# Patient Record
Sex: FEMALE | Race: BLACK OR AFRICAN AMERICAN | NOT HISPANIC OR LATINO | Employment: UNEMPLOYED | URBAN - METROPOLITAN AREA
[De-identification: names, ages, dates, MRNs, and addresses within clinical notes are randomized per-mention and may not be internally consistent; named-entity substitution may affect disease eponyms.]

---

## 2017-03-05 ENCOUNTER — HOSPITAL ENCOUNTER (EMERGENCY)
Facility: HOSPITAL | Age: 67
Discharge: HOME | End: 2017-03-05
Attending: EMERGENCY MEDICINE
Payer: MEDICARE

## 2017-03-05 VITALS
BODY MASS INDEX: 48.82 KG/M2 | DIASTOLIC BLOOD PRESSURE: 77 MMHG | TEMPERATURE: 99 F | OXYGEN SATURATION: 96 % | WEIGHT: 293 LBS | HEIGHT: 65 IN | HEART RATE: 88 BPM | SYSTOLIC BLOOD PRESSURE: 181 MMHG | RESPIRATION RATE: 18 BRPM

## 2017-03-05 DIAGNOSIS — M25.512 LEFT SHOULDER PAIN: ICD-10-CM

## 2017-03-05 DIAGNOSIS — M75.32 CALCIFIC TENDONITIS OF LEFT SHOULDER: Primary | ICD-10-CM

## 2017-03-05 PROCEDURE — 96372 THER/PROPH/DIAG INJ SC/IM: CPT

## 2017-03-05 PROCEDURE — 99283 EMERGENCY DEPT VISIT LOW MDM: CPT | Mod: 25

## 2017-03-05 PROCEDURE — 63600175 PHARM REV CODE 636 W HCPCS: Performed by: NURSE PRACTITIONER

## 2017-03-05 RX ORDER — MORPHINE SULFATE 10 MG/ML
6 INJECTION INTRAMUSCULAR; INTRAVENOUS; SUBCUTANEOUS
Status: COMPLETED | OUTPATIENT
Start: 2017-03-05 | End: 2017-03-05

## 2017-03-05 RX ORDER — HYDROCODONE BITARTRATE AND ACETAMINOPHEN 5; 325 MG/1; MG/1
1 TABLET ORAL EVERY 4 HOURS PRN
Qty: 12 TABLET | Refills: 0 | Status: SHIPPED | OUTPATIENT
Start: 2017-03-05 | End: 2017-03-15

## 2017-03-05 RX ADMIN — MORPHINE SULFATE 6 MG: 10 INJECTION INTRAVENOUS at 10:03

## 2017-03-05 NOTE — ED AVS SNAPSHOT
[facility]  [address] [place] [address]               Nisa Reynolds Zac   3/5/2017  8:18 AM   ED    Description:  Female : 1950   Department:  [facility]           Your Care was Coordinated By:     Provider Role From To    Maddy Dodd MD Attending Provider 17 0851 --    Marilyn Martin NP Nurse Practitioner 17 0829 --      Reason for Visit     Shoulder Pain           Diagnoses this Visit        Comments    Calcific tendonitis of left shoulder    -  Primary     Left shoulder pain           ED Disposition     ED Disposition Condition Comment    Discharge             To Do List           Follow-up Information     Follow up with Atilio Downs MD In 1 week.    Specialty:  General Practice    Why:  For further evaluation of symptoms, if no improvement    Contact information:    [address] [place] [address]  [phone]          Follow up with Serjio Baca MD.    Specialty:  Orthopedic Surgery    Why:  ORTHOPEDICS - CALL FOR APPOINTMENT AS NEEDED    Contact information:    [address]  [address]  [place] [address]  [phone]         These Medications        Disp Refills Start End    hydrocodone-acetaminophen 5-325mg (NORCO) 5-325 mg per tablet 12 tablet 0 3/5/2017 3/15/2017    Take 1 tablet by mouth every 4 (four) hours as needed (Breakthrough pain). May cause drowsiness - Oral    Pharmacy: [facility] [place] [facility] - [address] Ph #: [phone]         [facility] On Call     [facility] On Call Nurse Care Line -  Assistance  Registered nurses in the [facility] provide clinical advisement, health education, appointment booking, and other advisory services.  Call for this free service at [phone].             Medications           Message regarding Medications     Verify the changes and/or additions to your medication regime listed below are the same as  discussed with your clinician today.  If any of these changes or additions are incorrect, please notify your healthcare provider.        START taking these NEW medications        Refills    hydrocodone-acetaminophen 5-325mg (NORCO) 5-325 mg per tablet 0    Sig: Take 1 tablet by mouth every 4 (four) hours as needed (Breakthrough pain). May cause drowsiness    Class: Print    Route: Oral      These medications were administered today        Dose Freq    morphine injection 6 mg 6 mg ED 1 Time    Sig: Inject 0.6 mLs (6 mg total) into the muscle ED 1 Time.    Class: Normal    Route: Intramuscular      STOP taking these medications     ondansetron (ZOFRAN) 4 MG tablet Take 1 tablet (4 mg total) by mouth every 8 (eight) hours as needed.           Verify that the below list of medications is an accurate representation of the medications you are currently taking.  If none reported, the list may be blank. If incorrect, please contact your healthcare provider. Carry this list with you in case of emergency.           Current Medications     aliskiren (TEKTURNA) 300 MG Tab Take by mouth once daily.    amlodipine (NORVASC) 10 MG tablet Take 1 tablet (10 mg total) by mouth once daily.    aspirin 81 MG Chew Take 1 tablet (81 mg total) by mouth once daily.    hydrALAZINE (APRESOLINE) 25 MG tablet Take 1 tablet (25 mg total) by mouth every 12 (twelve) hours.    hydrochlorothiazide (HYDRODIURIL) 12.5 MG Tab Take 1 tablet (12.5 mg total) by mouth once daily.    hydrocodone-acetaminophen 7.5-325mg (NORCO) 7.5-325 mg per tablet     insulin glargine (LANTUS) 100 unit/mL injection Inject into the skin every evening.    JANUMET XR 50-1,000 mg TM24 Take 1 tablet by mouth 2 (two) times daily.    loratadine (CLARITIN) 10 mg tablet Take 10 mg by mouth once daily.    saxagliptin (ONGLYZA) 5 mg Tab tablet Take 1 tablet (5 mg total) by mouth once daily.    atorvastatin (LIPITOR) 10 MG tablet Take 10 mg by mouth once daily.    diclofenac sodium  "(VOLTAREN) 1 % Gel Apply 2 g topically 2 (two) times daily.    EASY TOUCH TWIST LANCETS 30 gauge Misc     hydrocodone-acetaminophen 5-325mg (NORCO) 5-325 mg per tablet Take 1 tablet by mouth every 4 (four) hours as needed (Breakthrough pain). May cause drowsiness    LANTUS SOLOSTAR 100 unit/mL (3 mL) InPn pen     meclizine (ANTIVERT) 25 mg tablet Take 1 tablet (25 mg total) by mouth every 6 (six) hours as needed.    morphine injection 6 mg Inject 0.6 mLs (6 mg total) into the muscle ED 1 Time.    TRUETEST TEST STRIPS Strp     urea (CARMOL) 40 % Crea Apply topically 2 (two) times daily.           Clinical Reference Information           Your Vitals Were     BP Pulse Temp Resp Height Weight    174/79 91 100 °F (37.8 °C) (Oral) 18 5' 5" (1.651 m) 151.5 kg (334 lb)    SpO2 BMI             97% 55.58 kg/m2         Allergies as of 3/5/2017        Reactions    Latex, Natural Rubber       Immunizations Administered on Date of Encounter - 3/5/2017     None      ED Micro, Lab, POCT     None      ED Imaging Orders     Start Ordered       Status Ordering Provider    03/05/17 0846 03/05/17 0847  X-Ray Shoulder Trauma Left  1 time imaging      Final result         Discharge Instructions       Your x-ray indicates that you may have calcific tendonitis.  Please see handout for more information.    Try to get plenty of rest and stay well-hydrated on these medications.  You can wear the sling to help alleviate the pain to your shoulder, but please stretch your shoulder several times a day to prevent frozen shoulder.    RICE - Rest, Ice, Compress, Elevate (see handout).    Please take medications as prescribed.    Take Norco for pain, as needed.    Follow up with your primary care doctor or orthopedics in 1 week if symptoms have not improved.    Return to ER for any new or worsening symptoms.      Discharge References/Attachments     TENDONITIS (ENGLISH)      [facility] Sign-Up     Activating your [facility] account is as easy as 1-2-3! "     1) Visit [email or web address], select Sign Up Now, enter this activation code and your date of birth, then select Next.  [email or web address]  Expires: 4/19/2017  9:44 AM      2) Create a username and password to use when you visit [facility] in the future and select a security question in case you lose your password and select Next.    3) Enter your e-mail address and click Sign Up!    Additional Information  If you have questions, please e-mail [email or web address] or call [phone] to talk to our [facility] staff. Remember, [facility] is NOT to be used for urgent needs. For medical emergencies, dial 911.          [facility] complies with applicable Federal civil rights laws and does not discriminate on the basis of race, color, national origin, age, disability, or sex.        Language Assistance Services     ATTENTION: Language assistance services are available, free of charge. Please call [phone].      ATENCIÓN: Si habla español, tiene a hall disposición servicios gratuitos de asistencia lingüística. Llame al [phone].     CHÚ Ý: N?u b?n nói Ti?ng Vi?t, có các d?ch v? h? tr? ngôn ng? mi?n phí dành cho b?n. G?i s? [phone].

## 2017-03-05 NOTE — ED PROVIDER NOTES
Encounter Date: 3/5/2017    SCRIBE #1 NOTE: I, Cathy Padilla , am scribing for, and in the presence of,  Marilyn Martin NP . I have scribed the following portions of the note - Other sections scribed: HPI/ROS .       History     Chief Complaint   Patient presents with    Shoulder Pain     States she has pain in her left shoulder since thursday      Review of patient's allergies indicates:   Allergen Reactions    Latex, natural rubber      HPI Comments: CC: Shoulder Pain     HPI: This 66 y.o. female with HTN and IDDM presents to the ED in her friend's arm sling to the LUE c/o a 3-day hx of atraumatic, acute-onset L sided shoulder pain that has not resolved since onset. Pt states her pain is constant, severe (10/10), and temporarily alleviated with Norco. Pain is exacerbated with movement. She notes she has been frequently sleeping on that shoulder recently. She otherwise denies any recent trauma. She reports similar shoulder pain which began spontaneously and resolved on its own several years ago. Pt denies fever, numbness, weakness, paresthesia to the arms, neck pain, and back pain.       The history is provided by the patient. No  was used.     Past Medical History:   Diagnosis Date    Diabetes mellitus     Hypertension      Past Surgical History:   Procedure Laterality Date    ARTERIAL BYPASS SURGRY      9 tears sgo    CARDIAC SURGERY       SECTION      HYSTERECTOMY      TUBAL LIGATION       Family History   Problem Relation Age of Onset    No Known Problems Father     No Known Problems Mother     No Known Problems Sister     No Known Problems Brother     No Known Problems Maternal Aunt     No Known Problems Maternal Uncle     No Known Problems Paternal Aunt     No Known Problems Paternal Uncle     No Known Problems Maternal Grandmother     No Known Problems Maternal Grandfather     No Known Problems Paternal Grandmother     No Known Problems Paternal Grandfather      Amblyopia Neg Hx     Blindness Neg Hx     Cancer Neg Hx     Cataracts Neg Hx     Diabetes Neg Hx     Glaucoma Neg Hx     Hypertension Neg Hx     Macular degeneration Neg Hx     Retinal detachment Neg Hx     Strabismus Neg Hx     Stroke Neg Hx     Thyroid disease Neg Hx      Social History   Substance Use Topics    Smoking status: Never Smoker    Smokeless tobacco: Never Used    Alcohol use Yes      Comment: occ     Review of Systems   Constitutional: Negative for chills and fever.   HENT: Negative for congestion, ear pain and sore throat.    Eyes: Negative for visual disturbance.   Respiratory: Negative for cough and shortness of breath.    Cardiovascular: Negative for chest pain.   Gastrointestinal: Negative for abdominal pain, diarrhea, nausea and vomiting.   Genitourinary: Negative for dysuria and hematuria.   Musculoskeletal: Positive for arthralgias (L shoulder ). Negative for back pain, joint swelling, neck pain and neck stiffness.   Skin: Negative for rash.   Neurological: Negative for dizziness, weakness, numbness and headaches.        (-) paresthesia to the arms        Physical Exam   Initial Vitals   BP Pulse Resp Temp SpO2   03/05/17 0807 03/05/17 0807 03/05/17 0807 03/05/17 0807 03/05/17 0807   174/79 91 18 100 °F (37.8 °C) 97 %     Physical Exam    Nursing note and vitals reviewed.  Constitutional: She appears well-developed and well-nourished. She is not diaphoretic.   Eyes: Conjunctivae and EOM are normal. Pupils are equal, round, and reactive to light.   Neck: Normal range of motion. Neck supple.   Pulmonary/Chest: No respiratory distress.   Musculoskeletal:        Left shoulder: She exhibits decreased range of motion (limited d/t pain - unable to flex or extend beyond 90 degrees without severe pain), tenderness (diffuse) and pain. She exhibits no bony tenderness, no swelling, no effusion, no crepitus, no deformity, no laceration, no spasm, normal pulse and normal strength.    Neurological: She is alert and oriented to person, place, and time. She has normal strength.   Skin: Skin is warm and dry.   Psychiatric: She has a normal mood and affect.         ED Course   Procedures  Labs Reviewed - No data to display       X-Rays:   Independently Interpreted Readings:   Other Readings:  Left shoulder x-ray with tiny calcific density projected over the superior lateral humeral head.  No fractures, osseous lesions.       Additional MDM:   Comments: This is an urgent evaluation of a 66-year-old female that presents to the emergency room complaining of atraumatic left shoulder pain.  Pain is worse with movement and she has moderate tenderness to palpation diffusely over the shoulder.  Sensory motor function is intact.  There is no erythema, swelling, warmth.  She denies a history of RA or gout.  XR shows a small calcific density projected over the area adjacent to the humeral head - c/w calcific tendonitis - which would correlate with her history and physical exam.  I have a low suspicion for gout, RA, septic joint at this time.  Will give sling for comfort and rest, with precautions to prevent frozen shoulder  She has run out of her [employer], but sees her PCP in 3 days. Will give short supply for supportive care.  Encouraged warm/cool compresses.  Ortho referral.    Case discussed with attending, , and she is in agreement with plan. Stable for discharge and outpatient follow.  .          Scribe Attestation:   Scribe #1: I performed the above scribed service and the documentation accurately describes the services I performed. I attest to the accuracy of the note.    Attending Attestation:     Physician Attestation Statement for NP/PA:   I discussed this assessment and plan of this patient with the NP/PA, but I did not personally examine the patient. The face to face encounter was performed by the NP/PA.    Other NP/PA Attestation Additions:      Medical Decision Makin y.o.  Female presents with left sided shoulder pain.  She has pain with range of motion of left shoulder.  Left shoulder x-rays independently interpreted by me show degenerative changes present.  Radiology comments that there is evidence of calcific tendinitis.  Given IM morphine in ED.  Septic joint unlikely at this time. Plan to treat symptomatically and refer to PCP and orthopedist. Patient placed in sling for comfort. I have discussed this patient with mid-level provider and agree with physical exam, assessment and plan.        Physician Attestation for Scribe:  Physician Attestation Statement for Scribe #1: I, Marilyn Martin NP  , reviewed documentation, as scribed by Cathy Padilla  in my presence, and it is both accurate and complete.                 ED Course     Clinical Impression:   The primary encounter diagnosis was Calcific tendonitis of left shoulder. A diagnosis of Left shoulder pain was also pertinent to this visit.    Disposition:   Disposition: Discharged  Condition: Stable       Marilyn Martin NP  03/05/17 1783       Maddy Dodd MD  03/05/17 3076

## 2017-03-05 NOTE — DISCHARGE INSTRUCTIONS
Your x-ray indicates that you may have calcific tendonitis.  Please see handout for more information.    Try to get plenty of rest and stay well-hydrated on these medications.  You can wear the sling to help alleviate the pain to your shoulder, but please stretch your shoulder several times a day to prevent frozen shoulder.    RICE - Rest, Ice, Compress, Elevate (see handout).    Please take medications as prescribed.    Take Norco for pain, as needed.    Follow up with your primary care doctor or orthopedics in 1 week if symptoms have not improved.    Return to ER for any new or worsening symptoms.

## 2017-06-18 ENCOUNTER — HOSPITAL ENCOUNTER (INPATIENT)
Facility: HOSPITAL | Age: 67
LOS: 26 days | Discharge: NURSING, REHAB OR LONG-TERM CARE | DRG: 004 | End: 2017-07-14
Attending: EMERGENCY MEDICINE | Admitting: PSYCHIATRY & NEUROLOGY
Payer: MEDICARE

## 2017-06-18 DIAGNOSIS — R79.89 ELEVATED TROPONIN: ICD-10-CM

## 2017-06-18 DIAGNOSIS — J96.01 ACUTE RESPIRATORY FAILURE WITH HYPOXIA: ICD-10-CM

## 2017-06-18 DIAGNOSIS — A41.9 SEPSIS, DUE TO UNSPECIFIED ORGANISM: Primary | ICD-10-CM

## 2017-06-18 DIAGNOSIS — R50.9 FEVER: ICD-10-CM

## 2017-06-18 DIAGNOSIS — R78.81 BACTEREMIA: ICD-10-CM

## 2017-06-18 DIAGNOSIS — I67.4 HYPERTENSIVE ENCEPHALOPATHY: ICD-10-CM

## 2017-06-18 DIAGNOSIS — R13.11 ORAL PHASE DYSPHAGIA: ICD-10-CM

## 2017-06-18 DIAGNOSIS — I63.89 CEREBRAL INFARCTION, WATERSHED DISTRIBUTION, BILATERAL, ACUTE: ICD-10-CM

## 2017-06-18 DIAGNOSIS — R56.9 SEIZURE: ICD-10-CM

## 2017-06-18 DIAGNOSIS — I16.1 HYPERTENSIVE EMERGENCY: ICD-10-CM

## 2017-06-18 DIAGNOSIS — I10 ESSENTIAL HYPERTENSION: ICD-10-CM

## 2017-06-18 LAB
ALBUMIN SERPL BCP-MCNC: 2.7 G/DL
ALP SERPL-CCNC: 137 U/L
ALT SERPL W/O P-5'-P-CCNC: 8 U/L
ANION GAP SERPL CALC-SCNC: 12 MMOL/L
AST SERPL-CCNC: 8 U/L
BACTERIA #/AREA URNS HPF: ABNORMAL /HPF
BASOPHILS # BLD AUTO: 0.01 K/UL
BASOPHILS NFR BLD: 0.1 %
BILIRUB SERPL-MCNC: 1.3 MG/DL
BILIRUB UR QL STRIP: NEGATIVE
BNP SERPL-MCNC: 151 PG/ML
BUN SERPL-MCNC: 9 MG/DL
CALCIUM SERPL-MCNC: 8.7 MG/DL
CHLORIDE SERPL-SCNC: 95 MMOL/L
CLARITY UR: ABNORMAL
CO2 SERPL-SCNC: 30 MMOL/L
COLOR UR: ABNORMAL
CREAT SERPL-MCNC: 0.9 MG/DL
DIFFERENTIAL METHOD: ABNORMAL
EOSINOPHIL # BLD AUTO: 0 K/UL
EOSINOPHIL NFR BLD: 0 %
ERYTHROCYTE [DISTWIDTH] IN BLOOD BY AUTOMATED COUNT: 13.6 %
EST. GFR  (AFRICAN AMERICAN): >60 ML/MIN/1.73 M^2
EST. GFR  (NON AFRICAN AMERICAN): >60 ML/MIN/1.73 M^2
GLUCOSE SERPL-MCNC: 276 MG/DL
GLUCOSE UR QL STRIP: ABNORMAL
HCT VFR BLD AUTO: 38.4 %
HGB BLD-MCNC: 12.4 G/DL
HGB UR QL STRIP: ABNORMAL
HYALINE CASTS #/AREA URNS LPF: 0 /LPF
KETONES UR QL STRIP: NEGATIVE
LACTATE SERPL-SCNC: 1.9 MMOL/L
LEUKOCYTE ESTERASE UR QL STRIP: ABNORMAL
LYMPHOCYTES # BLD AUTO: 1.8 K/UL
LYMPHOCYTES NFR BLD: 11.6 %
MCH RBC QN AUTO: 26.1 PG
MCHC RBC AUTO-ENTMCNC: 32.3 %
MCV RBC AUTO: 81 FL
MICROSCOPIC COMMENT: ABNORMAL
MONOCYTES # BLD AUTO: 0.6 K/UL
MONOCYTES NFR BLD: 3.8 %
NEUTROPHILS # BLD AUTO: 12.8 K/UL
NEUTROPHILS NFR BLD: 84.5 %
NITRITE UR QL STRIP: NEGATIVE
NON-SQ EPI CELLS #/AREA URNS HPF: 2 /HPF
PH UR STRIP: 5 [PH] (ref 5–8)
PLATELET # BLD AUTO: 280 K/UL
PMV BLD AUTO: 10 FL
POTASSIUM SERPL-SCNC: 3.4 MMOL/L
PROT SERPL-MCNC: 8 G/DL
PROT UR QL STRIP: ABNORMAL
RBC # BLD AUTO: 4.75 M/UL
RBC #/AREA URNS HPF: 1 /HPF (ref 0–4)
SODIUM SERPL-SCNC: 137 MMOL/L
SP GR UR STRIP: 1.02 (ref 1–1.03)
SQUAMOUS #/AREA URNS HPF: 0 /HPF
TROPONIN I SERPL DL<=0.01 NG/ML-MCNC: 0.04 NG/ML
URN SPEC COLLECT METH UR: ABNORMAL
UROBILINOGEN UR STRIP-ACNC: NEGATIVE EU/DL
WBC # BLD AUTO: 15.14 K/UL
WBC #/AREA URNS HPF: 8 /HPF (ref 0–5)
YEAST URNS QL MICRO: ABNORMAL

## 2017-06-18 PROCEDURE — 63600175 PHARM REV CODE 636 W HCPCS: Performed by: EMERGENCY MEDICINE

## 2017-06-18 PROCEDURE — 83880 ASSAY OF NATRIURETIC PEPTIDE: CPT

## 2017-06-18 PROCEDURE — 96375 TX/PRO/DX INJ NEW DRUG ADDON: CPT

## 2017-06-18 PROCEDURE — 84484 ASSAY OF TROPONIN QUANT: CPT

## 2017-06-18 PROCEDURE — 83605 ASSAY OF LACTIC ACID: CPT

## 2017-06-18 PROCEDURE — 12000002 HC ACUTE/MED SURGE SEMI-PRIVATE ROOM

## 2017-06-18 PROCEDURE — 93005 ELECTROCARDIOGRAM TRACING: CPT

## 2017-06-18 PROCEDURE — 80053 COMPREHEN METABOLIC PANEL: CPT

## 2017-06-18 PROCEDURE — 96365 THER/PROPH/DIAG IV INF INIT: CPT

## 2017-06-18 PROCEDURE — P9612 CATHETERIZE FOR URINE SPEC: HCPCS

## 2017-06-18 PROCEDURE — 87040 BLOOD CULTURE FOR BACTERIA: CPT | Mod: 59

## 2017-06-18 PROCEDURE — 85025 COMPLETE CBC W/AUTO DIFF WBC: CPT

## 2017-06-18 PROCEDURE — 96376 TX/PRO/DX INJ SAME DRUG ADON: CPT

## 2017-06-18 PROCEDURE — 81000 URINALYSIS NONAUTO W/SCOPE: CPT

## 2017-06-18 PROCEDURE — 96366 THER/PROPH/DIAG IV INF ADDON: CPT

## 2017-06-18 PROCEDURE — 83036 HEMOGLOBIN GLYCOSYLATED A1C: CPT

## 2017-06-18 PROCEDURE — 25000003 PHARM REV CODE 250: Performed by: EMERGENCY MEDICINE

## 2017-06-18 PROCEDURE — 87086 URINE CULTURE/COLONY COUNT: CPT

## 2017-06-18 PROCEDURE — 99291 CRITICAL CARE FIRST HOUR: CPT | Mod: 25

## 2017-06-18 RX ORDER — INSULIN ASPART 100 [IU]/ML
0-5 INJECTION, SOLUTION INTRAVENOUS; SUBCUTANEOUS
Status: DISCONTINUED | OUTPATIENT
Start: 2017-06-19 | End: 2017-06-19

## 2017-06-18 RX ORDER — LABETALOL HYDROCHLORIDE 5 MG/ML
10 INJECTION, SOLUTION INTRAVENOUS
Status: COMPLETED | OUTPATIENT
Start: 2017-06-18 | End: 2017-06-18

## 2017-06-18 RX ORDER — PROCHLORPERAZINE EDISYLATE 5 MG/ML
10 INJECTION INTRAMUSCULAR; INTRAVENOUS ONCE
Status: COMPLETED | OUTPATIENT
Start: 2017-06-18 | End: 2017-06-18

## 2017-06-18 RX ORDER — MORPHINE SULFATE 10 MG/ML
5 INJECTION INTRAMUSCULAR; INTRAVENOUS; SUBCUTANEOUS ONCE
Status: COMPLETED | OUTPATIENT
Start: 2017-06-19 | End: 2017-06-18

## 2017-06-18 RX ORDER — IBUPROFEN 200 MG
16 TABLET ORAL
Status: DISCONTINUED | OUTPATIENT
Start: 2017-06-19 | End: 2017-06-27

## 2017-06-18 RX ORDER — HYDRALAZINE HYDROCHLORIDE 20 MG/ML
10 INJECTION INTRAMUSCULAR; INTRAVENOUS
Status: COMPLETED | OUTPATIENT
Start: 2017-06-18 | End: 2017-06-18

## 2017-06-18 RX ORDER — LABETALOL HYDROCHLORIDE 5 MG/ML
20 INJECTION, SOLUTION INTRAVENOUS EVERY 4 HOURS PRN
Status: DISCONTINUED | OUTPATIENT
Start: 2017-06-18 | End: 2017-06-19

## 2017-06-18 RX ORDER — IBUPROFEN 200 MG
24 TABLET ORAL
Status: DISCONTINUED | OUTPATIENT
Start: 2017-06-19 | End: 2017-06-27

## 2017-06-18 RX ORDER — DIPHENHYDRAMINE HYDROCHLORIDE 50 MG/ML
25 INJECTION INTRAMUSCULAR; INTRAVENOUS
Status: COMPLETED | OUTPATIENT
Start: 2017-06-18 | End: 2017-06-18

## 2017-06-18 RX ORDER — LABETALOL HYDROCHLORIDE 5 MG/ML
20 INJECTION, SOLUTION INTRAVENOUS
Status: COMPLETED | OUTPATIENT
Start: 2017-06-18 | End: 2017-06-18

## 2017-06-18 RX ADMIN — HYDRALAZINE HYDROCHLORIDE 10 MG: 20 INJECTION INTRAMUSCULAR; INTRAVENOUS at 07:06

## 2017-06-18 RX ADMIN — CEFTRIAXONE 1 G: 1 INJECTION, SOLUTION INTRAVENOUS at 10:06

## 2017-06-18 RX ADMIN — DIPHENHYDRAMINE HYDROCHLORIDE 25 MG: 50 INJECTION, SOLUTION INTRAMUSCULAR; INTRAVENOUS at 08:06

## 2017-06-18 RX ADMIN — LABETALOL HYDROCHLORIDE 20 MG: 5 INJECTION, SOLUTION INTRAVENOUS at 10:06

## 2017-06-18 RX ADMIN — MORPHINE SULFATE 5 MG: 10 INJECTION INTRAVENOUS at 11:06

## 2017-06-18 RX ADMIN — LABETALOL HYDROCHLORIDE 10 MG: 5 INJECTION, SOLUTION INTRAVENOUS at 10:06

## 2017-06-18 RX ADMIN — PROCHLORPERAZINE EDISYLATE 10 MG: 5 INJECTION INTRAMUSCULAR; INTRAVENOUS at 08:06

## 2017-06-18 NOTE — LETTER
June 20, 2017                      [place] [address]  Phone: [phone] TO: Human Resources         [place]      RE:  Shayna Owen     Please assist Ms Owen to adjust her work schedule. Her mother admitted to the hospital on 6/18/2017, is currently in ICU in serious condition. Ms Owen is needed at this time at bedside to met with the medical team throughout the day.    Thank you for your consideration.          Dasia Talbot [facility]   [place]  [phone]

## 2017-06-18 NOTE — LETTER
June 20, 2017                      [place] [address]  Phone: [phone] TO: Human Resources         [facility] [place]      RE:  Shayna Owen     Please assist Ms wOen to adjust her work schedule. Her mother admitted to the hospital on 6/18/2017, is currently in ICU in serious condition. Ms Owen is needed at this time at bedside to met with the medical team throughout the day.    Thank you for your consideration.          Dasia Talbot [facility]   [place]  [phone]

## 2017-06-19 PROBLEM — E78.5 HYPERLIPIDEMIA: Chronic | Status: ACTIVE | Noted: 2017-06-19

## 2017-06-19 PROBLEM — I25.10 CAD (CORONARY ARTERY DISEASE): Chronic | Status: ACTIVE | Noted: 2017-06-19

## 2017-06-19 PROBLEM — N12 PYELONEPHRITIS: Status: ACTIVE | Noted: 2017-06-19

## 2017-06-19 PROBLEM — A41.9 SEPSIS: Status: ACTIVE | Noted: 2017-06-19

## 2017-06-19 PROBLEM — E44.0 MODERATE PROTEIN MALNUTRITION: Chronic | Status: ACTIVE | Noted: 2017-06-19

## 2017-06-19 PROBLEM — I16.1 HYPERTENSIVE EMERGENCY: Status: ACTIVE | Noted: 2017-06-18

## 2017-06-19 PROBLEM — N39.0 UTI (URINARY TRACT INFECTION): Status: ACTIVE | Noted: 2017-06-19

## 2017-06-19 PROBLEM — E87.6 HYPOKALEMIA: Status: ACTIVE | Noted: 2017-06-19

## 2017-06-19 LAB
ALBUMIN SERPL BCP-MCNC: 2.7 G/DL
ALLENS TEST: ABNORMAL
ALLENS TEST: ABNORMAL
ALP SERPL-CCNC: 127 U/L
ALT SERPL W/O P-5'-P-CCNC: 8 U/L
ANION GAP SERPL CALC-SCNC: 18 MMOL/L
ANISOCYTOSIS BLD QL SMEAR: SLIGHT
AST SERPL-CCNC: 14 U/L
BASOPHILS # BLD AUTO: 0.04 K/UL
BASOPHILS NFR BLD: 0.3 %
BILIRUB SERPL-MCNC: 1.2 MG/DL
BUN SERPL-MCNC: 12 MG/DL
CALCIUM SERPL-MCNC: 8.5 MG/DL
CHLORIDE SERPL-SCNC: 96 MMOL/L
CO2 SERPL-SCNC: 23 MMOL/L
CREAT SERPL-MCNC: 0.9 MG/DL
DACRYOCYTES BLD QL SMEAR: ABNORMAL
DELSYS: ABNORMAL
DELSYS: ABNORMAL
DIFFERENTIAL METHOD: ABNORMAL
EOSINOPHIL # BLD AUTO: 0 K/UL
EOSINOPHIL NFR BLD: 0.1 %
ERYTHROCYTE [DISTWIDTH] IN BLOOD BY AUTOMATED COUNT: 13.7 %
EST. GFR  (AFRICAN AMERICAN): >60 ML/MIN/1.73 M^2
EST. GFR  (NON AFRICAN AMERICAN): >60 ML/MIN/1.73 M^2
ESTIMATED AVG GLUCOSE: 252 MG/DL
ESTIMATED AVG GLUCOSE: 255 MG/DL
FIO2: 30
FLOW: 2
GLUCOSE SERPL-MCNC: 253 MG/DL
HBA1C MFR BLD HPLC: 10.4 %
HBA1C MFR BLD HPLC: 10.5 %
HCO3 UR-SCNC: 32 MMOL/L (ref 24–28)
HCO3 UR-SCNC: 32.5 MMOL/L (ref 24–28)
HCT VFR BLD AUTO: 36.4 %
HGB BLD-MCNC: 12 G/DL
HYPOCHROMIA BLD QL SMEAR: ABNORMAL
LYMPHOCYTES # BLD AUTO: 2.2 K/UL
LYMPHOCYTES NFR BLD: 13.8 %
MCH RBC QN AUTO: 26 PG
MCHC RBC AUTO-ENTMCNC: 33 %
MCV RBC AUTO: 79 FL
MIN VOL: 9
MODE: ABNORMAL
MODE: ABNORMAL
MONOCYTES # BLD AUTO: 0.9 K/UL
MONOCYTES NFR BLD: 5.7 %
NEUTROPHILS # BLD AUTO: 12.8 K/UL
NEUTROPHILS NFR BLD: 81 %
NRBC BLD-RTO: 0 /100 WBC
OVALOCYTES BLD QL SMEAR: ABNORMAL
PCO2 BLDA: 43 MMHG (ref 35–45)
PCO2 BLDA: 44.9 MMHG (ref 35–45)
PEEP: 17
PH SMN: 7.47 [PH] (ref 7.35–7.45)
PH SMN: 7.48 [PH] (ref 7.35–7.45)
PLATELET # BLD AUTO: 259 K/UL
PMV BLD AUTO: 10 FL
PO2 BLDA: 63 MMHG (ref 80–100)
PO2 BLDA: 82 MMHG (ref 80–100)
POC BE: 8 MMOL/L
POC BE: 8 MMOL/L
POC SATURATED O2: 93 % (ref 95–100)
POC SATURATED O2: 97 % (ref 95–100)
POC TCO2: 33 MMOL/L (ref 23–27)
POC TCO2: 34 MMOL/L (ref 23–27)
POCT GLUCOSE: 212 MG/DL (ref 70–110)
POCT GLUCOSE: 229 MG/DL (ref 70–110)
POCT GLUCOSE: 234 MG/DL (ref 70–110)
POCT GLUCOSE: 268 MG/DL (ref 70–110)
POCT GLUCOSE: 270 MG/DL (ref 70–110)
POIKILOCYTOSIS BLD QL SMEAR: SLIGHT
POLYCHROMASIA BLD QL SMEAR: ABNORMAL
POTASSIUM SERPL-SCNC: 3.9 MMOL/L
PROT SERPL-MCNC: 7.4 G/DL
RBC # BLD AUTO: 4.61 M/UL
SAMPLE: ABNORMAL
SAMPLE: ABNORMAL
SITE: ABNORMAL
SITE: ABNORMAL
SODIUM SERPL-SCNC: 137 MMOL/L
SP02: 95
SP02: 97
SPONT RATE: 28
TROPONIN I SERPL DL<=0.01 NG/ML-MCNC: 0.03 NG/ML
TROPONIN I SERPL DL<=0.01 NG/ML-MCNC: 0.04 NG/ML
WBC # BLD AUTO: 15.99 K/UL

## 2017-06-19 PROCEDURE — 82803 BLOOD GASES ANY COMBINATION: CPT

## 2017-06-19 PROCEDURE — 63600175 PHARM REV CODE 636 W HCPCS: Performed by: INTERNAL MEDICINE

## 2017-06-19 PROCEDURE — 21400001 HC TELEMETRY ROOM

## 2017-06-19 PROCEDURE — 25000003 PHARM REV CODE 250: Performed by: EMERGENCY MEDICINE

## 2017-06-19 PROCEDURE — 85025 COMPLETE CBC W/AUTO DIFF WBC: CPT

## 2017-06-19 PROCEDURE — 63600175 PHARM REV CODE 636 W HCPCS: Performed by: EMERGENCY MEDICINE

## 2017-06-19 PROCEDURE — 99900035 HC TECH TIME PER 15 MIN (STAT)

## 2017-06-19 PROCEDURE — 84484 ASSAY OF TROPONIN QUANT: CPT

## 2017-06-19 PROCEDURE — 94660 CPAP INITIATION&MGMT: CPT

## 2017-06-19 PROCEDURE — 27000190 HC CPAP FULL FACE MASK W/VALVE

## 2017-06-19 PROCEDURE — 25000003 PHARM REV CODE 250: Performed by: INTERNAL MEDICINE

## 2017-06-19 PROCEDURE — 36415 COLL VENOUS BLD VENIPUNCTURE: CPT

## 2017-06-19 PROCEDURE — 83036 HEMOGLOBIN GLYCOSYLATED A1C: CPT

## 2017-06-19 PROCEDURE — 80053 COMPREHEN METABOLIC PANEL: CPT

## 2017-06-19 PROCEDURE — 25500020 PHARM REV CODE 255: Performed by: HOSPITALIST

## 2017-06-19 PROCEDURE — 36600 WITHDRAWAL OF ARTERIAL BLOOD: CPT

## 2017-06-19 RX ORDER — SODIUM CHLORIDE 9 MG/ML
INJECTION, SOLUTION INTRAVENOUS CONTINUOUS
Status: DISCONTINUED | OUTPATIENT
Start: 2017-06-19 | End: 2017-06-19

## 2017-06-19 RX ORDER — INSULIN ASPART 100 [IU]/ML
0-5 INJECTION, SOLUTION INTRAVENOUS; SUBCUTANEOUS
Status: DISCONTINUED | OUTPATIENT
Start: 2017-06-19 | End: 2017-06-27

## 2017-06-19 RX ORDER — RAMELTEON 8 MG/1
8 TABLET ORAL NIGHTLY PRN
Status: DISCONTINUED | OUTPATIENT
Start: 2017-06-19 | End: 2017-06-30

## 2017-06-19 RX ORDER — AMLODIPINE BESYLATE 5 MG/1
10 TABLET ORAL DAILY
Status: DISCONTINUED | OUTPATIENT
Start: 2017-06-19 | End: 2017-06-20

## 2017-06-19 RX ORDER — ALISKIREN 150 MG/1
300 TABLET, FILM COATED ORAL DAILY
Status: DISCONTINUED | OUTPATIENT
Start: 2017-06-19 | End: 2017-06-20

## 2017-06-19 RX ORDER — HYDROCODONE BITARTRATE AND ACETAMINOPHEN 7.5; 325 MG/1; MG/1
1 TABLET ORAL EVERY 4 HOURS PRN
Status: DISCONTINUED | OUTPATIENT
Start: 2017-06-19 | End: 2017-06-19

## 2017-06-19 RX ORDER — ONDANSETRON 2 MG/ML
8 INJECTION INTRAMUSCULAR; INTRAVENOUS EVERY 8 HOURS PRN
Status: DISCONTINUED | OUTPATIENT
Start: 2017-06-19 | End: 2017-07-14 | Stop reason: HOSPADM

## 2017-06-19 RX ORDER — CLONIDINE HYDROCHLORIDE 0.1 MG/1
0.3 TABLET ORAL 3 TIMES DAILY PRN
Status: DISCONTINUED | OUTPATIENT
Start: 2017-06-19 | End: 2017-06-20

## 2017-06-19 RX ORDER — ENOXAPARIN SODIUM 100 MG/ML
40 INJECTION SUBCUTANEOUS EVERY 24 HOURS
Status: DISCONTINUED | OUTPATIENT
Start: 2017-06-19 | End: 2017-06-25

## 2017-06-19 RX ORDER — NAPROXEN SODIUM 220 MG/1
81 TABLET, FILM COATED ORAL DAILY
Status: DISCONTINUED | OUTPATIENT
Start: 2017-06-19 | End: 2017-06-25

## 2017-06-19 RX ORDER — LABETALOL HYDROCHLORIDE 5 MG/ML
20 INJECTION, SOLUTION INTRAVENOUS
Status: COMPLETED | OUTPATIENT
Start: 2017-06-19 | End: 2017-06-19

## 2017-06-19 RX ORDER — ATORVASTATIN CALCIUM 10 MG/1
10 TABLET, FILM COATED ORAL DAILY
Status: DISCONTINUED | OUTPATIENT
Start: 2017-06-19 | End: 2017-06-30

## 2017-06-19 RX ORDER — ONDANSETRON 2 MG/ML
4 INJECTION INTRAMUSCULAR; INTRAVENOUS EVERY 12 HOURS PRN
Status: DISCONTINUED | OUTPATIENT
Start: 2017-06-19 | End: 2017-06-19

## 2017-06-19 RX ORDER — HYDROCHLOROTHIAZIDE 12.5 MG/1
12.5 TABLET ORAL DAILY
Status: DISCONTINUED | OUTPATIENT
Start: 2017-06-19 | End: 2017-06-20

## 2017-06-19 RX ORDER — POTASSIUM CHLORIDE 20 MEQ/1
40 TABLET, EXTENDED RELEASE ORAL ONCE
Status: COMPLETED | OUTPATIENT
Start: 2017-06-19 | End: 2017-06-19

## 2017-06-19 RX ORDER — ACETAMINOPHEN 325 MG/1
650 TABLET ORAL
Status: COMPLETED | OUTPATIENT
Start: 2017-06-19 | End: 2017-06-19

## 2017-06-19 RX ORDER — ACETAMINOPHEN 500 MG
500 TABLET ORAL EVERY 6 HOURS PRN
Status: DISCONTINUED | OUTPATIENT
Start: 2017-06-19 | End: 2017-06-30

## 2017-06-19 RX ORDER — HYDRALAZINE HYDROCHLORIDE 25 MG/1
25 TABLET, FILM COATED ORAL EVERY 12 HOURS
Status: DISCONTINUED | OUTPATIENT
Start: 2017-06-19 | End: 2017-06-20

## 2017-06-19 RX ADMIN — CEFTRIAXONE 1 G: 1 INJECTION, SOLUTION INTRAVENOUS at 09:06

## 2017-06-19 RX ADMIN — ATORVASTATIN CALCIUM 10 MG: 10 TABLET, FILM COATED ORAL at 08:06

## 2017-06-19 RX ADMIN — SODIUM CHLORIDE: 0.9 INJECTION, SOLUTION INTRAVENOUS at 03:06

## 2017-06-19 RX ADMIN — AMLODIPINE BESYLATE 10 MG: 5 TABLET ORAL at 08:06

## 2017-06-19 RX ADMIN — CLONIDINE HYDROCHLORIDE 0.3 MG: 0.1 TABLET ORAL at 08:06

## 2017-06-19 RX ADMIN — HYDRALAZINE HYDROCHLORIDE 25 MG: 25 TABLET ORAL at 09:06

## 2017-06-19 RX ADMIN — INSULIN ASPART 1 UNITS: 100 INJECTION, SOLUTION INTRAVENOUS; SUBCUTANEOUS at 09:06

## 2017-06-19 RX ADMIN — ASPIRIN 81 MG 81 MG: 81 TABLET ORAL at 08:06

## 2017-06-19 RX ADMIN — IOHEXOL 100 ML: 350 INJECTION, SOLUTION INTRAVENOUS at 06:06

## 2017-06-19 RX ADMIN — HYDRALAZINE HYDROCHLORIDE 25 MG: 25 TABLET ORAL at 12:06

## 2017-06-19 RX ADMIN — HYDRALAZINE HYDROCHLORIDE 25 MG: 25 TABLET ORAL at 08:06

## 2017-06-19 RX ADMIN — LABETALOL HYDROCHLORIDE 20 MG: 5 INJECTION, SOLUTION INTRAVENOUS at 12:06

## 2017-06-19 RX ADMIN — ALISKIREN HEMIFUMARATE 300 MG: 150 TABLET, FILM COATED ORAL at 11:06

## 2017-06-19 RX ADMIN — ENOXAPARIN SODIUM 40 MG: 100 INJECTION SUBCUTANEOUS at 04:06

## 2017-06-19 RX ADMIN — HYDROCHLOROTHIAZIDE 12.5 MG: 12.5 TABLET ORAL at 08:06

## 2017-06-19 RX ADMIN — CEFTRIAXONE 1 G: 1 INJECTION, SOLUTION INTRAVENOUS at 11:06

## 2017-06-19 RX ADMIN — POTASSIUM CHLORIDE 40 MEQ: 1500 TABLET, EXTENDED RELEASE ORAL at 06:06

## 2017-06-19 RX ADMIN — ACETAMINOPHEN 650 MG: 325 TABLET, FILM COATED ORAL at 12:06

## 2017-06-19 NOTE — ASSESSMENT & PLAN NOTE
As addressed above.  Will restart her home regimen of aliskiren, amlodipine, hydralazine, and hydrochlorothiazide.

## 2017-06-19 NOTE — PLAN OF CARE
Problem: Diabetes, Type 2 (Adult)  Intervention: Support/Optimize Psychosocial Response to Condition   17   Coping/Psychosocial Interventions   Supportive Measures active listening utilized;positive reinforcement provided;verbalization of feelings encouraged   Environmental Support calm environment promoted     Intervention: Optimize Glycemic Control   17   Nutrition Interventions   Glycemic Management blood glucose monitoring       Goal: Signs and Symptoms of Listed Potential Problems Will be Absent, Minimized or Managed (Diabetes, Type 2)  Signs and symptoms of listed potential problems will be absent, minimized or managed by discharge/transition of care (reference Diabetes, Type 2 (Adult) CPG).   Outcome: Ongoing (interventions implemented as appropriate)   17   Diabetes, Type 2   Problems Assessed (Type 2 Diabetes) all   Problems Present (Type 2 Diabetes) hyperglycemia       Problem: Fall Risk (Adult)  Intervention: Monitor/Assist with Self Care   17 0144 17 1702 17   Functional Level Current   Ambulation --  --  2 - assistive person   Transferring --  --  2 - assistive person   Toileting --  --  2 - assistive person   Bathing --  --  2 - assistive person   Dressing --  --  0 - independent   Eating --  --  0 - independent   Communication --  --  0 - understands/communicates without difficulty   Swallowing --  --  0 - swallows foods/liquids without difficulty   Daily Care Interventions   Self-Care Promotion independence encouraged --  --    Activity   Activity Assistance Provided --  independent --      Intervention: Reduce Risk/Promote Restraint Free Environment   17   Safety Interventions   Environmental Safety Modification assistive device/personal items within reach;clutter free environment maintained;room near unit station;room organization consistent   Prevent  Drop/Fall   Safety/Security Measures bed alarm set     Intervention:  Review Medications/Identify Contributors to Fall Risk   06/19/17 1823   Safety Interventions   Medication Review/Management medications reviewed;infusion held     Intervention: Patient Rounds   06/19/17 1702   Safety Interventions   Patient Rounds bed in low position;bed wheels locked;call light in reach;clutter free environment maintained;ID band on;placement of personal items at bedside;visualized patient     Intervention: Safety Promotion/Fall Prevention   06/19/17 1702   Safety Interventions   Safety Promotion/Fall Prevention assistive device/personal item within reach;bed alarm set;side rails raised x 2       Goal: Identify Related Risk Factors and Signs and Symptoms  Related risk factors and signs and symptoms are identified upon initiation of Human Response Clinical Practice Guideline (CPG)   Outcome: Ongoing (interventions implemented as appropriate)   06/19/17 1823   Fall Risk   Related Risk Factors (Fall Risk) confusion/agitation   Signs and Symptoms (Fall Risk) presence of risk factors     Goal: Absence of Falls  Patient will demonstrate the desired outcomes by discharge/transition of care.   Outcome: Ongoing (interventions implemented as appropriate)   06/19/17 1823   Fall Risk (Adult)   Absence of Falls making progress toward outcome       Problem: Pressure Ulcer Risk (Scar Scale) (Adult,Obstetrics,Pediatric)  Intervention: Prevent/Manage Excess Moisture   06/19/17 1602 06/19/17 1823   Hygiene Care   Perineal Care --  perineum cleansed   Bathing/Skin Care incontinence care;linen changed;bath, complete --      Intervention: Maintain Head of Bed Elevation Less Than 30 Degrees as Tolerated   06/19/17 1702   Positioning   Head of Bed (HOB) HOB at 30-45 degrees     Intervention: Prevent/Minimize Sheer/Friction Injuries   06/19/17 1823   Skin Interventions   Pressure Reduction Techniques frequent weight shift encouraged     Intervention: Turn/Reposition Often   06/19/17 1702 06/19/17 1823   Skin  Interventions   Pressure Reduction Techniques --  frequent weight shift encouraged   Positioning   Body Position positioned/repositioned independently --        Goal: Identify Related Risk Factors and Signs and Symptoms  Related risk factors and signs and symptoms are identified upon initiation of Human Response Clinical Practice Guideline (CPG)   Outcome: Ongoing (interventions implemented as appropriate)   06/19/17 1823   Pressure Ulcer Risk (Scar Scale)   Related Risk Factors (Pressure Ulcer Risk (Scar Scale)) body weight extremes;infection     Goal: Skin Integrity  Patient will demonstrate the desired outcomes by discharge/transition of care.   Outcome: Ongoing (interventions implemented as appropriate)   06/19/17 1823   Pressure Ulcer Risk (Scar Scale) (Adult,Obstetrics,Pediatric)   Skin Integrity making progress toward outcome       Problem: Sepsis/Septic Shock (Adult)  Intervention: Promote Rest/Minimize Oxygen Consumption   06/19/17 1702 06/19/17 1823   Coping/Psychosocial Interventions   Environmental Support --  calm environment promoted   Restraint Interventions   LUE Range Of Motion --  AROM (active range of motion) performed   RUE Range Of Motion --  AROM (active range of motion) performed   LLE Range Of Motion --  AROM (active range of motion) performed   RLE Range Of Motion --  AROM (active range of motion) performed   Activity   Activity Type activity adjusted per tolerance --      Intervention: Provide Oxygenation/Ventilation/Perfusion Support   06/19/17 1702   Activity   Activity Type activity adjusted per tolerance   Positioning   Head of Bed (HOB) HOB at 30-45 degrees     Intervention: Support Psychosocial Response to Life-changing Event/Hospitalization   06/19/17 1823   Coping/Psychosocial Interventions   Supportive Measures active listening utilized;positive reinforcement provided;verbalization of feelings encouraged     Intervention: Prevent Infection Progression   06/19/17 1823    Prevent/Manage Colorectal Surgical Infection   Fever Reduction/Comfort Measures lightweight clothing;lightweight bedding   Safety Interventions   Infection Prevention environmental surveillance   Infection Management aseptic technique maintained     Intervention: Monitor/Manage Perfusion   06/19/17 1823   Safety Interventions   Medication Review/Management medications reviewed;infusion held     Intervention: Prevent/Manage DVT/VTE Risk   06/19/17 0830   OTHER   VTE Required Core Measure Pharmacological prophylaxis initiated/maintained     Intervention: Optimize Glycemic Control   06/19/17 1823   Nutrition Interventions   Glycemic Management blood glucose monitoring       Goal: Signs and Symptoms of Listed Potential Problems Will be Absent, Minimized or Managed (Sepsis/Septic Shock)  Signs and symptoms of listed potential problems will be absent, minimized or managed by discharge/transition of care (reference Sepsis/Septic Shock (Adult) CPG).   Outcome: Ongoing (interventions implemented as appropriate)   06/19/17 1823   Sepsis/Septic Shock   Problems Assessed (Sepsis) all   Problems Present (Sepsis) situational response

## 2017-06-19 NOTE — ED TRIAGE NOTES
Pt reports headache and vomiting x 1 day. Pt states that she took BP at home and it was very high, in 200s. Denies SOB, chest pain.

## 2017-06-19 NOTE — ASSESSMENT & PLAN NOTE
Poorly-controlled on a home regimen of metformin, a DPP-4 inhibitor, basal insulin therapy; will provide basal-prandial insulin along with insulin sliding scale.

## 2017-06-19 NOTE — NURSING
Patient arrived to the floor via stretcher.  Unable to form complete sentences or convey needs at this time.  Most needs to be anticipated by staff.  Remains disoriented to person, place, situation, and time.  Unable to reorient at this time.  Unsuccessful attempt at room orientation noted.  Patient noted to be lethargic at this time.  Patient denies pain at this time. Call bell within reach. Fall precautions in progress.  Will continue to monitor.

## 2017-06-19 NOTE — ASSESSMENT & PLAN NOTE
Patient presented with fever and does have a urinalysis that is suspicious for a urinary tract infection with trace leukocytes, 8 WBCs, and many bacteria.  She meet criteria for sepsis.  There is no previous urine cultures for comparison.  Will start empiric therapy while awaiting culture results.

## 2017-06-19 NOTE — NURSING
Patient noted to continue to present with garbled speech and stuttering at times.  Easily aroused to verbal stimuli.  Continues to present with disorientation to time, situation, and place.  Purposeful hourly rounding in progress.

## 2017-06-19 NOTE — SUBJECTIVE & OBJECTIVE
Past Medical History:   Diagnosis Date    Diabetes mellitus     Hypertension        Past Surgical History:   Procedure Laterality Date    ARTERIAL BYPASS SURGRY      9 tears sgo    CARDIAC SURGERY       SECTION      HYSTERECTOMY      TUBAL LIGATION         Review of patient's allergies indicates:   Allergen Reactions    Latex, natural rubber        No current facility-administered medications on file prior to encounter.      Current Outpatient Prescriptions on File Prior to Encounter   Medication Sig    aliskiren (TEKTURNA) 300 MG Tab Take by mouth once daily.    amlodipine (NORVASC) 10 MG tablet Take 1 tablet (10 mg total) by mouth once daily.    atorvastatin (LIPITOR) 10 MG tablet Take 10 mg by mouth once daily.    diclofenac sodium (VOLTAREN) 1 % Gel Apply 2 g topically 2 (two) times daily.    EASY TOUCH TWIST LANCETS 30 gauge Misc     hydrALAZINE (APRESOLINE) 25 MG tablet Take 1 tablet (25 mg total) by mouth every 12 (twelve) hours.    hydrocodone-acetaminophen 7.5-325mg (NORCO) 7.5-325 mg per tablet     insulin glargine (LANTUS) 100 unit/mL injection Inject into the skin every evening.    JANUMET XR 50-1,000 mg TM24 Take 1 tablet by mouth 2 (two) times daily.    LANTUS SOLOSTAR 100 unit/mL (3 mL) InPn pen     loratadine (CLARITIN) 10 mg tablet Take 10 mg by mouth once daily.    meclizine (ANTIVERT) 25 mg tablet Take 1 tablet (25 mg total) by mouth every 6 (six) hours as needed.    saxagliptin (ONGLYZA) 5 mg Tab tablet Take 1 tablet (5 mg total) by mouth once daily.    TRUETEST TEST STRIPS Strp     urea (CARMOL) 40 % Crea Apply topically 2 (two) times daily.    aspirin 81 MG Chew Take 1 tablet (81 mg total) by mouth once daily.    hydrochlorothiazide (HYDRODIURIL) 12.5 MG Tab Take 1 tablet (12.5 mg total) by mouth once daily.     Family History     Problem Relation (Age of Onset)    No Known Problems Father, Mother, Sister, Brother, Maternal Aunt, Maternal Uncle, Paternal Aunt,  Paternal Uncle, Maternal Grandmother, Maternal Grandfather, Paternal Grandmother, Paternal Grandfather        Social History Main Topics    Smoking status: Never Smoker    Smokeless tobacco: Never Used    Alcohol use Yes      Comment: occ    Drug use: No    Sexual activity: Not Currently     Birth control/ protection: See Surgical Hx     Review of Systems   Constitutional: Positive for fatigue. Negative for activity change, appetite change, chills, diaphoresis, fever and unexpected weight change.   HENT: Negative.    Respiratory: Negative for cough, chest tightness, shortness of breath and wheezing.    Cardiovascular: Negative for chest pain, palpitations and leg swelling.   Gastrointestinal: Negative for abdominal distention, abdominal pain, blood in stool, constipation, diarrhea, nausea and vomiting.   Endocrine: Negative.    Genitourinary: Negative for dysuria and hematuria.   Musculoskeletal: Negative.    Neurological: Positive for weakness. Negative for dizziness, seizures, syncope and light-headedness.   Psychiatric/Behavioral: Negative.      Objective:     Vital Signs (Most Recent):  Temp: 98.4 °F (36.9 °C) (06/19/17 0144)  Pulse: 79 (06/19/17 0203)  Resp: 18 (06/19/17 0144)  BP: (!) 188/87 (06/19/17 0144)  SpO2: (!) 94 % (06/19/17 0144) Vital Signs (24h Range):  Temp:  [98.4 °F (36.9 °C)-101.5 °F (38.6 °C)] 98.4 °F (36.9 °C)  Pulse:  [62-89] 79  Resp:  [18-20] 18  SpO2:  [92 %-100 %] 94 %  BP: (132-246)/() 188/87     Weight: (!) 159.9 kg (352 lb 8.3 oz)  Body mass index is 58.66 kg/m².    Physical Exam   Constitutional: She is oriented to person, place, and time. She appears well-developed and well-nourished. No distress.   Morbidly obese   HENT:   Head: Normocephalic and atraumatic.   Right Ear: External ear normal.   Left Ear: External ear normal.   Nose: Nose normal.   Eyes: Right eye exhibits no discharge. Left eye exhibits no discharge.   Neck: Normal range of motion.   Cardiovascular: Normal  rate, regular rhythm, normal heart sounds and intact distal pulses.  Exam reveals no gallop and no friction rub.    No murmur heard.  Pulmonary/Chest: Effort normal and breath sounds normal. No respiratory distress. She has no wheezes. She has no rales. She exhibits no tenderness.   Abdominal: Soft. Bowel sounds are normal. She exhibits no distension. There is no tenderness. There is no rebound and no guarding.   Musculoskeletal: Normal range of motion. She exhibits no edema.   Neurological: She is alert and oriented to person, place, and time.   Skin: Skin is warm and dry. She is not diaphoretic. No erythema.   Psychiatric: She has a normal mood and affect. Her behavior is normal. Judgment and thought content normal.   Nursing note and vitals reviewed.       Significant Labs: All pertinent labs within the past 24 hours have been reviewed.    Significant Imaging: I have reviewed and interpreted all pertinent imaging results/findings within the past 24 hours.

## 2017-06-19 NOTE — PROGRESS NOTES
Received a call from the nurse stating that the patient's CPAP mask was leaking and the machine was alarming. I went in and readjusted the mask and significantly decreased the leak around the mask to an acceptable level. The patient was getting adequate tidal volumes and Pressures. sats 98% HR 73. Pt stated that she was comfortable and I let the family know that we would come and changed the mask to a smaller one to see if that would help with the fit and leaking.

## 2017-06-19 NOTE — NURSING
BP manual 210/90. Held IV fluids, gave morning meds and PRN catapress. Dr. Augustine notified of situation. No additional orders    Resp - 35. Oxygen in use. 2L NC, 98 02 sat. Shallow breathing. Increasing confusion, R-side weakness, with expressive aphasia. Now unable to state name. Dr. Augustine notified. Will see pt ASAP.     Dr. Augustine at bedside for assessment. New orders received from [facility] @ [id number]. Educated pt on HTN medications and stroke symptoms.

## 2017-06-19 NOTE — ED PROVIDER NOTES
Encounter Date: 2017    SCRIBE #1 NOTE: I, Toi Arciniega II, am scribing for, and in the presence of,  Aureliano Alfaro MD. I have scribed the following portions of the note - Other sections scribed: HPI and ROS.       History     Chief Complaint   Patient presents with    Headache     constant throbbing frontal headache since this morning    Hypertension     Review of patient's allergies indicates:   Allergen Reactions    Latex, natural rubber      CC: Headache     HPI: This 66 y.o. female with HTN and IDDM presents to the ED c/o acute onset, severe (10/10), headache with fever that began today. Pt states she went to bed feeling fine and woke up with a throbbing headache. No prior tx attempted. No alleviating or exacerbating factors. Pt denies cough, SOB, abdominal pain, and vomiting.    SHx: tubal ligation,  section, arterial bypass, hysterectomy, and cardiac surgery      The history is provided by the patient. No  was used.     Past Medical History:   Diagnosis Date    Diabetes mellitus     Hypertension      Past Surgical History:   Procedure Laterality Date    ARTERIAL BYPASS SURGRY      9 tears sgo    CARDIAC SURGERY       SECTION      HYSTERECTOMY      TUBAL LIGATION       Family History   Problem Relation Age of Onset    No Known Problems Father     No Known Problems Mother     No Known Problems Sister     No Known Problems Brother     No Known Problems Maternal Aunt     No Known Problems Maternal Uncle     No Known Problems Paternal Aunt     No Known Problems Paternal Uncle     No Known Problems Maternal Grandmother     No Known Problems Maternal Grandfather     No Known Problems Paternal Grandmother     No Known Problems Paternal Grandfather     Amblyopia Neg Hx     Blindness Neg Hx     Cancer Neg Hx     Cataracts Neg Hx     Diabetes Neg Hx     Glaucoma Neg Hx     Hypertension Neg Hx     Macular degeneration Neg Hx     Retinal  detachment Neg Hx     Strabismus Neg Hx     Stroke Neg Hx     Thyroid disease Neg Hx      Social History   Substance Use Topics    Smoking status: Never Smoker    Smokeless tobacco: Never Used    Alcohol use Yes      Comment: occ     Review of Systems   Constitutional: Positive for fever. Negative for chills and diaphoresis.   HENT: Negative for ear pain and sore throat.    Eyes: Negative for pain.        (-) eye problems   Respiratory: Negative for cough and shortness of breath.    Cardiovascular: Negative for chest pain.   Gastrointestinal: Negative for abdominal pain, diarrhea, nausea and vomiting.   Genitourinary: Negative for dysuria.   Musculoskeletal: Negative for back pain.        (-) arm or leg problems   Skin: Negative for rash.   Neurological: Positive for headaches.       Physical Exam     Initial Vitals [06/18/17 1802]   BP Pulse Resp Temp SpO2   (!) 224/108 89 20 (!) 100.7 °F (38.2 °C) 100 %     Physical Exam    Vitals reviewed.  Constitutional: She appears well-developed and well-nourished. She is Obese .   HENT:   Head: Normocephalic and atraumatic.   Nose: Nose normal.   Mouth/Throat: No oropharyngeal exudate.   Eyes: EOM are normal. Pupils are equal, round, and reactive to light.   Neck: Normal range of motion. Neck supple. No JVD present.   Cardiovascular: Regular rhythm and normal heart sounds. Exam reveals no gallop and no friction rub.    No murmur heard.  Pulmonary/Chest: Breath sounds normal. No stridor. No respiratory distress. She has no wheezes. She has no rhonchi. She has no rales. She exhibits no tenderness.   Abdominal: Soft. Bowel sounds are normal. She exhibits no distension and no mass. There is no tenderness. There is no rebound and no guarding.   Musculoskeletal: Normal range of motion. She exhibits no edema or tenderness.   Neurological: She is alert and oriented to person, place, and time. She has normal strength. No sensory deficit.   Skin: Skin is warm and dry.    Psychiatric: She has a normal mood and affect. Thought content normal.         ED Course   Critical Care  Date/Time: 6/18/2017 11:56 PM  Performed by: OLEKSANDR SOSA  Authorized by: OLEKSANDR SOSA   Total critical care time (exclusive of procedural time) : 44 minutes  Critical care was necessary to treat or prevent imminent or life-threatening deterioration of the following conditions: sepsis.  Critical care was time spent personally by me on the following activities: discussions with consultants, development of treatment plan with patient or surrogate, evaluation of patient's response to treatment, examination of patient, obtaining history from patient or surrogate, ordering and performing treatments and interventions, ordering and review of laboratory studies, ordering and review of radiographic studies, pulse oximetry, re-evaluation of patient's condition and review of old charts.        Labs Reviewed   B-TYPE NATRIURETIC PEPTIDE - Abnormal; Notable for the following:        Result Value     (*)     All other components within normal limits   CBC W/ AUTO DIFFERENTIAL - Abnormal; Notable for the following:     WBC 15.14 (*)     MCV 81 (*)     MCH 26.1 (*)     Gran # 12.8 (*)     Gran% 84.5 (*)     Lymph% 11.6 (*)     Mono% 3.8 (*)     All other components within normal limits   COMPREHENSIVE METABOLIC PANEL - Abnormal; Notable for the following:     Potassium 3.4 (*)     CO2 30 (*)     Glucose 276 (*)     Albumin 2.7 (*)     Total Bilirubin 1.3 (*)     Alkaline Phosphatase 137 (*)     AST 8 (*)     ALT 8 (*)     All other components within normal limits   TROPONIN I - Abnormal; Notable for the following:     Troponin I 0.038 (*)     All other components within normal limits   URINALYSIS - Abnormal; Notable for the following:     Color, UA Red (*)     Appearance, UA Cloudy (*)     Protein, UA 2+ (*)     Glucose, UA 3+ (*)     Occult Blood UA 1+ (*)     Leukocytes, UA Trace (*)     All other  components within normal limits   URINALYSIS MICROSCOPIC - Abnormal; Notable for the following:     WBC, UA 8 (*)     Bacteria, UA Many (*)     Non-Squam Epith 2 (*)     All other components within normal limits   CULTURE, BLOOD   CULTURE, BLOOD   CULTURE, URINE   LACTIC ACID, PLASMA     EKG Readings: (Independently Interpreted)   Initial Reading: No STEMI. Rhythm: Normal Sinus Rhythm. Ectopy: No Ectopy. Conduction: Normal. ST Segments: Normal ST Segments. ST Segment Depression: II, V5 and V6. T Waves: Normal. Clinical Impression: Normal Sinus Rhythm       X-Rays:   Independently Interpreted Readings:   Chest X-Ray: Normal heart size.  No infiltrates.  No acute abnormalities.     Medical Decision Making:   History:   Old Medical Records: I decided to obtain old medical records.  Initial Assessment:   Medical decision-making:    The patient received a medical screening exam. If performed, the EKG was independently evaluated by me and is pending final cardiology evaluation.  If performed, all radiographic studies were independently evaluated by me and are pending final radiology evaluation. If labs were ordered, they were reviewed. Vital signs are independently assessed by me.  If performed, the pulse oximetry was independently evaluated by me.  I decided to obtain the patient's past medical record.  If available, I reviewed the patient's past medical record, including most recent labs and radiology reports.  Differential Diagnosis:   Sepsis, severe sepsis, pneumonia, urinary tract infection, bacteremia  ED Management:  Catheterized urine sample was obtained and shows evidence for urinary tract infection in the source of her fever.  Patient has leukocytosis.  She meets sepsis criteria.  A should also does have an elevated troponin.  This could represent severe sepsis with endorgan damage.  I think this is more likely related to demand ischemia.  With regards to the patient's hypertension.  She has uncontrolled  hypertension that was difficult to control in the emergency department.  She was given 3 doses of IV medications before it eventually improved.  Her systolics were sustained above 200.  For some time.  She received IV hydralazine and IV labetalol ×2 with gradual improvement to systolic 180.  We'll continue post dose medications at this time.  Discussed the findings with the hospitalist and agrees that the patient does not require drip medication at this time.  Consider hypertensive emergency and urgency with improvement in this patient.  Patient will be started on IV antibiotics.  This was done after urine cultures and blood cultures were sent.    I discussed the patient's presentation and workup with the hospitalist who agrees with placing the patient in the hospital.  I have placed orders for the hospitalist.       The results and physical exam findings were reviewed with the patient. Pt agrees with assessment, disposition and treatment plan and has no further questions or complaints at this time.    ESTEPHANIE Alfaro M.D. 11:56 PM 6/18/2017              Scribe Attestation:   Scribe #1: I performed the above scribed service and the documentation accurately describes the services I performed. I attest to the accuracy of the note.    Attending Attestation:           Physician Attestation for Scribe:  Physician Attestation Statement for Scribe #1: I, Aureliano Alfaro MD, reviewed documentation, as scribed by Toi Arciniega II in my presence, and it is both accurate and complete.                 ED Course     Clinical Impression:   The primary encounter diagnosis was Sepsis, due to unspecified organism. Diagnoses of Fever, Elevated troponin, and Essential hypertension were also pertinent to this visit.    Disposition:   Disposition: Admitted  Condition: Stable       Aureliano Alfaro MD  06/18/17 [id number]

## 2017-06-19 NOTE — HPI
Ms. Nisa Frank is a 66 y.o. female with essential hypertension, hyperlipidemia (.2 May 2014), type 2 diabetes mellitus (HbA1c 8.2% May 2014), CAD s/p CABG, morbid obesity (BMI 58.7), and moderate protein malnutrition who presents to [facility] ED with complaints of headache this morning.  She cannot provide much details on the quality of the headache but does say she's been feeling more weak and tired lately.  She denies any nausea, vomiting, fevers, or chills, and hasn't had any neck stiffness, photophobia, nor any phonophobia.  She denies any laterality to  Her weakness and has never had a stroke before.  She also denies any dysuria, hematuria, abdominal pain, diarrhea, chest pain, shortness of breath, nor any coughing.  Further history is limited as she is a very poor historian and has very garbled speech.

## 2017-06-19 NOTE — H&P
[facility] Hospital Medicine  History & Physical    Patient Name: Nisa Frank  MRN: [id number]  Admission Date: 2017  Attending Physician: Buddy Nichols MD   Primary Care Provider: Atilio Downs MD         Patient information was obtained from patient.     Subjective:     Principal Problem:Hypertensive emergency    Chief Complaint: Headache today.    HPI: Ms. Nisa Frank is a 66 y.o. female with essential hypertension, hyperlipidemia (.2 May 2014), type 2 diabetes mellitus (HbA1c 8.2% May 2014), CAD s/p CABG, morbid obesity (BMI 58.7), and moderate protein malnutrition who presents to [facility] ED with complaints of headache this morning.  She cannot provide much details on the quality of the headache but does say she's been feeling more weak and tired lately.  She denies any nausea, vomiting, fevers, or chills, and hasn't had any neck stiffness, photophobia, nor any phonophobia.  She denies any laterality to  Her weakness and has never had a stroke before.  She also denies any dysuria, hematuria, abdominal pain, diarrhea, chest pain, shortness of breath, nor any coughing.  Further history is limited as she is a very poor historian and has very garbled speech.    Chart Review:  Previous Hospitalizations  Date Hospital Diagnosis   May 2014 [facility] Hypertensive urgency      Outpatient Follow-Up  Date of Visit Physician Service   Dec 2016 Deo Cantrell MD Cardiology    2016 Darleen Mata DPM Podiatry    2015 Zhen Milligan MD Gynecology      Past Medical History:   Diagnosis Date    Diabetes mellitus     Hypertension        Past Surgical History:   Procedure Laterality Date    ARTERIAL BYPASS SURGRY      9 tears sgo    CARDIAC SURGERY       SECTION      HYSTERECTOMY      TUBAL LIGATION         Review of patient's allergies indicates:   Allergen Reactions    Latex, natural rubber        No current facility-administered medications on file prior to  encounter.      Current Outpatient Prescriptions on File Prior to Encounter   Medication Sig    aliskiren (TEKTURNA) 300 MG Tab Take by mouth once daily.    amlodipine (NORVASC) 10 MG tablet Take 1 tablet (10 mg total) by mouth once daily.    atorvastatin (LIPITOR) 10 MG tablet Take 10 mg by mouth once daily.    diclofenac sodium (VOLTAREN) 1 % Gel Apply 2 g topically 2 (two) times daily.    EASY TOUCH TWIST LANCETS 30 gauge Misc     hydrALAZINE (APRESOLINE) 25 MG tablet Take 1 tablet (25 mg total) by mouth every 12 (twelve) hours.    hydrocodone-acetaminophen 7.5-325mg (NORCO) 7.5-325 mg per tablet     insulin glargine (LANTUS) 100 unit/mL injection Inject into the skin every evening.    JANUMET XR 50-1,000 mg TM24 Take 1 tablet by mouth 2 (two) times daily.    LANTUS SOLOSTAR 100 unit/mL (3 mL) InPn pen     loratadine (CLARITIN) 10 mg tablet Take 10 mg by mouth once daily.    meclizine (ANTIVERT) 25 mg tablet Take 1 tablet (25 mg total) by mouth every 6 (six) hours as needed.    saxagliptin (ONGLYZA) 5 mg Tab tablet Take 1 tablet (5 mg total) by mouth once daily.    TRUETEST TEST STRIPS Strp     urea (CARMOL) 40 % Crea Apply topically 2 (two) times daily.    aspirin 81 MG Chew Take 1 tablet (81 mg total) by mouth once daily.    hydrochlorothiazide (HYDRODIURIL) 12.5 MG Tab Take 1 tablet (12.5 mg total) by mouth once daily.     Family History     Problem Relation (Age of Onset)    No Known Problems Father, Mother, Sister, Brother, Maternal Aunt, Maternal Uncle, Paternal Aunt, Paternal Uncle, Maternal Grandmother, Maternal Grandfather, Paternal Grandmother, Paternal Grandfather        Social History Main Topics    Smoking status: Never Smoker    Smokeless tobacco: Never Used    Alcohol use Yes      Comment: occ    Drug use: No    Sexual activity: Not Currently     Birth control/ protection: See Surgical Hx     Review of Systems   Constitutional: Positive for fatigue. Negative for activity  change, appetite change, chills, diaphoresis, fever and unexpected weight change.   HENT: Negative.    Respiratory: Negative for cough, chest tightness, shortness of breath and wheezing.    Cardiovascular: Negative for chest pain, palpitations and leg swelling.   Gastrointestinal: Negative for abdominal distention, abdominal pain, blood in stool, constipation, diarrhea, nausea and vomiting.   Endocrine: Negative.    Genitourinary: Negative for dysuria and hematuria.   Musculoskeletal: Negative.    Neurological: Positive for weakness. Negative for dizziness, seizures, syncope and light-headedness.   Psychiatric/Behavioral: Negative.      Objective:     Vital Signs (Most Recent):  Temp: 98.4 °F (36.9 °C) (06/19/17 0144)  Pulse: 79 (06/19/17 0203)  Resp: 18 (06/19/17 0144)  BP: (!) 188/87 (06/19/17 0144)  SpO2: (!) 94 % (06/19/17 0144) Vital Signs (24h Range):  Temp:  [98.4 °F (36.9 °C)-101.5 °F (38.6 °C)] 98.4 °F (36.9 °C)  Pulse:  [62-89] 79  Resp:  [18-20] 18  SpO2:  [92 %-100 %] 94 %  BP: (132-246)/() 188/87     Weight: (!) 159.9 kg (352 lb 8.3 oz)  Body mass index is 58.66 kg/m².    Physical Exam   Constitutional: She is oriented to person, place, and time. She appears well-developed and well-nourished. No distress.   Morbidly obese   HENT:   Head: Normocephalic and atraumatic.   Right Ear: External ear normal.   Left Ear: External ear normal.   Nose: Nose normal.   Eyes: Right eye exhibits no discharge. Left eye exhibits no discharge.   Neck: Normal range of motion.   Cardiovascular: Normal rate, regular rhythm, normal heart sounds and intact distal pulses.  Exam reveals no gallop and no friction rub.    No murmur heard.  Pulmonary/Chest: Effort normal and breath sounds normal. No respiratory distress. She has no wheezes. She has no rales. She exhibits no tenderness.   Abdominal: Soft. Bowel sounds are normal. She exhibits no distension. There is no tenderness. There is no rebound and no guarding.    Musculoskeletal: Normal range of motion. She exhibits no edema.   Neurological: She is alert and oriented to person, place, and time.   Skin: Skin is warm and dry. She is not diaphoretic. No erythema.   Psychiatric: She has a normal mood and affect. Her behavior is normal. Judgment and thought content normal.   Nursing note and vitals reviewed.       Significant Labs: All pertinent labs within the past 24 hours have been reviewed.    Significant Imaging: I have reviewed and interpreted all pertinent imaging results/findings within the past 24 hours.    Assessment/Plan:     * Hypertensive emergency    Patient presented with an initial blood pressure of 224/108 ().  She was responsive to medications administered in the ED so was not needing an antihypertensive infusion.  Will restart her home medications of aliskiren, amlodipine, hydralazine, and hydrochlorothiazide, and provide as-needed clonidine.        Pyelonephritis    Patient presented with fever and does have a urinalysis that is suspicious for a urinary tract infection with trace leukocytes, 8 WBCs, and many bacteria.  She meet criteria for sepsis.  There is no previous urine cultures for comparison.  Will start empiric therapy while awaiting culture results.        Sepsis    This patient meets criteria for sepsis given fevers, tachypnea, and suspected pyelonephritis.  Blood and urine cultures are pending; will start IV fluid hydration and broad spectrum antibiotics.        Hypokalemia    Will replete orally and recheck her potassium in the morning.        Essential hypertension    As addressed above.  Will restart her home regimen of aliskiren, amlodipine, hydralazine, and hydrochlorothiazide.        Hyperlipidemia    Poorly-controlled; will continue patient's home regimen of atorvastatin.        Type 2 diabetes mellitus, uncontrolled    Poorly-controlled on a home regimen of metformin, a DPP-4 inhibitor, basal insulin therapy; will provide  basal-prandial insulin along with insulin sliding scale.        CAD s/p CABG    Stable; will continue her home regimen of aspirin and atorvastatin.        Morbid obesity with BMI of 50.0-59.9, adult    The patient has been counseled on the negative impact that obesity imparts on her health, and was encouraged to make lifestyle changes in order to lose weight and decrease her modifiable risk factors.        Moderate protein malnutrition    Will provide protein supplementation with Boost Glucose.          VTE Risk Mitigation         Ordered     enoxaparin injection 40 mg  Daily     Route:  Subcutaneous        06/19/17 0143     Medium Risk of VTE  Once      06/19/17 0424            Total time spent on case: 45 minutes.        Laurie Barksdale M.D.  Staff Nocturnist  Department of Hospital Medicine  [facility]  Pager: [phone]

## 2017-06-19 NOTE — NURSING
Bedside report given by JULIÁN Ramírez. Pt just returning from CT-head with family at the bedside. Pt unable to state birthday, only name. Unable to answer questions asked. Speech garbled. Repeats that she is looking for something. Currently confused. Has NS infusing @ 75ml.

## 2017-06-19 NOTE — ED NOTES
Patient incontinent of urine.  Caryl care done. Skin folds noted to have thick brown fowl smelling moist substance. Cleaned and dried. 2 cm open area on left side under fold.  Cleaned and dried.

## 2017-06-19 NOTE — ASSESSMENT & PLAN NOTE
This patient meets criteria for sepsis given fevers, tachypnea, and suspected pyelonephritis.  Blood and urine cultures are pending; will start IV fluid hydration and broad spectrum antibiotics.

## 2017-06-19 NOTE — PLAN OF CARE
"TN completed discharge needs assessment. TN provided and reviewed with patient "Blue My Health Packet" , "Help At Home" and "Discharge Planning Begins on Admission" handouts. TN discussed with patient the things the patient is responsible for to manage patient's  healthcare at home. Patient verbalized understanding & teachback implemented.      06/19/17 1221   Discharge Assessment   Assessment Type Discharge Planning Assessment   Confirmed/corrected address and phone number on facesheet? Yes   Communicated expected length of stay with patient/caregiver no   If Healthcare Directive is received, is it scanned into Epic? no (comment)   Prior to hospitilization cognitive status: Alert/Oriented;No Deficits   Prior to hospitalization functional status: Independent   Current cognitive status: Alert/Oriented;No Deficits   Current Functional Status: Independent   Arrived From admitted as an inpatient   Lives With alone   Able to Return to Prior Arrangements yes   Is patient able to care for self after discharge? Yes   How many people do you have in your home that can help with your care after discharge? 0   Who are your caregiver(s) and their phone number(s)? (dtr, Shayna Owen [phone])   Patient's perception of discharge disposition admitted as an inpatient   Readmission Within The Last 30 Days no previous admission in last 30 days   Patient currently being followed by outpatient case management? No   Patient currently receives home health services? No   Does the patient currently use HME? No   Patient currently receives private duty nursing? No   Equipment Currently Used at Home none   Do you have any problems affording any of your prescribed medications? No   Is the patient taking medications as prescribed? yes   Do you have any financial concerns preventing you from receiving the healthcare you need? No   Does the patient have transportation to healthcare appointments? Yes   Transportation Available family or friend " will provide   Does the patient receive services at the [facility]? No   Discharge Plan A Home;Home Health   Discharge Plan B Home   Patient/Family In Agreement With Plan yes   .Tn will call patiencerShayna to complete assessment [phone]      [facility] - [place], [place] - [address]  [address]  [place] [address]  Phone: [phone] Fax: [phone]    ..Nicolasa Ackerman, RN, BSN, STN [facility]  6/19/2017

## 2017-06-19 NOTE — ASSESSMENT & PLAN NOTE
Patient presented with an initial blood pressure of 224/108 ().  She was responsive to medications administered in the ED so was not needing an antihypertensive infusion.  Will restart her home medications of aliskiren, amlodipine, hydralazine, and hydrochlorothiazide, and provide as-needed clonidine.

## 2017-06-20 PROBLEM — J96.00 ACUTE RESPIRATORY FAILURE: Status: ACTIVE | Noted: 2017-06-20

## 2017-06-20 PROBLEM — R56.9 SEIZURE: Status: ACTIVE | Noted: 2017-06-20

## 2017-06-20 LAB
ALBUMIN SERPL BCP-MCNC: 2.6 G/DL
ALLENS TEST: ABNORMAL
ALLENS TEST: ABNORMAL
ALP SERPL-CCNC: 110 U/L
ALT SERPL W/O P-5'-P-CCNC: <5 U/L
ANION GAP SERPL CALC-SCNC: 11 MMOL/L
ANION GAP SERPL CALC-SCNC: 24 MMOL/L
AST SERPL-CCNC: 8 U/L
BACTERIA UR CULT: NO GROWTH
BASOPHILS # BLD AUTO: 0.02 K/UL
BASOPHILS NFR BLD: 0.1 %
BILIRUB SERPL-MCNC: 1 MG/DL
BUN SERPL-MCNC: 22 MG/DL
BUN SERPL-MCNC: 27 MG/DL
CALCIUM SERPL-MCNC: 7.5 MG/DL
CALCIUM SERPL-MCNC: 7.7 MG/DL
CHLORIDE SERPL-SCNC: 93 MMOL/L
CHLORIDE SERPL-SCNC: 94 MMOL/L
CO2 SERPL-SCNC: 17 MMOL/L
CO2 SERPL-SCNC: 30 MMOL/L
CREAT SERPL-MCNC: 1.8 MG/DL
CREAT SERPL-MCNC: 2.4 MG/DL
CREAT UR-MCNC: 362 MG/DL
DELSYS: ABNORMAL
DELSYS: ABNORMAL
DIFFERENTIAL METHOD: ABNORMAL
EOSINOPHIL # BLD AUTO: 0.1 K/UL
EOSINOPHIL NFR BLD: 0.3 %
EOSINOPHIL URNS QL WRIGHT STN: NORMAL
EP: 5
ERYTHROCYTE [DISTWIDTH] IN BLOOD BY AUTOMATED COUNT: 14.2 %
ERYTHROCYTE [SEDIMENTATION RATE] IN BLOOD BY WESTERGREN METHOD: 16 MM/H
ERYTHROCYTE [SEDIMENTATION RATE] IN BLOOD BY WESTERGREN METHOD: 22 MM/H
EST. GFR  (AFRICAN AMERICAN): 24 ML/MIN/1.73 M^2
EST. GFR  (AFRICAN AMERICAN): 33 ML/MIN/1.73 M^2
EST. GFR  (NON AFRICAN AMERICAN): 20 ML/MIN/1.73 M^2
EST. GFR  (NON AFRICAN AMERICAN): 29 ML/MIN/1.73 M^2
FIO2: 30
FIO2: 40
GLUCOSE SERPL-MCNC: 140 MG/DL
GLUCOSE SERPL-MCNC: 217 MG/DL
HCO3 UR-SCNC: 18.1 MMOL/L (ref 24–28)
HCO3 UR-SCNC: 23.3 MMOL/L (ref 24–28)
HCT VFR BLD AUTO: 35.3 %
HGB BLD-MCNC: 11.1 G/DL
IP: 10
LACTATE SERPL-SCNC: 11.1 MMOL/L
LYMPHOCYTES # BLD AUTO: 4.2 K/UL
LYMPHOCYTES NFR BLD: 21.7 %
MAGNESIUM SERPL-MCNC: 1.2 MG/DL
MAGNESIUM SERPL-MCNC: 1.7 MG/DL
MCH RBC QN AUTO: 26.1 PG
MCHC RBC AUTO-ENTMCNC: 31.4 %
MCV RBC AUTO: 83 FL
MIN VOL: 29
MODE: ABNORMAL
MODE: ABNORMAL
MONOCYTES # BLD AUTO: 1.5 K/UL
MONOCYTES NFR BLD: 7.6 %
NEUTROPHILS # BLD AUTO: 13.7 K/UL
NEUTROPHILS NFR BLD: 70.3 %
PCO2 BLDA: 42.2 MMHG (ref 35–45)
PCO2 BLDA: 51.6 MMHG (ref 35–45)
PEEP: 5
PH SMN: 7.15 [PH] (ref 7.35–7.45)
PH SMN: 7.35 [PH] (ref 7.35–7.45)
PHOSPHATE SERPL-MCNC: 6 MG/DL
PLATELET # BLD AUTO: 314 K/UL
PMV BLD AUTO: 10 FL
PO2 BLDA: 126 MMHG (ref 80–100)
PO2 BLDA: 69 MMHG (ref 80–100)
POC BE: -11 MMOL/L
POC BE: -2 MMOL/L
POC SATURATED O2: 93 % (ref 95–100)
POC SATURATED O2: 98 % (ref 95–100)
POC TCO2: 20 MMOL/L (ref 23–27)
POC TCO2: 25 MMOL/L (ref 23–27)
POCT GLUCOSE: 153 MG/DL (ref 70–110)
POCT GLUCOSE: 219 MG/DL (ref 70–110)
POCT GLUCOSE: 232 MG/DL (ref 70–110)
POCT GLUCOSE: 254 MG/DL (ref 70–110)
POTASSIUM SERPL-SCNC: 2.5 MMOL/L
POTASSIUM SERPL-SCNC: 3.5 MMOL/L
PROT SERPL-MCNC: 6.9 G/DL
PROT UR-MCNC: 97 MG/DL
PROT/CREAT RATIO, UR: 0.27
RBC # BLD AUTO: 4.26 M/UL
SAMPLE: ABNORMAL
SAMPLE: ABNORMAL
SITE: ABNORMAL
SITE: ABNORMAL
SODIUM SERPL-SCNC: 134 MMOL/L
SODIUM SERPL-SCNC: 135 MMOL/L
SODIUM UR-SCNC: 52 MMOL/L
SPONT RATE: 18
VANCOMYCIN TROUGH SERPL-MCNC: <1.1 UG/ML
VT: 450
WBC # BLD AUTO: 19.43 K/UL

## 2017-06-20 PROCEDURE — 87205 SMEAR GRAM STAIN: CPT | Mod: 59

## 2017-06-20 PROCEDURE — 25000003 PHARM REV CODE 250: Performed by: EMERGENCY MEDICINE

## 2017-06-20 PROCEDURE — 87070 CULTURE OTHR SPECIMN AEROBIC: CPT

## 2017-06-20 PROCEDURE — 82803 BLOOD GASES ANY COMBINATION: CPT

## 2017-06-20 PROCEDURE — 36600 WITHDRAWAL OF ARTERIAL BLOOD: CPT

## 2017-06-20 PROCEDURE — 80202 ASSAY OF VANCOMYCIN: CPT

## 2017-06-20 PROCEDURE — 0BH17EZ INSERTION OF ENDOTRACHEAL AIRWAY INTO TRACHEA, VIA NATURAL OR ARTIFICIAL OPENING: ICD-10-PCS | Performed by: INTERNAL MEDICINE

## 2017-06-20 PROCEDURE — 27200966 HC CLOSED SUCTION SYSTEM

## 2017-06-20 PROCEDURE — 82570 ASSAY OF URINE CREATININE: CPT

## 2017-06-20 PROCEDURE — 36415 COLL VENOUS BLD VENIPUNCTURE: CPT

## 2017-06-20 PROCEDURE — 83735 ASSAY OF MAGNESIUM: CPT

## 2017-06-20 PROCEDURE — 94002 VENT MGMT INPAT INIT DAY: CPT

## 2017-06-20 PROCEDURE — 80048 BASIC METABOLIC PNL TOTAL CA: CPT

## 2017-06-20 PROCEDURE — 80053 COMPREHEN METABOLIC PANEL: CPT

## 2017-06-20 PROCEDURE — 25000003 PHARM REV CODE 250: Performed by: INTERNAL MEDICINE

## 2017-06-20 PROCEDURE — 25000003 PHARM REV CODE 250: Performed by: PSYCHIATRY & NEUROLOGY

## 2017-06-20 PROCEDURE — 83605 ASSAY OF LACTIC ACID: CPT

## 2017-06-20 PROCEDURE — 27000221 HC OXYGEN, UP TO 24 HOURS

## 2017-06-20 PROCEDURE — 63600175 PHARM REV CODE 636 W HCPCS: Performed by: PSYCHIATRY & NEUROLOGY

## 2017-06-20 PROCEDURE — 87205 SMEAR GRAM STAIN: CPT

## 2017-06-20 PROCEDURE — 85025 COMPLETE CBC W/AUTO DIFF WBC: CPT

## 2017-06-20 PROCEDURE — 5A1955Z RESPIRATORY VENTILATION, GREATER THAN 96 CONSECUTIVE HOURS: ICD-10-PCS | Performed by: INTERNAL MEDICINE

## 2017-06-20 PROCEDURE — 83735 ASSAY OF MAGNESIUM: CPT | Mod: 91

## 2017-06-20 PROCEDURE — 99900026 HC AIRWAY MAINTENANCE (STAT)

## 2017-06-20 PROCEDURE — 63600175 PHARM REV CODE 636 W HCPCS: Performed by: HOSPITALIST

## 2017-06-20 PROCEDURE — 84300 ASSAY OF URINE SODIUM: CPT

## 2017-06-20 PROCEDURE — 63600175 PHARM REV CODE 636 W HCPCS

## 2017-06-20 PROCEDURE — 99222 1ST HOSP IP/OBS MODERATE 55: CPT | Mod: ,,, | Performed by: PSYCHIATRY & NEUROLOGY

## 2017-06-20 PROCEDURE — 93005 ELECTROCARDIOGRAM TRACING: CPT

## 2017-06-20 PROCEDURE — 25000003 PHARM REV CODE 250: Performed by: HOSPITALIST

## 2017-06-20 PROCEDURE — 84100 ASSAY OF PHOSPHORUS: CPT

## 2017-06-20 PROCEDURE — 99900035 HC TECH TIME PER 15 MIN (STAT)

## 2017-06-20 PROCEDURE — 63600175 PHARM REV CODE 636 W HCPCS: Performed by: EMERGENCY MEDICINE

## 2017-06-20 PROCEDURE — 94761 N-INVAS EAR/PLS OXIMETRY MLT: CPT

## 2017-06-20 PROCEDURE — 63600175 PHARM REV CODE 636 W HCPCS: Performed by: INTERNAL MEDICINE

## 2017-06-20 PROCEDURE — 20000000 HC ICU ROOM

## 2017-06-20 RX ORDER — LORAZEPAM 2 MG/ML
2 INJECTION INTRAMUSCULAR ONCE
Status: DISCONTINUED | OUTPATIENT
Start: 2017-06-20 | End: 2017-07-05

## 2017-06-20 RX ORDER — LORAZEPAM 2 MG/ML
2 INJECTION INTRAMUSCULAR ONCE
Status: COMPLETED | OUTPATIENT
Start: 2017-06-20 | End: 2017-06-20

## 2017-06-20 RX ORDER — SUCCINYLCHOLINE CHLORIDE 20 MG/ML
120 INJECTION INTRAMUSCULAR; INTRAVENOUS ONCE
Status: COMPLETED | OUTPATIENT
Start: 2017-06-20 | End: 2017-06-20

## 2017-06-20 RX ORDER — SODIUM BICARBONATE 1 MEQ/ML
SYRINGE (ML) INTRAVENOUS
Status: DISPENSED
Start: 2017-06-20 | End: 2017-06-21

## 2017-06-20 RX ORDER — HYDROCHLOROTHIAZIDE 12.5 MG/1
12.5 TABLET ORAL DAILY
Status: DISCONTINUED | OUTPATIENT
Start: 2017-06-20 | End: 2017-06-20

## 2017-06-20 RX ORDER — HYDRALAZINE HYDROCHLORIDE 25 MG/1
75 TABLET, FILM COATED ORAL EVERY 12 HOURS
Status: DISCONTINUED | OUTPATIENT
Start: 2017-06-20 | End: 2017-06-25

## 2017-06-20 RX ORDER — ETOMIDATE 2 MG/ML
30 INJECTION INTRAVENOUS ONCE
Status: COMPLETED | OUTPATIENT
Start: 2017-06-20 | End: 2017-06-20

## 2017-06-20 RX ORDER — LORAZEPAM 2 MG/ML
INJECTION INTRAMUSCULAR
Status: COMPLETED
Start: 2017-06-20 | End: 2017-06-20

## 2017-06-20 RX ORDER — AMLODIPINE BESYLATE 10 MG/1
10 TABLET ORAL DAILY
Status: DISCONTINUED | OUTPATIENT
Start: 2017-06-20 | End: 2017-06-30

## 2017-06-20 RX ORDER — HYDRALAZINE HYDROCHLORIDE 25 MG/1
25 TABLET, FILM COATED ORAL EVERY 12 HOURS
Status: DISCONTINUED | OUTPATIENT
Start: 2017-06-20 | End: 2017-06-20

## 2017-06-20 RX ORDER — SODIUM CHLORIDE 9 MG/ML
INJECTION, SOLUTION INTRAVENOUS CONTINUOUS
Status: DISCONTINUED | OUTPATIENT
Start: 2017-06-20 | End: 2017-06-23

## 2017-06-20 RX ORDER — LEVETIRACETAM 10 MG/ML
1000 INJECTION INTRAVASCULAR EVERY 12 HOURS
Status: DISCONTINUED | OUTPATIENT
Start: 2017-06-20 | End: 2017-06-23

## 2017-06-20 RX ORDER — SODIUM BICARBONATE 1 MEQ/ML
100 SYRINGE (ML) INTRAVENOUS ONCE
Status: COMPLETED | OUTPATIENT
Start: 2017-06-20 | End: 2017-06-20

## 2017-06-20 RX ORDER — POTASSIUM CHLORIDE 20 MEQ/15ML
40 SOLUTION ORAL EVERY 4 HOURS
Status: COMPLETED | OUTPATIENT
Start: 2017-06-20 | End: 2017-06-21

## 2017-06-20 RX ORDER — PANTOPRAZOLE SODIUM 40 MG/1
40 FOR SUSPENSION ORAL DAILY
Status: DISCONTINUED | OUTPATIENT
Start: 2017-06-20 | End: 2017-07-14 | Stop reason: HOSPADM

## 2017-06-20 RX ORDER — PROPOFOL 10 MG/ML
5 INJECTION, EMULSION INTRAVENOUS CONTINUOUS
Status: DISCONTINUED | OUTPATIENT
Start: 2017-06-20 | End: 2017-06-26

## 2017-06-20 RX ORDER — CHLORHEXIDINE GLUCONATE ORAL RINSE 1.2 MG/ML
15 SOLUTION DENTAL 2 TIMES DAILY
Status: DISCONTINUED | OUTPATIENT
Start: 2017-06-20 | End: 2017-07-14 | Stop reason: HOSPADM

## 2017-06-20 RX ORDER — ALISKIREN 300 MG/1
300 TABLET, FILM COATED ORAL DAILY
Status: DISCONTINUED | OUTPATIENT
Start: 2017-06-20 | End: 2017-06-30

## 2017-06-20 RX ADMIN — SODIUM CHLORIDE 500 ML: 0.9 INJECTION, SOLUTION INTRAVENOUS at 05:06

## 2017-06-20 RX ADMIN — PANTOPRAZOLE SODIUM 40 MG: 40 GRANULE, DELAYED RELEASE ORAL at 09:06

## 2017-06-20 RX ADMIN — CHLORHEXIDINE GLUCONATE 15 ML: 1.2 RINSE ORAL at 09:06

## 2017-06-20 RX ADMIN — POTASSIUM CHLORIDE 40 MEQ: 20 SOLUTION ORAL at 08:06

## 2017-06-20 RX ADMIN — INSULIN ASPART 3 UNITS: 100 INJECTION, SOLUTION INTRAVENOUS; SUBCUTANEOUS at 06:06

## 2017-06-20 RX ADMIN — HYDRALAZINE HYDROCHLORIDE 75 MG: 25 TABLET ORAL at 08:06

## 2017-06-20 RX ADMIN — ETOMIDATE 30 MG: 2 INJECTION, SOLUTION INTRAVENOUS at 02:06

## 2017-06-20 RX ADMIN — ACETAMINOPHEN 500 MG: 500 TABLET ORAL at 02:06

## 2017-06-20 RX ADMIN — MAGNESIUM SULFATE HEPTAHYDRATE 3 G: 500 INJECTION, SOLUTION INTRAMUSCULAR; INTRAVENOUS at 04:06

## 2017-06-20 RX ADMIN — SODIUM CHLORIDE: 0.9 INJECTION, SOLUTION INTRAVENOUS at 06:06

## 2017-06-20 RX ADMIN — ENOXAPARIN SODIUM 40 MG: 100 INJECTION SUBCUTANEOUS at 05:06

## 2017-06-20 RX ADMIN — AMLODIPINE BESYLATE 10 MG: 10 TABLET ORAL at 09:06

## 2017-06-20 RX ADMIN — VANCOMYCIN HYDROCHLORIDE 1500 MG: 1 INJECTION, POWDER, LYOPHILIZED, FOR SOLUTION INTRAVENOUS at 09:06

## 2017-06-20 RX ADMIN — ATORVASTATIN CALCIUM 10 MG: 10 TABLET, FILM COATED ORAL at 09:06

## 2017-06-20 RX ADMIN — CEFTRIAXONE 1 G: 1 INJECTION, SOLUTION INTRAVENOUS at 10:06

## 2017-06-20 RX ADMIN — DEXTROSE MONOHYDRATE 2000 MG: 5 INJECTION, SOLUTION INTRAVENOUS at 01:06

## 2017-06-20 RX ADMIN — CHLORHEXIDINE GLUCONATE 15 ML: 1.2 RINSE ORAL at 08:06

## 2017-06-20 RX ADMIN — SODIUM BICARBONATE 100 MEQ: 84 INJECTION, SOLUTION INTRAVENOUS at 06:06

## 2017-06-20 RX ADMIN — ASPIRIN 81 MG 81 MG: 81 TABLET ORAL at 09:06

## 2017-06-20 RX ADMIN — MIDAZOLAM 1 MG/HR: 5 INJECTION INTRAMUSCULAR; INTRAVENOUS at 03:06

## 2017-06-20 RX ADMIN — HYDRALAZINE HYDROCHLORIDE 25 MG: 25 TABLET ORAL at 09:06

## 2017-06-20 RX ADMIN — LEVETIRACETAM 1000 MG: 1000 INJECTION, SOLUTION INTRAVENOUS at 08:06

## 2017-06-20 RX ADMIN — LORAZEPAM 2 MG: 2 INJECTION INTRAMUSCULAR; INTRAVENOUS at 03:06

## 2017-06-20 RX ADMIN — HYDROCHLOROTHIAZIDE 12.5 MG: 12.5 TABLET ORAL at 09:06

## 2017-06-20 RX ADMIN — PROPOFOL 5 MCG/KG/MIN: 10 INJECTION, EMULSION INTRAVENOUS at 04:06

## 2017-06-20 RX ADMIN — LORAZEPAM 2 MG: 2 INJECTION INTRAMUSCULAR at 03:06

## 2017-06-20 RX ADMIN — SUCCINYLCHOLINE CHLORIDE 120 MG: 20 INJECTION, SOLUTION INTRAMUSCULAR; INTRAVENOUS at 02:06

## 2017-06-20 RX ADMIN — CEFTRIAXONE 1 G: 1 INJECTION, SOLUTION INTRAVENOUS at 11:06

## 2017-06-20 RX ADMIN — INSULIN ASPART 2 UNITS: 100 INJECTION, SOLUTION INTRAVENOUS; SUBCUTANEOUS at 11:06

## 2017-06-20 RX ADMIN — LORAZEPAM 2 MG: 2 INJECTION INTRAMUSCULAR; INTRAVENOUS at 01:06

## 2017-06-20 RX ADMIN — PROPOFOL 20 MCG/KG/MIN: 10 INJECTION, EMULSION INTRAVENOUS at 08:06

## 2017-06-20 RX ADMIN — ALISKIREN HEMIFUMARATE 300 MG: 300 TABLET, FILM COATED ORAL at 09:06

## 2017-06-20 NOTE — NURSING
0107    Called to room per [facility] ALINE Ponce because the patient was shaking. Upon walking in the room the patient was shaking as well as the bed. Witnessed seizure lasting about 5 minutes. Dr. Barksdale and charge nurse Samy at bedside. ABG's were ordered and done per agusto Antonio therapist. CBG was 219. /69, 95, 100.5, 19, 98% on BIPAP.    0136     Upon starting an IV, noted the patient started to twitch. Shamir Sommer informed and order for Ativan ordered and given by SWEETIE Trejo Rn.    0150     Dr. Barksdale ordered the patient to go to ICU. Charge nurse Samy informed and Garth Holcomb Rn. Transferred with resp, house sup, charge nurse and nurse. Bedside report given to Diego Brady.

## 2017-06-20 NOTE — SIGNIFICANT EVENT
STAT ABG ordered. ABG obtained. Dr. Barksdale at bedside and changed BIPAP settings to BIPAP 10/5, rate 16, FI02 30% before obtaining ABG. Patient was transferred to ICU via transport cpap device at 10 liters to room 266. Intubation initiated by Dr. Barksdale and patient was placed on ventilator with charted settings. Respiratory at bedside. Will continue to monitor.

## 2017-06-20 NOTE — PROGRESS NOTES
Pt remains on vent with settings of prvc, vt 450, rate 22, +5 of peep with 70% fio2.  Vent alarms set and functioning.  ambu bag and mask at bedside.  hme changed and ETT and bite block rotated from center to right side.

## 2017-06-20 NOTE — PLAN OF CARE
Problem: Patient Care Overview  Goal: Plan of Care Review  Outcome: Ongoing (interventions implemented as appropriate)  Admitted to ICU this am for emergent intubation after witnessed seizure activity and decreased responsiveness. Seizure witnessed upon arrival to floor. Single dose of ativan given. Started on Versed drip. Patient now more responsive and following commands. Patient pulling at tubes despite teaching. Versed drip titrated for RASS -2. VSS. TMAX 101. No falls or new skin breakdown throughout shift. Plan of care reviewed with patient and daughter.

## 2017-06-20 NOTE — CONSULTS
[facility]  Neurology  Consult Note    Patient Name: Nisa Frank  MRN: [id number]  Admission Date: 2017  Hospital Length of Stay: 2 days  Code Status: Full Code   Attending Provider: Rebeca Chew MD   Consulting Provider: Dustin Parish MD  Primary Care Physician: Atilio Downs MD  Principal Problem:Hypertensive emergency    Inpatient consult to Neurology  Consult performed by: DUSTIN PARISH  Consult ordered by: MOHINDER BOLTON        Subjective:     Chief Complaint/Reason for consult: Seizure    HPI: 65 y/o female with medical Hx as listed below who came to ED for headaches. According to notes pt was admitted after being found to have fever and leukocytosis; markedly hypertensive. Overnight pt became unresponsive and was presenting seizure-like activity. Intubated to protect airway.  Pt was also reported to present uintermittent rhythmic activity in RUE. She still intubated and mildly sedated with midazolam. No previous Hx of seizures.    Past Medical History:   Diagnosis Date    Diabetes mellitus     Hypertension        Past Surgical History:   Procedure Laterality Date    ARTERIAL BYPASS SURGRY      9 tears sgo    CARDIAC SURGERY       SECTION      HYSTERECTOMY      TUBAL LIGATION         Review of patient's allergies indicates:   Allergen Reactions    Latex, natural rubber        Current Neurological Medications:    No current facility-administered medications on file prior to encounter.      Current Outpatient Prescriptions on File Prior to Encounter   Medication Sig    aliskiren (TEKTURNA) 300 MG Tab Take by mouth once daily.    amlodipine (NORVASC) 10 MG tablet Take 1 tablet (10 mg total) by mouth once daily.    atorvastatin (LIPITOR) 10 MG tablet Take 10 mg by mouth once daily.    diclofenac sodium (VOLTAREN) 1 % Gel Apply 2 g topically 2 (two) times daily.    EASY TOUCH TWIST LANCETS 30 gauge Misc     hydrALAZINE (APRESOLINE) 25 MG tablet Take 1  tablet (25 mg total) by mouth every 12 (twelve) hours.    hydrocodone-acetaminophen 7.5-325mg (NORCO) 7.5-325 mg per tablet     insulin glargine (LANTUS) 100 unit/mL injection Inject into the skin every evening.    JANUMET XR 50-1,000 mg TM24 Take 1 tablet by mouth 2 (two) times daily.    LANTUS SOLOSTAR 100 unit/mL (3 mL) InPn pen     loratadine (CLARITIN) 10 mg tablet Take 10 mg by mouth once daily.    meclizine (ANTIVERT) 25 mg tablet Take 1 tablet (25 mg total) by mouth every 6 (six) hours as needed.    saxagliptin (ONGLYZA) 5 mg Tab tablet Take 1 tablet (5 mg total) by mouth once daily.    TRUETEST TEST STRIPS Strp     urea (CARMOL) 40 % Crea Apply topically 2 (two) times daily.    aspirin 81 MG Chew Take 1 tablet (81 mg total) by mouth once daily.    hydrochlorothiazide (HYDRODIURIL) 12.5 MG Tab Take 1 tablet (12.5 mg total) by mouth once daily.      Family History     Problem Relation (Age of Onset)    No Known Problems Father, Mother, Sister, Brother, Maternal Aunt, Maternal Uncle, Paternal Aunt, Paternal Uncle, Maternal Grandmother, Maternal Grandfather, Paternal Grandmother, Paternal Grandfather        Social History Main Topics    Smoking status: Never Smoker    Smokeless tobacco: Never Used    Alcohol use Yes      Comment: occ    Drug use: No    Sexual activity: Not Currently     Birth control/ protection: See Surgical Hx     Review of Systems   Unable to perform ROS: Intubated          Objective:     Vital Signs (Most Recent):  Temp: (!) 101 °F (38.3 °C) (06/20/17 0245)  Pulse: 78 (06/20/17 0600)  Resp: (!) 27 (06/20/17 0600)  BP: (!) 159/71 (06/20/17 0903)  SpO2: 100 % (06/20/17 0600) Vital Signs (24h Range):  Temp:  [99.1 °F (37.3 °C)-101 °F (38.3 °C)] 101 °F (38.3 °C)  Pulse:  [] 78  Resp:  [] 27  SpO2:  [92 %-100 %] 100 %  BP: ()/(53-76) 159/71     Weight: (!) 155.1 kg (341 lb 14.9 oz)  Body mass index is 56.9 kg/m².    Physical Exam  Constitutional:  well-developed  Head: normocephalic  Eyes: conjunctivae/corneas clear.  Nose: no discharge, no epistaxis  Heart: regular rate and rhythm.  Abdomen: no pain to palaption  Extremities: no edema  Neurologic: Mental status: eyes closed; not following commands  Cranial nerves: pupils are round at 2 mm; roving eyes; corneal responses present bilaterally  Strength: withdraws four extremities to noxious stimulation and moves four extremities partially on command         Significant Imaging:  Head CT  Impression      No significant change from prior. Age-appropriate generalized cerebral volume loss with mild degree of patchy decreased attenuation supratentorial white matter suggestive for chronic microvascular ischemic change similarly seen on the prior.     No evidence for acute intracranial hemorrhage or new abnormal parenchymal attenuation. Clinical correlation and further evaluation as warranted.      Electronically signed by: VICTORIANO HUERTA DO  Date: 06/19/17  Time: 14:09          Assessment and Plan:     65 y/o female consulted for possible seizure    1. Seizure: etiology of this event could be related to hypertensive crisis +/- infectious. She is mildly sedated but follows commands which at this point steers away from an encephalitis.   -Pt is on ceftriaxone.   -Will add levetiracetam initial dose of 2000 mg IV x 1. Midazolam infusion will also work as AED for now.   -EEG.    Active Diagnoses:    Diagnosis Date Noted POA    PRINCIPAL PROBLEM:  Hypertensive emergency [I16.1] 06/18/2017 Yes    Acute respiratory failure [J96.00] 06/20/2017 Yes    Hypokalemia [E87.6] 06/19/2017 Yes    Hyperlipidemia [E78.5] 06/19/2017 Yes     Chronic    Moderate protein malnutrition [E44.0] 06/19/2017 Yes     Chronic    CAD s/p CABG [I25.10] 06/19/2017 Yes     Chronic    Pyelonephritis [N12] 06/19/2017 Yes    Sepsis [A41.9] 06/19/2017 Yes    Morbid obesity with BMI of 50.0-59.9, adult [E66.01, Z68.43] 02/24/2015 Not Applicable      Chronic    Essential hypertension [I10] 05/12/2014 Yes     Chronic    Type 2 diabetes mellitus, uncontrolled [E11.65] 05/12/2014 Yes     Chronic      Problems Resolved During this Admission:    Diagnosis Date Noted Date Resolved POA       VTE Risk Mitigation         Ordered     enoxaparin injection 40 mg  Daily     Route:  Subcutaneous        06/19/17 0143     Medium Risk of VTE  Once      06/19/17 0424          Thank you for your consult. I will follow-up with patient. Please contact us if you have any additional questions.    Dustin Harmon MD  Neurology  [facility]

## 2017-06-20 NOTE — PLAN OF CARE
Problem: Patient Care Overview  Goal: Plan of Care Review  Outcome: Ongoing (interventions implemented as appropriate)  Patient on CPAP +17, fi02 30%. Family at bedside.will continue to monitor.

## 2017-06-20 NOTE — PLAN OF CARE
17 1104   Discharge Reassessment   Assessment Type Discharge Planning Reassessment   Number of comorbid conditions (as recorded on the chart) Five or more   Discharge plan remains the same: Yes   Provided patient/caregiver education on the expected discharge date and the discharge plan No   Discharge Plan A Home;Home Health   Discharge Plan B Home   Change in patient condition or support system Yes   Patient choice form signed by patient/caregiver No   Explained to the the patient/caregiver why the discharge planned changed: No   Involved the patient/caregiver in establishing a new discharge plan: No   patient transferred to ICU overnight with seizure activity, vent support. She remains in ICU on vent. This patient has been under stress lately. Her mother  last month. At her mother's , patient's sister collapsed,  in this hospital about a week ago. Sister's  will be this coming Saturday.   Patient at baseline lives alone. SW met patient daughter Shayna Owen this am, inquired regarding how patient has been caring for self thru the recent stress. Per daughter, possible patient was not using her CPAP at home. Daughter believes patient was taking her medications.   Daughter Shayna wonders if she should advise her sister (out of state)  to come to visit. SW spoke with the treatment team to update, provide guidance.   SW will update demographics to include contact information.   SW will follow in ICU and assist as needed.

## 2017-06-20 NOTE — PROCEDURES
"Nisa Frank is a 66 y.o. female patient.    Temp: (!) 100.5 °F (38.1 °C) (06/20/17 0000)  Pulse: 95 (06/20/17 0000)  Resp: 19 (06/20/17 0000)  BP: (!) 165/69 (06/20/17 0000)  SpO2: 98 % (06/20/17 0000)  Weight: (!) 155.1 kg (341 lb 14.9 oz) (06/19/17 0500)  Height: 5' 5" (165.1 cm) (06/18/17 1802)       Intubation  Date/Time: 6/20/2017 2:14 AM  Performed by: MOHINDER BOLTON  Authorized by: MOHINDER BOLTON   Consent Done: Emergent Situation  Indications: respiratory failure  Intubation method: video-assisted  Preoxygenation: BVM  Pretreatment medications: none  Sedatives: etomidate  Paralytic: succinylcholine  Laryngoscope size: Mac 4  Tube size: 7.5 mm  Tube type: cuffed  Number of attempts: 1  Cricoid pressure: no  Cords visualized: yes  Post-procedure assessment: ETCO2 monitor  Breath sounds: equal and absent over the epigastrium  Cuff inflated: yes  ETT to teeth: 23 cm  Tube secured with: ETT strauss  Chest x-ray interpreted by me.  Chest x-ray findings: endotracheal tube in appropriate position  Patient tolerance: Patient tolerated the procedure well with no immediate complications  Complications: No      View: Cormack-Lehane Grade I  Cuff inflated to minimally-occlusive pressure  Teeth intact  Complications: None      Mohinder Bolton M.D.  Staff Nocturnist  Department of Hospital Medicine  [facility]  Pager: [phone]    "

## 2017-06-20 NOTE — CONSULTS
"  [facility]  Adult Nutrition  Consult Note    SUMMARY     Recommendations    Recommendation/Intervention: 1) TF recs:  Impact Peptide @ 65 cc/hr, provides 2340 calories, 146 g protein, 1201 cc free water 2) Initiate at 15 cc/hr and advance as tolerated 3) Flushes per MD 4) Check residuals Q4 hours. Hold if > 250 - 300 cc  Goals: 1) Patient will meet >=85% of EEN  Nutrition Goal Status: new  Communication of RD Recs:  (care plan/notes/physician's sticky note)    Continuum of Care Plan     D/C Planning:  Too soon to determine. Will monitor/follow-up.     Reason for Assessment    Reason for Assessment: new tube feeding, physician consult  Diagnosis:  (sepsis, fever, elevated troponin, HTN)  Relevent Medical History: DM     General Information Comments: Patient intubated. No family/friends at bedside.     Nutrition Prescription Ordered    Current Diet Order: NPO    Evaluation of Received Nutrients/Fluid Intake     Energy Calories Required: not meeting needs  Protein Required: not meeting needs  Fluid Required: other (see comments) (Net I/O -39.3)       Nutrition Risk Screen     Nutrition Risk Screen: no indicators present    Nutrition/Diet History    Patient Reported Diet/Restrictions/Preferences:  (neo)     Food Preferences: neo    Factors Affecting Nutritional Intake: NPO, on mechanical ventilation    Labs/Tests/Procedures/Meds       Pertinent Labs Reviewed: reviewed, pertinent  Pertinent Labs Comments: Phos 6.0, , GFR 24, Creat 2.4  Pertinent Medications Reviewed: reviewed       Physical Findings    Overall Physical Appearance: obese, on ventilator support  Tubes: orogastric tube  Skin: intact    Anthropometrics    Temp: (!) 101 °F (38.3 °C)     Height: 5' 5" (165.1 cm)  Weight Method: Bed Scale  Weight: (!) 155.1 kg (341 lb 14.9 oz)  Ideal Body Weight (IBW), Female: 125 lb  % Ideal Body Weight, Female (lb): 273.55 lb  BMI (Calculated): 57  BMI Grade: greater than 40 - morbid " obesity      Estimated/Assessed Needs    Weight Used For Calorie Calculations: (!) 155.1 kg (341 lb 14.9 oz)   Energy Need Method: [facility] (modified) (8049 )  RMR (Curtiss-St. Jeor Equation): 2091.88  Weight Used For Protein Calculations: 56.8 kg (125 lb 3.5 oz) (Ideal Body Weight)  Protein Requirements: 142 g/day  Fluid Need Method: RDA Method, other (see comments) (or per MD)  CHO Requirement: 250 g/day     Assessment and Plan    Nutrition Diagnosis  Problem:  Inadequate energy intake  Etiology:  Decreased ability to consume sufficient energy orally  Signs/Symptoms:  Intubated, NPO  Status:  new    Monitor and Evaluation    Food and Nutrient Intake: enteral nutrition intake  Food and Nutrient Adminstration: enteral and parenteral nutrition administration  Anthropometric Measurements: weight, weight change, body mass index  Biochemical Data, Medical Tests and Procedures: electrolyte and renal panel, gastrointestinal profile, glucose/endocrine profile, lipid profile  Nutrition-Focused Physical Findings: overall appearance, skin    Nutrition Risk    Level of Risk: other (see comments) (f/u 2x weekly)    Nutrition Follow-Up    RD Follow-up?: Yes

## 2017-06-20 NOTE — PROGRESS NOTES
Patient was transferred to ICU and intubated by Dr. Barksdale. Vent settings of PRVC/22/450/+5/40% was given. Sputum was obtained after intubation and sent to lab. ABG was done 45 minutes after intubation. FIO2 was increased to 60% per Dr. Barksdale. Will continue to monitor patient.

## 2017-06-20 NOTE — PROGRESS NOTES
Cross-Cover Progress Note    I was called to Ms. Nisa Frank's bedside by her nurse to evaluate her for unresponsiveness.  Upon my arrival to the room, she was somnolent and not withdrawing to pain.  She then went into a seizure-like activity state and was given a dose of lorazepam.  She was on CPAP at the time and was with sonorous breath sounds.  Her respirations were very shallow and was paradoxical.  We obtained an ABG to assess for hypercapnia given her morbid obesity and it was revealing for 7.154  51.6  126  18.1 on BPAP 10  5  30%.  As she was still unresponsive I decided to transfer her to the ICU For emergent intubation given that she was not protecting her airway.      She was intubated without difficulty.  Will obtain a chest radiograph to confirm proper placement and consult Pulmonology for ventilator management.  I am unclear as to why she is so acidemic as she is not especially hypercarbic but will obtain labs to help elucidate the cause.      I will continue monitor the patient's progress throughout the night.          Total time spent on case: 60 minutes.          Laurie Barksdale M.D.  Staff Nocturnist  Department of Hospital Medicine  [facility]  Pager: [phone]

## 2017-06-20 NOTE — NURSING
Pt resting in bed quietly. No complaints of pain. Fall and safety precautions maintained. Bed locked in lowest position with side rails x 2. Call bell and personal items within reach. Shift report given to night rn.

## 2017-06-20 NOTE — PLAN OF CARE
Problem: Patient Care Overview  Goal: Plan of Care Review  06/20/2017    Recommendations    Recommendation/Intervention: 1) TF recs:  Impact Peptide @ 65 cc/hr, provides 2340 calories, 146 g protein, 1201 cc free water 2) Initiate at 15 cc/hr and advance as tolerated 3) Flushes per MD 4) Check residuals Q4 hours. Hold if > 250 - 300 cc  Goals: 1) Patient will meet >=85% of EEN  Nutrition Goal Status: new  Communication of RD Recs:  (care plan/notes/physician's sticky note)    Shruthi Ramon, MPH, RD, LDN

## 2017-06-21 ENCOUNTER — ANESTHESIA (OUTPATIENT)
Dept: SURGERY | Facility: HOSPITAL | Age: 67
DRG: 004 | End: 2017-06-21
Payer: MEDICARE

## 2017-06-21 ENCOUNTER — ANESTHESIA EVENT (OUTPATIENT)
Dept: SURGERY | Facility: HOSPITAL | Age: 67
DRG: 004 | End: 2017-06-21
Payer: MEDICARE

## 2017-06-21 LAB
ALLENS TEST: ABNORMAL
ANION GAP SERPL CALC-SCNC: 15 MMOL/L
BASOPHILS # BLD AUTO: 0.02 K/UL
BASOPHILS NFR BLD: 0.1 %
BUN SERPL-MCNC: 31 MG/DL
CALCIUM SERPL-MCNC: 8 MG/DL
CHLORIDE SERPL-SCNC: 100 MMOL/L
CO2 SERPL-SCNC: 20 MMOL/L
CREAT SERPL-MCNC: 2.1 MG/DL
CREAT UR-MCNC: 253.5 MG/DL
DELSYS: ABNORMAL
DIASTOLIC DYSFUNCTION: YES
DIFFERENTIAL METHOD: ABNORMAL
EOSINOPHIL # BLD AUTO: 0.1 K/UL
EOSINOPHIL NFR BLD: 0.6 %
ERYTHROCYTE [DISTWIDTH] IN BLOOD BY AUTOMATED COUNT: 14.2 %
ERYTHROCYTE [SEDIMENTATION RATE] IN BLOOD BY WESTERGREN METHOD: 22 MM/H
EST. GFR  (AFRICAN AMERICAN): 28 ML/MIN/1.73 M^2
EST. GFR  (NON AFRICAN AMERICAN): 24 ML/MIN/1.73 M^2
ESTIMATED PA SYSTOLIC PRESSURE: 31.58
FIO2: 50
GLOBAL PERICARDIAL EFFUSION: ABNORMAL
GLUCOSE SERPL-MCNC: 213 MG/DL
HCO3 UR-SCNC: 27.3 MMOL/L (ref 24–28)
HCT VFR BLD AUTO: 30.8 %
HGB BLD-MCNC: 10 G/DL
LACTATE SERPL-SCNC: 1.8 MMOL/L
LYMPHOCYTES # BLD AUTO: 2.5 K/UL
LYMPHOCYTES NFR BLD: 16.9 %
MCH RBC QN AUTO: 25.8 PG
MCHC RBC AUTO-ENTMCNC: 32.5 %
MCV RBC AUTO: 80 FL
MIN VOL: 10.1
MODE: ABNORMAL
MONOCYTES # BLD AUTO: 1.2 K/UL
MONOCYTES NFR BLD: 8.1 %
NEUTROPHILS # BLD AUTO: 11.1 K/UL
NEUTROPHILS NFR BLD: 74.3 %
PCO2 BLDA: 32.4 MMHG (ref 35–45)
PEEP: 5
PH SMN: 7.53 [PH] (ref 7.35–7.45)
PIP: 26
PLATELET # BLD AUTO: 202 K/UL
PMV BLD AUTO: 10.5 FL
PO2 BLDA: 63 MMHG (ref 80–100)
POC BE: 5 MMOL/L
POC SATURATED O2: 94 % (ref 95–100)
POC TCO2: 28 MMOL/L (ref 23–27)
POCT GLUCOSE: 186 MG/DL (ref 70–110)
POCT GLUCOSE: 210 MG/DL (ref 70–110)
POCT GLUCOSE: 224 MG/DL (ref 70–110)
POCT GLUCOSE: 287 MG/DL (ref 70–110)
POTASSIUM SERPL-SCNC: 5.6 MMOL/L
RBC # BLD AUTO: 3.87 M/UL
RETIRED EF AND QEF - SEE NOTES: 55 (ref 55–65)
SAMPLE: ABNORMAL
SITE: ABNORMAL
SODIUM SERPL-SCNC: 135 MMOL/L
SODIUM UR-SCNC: 46 MMOL/L
SP02: 95
TRICUSPID VALVE REGURGITATION: ABNORMAL
VANCOMYCIN SERPL-MCNC: 27.8 UG/ML
VT: 450
WBC # BLD AUTO: 14.89 K/UL

## 2017-06-21 PROCEDURE — 27000221 HC OXYGEN, UP TO 24 HOURS

## 2017-06-21 PROCEDURE — 80202 ASSAY OF VANCOMYCIN: CPT

## 2017-06-21 PROCEDURE — C1751 CATH, INF, PER/CENT/MIDLINE: HCPCS | Performed by: ANESTHESIOLOGY

## 2017-06-21 PROCEDURE — 93306 TTE W/DOPPLER COMPLETE: CPT

## 2017-06-21 PROCEDURE — 93306 TTE W/DOPPLER COMPLETE: CPT | Mod: 26,,, | Performed by: INTERNAL MEDICINE

## 2017-06-21 PROCEDURE — 85025 COMPLETE CBC W/AUTO DIFF WBC: CPT

## 2017-06-21 PROCEDURE — 94761 N-INVAS EAR/PLS OXIMETRY MLT: CPT

## 2017-06-21 PROCEDURE — 84300 ASSAY OF URINE SODIUM: CPT

## 2017-06-21 PROCEDURE — 82570 ASSAY OF URINE CREATININE: CPT

## 2017-06-21 PROCEDURE — 99900026 HC AIRWAY MAINTENANCE (STAT)

## 2017-06-21 PROCEDURE — 76937 US GUIDE VASCULAR ACCESS: CPT | Performed by: ANESTHESIOLOGY

## 2017-06-21 PROCEDURE — 63600175 PHARM REV CODE 636 W HCPCS: Performed by: EMERGENCY MEDICINE

## 2017-06-21 PROCEDURE — 63600175 PHARM REV CODE 636 W HCPCS: Performed by: PSYCHIATRY & NEUROLOGY

## 2017-06-21 PROCEDURE — 99232 SBSQ HOSP IP/OBS MODERATE 35: CPT | Mod: ,,, | Performed by: PSYCHIATRY & NEUROLOGY

## 2017-06-21 PROCEDURE — 25000003 PHARM REV CODE 250: Performed by: INTERNAL MEDICINE

## 2017-06-21 PROCEDURE — 93005 ELECTROCARDIOGRAM TRACING: CPT

## 2017-06-21 PROCEDURE — 82803 BLOOD GASES ANY COMBINATION: CPT

## 2017-06-21 PROCEDURE — 80048 BASIC METABOLIC PNL TOTAL CA: CPT

## 2017-06-21 PROCEDURE — 63600175 PHARM REV CODE 636 W HCPCS: Performed by: INTERNAL MEDICINE

## 2017-06-21 PROCEDURE — 25000003 PHARM REV CODE 250: Performed by: EMERGENCY MEDICINE

## 2017-06-21 PROCEDURE — 99900035 HC TECH TIME PER 15 MIN (STAT)

## 2017-06-21 PROCEDURE — 36556 INSERT NON-TUNNEL CV CATH: CPT | Mod: ,,, | Performed by: ANESTHESIOLOGY

## 2017-06-21 PROCEDURE — 94003 VENT MGMT INPAT SUBQ DAY: CPT

## 2017-06-21 PROCEDURE — C9254 INJECTION, LACOSAMIDE: HCPCS | Performed by: PSYCHIATRY & NEUROLOGY

## 2017-06-21 PROCEDURE — 63600175 PHARM REV CODE 636 W HCPCS: Performed by: HOSPITALIST

## 2017-06-21 PROCEDURE — 87040 BLOOD CULTURE FOR BACTERIA: CPT | Mod: 59

## 2017-06-21 PROCEDURE — 20000000 HC ICU ROOM

## 2017-06-21 PROCEDURE — 36600 WITHDRAWAL OF ARTERIAL BLOOD: CPT

## 2017-06-21 PROCEDURE — 25000003 PHARM REV CODE 250: Performed by: HOSPITALIST

## 2017-06-21 PROCEDURE — 25000003 PHARM REV CODE 250: Performed by: PSYCHIATRY & NEUROLOGY

## 2017-06-21 PROCEDURE — 83605 ASSAY OF LACTIC ACID: CPT

## 2017-06-21 PROCEDURE — 36415 COLL VENOUS BLD VENIPUNCTURE: CPT

## 2017-06-21 RX ORDER — LORAZEPAM 2 MG/ML
2 INJECTION INTRAMUSCULAR EVERY 4 HOURS PRN
Status: DISCONTINUED | OUTPATIENT
Start: 2017-06-21 | End: 2017-07-14 | Stop reason: HOSPADM

## 2017-06-21 RX ADMIN — LEVETIRACETAM 1000 MG: 1000 INJECTION, SOLUTION INTRAVENOUS at 08:06

## 2017-06-21 RX ADMIN — ASPIRIN 81 MG 81 MG: 81 TABLET ORAL at 08:06

## 2017-06-21 RX ADMIN — PROPOFOL 20 MCG/KG/MIN: 10 INJECTION, EMULSION INTRAVENOUS at 05:06

## 2017-06-21 RX ADMIN — INSULIN ASPART 2 UNITS: 100 INJECTION, SOLUTION INTRAVENOUS; SUBCUTANEOUS at 12:06

## 2017-06-21 RX ADMIN — INSULIN ASPART 2 UNITS: 100 INJECTION, SOLUTION INTRAVENOUS; SUBCUTANEOUS at 05:06

## 2017-06-21 RX ADMIN — PROPOFOL 35 MCG/KG/MIN: 10 INJECTION, EMULSION INTRAVENOUS at 06:06

## 2017-06-21 RX ADMIN — PROPOFOL 30 MCG/KG/MIN: 10 INJECTION, EMULSION INTRAVENOUS at 10:06

## 2017-06-21 RX ADMIN — PROPOFOL 35 MCG/KG/MIN: 10 INJECTION, EMULSION INTRAVENOUS at 10:06

## 2017-06-21 RX ADMIN — PHENYTOIN SODIUM 2000 MG: 50 INJECTION INTRAMUSCULAR; INTRAVENOUS at 03:06

## 2017-06-21 RX ADMIN — CEFTRIAXONE 1 G: 1 INJECTION, SOLUTION INTRAVENOUS at 10:06

## 2017-06-21 RX ADMIN — LORAZEPAM 2 MG: 2 INJECTION INTRAMUSCULAR; INTRAVENOUS at 02:06

## 2017-06-21 RX ADMIN — POTASSIUM CHLORIDE 40 MEQ: 20 SOLUTION ORAL at 01:06

## 2017-06-21 RX ADMIN — PROPOFOL 20 MCG/KG/MIN: 10 INJECTION, EMULSION INTRAVENOUS at 03:06

## 2017-06-21 RX ADMIN — CHLORHEXIDINE GLUCONATE 15 ML: 1.2 RINSE ORAL at 08:06

## 2017-06-21 RX ADMIN — PROPOFOL 35 MCG/KG/MIN: 10 INJECTION, EMULSION INTRAVENOUS at 04:06

## 2017-06-21 RX ADMIN — PROPOFOL 40 MCG/KG/MIN: 10 INJECTION, EMULSION INTRAVENOUS at 01:06

## 2017-06-21 RX ADMIN — HYDRALAZINE HYDROCHLORIDE 75 MG: 25 TABLET ORAL at 08:06

## 2017-06-21 RX ADMIN — VANCOMYCIN HYDROCHLORIDE 1500 MG: 1 INJECTION, POWDER, LYOPHILIZED, FOR SOLUTION INTRAVENOUS at 05:06

## 2017-06-21 RX ADMIN — PANTOPRAZOLE SODIUM 40 MG: 40 GRANULE, DELAYED RELEASE ORAL at 08:06

## 2017-06-21 RX ADMIN — POTASSIUM CHLORIDE 40 MEQ: 20 SOLUTION ORAL at 05:06

## 2017-06-21 RX ADMIN — SODIUM CHLORIDE: 0.9 INJECTION, SOLUTION INTRAVENOUS at 06:06

## 2017-06-21 RX ADMIN — SODIUM CHLORIDE: 0.9 INJECTION, SOLUTION INTRAVENOUS at 04:06

## 2017-06-21 RX ADMIN — ATORVASTATIN CALCIUM 10 MG: 10 TABLET, FILM COATED ORAL at 08:06

## 2017-06-21 RX ADMIN — INSULIN ASPART 3 UNITS: 100 INJECTION, SOLUTION INTRAVENOUS; SUBCUTANEOUS at 06:06

## 2017-06-21 RX ADMIN — ENOXAPARIN SODIUM 40 MG: 100 INJECTION SUBCUTANEOUS at 04:06

## 2017-06-21 RX ADMIN — SODIUM CHLORIDE 200 MG: 9 INJECTION, SOLUTION INTRAVENOUS at 07:06

## 2017-06-21 NOTE — SUBJECTIVE & OBJECTIVE
Interval History: pt with continuous seizure vs myoclonic activity, drop in UOP, on gentle IVF, fever improved; all cultures negative thus far    Review of Systems   Unable to perform ROS: Intubated     Objective:     Vital Signs (Most Recent):  Temp: 98.5 °F (36.9 °C) (06/21/17 0715)  Pulse: 75 (06/21/17 1152)  Resp: (!) 42 (06/21/17 1152)  BP: 139/62 (06/21/17 1100)  SpO2: 100 % (06/21/17 1152) Vital Signs (24h Range):  Temp:  [98.5 °F (36.9 °C)-100.7 °F (38.2 °C)] 98.5 °F (36.9 °C)  Pulse:  [] 75  Resp:  [0-75] 42  SpO2:  [93 %-100 %] 100 %  BP: ()/(48-77) 139/62     Weight: (!) 157.9 kg (348 lb 1.7 oz)  Body mass index is 57.93 kg/m².    Intake/Output Summary (Last 24 hours) at 06/21/17 1208  Last data filed at 06/21/17 0700   Gross per 24 hour   Intake          3702.25 ml   Output              524 ml   Net          3178.25 ml      Physical Exam   Constitutional: She appears well-developed and well-nourished. No distress.   Morbidly obese   HENT:   Head: Normocephalic and atraumatic.   Eyes: Right eye exhibits no discharge. Left eye exhibits no discharge.   Neck: Normal range of motion.   Cardiovascular: Normal rate, regular rhythm, normal heart sounds and intact distal pulses.  Exam reveals no gallop and no friction rub.    No murmur heard.  Pulmonary/Chest: Effort normal and breath sounds normal. No respiratory distress. She has no wheezes. She has no rales. She exhibits no tenderness.   Abdominal: Soft. Bowel sounds are normal. She exhibits no distension. There is no tenderness. There is no rebound and no guarding.   Musculoskeletal: She exhibits edema.   Neurological:   Facial and right sided twitching ?seizure    Skin: Skin is warm and dry. Capillary refill takes less than 2 seconds. She is not diaphoretic. No erythema.   Nursing note and vitals reviewed.      Significant Labs:   Blood Culture: No results for input(s): LABSANJUANA in the last 48 hours.  BMP:   Recent Labs  Lab 06/20/17  5574  06/21/17  0633   * 213*   * 135*   K 2.5* 5.6*   CL 94* 100   CO2 30* 20*   BUN 27* 31*   CREATININE 1.8* 2.1*   CALCIUM 7.5* 8.0*   MG 1.7  --      CBC:   Recent Labs  Lab 06/20/17  0231 06/21/17  0633   WBC 19.43* 14.89*   HGB 11.1* 10.0*   HCT 35.3* 30.8*    202     POCT Glucose:   Recent Labs  Lab 06/20/17  1728 06/20/17  2331 06/21/17  0529   POCTGLUCOSE 153* 186* 210*     Respiratory Culture:   Recent Labs  Lab 06/20/17  0251   GSRESP <10 epithelial cells per low power field.  Moderate WBC's  Few Gram positive cocci   RESPIRATORYC Normal respiratory jose luis     Urine Culture: No results for input(s): LABURIN in the last 48 hours.    Significant Imaging: I have reviewed all pertinent imaging results/findings within the past 24 hours.

## 2017-06-21 NOTE — CONSULTS
Dictation #1  MRN:[id number]  CSN:[id number]    Consult dictated # [id number]    Continue IVF  Renal US  Urine Na, Cr  Watch renal function closely  We'll follow  Thanks

## 2017-06-21 NOTE — HOSPITAL COURSE
Ms. Frank was admitted for presumed sepsis with probable UTI. Pt was started on broad spectrum antibiotics, but urine culture remained negative. Pt's blood cultures were remarkable for Staph in only 1 bottle and this was thought to be a contaminant, therefore, Vancomycin was stopped. Sputum culture had no significant growth. Empiric Rocephin was stopped and she was changed to Zosyn. On the night of 6/19-6/20, she had multiple seizures and was intubated for airway protection.  Neurology was consulted and she was loaded with Keppra. She appeared to have continous seizures and Versed was stopped for sedation and changed to propofol. BP dropped overnight 6/20/2017 with change in sedation. Dilantin and Vimpat added to Keppra for possible seizures vs myoclonic activity.  Urine output dropped off, IVF were increased, and Nephrology was consulted for ARF. Pulmonary followed for vent management. TLC placed to left IJ 6/21/2017. Further workup with EEG did not show any epileptic activity, but did show asymmetric activity.  Repeat Head CT was unremarkable. Pt's renal function improved and s/p lasix due to edema. However, patient remains minimally responsive despite being off sedation for several days. Further workup with MRI and LP not possible due to patient's weight and body habitus.

## 2017-06-21 NOTE — PROGRESS NOTES
[facility]  Neurology  Progress Note    Patient Name: Nisa Frank  MRN: [id number]  Admission Date: 6/18/2017  Hospital Length of Stay: 3 days  Code Status: Full Code   Attending Provider: Rebeca Chew MD  Primary Care Physician: Atilio Downs MD   Principal Problem:Hypertensive emergency    Subjective:     Interval History: 65 y/o female with medical Hx as listed below who came to ED for headaches. According to notes pt was admitted after being found to have fever and leukocytosis; markedly hypertensive. Overnight pt became unresponsive and was presenting seizure-like activity. Intubated to protect airway.  Pt was also reported to present uintermittent rhythmic activity in RUE. She still intubated and mildly sedated with midazolam. No previous Hx of seizures.    -6/21/17: Overnight pt reported to have almost continuous twitching OF RUE and right face. Pt was given Cerebyx and ativan with transient resolution. Propofol increased; activity ceased.    Current Neurological Medications:     Current Facility-Administered Medications   Medication Dose Route Frequency Provider Last Rate Last Dose    0.9%  NaCl infusion   Intravenous Continuous Rebeca Chew  mL/hr at 06/21/17 0600      acetaminophen tablet 500 mg  500 mg Oral Q6H PRN Laurie Barksdale MD   500 mg at 06/20/17 1454    aliskiren tablet 300 mg  300 mg Oral Daily Laurie Barksdale MD   300 mg at 06/20/17 0923    amlodipine tablet 10 mg  10 mg Oral Daily Laurie Barksdale MD   10 mg at 06/20/17 0902    aspirin chewable tablet 81 mg  81 mg Oral Daily Aureliano Alfaro MD   81 mg at 06/21/17 0855    atorvastatin tablet 10 mg  10 mg Oral Daily Aureliano Alfaro MD   10 mg at 06/21/17 0856    cefTRIAXone (ROCEPHIN) 1 g in dextrose 5 % 50 mL IVPB  1 g Intravenous Q12H Auerliano Alfaro MD   1 g at 06/20/17 2310    chlorhexidine 0.12 % solution 15 mL  15 mL Mouth/Throat BID Laurie Barksdale MD   15 mL at 06/21/17 0855     dextrose 50% injection 12.5 g  12.5 g Intravenous PRN Aureliano Alfaro MD        dextrose 50% injection 25 g  25 g Intravenous PRN Aureliano Alfaro MD        enoxaparin injection 40 mg  40 mg Subcutaneous Daily Aureliano Alfaro MD   40 mg at 06/20/17 1703    glucose chewable tablet 16 g  16 g Oral PRN Aureliano Alfaro MD        glucose chewable tablet 24 g  24 g Oral PRN Aureliano Alfaro MD        hydrALAZINE tablet 75 mg  75 mg Oral Q12H Rebeca Chew MD   75 mg at 06/20/17 2009    insulin aspart pen 0-5 Units  0-5 Units Subcutaneous PRN Laurie Barksdale MD   2 Units at 06/21/17 0542    levetiracetam in NaCl (iso-os) IVPB 1,000 mg  1,000 mg Intravenous Q12H Dustin Harmon  mL/hr at 06/21/17 0854 1,000 mg at 06/21/17 0854    lorazepam injection 2 mg  2 mg Intravenous Once Laurie Barksdale MD        lorazepam injection 2 mg  2 mg Intravenous Q4H PRN Laurie Barksdale MD   2 mg at 06/21/17 0247    midazolam (VERSED) 1 mg/mL in sodium chloride 0.9% 100 mL infusion (titrating)  1 mg/hr Intravenous Continuous Laurie Barksdale MD   Stopped at 06/20/17 1700    ondansetron injection 8 mg  8 mg Intravenous Q8H PRN Laurie Barksdale MD        pantoprazole suspension 40 mg  40 mg Per NG tube Daily Laurie Barksdale MD   40 mg at 06/21/17 0855    pneumoc 13-finn conj-dip cr(PF) 0.5 mL  0.5 mL Intramuscular vaccine x 1 dose Buddy Nichols MD        promethazine (PHENERGAN) 12.5 mg in dextrose 5 % 50 mL IVPB  12.5 mg Intravenous Q6H PRN Laurie Barksdale MD        propofol (DIPRIVAN) 10 mg/mL infusion  5 mcg/kg/min Intravenous Continuous Dustin Harmon MD 23.3 mL/hr at 06/21/17 0924 25 mcg/kg/min at 06/21/17 0924    ramelteon tablet 8 mg  8 mg Oral Nightly PRN Laurie Barksdale MD        [START ON 6/22/2017] vancomycin (VANCOCIN) 1,500 mg in dextrose 5 % 250 mL IVPB  1,500 mg Intravenous Q24H Rebeca Chew MD           Review of Systems   Unable to obtain as pt is intubated    Objective:     Vital  Signs (Most Recent):  Temp: 98.5 °F (36.9 °C) (06/21/17 0715)  Pulse: 65 (06/21/17 0815)  Resp: 20 (06/21/17 0815)  BP: (!) 100/53 (06/21/17 0800)  SpO2: 99 % (06/21/17 0815) Vital Signs (24h Range):  Temp:  [98.5 °F (36.9 °C)-100.7 °F (38.2 °C)] 98.5 °F (36.9 °C)  Pulse:  [] 65  Resp:  [0-75] 20  SpO2:  [93 %-100 %] 99 %  BP: ()/(48-81) 100/53     Weight: (!) 157.9 kg (348 lb 1.7 oz)  Body mass index is 57.93 kg/m².    Physical Exam  Constitutional: well-developed  Head: normocephalic  Eyes: conjunctivae/corneas clear.  Nose: no discharge, no epistaxis  Heart: regular rate and rhythm.  Abdomen: no pain to palaption  Extremities: no edema  Neurologic: Mental status: sedated  Cranial nerves: pupils are pinpoint; roving eyes; corneal responses present bilaterally  Strength: no involuntary movement while on sedation         Significant Labs:   CBC:   Recent Labs  Lab 06/20/17  0231 06/21/17  0633   WBC 19.43* 14.89*   HGB 11.1* 10.0*   HCT 35.3* 30.8*    202     CMP:   Recent Labs  Lab 06/20/17  0232 06/20/17  1819 06/21/17  0633   * 140* 213*   * 135* 135*   K 3.5 2.5* 5.6*   CL 93* 94* 100   CO2 17* 30* 20*   BUN 22 27* 31*   CREATININE 2.4* 1.8* 2.1*   CALCIUM 7.7* 7.5* 8.0*   MG 1.2* 1.7  --    PROT 6.9  --   --    ALBUMIN 2.6*  --   --    BILITOT 1.0  --   --    ALKPHOS 110  --   --    AST 8*  --   --    ALT <5*  --   --    ANIONGAP 24* 11 15   EGFRNONAA 20* 29* 24*         Assessment and Plan:     65 y/o female consulted for possible seizure     1. Seizure: etiology of this event could be related to hypertensive crisis +/- infectious?. Involuntary activity continued intermittently during the night and early morning. Activity seems to have an onset when pt stimulated. Two head CT's obtained 6 hours apart sowed no acute abnormalities or development of hypo/hyperdense areas. May need LP?                     -Contiune levetiracetam and propofol (will titrate up as long as BP is stable)  for now.                     -Lacosamide 200mg IV x 1 given.    -EEG pending to see if involuntary activity is epileptic. This could be myoclonus with no electrographical correlation.    Active Diagnoses:    Diagnosis Date Noted POA    PRINCIPAL PROBLEM:  Hypertensive emergency [I16.1] 06/18/2017 Yes    Acute respiratory failure [J96.00] 06/20/2017 Yes    Seizure [R56.9] 06/20/2017 No    Hypokalemia [E87.6] 06/19/2017 Yes    Hyperlipidemia [E78.5] 06/19/2017 Yes     Chronic    Moderate protein malnutrition [E44.0] 06/19/2017 Yes     Chronic    CAD s/p CABG [I25.10] 06/19/2017 Yes     Chronic    Pyelonephritis [N12] 06/19/2017 Yes    Blood poisoning [A41.9] 06/19/2017 Yes    Morbid obesity with BMI of 50.0-59.9, adult [E66.01, Z68.43] 02/24/2015 Not Applicable     Chronic    Essential hypertension [I10] 05/12/2014 Yes     Chronic    Type 2 diabetes mellitus, uncontrolled [E11.65] 05/12/2014 Yes     Chronic      Problems Resolved During this Admission:    Diagnosis Date Noted Date Resolved POA       VTE Risk Mitigation         Ordered     enoxaparin injection 40 mg  Daily     Route:  Subcutaneous        06/19/17 0143     Medium Risk of VTE  Once      06/19/17 0424          Dustin Harmon MD  Neurology  [facility]

## 2017-06-21 NOTE — PLAN OF CARE
Problem: Patient Care Overview  Goal: Plan of Care Review  Outcome: Ongoing (interventions implemented as appropriate)  Pt VSS, NAD. Remains on vent, on propofol for sedation. Pt able to follow commands, able to open eyes to voice. NS gtt infusing at 150 ml/hr. Pt NPO accuchecks done q6h. TF infusing without any complications with minimal residuals. Pt with increased seizure-like activity this AM. Dilantin given with ease of symptoms. Ross CDI, with oliguria, MD aware. Daughter at bedside. No falls, no new skin breakdown, pt turned q2h.

## 2017-06-21 NOTE — PROGRESS NOTES
Results for JOHN LOPEZ (MRN [id number]) as of 6/21/2017 05:36   Ref. Range 6/21/2017 05:02   POC PH Latest Ref Range: 7.35 - 7.45  7.534 (H)   POC PCO2 Latest Ref Range: 35 - 45 mmHg 32.4 (L)   POC PO2 Latest Ref Range: 80 - 100 mmHg 63 (L)   POC BE Latest Ref Range: -2 to 2 mmol/L 5   POC HCO3 Latest Ref Range: 24 - 28 mmol/L 27.3   POC SATURATED O2 Latest Ref Range: 95 - 100 % 94 (L)   POC TCO2 Latest Ref Range: 23 - 27 mmol/L 28 (H)   FiO2 Unknown 50   Vt Unknown 450   PiP Unknown 26   PEEP Unknown 5   Sample Unknown ARTERIAL   DelSys Unknown Adult Vent   Allens Test Unknown Pass   Site Unknown LR   Mode Unknown AC/PRVC

## 2017-06-21 NOTE — CONSULTS
REASON FOR CONSULTATION:  Renal failure.    HISTORY OF PRESENT ILLNESS:  The patient is a 66-year-old -American lady   with past medical history significant for hypertension, diabetes type 2, coronary   artery disease status post CABG, hyperlipidemia who presented to the hospital   with complaint of having headache and then the patient was found to have a   severe elevated blood pressure.  The patient was admitted to the hospital for   hypertensive emergency and the patient initially was admitted to the Telemetry   Unit; however, the patient suffered a seizure on the Telemetry Unit and was   transferred to the ICU, intubated and the patient was also found with elevation   of serum BUN and creatinine from baseline and we were consulted to see the   patient for evaluation of renal failure.  The patient at this point is   intubated.  Most of the information was obtained from reviewing the patient's   chart and discussion with the family and the patient has no previous history of   kidney problem.    PAST MEDICAL HISTORY:  As above.    MEDICATIONS:  The patient is currently receiving aliskiren 300 mg p.o. daily,   amlodipine 10 mg p.o. daily, aspirin 81 mg p.o. daily, Lipitor 10 mg p.o. at   bedtime, Rocephin 1 g IV piggyback q. 12 hours, Lovenox 40 mg subcu q. 24 hours,   hydralazine 75 mg p.o. b.i.d., Keppra 1 g IV q. 12 hours, lorazepam 2 mg IV x1,   Protonix 40 mg per NG tube daily and vancomycin 1.5 g IV q. 24 hours.    FAMILY HISTORY:  Noncontributory.    SOCIAL HISTORY:  The patient had no recent history of tobacco use, alcohol abuse   or IV drug use.    PHYSICAL EXAMINATION:  GENERAL:  The patient is sedated on ventilatory support.  VITAL SIGNS:  Temperature is 98.5 with a blood pressure of 100/53 with a pulse   of 65 and respirations at 99.  HEENT:  Normocephalic.  ET tube in place.  HEART:  Regular rhythm and rate.  LUNGS:  Decreased breath sounds bilaterally.  ABDOMEN:  Obese, positive bowel sounds,  nondistended and nontender.  EXTREMITIES:  With trace pedal edema.    LABORATORY DATA:  WBC is 14.89, hemoglobin 10.0, hematocrit 30.8 and platelet   count 202.  Sodium 135, potassium of 5.6, chloride 100, CO2 of 20, BUN is 31,   creatinine is 2.1 and glucose 213.  On admit, the patient's serum creatinine is   2.4 and urinalysis show 2+ protein, 3+ glucose, 1+ blood and wbc 8 per   high-powered field and urine eosinophil is negative.    IMPRESSION:  1.  Acute kidney injury, most likely due to malignant hypertension.  The   patient's blood pressure is much better controlled at this time, this might also   create a transient rise of serum creatinine due to hypoperfusion.  2.  Hypertensive emergency, blood pressure in good control at this time.  3.  Acute respiratory failure.  4.  Seizure.  5.  Urinary tract infection.  6.  Diabetes type 2.  7.  Coronary artery disease status post CABG.    DISCUSSION AND RECOMMENDATION:  At this time we will send urine for sodium and   creatinine and we will obtain a renal ultrasound.  We will continue the patient   with IV fluid at this time and we will watch in the patient's renal function   closely.  We will follow up on the patient's urine culture.    Thank you for courtesy of the consultation and allowing us to participate in the   patient's care.      JACKELIN  dd: 06/21/2017 09:34:51 (CDT)  td: 06/21/2017 12:54:54 (CDT)  Doc ID   #[id number]  Job ID #[id number]    CC:

## 2017-06-21 NOTE — ASSESSMENT & PLAN NOTE
Patient presented with fever and does have a urinalysis that is suspicious for a urinary tract infection with trace leukocytes, 8 WBCs, and many bacteria.  She meet criteria for sepsis.  There is no previous urine cultures for comparison.    Urine culture negative, fever resolved, cont abx

## 2017-06-21 NOTE — PHYSICIAN QUERY
PT Name: Nisa Frank  MR #: [id number]     Physician Query Form - Documentation Clarification      CDS/: Nicolasa Gusman RN, CCDS              Contact information: [phone]    This form is a permanent document in the medical record.     Query Date: June 21, 2017    By submitting this query, we are merely seeking further clarification of documentation. Please utilize your independent clinical judgment when addressing the question(s) below.    The Medical record reflects the following:    Supporting Clinical Findings Location in Medical Record   Pyelonephritis   Patient presented with fever and does have a urinalysis that is suspicious for a urinary tract infection with trace leukocytes, 8 WBCs, and many bacteria.     She meet criteria for sepsis. There is no previous urine cultures for comparison.     Urine culture negative, fever resolved, cont abx      Prim PN 6/21                                                                                      Doctor, Please specify diagnosis or diagnoses associated with above clinical findings.     Please specify acuity of Pyelonephritis.     Provider Use Only    ( x ) Acute    (  ) Acute on chronic    (  ) Chronic    (  ) Undeterminable    (  ) Other: ______                                                                                                                           [  ] Clinically undetermined

## 2017-06-21 NOTE — PROGRESS NOTES
1932: K results relayed to Dr. Barksdale, will replete. UO and BMP results relayed to Dr. Barksdale. No new orders. Ordered to monitor. Instructed to give vanc, trough of <1.1.       0240: pt with more pronounced seizure-like activity to right arm and jaw this AM. Dr. Barksdale notified, will order prn ativan.    0725: pt with seizure like activity this AM. Dr. Harmon notified will order vimpat.

## 2017-06-21 NOTE — PROGRESS NOTES
[facility] Hospital Medicine  Progress Note    Patient Name: Nisa Frank  MRN: [id number]  Patient Class: IP- Inpatient   Admission Date: 6/18/2017  Length of Stay: 3 days  Attending Physician: Rebeca Chew MD  Primary Care Provider: Atilio Downs MD        Subjective:     Principal Problem:Hypertensive emergency    HPI:  Ms. Nisa Frank is a 66 y.o. female with essential hypertension, hyperlipidemia (.2 May 2014), type 2 diabetes mellitus (HbA1c 8.2% May 2014), CAD s/p CABG, morbid obesity (BMI 58.7), and moderate protein malnutrition who presents to [facility] ED with complaints of headache this morning.  She cannot provide much details on the quality of the headache but does say she's been feeling more weak and tired lately.  She denies any nausea, vomiting, fevers, or chills, and hasn't had any neck stiffness, photophobia, nor any phonophobia.  She denies any laterality to  Her weakness and has never had a stroke before.  She also denies any dysuria, hematuria, abdominal pain, diarrhea, chest pain, shortness of breath, nor any coughing.  Further history is limited as she is a very poor historian and has very garbled speech.    Hospital Course:  Pt admitted with sepsis from presumed UTI, urine culture negative. Through course of night 6/19/-6/20 had multiple seizures and intubated for airway protection. Blood cultures growing suspected Staph. Only one bottle positive and thought to be a contaminant. Vancomycin dc'd. Sputum culture - no significant growth. Dc rocephin and start zosyn.  Neurology consulted and given large dose of keppra. Appears to have continous seizures and will dc versed for sedation and start propofol. BP dropped overnight 6/20/2017 with change in sedation. Dilantin and vimpat added to keppra for possible seizures vs myoclonic activity.  Urine output dropped off and started IVF. Nephrology consulted for ARF. Pulmonary consulted for vent management. TLC  placed to left IJ 6/21/2017.     Awaiting EEG.     Interval History: pt with continuous seizure vs myoclonic activity, drop in UOP, on gentle IVF, fever improved; all cultures negative thus far    Review of Systems   Unable to perform ROS: Intubated     Objective:     Vital Signs (Most Recent):  Temp: 98.5 °F (36.9 °C) (06/21/17 0715)  Pulse: 75 (06/21/17 1152)  Resp: (!) 42 (06/21/17 1152)  BP: 139/62 (06/21/17 1100)  SpO2: 100 % (06/21/17 1152) Vital Signs (24h Range):  Temp:  [98.5 °F (36.9 °C)-100.7 °F (38.2 °C)] 98.5 °F (36.9 °C)  Pulse:  [] 75  Resp:  [0-75] 42  SpO2:  [93 %-100 %] 100 %  BP: ()/(48-77) 139/62     Weight: (!) 157.9 kg (348 lb 1.7 oz)  Body mass index is 57.93 kg/m².    Intake/Output Summary (Last 24 hours) at 06/21/17 1208  Last data filed at 06/21/17 0700   Gross per 24 hour   Intake          3702.25 ml   Output              524 ml   Net          3178.25 ml      Physical Exam   Constitutional: She appears well-developed and well-nourished. No distress.   Morbidly obese   HENT:   Head: Normocephalic and atraumatic.   Eyes: Right eye exhibits no discharge. Left eye exhibits no discharge.   Neck: Normal range of motion.   Cardiovascular: Normal rate, regular rhythm, normal heart sounds and intact distal pulses.  Exam reveals no gallop and no friction rub.    No murmur heard.  Pulmonary/Chest: Effort normal and breath sounds normal. No respiratory distress. She has no wheezes. She has no rales. She exhibits no tenderness.   Abdominal: Soft. Bowel sounds are normal. She exhibits no distension. There is no tenderness. There is no rebound and no guarding.   Musculoskeletal: She exhibits edema.   Neurological:   Facial and right sided twitching ?seizure    Skin: Skin is warm and dry. Capillary refill takes less than 2 seconds. She is not diaphoretic. No erythema.   Nursing note and vitals reviewed.      Significant Labs:   Blood Culture: No results for input(s): LABBLOO in the last 48  hours.  BMP:   Recent Labs  Lab 06/20/17  1819 06/21/17  0633   * 213*   * 135*   K 2.5* 5.6*   CL 94* 100   CO2 30* 20*   BUN 27* 31*   CREATININE 1.8* 2.1*   CALCIUM 7.5* 8.0*   MG 1.7  --      CBC:   Recent Labs  Lab 06/20/17  0231 06/21/17  0633   WBC 19.43* 14.89*   HGB 11.1* 10.0*   HCT 35.3* 30.8*    202     POCT Glucose:   Recent Labs  Lab 06/20/17  1728 06/20/17  2331 06/21/17  0529   POCTGLUCOSE 153* 186* 210*     Respiratory Culture:   Recent Labs  Lab 06/20/17  0251   GSRESP <10 epithelial cells per low power field.  Moderate WBC's  Few Gram positive cocci   RESPIRATORYC Normal respiratory jose luis     Urine Culture: No results for input(s): LABURIN in the last 48 hours.    Significant Imaging: I have reviewed all pertinent imaging results/findings within the past 24 hours.    Assessment/Plan:      Seizure    On keppra, vimpat, dilantin x one dose  EEG pending  Neurology consulted           Acute respiratory failure      Intubated 6/20/2017  Pulmonary consulted for vent management          Blood poisoning    This patient meets criteria for sepsis given fevers, tachypnea, and suspected pyelonephritis.  Blood and urine cultures are negative. Sputum culture no significant growth.     On bottle + blood appears contaminant, recheck x2        Pyelonephritis    Patient presented with fever and does have a urinalysis that is suspicious for a urinary tract infection with trace leukocytes, 8 WBCs, and many bacteria.  She meet criteria for sepsis.  There is no previous urine cultures for comparison.    Urine culture negative, fever resolved, cont abx        CAD s/p CABG    Stable; will continue her home regimen of aspirin and atorvastatin.        Moderate protein malnutrition    Pt NPO, continue tube feeds        Hyperlipidemia    Poorly-controlled; will continue patient's home regimen of atorvastatin.        Hypokalemia    Replaced   Hyperkalemia this morning but appears hemolyzed        S/P  hysterectomy with oophorectomy              Morbid obesity with BMI of 50.0-59.9, adult    The patient has been counseled on the negative impact that obesity imparts on her health, and was encouraged to make lifestyle changes in order to lose weight and decrease her modifiable risk factors.        Type 2 diabetes mellitus, uncontrolled    Poorly-controlled on a home regimen of metformin, a DPP-4 inhibitor, basal insulin therapy; will provide basal-prandial insulin along with insulin sliding scale.        Essential hypertension    Labile BP  All Bp meds held with drop in BP overnight.   ECHO pending         * Hypertensive emergency    Patient presented with an initial blood pressure of 224/108 ().  She was responsive to medications administered in the ED so was not needing an antihypertensive infusion.  Will restart her home medications of aliskiren, amlodipine, hydralazine, and hydrochlorothiazide, and provide as-needed clonidine.    BP dropped on sedation and oral medications held          VTE Risk Mitigation         Ordered     enoxaparin injection 40 mg  Daily     Route:  Subcutaneous        06/19/17 0143     Medium Risk of VTE  Once      06/19/17 0424          Rebeca Chew MD  Department of Hospital Medicine   [facility]

## 2017-06-21 NOTE — ASSESSMENT & PLAN NOTE
This patient meets criteria for sepsis given fevers, tachypnea, and suspected pyelonephritis.  Blood and urine cultures are negative. Sputum culture no significant growth.     On bottle + blood appears contaminant, recheck x2

## 2017-06-21 NOTE — PROGRESS NOTES
[facility] Hospital Medicine  Progress Note    Patient Name: Nisa Frank  MRN: [id number]  Patient Class: IP- Inpatient   Admission Date: 6/18/2017  Length of Stay: 2 days  Attending Physician: Rebeca Chew MD  Primary Care Provider: Atilio Downs MD        Subjective:     Principal Problem:Hypertensive emergency    HPI:  Ms. Nisa Frank is a 66 y.o. female with essential hypertension, hyperlipidemia (.2 May 2014), type 2 diabetes mellitus (HbA1c 8.2% May 2014), CAD s/p CABG, morbid obesity (BMI 58.7), and moderate protein malnutrition who presents to [facility] ED with complaints of headache this morning.  She cannot provide much details on the quality of the headache but does say she's been feeling more weak and tired lately.  She denies any nausea, vomiting, fevers, or chills, and hasn't had any neck stiffness, photophobia, nor any phonophobia.  She denies any laterality to  Her weakness and has never had a stroke before.  She also denies any dysuria, hematuria, abdominal pain, diarrhea, chest pain, shortness of breath, nor any coughing.  Further history is limited as she is a very poor historian and has very garbled speech.    Hospital Course:  Pt admitted with sepsis from presumed UTI. Through course of night had multiple seizures and intubated for airway protection. Blood cultures growing suspected Staph. On Rocephin and Vancomycin. Neurology consulted and given large dose of keppra. Appears to have continous seizures and will dc versed for sedation and start propofol. BP improved. Urine output dropped off and started IVF. Nephrology consulted for ARF. Pulmonary consulted for vent management. Start tube feeds.     Interval History: Pt intubated for airway protection and on broad spectrum antibiotics, multiple seizures.    Review of Systems   Unable to perform ROS: Intubated     Objective:     Vital Signs (Most Recent):  Temp: 99.2 °F (37.3 °C) (06/20/17 1957)  Pulse:  66 (06/20/17 1940)  Resp: 20 (06/20/17 1940)  BP: (!) 148/65 (06/20/17 1830)  SpO2: 100 % (06/20/17 1940) Vital Signs (24h Range):  Temp:  [99.2 °F (37.3 °C)-101 °F (38.3 °C)] 99.2 °F (37.3 °C)  Pulse:  [] 66  Resp:  [0-132] 20  SpO2:  [92 %-100 %] 100 %  BP: ()/(53-81) 148/65     Weight: (!) 155.1 kg (341 lb 14.9 oz)  Body mass index is 56.9 kg/m².    Intake/Output Summary (Last 24 hours) at 06/20/17 2131  Last data filed at 06/20/17 2106   Gross per 24 hour   Intake          1550.31 ml   Output              872 ml   Net           678.31 ml      Physical Exam   Constitutional: She is oriented to person, place, and time. She appears well-developed and well-nourished. No distress.   Morbidly obese   HENT:   Head: Normocephalic and atraumatic.   Right Ear: External ear normal.   Left Ear: External ear normal.   Nose: Nose normal.   Eyes: Right eye exhibits no discharge. Left eye exhibits no discharge.   Neck: Normal range of motion.   Cardiovascular: Normal rate, regular rhythm, normal heart sounds and intact distal pulses.  Exam reveals no gallop and no friction rub.    No murmur heard.  Pulmonary/Chest: Effort normal and breath sounds normal. No respiratory distress. She has no wheezes. She has no rales. She exhibits no tenderness.   Abdominal: Soft. Bowel sounds are normal. She exhibits no distension. There is no tenderness. There is no rebound and no guarding.   Musculoskeletal: Normal range of motion. She exhibits no edema.   Neurological: She is alert and oriented to person, place, and time.   Skin: Skin is warm and dry. She is not diaphoretic. No erythema.   Psychiatric: She has a normal mood and affect. Her behavior is normal. Judgment and thought content normal.   Nursing note and vitals reviewed.      Significant Labs:   ABGs:   Recent Labs  Lab 06/20/17  0246   PH 7.351   PCO2 42.2   HCO3 23.3*   POCSATURATED 93*   BE -2     Blood Culture:   Recent Labs  Lab 06/18/17  2225 06/18/17  2241    LABBLOO No Growth to date  No Growth to date Gram stain tena bottle: Gram positive cocci in clusters resembling Staph  Results called to and read back by: April Cleveland 06/20/2017  15:25     BMP:   Recent Labs  Lab 06/20/17  1819   *   *   K 2.5*   CL 94*   CO2 30*   BUN 27*   CREATININE 1.8*   CALCIUM 7.5*   MG 1.7     CBC:   Recent Labs  Lab 06/19/17  0333 06/20/17  0231   WBC 15.99* 19.43*   HGB 12.0 11.1*   HCT 36.4* 35.3*    314     POCT Glucose:   Recent Labs  Lab 06/20/17  0558 06/20/17  1133 06/20/17  1728   POCTGLUCOSE 254* 232* 153*     Respiratory Culture:   Recent Labs  Lab 06/20/17  0251   GSRESP <10 epithelial cells per low power field.  Moderate WBC's  Few Gram positive cocci     Urine Culture: No results for input(s): LABURIN in the last 48 hours.    Significant Imaging: I have reviewed all pertinent imaging results/findings within the past 24 hours.    Assessment/Plan:      Seizure    On keppra  EEG pending  Neurology consulted           Acute respiratory failure      Intubated 6/20/2017  Pulmonary consulted for vent management          Sepsis    This patient meets criteria for sepsis given fevers, tachypnea, and suspected pyelonephritis.  Blood and urine cultures are pending; will start IV fluid hydration and broad spectrum antibiotics.        Pyelonephritis    Patient presented with fever and does have a urinalysis that is suspicious for a urinary tract infection with trace leukocytes, 8 WBCs, and many bacteria.  She meet criteria for sepsis.  There is no previous urine cultures for comparison.  Will start empiric therapy while awaiting culture results.        CAD s/p CABG    Stable; will continue her home regimen of aspirin and atorvastatin.        Moderate protein malnutrition    Will provide protein supplementation with Boost Glucose.        Hyperlipidemia    Poorly-controlled; will continue patient's home regimen of atorvastatin.        Hypokalemia    Will replete orally  and recheck her potassium in the morning.        Morbid obesity with BMI of 50.0-59.9, adult    The patient has been counseled on the negative impact that obesity imparts on her health, and was encouraged to make lifestyle changes in order to lose weight and decrease her modifiable risk factors.        Type 2 diabetes mellitus, uncontrolled    Poorly-controlled on a home regimen of metformin, a DPP-4 inhibitor, basal insulin therapy; will provide basal-prandial insulin along with insulin sliding scale.        Essential hypertension    As addressed above.  Will restart her home regimen of aliskiren, amlodipine, hydralazine, and hydrochlorothiazide.        * Hypertensive emergency    Patient presented with an initial blood pressure of 224/108 ().  She was responsive to medications administered in the ED so was not needing an antihypertensive infusion.  Will restart her home medications of aliskiren, amlodipine, hydralazine, and hydrochlorothiazide, and provide as-needed clonidine.          VTE Risk Mitigation         Ordered     enoxaparin injection 40 mg  Daily     Route:  Subcutaneous        06/19/17 0143     Medium Risk of VTE  Once      06/19/17 0424          Rebeca Chew MD  Department of Hospital Medicine   [facility]

## 2017-06-21 NOTE — ANESTHESIA PROCEDURE NOTES
Central Line    Diagnosis: Sepsis  Doctor requesting consult: ICU  Patient location during procedure: ICU  Procedure start time: 6/21/2017 9:48 AM  Timeout: 6/21/2017 9:48 AM  Procedure end time: 6/21/2017 10:15 AM  Staffing  Anesthesiologist: DILCIA ROSAS  Performed: anesthesiologist   Anesthesiologist was present at the time of the procedure.  Preanesthetic Checklist  Completed: patient identified, site marked, pre-op evaluation, timeout performed, IV checked, risks and benefits discussed, monitors and equipment checked and anesthesia consent given  Indication  Indication: vascular access, med administration     Anesthesia   4 ml of  local infiltration  Local Infiltration: lidocaine 1% without epinephrine    Central Line  Skin Prep: skin prepped with ChloraPrep, skin prep agent completely dried prior to procedure  maximum sterile barriers used during central venous catheter insertion  hand hygiene performed prior to central venous catheter insertion  Location: left internal jugular,   Catheter type: triple lumen  Catheter Size: 7 Fr  Inserted Catheter Length: 16 cm  Ultrasound: vascular probe with ultrasound  Vessel Caliber: large, patent  Vascular Doppler:  flow caudad, compressibility normal  Needle advanced into vessel with real time Ultrasound guidance.  Guidewire confirmed in vessel.  Sterile sheath used.  Image recorded and saved.   Manometry: Venous cannualation confirmed by visual estimation of blood vessel pressure using manometry.  Insertion Attempts: 1   Securement:line sutured, chlorhexidine patch, sterile dressing applied and blood return through all ports     Post-Procedure  X-Ray: no pneumothorax on x-ray, placement verified by x-ray, tip termination and successful placement  Adverse Events:none

## 2017-06-21 NOTE — ASSESSMENT & PLAN NOTE
Patient presented with an initial blood pressure of 224/108 ().  She was responsive to medications administered in the ED so was not needing an antihypertensive infusion.  Will restart her home medications of aliskiren, amlodipine, hydralazine, and hydrochlorothiazide, and provide as-needed clonidine.    BP dropped on sedation and oral medications held

## 2017-06-21 NOTE — PHYSICIAN QUERY
PT Name: Nisa Frank  MR #: [id number]    Physician Query Form - Neurological Condition Clarification       CDS/: Nicolasa Gusman RN, CCDS              Contact information: [phone]    This form is a permanent document in the medical record.     Query Date: June 21, 2017    By submitting this query, we are merely seeking further clarification of documentation. Please utilize your independent clinical judgment when addressing the question(s) below.    The Medical record contains the following:   Indicators   Supporting Clinical Findings Location in Medical Record   x AMS, Confusion, LOC, etc. Further history is limited as she is a very poor historian and has very garbled speech    Headache    Feeling more weak and tired lately    Overnight pt became unresponsive and wasw presenting seizure-like activity  Neuro status: eyes closed not following commands  Cranial nerves: pupils round @ 2mm, roving eyes, corneal responses bilat  Strength: withdraws 4 ext to noxious stimulation & moves 4 ext partially on command.    Prim PN 6/21                Neuro CN 6/20   x Acute / Chronic Illness Hypertensive emergency  Hypertensive Urgency  Hypertensive crisis    Sepsis  Pyelnephritis  Hypokalemia  DMT2 uncontrolled  Morbid obesity  Moderate Malnutrition ED MD Note    Neuro CN 6/20          H&P   x Radiology Findings No significant change from prior. Age-appropriate generalized cerebral volume loss with mild degree of patchy decreased attenuation supratentorial white matter suggestive for chronic microvascular ischemic change similarly seen on the prior.     No evidence for acute intracranial hemorrhage or new abnormal parenchymal attenuation. Clinical correlation and further evaluation as warranted.    Head CT    Electrolyte Imbalance     x Medication  Levetiracetam IV Every 12 hours start 6/20  Lacosamide IV   Lorazepam IV 2 mg IV every 4 hours prn anxiey, seizures. Start 6/21  Phenytoin IV once on 6/21  Hydralazine  10 mg IV in ED x1  Labetalol 20 mg IV in ED x2  Labetalol 10 mg IV in ED x1  Morphine IV 5 mg once on 6/19  Clonidine po 3 times daily prn given 6/18 - 20 for SBP> 160  Amlodipine PO daily on 6/19-20  Hctz 12.5 mg daily 6/19-20  HCTZ 25 mg PO every 12 hours on 6/20  Amlodipine PO 10 mg hernandez 6/20   MAR   x Treatment Neuro CN    Seizure: etiology of this event could be related to hypertensive crisis +/- infectious. She is mildly sedated but follows commands which at this point steers away from an encephalitis.  -Pt is on ceftriaxone.  -Will add levetiracetam initial dose of 2000 mg IV x 1. Midazolam infusion will also work as AED for now.   -EEG.   Orders      Neuro CN   x Other · Seizure: etiology of this event could be related to hypertensive crisis +/- infectious.    Hypertensive difficult to control in the ED   Neuro CN & PN 6/21    ED MD Note     Provider, please specify the diagnosis or diagnoses associated with above clinical findings.    [  ] Hypertensive Encephalopathy    [  ] Metabolic Encephalopathy    [  ] Other Encephalopathy    [  ] Unspecified Encephalopathy    [  ] Other (please specify): _____________________________________    [ x ] Clinically Undetermined      Please document in your progress notes daily for the duration of treatment until resolved, and  include in your discharge summary.

## 2017-06-21 NOTE — PROGRESS NOTES
Progress Note  Pulmonology    Admit Date: 2017   LOS: 3 days       SUBJECTIVE: acute respiratory failure  On vent     History of Present Illness:Patient unresponsive;information obtained from chart  ; 66 y.o. female with essential hypertension, hyperlipidemia (.2 May 2014), type 2 diabetes mellitus (HbA1c 8.2% May 2014), CAD s/p CABG, morbid obesity (BMI 58.7), and moderate protein malnutrition who presents to [facility] ED with complaints of headache this morning.  She cannot provide much details on the quality of the headache but does say she's been feeling more weak and tired lately.  She denies any nausea, vomiting, fevers, or chills, and hasn't had any neck stiffness, photophobia, nor any phonophobia.  She denies any laterality to  Her weakness and has never had a stroke before.  She also denies any dysuria, hematuria, abdominal pain, diarrhea, chest pain, shortness of breath, nor any coughing.  Further history is limited as she is a very poor historian and has very garbled speech.      Review of patient's allergies indicates:   Allergen Reactions    Latex, natural rubber        Past Medical History:   Diagnosis Date    Diabetes mellitus     Hypertension      Past Surgical History:   Procedure Laterality Date    ARTERIAL BYPASS SURGRY      9 tears sgo    CARDIAC SURGERY       SECTION      HYSTERECTOMY      TUBAL LIGATION       Family History   Problem Relation Age of Onset    No Known Problems Father     No Known Problems Mother     No Known Problems Sister     No Known Problems Brother     No Known Problems Maternal Aunt     No Known Problems Maternal Uncle     No Known Problems Paternal Aunt     No Known Problems Paternal Uncle     No Known Problems Maternal Grandmother     No Known Problems Maternal Grandfather     No Known Problems Paternal Grandmother     No Known Problems Paternal Grandfather     Amblyopia Neg Hx     Blindness Neg Hx     Cancer Neg Hx     Cataracts  Neg Hx     Diabetes Neg Hx     Glaucoma Neg Hx     Hypertension Neg Hx     Macular degeneration Neg Hx     Retinal detachment Neg Hx     Strabismus Neg Hx     Stroke Neg Hx     Thyroid disease Neg Hx        ROS:patient  unable to communicate because intubated, on vent, and sedated     OBJECTIVE:     Vital Signs (Most Recent)  Temp: 98.5 °F (36.9 °C) (06/21/17 0715)  Pulse: 65 (06/21/17 0815)  Resp: 20 (06/21/17 0815)  BP: (!) 100/53 (06/21/17 0800)  SpO2: 99 % (06/21/17 0815)    Vital Signs Range (Last 24H):  Temp:  [98.5 °F (36.9 °C)-100.7 °F (38.2 °C)]   Pulse:  []   Resp:  [0-75]   BP: ()/(48-81)   SpO2:  [93 %-100 %]     Physical Exam:  Intubated on vent  Head: normocephalic, atraumatic  Eyes:  conjunctivae/corneas clear. PERRL.  Throat: lips, mucosa, and tongue normal; teeth and gums normal and no throat erythema  Neck: no jugular venous distention, no adenopathy, no carotid bruit and thyroid not enlarged, symmetric, no tenderness/mass/nodules, trachea midline  Lungs:  rales bilaterally and rhonchi bilaterally  Chest Wall: no tenderness  Heart: regular rate and rhythm and no murmur  Abdomen: soft, non-tender non-distended; bowel sounds normal  Extremities: no cyanosis or edema, or clubbing  Skin: No rashes or lesions or good skin turgor  Neurologic: sedated    Laboratory:  Recent Results (from the past 24 hour(s))   POCT glucose    Collection Time: 06/20/17 11:33 AM   Result Value Ref Range    POCT Glucose 232 (H) 70 - 110 mg/dL   POCT glucose    Collection Time: 06/20/17  5:28 PM   Result Value Ref Range    POCT Glucose 153 (H) 70 - 110 mg/dL   Fry's Stain, Urine Random    Collection Time: 06/20/17  5:55 PM   Result Value Ref Range    Fry's Stain, Ur No eosinophils seen No eosinophils seen   Sodium, urine, random    Collection Time: 06/20/17  5:55 PM   Result Value Ref Range    Sodium Urine Random 52 20 - 250 mmol/L   Protein / creatinine ratio, urine    Collection Time: 06/20/17   5:55 PM   Result Value Ref Range    Protein, Urine Random 97 mg/dL    Creatinine, Random Ur 362.0 (H) 15.0 - 325.0 mg/dL    Prot/Creat Ratio, Ur 0.27 (H) 0.00 - 0.20   VANCOMYCIN, TROUGH    Collection Time: 06/20/17  6:19 PM   Result Value Ref Range    Vancomycin-Trough <1.1 (L) 10.0 - 22.0 ug/mL   Basic metabolic panel    Collection Time: 06/20/17  6:19 PM   Result Value Ref Range    Sodium 135 (L) 136 - 145 mmol/L    Potassium 2.5 (LL) 3.5 - 5.1 mmol/L    Chloride 94 (L) 95 - 110 mmol/L    CO2 30 (H) 23 - 29 mmol/L    Glucose 140 (H) 70 - 110 mg/dL    BUN, Bld 27 (H) 8 - 23 mg/dL    Creatinine 1.8 (H) 0.5 - 1.4 mg/dL    Calcium 7.5 (L) 8.7 - 10.5 mg/dL    Anion Gap 11 8 - 16 mmol/L    eGFR if African American 33 (A) >60 mL/min/1.73 m^2    eGFR if non African American 29 (A) >60 mL/min/1.73 m^2   Magnesium    Collection Time: 06/20/17  6:19 PM   Result Value Ref Range    Magnesium 1.7 1.6 - 2.6 mg/dL   POCT glucose    Collection Time: 06/20/17 11:31 PM   Result Value Ref Range    POCT Glucose 186 (H) 70 - 110 mg/dL   ISTAT PROCEDURE    Collection Time: 06/21/17  5:02 AM   Result Value Ref Range    POC PH 7.534 (H) 7.35 - 7.45    POC PCO2 32.4 (L) 35 - 45 mmHg    POC PO2 63 (L) 80 - 100 mmHg    POC HCO3 27.3 24 - 28 mmol/L    POC BE 5 -2 to 2 mmol/L    POC SATURATED O2 94 (L) 95 - 100 %    POC TCO2 28 (H) 23 - 27 mmol/L    Rate 22     Sample ARTERIAL     Site LR     Allens Test Pass     DelSys Adult Vent     Mode AC/PRVC     Vt 450     PEEP 5     PiP 26     FiO2 50     Min Vol 10.1     Sp02 95    POCT glucose    Collection Time: 06/21/17  5:29 AM   Result Value Ref Range    POCT Glucose 210 (H) 70 - 110 mg/dL   Basic metabolic panel    Collection Time: 06/21/17  6:33 AM   Result Value Ref Range    Sodium 135 (L) 136 - 145 mmol/L    Potassium 5.6 (H) 3.5 - 5.1 mmol/L    Chloride 100 95 - 110 mmol/L    CO2 20 (L) 23 - 29 mmol/L    Glucose 213 (H) 70 - 110 mg/dL    BUN, Bld 31 (H) 8 - 23 mg/dL    Creatinine 2.1 (H) 0.5  - 1.4 mg/dL    Calcium 8.0 (L) 8.7 - 10.5 mg/dL    Anion Gap 15 8 - 16 mmol/L    eGFR if African American 28 (A) >60 mL/min/1.73 m^2    eGFR if non African American 24 (A) >60 mL/min/1.73 m^2   CBC auto differential    Collection Time: 06/21/17  6:33 AM   Result Value Ref Range    WBC 14.89 (H) 3.90 - 12.70 K/uL    RBC 3.87 (L) 4.00 - 5.40 M/uL    Hemoglobin 10.0 (L) 12.0 - 16.0 g/dL    Hematocrit 30.8 (L) 37.0 - 48.5 %    MCV 80 (L) 82 - 98 fL    MCH 25.8 (L) 27.0 - 31.0 pg    MCHC 32.5 32.0 - 36.0 %    RDW 14.2 11.5 - 14.5 %    Platelets 202 150 - 350 K/uL    MPV 10.5 9.2 - 12.9 fL    Gran # 11.1 (H) 1.8 - 7.7 K/uL    Lymph # 2.5 1.0 - 4.8 K/uL    Mono # 1.2 (H) 0.3 - 1.0 K/uL    Eos # 0.1 0.0 - 0.5 K/uL    Baso # 0.02 0.00 - 0.20 K/uL    Gran% 74.3 (H) 38.0 - 73.0 %    Lymph% 16.9 (L) 18.0 - 48.0 %    Mono% 8.1 4.0 - 15.0 %    Eosinophil% 0.6 0.0 - 8.0 %    Basophil% 0.1 0.0 - 1.9 %    Differential Method Automated    Lactic acid, plasma    Collection Time: 06/21/17  7:51 AM   Result Value Ref Range    Lactate (Lactic Acid) 1.8 0.5 - 2.2 mmol/L     CBC:   Recent Labs  Lab 06/21/17  0633   WBC 14.89*   RBC 3.87*   HGB 10.0*   HCT 30.8*      MCV 80*   MCH 25.8*   MCHC 32.5     BMP:   Recent Labs  Lab 06/20/17  1819 06/21/17  0633   * 213*   * 135*   K 2.5* 5.6*   CL 94* 100   CO2 30* 20*   BUN 27* 31*   CREATININE 1.8* 2.1*   CALCIUM 7.5* 8.0*   MG 1.7  --      CMP:   Recent Labs  Lab 06/20/17 0232 06/21/17  0633   *  < > 213*   CALCIUM 7.7*  < > 8.0*   ALBUMIN 2.6*  --   --    PROT 6.9  --   --    *  < > 135*   K 3.5  < > 5.6*   CO2 17*  < > 20*   CL 93*  < > 100   BUN 22  < > 31*   CREATININE 2.4*  < > 2.1*   ALKPHOS 110  --   --    ALT <5*  --   --    AST 8*  --   --    BILITOT 1.0  --   --    < > = values in this interval not displayed.  LFTs:   Recent Labs  Lab 06/20/17 0232   ALT <5*   AST 8*   ALKPHOS 110   BILITOT 1.0   PROT 6.9   ALBUMIN 2.6*     Coagulation: No results  for input(s): INR, APTT in the last 168 hours.    Invalid input(s): PT  Cardiac markers: No results for input(s): CKMB, TROPONINT, MYOGLOBIN in the last 168 hours.  Microbiology Results (last 7 days)     Procedure Component Value Units Date/Time    Culture, Respiratory with Gram Stain [[id number]] Collected:  06/20/17 0251    Order Status:  Completed Specimen:  Respiratory from Sputum, Induced Updated:  06/21/17 1016     Respiratory Culture Normal respiratory jose luis     Gram Stain (Respiratory) <10 epithelial cells per low power field.     Gram Stain (Respiratory) Moderate WBC's     Gram Stain (Respiratory) Few Gram positive cocci    Blood culture [[id number]] Collected:  06/18/17 2225    Order Status:  Completed Specimen:  Blood from Peripheral, Forearm, Right Updated:  06/21/17 0303     Blood Culture, Routine No Growth to date     Blood Culture, Routine No Growth to date     Blood Culture, Routine No Growth to date    Blood culture [[id number]] Collected:  06/18/17 2240    Order Status:  Completed Specimen:  Blood from Peripheral, Forearm, Right Updated:  06/20/17 1526     Blood Culture, Routine Gram stain tena bottle: Gram positive cocci in clusters resembling Staph     Blood Culture, Routine Results called to and read back by: April Cleveland 06/20/2017  15:25    Urine culture [[id number]] Collected:  06/18/17 2120    Order Status:  Completed Specimen:  Urine from Urine, Catheterized Updated:  06/20/17 1023     Urine Culture, Routine No growth        ABGs:   Recent Labs  Lab 06/21/17  0502   PH 7.534*   PCO2 32.4*   PO2 63*   HCO3 27.3   POCSATURATED 94*   BE 5       Ventilator Setting:  Vent Mode: PRVC  Oxygen Concentration (%):  [50-60] 55  Resp Rate Total:  [17 br/min-28 br/min] 17 br/min  Vt Set:  [450 mL] 450 mL  PEEP/CPAP:  [5 cmH20] 5 cmH20  Mean Airway Pressure:  [10.8 flF97-95.6 cmH20] 10.8 cmH20     Diagnostic Results:  .Interval improved aeration of the lung bases. The distal aspect of the esophagogastric tube  is not well-visualized and cannot exclude that the tip terminates within the thoracic esophagus. Correlation with any history of interval repositioning is recommended.    ASSESSMENT/PLAN:     1. Sepsis, due to unspecified organism    2. Fever    3. Elevated troponin    4. Essential hypertension    5.      acute respiratory failure  6.      Vent management      Plan:pt continues to have seizures ;will monitor and wean as permitted by circumstances .

## 2017-06-21 NOTE — SUBJECTIVE & OBJECTIVE
Interval History: Pt intubated for airway protection and on broad spectrum antibiotics, multiple seizures.    Review of Systems   Unable to perform ROS: Intubated     Objective:     Vital Signs (Most Recent):  Temp: 99.2 °F (37.3 °C) (06/20/17 1957)  Pulse: 66 (06/20/17 1940)  Resp: 20 (06/20/17 1940)  BP: (!) 148/65 (06/20/17 1830)  SpO2: 100 % (06/20/17 1940) Vital Signs (24h Range):  Temp:  [99.2 °F (37.3 °C)-101 °F (38.3 °C)] 99.2 °F (37.3 °C)  Pulse:  [] 66  Resp:  [0-132] 20  SpO2:  [92 %-100 %] 100 %  BP: ()/(53-81) 148/65     Weight: (!) 155.1 kg (341 lb 14.9 oz)  Body mass index is 56.9 kg/m².    Intake/Output Summary (Last 24 hours) at 06/20/17 2131  Last data filed at 06/20/17 2106   Gross per 24 hour   Intake          1550.31 ml   Output              872 ml   Net           678.31 ml      Physical Exam   Constitutional: She is oriented to person, place, and time. She appears well-developed and well-nourished. No distress.   Morbidly obese   HENT:   Head: Normocephalic and atraumatic.   Right Ear: External ear normal.   Left Ear: External ear normal.   Nose: Nose normal.   Eyes: Right eye exhibits no discharge. Left eye exhibits no discharge.   Neck: Normal range of motion.   Cardiovascular: Normal rate, regular rhythm, normal heart sounds and intact distal pulses.  Exam reveals no gallop and no friction rub.    No murmur heard.  Pulmonary/Chest: Effort normal and breath sounds normal. No respiratory distress. She has no wheezes. She has no rales. She exhibits no tenderness.   Abdominal: Soft. Bowel sounds are normal. She exhibits no distension. There is no tenderness. There is no rebound and no guarding.   Musculoskeletal: Normal range of motion. She exhibits no edema.   Neurological: She is alert and oriented to person, place, and time.   Skin: Skin is warm and dry. She is not diaphoretic. No erythema.   Psychiatric: She has a normal mood and affect. Her behavior is normal. Judgment and  thought content normal.   Nursing note and vitals reviewed.      Significant Labs:   ABGs:   Recent Labs  Lab 06/20/17  0246   PH 7.351   PCO2 42.2   HCO3 23.3*   POCSATURATED 93*   BE -2     Blood Culture:   Recent Labs  Lab 06/18/17  2225 06/18/17  2240   LABBLOO No Growth to date  No Growth to date Gram stain tena bottle: Gram positive cocci in clusters resembling Staph  Results called to and read back by: April Cleveland 06/20/2017  15:25     BMP:   Recent Labs  Lab 06/20/17  1819   *   *   K 2.5*   CL 94*   CO2 30*   BUN 27*   CREATININE 1.8*   CALCIUM 7.5*   MG 1.7     CBC:   Recent Labs  Lab 06/19/17  0333 06/20/17  0231   WBC 15.99* 19.43*   HGB 12.0 11.1*   HCT 36.4* 35.3*    314     POCT Glucose:   Recent Labs  Lab 06/20/17  0558 06/20/17  1133 06/20/17  1728   POCTGLUCOSE 254* 232* 153*     Respiratory Culture:   Recent Labs  Lab 06/20/17  0251   GSRESP <10 epithelial cells per low power field.  Moderate WBC's  Few Gram positive cocci     Urine Culture: No results for input(s): LABURIN in the last 48 hours.    Significant Imaging: I have reviewed all pertinent imaging results/findings within the past 24 hours.

## 2017-06-21 NOTE — PROGRESS NOTES
Dr. Harmon here and he was notified that pt con't with right arm twitching also twitching noted to right jaw. New orders received.

## 2017-06-22 PROBLEM — N17.9 ARF (ACUTE RENAL FAILURE): Status: ACTIVE | Noted: 2017-06-22

## 2017-06-22 LAB
ALBUMIN SERPL BCP-MCNC: 1.9 G/DL
ALLENS TEST: ABNORMAL
ALP SERPL-CCNC: 88 U/L
ALT SERPL W/O P-5'-P-CCNC: 8 U/L
ANION GAP SERPL CALC-SCNC: 10 MMOL/L
AST SERPL-CCNC: 12 U/L
BACTERIA BLD CULT: NORMAL
BACTERIA SPEC AEROBE CULT: NORMAL
BILIRUB SERPL-MCNC: 0.4 MG/DL
BUN SERPL-MCNC: 41 MG/DL
CALCIUM SERPL-MCNC: 7.3 MG/DL
CHLORIDE SERPL-SCNC: 98 MMOL/L
CO2 SERPL-SCNC: 26 MMOL/L
CREAT SERPL-MCNC: 2.7 MG/DL
DELSYS: ABNORMAL
ERYTHROCYTE [SEDIMENTATION RATE] IN BLOOD BY WESTERGREN METHOD: 18 MM/H
EST. GFR  (AFRICAN AMERICAN): 20 ML/MIN/1.73 M^2
EST. GFR  (NON AFRICAN AMERICAN): 18 ML/MIN/1.73 M^2
FIO2: 50
GLUCOSE SERPL-MCNC: 219 MG/DL
GRAM STN SPEC: NORMAL
HCO3 UR-SCNC: 26 MMOL/L (ref 24–28)
MIN VOL: 10.7
MODE: ABNORMAL
PCO2 BLDA: 36.7 MMHG (ref 35–45)
PEEP: 5
PH SMN: 7.46 [PH] (ref 7.35–7.45)
PIP: 31
PO2 BLDA: 70 MMHG (ref 80–100)
POC BE: 2 MMOL/L
POC SATURATED O2: 95 % (ref 95–100)
POC TCO2: 27 MMOL/L (ref 23–27)
POCT GLUCOSE: 240 MG/DL (ref 70–110)
POCT GLUCOSE: 242 MG/DL (ref 70–110)
POCT GLUCOSE: 256 MG/DL (ref 70–110)
POCT GLUCOSE: 264 MG/DL (ref 70–110)
POTASSIUM SERPL-SCNC: 3.2 MMOL/L
PROT SERPL-MCNC: 5.8 G/DL
SAMPLE: ABNORMAL
SITE: ABNORMAL
SODIUM SERPL-SCNC: 134 MMOL/L
SP02: 98
VT: 450

## 2017-06-22 PROCEDURE — 25000003 PHARM REV CODE 250: Performed by: HOSPITALIST

## 2017-06-22 PROCEDURE — 25000003 PHARM REV CODE 250: Performed by: INTERNAL MEDICINE

## 2017-06-22 PROCEDURE — 99900026 HC AIRWAY MAINTENANCE (STAT)

## 2017-06-22 PROCEDURE — 95816 EEG AWAKE AND DROWSY: CPT | Mod: 26,,, | Performed by: PSYCHIATRY & NEUROLOGY

## 2017-06-22 PROCEDURE — 25000003 PHARM REV CODE 250: Performed by: EMERGENCY MEDICINE

## 2017-06-22 PROCEDURE — 94003 VENT MGMT INPAT SUBQ DAY: CPT

## 2017-06-22 PROCEDURE — 95822 EEG COMA OR SLEEP ONLY: CPT

## 2017-06-22 PROCEDURE — 63600175 PHARM REV CODE 636 W HCPCS: Performed by: EMERGENCY MEDICINE

## 2017-06-22 PROCEDURE — 63600175 PHARM REV CODE 636 W HCPCS: Performed by: HOSPITALIST

## 2017-06-22 PROCEDURE — 80053 COMPREHEN METABOLIC PANEL: CPT

## 2017-06-22 PROCEDURE — 36600 WITHDRAWAL OF ARTERIAL BLOOD: CPT

## 2017-06-22 PROCEDURE — 99232 SBSQ HOSP IP/OBS MODERATE 35: CPT | Mod: ,,, | Performed by: PSYCHIATRY & NEUROLOGY

## 2017-06-22 PROCEDURE — 63600175 PHARM REV CODE 636 W HCPCS: Performed by: PSYCHIATRY & NEUROLOGY

## 2017-06-22 PROCEDURE — 20000000 HC ICU ROOM

## 2017-06-22 RX ORDER — POTASSIUM CHLORIDE 20 MEQ/15ML
20 SOLUTION ORAL ONCE
Status: COMPLETED | OUTPATIENT
Start: 2017-06-22 | End: 2017-06-22

## 2017-06-22 RX ADMIN — INSULIN ASPART 2 UNITS: 100 INJECTION, SOLUTION INTRAVENOUS; SUBCUTANEOUS at 05:06

## 2017-06-22 RX ADMIN — INSULIN ASPART 3 UNITS: 100 INJECTION, SOLUTION INTRAVENOUS; SUBCUTANEOUS at 12:06

## 2017-06-22 RX ADMIN — PROPOFOL 35 MCG/KG/MIN: 10 INJECTION, EMULSION INTRAVENOUS at 12:06

## 2017-06-22 RX ADMIN — CEFTRIAXONE 1 G: 1 INJECTION, SOLUTION INTRAVENOUS at 10:06

## 2017-06-22 RX ADMIN — PROPOFOL 30 MCG/KG/MIN: 10 INJECTION, EMULSION INTRAVENOUS at 04:06

## 2017-06-22 RX ADMIN — ENOXAPARIN SODIUM 40 MG: 100 INJECTION SUBCUTANEOUS at 05:06

## 2017-06-22 RX ADMIN — PROPOFOL 35 MCG/KG/MIN: 10 INJECTION, EMULSION INTRAVENOUS at 01:06

## 2017-06-22 RX ADMIN — CHLORHEXIDINE GLUCONATE 15 ML: 1.2 RINSE ORAL at 08:06

## 2017-06-22 RX ADMIN — CEFTRIAXONE 1 G: 1 INJECTION, SOLUTION INTRAVENOUS at 09:06

## 2017-06-22 RX ADMIN — SODIUM CHLORIDE: 0.9 INJECTION, SOLUTION INTRAVENOUS at 07:06

## 2017-06-22 RX ADMIN — ATORVASTATIN CALCIUM 10 MG: 10 TABLET, FILM COATED ORAL at 08:06

## 2017-06-22 RX ADMIN — HYDRALAZINE HYDROCHLORIDE 75 MG: 25 TABLET ORAL at 08:06

## 2017-06-22 RX ADMIN — ALISKIREN HEMIFUMARATE 300 MG: 300 TABLET, FILM COATED ORAL at 08:06

## 2017-06-22 RX ADMIN — LEVETIRACETAM 1000 MG: 1000 INJECTION, SOLUTION INTRAVENOUS at 08:06

## 2017-06-22 RX ADMIN — PROPOFOL 35 MCG/KG/MIN: 10 INJECTION, EMULSION INTRAVENOUS at 10:06

## 2017-06-22 RX ADMIN — INSULIN DETEMIR 10 UNITS: 100 INJECTION, SOLUTION SUBCUTANEOUS at 09:06

## 2017-06-22 RX ADMIN — PROPOFOL 35 MCG/KG/MIN: 10 INJECTION, EMULSION INTRAVENOUS at 06:06

## 2017-06-22 RX ADMIN — PROPOFOL 30 MCG/KG/MIN: 10 INJECTION, EMULSION INTRAVENOUS at 05:06

## 2017-06-22 RX ADMIN — CHLORHEXIDINE GLUCONATE 15 ML: 1.2 RINSE ORAL at 09:06

## 2017-06-22 RX ADMIN — LEVETIRACETAM 1000 MG: 1000 INJECTION, SOLUTION INTRAVENOUS at 09:06

## 2017-06-22 RX ADMIN — HYDRALAZINE HYDROCHLORIDE 75 MG: 25 TABLET ORAL at 09:06

## 2017-06-22 RX ADMIN — AMLODIPINE BESYLATE 10 MG: 10 TABLET ORAL at 08:06

## 2017-06-22 RX ADMIN — PROPOFOL 35 MCG/KG/MIN: 10 INJECTION, EMULSION INTRAVENOUS at 03:06

## 2017-06-22 RX ADMIN — INSULIN ASPART 3 UNITS: 100 INJECTION, SOLUTION INTRAVENOUS; SUBCUTANEOUS at 11:06

## 2017-06-22 RX ADMIN — PANTOPRAZOLE SODIUM 40 MG: 40 GRANULE, DELAYED RELEASE ORAL at 08:06

## 2017-06-22 RX ADMIN — POTASSIUM CHLORIDE 20 MEQ: 20 SOLUTION ORAL at 12:06

## 2017-06-22 RX ADMIN — PROPOFOL 35 MCG/KG/MIN: 10 INJECTION, EMULSION INTRAVENOUS at 09:06

## 2017-06-22 RX ADMIN — ASPIRIN 81 MG 81 MG: 81 TABLET ORAL at 08:06

## 2017-06-22 NOTE — PLAN OF CARE
Problem: Ventilation, Mechanical Invasive (Adult)  Intervention: Prevent Airway-Related Skin/Tissue Breakdown  Patient received on serovi ventilator with 7.5 ETT moved and secured to to right side of lip at 25cm with bite block in place. All ventilator alarms on, set and functioning. ambu bag, mask at bedside. Suctioning done as needed. Oral care done. Family at bedside. Will continue to monitor.

## 2017-06-22 NOTE — PROCEDURES
ROUTINE ELECTROENCEPHALOGRAM REPORT      Nisa Frank  [id number]  1950    DATE OF SERVICE: 6/22/17    REQUESTING PROVIDER: Dustin Harmon MD    REASON FOR CONSULT: 67yo F with HTN and DM, admitted with headaches and AMS with seizure-like activity.    PRIOR EEG: none    MEDICATIONS:   Current Facility-Administered Medications   Medication    0.9%  NaCl infusion    acetaminophen tablet 500 mg    aliskiren tablet 300 mg    amlodipine tablet 10 mg    aspirin chewable tablet 81 mg    atorvastatin tablet 10 mg    cefTRIAXone (ROCEPHIN) 1 g in dextrose 5 % 50 mL IVPB    chlorhexidine 0.12 % solution 15 mL    dextrose 50% injection 12.5 g    dextrose 50% injection 25 g    enoxaparin injection 40 mg    glucose chewable tablet 16 g    glucose chewable tablet 24 g    hydrALAZINE tablet 75 mg    insulin aspart pen 0-5 Units    insulin detemir pen 10 Units    levetiracetam in NaCl (iso-os) IVPB 1,000 mg    lorazepam injection 2 mg    lorazepam injection 2 mg    midazolam (VERSED) 1 mg/mL in sodium chloride 0.9% 100 mL infusion (titrating)    ondansetron injection 8 mg    pantoprazole suspension 40 mg    pneumoc 13-finn conj-dip cr(PF) 0.5 mL    promethazine (PHENERGAN) 12.5 mg in dextrose 5 % 50 mL IVPB    propofol (DIPRIVAN) 10 mg/mL infusion    ramelteon tablet 8 mg     METHODOLOGY   Electroencephalographic (EEG) recording is with electrodes placed according to the International 10-20 placement system.  Thirty two (32) channels of digital signal (sampling rate of 512/sec) including T1 and T2 was simultaneously recorded from the scalp and may include  EKG, EMG, and/or eye monitors.  Recording band pass was 0.1 to 512 hz.  Digital video recording of the patient is simultaneously recorded with the EEG.  The patient is instructed report clinical symptoms which may occur during the recording session.  EEG and video recording is stored and archived in digital format. Activation procedures which  include photic stimulation, hyperventilation and instructing patients to perform simple task are done in selected patients.    The EEG is displayed on a monitor screen and can be reviewed using different montages.  Computer assisted analysis is employed to detect spike and electrographic seizure activity.   The entire record is submitted for computer analysis.  The entire recording is visually reviewed and the times identified by computer analysis as being spikes or seizures are reviewed again.  Compresses spectral analysis (CSA) is also performed on the activity recorded from each individual channel.  This is displayed as a power display of frequencies from 0 to 30 Hz over time.   The CSA is reviewed looking for asymmetries in power between homologous areas of the scalp and then compared with the original EEG recording.     DrDoctor software was also utilized in the review of this study.  This software suite analyzes the EEG recording in multiple domains.  Coherence and rhythmicity is computed to identify EEG sections which may contain organized seizures.  Each channel undergoes analysis to detect presence of spike and sharp waves which have special and morphological characteristic of epileptic activity.  The routine EEG recording is converted from spacial into frequency domain.  This is then displayed comparing homologous areas to identify areas of significant asymmetry.  Algorithm to identify non-cortically generated artifact is used to separate eye movement, EMG and other artifact from the EEG    EEG FINGINGS  Background activity:   The background rhythm was characterized by delta-theta (2- 5 Hz) activity   No posterior dominant rhythm seen.   Symmetry and continuity: The background was continuous; focal left sided slowing (delta 2-4Hz) noted throughout.    Sleep:   Not seen.    Activation procedures:   Hyperventilation and photic stimulation were not performed    Abnormal activity:   After propofol was  discontinued right maximal EMG artifacts were noted associated with myoclonic movements of the right side of face/head and right shoulder (visible on camera).     No epileptiform discharges, periodic discharges, lateralized rhythmic delta activity or electrographic seizures were seen.    IMPRESSION:   This is an abnormal routine EEG due to:  1) focal left sided slowing suggestive of underlying structural lesion  2) severe generalized slowing, suggestive of severe diffuse or multifocal cerebral dysfunction    Myoclonic clinical activity involving the right side as described was seen associated with EMG correlate.  No epileptiform activity or electrographic seizures were seen.     CLINICAL CORRELATION IS RECOMMENDED.    Nicky Arguello MD, VICTORINA(), FACNS.  Neurology-Epilepsy.

## 2017-06-22 NOTE — ASSESSMENT & PLAN NOTE
Patient presented with an initial blood pressure of 224/108 ().  She was responsive to medications administered in the ED so was not needing an antihypertensive infusion.  Restarted her home medications of aliskiren, amlodipine, hydralazine and provide as-needed clonidine.  BP dropped on sedation and placed parameters on medications.

## 2017-06-22 NOTE — ASSESSMENT & PLAN NOTE
Poorly-controlled on a home regimen of metformin, a DPP-4 inhibitor, basal insulin therapy.  Hyperglycemic.  Add nightly Levemir.  Continue with sliding scale.

## 2017-06-22 NOTE — ASSESSMENT & PLAN NOTE
On keppra, vimpat, dilantin x one dose  EEG being done right now.  Neurology consulted.  No evidence of seizures today.  Continue to monitor and await EEG results.

## 2017-06-22 NOTE — PROGRESS NOTES
Nisa Frank is a 66 y.o. female patient.    Follow for KATHI    Sedated, poorly responsive on vent support    Scheduled Meds:   aliskiren  300 mg Oral Daily    amlodipine  10 mg Oral Daily    aspirin  81 mg Oral Daily    atorvastatin  10 mg Oral Daily    cefTRIAXone (ROCEPHIN) IVPB  1 g Intravenous Q12H    chlorhexidine  15 mL Mouth/Throat BID    enoxaparin  40 mg Subcutaneous Daily    hydrALAZINE  75 mg Oral Q12H    levetiracetam IVPB  1,000 mg Intravenous Q12H    lorazepam  2 mg Intravenous Once    pantoprazole  40 mg Per NG tube Daily       Review of patient's allergies indicates:   Allergen Reactions    Latex, natural rubber          Vital Signs Range (Last 24H):  Temp:  [98 °F (36.7 °C)-98.8 °F (37.1 °C)]   Pulse:  [66-80]   Resp:  [0-42]   BP: ()/(44-69)   SpO2:  [95 %-100 %]     I & O (Last 24H):  Intake/Output Summary (Last 24 hours) at 06/22/17 0833  Last data filed at 06/22/17 0748   Gross per 24 hour   Intake          4102.05 ml   Output              563 ml   Net          3539.05 ml           Physical Exam:  General appearance: well developed, well nourished, no distress  Lungs:  clear to auscultation bilaterally and normal respiratory effort  Heart: regular rate and rhythm  Abdomen: soft, non-tender non-distented; bowel sounds normal; no masses,  no organomegaly  Extremities: no cyanosis or edema, or clubbing    Laboratory:  CBC:   Recent Labs  Lab 06/21/17  0633   WBC 14.89*   RBC 3.87*   HGB 10.0*   HCT 30.8*      MCV 80*   MCH 25.8*   MCHC 32.5     CMP:   Recent Labs  Lab 06/22/17  0125   *   CALCIUM 7.3*   ALBUMIN 1.9*   PROT 5.8*   *   K 3.2*   CO2 26   CL 98   BUN 41*   CREATININE 2.7*   ALKPHOS 88   ALT 8*   AST 12   BILITOT 0.4       Imp/Plan    KATHI - creatinine still elevated  HTN emergency - BP controlled at this time  Acute respiratory failure  Seizure - neurology following  UTI  DM type 2  CAD/CABG    Continue present rx  Watch renal  function  CMP in am        Trac T Le  6/22/2017

## 2017-06-22 NOTE — PROGRESS NOTES
Progress Note  Pulmonology    Admit Date: 2017   LOS: 4 days       SUBJECTIVE: acute respiratory failure  On vent     History of Present Illness:Patient unresponsive;information obtained from chart  ; 66 y.o. female with essential hypertension, hyperlipidemia (.2 May 2014), type 2 diabetes mellitus (HbA1c 8.2% May 2014), CAD s/p CABG, morbid obesity (BMI 58.7), and moderate protein malnutrition who presents to [facility] ED with complaints of headache this morning.  She cannot provide much details on the quality of the headache but does say she's been feeling more weak and tired lately.  She denies any nausea, vomiting, fevers, or chills, and hasn't had any neck stiffness, photophobia, nor any phonophobia.  She denies any laterality to  Her weakness and has never had a stroke before.  She also denies any dysuria, hematuria, abdominal pain, diarrhea, chest pain, shortness of breath, nor any coughing.  Further history is limited as she is a very poor historian and has very garbled speech.      Review of patient's allergies indicates:   Allergen Reactions    Latex, natural rubber        Past Medical History:   Diagnosis Date    Diabetes mellitus     Hypertension      Past Surgical History:   Procedure Laterality Date    ARTERIAL BYPASS SURGRY      9 tears sgo    CARDIAC SURGERY       SECTION      HYSTERECTOMY      TUBAL LIGATION       Family History   Problem Relation Age of Onset    No Known Problems Father     No Known Problems Mother     No Known Problems Sister     No Known Problems Brother     No Known Problems Maternal Aunt     No Known Problems Maternal Uncle     No Known Problems Paternal Aunt     No Known Problems Paternal Uncle     No Known Problems Maternal Grandmother     No Known Problems Maternal Grandfather     No Known Problems Paternal Grandmother     No Known Problems Paternal Grandfather     Amblyopia Neg Hx     Blindness Neg Hx     Cancer Neg Hx     Cataracts  Neg Hx     Diabetes Neg Hx     Glaucoma Neg Hx     Hypertension Neg Hx     Macular degeneration Neg Hx     Retinal detachment Neg Hx     Strabismus Neg Hx     Stroke Neg Hx     Thyroid disease Neg Hx        ROS:patient  unable to communicate because intubated, on vent, and sedated     OBJECTIVE:     Vital Signs (Most Recent)  Temp: 98.3 °F (36.8 °C) (06/22/17 0745)  Pulse: 81 (06/22/17 0915)  Resp: (!) 23 (06/22/17 0915)  BP: (!) 147/65 (06/22/17 0900)  SpO2: 99 % (06/22/17 0915)    Vital Signs Range (Last 24H):  Temp:  [98 °F (36.7 °C)-98.8 °F (37.1 °C)]   Pulse:  [67-81]   Resp:  [0-42]   BP: ()/(44-69)   SpO2:  [95 %-100 %]     Physical Exam:  Intubated on vent  Lungs:  rales bilaterally and rhonchi bilaterally  Chest Wall: no tenderness  Heart: regular rate and rhythm and no murmur  Abdomen: soft, non-tender non-distended; bowel sounds normal  Extremities: no cyanosis or edema, or clubbing  Skin: No rashes or lesions or good skin turgor  Neurologic: sedated    Laboratory:  Recent Results (from the past 24 hour(s))   Sodium, urine, random    Collection Time: 06/21/17 11:59 AM   Result Value Ref Range    Sodium Urine Random 46 20 - 250 mmol/L   Creatinine, urine, random    Collection Time: 06/21/17 11:59 AM   Result Value Ref Range    Creatinine, Random Ur 253.5 15.0 - 325.0 mg/dL   Blood culture    Collection Time: 06/21/17 12:07 PM   Result Value Ref Range    Blood Culture, Routine No Growth to date    POCT glucose    Collection Time: 06/21/17 12:07 PM   Result Value Ref Range    POCT Glucose 224 (H) 70 - 110 mg/dL   Blood culture    Collection Time: 06/21/17 12:10 PM   Result Value Ref Range    Blood Culture, Routine No Growth to date    2D echo with color flow doppler    Collection Time: 06/21/17 12:12 PM   Result Value Ref Range    EF 55 55 - 65    Diastolic Dysfunction Yes (A)     Est. PA Systolic Pressure 31.58     Pericardial Effusion NONE     Tricuspid Valve Regurgitation TRIVIAL TO MILD     POCT glucose    Collection Time: 06/21/17  6:45 PM   Result Value Ref Range    POCT Glucose 287 (H) 70 - 110 mg/dL   POCT glucose    Collection Time: 06/22/17 12:12 AM   Result Value Ref Range    POCT Glucose 264 (H) 70 - 110 mg/dL   Comprehensive metabolic panel    Collection Time: 06/22/17  1:25 AM   Result Value Ref Range    Sodium 134 (L) 136 - 145 mmol/L    Potassium 3.2 (L) 3.5 - 5.1 mmol/L    Chloride 98 95 - 110 mmol/L    CO2 26 23 - 29 mmol/L    Glucose 219 (H) 70 - 110 mg/dL    BUN, Bld 41 (H) 8 - 23 mg/dL    Creatinine 2.7 (H) 0.5 - 1.4 mg/dL    Calcium 7.3 (L) 8.7 - 10.5 mg/dL    Total Protein 5.8 (L) 6.0 - 8.4 g/dL    Albumin 1.9 (L) 3.5 - 5.2 g/dL    Total Bilirubin 0.4 0.1 - 1.0 mg/dL    Alkaline Phosphatase 88 55 - 135 U/L    AST 12 10 - 40 U/L    ALT 8 (L) 10 - 44 U/L    Anion Gap 10 8 - 16 mmol/L    eGFR if African American 20 (A) >60 mL/min/1.73 m^2    eGFR if non African American 18 (A) >60 mL/min/1.73 m^2   ISTAT PROCEDURE    Collection Time: 06/22/17  4:42 AM   Result Value Ref Range    POC PH 7.458 (H) 7.35 - 7.45    POC PCO2 36.7 35 - 45 mmHg    POC PO2 70 (L) 80 - 100 mmHg    POC HCO3 26.0 24 - 28 mmol/L    POC BE 2 -2 to 2 mmol/L    POC SATURATED O2 95 95 - 100 %    POC TCO2 27 23 - 27 mmol/L    Rate 18     Sample ARTERIAL     Site RR     Allens Test Pass     DelSys Adult Vent     Mode AC/PRVC     Vt 450     PEEP 5     PiP 31     FiO2 50     Min Vol 10.7     Sp02 98    POCT glucose    Collection Time: 06/22/17  5:20 AM   Result Value Ref Range    POCT Glucose 242 (H) 70 - 110 mg/dL     CBC:     Recent Labs  Lab 06/21/17  0633   WBC 14.89*   RBC 3.87*   HGB 10.0*   HCT 30.8*      MCV 80*   MCH 25.8*   MCHC 32.5     BMP:   Recent Labs  Lab 06/20/17  1819  06/22/17  0125   *  < > 219*   *  < > 134*   K 2.5*  < > 3.2*   CL 94*  < > 98   CO2 30*  < > 26   BUN 27*  < > 41*   CREATININE 1.8*  < > 2.7*   CALCIUM 7.5*  < > 7.3*   MG 1.7  --   --    < > = values in this interval  not displayed.  CMP:     Recent Labs  Lab 06/22/17  0125   *   CALCIUM 7.3*   ALBUMIN 1.9*   PROT 5.8*   *   K 3.2*   CO2 26   CL 98   BUN 41*   CREATININE 2.7*   ALKPHOS 88   ALT 8*   AST 12   BILITOT 0.4     LFTs:     Recent Labs  Lab 06/22/17  0125   ALT 8*   AST 12   ALKPHOS 88   BILITOT 0.4   PROT 5.8*   ALBUMIN 1.9*     Coagulation: No results for input(s): INR, APTT in the last 168 hours.    Invalid input(s): PT  Cardiac markers: No results for input(s): CKMB, TROPONINT, MYOGLOBIN in the last 168 hours.  Microbiology Results (last 7 days)     Procedure Component Value Units Date/Time    Culture, Respiratory with Gram Stain [[id number]] Collected:  06/20/17 0251    Order Status:  Completed Specimen:  Respiratory from Sputum, Induced Updated:  06/22/17 0825     Respiratory Culture Normal respiratory jose luis     Gram Stain (Respiratory) <10 epithelial cells per low power field.     Gram Stain (Respiratory) Moderate WBC's     Gram Stain (Respiratory) Few Gram positive cocci    Blood culture [[id number]] Collected:  06/18/17 2240    Order Status:  Completed Specimen:  Blood from Peripheral, Forearm, Right Updated:  06/22/17 0811     Blood Culture, Routine Gram stain tena bottle: Gram positive cocci in clusters resembling Staph     Blood Culture, Routine Results called to and read back by: April Cleveland 06/20/2017  15:25     Blood Culture, Routine --     COAGULASE-NEGATIVE STAPHYLOCOCCUS SPECIES  Organism is a probable contaminant      Blood culture [[id number]] Collected:  06/18/17 2225    Order Status:  Completed Specimen:  Blood from Peripheral, Forearm, Right Updated:  06/22/17 0303     Blood Culture, Routine No Growth to date     Blood Culture, Routine No Growth to date     Blood Culture, Routine No Growth to date     Blood Culture, Routine No Growth to date    Blood culture [[id number]] Collected:  06/21/17 1210    Order Status:  Completed Specimen:  Blood Updated:  06/21/17 1912     Blood Culture,  Routine No Growth to date    Blood culture [[id number]] Collected:  06/21/17 1207    Order Status:  Completed Specimen:  Blood Updated:  06/21/17 1912     Blood Culture, Routine No Growth to date    Urine culture [[id number]] Collected:  06/18/17 2120    Order Status:  Completed Specimen:  Urine from Urine, Catheterized Updated:  06/20/17 1023     Urine Culture, Routine No growth        ABGs:     Recent Labs  Lab 06/22/17  0442   PH 7.458*   PCO2 36.7   PO2 70*   HCO3 26.0   POCSATURATED 95   BE 2       Ventilator Setting:  Vent Mode: PRVC  Oxygen Concentration (%):  [50-55] 50  Resp Rate Total:  [18 br/min-26 br/min] 23 br/min  Vt Set:  [450 mL] 450 mL  PEEP/CPAP:  [5 cmH20] 5 cmH20  Mean Airway Pressure:  [10.3 szR65-32.6 cmH20] 14.3 cmH20     Diagnostic Results:    Allowing for pulmonary underinflation and LEFT posterior oblique rotation, intrathoracic devices remain in good location.  NG tube cannot be followed across the GE junction because of underpenetration of mediastinal and upper abdominal structures but the recent abdominal radiograph performed yesterday at 1344 hrs. reveals the tube tip and proximal port in the distal gastric body or antrum.    ASSESSMENT/PLAN:     1. Sepsis, due to unspecified organism    2. Fever    3. Elevated troponin    4. Essential hypertension    5. Bacteremia    5.      acute respiratory failure  6.      Vent management      Plan:no seizures since yesterday ; I>O but starting to diurese ; will wean when appropriate

## 2017-06-22 NOTE — PLAN OF CARE
Problem: Patient Care Overview  Goal: Plan of Care Review  Outcome: Ongoing (interventions implemented as appropriate)  Pt remains on vent. Propofol infusing for sedation. Pt unable to follow commands, withdraws from painful stimulation. Tube feeding at goal of 65 ml/hr, minimal residual. Ross CDI, urine output adequate. VSS. Tmax 99.9. EEG and CT done this shift .Pt placed on bariatric bed. Family at bedside throughout shift. No falls or new skin breakdown.

## 2017-06-22 NOTE — PLAN OF CARE
Problem: Patient Care Overview  Goal: Plan of Care Review  Outcome: Ongoing (interventions implemented as appropriate)  Remains on vent.  Seizure activity noted to RUE and face this am.  Dr Harmon witnessed activity.  Seizure activity resolved by mid day.  Awaiting EEG.  TLC placed by anesthesia.  Tolerating TFs at goal of 60.  A-febrile.  Ross with low urine output.  Remains free from falls or other hospital acquired injury.

## 2017-06-22 NOTE — ASSESSMENT & PLAN NOTE
The patient has been counseled on the negative impact that obesity imparts on her health, and was encouraged to make lifestyle changes in order to lose weight and decrease her modifiable risk factors.  Body mass index is 59.14 kg/m².

## 2017-06-22 NOTE — ASSESSMENT & PLAN NOTE
Patient presented with fever and does have a urinalysis that is suspicious for a urinary tract infection with trace leukocytes, 8 WBCs, and many bacteria.  She meet criteria for sepsis.  There is no previous urine cultures for comparison.    Urine culture negative, fever resolved.  Fevers could also be secondary to seizures.  May stop ABx's tomorrow if no further fevers and all Cx negative.  Staph in blood is contaminant.

## 2017-06-22 NOTE — PLAN OF CARE
Problem: Patient Care Overview  Goal: Plan of Care Review  Patient remain on Servoi ventilator settings are PRVC, rate 18, tidal volume 450, +5 PEEP and fio2 50%.  Patient will continue to be monitored.

## 2017-06-22 NOTE — SUBJECTIVE & OBJECTIVE
Interval History: No apparent seizures.    Review of Systems   Unable to perform ROS: Intubated     Objective:     Vital Signs (Most Recent):  Temp: 98.9 °F (37.2 °C) (06/22/17 1100)  Pulse: 75 (06/22/17 1215)  Resp: 19 (06/22/17 1215)  BP: (!) 110/56 (06/22/17 1200)  SpO2: 97 % (06/22/17 1215) Vital Signs (24h Range):  Temp:  [98 °F (36.7 °C)-98.9 °F (37.2 °C)] 98.9 °F (37.2 °C)  Pulse:  [67-81] 75  Resp:  [0-31] 19  SpO2:  [95 %-100 %] 97 %  BP: ()/(44-67) 110/56     Weight: (!) 161.2 kg (355 lb 6.1 oz)  Body mass index is 59.14 kg/m².    Intake/Output Summary (Last 24 hours) at 06/22/17 1233  Last data filed at 06/22/17 1200   Gross per 24 hour   Intake          4402.71 ml   Output              808 ml   Net          3594.71 ml      Physical Exam   Constitutional: No distress.   Morbidly obese   HENT:   ET tube in place   Eyes: Conjunctivae are normal. Right eye exhibits no discharge. Left eye exhibits no discharge.   Neck: Neck supple.   Cardiovascular: Normal rate, regular rhythm and normal heart sounds.    Pulmonary/Chest: Breath sounds normal. No stridor.   Mechanically ventilated   Abdominal: Soft. Bowel sounds are normal.   Musculoskeletal: She exhibits no deformity.   Neurological:   Sedated   Skin: Skin is warm and dry.       Significant Labs:   BMP:   Recent Labs  Lab 06/20/17  1819  06/22/17  0125   *  < > 219*   *  < > 134*   K 2.5*  < > 3.2*   CL 94*  < > 98   CO2 30*  < > 26   BUN 27*  < > 41*   CREATININE 1.8*  < > 2.7*   CALCIUM 7.5*  < > 7.3*   MG 1.7  --   --    < > = values in this interval not displayed.  CBC:   Recent Labs  Lab 06/21/17  0633   WBC 14.89*   HGB 10.0*   HCT 30.8*          Significant Imaging: I have reviewed all pertinent imaging results/findings within the past 24 hours.

## 2017-06-22 NOTE — PROGRESS NOTES
[facility] [place]  Neurology  Progress Note    Patient Name: Nisa Frank  MRN: [id number]  Admission Date: 6/18/2017  Hospital Length of Stay: 4 days  Code Status: Full Code   Attending Provider: Adelfo Dangelo MD  Primary Care Physician: Atilio Downs MD   Principal Problem:Hypertensive emergency    Subjective:     Interval History: 67 y/o female with medical Hx as listed below who came to ED for headaches. According to notes pt was admitted after being found to have fever and leukocytosis; markedly hypertensive. Overnight pt became unresponsive and was presenting seizure-like activity. Intubated to protect airway.  Pt was also reported to present uintermittent rhythmic activity in RUE. She still intubated and mildly sedated with midazolam. No previous Hx of seizures.     -6/21/17: Overnight pt reported to have almost continuous twitching OF RUE and right face. Pt was given Cerebyx and ativan with transient resolution. Propofol increased; activity ceased.    -6/22/17: Pt remain intubated. Off of sedation intermittent and almost continuous twitching in RUE and face.    Current Neurological Medications:     Current Facility-Administered Medications   Medication Dose Route Frequency Provider Last Rate Last Dose    0.9%  NaCl infusion   Intravenous Continuous Trac BERNADETTE Stauffer MD 75 mL/hr at 06/22/17 1400      acetaminophen tablet 500 mg  500 mg Oral Q6H PRN Laurie Barksdale MD   500 mg at 06/20/17 1454    aliskiren tablet 300 mg  300 mg Oral Daily Adelfo Dangelo MD   300 mg at 06/22/17 0840    amlodipine tablet 10 mg  10 mg Oral Daily Adelfo Dangelo MD   10 mg at 06/22/17 0840    aspirin chewable tablet 81 mg  81 mg Oral Daily Aureliano Alfaro MD   81 mg at 06/22/17 0841    atorvastatin tablet 10 mg  10 mg Oral Daily Aureliano Alfaro MD   10 mg at 06/22/17 0841    cefTRIAXone (ROCEPHIN) 1 g in dextrose 5 % 50 mL IVPB  1 g Intravenous Q12H Aureliano Alfaro MD   1 g at 06/22/17  0940    chlorhexidine 0.12 % solution 15 mL  15 mL Mouth/Throat BID Laurie Barksdale MD   15 mL at 06/22/17 0841    dextrose 50% injection 12.5 g  12.5 g Intravenous PRN Aureliano Alfaro MD        dextrose 50% injection 25 g  25 g Intravenous PRN Aureliano Alfaro MD        enoxaparin injection 40 mg  40 mg Subcutaneous Daily Aureliano Alfaro MD   40 mg at 06/21/17 1617    glucose chewable tablet 16 g  16 g Oral PRN Aureliano Alfaro MD        glucose chewable tablet 24 g  24 g Oral PRN Aureliano Alfaro MD        hydrALAZINE tablet 75 mg  75 mg Oral Q12H Adelfo Dangelo MD   75 mg at 06/22/17 0841    insulin aspart pen 0-5 Units  0-5 Units Subcutaneous PRN Laurie Barksdale MD   3 Units at 06/22/17 1110    insulin detemir pen 10 Units  10 Units Subcutaneous QHS Adelfo Dangelo MD        levetiracetam in NaCl (iso-os) IVPB 1,000 mg  1,000 mg Intravenous Q12H Dustin Harmon  mL/hr at 06/22/17 0841 1,000 mg at 06/22/17 0841    lorazepam injection 2 mg  2 mg Intravenous Once Laurie Barksdale MD        lorazepam injection 2 mg  2 mg Intravenous Q4H PRN Laurie Barksdale MD   2 mg at 06/21/17 0247    midazolam (VERSED) 1 mg/mL in sodium chloride 0.9% 100 mL infusion (titrating)  1 mg/hr Intravenous Continuous Laurie Barksdale MD   Stopped at 06/20/17 1700    ondansetron injection 8 mg  8 mg Intravenous Q8H PRN Laurie Barksdale MD        pantoprazole suspension 40 mg  40 mg Per NG tube Daily Laurie Barksdale MD   40 mg at 06/22/17 0841    pneumoc 13-finn conj-dip cr(PF) 0.5 mL  0.5 mL Intramuscular vaccine x 1 dose Buddy Nichols MD        promethazine (PHENERGAN) 12.5 mg in dextrose 5 % 50 mL IVPB  12.5 mg Intravenous Q6H PRN Laurie Barksdale MD        propofol (DIPRIVAN) 10 mg/mL infusion  5 mcg/kg/min Intravenous Continuous Dustin Harmon MD 32.6 mL/hr at 06/22/17 1400 35.031 mcg/kg/min at 06/22/17 1400    ramelteon tablet 8 mg  8 mg Oral Nightly PRN Laurie Barksdale MD           Review of  Systems       Objective:     Vital Signs (Most Recent):  Temp: 98.9 °F (37.2 °C) (06/22/17 1100)  Pulse: 79 (06/22/17 1400)  Resp: (!) 21 (06/22/17 1400)  BP: (!) 105/52 (06/22/17 1400)  SpO2: 96 % (06/22/17 1400) Vital Signs (24h Range):  Temp:  [98 °F (36.7 °C)-98.9 °F (37.2 °C)] 98.9 °F (37.2 °C)  Pulse:  [67-88] 79  Resp:  [0-31] 21  SpO2:  [95 %-100 %] 96 %  BP: ()/(46-67) 105/52     Weight: (!) 161.2 kg (355 lb 6.1 oz)  Body mass index is 59.14 kg/m².    Physical Exam  Constitutional: well-developed  Head: normocephalic  Eyes: conjunctivae/corneas clear.  Nose: no discharge, no epistaxis  Heart: regular rate and rhythm.  Abdomen: no pain to palpation  Extremities: no edema  Neurologic: Mental status: sedated; of sedation  Cranial nerves: pupils are pinpoint; roving eyes; corneal responses present bilaterally  Strength: twitching in RUE            Significant Labs:   CBC:   Recent Labs  Lab 06/21/17  0633   WBC 14.89*   HGB 10.0*   HCT 30.8*        CMP:   Recent Labs  Lab 06/20/17  1819 06/21/17  0633 06/22/17  0125   * 213* 219*   * 135* 134*   K 2.5* 5.6* 3.2*   CL 94* 100 98   CO2 30* 20* 26   BUN 27* 31* 41*   CREATININE 1.8* 2.1* 2.7*   CALCIUM 7.5* 8.0* 7.3*   MG 1.7  --   --    PROT  --   --  5.8*   ALBUMIN  --   --  1.9*   BILITOT  --   --  0.4   ALKPHOS  --   --  88   AST  --   --  12   ALT  --   --  8*   ANIONGAP 11 15 10   EGFRNONAA 29* 24* 18*         Assessment and Plan:     67 y/o female consulted for possible seizure     1. Seizure: etiology of this event could be related to hypertensive crisis +/- infectious? Involuntary activity continued intermittently during the night and early morning. Activity seems to have an onset when pt stimulated. Two head CT's obtained 6 hours apart sowed no acute abnormalities or development of hypo/hyperdense areas.    -Contiune levetiracetam and propofol (will titrate up as long as BP is stable) for now. While on medication pt has no  involuntary activity.   -LP?   -EEG done today. Myoclonic activity seems to not be epileptic but there is asymmetry of the activity on EEG as it is with high amplitude on right hemisphere. Official report pending. Because of this asymmetry will obtain another head CT. MRI would be better but pt exceeds weight limit of this facility's machine.    Active Diagnoses:    Diagnosis Date Noted POA    PRINCIPAL PROBLEM:  Hypertensive emergency [I16.1] 06/18/2017 Yes    ARF (acute renal failure) [N17.9] 06/22/2017 No    Acute respiratory failure [J96.00] 06/20/2017 Yes    Seizure [R56.9] 06/20/2017 No    Hypokalemia [E87.6] 06/19/2017 Yes    Hyperlipidemia [E78.5] 06/19/2017 Yes     Chronic    Moderate protein malnutrition [E44.0] 06/19/2017 Yes     Chronic    CAD s/p CABG [I25.10] 06/19/2017 Yes     Chronic    Pyelonephritis [N12] 06/19/2017 Yes    Morbid obesity with BMI of 50.0-59.9, adult [E66.01, Z68.43] 02/24/2015 Not Applicable     Chronic    Essential hypertension [I10] 05/12/2014 Yes     Chronic    Type 2 diabetes mellitus, uncontrolled [E11.65] 05/12/2014 Yes     Chronic      Problems Resolved During this Admission:    Diagnosis Date Noted Date Resolved POA    Blood poisoning [A41.9] 06/19/2017 06/22/2017 Yes       VTE Risk Mitigation         Ordered     Place sequential compression device  Until discontinued      06/22/17 0300     enoxaparin injection 40 mg  Daily     Route:  Subcutaneous        06/19/17 0143     Medium Risk of VTE  Once      06/19/17 0424          Dustin Harmon MD  Neurology  [facility] [place]

## 2017-06-22 NOTE — PLAN OF CARE
Problem: Patient Care Overview  Goal: Plan of Care Review  Pt VSS, NAD. Remains on vent, on propofol for sedation. Pt unable to follow commands, withdraws from painful stimulation. NS gtt infusing at 75 ml/hr. Pt NPO accuchecks done q6h. TF infusing without any complications with minimal residuals. Ross CDI, with oliguria, MD aware. Daughter at bedside. EEG scheduled for AM. No falls, no new skin breakdown, pt turned q2h.

## 2017-06-22 NOTE — PROGRESS NOTES
Rec'd patient orally intubated with 7.5 ETT secured at 25cm.  Patient currently on Servoi ventilator settings are PRVC, rate 18, tidal volume 450, +5 PEEP andfio2 50%.  All ventilator alarms are set and ambu at [facility].

## 2017-06-22 NOTE — PROGRESS NOTES
[facility] Hospital Medicine  Progress Note    Patient Name: Nisa Frank  MRN: [id number]  Patient Class: IP- Inpatient   Admission Date: 6/18/2017  Length of Stay: 4 days  Attending Physician: Adelfo Dangelo MD  Primary Care Provider: Atilio Downs MD        Subjective:     Principal Problem:Hypertensive emergency    HPI:  Ms. Nisa Frank is a 66 y.o. female with essential hypertension, hyperlipidemia (.2 May 2014), type 2 diabetes mellitus (HbA1c 8.2% May 2014), CAD s/p CABG, morbid obesity (BMI 58.7), and moderate protein malnutrition who presents to [facility] ED with complaints of headache this morning.  She cannot provide much details on the quality of the headache but does say she's been feeling more weak and tired lately.  She denies any nausea, vomiting, fevers, or chills, and hasn't had any neck stiffness, photophobia, nor any phonophobia.  She denies any laterality to  Her weakness and has never had a stroke before.  She also denies any dysuria, hematuria, abdominal pain, diarrhea, chest pain, shortness of breath, nor any coughing.  Further history is limited as she is a very poor historian and has very garbled speech.    Hospital Course:  Pt admitted with sepsis from presumed UTI, urine culture negative. Through course of night 6/19/-6/20 had multiple seizures and intubated for airway protection. Blood cultures growing suspected Staph. Only one bottle positive and thought to be a contaminant. Vancomycin dc'd. Sputum culture - no significant growth. Dc rocephin and start zosyn.  Neurology consulted and given large dose of keppra. Appears to have continous seizures and will dc versed for sedation and start propofol. BP dropped overnight 6/20/2017 with change in sedation. Dilantin and vimpat added to keppra for possible seizures vs myoclonic activity.  Urine output dropped off and started IVF. Nephrology consulted for ARF. Pulmonary consulted for vent management. TLC  placed to left IJ 6/21/2017.   Awaiting EEG.     Interval History: No apparent seizures.    Review of Systems   Unable to perform ROS: Intubated     Objective:     Vital Signs (Most Recent):  Temp: 98.9 °F (37.2 °C) (06/22/17 1100)  Pulse: 75 (06/22/17 1215)  Resp: 19 (06/22/17 1215)  BP: (!) 110/56 (06/22/17 1200)  SpO2: 97 % (06/22/17 1215) Vital Signs (24h Range):  Temp:  [98 °F (36.7 °C)-98.9 °F (37.2 °C)] 98.9 °F (37.2 °C)  Pulse:  [67-81] 75  Resp:  [0-31] 19  SpO2:  [95 %-100 %] 97 %  BP: ()/(44-67) 110/56     Weight: (!) 161.2 kg (355 lb 6.1 oz)  Body mass index is 59.14 kg/m².    Intake/Output Summary (Last 24 hours) at 06/22/17 1233  Last data filed at 06/22/17 1200   Gross per 24 hour   Intake          4402.71 ml   Output              808 ml   Net          3594.71 ml      Physical Exam   Constitutional: No distress.   Morbidly obese   HENT:   ET tube in place   Eyes: Conjunctivae are normal. Right eye exhibits no discharge. Left eye exhibits no discharge.   Neck: Neck supple.   Cardiovascular: Normal rate, regular rhythm and normal heart sounds.    Pulmonary/Chest: Breath sounds normal. No stridor.   Mechanically ventilated   Abdominal: Soft. Bowel sounds are normal.   Musculoskeletal: She exhibits no deformity.   Neurological:   Sedated   Skin: Skin is warm and dry.       Significant Labs:   BMP:   Recent Labs  Lab 06/20/17  1819  06/22/17  0125   *  < > 219*   *  < > 134*   K 2.5*  < > 3.2*   CL 94*  < > 98   CO2 30*  < > 26   BUN 27*  < > 41*   CREATININE 1.8*  < > 2.7*   CALCIUM 7.5*  < > 7.3*   MG 1.7  --   --    < > = values in this interval not displayed.  CBC:   Recent Labs  Lab 06/21/17  0633   WBC 14.89*   HGB 10.0*   HCT 30.8*          Significant Imaging: I have reviewed all pertinent imaging results/findings within the past 24 hours.    Assessment/Plan:      Seizure    On keppra, vimpat, dilantin x one dose  EEG being done right now.  Neurology consulted.  No  evidence of seizures today.  Continue to monitor and await EEG results.          Acute respiratory failure    Patient ntubated 6/20/2017  Pulmonary consulted for vent management  Currently on 50% FIO2.          Pyelonephritis    Patient presented with fever and does have a urinalysis that is suspicious for a urinary tract infection with trace leukocytes, 8 WBCs, and many bacteria.  She meet criteria for sepsis.  There is no previous urine cultures for comparison.    Urine culture negative, fever resolved.  Fevers could also be secondary to seizures.  May stop ABx's tomorrow if no further fevers and all Cx negative.  Staph in blood is contaminant.        ARF (acute renal failure)    Worsening Creat.  Nephrology consulted.          CAD s/p CABG    Stable; will continue her home regimen of aspirin and atorvastatin.        Moderate protein malnutrition    Continue tube feeds        Hyperlipidemia    Poorly-controlled; will continue patient's home regimen of atorvastatin.        Hypokalemia    Replace as necessary.        S/P hysterectomy with oophorectomy              Morbid obesity with BMI of 50.0-59.9, adult    The patient has been counseled on the negative impact that obesity imparts on her health, and was encouraged to make lifestyle changes in order to lose weight and decrease her modifiable risk factors.  Body mass index is 59.14 kg/m².          Type 2 diabetes mellitus, uncontrolled    Poorly-controlled on a home regimen of metformin, a DPP-4 inhibitor, basal insulin therapy.  Hyperglycemic.  Add nightly Levemir.  Continue with sliding scale.        Essential hypertension    Currently normotensive.  Continue current management with parameters.        * Hypertensive emergency    Patient presented with an initial blood pressure of 224/108 ().  She was responsive to medications administered in the ED so was not needing an antihypertensive infusion.  Restarted her home medications of aliskiren, amlodipine,  hydralazine and provide as-needed clonidine.  BP dropped on sedation and placed parameters on medications.          VTE Risk Mitigation         Ordered     Place sequential compression device  Until discontinued      06/22/17 0300     enoxaparin injection 40 mg  Daily     Route:  Subcutaneous        06/19/17 0143     Medium Risk of VTE  Once      06/19/17 0424        Critical Care time spent > 40 minutes.     Adelfo Dangelo MD  Department of Hospital Medicine   [facility]

## 2017-06-23 LAB
ALBUMIN SERPL BCP-MCNC: 2 G/DL
ALLENS TEST: ABNORMAL
ALP SERPL-CCNC: 103 U/L
ALT SERPL W/O P-5'-P-CCNC: 6 U/L
ANION GAP SERPL CALC-SCNC: 9 MMOL/L
AST SERPL-CCNC: 12 U/L
BASOPHILS # BLD AUTO: 0.02 K/UL
BASOPHILS NFR BLD: 0.2 %
BILIRUB SERPL-MCNC: 0.2 MG/DL
BUN SERPL-MCNC: 50 MG/DL
CALCIUM SERPL-MCNC: 7.8 MG/DL
CHLORIDE SERPL-SCNC: 101 MMOL/L
CO2 SERPL-SCNC: 24 MMOL/L
CREAT SERPL-MCNC: 1.9 MG/DL
DELSYS: ABNORMAL
DIFFERENTIAL METHOD: ABNORMAL
EOSINOPHIL # BLD AUTO: 0.1 K/UL
EOSINOPHIL NFR BLD: 1.1 %
ERYTHROCYTE [DISTWIDTH] IN BLOOD BY AUTOMATED COUNT: 14.4 %
ERYTHROCYTE [SEDIMENTATION RATE] IN BLOOD BY WESTERGREN METHOD: 18 MM/H
EST. GFR  (AFRICAN AMERICAN): 31 ML/MIN/1.73 M^2
EST. GFR  (NON AFRICAN AMERICAN): 27 ML/MIN/1.73 M^2
FIO2: 50
GLUCOSE SERPL-MCNC: 248 MG/DL
HCO3 UR-SCNC: 24.7 MMOL/L (ref 24–28)
HCT VFR BLD AUTO: 29.3 %
HGB BLD-MCNC: 9.4 G/DL
LYMPHOCYTES # BLD AUTO: 1.9 K/UL
LYMPHOCYTES NFR BLD: 15.7 %
MCH RBC QN AUTO: 26.1 PG
MCHC RBC AUTO-ENTMCNC: 32.1 %
MCV RBC AUTO: 81 FL
MODE: ABNORMAL
MONOCYTES # BLD AUTO: 0.9 K/UL
MONOCYTES NFR BLD: 7.5 %
NEUTROPHILS # BLD AUTO: 9.1 K/UL
NEUTROPHILS NFR BLD: 74.8 %
PCO2 BLDA: 34.9 MMHG (ref 35–45)
PEEP: 5
PH SMN: 7.46 [PH] (ref 7.35–7.45)
PLATELET # BLD AUTO: 268 K/UL
PMV BLD AUTO: 10.5 FL
PO2 BLDA: 90 MMHG (ref 80–100)
POC BE: 1 MMOL/L
POC SATURATED O2: 97 % (ref 95–100)
POC TCO2: 26 MMOL/L (ref 23–27)
POCT GLUCOSE: 224 MG/DL (ref 70–110)
POCT GLUCOSE: 240 MG/DL (ref 70–110)
POCT GLUCOSE: 250 MG/DL (ref 70–110)
POCT GLUCOSE: 274 MG/DL (ref 70–110)
POTASSIUM SERPL-SCNC: 3.7 MMOL/L
PROT SERPL-MCNC: 6.6 G/DL
RBC # BLD AUTO: 3.6 M/UL
SAMPLE: ABNORMAL
SITE: ABNORMAL
SODIUM SERPL-SCNC: 134 MMOL/L
VT: 450
WBC # BLD AUTO: 12.14 K/UL

## 2017-06-23 PROCEDURE — 63600175 PHARM REV CODE 636 W HCPCS: Performed by: PSYCHIATRY & NEUROLOGY

## 2017-06-23 PROCEDURE — 94003 VENT MGMT INPAT SUBQ DAY: CPT

## 2017-06-23 PROCEDURE — 25000003 PHARM REV CODE 250: Performed by: PSYCHIATRY & NEUROLOGY

## 2017-06-23 PROCEDURE — 63600175 PHARM REV CODE 636 W HCPCS: Performed by: EMERGENCY MEDICINE

## 2017-06-23 PROCEDURE — 36600 WITHDRAWAL OF ARTERIAL BLOOD: CPT

## 2017-06-23 PROCEDURE — 25000003 PHARM REV CODE 250: Performed by: INTERNAL MEDICINE

## 2017-06-23 PROCEDURE — 94761 N-INVAS EAR/PLS OXIMETRY MLT: CPT

## 2017-06-23 PROCEDURE — 27000221 HC OXYGEN, UP TO 24 HOURS

## 2017-06-23 PROCEDURE — 99900035 HC TECH TIME PER 15 MIN (STAT)

## 2017-06-23 PROCEDURE — 20000000 HC ICU ROOM

## 2017-06-23 PROCEDURE — 82803 BLOOD GASES ANY COMBINATION: CPT

## 2017-06-23 PROCEDURE — 25000003 PHARM REV CODE 250: Performed by: HOSPITALIST

## 2017-06-23 PROCEDURE — 80053 COMPREHEN METABOLIC PANEL: CPT

## 2017-06-23 PROCEDURE — 85025 COMPLETE CBC W/AUTO DIFF WBC: CPT

## 2017-06-23 PROCEDURE — 36415 COLL VENOUS BLD VENIPUNCTURE: CPT

## 2017-06-23 PROCEDURE — 99232 SBSQ HOSP IP/OBS MODERATE 35: CPT | Mod: ,,, | Performed by: PSYCHIATRY & NEUROLOGY

## 2017-06-23 PROCEDURE — 63600175 PHARM REV CODE 636 W HCPCS: Performed by: HOSPITALIST

## 2017-06-23 PROCEDURE — 99900026 HC AIRWAY MAINTENANCE (STAT)

## 2017-06-23 PROCEDURE — 25000003 PHARM REV CODE 250: Performed by: EMERGENCY MEDICINE

## 2017-06-23 RX ORDER — CLONAZEPAM 0.5 MG/1
1 TABLET ORAL 2 TIMES DAILY
Status: DISCONTINUED | OUTPATIENT
Start: 2017-06-23 | End: 2017-06-25

## 2017-06-23 RX ADMIN — ENOXAPARIN SODIUM 40 MG: 100 INJECTION SUBCUTANEOUS at 04:06

## 2017-06-23 RX ADMIN — CHLORHEXIDINE GLUCONATE 15 ML: 1.2 RINSE ORAL at 08:06

## 2017-06-23 RX ADMIN — PANTOPRAZOLE SODIUM 40 MG: 40 GRANULE, DELAYED RELEASE ORAL at 08:06

## 2017-06-23 RX ADMIN — CLONAZEPAM 1 MG: 0.5 TABLET ORAL at 08:06

## 2017-06-23 RX ADMIN — ASPIRIN 81 MG 81 MG: 81 TABLET ORAL at 08:06

## 2017-06-23 RX ADMIN — INSULIN DETEMIR 15 UNITS: 100 INJECTION, SOLUTION SUBCUTANEOUS at 08:06

## 2017-06-23 RX ADMIN — PROPOFOL 35 MCG/KG/MIN: 10 INJECTION, EMULSION INTRAVENOUS at 08:06

## 2017-06-23 RX ADMIN — INSULIN ASPART 2 UNITS: 100 INJECTION, SOLUTION INTRAVENOUS; SUBCUTANEOUS at 07:06

## 2017-06-23 RX ADMIN — PROPOFOL 35 MCG/KG/MIN: 10 INJECTION, EMULSION INTRAVENOUS at 05:06

## 2017-06-23 RX ADMIN — ATORVASTATIN CALCIUM 10 MG: 10 TABLET, FILM COATED ORAL at 08:06

## 2017-06-23 RX ADMIN — INSULIN ASPART 3 UNITS: 100 INJECTION, SOLUTION INTRAVENOUS; SUBCUTANEOUS at 12:06

## 2017-06-23 RX ADMIN — CLONAZEPAM 1 MG: 0.5 TABLET ORAL at 01:06

## 2017-06-23 RX ADMIN — INSULIN ASPART 2 UNITS: 100 INJECTION, SOLUTION INTRAVENOUS; SUBCUTANEOUS at 05:06

## 2017-06-23 RX ADMIN — PROPOFOL 20 MCG/KG/MIN: 10 INJECTION, EMULSION INTRAVENOUS at 08:06

## 2017-06-23 RX ADMIN — CEFTRIAXONE 1 G: 1 INJECTION, SOLUTION INTRAVENOUS at 10:06

## 2017-06-23 RX ADMIN — HYDRALAZINE HYDROCHLORIDE 75 MG: 25 TABLET ORAL at 08:06

## 2017-06-23 RX ADMIN — PROPOFOL 35 MCG/KG/MIN: 10 INJECTION, EMULSION INTRAVENOUS at 01:06

## 2017-06-23 RX ADMIN — SODIUM CHLORIDE: 0.9 INJECTION, SOLUTION INTRAVENOUS at 01:06

## 2017-06-23 RX ADMIN — ALISKIREN HEMIFUMARATE 300 MG: 300 TABLET, FILM COATED ORAL at 08:06

## 2017-06-23 RX ADMIN — AMLODIPINE BESYLATE 10 MG: 10 TABLET ORAL at 08:06

## 2017-06-23 RX ADMIN — INSULIN ASPART 2 UNITS: 100 INJECTION, SOLUTION INTRAVENOUS; SUBCUTANEOUS at 11:06

## 2017-06-23 RX ADMIN — PROPOFOL 15 MCG/KG/MIN: 10 INJECTION, EMULSION INTRAVENOUS at 03:06

## 2017-06-23 RX ADMIN — LEVETIRACETAM 1000 MG: 1000 INJECTION, SOLUTION INTRAVENOUS at 08:06

## 2017-06-23 RX ADMIN — PROPOFOL 25 MCG/KG/MIN: 10 INJECTION, EMULSION INTRAVENOUS at 10:06

## 2017-06-23 NOTE — SUBJECTIVE & OBJECTIVE
Interval History: Remains intubated.  No acute issues.    Review of Systems   Unable to perform ROS: Intubated     Objective:     Vital Signs (Most Recent):  Temp: 98.8 °F (37.1 °C) (06/23/17 1100)  Pulse: 75 (06/23/17 1100)  Resp: 20 (06/23/17 1100)  BP: (!) 130/58 (06/23/17 1100)  SpO2: 96 % (06/23/17 1100) Vital Signs (24h Range):  Temp:  [98.3 °F (36.8 °C)-99.9 °F (37.7 °C)] 98.8 °F (37.1 °C)  Pulse:  [74-88] 75  Resp:  [18-53] 20  SpO2:  [93 %-100 %] 96 %  BP: ()/(51-70) 130/58     Weight: (!) 161.2 kg (355 lb 6.1 oz)  Body mass index is 59.14 kg/m².    Intake/Output Summary (Last 24 hours) at 06/23/17 1203  Last data filed at 06/23/17 1100   Gross per 24 hour   Intake          3989.78 ml   Output             1255 ml   Net          2734.78 ml      Physical Exam   Constitutional: No distress.   Morbidly obese   HENT:   ET tube in place   Eyes: Conjunctivae are normal. Right eye exhibits no discharge. Left eye exhibits no discharge.   Neck: Neck supple.   Cardiovascular: Normal rate, regular rhythm and normal heart sounds.    Pulmonary/Chest: Breath sounds normal. No stridor.   Mechanically ventilated   Abdominal: Soft. Bowel sounds are normal.   Musculoskeletal: She exhibits no deformity.   Neurological:   Sedated   Skin: Skin is warm and dry.       Significant Labs:   BMP:     Recent Labs  Lab 06/23/17  0330   *   *   K 3.7      CO2 24   BUN 50*   CREATININE 1.9*   CALCIUM 7.8*     CBC:     Recent Labs  Lab 06/23/17  0330   WBC 12.14   HGB 9.4*   HCT 29.3*          Significant Imaging: I have reviewed all pertinent imaging results/findings within the past 24 hours.

## 2017-06-23 NOTE — ASSESSMENT & PLAN NOTE
Patient ntubated 6/20/2017  Pulmonary consulted for vent management  Able to wean down FIO2 to 45%.

## 2017-06-23 NOTE — PLAN OF CARE
Problem: Patient Care Overview  Goal: Plan of Care Review  Outcome: Ongoing (interventions implemented as appropriate)  Pt remains on vent. Propofol infusing for sedation at 15 ml/hr. Pt unable to follow commands, withdraws from painful stimulation. Tube feeding at 60 ml/hr, minimal residual. Ross CDI, urine output adequate. VSS. Family at bedside throughout shift. No falls or new skin breakdown.

## 2017-06-23 NOTE — PROGRESS NOTES
Received patient orally intubated and on a Servo I Ventilator with settings as follows:  PRVC 18/ 450/ +5 PEEP/ 50%.  Patient has a size 7.5 ET tube which is secured at the 25 cm mehreen at the lips in the center of the mouth.  Patient also has a bite block in place which is located in the center of the mouth as well.  Both the ET tube and the bite block were rotated to the left side of the mouth.  Ventilator alarms are set and functional and AMBU bag is at the HOB.

## 2017-06-23 NOTE — ASSESSMENT & PLAN NOTE
Poorly-controlled on a home regimen of metformin, a DPP-4 inhibitor, basal insulin therapy.  Hyperglycemic.  Increase nightly Levemir.  Continue with sliding scale.

## 2017-06-23 NOTE — PROGRESS NOTES
[facility] Hospital Medicine  Progress Note    Patient Name: Nisa Frank  MRN: [id number]  Patient Class: IP- Inpatient   Admission Date: 6/18/2017  Length of Stay: 5 days  Attending Physician: Adelfo Dangelo MD  Primary Care Provider: Atilio Downs MD        Subjective:     Principal Problem:Hypertensive emergency    HPI:  Ms. Nisa Frank is a 66 y.o. female with essential hypertension, hyperlipidemia (.2 May 2014), type 2 diabetes mellitus (HbA1c 8.2% May 2014), CAD s/p CABG, morbid obesity (BMI 58.7), and moderate protein malnutrition who presents to [facility] ED with complaints of headache this morning.  She cannot provide much details on the quality of the headache but does say she's been feeling more weak and tired lately.  She denies any nausea, vomiting, fevers, or chills, and hasn't had any neck stiffness, photophobia, nor any phonophobia.  She denies any laterality to  Her weakness and has never had a stroke before.  She also denies any dysuria, hematuria, abdominal pain, diarrhea, chest pain, shortness of breath, nor any coughing.  Further history is limited as she is a very poor historian and has very garbled speech.    Hospital Course:  Pt admitted with sepsis from presumed UTI, urine culture negative. Through course of night 6/19/-6/20 had multiple seizures and intubated for airway protection. Blood cultures growing suspected Staph. Only one bottle positive and thought to be a contaminant. Vancomycin dc'd. Sputum culture - no significant growth. Dc rocephin and start zosyn.  Neurology consulted and given large dose of keppra. Appears to have continous seizures and will dc versed for sedation and start propofol. BP dropped overnight 6/20/2017 with change in sedation. Dilantin and vimpat added to keppra for possible seizures vs myoclonic activity.  Urine output dropped off and started IVF. Nephrology consulted for ARF. Pulmonary consulted for vent management. TLC  placed to left IJ 6/21/2017.   EEG did not show any epileptic activity, but did show asymmetric activity.  Repeat Head CT unremarkable.    Interval History: Remains intubated.  No acute issues.    Review of Systems   Unable to perform ROS: Intubated     Objective:     Vital Signs (Most Recent):  Temp: 98.8 °F (37.1 °C) (06/23/17 1100)  Pulse: 75 (06/23/17 1100)  Resp: 20 (06/23/17 1100)  BP: (!) 130/58 (06/23/17 1100)  SpO2: 96 % (06/23/17 1100) Vital Signs (24h Range):  Temp:  [98.3 °F (36.8 °C)-99.9 °F (37.7 °C)] 98.8 °F (37.1 °C)  Pulse:  [74-88] 75  Resp:  [18-53] 20  SpO2:  [93 %-100 %] 96 %  BP: ()/(51-70) 130/58     Weight: (!) 161.2 kg (355 lb 6.1 oz)  Body mass index is 59.14 kg/m².    Intake/Output Summary (Last 24 hours) at 06/23/17 1203  Last data filed at 06/23/17 1100   Gross per 24 hour   Intake          3989.78 ml   Output             1255 ml   Net          2734.78 ml      Physical Exam   Constitutional: No distress.   Morbidly obese   HENT:   ET tube in place   Eyes: Conjunctivae are normal. Right eye exhibits no discharge. Left eye exhibits no discharge.   Neck: Neck supple.   Cardiovascular: Normal rate, regular rhythm and normal heart sounds.    Pulmonary/Chest: Breath sounds normal. No stridor.   Mechanically ventilated   Abdominal: Soft. Bowel sounds are normal.   Musculoskeletal: She exhibits no deformity.   Neurological:   Sedated   Skin: Skin is warm and dry.       Significant Labs:   BMP:     Recent Labs  Lab 06/23/17  0330   *   *   K 3.7      CO2 24   BUN 50*   CREATININE 1.9*   CALCIUM 7.8*     CBC:     Recent Labs  Lab 06/23/17  0330   WBC 12.14   HGB 9.4*   HCT 29.3*          Significant Imaging: I have reviewed all pertinent imaging results/findings within the past 24 hours.    Assessment/Plan:      Seizure    On keppra, vimpat, dilantin x one dose  No epileptic activity on EEG.  Neurology following.  May make changes to medications.  No evidence of  seizures today.            Acute respiratory failure    Patient ntubated 6/20/2017  Pulmonary consulted for vent management  Able to wean down FIO2 to 45%.          Pyelonephritis    Patient presented with fever and does have a urinalysis that is suspicious for a urinary tract infection with trace leukocytes, 8 WBCs, and many bacteria.  She meet criteria for sepsis.  There is no previous urine cultures for comparison.    Urine culture negative, fever resolved.  Fevers could also be secondary to seizures.  Will stop ABx's.  Staph in blood is contaminant.        ARF (acute renal failure)    Nephrology following.  Improved Creat today.  Continue to monitor.          CAD s/p CABG    Stable; will continue her home regimen of aspirin and atorvastatin.        Moderate protein malnutrition    Continue tube feeds        Hyperlipidemia    Poorly-controlled; will continue patient's home regimen of atorvastatin.        Hypokalemia    Replace as necessary.        S/P hysterectomy with oophorectomy              Morbid obesity with BMI of 50.0-59.9, adult    The patient has been counseled on the negative impact that obesity imparts on her health, and was encouraged to make lifestyle changes in order to lose weight and decrease her modifiable risk factors.  Body mass index is 59.14 kg/m².          Type 2 diabetes mellitus, uncontrolled    Poorly-controlled on a home regimen of metformin, a DPP-4 inhibitor, basal insulin therapy.  Hyperglycemic.  Increase nightly Levemir.  Continue with sliding scale.        Essential hypertension    Currently normotensive.  Continue current management with parameters.        * Hypertensive emergency    Patient presented with an initial blood pressure of 224/108 ().  She was responsive to medications administered in the ED so was not needing an antihypertensive infusion.  Restarted her home medications of aliskiren, amlodipine, hydralazine and provide as-needed clonidine.  BP dropped on  sedation and placed parameters on medications.          VTE Risk Mitigation         Ordered     Place sequential compression device  Until discontinued      06/22/17 0300     enoxaparin injection 40 mg  Daily     Route:  Subcutaneous        06/19/17 0143     Medium Risk of VTE  Once      06/19/17 0424        Critical Care time spent > 40 minutes.       Adelfo Dangelo MD  Department of Hospital Medicine   [facility]

## 2017-06-23 NOTE — PLAN OF CARE
Problem: Patient Care Overview  Goal: Plan of Care Review  06/23/2017    Recommendations    Recommendation/Intervention: 1) TF recs:  Peptamen VHP (Bariatric) @ 60 cc/hr with Beneprotein x2 daily, provides 1490 calories (2351 with propofol), 144 g protein, 1210 cc free water 2) Flushes per MD 3) Check residuals Q4 hours. Hold if > 250 cc  Goals: 1) Patient will meet >=85% - 115% of EEN  Nutrition Goal Status: new  Communication of RD Recs: reviewed with physician    Shruthi Ramon, MPH, RD, LDN

## 2017-06-23 NOTE — PROGRESS NOTES
Nisa Frank is a 66 y.o. female patient.    Follow for KATHI    Sedated, poorly resonsive, on vent support    Scheduled Meds:   aliskiren  300 mg Oral Daily    amlodipine  10 mg Oral Daily    aspirin  81 mg Oral Daily    atorvastatin  10 mg Oral Daily    cefTRIAXone (ROCEPHIN) IVPB  1 g Intravenous Q12H    chlorhexidine  15 mL Mouth/Throat BID    enoxaparin  40 mg Subcutaneous Daily    hydrALAZINE  75 mg Oral Q12H    insulin detemir  15 Units Subcutaneous QHS    levetiracetam IVPB  1,000 mg Intravenous Q12H    lorazepam  2 mg Intravenous Once    pantoprazole  40 mg Per NG tube Daily       Review of patient's allergies indicates:   Allergen Reactions    Latex, natural rubber          Vital Signs Range (Last 24H):  Temp:  [98.3 °F (36.8 °C)-99.9 °F (37.7 °C)]   Pulse:  [75-88]   Resp:  [18-53]   BP: ()/(51-70)   SpO2:  [93 %-100 %]     I & O (Last 24H):  Intake/Output Summary (Last 24 hours) at 06/23/17 0857  Last data filed at 06/23/17 0740   Gross per 24 hour   Intake           4294.8 ml   Output             1280 ml   Net           3014.8 ml           Physical Exam:  General appearance: well developed, well nourished, no distress  Lungs:  diminished breath sounds bilaterally  Heart: regular rate and rhythm  Abdomen: soft, non-tender non-distented; bowel sounds normal; no masses,  no organomegaly  Extremities: edema (+)    Laboratory:  CBC:   Recent Labs  Lab 06/23/17  0330   WBC 12.14   RBC 3.60*   HGB 9.4*   HCT 29.3*      MCV 81*   MCH 26.1*   MCHC 32.1     CMP:   Recent Labs  Lab 06/23/17  0330   *   CALCIUM 7.8*   ALBUMIN 2.0*   PROT 6.6   *   K 3.7   CO2 24      BUN 50*   CREATININE 1.9*   ALKPHOS 103   ALT 6*   AST 12   BILITOT 0.2       Imp/Plan    KATHI - improving  Hypertensive emergency - BP controlled  Seizure  Acute respiratory failure  DM type 2  UTI  CAD/CABG    D/c IVF  Continue present Rx  CMP in am        Trac T Xiomy  6/23/2017

## 2017-06-23 NOTE — PROGRESS NOTES
[facility]  [place] [facility]  [place]    SUMMARY     Recommendations    Recommendation/Intervention: 1) TF recs:  Peptamen VHP (Bariatric) @ 60 cc/hr with Beneprotein x2 daily, provides 1490 calories (2351 with propofol), 144 g protein, 1210 cc free water 2) Flushes per MD 3) Check residuals Q4 hours. Hold if > 250 cc  Goals: 1) Patient will meet >=85% - 115% of EEN  Nutrition Goal Status: new  Communication of RD Recs: reviewed with physician    Reason for Assessment    Reason for Assessment: RD follow-up  Diagnosis:  (sepsis, fever, elevated troponin, HTN)  Relevent Medical History: DM   General Information Comments: Patient remains intubated and sedated. TF running at goal rate and tolerating well. Propofol ordered for sedation. Currently exceeding energy needs.      Nutrition Discharge Planning: Too soon to determine. Will monitor/follow-up.     Nutrition Prescription Ordered    Current Diet Order: NPO     Current Nutrition Support Formula Ordered: Impact Peptide 1.5  Current Nutrition Support Rate Ordered: 65 (ml)  Current Nutrition Support Frequency Ordered: mL/hr    Evaluation of Received Nutrients/Fluid Intake    Enteral Calories (kcal): 2340  Enteral Protein (gm): 146  Enteral (Free Water) Fluid (mL): 1201    Other Calories (kcal): 861    Energy Calories Required: exceed needs  Protein Required: meeting needs  Fluid Required: other (see comments) (Net I/O +3216.6)     Tolerance: tolerating     Nutrition Risk Screen     Nutrition Risk Screen: no indicators present    Nutrition/Diet History    Patient Reported Diet/Restrictions/Preferences:  (neo)     Food Preferences: neo    Factors Affecting Nutritional Intake: NPO, on mechanical ventilation    Labs/Tests/Procedures/Meds       Pertinent Labs Reviewed: reviewed    Pertinent Medications Reviewed: reviewed, pertinent  Pertinent Medications Comments: propofol    Physical Findings    Overall Physical Appearance: obese, on ventilator  "support  Tubes: orogastric tube  Skin: intact    Anthropometrics    Temp: 98.9 °F (37.2 °C)     Height: 5' 5" (165.1 cm)  Weight Method: Bed Scale  Weight: (!) 161.2 kg (355 lb 6.1 oz)  Ideal Body Weight (IBW), Female: 125 lb  % Ideal Body Weight, Female (lb): 273.55 lb  BMI (Calculated): 57  BMI Grade: greater than 40 - morbid obesity    Estimated/Assessed Needs    Weight Used For Calorie Calculations: (!) 155.1 kg (341 lb 14.9 oz) (Usual Body Weight)   Energy Need Method: [facility] (modified) (2234)  RMR (Lyman-St. Jeor Equation): 2091.88  Weight Used For Protein Calculations: 56.8 kg (125 lb 3.5 oz) (Ideal Body Weight)  Protein Requirements: 142 g/day  Fluid Need Method: RDA Method, other (see comments) (or per MD)    CHO Requirement: 250 g/day     Assessment and Plan     Nutrition Diagnosis  Problem:  Inadequate energy intake  Etiology:  Decreased ability to consume sufficient energy orally  Signs/Symptoms:  Intubated, NPO  Status: improving (TF running at goal rate)    Monitor and Evaluation    Food and Nutrient Intake: enteral nutrition intake  Food and Nutrient Adminstration: enteral and parenteral nutrition administration  Anthropometric Measurements: weight, weight change, body mass index  Biochemical Data, Medical Tests and Procedures: electrolyte and renal panel, gastrointestinal profile, glucose/endocrine profile, lipid profile  Nutrition-Focused Physical Findings: overall appearance, skin    Nutrition Risk    Level of Risk: other (see comments) (f/u 2x weekly)    Nutrition Follow-Up    RD Follow-up?: Yes    "

## 2017-06-23 NOTE — PROGRESS NOTES
[facility]  Neurology  Progress Note    Patient Name: Nisa Frank  MRN: [id number]  Admission Date: 6/18/2017  Hospital Length of Stay: 5 days  Code Status: Full Code   Attending Provider: Adelfo Dangelo MD  Primary Care Physician: Atilio Downs MD   Principal Problem:Hypertensive emergency    Subjective:     Interval History: 67 y/o female with medical Hx as listed below who came to ED for headaches. According to notes pt was admitted after being found to have fever and leukocytosis; markedly hypertensive. Overnight pt became unresponsive and was presenting seizure-like activity. Intubated to protect airway.  Pt was also reported to present uintermittent rhythmic activity in RUE. She still intubated and mildly sedated with midazolam. No previous Hx of seizures.     -6/21/17: Overnight pt reported to have almost continuous twitching OF RUE and right face. Pt was given Cerebyx and ativan with transient resolution. Propofol increased; activity ceased.     -6/22/17: Pt remain intubated. Off of sedation intermittent and almost continuous twitching in RUE and face.    -6/23/17: Muscle jerks upon holding sedation. Reported to not follow commands.    Current Neurological Medications:     Current Facility-Administered Medications   Medication Dose Route Frequency Provider Last Rate Last Dose    acetaminophen tablet 500 mg  500 mg Oral Q6H PRN Laurie Barksdale MD   500 mg at 06/20/17 1454    aliskiren tablet 300 mg  300 mg Oral Daily Adelfo Dangelo MD   300 mg at 06/23/17 0834    amlodipine tablet 10 mg  10 mg Oral Daily Adelfo Dangelo MD   10 mg at 06/23/17 0834    aspirin chewable tablet 81 mg  81 mg Oral Daily Aureliano Alfaro MD   81 mg at 06/23/17 0835    atorvastatin tablet 10 mg  10 mg Oral Daily Aureliano Alfaro MD   10 mg at 06/23/17 0835    cefTRIAXone (ROCEPHIN) 1 g in dextrose 5 % 50 mL IVPB  1 g Intravenous Q12H Aureliano Alfaro MD   1 g at 06/23/17 1008     chlorhexidine 0.12 % solution 15 mL  15 mL Mouth/Throat BID Laurie Barksdale MD   15 mL at 06/23/17 0835    dextrose 50% injection 12.5 g  12.5 g Intravenous PRN Aureliano Alfaro MD        dextrose 50% injection 25 g  25 g Intravenous PRN Aureliano Alfaro MD        enoxaparin injection 40 mg  40 mg Subcutaneous Daily Aureliano Alfaro MD   40 mg at 06/22/17 1734    glucose chewable tablet 16 g  16 g Oral PRN Aureliano Alfaro MD        glucose chewable tablet 24 g  24 g Oral PRN Aureliano Alfaro MD        hydrALAZINE tablet 75 mg  75 mg Oral Q12H Adelfo Dangelo MD   75 mg at 06/23/17 0834    insulin aspart pen 0-5 Units  0-5 Units Subcutaneous PRN Laurie Barksdale MD   2 Units at 06/23/17 0554    insulin detemir pen 15 Units  15 Units Subcutaneous QHS Adelfo Dangelo MD        levetiracetam in NaCl (iso-os) IVPB 1,000 mg  1,000 mg Intravenous Q12H Dustin Harmon  mL/hr at 06/23/17 0832 1,000 mg at 06/23/17 0832    lorazepam injection 2 mg  2 mg Intravenous Once Laurie Barksdale MD        lorazepam injection 2 mg  2 mg Intravenous Q4H PRN Laurie Barskdale MD   2 mg at 06/21/17 0247    ondansetron injection 8 mg  8 mg Intravenous Q8H PRN Laurie Barksdale MD        pantoprazole suspension 40 mg  40 mg Per NG tube Daily Laurie Barksdale MD   40 mg at 06/23/17 0835    pneumoc 13-finn conj-dip cr(PF) 0.5 mL  0.5 mL Intramuscular vaccine x 1 dose Buddy Nichols MD        promethazine (PHENERGAN) 12.5 mg in dextrose 5 % 50 mL IVPB  12.5 mg Intravenous Q6H PRN Laurie Barksdale MD        propofol (DIPRIVAN) 10 mg/mL infusion  5 mcg/kg/min Intravenous Continuous Dustin Harmon MD 27.9 mL/hr at 06/23/17 1010 30 mcg/kg/min at 06/23/17 1010    ramelteon tablet 8 mg  8 mg Oral Nightly PRN Laurie Barksdale MD           Review of Systems   Unable to obtain as pt is intubated    Objective:     Vital Signs (Most Recent):  Temp: 98.9 °F (37.2 °C) (06/23/17 0730)  Pulse: 79 (06/23/17 0850)  Resp: (!) 23  (06/23/17 0850)  BP: (!) 118/57 (06/23/17 0730)  SpO2: 99 % (06/23/17 0850) Vital Signs (24h Range):  Temp:  [98.3 °F (36.8 °C)-99.9 °F (37.7 °C)] 98.9 °F (37.2 °C)  Pulse:  [75-88] 79  Resp:  [18-53] 23  SpO2:  [93 %-100 %] 99 %  BP: ()/(51-70) 118/57     Weight: (!) 161.2 kg (355 lb 6.1 oz)  Body mass index is 59.14 kg/m².    Physical Exam  Constitutional: well-developed  Head: normocephalic  Eyes: conjunctivae/corneas clear.  Nose: no discharge, no epistaxis  Heart: regular rate and rhythm.  Abdomen: no pain to palaption  Extremities: no edema  Neurologic: Mental status: sedated; off sedation no following commands  Cranial nerves: pupils are pinpoint; roving eyes; corneal responses present bilaterally  Strength: twitching in RUE upon holding sedation       Significant Labs:   CBC:   Recent Labs  Lab 06/23/17  0330   WBC 12.14   HGB 9.4*   HCT 29.3*        CMP:   Recent Labs  Lab 06/22/17  0125 06/23/17  0330   * 248*   * 134*   K 3.2* 3.7   CL 98 101   CO2 26 24   BUN 41* 50*   CREATININE 2.7* 1.9*   CALCIUM 7.3* 7.8*   PROT 5.8* 6.6   ALBUMIN 1.9* 2.0*   BILITOT 0.4 0.2   ALKPHOS 88 103   AST 12 12   ALT 8* 6*   ANIONGAP 10 9   EGFRNONAA 18* 27*       EEG  Background activity:   The background rhythm was characterized by delta-theta (2- 5 Hz) activity   No posterior dominant rhythm seen.   Symmetry and continuity: The background was continuous; focal left sided slowing (delta 2-4Hz) noted throughout.     Sleep:   Not seen.     Activation procedures:   Hyperventilation and photic stimulation were not performed     Abnormal activity:   After propofol was discontinued right maximal EMG artifacts were noted associated with myoclonic movements of the right side of face/head and right shoulder (visible on camera).      No epileptiform discharges, periodic discharges, lateralized rhythmic delta activity or electrographic seizures were seen.     IMPRESSION:   This is an abnormal routine EEG due  to:  1) focal left sided slowing suggestive of underlying structural lesion  2) severe generalized slowing, suggestive of severe diffuse or multifocal cerebral dysfunction     Myoclonic clinical activity involving the right side as described was seen associated with EMG correlate.  No epileptiform activity or electrographic seizures were seen.      CLINICAL CORRELATION IS RECOMMENDED.     Nicky Arguello MD, VICTORINA(), [facility].  Neurology-Epilepsy.       Assessment and Plan:     67 y/o female consulted for possible seizure     1. Seizure: possible seizure wile on the floor. Pt began to present myoclonic activity in RUE and right face but with no electographical correlation on EEG. 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                     -Will stop levetiracetam as it has not aborted involuntary activity. On propofol activity ceases. Will start clonazepam 1 mg twice daily for myoclonus.                     -LP?                     - Head CT repeated due to asymmetry of rhythm on EEG but there has been no development of any obvious abnormalities. Pt above weight limit for MRI in this facility.    Active Diagnoses:    Diagnosis Date Noted POA    PRINCIPAL PROBLEM:  Hypertensive emergency [I16.1] 06/18/2017 Yes    ARF (acute renal failure) [N17.9] 06/22/2017 No    Acute respiratory failure [J96.00] 06/20/2017 Yes    Seizure [R56.9] 06/20/2017 No    Hypokalemia [E87.6] 06/19/2017 Yes    Hyperlipidemia [E78.5] 06/19/2017 Yes     Chronic    Moderate protein malnutrition [E44.0] 06/19/2017 Yes     Chronic    CAD s/p CABG [I25.10] 06/19/2017 Yes     Chronic    Pyelonephritis [N12] 06/19/2017 Yes    Morbid obesity with BMI of 50.0-59.9, adult [E66.01, Z68.43] 02/24/2015 Not Applicable     Chronic    Essential hypertension [I10] 05/12/2014 Yes     Chronic    Type 2 diabetes mellitus, uncontrolled [E11.65] 05/12/2014 Yes     Chronic      Problems Resolved During this Admission:    Diagnosis Date Noted  Date Resolved POA    Blood poisoning [A41.9] 06/19/2017 06/22/2017 Yes       VTE Risk Mitigation         Ordered     Place sequential compression device  Until discontinued      06/22/17 0300     enoxaparin injection 40 mg  Daily     Route:  Subcutaneous        06/19/17 0143     Medium Risk of VTE  Once      06/19/17 0424          Dustin Harmon MD  Neurology  [facility]

## 2017-06-23 NOTE — PLAN OF CARE
06/23/17 1120   Discharge Reassessment   Assessment Type Discharge Planning Reassessment   Can the patient answer the patient profile reliably? No, cognitively impaired   How does the patient rate their overall health at the present time? Fair   Describe the patient's ability to walk at the present time. Does not walk or unable to take any steps at all   How often would a person be available to care for the patient? Occasionally   Number of comorbid conditions (as recorded on the chart) Five or more   During the past month, has the patient often been bothered by feeling down, depressed or hopeless? No   During the past month, has the patient often been bothered by little interest or pleasure in doing things? No   Discharge plan remains the same: Yes   Provided patient/caregiver education on the expected discharge date and the discharge plan No   Discharge Plan A Home;Home Health   Discharge Plan B Home   Change in patient condition or support system Yes   Patient choice form signed by patient/caregiver No   Explained to the the patient/caregiver why the discharge planned changed: No   Involved the patient/caregiver in establishing a new discharge plan: No   patient remains in ICU on vent support. Family visits often. Depending upon progress, may benefit from some level of therapy post acute stay. SW will continue to follow in ICU and assist as needed.

## 2017-06-23 NOTE — PLAN OF CARE
Problem: Patient Care Overview  Goal: Plan of Care Review  Outcome: Ongoing (interventions implemented as appropriate)   06/23/17 7518   Coping/Psychosocial   Plan Of Care Reviewed With patient     Patient Remains intubated and well sedated. Vital signs stable and within limits. No skin Breakdown noted no fall or injury during the shift. Able to tolerate tube feeding well at 65 ml/hr.

## 2017-06-23 NOTE — PROGRESS NOTES
Pt remains intubated and on the servo-i vent. With the following settings: PRVC /16/ 450/ +5/ 50%. AMBU bag is at the HOB, All alarms are set and functioning. Will continue to monitor and wean as tolerated. As per nurse, the patient is still having seizures so Sedation wasn't placed on hold,  And SBT not initiated.     Weaned FiO2 to 45%.

## 2017-06-23 NOTE — PROGRESS NOTES
Following am ABG's, the following changes were made to the Ventilator settings:  Rate was decreased to 16.

## 2017-06-23 NOTE — ASSESSMENT & PLAN NOTE
On keppra, vimpat, dilantin x one dose  No epileptic activity on EEG.  Neurology following.  May make changes to medications.  No evidence of seizures today.

## 2017-06-23 NOTE — PROGRESS NOTES
Progress Note  Pulmonology    Admit Date: 2017   LOS: 5 days       SUBJECTIVE: acute respiratory failure  On vent     History of Present Illness:Patient unresponsive;information obtained from chart  ; 66 y.o. female with essential hypertension, hyperlipidemia (.2 May 2014), type 2 diabetes mellitus (HbA1c 8.2% May 2014), CAD s/p CABG, morbid obesity (BMI 58.7), and moderate protein malnutrition who presents to [facility] ED with complaints of headache this morning.  She cannot provide much details on the quality of the headache but does say she's been feeling more weak and tired lately.  She denies any nausea, vomiting, fevers, or chills, and hasn't had any neck stiffness, photophobia, nor any phonophobia.  She denies any laterality to  Her weakness and has never had a stroke before.  She also denies any dysuria, hematuria, abdominal pain, diarrhea, chest pain, shortness of breath, nor any coughing.  Further history is limited as she is a very poor historian and has very garbled speech.      Review of patient's allergies indicates:   Allergen Reactions    Latex, natural rubber        Past Medical History:   Diagnosis Date    Diabetes mellitus     Hypertension      Past Surgical History:   Procedure Laterality Date    ARTERIAL BYPASS SURGRY      9 tears sgo    CARDIAC SURGERY       SECTION      HYSTERECTOMY      TUBAL LIGATION       Family History   Problem Relation Age of Onset    No Known Problems Father     No Known Problems Mother     No Known Problems Sister     No Known Problems Brother     No Known Problems Maternal Aunt     No Known Problems Maternal Uncle     No Known Problems Paternal Aunt     No Known Problems Paternal Uncle     No Known Problems Maternal Grandmother     No Known Problems Maternal Grandfather     No Known Problems Paternal Grandmother     No Known Problems Paternal Grandfather     Amblyopia Neg Hx     Blindness Neg Hx     Cancer Neg Hx     Cataracts  Neg Hx     Diabetes Neg Hx     Glaucoma Neg Hx     Hypertension Neg Hx     Macular degeneration Neg Hx     Retinal detachment Neg Hx     Strabismus Neg Hx     Stroke Neg Hx     Thyroid disease Neg Hx        ROS:patient  unable to communicate because intubated, on vent, and sedated     OBJECTIVE:     Vital Signs (Most Recent)  Temp: 98.9 °F (37.2 °C) (06/23/17 0730)  Pulse: 78 (06/23/17 1030)  Resp: 20 (06/23/17 1030)  BP: 129/62 (06/23/17 1030)  SpO2: 96 % (06/23/17 1030)    Vital Signs Range (Last 24H):  Temp:  [98.3 °F (36.8 °C)-99.9 °F (37.7 °C)]   Pulse:  [74-88]   Resp:  [18-53]   BP: ()/(51-70)   SpO2:  [93 %-100 %]     Physical Exam:  Intubated on vent  Lungs:  rales bilaterally and rhonchi bilaterally  Chest Wall: no tenderness  Heart: regular rate and rhythm and no murmur  Abdomen: soft, non-tender non-distended; bowel sounds normal  Extremities: no cyanosis or edema, or clubbing  Skin: No rashes or lesions or good skin turgor  Neurologic: sedated    Laboratory:  Recent Results (from the past 24 hour(s))   POCT glucose    Collection Time: 06/22/17 10:57 AM   Result Value Ref Range    POCT Glucose 256 (H) 70 - 110 mg/dL   POCT glucose    Collection Time: 06/22/17  5:33 PM   Result Value Ref Range    POCT Glucose 240 (H) 70 - 110 mg/dL   POCT glucose    Collection Time: 06/23/17 12:03 AM   Result Value Ref Range    POCT Glucose 274 (H) 70 - 110 mg/dL   CBC auto differential    Collection Time: 06/23/17  3:30 AM   Result Value Ref Range    WBC 12.14 3.90 - 12.70 K/uL    RBC 3.60 (L) 4.00 - 5.40 M/uL    Hemoglobin 9.4 (L) 12.0 - 16.0 g/dL    Hematocrit 29.3 (L) 37.0 - 48.5 %    MCV 81 (L) 82 - 98 fL    MCH 26.1 (L) 27.0 - 31.0 pg    MCHC 32.1 32.0 - 36.0 %    RDW 14.4 11.5 - 14.5 %    Platelets 268 150 - 350 K/uL    MPV 10.5 9.2 - 12.9 fL    Gran # 9.1 (H) 1.8 - 7.7 K/uL    Lymph # 1.9 1.0 - 4.8 K/uL    Mono # 0.9 0.3 - 1.0 K/uL    Eos # 0.1 0.0 - 0.5 K/uL    Baso # 0.02 0.00 - 0.20 K/uL    Gran%  74.8 (H) 38.0 - 73.0 %    Lymph% 15.7 (L) 18.0 - 48.0 %    Mono% 7.5 4.0 - 15.0 %    Eosinophil% 1.1 0.0 - 8.0 %    Basophil% 0.2 0.0 - 1.9 %    Differential Method Automated    Comprehensive metabolic panel    Collection Time: 06/23/17  3:30 AM   Result Value Ref Range    Sodium 134 (L) 136 - 145 mmol/L    Potassium 3.7 3.5 - 5.1 mmol/L    Chloride 101 95 - 110 mmol/L    CO2 24 23 - 29 mmol/L    Glucose 248 (H) 70 - 110 mg/dL    BUN, Bld 50 (H) 8 - 23 mg/dL    Creatinine 1.9 (H) 0.5 - 1.4 mg/dL    Calcium 7.8 (L) 8.7 - 10.5 mg/dL    Total Protein 6.6 6.0 - 8.4 g/dL    Albumin 2.0 (L) 3.5 - 5.2 g/dL    Total Bilirubin 0.2 0.1 - 1.0 mg/dL    Alkaline Phosphatase 103 55 - 135 U/L    AST 12 10 - 40 U/L    ALT 6 (L) 10 - 44 U/L    Anion Gap 9 8 - 16 mmol/L    eGFR if African American 31 (A) >60 mL/min/1.73 m^2    eGFR if non African American 27 (A) >60 mL/min/1.73 m^2   ISTAT PROCEDURE    Collection Time: 06/23/17  4:07 AM   Result Value Ref Range    POC PH 7.458 (H) 7.35 - 7.45    POC PCO2 34.9 (L) 35 - 45 mmHg    POC PO2 90 80 - 100 mmHg    POC HCO3 24.7 24 - 28 mmol/L    POC BE 1 -2 to 2 mmol/L    POC SATURATED O2 97 95 - 100 %    POC TCO2 26 23 - 27 mmol/L    Rate 18     Sample ARTERIAL     Site LR     Allens Test Pass     DelSys Adult Vent     Mode AC/PRVC     Vt 450     PEEP 5     FiO2 50    POCT glucose    Collection Time: 06/23/17  5:53 AM   Result Value Ref Range    POCT Glucose 240 (H) 70 - 110 mg/dL     CBC:     Recent Labs  Lab 06/23/17  0330   WBC 12.14   RBC 3.60*   HGB 9.4*   HCT 29.3*      MCV 81*   MCH 26.1*   MCHC 32.1     BMP:   Recent Labs  Lab 06/20/17  1819  06/23/17  0330   *  < > 248*   *  < > 134*   K 2.5*  < > 3.7   CL 94*  < > 101   CO2 30*  < > 24   BUN 27*  < > 50*   CREATININE 1.8*  < > 1.9*   CALCIUM 7.5*  < > 7.8*   MG 1.7  --   --    < > = values in this interval not displayed.  CMP:     Recent Labs  Lab 06/23/17  0330   *   CALCIUM 7.8*   ALBUMIN 2.0*   PROT  6.6   *   K 3.7   CO2 24      BUN 50*   CREATININE 1.9*   ALKPHOS 103   ALT 6*   AST 12   BILITOT 0.2     LFTs:     Recent Labs  Lab 06/23/17  0330   ALT 6*   AST 12   ALKPHOS 103   BILITOT 0.2   PROT 6.6   ALBUMIN 2.0*     Coagulation: No results for input(s): INR, APTT in the last 168 hours.    Invalid input(s): PT  Cardiac markers: No results for input(s): CKMB, TROPONINT, MYOGLOBIN in the last 168 hours.  Microbiology Results (last 7 days)     Procedure Component Value Units Date/Time    Blood culture [[id number]] Collected:  06/18/17 2225    Order Status:  Completed Specimen:  Blood from Peripheral, Forearm, Right Updated:  06/23/17 0303     Blood Culture, Routine No Growth to date     Blood Culture, Routine No Growth to date     Blood Culture, Routine No Growth to date     Blood Culture, Routine No Growth to date     Blood Culture, Routine No Growth to date    Blood culture [[id number]] Collected:  06/21/17 1210    Order Status:  Completed Specimen:  Blood Updated:  06/22/17 1303     Blood Culture, Routine No Growth to date     Blood Culture, Routine No Growth to date    Blood culture [[id number]] Collected:  06/21/17 1207    Order Status:  Completed Specimen:  Blood Updated:  06/22/17 1303     Blood Culture, Routine No Growth to date     Blood Culture, Routine No Growth to date    Culture, Respiratory with Gram Stain [[id number]] Collected:  06/20/17 0251    Order Status:  Completed Specimen:  Respiratory from Sputum, Induced Updated:  06/22/17 0825     Respiratory Culture Normal respiratory jose luis     Gram Stain (Respiratory) <10 epithelial cells per low power field.     Gram Stain (Respiratory) Moderate WBC's     Gram Stain (Respiratory) Few Gram positive cocci    Blood culture [[id number]] Collected:  06/18/17 2240    Order Status:  Completed Specimen:  Blood from Peripheral, Forearm, Right Updated:  06/22/17 0811     Blood Culture, Routine Gram stain tena bottle: Gram positive cocci in clusters  resembling Staph     Blood Culture, Routine Results called to and read back by: April Cleveland 06/20/2017  15:25     Blood Culture, Routine --     COAGULASE-NEGATIVE STAPHYLOCOCCUS SPECIES  Organism is a probable contaminant      Urine culture [[id number]] Collected:  06/18/17 2120    Order Status:  Completed Specimen:  Urine from Urine, Catheterized Updated:  06/20/17 1023     Urine Culture, Routine No growth        ABGs:     Recent Labs  Lab 06/23/17  0407   PH 7.458*   PCO2 34.9*   PO2 90   HCO3 24.7   POCSATURATED 97   BE 1       Ventilator Setting:  Vent Mode: PRVC  Oxygen Concentration (%):  [] 45  Resp Rate Total:  [18 br/min-37 br/min] 19 br/min  Vt Set:  [450 mL] 450 mL  PEEP/CPAP:  [5 cmH20] 5 cmH20  Mean Airway Pressure:  [10.9 exL17-72.1 cmH20] 17.1 cmH20     Diagnostic Results:    Portable AP view of the chest was obtained.  Comparison -- 6/22. Status post sternotomy. The left central venous line, endotracheal and gastric tubes remain in place. ET tip is about 5 cm above the jasmin. The heart size is normal. Mediastinal structures are midline. Lungs are a little better expanded and aerated today. Mild, patchy consolidation persists in both lung bases. Upper lung zones are clear. No significant pleural finding is seen.    ASSESSMENT/PLAN:     1. Sepsis, due to unspecified organism    2. Fever    3. Elevated troponin    4. Essential hypertension    5. Bacteremia    6. Hypertensive emergency    7. Seizure    5.      acute respiratory failure  6.      Vent management      Plan:improving slowly but still edema ; renal function improving .will wean when appropriate

## 2017-06-23 NOTE — ASSESSMENT & PLAN NOTE
Patient presented with fever and does have a urinalysis that is suspicious for a urinary tract infection with trace leukocytes, 8 WBCs, and many bacteria.  She meet criteria for sepsis.  There is no previous urine cultures for comparison.    Urine culture negative, fever resolved.  Fevers could also be secondary to seizures.  Will stop ABx's.  Staph in blood is contaminant.

## 2017-06-24 LAB
ALBUMIN SERPL BCP-MCNC: 2 G/DL
ALLENS TEST: ABNORMAL
ALP SERPL-CCNC: 109 U/L
ALT SERPL W/O P-5'-P-CCNC: 9 U/L
ANION GAP SERPL CALC-SCNC: 9 MMOL/L
AST SERPL-CCNC: 19 U/L
BACTERIA BLD CULT: NORMAL
BILIRUB SERPL-MCNC: 0.2 MG/DL
BUN SERPL-MCNC: 48 MG/DL
CALCIUM SERPL-MCNC: 8.2 MG/DL
CHLORIDE SERPL-SCNC: 101 MMOL/L
CO2 SERPL-SCNC: 24 MMOL/L
CREAT SERPL-MCNC: 1.3 MG/DL
DELSYS: ABNORMAL
ERYTHROCYTE [SEDIMENTATION RATE] IN BLOOD BY WESTERGREN METHOD: 16 MM/H
EST. GFR  (AFRICAN AMERICAN): 49 ML/MIN/1.73 M^2
EST. GFR  (NON AFRICAN AMERICAN): 43 ML/MIN/1.73 M^2
FIO2: 40
GLUCOSE SERPL-MCNC: 206 MG/DL
HCO3 UR-SCNC: 24.6 MMOL/L (ref 24–28)
MODE: ABNORMAL
PCO2 BLDA: 36.2 MMHG (ref 35–45)
PEEP: 5
PH SMN: 7.44 [PH] (ref 7.35–7.45)
PO2 BLDA: 74 MMHG (ref 80–100)
POC BE: 1 MMOL/L
POC SATURATED O2: 95 % (ref 95–100)
POC TCO2: 26 MMOL/L (ref 23–27)
POCT GLUCOSE: 208 MG/DL (ref 70–110)
POCT GLUCOSE: 210 MG/DL (ref 70–110)
POCT GLUCOSE: 229 MG/DL (ref 70–110)
POCT GLUCOSE: 322 MG/DL (ref 70–110)
POTASSIUM SERPL-SCNC: 3.9 MMOL/L
PROT SERPL-MCNC: 7 G/DL
SAMPLE: ABNORMAL
SITE: ABNORMAL
SODIUM SERPL-SCNC: 134 MMOL/L
SP02: 96
VT: 450

## 2017-06-24 PROCEDURE — 94761 N-INVAS EAR/PLS OXIMETRY MLT: CPT

## 2017-06-24 PROCEDURE — 25000003 PHARM REV CODE 250: Performed by: HOSPITALIST

## 2017-06-24 PROCEDURE — 63600175 PHARM REV CODE 636 W HCPCS: Performed by: INTERNAL MEDICINE

## 2017-06-24 PROCEDURE — 80053 COMPREHEN METABOLIC PANEL: CPT

## 2017-06-24 PROCEDURE — 99222 1ST HOSP IP/OBS MODERATE 55: CPT | Mod: ,,, | Performed by: PSYCHIATRY & NEUROLOGY

## 2017-06-24 PROCEDURE — 63600175 PHARM REV CODE 636 W HCPCS: Performed by: HOSPITALIST

## 2017-06-24 PROCEDURE — 25000003 PHARM REV CODE 250: Performed by: EMERGENCY MEDICINE

## 2017-06-24 PROCEDURE — P9047 ALBUMIN (HUMAN), 25%, 50ML: HCPCS | Performed by: HOSPITALIST

## 2017-06-24 PROCEDURE — 27000221 HC OXYGEN, UP TO 24 HOURS

## 2017-06-24 PROCEDURE — 25000003 PHARM REV CODE 250: Performed by: PSYCHIATRY & NEUROLOGY

## 2017-06-24 PROCEDURE — 99900026 HC AIRWAY MAINTENANCE (STAT)

## 2017-06-24 PROCEDURE — 94003 VENT MGMT INPAT SUBQ DAY: CPT

## 2017-06-24 PROCEDURE — 94640 AIRWAY INHALATION TREATMENT: CPT

## 2017-06-24 PROCEDURE — 63600175 PHARM REV CODE 636 W HCPCS: Performed by: PSYCHIATRY & NEUROLOGY

## 2017-06-24 PROCEDURE — 63600175 PHARM REV CODE 636 W HCPCS: Performed by: EMERGENCY MEDICINE

## 2017-06-24 PROCEDURE — 20000000 HC ICU ROOM

## 2017-06-24 PROCEDURE — 25000003 PHARM REV CODE 250: Performed by: INTERNAL MEDICINE

## 2017-06-24 PROCEDURE — 99900035 HC TECH TIME PER 15 MIN (STAT)

## 2017-06-24 PROCEDURE — 36600 WITHDRAWAL OF ARTERIAL BLOOD: CPT

## 2017-06-24 PROCEDURE — 25000242 PHARM REV CODE 250 ALT 637 W/ HCPCS: Performed by: INTERNAL MEDICINE

## 2017-06-24 RX ORDER — METOPROLOL TARTRATE 1 MG/ML
5 INJECTION, SOLUTION INTRAVENOUS EVERY 5 MIN PRN
Status: COMPLETED | OUTPATIENT
Start: 2017-06-24 | End: 2017-06-25

## 2017-06-24 RX ORDER — ALBUMIN HUMAN 250 G/1000ML
25 SOLUTION INTRAVENOUS ONCE
Status: COMPLETED | OUTPATIENT
Start: 2017-06-24 | End: 2017-06-24

## 2017-06-24 RX ORDER — SCOLOPAMINE TRANSDERMAL SYSTEM 1 MG/1
1 PATCH, EXTENDED RELEASE TRANSDERMAL
Status: DISCONTINUED | OUTPATIENT
Start: 2017-06-24 | End: 2017-06-27

## 2017-06-24 RX ORDER — HYDRALAZINE HYDROCHLORIDE 20 MG/ML
10 INJECTION INTRAMUSCULAR; INTRAVENOUS EVERY 6 HOURS PRN
Status: DISCONTINUED | OUTPATIENT
Start: 2017-06-24 | End: 2017-06-25

## 2017-06-24 RX ORDER — IPRATROPIUM BROMIDE AND ALBUTEROL SULFATE 2.5; .5 MG/3ML; MG/3ML
3 SOLUTION RESPIRATORY (INHALATION) EVERY 6 HOURS
Status: DISCONTINUED | OUTPATIENT
Start: 2017-06-24 | End: 2017-07-14 | Stop reason: HOSPADM

## 2017-06-24 RX ORDER — METOPROLOL TARTRATE 1 MG/ML
5 INJECTION, SOLUTION INTRAVENOUS EVERY 5 MIN PRN
Status: DISCONTINUED | OUTPATIENT
Start: 2017-06-25 | End: 2017-06-24

## 2017-06-24 RX ORDER — FUROSEMIDE 10 MG/ML
40 INJECTION INTRAMUSCULAR; INTRAVENOUS ONCE
Status: COMPLETED | OUTPATIENT
Start: 2017-06-24 | End: 2017-06-24

## 2017-06-24 RX ADMIN — CLONAZEPAM 1 MG: 0.5 TABLET ORAL at 09:06

## 2017-06-24 RX ADMIN — INSULIN ASPART 3 UNITS: 100 INJECTION, SOLUTION INTRAVENOUS; SUBCUTANEOUS at 12:06

## 2017-06-24 RX ADMIN — PROPOFOL 25 MCG/KG/MIN: 10 INJECTION, EMULSION INTRAVENOUS at 09:06

## 2017-06-24 RX ADMIN — FUROSEMIDE 40 MG: 10 INJECTION, SOLUTION INTRAMUSCULAR; INTRAVENOUS at 01:06

## 2017-06-24 RX ADMIN — PANTOPRAZOLE SODIUM 40 MG: 40 GRANULE, DELAYED RELEASE ORAL at 09:06

## 2017-06-24 RX ADMIN — IPRATROPIUM BROMIDE AND ALBUTEROL SULFATE 3 ML: .5; 3 SOLUTION RESPIRATORY (INHALATION) at 07:06

## 2017-06-24 RX ADMIN — ENOXAPARIN SODIUM 40 MG: 100 INJECTION SUBCUTANEOUS at 04:06

## 2017-06-24 RX ADMIN — IPRATROPIUM BROMIDE AND ALBUTEROL SULFATE 3 ML: .5; 3 SOLUTION RESPIRATORY (INHALATION) at 12:06

## 2017-06-24 RX ADMIN — ALBUMIN (HUMAN) 25 G: 12.5 SOLUTION INTRAVENOUS at 01:06

## 2017-06-24 RX ADMIN — CHLORHEXIDINE GLUCONATE 15 ML: 1.2 RINSE ORAL at 09:06

## 2017-06-24 RX ADMIN — INSULIN ASPART 1 UNITS: 100 INJECTION, SOLUTION INTRAVENOUS; SUBCUTANEOUS at 12:06

## 2017-06-24 RX ADMIN — INSULIN ASPART 4 UNITS: 100 INJECTION, SOLUTION INTRAVENOUS; SUBCUTANEOUS at 06:06

## 2017-06-24 RX ADMIN — METOPROLOL TARTRATE 5 MG: 5 INJECTION INTRAVENOUS at 11:06

## 2017-06-24 RX ADMIN — AMLODIPINE BESYLATE 10 MG: 10 TABLET ORAL at 09:06

## 2017-06-24 RX ADMIN — INSULIN ASPART 3 UNITS: 100 INJECTION, SOLUTION INTRAVENOUS; SUBCUTANEOUS at 11:06

## 2017-06-24 RX ADMIN — HYDRALAZINE HYDROCHLORIDE 75 MG: 25 TABLET ORAL at 10:06

## 2017-06-24 RX ADMIN — HYDRALAZINE HYDROCHLORIDE 75 MG: 25 TABLET ORAL at 09:06

## 2017-06-24 RX ADMIN — ATORVASTATIN CALCIUM 10 MG: 10 TABLET, FILM COATED ORAL at 09:06

## 2017-06-24 RX ADMIN — LORAZEPAM 2 MG: 2 INJECTION INTRAMUSCULAR; INTRAVENOUS at 04:06

## 2017-06-24 RX ADMIN — ALISKIREN HEMIFUMARATE 300 MG: 300 TABLET, FILM COATED ORAL at 09:06

## 2017-06-24 RX ADMIN — SCOPOLAMINE 1.5 MG: 1 PATCH, EXTENDED RELEASE TRANSDERMAL at 04:06

## 2017-06-24 RX ADMIN — INSULIN DETEMIR 15 UNITS: 100 INJECTION, SOLUTION SUBCUTANEOUS at 09:06

## 2017-06-24 RX ADMIN — ASPIRIN 81 MG 81 MG: 81 TABLET ORAL at 09:06

## 2017-06-24 RX ADMIN — PROPOFOL 20 MCG/KG/MIN: 10 INJECTION, EMULSION INTRAVENOUS at 05:06

## 2017-06-24 RX ADMIN — INSULIN ASPART 2 UNITS: 100 INJECTION, SOLUTION INTRAVENOUS; SUBCUTANEOUS at 05:06

## 2017-06-24 RX ADMIN — HYDRALAZINE HYDROCHLORIDE 10 MG: 20 INJECTION INTRAMUSCULAR; INTRAVENOUS at 01:06

## 2017-06-24 RX ADMIN — HYDRALAZINE HYDROCHLORIDE 10 MG: 20 INJECTION INTRAMUSCULAR; INTRAVENOUS at 07:06

## 2017-06-24 RX ADMIN — PROPOFOL 20 MCG/KG/MIN: 10 INJECTION, EMULSION INTRAVENOUS at 01:06

## 2017-06-24 RX ADMIN — HYDRALAZINE HYDROCHLORIDE 10 MG: 20 INJECTION INTRAMUSCULAR; INTRAVENOUS at 04:06

## 2017-06-24 NOTE — PROGRESS NOTES
[facility] Hospital Medicine  Progress Note    Patient Name: Nisa Frank  MRN: [id number]  Patient Class: IP- Inpatient   Admission Date: 6/18/2017  Length of Stay: 6 days  Attending Physician: Adelfo Dangelo MD  Primary Care Provider: Atilio Downs MD        Subjective:     Principal Problem:Hypertensive emergency    HPI:  Ms. Nisa Frank is a 66 y.o. female with essential hypertension, hyperlipidemia (.2 May 2014), type 2 diabetes mellitus (HbA1c 8.2% May 2014), CAD s/p CABG, morbid obesity (BMI 58.7), and moderate protein malnutrition who presents to [facility] ED with complaints of headache this morning.  She cannot provide much details on the quality of the headache but does say she's been feeling more weak and tired lately.  She denies any nausea, vomiting, fevers, or chills, and hasn't had any neck stiffness, photophobia, nor any phonophobia.  She denies any laterality to  Her weakness and has never had a stroke before.  She also denies any dysuria, hematuria, abdominal pain, diarrhea, chest pain, shortness of breath, nor any coughing.  Further history is limited as she is a very poor historian and has very garbled speech.    Hospital Course:  Pt admitted with sepsis from presumed UTI, urine culture negative. Through course of night 6/19/-6/20 had multiple seizures and intubated for airway protection. Blood cultures growing suspected Staph. Only one bottle positive and thought to be a contaminant. Vancomycin dc'd. Sputum culture - no significant growth. Dc rocephin and start zosyn.  Neurology consulted and given large dose of keppra. Appears to have continous seizures and will dc versed for sedation and start propofol. BP dropped overnight 6/20/2017 with change in sedation. Dilantin and vimpat added to keppra for possible seizures vs myoclonic activity.  Urine output dropped off and started IVF. Nephrology consulted for ARF. Pulmonary consulted for vent management. TLC  placed to left IJ 6/21/2017.   EEG did not show any epileptic activity, but did show asymmetric activity.  Repeat Head CT unremarkable.  6/24: Remains on vent.  Increased edema around facial area and extremities.  Improving renal function.  Hypoalbuminemia.  Given dose of Lasix and Albumin.    Interval History: No major events overnight.    Review of Systems   Unable to perform ROS: Intubated     Objective:     Vital Signs (Most Recent):  Temp: 99.6 °F (37.6 °C) (06/24/17 1115)  Pulse: 81 (06/24/17 1215)  Resp: (!) 26 (06/24/17 1215)  BP: (!) 166/77 (06/24/17 1200)  SpO2: 96 % (06/24/17 1215) Vital Signs (24h Range):  Temp:  [98 °F (36.7 °C)-99.6 °F (37.6 °C)] 99.6 °F (37.6 °C)  Pulse:  [70-98] 81  Resp:  [0-40] 26  SpO2:  [92 %-98 %] 96 %  BP: (114-186)/(56-85) 166/77     Weight: (!) 158 kg (348 lb 5.2 oz)  Body mass index is 57.96 kg/m².    Intake/Output Summary (Last 24 hours) at 06/24/17 1234  Last data filed at 06/24/17 1200   Gross per 24 hour   Intake          2527.34 ml   Output             1975 ml   Net           552.34 ml      Physical Exam   Constitutional: No distress.   Morbidly obese   HENT:   ET tube in place  Increased edema on facial area.   Eyes: Conjunctivae are normal. Right eye exhibits no discharge. Left eye exhibits no discharge.   Neck: Neck supple.   Cardiovascular: Normal rate, regular rhythm and normal heart sounds.    Pulmonary/Chest: Breath sounds normal. No stridor.   Mechanically ventilated   Abdominal: Soft. Bowel sounds are normal.   Musculoskeletal: She exhibits edema. She exhibits no deformity.   Neurological:   Sedated   Skin: Skin is warm and dry.       Significant Labs:   BMP:     Recent Labs  Lab 06/24/17  0150   *   *   K 3.9      CO2 24   BUN 48*   CREATININE 1.3   CALCIUM 8.2*     CBC:     Recent Labs  Lab 06/23/17  0330   WBC 12.14   HGB 9.4*   HCT 29.3*          Significant Imaging: I have reviewed all pertinent imaging results/findings within the  past 24 hours.    Assessment/Plan:      Seizure    No epileptic activity on EEG.  Neurology following.   No evidence of seizures today.  Probable myoclonus.              Acute respiratory failure    Patient ntubated 6/20/2017  Pulmonary consulted for vent management  Increased edema around face and upper extremities.  Probably third spacing from severe hypoalbuminemia.  Will give dose of lasix and albumin.          Pyelonephritis    Patient presented with fever and does have a urinalysis that is suspicious for a urinary tract infection with trace leukocytes, 8 WBCs, and many bacteria.  She meet criteria for sepsis.  There is no previous urine cultures for comparison.    Urine culture negative, fever resolved.  Fevers could also be secondary to seizures.  Will stop ABx's.  Staph in blood is contaminant.        ARF (acute renal failure)    Appreciate Nephrology input.  Creat greatly improved.          CAD s/p CABG    Stable; will continue her home regimen of aspirin and atorvastatin.        Moderate protein malnutrition    Continue tube feeds        Hyperlipidemia    Poorly-controlled; will continue patient's home regimen of atorvastatin.        Hypokalemia    Replace as necessary.        S/P hysterectomy with oophorectomy              Morbid obesity with BMI of 50.0-59.9, adult    The patient has been counseled on the negative impact that obesity imparts on her health, and was encouraged to make lifestyle changes in order to lose weight and decrease her modifiable risk factors.  Body mass index is 57.96 kg/m².          Type 2 diabetes mellitus, uncontrolled    Poorly-controlled on a home regimen of metformin, a DPP-4 inhibitor, basal insulin therapy.  Hyperglycemic.  Increased nightly Levemir.  Continue with sliding scale.        Essential hypertension    Currently normotensive.  Continue current management with parameters.        * Hypertensive emergency    Patient presented with an initial blood pressure of 224/108  ().  She was responsive to medications administered in the ED so was not needing an antihypertensive infusion.  Restarted her home medications of aliskiren, amlodipine, hydralazine and provide as-needed clonidine.  BP dropped on sedation and placed parameters on medications.          VTE Risk Mitigation         Ordered     Place sequential compression device  Until discontinued      06/22/17 0300     enoxaparin injection 40 mg  Daily     Route:  Subcutaneous        06/19/17 0143     Medium Risk of VTE  Once      06/19/17 0424        Critical Care time spent > 35 minutes.     Adelfo Dangelo MD  Department of Hospital Medicine   [facility]

## 2017-06-24 NOTE — ASSESSMENT & PLAN NOTE
The patient has been counseled on the negative impact that obesity imparts on her health, and was encouraged to make lifestyle changes in order to lose weight and decrease her modifiable risk factors.  Body mass index is 57.96 kg/m².

## 2017-06-24 NOTE — PROGRESS NOTES
[facility]  Pulmonology  Progress Note    Patient Name: Nisa Frank  MRN: [id number]  Admission Date: 6/18/2017  Hospital Length of Stay: 6 days  Code Status: Full Code  Attending Provider: Adelfo Dangelo MD  Primary Care Provider: Atilio Downs MD   Principal Problem: Hypertensive emergency    Subjective:     Interval History: vent management. Intubated, sedated. Daughter at bedside.     Unable to obtain ROS - due to sedation and intubation.     aliskiren  300 mg Oral Daily    amlodipine  10 mg Oral Daily    aspirin  81 mg Oral Daily    atorvastatin  10 mg Oral Daily    chlorhexidine  15 mL Mouth/Throat BID    clonazePAM  1 mg Oral BID    enoxaparin  40 mg Subcutaneous Daily    hydrALAZINE  75 mg Oral Q12H    insulin detemir  15 Units Subcutaneous QHS    lorazepam  2 mg Intravenous Once    pantoprazole  40 mg Per NG tube Daily       Objective:     Vital Signs (Most Recent):  Temp: 98.7 °F (37.1 °C) (06/24/17 0315)  Pulse: 90 (06/24/17 0808)  Resp: (!) 33 (06/24/17 0808)  BP: (!) 168/73 (06/24/17 0730)  SpO2: 96 % (06/24/17 0808) Vital Signs (24h Range):  Temp:  [98 °F (36.7 °C)-98.9 °F (37.2 °C)] 98.7 °F (37.1 °C)  Pulse:  [70-91] 90  Resp:  [0-35] 33  SpO2:  [92 %-99 %] 96 %  BP: (114-186)/(56-85) 168/73     Weight: (!) 158 kg (348 lb 5.2 oz)  Body mass index is 57.96 kg/m².      Intake/Output Summary (Last 24 hours) at 06/24/17 0831  Last data filed at 06/24/17 0700   Gross per 24 hour   Intake          1695.44 ml   Output             1745 ml   Net           -49.56 ml     Physical Exam   Gen: intubated, sedated, morbidly obese.  Neck: left IJ TLC. Short and thick  Heart: regular, distant. No murmur  Lungs: shallow, marked decreased bs bilaterally, more decreased on left. 7.5 ett  Abdomen: obese, hypoactive bs. OGT. Soft.  Ext: non-pitting LE edema. SCDs.   Skin: warm, dry. No rash    Vents:  Vent Mode: SIMV (PRVC) + PS (06/24/17 0808)  Set Rate: 16 bmp (06/24/17 0808)  Vt  Set: 450 mL (06/24/17 0808)  Pressure Support: 10 cmH20 (06/24/17 0808)  PEEP/CPAP: 5 cmH20 (06/24/17 0808)  Oxygen Concentration (%): 40 (06/24/17 0808)  Peak Airway Pressure: 28.8 cmH2O (06/24/17 0808)  Total Ve: 10.6 mL (06/24/17 0808)  F/VT Ratio<105 (RSBI): (!) 78.95 (06/24/17 0808)    Lines/Drains/Airways     Central Venous Catheter Line                 Percutaneous Central Line Insertion/Assessment - triple lumen  06/21/17 1015 left internal jugular 2 days          Drain                 NG/OG Tube 06/20/17 0402 orogastric 14 Fr. Left mouth 4 days         Urethral Catheter 06/20/17 0404 Non-latex 18 Fr. 4 days          Airway                 Airway - Non-Surgical 06/20/17 0330 Endotracheal Tube 4 days          Peripheral Intravenous Line                 Peripheral IV - Single Lumen 06/20/17 0409 Left Other 4 days              Significant Labs:    CBC/Anemia Profile:    Recent Labs  Lab 06/23/17  0330   WBC 12.14   HGB 9.4*   HCT 29.3*      MCV 81*   RDW 14.4        Chemistries:    Recent Labs  Lab 06/23/17  0330 06/24/17  0150   * 134*   K 3.7 3.9    101   CO2 24 24   BUN 50* 48*   CREATININE 1.9* 1.3   CALCIUM 7.8* 8.2*   ALBUMIN 2.0* 2.0*   PROT 6.6 7.0   BILITOT 0.2 0.2   ALKPHOS 103 109   ALT 6* 9*   AST 12 19       Recent Labs  Lab 06/24/17  0438   PH 7.440   PCO2 36.2   PO2 74*   HCO3 24.6   POCSATURATED 95   BE 1     Microbiology Results (last 7 days)     Procedure Component Value Units Date/Time    Blood culture [[id number]] Collected:  06/18/17 2225    Order Status:  Completed Specimen:  Blood from Peripheral, Forearm, Right Updated:  06/24/17 0303     Blood Culture, Routine No growth after 5 days.    Blood culture [[id number]] Collected:  06/21/17 1210    Order Status:  Completed Specimen:  Blood Updated:  06/23/17 1303     Blood Culture, Routine No Growth to date     Blood Culture, Routine No Growth to date     Blood Culture, Routine No Growth to date    Blood culture [[id number]]  Collected:  06/21/17 1207    Order Status:  Completed Specimen:  Blood Updated:  06/23/17 1303     Blood Culture, Routine No Growth to date     Blood Culture, Routine No Growth to date     Blood Culture, Routine No Growth to date    Culture, Respiratory with Gram Stain [[id number]] Collected:  06/20/17 0251    Order Status:  Completed Specimen:  Respiratory from Sputum, Induced Updated:  06/22/17 0825     Respiratory Culture Normal respiratory jose luis     Gram Stain (Respiratory) <10 epithelial cells per low power field.     Gram Stain (Respiratory) Moderate WBC's     Gram Stain (Respiratory) Few Gram positive cocci    Blood culture [[id number]] Collected:  06/18/17 2240    Order Status:  Completed Specimen:  Blood from Peripheral, Forearm, Right Updated:  06/22/17 0811     Blood Culture, Routine Gram stain tena bottle: Gram positive cocci in clusters resembling Staph     Blood Culture, Routine Results called to and read back by: April Cleveland 06/20/2017  15:25     Blood Culture, Routine --     COAGULASE-NEGATIVE STAPHYLOCOCCUS SPECIES  Organism is a probable contaminant      Urine culture [[id number]] Collected:  06/18/17 2120    Order Status:  Completed Specimen:  Urine from Urine, Catheterized Updated:  06/20/17 1023     Urine Culture, Routine No growth        Significant Imaging:  CXR - ett in place. Small volumes but otherwise fairly clear.    Assessment/Plan:     Active Diagnoses:    Diagnosis Date Noted POA    PRINCIPAL PROBLEM:  Hypertensive emergency [I16.1] 06/18/2017 Yes    ARF (acute renal failure) [N17.9] 06/22/2017 No    Acute respiratory failure [J96.00] 06/20/2017 Yes    Seizure [R56.9] 06/20/2017 No    Hypokalemia [E87.6] 06/19/2017 Yes    Hyperlipidemia [E78.5] 06/19/2017 Yes     Chronic    Moderate protein malnutrition [E44.0] 06/19/2017 Yes     Chronic    CAD s/p CABG [I25.10] 06/19/2017 Yes     Chronic    Pyelonephritis [N12] 06/19/2017 Yes    Morbid obesity with BMI of 50.0-59.9, adult  [E66.01, Z68.43] 02/24/2015 Not Applicable     Chronic    Essential hypertension [I10] 05/12/2014 Yes     Chronic    Type 2 diabetes mellitus, uncontrolled [E11.65] 05/12/2014 Yes     Chronic      Problems Resolved During this Admission:    Diagnosis Date Noted Date Resolved POA    Blood poisoning [A41.9] 06/19/2017 06/22/2017 Yes     Imp/Plan  1) Altered MS - due to seizures? Currently on clonazepam only. CT head apparently negative. Neurology following. On propofol.  2) Mechanical ventilation - for airway protection. Obese, no distress. Good gas exchange. On rate 16. Will decrease rate. Wean to extubate when able to come off propfol and more awake. Bronchodilators. Sputum cultures as above. Afebrile.  3) DM  4) Acute renal insufficiency - creatinine improved. Nephrology following.  5) Hypoalbuminemia - ?malnutrition. On feeds.  6) Diabetes  7) HTN - mildly variable. On Aliskiren.  8) History of CAD  9) Morbid obesity     Annie Montoya MD  Pulmonology  [facility]

## 2017-06-24 NOTE — PROGRESS NOTES
MINGO called Pt rr was in 40's. Vent  rate increased to 16.Pt respiratory rate in upper 30's.Pt placed in PRVC and rate dropped to 20's.Pt in PRVC rate 16/450 tv/+5 peep.Pt alarms on and functioning.Will continue to monitor.

## 2017-06-24 NOTE — PROGRESS NOTES
Pt received on servoi vent in SIMV rate 16/450 tv/+5 peep/ 10 ps/ and 40% fio2 sats 96%the patient has 7.5 et tube switched to left lip taped 24 cm with bite block.Pt has small blister or scab to upper lip.Wendy AGUILA notified.Pt has coarse bs with thin clear secretions.Pt alarms on and functioning.Ambu bag at [facility].HME and suction changed.Pt sedation being weaned however complete sedation vacation not performed due to rr rate in the 30's.Will continue to monitor.

## 2017-06-24 NOTE — PLAN OF CARE
Problem: Patient Care Overview  Goal: Plan of Care Review  Outcome: Ongoing (interventions implemented as appropriate)  Pt remains in ICU, VSS/ TMAX 100.1. Vent placed back on PRVC after failed weaning attempt and tachypnea. Pt now off propofol and calm. Not currently responding purposefully to stimuli. Excessive oral secretions, scopolamine patch placed. IV lasix/albumin given. UO ~ 2L today. No BM, TF @ goal and low residual. Safety and skin integrity maintained with precautions in placed. No new injury, POC reviewed with patient and family at bedside today.

## 2017-06-24 NOTE — PLAN OF CARE
Problem: Patient Care Overview  Goal: Plan of Care Review  Outcome: Ongoing (interventions implemented as appropriate)  Pt remains free of falls/injury this shift, pt weaned to SIMV this am tolerating well, propofol infusing @ 20mcg, BP elevated this AM new order received for prn hydralazine, siegel intact urine output >30cc/hr,

## 2017-06-24 NOTE — CONSULTS
Remains intubated on mechanical ventilator sedated     ROS Unable to obtain    Family at beside case discussed      Past Medical History:   Diagnosis Date    Diabetes mellitus     Hypertension      Review of patient's allergies indicates:   Allergen Reactions    Latex, natural rubber        Current Facility-Administered Medications   Medication    acetaminophen tablet 500 mg    albuterol-ipratropium 2.5mg-0.5mg/3mL nebulizer solution 3 mL    aliskiren tablet 300 mg    amlodipine tablet 10 mg    aspirin chewable tablet 81 mg    atorvastatin tablet 10 mg    chlorhexidine 0.12 % solution 15 mL    clonazePAM tablet 1 mg    dextrose 50% injection 12.5 g    dextrose 50% injection 25 g    enoxaparin injection 40 mg    glucose chewable tablet 16 g    glucose chewable tablet 24 g    hydrALAZINE injection 10 mg    hydrALAZINE tablet 75 mg    insulin aspart pen 0-5 Units    insulin detemir pen 15 Units    lorazepam injection 2 mg    lorazepam injection 2 mg    ondansetron injection 8 mg    pantoprazole suspension 40 mg    pneumoc 13-finn conj-dip cr(PF) 0.5 mL    promethazine (PHENERGAN) 12.5 mg in dextrose 5 % 50 mL IVPB    propofol (DIPRIVAN) 10 mg/mL infusion    ramelteon tablet 8 mg       LABS    Recent Results (from the past 24 hour(s))   POCT glucose    Collection Time: 06/23/17  7:12 PM   Result Value Ref Range    POCT Glucose 224 (H) 70 - 110 mg/dL   POCT glucose    Collection Time: 06/24/17 12:17 AM   Result Value Ref Range    POCT Glucose 210 (H) 70 - 110 mg/dL   Comprehensive metabolic panel    Collection Time: 06/24/17  1:50 AM   Result Value Ref Range    Sodium 134 (L) 136 - 145 mmol/L    Potassium 3.9 3.5 - 5.1 mmol/L    Chloride 101 95 - 110 mmol/L    CO2 24 23 - 29 mmol/L    Glucose 206 (H) 70 - 110 mg/dL    BUN, Bld 48 (H) 8 - 23 mg/dL    Creatinine 1.3 0.5 - 1.4 mg/dL    Calcium 8.2 (L) 8.7 - 10.5 mg/dL    Total Protein 7.0 6.0 - 8.4 g/dL    Albumin 2.0 (L) 3.5 - 5.2 g/dL    Total  Bilirubin 0.2 0.1 - 1.0 mg/dL    Alkaline Phosphatase 109 55 - 135 U/L    AST 19 10 - 40 U/L    ALT 9 (L) 10 - 44 U/L    Anion Gap 9 8 - 16 mmol/L    eGFR if African American 49 (A) >60 mL/min/1.73 m^2    eGFR if non African American 43 (A) >60 mL/min/1.73 m^2   ISTAT PROCEDURE    Collection Time: 06/24/17  4:38 AM   Result Value Ref Range    POC PH 7.440 7.35 - 7.45    POC PCO2 36.2 35 - 45 mmHg    POC PO2 74 (L) 80 - 100 mmHg    POC HCO3 24.6 24 - 28 mmol/L    POC BE 1 -2 to 2 mmol/L    POC SATURATED O2 95 95 - 100 %    POC TCO2 26 23 - 27 mmol/L    Rate 16     Sample ARTERIAL     Site RR     Allens Test Pass     DelSys Adult Vent     Mode AC/PRVC     Vt 450     PEEP 5     FiO2 40     Sp02 96    POCT glucose    Collection Time: 06/24/17  5:39 AM   Result Value Ref Range    POCT Glucose 208 (H) 70 - 110 mg/dL   POCT glucose    Collection Time: 06/24/17 11:59 AM   Result Value Ref Range    POCT Glucose 229 (H) 70 - 110 mg/dL   ]    I/O last 3 completed shifts:  In: 4231.6 [I.V.:1846.6; NG/GT:2185; IV Piggyback:200]  Out: 2495 [Urine:2495]    Vitals:    06/24/17 1345 06/24/17 1400 06/24/17 1415 06/24/17 1430   BP:  (!) 185/82  (!) 163/71   Pulse: 95 96 102 91   Resp: 15 11 (!) 30 (!) 25   Temp:       TempSrc:       SpO2: (!) 94% (!) 94% (!) 93% (!) 93%   Weight:       Height:           No Jvd, Thyromegaly or Lymphadenopathy  Lungs: Fairly clear anteriorly and laterally  Cor: RRR no G or rubs  Abd: Soft benign good bowel sounds non tender  Ext: Pos edema    A)  Non oliguric nilson creat much better  Sp Hypertensive emergency  Seizure ds  Acute resp failure  CAD  Hx of cabg  T2DM  UTI ???    P)  Continue sx rx   Avoid nephrotox meds  Maintain hydration

## 2017-06-24 NOTE — ASSESSMENT & PLAN NOTE
No epileptic activity on EEG.  Neurology following.   No evidence of seizures today.  Probable myoclonus.

## 2017-06-24 NOTE — ASSESSMENT & PLAN NOTE
Patient ntubated 6/20/2017  Pulmonary consulted for vent management  Increased edema around face and upper extremities.  Probably third spacing from severe hypoalbuminemia.  Will give dose of lasix and albumin.

## 2017-06-24 NOTE — ASSESSMENT & PLAN NOTE
Poorly-controlled on a home regimen of metformin, a DPP-4 inhibitor, basal insulin therapy.  Hyperglycemic.  Increased nightly Levemir.  Continue with sliding scale.

## 2017-06-24 NOTE — PROGRESS NOTES
[facility]  Neurology  Progress Note    Patient Name: Nisa Frank  MRN: [id number]  Admission Date: 6/18/2017  Hospital Length of Stay: 6 days  Code Status: Full Code   Attending Provider: Adelfo Dangelo MD  Primary Care Physician: Atilio Downs MD   Principal Problem:Hypertensive emergency    Subjective:     Interval History: 65 y/o female with medical Hx as listed below who came to ED for headaches. According to notes pt was admitted after being found to have fever and leukocytosis; markedly hypertensive. Overnight pt became unresponsive and was presenting seizure-like activity. Intubated to protect airway.  Pt was also reported to present uintermittent rhythmic activity in RUE. She still intubated and mildly sedated with midazolam. No previous Hx of seizures.     -6/21/17: Overnight pt reported to have almost continuous twitching OF RUE and right face. Pt was given Cerebyx and ativan with transient resolution. Propofol increased; activity ceased.     -6/22/17: Pt remain intubated. Off of sedation intermittent and almost continuous twitching in RUE and face.     -6/23/17: Muscle jerks upon holding sedation. Reported to not follow commands.     -6/24/17: No sedation for 20 minutes. No myoclonus seen.     Current Neurological Medications:     Current Facility-Administered Medications   Medication Dose Route Frequency Provider Last Rate Last Dose    acetaminophen tablet 500 mg  500 mg Oral Q6H PRN Laurie Barksdale MD   500 mg at 06/20/17 1454    albumin human 25% bottle 25 g  25 g Intravenous Once Adelfo Dangelo MD        albuterol-ipratropium 2.5mg-0.5mg/3mL nebulizer solution 3 mL  3 mL Nebulization Q6H Annie Montoya MD   3 mL at 06/24/17 1213    aliskiren tablet 300 mg  300 mg Oral Daily Adelfo Dangelo MD   300 mg at 06/24/17 0955    amlodipine tablet 10 mg  10 mg Oral Daily Adelfo Dangelo MD   10 mg at 06/24/17 0945    aspirin chewable tablet 81 mg  81 mg Oral Daily  Aureliano Alfaro MD   81 mg at 06/24/17 0954    atorvastatin tablet 10 mg  10 mg Oral Daily Aureliano Alfaro MD   10 mg at 06/24/17 0954    chlorhexidine 0.12 % solution 15 mL  15 mL Mouth/Throat BID Lauire Barksdale MD   15 mL at 06/24/17 0955    clonazePAM tablet 1 mg  1 mg Oral BID Dustin Harmon MD   1 mg at 06/24/17 0955    dextrose 50% injection 12.5 g  12.5 g Intravenous PRN Aureliano Alfaro MD        dextrose 50% injection 25 g  25 g Intravenous PRN Aureliano Alfaro MD        enoxaparin injection 40 mg  40 mg Subcutaneous Daily Aureliano Alfaro MD   40 mg at 06/23/17 1632    furosemide injection 40 mg  40 mg Intravenous Once Adelfo Dangelo MD        glucose chewable tablet 16 g  16 g Oral PRN Aureliano Alfaro MD        glucose chewable tablet 24 g  24 g Oral PRN Aureliano Alfaro MD        hydrALAZINE injection 10 mg  10 mg Intravenous Q6H PRN EMORY Srivastava MD   10 mg at 06/24/17 0443    hydrALAZINE tablet 75 mg  75 mg Oral Q12H Adelfo Dangelo MD   75 mg at 06/24/17 1000    insulin aspart pen 0-5 Units  0-5 Units Subcutaneous PRN Laurie Barksdale MD   3 Units at 06/24/17 1209    insulin detemir pen 15 Units  15 Units Subcutaneous QHS Adelfo Dangelo MD   15 Units at 06/23/17 2022    lorazepam injection 2 mg  2 mg Intravenous Once Laurie Barksdale MD        lorazepam injection 2 mg  2 mg Intravenous Q4H PRN Laurie Barksdale MD   2 mg at 06/21/17 0247    ondansetron injection 8 mg  8 mg Intravenous Q8H PRN Laurie Barksdale MD        pantoprazole suspension 40 mg  40 mg Per NG tube Daily Laurie Barksdale MD   40 mg at 06/24/17 0955    pneumoc 13-finn conj-dip cr(PF) 0.5 mL  0.5 mL Intramuscular vaccine x 1 dose Buddy Nichols MD        promethazine (PHENERGAN) 12.5 mg in dextrose 5 % 50 mL IVPB  12.5 mg Intravenous Q6H PRN Laurie Barksdale MD        propofol (DIPRIVAN) 10 mg/mL infusion  5 mcg/kg/min Intravenous Continuous Dustin Harmon MD 23.3 mL/hr at 06/24/17  1200 25.038 mcg/kg/min at 06/24/17 1200    ramelteon tablet 8 mg  8 mg Oral Nightly PRN Laurie Barksdale MD           Review of Systems   Unable to obtain as pt is intubated    Objective:     Vital Signs (Most Recent):  Temp: 99.6 °F (37.6 °C) (06/24/17 1115)  Pulse: 81 (06/24/17 1215)  Resp: (!) 26 (06/24/17 1215)  BP: (!) 166/77 (06/24/17 1200)  SpO2: 96 % (06/24/17 1215) Vital Signs (24h Range):  Temp:  [98 °F (36.7 °C)-99.6 °F (37.6 °C)] 99.6 °F (37.6 °C)  Pulse:  [70-98] 81  Resp:  [0-40] 26  SpO2:  [92 %-98 %] 96 %  BP: (114-186)/(56-85) 166/77     Weight: (!) 158 kg (348 lb 5.2 oz)  Body mass index is 57.96 kg/m².    Physical Exam  Constitutional: well-developed  Head: normocephalic  Eyes: conjunctivae/corneas clear.  Nose: no discharge, no epistaxis  Heart: regular rate and rhythm.  Abdomen: no pain to palpation  Extremities: no edema  Neurologic: Mental status: sedated; off sedation no following commands  Cranial nerves: pupils are pinpoint; roving eyes; corneal responses present bilaterally  Strength: mild withdrawal in LE's upon noxious stimulation. No involuntary motor activity seen.       Significant Labs:   CBC:   Recent Labs  Lab 06/23/17  0330   WBC 12.14   HGB 9.4*   HCT 29.3*        CMP:   Recent Labs  Lab 06/23/17  0330 06/24/17  0150   * 206*   * 134*   K 3.7 3.9    101   CO2 24 24   BUN 50* 48*   CREATININE 1.9* 1.3   CALCIUM 7.8* 8.2*   PROT 6.6 7.0   ALBUMIN 2.0* 2.0*   BILITOT 0.2 0.2   ALKPHOS 103 109   AST 12 19   ALT 6* 9*   ANIONGAP 9 9   EGFRNONAA 27* 43*         Assessment and Plan:     65 y/o female consulted for possible seizure     1. Seizure: possible seizure wile on the floor. Pt began to present myoclonic activity in RUE and right face but with no electographical correlation on EEG. 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                     -No myoclonus today. Continue clonazepam 1 mg twice daily. Will attempt to keep pt off of continuous  sedation as long as possible to see if any meaningful respose.                     - Head CT repeated due to asymmetry of rhythm on EEG but there has been no development of any obvious abnormalities. Pt above weight limit for MRI in this facility.       Active Diagnoses:    Diagnosis Date Noted POA    PRINCIPAL PROBLEM:  Hypertensive emergency [I16.1] 06/18/2017 Yes    ARF (acute renal failure) [N17.9] 06/22/2017 No    Acute respiratory failure [J96.00] 06/20/2017 Yes    Seizure [R56.9] 06/20/2017 No    Hypokalemia [E87.6] 06/19/2017 Yes    Hyperlipidemia [E78.5] 06/19/2017 Yes     Chronic    Moderate protein malnutrition [E44.0] 06/19/2017 Yes     Chronic    CAD s/p CABG [I25.10] 06/19/2017 Yes     Chronic    Pyelonephritis [N12] 06/19/2017 Yes    Morbid obesity with BMI of 50.0-59.9, adult [E66.01, Z68.43] 02/24/2015 Not Applicable     Chronic    Essential hypertension [I10] 05/12/2014 Yes     Chronic    Type 2 diabetes mellitus, uncontrolled [E11.65] 05/12/2014 Yes     Chronic      Problems Resolved During this Admission:    Diagnosis Date Noted Date Resolved POA    Blood poisoning [A41.9] 06/19/2017 06/22/2017 Yes       VTE Risk Mitigation         Ordered     Place sequential compression device  Until discontinued      06/22/17 0300     enoxaparin injection 40 mg  Daily     Route:  Subcutaneous        06/19/17 0143     Medium Risk of VTE  Once      06/19/17 0424          Dustin Harmon MD  Neurology  [facility]

## 2017-06-25 LAB
ALLENS TEST: ABNORMAL
ALLENS TEST: ABNORMAL
ANION GAP SERPL CALC-SCNC: 7 MMOL/L
BASOPHILS # BLD AUTO: 0.01 K/UL
BASOPHILS NFR BLD: 0.1 %
BUN SERPL-MCNC: 49 MG/DL
CALCIUM SERPL-MCNC: 8.6 MG/DL
CHLORIDE SERPL-SCNC: 103 MMOL/L
CO2 SERPL-SCNC: 27 MMOL/L
CREAT SERPL-MCNC: 1 MG/DL
DELSYS: ABNORMAL
DELSYS: ABNORMAL
DIFFERENTIAL METHOD: ABNORMAL
EOSINOPHIL # BLD AUTO: 0.1 K/UL
EOSINOPHIL NFR BLD: 0.4 %
ERYTHROCYTE [DISTWIDTH] IN BLOOD BY AUTOMATED COUNT: 14.7 %
ERYTHROCYTE [SEDIMENTATION RATE] IN BLOOD BY WESTERGREN METHOD: 16 MM/H
ERYTHROCYTE [SEDIMENTATION RATE] IN BLOOD BY WESTERGREN METHOD: 16 MM/H
EST. GFR  (AFRICAN AMERICAN): >60 ML/MIN/1.73 M^2
EST. GFR  (NON AFRICAN AMERICAN): 59 ML/MIN/1.73 M^2
FIO2: 40
FIO2: 40
GLUCOSE SERPL-MCNC: 283 MG/DL
HCO3 UR-SCNC: 25.4 MMOL/L (ref 24–28)
HCO3 UR-SCNC: 26.4 MMOL/L (ref 24–28)
HCT VFR BLD AUTO: 28.6 %
HGB BLD-MCNC: 9.1 G/DL
LYMPHOCYTES # BLD AUTO: 1.2 K/UL
LYMPHOCYTES NFR BLD: 10.7 %
MCH RBC QN AUTO: 26.8 PG
MCHC RBC AUTO-ENTMCNC: 31.8 %
MCV RBC AUTO: 84 FL
MIN VOL: 12.8
MIN VOL: 12.9
MODE: ABNORMAL
MODE: ABNORMAL
MONOCYTES # BLD AUTO: 1.1 K/UL
MONOCYTES NFR BLD: 9.4 %
NEUTROPHILS # BLD AUTO: 9.3 K/UL
NEUTROPHILS NFR BLD: 79.4 %
PCO2 BLDA: 38.3 MMHG (ref 35–45)
PCO2 BLDA: 41.2 MMHG (ref 35–45)
PEEP: 5
PEEP: 5
PH SMN: 7.41 [PH] (ref 7.35–7.45)
PH SMN: 7.43 [PH] (ref 7.35–7.45)
PIP: 32
PIP: 32
PLATELET # BLD AUTO: 266 K/UL
PMV BLD AUTO: 10.2 FL
PO2 BLDA: 39 MMHG (ref 80–100)
PO2 BLDA: 76 MMHG (ref 80–100)
POC BE: 1 MMOL/L
POC BE: 2 MMOL/L
POC SATURATED O2: 74 % (ref 95–100)
POC SATURATED O2: 95 % (ref 95–100)
POC TCO2: 27 MMOL/L (ref 23–27)
POC TCO2: 28 MMOL/L (ref 23–27)
POCT GLUCOSE: 236 MG/DL (ref 70–110)
POCT GLUCOSE: 247 MG/DL (ref 70–110)
POCT GLUCOSE: 270 MG/DL (ref 70–110)
POCT GLUCOSE: 282 MG/DL (ref 70–110)
POCT GLUCOSE: 298 MG/DL (ref 70–110)
POTASSIUM SERPL-SCNC: 4.1 MMOL/L
RBC # BLD AUTO: 3.39 M/UL
SAMPLE: ABNORMAL
SAMPLE: ABNORMAL
SITE: ABNORMAL
SITE: ABNORMAL
SODIUM SERPL-SCNC: 137 MMOL/L
SP02: 97
SP02: 98
VT: 450
VT: 450
WBC # BLD AUTO: 11.64 K/UL

## 2017-06-25 PROCEDURE — 85025 COMPLETE CBC W/AUTO DIFF WBC: CPT

## 2017-06-25 PROCEDURE — 25000003 PHARM REV CODE 250: Performed by: HOSPITALIST

## 2017-06-25 PROCEDURE — 94003 VENT MGMT INPAT SUBQ DAY: CPT

## 2017-06-25 PROCEDURE — 25000242 PHARM REV CODE 250 ALT 637 W/ HCPCS: Performed by: INTERNAL MEDICINE

## 2017-06-25 PROCEDURE — 25000003 PHARM REV CODE 250: Performed by: EMERGENCY MEDICINE

## 2017-06-25 PROCEDURE — 36600 WITHDRAWAL OF ARTERIAL BLOOD: CPT

## 2017-06-25 PROCEDURE — 27200966 HC CLOSED SUCTION SYSTEM

## 2017-06-25 PROCEDURE — 82803 BLOOD GASES ANY COMBINATION: CPT

## 2017-06-25 PROCEDURE — 63600175 PHARM REV CODE 636 W HCPCS: Performed by: INTERNAL MEDICINE

## 2017-06-25 PROCEDURE — 99232 SBSQ HOSP IP/OBS MODERATE 35: CPT | Mod: ,,, | Performed by: PSYCHIATRY & NEUROLOGY

## 2017-06-25 PROCEDURE — 99900035 HC TECH TIME PER 15 MIN (STAT)

## 2017-06-25 PROCEDURE — 36415 COLL VENOUS BLD VENIPUNCTURE: CPT

## 2017-06-25 PROCEDURE — 25000003 PHARM REV CODE 250: Performed by: PSYCHIATRY & NEUROLOGY

## 2017-06-25 PROCEDURE — 99900026 HC AIRWAY MAINTENANCE (STAT)

## 2017-06-25 PROCEDURE — 80048 BASIC METABOLIC PNL TOTAL CA: CPT

## 2017-06-25 PROCEDURE — 25000003 PHARM REV CODE 250: Performed by: INTERNAL MEDICINE

## 2017-06-25 PROCEDURE — 20000000 HC ICU ROOM

## 2017-06-25 PROCEDURE — 94761 N-INVAS EAR/PLS OXIMETRY MLT: CPT

## 2017-06-25 PROCEDURE — 27000221 HC OXYGEN, UP TO 24 HOURS

## 2017-06-25 PROCEDURE — 94640 AIRWAY INHALATION TREATMENT: CPT

## 2017-06-25 RX ORDER — CLONIDINE HYDROCHLORIDE 0.1 MG/1
0.1 TABLET ORAL EVERY 8 HOURS PRN
Status: DISCONTINUED | OUTPATIENT
Start: 2017-06-25 | End: 2017-06-26

## 2017-06-25 RX ORDER — HYDRALAZINE HYDROCHLORIDE 50 MG/1
100 TABLET, FILM COATED ORAL EVERY 8 HOURS
Status: DISCONTINUED | OUTPATIENT
Start: 2017-06-25 | End: 2017-06-30

## 2017-06-25 RX ADMIN — INSULIN ASPART 3 UNITS: 100 INJECTION, SOLUTION INTRAVENOUS; SUBCUTANEOUS at 11:06

## 2017-06-25 RX ADMIN — INSULIN ASPART 2 UNITS: 100 INJECTION, SOLUTION INTRAVENOUS; SUBCUTANEOUS at 06:06

## 2017-06-25 RX ADMIN — CLONAZEPAM 1 MG: 0.5 TABLET ORAL at 08:06

## 2017-06-25 RX ADMIN — IPRATROPIUM BROMIDE AND ALBUTEROL SULFATE 3 ML: .5; 3 SOLUTION RESPIRATORY (INHALATION) at 01:06

## 2017-06-25 RX ADMIN — LORAZEPAM 2 MG: 2 INJECTION INTRAMUSCULAR; INTRAVENOUS at 03:06

## 2017-06-25 RX ADMIN — ASPIRIN 81 MG 81 MG: 81 TABLET ORAL at 08:06

## 2017-06-25 RX ADMIN — CHLORHEXIDINE GLUCONATE 15 ML: 1.2 RINSE ORAL at 09:06

## 2017-06-25 RX ADMIN — CLONIDINE HYDROCHLORIDE 0.1 MG: 0.1 TABLET ORAL at 11:06

## 2017-06-25 RX ADMIN — HYDRALAZINE HYDROCHLORIDE 100 MG: 50 TABLET ORAL at 09:06

## 2017-06-25 RX ADMIN — HYDRALAZINE HYDROCHLORIDE 75 MG: 25 TABLET ORAL at 08:06

## 2017-06-25 RX ADMIN — AMLODIPINE BESYLATE 10 MG: 10 TABLET ORAL at 08:06

## 2017-06-25 RX ADMIN — ALISKIREN HEMIFUMARATE 300 MG: 300 TABLET, FILM COATED ORAL at 09:06

## 2017-06-25 RX ADMIN — PANTOPRAZOLE SODIUM 40 MG: 40 GRANULE, DELAYED RELEASE ORAL at 08:06

## 2017-06-25 RX ADMIN — ATORVASTATIN CALCIUM 10 MG: 10 TABLET, FILM COATED ORAL at 08:06

## 2017-06-25 RX ADMIN — IPRATROPIUM BROMIDE AND ALBUTEROL SULFATE 3 ML: .5; 3 SOLUTION RESPIRATORY (INHALATION) at 07:06

## 2017-06-25 RX ADMIN — CHLORHEXIDINE GLUCONATE 15 ML: 1.2 RINSE ORAL at 08:06

## 2017-06-25 RX ADMIN — INSULIN ASPART 3 UNITS: 100 INJECTION, SOLUTION INTRAVENOUS; SUBCUTANEOUS at 05:06

## 2017-06-25 RX ADMIN — METOPROLOL TARTRATE 5 MG: 5 INJECTION INTRAVENOUS at 01:06

## 2017-06-25 RX ADMIN — HYDRALAZINE HYDROCHLORIDE 10 MG: 20 INJECTION INTRAMUSCULAR; INTRAVENOUS at 03:06

## 2017-06-25 RX ADMIN — METOPROLOL TARTRATE 5 MG: 5 INJECTION INTRAVENOUS at 04:06

## 2017-06-25 RX ADMIN — HYDRALAZINE HYDROCHLORIDE 100 MG: 50 TABLET ORAL at 01:06

## 2017-06-25 NOTE — PROGRESS NOTES
Renal fx back to normal      From the renal point of view there is not much more to add except to maintain hydration and avoid nephrotoxic meds    Will sign off at this time     Thanks    Please reconsult if needed    GR    CMP  Sodium   Date Value Ref Range Status   06/25/2017 137 136 - 145 mmol/L Final     Potassium   Date Value Ref Range Status   06/25/2017 4.1 3.5 - 5.1 mmol/L Final     Chloride   Date Value Ref Range Status   06/25/2017 103 95 - 110 mmol/L Final     CO2   Date Value Ref Range Status   06/25/2017 27 23 - 29 mmol/L Final     Glucose   Date Value Ref Range Status   06/25/2017 283 (H) 70 - 110 mg/dL Final     BUN, Bld   Date Value Ref Range Status   06/25/2017 49 (H) 8 - 23 mg/dL Final     Creatinine   Date Value Ref Range Status   06/25/2017 1.0 0.5 - 1.4 mg/dL Final     Calcium   Date Value Ref Range Status   06/25/2017 8.6 (L) 8.7 - 10.5 mg/dL Final     Total Protein   Date Value Ref Range Status   06/24/2017 7.0 6.0 - 8.4 g/dL Final     Albumin   Date Value Ref Range Status   06/24/2017 2.0 (L) 3.5 - 5.2 g/dL Final     Total Bilirubin   Date Value Ref Range Status   06/24/2017 0.2 0.1 - 1.0 mg/dL Final     Comment:     For infants and newborns, interpretation of results should be based  on gestational age, weight and in agreement with clinical  observations.  Premature Infant recommended reference ranges:  Up to 24 hours.............<8.0 mg/dL  Up to 48 hours............<12.0 mg/dL  3-5 days..................<15.0 mg/dL  6-29 days.................<15.0 mg/dL       Alkaline Phosphatase   Date Value Ref Range Status   06/24/2017 109 55 - 135 U/L Final     AST   Date Value Ref Range Status   06/24/2017 19 10 - 40 U/L Final     ALT   Date Value Ref Range Status   06/24/2017 9 (L) 10 - 44 U/L Final     Anion Gap   Date Value Ref Range Status   06/25/2017 7 (L) 8 - 16 mmol/L Final     eGFR if    Date Value Ref Range Status   06/25/2017 >60 >60 mL/min/1.73 m^2 Final     eGFR if non     Date Value Ref Range Status   06/25/2017 59 (A) >60 mL/min/1.73 m^2 Final     Comment:     Calculation used to obtain the estimated glomerular filtration  rate (eGFR) is the CKD-EPI equation. Since race is unknown   in our information system, the eGFR values for   -American and Non--American patients are given   for each creatinine result.

## 2017-06-25 NOTE — PROGRESS NOTES
[facility]  Pulmonology  Progress Note    Patient Name: Nisa Frank  MRN: [id number]  Admission Date: 6/18/2017  Hospital Length of Stay: 7 days  Code Status: Full Code  Attending Provider: Adelfo Dangelo MD  Primary Care Provider: Atilio Downs MD   Principal Problem: Hypertensive emergency    Subjective:     Interval History: vent management. Intubated, sedated. Unresponsive off sedation.     Unable to obtain ROS - due to intubation and encephalopathy.     albuterol-ipratropium 2.5mg-0.5mg/3mL  3 mL Nebulization Q6H    aliskiren  300 mg Oral Daily    amlodipine  10 mg Oral Daily    aspirin  81 mg Oral Daily    atorvastatin  10 mg Oral Daily    chlorhexidine  15 mL Mouth/Throat BID    clonazePAM  1 mg Oral BID    enoxaparin  40 mg Subcutaneous Daily    hydrALAZINE  75 mg Oral Q12H    insulin detemir  15 Units Subcutaneous QHS    lorazepam  2 mg Intravenous Once    pantoprazole  40 mg Per NG tube Daily    scopolamine  1 patch Transdermal Q3 Days     Objective:     Vital Signs (Most Recent):  Temp: (!) 100.4 °F (38 °C) (06/25/17 0334)  Pulse: 90 (06/25/17 0430)  Resp: (!) 26 (06/25/17 0430)  BP: (!) 184/81 (06/25/17 0430)  SpO2: 98 % (06/25/17 0430) Vital Signs (24h Range):  Temp:  [99 °F (37.2 °C)-100.4 °F (38 °C)] 100.4 °F (38 °C)  Pulse:  [] 90  Resp:  [0-40] 26  SpO2:  [93 %-99 %] 98 %  BP: (148-192)/(69-85) 184/81     Weight: (!) 158 kg (348 lb 5.2 oz)  Body mass index is 57.96 kg/m².    Intake/Output Summary (Last 24 hours) at 06/25/17 0546  Last data filed at 06/25/17 0334   Gross per 24 hour   Intake          1680.78 ml   Output             3430 ml   Net         -1749.22 ml     Physical Exam   Gen: intubated, sedated, morbidly obese.  Neck: left IJ TLC. Short and thick  Heart: regular, distant. No murmur  Lungs: shallow and decreased bilaterally with increased rhonchi today. 7.5 ett  Abdomen: obese, hypoactive bs. OGT. Soft.  Ext: increased diffuse upper and LE  edema today. SCDs.   Skin: warm, dry. No rash    Vents:  Vent Mode: PRVC (06/25/17 0108)  Set Rate: 16 bmp (06/25/17 0108)  Vt Set: 450 mL (06/25/17 0108)  Pressure Support: 10 cmH20 (06/24/17 0905)  PEEP/CPAP: 5 cmH20 (06/25/17 0108)  Oxygen Concentration (%): 40 (06/25/17 0430)  Peak Airway Pressure: 31.1 cmH2O (06/25/17 0108)  Total Ve: 10.8 mL (06/25/17 0108)  F/VT Ratio<105 (RSBI): (!) 52.87 (06/25/17 0108)    Lines/Drains/Airways     Central Venous Catheter Line                 Percutaneous Central Line Insertion/Assessment - triple lumen  06/21/17 1015 left internal jugular 3 days          Drain                 NG/OG Tube 06/20/17 0402 orogastric 14 Fr. Left mouth 5 days         Urethral Catheter 06/20/17 0404 Non-latex 18 Fr. 5 days          Airway                 Airway - Non-Surgical 06/20/17 0330 Endotracheal Tube 5 days              Significant Labs:    CBC/Anemia Profile:  No results for input(s): WBC, HGB, HCT, PLT, MCV, RDW, IRON, FERRITIN, RETIC, FOLATE, [id number], OCCULTBLOOD in the last 48 hours.     Chemistries:    Recent Labs  Lab 06/24/17  0150   *   K 3.9      CO2 24   BUN 48*   CREATININE 1.3   CALCIUM 8.2*   ALBUMIN 2.0*   PROT 7.0   BILITOT 0.2   ALKPHOS 109   ALT 9*   AST 19       Recent Labs  Lab 06/25/17  0513   PH 7.429   PCO2 38.3   PO2 76*   HCO3 25.4   POCSATURATED 95   BE 1     Microbiology Results (last 7 days)     Procedure Component Value Units Date/Time    Blood culture [[id number]] Collected:  06/21/17 1207    Order Status:  Completed Specimen:  Blood Updated:  06/24/17 1303     Blood Culture, Routine No Growth to date     Blood Culture, Routine No Growth to date     Blood Culture, Routine No Growth to date     Blood Culture, Routine No Growth to date    Blood culture [[id number]] Collected:  06/21/17 1210    Order Status:  Completed Specimen:  Blood Updated:  06/24/17 1303     Blood Culture, Routine No Growth to date     Blood Culture, Routine No Growth to date      Blood Culture, Routine No Growth to date     Blood Culture, Routine No Growth to date    Blood culture [[id number]] Collected:  06/18/17 2225    Order Status:  Completed Specimen:  Blood from Peripheral, Forearm, Right Updated:  06/24/17 0303     Blood Culture, Routine No growth after 5 days.    Culture, Respiratory with Gram Stain [[id number]] Collected:  06/20/17 0251    Order Status:  Completed Specimen:  Respiratory from Sputum, Induced Updated:  06/22/17 0825     Respiratory Culture Normal respiratory jose luis     Gram Stain (Respiratory) <10 epithelial cells per low power field.     Gram Stain (Respiratory) Moderate WBC's     Gram Stain (Respiratory) Few Gram positive cocci    Blood culture [[id number]] Collected:  06/18/17 2240    Order Status:  Completed Specimen:  Blood from Peripheral, Forearm, Right Updated:  06/22/17 0811     Blood Culture, Routine Gram stain tena bottle: Gram positive cocci in clusters resembling Staph     Blood Culture, Routine Results called to and read back by: April lCeveland 06/20/2017  15:25     Blood Culture, Routine --     COAGULASE-NEGATIVE STAPHYLOCOCCUS SPECIES  Organism is a probable contaminant      Urine culture [[id number]] Collected:  06/18/17 2120    Order Status:  Completed Specimen:  Urine from Urine, Catheterized Updated:  06/20/17 1023     Urine Culture, Routine No growth        Significant Imaging:  CXR - ett in place. Small volumes and increased vascular congestion bilaterally.    Assessment/Plan:     Active Diagnoses:    Diagnosis Date Noted POA    PRINCIPAL PROBLEM:  Hypertensive emergency [I16.1] 06/18/2017 Yes    ARF (acute renal failure) [N17.9] 06/22/2017 No    Acute respiratory failure [J96.00] 06/20/2017 Yes    Seizure [R56.9] 06/20/2017 No    Hypokalemia [E87.6] 06/19/2017 Yes    Hyperlipidemia [E78.5] 06/19/2017 Yes     Chronic    Moderate protein malnutrition [E44.0] 06/19/2017 Yes     Chronic    CAD s/p CABG [I25.10] 06/19/2017 Yes     Chronic     Pyelonephritis [N12] 06/19/2017 Yes    Morbid obesity with BMI of 50.0-59.9, adult [E66.01, Z68.43] 02/24/2015 Not Applicable     Chronic    Essential hypertension [I10] 05/12/2014 Yes     Chronic    Type 2 diabetes mellitus, uncontrolled [E11.65] 05/12/2014 Yes     Chronic      Problems Resolved During this Admission:    Diagnosis Date Noted Date Resolved POA    Blood poisoning [A41.9] 06/19/2017 06/22/2017 Yes     Imp/Plan  1) Altered MS - due to seizures? Vs. HTN vs. Hypoxia? Currently on clonazepam only. CT head was apparently negative. Neurology following. Off sedation and not very responsive.  2) Mechanical ventilation - for airway protection. Obese, no distress. Good gas exchange. Remains on rate of 16. Not a weaning candidate given poor MS. Bronchodilators. Sputum cultures as above. Afebrile.   3) DM  4) Acute renal insufficiency - No labs yet today. Nephrology following. Negative balance but increased edema. Gentle diuresis if tolerates.  5) Hypoalbuminemia - ?malnutrition. On feeds. Was given albumin yesterday.  6) Diabetes  7) HTN - mildly variable. On Aliskiren and amlodipine. Still with increased BP.  8) History of CAD  9) Morbid obesity     Annie Montoya MD  Pulmonology  [facility]

## 2017-06-25 NOTE — PROGRESS NOTES
[facility] Hospital Medicine  Progress Note    Patient Name: Nisa Frank  MRN: [id number]  Patient Class: IP- Inpatient   Admission Date: 6/18/2017  Length of Stay: 7 days  Attending Physician: Adelfo Dangelo MD  Primary Care Provider: Atilio Downs MD        Subjective:     Principal Problem:Seizure    HPI:  Ms. Nisa Frank is a 66 y.o. female with essential hypertension, hyperlipidemia (.2 May 2014), type 2 diabetes mellitus (HbA1c 8.2% May 2014), CAD s/p CABG, morbid obesity (BMI 58.7), and moderate protein malnutrition who presents to [facility] ED with complaints of headache this morning.  She cannot provide much details on the quality of the headache but does say she's been feeling more weak and tired lately.  She denies any nausea, vomiting, fevers, or chills, and hasn't had any neck stiffness, photophobia, nor any phonophobia.  She denies any laterality to  Her weakness and has never had a stroke before.  She also denies any dysuria, hematuria, abdominal pain, diarrhea, chest pain, shortness of breath, nor any coughing.  Further history is limited as she is a very poor historian and has very garbled speech.    Hospital Course:  Pt admitted with sepsis from presumed UTI, urine culture negative. Through course of night 6/19/-6/20 had multiple seizures and intubated for airway protection. Blood cultures growing suspected Staph. Only one bottle positive and thought to be a contaminant. Vancomycin dc'd. Sputum culture - no significant growth. Dc rocephin and start zosyn.  Neurology consulted and given large dose of keppra. Appears to have continous seizures and will dc versed for sedation and start propofol. BP dropped overnight 6/20/2017 with change in sedation. Dilantin and vimpat added to keppra for possible seizures vs myoclonic activity.  Urine output dropped off and started IVF. Nephrology consulted for ARF. Pulmonary consulted for vent management. TLC placed to left  SHAYE 6/21/2017.   EEG did not show any epileptic activity, but did show asymmetric activity.  Repeat Head CT unremarkable.  6/24: Remains on vent.  Increased edema around facial area and extremities.  Improving renal function.  Hypoalbuminemia.  Given dose of Lasix and Albumin with good urine output.  Remains very poorly responsive off any sedation.    Interval History: Remains very poorly responsive off sedation.    Review of Systems   Unable to perform ROS: Intubated     Objective:     Vital Signs (Most Recent):  Temp: 99 °F (37.2 °C) (06/25/17 0800)  Pulse: 83 (06/25/17 1000)  Resp: (!) 22 (06/25/17 1000)  BP: (!) 168/74 (06/25/17 1000)  SpO2: 98 % (06/25/17 1000) Vital Signs (24h Range):  Temp:  [99 °F (37.2 °C)-100.4 °F (38 °C)] 99 °F (37.2 °C)  Pulse:  [] 83  Resp:  [0-57] 22  SpO2:  [91 %-99 %] 98 %  BP: (148-192)/(69-85) 168/74     Weight: (!) 158 kg (348 lb 5.2 oz)  Body mass index is 57.96 kg/m².    Intake/Output Summary (Last 24 hours) at 06/25/17 1055  Last data filed at 06/25/17 1000   Gross per 24 hour   Intake           1096.6 ml   Output             3455 ml   Net          -2358.4 ml      Physical Exam   Constitutional: No distress.   Morbidly obese   HENT:   ET tube in place     Eyes: Conjunctivae are normal. Right eye exhibits no discharge. Left eye exhibits no discharge.   Neck: Neck supple.   Cardiovascular: Normal rate, regular rhythm and normal heart sounds.    Pulmonary/Chest: Breath sounds normal. No stridor.   Mechanically ventilated   Abdominal: Soft. Bowel sounds are normal.   Musculoskeletal: She exhibits edema. She exhibits no deformity.   Neurological:   Sedated   Skin: Skin is warm and dry.       Significant Labs:   BMP:     Recent Labs  Lab 06/25/17  0900   *      K 4.1      CO2 27   BUN 49*   CREATININE 1.0   CALCIUM 8.6*     CBC:     Recent Labs  Lab 06/25/17  0900   WBC 11.64   HGB 9.1*   HCT 28.6*          Significant Imaging: I have reviewed all  pertinent imaging results/findings within the past 24 hours.    Assessment/Plan:      * Seizure    No epileptic activity on EEG.  No evidence of seizures today.  Probable myoclonus.      Main issue right now is Encephalopathy.  Patient remains very poorly responsive off sedation.  Unsure etiology.  Possibly secondary to hypoxia?    She initially presented severely hypertensive.  Was BP dropped too quickly?   Neurology following.          ARF (acute renal failure)    Appreciate Nephrology input.  Now resolved.          Acute respiratory failure    Patient ntubated 6/20/2017  Pulmonary consulted for vent management  Increased edema around face and upper extremities.  Probably third spacing from severe hypoalbuminemia.  Given dose of lasix and albumin with good urine output.  Extubation will probably depend more on mental status.          Pyelonephritis    Patient presented with fever and does have a urinalysis that is suspicious for a urinary tract infection with trace leukocytes, 8 WBCs, and many bacteria.  She meet criteria for sepsis.  There is no previous urine cultures for comparison.    Urine culture negative, fever resolved.  Fevers could also be secondary to seizures.  Stopped ABx's.  Staph in blood is contaminant.        CAD s/p CABG    Stable; will continue her home regimen of aspirin and atorvastatin.        Moderate protein malnutrition    Continue tube feeds        Hyperlipidemia    Poorly-controlled; will continue patient's home regimen of atorvastatin.        Hypokalemia    Replace as necessary.        Hypertensive emergency    Patient presented with an initial blood pressure of 224/108 ().  She was responsive to medications administered in the ED so was not needing an antihypertensive infusion.  Restarted her home medications of aliskiren, amlodipine, hydralazine and provide as-needed clonidine.  BP dropped on sedation and placed parameters on medications.        S/P hysterectomy with  oophorectomy              Morbid obesity with BMI of 50.0-59.9, adult    The patient has been counseled on the negative impact that obesity imparts on her health, and was encouraged to make lifestyle changes in order to lose weight and decrease her modifiable risk factors.  Body mass index is 57.96 kg/m².          Type 2 diabetes mellitus, uncontrolled    Poorly-controlled on a home regimen of metformin, a DPP-4 inhibitor, basal insulin therapy.  Hyperglycemic.  Increase nightly Levemir.  Continue with sliding scale.        Essential hypertension    Currently normotensive.  Continue current management with parameters.          VTE Risk Mitigation         Ordered     Place sequential compression device  Until discontinued      06/22/17 0300     enoxaparin injection 40 mg  Daily     Route:  Subcutaneous        06/19/17 0143     Medium Risk of VTE  Once      06/19/17 0424        Critical Care time spent > 35 minutes.     Adelfo Dangelo MD  Department of Hospital Medicine   [facility]

## 2017-06-25 NOTE — PLAN OF CARE
Problem: Patient Care Overview  Goal: Plan of Care Review  Outcome: Ongoing (interventions implemented as appropriate)  Remains unresponsive to verbal. Tactile and painful stimuli.  Cough is present with endotracheal suctioning and grimace present with mouth care.  Lumbar puncture ordered for AM with interventional radiology.  Some clots and bleeding from nares today, Lovenox and aspirin discontinued.

## 2017-06-25 NOTE — PLAN OF CARE
Problem: Patient Care Overview  Goal: Plan of Care Review  Outcome: Ongoing (interventions implemented as appropriate)  Pt remains off of propofol this shift, pt grimaces to oral care/stimulation and withdraws BLE, no movement to noxious stimuli to BUE, unable to wean vent at this time, BP elevated prn medicine given with minimal relief, tube feeding @ goal 60cc/hr, no residuals, siegel intact urine output >30 cc/hr, T max 100.5

## 2017-06-25 NOTE — ASSESSMENT & PLAN NOTE
Patient presented with fever and does have a urinalysis that is suspicious for a urinary tract infection with trace leukocytes, 8 WBCs, and many bacteria.  She meet criteria for sepsis.  There is no previous urine cultures for comparison.    Urine culture negative, fever resolved.  Fevers could also be secondary to seizures.  Stopped ABx's.  Staph in blood is contaminant.

## 2017-06-25 NOTE — ASSESSMENT & PLAN NOTE
No epileptic activity on EEG.  No evidence of seizures today.  Probable myoclonus.      Main issue right now is Encephalopathy.  Patient remains very poorly responsive off sedation.  Unsure etiology.  Possibly secondary to hypoxia?    She initially presented severely hypertensive.  Was BP dropped too quickly?   Neurology following.

## 2017-06-25 NOTE — SUBJECTIVE & OBJECTIVE
Interval History: Remains very poorly responsive off sedation.    Review of Systems   Unable to perform ROS: Intubated     Objective:     Vital Signs (Most Recent):  Temp: 99 °F (37.2 °C) (06/25/17 0800)  Pulse: 83 (06/25/17 1000)  Resp: (!) 22 (06/25/17 1000)  BP: (!) 168/74 (06/25/17 1000)  SpO2: 98 % (06/25/17 1000) Vital Signs (24h Range):  Temp:  [99 °F (37.2 °C)-100.4 °F (38 °C)] 99 °F (37.2 °C)  Pulse:  [] 83  Resp:  [0-57] 22  SpO2:  [91 %-99 %] 98 %  BP: (148-192)/(69-85) 168/74     Weight: (!) 158 kg (348 lb 5.2 oz)  Body mass index is 57.96 kg/m².    Intake/Output Summary (Last 24 hours) at 06/25/17 1055  Last data filed at 06/25/17 1000   Gross per 24 hour   Intake           1096.6 ml   Output             3455 ml   Net          -2358.4 ml      Physical Exam   Constitutional: No distress.   Morbidly obese   HENT:   ET tube in place     Eyes: Conjunctivae are normal. Right eye exhibits no discharge. Left eye exhibits no discharge.   Neck: Neck supple.   Cardiovascular: Normal rate, regular rhythm and normal heart sounds.    Pulmonary/Chest: Breath sounds normal. No stridor.   Mechanically ventilated   Abdominal: Soft. Bowel sounds are normal.   Musculoskeletal: She exhibits edema. She exhibits no deformity.   Neurological:   Sedated   Skin: Skin is warm and dry.       Significant Labs:   BMP:     Recent Labs  Lab 06/25/17  0900   *      K 4.1      CO2 27   BUN 49*   CREATININE 1.0   CALCIUM 8.6*     CBC:     Recent Labs  Lab 06/25/17  0900   WBC 11.64   HGB 9.1*   HCT 28.6*          Significant Imaging: I have reviewed all pertinent imaging results/findings within the past 24 hours.

## 2017-06-25 NOTE — PROGRESS NOTES
Called to notify Dr. Dangelo of bleeding from both nostrils. Clots noted to entrance of each nostril

## 2017-06-25 NOTE — ASSESSMENT & PLAN NOTE
Patient ntubated 6/20/2017  Pulmonary consulted for vent management  Increased edema around face and upper extremities.  Probably third spacing from severe hypoalbuminemia.  Given dose of lasix and albumin with good urine output.  Extubation will probably depend more on mental status.

## 2017-06-25 NOTE — PROGRESS NOTES
Patient remains intubated on the servo-i vent with the following settings: PRVC 10,450,5,40%. AMBU bag at HOB, all alarms are set and functioning . Will continue to monitor and wean as tolerated. 7.5 ETT secured at 24 moved to the left.

## 2017-06-25 NOTE — PROGRESS NOTES
[facility]  Neurology  Progress Note    Patient Name: Nisa Frank  MRN: [id number]  Admission Date: 6/18/2017  Hospital Length of Stay: 7 days  Code Status: Full Code   Attending Provider: Adelfo Dangelo MD  Primary Care Physician: Atilio Downs MD   Principal Problem:Seizure    Subjective:     Interval History: 67 y/o female with medical Hx as listed below who came to ED for headaches. According to notes pt was admitted after being found to have fever and leukocytosis; markedly hypertensive. Overnight pt became unresponsive and was presenting seizure-like activity. Intubated to protect airway.  Pt was also reported to present uintermittent rhythmic activity in RUE. She still intubated and mildly sedated with midazolam. No previous Hx of seizures.     -6/21/17: Overnight pt reported to have almost continuous twitching OF RUE and right face. Pt was given Cerebyx and ativan with transient resolution. Propofol increased; activity ceased.     -6/22/17: Pt remain intubated. Off of sedation intermittent and almost continuous twitching in RUE and face.     -6/23/17: Muscle jerks upon holding sedation. Reported to not follow commands.     -6/24/17: No sedation for 20 minutes. No myoclonus seen.     Current Neurological Medications:     Current Facility-Administered Medications   Medication Dose Route Frequency Provider Last Rate Last Dose    acetaminophen tablet 500 mg  500 mg Oral Q6H PRN Laurie Barksdale MD   500 mg at 06/20/17 1454    albuterol-ipratropium 2.5mg-0.5mg/3mL nebulizer solution 3 mL  3 mL Nebulization Q6H Annie Montoya MD   3 mL at 06/25/17 0108    aliskiren tablet 300 mg  300 mg Oral Daily Adelfo Dangelo MD   300 mg at 06/25/17 0946    amlodipine tablet 10 mg  10 mg Oral Daily Adelfo Dangelo MD   10 mg at 06/25/17 0817    aspirin chewable tablet 81 mg  81 mg Oral Daily Aureliano Alfaro MD   81 mg at 06/25/17 0818    atorvastatin tablet 10 mg  10 mg Oral Daily  Aureliano Alfaro MD   10 mg at 06/25/17 0821    chlorhexidine 0.12 % solution 15 mL  15 mL Mouth/Throat BID Laurie Barksdale MD   15 mL at 06/25/17 0821    cloNIDine tablet 0.1 mg  0.1 mg Per NG tube Q8H PRN Adelfo Dangelo MD        dextrose 50% injection 12.5 g  12.5 g Intravenous PRN Aureliano Alfaro MD        dextrose 50% injection 25 g  25 g Intravenous PRN Aureliano Alfaro MD        enoxaparin injection 40 mg  40 mg Subcutaneous Daily Aureliano Alfaro MD   40 mg at 06/24/17 1623    glucose chewable tablet 16 g  16 g Oral PRN Aureliano Alfaro MD        glucose chewable tablet 24 g  24 g Oral PRN Aureliano Alfaro MD        hydrALAZINE tablet 100 mg  100 mg Oral Q8H Adelfo Dangelo MD        insulin aspart pen 0-5 Units  0-5 Units Subcutaneous PRN Laurie Barksdale MD   3 Units at 06/25/17 1118    insulin detemir pen 18 Units  18 Units Subcutaneous QHS Adelfo Dangelo MD        lorazepam injection 2 mg  2 mg Intravenous Once Laurie Barksdale MD        lorazepam injection 2 mg  2 mg Intravenous Q4H PRN Laurie Barksdale MD   2 mg at 06/25/17 0334    ondansetron injection 8 mg  8 mg Intravenous Q8H PRN Laurie Barksdale MD        pantoprazole suspension 40 mg  40 mg Per NG tube Daily Laurie Barksdale MD   40 mg at 06/25/17 0816    pneumoc 13-finn conj-dip cr(PF) 0.5 mL  0.5 mL Intramuscular vaccine x 1 dose Buddy Nichols MD        promethazine (PHENERGAN) 12.5 mg in dextrose 5 % 50 mL IVPB  12.5 mg Intravenous Q6H PRN Laurie Barksdale MD        propofol (DIPRIVAN) 10 mg/mL infusion  5 mcg/kg/min Intravenous Continuous Dustin Harmon MD   Stopped at 06/24/17 1300    ramelteon tablet 8 mg  8 mg Oral Nightly PRN Laurie Barksdale MD        scopolamine 1.5 mg (1 mg over 3 days) 1.5 mg  1 patch Transdermal Q3 Days Adelfo Dangelo MD   1.5 mg at 06/24/17 9682       Review of Systems  Objective:     Vital Signs (Most Recent):  Temp: 99 °F (37.2 °C) (06/25/17 0800)  Pulse: 85 (06/25/17  1100)  Resp: (!) 22 (06/25/17 1100)  BP: (!) 163/72 (06/25/17 1100)  SpO2: 97 % (06/25/17 1100) Vital Signs (24h Range):  Temp:  [99 °F (37.2 °C)-100.4 °F (38 °C)] 99 °F (37.2 °C)  Pulse:  [] 85  Resp:  [0-57] 22  SpO2:  [91 %-99 %] 97 %  BP: (149-192)/(69-85) 163/72     Weight: (!) 158 kg (348 lb 5.2 oz)  Body mass index is 57.96 kg/m².    Physical Exam  Constitutional: well-developed; obese  Head: normocephalic  Eyes: conjunctivae/corneas clear.  Nose: no discharge, no epistaxis  Heart: regular rate and rhythm.  Abdomen: depressible; no grimacing on palpation  Extremities: no cyanosis  Neurologic: Mental status: no sedation; unresponsive to verbal stimulation  Cranial nerves: pupils are at 2 mm; no spontaneous eye movement; corneal responses present bilaterally; cough and gag response are present.  Strength: slight withdrawal to noxious stimulation in LE's       Significant Labs:   CBC:   Recent Labs  Lab 06/25/17  0900   WBC 11.64   HGB 9.1*   HCT 28.6*        CMP:   Recent Labs  Lab 06/24/17  0150 06/25/17  0900   * 283*   * 137   K 3.9 4.1    103   CO2 24 27   BUN 48* 49*   CREATININE 1.3 1.0   CALCIUM 8.2* 8.6*   PROT 7.0  --    ALBUMIN 2.0*  --    BILITOT 0.2  --    ALKPHOS 109  --    AST 19  --    ALT 9*  --    ANIONGAP 9 7*   EGFRNONAA 43* 59*         Assessment and Plan:     65 y/o female consulted for possible seizure     1. Seizure: possible seizure while on the floor. Pt began to present myoclonic activity in RUE and right face but with no electographical correlation on EEG. 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                      -No myoclonus today. Will D/C clonazepam.                     -Head CT repeated due to asymmetry of rhythm on EEG but there has been no development of any obvious abnormalities. Pt above weight limit for MRI in this facility would need to be [facility].    -Pt came with headache, there was apparently slurred  speech on admission as well as fever. Part of her presentation could had been hypertensive encephalopathy as her BP was 220/110's but definite infectious source identified. Consider LP.    Active Diagnoses:    Diagnosis Date Noted POA    PRINCIPAL PROBLEM:  Seizure [R56.9] 06/20/2017 No    ARF (acute renal failure) [N17.9] 06/22/2017 No    Acute respiratory failure [J96.00] 06/20/2017 Yes    Hypokalemia [E87.6] 06/19/2017 Yes    Hyperlipidemia [E78.5] 06/19/2017 Yes     Chronic    Moderate protein malnutrition [E44.0] 06/19/2017 Yes     Chronic    CAD s/p CABG [I25.10] 06/19/2017 Yes     Chronic    Pyelonephritis [N12] 06/19/2017 Yes    Hypertensive emergency [I16.1] 06/18/2017 Yes    Morbid obesity with BMI of 50.0-59.9, adult [E66.01, Z68.43] 02/24/2015 Not Applicable     Chronic    Essential hypertension [I10] 05/12/2014 Yes     Chronic    Type 2 diabetes mellitus, uncontrolled [E11.65] 05/12/2014 Yes     Chronic      Problems Resolved During this Admission:    Diagnosis Date Noted Date Resolved POA    Blood poisoning [A41.9] 06/19/2017 06/22/2017 Yes       VTE Risk Mitigation         Ordered     Place sequential compression device  Until discontinued      06/22/17 0300     enoxaparin injection 40 mg  Daily     Route:  Subcutaneous        06/19/17 0143     Medium Risk of VTE  Once      06/19/17 0424          Dustin Harmon MD  Neurology  [facility]

## 2017-06-25 NOTE — PROGRESS NOTES
Results for JOHN LOPEZ (MRN [id number]) as of 6/25/2017 05:50   Ref. Range 6/25/2017 05:13   POC PH Latest Ref Range: 7.35 - 7.45  7.429   POC PCO2 Latest Ref Range: 35 - 45 mmHg 38.3   POC PO2 Latest Ref Range: 80 - 100 mmHg 76 (L)   POC BE Latest Ref Range: -2 to 2 mmol/L 1   POC HCO3 Latest Ref Range: 24 - 28 mmol/L 25.4   POC SATURATED O2 Latest Ref Range: 95 - 100 % 95   POC TCO2 Latest Ref Range: 23 - 27 mmol/L 27   FiO2 Unknown 40   Vt Unknown 450   PiP Unknown 32   PEEP Unknown 5   Sample Unknown ARTERIAL   DelSys Unknown Adult Vent   Allens Test Unknown Pass   Site Unknown RR   Mode Unknown AC/PRVC

## 2017-06-26 PROBLEM — E66.01 MORBID OBESITY DUE TO EXCESS CALORIES: Status: ACTIVE | Noted: 2017-06-26

## 2017-06-26 PROBLEM — G93.41 ENCEPHALOPATHY, METABOLIC: Status: ACTIVE | Noted: 2017-06-26

## 2017-06-26 LAB
ALLENS TEST: ABNORMAL
ANION GAP SERPL CALC-SCNC: 7 MMOL/L
BACTERIA BLD CULT: NORMAL
BACTERIA BLD CULT: NORMAL
BASOPHILS # BLD AUTO: 0.02 K/UL
BASOPHILS NFR BLD: 0.2 %
BUN SERPL-MCNC: 57 MG/DL
CALCIUM SERPL-MCNC: 9.1 MG/DL
CHLORIDE SERPL-SCNC: 105 MMOL/L
CO2 SERPL-SCNC: 28 MMOL/L
CREAT SERPL-MCNC: 1.1 MG/DL
DELSYS: ABNORMAL
DIFFERENTIAL METHOD: ABNORMAL
EOSINOPHIL # BLD AUTO: 0.2 K/UL
EOSINOPHIL NFR BLD: 1.3 %
ERYTHROCYTE [DISTWIDTH] IN BLOOD BY AUTOMATED COUNT: 14.6 %
ERYTHROCYTE [SEDIMENTATION RATE] IN BLOOD BY WESTERGREN METHOD: 10 MM/H
EST. GFR  (AFRICAN AMERICAN): >60 ML/MIN/1.73 M^2
EST. GFR  (NON AFRICAN AMERICAN): 52 ML/MIN/1.73 M^2
FIO2: 0
GLUCOSE SERPL-MCNC: 194 MG/DL
HCO3 UR-SCNC: 28.3 MMOL/L (ref 24–28)
HCT VFR BLD AUTO: 29.2 %
HGB BLD-MCNC: 9 G/DL
LYMPHOCYTES # BLD AUTO: 1.9 K/UL
LYMPHOCYTES NFR BLD: 15.4 %
MCH RBC QN AUTO: 26.1 PG
MCHC RBC AUTO-ENTMCNC: 30.8 %
MCV RBC AUTO: 85 FL
MODE: ABNORMAL
MONOCYTES # BLD AUTO: 1.2 K/UL
MONOCYTES NFR BLD: 10 %
NEUTROPHILS # BLD AUTO: 8.7 K/UL
NEUTROPHILS NFR BLD: 72.3 %
PCO2 BLDA: 41.8 MMHG (ref 35–45)
PEEP: 5
PH SMN: 7.44 [PH] (ref 7.35–7.45)
PLATELET # BLD AUTO: 325 K/UL
PMV BLD AUTO: 10.2 FL
PO2 BLDA: 86 MMHG (ref 80–100)
POC BE: 4 MMOL/L
POC SATURATED O2: 97 % (ref 95–100)
POC TCO2: 30 MMOL/L (ref 23–27)
POCT GLUCOSE: 158 MG/DL (ref 70–110)
POCT GLUCOSE: 187 MG/DL (ref 70–110)
POCT GLUCOSE: 198 MG/DL (ref 70–110)
POCT GLUCOSE: 222 MG/DL (ref 70–110)
POCT GLUCOSE: 239 MG/DL (ref 70–110)
POTASSIUM SERPL-SCNC: 4.2 MMOL/L
RBC # BLD AUTO: 3.45 M/UL
SAMPLE: ABNORMAL
SITE: ABNORMAL
SODIUM SERPL-SCNC: 140 MMOL/L
SP02: 97
VT: 450
WBC # BLD AUTO: 11.99 K/UL

## 2017-06-26 PROCEDURE — 27200966 HC CLOSED SUCTION SYSTEM

## 2017-06-26 PROCEDURE — 85025 COMPLETE CBC W/AUTO DIFF WBC: CPT

## 2017-06-26 PROCEDURE — 94640 AIRWAY INHALATION TREATMENT: CPT

## 2017-06-26 PROCEDURE — 94002 VENT MGMT INPAT INIT DAY: CPT

## 2017-06-26 PROCEDURE — 99900026 HC AIRWAY MAINTENANCE (STAT)

## 2017-06-26 PROCEDURE — 36415 COLL VENOUS BLD VENIPUNCTURE: CPT

## 2017-06-26 PROCEDURE — 27000221 HC OXYGEN, UP TO 24 HOURS

## 2017-06-26 PROCEDURE — 25000003 PHARM REV CODE 250: Performed by: HOSPITALIST

## 2017-06-26 PROCEDURE — 80048 BASIC METABOLIC PNL TOTAL CA: CPT

## 2017-06-26 PROCEDURE — 25000003 PHARM REV CODE 250: Performed by: INTERNAL MEDICINE

## 2017-06-26 PROCEDURE — 25000003 PHARM REV CODE 250: Performed by: EMERGENCY MEDICINE

## 2017-06-26 PROCEDURE — 36600 WITHDRAWAL OF ARTERIAL BLOOD: CPT

## 2017-06-26 PROCEDURE — 20000000 HC ICU ROOM

## 2017-06-26 PROCEDURE — 94003 VENT MGMT INPAT SUBQ DAY: CPT

## 2017-06-26 PROCEDURE — 25000242 PHARM REV CODE 250 ALT 637 W/ HCPCS: Performed by: INTERNAL MEDICINE

## 2017-06-26 PROCEDURE — 99291 CRITICAL CARE FIRST HOUR: CPT | Mod: ,,, | Performed by: ANESTHESIOLOGY

## 2017-06-26 PROCEDURE — 99232 SBSQ HOSP IP/OBS MODERATE 35: CPT | Mod: ,,, | Performed by: PSYCHIATRY & NEUROLOGY

## 2017-06-26 PROCEDURE — 99900035 HC TECH TIME PER 15 MIN (STAT)

## 2017-06-26 RX ORDER — SODIUM CHLORIDE 0.9 % (FLUSH) 0.9 %
3 SYRINGE (ML) INJECTION EVERY 8 HOURS
Status: DISCONTINUED | OUTPATIENT
Start: 2017-06-27 | End: 2017-07-14 | Stop reason: HOSPADM

## 2017-06-26 RX ORDER — SODIUM CHLORIDE 9 MG/ML
INJECTION, SOLUTION INTRAVENOUS CONTINUOUS
Status: DISCONTINUED | OUTPATIENT
Start: 2017-06-27 | End: 2017-06-30

## 2017-06-26 RX ADMIN — IPRATROPIUM BROMIDE AND ALBUTEROL SULFATE 3 ML: .5; 3 SOLUTION RESPIRATORY (INHALATION) at 07:06

## 2017-06-26 RX ADMIN — AMLODIPINE BESYLATE 10 MG: 10 TABLET ORAL at 08:06

## 2017-06-26 RX ADMIN — IPRATROPIUM BROMIDE AND ALBUTEROL SULFATE 3 ML: .5; 3 SOLUTION RESPIRATORY (INHALATION) at 01:06

## 2017-06-26 RX ADMIN — IPRATROPIUM BROMIDE AND ALBUTEROL SULFATE 3 ML: .5; 3 SOLUTION RESPIRATORY (INHALATION) at 12:06

## 2017-06-26 RX ADMIN — HYDRALAZINE HYDROCHLORIDE 100 MG: 50 TABLET ORAL at 05:06

## 2017-06-26 RX ADMIN — PANTOPRAZOLE SODIUM 40 MG: 40 GRANULE, DELAYED RELEASE ORAL at 08:06

## 2017-06-26 RX ADMIN — CHLORHEXIDINE GLUCONATE 15 ML: 1.2 RINSE ORAL at 09:06

## 2017-06-26 RX ADMIN — HYDRALAZINE HYDROCHLORIDE 100 MG: 50 TABLET ORAL at 03:06

## 2017-06-26 RX ADMIN — INSULIN ASPART 1 UNITS: 100 INJECTION, SOLUTION INTRAVENOUS; SUBCUTANEOUS at 12:06

## 2017-06-26 RX ADMIN — ATORVASTATIN CALCIUM 10 MG: 10 TABLET, FILM COATED ORAL at 08:06

## 2017-06-26 RX ADMIN — ALISKIREN HEMIFUMARATE 300 MG: 300 TABLET, FILM COATED ORAL at 08:06

## 2017-06-26 RX ADMIN — CHLORHEXIDINE GLUCONATE 15 ML: 1.2 RINSE ORAL at 08:06

## 2017-06-26 RX ADMIN — CLONIDINE HYDROCHLORIDE 0.1 MG: 0.1 TABLET ORAL at 08:06

## 2017-06-26 RX ADMIN — HYDRALAZINE HYDROCHLORIDE 100 MG: 50 TABLET ORAL at 09:06

## 2017-06-26 NOTE — SUBJECTIVE & OBJECTIVE
Interval History: Remains very poorly responsive off sedation; no improvement. IR cannot perform LP as discussed with them today.    Review of Systems   Unable to perform ROS: Intubated     Objective:     Vital Signs (Most Recent):  Temp: 98.2 °F (36.8 °C) (06/26/17 1200)  Pulse: 86 (06/26/17 1324)  Resp: 20 (06/26/17 1324)  BP: (!) 176/77 (06/26/17 1200)  SpO2: 97 % (06/26/17 1324) Vital Signs (24h Range):  Temp:  [98.2 °F (36.8 °C)-99.1 °F (37.3 °C)] 98.2 °F (36.8 °C)  Pulse:  [75-93] 86  Resp:  [0-38] 20  SpO2:  [95 %-100 %] 97 %  BP: (144-208)/(66-86) 176/77     Weight: (!) 160.6 kg (354 lb)  Body mass index is 58.91 kg/m².    Intake/Output Summary (Last 24 hours) at 06/26/17 1326  Last data filed at 06/26/17 0601   Gross per 24 hour   Intake              540 ml   Output              850 ml   Net             -310 ml      Physical Exam   Constitutional: No distress.   Morbidly obese   HENT:   ET tube in place     Eyes: Conjunctivae are normal. Right eye exhibits no discharge. Left eye exhibits no discharge.   Neck: Neck supple.   Cardiovascular: Normal rate, regular rhythm and normal heart sounds.    Pulmonary/Chest: Breath sounds normal. No stridor.   Mechanically ventilated   Abdominal: Soft. Bowel sounds are normal.   Musculoskeletal: She exhibits edema. She exhibits no deformity.   Neurological:   Off all sedation; unresponsive to verbal stimulation, minimal response to painful stimuli; pupils are at 2 mm; no spontaneous eye movement; corneal responses present bilaterally; cough and gag response are present.   Skin: Skin is warm and dry.       Significant Labs:   BMP:     Recent Labs  Lab 06/26/17  0405   *      K 4.2      CO2 28   BUN 57*   CREATININE 1.1   CALCIUM 9.1     CBC:     Recent Labs  Lab 06/25/17  0900 06/26/17  0405   WBC 11.64 11.99   HGB 9.1* 9.0*   HCT 28.6* 29.2*    325       Significant Imaging: I have reviewed all pertinent imaging results/findings within the past  24 hours.

## 2017-06-26 NOTE — ASSESSMENT & PLAN NOTE
Body mass index is 58.91 kg/m².  Pt will need counseling on weight/risk reduction as outpatient when timing and conditions are more appropriate.

## 2017-06-26 NOTE — PROGRESS NOTES
[facility]  Neurology  Progress Note    Patient Name: Nisa Frank  MRN: [id number]  Admission Date: 6/18/2017  Hospital Length of Stay: 8 days  Code Status: Full Code   Attending Provider: Polina Locke MD  Primary Care Physician: Atilio Downs MD   Principal Problem:Seizure    Subjective:     Interval History: 67 y/o female with medical Hx as listed below who came to ED for headaches. According to notes pt was admitted after being found to have fever and leukocytosis; markedly hypertensive. Overnight pt became unresponsive and was presenting seizure-like activity. Intubated to protect airway.  Pt was also reported to present uintermittent rhythmic activity in RUE. She still intubated and mildly sedated with midazolam. No previous Hx of seizures.     -6/21/17: Overnight pt reported to have almost continuous twitching OF RUE and right face. Pt was given Cerebyx and ativan with transient resolution. Propofol increased; activity ceased.     -6/22/17: Pt remain intubated. Off of sedation intermittent and almost continuous twitching in RUE and face.     -6/23/17: Muscle jerks upon holding sedation. Reported to not follow commands.     -6/24/17: No sedation for 20 minutes. No myoclonus seen.     -6/26/17: Pt has been off of propofol for two days. Clonazepam D/C yesterday morning as myoclonus resolved.    Current Neurological Medications:     Current Facility-Administered Medications   Medication Dose Route Frequency Provider Last Rate Last Dose    acetaminophen tablet 500 mg  500 mg Oral Q6H PRN Laurie Barksdale MD   500 mg at 06/20/17 1454    albuterol-ipratropium 2.5mg-0.5mg/3mL nebulizer solution 3 mL  3 mL Nebulization Q6H Annie Montoya MD   3 mL at 06/26/17 0734    aliskiren tablet 300 mg  300 mg Oral Daily Adelfo Dangelo MD   300 mg at 06/26/17 0819    amlodipine tablet 10 mg  10 mg Oral Daily Adelfo Dangelo MD   10 mg at 06/26/17 0819    atorvastatin tablet 10 mg  10 mg Oral Daily  Aureliano Alfaro MD   10 mg at 06/26/17 0820    chlorhexidine 0.12 % solution 15 mL  15 mL Mouth/Throat BID Laurie Barksdale MD   15 mL at 06/26/17 0826    cloNIDine tablet 0.1 mg  0.1 mg Per NG tube Q8H PRN Adelfo Dangelo MD   0.1 mg at 06/26/17 0820    dextrose 50% injection 12.5 g  12.5 g Intravenous PRN Aureliano Alfaro MD        dextrose 50% injection 25 g  25 g Intravenous PRN Aureliano Alfaro MD        glucose chewable tablet 16 g  16 g Oral PRN Aureliano Alfaro MD        glucose chewable tablet 24 g  24 g Oral PRN Aureliano Alfaro MD        hydrALAZINE tablet 100 mg  100 mg Oral Q8H Adelfo Dangelo MD   100 mg at 06/26/17 0521    insulin aspart pen 0-5 Units  0-5 Units Subcutaneous PRN Laurie Barksdale MD   1 Units at 06/26/17 0002    insulin detemir pen 18 Units  18 Units Subcutaneous QHS Adelfo Dangelo MD   18 Units at 06/25/17 2122    lorazepam injection 2 mg  2 mg Intravenous Once Laurie Barksdale MD        lorazepam injection 2 mg  2 mg Intravenous Q4H PRN Laurie Barksdale MD   2 mg at 06/25/17 0334    ondansetron injection 8 mg  8 mg Intravenous Q8H PRN Laurie Barksdale MD        pantoprazole suspension 40 mg  40 mg Per NG tube Daily Laurie Barksdale MD   40 mg at 06/26/17 0820    pneumoc 13-finn conj-dip cr(PF) 0.5 mL  0.5 mL Intramuscular vaccine x 1 dose Buddy Nichols MD        promethazine (PHENERGAN) 12.5 mg in dextrose 5 % 50 mL IVPB  12.5 mg Intravenous Q6H PRN Laurie Barksdale MD        ramelteon tablet 8 mg  8 mg Oral Nightly PRN Laurie Barksdale MD        scopolamine 1.5 mg (1 mg over 3 days) 1.5 mg  1 patch Transdermal Q3 Days Adelfo Dangelo MD   1.5 mg at 06/24/17 1622       Review of Systems   Unable to obtain a spt is unresponsive/intubated    Objective:     Vital Signs (Most Recent):  Temp: 99.1 °F (37.3 °C) (06/26/17 0708)  Pulse: 77 (06/26/17 0900)  Resp: 19 (06/26/17 0900)  BP: (!) 154/68 (06/26/17 0900)  SpO2: 97 % (06/26/17 0900) Vital Signs (24h  Range):  Temp:  [98.8 °F (37.1 °C)-99.1 °F (37.3 °C)] 99.1 °F (37.3 °C)  Pulse:  [77-93] 77  Resp:  [0-38] 19  SpO2:  [96 %-100 %] 97 %  BP: (154-208)/(68-86) 154/68     Weight: (!) 160.6 kg (354 lb)  Body mass index is 58.91 kg/m².    Physical Exam  Constitutional: well-developed; obese  Head: normocephalic  Eyes: conjunctivae/corneas clear.  Nose: no discharge, no epistaxis  Heart: regular rate and rhythm.  Abdomen: depressible  Extremities: no cyanosis  Neurologic: Mental status: no sedation; unresponsive to verbal stimulation  Cranial nerves: pupils are at 2 mm; no spontaneous eye movement; corneal responses present bilaterally; cough and gag response are present.  Strength: slight withdrawal to noxious stimulation in LE's          Significant Labs:   CBC:   Recent Labs  Lab 06/25/17  0900 06/26/17  0405   WBC 11.64 11.99   HGB 9.1* 9.0*   HCT 28.6* 29.2*    325     CMP:   Recent Labs  Lab 06/25/17  0900 06/26/17  0405   * 194*    140   K 4.1 4.2    105   CO2 27 28   BUN 49* 57*   CREATININE 1.0 1.1   CALCIUM 8.6* 9.1   ANIONGAP 7* 7*   EGFRNONAA 59* 52*       Assessment and Plan:     65 y/o female consulted for possible seizure     1. Seizure: possible seizure while on the floor per reports. Pt was intubated for airway protection. Pt began to present later (after intubation) myoclonic activity in RUE and right face but with no electographical correlation on EEG. Pt was kept on levetiracetam and propofol due to concern that involuntary activity was epileptic but as mentioned above no seizures seen on EEG (see official report).                     -No more myoclonus. Clonazepam D/C upon resolution of involuntary activity    2. AMS: unresolved. Despite no use of sedation pt is unresponsive. Serial head CT were obtained but no definite sign a major area stroke or ICH seen.                      -Another Head CT repeated due to asymmetry of rhythm on EEG but there has been no development of  any obvious abnormalities. Pt above weight limit for MRI in this facility; would need to be taken to [facility] for MRI.       -On admission pt came with headache, reported slurred speech as well as fever. No definite infectious source identified. Part of her presentation could had been hypertensive encephalopathy as her BP was 220/110's. LP ordered but unable to obtain.    Pt being transferred to [facility].    Active Diagnoses:    Diagnosis Date Noted POA    PRINCIPAL PROBLEM:  Seizure [R56.9] 06/20/2017 No    ARF (acute renal failure) [N17.9] 06/22/2017 No    Acute respiratory failure [J96.00] 06/20/2017 Yes    Hypokalemia [E87.6] 06/19/2017 Yes    Hyperlipidemia [E78.5] 06/19/2017 Yes     Chronic    Moderate protein malnutrition [E44.0] 06/19/2017 Yes     Chronic    CAD s/p CABG [I25.10] 06/19/2017 Yes     Chronic    Pyelonephritis [N12] 06/19/2017 Yes    Hypertensive emergency [I16.1] 06/18/2017 Yes    Morbid obesity with BMI of 50.0-59.9, adult [E66.01, Z68.43] 02/24/2015 Not Applicable     Chronic    Essential hypertension [I10] 05/12/2014 Yes     Chronic    Type 2 diabetes mellitus, uncontrolled [E11.65] 05/12/2014 Yes     Chronic      Problems Resolved During this Admission:    Diagnosis Date Noted Date Resolved POA    Blood poisoning [A41.9] 06/19/2017 06/22/2017 Yes       VTE Risk Mitigation         Ordered     Place GARRETT hose  Until discontinued      06/25/17 1242     Place sequential compression device  Until discontinued      06/22/17 0300     Medium Risk of VTE  Once      06/19/17 0424          Dustin Harmon MD  Neurology  [facility]

## 2017-06-26 NOTE — PROGRESS NOTES
[facility] Hospital Medicine  Progress Note    Patient Name: Nisa Frank  MRN: [id number]  Patient Class: IP- Inpatient   Admission Date: 6/18/2017  Length of Stay: 8 days  Attending Physician: Polina Locke MD  Primary Care Provider: Atilio Downs MD        Subjective:     Principal Problem:Seizure    HPI:  Ms. Nisa Frank is a 66 y.o. female with essential hypertension, hyperlipidemia (.2 May 2014), type 2 diabetes mellitus (HbA1c 8.2% May 2014), CAD s/p CABG, morbid obesity (BMI 58.7), and moderate protein malnutrition who presents to [facility] ED with complaints of headache this morning.  She cannot provide much details on the quality of the headache but does say she's been feeling more weak and tired lately.  She denies any nausea, vomiting, fevers, or chills, and hasn't had any neck stiffness, photophobia, nor any phonophobia.  She denies any laterality to  Her weakness and has never had a stroke before.  She also denies any dysuria, hematuria, abdominal pain, diarrhea, chest pain, shortness of breath, nor any coughing.  Further history is limited as she is a very poor historian and has very garbled speech.    Hospital Course:  Ms. Frank was admitted for presumed sepsis with probable UTI. Pt was started on broad spectrum antibiotics, but urine culture remained negative. Pt's blood cultures were remarkable for Staph in only 1 bottle and this was thought to be a contaminant, therefore, Vancomycin was stopped. Sputum culture had no significant growth. Empiric Rocephin was stopped and she was changed to Zosyn. On the night of 6/19-6/20, she had multiple seizures and was intubated for airway protection.  Neurology was consulted and she was loaded with Keppra. She appeared to have continous seizures and Versed was stopped for sedation and changed to propofol. BP dropped overnight 6/20/2017 with change in sedation. Dilantin and Vimpat added to Keppra for possible seizures vs  myoclonic activity.  Urine output dropped off, IVF were increased, and Nephrology was consulted for ARF. Pulmonary followed for vent management. TLC placed to left IJ 6/21/2017. Further workup with EEG did not show any epileptic activity, but did show asymmetric activity.  Repeat Head CT was unremarkable. Pt's renal function improved and s/p lasix due to edema. However, patient remains minimally responsive despite being off sedation for several days. Further workup with MRI and LP not possible due to patient's weight and body habitus.    Interval History: Remains very poorly responsive off sedation; no improvement. IR cannot perform LP as discussed with them today.    Review of Systems   Unable to perform ROS: Intubated     Objective:     Vital Signs (Most Recent):  Temp: 98.2 °F (36.8 °C) (06/26/17 1200)  Pulse: 86 (06/26/17 1324)  Resp: 20 (06/26/17 1324)  BP: (!) 176/77 (06/26/17 1200)  SpO2: 97 % (06/26/17 1324) Vital Signs (24h Range):  Temp:  [98.2 °F (36.8 °C)-99.1 °F (37.3 °C)] 98.2 °F (36.8 °C)  Pulse:  [75-93] 86  Resp:  [0-38] 20  SpO2:  [95 %-100 %] 97 %  BP: (144-208)/(66-86) 176/77     Weight: (!) 160.6 kg (354 lb)  Body mass index is 58.91 kg/m².    Intake/Output Summary (Last 24 hours) at 06/26/17 1326  Last data filed at 06/26/17 0601   Gross per 24 hour   Intake              540 ml   Output              850 ml   Net             -310 ml      Physical Exam   Constitutional: No distress.   Morbidly obese   HENT:   ET tube in place     Eyes: Conjunctivae are normal. Right eye exhibits no discharge. Left eye exhibits no discharge.   Neck: Neck supple.   Cardiovascular: Normal rate, regular rhythm and normal heart sounds.    Pulmonary/Chest: Breath sounds normal. No stridor.   Mechanically ventilated   Abdominal: Soft. Bowel sounds are normal.   Musculoskeletal: She exhibits edema. She exhibits no deformity.   Neurological:   Off all sedation; unresponsive to verbal stimulation, minimal response to  painful stimuli; pupils are at 2 mm; no spontaneous eye movement; corneal responses present bilaterally; cough and gag response are present.   Skin: Skin is warm and dry.       Significant Labs:   BMP:     Recent Labs  Lab 06/26/17  0405   *      K 4.2      CO2 28   BUN 57*   CREATININE 1.1   CALCIUM 9.1     CBC:     Recent Labs  Lab 06/25/17  0900 06/26/17  0405   WBC 11.64 11.99   HGB 9.1* 9.0*   HCT 28.6* 29.2*    325       Significant Imaging: I have reviewed all pertinent imaging results/findings within the past 24 hours.    Assessment/Plan:      * Seizure    No epileptic activity on EEG.  No evidence of seizures today.  Probable myoclonus.      Encephalopathy etiology is unclear at this time.  Patient remains minimally responsive off sedation.  Possible CVA vs infectious, head CT negative thus far but will need MRI which cannot be done at this facility.  Pt accepted for transfer for further further workup with MRI and LP given fever with AMS.  Neurology following.      Encephalopathy, metabolic    As discussed above.      Hypertensive emergency    Patient presented with an initial blood pressure of 224/108 ().    She was responsive to medications administered in the ED.  She did not need an antihypertensive infusion; resumed her home regimen.    BP dropped on sedation and parameters were placed on her medications.      Acute respiratory failure    Patient intubated 6/20/2017 for airway protection.  Pulmonary following for vent management  Pt had increased edema around face and upper extremities.    Probably third spacing from severe hypoalbuminemia, s/p lasix and albumin with good urine output.  Extubation will probably depend more on mental status improvement at this point.      Pyelonephritis    Patient presented with fever and had UA that was suspicious UTI.  Pt met criteria for sepsis, but urine culture was negative and fever resolved.  Fevers could have been secondary to  seizures. All antibiotics stopped.  Staph in blood is contaminant, in only 1 out of 4 bottles.      ARF (acute renal failure)    Resolved, likely prerenal.  Appreciate Nephrology input, will continue to monitor.      CAD s/p CABG    Stable; continue her home regimen of aspirin and atorvastatin.      Moderate protein malnutrition    Continue tube feeds while intubated.      Hyperlipidemia    Continue patient's home regimen of atorvastatin.      Hypokalemia    Replace as necessary, monitor.      Morbid obesity with BMI of 50.0-59.9, adult    Body mass index is 58.91 kg/m².  Pt will need counseling on weight/risk reduction as outpatient when timing and conditions are more appropriate.      Type 2 diabetes mellitus, uncontrolled    On basal Levemir with sliding scale insulin.   Will increase levemir to 20 U QHS today.      Essential hypertension    Currently normotensive.  Continue current management with parameters.        VTE Risk Mitigation         Ordered     Place GARRETT hose  Until discontinued      06/25/17 1242     Place sequential compression device  Until discontinued      06/22/17 0300     Medium Risk of VTE  Once      06/19/17 0424          Polina Locke MD  Department of Hospital Medicine   [facility]

## 2017-06-26 NOTE — PLAN OF CARE
06/26/17 1439   Discharge Reassessment   Assessment Type Discharge Planning Reassessment   Can the patient answer the patient profile reliably? No, cognitively impaired   How does the patient rate their overall health at the present time? Fair  (record)   Describe the patient's ability to walk at the present time. Does not walk or unable to take any steps at all   How often would a person be available to care for the patient? Occasionally   Number of comorbid conditions (as recorded on the chart) Five or more   During the past month, has the patient often been bothered by feeling down, depressed or hopeless? No   During the past month, has the patient often been bothered by little interest or pleasure in doing things? No   Discharge plan remains the same: No   Provided patient/caregiver education on the expected discharge date and the discharge plan No   Discharge Plan A Other  (will transfer to [facility] neuro ICU)   Discharge Plan B Other  (to be determined as patient progresses; remains on vent support at this time)   Change in patient condition or support system No   Patient choice form signed by patient/caregiver No   Explained to the the patient/caregiver why the discharge planned changed: No   Involved the patient/caregiver in establishing a new discharge plan: No   patient remains in ICU on vent support. LUCIA reviewed chart, met with Dr Locke at 10 am today. Dr Locke informed that patient will transfer to [facility] neuro ICU, [facility] when bed available. LUCIA informed UR/RN per protocol. SW will follow in ICU and assist as needed.

## 2017-06-26 NOTE — ASSESSMENT & PLAN NOTE
Patient intubated 6/20/2017 for airway protection.  Pulmonary following for vent management  Pt had increased edema around face and upper extremities.    Probably third spacing from severe hypoalbuminemia, s/p lasix and albumin with good urine output.  Extubation will probably depend more on mental status improvement at this point.

## 2017-06-26 NOTE — CONSULTS
Patient weighs 354 lbs which exceeds our table limit.  After speaking with Dr. Locke, consult will be cancelled.

## 2017-06-26 NOTE — ASSESSMENT & PLAN NOTE
Patient presented with fever and had UA that was suspicious UTI.  Pt met criteria for sepsis, but urine culture was negative and fever resolved.  Fevers could have been secondary to seizures. All antibiotics stopped.  Staph in blood is contaminant, in only 1 out of 4 bottles.

## 2017-06-26 NOTE — PLAN OF CARE
Problem: Patient Care Overview  Goal: Plan of Care Review  Outcome: Ongoing (interventions implemented as appropriate)  Patient in bed and does withdraw to painful stimuli. Patient does have a gag reflex and grimaces when suctioned. Sinus rhythm on cardiac monitor. Afebrile. Patient requires prn meds for blood pressure. OG tube intact. Tube feedings placed on hold for scheduled Lumbar Puncture. Unable to wean vent settings. Patient remains on PRVC with a rate of 10, , Peep of 5, and 40% FiO2. No skin breakdown. Ross Catheter draining to gravity. Generalized edema. Daughter at bedside. Daughter aware of plan of care.

## 2017-06-26 NOTE — ASSESSMENT & PLAN NOTE
Patient presented with an initial blood pressure of 224/108 ().    She was responsive to medications administered in the ED.  She did not need an antihypertensive infusion; resumed her home regimen.    BP dropped on sedation and parameters were placed on her medications.

## 2017-06-26 NOTE — PROGRESS NOTES
[facility]  Pulmonology  Progress Note    Patient Name: Nisa Frank  MRN: [id number]  Admission Date: 6/18/2017  Hospital Length of Stay: 8 days  Code Status: Full Code  Attending Provider: Polina Locke MD  Primary Care Provider: Atilio Downs MD   Principal Problem: Seizure    Subjective:     Interval History: vent management. Intubated, sedated. Unresponsive off sedation still. Daughter at bedside. Confirms minimal neurologic improvement.    Unable to obtain ROS - due to intubation and encephalopathy.     albuterol-ipratropium 2.5mg-0.5mg/3mL  3 mL Nebulization Q6H    aliskiren  300 mg Oral Daily    amlodipine  10 mg Oral Daily    atorvastatin  10 mg Oral Daily    chlorhexidine  15 mL Mouth/Throat BID    hydrALAZINE  100 mg Oral Q8H    insulin detemir  18 Units Subcutaneous QHS    lorazepam  2 mg Intravenous Once    pantoprazole  40 mg Per NG tube Daily    scopolamine  1 patch Transdermal Q3 Days     Objective:     Vital Signs (Most Recent):  Temp: 99.1 °F (37.3 °C) (06/26/17 0708)  Pulse: 77 (06/26/17 0900)  Resp: 19 (06/26/17 0900)  BP: (!) 154/68 (06/26/17 0900)  SpO2: 97 % (06/26/17 0900) Vital Signs (24h Range):  Temp:  [98.8 °F (37.1 °C)-99.1 °F (37.3 °C)] 99.1 °F (37.3 °C)  Pulse:  [77-93] 77  Resp:  [0-38] 19  SpO2:  [96 %-100 %] 97 %  BP: (154-208)/(68-86) 154/68     Weight: (!) 160.6 kg (354 lb)  Body mass index is 58.91 kg/m².    Intake/Output Summary (Last 24 hours) at 06/26/17 0937  Last data filed at 06/26/17 0601   Gross per 24 hour   Intake              780 ml   Output             1100 ml   Net             -320 ml     Physical Exam   Gen: intubated, sedated, morbidly obese. No distress.  Neck: left IJ TLC. Short and thick  Heart: regular, distant. No murmur  Lungs: shallow and decreased bilaterally. 7.5 ett. No wheezing.  Abdomen: obese, hypoactive bs. OGT. Soft.  Ext: diffuse edema, no change. SCDs.   Skin: warm, dry. No rash    Vents:  Vent Mode: PRVC  (06/26/17 0918)  Set Rate: 10 bmp (06/26/17 0730)  Vt Set: 450 mL (06/26/17 0730)  Pressure Support: 10 cmH20 (06/24/17 0905)  PEEP/CPAP: 5 cmH20 (06/26/17 0730)  Oxygen Concentration (%): 40 (06/26/17 0900)  Peak Airway Pressure: 33 cmH2O (06/26/17 0730)  Total Ve: 9.1 mL (06/26/17 0730)  F/VT Ratio<105 (RSBI): (!) 39.91 (06/26/17 0730)    Lines/Drains/Airways     Central Venous Catheter Line                 Percutaneous Central Line Insertion/Assessment - triple lumen  06/21/17 1015 left internal jugular 4 days          Drain                 NG/OG Tube 06/20/17 0402 orogastric 14 Fr. Left mouth 6 days         Urethral Catheter 06/20/17 0404 Non-latex 18 Fr. 6 days          Airway                 Airway - Non-Surgical 06/20/17 0330 Endotracheal Tube 6 days              Significant Labs:    CBC/Anemia Profile:    Recent Labs  Lab 06/25/17  0900 06/26/17  0405   WBC 11.64 11.99   HGB 9.1* 9.0*   HCT 28.6* 29.2*    325   MCV 84 85   RDW 14.7* 14.6*        Chemistries:    Recent Labs  Lab 06/25/17  0900 06/26/17  0405    140   K 4.1 4.2    105   CO2 27 28   BUN 49* 57*   CREATININE 1.0 1.1   CALCIUM 8.6* 9.1       Recent Labs  Lab 06/26/17  0441   PH 7.439   PCO2 41.8   PO2 86   HCO3 28.3*   POCSATURATED 97   BE 4     Microbiology Results (last 7 days)     Procedure Component Value Units Date/Time    Blood culture [[id number]] Collected:  06/21/17 1207    Order Status:  Completed Specimen:  Blood Updated:  06/25/17 1303     Blood Culture, Routine No Growth to date     Blood Culture, Routine No Growth to date     Blood Culture, Routine No Growth to date     Blood Culture, Routine No Growth to date     Blood Culture, Routine No Growth to date    Blood culture [[id number]] Collected:  06/21/17 1210    Order Status:  Completed Specimen:  Blood Updated:  06/25/17 1303     Blood Culture, Routine No Growth to date     Blood Culture, Routine No Growth to date     Blood Culture, Routine No Growth to date      Blood Culture, Routine No Growth to date     Blood Culture, Routine No Growth to date    CSF culture [[id number]]     Order Status:  No result Specimen:  CSF (Spinal Fluid) from CSF Tap, Tube 3     Blood culture [[id number]] Collected:  06/18/17 2225    Order Status:  Completed Specimen:  Blood from Peripheral, Forearm, Right Updated:  06/24/17 0303     Blood Culture, Routine No growth after 5 days.    Culture, Respiratory with Gram Stain [[id number]] Collected:  06/20/17 0251    Order Status:  Completed Specimen:  Respiratory from Sputum, Induced Updated:  06/22/17 0825     Respiratory Culture Normal respiratory jose luis     Gram Stain (Respiratory) <10 epithelial cells per low power field.     Gram Stain (Respiratory) Moderate WBC's     Gram Stain (Respiratory) Few Gram positive cocci    Blood culture [[id number]] Collected:  06/18/17 2240    Order Status:  Completed Specimen:  Blood from Peripheral, Forearm, Right Updated:  06/22/17 0811     Blood Culture, Routine Gram stain tena bottle: Gram positive cocci in clusters resembling Staph     Blood Culture, Routine Results called to and read back by: April Cleveland 06/20/2017  15:25     Blood Culture, Routine --     COAGULASE-NEGATIVE STAPHYLOCOCCUS SPECIES  Organism is a probable contaminant      Urine culture [[id number]] Collected:  06/18/17 2120    Order Status:  Completed Specimen:  Urine from Urine, Catheterized Updated:  06/20/17 1023     Urine Culture, Routine No growth        Significant Imaging:  CXR - ett in place. Small volumes and and continued interstitial infiltrates. ?right lower opacity vs. Body habitus.    Assessment/Plan:     Active Diagnoses:    Diagnosis Date Noted POA    PRINCIPAL PROBLEM:  Seizure [R56.9] 06/20/2017 No    ARF (acute renal failure) [N17.9] 06/22/2017 No    Acute respiratory failure [J96.00] 06/20/2017 Yes    Hypokalemia [E87.6] 06/19/2017 Yes    Hyperlipidemia [E78.5] 06/19/2017 Yes     Chronic    Moderate protein malnutrition  [E44.0] 06/19/2017 Yes     Chronic    CAD s/p CABG [I25.10] 06/19/2017 Yes     Chronic    Pyelonephritis [N12] 06/19/2017 Yes    Hypertensive emergency [I16.1] 06/18/2017 Yes    Morbid obesity with BMI of 50.0-59.9, adult [E66.01, Z68.43] 02/24/2015 Not Applicable     Chronic    Essential hypertension [I10] 05/12/2014 Yes     Chronic    Type 2 diabetes mellitus, uncontrolled [E11.65] 05/12/2014 Yes     Chronic      Problems Resolved During this Admission:    Diagnosis Date Noted Date Resolved POA    Blood poisoning [A41.9] 06/19/2017 06/22/2017 Yes     Imp/Plan    1) Altered MS - due to seizures? Vs. HTN vs. Hypoxia? Currently on clonazepam only. CT head was apparently negative. Neurology following. Off sedation and still not very responsive. LP today.  2) Mechanical ventilation - for airway protection. Obese, no distress. Good gas exchange. Rate of 10 today. Not a weaning candidate given poor MS. Bronchodilators. Sputum cultures as above. Afebrile. If continues, may need trach.   3) DM  4) Acute renal insufficiency - creatinine stable.  Mildly negative balance.  5) Hypoalbuminemia - ?malnutrition. On feeds.  6) Diabetes  7) HTN - mildly variable. On Aliskiren and amlodipine. BP still variable. Per primary.  8) History of CAD  9) Morbid obesity     Annie Montoya MD  Pulmonology  [facility]

## 2017-06-27 PROBLEM — I63.89 CEREBRAL INFARCTION, WATERSHED DISTRIBUTION, BILATERAL, ACUTE: Status: ACTIVE | Noted: 2017-06-27

## 2017-06-27 LAB
ALBUMIN SERPL BCP-MCNC: 2.2 G/DL
ALLENS TEST: ABNORMAL
ALLENS TEST: ABNORMAL
ALP SERPL-CCNC: 148 U/L
ALT SERPL W/O P-5'-P-CCNC: 38 U/L
ANION GAP SERPL CALC-SCNC: 9 MMOL/L
ANION GAP SERPL CALC-SCNC: 9 MMOL/L
AST SERPL-CCNC: 58 U/L
BASOPHILS # BLD AUTO: 0.03 K/UL
BASOPHILS NFR BLD: 0.3 %
BILIRUB SERPL-MCNC: 0.3 MG/DL
BUN SERPL-MCNC: 53 MG/DL
BUN SERPL-MCNC: 53 MG/DL
CALCIUM SERPL-MCNC: 9.3 MG/DL
CALCIUM SERPL-MCNC: 9.3 MG/DL
CHLORIDE SERPL-SCNC: 106 MMOL/L
CHLORIDE SERPL-SCNC: 106 MMOL/L
CO2 SERPL-SCNC: 26 MMOL/L
CO2 SERPL-SCNC: 26 MMOL/L
CREAT SERPL-MCNC: 0.9 MG/DL
CREAT SERPL-MCNC: 0.9 MG/DL
DELSYS: ABNORMAL
DELSYS: ABNORMAL
DIFFERENTIAL METHOD: ABNORMAL
EOSINOPHIL # BLD AUTO: 0.1 K/UL
EOSINOPHIL NFR BLD: 1.1 %
ERYTHROCYTE [DISTWIDTH] IN BLOOD BY AUTOMATED COUNT: 14.6 %
ERYTHROCYTE [SEDIMENTATION RATE] IN BLOOD BY WESTERGREN METHOD: 12 MM/H
ERYTHROCYTE [SEDIMENTATION RATE] IN BLOOD BY WESTERGREN METHOD: 27 MM/H
EST. GFR  (AFRICAN AMERICAN): >60 ML/MIN/1.73 M^2
EST. GFR  (AFRICAN AMERICAN): >60 ML/MIN/1.73 M^2
EST. GFR  (NON AFRICAN AMERICAN): >60 ML/MIN/1.73 M^2
EST. GFR  (NON AFRICAN AMERICAN): >60 ML/MIN/1.73 M^2
FIO2: 40
FIO2: 40
GLUCOSE SERPL-MCNC: 163 MG/DL
GLUCOSE SERPL-MCNC: 163 MG/DL
HCO3 UR-SCNC: 27.6 MMOL/L (ref 24–28)
HCO3 UR-SCNC: 28.3 MMOL/L (ref 24–28)
HCT VFR BLD AUTO: 30.1 %
HGB BLD-MCNC: 9.2 G/DL
INR PPP: 1
LYMPHOCYTES # BLD AUTO: 2.3 K/UL
LYMPHOCYTES NFR BLD: 19 %
MAGNESIUM SERPL-MCNC: 2 MG/DL
MCH RBC QN AUTO: 25.8 PG
MCHC RBC AUTO-ENTMCNC: 30.6 %
MCV RBC AUTO: 85 FL
MODE: ABNORMAL
MODE: ABNORMAL
MONOCYTES # BLD AUTO: 0.9 K/UL
MONOCYTES NFR BLD: 7.3 %
NEUTROPHILS # BLD AUTO: 8.5 K/UL
NEUTROPHILS NFR BLD: 71.7 %
PCO2 BLDA: 41 MMHG (ref 35–45)
PCO2 BLDA: 42.6 MMHG (ref 35–45)
PEEP: 5
PH SMN: 7.42 [PH] (ref 7.35–7.45)
PH SMN: 7.45 [PH] (ref 7.35–7.45)
PHOSPHATE SERPL-MCNC: 3.5 MG/DL
PLATELET # BLD AUTO: 331 K/UL
PMV BLD AUTO: 9.4 FL
PO2 BLDA: 70 MMHG (ref 80–100)
PO2 BLDA: 82 MMHG (ref 80–100)
POC BE: 3 MMOL/L
POC BE: 4 MMOL/L
POC SATURATED O2: 94 % (ref 95–100)
POC SATURATED O2: 96 % (ref 95–100)
POC TCO2: 29 MMOL/L (ref 23–27)
POC TCO2: 29 MMOL/L (ref 23–27)
POCT GLUCOSE: 146 MG/DL (ref 70–110)
POCT GLUCOSE: 154 MG/DL (ref 70–110)
POCT GLUCOSE: 176 MG/DL (ref 70–110)
POTASSIUM SERPL-SCNC: 4.3 MMOL/L
POTASSIUM SERPL-SCNC: 4.3 MMOL/L
PROT SERPL-MCNC: 7.3 G/DL
PROTHROMBIN TIME: 10.8 SEC
PS: 14
RBC # BLD AUTO: 3.56 M/UL
SAMPLE: ABNORMAL
SAMPLE: ABNORMAL
SITE: ABNORMAL
SITE: ABNORMAL
SODIUM SERPL-SCNC: 141 MMOL/L
SODIUM SERPL-SCNC: 141 MMOL/L
SP02: 100
SP02: 96
VT: 450
WBC # BLD AUTO: 11.82 K/UL

## 2017-06-27 PROCEDURE — 25000003 PHARM REV CODE 250: Performed by: ANESTHESIOLOGY

## 2017-06-27 PROCEDURE — 99223 1ST HOSP IP/OBS HIGH 75: CPT | Mod: ,,, | Performed by: PSYCHIATRY & NEUROLOGY

## 2017-06-27 PROCEDURE — 85025 COMPLETE CBC W/AUTO DIFF WBC: CPT

## 2017-06-27 PROCEDURE — 25000003 PHARM REV CODE 250: Performed by: INTERNAL MEDICINE

## 2017-06-27 PROCEDURE — 94640 AIRWAY INHALATION TREATMENT: CPT

## 2017-06-27 PROCEDURE — 99900026 HC AIRWAY MAINTENANCE (STAT)

## 2017-06-27 PROCEDURE — 25000003 PHARM REV CODE 250: Performed by: PHYSICIAN ASSISTANT

## 2017-06-27 PROCEDURE — 95812 EEG 41-60 MINUTES: CPT

## 2017-06-27 PROCEDURE — 63600175 PHARM REV CODE 636 W HCPCS: Performed by: PHYSICIAN ASSISTANT

## 2017-06-27 PROCEDURE — 83735 ASSAY OF MAGNESIUM: CPT

## 2017-06-27 PROCEDURE — 27000221 HC OXYGEN, UP TO 24 HOURS

## 2017-06-27 PROCEDURE — 94761 N-INVAS EAR/PLS OXIMETRY MLT: CPT

## 2017-06-27 PROCEDURE — 25000003 PHARM REV CODE 250: Performed by: PSYCHIATRY & NEUROLOGY

## 2017-06-27 PROCEDURE — 25000003 PHARM REV CODE 250: Performed by: HOSPITALIST

## 2017-06-27 PROCEDURE — 20000000 HC ICU ROOM

## 2017-06-27 PROCEDURE — 85610 PROTHROMBIN TIME: CPT

## 2017-06-27 PROCEDURE — 95816 EEG AWAKE AND DROWSY: CPT | Mod: 26,,, | Performed by: PSYCHIATRY & NEUROLOGY

## 2017-06-27 PROCEDURE — 84100 ASSAY OF PHOSPHORUS: CPT

## 2017-06-27 PROCEDURE — 36600 WITHDRAWAL OF ARTERIAL BLOOD: CPT

## 2017-06-27 PROCEDURE — 99291 CRITICAL CARE FIRST HOUR: CPT | Mod: ,,, | Performed by: PSYCHIATRY & NEUROLOGY

## 2017-06-27 PROCEDURE — 80053 COMPREHEN METABOLIC PANEL: CPT

## 2017-06-27 PROCEDURE — 94003 VENT MGMT INPAT SUBQ DAY: CPT

## 2017-06-27 PROCEDURE — 25500020 PHARM REV CODE 255: Performed by: PSYCHIATRY & NEUROLOGY

## 2017-06-27 PROCEDURE — 97802 MEDICAL NUTRITION INDIV IN: CPT

## 2017-06-27 PROCEDURE — 82803 BLOOD GASES ANY COMBINATION: CPT

## 2017-06-27 PROCEDURE — 25000242 PHARM REV CODE 250 ALT 637 W/ HCPCS: Performed by: INTERNAL MEDICINE

## 2017-06-27 PROCEDURE — 99900035 HC TECH TIME PER 15 MIN (STAT)

## 2017-06-27 PROCEDURE — 63600175 PHARM REV CODE 636 W HCPCS: Performed by: ANESTHESIOLOGY

## 2017-06-27 PROCEDURE — 25000003 PHARM REV CODE 250: Performed by: EMERGENCY MEDICINE

## 2017-06-27 RX ORDER — AMOXICILLIN 250 MG
1 CAPSULE ORAL DAILY
Status: DISCONTINUED | OUTPATIENT
Start: 2017-06-27 | End: 2017-06-30

## 2017-06-27 RX ORDER — HEPARIN SODIUM 5000 [USP'U]/ML
5000 INJECTION, SOLUTION INTRAVENOUS; SUBCUTANEOUS EVERY 8 HOURS
Status: DISCONTINUED | OUTPATIENT
Start: 2017-06-27 | End: 2017-07-01

## 2017-06-27 RX ORDER — ASPIRIN 325 MG
325 TABLET ORAL DAILY
Status: DISCONTINUED | OUTPATIENT
Start: 2017-06-27 | End: 2017-07-14 | Stop reason: HOSPADM

## 2017-06-27 RX ORDER — INSULIN ASPART 100 [IU]/ML
1-10 INJECTION, SOLUTION INTRAVENOUS; SUBCUTANEOUS EVERY 4 HOURS PRN
Status: DISCONTINUED | OUTPATIENT
Start: 2017-06-27 | End: 2017-07-14 | Stop reason: HOSPADM

## 2017-06-27 RX ORDER — LEVETIRACETAM 15 MG/ML
1500 INJECTION INTRAVASCULAR EVERY 12 HOURS
Status: DISCONTINUED | OUTPATIENT
Start: 2017-06-27 | End: 2017-06-27

## 2017-06-27 RX ORDER — HYDRALAZINE HYDROCHLORIDE 20 MG/ML
10 INJECTION INTRAMUSCULAR; INTRAVENOUS EVERY 4 HOURS PRN
Status: DISCONTINUED | OUTPATIENT
Start: 2017-06-27 | End: 2017-07-14 | Stop reason: HOSPADM

## 2017-06-27 RX ORDER — GLUCAGON 1 MG
1 KIT INJECTION
Status: DISCONTINUED | OUTPATIENT
Start: 2017-06-27 | End: 2017-07-14 | Stop reason: HOSPADM

## 2017-06-27 RX ORDER — LABETALOL HYDROCHLORIDE 5 MG/ML
10 INJECTION, SOLUTION INTRAVENOUS EVERY 4 HOURS PRN
Status: DISCONTINUED | OUTPATIENT
Start: 2017-06-27 | End: 2017-07-14 | Stop reason: HOSPADM

## 2017-06-27 RX ADMIN — LEVETIRACETAM 1500 MG: 15 INJECTION INTRAVENOUS at 08:06

## 2017-06-27 RX ADMIN — HEPARIN SODIUM 5000 UNITS: 5000 INJECTION, SOLUTION INTRAVENOUS; SUBCUTANEOUS at 09:06

## 2017-06-27 RX ADMIN — HEPARIN SODIUM 5000 UNITS: 5000 INJECTION, SOLUTION INTRAVENOUS; SUBCUTANEOUS at 03:06

## 2017-06-27 RX ADMIN — IOHEXOL 100 ML: 350 INJECTION, SOLUTION INTRAVENOUS at 10:06

## 2017-06-27 RX ADMIN — IPRATROPIUM BROMIDE AND ALBUTEROL SULFATE 3 ML: .5; 3 SOLUTION RESPIRATORY (INHALATION) at 01:06

## 2017-06-27 RX ADMIN — ATORVASTATIN CALCIUM 10 MG: 10 TABLET, FILM COATED ORAL at 09:06

## 2017-06-27 RX ADMIN — Medication 3 ML: at 09:06

## 2017-06-27 RX ADMIN — HYDRALAZINE HYDROCHLORIDE 100 MG: 50 TABLET ORAL at 09:06

## 2017-06-27 RX ADMIN — SODIUM CHLORIDE: 0.9 INJECTION, SOLUTION INTRAVENOUS at 04:06

## 2017-06-27 RX ADMIN — PANTOPRAZOLE SODIUM 40 MG: 40 GRANULE, DELAYED RELEASE ORAL at 09:06

## 2017-06-27 RX ADMIN — LEVETIRACETAM 1500 MG: 15 INJECTION INTRAVENOUS at 02:06

## 2017-06-27 RX ADMIN — HYDRALAZINE HYDROCHLORIDE 10 MG: 20 INJECTION INTRAMUSCULAR; INTRAVENOUS at 10:06

## 2017-06-27 RX ADMIN — IPRATROPIUM BROMIDE AND ALBUTEROL SULFATE 3 ML: .5; 3 SOLUTION RESPIRATORY (INHALATION) at 02:06

## 2017-06-27 RX ADMIN — ASPIRIN 325 MG ORAL TABLET 325 MG: 325 PILL ORAL at 03:06

## 2017-06-27 RX ADMIN — STANDARDIZED SENNA CONCENTRATE AND DOCUSATE SODIUM 1 TABLET: 8.6; 5 TABLET, FILM COATED ORAL at 01:06

## 2017-06-27 RX ADMIN — IPRATROPIUM BROMIDE AND ALBUTEROL SULFATE 3 ML: .5; 3 SOLUTION RESPIRATORY (INHALATION) at 07:06

## 2017-06-27 RX ADMIN — ALISKIREN HEMIFUMARATE 300 MG: 300 TABLET, FILM COATED ORAL at 10:06

## 2017-06-27 RX ADMIN — CHLORHEXIDINE GLUCONATE 15 ML: 1.2 RINSE ORAL at 08:06

## 2017-06-27 RX ADMIN — AMLODIPINE BESYLATE 10 MG: 10 TABLET ORAL at 09:06

## 2017-06-27 RX ADMIN — Medication 3 ML: at 06:06

## 2017-06-27 RX ADMIN — CHLORHEXIDINE GLUCONATE 15 ML: 1.2 RINSE ORAL at 09:06

## 2017-06-27 RX ADMIN — IPRATROPIUM BROMIDE AND ALBUTEROL SULFATE 3 ML: .5; 3 SOLUTION RESPIRATORY (INHALATION) at 08:06

## 2017-06-27 NOTE — PLAN OF CARE
Problem: Patient Care Overview  Goal: Plan of Care Review  Outcome: Ongoing (interventions implemented as appropriate)  Weaned to Cpap.. May try extubation in am if pt able to protect airway, keep -180 for cerebral perfusion . Advance feeds to goal of 50 ml/hr. Supportive care, keep daughter abreast

## 2017-06-27 NOTE — PROGRESS NOTES
Transported pt to and from MRI on transport ventilator at documented  settings.  AMBU bag and mask at head of bed. Pt tolerated well. Will continue to monitor.

## 2017-06-27 NOTE — H&P
History & Physical  Neurocritical Care    Admit Date: 2017  LOS: 9    Code Status: Full Code     CC: Seizure    SUBJECTIVE:     History of Present Illness:   Ms. Frank is a 66yr old lady who has been transferred to [facility] for the management AMS and get an MRI.    She was admitted to an OSH with headaches and high grade fevers/leucocytosis. She subsequently became unresponsive at the OSH and had to be intubated.    She zee noticed to have R facial and RUE  twitching .  The twitching resolved after phosphyetoin  and ativan  was given.    The sedation has now been off for 2 days.    MRI was attempted; exceed weight limit.    Per records no LP done.      Altered mental status  Acute resp failure  Morbid obesity; excess of cals  Malignant hypertension requiring intensive management.  Anemia sec to acute loss ; poa    Past Medical History:   Diagnosis Date    Diabetes mellitus     Hypertension      Past Surgical History:   Procedure Laterality Date    ARTERIAL BYPASS SURGRY      9 tears sgo    CARDIAC SURGERY       SECTION      HYSTERECTOMY      TUBAL LIGATION       No current facility-administered medications on file prior to encounter.      Current Outpatient Prescriptions on File Prior to Encounter   Medication Sig Dispense Refill    aliskiren (TEKTURNA) 300 MG Tab Take by mouth once daily.      amlodipine (NORVASC) 10 MG tablet Take 1 tablet (10 mg total) by mouth once daily. 30 tablet 3    atorvastatin (LIPITOR) 10 MG tablet Take 10 mg by mouth once daily.      diclofenac sodium (VOLTAREN) 1 % Gel Apply 2 g topically 2 (two) times daily. 100 g 1    EASY TOUCH TWIST LANCETS 30 gauge Misc   6    hydrALAZINE (APRESOLINE) 25 MG tablet Take 1 tablet (25 mg total) by mouth every 12 (twelve) hours. 60 tablet 1    hydrocodone-acetaminophen 7.5-325mg (NORCO) 7.5-325 mg per tablet       insulin glargine (LANTUS) 100 unit/mL injection Inject into the skin every evening.      JANUMET XR 50-1,000 mg TM24  Take 1 tablet by mouth 2 (two) times daily.  5    LANTUS SOLOSTAR 100 unit/mL (3 mL) InPn pen       loratadine (CLARITIN) 10 mg tablet Take 10 mg by mouth once daily.  0    meclizine (ANTIVERT) 25 mg tablet Take 1 tablet (25 mg total) by mouth every 6 (six) hours as needed. 20 tablet 0    saxagliptin (ONGLYZA) 5 mg Tab tablet Take 1 tablet (5 mg total) by mouth once daily. 30 tablet 3    TRUETEST TEST STRIPS Strp   6    urea (CARMOL) 40 % Crea Apply topically 2 (two) times daily. 199 each 10    aspirin 81 MG Chew Take 1 tablet (81 mg total) by mouth once daily. 30 tablet 3    hydrochlorothiazide (HYDRODIURIL) 12.5 MG Tab Take 1 tablet (12.5 mg total) by mouth once daily. 30 tablet 11     Review of patient's allergies indicates:   Allergen Reactions    Latex, natural rubber      Family History   Problem Relation Age of Onset    No Known Problems Father     No Known Problems Mother     No Known Problems Sister     No Known Problems Brother     No Known Problems Maternal Aunt     No Known Problems Maternal Uncle     No Known Problems Paternal Aunt     No Known Problems Paternal Uncle     No Known Problems Maternal Grandmother     No Known Problems Maternal Grandfather     No Known Problems Paternal Grandmother     No Known Problems Paternal Grandfather     Amblyopia Neg Hx     Blindness Neg Hx     Cancer Neg Hx     Cataracts Neg Hx     Diabetes Neg Hx     Glaucoma Neg Hx     Hypertension Neg Hx     Macular degeneration Neg Hx     Retinal detachment Neg Hx     Strabismus Neg Hx     Stroke Neg Hx     Thyroid disease Neg Hx      Social History   Substance Use Topics    Smoking status: Never Smoker    Smokeless tobacco: Never Used    Alcohol use Yes      Comment: occ      Review of Symptoms:  Constitutional: Denies fevers, weight loss, chills, or weakness.  Eyes: Denies changes in vision.  ENT: Denies dysphagia, nasal discharge, ear pain or discharge.  Cardiovascular: Denies chest pain,  palpitations, orthopnea, or claudication.  Respiratory: Denies shortness of breath, cough, hemoptysis, or wheezing.  GI: Denies nausea/vomitting, hematochezia, melena, abd pain, or changes in appetite.  : Denies dysuria, incontinence, or hematuria.  Musculoskeletal: Denies joint pain or myalgias.  Skin/breast: Denies rashes, lumps, lesions, or discharge.  Neurologic: Denies headache, dizziness, vertigo, or paresthesias.  Psychiatric: Denies changes in mood or hallucinations.  Endocrine: Denies polyuria, polydipsia, heat/cold intolerance.  Hematologic/Lymph: Denies lymphadenopathy, easy bruising or easy bleeding.  Allergic/Immunologic: Denies rash, rhinitis.     OBJECTIVE:   Vital Signs (Most Recent):   Temp: 98.8 °F (37.1 °C) (06/26/17 1930)  Pulse: 88 (06/26/17 2200)  Resp: (!) 22 (06/26/17 2200)  BP: (!) 164/71 (06/26/17 2200)  SpO2: 99 % (06/26/17 2323)    Vital Signs (24h Range):   Temp:  [98.2 °F (36.8 °C)-99.1 °F (37.3 °C)] 98.8 °F (37.1 °C)  Pulse:  [75-91] 88  Resp:  [5-38] 22  SpO2:  [95 %-100 %] 99 %  BP: (143-208)/(65-90) 164/71    ICP/CPP (Last 24h):        I & O (Last 24h):    Intake/Output Summary (Last 24 hours) at 06/27/17 0001  Last data filed at 06/26/17 2200   Gross per 24 hour   Intake                0 ml   Output              860 ml   Net             -860 ml     Physical Exam:  GA: Alert, comfortable, no acute distress.   HEENT: No scleral icterus or JVD.   Pulmonary: Clear to auscultation A/P/L. No wheezing, crackles, or rhonchi.  Cardiac: RRR S1 & S2 w/o rubs/murmurs/gallops.   Abdominal: Bowel sounds present x 4. No appreciable hepatosplenomegaly.  Skin: No jaundice, rashes, or visible lesions.  Neuro:    Stupor not following commands  Pupils 3 mm reactive  Gag/cough present  Moves all 4 exts to noxious stimuli      Vent Data:   Vent Mode: A/C  Oxygen Concentration (%):  [40] 40  Resp Rate Total:  [17 br/min-25 br/min] 25 br/min  Vt Set:  [450 mL] 450 mL  PEEP/CPAP:  [5 cmH20] 5 cmH20  Mean  Airway Pressure:  [11.7 edT76-39.5 cmH20] 13 cmH20    Lines/Drains/Airway:        Airway - Non-Surgical 06/20/17 0330 Endotracheal Tube (Active)   Secured at 25 cm 6/26/2017 11:23 PM   Measured At Lips 6/26/2017 11:23 PM   Secured Location Left 6/26/2017 11:23 PM   Secured by Commercial tube strauss 6/26/2017 11:23 PM   Bite Block secure and patent 6/26/2017 11:23 PM   Site Condition Cool;Dry 6/26/2017 11:23 PM   Status Intact;Secured;Patent 6/26/2017 11:23 PM   Site Assessment Clean;Dry 6/26/2017 11:23 PM   Cuff Pressure 28 cm H2O 6/26/2017  7:30 AM           Percutaneous Central Line Insertion/Assessment - triple lumen  06/21/17 1015 left internal jugular (Active)   Dressing biopatch in place 6/26/2017  8:00 PM   Securement secured w/ sutures 6/26/2017  8:00 PM   Additional Site Signs no erythema;no warmth;no edema;no drainage 6/26/2017  8:00 PM   Distal Patency/Care blood return present;flushed w/o difficulty;normal saline locked 6/26/2017  8:00 PM   Medial Patency/Care blood return present;flushed w/o difficulty;normal saline locked 6/26/2017  8:00 PM   Proximal Patency/Care blood return present;flushed w/o difficulty;normal saline locked 6/26/2017  8:00 PM   Waveform normal 6/26/2017  3:08 PM   Dressing Change Due 06/28/17 6/26/2017  8:00 PM   Daily Line Review Performed 6/26/2017  8:00 PM             NG/OG Tube 06/20/17 0402 orogastric 14 Fr. Left mouth (Active)   Placement Check placement verified by x-ray 6/26/2017  8:00 PM   Tube advanced (cm) 10 6/21/2017 10:46 AM   Distal Tube Length (cm) 75 6/26/2017  8:00 PM   Tolerance no signs/symptoms of discomfort 6/26/2017  8:00 PM   Securement taped to commercial device 6/26/2017  8:00 PM   Clamp Status/Tolerance unclamped;no residual 6/26/2017  8:00 PM   Suction Setting/Drainage Method low;intermittent setting 6/20/2017  7:01 PM   Insertion Site Appearance no redness, warmth, tenderness, skin breakdown, drainage 6/26/2017  8:00 PM   Drainage None 6/25/2017  3:01  PM   Flush/Irrigation flushed w/;water 6/26/2017  3:01 AM   Feeding Method continuous 6/26/2017  8:00 PM   Current Rate (mL/hr) 60 mL/hr 6/26/2017  8:00 PM   Goal Rate (mL/hr) 60 mL/hr 6/26/2017  8:00 PM   Intake (mL) 60 mL 6/25/2017  7:01 PM   Tube Output(mL)(Include Discarded Residual) 10 mL 6/25/2017 11:01 PM   Intake (mL) - Breast Milk Tube Feeding 200 6/25/2017  9:00 AM   Rate Formula Tube Feeding (mL/hr) 60 mL/hr 6/25/2017  7:01 PM   Intake (mL) - Formula Tube Feeding 0 6/26/2017 12:01 AM   Residual Amount (ml) 0 ml 6/26/2017  8:00 PM     Nutrition/Tube Feeds:   Current Diet Order   Procedures    Diet NPO     Hold tube feeding until after Lumbar Puncture.       Labs:  ABG:   Recent Labs  Lab 06/26/17  0441   PH 7.439   PO2 86   PCO2 41.8   HCO3 28.3*   POCSATURATED 97   BE 4     BMP:  Recent Labs  Lab 06/26/17  0405      K 4.2      CO2 28   BUN 57*   CREATININE 1.1   *     LFT: Lab Results   Component Value Date    AST 19 06/24/2017    ALT 9 (L) 06/24/2017    ALKPHOS 109 06/24/2017    BILITOT 0.2 06/24/2017    ALBUMIN 2.0 (L) 06/24/2017    PROT 7.0 06/24/2017     CBC:   Lab Results   Component Value Date    WBC 11.99 06/26/2017    HGB 9.0 (L) 06/26/2017    HCT 29.2 (L) 06/26/2017    MCV 85 06/26/2017     06/26/2017     Microbiology x 7d:   Microbiology Results (last 7 days)     Procedure Component Value Units Date/Time    Blood culture [[id number]] Collected:  06/21/17 1207    Order Status:  Completed Specimen:  Blood Updated:  06/26/17 1303     Blood Culture, Routine No growth after 5 days.    Blood culture [[id number]] Collected:  06/21/17 1210    Order Status:  Completed Specimen:  Blood Updated:  06/26/17 1303     Blood Culture, Routine No growth after 5 days.    CSF culture [[id number]]     Order Status:  No result Specimen:  CSF (Spinal Fluid) from CSF Tap, Tube 3     Blood culture [[id number]] Collected:  06/18/17 2222    Order Status:  Completed Specimen:  Blood from Peripheral,  Forearm, Right Updated:  06/24/17 0303     Blood Culture, Routine No growth after 5 days.    Culture, Respiratory with Gram Stain [[id number]] Collected:  06/20/17 0251    Order Status:  Completed Specimen:  Respiratory from Sputum, Induced Updated:  06/22/17 0825     Respiratory Culture Normal respiratory jose luis     Gram Stain (Respiratory) <10 epithelial cells per low power field.     Gram Stain (Respiratory) Moderate WBC's     Gram Stain (Respiratory) Few Gram positive cocci    Blood culture [[id number]] Collected:  06/18/17 2240    Order Status:  Completed Specimen:  Blood from Peripheral, Forearm, Right Updated:  06/22/17 0811     Blood Culture, Routine Gram stain tena bottle: Gram positive cocci in clusters resembling Staph     Blood Culture, Routine Results called to and read back by: April Cleveland 06/20/2017  15:25     Blood Culture, Routine --     COAGULASE-NEGATIVE STAPHYLOCOCCUS SPECIES  Organism is a probable contaminant      Urine culture [[id number]] Collected:  06/18/17 2120    Order Status:  Completed Specimen:  Urine from Urine, Catheterized Updated:  06/20/17 1023     Urine Culture, Routine No growth        Imaging:  CXR: hypoinflated lungs  MRI pending  I personally reviewed the above image.    ASSESSMENT/PLAN:     Patient Active Problem List    Diagnosis Date Noted    Encephalopathy, metabolic 06/26/2017    Morbid obesity due to excess calories 06/26/2017    ARF (acute renal failure) 06/22/2017    Acute respiratory failure 06/20/2017    Seizure 06/20/2017    Hypokalemia 06/19/2017    Hyperlipidemia 06/19/2017    Moderate protein malnutrition 06/19/2017    CAD s/p CABG 06/19/2017    Pyelonephritis 06/19/2017    Hypertensive emergency 06/18/2017    Morbid obesity with BMI of 50.0-59.9, adult 02/24/2015    S/P hysterectomy with oophorectomy 02/24/2015    Essential hypertension 05/12/2014    Type 2 diabetes mellitus, uncontrolled 05/12/2014     Plan:  Critically ill   EEG  MRI  Follow  exam  Discussed with [facility] staff Dr Shayy dexter gtt  Keep abp < 180  Check abg  Change to SIMV      critically ill at high risk of deterioration from malignant hypertension/arf and time spent communicating with epilepsy service    Total time spent   > 32 mins

## 2017-06-27 NOTE — PROGRESS NOTES
[facility]  Neurocritical Care  Progress Note    Admit Date: 6/18/2017  Service Date: 06/27/2017  Length of Stay: 9    Subjective:     Chief Complaint: Seizure    History of Present Illness: 65 yo lady who presented to emergency room with severe headache and was found to have very elevated blood pressure at OSH.  Treated with antihypertensives and developed jerking thought to be seizure.  Required intubation due to becoming unresponsive and has remained minimally interactive since then, and EEG (-) for seizures. multiple CT head taken at OSH with no reports of pathological processes identified. Transferred to [facility] for further evaluation and higher level of care.    Hospital Course: -6/27: EEG with diffuse slowing no seizures, MRI with bilateral MCA SAQIB watershed infarcts    Review of Symptoms:   Unable to obtain, intubated    Physical Exam:  GA:  comfortable, no acute distress.   HEENT: No scleral icterus or JVD.   Pulmonary: Clear to auscultation A/L. No wheezing, crackles, or rhonchi.  Cardiac: RRR S1 & S2 w/o rubs/murmurs/gallops.   Abdominal: Bowel sounds present x 4. No appreciable hepatosplenomegaly. obese  Skin: No jaundice, rashes, or visible lesions.  Pulses: 1+ DP bilat    Neuro:  --sedation: none  --GCS: [id number]  --Mental Status: lethargic   --CN II-XII grossly intact.   --Pupils 3-->2mm, PERRL.   --brainstem: strong cough and gag  --Motor: withdrawal to pain x4 extremities, no commands  --sensory: intact to soft touch and pain throughout  --Reflexes: not tested  --Gait: deferred      Recent Labs  Lab 06/27/17  0241   WBC 11.82   RBC 3.56*   HGB 9.2*   HCT 30.1*      MCV 85   MCH 25.8*   MCHC 30.6*       Recent Labs  Lab 06/27/17  0241   CALCIUM 9.3  9.3   PROT 7.3     141   K 4.3  4.3   CO2 26  26     106   BUN 53*  53*   CREATININE 0.9  0.9   ALKPHOS 148*   ALT 38   AST 58*   BILITOT 0.3       Recent Labs  Lab 06/27/17  0052   INR 1.0     Vent Mode:  SIMV  Oxygen Concentration (%):  [40] 40  Resp Rate Total:  [17 br/min-31 br/min] 29 br/min  Vt Set:  [450 mL] 450 mL  PEEP/CPAP:  [5 cmH20] 5 cmH20  Pressure Support:  [14 cmH20] 14 cmH20  Mean Airway Pressure:  [10 xgV55-98 cmH20] 11 cmH20      I have personally reviewed all labs, imaging, and studies today      Assessment/Plan:     Neuro   * Seizure    EEG negative for seizures        Cerebral infarction, watershed distribution, bilateral, acute    Bilateral watershed SAQIB/MCA infarcts  Likely secondary to relative hypoperfusion  Wean ETT as tolerated  Will address stroke risk factors also and treat  PT OT SLP        Pulmonary   Acute respiratory failure    Daily ABG  CXR  Wean as tolerated        Cardiac   CAD s/p CABG    asa        Essential hypertension    sbp goal 160-180 to avoid relative hypoperfusion        Endocrine   Type 2 diabetes mellitus, uncontrolled    RISS  aspart  Diabetic tube feeds        Fluids/Electrolytes/Nutrition/GI   Hyperlipidemia    statin        Hypokalemia    Replete daily prn        Morbid obesity with BMI of 50.0-59.9, adult    Nutrition consult            Prophylaxis:  Venous Thromboembolism: chemical  Stress Ulcer: H2B  Ventilator Pneumonia: yes     Activity Orders          Remove central line starting at 06/27 1139    Up with assistance starting at 06/26 0174        Full Code    Jac Gonzalez MD  Neurocritical Care  [facility] time 30 minutes

## 2017-06-27 NOTE — NURSING
B/P treated with hydralazine... Daughter remains at bedside.. Involved in care.. Kept abreast of plans awaiting NCC rounds

## 2017-06-27 NOTE — NURSING
Dr. Longoria in with am rounds.. POC includes CPAP trial ( currently on 5/15) dc left IJ TLC place midline cath. Consult stroke ( MRI appears that pt has multiple small ischemic strokes noted ). Senna initiated  To encourage movement. Dr Longoria also spoke with pts daughter and sister regarding current condition and future plan of care including possible trach at 2 week limit.

## 2017-06-27 NOTE — PLAN OF CARE
SW met with Pt dinah at bedside. Discussed FMLA for her to stay at hospital. Reported she needs to go to Release of Information and let this SW know if there are any additional needs.    Susan España, DIONNE  Neurocritical Care   [facility]  [id number]

## 2017-06-27 NOTE — ASSESSMENT & PLAN NOTE
Likely due to hypoperfusion event which also resulted in watershed pattern strokes on both cerebral hemispheres.  No other clear etiology for the areas of cytotoxic cerebral edema that can be seen on MRI.    No clear stroke prevention intervention other than standard risk factor modification for secondary stroke prevention.  This is a secondary cause stroke and the result of a poor global perfusion event and not a localized cerebral event.    Will sign off at this time since the true cause of her symptoms does not relate to the actual cerebral vasculature.

## 2017-06-27 NOTE — ASSESSMENT & PLAN NOTE
Bilateral watershed SAQIB/MCA infarcts  Likely secondary to relative hypoperfusion  Wean ETT as tolerated  Will address stroke risk factors also and treat  PT OT SLP

## 2017-06-27 NOTE — PROGRESS NOTES
Received intubated pt, placed on mechanical ventilation at documented settings. 7.5 ETT secured at 25cm at the lip. Will continue to monitor.

## 2017-06-27 NOTE — HOSPITAL COURSE
66 y.o. female with HTN, HLD, DMII, CAD s/p CABG who presented to ED with complaints of intractable headache.Originally patient was admitted to [facility]; while there, patient reported to have had seizure like activity; was started on eEEG; activity was noted to not have correlating epileptiform activity. Through course of night 6/19/-6/20 had multiple seizures and intubated for airway protection. She was treated there for hypertensive emergency. Neurology was consulted and patient was noted to have had continuous twitching of RUE and right face which ceased with cerebyx, ativan and propofol was administered with reported cessation of events. Per chart review, she was reported to have been hypotensive when she was placed on sedation after being intubated for airway protection. Was unable to do an MRI at [facility] given concern about weight being limiting factor. She was subsquently transferred to [facility] main campus for further workup. EEG here was unremarkable for NCSE; and showed diffuse slowing with no seizures. MRI brain was obtained; which was remarkable for bi-hemispheric watershed infarcts thought to be from relative hypotension. Patient was on Patient was started on amantadine to help improve wakefulness. Patient was also started on fluoxetine given concerns for poor engagement. Patient had no air leak when assessed for extubation trial; efforts with solumedrol for possible laryngeal edema without improvement. General surgery was consulted and patient underwent trach/peg on 7/12. While inpatient, malignant hypertension was also managed and patient required aliskerin, clonidine, coreg, amlodipine and hydralazine - hydralazine was eventually weaned off given concern that relative hypotension is probably what led to watershed infarcts to begin with and would likely benefit from slightly higher BP to maintain cerebral perfusion. Patient hemodynamically stable on the day of transfer.

## 2017-06-27 NOTE — PROGRESS NOTES
Received patient orally intubated and on a Servo I Ventilator with settings as follows:  PRVC 10/ 450/ +5 PEEP/ 40%.  Patient has a size 7.5 ET tube in place which is secured at the 24 cm mehreen at the lips on the right side of the mouth.  Patient also has a bite block in place which is located on the right side of the mouth as well.  Both the ET tube and the bite block were rotated to the left side of the mouth.  Ventilator alarms are set and functional and AMBU bag is at the HOB.

## 2017-06-27 NOTE — CONSULTS
[facility]  Vascular Neurology  Comprehensive Stroke Center  Consult Note    Inpatient consult to Vascular (Stroke) Neurology  Consult performed by: JAMES TERRAZAS  Consult ordered by: RAYMUNDO CRISTINA  Reason for consult: abnormal mri brain        Assessment/Plan:     Patient is a 66 y.o. year old female with:    Encephalopathy, metabolic    Likely due to hypoperfusion event which also resulted in watershed pattern strokes on both cerebral hemispheres.  No other clear etiology for the areas of cytotoxic cerebral edema that can be seen on MRI.    No clear stroke prevention intervention other than standard risk factor modification for secondary stroke prevention.  This is a secondary cause stroke and the result of a poor global perfusion event and not a localized cerebral event.    Will sign off at this time since the true cause of her symptoms does not relate to the actual cerebral vasculature.            Thrombolysis Candidate? No  1. Contraindications: Hypoperfuion event due to hypotension -- not a primary vascular event    Subjective:     History of Present Illness:  65 yo lady who presented to emergency room with severe headache and was found to have very elevated blood pressure.  Treated with antihypertensives and developed jerking thought to be seizure.  Required intubation due to becoming unresponsive and has remained minimally interactive since then.  She had brain imaging that showed diffuse bilateral watershed pattern infarcts on the hemispheres. We have been consulted for abnormal MRI with watershed strokes.         Past Medical History:   Diagnosis Date    Diabetes mellitus     Hypertension      Past Surgical History:   Procedure Laterality Date    ARTERIAL BYPASS SURGRY      9 tears sgo    CARDIAC SURGERY       SECTION      HYSTERECTOMY      TUBAL LIGATION       Family History   Problem Relation Age of Onset    No Known Problems Father     No Known Problems  Mother     No Known Problems Sister     No Known Problems Brother     No Known Problems Maternal Aunt     No Known Problems Maternal Uncle     No Known Problems Paternal Aunt     No Known Problems Paternal Uncle     No Known Problems Maternal Grandmother     No Known Problems Maternal Grandfather     No Known Problems Paternal Grandmother     No Known Problems Paternal Grandfather     Amblyopia Neg Hx     Blindness Neg Hx     Cancer Neg Hx     Cataracts Neg Hx     Diabetes Neg Hx     Glaucoma Neg Hx     Hypertension Neg Hx     Macular degeneration Neg Hx     Retinal detachment Neg Hx     Strabismus Neg Hx     Stroke Neg Hx     Thyroid disease Neg Hx      Social History   Substance Use Topics    Smoking status: Never Smoker    Smokeless tobacco: Never Used    Alcohol use Yes      Comment: occ     Review of patient's allergies indicates:   Allergen Reactions    Latex, natural rubber      Medications: I have reviewed the current medication administration record.    Prescriptions Prior to Admission   Medication Sig Dispense Refill Last Dose    aliskiren (TEKTURNA) 300 MG Tab Take by mouth once daily.   6/18/2017    amlodipine (NORVASC) 10 MG tablet Take 1 tablet (10 mg total) by mouth once daily. 30 tablet 3 6/18/2017    atorvastatin (LIPITOR) 10 MG tablet Take 10 mg by mouth once daily.   6/18/2017    diclofenac sodium (VOLTAREN) 1 % Gel Apply 2 g topically 2 (two) times daily. 100 g 1 6/18/2017    EASY TOUCH TWIST LANCETS 30 gauge Misc   6 6/18/2017    hydrALAZINE (APRESOLINE) 25 MG tablet Take 1 tablet (25 mg total) by mouth every 12 (twelve) hours. 60 tablet 1 6/18/2017    hydrocodone-acetaminophen 7.5-325mg (NORCO) 7.5-325 mg per tablet    6/18/2017    insulin glargine (LANTUS) 100 unit/mL injection Inject into the skin every evening.   6/18/2017    JANUMET XR 50-1,000 mg TM24 Take 1 tablet by mouth 2 (two) times daily.  5 6/18/2017    LANTUS SOLOSTAR 100 unit/mL (3 mL) InPn pen     6/18/2017    loratadine (CLARITIN) 10 mg tablet Take 10 mg by mouth once daily.  0 6/18/2017    meclizine (ANTIVERT) 25 mg tablet Take 1 tablet (25 mg total) by mouth every 6 (six) hours as needed. 20 tablet 0 6/18/2017    saxagliptin (ONGLYZA) 5 mg Tab tablet Take 1 tablet (5 mg total) by mouth once daily. 30 tablet 3 6/18/2017    TRUETEST TEST STRIPS Strp   6 6/18/2017    urea (CARMOL) 40 % Crea Apply topically 2 (two) times daily. 199 each 10 6/18/2017    aspirin 81 MG Chew Take 1 tablet (81 mg total) by mouth once daily. 30 tablet 3 3/5/2017    hydrochlorothiazide (HYDRODIURIL) 12.5 MG Tab Take 1 tablet (12.5 mg total) by mouth once daily. 30 tablet 11 3/5/2017       Review of Systems   Constitutional: Negative for chills and fever.   HENT: Negative for drooling, hearing loss, sore throat and trouble swallowing.    Eyes: Negative for visual disturbance.   Respiratory: Negative for choking and shortness of breath.    Cardiovascular: Negative for chest pain.   Gastrointestinal: Negative for blood in stool, nausea and vomiting.   Genitourinary: Negative for hematuria.   Musculoskeletal: Positive for arthralgias. Negative for back pain.   Skin: Negative for rash.   Neurological: Negative for dizziness, facial asymmetry, speech difficulty, weakness, light-headedness, numbness and headaches.   Hematological: Does not bruise/bleed easily.   Psychiatric/Behavioral: Negative for confusion and sleep disturbance.     Objective:     Vital Signs (Most Recent):  Temp: 98.2 °F (36.8 °C) (06/27/17 1200)  Pulse: 78 (06/27/17 1300)  Resp: (!) 29 (06/27/17 1300)  BP: (!) 179/77 (06/27/17 1300)  SpO2: (!) 94 % (06/27/17 1300)    Vital Signs Range (Last 24H):  Temp:  [97.9 °F (36.6 °C)-98.8 °F (37.1 °C)]   Pulse:  [71-94]   Resp:  [12-32]   BP: (143-192)/(65-86)   SpO2:  [94 %-100 %]     Physical Exam   Constitutional: She appears well-developed and well-nourished.   Obese  BMI: 29   HENT:   Head: Normocephalic.   Right  Ear: External ear normal.   Left Ear: External ear normal.   Intubated   Eyes: Conjunctivae are normal. Pupils are equal, round, and reactive to light.   Neck: Normal range of motion. Neck supple.   Cardiovascular: Normal rate and regular rhythm.    Pulmonary/Chest: Breath sounds normal.   Abdominal: Soft. Bowel sounds are normal.   Neurological: GCS eye subscore is 2 - to pain. GCS verbal subscore is 1 - none. GCS motor subscore is 5 - localizes pain.   Skin: Skin is dry.       Neurological Exam:   LOC: does not follow requests and obtunded  Language: Intubated  Speech: Intubated  Orientation: Intubated  Memory: Intubated  Visual Fields (CN II): Full  EOM (CN III, IV, VI): Full/intact  Oculocephalics: normal  Pupils (CN III, IV, VI): PERRL  Facial Sensation (CN V): Symmetric, Corneal reflex intact  Facial Movement (CN VII): symmetric facial expression  Hearing (CN VIII): unable to test  Gag Reflex (CN IX, X): not done  Shoulder/Neck (CN XI): SCM-Left: Not Done ; SCM-Right: Not Done ; Shoulder Shrug: Not done  Tongue (CN XII): to midline  Reflexes: 2+ throughout  Motor*: Arm Left:  Paretic:  3/5, Leg Left:   Paretic:  3/5, Arm Right:   Paretic:  3/5, Leg Right:   Paretic:  3/5  Cerebellar*: Not testable due to intubated  Sensation: not testable due to decreased LOC  Tone: Arm-Left: normal; Leg-Left: normal; Arm-Right: normal; Leg-Right: normal    NIH Stroke Scale:  Interval: baseline (upon arrival/admit)  Level of Consciousness: 2 - stuporous  LOC Questions: 2 - answers none correctly  LOC Commands: 2 - performs neither correctly  Best Gaze: 0 - normal  Visual: 0 - no visual loss  Facial Palsy: 0 - normal  Motor Left Arm: 2 - can't resist gravity  Motor Right Arm: 2 - can't resist gravity  Motor Left Le - can't resist gravity  Motor Right Le - can't resist gravity  Limb Ataxia: 0 - absent  Sensory: 0 - normal  Best Language: 3 - mute  Dysarthria: UN - intubated or other barrier  Extinction and Inattention: 0  - no neglect  NIH Stroke Scale Total: 17  Eve Coma Scale:  Best Eye Response: 2 - to pain  Best Motor Response: 5 - localizes pain  Best Verbal Response: 1 - none  Eve Coma Scale Total: 8  Modified Evie Scale:   Timeline: Prior to symptoms onset  Modified Kossuth Score: 0 - no symptoms        Laboratory:  CMP:   Recent Labs  Lab 06/27/17  0241   CALCIUM 9.3  9.3   ALBUMIN 2.2*   PROT 7.3     141   K 4.3  4.3   CO2 26  26     106   BUN 53*  53*   CREATININE 0.9  0.9   ALKPHOS 148*   ALT 38   AST 58*   BILITOT 0.3     BMP:   Recent Labs  Lab 06/27/17  0241     141   K 4.3  4.3     106   CO2 26  26   BUN 53*  53*   CREATININE 0.9  0.9   CALCIUM 9.3  9.3     CBC:   Recent Labs  Lab 06/27/17  0241   WBC 11.82   RBC 3.56*   HGB 9.2*   HCT 30.1*      MCV 85   MCH 25.8*   MCHC 30.6*     Lipid Panel: No results for input(s): CHOL, LDLCALC, HDL, TRIG in the last 168 hours.  Coagulation:   Recent Labs  Lab 06/27/17  0052   INR 1.0     Platelet Aggregation Study: No results for input(s): PLTAGG, PLTAGINTERP, PLTAGREGLACO, ADPPLTAGGREG in the last 168 hours.  Hgb A1C: No results for input(s): HGBA1C in the last 168 hours.  TSH: No results for input(s): TSH in the last 168 hours.    Diagnostic Results:  Brain Imaging: MRI Head. Date: 6.27.17  Acute Pathology: Watershed strokes  Location: bilateral hemispheres          Cesar Padgett MD  Comprehensive Stroke Center  Department of Vascular Neurology   [facility]

## 2017-06-27 NOTE — SUBJECTIVE & OBJECTIVE
Past Medical History:   Diagnosis Date    Diabetes mellitus     Hypertension      Past Surgical History:   Procedure Laterality Date    ARTERIAL BYPASS SURGRY      9 tears sgo    CARDIAC SURGERY       SECTION      HYSTERECTOMY      TUBAL LIGATION       Family History   Problem Relation Age of Onset    No Known Problems Father     No Known Problems Mother     No Known Problems Sister     No Known Problems Brother     No Known Problems Maternal Aunt     No Known Problems Maternal Uncle     No Known Problems Paternal Aunt     No Known Problems Paternal Uncle     No Known Problems Maternal Grandmother     No Known Problems Maternal Grandfather     No Known Problems Paternal Grandmother     No Known Problems Paternal Grandfather     Amblyopia Neg Hx     Blindness Neg Hx     Cancer Neg Hx     Cataracts Neg Hx     Diabetes Neg Hx     Glaucoma Neg Hx     Hypertension Neg Hx     Macular degeneration Neg Hx     Retinal detachment Neg Hx     Strabismus Neg Hx     Stroke Neg Hx     Thyroid disease Neg Hx      Social History   Substance Use Topics    Smoking status: Never Smoker    Smokeless tobacco: Never Used    Alcohol use Yes      Comment: occ     Review of patient's allergies indicates:   Allergen Reactions    Latex, natural rubber      Medications: I have reviewed the current medication administration record.    Prescriptions Prior to Admission   Medication Sig Dispense Refill Last Dose    aliskiren (TEKTURNA) 300 MG Tab Take by mouth once daily.   2017    amlodipine (NORVASC) 10 MG tablet Take 1 tablet (10 mg total) by mouth once daily. 30 tablet 3 2017    atorvastatin (LIPITOR) 10 MG tablet Take 10 mg by mouth once daily.   2017    diclofenac sodium (VOLTAREN) 1 % Gel Apply 2 g topically 2 (two) times daily. 100 g 1 2017    EASY TOUCH TWIST LANCETS 30 gauge Misc   6 2017    hydrALAZINE (APRESOLINE) 25 MG tablet Take 1 tablet (25 mg total) by  mouth every 12 (twelve) hours. 60 tablet 1 6/18/2017    hydrocodone-acetaminophen 7.5-325mg (NORCO) 7.5-325 mg per tablet    6/18/2017    insulin glargine (LANTUS) 100 unit/mL injection Inject into the skin every evening.   6/18/2017    JANUMET XR 50-1,000 mg TM24 Take 1 tablet by mouth 2 (two) times daily.  5 6/18/2017    LANTUS SOLOSTAR 100 unit/mL (3 mL) InPn pen    6/18/2017    loratadine (CLARITIN) 10 mg tablet Take 10 mg by mouth once daily.  0 6/18/2017    meclizine (ANTIVERT) 25 mg tablet Take 1 tablet (25 mg total) by mouth every 6 (six) hours as needed. 20 tablet 0 6/18/2017    saxagliptin (ONGLYZA) 5 mg Tab tablet Take 1 tablet (5 mg total) by mouth once daily. 30 tablet 3 6/18/2017    TRUETEST TEST STRIPS Strp   6 6/18/2017    urea (CARMOL) 40 % Crea Apply topically 2 (two) times daily. 199 each 10 6/18/2017    aspirin 81 MG Chew Take 1 tablet (81 mg total) by mouth once daily. 30 tablet 3 3/5/2017    hydrochlorothiazide (HYDRODIURIL) 12.5 MG Tab Take 1 tablet (12.5 mg total) by mouth once daily. 30 tablet 11 3/5/2017       Review of Systems   Constitutional: Negative for chills and fever.   HENT: Negative for drooling, hearing loss, sore throat and trouble swallowing.    Eyes: Negative for visual disturbance.   Respiratory: Negative for choking and shortness of breath.    Cardiovascular: Negative for chest pain.   Gastrointestinal: Negative for blood in stool, nausea and vomiting.   Genitourinary: Negative for hematuria.   Musculoskeletal: Positive for arthralgias. Negative for back pain.   Skin: Negative for rash.   Neurological: Negative for dizziness, facial asymmetry, speech difficulty, weakness, light-headedness, numbness and headaches.   Hematological: Does not bruise/bleed easily.   Psychiatric/Behavioral: Negative for confusion and sleep disturbance.     Objective:     Vital Signs (Most Recent):  Temp: 98.2 °F (36.8 °C) (06/27/17 1200)  Pulse: 78 (06/27/17 1300)  Resp: (!) 29  (06/27/17 1300)  BP: (!) 179/77 (06/27/17 1300)  SpO2: (!) 94 % (06/27/17 1300)    Vital Signs Range (Last 24H):  Temp:  [97.9 °F (36.6 °C)-98.8 °F (37.1 °C)]   Pulse:  [71-94]   Resp:  [12-32]   BP: (143-192)/(65-86)   SpO2:  [94 %-100 %]     Physical Exam   Constitutional: She appears well-developed and well-nourished.   Obese  BMI: 29   HENT:   Head: Normocephalic.   Right Ear: External ear normal.   Left Ear: External ear normal.   Intubated   Eyes: Conjunctivae are normal. Pupils are equal, round, and reactive to light.   Neck: Normal range of motion. Neck supple.   Cardiovascular: Normal rate and regular rhythm.    Pulmonary/Chest: Breath sounds normal.   Abdominal: Soft. Bowel sounds are normal.   Neurological: GCS eye subscore is 2 - to pain. GCS verbal subscore is 1 - none. GCS motor subscore is 5 - localizes pain.   Skin: Skin is dry.       Neurological Exam:   LOC: does not follow requests and obtunded  Language: Intubated  Speech: Intubated  Orientation: Intubated  Memory: Intubated  Visual Fields (CN II): Full  EOM (CN III, IV, VI): Full/intact  Oculocephalics: normal  Pupils (CN III, IV, VI): PERRL  Facial Sensation (CN V): Symmetric, Corneal reflex intact  Facial Movement (CN VII): symmetric facial expression  Hearing (CN VIII): unable to test  Gag Reflex (CN IX, X): not done  Shoulder/Neck (CN XI): SCM-Left: Not Done ; SCM-Right: Not Done ; Shoulder Shrug: Not done  Tongue (CN XII): to midline  Reflexes: 2+ throughout  Motor*: Arm Left:  Paretic:  3/5, Leg Left:   Paretic:  3/5, Arm Right:   Paretic:  3/5, Leg Right:   Paretic:  3/5  Cerebellar*: Not testable due to intubated  Sensation: not testable due to decreased LOC  Tone: Arm-Left: normal; Leg-Left: normal; Arm-Right: normal; Leg-Right: normal    NIH Stroke Scale:  Interval: baseline (upon arrival/admit)  Level of Consciousness: 2 - stuporous  LOC Questions: 2 - answers none correctly  LOC Commands: 2 - performs neither correctly  Best Gaze: 0  - normal  Visual: 0 - no visual loss  Facial Palsy: 0 - normal  Motor Left Arm: 2 - can't resist gravity  Motor Right Arm: 2 - can't resist gravity  Motor Left Le - can't resist gravity  Motor Right Le - can't resist gravity  Limb Ataxia: 0 - absent  Sensory: 0 - normal  Best Language: 3 - mute  Dysarthria: UN - intubated or other barrier  Extinction and Inattention: 0 - no neglect  NIH Stroke Scale Total: 17  Eve Coma Scale:  Best Eye Response: 2 - to pain  Best Motor Response: 5 - localizes pain  Best Verbal Response: 1 - none  Eve Coma Scale Total: 8  Modified Evie Scale:   Timeline: Prior to symptoms onset  Modified Blaine Score: 0 - no symptoms        Laboratory:  CMP:   Recent Labs  Lab 17  0241   CALCIUM 9.3  9.3   ALBUMIN 2.2*   PROT 7.3     141   K 4.3  4.3   CO2 26  26     106   BUN 53*  53*   CREATININE 0.9  0.9   ALKPHOS 148*   ALT 38   AST 58*   BILITOT 0.3     BMP:   Recent Labs  Lab 17  0241     141   K 4.3  4.3     106   CO2 26  26   BUN 53*  53*   CREATININE 0.9  0.9   CALCIUM 9.3  9.3     CBC:   Recent Labs  Lab 17  0241   WBC 11.82   RBC 3.56*   HGB 9.2*   HCT 30.1*      MCV 85   MCH 25.8*   MCHC 30.6*     Lipid Panel: No results for input(s): CHOL, LDLCALC, HDL, TRIG in the last 168 hours.  Coagulation:   Recent Labs  Lab 17  0052   INR 1.0     Platelet Aggregation Study: No results for input(s): PLTAGG, PLTAGINTERP, PLTAGREGLACO, ADPPLTAGGREG in the last 168 hours.  Hgb A1C: No results for input(s): HGBA1C in the last 168 hours.  TSH: No results for input(s): TSH in the last 168 hours.    Diagnostic Results:  Brain Imaging: MRI Head. Date: 17  Acute Pathology: Watershed strokes  Location: bilateral hemispheres

## 2017-06-27 NOTE — NURSING
IR called in morning notified of pt weight, Discussed with MD Locke, pt to be transferred to main campus as our MRI and IR beds max weight is under what pt weight is. Pt had 1mm fixed pupil upon first assessment by afternoon pt pupils were 5mm and equally reactive. Pt began to open eyes spontaneously but remained without extremity movent. When leaving shift it was noted that pt lower left leg was moving spontaneously. Pt tube feedings restarted per MD order had been held for IR procedure, pt still has not had BM MD Locke notified and stated will write PRN. Pt BP high MD Locke notified stated stroke is not ruled out okay with BP slightly high and use PRN when needed. All other vitals stable pt remains on vent with no sedation. Family at bedside and updated.

## 2017-06-27 NOTE — NURSING
To CTA of head and neck tolerated well with the exception of htn.. PRN antihypertensives added as well as b/p declined back to 163 sys

## 2017-06-27 NOTE — HPI
66yr old lady who has been transferred to [facility] for the management AMS and get an MRI. She was admitted to an OSH with headaches and high grade fevers/leucocytosis. She subsequently became unresponsive at the OSH and had to be intubated for airway protection. She was managed for hypertensive emergency. She was noticed to have R facial and RUE twitching. The twitching resolved after phosphyetoin and ativan was given. MRI was attempted but unable to do so as she exceeded weight limit.

## 2017-06-27 NOTE — HPI
67 yo lady who presented to emergency room with severe headache and was found to have very elevated blood pressure.  Treated with antihypertensives and developed jerking thought to be seizure.  Required intubation due to becoming unresponsive and has remained minimally interactive since then.  She had brain imaging that showed diffuse bilateral watershed pattern infarcts on the hemispheres. We have been consulted for abnormal MRI with watershed strokes.

## 2017-06-27 NOTE — NURSING
Report received assessment done.. Pt with withdrawal to deep stimuli only all protectives in place.OK to use OGT as per NCC. CTA of head and neck ordered. Daughter at bedside

## 2017-06-27 NOTE — PROGRESS NOTES
[facility]  Adult Nutrition  Consult Note    SUMMARY     Recommendations    Recommendation/Intervention:   Recommend increasing TF to best meet pt's needs. Diabetisource @ goal rate 70mL/hr.   -Hold for residuals >500ml.     RD to monitor.      Goals: 1) Patient will meet >=85% - 115% of EEN  Nutrition Goal Status: progressing towards goal  Communication of RD Recs: reviewed with physician    Reason for Assessment    Reason for Assessment: RD follow-up  Diagnosis:  (sepsis, fever, elevated troponin, HTN)  Relevent Medical History: DM         General Information Comments: [place] [facility] [place]. New TF ordered, not running at this time. Propofol cancelled since last RD f/u. Previously toleratinf high protein formula.    Nutrition Discharge Planning: unable to determine at this time.    Nutrition Prescription Ordered    Current Diet Order: NPO     Current Nutrition Support Formula Ordered: Diabetisource  Current Nutrition Support Rate Ordered: 50 (ml)  Current Nutrition Support Frequency Ordered: mL/hr        Evaluation of Received Nutrients/Fluid Intake    Enteral Calories (kcal): 1440  Enteral Protein (gm): 72  Enteral (Free Water) Fluid (mL): 982      Energy Calories Required: not meeting needs  % Kcal Needs: 65              Protein Required: not meeting needs  % Protein Needs: 64     IV Fluid (mL): 1800         Fluid Required: exceed needs  Comments: +I/O overall, -I/o overnight, LBM 6/18  Tolerance: tolerating  % Intake of Estimated Energy Needs: 50 - 75 %  % Meal Intake: NPO     Nutrition Risk Screen     Nutrition Risk Screen: no indicators present    Nutrition/Diet History    Patient Reported Diet/Restrictions/Preferences:  (neo)     Food Preferences: neo        Factors Affecting Nutritional Intake: NPO, on mechanical ventilation                Labs/Tests/Procedures/Meds       Pertinent Labs Reviewed: reviewed  Pertinent Labs Comments: POCT Glu 146-176  Pertinent Medications  "Reviewed: reviewed, pertinent  Pertinent Medications Comments: IVF, insulin    Physical Findings    Overall Physical Appearance: on ventilator support, obese, edematous  Tubes: orogastric tube     Skin: dermatitis, edema    Anthropometrics    Temp: 98.2 °F (36.8 °C)     Height: 5' 5" (165.1 cm)  Weight Method: Bed Scale  Weight: (!) 160.6 kg (354 lb)     Ideal Body Weight (IBW), Female: 125 lb     % Ideal Body Weight, Female (lb): 273.55 lb  BMI (Calculated): 57  BMI Grade: greater than 40 - morbid obesity                            Estimated/Assessed Needs    Weight Used For Calorie Calculations: (!) 155.1 kg (341 lb 14.9 oz) (Usual Body Weight)      Energy Calorie Requirements (kcal): 2226  Energy Need Method: [place] (modified)       RMR ([place]. Jeor Equation): 2091.88        Weight Used For Protein Calculations: 56.8 kg (125 lb 3.5 oz) (Ideal Body Weight)  Protein Requirements: 113-142g (2.0-2.5g/kg IBW)     Fluid Need Method: RDA Method, other (see comments) (or per MD)           RDA Method (mL): 2226         CHO Requirement: 50% of total kcals     Assessment and Plan    Nutrition Diagnosis  Problem:  Inadequate energy intake  Etiology:  Decreased ability to consume sufficient energy orally  Signs/Symptoms:  Intubated, NPO  Status: continues    Monitor and Evaluation    Food and Nutrient Intake: enteral nutrition intake  Food and Nutrient Adminstration: enteral and parenteral nutrition administration        Anthropometric Measurements: weight, weight change, body mass index  Biochemical Data, Medical Tests and Procedures: electrolyte and renal panel, gastrointestinal profile, glucose/endocrine profile, lipid profile  Nutrition-Focused Physical Findings: overall appearance, skin    Nutrition Risk    Level of Risk: other (see comments) (f/u 1x/week)    Nutrition Follow-Up    RD Follow-up?: Yes        "

## 2017-06-27 NOTE — NURSING TRANSFER
Nursing Transfer Note      6/26/2017     Transfer From: [facility] 266 to [facility] 7236    Transfer via stretcher    Transfer with to O2, cardiac monitoring    Transported by [facility]    Medicines sent: Novolog Flexpens: Novolog N and Levemeir    Chart send with patient: Yes    Notified: daughters at bedside     Patient reassessed at: 6/26 2200

## 2017-06-27 NOTE — NURSING
Long piv placed as per PICC team.. Left IJ TLC dcd as per order without difficulty. Pt placed back on a SIMV rate of 12 to rest

## 2017-06-28 LAB
ALLENS TEST: ABNORMAL
ALLENS TEST: ABNORMAL
ANION GAP SERPL CALC-SCNC: 10 MMOL/L
BASOPHILS # BLD AUTO: 0.02 K/UL
BASOPHILS NFR BLD: 0.2 %
BUN SERPL-MCNC: 37 MG/DL
CALCIUM SERPL-MCNC: 8.7 MG/DL
CHLORIDE SERPL-SCNC: 110 MMOL/L
CO2 SERPL-SCNC: 23 MMOL/L
CREAT SERPL-MCNC: 0.8 MG/DL
DELSYS: ABNORMAL
DELSYS: ABNORMAL
DIFFERENTIAL METHOD: ABNORMAL
EOSINOPHIL # BLD AUTO: 0.1 K/UL
EOSINOPHIL NFR BLD: 1.1 %
ERYTHROCYTE [DISTWIDTH] IN BLOOD BY AUTOMATED COUNT: 14.2 %
ERYTHROCYTE [SEDIMENTATION RATE] IN BLOOD BY WESTERGREN METHOD: 12 MM/H
EST. GFR  (AFRICAN AMERICAN): >60 ML/MIN/1.73 M^2
EST. GFR  (NON AFRICAN AMERICAN): >60 ML/MIN/1.73 M^2
FIO2: 40
FIO2: 40
FLOW: 45
GLUCOSE SERPL-MCNC: 160 MG/DL
HCO3 UR-SCNC: 26.3 MMOL/L (ref 24–28)
HCO3 UR-SCNC: 27.1 MMOL/L (ref 24–28)
HCT VFR BLD AUTO: 30.2 %
HGB BLD-MCNC: 9.3 G/DL
LYMPHOCYTES # BLD AUTO: 2 K/UL
LYMPHOCYTES NFR BLD: 17.3 %
MAGNESIUM SERPL-MCNC: 1.9 MG/DL
MCH RBC QN AUTO: 26 PG
MCHC RBC AUTO-ENTMCNC: 30.8 %
MCV RBC AUTO: 84 FL
MIN VOL: 10
MODE: ABNORMAL
MODE: ABNORMAL
MONOCYTES # BLD AUTO: 0.6 K/UL
MONOCYTES NFR BLD: 5.3 %
NEUTROPHILS # BLD AUTO: 8.6 K/UL
NEUTROPHILS NFR BLD: 75.5 %
PCO2 BLDA: 42.1 MMHG (ref 35–45)
PCO2 BLDA: 45.7 MMHG (ref 35–45)
PEEP: 5
PEEP: 5
PH SMN: 7.38 [PH] (ref 7.35–7.45)
PH SMN: 7.4 [PH] (ref 7.35–7.45)
PHOSPHATE SERPL-MCNC: 3.1 MG/DL
PIP: 24
PLATELET # BLD AUTO: 328 K/UL
PMV BLD AUTO: 9.2 FL
PO2 BLDA: 51 MMHG (ref 40–60)
PO2 BLDA: 80 MMHG (ref 80–100)
POC BE: 2 MMOL/L
POC BE: 2 MMOL/L
POC SATURATED O2: 85 % (ref 95–100)
POC SATURATED O2: 96 % (ref 95–100)
POC TCO2: 28 MMOL/L (ref 23–27)
POC TCO2: 28 MMOL/L (ref 24–29)
POCT GLUCOSE: 114 MG/DL (ref 70–110)
POCT GLUCOSE: 144 MG/DL (ref 70–110)
POCT GLUCOSE: 156 MG/DL (ref 70–110)
POCT GLUCOSE: 188 MG/DL (ref 70–110)
POCT GLUCOSE: 191 MG/DL (ref 70–110)
POCT GLUCOSE: 194 MG/DL (ref 70–110)
POTASSIUM SERPL-SCNC: 4.3 MMOL/L
PS: 10
PS: 14
RBC # BLD AUTO: 3.58 M/UL
SAMPLE: ABNORMAL
SAMPLE: ABNORMAL
SITE: ABNORMAL
SITE: ABNORMAL
SODIUM SERPL-SCNC: 143 MMOL/L
SP02: 97
SP02: 97
SPONT RATE: 22
VT: 500
WBC # BLD AUTO: 11.35 K/UL

## 2017-06-28 PROCEDURE — 25000003 PHARM REV CODE 250: Performed by: PSYCHIATRY & NEUROLOGY

## 2017-06-28 PROCEDURE — 36600 WITHDRAWAL OF ARTERIAL BLOOD: CPT

## 2017-06-28 PROCEDURE — 25000003 PHARM REV CODE 250: Performed by: PHYSICIAN ASSISTANT

## 2017-06-28 PROCEDURE — 25000003 PHARM REV CODE 250: Performed by: INTERNAL MEDICINE

## 2017-06-28 PROCEDURE — 82803 BLOOD GASES ANY COMBINATION: CPT

## 2017-06-28 PROCEDURE — 25000003 PHARM REV CODE 250: Performed by: ANESTHESIOLOGY

## 2017-06-28 PROCEDURE — 85025 COMPLETE CBC W/AUTO DIFF WBC: CPT

## 2017-06-28 PROCEDURE — 27200966 HC CLOSED SUCTION SYSTEM

## 2017-06-28 PROCEDURE — 25000003 PHARM REV CODE 250: Performed by: HOSPITALIST

## 2017-06-28 PROCEDURE — 63600175 PHARM REV CODE 636 W HCPCS: Performed by: PSYCHIATRY & NEUROLOGY

## 2017-06-28 PROCEDURE — 94003 VENT MGMT INPAT SUBQ DAY: CPT

## 2017-06-28 PROCEDURE — 83735 ASSAY OF MAGNESIUM: CPT

## 2017-06-28 PROCEDURE — 25000003 PHARM REV CODE 250: Performed by: EMERGENCY MEDICINE

## 2017-06-28 PROCEDURE — 99291 CRITICAL CARE FIRST HOUR: CPT | Mod: ,,, | Performed by: PSYCHIATRY & NEUROLOGY

## 2017-06-28 PROCEDURE — 20000000 HC ICU ROOM

## 2017-06-28 PROCEDURE — 80048 BASIC METABOLIC PNL TOTAL CA: CPT

## 2017-06-28 PROCEDURE — 63600175 PHARM REV CODE 636 W HCPCS: Performed by: PHYSICIAN ASSISTANT

## 2017-06-28 PROCEDURE — 25000242 PHARM REV CODE 250 ALT 637 W/ HCPCS: Performed by: INTERNAL MEDICINE

## 2017-06-28 PROCEDURE — 94640 AIRWAY INHALATION TREATMENT: CPT

## 2017-06-28 PROCEDURE — 27000221 HC OXYGEN, UP TO 24 HOURS

## 2017-06-28 PROCEDURE — 84100 ASSAY OF PHOSPHORUS: CPT

## 2017-06-28 RX ADMIN — INSULIN ASPART 2 UNITS: 100 INJECTION, SOLUTION INTRAVENOUS; SUBCUTANEOUS at 06:06

## 2017-06-28 RX ADMIN — AMLODIPINE BESYLATE 10 MG: 10 TABLET ORAL at 08:06

## 2017-06-28 RX ADMIN — CHLORHEXIDINE GLUCONATE 15 ML: 1.2 RINSE ORAL at 08:06

## 2017-06-28 RX ADMIN — INSULIN ASPART 1 UNITS: 100 INJECTION, SOLUTION INTRAVENOUS; SUBCUTANEOUS at 09:06

## 2017-06-28 RX ADMIN — PANTOPRAZOLE SODIUM 40 MG: 40 GRANULE, DELAYED RELEASE ORAL at 08:06

## 2017-06-28 RX ADMIN — LABETALOL HYDROCHLORIDE 10 MG: 5 INJECTION, SOLUTION INTRAVENOUS at 04:06

## 2017-06-28 RX ADMIN — HYDRALAZINE HYDROCHLORIDE 10 MG: 20 INJECTION INTRAMUSCULAR; INTRAVENOUS at 08:06

## 2017-06-28 RX ADMIN — Medication 3 ML: at 06:06

## 2017-06-28 RX ADMIN — INSULIN ASPART 1 UNITS: 100 INJECTION, SOLUTION INTRAVENOUS; SUBCUTANEOUS at 12:06

## 2017-06-28 RX ADMIN — HEPARIN SODIUM 5000 UNITS: 5000 INJECTION, SOLUTION INTRAVENOUS; SUBCUTANEOUS at 09:06

## 2017-06-28 RX ADMIN — HEPARIN SODIUM 5000 UNITS: 5000 INJECTION, SOLUTION INTRAVENOUS; SUBCUTANEOUS at 06:06

## 2017-06-28 RX ADMIN — ATORVASTATIN CALCIUM 10 MG: 10 TABLET, FILM COATED ORAL at 08:06

## 2017-06-28 RX ADMIN — IPRATROPIUM BROMIDE AND ALBUTEROL SULFATE 3 ML: .5; 3 SOLUTION RESPIRATORY (INHALATION) at 07:06

## 2017-06-28 RX ADMIN — ASPIRIN 325 MG ORAL TABLET 325 MG: 325 PILL ORAL at 08:06

## 2017-06-28 RX ADMIN — Medication 3 ML: at 09:06

## 2017-06-28 RX ADMIN — IPRATROPIUM BROMIDE AND ALBUTEROL SULFATE 3 ML: .5; 3 SOLUTION RESPIRATORY (INHALATION) at 01:06

## 2017-06-28 RX ADMIN — ALISKIREN HEMIFUMARATE 300 MG: 300 TABLET, FILM COATED ORAL at 08:06

## 2017-06-28 RX ADMIN — INSULIN ASPART 2 UNITS: 100 INJECTION, SOLUTION INTRAVENOUS; SUBCUTANEOUS at 04:06

## 2017-06-28 RX ADMIN — HYDRALAZINE HYDROCHLORIDE 100 MG: 50 TABLET ORAL at 01:06

## 2017-06-28 RX ADMIN — STANDARDIZED SENNA CONCENTRATE AND DOCUSATE SODIUM 1 TABLET: 8.6; 5 TABLET, FILM COATED ORAL at 08:06

## 2017-06-28 RX ADMIN — HYDRALAZINE HYDROCHLORIDE 100 MG: 50 TABLET ORAL at 09:06

## 2017-06-28 RX ADMIN — LABETALOL HYDROCHLORIDE 10 MG: 5 INJECTION, SOLUTION INTRAVENOUS at 10:06

## 2017-06-28 RX ADMIN — SODIUM CHLORIDE: 0.9 INJECTION, SOLUTION INTRAVENOUS at 06:06

## 2017-06-28 RX ADMIN — HYDRALAZINE HYDROCHLORIDE 100 MG: 50 TABLET ORAL at 06:06

## 2017-06-28 RX ADMIN — HEPARIN SODIUM 5000 UNITS: 5000 INJECTION, SOLUTION INTRAVENOUS; SUBCUTANEOUS at 01:06

## 2017-06-28 RX ADMIN — IPRATROPIUM BROMIDE AND ALBUTEROL SULFATE 3 ML: .5; 3 SOLUTION RESPIRATORY (INHALATION) at 08:06

## 2017-06-28 NOTE — NURSING
Report received assessment done.. Pt remains unresponsive , mild localizing but mostly withdrawal to stimuli. tolerating tube feeds. Daughter at bedside updated on POC

## 2017-06-28 NOTE — NURSING
Dr. Longoria in for AM [facility] rounds.. POC includes CPAP trial 14/5 , continue present plan of care. Daughter at bedside.. All questions answered

## 2017-06-28 NOTE — PROGRESS NOTES
[facility]  Neurocritical Care  Progress Note    Admit Date: 6/18/2017  Service Date: 06/28/2017  Length of Stay: 10    Subjective:     Chief Complaint: Seizure    History of Present Illness: 67 yo lady who presented to emergency room with severe headache and was found to have very elevated blood pressure at OSH.  Treated with antihypertensives and developed jerking thought to be seizure.  Required intubation due to becoming unresponsive and has remained minimally interactive since then, and EEG (-) for seizures. multiple CT head taken at OSH with no reports of pathological processes identified. Transferred to [facility] for further evaluation and higher level of care.    Hospital Course: -6/27: EEG with diffuse slowing no seizures, MRI with bilateral MCA SAQIB watershed infarcts    Review of Symptoms:   Unable to fully assess, neuro status    Physical Exam:  GA:  comfortable, no acute distress.   HEENT: No scleral icterus or JVD.   Pulmonary: Clear to auscultation A/L. No wheezing, crackles, or rhonchi.  Cardiac: RRR S1 & S2 w/o rubs/murmurs/gallops.   Abdominal: Bowel sounds present x 4. No appreciable hepatosplenomegaly. obese  Skin: No jaundice, rashes, or visible lesions.  Pulses: 2+ DP bilat    Neuro:  --sedation: none  --GCS: Q4C0hX5  --Mental Status: does not follow commands   --CN II-XII grossly intact.   --Pupils 3-->2mm, PERRL.   --brainstem: good cough and gag  --Motor: withdrawal throughout, no commands  --sensory: intact to soft touch and pain throughout  --Reflexes: not tested  --Gait: deferred      Recent Labs  Lab 06/28/17 0214   WBC 11.35   RBC 3.58*   HGB 9.3*   HCT 30.2*      MCV 84   MCH 26.0*   MCHC 30.8*       Recent Labs  Lab 06/28/17 0214   CALCIUM 8.7      K 4.3   CO2 23      BUN 37*   CREATININE 0.8     No results for input(s): INR, APTT in the last 24 hours.    Invalid input(s): PT  Vent Mode: Spont  Oxygen Concentration (%):  [40] 40  Resp Rate Total:   [15 br/min-38 br/min] 28 br/min  Vt Set:  [500 mL] 500 mL  PEEP/CPAP:  [5 cmH20] 5 cmH20  Pressure Support:  [10 tiU61-83 cmH20] 10 cmH20  Mean Airway Pressure:  [9.5 hdI30-66 cmH20] 9.5 cmH20      I have personally reviewed all labs, imaging, and studies today        Assessment/Plan:     Neuro   Cerebral infarction, watershed distribution, bilateral, acute    Bilateral watershed SAQIB/MCA infarcts  Likely secondary to relative hypoperfusion  Wean ETT as tolerated  Will address stroke risk factors also and treat  PT OT SLP        Pulmonary   Acute respiratory failure    Daily ABG  CXR  Wean as tolerated        Cardiac   Essential hypertension    sbp goal 160-180 to avoid relative hypoperfusion        Endocrine   Type 2 diabetes mellitus, uncontrolled    RISS  aspart  Diabetic tube feeds        Fluids/Electrolytes/Nutrition/GI   Hyperlipidemia    statin        Morbid obesity with BMI of 50.0-59.9, adult    Nutrition consult            Prophylaxis:  Venous Thromboembolism: chemical  Stress Ulcer: H2B  Ventilator Pneumonia: yes     Activity Orders          Up with assistance starting at 06/26 0878        Full Code    Jac Gonzalez MD  Neurocritical Care  [facility]    Cc time 32 minutes

## 2017-06-29 LAB
ALLENS TEST: ABNORMAL
ANION GAP SERPL CALC-SCNC: 10 MMOL/L
BASOPHILS # BLD AUTO: 0.01 K/UL
BASOPHILS NFR BLD: 0.1 %
BUN SERPL-MCNC: 24 MG/DL
CALCIUM SERPL-MCNC: 8.5 MG/DL
CHLORIDE SERPL-SCNC: 112 MMOL/L
CO2 SERPL-SCNC: 22 MMOL/L
CREAT SERPL-MCNC: 0.8 MG/DL
DELSYS: ABNORMAL
DIFFERENTIAL METHOD: ABNORMAL
EOSINOPHIL # BLD AUTO: 0.1 K/UL
EOSINOPHIL NFR BLD: 0.8 %
ERYTHROCYTE [DISTWIDTH] IN BLOOD BY AUTOMATED COUNT: 14.3 %
ERYTHROCYTE [SEDIMENTATION RATE] IN BLOOD BY WESTERGREN METHOD: 14 MM/H
EST. GFR  (AFRICAN AMERICAN): >60 ML/MIN/1.73 M^2
EST. GFR  (NON AFRICAN AMERICAN): >60 ML/MIN/1.73 M^2
FIO2: 40
FIO2: 60
FIO2: 70
FLOW: 44
GLUCOSE SERPL-MCNC: 207 MG/DL
HCO3 UR-SCNC: 27.1 MMOL/L (ref 24–28)
HCO3 UR-SCNC: 27.7 MMOL/L (ref 24–28)
HCO3 UR-SCNC: 28.2 MMOL/L (ref 24–28)
HCT VFR BLD AUTO: 31.7 %
HGB BLD-MCNC: 9.7 G/DL
LYMPHOCYTES # BLD AUTO: 1.7 K/UL
LYMPHOCYTES NFR BLD: 14.7 %
MAGNESIUM SERPL-MCNC: 2 MG/DL
MAGNESIUM SERPL-MCNC: 2 MG/DL
MCH RBC QN AUTO: 26.1 PG
MCHC RBC AUTO-ENTMCNC: 30.6 %
MCV RBC AUTO: 85 FL
MIN VOL: 12.8
MIN VOL: 13
MODE: ABNORMAL
MONOCYTES # BLD AUTO: 0.8 K/UL
MONOCYTES NFR BLD: 6.4 %
NEUTROPHILS # BLD AUTO: 9.1 K/UL
NEUTROPHILS NFR BLD: 77.1 %
PCO2 BLDA: 39.2 MMHG (ref 35–45)
PCO2 BLDA: 43.5 MMHG (ref 35–45)
PCO2 BLDA: 51 MMHG (ref 35–45)
PEEP: 5
PEEP: 5
PEEP: 8
PH SMN: 7.35 [PH] (ref 7.35–7.45)
PH SMN: 7.4 [PH] (ref 7.35–7.45)
PH SMN: 7.46 [PH] (ref 7.35–7.45)
PHOSPHATE SERPL-MCNC: 2.7 MG/DL
PHOSPHATE SERPL-MCNC: 2.7 MG/DL
PIP: 16
PLATELET # BLD AUTO: 299 K/UL
PMV BLD AUTO: 9.2 FL
PO2 BLDA: 151 MMHG (ref 80–100)
PO2 BLDA: 81 MMHG (ref 80–100)
PO2 BLDA: 85 MMHG (ref 80–100)
POC BE: 2 MMOL/L
POC BE: 3 MMOL/L
POC BE: 4 MMOL/L
POC SATURATED O2: 96 % (ref 95–100)
POC SATURATED O2: 96 % (ref 95–100)
POC SATURATED O2: 99 % (ref 95–100)
POC TCO2: 28 MMOL/L (ref 23–27)
POC TCO2: 29 MMOL/L (ref 23–27)
POC TCO2: 30 MMOL/L (ref 23–27)
POCT GLUCOSE: 175 MG/DL (ref 70–110)
POCT GLUCOSE: 181 MG/DL (ref 70–110)
POCT GLUCOSE: 186 MG/DL (ref 70–110)
POCT GLUCOSE: 191 MG/DL (ref 70–110)
POCT GLUCOSE: 243 MG/DL (ref 70–110)
POTASSIUM SERPL-SCNC: 4.8 MMOL/L
PROVIDER CREDENTIALS: ABNORMAL
PROVIDER NOTIFIED: ABNORMAL
PS: 10
RBC # BLD AUTO: 3.71 M/UL
SAMPLE: ABNORMAL
SITE: ABNORMAL
SODIUM SERPL-SCNC: 144 MMOL/L
SP02: 96
SP02: 98
SP02: 99
SPONT RATE: 21
SPONT RATE: 38
VERBAL RESULT READBACK PERFORMED: YES
VT: 420
WBC # BLD AUTO: 11.8 K/UL

## 2017-06-29 PROCEDURE — 63600175 PHARM REV CODE 636 W HCPCS: Performed by: INTERNAL MEDICINE

## 2017-06-29 PROCEDURE — 25000003 PHARM REV CODE 250: Performed by: EMERGENCY MEDICINE

## 2017-06-29 PROCEDURE — 25000003 PHARM REV CODE 250: Performed by: HOSPITALIST

## 2017-06-29 PROCEDURE — 63600175 PHARM REV CODE 636 W HCPCS: Performed by: PHYSICIAN ASSISTANT

## 2017-06-29 PROCEDURE — 27000221 HC OXYGEN, UP TO 24 HOURS

## 2017-06-29 PROCEDURE — 94640 AIRWAY INHALATION TREATMENT: CPT

## 2017-06-29 PROCEDURE — 25000003 PHARM REV CODE 250: Performed by: ANESTHESIOLOGY

## 2017-06-29 PROCEDURE — 99900026 HC AIRWAY MAINTENANCE (STAT)

## 2017-06-29 PROCEDURE — 25000003 PHARM REV CODE 250: Performed by: PSYCHIATRY & NEUROLOGY

## 2017-06-29 PROCEDURE — 83735 ASSAY OF MAGNESIUM: CPT

## 2017-06-29 PROCEDURE — 25000003 PHARM REV CODE 250: Performed by: INTERNAL MEDICINE

## 2017-06-29 PROCEDURE — 82803 BLOOD GASES ANY COMBINATION: CPT

## 2017-06-29 PROCEDURE — 94003 VENT MGMT INPAT SUBQ DAY: CPT

## 2017-06-29 PROCEDURE — 63600175 PHARM REV CODE 636 W HCPCS

## 2017-06-29 PROCEDURE — 36600 WITHDRAWAL OF ARTERIAL BLOOD: CPT

## 2017-06-29 PROCEDURE — 25000003 PHARM REV CODE 250: Performed by: PHYSICIAN ASSISTANT

## 2017-06-29 PROCEDURE — 25000242 PHARM REV CODE 250 ALT 637 W/ HCPCS: Performed by: INTERNAL MEDICINE

## 2017-06-29 PROCEDURE — 80048 BASIC METABOLIC PNL TOTAL CA: CPT

## 2017-06-29 PROCEDURE — 20000000 HC ICU ROOM

## 2017-06-29 PROCEDURE — 99900035 HC TECH TIME PER 15 MIN (STAT)

## 2017-06-29 PROCEDURE — 99291 CRITICAL CARE FIRST HOUR: CPT | Mod: ,,, | Performed by: PSYCHIATRY & NEUROLOGY

## 2017-06-29 PROCEDURE — 85025 COMPLETE CBC W/AUTO DIFF WBC: CPT

## 2017-06-29 PROCEDURE — 84100 ASSAY OF PHOSPHORUS: CPT

## 2017-06-29 RX ORDER — PROPOFOL 10 MG/ML
10 INJECTION, EMULSION INTRAVENOUS CONTINUOUS PRN
Status: DISCONTINUED | OUTPATIENT
Start: 2017-06-29 | End: 2017-07-10

## 2017-06-29 RX ORDER — PROPOFOL 10 MG/ML
INJECTION, EMULSION INTRAVENOUS
Status: COMPLETED
Start: 2017-06-29 | End: 2017-06-29

## 2017-06-29 RX ADMIN — CHLORHEXIDINE GLUCONATE 15 ML: 1.2 RINSE ORAL at 09:06

## 2017-06-29 RX ADMIN — PROPOFOL 10 MCG/KG/MIN: 10 INJECTION, EMULSION INTRAVENOUS at 07:06

## 2017-06-29 RX ADMIN — HYDRALAZINE HYDROCHLORIDE 10 MG: 20 INJECTION INTRAMUSCULAR; INTRAVENOUS at 04:06

## 2017-06-29 RX ADMIN — IPRATROPIUM BROMIDE AND ALBUTEROL SULFATE 3 ML: .5; 3 SOLUTION RESPIRATORY (INHALATION) at 02:06

## 2017-06-29 RX ADMIN — IPRATROPIUM BROMIDE AND ALBUTEROL SULFATE 3 ML: .5; 3 SOLUTION RESPIRATORY (INHALATION) at 07:06

## 2017-06-29 RX ADMIN — ASPIRIN 325 MG ORAL TABLET 325 MG: 325 PILL ORAL at 09:06

## 2017-06-29 RX ADMIN — INSULIN ASPART 4 UNITS: 100 INJECTION, SOLUTION INTRAVENOUS; SUBCUTANEOUS at 07:06

## 2017-06-29 RX ADMIN — IPRATROPIUM BROMIDE AND ALBUTEROL SULFATE 3 ML: .5; 3 SOLUTION RESPIRATORY (INHALATION) at 12:06

## 2017-06-29 RX ADMIN — INSULIN ASPART 2 UNITS: 100 INJECTION, SOLUTION INTRAVENOUS; SUBCUTANEOUS at 11:06

## 2017-06-29 RX ADMIN — HEPARIN SODIUM 5000 UNITS: 5000 INJECTION, SOLUTION INTRAVENOUS; SUBCUTANEOUS at 05:06

## 2017-06-29 RX ADMIN — STANDARDIZED SENNA CONCENTRATE AND DOCUSATE SODIUM 1 TABLET: 8.6; 5 TABLET, FILM COATED ORAL at 09:06

## 2017-06-29 RX ADMIN — Medication 3 ML: at 01:06

## 2017-06-29 RX ADMIN — HYDRALAZINE HYDROCHLORIDE 100 MG: 50 TABLET ORAL at 01:06

## 2017-06-29 RX ADMIN — PANTOPRAZOLE SODIUM 40 MG: 40 GRANULE, DELAYED RELEASE ORAL at 09:06

## 2017-06-29 RX ADMIN — ALISKIREN HEMIFUMARATE 300 MG: 300 TABLET, FILM COATED ORAL at 09:06

## 2017-06-29 RX ADMIN — HEPARIN SODIUM 5000 UNITS: 5000 INJECTION, SOLUTION INTRAVENOUS; SUBCUTANEOUS at 01:06

## 2017-06-29 RX ADMIN — CHLORHEXIDINE GLUCONATE 15 ML: 1.2 RINSE ORAL at 10:06

## 2017-06-29 RX ADMIN — Medication 3 ML: at 09:06

## 2017-06-29 RX ADMIN — HYDRALAZINE HYDROCHLORIDE 100 MG: 50 TABLET ORAL at 10:06

## 2017-06-29 RX ADMIN — HEPARIN SODIUM 5000 UNITS: 5000 INJECTION, SOLUTION INTRAVENOUS; SUBCUTANEOUS at 10:06

## 2017-06-29 RX ADMIN — HYDRALAZINE HYDROCHLORIDE 100 MG: 50 TABLET ORAL at 05:06

## 2017-06-29 RX ADMIN — Medication 3 ML: at 10:06

## 2017-06-29 RX ADMIN — ATORVASTATIN CALCIUM 10 MG: 10 TABLET, FILM COATED ORAL at 09:06

## 2017-06-29 RX ADMIN — SODIUM CHLORIDE: 0.9 INJECTION, SOLUTION INTRAVENOUS at 10:06

## 2017-06-29 RX ADMIN — INSULIN ASPART 2 UNITS: 100 INJECTION, SOLUTION INTRAVENOUS; SUBCUTANEOUS at 04:06

## 2017-06-29 RX ADMIN — LORAZEPAM 2 MG: 2 INJECTION INTRAMUSCULAR; INTRAVENOUS at 06:06

## 2017-06-29 NOTE — NURSING
0540-pt moving left arm, pulling on iv tubing, iv accidentally pulled out by patient, patient tachycardic, tachypnic, and restless, charge and RN multiple attempts to start iv, iv started to left ac, iv ativan given per prn orders, MD at bedside, pt bagged for mucous plug and suctioned. See orders for all new orders, propofol started, hr now 110's sbp 120's, pt resting, new vent settings noted

## 2017-06-29 NOTE — PLAN OF CARE
06/29/17 0829   Discharge Reassessment   Assessment Type Discharge Planning Reassessment   Can the patient answer the patient profile reliably? No, cognitively impaired   How does the patient rate their overall health at the present time? (neo)   Describe the patient's ability to walk at the present time. Does not walk or unable to take any steps at all   How often would a person be available to care for the patient? Occasionally   Number of comorbid conditions (as recorded on the chart) Five or more   During the past month, has the patient often been bothered by feeling down, depressed or hopeless? (neo)   During the past month, has the patient often been bothered by little interest or pleasure in doing things? (neo)   Discharge plan remains the same: No   Provided patient/caregiver education on the expected discharge date and the discharge plan No   Discharge Plan A Long-term acute care facility (LTAC)   Discharge Plan B Skilled Nursing Facility   Change in patient condition or support system No   Patient choice form signed by patient/caregiver No   Explained to the the patient/caregiver why the discharge planned changed: No   Involved the patient/caregiver in establishing a new discharge plan: No     Discharge plan to be determined once medically stable.    Pat Perez   [id number]

## 2017-06-30 LAB
ALLENS TEST: ABNORMAL
ANION GAP SERPL CALC-SCNC: 7 MMOL/L
BASOPHILS # BLD AUTO: 0.01 K/UL
BASOPHILS NFR BLD: 0.1 %
BUN SERPL-MCNC: 27 MG/DL
CALCIUM SERPL-MCNC: 8.7 MG/DL
CHLORIDE SERPL-SCNC: 112 MMOL/L
CO2 SERPL-SCNC: 25 MMOL/L
CORTIS SERPL-MCNC: 14.4 UG/DL
CREAT SERPL-MCNC: 0.8 MG/DL
DELSYS: ABNORMAL
DIFFERENTIAL METHOD: ABNORMAL
EOSINOPHIL # BLD AUTO: 0.1 K/UL
EOSINOPHIL NFR BLD: 0.7 %
ERYTHROCYTE [DISTWIDTH] IN BLOOD BY AUTOMATED COUNT: 14.2 %
EST. GFR  (AFRICAN AMERICAN): >60 ML/MIN/1.73 M^2
EST. GFR  (NON AFRICAN AMERICAN): >60 ML/MIN/1.73 M^2
FOLATE SERPL-MCNC: 6.5 NG/ML
GLUCOSE SERPL-MCNC: 185 MG/DL
HCO3 UR-SCNC: 27.5 MMOL/L (ref 24–28)
HCT VFR BLD AUTO: 30.7 %
HGB BLD-MCNC: 9.4 G/DL
LYMPHOCYTES # BLD AUTO: 1.8 K/UL
LYMPHOCYTES NFR BLD: 14.8 %
MAGNESIUM SERPL-MCNC: 1.9 MG/DL
MCH RBC QN AUTO: 26.3 PG
MCHC RBC AUTO-ENTMCNC: 30.6 %
MCV RBC AUTO: 86 FL
MODE: ABNORMAL
MONOCYTES # BLD AUTO: 0.7 K/UL
MONOCYTES NFR BLD: 5.6 %
NEUTROPHILS # BLD AUTO: 9.7 K/UL
NEUTROPHILS NFR BLD: 78.1 %
PCO2 BLDA: 43.1 MMHG (ref 35–45)
PH SMN: 7.41 [PH] (ref 7.35–7.45)
PHOSPHATE SERPL-MCNC: 2.5 MG/DL
PLATELET # BLD AUTO: 338 K/UL
PMV BLD AUTO: 9.5 FL
PO2 BLDA: 98 MMHG (ref 80–100)
POC BE: 3 MMOL/L
POC SATURATED O2: 98 % (ref 95–100)
POC TCO2: 29 MMOL/L (ref 23–27)
POCT GLUCOSE: 188 MG/DL (ref 70–110)
POCT GLUCOSE: 197 MG/DL (ref 70–110)
POCT GLUCOSE: 203 MG/DL (ref 70–110)
POCT GLUCOSE: 204 MG/DL (ref 70–110)
POCT GLUCOSE: 216 MG/DL (ref 70–110)
POCT GLUCOSE: 232 MG/DL (ref 70–110)
POTASSIUM SERPL-SCNC: 4.4 MMOL/L
RBC # BLD AUTO: 3.57 M/UL
SAMPLE: ABNORMAL
SITE: ABNORMAL
SODIUM SERPL-SCNC: 144 MMOL/L
TSH SERPL DL<=0.005 MIU/L-ACNC: 2.49 UIU/ML
VIT B12 SERPL-MCNC: 652 PG/ML
WBC # BLD AUTO: 12.43 K/UL

## 2017-06-30 PROCEDURE — 36600 WITHDRAWAL OF ARTERIAL BLOOD: CPT

## 2017-06-30 PROCEDURE — 85347 COAGULATION TIME ACTIVATED: CPT

## 2017-06-30 PROCEDURE — 99233 SBSQ HOSP IP/OBS HIGH 50: CPT | Mod: ,,, | Performed by: PSYCHIATRY & NEUROLOGY

## 2017-06-30 PROCEDURE — 25000003 PHARM REV CODE 250: Performed by: INTERNAL MEDICINE

## 2017-06-30 PROCEDURE — 25000003 PHARM REV CODE 250: Performed by: PHYSICIAN ASSISTANT

## 2017-06-30 PROCEDURE — 20000000 HC ICU ROOM

## 2017-06-30 PROCEDURE — 84305 ASSAY OF SOMATOMEDIN: CPT

## 2017-06-30 PROCEDURE — 99900026 HC AIRWAY MAINTENANCE (STAT)

## 2017-06-30 PROCEDURE — 80048 BASIC METABOLIC PNL TOTAL CA: CPT

## 2017-06-30 PROCEDURE — 25000003 PHARM REV CODE 250: Performed by: HOSPITALIST

## 2017-06-30 PROCEDURE — 82607 VITAMIN B-12: CPT

## 2017-06-30 PROCEDURE — 84443 ASSAY THYROID STIM HORMONE: CPT

## 2017-06-30 PROCEDURE — 94640 AIRWAY INHALATION TREATMENT: CPT

## 2017-06-30 PROCEDURE — 63600175 PHARM REV CODE 636 W HCPCS: Performed by: PHYSICIAN ASSISTANT

## 2017-06-30 PROCEDURE — 84100 ASSAY OF PHOSPHORUS: CPT

## 2017-06-30 PROCEDURE — 25000003 PHARM REV CODE 250

## 2017-06-30 PROCEDURE — 82533 TOTAL CORTISOL: CPT

## 2017-06-30 PROCEDURE — 85025 COMPLETE CBC W/AUTO DIFF WBC: CPT

## 2017-06-30 PROCEDURE — 27200966 HC CLOSED SUCTION SYSTEM

## 2017-06-30 PROCEDURE — 82746 ASSAY OF FOLIC ACID SERUM: CPT

## 2017-06-30 PROCEDURE — 25000003 PHARM REV CODE 250: Performed by: PSYCHIATRY & NEUROLOGY

## 2017-06-30 PROCEDURE — 25000003 PHARM REV CODE 250: Performed by: ANESTHESIOLOGY

## 2017-06-30 PROCEDURE — 25000242 PHARM REV CODE 250 ALT 637 W/ HCPCS: Performed by: INTERNAL MEDICINE

## 2017-06-30 PROCEDURE — 27000221 HC OXYGEN, UP TO 24 HOURS

## 2017-06-30 PROCEDURE — 94761 N-INVAS EAR/PLS OXIMETRY MLT: CPT

## 2017-06-30 PROCEDURE — 94003 VENT MGMT INPAT SUBQ DAY: CPT

## 2017-06-30 PROCEDURE — 25000003 PHARM REV CODE 250: Performed by: EMERGENCY MEDICINE

## 2017-06-30 PROCEDURE — 99900035 HC TECH TIME PER 15 MIN (STAT)

## 2017-06-30 PROCEDURE — 83735 ASSAY OF MAGNESIUM: CPT

## 2017-06-30 RX ORDER — CARVEDILOL 25 MG/1
25 TABLET ORAL 2 TIMES DAILY
Status: DISCONTINUED | OUTPATIENT
Start: 2017-06-30 | End: 2017-07-14 | Stop reason: HOSPADM

## 2017-06-30 RX ORDER — POTASSIUM CHLORIDE 20 MEQ/15ML
40 SOLUTION ORAL
Status: DISCONTINUED | OUTPATIENT
Start: 2017-06-30 | End: 2017-06-30

## 2017-06-30 RX ORDER — POTASSIUM CHLORIDE 20 MEQ/15ML
40 SOLUTION ORAL
Status: DISCONTINUED | OUTPATIENT
Start: 2017-06-30 | End: 2017-07-14 | Stop reason: HOSPADM

## 2017-06-30 RX ORDER — SODIUM,POTASSIUM PHOSPHATES 280-250MG
2 POWDER IN PACKET (EA) ORAL
Status: DISCONTINUED | OUTPATIENT
Start: 2017-06-30 | End: 2017-07-14 | Stop reason: HOSPADM

## 2017-06-30 RX ORDER — ALBUMIN HUMAN 250 G/1000ML
50 SOLUTION INTRAVENOUS ONCE
Status: DISCONTINUED | OUTPATIENT
Start: 2017-06-30 | End: 2017-06-30

## 2017-06-30 RX ORDER — SODIUM,POTASSIUM PHOSPHATES 280-250MG
2 POWDER IN PACKET (EA) ORAL
Status: DISCONTINUED | OUTPATIENT
Start: 2017-06-30 | End: 2017-06-30

## 2017-06-30 RX ORDER — NICARDIPINE HYDROCHLORIDE 0.2 MG/ML
1 INJECTION INTRAVENOUS CONTINUOUS
Status: DISCONTINUED | OUTPATIENT
Start: 2017-06-30 | End: 2017-07-03

## 2017-06-30 RX ORDER — NICARDIPINE HYDROCHLORIDE 0.2 MG/ML
INJECTION INTRAVENOUS
Status: COMPLETED
Start: 2017-06-30 | End: 2017-06-30

## 2017-06-30 RX ORDER — LANOLIN ALCOHOL/MO/W.PET/CERES
800 CREAM (GRAM) TOPICAL
Status: DISCONTINUED | OUTPATIENT
Start: 2017-06-30 | End: 2017-07-14 | Stop reason: HOSPADM

## 2017-06-30 RX ORDER — POTASSIUM CHLORIDE 20 MEQ/15ML
60 SOLUTION ORAL
Status: DISCONTINUED | OUTPATIENT
Start: 2017-06-30 | End: 2017-07-14 | Stop reason: HOSPADM

## 2017-06-30 RX ORDER — AMLODIPINE BESYLATE 10 MG/1
10 TABLET ORAL DAILY
Status: DISCONTINUED | OUTPATIENT
Start: 2017-07-01 | End: 2017-07-14 | Stop reason: HOSPADM

## 2017-06-30 RX ORDER — ALISKIREN 300 MG/1
300 TABLET, FILM COATED ORAL DAILY
Status: DISCONTINUED | OUTPATIENT
Start: 2017-07-01 | End: 2017-07-14 | Stop reason: HOSPADM

## 2017-06-30 RX ORDER — HYDRALAZINE HYDROCHLORIDE 50 MG/1
100 TABLET, FILM COATED ORAL EVERY 8 HOURS
Status: DISCONTINUED | OUTPATIENT
Start: 2017-06-30 | End: 2017-07-11

## 2017-06-30 RX ORDER — ACETAMINOPHEN 650 MG/20.3ML
650 LIQUID ORAL EVERY 6 HOURS PRN
Status: DISCONTINUED | OUTPATIENT
Start: 2017-06-30 | End: 2017-07-14 | Stop reason: HOSPADM

## 2017-06-30 RX ORDER — ATORVASTATIN CALCIUM 10 MG/1
10 TABLET, FILM COATED ORAL DAILY
Status: DISCONTINUED | OUTPATIENT
Start: 2017-07-01 | End: 2017-07-14 | Stop reason: HOSPADM

## 2017-06-30 RX ORDER — LANOLIN ALCOHOL/MO/W.PET/CERES
800 CREAM (GRAM) TOPICAL
Status: DISCONTINUED | OUTPATIENT
Start: 2017-06-30 | End: 2017-06-30

## 2017-06-30 RX ORDER — AMOXICILLIN 250 MG
1 CAPSULE ORAL 2 TIMES DAILY
Status: DISCONTINUED | OUTPATIENT
Start: 2017-06-30 | End: 2017-07-05

## 2017-06-30 RX ORDER — BISACODYL 10 MG
10 SUPPOSITORY, RECTAL RECTAL DAILY
Status: DISCONTINUED | OUTPATIENT
Start: 2017-06-30 | End: 2017-07-05

## 2017-06-30 RX ORDER — POTASSIUM CHLORIDE 20 MEQ/15ML
60 SOLUTION ORAL
Status: DISCONTINUED | OUTPATIENT
Start: 2017-06-30 | End: 2017-06-30

## 2017-06-30 RX ADMIN — IPRATROPIUM BROMIDE AND ALBUTEROL SULFATE 3 ML: .5; 3 SOLUTION RESPIRATORY (INHALATION) at 07:06

## 2017-06-30 RX ADMIN — Medication 3 ML: at 02:06

## 2017-06-30 RX ADMIN — IPRATROPIUM BROMIDE AND ALBUTEROL SULFATE 3 ML: .5; 3 SOLUTION RESPIRATORY (INHALATION) at 01:06

## 2017-06-30 RX ADMIN — CARVEDILOL 25 MG: 25 TABLET, FILM COATED ORAL at 09:06

## 2017-06-30 RX ADMIN — CHLORHEXIDINE GLUCONATE 15 ML: 1.2 RINSE ORAL at 08:06

## 2017-06-30 RX ADMIN — HEPARIN SODIUM 5000 UNITS: 5000 INJECTION, SOLUTION INTRAVENOUS; SUBCUTANEOUS at 09:06

## 2017-06-30 RX ADMIN — HYDRALAZINE HYDROCHLORIDE 100 MG: 50 TABLET ORAL at 02:06

## 2017-06-30 RX ADMIN — NICARDIPINE HYDROCHLORIDE 2.5 MG/HR: 0.2 INJECTION, SOLUTION INTRAVENOUS at 07:06

## 2017-06-30 RX ADMIN — STANDARDIZED SENNA CONCENTRATE AND DOCUSATE SODIUM 1 TABLET: 8.6; 5 TABLET, FILM COATED ORAL at 09:06

## 2017-06-30 RX ADMIN — ALISKIREN HEMIFUMARATE 300 MG: 300 TABLET, FILM COATED ORAL at 08:06

## 2017-06-30 RX ADMIN — ASPIRIN 325 MG ORAL TABLET 325 MG: 325 PILL ORAL at 08:06

## 2017-06-30 RX ADMIN — INSULIN ASPART 6 UNITS: 100 INJECTION, SOLUTION INTRAVENOUS; SUBCUTANEOUS at 11:06

## 2017-06-30 RX ADMIN — PANTOPRAZOLE SODIUM 40 MG: 40 GRANULE, DELAYED RELEASE ORAL at 08:06

## 2017-06-30 RX ADMIN — LABETALOL HYDROCHLORIDE 10 MG: 5 INJECTION, SOLUTION INTRAVENOUS at 05:06

## 2017-06-30 RX ADMIN — NICARDIPINE HYDROCHLORIDE 2.5 MG/HR: 0.2 INJECTION INTRAVENOUS at 07:06

## 2017-06-30 RX ADMIN — INSULIN ASPART 3 UNITS: 100 INJECTION, SOLUTION INTRAVENOUS; SUBCUTANEOUS at 10:06

## 2017-06-30 RX ADMIN — HEPARIN SODIUM 5000 UNITS: 5000 INJECTION, SOLUTION INTRAVENOUS; SUBCUTANEOUS at 05:06

## 2017-06-30 RX ADMIN — CHLORHEXIDINE GLUCONATE 15 ML: 1.2 RINSE ORAL at 09:06

## 2017-06-30 RX ADMIN — HYDRALAZINE HYDROCHLORIDE 100 MG: 50 TABLET ORAL at 05:06

## 2017-06-30 RX ADMIN — ATORVASTATIN CALCIUM 10 MG: 10 TABLET, FILM COATED ORAL at 08:06

## 2017-06-30 RX ADMIN — HYDRALAZINE HYDROCHLORIDE 100 MG: 50 TABLET ORAL at 10:06

## 2017-06-30 RX ADMIN — AMLODIPINE BESYLATE 10 MG: 10 TABLET ORAL at 08:06

## 2017-06-30 RX ADMIN — IPRATROPIUM BROMIDE AND ALBUTEROL SULFATE 3 ML: .5; 3 SOLUTION RESPIRATORY (INHALATION) at 12:06

## 2017-06-30 RX ADMIN — HYDRALAZINE HYDROCHLORIDE 10 MG: 20 INJECTION INTRAMUSCULAR; INTRAVENOUS at 06:06

## 2017-06-30 RX ADMIN — STANDARDIZED SENNA CONCENTRATE AND DOCUSATE SODIUM 1 TABLET: 8.6; 5 TABLET, FILM COATED ORAL at 08:06

## 2017-06-30 RX ADMIN — INSULIN ASPART 2 UNITS: 100 INJECTION, SOLUTION INTRAVENOUS; SUBCUTANEOUS at 01:06

## 2017-06-30 RX ADMIN — HEPARIN SODIUM 5000 UNITS: 5000 INJECTION, SOLUTION INTRAVENOUS; SUBCUTANEOUS at 02:06

## 2017-06-30 RX ADMIN — INSULIN ASPART 2 UNITS: 100 INJECTION, SOLUTION INTRAVENOUS; SUBCUTANEOUS at 06:06

## 2017-06-30 RX ADMIN — BISACODYL 10 MG: 10 SUPPOSITORY RECTAL at 11:06

## 2017-06-30 NOTE — PROGRESS NOTES
ICU Progress Note  Neurocritical Care    Admit Date: 6/18/2017  LOS: 12    CC: Cerebral infarction, watershed distribution, bilateral, acute    Code Status: Full Code     SUBJECTIVE:     Interval History/Significant Events: no acute events overnight    Review of Symptoms: intubated cannot participate    Medications:  Continuous Infusions:   propofol Stopped (06/29/17 0915)     Scheduled Meds:   albuterol-ipratropium 2.5mg-0.5mg/3mL  3 mL Nebulization Q6H    [START ON 7/1/2017] aliskiren  300 mg Per NG tube Daily    [START ON 7/1/2017] amlodipine  10 mg Per NG tube Daily    aspirin  325 mg Per NG tube Daily    [START ON 7/1/2017] atorvastatin  10 mg Per NG tube Daily    bisacodyl  10 mg Rectal Daily    chlorhexidine  15 mL Mouth/Throat BID    heparin (porcine)  5,000 Units Subcutaneous Q8H    hydrALAZINE  100 mg Per NG tube Q8H    lorazepam  2 mg Intravenous Once    pantoprazole  40 mg Per NG tube Daily    senna-docusate 8.6-50 mg  1 tablet Per NG tube BID    sodium chloride 0.9%  3 mL Intravenous Q8H     PRN Meds:.acetaminophen, dextrose 50%, glucagon (human recombinant), hydrALAZINE, insulin aspart, labetalol, lorazepam, magnesium oxide, magnesium oxide, ondansetron, pneumoc 13-finn conj-dip cr(PF), potassium chloride 10%, potassium chloride 10%, potassium chloride 10%, potassium, sodium phosphates, potassium, sodium phosphates, potassium, sodium phosphates, promethazine (PHENERGAN) IVPB, propofol    OBJECTIVE:   Vital Signs (Most Recent):   Temp: 99.2 °F (37.3 °C) (06/30/17 1100)  Pulse: 92 (06/30/17 1712)  Resp: 20 (06/30/17 1712)  BP: (!) 213/89 (06/30/17 1712)  SpO2: 99 % (06/30/17 1712)    Vital Signs (24h Range):   Temp:  [98.5 °F (36.9 °C)-99.2 °F (37.3 °C)] 99.2 °F (37.3 °C)  Pulse:  [75-95] 92  Resp:  [0-53] 20  SpO2:  [97 %-100 %] 99 %  BP: (133-213)/(63-93) 213/89    ICP/CPP (Last 24h):        I & O (Last 24h):   Intake/Output Summary (Last 24 hours) at 06/30/17 1806  Last data filed at  06/30/17 1500   Gross per 24 hour   Intake             2240 ml   Output              600 ml   Net             1640 ml     Physical Exam:  GA: drowsy, comfortable, no acute distress.   HEENT: No scleral icterus or JVD.   Pulmonary: Clear to auscultation A/P/L.   Cardiac: RRR S1 & S2 .   Abdominal: Bowel sounds present x 4. No appreciable hepatosplenomegaly.  Skin: No jaundice, rashes, or visible lesions.  Neuro:  --Mental Status:  Drowsy, spontaneous eye opening. Appears to track. Does not follow commands  --Pupils 3.5 mm, PERRL.   --Corneal reflex, gag, cough intact.  Spontaneous movement of kathryn ue and le    Vent Data:   Vent Mode: SIMV  Oxygen Concentration (%):  [60] 60  Resp Rate Total:  [16 br/min-26 br/min] 16 br/min  Vt Set:  [420 mL] 420 mL  PEEP/CPAP:  [5 cmH20] 5 cmH20  Pressure Support:  [10 pgD94-73 cmH20] 14 cmH20  Mean Airway Pressure:  [10 anM64-40 cmH20] 10 cmH20    Lines/Drains/Airway:        Airway - Non-Surgical 06/20/17 0330 Endotracheal Tube (Active)   Secured at 26 cm 6/30/2017  3:50 PM   Measured At Lips 6/30/2017  3:50 PM   Secured Location Left 6/30/2017  3:50 PM   Secured by Commercial tube strauss 6/30/2017  3:50 PM   Bite Block left 6/30/2017  3:50 PM   Site Condition Cool;Dry 6/30/2017  3:50 PM   Status Intact;Secured;Patent 6/30/2017  3:50 PM   Site Assessment Clean;Dry;No bleeding;No drainage 6/30/2017  3:50 PM   Cuff Pressure 30 cm H2O 6/29/2017  7:50 AM               NG/OG Tube 06/20/17 0402 orogastric 14 Fr. Left mouth (Active)   Placement Check placement verified by x-ray;placement verified by distal tube length measurement 6/30/2017  3:01 AM   Tube advanced (cm) 10 6/21/2017 10:46 AM   Distal Tube Length (cm) 70 6/30/2017  3:01 AM   Tolerance no signs/symptoms of discomfort 6/30/2017  3:01 AM   Securement taped to commercial device 6/30/2017  3:01 AM   Clamp Status/Tolerance unclamped 6/30/2017  3:01 AM   Suction Setting/Drainage Method low;intermittent setting 6/20/2017  7:01 PM    Insertion Site Appearance no redness, warmth, tenderness, skin breakdown, drainage 6/30/2017  3:01 AM   Drainage None 6/30/2017  3:01 AM   Flush/Irrigation irrigated w/;water;no resistance met 6/30/2017  3:01 AM   Feeding Method continuous 6/30/2017  3:01 AM   Current Rate (mL/hr) 50 mL/hr 6/30/2017  3:01 AM   Goal Rate (mL/hr) 50 mL/hr 6/30/2017  3:01 AM   Intake (mL) 60 mL 6/30/2017  6:00 AM   Tube Output(mL)(Include Discarded Residual) 0 mL 6/28/2017  7:00 AM   Intake (mL) - Breast Milk Tube Feeding 200 6/25/2017  9:00 AM   Rate Formula Tube Feeding (mL/hr) 50 mL/hr 6/28/2017  7:00 PM   Intake (mL) - Formula Tube Feeding 50 6/30/2017  3:00 PM   Residual Amount (ml) 0 ml 6/29/2017  3:00 PM       Female External Urinary Catheter 06/27/17 0000 (Active)   Skin no redness;no breakdown 6/30/2017  3:01 AM   Tolerance no signs/symptoms of discomfort 6/30/2017  3:01 AM   Suction Continuous suction at 40 mmHg 6/29/2017  7:01 PM   Date of last wick change 06/29/17 6/29/2017  7:01 PM   Time of last wick change 1500 6/28/2017  3:00 PM   Output (mL) 600 mL 6/30/2017  6:00 AM     Nutrition/Tube Feeds (if NPO state why): tf     Labs:  ABG:   Recent Labs  Lab 06/30/17  0414   PH 7.414   PO2 98   PCO2 43.1   HCO3 27.5   POCSATURATED 98   BE 3     BMP:  Recent Labs  Lab 06/30/17  0250      K 4.4   *   CO2 25   BUN 27*   CREATININE 0.8   *   MG 1.9   PHOS 2.5*     LFT: Lab Results   Component Value Date    AST 58 (H) 06/27/2017    ALT 38 06/27/2017    ALKPHOS 148 (H) 06/27/2017    BILITOT 0.3 06/27/2017    ALBUMIN 2.2 (L) 06/27/2017    PROT 7.3 06/27/2017     CBC:   Lab Results   Component Value Date    WBC 12.43 06/30/2017    HGB 9.4 (L) 06/30/2017    HCT 30.7 (L) 06/30/2017    MCV 86 06/30/2017     06/30/2017     Microbiology x 7d:   Microbiology Results (last 7 days)     Procedure Component Value Units Date/Time    Blood culture [[id number]] Collected:  06/21/17 1207    Order Status:  Completed  Specimen:  Blood Updated:  06/26/17 1303     Blood Culture, Routine No growth after 5 days.    Blood culture [[id number]] Collected:  06/21/17 1210    Order Status:  Completed Specimen:  Blood Updated:  06/26/17 1303     Blood Culture, Routine No growth after 5 days.    CSF culture [[id number]]     Order Status:  No result Specimen:  CSF (Spinal Fluid) from CSF Tap, Tube 3     Blood culture [[id number]] Collected:  06/18/17 2225    Order Status:  Completed Specimen:  Blood from Peripheral, Forearm, Right Updated:  06/24/17 0303     Blood Culture, Routine No growth after 5 days.        Imaging:    I personally reviewed the above image.    ASSESSMENT/PLAN:     Active Hospital Problems    Diagnosis    *Cerebral infarction, watershed distribution, bilateral, acute    Encephalopathy, metabolic    Morbid obesity due to excess calories    ARF (acute renal failure)    Acute respiratory failure    Seizure    Hypokalemia    Hyperlipidemia    CAD s/p CABG    Pyelonephritis    Hypertensive emergency    Morbid obesity with BMI of 50.0-59.9, adult    Essential hypertension    Type 2 diabetes mellitus, uncontrolled      Neuro:   Bilateral watershed stroke from profound hypotension.  No obvious proximal extra or intracranial disease.  encephalopathy of unclear etiology - send tsh, b12, folate, cortisol  EEG neg for NCSE    Pulmonary:   Begin vent weaning attempts again    Cardiac:   Hemodynamically stable  bld pr controlled    Renal:    Making urine    ID:   Afebrile, normal wbc    Hem/Onc:   Stable hh    Endocrine:    BS stable  Send IGF    Fluids/Electrolytes/Nutrition/GI:   tf  Lines:  Art NA  CVC NA  ETT +  Ross NA  NG +  PEG NA    Proph:  DVT: SCD  Constipation:  Last output:bowel regiemn  PUP:protonix  VAP:peridex    Uninterrupted Critical Care/Counseling Time (not including procedures):: VAN Villanueva MD  Neurocritical care attending

## 2017-06-30 NOTE — PLAN OF CARE
Problem: Patient Care Overview  Goal: Plan of Care Review  Outcome: Ongoing (interventions implemented as appropriate)  POC reviewed with pt at 2030. Pt intubated; therefore, unable to verbalize understanding. Questions and concerns addressed at the bedside. No acute events overnight. Pt progressing toward goals. Will continue to monitor. See flowsheets for full assessment and VS info

## 2017-06-30 NOTE — PROGRESS NOTES
POC reviewed with pt and family at 1400. Pt verbalized understanding. Questions and concerns addressed. No acute events today. Pt progressing toward goals. Will continue to monitor. See flowsheets for full assessment and VS info.

## 2017-06-30 NOTE — PROGRESS NOTES
Greasy/oily face  Thickened folding skin with prominent forehead and jaw bones.   bld pressure controlled.   CXR pending  Vent weaning   Encephalopathic of unclear etiology - does not follow commands. Will send encephalopathy work up. TSH, B12, folate, cortisol level (random)  Will connect with primary care physician for historical hypertension work up.

## 2017-07-01 LAB
ALLENS TEST: ABNORMAL
ANION GAP SERPL CALC-SCNC: 5 MMOL/L
BASOPHILS # BLD AUTO: 0.02 K/UL
BASOPHILS NFR BLD: 0.1 %
BUN SERPL-MCNC: 27 MG/DL
CALCIUM SERPL-MCNC: 8.8 MG/DL
CHLORIDE SERPL-SCNC: 113 MMOL/L
CO2 SERPL-SCNC: 26 MMOL/L
CREAT SERPL-MCNC: 0.8 MG/DL
DELSYS: ABNORMAL
DIFFERENTIAL METHOD: ABNORMAL
EOSINOPHIL # BLD AUTO: 0.1 K/UL
EOSINOPHIL NFR BLD: 0.7 %
ERYTHROCYTE [DISTWIDTH] IN BLOOD BY AUTOMATED COUNT: 14.2 %
ERYTHROCYTE [SEDIMENTATION RATE] IN BLOOD BY WESTERGREN METHOD: 14 MM/H
EST. GFR  (AFRICAN AMERICAN): >60 ML/MIN/1.73 M^2
EST. GFR  (NON AFRICAN AMERICAN): >60 ML/MIN/1.73 M^2
FIO2: 60
FLOW: 60
GLUCOSE SERPL-MCNC: 195 MG/DL
HCO3 UR-SCNC: 28.2 MMOL/L (ref 24–28)
HCT VFR BLD AUTO: 32.4 %
HGB BLD-MCNC: 9.6 G/DL
LYMPHOCYTES # BLD AUTO: 1.6 K/UL
LYMPHOCYTES NFR BLD: 11.2 %
MAGNESIUM SERPL-MCNC: 2 MG/DL
MCH RBC QN AUTO: 25.6 PG
MCHC RBC AUTO-ENTMCNC: 29.6 %
MCV RBC AUTO: 86 FL
MODE: ABNORMAL
MONOCYTES # BLD AUTO: 0.6 K/UL
MONOCYTES NFR BLD: 4.5 %
NEUTROPHILS # BLD AUTO: 11.5 K/UL
NEUTROPHILS NFR BLD: 82.9 %
PCO2 BLDA: 50 MMHG (ref 35–45)
PEEP: 5
PH SMN: 7.36 [PH] (ref 7.35–7.45)
PHOSPHATE SERPL-MCNC: 3.2 MG/DL
PLATELET # BLD AUTO: 337 K/UL
PMV BLD AUTO: 9.5 FL
PO2 BLDA: 110 MMHG (ref 80–100)
POC BE: 3 MMOL/L
POC SATURATED O2: 98 % (ref 95–100)
POC TCO2: 30 MMOL/L (ref 23–27)
POCT GLUCOSE: 160 MG/DL (ref 70–110)
POCT GLUCOSE: 177 MG/DL (ref 70–110)
POCT GLUCOSE: 202 MG/DL (ref 70–110)
POCT GLUCOSE: 221 MG/DL (ref 70–110)
POCT GLUCOSE: 245 MG/DL (ref 70–110)
POTASSIUM SERPL-SCNC: 4.8 MMOL/L
PS: 14
RBC # BLD AUTO: 3.75 M/UL
SAMPLE: ABNORMAL
SITE: ABNORMAL
SODIUM SERPL-SCNC: 144 MMOL/L
SP02: 99
VT: 420
WBC # BLD AUTO: 13.89 K/UL

## 2017-07-01 PROCEDURE — 25000003 PHARM REV CODE 250: Performed by: INTERNAL MEDICINE

## 2017-07-01 PROCEDURE — 83735 ASSAY OF MAGNESIUM: CPT

## 2017-07-01 PROCEDURE — 25000003 PHARM REV CODE 250: Performed by: ANESTHESIOLOGY

## 2017-07-01 PROCEDURE — 25000242 PHARM REV CODE 250 ALT 637 W/ HCPCS: Performed by: INTERNAL MEDICINE

## 2017-07-01 PROCEDURE — 99900026 HC AIRWAY MAINTENANCE (STAT)

## 2017-07-01 PROCEDURE — 99900035 HC TECH TIME PER 15 MIN (STAT)

## 2017-07-01 PROCEDURE — 25000003 PHARM REV CODE 250: Performed by: PHYSICIAN ASSISTANT

## 2017-07-01 PROCEDURE — 94003 VENT MGMT INPAT SUBQ DAY: CPT

## 2017-07-01 PROCEDURE — 99291 CRITICAL CARE FIRST HOUR: CPT | Mod: GC,,, | Performed by: PSYCHIATRY & NEUROLOGY

## 2017-07-01 PROCEDURE — 27200966 HC CLOSED SUCTION SYSTEM

## 2017-07-01 PROCEDURE — 84100 ASSAY OF PHOSPHORUS: CPT

## 2017-07-01 PROCEDURE — 82803 BLOOD GASES ANY COMBINATION: CPT

## 2017-07-01 PROCEDURE — 25000003 PHARM REV CODE 250: Performed by: PSYCHIATRY & NEUROLOGY

## 2017-07-01 PROCEDURE — 20000000 HC ICU ROOM

## 2017-07-01 PROCEDURE — 25000003 PHARM REV CODE 250

## 2017-07-01 PROCEDURE — 80048 BASIC METABOLIC PNL TOTAL CA: CPT

## 2017-07-01 PROCEDURE — 94640 AIRWAY INHALATION TREATMENT: CPT

## 2017-07-01 PROCEDURE — 94761 N-INVAS EAR/PLS OXIMETRY MLT: CPT

## 2017-07-01 PROCEDURE — 36600 WITHDRAWAL OF ARTERIAL BLOOD: CPT

## 2017-07-01 PROCEDURE — 27000221 HC OXYGEN, UP TO 24 HOURS

## 2017-07-01 PROCEDURE — 63600175 PHARM REV CODE 636 W HCPCS: Performed by: PSYCHIATRY & NEUROLOGY

## 2017-07-01 PROCEDURE — 85025 COMPLETE CBC W/AUTO DIFF WBC: CPT

## 2017-07-01 RX ORDER — HEPARIN SODIUM 5000 [USP'U]/ML
7500 INJECTION, SOLUTION INTRAVENOUS; SUBCUTANEOUS EVERY 8 HOURS
Status: DISCONTINUED | OUTPATIENT
Start: 2017-07-01 | End: 2017-07-14 | Stop reason: HOSPADM

## 2017-07-01 RX ORDER — DEXMEDETOMIDINE HYDROCHLORIDE 4 UG/ML
INJECTION, SOLUTION INTRAVENOUS
Status: COMPLETED
Start: 2017-07-01 | End: 2017-07-01

## 2017-07-01 RX ORDER — DEXMEDETOMIDINE HYDROCHLORIDE 4 UG/ML
0.2 INJECTION, SOLUTION INTRAVENOUS CONTINUOUS
Status: DISCONTINUED | OUTPATIENT
Start: 2017-07-01 | End: 2017-07-05

## 2017-07-01 RX ADMIN — CHLORHEXIDINE GLUCONATE 15 ML: 1.2 RINSE ORAL at 09:07

## 2017-07-01 RX ADMIN — INSULIN ASPART 4 UNITS: 100 INJECTION, SOLUTION INTRAVENOUS; SUBCUTANEOUS at 12:07

## 2017-07-01 RX ADMIN — DEXMEDETOMIDINE HYDROCHLORIDE 0.2 MCG/KG/HR: 4 INJECTION, SOLUTION INTRAVENOUS at 07:07

## 2017-07-01 RX ADMIN — INSULIN DETEMIR 5 UNITS: 100 INJECTION, SOLUTION SUBCUTANEOUS at 09:07

## 2017-07-01 RX ADMIN — IPRATROPIUM BROMIDE AND ALBUTEROL SULFATE 3 ML: .5; 3 SOLUTION RESPIRATORY (INHALATION) at 09:07

## 2017-07-01 RX ADMIN — HYDRALAZINE HYDROCHLORIDE 100 MG: 50 TABLET ORAL at 01:07

## 2017-07-01 RX ADMIN — ATORVASTATIN CALCIUM 10 MG: 10 TABLET, FILM COATED ORAL at 09:07

## 2017-07-01 RX ADMIN — AMLODIPINE BESYLATE 10 MG: 10 TABLET ORAL at 09:07

## 2017-07-01 RX ADMIN — STANDARDIZED SENNA CONCENTRATE AND DOCUSATE SODIUM 1 TABLET: 8.6; 5 TABLET, FILM COATED ORAL at 09:07

## 2017-07-01 RX ADMIN — INSULIN ASPART 6 UNITS: 100 INJECTION, SOLUTION INTRAVENOUS; SUBCUTANEOUS at 03:07

## 2017-07-01 RX ADMIN — HEPARIN SODIUM 7500 UNITS: 5000 INJECTION, SOLUTION INTRAVENOUS; SUBCUTANEOUS at 09:07

## 2017-07-01 RX ADMIN — PANTOPRAZOLE SODIUM 40 MG: 40 GRANULE, DELAYED RELEASE ORAL at 09:07

## 2017-07-01 RX ADMIN — INSULIN ASPART 4 UNITS: 100 INJECTION, SOLUTION INTRAVENOUS; SUBCUTANEOUS at 01:07

## 2017-07-01 RX ADMIN — IPRATROPIUM BROMIDE AND ALBUTEROL SULFATE 3 ML: .5; 3 SOLUTION RESPIRATORY (INHALATION) at 02:07

## 2017-07-01 RX ADMIN — HEPARIN SODIUM 7500 UNITS: 5000 INJECTION, SOLUTION INTRAVENOUS; SUBCUTANEOUS at 02:07

## 2017-07-01 RX ADMIN — ASPIRIN 325 MG ORAL TABLET 325 MG: 325 PILL ORAL at 09:07

## 2017-07-01 RX ADMIN — BISACODYL 10 MG: 10 SUPPOSITORY RECTAL at 09:07

## 2017-07-01 RX ADMIN — HYDRALAZINE HYDROCHLORIDE 100 MG: 50 TABLET ORAL at 06:07

## 2017-07-01 RX ADMIN — Medication 3 ML: at 02:07

## 2017-07-01 RX ADMIN — INSULIN ASPART 6 UNITS: 100 INJECTION, SOLUTION INTRAVENOUS; SUBCUTANEOUS at 06:07

## 2017-07-01 RX ADMIN — HEPARIN SODIUM 5000 UNITS: 5000 INJECTION, SOLUTION INTRAVENOUS; SUBCUTANEOUS at 06:07

## 2017-07-01 RX ADMIN — IPRATROPIUM BROMIDE AND ALBUTEROL SULFATE 3 ML: .5; 3 SOLUTION RESPIRATORY (INHALATION) at 01:07

## 2017-07-01 RX ADMIN — IPRATROPIUM BROMIDE AND ALBUTEROL SULFATE 3 ML: .5; 3 SOLUTION RESPIRATORY (INHALATION) at 07:07

## 2017-07-01 RX ADMIN — INSULIN ASPART 4 UNITS: 100 INJECTION, SOLUTION INTRAVENOUS; SUBCUTANEOUS at 08:07

## 2017-07-01 RX ADMIN — CARVEDILOL 25 MG: 25 TABLET, FILM COATED ORAL at 09:07

## 2017-07-01 RX ADMIN — INSULIN ASPART 6 UNITS: 100 INJECTION, SOLUTION INTRAVENOUS; SUBCUTANEOUS at 09:07

## 2017-07-01 NOTE — PLAN OF CARE
Problem: Patient Care Overview  Goal: Plan of Care Review  Outcome: Ongoing (interventions implemented as appropriate)  Plan of care reviewed with patient and family at 1700. Patient's daughter verbalized understanding; patient unable due to intubated/nonverbal status. All questions and concerns addressed today. Patient remains free from injury and falls. Patient was bathed during shift and new purewick was applied. Patient was turned/wedged every 2 hours with frequent oral care. She was more arousable as shift progressed but still does not follow commands. Patient is progressing towards goals. Will continue to monitor. See flowsheets for full assessments and vitals throughout shift.

## 2017-07-01 NOTE — PROGRESS NOTES
ICU Progress Note  Neurocritical Care    Admit Date: 6/18/2017  LOS: 13    CC: Cerebral infarction, watershed distribution, bilateral, acute    Code Status: Full Code     SUBJECTIVE:     Interval History/Significant Events:   No acute events overnight         Medications:  Continuous Infusions:   nicardipine Stopped (06/30/17 2100)    propofol Stopped (06/29/17 0915)     Scheduled Meds:   albuterol-ipratropium 2.5mg-0.5mg/3mL  3 mL Nebulization Q6H    aliskiren  300 mg Per NG tube Daily    amlodipine  10 mg Per NG tube Daily    aspirin  325 mg Per NG tube Daily    atorvastatin  10 mg Per NG tube Daily    bisacodyl  10 mg Rectal Daily    carvedilol  25 mg Per NG tube BID    chlorhexidine  15 mL Mouth/Throat BID    heparin (porcine)  7,500 Units Subcutaneous Q8H    hydrALAZINE  100 mg Per NG tube Q8H    insulin detemir  5 Units Subcutaneous BID    lorazepam  2 mg Intravenous Once    pantoprazole  40 mg Per NG tube Daily    senna-docusate 8.6-50 mg  1 tablet Per NG tube BID    sodium chloride 0.9%  3 mL Intravenous Q8H     PRN Meds:.acetaminophen, dextrose 50%, glucagon (human recombinant), hydrALAZINE, insulin aspart, labetalol, lorazepam, magnesium oxide, magnesium oxide, ondansetron, pneumoc 13-finn conj-dip cr(PF), potassium chloride 10%, potassium chloride 10%, potassium chloride 10%, potassium, sodium phosphates, potassium, sodium phosphates, potassium, sodium phosphates, promethazine (PHENERGAN) IVPB, propofol    OBJECTIVE:   Vital Signs (Most Recent):   Temp: 98.6 °F (37 °C) (07/01/17 1100)  Pulse: 69 (07/01/17 1200)  Resp: 19 (07/01/17 1200)  BP: (!) 165/79 (07/01/17 1200)  SpO2: 98 % (07/01/17 1200)    Vital Signs (24h Range):   Temp:  [98.6 °F (37 °C)-98.9 °F (37.2 °C)] 98.6 °F (37 °C)  Pulse:  [68-95] 69  Resp:  [14-24] 19  SpO2:  [95 %-100 %] 98 %  BP: (139-213)/(63-98) 165/79    ICP/CPP (Last 24h):        I & O (Last 24h):   Intake/Output Summary (Last 24 hours) at 07/01/17 1232  Last data  filed at 07/01/17 1200   Gross per 24 hour   Intake          1158.96 ml   Output             1875 ml   Net          -716.04 ml     Physical Exam:  Intubated  No jaundice  Lungs: diminished breathing sounds bilateral   Normal S1/S2, no murmurs.  Abdomen is obese, but soft, lax, +ve BS.  +2 pulse in DP and PT bilateral.  +2 peripheral edema.    Neuro:  Open eyes to voice  Follows simple midline commands  PERRL, EOMI  Intact corneal reflexes bilateral  Intact gag and cough reflexes   Moves all extremities spontaneously, less on the right side         Vent Data:   Vent Mode: Spont  Oxygen Concentration (%):  [45-60] 45  Resp Rate Total:  [16 br/min-25 br/min] 21 br/min  Vt Set:  [420 mL] 420 mL  PEEP/CPAP:  [5 cmH20] 5 cmH20  Pressure Support:  [10 xkY18-82 cmH20] 10 cmH20  Mean Airway Pressure:  [8.7 hvP27-66 cmH20] 8.7 cmH20    Lines/Drains/Airway:        Airway - Non-Surgical 06/20/17 0330 Endotracheal Tube (Active)   Secured at 26 cm 7/1/2017 11:00 AM   Measured At Lips 7/1/2017 11:00 AM   Secured Location Center 7/1/2017  5:47 AM   Secured by Commercial tube strauss 7/1/2017  5:47 AM   Bite Block center;secure and patent 7/1/2017  5:47 AM   Site Condition Cool;Dry 7/1/2017  5:47 AM   Status Patent;Secured;Intact 7/1/2017  5:47 AM   Site Assessment Clean;Dry 7/1/2017  5:47 AM   Cuff Pressure 27 cm H2O 6/30/2017 11:29 PM               NG/OG Tube 06/20/17 0402 orogastric 14 Fr. Left mouth (Active)   Placement Check placement verified by x-ray 7/1/2017 11:00 AM   Tube advanced (cm) 10 6/21/2017 10:46 AM   Distal Tube Length (cm) 70 7/1/2017  3:01 AM   Tolerance no signs/symptoms of discomfort 7/1/2017 11:00 AM   Securement taped to commercial device 7/1/2017 11:00 AM   Clamp Status/Tolerance unclamped 7/1/2017 11:00 AM   Suction Setting/Drainage Method low;intermittent setting 7/1/2017 11:00 AM   Insertion Site Appearance no redness, warmth, tenderness, skin breakdown, drainage 7/1/2017 11:00 AM   Drainage None 7/1/2017  11:00 AM   Flush/Irrigation irrigated w/ 7/1/2017 11:00 AM   Feeding Method continuous 7/1/2017 11:00 AM   Current Rate (mL/hr) 50 mL/hr 7/1/2017  7:00 AM   Goal Rate (mL/hr) 50 mL/hr 7/1/2017  7:00 AM   Intake (mL) 50 mL 7/1/2017 11:00 AM   Tube Output(mL)(Include Discarded Residual) 0 mL 7/1/2017  7:00 AM   Intake (mL) - Breast Milk Tube Feeding 200 6/25/2017  9:00 AM   Rate Formula Tube Feeding (mL/hr) 50 mL/hr 7/1/2017  7:00 AM   Intake (mL) - Formula Tube Feeding 50 7/1/2017 12:00 PM   Residual Amount (ml) 10 ml 7/1/2017  7:00 AM       Female External Urinary Catheter 06/27/17 0000 (Active)   Skin no redness;no breakdown 7/1/2017 11:00 AM   Tolerance no signs/symptoms of discomfort 7/1/2017 11:00 AM   Suction Continuous suction at 40 mmHg 7/1/2017  7:00 AM   Date of last wick change 08/01/17 7/1/2017  7:00 AM   Time of last wick change 0903 7/1/2017  7:00 AM   Output (mL) 450 mL 7/1/2017 12:00 PM         Labs:  ABG:   Recent Labs  Lab 07/01/17  0637   PH 7.359   PO2 110*   PCO2 50.0*   HCO3 28.2*   POCSATURATED 98   BE 3     BMP:  Recent Labs  Lab 07/01/17  0349      K 4.8   *   CO2 26   BUN 27*   CREATININE 0.8   *   MG 2.0   PHOS 3.2     LFT: Lab Results   Component Value Date    AST 58 (H) 06/27/2017    ALT 38 06/27/2017    ALKPHOS 148 (H) 06/27/2017    BILITOT 0.3 06/27/2017    ALBUMIN 2.2 (L) 06/27/2017    PROT 7.3 06/27/2017     CBC:   Lab Results   Component Value Date    WBC 13.89 (H) 07/01/2017    HGB 9.6 (L) 07/01/2017    HCT 32.4 (L) 07/01/2017    MCV 86 07/01/2017     07/01/2017     Microbiology x 7d:   Microbiology Results (last 7 days)     Procedure Component Value Units Date/Time    Blood culture [[id number]] Collected:  06/21/17 1207    Order Status:  Completed Specimen:  Blood Updated:  06/26/17 1303     Blood Culture, Routine No growth after 5 days.    Blood culture [[id number]] Collected:  06/21/17 1210    Order Status:  Completed Specimen:  Blood Updated:  06/26/17  1303     Blood Culture, Routine No growth after 5 days.    CSF culture [[id number]]     Order Status:  No result Specimen:  CSF (Spinal Fluid) from CSF Tap, Tube 3         Imaging:  CXR showed no acute findings   I personally reviewed the above image.    ASSESSMENT/PLAN:     1. Bilateral watershed strokes   2. Multifactorial encephalopathy   3. Acute respiratory failure   4. Morbid obesity  5. DM    A/P:  - Neurological exam unchanged   - Continue current secondary prevention measure   - Place on CPAP  - I doubt she will tolerate extubation, will get weaning parameters and evaluate  - Discussed possible trach and peg with daughter at bedside   - Daily ABG and CXR  - SBP<180.  - TF at goal  - Start scheduled insuline  - Increase SQH prophylaxis dose to 7500 q8h.    Uninterrupted Critical Care/Counseling Time (not including procedures): 32 minutes

## 2017-07-01 NOTE — NURSING
NCC notified of both pt's IV sites infiltrating. Plans to place central line have been discussed. Pt remains stable with no acute changes. Will continue to monitor.

## 2017-07-01 NOTE — PLAN OF CARE
Problem: Patient Care Overview  Goal: Plan of Care Review  POC reviewed with Nisa Frank and daughter at 1930. Pt intubated; therefore, unable to verbalize understanding. Family's questions and concerns addressed at bedside. Plans to place central line have been postponed at this time. No acute events overnight. Pt progressing toward goals. Will continue to monitor. See flowsheets for full assessment and VS info

## 2017-07-01 NOTE — PROGRESS NOTES
Insuin-like growth factor lab collected and sent at 1450. Lab was called to clarify why results were not posted yet and no one answered the phone. Will pass this information to night shift.

## 2017-07-01 NOTE — PROGRESS NOTES
Ms. Frank's NIBP cuff was displaying , cuff was repositioned and recycled and displayed . Cuff was then moved to arm and leg and recycled and continued to climb as high as 210/102. Labetalol 10mg was adminsitered at 1709 for SBP at 181 and Hydralazline 10mg was administered at 1843 for . Mendoza Siddiqui PA-C called and notified of patients elevated blood pressure with little response from PRN medications over the last hour. JOSE Siddiqui ordered to start Cardene infusion at this time. Order placed. Medication infusion. Will hand off vitals to night shift.

## 2017-07-02 LAB
ALLENS TEST: ABNORMAL
ANION GAP SERPL CALC-SCNC: 8 MMOL/L
BASOPHILS # BLD AUTO: 0.01 K/UL
BASOPHILS NFR BLD: 0.1 %
BUN SERPL-MCNC: 33 MG/DL
CALCIUM SERPL-MCNC: 8.7 MG/DL
CHLORIDE SERPL-SCNC: 112 MMOL/L
CO2 SERPL-SCNC: 26 MMOL/L
CREAT SERPL-MCNC: 0.9 MG/DL
DELSYS: ABNORMAL
DIFFERENTIAL METHOD: ABNORMAL
EOSINOPHIL # BLD AUTO: 0.1 K/UL
EOSINOPHIL NFR BLD: 1 %
ERYTHROCYTE [DISTWIDTH] IN BLOOD BY AUTOMATED COUNT: 14 %
ERYTHROCYTE [SEDIMENTATION RATE] IN BLOOD BY WESTERGREN METHOD: 14 MM/H
EST. GFR  (AFRICAN AMERICAN): >60 ML/MIN/1.73 M^2
EST. GFR  (NON AFRICAN AMERICAN): >60 ML/MIN/1.73 M^2
FIO2: 60
FLOW: 60
GLUCOSE SERPL-MCNC: 152 MG/DL
HCO3 UR-SCNC: 29 MMOL/L (ref 24–28)
HCT VFR BLD AUTO: 31.7 %
HGB BLD-MCNC: 9.3 G/DL
LYMPHOCYTES # BLD AUTO: 2 K/UL
LYMPHOCYTES NFR BLD: 14 %
MAGNESIUM SERPL-MCNC: 2 MG/DL
MCH RBC QN AUTO: 25.4 PG
MCHC RBC AUTO-ENTMCNC: 29.3 %
MCV RBC AUTO: 87 FL
MODE: ABNORMAL
MONOCYTES # BLD AUTO: 0.6 K/UL
MONOCYTES NFR BLD: 4.1 %
NEUTROPHILS # BLD AUTO: 11.2 K/UL
NEUTROPHILS NFR BLD: 80.4 %
PCO2 BLDA: 49.6 MMHG (ref 35–45)
PEEP: 5
PH SMN: 7.37 [PH] (ref 7.35–7.45)
PHOSPHATE SERPL-MCNC: 3.6 MG/DL
PIP: 36
PLATELET # BLD AUTO: 310 K/UL
PMV BLD AUTO: 9.9 FL
PO2 BLDA: 112 MMHG (ref 80–100)
POC BE: 4 MMOL/L
POC SATURATED O2: 98 % (ref 95–100)
POC TCO2: 30 MMOL/L (ref 23–27)
POCT GLUCOSE: 150 MG/DL (ref 70–110)
POCT GLUCOSE: 163 MG/DL (ref 70–110)
POCT GLUCOSE: 169 MG/DL (ref 70–110)
POCT GLUCOSE: 192 MG/DL (ref 70–110)
POCT GLUCOSE: 194 MG/DL (ref 70–110)
POCT GLUCOSE: 204 MG/DL (ref 70–110)
POCT GLUCOSE: 219 MG/DL (ref 70–110)
POCT GLUCOSE: 223 MG/DL (ref 70–110)
POTASSIUM SERPL-SCNC: 4.5 MMOL/L
PS: 10
RBC # BLD AUTO: 3.66 M/UL
SAMPLE: ABNORMAL
SITE: ABNORMAL
SODIUM SERPL-SCNC: 146 MMOL/L
SP02: 100
VT: 420
WBC # BLD AUTO: 13.98 K/UL

## 2017-07-02 PROCEDURE — 83735 ASSAY OF MAGNESIUM: CPT

## 2017-07-02 PROCEDURE — 25000003 PHARM REV CODE 250: Performed by: PHYSICIAN ASSISTANT

## 2017-07-02 PROCEDURE — 25000003 PHARM REV CODE 250: Performed by: NURSE PRACTITIONER

## 2017-07-02 PROCEDURE — 27000221 HC OXYGEN, UP TO 24 HOURS

## 2017-07-02 PROCEDURE — 36600 WITHDRAWAL OF ARTERIAL BLOOD: CPT

## 2017-07-02 PROCEDURE — 25000003 PHARM REV CODE 250: Performed by: PSYCHIATRY & NEUROLOGY

## 2017-07-02 PROCEDURE — 94761 N-INVAS EAR/PLS OXIMETRY MLT: CPT

## 2017-07-02 PROCEDURE — 20000000 HC ICU ROOM

## 2017-07-02 PROCEDURE — 99900035 HC TECH TIME PER 15 MIN (STAT)

## 2017-07-02 PROCEDURE — 25000003 PHARM REV CODE 250: Performed by: INTERNAL MEDICINE

## 2017-07-02 PROCEDURE — 80048 BASIC METABOLIC PNL TOTAL CA: CPT

## 2017-07-02 PROCEDURE — 25000242 PHARM REV CODE 250 ALT 637 W/ HCPCS: Performed by: INTERNAL MEDICINE

## 2017-07-02 PROCEDURE — 99900026 HC AIRWAY MAINTENANCE (STAT)

## 2017-07-02 PROCEDURE — 25000003 PHARM REV CODE 250: Performed by: ANESTHESIOLOGY

## 2017-07-02 PROCEDURE — 94003 VENT MGMT INPAT SUBQ DAY: CPT

## 2017-07-02 PROCEDURE — 27200966 HC CLOSED SUCTION SYSTEM

## 2017-07-02 PROCEDURE — 94640 AIRWAY INHALATION TREATMENT: CPT

## 2017-07-02 PROCEDURE — 84100 ASSAY OF PHOSPHORUS: CPT

## 2017-07-02 PROCEDURE — 85025 COMPLETE CBC W/AUTO DIFF WBC: CPT

## 2017-07-02 PROCEDURE — 99291 CRITICAL CARE FIRST HOUR: CPT | Mod: GC,,, | Performed by: PSYCHIATRY & NEUROLOGY

## 2017-07-02 RX ADMIN — CHLORHEXIDINE GLUCONATE 15 ML: 1.2 RINSE ORAL at 09:07

## 2017-07-02 RX ADMIN — PANTOPRAZOLE SODIUM 40 MG: 40 GRANULE, DELAYED RELEASE ORAL at 09:07

## 2017-07-02 RX ADMIN — AMLODIPINE BESYLATE 10 MG: 10 TABLET ORAL at 09:07

## 2017-07-02 RX ADMIN — IPRATROPIUM BROMIDE AND ALBUTEROL SULFATE 3 ML: .5; 3 SOLUTION RESPIRATORY (INHALATION) at 02:07

## 2017-07-02 RX ADMIN — ALISKIREN HEMIFUMARATE 300 MG: 300 TABLET, FILM COATED ORAL at 09:07

## 2017-07-02 RX ADMIN — IPRATROPIUM BROMIDE AND ALBUTEROL SULFATE 3 ML: .5; 3 SOLUTION RESPIRATORY (INHALATION) at 07:07

## 2017-07-02 RX ADMIN — HYDRALAZINE HYDROCHLORIDE 100 MG: 50 TABLET ORAL at 09:07

## 2017-07-02 RX ADMIN — IPRATROPIUM BROMIDE AND ALBUTEROL SULFATE 3 ML: .5; 3 SOLUTION RESPIRATORY (INHALATION) at 01:07

## 2017-07-02 RX ADMIN — HEPARIN SODIUM 7500 UNITS: 5000 INJECTION, SOLUTION INTRAVENOUS; SUBCUTANEOUS at 05:07

## 2017-07-02 RX ADMIN — HYDRALAZINE HYDROCHLORIDE 100 MG: 50 TABLET ORAL at 01:07

## 2017-07-02 RX ADMIN — HYDRALAZINE HYDROCHLORIDE 100 MG: 50 TABLET ORAL at 05:07

## 2017-07-02 RX ADMIN — INSULIN DETEMIR 5 UNITS: 100 INJECTION, SOLUTION SUBCUTANEOUS at 09:07

## 2017-07-02 RX ADMIN — ASPIRIN 325 MG ORAL TABLET 325 MG: 325 PILL ORAL at 09:07

## 2017-07-02 RX ADMIN — ATORVASTATIN CALCIUM 10 MG: 10 TABLET, FILM COATED ORAL at 09:07

## 2017-07-02 RX ADMIN — BISACODYL 10 MG: 10 SUPPOSITORY RECTAL at 09:07

## 2017-07-02 RX ADMIN — Medication 3 ML: at 01:07

## 2017-07-02 RX ADMIN — HEPARIN SODIUM 7500 UNITS: 5000 INJECTION, SOLUTION INTRAVENOUS; SUBCUTANEOUS at 09:07

## 2017-07-02 RX ADMIN — INSULIN ASPART 6 UNITS: 100 INJECTION, SOLUTION INTRAVENOUS; SUBCUTANEOUS at 01:07

## 2017-07-02 RX ADMIN — INSULIN ASPART 6 UNITS: 100 INJECTION, SOLUTION INTRAVENOUS; SUBCUTANEOUS at 04:07

## 2017-07-02 RX ADMIN — CARVEDILOL 25 MG: 25 TABLET, FILM COATED ORAL at 09:07

## 2017-07-02 RX ADMIN — DEXMEDETOMIDINE HYDROCHLORIDE 0.25 MCG/KG/HR: 4 INJECTION, SOLUTION INTRAVENOUS at 03:07

## 2017-07-02 RX ADMIN — INSULIN ASPART 4 UNITS: 100 INJECTION, SOLUTION INTRAVENOUS; SUBCUTANEOUS at 08:07

## 2017-07-02 RX ADMIN — STANDARDIZED SENNA CONCENTRATE AND DOCUSATE SODIUM 1 TABLET: 8.6; 5 TABLET, FILM COATED ORAL at 09:07

## 2017-07-02 RX ADMIN — INSULIN ASPART 3 UNITS: 100 INJECTION, SOLUTION INTRAVENOUS; SUBCUTANEOUS at 03:07

## 2017-07-02 RX ADMIN — IPRATROPIUM BROMIDE AND ALBUTEROL SULFATE 3 ML: .5; 3 SOLUTION RESPIRATORY (INHALATION) at 09:07

## 2017-07-02 RX ADMIN — INSULIN ASPART 4 UNITS: 100 INJECTION, SOLUTION INTRAVENOUS; SUBCUTANEOUS at 12:07

## 2017-07-02 RX ADMIN — HEPARIN SODIUM 7500 UNITS: 5000 INJECTION, SOLUTION INTRAVENOUS; SUBCUTANEOUS at 01:07

## 2017-07-02 NOTE — PLAN OF CARE
Problem: Patient Care Overview  Goal: Plan of Care Review  POC reviewed with pt and pt's daughter at 2100. Pt intubated; therefore, unable to verbalize understanding. Family's questions and concerns addressed. Pt started on precedex at 0.2 for agitation related to weaning off vent. No acute events overnight. Pt progressing toward goals. Will continue to monitor. See flowsheets for full assessment and VS info

## 2017-07-02 NOTE — PROGRESS NOTES
ICU Progress Note  Neurocritical Care    Admit Date: 6/18/2017  LOS: 14    CC: Cerebral infarction, watershed distribution, bilateral, acute    Code Status: Full Code     SUBJECTIVE:     Interval History/Significant Events:   No acute events overnight         Medications:  Continuous Infusions:   dexmedetomidine (PRECEDEX) infusion 0.25 mcg/kg/hr (07/02/17 0700)    nicardipine Stopped (06/30/17 2100)    propofol Stopped (06/29/17 0915)     Scheduled Meds:   albuterol-ipratropium 2.5mg-0.5mg/3mL  3 mL Nebulization Q6H    aliskiren  300 mg Per NG tube Daily    amlodipine  10 mg Per NG tube Daily    aspirin  325 mg Per NG tube Daily    atorvastatin  10 mg Per NG tube Daily    bisacodyl  10 mg Rectal Daily    carvedilol  25 mg Per NG tube BID    chlorhexidine  15 mL Mouth/Throat BID    heparin (porcine)  7,500 Units Subcutaneous Q8H    hydrALAZINE  100 mg Per NG tube Q8H    insulin detemir  10 Units Subcutaneous BID    lorazepam  2 mg Intravenous Once    pantoprazole  40 mg Per NG tube Daily    senna-docusate 8.6-50 mg  1 tablet Per NG tube BID    sodium chloride 0.9%  3 mL Intravenous Q8H     PRN Meds:.acetaminophen, dextrose 50%, glucagon (human recombinant), hydrALAZINE, insulin aspart, labetalol, lorazepam, magnesium oxide, magnesium oxide, ondansetron, pneumoc 13-finn conj-dip cr(PF), potassium chloride 10%, potassium chloride 10%, potassium chloride 10%, potassium, sodium phosphates, potassium, sodium phosphates, potassium, sodium phosphates, promethazine (PHENERGAN) IVPB, propofol    OBJECTIVE:   Vital Signs (Most Recent):   Temp: 98.6 °F (37 °C) (07/02/17 1100)  Pulse: 62 (07/02/17 1100)  Resp: 17 (07/02/17 1100)  BP: (!) 99/55 (07/02/17 1100)  SpO2: 99 % (07/02/17 1100)    Vital Signs (24h Range):   Temp:  [98.4 °F (36.9 °C)-98.7 °F (37.1 °C)] 98.6 °F (37 °C)  Pulse:  [] 62  Resp:  [16-36] 17  SpO2:  [92 %-100 %] 99 %  BP: ()/(54-93) 99/55    ICP/CPP (Last 24h):        I & O (Last  24h):     Intake/Output Summary (Last 24 hours) at 07/02/17 1158  Last data filed at 07/02/17 1100   Gross per 24 hour   Intake           1583.2 ml   Output             2450 ml   Net           -866.8 ml     Physical Exam:  Intubated  No jaundice  Lungs: diminished breathing sounds bilateral   Normal S1/S2, no murmurs.  Abdomen is obese, but soft, lax, +ve BS.  +2 pulse in DP and PT bilateral.  +2 peripheral edema.    Neuro:  Open eyes to voice  Follows simple midline commands  PERRL, EOMI  Intact corneal reflexes bilateral  Intact gag and cough reflexes   Moves all extremities spontaneously, less on the right side         Vent Data:   Vent Mode: Spont  Oxygen Concentration (%):  [45-70] 70  Resp Rate Total:  [17 br/min-37 br/min] 18 br/min  Vt Set:  [420 mL] 420 mL  PEEP/CPAP:  [5 cmH20-8 cmH20] 8 cmH20  Pressure Support:  [10 cmH20-15 cmH20] 15 cmH20  Mean Airway Pressure:  [8.7 huF27-25 cmH20] 12 cmH20    Lines/Drains/Airway:        Airway - Non-Surgical 06/20/17 0330 Endotracheal Tube (Active)   Secured at 26 cm 7/1/2017 11:00 AM   Measured At Lips 7/1/2017 11:00 AM   Secured Location Center 7/1/2017  5:47 AM   Secured by Commercial tube strauss 7/1/2017  5:47 AM   Bite Block center;secure and patent 7/1/2017  5:47 AM   Site Condition Cool;Dry 7/1/2017  5:47 AM   Status Patent;Secured;Intact 7/1/2017  5:47 AM   Site Assessment Clean;Dry 7/1/2017  5:47 AM   Cuff Pressure 27 cm H2O 6/30/2017 11:29 PM               NG/OG Tube 06/20/17 0402 orogastric 14 Fr. Left mouth (Active)   Placement Check placement verified by x-ray 7/1/2017 11:00 AM   Tube advanced (cm) 10 6/21/2017 10:46 AM   Distal Tube Length (cm) 70 7/1/2017  3:01 AM   Tolerance no signs/symptoms of discomfort 7/1/2017 11:00 AM   Securement taped to commercial device 7/1/2017 11:00 AM   Clamp Status/Tolerance unclamped 7/1/2017 11:00 AM   Suction Setting/Drainage Method low;intermittent setting 7/1/2017 11:00 AM   Insertion Site Appearance no redness,  warmth, tenderness, skin breakdown, drainage 7/1/2017 11:00 AM   Drainage None 7/1/2017 11:00 AM   Flush/Irrigation irrigated w/ 7/1/2017 11:00 AM   Feeding Method continuous 7/1/2017 11:00 AM   Current Rate (mL/hr) 50 mL/hr 7/1/2017  7:00 AM   Goal Rate (mL/hr) 50 mL/hr 7/1/2017  7:00 AM   Intake (mL) 50 mL 7/1/2017 11:00 AM   Tube Output(mL)(Include Discarded Residual) 0 mL 7/1/2017  7:00 AM   Intake (mL) - Breast Milk Tube Feeding 200 6/25/2017  9:00 AM   Rate Formula Tube Feeding (mL/hr) 50 mL/hr 7/1/2017  7:00 AM   Intake (mL) - Formula Tube Feeding 50 7/1/2017 12:00 PM   Residual Amount (ml) 10 ml 7/1/2017  7:00 AM       Female External Urinary Catheter 06/27/17 0000 (Active)   Skin no redness;no breakdown 7/1/2017 11:00 AM   Tolerance no signs/symptoms of discomfort 7/1/2017 11:00 AM   Suction Continuous suction at 40 mmHg 7/1/2017  7:00 AM   Date of last wick change 08/01/17 7/1/2017  7:00 AM   Time of last wick change 0903 7/1/2017  7:00 AM   Output (mL) 450 mL 7/1/2017 12:00 PM         Labs:  ABG:     Recent Labs  Lab 07/02/17  0515   PH 7.374   PO2 112*   PCO2 49.6*   HCO3 29.0*   POCSATURATED 98   BE 4     BMP:    Recent Labs  Lab 07/02/17  0304   *   K 4.5   *   CO2 26   BUN 33*   CREATININE 0.9   *   MG 2.0   PHOS 3.6     LFT:   Lab Results   Component Value Date    AST 58 (H) 06/27/2017    ALT 38 06/27/2017    ALKPHOS 148 (H) 06/27/2017    BILITOT 0.3 06/27/2017    ALBUMIN 2.2 (L) 06/27/2017    PROT 7.3 06/27/2017     CBC:   Lab Results   Component Value Date    WBC 13.98 (H) 07/02/2017    HGB 9.3 (L) 07/02/2017    HCT 31.7 (L) 07/02/2017    MCV 87 07/02/2017     07/02/2017     Microbiology x 7d:   Microbiology Results (last 7 days)     Procedure Component Value Units Date/Time    Blood culture [[id number]] Collected:  06/21/17 1207    Order Status:  Completed Specimen:  Blood Updated:  06/26/17 1303     Blood Culture, Routine No growth after 5 days.    Blood culture  [[id number]] Collected:  06/21/17 1210    Order Status:  Completed Specimen:  Blood Updated:  06/26/17 1303     Blood Culture, Routine No growth after 5 days.    CSF culture [[id number]]     Order Status:  No result Specimen:  CSF (Spinal Fluid) from CSF Tap, Tube 3         Imaging:  CXR showed no acute findings   I personally reviewed the above image.    ASSESSMENT/PLAN:     1. Bilateral watershed strokes   2. Multifactorial encephalopathy   3. Acute respiratory failure   4. Morbid obesity  5. DM    A/P:  - Neurological exam unchanged   - Continue current secondary prevention measure   - Had multiple episodes of suspected mucus plugging. Continue chest PT  - Continue current vent settings and f/u ABG and CXR  - Will possibly do diuresis tomorrow   - SBP<180.  - TF at goal  - Increase insuline dose  - SQH     Uninterrupted Critical Care/Counseling Time (not including procedures): 34 minutes

## 2017-07-03 LAB
ALLENS TEST: ABNORMAL
ALLENS TEST: ABNORMAL
ANION GAP SERPL CALC-SCNC: 7 MMOL/L
BASOPHILS # BLD AUTO: 0.02 K/UL
BASOPHILS NFR BLD: 0.2 %
BUN SERPL-MCNC: 41 MG/DL
CALCIUM SERPL-MCNC: 8.6 MG/DL
CHLORIDE SERPL-SCNC: 112 MMOL/L
CO2 SERPL-SCNC: 26 MMOL/L
CREAT SERPL-MCNC: 0.9 MG/DL
DELSYS: ABNORMAL
DELSYS: ABNORMAL
DIFFERENTIAL METHOD: ABNORMAL
EOSINOPHIL # BLD AUTO: 0.2 K/UL
EOSINOPHIL NFR BLD: 1.2 %
ERYTHROCYTE [DISTWIDTH] IN BLOOD BY AUTOMATED COUNT: 14.1 %
EST. GFR  (AFRICAN AMERICAN): >60 ML/MIN/1.73 M^2
EST. GFR  (NON AFRICAN AMERICAN): >60 ML/MIN/1.73 M^2
FIO2: 60
FIO2: 60
GLUCOSE SERPL-MCNC: 148 MG/DL
HCO3 UR-SCNC: 29.1 MMOL/L (ref 24–28)
HCO3 UR-SCNC: 30.9 MMOL/L (ref 24–28)
HCT VFR BLD AUTO: 30.8 %
HGB BLD-MCNC: 9.2 G/DL
LYMPHOCYTES # BLD AUTO: 2.1 K/UL
LYMPHOCYTES NFR BLD: 17.7 %
MAGNESIUM SERPL-MCNC: 1.7 MG/DL
MAGNESIUM SERPL-MCNC: 2.1 MG/DL
MCH RBC QN AUTO: 25.8 PG
MCHC RBC AUTO-ENTMCNC: 29.9 %
MCV RBC AUTO: 86 FL
MIN VOL: 7.9
MIN VOL: 9.97
MODE: ABNORMAL
MODE: ABNORMAL
MONOCYTES # BLD AUTO: 0.4 K/UL
MONOCYTES NFR BLD: 3.6 %
NEUTROPHILS # BLD AUTO: 9.3 K/UL
NEUTROPHILS NFR BLD: 76.8 %
PCO2 BLDA: 47.4 MMHG (ref 35–45)
PCO2 BLDA: 49.5 MMHG (ref 35–45)
PEEP: 8
PEEP: 8
PH SMN: 7.38 [PH] (ref 7.35–7.45)
PH SMN: 7.42 [PH] (ref 7.35–7.45)
PHOSPHATE SERPL-MCNC: 3.6 MG/DL
PLATELET # BLD AUTO: 335 K/UL
PMV BLD AUTO: 10.3 FL
PO2 BLDA: 113 MMHG (ref 80–100)
PO2 BLDA: 79 MMHG (ref 80–100)
POC BE: 4 MMOL/L
POC BE: 6 MMOL/L
POC SATURATED O2: 95 % (ref 95–100)
POC SATURATED O2: 98 % (ref 95–100)
POC TCO2: 31 MMOL/L (ref 23–27)
POC TCO2: 32 MMOL/L (ref 23–27)
POCT GLUCOSE: 151 MG/DL (ref 70–110)
POCT GLUCOSE: 158 MG/DL (ref 70–110)
POCT GLUCOSE: 172 MG/DL (ref 70–110)
POCT GLUCOSE: 182 MG/DL (ref 70–110)
POCT GLUCOSE: 190 MG/DL (ref 70–110)
POCT GLUCOSE: 193 MG/DL (ref 70–110)
POCT GLUCOSE: 204 MG/DL (ref 70–110)
POTASSIUM SERPL-SCNC: 4.7 MMOL/L
PS: 12
PS: 12
RBC # BLD AUTO: 3.57 M/UL
SAMPLE: ABNORMAL
SAMPLE: ABNORMAL
SITE: ABNORMAL
SITE: ABNORMAL
SODIUM SERPL-SCNC: 145 MMOL/L
SP02: 100
SP02: 96
SPONT RATE: 15
SPONT RATE: 23
WBC # BLD AUTO: 12.11 K/UL

## 2017-07-03 PROCEDURE — 25000003 PHARM REV CODE 250: Performed by: INTERNAL MEDICINE

## 2017-07-03 PROCEDURE — 83735 ASSAY OF MAGNESIUM: CPT

## 2017-07-03 PROCEDURE — 99900026 HC AIRWAY MAINTENANCE (STAT)

## 2017-07-03 PROCEDURE — 99233 SBSQ HOSP IP/OBS HIGH 50: CPT | Mod: ,,, | Performed by: PSYCHIATRY & NEUROLOGY

## 2017-07-03 PROCEDURE — 25000003 PHARM REV CODE 250: Performed by: PHYSICIAN ASSISTANT

## 2017-07-03 PROCEDURE — 94003 VENT MGMT INPAT SUBQ DAY: CPT

## 2017-07-03 PROCEDURE — 20000000 HC ICU ROOM

## 2017-07-03 PROCEDURE — 27000221 HC OXYGEN, UP TO 24 HOURS

## 2017-07-03 PROCEDURE — 25000003 PHARM REV CODE 250: Performed by: PSYCHIATRY & NEUROLOGY

## 2017-07-03 PROCEDURE — 25000242 PHARM REV CODE 250 ALT 637 W/ HCPCS: Performed by: INTERNAL MEDICINE

## 2017-07-03 PROCEDURE — 80048 BASIC METABOLIC PNL TOTAL CA: CPT

## 2017-07-03 PROCEDURE — 94640 AIRWAY INHALATION TREATMENT: CPT

## 2017-07-03 PROCEDURE — 25000003 PHARM REV CODE 250: Performed by: ANESTHESIOLOGY

## 2017-07-03 PROCEDURE — 27200966 HC CLOSED SUCTION SYSTEM

## 2017-07-03 PROCEDURE — 82803 BLOOD GASES ANY COMBINATION: CPT

## 2017-07-03 PROCEDURE — 36600 WITHDRAWAL OF ARTERIAL BLOOD: CPT

## 2017-07-03 PROCEDURE — 25000003 PHARM REV CODE 250: Performed by: NURSE PRACTITIONER

## 2017-07-03 PROCEDURE — 85025 COMPLETE CBC W/AUTO DIFF WBC: CPT

## 2017-07-03 PROCEDURE — 84100 ASSAY OF PHOSPHORUS: CPT

## 2017-07-03 PROCEDURE — 99900035 HC TECH TIME PER 15 MIN (STAT)

## 2017-07-03 PROCEDURE — 94761 N-INVAS EAR/PLS OXIMETRY MLT: CPT

## 2017-07-03 PROCEDURE — 83735 ASSAY OF MAGNESIUM: CPT | Mod: 91

## 2017-07-03 RX ORDER — SODIUM CHLORIDE FOR INHALATION 3 %
4 VIAL, NEBULIZER (ML) INHALATION EVERY 6 HOURS
Status: DISCONTINUED | OUTPATIENT
Start: 2017-07-03 | End: 2017-07-14 | Stop reason: HOSPADM

## 2017-07-03 RX ADMIN — SODIUM CHLORIDE SOLN NEBU 3% 15 ML: 3 NEBU SOLN at 02:07

## 2017-07-03 RX ADMIN — INSULIN ASPART 4 UNITS: 100 INJECTION, SOLUTION INTRAVENOUS; SUBCUTANEOUS at 05:07

## 2017-07-03 RX ADMIN — HEPARIN SODIUM 7500 UNITS: 5000 INJECTION, SOLUTION INTRAVENOUS; SUBCUTANEOUS at 01:07

## 2017-07-03 RX ADMIN — STANDARDIZED SENNA CONCENTRATE AND DOCUSATE SODIUM 1 TABLET: 8.6; 5 TABLET, FILM COATED ORAL at 09:07

## 2017-07-03 RX ADMIN — CARVEDILOL 25 MG: 25 TABLET, FILM COATED ORAL at 10:07

## 2017-07-03 RX ADMIN — CHLORHEXIDINE GLUCONATE 15 ML: 1.2 RINSE ORAL at 09:07

## 2017-07-03 RX ADMIN — HEPARIN SODIUM 7500 UNITS: 5000 INJECTION, SOLUTION INTRAVENOUS; SUBCUTANEOUS at 05:07

## 2017-07-03 RX ADMIN — HYDRALAZINE HYDROCHLORIDE 100 MG: 50 TABLET ORAL at 09:07

## 2017-07-03 RX ADMIN — ASPIRIN 325 MG ORAL TABLET 325 MG: 325 PILL ORAL at 10:07

## 2017-07-03 RX ADMIN — HEPARIN SODIUM 7500 UNITS: 5000 INJECTION, SOLUTION INTRAVENOUS; SUBCUTANEOUS at 09:07

## 2017-07-03 RX ADMIN — Medication 3 ML: at 01:07

## 2017-07-03 RX ADMIN — DEXMEDETOMIDINE HYDROCHLORIDE 0.2 MCG/KG/HR: 4 INJECTION, SOLUTION INTRAVENOUS at 09:07

## 2017-07-03 RX ADMIN — ATORVASTATIN CALCIUM 10 MG: 10 TABLET, FILM COATED ORAL at 09:07

## 2017-07-03 RX ADMIN — HYDRALAZINE HYDROCHLORIDE 100 MG: 50 TABLET ORAL at 01:07

## 2017-07-03 RX ADMIN — IPRATROPIUM BROMIDE AND ALBUTEROL SULFATE 3 ML: .5; 3 SOLUTION RESPIRATORY (INHALATION) at 02:07

## 2017-07-03 RX ADMIN — IPRATROPIUM BROMIDE AND ALBUTEROL SULFATE 3 ML: .5; 3 SOLUTION RESPIRATORY (INHALATION) at 12:07

## 2017-07-03 RX ADMIN — MAGNESIUM OXIDE TAB 400 MG (241.3 MG ELEMENTAL MG) 800 MG: 400 (241.3 MG) TAB at 05:07

## 2017-07-03 RX ADMIN — IPRATROPIUM BROMIDE AND ALBUTEROL SULFATE 3 ML: .5; 3 SOLUTION RESPIRATORY (INHALATION) at 07:07

## 2017-07-03 RX ADMIN — AMLODIPINE BESYLATE 10 MG: 10 TABLET ORAL at 10:07

## 2017-07-03 RX ADMIN — ALISKIREN HEMIFUMARATE 300 MG: 300 TABLET, FILM COATED ORAL at 10:07

## 2017-07-03 RX ADMIN — CHLORHEXIDINE GLUCONATE 15 ML: 1.2 RINSE ORAL at 10:07

## 2017-07-03 RX ADMIN — INSULIN ASPART 4 UNITS: 100 INJECTION, SOLUTION INTRAVENOUS; SUBCUTANEOUS at 08:07

## 2017-07-03 RX ADMIN — INSULIN ASPART 4 UNITS: 100 INJECTION, SOLUTION INTRAVENOUS; SUBCUTANEOUS at 06:07

## 2017-07-03 RX ADMIN — PANTOPRAZOLE SODIUM 40 MG: 40 GRANULE, DELAYED RELEASE ORAL at 10:07

## 2017-07-03 RX ADMIN — CARVEDILOL 25 MG: 25 TABLET, FILM COATED ORAL at 09:07

## 2017-07-03 RX ADMIN — INSULIN ASPART 6 UNITS: 100 INJECTION, SOLUTION INTRAVENOUS; SUBCUTANEOUS at 02:07

## 2017-07-03 RX ADMIN — SODIUM CHLORIDE SOLN NEBU 3% 4 ML: 3 NEBU SOLN at 07:07

## 2017-07-03 RX ADMIN — INSULIN ASPART 4 UNITS: 100 INJECTION, SOLUTION INTRAVENOUS; SUBCUTANEOUS at 10:07

## 2017-07-03 RX ADMIN — MAGNESIUM OXIDE TAB 400 MG (241.3 MG ELEMENTAL MG) 800 MG: 400 (241.3 MG) TAB at 10:07

## 2017-07-03 RX ADMIN — INSULIN ASPART 3 UNITS: 100 INJECTION, SOLUTION INTRAVENOUS; SUBCUTANEOUS at 12:07

## 2017-07-03 RX ADMIN — HYDRALAZINE HYDROCHLORIDE 100 MG: 50 TABLET ORAL at 05:07

## 2017-07-03 NOTE — PLAN OF CARE
07/03/17 0932   Discharge Reassessment   Assessment Type Discharge Planning Reassessment   Can the patient answer the patient profile reliably? No, cognitively impaired   How does the patient rate their overall health at the present time? (neo)   Describe the patient's ability to walk at the present time. Does not walk or unable to take any steps at all   How often would a person be available to care for the patient? Occasionally   Number of comorbid conditions (as recorded on the chart) Five or more   During the past month, has the patient often been bothered by feeling down, depressed or hopeless? (neo)   During the past month, has the patient often been bothered by little interest or pleasure in doing things? (neo)   Discharge plan remains the same: No   Provided patient/caregiver education on the expected discharge date and the discharge plan No   Discharge Plan A Long-term acute care facility (LTAC)   Discharge Plan B Skilled Nursing Facility   Change in patient condition or support system No   Patient choice form signed by patient/caregiver No   Explained to the the patient/caregiver why the discharge planned changed: No   Involved the patient/caregiver in establishing a new discharge plan: No       Discharge plan to be determined when medically stable.    Pat Perez   [id number]

## 2017-07-03 NOTE — PROGRESS NOTES
ICU Progress Note  Neurocritical Care    Admit Date: 6/18/2017  LOS: 15    CC: Cerebral infarction, watershed distribution, bilateral, acute    Code Status: Full Code     SUBJECTIVE:     Interval History/Significant Events: no acute events overnight    Review of Symptoms: intubated cannot participate    Medications:  Continuous Infusions:   dexmedetomidine (PRECEDEX) infusion 0.8 mcg/kg/hr (07/03/17 0800)    nicardipine Stopped (06/30/17 2100)    propofol Stopped (06/29/17 0915)     Scheduled Meds:   albuterol-ipratropium 2.5mg-0.5mg/3mL  3 mL Nebulization Q6H    aliskiren  300 mg Per NG tube Daily    amlodipine  10 mg Per NG tube Daily    aspirin  325 mg Per NG tube Daily    atorvastatin  10 mg Per NG tube Daily    bisacodyl  10 mg Rectal Daily    carvedilol  25 mg Per NG tube BID    chlorhexidine  15 mL Mouth/Throat BID    heparin (porcine)  7,500 Units Subcutaneous Q8H    hydrALAZINE  100 mg Per NG tube Q8H    insulin detemir  10 Units Subcutaneous BID    lorazepam  2 mg Intravenous Once    pantoprazole  40 mg Per NG tube Daily    senna-docusate 8.6-50 mg  1 tablet Per NG tube BID    sodium chloride 0.9%  3 mL Intravenous Q8H     PRN Meds:.acetaminophen, dextrose 50%, glucagon (human recombinant), hydrALAZINE, insulin aspart, labetalol, lorazepam, magnesium oxide, magnesium oxide, ondansetron, pneumoc 13-finn conj-dip cr(PF), potassium chloride 10%, potassium chloride 10%, potassium chloride 10%, potassium, sodium phosphates, potassium, sodium phosphates, potassium, sodium phosphates, promethazine (PHENERGAN) IVPB, propofol    OBJECTIVE:   Vital Signs (Most Recent):   Temp: 98.8 °F (37.1 °C) (07/03/17 0700)  Pulse: 64 (07/03/17 0800)  Resp: 19 (07/03/17 0800)  BP: (!) 126/58 (07/03/17 0800)  SpO2: (!) 92 % (07/03/17 0800)    Vital Signs (24h Range):   Temp:  [98.5 °F (36.9 °C)-98.8 °F (37.1 °C)] 98.8 °F (37.1 °C)  Pulse:  [58-83] 64  Resp:  [12-68] 19  SpO2:  [92 %-100 %] 92 %  BP:  ()/(53-75) 126/58    ICP/CPP (Last 24h):        I & O (Last 24h):     Intake/Output Summary (Last 24 hours) at 07/03/17 0953  Last data filed at 07/03/17 0800   Gross per 24 hour   Intake          1462.09 ml   Output              650 ml   Net           812.09 ml     Physical Exam:  GA: drowsy, comfortable, no acute distress.   HEENT: No scleral icterus or JVD.   Pulmonary: Clear to auscultation A/P/L.   Cardiac: RRR S1 & S2 .   Abdominal: Bowel sounds present x 4. No appreciable hepatosplenomegaly.  Skin: No jaundice, rashes, or visible lesions.  Neuro:  --Mental Status:  Drowsy, spontaneous eye opening. Appears to track. Does not follow commands  --Pupils 3.5 mm, PERRL.   --Corneal reflex, gag, cough intact.  Spontaneous movement of kathryn ue and le    Vent Data:   Vent Mode: Spont  Oxygen Concentration (%):  [60-70] 70  Resp Rate Total:  [15 br/min-32 br/min] 19 br/min  Vt Set:  [420 mL] 420 mL  PEEP/CPAP:  [8 cmH20] 8 cmH20  Pressure Support:  [12 cmH20-15 cmH20] 12 cmH20  Mean Airway Pressure:  [12 sdJ23-68 cmH20] 12 cmH20    Lines/Drains/Airway:        Airway - Non-Surgical 06/20/17 0330 Endotracheal Tube (Active)   Secured at 26 cm 6/30/2017  3:50 PM   Measured At Lips 6/30/2017  3:50 PM   Secured Location Left 6/30/2017  3:50 PM   Secured by Commercial tube strauss 6/30/2017  3:50 PM   Bite Block left 6/30/2017  3:50 PM   Site Condition Cool;Dry 6/30/2017  3:50 PM   Status Intact;Secured;Patent 6/30/2017  3:50 PM   Site Assessment Clean;Dry;No bleeding;No drainage 6/30/2017  3:50 PM   Cuff Pressure 30 cm H2O 6/29/2017  7:50 AM               NG/OG Tube 06/20/17 0402 orogastric 14 Fr. Left mouth (Active)   Placement Check placement verified by x-ray;placement verified by distal tube length measurement 6/30/2017  3:01 AM   Tube advanced (cm) 10 6/21/2017 10:46 AM   Distal Tube Length (cm) 70 6/30/2017  3:01 AM   Tolerance no signs/symptoms of discomfort 6/30/2017  3:01 AM   Securement taped to commercial  device 6/30/2017  3:01 AM   Clamp Status/Tolerance unclamped 6/30/2017  3:01 AM   Suction Setting/Drainage Method low;intermittent setting 6/20/2017  7:01 PM   Insertion Site Appearance no redness, warmth, tenderness, skin breakdown, drainage 6/30/2017  3:01 AM   Drainage None 6/30/2017  3:01 AM   Flush/Irrigation irrigated w/;water;no resistance met 6/30/2017  3:01 AM   Feeding Method continuous 6/30/2017  3:01 AM   Current Rate (mL/hr) 50 mL/hr 6/30/2017  3:01 AM   Goal Rate (mL/hr) 50 mL/hr 6/30/2017  3:01 AM   Intake (mL) 60 mL 6/30/2017  6:00 AM   Tube Output(mL)(Include Discarded Residual) 0 mL 6/28/2017  7:00 AM   Intake (mL) - Breast Milk Tube Feeding 200 6/25/2017  9:00 AM   Rate Formula Tube Feeding (mL/hr) 50 mL/hr 6/28/2017  7:00 PM   Intake (mL) - Formula Tube Feeding 50 6/30/2017  3:00 PM   Residual Amount (ml) 0 ml 6/29/2017  3:00 PM       Female External Urinary Catheter 06/27/17 0000 (Active)   Skin no redness;no breakdown 6/30/2017  3:01 AM   Tolerance no signs/symptoms of discomfort 6/30/2017  3:01 AM   Suction Continuous suction at 40 mmHg 6/29/2017  7:01 PM   Date of last wick change 06/29/17 6/29/2017  7:01 PM   Time of last wick change 1500 6/28/2017  3:00 PM   Output (mL) 600 mL 6/30/2017  6:00 AM     Nutrition/Tube Feeds (if NPO state why): tf     Labs:  ABG:     Recent Labs  Lab 07/03/17  0325   PH 7.377   PO2 79*   PCO2 49.5*   HCO3 29.1*   POCSATURATED 95   BE 4     BMP:    Recent Labs  Lab 07/03/17  0318      K 4.7   *   CO2 26   BUN 41*   CREATININE 0.9   *   MG 1.7   PHOS 3.6     LFT:   Lab Results   Component Value Date    AST 58 (H) 06/27/2017    ALT 38 06/27/2017    ALKPHOS 148 (H) 06/27/2017    BILITOT 0.3 06/27/2017    ALBUMIN 2.2 (L) 06/27/2017    PROT 7.3 06/27/2017     CBC:   Lab Results   Component Value Date    WBC 12.11 07/03/2017    HGB 9.2 (L) 07/03/2017    HCT 30.8 (L) 07/03/2017    MCV 86 07/03/2017     07/03/2017     Microbiology x 7d:    Microbiology Results (last 7 days)     Procedure Component Value Units Date/Time    Blood culture [[id number]] Collected:  06/21/17 1207    Order Status:  Completed Specimen:  Blood Updated:  06/26/17 1303     Blood Culture, Routine No growth after 5 days.    Blood culture [[id number]] Collected:  06/21/17 1210    Order Status:  Completed Specimen:  Blood Updated:  06/26/17 1303     Blood Culture, Routine No growth after 5 days.        Imaging:    I personally reviewed the above image.    ASSESSMENT/PLAN:     Active Hospital Problems    Diagnosis    *Cerebral infarction, watershed distribution, bilateral, acute    Encephalopathy, metabolic    Morbid obesity due to excess calories    ARF (acute renal failure)    Acute respiratory failure    Seizure    Hypokalemia    Hyperlipidemia    CAD s/p CABG    Pyelonephritis    Hypertensive emergency    Morbid obesity with BMI of 50.0-59.9, adult    Essential hypertension    Type 2 diabetes mellitus, uncontrolled      Neuro:   Bilateral watershed stroke from profound hypotension.  No obvious proximal extra or intracranial disease.  encephalopathy of unclear etiology - tsh, b12, folate, cortisol (all normal)  EEG neg for NCSE  Persistent encephalopathy with reports of restlessness. Hold precedex.     Pulmonary:   Continue vent weaning attempts  Increased secretions - add 3% nebs with duonebs.  Might need trach and peg.      Cardiac:   Hemodynamically stable  bld pr controlled on multiple antihypertensives.     Renal:    Making urine    ID:   Afebrile, normal wbc    Hem/Onc:   Stable hh    Endocrine:    BS stable  Send IGF (pending)    Fluids/Electrolytes/Nutrition/GI:   tf  Lines:  Art NA  CVC NA  ETT +  Ross NA  NG +  PEG NA    Proph:  DVT: SCD  Constipation:  Last output:bowel regiemn  PUP:protonix  VAP:peridex    Uninterrupted Critical Care/Counseling Time (not including procedures):: VAN Villanueva MD  Neurocritical care attending

## 2017-07-03 NOTE — PLAN OF CARE
Problem: Patient Care Overview  Goal: Plan of Care Review  Outcome: Ongoing (interventions implemented as appropriate)  No acute events throughout the day, VS and assessment per flow sheet, patient progressing towards goal as tolerated. Plan of care reviewed with Nisa Frank and family, all concerns addressed. Will continue to monitor.

## 2017-07-03 NOTE — PLAN OF CARE
Problem: Patient Care Overview  Goal: Plan of Care Review  POC reviewed with pt and pt's daughter at 1900. Pt intubated/sedated; therefore, unable to verbalize understanding. Questions and concerns addressed. No acute events overnight. Pt progressing toward goals. Will continue to monitor. See flowsheets for full assessment and VS info

## 2017-07-04 LAB
ALLENS TEST: ABNORMAL
ANION GAP SERPL CALC-SCNC: 7 MMOL/L
BASOPHILS # BLD AUTO: 0.01 K/UL
BASOPHILS NFR BLD: 0.1 %
BUN SERPL-MCNC: 39 MG/DL
CALCIUM SERPL-MCNC: 8.5 MG/DL
CHLORIDE SERPL-SCNC: 111 MMOL/L
CO2 SERPL-SCNC: 27 MMOL/L
CREAT SERPL-MCNC: 0.9 MG/DL
DELSYS: ABNORMAL
DIFFERENTIAL METHOD: ABNORMAL
EOSINOPHIL # BLD AUTO: 0.1 K/UL
EOSINOPHIL NFR BLD: 0.9 %
ERYTHROCYTE [DISTWIDTH] IN BLOOD BY AUTOMATED COUNT: 14.3 %
EST. GFR  (AFRICAN AMERICAN): >60 ML/MIN/1.73 M^2
EST. GFR  (NON AFRICAN AMERICAN): >60 ML/MIN/1.73 M^2
FIO2: 50
GLUCOSE SERPL-MCNC: 154 MG/DL
HCO3 UR-SCNC: 31.7 MMOL/L (ref 24–28)
HCT VFR BLD AUTO: 30.6 %
HGB BLD-MCNC: 9.1 G/DL
LYMPHOCYTES # BLD AUTO: 1.7 K/UL
LYMPHOCYTES NFR BLD: 13.5 %
MAGNESIUM SERPL-MCNC: 2.3 MG/DL
MCH RBC QN AUTO: 25.6 PG
MCHC RBC AUTO-ENTMCNC: 29.7 %
MCV RBC AUTO: 86 FL
MODE: ABNORMAL
MONOCYTES # BLD AUTO: 0.5 K/UL
MONOCYTES NFR BLD: 4.1 %
NEUTROPHILS # BLD AUTO: 10.3 K/UL
NEUTROPHILS NFR BLD: 80.9 %
OB PNL STL: POSITIVE
PCO2 BLDA: 47.7 MMHG (ref 35–45)
PH SMN: 7.43 [PH] (ref 7.35–7.45)
PHOSPHATE SERPL-MCNC: 3.4 MG/DL
PLATELET # BLD AUTO: 293 K/UL
PMV BLD AUTO: 10.2 FL
PO2 BLDA: 46 MMHG (ref 40–60)
POC BE: 7 MMOL/L
POC SATURATED O2: 82 % (ref 95–100)
POC TCO2: 33 MMOL/L (ref 24–29)
POCT GLUCOSE: 148 MG/DL (ref 70–110)
POCT GLUCOSE: 168 MG/DL (ref 70–110)
POCT GLUCOSE: 171 MG/DL (ref 70–110)
POTASSIUM SERPL-SCNC: 4.9 MMOL/L
PROCALCITONIN SERPL IA-MCNC: 0.1 NG/ML
RBC # BLD AUTO: 3.55 M/UL
SAMPLE: ABNORMAL
SITE: ABNORMAL
SODIUM SERPL-SCNC: 145 MMOL/L
WBC # BLD AUTO: 12.78 K/UL

## 2017-07-04 PROCEDURE — 94761 N-INVAS EAR/PLS OXIMETRY MLT: CPT

## 2017-07-04 PROCEDURE — 25000003 PHARM REV CODE 250: Performed by: PHYSICIAN ASSISTANT

## 2017-07-04 PROCEDURE — 25000003 PHARM REV CODE 250: Performed by: PSYCHIATRY & NEUROLOGY

## 2017-07-04 PROCEDURE — 83735 ASSAY OF MAGNESIUM: CPT

## 2017-07-04 PROCEDURE — 82803 BLOOD GASES ANY COMBINATION: CPT

## 2017-07-04 PROCEDURE — 99900026 HC AIRWAY MAINTENANCE (STAT)

## 2017-07-04 PROCEDURE — 80048 BASIC METABOLIC PNL TOTAL CA: CPT

## 2017-07-04 PROCEDURE — 87205 SMEAR GRAM STAIN: CPT

## 2017-07-04 PROCEDURE — 85025 COMPLETE CBC W/AUTO DIFF WBC: CPT

## 2017-07-04 PROCEDURE — 99900035 HC TECH TIME PER 15 MIN (STAT)

## 2017-07-04 PROCEDURE — 20000000 HC ICU ROOM

## 2017-07-04 PROCEDURE — 84145 PROCALCITONIN (PCT): CPT

## 2017-07-04 PROCEDURE — 99233 SBSQ HOSP IP/OBS HIGH 50: CPT | Mod: ,,, | Performed by: PHYSICIAN ASSISTANT

## 2017-07-04 PROCEDURE — 87040 BLOOD CULTURE FOR BACTERIA: CPT

## 2017-07-04 PROCEDURE — 27000221 HC OXYGEN, UP TO 24 HOURS

## 2017-07-04 PROCEDURE — 94640 AIRWAY INHALATION TREATMENT: CPT

## 2017-07-04 PROCEDURE — 87070 CULTURE OTHR SPECIMN AEROBIC: CPT

## 2017-07-04 PROCEDURE — 82272 OCCULT BLD FECES 1-3 TESTS: CPT

## 2017-07-04 PROCEDURE — 25000003 PHARM REV CODE 250: Performed by: INTERNAL MEDICINE

## 2017-07-04 PROCEDURE — 97803 MED NUTRITION INDIV SUBSEQ: CPT

## 2017-07-04 PROCEDURE — 94003 VENT MGMT INPAT SUBQ DAY: CPT

## 2017-07-04 PROCEDURE — 25000003 PHARM REV CODE 250: Performed by: ANESTHESIOLOGY

## 2017-07-04 PROCEDURE — 84100 ASSAY OF PHOSPHORUS: CPT

## 2017-07-04 PROCEDURE — 25000242 PHARM REV CODE 250 ALT 637 W/ HCPCS: Performed by: INTERNAL MEDICINE

## 2017-07-04 PROCEDURE — 27200966 HC CLOSED SUCTION SYSTEM

## 2017-07-04 RX ORDER — FENTANYL CITRATE 50 UG/ML
50 INJECTION, SOLUTION INTRAMUSCULAR; INTRAVENOUS
Status: DISCONTINUED | OUTPATIENT
Start: 2017-07-04 | End: 2017-07-04

## 2017-07-04 RX ORDER — FENTANYL CITRATE 50 UG/ML
100 INJECTION, SOLUTION INTRAMUSCULAR; INTRAVENOUS EVERY 6 HOURS PRN
Status: DISCONTINUED | OUTPATIENT
Start: 2017-07-04 | End: 2017-07-14 | Stop reason: HOSPADM

## 2017-07-04 RX ADMIN — CARVEDILOL 25 MG: 25 TABLET, FILM COATED ORAL at 09:07

## 2017-07-04 RX ADMIN — Medication 3 ML: at 01:07

## 2017-07-04 RX ADMIN — HYDRALAZINE HYDROCHLORIDE 100 MG: 50 TABLET ORAL at 01:07

## 2017-07-04 RX ADMIN — STANDARDIZED SENNA CONCENTRATE AND DOCUSATE SODIUM 1 TABLET: 8.6; 5 TABLET, FILM COATED ORAL at 09:07

## 2017-07-04 RX ADMIN — INSULIN ASPART 3 UNITS: 100 INJECTION, SOLUTION INTRAVENOUS; SUBCUTANEOUS at 03:07

## 2017-07-04 RX ADMIN — ATORVASTATIN CALCIUM 10 MG: 10 TABLET, FILM COATED ORAL at 08:07

## 2017-07-04 RX ADMIN — INSULIN ASPART 4 UNITS: 100 INJECTION, SOLUTION INTRAVENOUS; SUBCUTANEOUS at 06:07

## 2017-07-04 RX ADMIN — INSULIN ASPART 4 UNITS: 100 INJECTION, SOLUTION INTRAVENOUS; SUBCUTANEOUS at 11:07

## 2017-07-04 RX ADMIN — HEPARIN SODIUM 7500 UNITS: 5000 INJECTION, SOLUTION INTRAVENOUS; SUBCUTANEOUS at 01:07

## 2017-07-04 RX ADMIN — CHLORHEXIDINE GLUCONATE 15 ML: 1.2 RINSE ORAL at 08:07

## 2017-07-04 RX ADMIN — PANTOPRAZOLE SODIUM 40 MG: 40 GRANULE, DELAYED RELEASE ORAL at 08:07

## 2017-07-04 RX ADMIN — CHLORHEXIDINE GLUCONATE 15 ML: 1.2 RINSE ORAL at 09:07

## 2017-07-04 RX ADMIN — LABETALOL HYDROCHLORIDE 10 MG: 5 INJECTION, SOLUTION INTRAVENOUS at 07:07

## 2017-07-04 RX ADMIN — IPRATROPIUM BROMIDE AND ALBUTEROL SULFATE 3 ML: .5; 3 SOLUTION RESPIRATORY (INHALATION) at 01:07

## 2017-07-04 RX ADMIN — HEPARIN SODIUM 7500 UNITS: 5000 INJECTION, SOLUTION INTRAVENOUS; SUBCUTANEOUS at 06:07

## 2017-07-04 RX ADMIN — HEPARIN SODIUM 7500 UNITS: 5000 INJECTION, SOLUTION INTRAVENOUS; SUBCUTANEOUS at 09:07

## 2017-07-04 RX ADMIN — Medication 3 ML: at 06:07

## 2017-07-04 RX ADMIN — SODIUM CHLORIDE SOLN NEBU 3% 4 ML: 3 NEBU SOLN at 01:07

## 2017-07-04 RX ADMIN — ASPIRIN 325 MG ORAL TABLET 325 MG: 325 PILL ORAL at 08:07

## 2017-07-04 RX ADMIN — HYDRALAZINE HYDROCHLORIDE 100 MG: 50 TABLET ORAL at 09:07

## 2017-07-04 RX ADMIN — Medication 3 ML: at 09:07

## 2017-07-04 RX ADMIN — HYDRALAZINE HYDROCHLORIDE 100 MG: 50 TABLET ORAL at 06:07

## 2017-07-04 RX ADMIN — AMLODIPINE BESYLATE 10 MG: 10 TABLET ORAL at 08:07

## 2017-07-04 RX ADMIN — SODIUM CHLORIDE SOLN NEBU 3% 4 ML: 3 NEBU SOLN at 07:07

## 2017-07-04 RX ADMIN — IPRATROPIUM BROMIDE AND ALBUTEROL SULFATE 3 ML: .5; 3 SOLUTION RESPIRATORY (INHALATION) at 07:07

## 2017-07-04 RX ADMIN — CARVEDILOL 25 MG: 25 TABLET, FILM COATED ORAL at 08:07

## 2017-07-04 RX ADMIN — INSULIN ASPART 3 UNITS: 100 INJECTION, SOLUTION INTRAVENOUS; SUBCUTANEOUS at 01:07

## 2017-07-04 RX ADMIN — ALISKIREN HEMIFUMARATE 300 MG: 300 TABLET, FILM COATED ORAL at 08:07

## 2017-07-04 RX ADMIN — POTASSIUM CHLORIDE 40 MEQ: 20 SOLUTION ORAL at 06:07

## 2017-07-04 NOTE — PROGRESS NOTES
[facility]  Adult Nutrition  Consult Note    SUMMARY     Recommendations    Recommendation/Intervention:   Recommend changing TF to better meet pt's needs. Glucerna 1.5 @ goal rate 60mL/hr.   -Hold for residuals >500mL.     RD to monitor.      Goals: 1) Patient will meet >=85% - 115% of EEN  Nutrition Goal Status: progressing towards goal  Communication of RD Recs: reviewed with physician    Reason for Assessment    Reason for Assessment: RD follow-up  Diagnosis:  (sepsis, fever, elevated troponin, HTN)  Relevent Medical History: DM         General Information Comments: Pt remains intubated. CPAP trials. TF running at goal with no residuals.    Nutrition Discharge Planning: unable to determine at this time.    Nutrition Prescription Ordered    Current Diet Order: NPO     Current Nutrition Support Formula Ordered: Diabetisource  Current Nutrition Support Rate Ordered: 50 (ml)  Current Nutrition Support Frequency Ordered: mL/hr        Evaluation of Received Nutrients/Fluid Intake    Enteral Calories (kcal): 1440  Enteral Protein (gm): 72  Enteral (Free Water) Fluid (mL): 982      Energy Calories Required: not meeting needs  % Kcal Needs: 63     Protein Required: not meeting needs  % Protein Needs: 64     Fluid Required: not meeting needs  Comments: +I/O overall, -I/O overnight, LBM 7/4, good UOP  Tolerance: tolerating  % Intake of Estimated Energy Needs: 50 - 75 %  % Meal Intake: NPO     Nutrition Risk Screen     Nutrition Risk Screen: no indicators present    Nutrition/Diet History    Patient Reported Diet/Restrictions/Preferences:  (neo)     Food Preferences: neo        Factors Affecting Nutritional Intake: NPO, on mechanical ventilation                Labs/Tests/Procedures/Meds       Pertinent Labs Reviewed: reviewed  Pertinent Labs Comments: POCT Glu 158-204  Pertinent Medications Reviewed: reviewed, pertinent  Pertinent Medications Comments: statin, precedex, insulin    Physical  "Findings    Overall Physical Appearance: on ventilator support, obese, edematous  Tubes: orogastric tube     Skin: dermatitis, edema    Anthropometrics    Temp: 97.8 °F (36.6 °C)     Height: 5' 5" (165.1 cm)  Weight Method: Bed Scale  Weight: (!) 160.6 kg (354 lb)     Ideal Body Weight (IBW), Female: 125 lb     % Ideal Body Weight, Female (lb): 273.55 lb  BMI (Calculated): 57  BMI Grade: greater than 40 - morbid obesity                            Estimated/Assessed Needs    Weight Used For Calorie Calculations: (!) 155.1 kg (341 lb 14.9 oz) (Usual Body Weight)      Energy Calorie Requirements (kcal): 2275  Energy Need Method: [facility] (modified)     RMR ([place]kiff-St. Jeor Equation): 2091.88        Weight Used For Protein Calculations: 56.8 kg (125 lb 3.5 oz) (Ideal Body Weight)  Protein Requirements: 113-142g (2.0-2.5g/kg IBW)   Fluid Need Method: RDA Method, other (see comments) (or per MD)           RDA Method (mL): 2275         CHO Requirement: 50% of total kcals     Assessment and Plan       Nutrition Diagnosis  Problem:  Inadequate energy intake  Etiology:  Decreased ability to consume sufficient energy orally  Signs/Symptoms:  Intubated, NPO  Status: continues       Monitor and Evaluation    Food and Nutrient Intake: enteral nutrition intake  Food and Nutrient Adminstration: enteral and parenteral nutrition administration        Anthropometric Measurements: weight, weight change, body mass index  Biochemical Data, Medical Tests and Procedures: electrolyte and renal panel, gastrointestinal profile, glucose/endocrine profile, lipid profile  Nutrition-Focused Physical Findings: overall appearance, skin    Nutrition Risk    Level of Risk: other (see comments) (f/u 1x/week)    Nutrition Follow-Up    RD Follow-up?: Yes        "

## 2017-07-04 NOTE — PLAN OF CARE
Problem: Patient Care Overview  Goal: Plan of Care Review  Outcome: Ongoing (interventions implemented as appropriate)  POC reviewed with pt and pt's daughter at 1930. Pt intubated; therefore, unable to verbalize understanding. Questions and concerns addressed. No acute events overnight. Pt progressing toward goals. Will continue to monitor. See flowsheets for full assessment and VS info

## 2017-07-05 LAB
ALLENS TEST: ABNORMAL
ANION GAP SERPL CALC-SCNC: 6 MMOL/L
BASOPHILS # BLD AUTO: 0.01 K/UL
BASOPHILS NFR BLD: 0.1 %
BUN SERPL-MCNC: 31 MG/DL
C DIFF GDH STL QL: NEGATIVE
C DIFF TOX A+B STL QL IA: NEGATIVE
CALCIUM SERPL-MCNC: 8.5 MG/DL
CHLORIDE SERPL-SCNC: 112 MMOL/L
CO2 SERPL-SCNC: 26 MMOL/L
CREAT SERPL-MCNC: 0.8 MG/DL
DELSYS: ABNORMAL
DIFFERENTIAL METHOD: ABNORMAL
EOSINOPHIL # BLD AUTO: 0.2 K/UL
EOSINOPHIL NFR BLD: 1.3 %
ERYTHROCYTE [DISTWIDTH] IN BLOOD BY AUTOMATED COUNT: 14.2 %
EST. GFR  (AFRICAN AMERICAN): >60 ML/MIN/1.73 M^2
EST. GFR  (NON AFRICAN AMERICAN): >60 ML/MIN/1.73 M^2
FIO2: 50
GLUCOSE SERPL-MCNC: 159 MG/DL
HCO3 UR-SCNC: 34.2 MMOL/L (ref 24–28)
HCT VFR BLD AUTO: 28.5 %
HGB BLD-MCNC: 8.4 G/DL
IGF-I SERPL-MCNC: 59 NG/ML (ref 34–194)
IGF-I Z-SCORE SERPL: -1.04 SD
LYMPHOCYTES # BLD AUTO: 1.6 K/UL
LYMPHOCYTES NFR BLD: 14.5 %
MAGNESIUM SERPL-MCNC: 1.9 MG/DL
MCH RBC QN AUTO: 25.8 PG
MCHC RBC AUTO-ENTMCNC: 29.5 %
MCV RBC AUTO: 87 FL
MIN VOL: 9
MODE: ABNORMAL
MONOCYTES # BLD AUTO: 0.6 K/UL
MONOCYTES NFR BLD: 5 %
NEUTROPHILS # BLD AUTO: 8.9 K/UL
NEUTROPHILS NFR BLD: 78.6 %
PCO2 BLDA: 37.3 MMHG (ref 35–45)
PEEP: 8
PH SMN: 7.57 [PH] (ref 7.35–7.45)
PHOSPHATE SERPL-MCNC: 3.6 MG/DL
PLATELET # BLD AUTO: 286 K/UL
PMV BLD AUTO: 11 FL
PO2 BLDA: 136 MMHG (ref 80–100)
POC BE: 12 MMOL/L
POC SATURATED O2: 99 % (ref 95–100)
POC TCO2: 35 MMOL/L (ref 23–27)
POCT GLUCOSE: 153 MG/DL (ref 70–110)
POCT GLUCOSE: 164 MG/DL (ref 70–110)
POCT GLUCOSE: 167 MG/DL (ref 70–110)
POCT GLUCOSE: 180 MG/DL (ref 70–110)
POCT GLUCOSE: 181 MG/DL (ref 70–110)
POCT GLUCOSE: 183 MG/DL (ref 70–110)
POCT GLUCOSE: 186 MG/DL (ref 70–110)
POCT GLUCOSE: 186 MG/DL (ref 70–110)
POCT GLUCOSE: 187 MG/DL (ref 70–110)
POCT GLUCOSE: 190 MG/DL (ref 70–110)
POTASSIUM SERPL-SCNC: 5.1 MMOL/L
PS: 12
RBC # BLD AUTO: 3.26 M/UL
SAMPLE: ABNORMAL
SITE: ABNORMAL
SODIUM SERPL-SCNC: 144 MMOL/L
SP02: 99
SPONT RATE: 22
WBC # BLD AUTO: 11.29 K/UL

## 2017-07-05 PROCEDURE — 63600175 PHARM REV CODE 636 W HCPCS: Performed by: PHYSICIAN ASSISTANT

## 2017-07-05 PROCEDURE — 25000242 PHARM REV CODE 250 ALT 637 W/ HCPCS: Performed by: INTERNAL MEDICINE

## 2017-07-05 PROCEDURE — 94003 VENT MGMT INPAT SUBQ DAY: CPT

## 2017-07-05 PROCEDURE — 36600 WITHDRAWAL OF ARTERIAL BLOOD: CPT

## 2017-07-05 PROCEDURE — 25000003 PHARM REV CODE 250: Performed by: PSYCHIATRY & NEUROLOGY

## 2017-07-05 PROCEDURE — 25000003 PHARM REV CODE 250: Performed by: PHYSICIAN ASSISTANT

## 2017-07-05 PROCEDURE — 99291 CRITICAL CARE FIRST HOUR: CPT | Mod: ,,, | Performed by: PSYCHIATRY & NEUROLOGY

## 2017-07-05 PROCEDURE — 25000003 PHARM REV CODE 250: Performed by: ANESTHESIOLOGY

## 2017-07-05 PROCEDURE — 87449 NOS EACH ORGANISM AG IA: CPT

## 2017-07-05 PROCEDURE — 63600175 PHARM REV CODE 636 W HCPCS: Performed by: PSYCHIATRY & NEUROLOGY

## 2017-07-05 PROCEDURE — 84100 ASSAY OF PHOSPHORUS: CPT

## 2017-07-05 PROCEDURE — 94640 AIRWAY INHALATION TREATMENT: CPT

## 2017-07-05 PROCEDURE — 85025 COMPLETE CBC W/AUTO DIFF WBC: CPT

## 2017-07-05 PROCEDURE — 80048 BASIC METABOLIC PNL TOTAL CA: CPT

## 2017-07-05 PROCEDURE — 99900026 HC AIRWAY MAINTENANCE (STAT)

## 2017-07-05 PROCEDURE — 25000003 PHARM REV CODE 250: Performed by: INTERNAL MEDICINE

## 2017-07-05 PROCEDURE — 82803 BLOOD GASES ANY COMBINATION: CPT

## 2017-07-05 PROCEDURE — 20000000 HC ICU ROOM

## 2017-07-05 PROCEDURE — 99900035 HC TECH TIME PER 15 MIN (STAT)

## 2017-07-05 PROCEDURE — 83735 ASSAY OF MAGNESIUM: CPT

## 2017-07-05 RX ADMIN — IPRATROPIUM BROMIDE AND ALBUTEROL SULFATE 3 ML: .5; 3 SOLUTION RESPIRATORY (INHALATION) at 02:07

## 2017-07-05 RX ADMIN — INSULIN ASPART 4 UNITS: 100 INJECTION, SOLUTION INTRAVENOUS; SUBCUTANEOUS at 08:07

## 2017-07-05 RX ADMIN — AMLODIPINE BESYLATE 10 MG: 10 TABLET ORAL at 08:07

## 2017-07-05 RX ADMIN — CARVEDILOL 25 MG: 25 TABLET, FILM COATED ORAL at 08:07

## 2017-07-05 RX ADMIN — LABETALOL HYDROCHLORIDE 10 MG: 5 INJECTION, SOLUTION INTRAVENOUS at 02:07

## 2017-07-05 RX ADMIN — IPRATROPIUM BROMIDE AND ALBUTEROL SULFATE 3 ML: .5; 3 SOLUTION RESPIRATORY (INHALATION) at 10:07

## 2017-07-05 RX ADMIN — SODIUM CHLORIDE SOLN NEBU 3% 4 ML: 3 NEBU SOLN at 10:07

## 2017-07-05 RX ADMIN — ATORVASTATIN CALCIUM 10 MG: 10 TABLET, FILM COATED ORAL at 08:07

## 2017-07-05 RX ADMIN — IPRATROPIUM BROMIDE AND ALBUTEROL SULFATE 3 ML: .5; 3 SOLUTION RESPIRATORY (INHALATION) at 08:07

## 2017-07-05 RX ADMIN — Medication 3 ML: at 05:07

## 2017-07-05 RX ADMIN — PANTOPRAZOLE SODIUM 40 MG: 40 GRANULE, DELAYED RELEASE ORAL at 08:07

## 2017-07-05 RX ADMIN — PSYLLIUM HUSK 1 PACKET: 3.4 POWDER ORAL at 01:07

## 2017-07-05 RX ADMIN — Medication 3 ML: at 09:07

## 2017-07-05 RX ADMIN — HYDRALAZINE HYDROCHLORIDE 100 MG: 50 TABLET ORAL at 09:07

## 2017-07-05 RX ADMIN — INSULIN ASPART 4 UNITS: 100 INJECTION, SOLUTION INTRAVENOUS; SUBCUTANEOUS at 12:07

## 2017-07-05 RX ADMIN — CHLORHEXIDINE GLUCONATE 15 ML: 1.2 RINSE ORAL at 08:07

## 2017-07-05 RX ADMIN — IPRATROPIUM BROMIDE AND ALBUTEROL SULFATE 3 ML: .5; 3 SOLUTION RESPIRATORY (INHALATION) at 01:07

## 2017-07-05 RX ADMIN — ASPIRIN 325 MG ORAL TABLET 325 MG: 325 PILL ORAL at 08:07

## 2017-07-05 RX ADMIN — HYDRALAZINE HYDROCHLORIDE 100 MG: 50 TABLET ORAL at 01:07

## 2017-07-05 RX ADMIN — Medication 3 ML: at 01:07

## 2017-07-05 RX ADMIN — HEPARIN SODIUM 7500 UNITS: 5000 INJECTION, SOLUTION INTRAVENOUS; SUBCUTANEOUS at 01:07

## 2017-07-05 RX ADMIN — SODIUM CHLORIDE SOLN NEBU 3% 4 ML: 3 NEBU SOLN at 02:07

## 2017-07-05 RX ADMIN — ACETAZOLAMIDE 250 MG: 500 INJECTION, POWDER, LYOPHILIZED, FOR SOLUTION INTRAVENOUS at 12:07

## 2017-07-05 RX ADMIN — SODIUM CHLORIDE SOLN NEBU 3% 4 ML: 3 NEBU SOLN at 01:07

## 2017-07-05 RX ADMIN — INSULIN ASPART 4 UNITS: 100 INJECTION, SOLUTION INTRAVENOUS; SUBCUTANEOUS at 04:07

## 2017-07-05 RX ADMIN — SODIUM CHLORIDE SOLN NEBU 3% 4 ML: 3 NEBU SOLN at 08:07

## 2017-07-05 RX ADMIN — BISACODYL 10 MG: 10 SUPPOSITORY RECTAL at 08:07

## 2017-07-05 RX ADMIN — STANDARDIZED SENNA CONCENTRATE AND DOCUSATE SODIUM 1 TABLET: 8.6; 5 TABLET, FILM COATED ORAL at 08:07

## 2017-07-05 RX ADMIN — HEPARIN SODIUM 7500 UNITS: 5000 INJECTION, SOLUTION INTRAVENOUS; SUBCUTANEOUS at 05:07

## 2017-07-05 RX ADMIN — HEPARIN SODIUM 7500 UNITS: 5000 INJECTION, SOLUTION INTRAVENOUS; SUBCUTANEOUS at 09:07

## 2017-07-05 RX ADMIN — ALISKIREN HEMIFUMARATE 300 MG: 300 TABLET, FILM COATED ORAL at 08:07

## 2017-07-05 RX ADMIN — HYDRALAZINE HYDROCHLORIDE 100 MG: 50 TABLET ORAL at 05:07

## 2017-07-05 NOTE — PROGRESS NOTES
[facility]  Neurocritical Care  Progress Note    Admit Date: 6/18/2017  Service Date: 07/05/2017  Length of Stay: 17    Subjective:     Chief Complaint: Cerebral infarction, watershed distribution, bilateral, acute    History of Present Illness: 65 yo lady who presented to emergency room with severe headache and was found to have very elevated blood pressure at OSH.  Treated with antihypertensives and developed jerking thought to be seizure.  Required intubation due to becoming unresponsive and has remained minimally interactive since then, and EEG (-) for seizures. multiple CT head taken at OSH with no reports of pathological processes identified. Transferred to [facility] for further evaluation and higher level of care.    Hospital Course: -6/27: EEG with diffuse slowing no seizures, MRI with bilateral MCA SAQIB watershed infarcts  -6/28-7/3--little improvement in exam ,TSH, b12, folate wnl. EEG remains negative.  vent weaned to CPAP, tolerating well.    INTERVAL HISTORY - no acute events. Will likely need trach and peg. Will discuss with family     Review of Symptoms:   Unable to obtain, watershed infarcts    Physical Exam:  GA: comfortable, no acute distress.   HEENT: No scleral icterus or JVD.   Pulmonary: Clear to auscultation A/L. No wheezing, crackles, or rhonchi.  Cardiac: RRR S1 & S2 w/o rubs/murmurs/gallops.   Abdominal: Bowel sounds present x 4. No appreciable hepatosplenomegaly. obese  Skin: No jaundice, rashes, or visible lesions.  Pulses: 1+ DP bilat    Neuro:  --sedation: none  --GCS: [id number]  --Mental Status: opens eyes, does not follow commands   --CN II-XII grossly intact.   --Pupils 3-->2mm, PERRL.   --brainstem: weak cough and gag  --Motor: withdraws to deep pain, then spontaneous movements, no commands  --sensory: see motor  --Reflexes: not tested  --Gait: deferred      Recent Labs  Lab 07/05/17  0304   WBC 11.29   RBC 3.26*   HGB 8.4*   HCT 28.5*      MCV 87   MCH 25.8*    MCHC 29.5*       Recent Labs  Lab 07/05/17  0304   CALCIUM 8.5*      K 5.1   CO2 26   *   BUN 31*   CREATININE 0.8     No results for input(s): INR, APTT in the last 24 hours.    Invalid input(s): PT  Vent Mode: Spont  Oxygen Concentration (%):  [50] 50  Resp Rate Total:  [18 br/min-30 br/min] 20 br/min  Vt Set:  [420 mL] 420 mL  PEEP/CPAP:  [8 cmH20] 8 cmH20  Pressure Support:  [8 alS13-91 cmH20] 8 cmH20  Mean Airway Pressure:  [11 eyS75-66 cmH20] 11 cmH20      I have personally reviewed all labs, imaging, and studies today      Assessment/Plan:     Neuro   * Cerebral infarction, watershed distribution, bilateral, acute    Bilateral watershed SAQIB/MCA infarcts  Likely secondary to relative hypoperfusion  Wean ETT as tolerated  Will address stroke risk factors also and treat  PT OT SLP  Trach peg discussion with family, as little improvement in exam and will likely not safely tolerate extubation         Pulmonary   Acute respiratory failure    Daily ABG  CXR  Wean as tolerated  Will need trach, discussions with family  --patient tachypneic on CPAP >25 with small tidal volumes, though RSBI 70s, sats 94, volumes high 300s low 400s        Cardiac   CAD s/p CABG    - continue asa  --f/u w cardiology outpatient   --EF 55  --diastolic dysfunction - cont BB        Essential hypertension    PO hydralazine, coreg, norvasc- goal less than 180        Renal   ARF (acute renal failure)    Monitor I/Os        Endocrine   Type 2 diabetes mellitus, uncontrolled    RISS  aspart  Diabetic tube feeds        Fluids/Electrolytes/Nutrition/GI   Hyperlipidemia    statin        Morbid obesity with BMI of 50.0-59.9, adult    Nutrition consult            Prophylaxis:  Venous Thromboembolism: chemical  Stress Ulcer: H2B  Ventilator Pneumonia: yes     Activity Orders          Up with assistance starting at 06/26 2994        Full Code    Jac Gonzalez MD  Neurocritical Care  [facility]    Cc time 30  minutes

## 2017-07-05 NOTE — PROGRESS NOTES
[facility]  Neurocritical Care  Progress Note    Admit Date: 6/18/2017  Service Date: 07/04/2017  Length of Stay: 16    Subjective:     Chief Complaint: Cerebral infarction, watershed distribution, bilateral, acute    History of Present Illness: 65 yo lady who presented to emergency room with severe headache and was found to have very elevated blood pressure at OSH.  Treated with antihypertensives and developed jerking thought to be seizure.  Required intubation due to becoming unresponsive and has remained minimally interactive since then, and EEG (-) for seizures. multiple CT head taken at OSH with no reports of pathological processes identified. Transferred to [facility] for further evaluation and higher level of care.    Hospital Course: -6/27: EEG with diffuse slowing no seizures, MRI with bilateral MCA SAQIB watershed infarcts  -6/28-7/3--little improvement in exam ,TSH, b12, folate wnl. EEG remains negative.  vent weaned to CPAP, tolerating well.    INTERVAL HISTORY - no acute events. Will likely need trach and peg. Will discuss with family       Review of Systems: Unable to obtain a complete ROS due to level of consciousness.     Vitals:   Temp: 97.1 °F (36.2 °C) (07/04/17 1500)  Pulse: 65 (07/04/17 1800)  Resp: 20 (07/04/17 1800)  BP: 133/60 (07/04/17 1800)  SpO2: 99 % (07/04/17 1800)    Temp:  [97.1 °F (36.2 °C)-98.7 °F (37.1 °C)] 97.1 °F (36.2 °C)  Pulse:  [64-81] 65  Resp:  [8-51] 20  SpO2:  [96 %-100 %] 99 %  BP: (133-196)/(60-86) 133/60         Vent Mode: Spont  Oxygen Concentration (%):  [50] 50  Resp Rate Total:  [16 br/min-34 br/min] 19 br/min  Vt Set:  [420 mL] 420 mL  PEEP/CPAP:  [8 cmH20] 8 cmH20  Pressure Support:  [12 cmH20] 12 cmH20  Mean Airway Pressure:  [12 ygF79-92 cmH20] 12 cmH20    07/03 0701 - 07/04 0700  In: 982.1 [I.V.:32.1]  Out: 1700 [Urine:1700]     Examination:   Constitutional: no apparent distress, obese   Eyes: Conjunctiva clear, anicteric. Lids no  lesions.  Head/Ears/Nose/Mouth/Throat/Neck: Moist mucous membranes. External ears, nose atraumatic.   Cardiovascular: Regular rhythm. No murmurs. No leg edema.  Respiratory: Comfortable respirations. Bilateral wheezes  Gastrointestinal: No hernia. Soft, nondistended, nontender. + bowel sounds.    Neurologic:  -GCS [id number]  -cough, corneal, gag intact  -pupils equal and reactive   --does not track, does not follow commands    Medications:   Continuous  dexmedetomidine (PRECEDEX) infusion Last Rate: Stopped (07/03/17 1000)   propofol Last Rate: Stopped (06/29/17 0915)   Scheduled  albuterol-ipratropium 2.5mg-0.5mg/3mL 3 mL Q6H   aliskiren 300 mg Daily   amlodipine 10 mg Daily   aspirin 325 mg Daily   atorvastatin 10 mg Daily   bisacodyl 10 mg Daily   carvedilol 25 mg BID   chlorhexidine 15 mL BID   heparin (porcine) 7,500 Units Q8H   hydrALAZINE 100 mg Q8H   insulin detemir 10 Units BID   lorazepam 2 mg Once   pantoprazole 40 mg Daily   senna-docusate 8.6-50 mg 1 tablet BID   sodium chloride 0.9% 3 mL Q8H   sodium chloride 3% 4 mL Q6H   PRN  acetaminophen 650 mg Q6H PRN   dextrose 50% 12.5 g PRN   fentaNYL 100 mcg Q6H PRN   glucagon (human recombinant) 1 mg PRN   hydrALAZINE 10 mg Q4H PRN   insulin aspart 1-10 Units Q4H PRN   labetalol 10 mg Q4H PRN   lorazepam 2 mg Q4H PRN   magnesium oxide 800 mg PRN   magnesium oxide 800 mg PRN   ondansetron 8 mg Q8H PRN   pneumoc 13-finn conj-dip cr(PF) 0.5 mL vaccine x 1 dose   potassium chloride 10% 40 mEq PRN   potassium chloride 10% 40 mEq PRN   potassium chloride 10% 60 mEq PRN   potassium, sodium phosphates 2 packet PRN   potassium, sodium phosphates 2 packet PRN   potassium, sodium phosphates 2 packet PRN   promethazine (PHENERGAN) IVPB 12.5 mg Q6H PRN   propofol 10 mcg/kg/min (Dosing Weight) Continuous PRN          Assessment/Plan:     Neuro   * Cerebral infarction, watershed distribution, bilateral, acute    Bilateral watershed SAQIB/MCA infarcts  Likely secondary to relative  hypoperfusion  Wean ETT as tolerated  Will address stroke risk factors also and treat  PT OT SLP  Trach peg discussion with family, as little improvement in exam and will likely not safely tolerate extubation         Pulmonary   Acute respiratory failure    Daily ABG  CXR  Wean as tolerated  May need trach due to bilat infarcts and limited arousal  --cpap trial tolerated today, although increased secretions, sent BAL        Cardiac   CAD s/p CABG    - continue asa  --f/u w cardiology outpatient   --EF 55  --diastolic dysfunction - cont BB        Essential hypertension    PO hydralazine, coreg, norvasc- goal less than 180        Endocrine   Type 2 diabetes mellitus, uncontrolled    RISS  aspart  Diabetic tube feeds        Fluids/Electrolytes/Nutrition/GI   Hypokalemia    Replete daily prn            Activity Orders          Up with assistance starting at 06/26 3517        Full Code    Itzel Pop PA-C  Neurocritical Care  [facility]

## 2017-07-05 NOTE — PLAN OF CARE
Problem: Patient Care Overview  Goal: Plan of Care Review  Outcome: Ongoing (interventions implemented as appropriate)  POC reviewed with pt and daughter at 0500. Pt unable to verbalize understanding d/t intubation and neuro status. No acute events overnight. See flowsheets for full assessment and VS info

## 2017-07-05 NOTE — ASSESSMENT & PLAN NOTE
Daily ABG  CXR  Wean as tolerated  May need trach due to bilat infarcts and limited arousal  --cpap trial tolerated today, although increased secretions, sent BAL

## 2017-07-05 NOTE — PLAN OF CARE
Problem: Patient Care Overview  Goal: Plan of Care Review  Outcome: Ongoing (interventions implemented as appropriate)  POC reviewed with patient and family at 1600. No evidence of learning from the patient. Questions and concerns addressed with the family. Day bath given. Patient turned q 2hr.PRN aspart given each accu check. PRN labetalol given for SBP > 180. Patient had multiple BMs during shift. Stool sample sent off for C. Diff. Precautions are set up. No acute events today. RN will continue to monitor. See flowsheets for full assessment and VS info.

## 2017-07-05 NOTE — ASSESSMENT & PLAN NOTE
Bilateral watershed SAQIB/MCA infarcts  Likely secondary to relative hypoperfusion  Wean ETT as tolerated  Will address stroke risk factors also and treat  PT OT SLP  Trach peg discussion with family, as little improvement in exam and will likely not safely tolerate extubation

## 2017-07-05 NOTE — ASSESSMENT & PLAN NOTE
Daily ABG  CXR  Wean as tolerated  Will need trach, discussions with family  --patient tachypneic on CPAP >25 with small tidal volumes, though RSBI 70s, sats 94, volumes high 300s low 400s

## 2017-07-06 PROBLEM — R40.2420 GLASGOW COMA SCALE TOTAL SCORE 9-12: Status: ACTIVE | Noted: 2017-07-06

## 2017-07-06 PROBLEM — I67.4 HYPERTENSIVE ENCEPHALOPATHY: Status: ACTIVE | Noted: 2017-06-26

## 2017-07-06 PROBLEM — R78.81 POSITIVE BLOOD CULTURE: Status: ACTIVE | Noted: 2017-07-06

## 2017-07-06 PROBLEM — N17.9 ARF (ACUTE RENAL FAILURE): Status: RESOLVED | Noted: 2017-06-22 | Resolved: 2017-07-06

## 2017-07-06 LAB
ALLENS TEST: ABNORMAL
ANION GAP SERPL CALC-SCNC: 6 MMOL/L
BACTERIA SPEC AEROBE CULT: NORMAL
BASOPHILS # BLD AUTO: 0.01 K/UL
BASOPHILS NFR BLD: 0.1 %
BUN SERPL-MCNC: 25 MG/DL
CALCIUM SERPL-MCNC: 8.6 MG/DL
CHLORIDE SERPL-SCNC: 111 MMOL/L
CO2 SERPL-SCNC: 25 MMOL/L
CREAT SERPL-MCNC: 0.8 MG/DL
DELSYS: ABNORMAL
DIFFERENTIAL METHOD: ABNORMAL
EOSINOPHIL # BLD AUTO: 0.2 K/UL
EOSINOPHIL NFR BLD: 1.7 %
ERYTHROCYTE [DISTWIDTH] IN BLOOD BY AUTOMATED COUNT: 14.2 %
EST. GFR  (AFRICAN AMERICAN): >60 ML/MIN/1.73 M^2
EST. GFR  (NON AFRICAN AMERICAN): >60 ML/MIN/1.73 M^2
FIO2: 50
GLUCOSE SERPL-MCNC: 112 MG/DL
GRAM STN SPEC: NORMAL
HCO3 UR-SCNC: 28 MMOL/L (ref 24–28)
HCT VFR BLD AUTO: 29.3 %
HGB BLD-MCNC: 8.7 G/DL
LYMPHOCYTES # BLD AUTO: 2.1 K/UL
LYMPHOCYTES NFR BLD: 18.3 %
MAGNESIUM SERPL-MCNC: 1.8 MG/DL
MCH RBC QN AUTO: 25.9 PG
MCHC RBC AUTO-ENTMCNC: 29.7 %
MCV RBC AUTO: 87 FL
MIN VOL: 8
MODE: ABNORMAL
MONOCYTES # BLD AUTO: 0.6 K/UL
MONOCYTES NFR BLD: 5.2 %
NEUTROPHILS # BLD AUTO: 8.5 K/UL
NEUTROPHILS NFR BLD: 74.2 %
PCO2 BLDA: 47.5 MMHG (ref 35–45)
PEEP: 8
PH SMN: 7.38 [PH] (ref 7.35–7.45)
PHOSPHATE SERPL-MCNC: 3.7 MG/DL
PLATELET # BLD AUTO: 263 K/UL
PMV BLD AUTO: 11 FL
PO2 BLDA: 103 MMHG (ref 80–100)
POC BE: 3 MMOL/L
POC SATURATED O2: 98 % (ref 95–100)
POC TCO2: 29 MMOL/L (ref 23–27)
POCT GLUCOSE: 109 MG/DL (ref 70–110)
POCT GLUCOSE: 114 MG/DL (ref 70–110)
POCT GLUCOSE: 121 MG/DL (ref 70–110)
POCT GLUCOSE: 129 MG/DL (ref 70–110)
POCT GLUCOSE: 137 MG/DL (ref 70–110)
POCT GLUCOSE: 154 MG/DL (ref 70–110)
POTASSIUM SERPL-SCNC: 5 MMOL/L
PS: 5
RBC # BLD AUTO: 3.36 M/UL
SAMPLE: ABNORMAL
SITE: ABNORMAL
SODIUM SERPL-SCNC: 142 MMOL/L
SP02: 100
SPONT RATE: 21
WBC # BLD AUTO: 11.45 K/UL

## 2017-07-06 PROCEDURE — 84100 ASSAY OF PHOSPHORUS: CPT

## 2017-07-06 PROCEDURE — 99233 SBSQ HOSP IP/OBS HIGH 50: CPT | Mod: ,,, | Performed by: PSYCHIATRY & NEUROLOGY

## 2017-07-06 PROCEDURE — 25000003 PHARM REV CODE 250: Performed by: PHYSICIAN ASSISTANT

## 2017-07-06 PROCEDURE — 25000003 PHARM REV CODE 250: Performed by: PSYCHIATRY & NEUROLOGY

## 2017-07-06 PROCEDURE — 85025 COMPLETE CBC W/AUTO DIFF WBC: CPT

## 2017-07-06 PROCEDURE — 25000003 PHARM REV CODE 250: Performed by: INTERNAL MEDICINE

## 2017-07-06 PROCEDURE — 20000000 HC ICU ROOM

## 2017-07-06 PROCEDURE — 94640 AIRWAY INHALATION TREATMENT: CPT

## 2017-07-06 PROCEDURE — 94003 VENT MGMT INPAT SUBQ DAY: CPT

## 2017-07-06 PROCEDURE — 99900026 HC AIRWAY MAINTENANCE (STAT)

## 2017-07-06 PROCEDURE — 94761 N-INVAS EAR/PLS OXIMETRY MLT: CPT

## 2017-07-06 PROCEDURE — 27200966 HC CLOSED SUCTION SYSTEM

## 2017-07-06 PROCEDURE — 80048 BASIC METABOLIC PNL TOTAL CA: CPT

## 2017-07-06 PROCEDURE — 27000221 HC OXYGEN, UP TO 24 HOURS

## 2017-07-06 PROCEDURE — 87040 BLOOD CULTURE FOR BACTERIA: CPT

## 2017-07-06 PROCEDURE — 83735 ASSAY OF MAGNESIUM: CPT

## 2017-07-06 PROCEDURE — 25000242 PHARM REV CODE 250 ALT 637 W/ HCPCS: Performed by: INTERNAL MEDICINE

## 2017-07-06 PROCEDURE — 25000003 PHARM REV CODE 250: Performed by: ANESTHESIOLOGY

## 2017-07-06 RX ADMIN — HYDRALAZINE HYDROCHLORIDE 100 MG: 50 TABLET ORAL at 06:07

## 2017-07-06 RX ADMIN — SODIUM CHLORIDE SOLN NEBU 3% 4 ML: 3 NEBU SOLN at 07:07

## 2017-07-06 RX ADMIN — SODIUM CHLORIDE SOLN NEBU 3% 4 ML: 3 NEBU SOLN at 01:07

## 2017-07-06 RX ADMIN — AMLODIPINE BESYLATE 10 MG: 10 TABLET ORAL at 12:07

## 2017-07-06 RX ADMIN — HEPARIN SODIUM 7500 UNITS: 5000 INJECTION, SOLUTION INTRAVENOUS; SUBCUTANEOUS at 03:07

## 2017-07-06 RX ADMIN — HYDRALAZINE HYDROCHLORIDE 100 MG: 50 TABLET ORAL at 03:07

## 2017-07-06 RX ADMIN — HEPARIN SODIUM 7500 UNITS: 5000 INJECTION, SOLUTION INTRAVENOUS; SUBCUTANEOUS at 06:07

## 2017-07-06 RX ADMIN — Medication 3 ML: at 06:07

## 2017-07-06 RX ADMIN — CARVEDILOL 25 MG: 25 TABLET, FILM COATED ORAL at 09:07

## 2017-07-06 RX ADMIN — CHLORHEXIDINE GLUCONATE 15 ML: 1.2 RINSE ORAL at 09:07

## 2017-07-06 RX ADMIN — HEPARIN SODIUM 7500 UNITS: 5000 INJECTION, SOLUTION INTRAVENOUS; SUBCUTANEOUS at 09:07

## 2017-07-06 RX ADMIN — ATORVASTATIN CALCIUM 10 MG: 10 TABLET, FILM COATED ORAL at 12:07

## 2017-07-06 RX ADMIN — IPRATROPIUM BROMIDE AND ALBUTEROL SULFATE 3 ML: .5; 3 SOLUTION RESPIRATORY (INHALATION) at 07:07

## 2017-07-06 RX ADMIN — ASPIRIN 325 MG ORAL TABLET 325 MG: 325 PILL ORAL at 12:07

## 2017-07-06 RX ADMIN — IPRATROPIUM BROMIDE AND ALBUTEROL SULFATE 3 ML: .5; 3 SOLUTION RESPIRATORY (INHALATION) at 01:07

## 2017-07-06 RX ADMIN — HYDRALAZINE HYDROCHLORIDE 100 MG: 50 TABLET ORAL at 09:07

## 2017-07-06 RX ADMIN — PANTOPRAZOLE SODIUM 40 MG: 40 GRANULE, DELAYED RELEASE ORAL at 12:07

## 2017-07-06 RX ADMIN — CHLORHEXIDINE GLUCONATE 15 ML: 1.2 RINSE ORAL at 12:07

## 2017-07-06 RX ADMIN — ALISKIREN HEMIFUMARATE 300 MG: 300 TABLET, FILM COATED ORAL at 12:07

## 2017-07-06 RX ADMIN — Medication 3 ML: at 09:07

## 2017-07-06 RX ADMIN — CARVEDILOL 25 MG: 25 TABLET, FILM COATED ORAL at 12:07

## 2017-07-06 RX ADMIN — IPRATROPIUM BROMIDE AND ALBUTEROL SULFATE 3 ML: .5; 3 SOLUTION RESPIRATORY (INHALATION) at 08:07

## 2017-07-06 NOTE — ASSESSMENT & PLAN NOTE
-from hypoperfusion amid correction of hypertensive emergency  -aspirin, atorvastatin    Amantadine 100 q8h to help improve wakefullness

## 2017-07-06 NOTE — PLAN OF CARE
07/06/17 0909   Discharge Reassessment   Assessment Type Discharge Planning Reassessment   Can the patient answer the patient profile reliably? No, cognitively impaired   How does the patient rate their overall health at the present time? (neo)   Describe the patient's ability to walk at the present time. Does not walk or unable to take any steps at all   How often would a person be available to care for the patient? Occasionally   Number of comorbid conditions (as recorded on the chart) Five or more   During the past month, has the patient often been bothered by feeling down, depressed or hopeless? (neo)   During the past month, has the patient often been bothered by little interest or pleasure in doing things? (neo)   Discharge plan remains the same: No   Provided patient/caregiver education on the expected discharge date and the discharge plan No   Discharge Plan A Long-term acute care facility (LTAC)   Discharge Plan B Skilled Nursing Facility   Change in patient condition or support system No   Patient choice form signed by patient/caregiver No   Explained to the the patient/caregiver why the discharge planned changed: No   Involved the patient/caregiver in establishing a new discharge plan: No       Discharge plan: needs to be determined once medically stable.    Pat Perez   [phone]

## 2017-07-06 NOTE — ASSESSMENT & PLAN NOTE
-no air leak; ET in place for 20 days; efforts late last week with solumedrol for possible laryngeal edema without improvement. Plan for trach/PEG tomorrow  Vent Mode: Spont  Oxygen Concentration (%):  [] 50  Resp Rate Total:  [12 br/min-26 br/min] 15 br/min  Vt Set:  [420 mL] 420 mL  PEEP/CPAP:  [5 cmH20] 5 cmH20  Pressure Support:  [5 cmH20] 5 cmH20  Mean Airway Pressure:  [6.6 cmH20-7.6 cmH20] 6.6 cmH20  - daily CXR/ABG while intubated

## 2017-07-06 NOTE — ASSESSMENT & PLAN NOTE
--180  -carvedilol, hydralazine, amlodipine, aliskiren; no ACEI or ARB given aliskiren; start clonidine patch 0.1 mg/24 h  -nicardipine if necessary

## 2017-07-06 NOTE — PROGRESS NOTES
Progress Note  Neurocritical Care    Admit Date: 6/18/2017  Service Date: 07/06/2017   Length of Stay: 18    Chief Complaint: Cerebral infarction, watershed distribution, bilateral, acute    History of Present Illness: 66 y woman who presents with hypertensive emergency with severe HA-> antihypertensives-> jerking of concern for seizure-> intubation-> NSCCU.     Hospital Course: -6/27 admission to [facility]-> EEG with diffuse slowing no seizures, MR with bilateral MCA SAQIB watershed infarcts  -no subsequent documented seizures, no significant exam improvement     Interval History: She is more awake. She had been tolerating SBT at 5/5 until a recent coughing fit. She has moderate secretions that may be preventing extubation. BP is controlled with some PRN. She denies complaints.    Review of Systems: Unable to obtain a complete ROS due to level of consciousness.     Vitals:   Temp: 98.8 °F (37.1 °C) (07/06/17 0701)  Pulse: 64 (07/06/17 0806)  Resp: (!) 22 (07/06/17 0806)  BP: (!) 144/67 (07/06/17 0800)  SpO2: 99 % (07/06/17 0806)    Temp:  [97.6 °F (36.4 °C)-98.8 °F (37.1 °C)] 98.8 °F (37.1 °C)  Pulse:  [58-76] 64  Resp:  [12-36] 22  SpO2:  [94 %-100 %] 99 %  BP: (133-183)/(61-79) 144/67    Vent Mode: Spont  Oxygen Concentration (%):  [50] 50  Resp Rate Total:  [16 br/min-31 br/min] 21 br/min  Vt Set:  [420 mL] 420 mL  PEEP/CPAP:  [8 cmH20] 8 cmH20  Pressure Support:  [5 cmH20-8 cmH20] 5 cmH20  Mean Airway Pressure:  [10 tzW71-43 cmH20] 11 cmH20    07/05 0701 - 07/06 0700  In: 1000   Out: 350 [Urine:350]     Examination:   Constitutional: Obese woman in no apparent distress.   Eyes: Conjunctiva clear, anicteric. Lids no lesions.  Head/Ears/Nose/Mouth/Throat/Neck: Frontal bossing. Moist mucous membranes. External ears, nose atraumatic.   Cardiovascular: Regular rhythm. No murmurs.  Respiratory: Comfortable respirations. Clear to auscultation.  Gastrointestinal: No hernia. Soft, nondistended, nontender. + bowel  sounds.    Neurologic:  -GCS [id number]  -Eyes open spontaneously. Tracks. Follows simple commands.  -Cranial nerves intact, particularly pupils reactive, corneals present, oculocephalics intact, blinks to threat, + cough.  -Motor lifts arms, legs to stimulation and follows commands adequately.  -Sensation no gross neglect.  Unable to test orientation, language, memory, judgment, insight, fund of knowledge, coordination, gait due to level of consciousness.    Medications:   Continuous  propofol Last Rate: Stopped (06/29/17 0915)   Scheduled  albuterol-ipratropium 2.5mg-0.5mg/3mL 3 mL Q6H   aliskiren 300 mg Daily   amlodipine 10 mg Daily   aspirin 325 mg Daily   atorvastatin 10 mg Daily   carvedilol 25 mg BID   chlorhexidine 15 mL BID   heparin (porcine) 7,500 Units Q8H   hydrALAZINE 100 mg Q8H   insulin detemir 10 Units BID   pantoprazole 40 mg Daily   psyllium 1 packet Daily   sodium chloride 0.9% 3 mL Q8H   sodium chloride 3% 4 mL Q6H   PRN  acetaminophen 650 mg Q6H PRN   dextrose 50% 12.5 g PRN   fentaNYL 100 mcg Q6H PRN   glucagon (human recombinant) 1 mg PRN   hydrALAZINE 10 mg Q4H PRN   insulin aspart 1-10 Units Q4H PRN   labetalol 10 mg Q4H PRN   lorazepam 2 mg Q4H PRN   magnesium oxide 800 mg PRN   magnesium oxide 800 mg PRN   ondansetron 8 mg Q8H PRN   pneumoc 13-finn conj-dip cr(PF) 0.5 mL vaccine x 1 dose   potassium chloride 10% 40 mEq PRN   potassium chloride 10% 40 mEq PRN   potassium chloride 10% 60 mEq PRN   potassium, sodium phosphates 2 packet PRN   potassium, sodium phosphates 2 packet PRN   potassium, sodium phosphates 2 packet PRN   promethazine (PHENERGAN) IVPB 12.5 mg Q6H PRN   propofol 10 mcg/kg/min (Dosing Weight) Continuous PRN      Today I independently reviewed pertinent medications, lines/drains/airways, imaging, lab results, microbiology results, notably:      7.38/48/103/3, Na 142, K 5, BUN 25, Cr 0.8, HGB 8.7    CXR lungs clear, ETT ok    1/2 blood cultures + GPC resembling  [facility]    Assessment/Plan:  Neuro   * Cerebral infarction, watershed distribution, bilateral, acute    -from hypoperfusion amid correction of hypertensive emergency  -aspirin, atorvastatin        Hypertensive encephalopathy    -see HTN emergency        Seizure    -see HTN emergency  -provoked  -does not require anticonvulsant        Pulmonary   Acute respiratory failure    -SBT at 5/5/40%  -discussed possibility of extubation with family at bedside  -however, if unable to extubate or fails extubation, plan tracheostomy        Cardiac   Hypertensive emergency    --180  -carvedilol, hydralazine, amlodipine        Endocrine   Type 2 diabetes mellitus, uncontrolled    -detemir, ISS        Fluids/Electrolytes/Nutrition/GI    -TF        Other   Eve coma scale total score 9-12    -see HTN emergency        Positive blood culture    -repeat blood cultures  -currently low suspicion of true bacteremia           Prophylaxis:  Venous Thromboembolism: mechanical chemical  Stress Ulcer: PPI  Ventilator Pneumonia: yes     Activity Orders          Up with assistance starting at 06/26 5256        Full Code

## 2017-07-07 PROBLEM — R40.2420 GLASGOW COMA SCALE TOTAL SCORE 9-12: Status: ACTIVE | Noted: 2017-07-07

## 2017-07-07 PROBLEM — J38.4 LARYNGEAL EDEMA: Status: ACTIVE | Noted: 2017-07-07

## 2017-07-07 LAB
ALLENS TEST: NORMAL
ANION GAP SERPL CALC-SCNC: 8 MMOL/L
BASOPHILS # BLD AUTO: 0.01 K/UL
BASOPHILS NFR BLD: 0.1 %
BUN SERPL-MCNC: 25 MG/DL
CALCIUM SERPL-MCNC: 9 MG/DL
CHLORIDE SERPL-SCNC: 108 MMOL/L
CO2 SERPL-SCNC: 23 MMOL/L
CREAT SERPL-MCNC: 0.8 MG/DL
DELSYS: NORMAL
DIFFERENTIAL METHOD: ABNORMAL
EOSINOPHIL # BLD AUTO: 0.2 K/UL
EOSINOPHIL NFR BLD: 1.5 %
ERYTHROCYTE [DISTWIDTH] IN BLOOD BY AUTOMATED COUNT: 14.1 %
EST. GFR  (AFRICAN AMERICAN): >60 ML/MIN/1.73 M^2
EST. GFR  (NON AFRICAN AMERICAN): >60 ML/MIN/1.73 M^2
FIO2: 40
FLOW: 60
GLUCOSE SERPL-MCNC: 95 MG/DL
HCO3 UR-SCNC: 24.6 MMOL/L (ref 24–28)
HCT VFR BLD AUTO: 32.5 %
HGB BLD-MCNC: 9.7 G/DL
LYMPHOCYTES # BLD AUTO: 1.7 K/UL
LYMPHOCYTES NFR BLD: 15.5 %
MAGNESIUM SERPL-MCNC: 1.6 MG/DL
MCH RBC QN AUTO: 25.6 PG
MCHC RBC AUTO-ENTMCNC: 29.8 %
MCV RBC AUTO: 86 FL
MODE: NORMAL
MONOCYTES # BLD AUTO: 0.7 K/UL
MONOCYTES NFR BLD: 6.7 %
NEUTROPHILS # BLD AUTO: 8.2 K/UL
NEUTROPHILS NFR BLD: 75.6 %
PCO2 BLDA: 43 MMHG (ref 35–45)
PH SMN: 7.37 [PH] (ref 7.35–7.45)
PHOSPHATE SERPL-MCNC: 3.9 MG/DL
PLATELET # BLD AUTO: 279 K/UL
PMV BLD AUTO: 11.1 FL
PO2 BLDA: 87 MMHG (ref 80–100)
POC BE: -1 MMOL/L
POC SATURATED O2: 96 % (ref 95–100)
POC TCO2: 26 MMOL/L (ref 23–27)
POCT GLUCOSE: 100 MG/DL (ref 70–110)
POCT GLUCOSE: 107 MG/DL (ref 70–110)
POCT GLUCOSE: 109 MG/DL (ref 70–110)
POCT GLUCOSE: 211 MG/DL (ref 70–110)
POTASSIUM SERPL-SCNC: 5.1 MMOL/L
RBC # BLD AUTO: 3.79 M/UL
SAMPLE: NORMAL
SITE: NORMAL
SODIUM SERPL-SCNC: 139 MMOL/L
SP02: 97
WBC # BLD AUTO: 10.85 K/UL

## 2017-07-07 PROCEDURE — 85025 COMPLETE CBC W/AUTO DIFF WBC: CPT

## 2017-07-07 PROCEDURE — 25000003 PHARM REV CODE 250: Performed by: PSYCHIATRY & NEUROLOGY

## 2017-07-07 PROCEDURE — 83735 ASSAY OF MAGNESIUM: CPT

## 2017-07-07 PROCEDURE — 27200966 HC CLOSED SUCTION SYSTEM

## 2017-07-07 PROCEDURE — 99291 CRITICAL CARE FIRST HOUR: CPT | Mod: ,,, | Performed by: PSYCHIATRY & NEUROLOGY

## 2017-07-07 PROCEDURE — 63600175 PHARM REV CODE 636 W HCPCS: Performed by: PHYSICIAN ASSISTANT

## 2017-07-07 PROCEDURE — C1751 CATH, INF, PER/CENT/MIDLINE: HCPCS

## 2017-07-07 PROCEDURE — 76937 US GUIDE VASCULAR ACCESS: CPT

## 2017-07-07 PROCEDURE — 84100 ASSAY OF PHOSPHORUS: CPT

## 2017-07-07 PROCEDURE — 99900035 HC TECH TIME PER 15 MIN (STAT)

## 2017-07-07 PROCEDURE — 27000221 HC OXYGEN, UP TO 24 HOURS

## 2017-07-07 PROCEDURE — 63600175 PHARM REV CODE 636 W HCPCS: Performed by: PSYCHIATRY & NEUROLOGY

## 2017-07-07 PROCEDURE — 80048 BASIC METABOLIC PNL TOTAL CA: CPT

## 2017-07-07 PROCEDURE — 82803 BLOOD GASES ANY COMBINATION: CPT

## 2017-07-07 PROCEDURE — 36600 WITHDRAWAL OF ARTERIAL BLOOD: CPT

## 2017-07-07 PROCEDURE — 94003 VENT MGMT INPAT SUBQ DAY: CPT

## 2017-07-07 PROCEDURE — 51702 INSERT TEMP BLADDER CATH: CPT

## 2017-07-07 PROCEDURE — 25000003 PHARM REV CODE 250: Performed by: PHYSICIAN ASSISTANT

## 2017-07-07 PROCEDURE — 25000003 PHARM REV CODE 250: Performed by: ANESTHESIOLOGY

## 2017-07-07 PROCEDURE — 20000000 HC ICU ROOM

## 2017-07-07 PROCEDURE — 36569 INSJ PICC 5 YR+ W/O IMAGING: CPT

## 2017-07-07 PROCEDURE — 25000003 PHARM REV CODE 250: Performed by: INTERNAL MEDICINE

## 2017-07-07 PROCEDURE — 94640 AIRWAY INHALATION TREATMENT: CPT

## 2017-07-07 PROCEDURE — 99900026 HC AIRWAY MAINTENANCE (STAT)

## 2017-07-07 PROCEDURE — 25000242 PHARM REV CODE 250 ALT 637 W/ HCPCS: Performed by: INTERNAL MEDICINE

## 2017-07-07 RX ORDER — NICARDIPINE HYDROCHLORIDE 0.2 MG/ML
1 INJECTION INTRAVENOUS CONTINUOUS
Status: DISCONTINUED | OUTPATIENT
Start: 2017-07-07 | End: 2017-07-10

## 2017-07-07 RX ORDER — CLONIDINE 0.1 MG/24H
1 PATCH, EXTENDED RELEASE TRANSDERMAL
Status: DISCONTINUED | OUTPATIENT
Start: 2017-07-07 | End: 2017-07-14 | Stop reason: HOSPADM

## 2017-07-07 RX ORDER — FUROSEMIDE 10 MG/ML
40 INJECTION INTRAMUSCULAR; INTRAVENOUS ONCE
Status: COMPLETED | OUTPATIENT
Start: 2017-07-07 | End: 2017-07-07

## 2017-07-07 RX ADMIN — LABETALOL HYDROCHLORIDE 10 MG: 5 INJECTION, SOLUTION INTRAVENOUS at 08:07

## 2017-07-07 RX ADMIN — HYDRALAZINE HYDROCHLORIDE 100 MG: 50 TABLET ORAL at 09:07

## 2017-07-07 RX ADMIN — HEPARIN SODIUM 7500 UNITS: 5000 INJECTION, SOLUTION INTRAVENOUS; SUBCUTANEOUS at 05:07

## 2017-07-07 RX ADMIN — PSYLLIUM HUSK 1 PACKET: 3.4 POWDER ORAL at 08:07

## 2017-07-07 RX ADMIN — METHYLPREDNISOLONE SODIUM SUCCINATE 40 MG: 40 INJECTION, POWDER, FOR SOLUTION INTRAMUSCULAR; INTRAVENOUS at 01:07

## 2017-07-07 RX ADMIN — Medication 3 ML: at 05:07

## 2017-07-07 RX ADMIN — INSULIN ASPART 6 UNITS: 100 INJECTION, SOLUTION INTRAVENOUS; SUBCUTANEOUS at 08:07

## 2017-07-07 RX ADMIN — ATORVASTATIN CALCIUM 10 MG: 10 TABLET, FILM COATED ORAL at 08:07

## 2017-07-07 RX ADMIN — CHLORHEXIDINE GLUCONATE 15 ML: 1.2 RINSE ORAL at 08:07

## 2017-07-07 RX ADMIN — ALISKIREN HEMIFUMARATE 300 MG: 300 TABLET, FILM COATED ORAL at 08:07

## 2017-07-07 RX ADMIN — IPRATROPIUM BROMIDE AND ALBUTEROL SULFATE 3 ML: .5; 3 SOLUTION RESPIRATORY (INHALATION) at 07:07

## 2017-07-07 RX ADMIN — METHYLPREDNISOLONE SODIUM SUCCINATE 40 MG: 40 INJECTION, POWDER, FOR SOLUTION INTRAMUSCULAR; INTRAVENOUS at 06:07

## 2017-07-07 RX ADMIN — CLONIDINE 1 PATCH: 0.1 PATCH TRANSDERMAL at 02:07

## 2017-07-07 RX ADMIN — MAGNESIUM OXIDE TAB 400 MG (241.3 MG ELEMENTAL MG) 800 MG: 400 (241.3 MG) TAB at 05:07

## 2017-07-07 RX ADMIN — HYDRALAZINE HYDROCHLORIDE 100 MG: 50 TABLET ORAL at 05:07

## 2017-07-07 RX ADMIN — HEPARIN SODIUM 7500 UNITS: 5000 INJECTION, SOLUTION INTRAVENOUS; SUBCUTANEOUS at 09:07

## 2017-07-07 RX ADMIN — IPRATROPIUM BROMIDE AND ALBUTEROL SULFATE 3 ML: .5; 3 SOLUTION RESPIRATORY (INHALATION) at 08:07

## 2017-07-07 RX ADMIN — IPRATROPIUM BROMIDE AND ALBUTEROL SULFATE 3 ML: .5; 3 SOLUTION RESPIRATORY (INHALATION) at 01:07

## 2017-07-07 RX ADMIN — CARVEDILOL 25 MG: 25 TABLET, FILM COATED ORAL at 08:07

## 2017-07-07 RX ADMIN — CARVEDILOL 25 MG: 25 TABLET, FILM COATED ORAL at 09:07

## 2017-07-07 RX ADMIN — AMLODIPINE BESYLATE 10 MG: 10 TABLET ORAL at 08:07

## 2017-07-07 RX ADMIN — NICARDIPINE HYDROCHLORIDE 2.5 MG/HR: 0.2 INJECTION, SOLUTION INTRAVENOUS at 08:07

## 2017-07-07 RX ADMIN — SODIUM CHLORIDE SOLN NEBU 3% 4 ML: 3 NEBU SOLN at 08:07

## 2017-07-07 RX ADMIN — Medication 3 ML: at 09:07

## 2017-07-07 RX ADMIN — HYDRALAZINE HYDROCHLORIDE 100 MG: 50 TABLET ORAL at 01:07

## 2017-07-07 RX ADMIN — METHYLPREDNISOLONE SODIUM SUCCINATE 40 MG: 40 INJECTION, POWDER, FOR SOLUTION INTRAMUSCULAR; INTRAVENOUS at 09:07

## 2017-07-07 RX ADMIN — SODIUM CHLORIDE SOLN NEBU 3% 4 ML: 3 NEBU SOLN at 07:07

## 2017-07-07 RX ADMIN — HEPARIN SODIUM 7500 UNITS: 5000 INJECTION, SOLUTION INTRAVENOUS; SUBCUTANEOUS at 01:07

## 2017-07-07 RX ADMIN — PANTOPRAZOLE SODIUM 40 MG: 40 GRANULE, DELAYED RELEASE ORAL at 08:07

## 2017-07-07 RX ADMIN — SODIUM CHLORIDE SOLN NEBU 3% 4 ML: 3 NEBU SOLN at 01:07

## 2017-07-07 RX ADMIN — CHLORHEXIDINE GLUCONATE 15 ML: 1.2 RINSE ORAL at 09:07

## 2017-07-07 RX ADMIN — METHYLPREDNISOLONE SODIUM SUCCINATE 40 MG: 40 INJECTION, POWDER, FOR SOLUTION INTRAMUSCULAR; INTRAVENOUS at 11:07

## 2017-07-07 RX ADMIN — ASPIRIN 325 MG ORAL TABLET 325 MG: 325 PILL ORAL at 08:07

## 2017-07-07 RX ADMIN — FUROSEMIDE 40 MG: 10 INJECTION, SOLUTION INTRAVENOUS at 09:07

## 2017-07-07 NOTE — CONSULTS
Single lumen 18g x 10cm midline placed to right brachial vein. Max dwell date 8/5/17, Lot# [id number].  Needle advanced into the vessel under real time ultrasound guidance.  Image recorded and saved.

## 2017-07-07 NOTE — PROGRESS NOTES
Consult received on patient's skin breakdown to lower left labia. Unknown etiology. Recommend nursing apply clear barrier cream qshift and as needed. Discussed with nursing. Nursing to continue care.     07/07/17 1429       Wound 07/07/17 1429 Ulceration lower labia   Date First Assessed/Time First Assessed: 07/07/17 1429   Wound Type: Ulceration  Side: Left  Orientation: lower  Location: labia   Wound WDL ex  (unknown etiology)   Drainage Amount none   Drainage Characteristics/Odor no odor   Wound Base moist;pink   Periwound Area moist;normal skin tone   Wound Edges open   Wound Length (cm) 0.2   Wound Width (cm) 0.2   Depth (cm) 0   Non-staged Wound Description Partial thickness

## 2017-07-07 NOTE — PROGRESS NOTES
Progress Note  Neurocritical Care    Admit Date: 6/18/2017  Service Date: 07/07/2017   Length of Stay: 19    Chief Complaint: Cerebral infarction, watershed distribution, bilateral, acute    History of Present Illness: 66 y woman who presents with hypertensive emergency with severe HA-> antihypertensives-> jerking of concern for seizure-> intubation-> NSCCU.     Hospital Course: -6/27 admission to [facility]-> EEG with diffuse slowing no seizures, MR with bilateral MCA SAQIB watershed infarcts  -no subsequent documented seizures, no significant exam improvement     Interval History: Stayed on SBT overnight. Oxygenation and PaCO2 stable. No severe secretions. Considered extubation. However no cuff leak and moderate secretions urged caution. SBP in 200s. TF still held. BG no hypoglycemia. Cultures CNS.    Review of Systems: Unable to obtain a complete ROS due to level of consciousness.     Vitals:   Temp: 98.5 °F (36.9 °C) (07/07/17 0701)  Pulse: 70 (07/07/17 0701)  Resp: (!) 25 (07/07/17 0701)  BP: (!) 183/79 (07/07/17 0701)  SpO2: 98 % (07/07/17 0701)    Temp:  [97.4 °F (36.3 °C)-98.6 °F (37 °C)] 98.5 °F (36.9 °C)  Pulse:  [60-79] 70  Resp:  [8-35] 25  SpO2:  [95 %-100 %] 98 %  BP: (132-196)/(56-83) 183/79    Vent Mode: Spont  Oxygen Concentration (%):  [40-50] 40  Resp Rate Total:  [19 br/min-31 br/min] 24 br/min  Vt Set:  [420 mL] 420 mL  PEEP/CPAP:  [5 cmH20-8 cmH20] 5 cmH20  Pressure Support:  [5 cmH20] 5 cmH20  Mean Airway Pressure:  [7.1 odL05-76 cmH20] 7.3 cmH20    07/06 0701 - 07/07 0700  In: 300   Out: -      Examination:   Constitutional: Obese woman in no apparent distress.   Eyes: Conjunctiva clear, anicteric. Lids no lesions.  Head/Ears/Nose/Mouth/Throat/Neck: Frontal bossing. Moist mucous membranes. External ears, nose atraumatic.   Cardiovascular: Regular rhythm. No murmurs.  Respiratory: Comfortable respirations. Clear to auscultation.  Gastrointestinal: No hernia. Soft, nondistended, nontender. + bowel  sounds.     Neurologic:  -GCS G2N3IY7  -Eyes open spontaneously. Tracks. Follows simple commands but less brisk than before.  -Cranial nerves intact, particularly pupils reactive, corneals present, oculocephalics intact, blinks to threat, + cough.  -Motor lifts arms, legs to stimulation, weaker in R than L arm.  -Sensation no gross neglect.  Unable to test orientation, language, memory, judgment, insight, fund of knowledge, hearing, shoulder shrug, tongue protrusion, coordination, gait due to level of consciousness.    Medications:   Continuous  nicardipine    propofol Last Rate: Stopped (06/29/17 0915)   Scheduled  albuterol-ipratropium 2.5mg-0.5mg/3mL 3 mL Q6H   aliskiren 300 mg Daily   amlodipine 10 mg Daily   aspirin 325 mg Daily   atorvastatin 10 mg Daily   carvedilol 25 mg BID   chlorhexidine 15 mL BID   heparin (porcine) 7,500 Units Q8H   hydrALAZINE 100 mg Q8H   insulin detemir 10 Units Daily   pantoprazole 40 mg Daily   psyllium 1 packet Daily   sodium chloride 0.9% 3 mL Q8H   sodium chloride 3% 4 mL Q6H   PRN  acetaminophen 650 mg Q6H PRN   dextrose 50% 12.5 g PRN   fentaNYL 100 mcg Q6H PRN   glucagon (human recombinant) 1 mg PRN   hydrALAZINE 10 mg Q4H PRN   insulin aspart 1-10 Units Q4H PRN   labetalol 10 mg Q4H PRN   lorazepam 2 mg Q4H PRN   magnesium oxide 800 mg PRN   magnesium oxide 800 mg PRN   ondansetron 8 mg Q8H PRN   pneumoc 13-finn conj-dip cr(PF) 0.5 mL vaccine x 1 dose   potassium chloride 10% 40 mEq PRN   potassium chloride 10% 40 mEq PRN   potassium chloride 10% 60 mEq PRN   potassium, sodium phosphates 2 packet PRN   potassium, sodium phosphates 2 packet PRN   potassium, sodium phosphates 2 packet PRN   promethazine (PHENERGAN) IVPB 12.5 mg Q6H PRN   propofol 10 mcg/kg/min (Dosing Weight) Continuous PRN      Today I independently reviewed pertinent medications, lines/drains/airways, imaging, lab results, microbiology results, notably:      HGB 9.7, W 11, 7.36/42/87/-1    CXR tube ok, some  increased pulmonary edema    Assessment/Plan:  Neuro   * Cerebral infarction, watershed distribution, bilateral, acute    -from hypoperfusion amid correction of hypertensive emergency  -aspirin, atorvastatin        Hypertensive encephalopathy    -see HTN emergency        Seizure    -see HTN emergency  -provoked  -does not require anticonvulsant        Pulmonary   Laryngeal edema    -methylprednisolone 40 mg IV q4h x4  -furosemide 40 mg IV  -consider extubation tomorrow over tube exchange catheter        Acute respiratory failure    -SBT at 5/5/40%  -risk of reintubation with extubation is high; likely 30%; if mechanics and secretions do not significantly improve, may be best to pursue trach        Cardiac   Hypertensive emergency    --180  -carvedilol, hydralazine, amlodipine, aliskiren; no ACEI or ARB given aliskiren; start clonidine patch 0.1 mg/24 h  -nicardipine if necessary        Renal    -Ross        Endocrine   Type 2 diabetes mellitus, uncontrolled    -detemir 10 units daily  -ISS        Fluids/Electrolytes/Nutrition/GI    -start TF        Other   [place] coma scale total score 9-12    -see HTN emergency        Positive blood culture    -repeat blood cultures  -currently low suspicion of true bacteremia          Prophylaxis:  Venous Thromboembolism: mechanical chemical  Stress Ulcer: PPI  Ventilator Pneumonia: yes     Activity Orders          Up with assistance starting at 06/26 6097        Full Code      Uninterrupted Critical Care/Counseling Time (not including procedures):   Nisa Frank has neurocritical illlness from HTN emergency.  She requires continuous monitoring and adjustment of antihypertensives to prevent neurologic deterioration and injury from HTN emergency, HTN encephalopathy.  Thus she meets the criteria for critical care due to the high probability of imminent deterioration in the patient's condition.  35 minutes of critical care time were spent today directly by me in addition to  any separately billable procedures (10139).

## 2017-07-07 NOTE — ASSESSMENT & PLAN NOTE
-methylprednisolone 40 mg IV q4h x4  -furosemide 40 mg IV  -consider extubation tomorrow over tube exchange catheter

## 2017-07-07 NOTE — PLAN OF CARE
Problem: Patient Care Overview  Goal: Plan of Care Review  Outcome: Ongoing (interventions implemented as appropriate)  POC reviewed with pt and family at 1400. Family verbalized understanding. Questions and concerns addressed. No acute events today. Pt progressing toward goals. Will continue to monitor. See flowsheets for full assessment and VS info.

## 2017-07-08 LAB
ALLENS TEST: ABNORMAL
ANION GAP SERPL CALC-SCNC: 10 MMOL/L
BACTERIA BLD CULT: NORMAL
BASOPHILS # BLD AUTO: 0 K/UL
BASOPHILS NFR BLD: 0 %
BUN SERPL-MCNC: 47 MG/DL
CALCIUM SERPL-MCNC: 8.6 MG/DL
CHLORIDE SERPL-SCNC: 105 MMOL/L
CO2 SERPL-SCNC: 22 MMOL/L
CREAT SERPL-MCNC: 1.2 MG/DL
DELSYS: ABNORMAL
DIFFERENTIAL METHOD: ABNORMAL
EOSINOPHIL # BLD AUTO: 0 K/UL
EOSINOPHIL NFR BLD: 0 %
ERYTHROCYTE [DISTWIDTH] IN BLOOD BY AUTOMATED COUNT: 13.9 %
EST. GFR  (AFRICAN AMERICAN): 54.4 ML/MIN/1.73 M^2
EST. GFR  (NON AFRICAN AMERICAN): 47.2 ML/MIN/1.73 M^2
FIO2: 40
GLUCOSE SERPL-MCNC: 254 MG/DL
HCO3 UR-SCNC: 23.7 MMOL/L (ref 24–28)
HCT VFR BLD AUTO: 30.5 %
HGB BLD-MCNC: 9.6 G/DL
LYMPHOCYTES # BLD AUTO: 0.8 K/UL
LYMPHOCYTES NFR BLD: 9.3 %
MAGNESIUM SERPL-MCNC: 1.9 MG/DL
MCH RBC QN AUTO: 26.3 PG
MCHC RBC AUTO-ENTMCNC: 31.5 %
MCV RBC AUTO: 84 FL
MIN VOL: 9.66
MODE: ABNORMAL
MONOCYTES # BLD AUTO: 0.1 K/UL
MONOCYTES NFR BLD: 0.9 %
NEUTROPHILS # BLD AUTO: 8 K/UL
NEUTROPHILS NFR BLD: 89.1 %
PCO2 BLDA: 40.4 MMHG (ref 35–45)
PEEP: 5
PH SMN: 7.38 [PH] (ref 7.35–7.45)
PHOSPHATE SERPL-MCNC: 5.1 MG/DL
PLATELET # BLD AUTO: 292 K/UL
PMV BLD AUTO: 11 FL
PO2 BLDA: 92 MMHG (ref 80–100)
POC BE: -2 MMOL/L
POC SATURATED O2: 97 % (ref 95–100)
POC TCO2: 25 MMOL/L (ref 23–27)
POCT GLUCOSE: 105 MG/DL (ref 70–110)
POCT GLUCOSE: 107 MG/DL (ref 70–110)
POCT GLUCOSE: 138 MG/DL (ref 70–110)
POCT GLUCOSE: 171 MG/DL (ref 70–110)
POCT GLUCOSE: 206 MG/DL (ref 70–110)
POCT GLUCOSE: 254 MG/DL (ref 70–110)
POCT GLUCOSE: 281 MG/DL (ref 70–110)
POTASSIUM SERPL-SCNC: 4.9 MMOL/L
POTASSIUM SERPL-SCNC: 5.3 MMOL/L
PS: 5
RBC # BLD AUTO: 3.65 M/UL
SAMPLE: ABNORMAL
SITE: ABNORMAL
SODIUM SERPL-SCNC: 137 MMOL/L
SP02: 99
SPONT RATE: 18
WBC # BLD AUTO: 8.96 K/UL

## 2017-07-08 PROCEDURE — 25000003 PHARM REV CODE 250: Performed by: PHYSICIAN ASSISTANT

## 2017-07-08 PROCEDURE — 25000242 PHARM REV CODE 250 ALT 637 W/ HCPCS: Performed by: INTERNAL MEDICINE

## 2017-07-08 PROCEDURE — 99900026 HC AIRWAY MAINTENANCE (STAT)

## 2017-07-08 PROCEDURE — 20000000 HC ICU ROOM

## 2017-07-08 PROCEDURE — 25000003 PHARM REV CODE 250: Performed by: INTERNAL MEDICINE

## 2017-07-08 PROCEDURE — 84100 ASSAY OF PHOSPHORUS: CPT

## 2017-07-08 PROCEDURE — 99233 SBSQ HOSP IP/OBS HIGH 50: CPT | Mod: ,,, | Performed by: PSYCHIATRY & NEUROLOGY

## 2017-07-08 PROCEDURE — 80048 BASIC METABOLIC PNL TOTAL CA: CPT

## 2017-07-08 PROCEDURE — 94761 N-INVAS EAR/PLS OXIMETRY MLT: CPT

## 2017-07-08 PROCEDURE — 94003 VENT MGMT INPAT SUBQ DAY: CPT

## 2017-07-08 PROCEDURE — 99223 1ST HOSP IP/OBS HIGH 75: CPT | Mod: ,,, | Performed by: SURGERY

## 2017-07-08 PROCEDURE — 94640 AIRWAY INHALATION TREATMENT: CPT

## 2017-07-08 PROCEDURE — 25000003 PHARM REV CODE 250: Performed by: PSYCHIATRY & NEUROLOGY

## 2017-07-08 PROCEDURE — 25000003 PHARM REV CODE 250: Performed by: ANESTHESIOLOGY

## 2017-07-08 PROCEDURE — 27200966 HC CLOSED SUCTION SYSTEM

## 2017-07-08 PROCEDURE — 85025 COMPLETE CBC W/AUTO DIFF WBC: CPT

## 2017-07-08 PROCEDURE — 84132 ASSAY OF SERUM POTASSIUM: CPT

## 2017-07-08 PROCEDURE — 83735 ASSAY OF MAGNESIUM: CPT

## 2017-07-08 PROCEDURE — 25000003 PHARM REV CODE 250: Performed by: NURSE PRACTITIONER

## 2017-07-08 PROCEDURE — 27000221 HC OXYGEN, UP TO 24 HOURS

## 2017-07-08 RX ORDER — AMANTADINE HYDROCHLORIDE 100 MG/1
100 CAPSULE, GELATIN COATED ORAL EVERY 8 HOURS
Status: DISCONTINUED | OUTPATIENT
Start: 2017-07-08 | End: 2017-07-12

## 2017-07-08 RX ADMIN — CHLORHEXIDINE GLUCONATE 15 ML: 1.2 RINSE ORAL at 08:07

## 2017-07-08 RX ADMIN — IPRATROPIUM BROMIDE AND ALBUTEROL SULFATE 3 ML: .5; 3 SOLUTION RESPIRATORY (INHALATION) at 08:07

## 2017-07-08 RX ADMIN — IPRATROPIUM BROMIDE AND ALBUTEROL SULFATE 3 ML: .5; 3 SOLUTION RESPIRATORY (INHALATION) at 01:07

## 2017-07-08 RX ADMIN — INSULIN ASPART 5 UNITS: 100 INJECTION, SOLUTION INTRAVENOUS; SUBCUTANEOUS at 12:07

## 2017-07-08 RX ADMIN — PSYLLIUM HUSK 1 PACKET: 3.4 POWDER ORAL at 08:07

## 2017-07-08 RX ADMIN — INSULIN ASPART 6 UNITS: 100 INJECTION, SOLUTION INTRAVENOUS; SUBCUTANEOUS at 12:07

## 2017-07-08 RX ADMIN — INSULIN ASPART 3 UNITS: 100 INJECTION, SOLUTION INTRAVENOUS; SUBCUTANEOUS at 07:07

## 2017-07-08 RX ADMIN — AMANTADINE HYDROCHLORIDE 100 MG: 100 CAPSULE, LIQUID FILLED ORAL at 09:07

## 2017-07-08 RX ADMIN — Medication 3 ML: at 05:07

## 2017-07-08 RX ADMIN — SODIUM CHLORIDE SOLN NEBU 3% 4 ML: 3 NEBU SOLN at 08:07

## 2017-07-08 RX ADMIN — AMANTADINE HYDROCHLORIDE 100 MG: 100 CAPSULE, LIQUID FILLED ORAL at 02:07

## 2017-07-08 RX ADMIN — ALISKIREN HEMIFUMARATE 300 MG: 300 TABLET, FILM COATED ORAL at 08:07

## 2017-07-08 RX ADMIN — AMLODIPINE BESYLATE 10 MG: 10 TABLET ORAL at 08:07

## 2017-07-08 RX ADMIN — IPRATROPIUM BROMIDE AND ALBUTEROL SULFATE 3 ML: .5; 3 SOLUTION RESPIRATORY (INHALATION) at 07:07

## 2017-07-08 RX ADMIN — HEPARIN SODIUM 7500 UNITS: 5000 INJECTION, SOLUTION INTRAVENOUS; SUBCUTANEOUS at 05:07

## 2017-07-08 RX ADMIN — PANTOPRAZOLE SODIUM 40 MG: 40 GRANULE, DELAYED RELEASE ORAL at 08:07

## 2017-07-08 RX ADMIN — SODIUM CHLORIDE SOLN NEBU 3% 4 ML: 3 NEBU SOLN at 07:07

## 2017-07-08 RX ADMIN — CHLORHEXIDINE GLUCONATE 15 ML: 1.2 RINSE ORAL at 09:07

## 2017-07-08 RX ADMIN — HEPARIN SODIUM 7500 UNITS: 5000 INJECTION, SOLUTION INTRAVENOUS; SUBCUTANEOUS at 02:07

## 2017-07-08 RX ADMIN — HYDRALAZINE HYDROCHLORIDE 100 MG: 50 TABLET ORAL at 02:07

## 2017-07-08 RX ADMIN — Medication 3 ML: at 02:07

## 2017-07-08 RX ADMIN — HEPARIN SODIUM 7500 UNITS: 5000 INJECTION, SOLUTION INTRAVENOUS; SUBCUTANEOUS at 09:07

## 2017-07-08 RX ADMIN — IPRATROPIUM BROMIDE AND ALBUTEROL SULFATE 3 ML: .5; 3 SOLUTION RESPIRATORY (INHALATION) at 02:07

## 2017-07-08 RX ADMIN — CARVEDILOL 25 MG: 25 TABLET, FILM COATED ORAL at 08:07

## 2017-07-08 RX ADMIN — INSULIN ASPART 8 UNITS: 100 INJECTION, SOLUTION INTRAVENOUS; SUBCUTANEOUS at 03:07

## 2017-07-08 RX ADMIN — INSULIN ASPART 6 UNITS: 100 INJECTION, SOLUTION INTRAVENOUS; SUBCUTANEOUS at 04:07

## 2017-07-08 RX ADMIN — SODIUM CHLORIDE SOLN NEBU 3% 4 ML: 3 NEBU SOLN at 01:07

## 2017-07-08 RX ADMIN — SODIUM CHLORIDE SOLN NEBU 3% 4 ML: 3 NEBU SOLN at 02:07

## 2017-07-08 RX ADMIN — ATORVASTATIN CALCIUM 10 MG: 10 TABLET, FILM COATED ORAL at 08:07

## 2017-07-08 RX ADMIN — HYDRALAZINE HYDROCHLORIDE 100 MG: 50 TABLET ORAL at 05:07

## 2017-07-08 RX ADMIN — ASPIRIN 325 MG ORAL TABLET 325 MG: 325 PILL ORAL at 08:07

## 2017-07-08 RX ADMIN — INSULIN ASPART 6 UNITS: 100 INJECTION, SOLUTION INTRAVENOUS; SUBCUTANEOUS at 09:07

## 2017-07-08 RX ADMIN — CARVEDILOL 25 MG: 25 TABLET, FILM COATED ORAL at 09:07

## 2017-07-08 NOTE — PROGRESS NOTES
ICU Progress Note  Neurocritical Care    Admit Date: 6/18/2017  LOS: 20    CC: Cerebral infarction, watershed distribution, bilateral, acute    Code Status: Full Code     SUBJECTIVE:     Interval History/Significant Events: no acute events overnight    Review of Symptoms: intubated cannot participate    Medications:  Continuous Infusions:   nicardipine Stopped (07/07/17 1350)    propofol Stopped (06/29/17 0915)     Scheduled Meds:   albuterol-ipratropium 2.5mg-0.5mg/3mL  3 mL Nebulization Q6H    aliskiren  300 mg Per NG tube Daily    amantadine HCl  100 mg Oral Q8H    amlodipine  10 mg Per NG tube Daily    aspirin  325 mg Per NG tube Daily    atorvastatin  10 mg Per NG tube Daily    carvedilol  25 mg Per NG tube BID    chlorhexidine  15 mL Mouth/Throat BID    cloNIDine 0.1 mg/24 hr td ptwk  1 patch Transdermal Q7 Days    heparin (porcine)  7,500 Units Subcutaneous Q8H    hydrALAZINE  100 mg Per NG tube Q8H    insulin detemir  10 Units Subcutaneous Daily    pantoprazole  40 mg Per NG tube Daily    psyllium  1 packet Per NG tube Daily    sodium chloride 0.9%  3 mL Intravenous Q8H    sodium chloride 3%  4 mL Nebulization Q6H     PRN Meds:.acetaminophen, dextrose 50%, fentaNYL, glucagon (human recombinant), hydrALAZINE, insulin aspart, labetalol, lorazepam, magnesium oxide, magnesium oxide, ondansetron, pneumoc 13-finn conj-dip cr(PF), potassium chloride 10%, potassium chloride 10%, potassium chloride 10%, potassium, sodium phosphates, potassium, sodium phosphates, potassium, sodium phosphates, promethazine (PHENERGAN) IVPB, propofol    OBJECTIVE:   Vital Signs (Most Recent):   Temp: 99.2 °F (37.3 °C) (07/08/17 1103)  Pulse: (!) 57 (07/08/17 1325)  Resp: 17 (07/08/17 1325)  BP: (!) 120/59 (07/08/17 1200)  SpO2: 99 % (07/08/17 1324)    Vital Signs (24h Range):   Temp:  [97.8 °F (36.6 °C)-99.3 °F (37.4 °C)] 99.2 °F (37.3 °C)  Pulse:  [57-82] 57  Resp:  [12-27] 17  SpO2:  [96 %-100 %] 99 %  BP:  (120-161)/(53-71) 120/59    ICP/CPP (Last 24h):        I & O (Last 24h):     Intake/Output Summary (Last 24 hours) at 07/08/17 1424  Last data filed at 07/08/17 1300   Gross per 24 hour   Intake              625 ml   Output             1162 ml   Net             -537 ml     Physical Exam:  GA: awake, comfortable, no acute distress.   HEENT: No scleral icterus or JVD.   Pulmonary: Clear to auscultation A/P/L.   Cardiac: RRR S1 & S2 .   Abdominal: Obese, Bowel sounds present x 4. No appreciable hepatosplenomegaly.  Skin: No jaundice, rashes, or visible lesions.  Neuro:  --Mental Status:  awake, spontaneous eye opening. Appears to track. Does not follow commands  --Pupils 3.5 mm, PERRL.   --Corneal reflex, gag, cough intact.  Spontaneous movement of kathryn ue and le    Vent Data:   Vent Mode: Spont  Oxygen Concentration (%):  [40] 40  Resp Rate Total:  [13 br/min-27 br/min] 18 br/min  Vt Set:  [420 mL] 420 mL  PEEP/CPAP:  [5 cmH20] 5 cmH20  Pressure Support:  [0 cmH20-5 cmH20] 5 cmH20  Mean Airway Pressure:  [6.8 cmH20-9.2 cmH20] 7.1 cmH20    Lines/Drains/Airway:        Airway - Non-Surgical 06/20/17 0330 Endotracheal Tube (Active)   Secured at 26 cm 6/30/2017  3:50 PM   Measured At Lips 6/30/2017  3:50 PM   Secured Location Left 6/30/2017  3:50 PM   Secured by Commercial tube strauss 6/30/2017  3:50 PM   Bite Block left 6/30/2017  3:50 PM   Site Condition Cool;Dry 6/30/2017  3:50 PM   Status Intact;Secured;Patent 6/30/2017  3:50 PM   Site Assessment Clean;Dry;No bleeding;No drainage 6/30/2017  3:50 PM   Cuff Pressure 30 cm H2O 6/29/2017  7:50 AM               NG/OG Tube 06/20/17 0402 orogastric 14 Fr. Left mouth (Active)   Placement Check placement verified by x-ray;placement verified by distal tube length measurement 6/30/2017  3:01 AM   Tube advanced (cm) 10 6/21/2017 10:46 AM   Distal Tube Length (cm) 70 6/30/2017  3:01 AM   Tolerance no signs/symptoms of discomfort 6/30/2017  3:01 AM   Securement taped to commercial  device 6/30/2017  3:01 AM   Clamp Status/Tolerance unclamped 6/30/2017  3:01 AM   Suction Setting/Drainage Method low;intermittent setting 6/20/2017  7:01 PM   Insertion Site Appearance no redness, warmth, tenderness, skin breakdown, drainage 6/30/2017  3:01 AM   Drainage None 6/30/2017  3:01 AM   Flush/Irrigation irrigated w/;water;no resistance met 6/30/2017  3:01 AM   Feeding Method continuous 6/30/2017  3:01 AM   Current Rate (mL/hr) 50 mL/hr 6/30/2017  3:01 AM   Goal Rate (mL/hr) 50 mL/hr 6/30/2017  3:01 AM   Intake (mL) 60 mL 6/30/2017  6:00 AM   Tube Output(mL)(Include Discarded Residual) 0 mL 6/28/2017  7:00 AM   Intake (mL) - Breast Milk Tube Feeding 200 6/25/2017  9:00 AM   Rate Formula Tube Feeding (mL/hr) 50 mL/hr 6/28/2017  7:00 PM   Intake (mL) - Formula Tube Feeding 50 6/30/2017  3:00 PM   Residual Amount (ml) 0 ml 6/29/2017  3:00 PM       Female External Urinary Catheter 06/27/17 0000 (Active)   Skin no redness;no breakdown 6/30/2017  3:01 AM   Tolerance no signs/symptoms of discomfort 6/30/2017  3:01 AM   Suction Continuous suction at 40 mmHg 6/29/2017  7:01 PM   Date of last wick change 06/29/17 6/29/2017  7:01 PM   Time of last wick change 1500 6/28/2017  3:00 PM   Output (mL) 600 mL 6/30/2017  6:00 AM     Nutrition/Tube Feeds (if NPO state why): tf     Labs:  ABG:     Recent Labs  Lab 07/08/17  0538   PH 7.376   PO2 92   PCO2 40.4   HCO3 23.7*   POCSATURATED 97   BE -2     BMP:    Recent Labs  Lab 07/08/17  0206 07/08/17  0457     --    K 5.3* 4.9     --    CO2 22*  --    BUN 47*  --    CREATININE 1.2  --    *  --    MG 1.9  --    PHOS 5.1*  --      LFT:   Lab Results   Component Value Date    AST 58 (H) 06/27/2017    ALT 38 06/27/2017    ALKPHOS 148 (H) 06/27/2017    BILITOT 0.3 06/27/2017    ALBUMIN 2.2 (L) 06/27/2017    PROT 7.3 06/27/2017     CBC:   Lab Results   Component Value Date    WBC 8.96 07/08/2017    HGB 9.6 (L) 07/08/2017    HCT 30.5 (L) 07/08/2017    MCV 84  07/08/2017     07/08/2017     Microbiology x 7d:   Microbiology Results (last 7 days)     Procedure Component Value Units Date/Time    Blood culture [[id number]] Collected:  07/04/17 1453    Order Status:  Completed Specimen:  Blood from Peripheral, Foot, Right Updated:  07/08/17 1015     Blood Culture, Routine Gram stain aer bottle: Gram positive cocci in clusters resembling Staph      Blood Culture, Routine Results called to and read back by:Andi Jimenez RN 07/06/2017  00:54     Blood Culture, Routine --     COAGULASE-NEGATIVE STAPHYLOCOCCUS SPECIES  Organism is a probable contaminant      Narrative:       Blood cultures x 2 different sites. 4 bottles total. Please  draw cultures before administering antibiotics.    Blood culture [[id number]] Collected:  07/06/17 1453    Order Status:  Completed Specimen:  Blood from Peripheral, Hand, Left Updated:  07/07/17 1822     Blood Culture, Routine No Growth to date     Blood Culture, Routine No Growth to date    Narrative:       Blood cultures x 2 different sites. 4 bottles total. Please  draw cultures before administering antibiotics.    Blood culture [[id number]] Collected:  07/06/17 1453    Order Status:  Completed Specimen:  Blood from Peripheral, Forearm, Right Updated:  07/07/17 1822     Blood Culture, Routine No Growth to date     Blood Culture, Routine No Growth to date    Narrative:       Blood cultures from 2 different sites. 4 bottles total.  Please draw before starting antibiotics.    Blood culture [[id number]] Collected:  07/04/17 1407    Order Status:  Completed Specimen:  Blood from Peripheral, Lower Leg, Right Updated:  07/07/17 1812     Blood Culture, Routine No Growth to date     Blood Culture, Routine No Growth to date     Blood Culture, Routine No Growth to date     Blood Culture, Routine No Growth to date    Narrative:       Blood cultures from 2 different sites. 4 bottles total.  Please draw before starting antibiotics.    Culture, Respiratory with  Gram Stain [[id number]] Collected:  07/04/17 1558    Order Status:  Completed Specimen:  Respiratory from Endotracheal Aspirate Updated:  07/06/17 1305     Respiratory Culture Normal respiratory jose luis     Gram Stain (Respiratory) <10 epithelial cells per low power field.     Gram Stain (Respiratory) Few WBC's     Gram Stain (Respiratory) Rare Gram positive cocci    Clostridium difficile EIA [[id number]] Collected:  07/05/17 1107    Order Status:  Completed Specimen:  Stool from Stool Updated:  07/05/17 2102     C. diff Antigen Negative     C difficile Toxins A+B, EIA Negative     Comment: Testing not recommended for children <24 months old.           Imaging:    I personally reviewed the above image.    ASSESSMENT/PLAN:     Active Hospital Problems    Diagnosis    *Cerebral infarction, watershed distribution, bilateral, acute    [place] coma scale total score 9-12    Laryngeal edema    Positive blood culture    Hypertensive encephalopathy    Morbid obesity due to excess calories    Acute respiratory failure    Hypokalemia    Hyperlipidemia    CAD s/p CABG    Pyelonephritis    Hypertensive emergency    Morbid obesity with BMI of 50.0-59.9, adult    Essential hypertension    Type 2 diabetes mellitus, uncontrolled      Neuro:   Bilateral watershed stroke from profound hypotension.  No obvious proximal extra or intracranial disease.  Secondary stroke prevention  encephalopathy appears to be slowing improving  - tsh, b12, folate, cortisol (all normal)  EEG neg for NCSE      Pulmonary:   Tolerating CPAP 5/5  Discussed the earlier plan of extubation trial today with the family and interval development. Still with no air leak. I have suggested a trach and peg due to risk for extubation failure, prolonged intubation with laryngeal edema, and secretion. Will consult gen surgery.     Cardiac:   Hemodynamically stable  bld pr controlled on multiple antihypertensives. Clonidine added 7/7    Renal:    Making  urine    ID:   Afebrile, normal wbc  Cultures neg so far.     Hem/Onc:   Stable hh    Endocrine:    BS stable  On detemir  IGF normal    Fluids/Electrolytes/Nutrition/GI:   Resume tf  Lines:  Art NA  CVC NA  ETT +  Ross NA  NG +  PEG NA    Proph:  DVT: SCD  Constipation:  Last output:bowel regiemn  PUP:protonix  VAP:peridex    Uninterrupted Critical Care/Counseling Time (not including procedures):: III    Dany Villanueva MD  Neurocritical care attending

## 2017-07-08 NOTE — PROGRESS NOTES
Notified NCC that pt's BG have trended up throughout the night. Last BG was 281. MD ordered to hold tube feeds. Will continue to monitor.

## 2017-07-08 NOTE — PLAN OF CARE
Problem: Patient Care Overview  Goal: Individualization & Mutuality  Admit Date  2017  7:18 PM    Admit Diagnosis: Elevated troponin [R74.8]  Fever [R50.9]  Essential hypertension [I10]  Sepsis, due to unspecified organism [A41.9]  Acute respiratory failure [J96.00]    Past Medical History: Past Medical History:  No date: Diabetes mellitus  No date: Hypertension    Past Surgical History: Past Surgical History:  No date: ARTERIAL BYPASS SURGRY      Comment: 9 tears sgo  No date: CARDIAC SURGERY  No date:  SECTION  No date: HYSTERECTOMY  No date: TUBAL LIGATION    Individualization:   1. Patient likes for daughters to be involved in care  2. Patient likes for you to explain everything before performing care. JULIÁN Richard 17    Restraints: 7/3/17 1800 Restraints placed due to treatment interference. Will continue to reassess pt for discontinuation criteria.       Outcome: Ongoing (interventions implemented as appropriate)  Left wrist restraint  Removing medical devices.   Will continue to monitor d/c criteria.

## 2017-07-08 NOTE — PROGRESS NOTES
Reviewed her history and clinical course so far with the patient's daughter. Based on her body habitus, secretion handling would likely benefit from trach and peg high risk for failure with extubation trial   Will call gen surg for trach and peg.  Add amantadine 100mg q8hrly to improve wakefulness.

## 2017-07-08 NOTE — CONSULTS
[facility]  General Surgery  Consult Note    Inpatient consult to General Surgery  Consult performed by: DESTINEE SHAH  Consult ordered by: YUE JAUREGUI  Reason for consult: for trach and peg  Assessment/Recommendations: 67 yo W with a PMH of HTN, DM type 2, morbid obesity ( BMI of 59), HLD, and CAD s/p CABG who was admitted to [facility] as a transfer from [facility] for AMS; general surgery consulted for trach and PEG     -Due to the patient's size, it will likely be safer to do her procedures in the operating room, rather than at bedside. She is on minimal vent settings and is tolerating her tube feeds so she is a surgical candidate.  -Plan for trach and PEG in the OR on Tuesday 7/11/17; consents to be obtained closer to the date of surgery  -Medical management per primary team     Destinee Shah MD  PGY-4  [phone] (pager)        Subjective:     Chief Complaint/Reason for Admission: peg/trach    History of Present Illness: 67 yo W with a PMH of HTN, DM type 2, morbid obesity ( BMI of 59), HLD, and CAD s/p CABG who was admitted to [facility] as a transfer from [facility] for AMS. She was initially admitted with headaches, fevers and leukocytosis at [facility]. She then deteriorated and had to be intubated there. There was some abnormal movement of her RUE and she was thought to be seizing. MRI was attempted to be done but she exceeded the weight limit and so the study was canceled. She was then transferred to [facility] and was able to have the MRI done which showed bilateral MCA and SAQIB watershed infarcts. She has been intubated since 6/20/17 with no attempts for extubation for the concern that she will fail extubation. She is intubated with a 7.5mm ETT and is spontaneous settings on the vent. She is tolerating tube feeds via an OGT.    There were no family members in the room during my assessment therefore a thorough surgical history was not able to be obtained.    No current facility-administered  medications on file prior to encounter.      Current Outpatient Prescriptions on File Prior to Encounter   Medication Sig    aliskiren (TEKTURNA) 300 MG Tab Take by mouth once daily.    amlodipine (NORVASC) 10 MG tablet Take 1 tablet (10 mg total) by mouth once daily.    atorvastatin (LIPITOR) 10 MG tablet Take 10 mg by mouth once daily.    diclofenac sodium (VOLTAREN) 1 % Gel Apply 2 g topically 2 (two) times daily.    EASY TOUCH TWIST LANCETS 30 gauge Misc     hydrALAZINE (APRESOLINE) 25 MG tablet Take 1 tablet (25 mg total) by mouth every 12 (twelve) hours.    hydrocodone-acetaminophen 7.5-325mg (NORCO) 7.5-325 mg per tablet     insulin glargine (LANTUS) 100 unit/mL injection Inject into the skin every evening.    JANUMET XR 50-1,000 mg TM24 Take 1 tablet by mouth 2 (two) times daily.    LANTUS SOLOSTAR 100 unit/mL (3 mL) InPn pen     loratadine (CLARITIN) 10 mg tablet Take 10 mg by mouth once daily.    meclizine (ANTIVERT) 25 mg tablet Take 1 tablet (25 mg total) by mouth every 6 (six) hours as needed.    saxagliptin (ONGLYZA) 5 mg Tab tablet Take 1 tablet (5 mg total) by mouth once daily.    TRUETEST TEST STRIPS Strp     urea (CARMOL) 40 % Crea Apply topically 2 (two) times daily.    aspirin 81 MG Chew Take 1 tablet (81 mg total) by mouth once daily.    hydrochlorothiazide (HYDRODIURIL) 12.5 MG Tab Take 1 tablet (12.5 mg total) by mouth once daily.       Review of patient's allergies indicates:   Allergen Reactions    Latex, natural rubber        Past Medical History:   Diagnosis Date    Diabetes mellitus     Hypertension      Past Surgical History:   Procedure Laterality Date    ARTERIAL BYPASS SURGRY      9 tears sgo    CARDIAC SURGERY       SECTION      HYSTERECTOMY      TUBAL LIGATION       Family History     Problem Relation (Age of Onset)    No Known Problems Father, Mother, Sister, Brother, Maternal Aunt, Maternal Uncle, Paternal Aunt, Paternal Uncle, Maternal  Grandmother, Maternal Grandfather, Paternal Grandmother, Paternal Grandfather        Social History Main Topics    Smoking status: Never Smoker    Smokeless tobacco: Never Used    Alcohol use Yes      Comment: occ    Drug use: No    Sexual activity: Not Currently     Birth control/ protection: See Surgical Hx     Review of Systems   Unable to perform ROS: Intubated     Objective:     Vital Signs (Most Recent):  Temp: 99.2 °F (37.3 °C) (07/08/17 1103)  Pulse: (!) 57 (07/08/17 1325)  Resp: 17 (07/08/17 1325)  BP: (!) 120/59 (07/08/17 1200)  SpO2: 99 % (07/08/17 1324) Vital Signs (24h Range):  Temp:  [97.8 °F (36.6 °C)-99.3 °F (37.4 °C)] 99.2 °F (37.3 °C)  Pulse:  [57-82] 57  Resp:  [12-27] 17  SpO2:  [96 %-100 %] 99 %  BP: (120-161)/(53-71) 120/59     Weight: (!) 160.6 kg (354 lb)  Body mass index is 58.91 kg/m².      Intake/Output Summary (Last 24 hours) at 07/08/17 1414  Last data filed at 07/08/17 1300   Gross per 24 hour   Intake              625 ml   Output             1162 ml   Net             -537 ml       Physical Exam   Constitutional: She appears well-developed and well-nourished.   Morbidly obese   Eyes: EOM are normal.   Neck: Normal range of motion.   Short neck   Cardiovascular: Normal rate and regular rhythm.  Exam reveals no friction rub.    No murmur heard.  Pulmonary/Chest: Effort normal and breath sounds normal. No respiratory distress. She has no wheezes.   Abdominal: Soft. Bowel sounds are normal. She exhibits no distension. There is no tenderness. No hernia.       Well-healed lower midline incision   Musculoskeletal: She exhibits edema.   Neurological:   Sedated     Skin: Skin is warm and dry.       Significant Labs:  CBC:   Recent Labs  Lab 07/08/17  0206   WBC 8.96   RBC 3.65*   HGB 9.6*   HCT 30.5*      MCV 84   MCH 26.3*   MCHC 31.5*     CMP:   Recent Labs  Lab 07/08/17  0206 07/08/17  0457   *  --    CALCIUM 8.6*  --      --    K 5.3* 4.9   CO2 22*  --      --     BUN 47*  --    CREATININE 1.2  --      Coagulation: No results for input(s): INR, APTT in the last 48 hours.    Invalid input(s): PT    Significant Diagnostics:  None    Assessment/Plan:     Active Diagnoses:    Diagnosis Date Noted POA    PRINCIPAL PROBLEM:  Cerebral infarction, watershed distribution, bilateral, acute [I63.8] 06/27/2017 Yes    [place] coma scale total score 9-12 [R40.2420] 07/07/2017 Yes    Laryngeal edema [J38.4] 07/07/2017 Unknown    Positive blood culture [R78.81] 07/06/2017 Unknown    Hypertensive encephalopathy [I67.4] 06/26/2017 Yes    Morbid obesity due to excess calories [E66.01] 06/26/2017 Yes    Acute respiratory failure [J96.00] 06/20/2017 Yes    Hypokalemia [E87.6] 06/19/2017 Yes    Hyperlipidemia [E78.5] 06/19/2017 Yes     Chronic    CAD s/p CABG [I25.10] 06/19/2017 Yes     Chronic    Pyelonephritis [N12] 06/19/2017 Yes    Hypertensive emergency [I16.1] 06/18/2017 Yes    Morbid obesity with BMI of 50.0-59.9, adult [E66.01, Z68.43] 02/24/2015 Not Applicable     Chronic    Essential hypertension [I10] 05/12/2014 Yes     Chronic    Type 2 diabetes mellitus, uncontrolled [E11.65] 05/12/2014 Yes     Chronic      Problems Resolved During this Admission:    Diagnosis Date Noted Date Resolved POA    ARF (acute renal failure) [N17.9] 06/22/2017 07/06/2017 No    Blood poisoning [A41.9] 06/19/2017 06/22/2017 Yes       Thank you for your consult. I will follow-up with patient. Please contact us if you have any additional questions.    Destinee Shah MD  General Surgery  [facility]

## 2017-07-08 NOTE — PLAN OF CARE
Problem: Patient Care Overview  Goal: Plan of Care Review  Outcome: Ongoing (interventions implemented as appropriate)  POC reviewed with pt and family at 0500. Pt family verbalized understanding. Questions and concerns addressed. No acute events overnight. Tube feeds held at 0400. BG trending up, MD aware.  Pt progressing toward goals. Will continue to monitor. See flowsheets for full assessment and VS info

## 2017-07-09 LAB
ALLENS TEST: NORMAL
ANION GAP SERPL CALC-SCNC: 7 MMOL/L
BACTERIA BLD CULT: NORMAL
BASOPHILS # BLD AUTO: 0.01 K/UL
BASOPHILS NFR BLD: 0.1 %
BUN SERPL-MCNC: 52 MG/DL
CALCIUM SERPL-MCNC: 8.3 MG/DL
CHLORIDE SERPL-SCNC: 108 MMOL/L
CO2 SERPL-SCNC: 23 MMOL/L
CREAT SERPL-MCNC: 1.1 MG/DL
DELSYS: NORMAL
DIFFERENTIAL METHOD: ABNORMAL
EOSINOPHIL # BLD AUTO: 0 K/UL
EOSINOPHIL NFR BLD: 0.2 %
ERYTHROCYTE [DISTWIDTH] IN BLOOD BY AUTOMATED COUNT: 13.9 %
EST. GFR  (AFRICAN AMERICAN): >60 ML/MIN/1.73 M^2
EST. GFR  (NON AFRICAN AMERICAN): 52.4 ML/MIN/1.73 M^2
FIO2: 40
GLUCOSE SERPL-MCNC: 162 MG/DL
HCO3 UR-SCNC: 25.9 MMOL/L (ref 24–28)
HCT VFR BLD AUTO: 28.7 %
HGB BLD-MCNC: 8.8 G/DL
LYMPHOCYTES # BLD AUTO: 2.3 K/UL
LYMPHOCYTES NFR BLD: 17 %
MAGNESIUM SERPL-MCNC: 2.1 MG/DL
MCH RBC QN AUTO: 25.7 PG
MCHC RBC AUTO-ENTMCNC: 30.7 %
MCV RBC AUTO: 84 FL
MODE: NORMAL
MONOCYTES # BLD AUTO: 0.9 K/UL
MONOCYTES NFR BLD: 6.7 %
NEUTROPHILS # BLD AUTO: 10.3 K/UL
NEUTROPHILS NFR BLD: 75.3 %
PCO2 BLDA: 44.7 MMHG (ref 35–45)
PEEP: 5
PH SMN: 7.37 [PH] (ref 7.35–7.45)
PHOSPHATE SERPL-MCNC: 3.4 MG/DL
PLATELET # BLD AUTO: 264 K/UL
PMV BLD AUTO: 10.4 FL
PO2 BLDA: 96 MMHG (ref 80–100)
POC BE: 1 MMOL/L
POC SATURATED O2: 97 % (ref 95–100)
POC TCO2: 27 MMOL/L (ref 23–27)
POCT GLUCOSE: 163 MG/DL (ref 70–110)
POCT GLUCOSE: 175 MG/DL (ref 70–110)
POCT GLUCOSE: 176 MG/DL (ref 70–110)
POCT GLUCOSE: 223 MG/DL (ref 70–110)
POCT GLUCOSE: 237 MG/DL (ref 70–110)
POTASSIUM SERPL-SCNC: 4.2 MMOL/L
PS: 5
RBC # BLD AUTO: 3.43 M/UL
SAMPLE: NORMAL
SITE: NORMAL
SODIUM SERPL-SCNC: 138 MMOL/L
WBC # BLD AUTO: 13.74 K/UL

## 2017-07-09 PROCEDURE — 25000003 PHARM REV CODE 250: Performed by: PSYCHIATRY & NEUROLOGY

## 2017-07-09 PROCEDURE — 25000003 PHARM REV CODE 250: Performed by: INTERNAL MEDICINE

## 2017-07-09 PROCEDURE — 25000003 PHARM REV CODE 250: Performed by: PHYSICIAN ASSISTANT

## 2017-07-09 PROCEDURE — 99233 SBSQ HOSP IP/OBS HIGH 50: CPT | Mod: ,,, | Performed by: PSYCHIATRY & NEUROLOGY

## 2017-07-09 PROCEDURE — 25000242 PHARM REV CODE 250 ALT 637 W/ HCPCS: Performed by: INTERNAL MEDICINE

## 2017-07-09 PROCEDURE — 94761 N-INVAS EAR/PLS OXIMETRY MLT: CPT

## 2017-07-09 PROCEDURE — 82803 BLOOD GASES ANY COMBINATION: CPT

## 2017-07-09 PROCEDURE — 36600 WITHDRAWAL OF ARTERIAL BLOOD: CPT

## 2017-07-09 PROCEDURE — 25000003 PHARM REV CODE 250: Performed by: ANESTHESIOLOGY

## 2017-07-09 PROCEDURE — 84100 ASSAY OF PHOSPHORUS: CPT

## 2017-07-09 PROCEDURE — 80048 BASIC METABOLIC PNL TOTAL CA: CPT

## 2017-07-09 PROCEDURE — 94003 VENT MGMT INPAT SUBQ DAY: CPT

## 2017-07-09 PROCEDURE — 85025 COMPLETE CBC W/AUTO DIFF WBC: CPT

## 2017-07-09 PROCEDURE — 83735 ASSAY OF MAGNESIUM: CPT

## 2017-07-09 PROCEDURE — 27200966 HC CLOSED SUCTION SYSTEM

## 2017-07-09 PROCEDURE — 25000003 PHARM REV CODE 250: Performed by: NURSE PRACTITIONER

## 2017-07-09 PROCEDURE — 94640 AIRWAY INHALATION TREATMENT: CPT

## 2017-07-09 PROCEDURE — 99900026 HC AIRWAY MAINTENANCE (STAT)

## 2017-07-09 PROCEDURE — 20000000 HC ICU ROOM

## 2017-07-09 PROCEDURE — 27000221 HC OXYGEN, UP TO 24 HOURS

## 2017-07-09 RX ADMIN — AMANTADINE HYDROCHLORIDE 100 MG: 100 CAPSULE, LIQUID FILLED ORAL at 05:07

## 2017-07-09 RX ADMIN — SODIUM CHLORIDE SOLN NEBU 3% 4 ML: 3 NEBU SOLN at 01:07

## 2017-07-09 RX ADMIN — CARVEDILOL 25 MG: 25 TABLET, FILM COATED ORAL at 08:07

## 2017-07-09 RX ADMIN — SODIUM CHLORIDE SOLN NEBU 3% 4 ML: 3 NEBU SOLN at 07:07

## 2017-07-09 RX ADMIN — SODIUM CHLORIDE SOLN NEBU 3% 4 ML: 3 NEBU SOLN at 08:07

## 2017-07-09 RX ADMIN — INSULIN ASPART 4 UNITS: 100 INJECTION, SOLUTION INTRAVENOUS; SUBCUTANEOUS at 08:07

## 2017-07-09 RX ADMIN — HEPARIN SODIUM 7500 UNITS: 5000 INJECTION, SOLUTION INTRAVENOUS; SUBCUTANEOUS at 05:07

## 2017-07-09 RX ADMIN — ALISKIREN HEMIFUMARATE 300 MG: 300 TABLET, FILM COATED ORAL at 08:07

## 2017-07-09 RX ADMIN — AMLODIPINE BESYLATE 10 MG: 10 TABLET ORAL at 08:07

## 2017-07-09 RX ADMIN — INSULIN ASPART 4 UNITS: 100 INJECTION, SOLUTION INTRAVENOUS; SUBCUTANEOUS at 04:07

## 2017-07-09 RX ADMIN — AMANTADINE HYDROCHLORIDE 100 MG: 100 CAPSULE, LIQUID FILLED ORAL at 09:07

## 2017-07-09 RX ADMIN — HEPARIN SODIUM 7500 UNITS: 5000 INJECTION, SOLUTION INTRAVENOUS; SUBCUTANEOUS at 01:07

## 2017-07-09 RX ADMIN — INSULIN ASPART 3 UNITS: 100 INJECTION, SOLUTION INTRAVENOUS; SUBCUTANEOUS at 12:07

## 2017-07-09 RX ADMIN — INSULIN ASPART 4 UNITS: 100 INJECTION, SOLUTION INTRAVENOUS; SUBCUTANEOUS at 01:07

## 2017-07-09 RX ADMIN — INSULIN ASPART 3 UNITS: 100 INJECTION, SOLUTION INTRAVENOUS; SUBCUTANEOUS at 08:07

## 2017-07-09 RX ADMIN — ASPIRIN 325 MG ORAL TABLET 325 MG: 325 PILL ORAL at 08:07

## 2017-07-09 RX ADMIN — IPRATROPIUM BROMIDE AND ALBUTEROL SULFATE 3 ML: .5; 3 SOLUTION RESPIRATORY (INHALATION) at 08:07

## 2017-07-09 RX ADMIN — CHLORHEXIDINE GLUCONATE 15 ML: 1.2 RINSE ORAL at 09:07

## 2017-07-09 RX ADMIN — ATORVASTATIN CALCIUM 10 MG: 10 TABLET, FILM COATED ORAL at 08:07

## 2017-07-09 RX ADMIN — IPRATROPIUM BROMIDE AND ALBUTEROL SULFATE 3 ML: .5; 3 SOLUTION RESPIRATORY (INHALATION) at 07:07

## 2017-07-09 RX ADMIN — IPRATROPIUM BROMIDE AND ALBUTEROL SULFATE 3 ML: .5; 3 SOLUTION RESPIRATORY (INHALATION) at 01:07

## 2017-07-09 RX ADMIN — PSYLLIUM HUSK 1 PACKET: 3.4 POWDER ORAL at 09:07

## 2017-07-09 RX ADMIN — PANTOPRAZOLE SODIUM 40 MG: 40 GRANULE, DELAYED RELEASE ORAL at 08:07

## 2017-07-09 RX ADMIN — Medication 3 ML: at 01:07

## 2017-07-09 RX ADMIN — AMANTADINE HYDROCHLORIDE 100 MG: 100 CAPSULE, LIQUID FILLED ORAL at 01:07

## 2017-07-09 RX ADMIN — INSULIN ASPART 4 UNITS: 100 INJECTION, SOLUTION INTRAVENOUS; SUBCUTANEOUS at 05:07

## 2017-07-09 RX ADMIN — HEPARIN SODIUM 7500 UNITS: 5000 INJECTION, SOLUTION INTRAVENOUS; SUBCUTANEOUS at 09:07

## 2017-07-09 NOTE — PLAN OF CARE
Problem: Patient Care Overview  Goal: Plan of Care Review  Outcome: Ongoing (interventions implemented as appropriate)  POC reviewed with pt and family. Pt's family demonstrated and verbalized understanding, pt nonverbal. All questions and concerns addressed. No acute events overnight. Pt progressing toward goals. Will continue to monitor. See flowsheets for full assessment and VS info

## 2017-07-09 NOTE — PROGRESS NOTES
Pt blood pressure was 111/56 HR 56. Contacted [facility] regarding hydralazine due at 1400. Advised to hold dose by ULCERO Puente.

## 2017-07-09 NOTE — PLAN OF CARE
Problem: Patient Care Overview  Goal: Plan of Care Review  Outcome: Ongoing (interventions implemented as appropriate)  Plan of care reviewed with patient and family at 1400. All questions and concerns addressed. Patient progressing toward goals. No acute events noted during shift. Will continue to monitor.

## 2017-07-09 NOTE — PROGRESS NOTES
ICU Progress Note  Neurocritical Care    Admit Date: 6/18/2017  LOS: 21    CC: Cerebral infarction, watershed distribution, bilateral, acute    Code Status: Full Code     SUBJECTIVE:     Interval History/Significant Events: no acute events overnight    Review of Symptoms: intubated cannot participate    Medications:  Continuous Infusions:   nicardipine Stopped (07/07/17 1350)    propofol Stopped (06/29/17 0915)     Scheduled Meds:   albuterol-ipratropium 2.5mg-0.5mg/3mL  3 mL Nebulization Q6H    aliskiren  300 mg Per NG tube Daily    amantadine HCl  100 mg Oral Q8H    amlodipine  10 mg Per NG tube Daily    aspirin  325 mg Per NG tube Daily    atorvastatin  10 mg Per NG tube Daily    carvedilol  25 mg Per NG tube BID    chlorhexidine  15 mL Mouth/Throat BID    cloNIDine 0.1 mg/24 hr td ptwk  1 patch Transdermal Q7 Days    heparin (porcine)  7,500 Units Subcutaneous Q8H    hydrALAZINE  100 mg Per NG tube Q8H    insulin detemir  10 Units Subcutaneous Daily    pantoprazole  40 mg Per NG tube Daily    psyllium  1 packet Per NG tube Daily    sodium chloride 0.9%  3 mL Intravenous Q8H    sodium chloride 3%  4 mL Nebulization Q6H     PRN Meds:.acetaminophen, dextrose 50%, fentaNYL, glucagon (human recombinant), hydrALAZINE, insulin aspart, labetalol, lorazepam, magnesium oxide, magnesium oxide, ondansetron, pneumoc 13-finn conj-dip cr(PF), potassium chloride 10%, potassium chloride 10%, potassium chloride 10%, potassium, sodium phosphates, potassium, sodium phosphates, potassium, sodium phosphates, promethazine (PHENERGAN) IVPB, propofol    OBJECTIVE:   Vital Signs (Most Recent):   Temp: 97.9 °F (36.6 °C) (07/09/17 0700)  Pulse: (!) 52 (07/09/17 0926)  Resp: 15 (07/09/17 0926)  BP: 135/61 (07/09/17 0900)  SpO2: 100 % (07/09/17 0926)    Vital Signs (24h Range):   Temp:  [97.9 °F (36.6 °C)-99.2 °F (37.3 °C)] 97.9 °F (36.6 °C)  Pulse:  [52-73] 52  Resp:  [12-24] 15  SpO2:  [96 %-100 %] 100 %  BP:  (102-160)/(49-69) 135/61    ICP/CPP (Last 24h):        I & O (Last 24h):     Intake/Output Summary (Last 24 hours) at 07/09/17 0932  Last data filed at 07/09/17 0900   Gross per 24 hour   Intake              650 ml   Output             1237 ml   Net             -587 ml     Physical Exam:  GA: awake, comfortable, no acute distress.   HEENT: No scleral icterus or JVD.   Pulmonary: Clear to auscultation A/P/L.   Cardiac: RRR S1 & S2 .   Abdominal: Obese, Bowel sounds present x 4. No appreciable hepatosplenomegaly.  Skin: No jaundice, rashes, or visible lesions.  Neuro:  --Mental Status:  awake, spontaneous eye opening. Tracks to visual cues. Does not follow commands consistently.  --Pupils 3.5 mm, PERRL.   --Corneal reflex, gag, cough intact.  Spontaneous movement of kathryn ue and le    Vent Data:   Vent Mode: Spont  Oxygen Concentration (%):  [40] 40  Resp Rate Total:  [13 br/min-21 br/min] 13 br/min  Vt Set:  [420 mL] 420 mL  PEEP/CPAP:  [5 cmH20] 5 cmH20  Pressure Support:  [5 cmH20] 5 cmH20  Mean Airway Pressure:  [6.7 cmH20-7.8 cmH20] 6.7 cmH20    Lines/Drains/Airway:        Airway - Non-Surgical 06/20/17 0330 Endotracheal Tube (Active)   Secured at 26 cm 6/30/2017  3:50 PM   Measured At Lips 6/30/2017  3:50 PM   Secured Location Left 6/30/2017  3:50 PM   Secured by Commercial tube strauss 6/30/2017  3:50 PM   Bite Block left 6/30/2017  3:50 PM   Site Condition Cool;Dry 6/30/2017  3:50 PM   Status Intact;Secured;Patent 6/30/2017  3:50 PM   Site Assessment Clean;Dry;No bleeding;No drainage 6/30/2017  3:50 PM   Cuff Pressure 30 cm H2O 6/29/2017  7:50 AM               NG/OG Tube 06/20/17 0402 orogastric 14 Fr. Left mouth (Active)   Placement Check placement verified by x-ray;placement verified by distal tube length measurement 6/30/2017  3:01 AM   Tube advanced (cm) 10 6/21/2017 10:46 AM   Distal Tube Length (cm) 70 6/30/2017  3:01 AM   Tolerance no signs/symptoms of discomfort 6/30/2017  3:01 AM   Securement taped to  commercial device 6/30/2017  3:01 AM   Clamp Status/Tolerance unclamped 6/30/2017  3:01 AM   Suction Setting/Drainage Method low;intermittent setting 6/20/2017  7:01 PM   Insertion Site Appearance no redness, warmth, tenderness, skin breakdown, drainage 6/30/2017  3:01 AM   Drainage None 6/30/2017  3:01 AM   Flush/Irrigation irrigated w/;water;no resistance met 6/30/2017  3:01 AM   Feeding Method continuous 6/30/2017  3:01 AM   Current Rate (mL/hr) 50 mL/hr 6/30/2017  3:01 AM   Goal Rate (mL/hr) 50 mL/hr 6/30/2017  3:01 AM   Intake (mL) 60 mL 6/30/2017  6:00 AM   Tube Output(mL)(Include Discarded Residual) 0 mL 6/28/2017  7:00 AM   Intake (mL) - Breast Milk Tube Feeding 200 6/25/2017  9:00 AM   Rate Formula Tube Feeding (mL/hr) 50 mL/hr 6/28/2017  7:00 PM   Intake (mL) - Formula Tube Feeding 50 6/30/2017  3:00 PM   Residual Amount (ml) 0 ml 6/29/2017  3:00 PM       Female External Urinary Catheter 06/27/17 0000 (Active)   Skin no redness;no breakdown 6/30/2017  3:01 AM   Tolerance no signs/symptoms of discomfort 6/30/2017  3:01 AM   Suction Continuous suction at 40 mmHg 6/29/2017  7:01 PM   Date of last wick change 06/29/17 6/29/2017  7:01 PM   Time of last wick change 1500 6/28/2017  3:00 PM   Output (mL) 600 mL 6/30/2017  6:00 AM     Nutrition/Tube Feeds (if NPO state why): tf     Labs:  ABG:     Recent Labs  Lab 07/09/17  0517   PH 7.371   PO2 96   PCO2 44.7   HCO3 25.9   POCSATURATED 97   BE 1     BMP:    Recent Labs  Lab 07/09/17  0200      K 4.2      CO2 23   BUN 52*   CREATININE 1.1   *   MG 2.1   PHOS 3.4     LFT:   Lab Results   Component Value Date    AST 58 (H) 06/27/2017    ALT 38 06/27/2017    ALKPHOS 148 (H) 06/27/2017    BILITOT 0.3 06/27/2017    ALBUMIN 2.2 (L) 06/27/2017    PROT 7.3 06/27/2017     CBC:   Lab Results   Component Value Date    WBC 13.74 (H) 07/09/2017    HGB 8.8 (L) 07/09/2017    HCT 28.7 (L) 07/09/2017    MCV 84 07/09/2017     07/09/2017     Microbiology x  7d:   Microbiology Results (last 7 days)     Procedure Component Value Units Date/Time    Blood culture [[id number]] Collected:  07/06/17 1453    Order Status:  Completed Specimen:  Blood from Peripheral, Forearm, Right Updated:  07/08/17 1822     Blood Culture, Routine No Growth to date     Blood Culture, Routine No Growth to date     Blood Culture, Routine No Growth to date    Narrative:       Blood cultures from 2 different sites. 4 bottles total.  Please draw before starting antibiotics.    Blood culture [[id number]] Collected:  07/06/17 1453    Order Status:  Completed Specimen:  Blood from Peripheral, Hand, Left Updated:  07/08/17 1822     Blood Culture, Routine No Growth to date     Blood Culture, Routine No Growth to date     Blood Culture, Routine No Growth to date    Narrative:       Blood cultures x 2 different sites. 4 bottles total. Please  draw cultures before administering antibiotics.    Blood culture [[id number]] Collected:  07/04/17 1407    Order Status:  Completed Specimen:  Blood from Peripheral, Lower Leg, Right Updated:  07/08/17 1812     Blood Culture, Routine No Growth to date     Blood Culture, Routine No Growth to date     Blood Culture, Routine No Growth to date     Blood Culture, Routine No Growth to date     Blood Culture, Routine No Growth to date    Narrative:       Blood cultures from 2 different sites. 4 bottles total.  Please draw before starting antibiotics.    Blood culture [[id number]] Collected:  07/04/17 1453    Order Status:  Completed Specimen:  Blood from Peripheral, Foot, Right Updated:  07/08/17 1015     Blood Culture, Routine Gram stain aer bottle: Gram positive cocci in clusters resembling Staph      Blood Culture, Routine Results called to and read back by:Andi Jimenez RN 07/06/2017  00:54     Blood Culture, Routine --     COAGULASE-NEGATIVE STAPHYLOCOCCUS SPECIES  Organism is a probable contaminant      Narrative:       Blood cultures x 2 different sites. 4 bottles total.  Please  draw cultures before administering antibiotics.    Culture, Respiratory with Gram Stain [[id number]] Collected:  07/04/17 1558    Order Status:  Completed Specimen:  Respiratory from Endotracheal Aspirate Updated:  07/06/17 1305     Respiratory Culture Normal respiratory jose luis     Gram Stain (Respiratory) <10 epithelial cells per low power field.     Gram Stain (Respiratory) Few WBC's     Gram Stain (Respiratory) Rare Gram positive cocci    Clostridium difficile EIA [[id number]] Collected:  07/05/17 1107    Order Status:  Completed Specimen:  Stool from Stool Updated:  07/05/17 2102     C. diff Antigen Negative     C difficile Toxins A+B, EIA Negative     Comment: Testing not recommended for children <24 months old.           Imaging:    I personally reviewed the above image.    ASSESSMENT/PLAN:     Active Hospital Problems    Diagnosis    *Cerebral infarction, watershed distribution, bilateral, acute    Oral phase dysphagia    [place] coma scale total score 9-12    Laryngeal edema    Positive blood culture    Hypertensive encephalopathy    Morbid obesity due to excess calories    Acute respiratory failure    Hypokalemia    Hyperlipidemia    CAD s/p CABG    Pyelonephritis    Hypertensive emergency    Morbid obesity with BMI of 50.0-59.9, adult    Essential hypertension    Type 2 diabetes mellitus, uncontrolled      Neuro:   Bilateral watershed stroke from profound hypotension.  No obvious proximal extra or intracranial disease.  Secondary stroke prevention  encephalopathy appears to be slowing improving  - tsh, b12, folate, cortisol (all normal)  EEG neg for NCSE      Pulmonary:   Tolerating CPAP 5/5  On 7/8 discussed the earlier plan of extubation trial today with the family and interval development. Still with no air leak. I have suggested a trach and peg due to risk for extubation failure, prolonged intubation with laryngeal edema, and secretion.   Trach and peg planned for 7/11    Cardiac:    Hemodynamically stable  bld pr controlled on multiple antihypertensives. Clonidine added 7/7    Renal:    Making urine    ID:   Afebrile, normal wbc  Cultures neg so far.     Hem/Onc:   Stable hh    Endocrine:    BS stable  On detemir  IGF normal    Fluids/Electrolytes/Nutrition/GI:   Resume tf  Lines:  Art NA  CVC NA  ETT +  Ross NA  NG +  PEG NA    Proph:  DVT: SCD  Constipation:  Last output:bowel regiemn  PUP:protonix  VAP:peridex    Uninterrupted Critical Care/Counseling Time (not including procedures):: III    Dany Villanueva MD  Neurocritical care attending

## 2017-07-09 NOTE — PLAN OF CARE
Problem: Patient Care Overview  Goal: Plan of Care Review  POC reviewed with pt and family at 1400. Family verbalized understanding. Questions and concerns addressed. No acute events today. Pt progressing toward goals. Will continue to monitor. See flowsheets for full assessment and VS info.

## 2017-07-10 PROBLEM — R13.11 ORAL PHASE DYSPHAGIA: Status: ACTIVE | Noted: 2017-07-10

## 2017-07-10 PROBLEM — R78.81 POSITIVE BLOOD CULTURE: Status: ACTIVE | Noted: 2017-07-10

## 2017-07-10 PROBLEM — R79.89 PRERENAL AZOTEMIA: Status: ACTIVE | Noted: 2017-07-10

## 2017-07-10 PROBLEM — J38.4 LARYNGEAL EDEMA: Status: ACTIVE | Noted: 2017-07-10

## 2017-07-10 LAB
ALLENS TEST: ABNORMAL
ANION GAP SERPL CALC-SCNC: 4 MMOL/L
BASOPHILS # BLD AUTO: 0 K/UL
BASOPHILS NFR BLD: 0 %
BUN SERPL-MCNC: 51 MG/DL
CALCIUM SERPL-MCNC: 8.4 MG/DL
CHLORIDE SERPL-SCNC: 106 MMOL/L
CO2 SERPL-SCNC: 29 MMOL/L
CREAT SERPL-MCNC: 1 MG/DL
DELSYS: ABNORMAL
DIFFERENTIAL METHOD: ABNORMAL
EOSINOPHIL # BLD AUTO: 0.1 K/UL
EOSINOPHIL NFR BLD: 1 %
ERYTHROCYTE [DISTWIDTH] IN BLOOD BY AUTOMATED COUNT: 14.1 %
EST. GFR  (AFRICAN AMERICAN): >60 ML/MIN/1.73 M^2
EST. GFR  (NON AFRICAN AMERICAN): 58.8 ML/MIN/1.73 M^2
FIO2: 40
GLUCOSE SERPL-MCNC: 178 MG/DL
HCO3 UR-SCNC: 27.8 MMOL/L (ref 24–28)
HCT VFR BLD AUTO: 30 %
HGB BLD-MCNC: 9.2 G/DL
LYMPHOCYTES # BLD AUTO: 2.2 K/UL
LYMPHOCYTES NFR BLD: 21.4 %
MAGNESIUM SERPL-MCNC: 2.2 MG/DL
MCH RBC QN AUTO: 25.8 PG
MCHC RBC AUTO-ENTMCNC: 30.7 %
MCV RBC AUTO: 84 FL
MODE: ABNORMAL
MONOCYTES # BLD AUTO: 0.9 K/UL
MONOCYTES NFR BLD: 8.5 %
NEUTROPHILS # BLD AUTO: 7.1 K/UL
NEUTROPHILS NFR BLD: 68.7 %
PCO2 BLDA: 50.2 MMHG (ref 35–45)
PEEP: 5
PH SMN: 7.35 [PH] (ref 7.35–7.45)
PHOSPHATE SERPL-MCNC: 3.5 MG/DL
PLATELET # BLD AUTO: 231 K/UL
PMV BLD AUTO: 10.6 FL
PO2 BLDA: 100 MMHG (ref 80–100)
POC BE: 2 MMOL/L
POC SATURATED O2: 97 % (ref 95–100)
POC TCO2: 29 MMOL/L (ref 23–27)
POCT GLUCOSE: 146 MG/DL (ref 70–110)
POCT GLUCOSE: 159 MG/DL (ref 70–110)
POCT GLUCOSE: 160 MG/DL (ref 70–110)
POCT GLUCOSE: 161 MG/DL (ref 70–110)
POCT GLUCOSE: 164 MG/DL (ref 70–110)
POCT GLUCOSE: 175 MG/DL (ref 70–110)
POCT GLUCOSE: 178 MG/DL (ref 70–110)
POCT GLUCOSE: 190 MG/DL (ref 70–110)
POCT GLUCOSE: 208 MG/DL (ref 70–110)
POTASSIUM SERPL-SCNC: 4.4 MMOL/L
PS: 5
RBC # BLD AUTO: 3.56 M/UL
SAMPLE: ABNORMAL
SITE: ABNORMAL
SODIUM SERPL-SCNC: 139 MMOL/L
WBC # BLD AUTO: 10.32 K/UL

## 2017-07-10 PROCEDURE — 25000003 PHARM REV CODE 250: Performed by: PSYCHIATRY & NEUROLOGY

## 2017-07-10 PROCEDURE — 25000003 PHARM REV CODE 250: Performed by: PHYSICIAN ASSISTANT

## 2017-07-10 PROCEDURE — 25000003 PHARM REV CODE 250: Performed by: NURSE PRACTITIONER

## 2017-07-10 PROCEDURE — 25000003 PHARM REV CODE 250: Performed by: INTERNAL MEDICINE

## 2017-07-10 PROCEDURE — 84100 ASSAY OF PHOSPHORUS: CPT

## 2017-07-10 PROCEDURE — 20000000 HC ICU ROOM

## 2017-07-10 PROCEDURE — 80048 BASIC METABOLIC PNL TOTAL CA: CPT

## 2017-07-10 PROCEDURE — 99900026 HC AIRWAY MAINTENANCE (STAT)

## 2017-07-10 PROCEDURE — 36600 WITHDRAWAL OF ARTERIAL BLOOD: CPT

## 2017-07-10 PROCEDURE — 85025 COMPLETE CBC W/AUTO DIFF WBC: CPT

## 2017-07-10 PROCEDURE — 82803 BLOOD GASES ANY COMBINATION: CPT

## 2017-07-10 PROCEDURE — 94640 AIRWAY INHALATION TREATMENT: CPT

## 2017-07-10 PROCEDURE — 94003 VENT MGMT INPAT SUBQ DAY: CPT

## 2017-07-10 PROCEDURE — 99233 SBSQ HOSP IP/OBS HIGH 50: CPT | Mod: ,,, | Performed by: PSYCHIATRY & NEUROLOGY

## 2017-07-10 PROCEDURE — 83735 ASSAY OF MAGNESIUM: CPT

## 2017-07-10 PROCEDURE — 99900035 HC TECH TIME PER 15 MIN (STAT)

## 2017-07-10 PROCEDURE — 25000242 PHARM REV CODE 250 ALT 637 W/ HCPCS: Performed by: INTERNAL MEDICINE

## 2017-07-10 PROCEDURE — 25000003 PHARM REV CODE 250: Performed by: ANESTHESIOLOGY

## 2017-07-10 RX ORDER — SODIUM CHLORIDE 9 MG/ML
INJECTION, SOLUTION INTRAVENOUS CONTINUOUS
Status: DISCONTINUED | OUTPATIENT
Start: 2017-07-10 | End: 2017-07-14

## 2017-07-10 RX ADMIN — INSULIN ASPART 3 UNITS: 100 INJECTION, SOLUTION INTRAVENOUS; SUBCUTANEOUS at 12:07

## 2017-07-10 RX ADMIN — ALISKIREN HEMIFUMARATE 300 MG: 300 TABLET, FILM COATED ORAL at 08:07

## 2017-07-10 RX ADMIN — HEPARIN SODIUM 7500 UNITS: 5000 INJECTION, SOLUTION INTRAVENOUS; SUBCUTANEOUS at 05:07

## 2017-07-10 RX ADMIN — AMANTADINE HYDROCHLORIDE 100 MG: 100 CAPSULE, LIQUID FILLED ORAL at 05:07

## 2017-07-10 RX ADMIN — AMANTADINE HYDROCHLORIDE 100 MG: 100 CAPSULE, LIQUID FILLED ORAL at 09:07

## 2017-07-10 RX ADMIN — SODIUM CHLORIDE SOLN NEBU 3% 4 ML: 3 NEBU SOLN at 07:07

## 2017-07-10 RX ADMIN — INSULIN ASPART 4 UNITS: 100 INJECTION, SOLUTION INTRAVENOUS; SUBCUTANEOUS at 11:07

## 2017-07-10 RX ADMIN — CHLORHEXIDINE GLUCONATE 15 ML: 1.2 RINSE ORAL at 09:07

## 2017-07-10 RX ADMIN — CARVEDILOL 25 MG: 25 TABLET, FILM COATED ORAL at 08:07

## 2017-07-10 RX ADMIN — SODIUM CHLORIDE: 0.9 INJECTION, SOLUTION INTRAVENOUS at 01:07

## 2017-07-10 RX ADMIN — ATORVASTATIN CALCIUM 10 MG: 10 TABLET, FILM COATED ORAL at 08:07

## 2017-07-10 RX ADMIN — CARVEDILOL 25 MG: 25 TABLET, FILM COATED ORAL at 09:07

## 2017-07-10 RX ADMIN — CHLORHEXIDINE GLUCONATE 15 ML: 1.2 RINSE ORAL at 08:07

## 2017-07-10 RX ADMIN — IPRATROPIUM BROMIDE AND ALBUTEROL SULFATE 3 ML: .5; 3 SOLUTION RESPIRATORY (INHALATION) at 07:07

## 2017-07-10 RX ADMIN — SODIUM CHLORIDE SOLN NEBU 3% 4 ML: 3 NEBU SOLN at 01:07

## 2017-07-10 RX ADMIN — Medication 3 ML: at 09:07

## 2017-07-10 RX ADMIN — INSULIN ASPART 4 UNITS: 100 INJECTION, SOLUTION INTRAVENOUS; SUBCUTANEOUS at 08:07

## 2017-07-10 RX ADMIN — HEPARIN SODIUM 7500 UNITS: 5000 INJECTION, SOLUTION INTRAVENOUS; SUBCUTANEOUS at 01:07

## 2017-07-10 RX ADMIN — PSYLLIUM HUSK 1 PACKET: 3.4 POWDER ORAL at 08:07

## 2017-07-10 RX ADMIN — IPRATROPIUM BROMIDE AND ALBUTEROL SULFATE 3 ML: .5; 3 SOLUTION RESPIRATORY (INHALATION) at 01:07

## 2017-07-10 RX ADMIN — AMANTADINE HYDROCHLORIDE 100 MG: 100 CAPSULE, LIQUID FILLED ORAL at 01:07

## 2017-07-10 RX ADMIN — PANTOPRAZOLE SODIUM 40 MG: 40 GRANULE, DELAYED RELEASE ORAL at 08:07

## 2017-07-10 RX ADMIN — AMLODIPINE BESYLATE 10 MG: 10 TABLET ORAL at 08:07

## 2017-07-10 RX ADMIN — Medication 3 ML: at 05:07

## 2017-07-10 RX ADMIN — HYDRALAZINE HYDROCHLORIDE 100 MG: 50 TABLET ORAL at 09:07

## 2017-07-10 RX ADMIN — HYDRALAZINE HYDROCHLORIDE 100 MG: 50 TABLET ORAL at 05:07

## 2017-07-10 RX ADMIN — INSULIN ASPART 4 UNITS: 100 INJECTION, SOLUTION INTRAVENOUS; SUBCUTANEOUS at 12:07

## 2017-07-10 RX ADMIN — INSULIN ASPART 4 UNITS: 100 INJECTION, SOLUTION INTRAVENOUS; SUBCUTANEOUS at 04:07

## 2017-07-10 RX ADMIN — HEPARIN SODIUM 7500 UNITS: 5000 INJECTION, SOLUTION INTRAVENOUS; SUBCUTANEOUS at 09:07

## 2017-07-10 RX ADMIN — ASPIRIN 325 MG ORAL TABLET 325 MG: 325 PILL ORAL at 08:07

## 2017-07-10 NOTE — PLAN OF CARE
07/10/17 0810   Discharge Reassessment   Assessment Type Discharge Planning Reassessment   Can the patient answer the patient profile reliably? No, cognitively impaired   How does the patient rate their overall health at the present time? (neo)   Describe the patient's ability to walk at the present time. Does not walk or unable to take any steps at all   How often would a person be available to care for the patient? Occasionally   Number of comorbid conditions (as recorded on the chart) Five or more   During the past month, has the patient often been bothered by feeling down, depressed or hopeless? (neo)   During the past month, has the patient often been bothered by little interest or pleasure in doing things? (neo)   Discharge plan remains the same: No   Provided patient/caregiver education on the expected discharge date and the discharge plan No   Discharge Plan A Long-term acute care facility (LTAC)   Discharge Plan B Skilled Nursing Facility   Change in patient condition or support system No   Patient choice form signed by patient/caregiver N/A   Explained to the the patient/caregiver why the discharge planned changed: No   Involved the patient/caregiver in establishing a new discharge plan: No     Discharge plan SNF vs LTAC when medically stable.    Pat Perez   [id number]

## 2017-07-10 NOTE — PROGRESS NOTES
[facility]  Neurocritical Care  Progress Note    Admit Date: 6/18/2017  Service Date: 07/10/2017  Length of Stay: 22    Subjective:     Chief Complaint: Cerebral infarction, watershed distribution, bilateral, acute    History of Present Illness: 66 y woman who presents with hypertensive emergency with severe HA-> antihypertensives-> jerking of concern for seizure-> intubation-> NSCCU.    Hospital Course: -6/27 admission to [facility]-> EEG with diffuse slowing no seizures, MR with bilateral MCA SAQIB watershed infarcts  -no subsequent documented seizures, no significant exam improvement  7/8 Discussed the earlier plan of extubation trial today with the family and interval development. Still with no air leak. I have suggested a trach and peg due to risk for extubation failure, prolonged intubation with laryngeal edema, and secretion. Will consult gen surgery    Interval History:    No acute events overnight.     Review of Systems   Unable to perform ROS: Intubated   and due to level of consciousness.  Objective:     Vitals:  Temp: 97.9 °F (36.6 °C) (07/10/17 1500)  Pulse: (!) 51 (07/10/17 1500)  Resp: 16 (07/10/17 1500)  BP: (!) 113/56 (07/10/17 1500)  SpO2: 99 % (07/10/17 1500)    Temp:  [97.9 °F (36.6 °C)-99.1 °F (37.3 °C)] 97.9 °F (36.6 °C)  Pulse:  [50-73] 51  Resp:  [13-34] 16  SpO2:  [97 %-100 %] 99 %  BP: ()/(46-79) 113/56         Vent Mode: Spont  Oxygen Concentration (%):  [] 100  Resp Rate Total:  [12 br/min-26 br/min] 15 br/min  Vt Set:  [420 mL] 420 mL  PEEP/CPAP:  [5 cmH20] 5 cmH20  Pressure Support:  [5 cmH20] 5 cmH20  Mean Airway Pressure:  [6.7 cmH20-7.6 cmH20] 6.9 cmH20    07/09 0701 - 07/10 0700  In: 1240   Out: 235 [Urine:235]    Physical Exam   Constitutional: She appears well-developed and well-nourished.   HENT:   Head: Normocephalic and atraumatic.   Eyes: Pupils are equal, round, and reactive to light.   Cardiovascular: Normal rate.    Pulmonary/Chest:   intubated    Neurological: GCS eye subscore is 4. GCS verbal subscore is 1. GCS motor subscore is 5.      Lethargic, not following commands  PERRL, EOMI  +cough, gag and oculocephalic intact  Spontaneous movement of all 4 extremities noted  Withdraws to pain in all 4 extremities    Unable to test orientation, language, memory, judgment, insight, fund of knowledge, hearing, shoulder shrug, tongue protrusion, coordination, gait due to level of consciousness.    Medications:  Continuous  sodium chloride 0.9% Last Rate: 75 mL/hr at 07/10/17 1505   Scheduled  albuterol-ipratropium 2.5mg-0.5mg/3mL 3 mL Q6H   aliskiren 300 mg Daily   amantadine HCl 100 mg Q8H   amlodipine 10 mg Daily   aspirin 325 mg Daily   atorvastatin 10 mg Daily   carvedilol 25 mg BID   chlorhexidine 15 mL BID   cloNIDine 0.1 mg/24 hr td ptwk 1 patch Q7 Days   heparin (porcine) 7,500 Units Q8H   hydrALAZINE 100 mg Q8H   insulin detemir 10 Units Daily   pantoprazole 40 mg Daily   psyllium 1 packet Daily   sodium chloride 0.9% 3 mL Q8H   sodium chloride 3% 4 mL Q6H   PRN  acetaminophen 650 mg Q6H PRN   dextrose 50% 12.5 g PRN   fentaNYL 100 mcg Q6H PRN   glucagon (human recombinant) 1 mg PRN   hydrALAZINE 10 mg Q4H PRN   insulin aspart 1-10 Units Q4H PRN   labetalol 10 mg Q4H PRN   lorazepam 2 mg Q4H PRN   magnesium oxide 800 mg PRN   magnesium oxide 800 mg PRN   ondansetron 8 mg Q8H PRN   pneumoc 13-finn conj-dip cr(PF) 0.5 mL vaccine x 1 dose   potassium chloride 10% 40 mEq PRN   potassium chloride 10% 40 mEq PRN   potassium chloride 10% 60 mEq PRN   potassium, sodium phosphates 2 packet PRN   potassium, sodium phosphates 2 packet PRN   potassium, sodium phosphates 2 packet PRN   promethazine (PHENERGAN) IVPB 12.5 mg Q6H PRN     Today I personally reviewed pertinent medications, lines/drains/airways, imaging, cardiology, lab results, microbiology results,    Assessment/Plan:     Neuro   * Cerebral infarction, watershed distribution, bilateral, acute    -from  hypoperfusion amid correction of hypertensive emergency  -aspirin, atorvastatin    Amantadine 100 q8h to help improve wakefullness        Hypertensive encephalopathy    -see HTN emergency        Seizure    -see HTN emergency  -provoked  -does not require anticonvulsant        Pulmonary   Laryngeal edema    -methylprednisolone 40 mg IV q4h x4  -furosemide 40 mg IV  -consider extubation tomorrow over tube exchange catheter        Acute respiratory failure    -no air leak; ET in place for 20 days; efforts late last week with solumedrol for possible laryngeal edema without improvement. Plan for trach/PEG tomorrow  Vent Mode: Spont  Oxygen Concentration (%):  [] 50  Resp Rate Total:  [12 br/min-26 br/min] 15 br/min  Vt Set:  [420 mL] 420 mL  PEEP/CPAP:  [5 cmH20] 5 cmH20  Pressure Support:  [5 cmH20] 5 cmH20  Mean Airway Pressure:  [6.6 cmH20-7.6 cmH20] 6.6 cmH20  - daily CXR/ABG while intubated          Cardiac   Hypertensive emergency    --180  -carvedilol, hydralazine, amlodipine, aliskiren; no ACEI or ARB given aliskiren; start clonidine patch 0.1 mg/24 h  -nicardipine if necessary        Essential hypertension    -180  Hydralazine 100q8h  Clonidine 0.1mg/24hr patch  Aliskerin 300mg qd  Coreg 25mg TID  Amlodipine 10mg qd        Renal    -Ross        Endocrine   Type 2 diabetes mellitus, uncontrolled    -detemir 10 units daily  -ISS        Fluids/Electrolytes/Nutrition/GI    -start TF        Prerenal azotemia    BUN/Cr 51/1.0  - start NS @75cc/hr        Other   Eve coma scale total score 9-12    -see HTN emergency        Positive blood culture    -repeat blood cultures  -currently low suspicion of true bacteremia            Prophylaxis:  Venous Thromboembolism: chemical  Stress Ulcer: PPI  Ventilator Pneumonia: yes     Activity Orders          Up with assistance starting at 06/26 0170        Full Code    Miko Storey MD  Neurocritical Care  [facility]

## 2017-07-10 NOTE — ANESTHESIA PREPROCEDURE EVALUATION
07/10/2017  Nisa Frank is a 66 y.o., female pmhx HTN, T2DM, morbid obesity(BMI 59), HLD, CAD s/p CABG, diastolic dysfunction presenting with AMS, intubated for airway protection found to have bilateral MCA and SAQIB watershed infarcts.. Pre-op evaluation for PEG tube placement and tracheostomy.    She has been intubated since 6/20/17. She has been on minimal vent settings, tolerating CPAP 5/5.    Pre-operative evaluation for Procedure(s) (LRB):  PLACEMENT-TUBE-PEG (N/A)  TRACHEOSTOMY perc trach blue rhino (N/A)    IV Access:    Peripheral IV - Single Lumen 07/06/17 1303 Right Forearm         Midline Catheter Insertion/Assessment  - Single Lumen 07/07/17 1315 Right brachial vein 18g x 10cm        Oxygen/Ventilatory Requirements:  Vent Mode: Spont  Oxygen Concentration (%):  [] 100  Resp Rate Total:  [12 br/min-26 br/min] 15 br/min  Vt Set:  [420 mL] 420 mL  PEEP/CPAP:  [5 cmH20] 5 cmH20  Pressure Support:  [5 cmH20] 5 cmH20  Mean Airway Pressure:  [6.7 cmH20-7.6 cmH20] 6.9 cmH20    Infusions:  none    Last Airway:    Date/Time: 6/20/2017 2:14 AM  Performed by: MOHINDER BOLTON  Authorized by: MOHINDER BOLTON   Consent Done: Emergent Situation  Indications: respiratory failure  Intubation method: video-assisted  Preoxygenation: BVM  Pretreatment medications: none  Sedatives: etomidate  Paralytic: succinylcholine  Laryngoscope size: Mac 4  Tube size: 7.5 mm  Tube type: cuffed  Number of attempts: 1  Cricoid pressure: no  Cords visualized: yes  Post-procedure assessment: ETCO2 monitor  Breath sounds: equal and absent over the epigastrium  Cuff inflated: yes  ETT to teeth: 23 cm  Tube secured with: ETT strauss  Chest x-ray interpreted by me.  Chest x-ray findings: endotracheal tube in appropriate position  Patient tolerance: Patient tolerated the procedure well with no immediate complications  Complications:  No     View: Cormack-Lehane Grade I  Cuff inflated to minimally-occlusive pressure  Teeth intact  Complications: None    Patient Active Problem List   Diagnosis    Essential hypertension    Type 2 diabetes mellitus, uncontrolled    Morbid obesity with BMI of 50.0-59.9, adult    S/P hysterectomy with oophorectomy    Hypertensive emergency    Hypokalemia    Hyperlipidemia    Moderate protein malnutrition    CAD s/p CABG    Pyelonephritis    Acute respiratory failure    Seizure    Hypertensive encephalopathy    Morbid obesity due to excess calories    Cerebral infarction, watershed distribution, bilateral, acute    Positive blood culture    [place] coma scale total score 9-12    Laryngeal edema    Oral phase dysphagia       Review of patient's allergies indicates:   Allergen Reactions    Latex, natural rubber        No current facility-administered medications on file prior to encounter.      Current Outpatient Prescriptions on File Prior to Encounter   Medication Sig Dispense Refill    aliskiren (TEKTURNA) 300 MG Tab Take by mouth once daily.      amlodipine (NORVASC) 10 MG tablet Take 1 tablet (10 mg total) by mouth once daily. 30 tablet 3    atorvastatin (LIPITOR) 10 MG tablet Take 10 mg by mouth once daily.      diclofenac sodium (VOLTAREN) 1 % Gel Apply 2 g topically 2 (two) times daily. 100 g 1    EASY TOUCH TWIST LANCETS 30 gauge Misc   6    hydrALAZINE (APRESOLINE) 25 MG tablet Take 1 tablet (25 mg total) by mouth every 12 (twelve) hours. 60 tablet 1    hydrocodone-acetaminophen 7.5-325mg (NORCO) 7.5-325 mg per tablet       insulin glargine (LANTUS) 100 unit/mL injection Inject into the skin every evening.      JANUMET XR 50-1,000 mg TM24 Take 1 tablet by mouth 2 (two) times daily.  5    LANTUS SOLOSTAR 100 unit/mL (3 mL) InPn pen       loratadine (CLARITIN) 10 mg tablet Take 10 mg by mouth once daily.  0    meclizine (ANTIVERT) 25 mg tablet Take 1 tablet (25 mg total) by mouth  every 6 (six) hours as needed. 20 tablet 0    saxagliptin (ONGLYZA) 5 mg Tab tablet Take 1 tablet (5 mg total) by mouth once daily. 30 tablet 3    TRUETEST TEST STRIPS Strp   6    urea (CARMOL) 40 % Crea Apply topically 2 (two) times daily. 199 each 10    aspirin 81 MG Chew Take 1 tablet (81 mg total) by mouth once daily. 30 tablet 3    hydrochlorothiazide (HYDRODIURIL) 12.5 MG Tab Take 1 tablet (12.5 mg total) by mouth once daily. 30 tablet 11       Past Surgical History:   Procedure Laterality Date    ARTERIAL BYPASS SURGRY      9 tears sgo    CARDIAC SURGERY       SECTION      HYSTERECTOMY      TUBAL LIGATION         Social History     Social History    Marital status:      Spouse name: N/A    Number of children: N/A    Years of education: N/A     Occupational History    Not on file.     Social History Main Topics    Smoking status: Never Smoker    Smokeless tobacco: Never Used    Alcohol use Yes      Comment: occ    Drug use: No    Sexual activity: Not Currently     Birth control/ protection: See Surgical Hx     Other Topics Concern    Not on file     Social History Narrative    No narrative on file         Vital Signs Range (Last 24H):  Temp:  [36.8 °C (98.2 °F)-37.3 °C (99.1 °F)]   Pulse:  [50-73]   Resp:  [13-34]   BP: ()/(46-79)   SpO2:  [97 %-100 %]       CBC:   Recent Labs      17   0200  07/10/17   0236   WBC  13.74*  10.32   RBC  3.43*  3.56*   HGB  8.8*  9.2*   HCT  28.7*  30.0*   PLT  264  231   MCV  84  84   MCH  25.7*  25.8*   MCHC  30.7*  30.7*       CMP:   Recent Labs      17   0200  07/10/17   0236   NA  138  139   K  4.2  4.4   CL  108  106   CO2  23  29   BUN  52*  51*   CREATININE  1.1  1.0   GLU  162*  178*   MG  2.1  2.2   PHOS  3.4  3.5   CALCIUM  8.3*  8.4*       INR  No results for input(s): INR, PROTIME, APTT in the last 72 hours.    Invalid input(s): PT        Diagnostic Studies:      EK17  Blood Pressure : 146/063  mmHG  Vent. Rate : 071 BPM     Atrial Rate : 071 BPM     P-R Int : 168 ms          QRS Dur : 088 ms      QT Int : 436 ms       P-R-T Axes : 067 009 192 degrees     QTc Int : 473 ms    Normal sinus rhythm  ST and T wave abnormality, consider inferolateral ischemia  Prolonged QT  Abnormal ECG    2D Echo:  6/21/17  CONCLUSIONS     1 - Normal left ventricular systolic function (EF 55-60%).     2 - Concentric hypertrophy.     3 - Impaired LV relaxation, elevated LAP (grade 2 diastolic dysfunction).     .    Anesthesia Evaluation    I have reviewed the Patient Summary Reports.    I have reviewed the Nursing Notes.   I have reviewed the Medications.     Review of Systems  Anesthesia Hx:  History of prior surgery of interest to airway management or planning: heart surgery. Denies Family Hx of Anesthesia complications.   Denies Personal Hx of Anesthesia complications.   Cardiovascular:   Hypertension CAD  CABG/stent  Denies Dysrhythmias.  CHF    Pulmonary:   Denies COPD.  Denies Asthma.    Renal/:   Denies Chronic Renal Disease.     Neurological:   Seizures    Endocrine:   Diabetes, type 2, using insulin        Physical Exam   Airway/Jaw/Neck:  Airway Findings: Pre-Existing Airway Tube(s): Oral Endotracheal tube      Chest/Lungs:  Chest/Lungs Findings: Clear to auscultation, Normal Respiratory Rate     Heart/Vascular:  Heart Findings: Rate: Normal        Mental Status:  Mental Status Findings:         Anesthesia Plan  Type of Anesthesia, risks & benefits discussed:  Anesthesia Type:  general  Patient's Preference:   Intra-op Monitoring Plan: standard ASA monitors  Intra-op Monitoring Plan Comments:   Post Op Pain Control Plan: multimodal analgesia, IV/PO Opioids PRN and per primary service following discharge from PACU  Post Op Pain Control Plan Comments:   Induction:   IV  Beta Blocker:  Patient is on a Beta-Blocker and has received one dose within the past 24 hours (No further documentation required).       Informed  Consent: Patient representative understands risks and agrees with Anesthesia plan.  Questions answered. Anesthesia consent signed with patient representative.  ASA Score: 3     Day of Surgery Review of History & Physical: I have interviewed and examined the patient. I have reviewed the patient's H&P dated:            Ready For Surgery From Anesthesia Perspective.

## 2017-07-10 NOTE — PROGRESS NOTES
[facility]  General Surgery  Progress Note    Subjective:     Interval History:   Patient seen and examined, no acute events overnight, plan for PEG/trach in OR tomorrow 7/11/17, vent settings stable    Post-Op Info:  Procedure(s) (LRB):  PLACEMENT-TUBE-PEG (N/A)  TRACHEOSTOMY perc trach blue rhino (N/A)          Medications:  Continuous Infusions:   nicardipine Stopped (07/07/17 1350)    propofol Stopped (06/29/17 0915)     Scheduled Meds:   albuterol-ipratropium 2.5mg-0.5mg/3mL  3 mL Nebulization Q6H    aliskiren  300 mg Per NG tube Daily    amantadine HCl  100 mg Oral Q8H    amlodipine  10 mg Per NG tube Daily    aspirin  325 mg Per NG tube Daily    atorvastatin  10 mg Per NG tube Daily    carvedilol  25 mg Per NG tube BID    chlorhexidine  15 mL Mouth/Throat BID    cloNIDine 0.1 mg/24 hr td ptwk  1 patch Transdermal Q7 Days    heparin (porcine)  7,500 Units Subcutaneous Q8H    hydrALAZINE  100 mg Per NG tube Q8H    insulin detemir  10 Units Subcutaneous Daily    pantoprazole  40 mg Per NG tube Daily    psyllium  1 packet Per NG tube Daily    sodium chloride 0.9%  3 mL Intravenous Q8H    sodium chloride 3%  4 mL Nebulization Q6H     PRN Meds:acetaminophen, dextrose 50%, fentaNYL, glucagon (human recombinant), hydrALAZINE, insulin aspart, labetalol, lorazepam, magnesium oxide, magnesium oxide, ondansetron, pneumoc 13-finn conj-dip cr(PF), potassium chloride 10%, potassium chloride 10%, potassium chloride 10%, potassium, sodium phosphates, potassium, sodium phosphates, potassium, sodium phosphates, promethazine (PHENERGAN) IVPB, propofol     Objective:     Vital Signs (Most Recent):  Temp: 98.2 °F (36.8 °C) (07/10/17 0300)  Pulse: (!) 56 (07/10/17 0600)  Resp: 17 (07/10/17 0600)  BP: 126/60 (07/10/17 0600)  SpO2: 100 % (07/10/17 0600) Vital Signs (24h Range):  Temp:  [98 °F (36.7 °C)-98.5 °F (36.9 °C)] 98.2 °F (36.8 °C)  Pulse:  [50-63] 56  Resp:  [12-34] 17  SpO2:  [97 %-100 %]  100 %  BP: (110-139)/(53-65) 126/60       Intake/Output Summary (Last 24 hours) at 07/10/17 0720  Last data filed at 07/10/17 0600   Gross per 24 hour   Intake             1190 ml   Output              235 ml   Net              955 ml       Physical Exam   Constitutional: She appears well-developed and well-nourished. No distress.   HENT:   Head: Normocephalic and atraumatic.   Eyes: No scleral icterus.   Neck: Neck supple.   Thick, obese neck   Cardiovascular: Normal rate and regular rhythm.    Pulmonary/Chest:   Coarse BS bilat, intubated   Abdominal:   Soft, obese, well healed lower midline incision noted   Musculoskeletal: She exhibits no edema or tenderness.   Skin: Skin is warm and dry.       Significant Labs:  ABGs:   Recent Labs  Lab 07/10/17  0416   PH 7.352   PCO2 50.2*   PO2 100   HCO3 27.8   POCSATURATED 97   BE 2     BMP:   Recent Labs  Lab 07/10/17  0236   *      K 4.4      CO2 29   BUN 51*   CREATININE 1.0   CALCIUM 8.4*   MG 2.2       CBC:   Recent Labs  Lab 07/10/17  0236   WBC 10.32   RBC 3.56*   HGB 9.2*   HCT 30.0*      MCV 84   MCH 25.8*   MCHC 30.7*     CMP:   Recent Labs  Lab 07/10/17  0236   *   CALCIUM 8.4*      K 4.4   CO2 29      BUN 51*   CREATININE 1.0     All pertinent labs from the last 24 hours have been reviewed.    Vent Mode: Spont  Oxygen Concentration (%):  [40] 40  Resp Rate Total:  [12 br/min-18 br/min] 16 br/min  Vt Set:  [420 mL] 420 mL  PEEP/CPAP:  [5 cmH20] 5 cmH20  Pressure Support:  [5 cmH20] 5 cmH20  Mean Airway Pressure:  [6.7 cmH20-7.4 cmH20] 6.9 cmH20    Assessment/Plan:     Active Diagnoses:    Diagnosis Date Noted POA    PRINCIPAL PROBLEM:  Cerebral infarction, watershed distribution, bilateral, acute [I63.8] 06/27/2017 Yes    Oral phase dysphagia [R13.11]  Unknown    Eve coma scale total score 9-12 [R40.2420] 07/07/2017 Yes    Laryngeal edema [J38.4] 07/07/2017 Unknown    Positive blood culture [R78.81]  07/06/2017 Unknown    Hypertensive encephalopathy [I67.4] 06/26/2017 Yes    Morbid obesity due to excess calories [E66.01] 06/26/2017 Yes    Acute respiratory failure [J96.00] 06/20/2017 Yes    Hypokalemia [E87.6] 06/19/2017 Yes    Hyperlipidemia [E78.5] 06/19/2017 Yes     Chronic    CAD s/p CABG [I25.10] 06/19/2017 Yes     Chronic    Pyelonephritis [N12] 06/19/2017 Yes    Hypertensive emergency [I16.1] 06/18/2017 Yes    Morbid obesity with BMI of 50.0-59.9, adult [E66.01, Z68.43] 02/24/2015 Not Applicable     Chronic    Essential hypertension [I10] 05/12/2014 Yes     Chronic    Type 2 diabetes mellitus, uncontrolled [E11.65] 05/12/2014 Yes     Chronic      Problems Resolved During this Admission:    Diagnosis Date Noted Date Resolved POA    ARF (acute renal failure) [N17.9] 06/22/2017 07/06/2017 No    Blood poisoning [A41.9] 06/19/2017 06/22/2017 Yes     Plan for PEG/trach in OR tomorrow 7/11/17  NPO/hold tube feeds p MN 7/11/17  Consents to be obtained  Discussed with staff    Milagros Carpenter PA-C   [id number]  General Surgery  [facility]

## 2017-07-10 NOTE — PLAN OF CARE
Problem: Patient Care Overview  Goal: Plan of Care Review  Outcome: Ongoing (interventions implemented as appropriate)  POC reviewed with pt at 0500. Pt's daughter verbalized understanding. Questions and concerns addressed. No acute events overnight. Pt progressing toward goals. Will continue to monitor. See flowsheets for full assessment and VS info

## 2017-07-10 NOTE — PROGRESS NOTES
ICU Attending Note  Neurocritical Care    [id number]    -aspirin, atorvastatin  -amantadine  -trach/PEG tomorrow  -increase PS  --180  -alliskiren, amlodipine, clonidine, carvedilol, hydralazine  -NS 75 mL/h  -detemir, ISS  -prophylaxis

## 2017-07-10 NOTE — ASSESSMENT & PLAN NOTE
-180  Hydralazine 100q8h  Clonidine 0.1mg/24hr patch  Aliskerin 300mg qd  Coreg 25mg TID  Amlodipine 10mg qd

## 2017-07-10 NOTE — SUBJECTIVE & OBJECTIVE
Interval History:    No acute events overnight.     Review of Systems   Unable to perform ROS: Intubated   and due to level of consciousness.  Objective:     Vitals:  Temp: 97.9 °F (36.6 °C) (07/10/17 1500)  Pulse: (!) 51 (07/10/17 1500)  Resp: 16 (07/10/17 1500)  BP: (!) 113/56 (07/10/17 1500)  SpO2: 99 % (07/10/17 1500)    Temp:  [97.9 °F (36.6 °C)-99.1 °F (37.3 °C)] 97.9 °F (36.6 °C)  Pulse:  [50-73] 51  Resp:  [13-34] 16  SpO2:  [97 %-100 %] 99 %  BP: ()/(46-79) 113/56         Vent Mode: Spont  Oxygen Concentration (%):  [] 100  Resp Rate Total:  [12 br/min-26 br/min] 15 br/min  Vt Set:  [420 mL] 420 mL  PEEP/CPAP:  [5 cmH20] 5 cmH20  Pressure Support:  [5 cmH20] 5 cmH20  Mean Airway Pressure:  [6.7 cmH20-7.6 cmH20] 6.9 cmH20    07/09 0701 - 07/10 0700  In: 1240   Out: 235 [Urine:235]    Physical Exam   Constitutional: She appears well-developed and well-nourished.   HENT:   Head: Normocephalic and atraumatic.   Eyes: Pupils are equal, round, and reactive to light.   Cardiovascular: Normal rate.    Pulmonary/Chest:   intubated   Neurological: GCS eye subscore is 4. GCS verbal subscore is 1. GCS motor subscore is 5.      Lethargic, not following commands  PERRL, EOMI  +cough, gag and oculocephalic intact  Spontaneous movement of all 4 extremities noted  Withdraws to pain in all 4 extremities    Unable to test orientation, language, memory, judgment, insight, fund of knowledge, hearing, shoulder shrug, tongue protrusion, coordination, gait due to level of consciousness.    Medications:  Continuous  sodium chloride 0.9% Last Rate: 75 mL/hr at 07/10/17 1505   Scheduled  albuterol-ipratropium 2.5mg-0.5mg/3mL 3 mL Q6H   aliskiren 300 mg Daily   amantadine HCl 100 mg Q8H   amlodipine 10 mg Daily   aspirin 325 mg Daily   atorvastatin 10 mg Daily   carvedilol 25 mg BID   chlorhexidine 15 mL BID   cloNIDine 0.1 mg/24 hr td ptwk 1 patch Q7 Days   heparin (porcine) 7,500 Units Q8H   hydrALAZINE 100 mg Q8H    insulin detemir 10 Units Daily   pantoprazole 40 mg Daily   psyllium 1 packet Daily   sodium chloride 0.9% 3 mL Q8H   sodium chloride 3% 4 mL Q6H   PRN  acetaminophen 650 mg Q6H PRN   dextrose 50% 12.5 g PRN   fentaNYL 100 mcg Q6H PRN   glucagon (human recombinant) 1 mg PRN   hydrALAZINE 10 mg Q4H PRN   insulin aspart 1-10 Units Q4H PRN   labetalol 10 mg Q4H PRN   lorazepam 2 mg Q4H PRN   magnesium oxide 800 mg PRN   magnesium oxide 800 mg PRN   ondansetron 8 mg Q8H PRN   pneumoc 13-finn conj-dip cr(PF) 0.5 mL vaccine x 1 dose   potassium chloride 10% 40 mEq PRN   potassium chloride 10% 40 mEq PRN   potassium chloride 10% 60 mEq PRN   potassium, sodium phosphates 2 packet PRN   potassium, sodium phosphates 2 packet PRN   potassium, sodium phosphates 2 packet PRN   promethazine (PHENERGAN) IVPB 12.5 mg Q6H PRN     Today I personally reviewed pertinent medications, lines/drains/airways, imaging, cardiology, lab results, microbiology results,

## 2017-07-11 LAB
ABO + RH BLD: NORMAL
ALLENS TEST: ABNORMAL
ANION GAP SERPL CALC-SCNC: 7 MMOL/L
BACTERIA BLD CULT: NORMAL
BACTERIA BLD CULT: NORMAL
BASOPHILS # BLD AUTO: 0 K/UL
BASOPHILS NFR BLD: 0 %
BLD GP AB SCN CELLS X3 SERPL QL: NORMAL
BUN SERPL-MCNC: 54 MG/DL
CALCIUM SERPL-MCNC: 8.1 MG/DL
CHLORIDE SERPL-SCNC: 108 MMOL/L
CO2 SERPL-SCNC: 25 MMOL/L
CREAT SERPL-MCNC: 1.1 MG/DL
DELSYS: ABNORMAL
DIFFERENTIAL METHOD: ABNORMAL
EOSINOPHIL # BLD AUTO: 0.1 K/UL
EOSINOPHIL NFR BLD: 0.9 %
ERYTHROCYTE [DISTWIDTH] IN BLOOD BY AUTOMATED COUNT: 14.1 %
EST. GFR  (AFRICAN AMERICAN): >60 ML/MIN/1.73 M^2
EST. GFR  (NON AFRICAN AMERICAN): 52.4 ML/MIN/1.73 M^2
FIO2: 40
GLUCOSE SERPL-MCNC: 136 MG/DL
HCO3 UR-SCNC: 26.6 MMOL/L (ref 24–28)
HCT VFR BLD AUTO: 28.3 %
HGB BLD-MCNC: 8.4 G/DL
LYMPHOCYTES # BLD AUTO: 1.9 K/UL
LYMPHOCYTES NFR BLD: 19.5 %
MAGNESIUM SERPL-MCNC: 2.1 MG/DL
MCH RBC QN AUTO: 25.5 PG
MCHC RBC AUTO-ENTMCNC: 29.7 %
MCV RBC AUTO: 86 FL
MIN VOL: 6
MODE: ABNORMAL
MONOCYTES # BLD AUTO: 0.9 K/UL
MONOCYTES NFR BLD: 9.2 %
NEUTROPHILS # BLD AUTO: 6.8 K/UL
NEUTROPHILS NFR BLD: 70 %
PCO2 BLDA: 44.8 MMHG (ref 35–45)
PEEP: 5
PH SMN: 7.38 [PH] (ref 7.35–7.45)
PHOSPHATE SERPL-MCNC: 3.5 MG/DL
PLATELET # BLD AUTO: 240 K/UL
PMV BLD AUTO: 11.4 FL
PO2 BLDA: 80 MMHG (ref 80–100)
POC BE: 2 MMOL/L
POC SATURATED O2: 95 % (ref 95–100)
POC TCO2: 28 MMOL/L (ref 23–27)
POCT GLUCOSE: 130 MG/DL (ref 70–110)
POCT GLUCOSE: 138 MG/DL (ref 70–110)
POCT GLUCOSE: 168 MG/DL (ref 70–110)
POTASSIUM SERPL-SCNC: 4.5 MMOL/L
PS: 5
RBC # BLD AUTO: 3.3 M/UL
SAMPLE: ABNORMAL
SITE: ABNORMAL
SODIUM SERPL-SCNC: 140 MMOL/L
SP02: 100
SPONT RATE: 15
WBC # BLD AUTO: 9.75 K/UL

## 2017-07-11 PROCEDURE — 20000000 HC ICU ROOM

## 2017-07-11 PROCEDURE — 25000003 PHARM REV CODE 250: Performed by: PHYSICIAN ASSISTANT

## 2017-07-11 PROCEDURE — 25000242 PHARM REV CODE 250 ALT 637 W/ HCPCS: Performed by: INTERNAL MEDICINE

## 2017-07-11 PROCEDURE — 94640 AIRWAY INHALATION TREATMENT: CPT

## 2017-07-11 PROCEDURE — 36600 WITHDRAWAL OF ARTERIAL BLOOD: CPT

## 2017-07-11 PROCEDURE — 83735 ASSAY OF MAGNESIUM: CPT

## 2017-07-11 PROCEDURE — 86901 BLOOD TYPING SEROLOGIC RH(D): CPT

## 2017-07-11 PROCEDURE — 94003 VENT MGMT INPAT SUBQ DAY: CPT

## 2017-07-11 PROCEDURE — 86900 BLOOD TYPING SEROLOGIC ABO: CPT

## 2017-07-11 PROCEDURE — 82803 BLOOD GASES ANY COMBINATION: CPT

## 2017-07-11 PROCEDURE — 99233 SBSQ HOSP IP/OBS HIGH 50: CPT | Mod: ,,, | Performed by: PSYCHIATRY & NEUROLOGY

## 2017-07-11 PROCEDURE — 85025 COMPLETE CBC W/AUTO DIFF WBC: CPT

## 2017-07-11 PROCEDURE — 25000003 PHARM REV CODE 250: Performed by: PSYCHIATRY & NEUROLOGY

## 2017-07-11 PROCEDURE — 25000003 PHARM REV CODE 250: Performed by: INTERNAL MEDICINE

## 2017-07-11 PROCEDURE — 80048 BASIC METABOLIC PNL TOTAL CA: CPT

## 2017-07-11 PROCEDURE — 84100 ASSAY OF PHOSPHORUS: CPT

## 2017-07-11 PROCEDURE — 25000003 PHARM REV CODE 250: Performed by: ANESTHESIOLOGY

## 2017-07-11 PROCEDURE — 25000003 PHARM REV CODE 250: Performed by: NURSE PRACTITIONER

## 2017-07-11 RX ORDER — HYDRALAZINE HYDROCHLORIDE 50 MG/1
50 TABLET, FILM COATED ORAL EVERY 8 HOURS
Status: DISCONTINUED | OUTPATIENT
Start: 2017-07-11 | End: 2017-07-13

## 2017-07-11 RX ADMIN — CARVEDILOL 25 MG: 25 TABLET, FILM COATED ORAL at 08:07

## 2017-07-11 RX ADMIN — SODIUM CHLORIDE: 0.9 INJECTION, SOLUTION INTRAVENOUS at 02:07

## 2017-07-11 RX ADMIN — CHLORHEXIDINE GLUCONATE 15 ML: 1.2 RINSE ORAL at 08:07

## 2017-07-11 RX ADMIN — AMANTADINE HYDROCHLORIDE 100 MG: 100 CAPSULE, LIQUID FILLED ORAL at 05:07

## 2017-07-11 RX ADMIN — AMANTADINE HYDROCHLORIDE 100 MG: 100 CAPSULE, LIQUID FILLED ORAL at 09:07

## 2017-07-11 RX ADMIN — CARVEDILOL 25 MG: 25 TABLET, FILM COATED ORAL at 09:07

## 2017-07-11 RX ADMIN — SODIUM CHLORIDE SOLN NEBU 3% 4 ML: 3 NEBU SOLN at 08:07

## 2017-07-11 RX ADMIN — Medication 3 ML: at 02:07

## 2017-07-11 RX ADMIN — SODIUM CHLORIDE: 0.9 INJECTION, SOLUTION INTRAVENOUS at 09:07

## 2017-07-11 RX ADMIN — HEPARIN SODIUM 7500 UNITS: 5000 INJECTION, SOLUTION INTRAVENOUS; SUBCUTANEOUS at 09:07

## 2017-07-11 RX ADMIN — SODIUM CHLORIDE: 0.9 INJECTION, SOLUTION INTRAVENOUS at 03:07

## 2017-07-11 RX ADMIN — AMANTADINE HYDROCHLORIDE 100 MG: 100 CAPSULE, LIQUID FILLED ORAL at 02:07

## 2017-07-11 RX ADMIN — SODIUM CHLORIDE SOLN NEBU 3% 4 ML: 3 NEBU SOLN at 07:07

## 2017-07-11 RX ADMIN — IPRATROPIUM BROMIDE AND ALBUTEROL SULFATE 3 ML: .5; 3 SOLUTION RESPIRATORY (INHALATION) at 01:07

## 2017-07-11 RX ADMIN — PANTOPRAZOLE SODIUM 40 MG: 40 GRANULE, DELAYED RELEASE ORAL at 08:07

## 2017-07-11 RX ADMIN — ATORVASTATIN CALCIUM 10 MG: 10 TABLET, FILM COATED ORAL at 08:07

## 2017-07-11 RX ADMIN — ALISKIREN HEMIFUMARATE 300 MG: 300 TABLET, FILM COATED ORAL at 08:07

## 2017-07-11 RX ADMIN — HEPARIN SODIUM 7500 UNITS: 5000 INJECTION, SOLUTION INTRAVENOUS; SUBCUTANEOUS at 05:07

## 2017-07-11 RX ADMIN — SODIUM CHLORIDE SOLN NEBU 3% 4 ML: 3 NEBU SOLN at 01:07

## 2017-07-11 RX ADMIN — Medication 3 ML: at 09:07

## 2017-07-11 RX ADMIN — HYDRALAZINE HYDROCHLORIDE 100 MG: 50 TABLET ORAL at 05:07

## 2017-07-11 RX ADMIN — Medication 3 ML: at 05:07

## 2017-07-11 RX ADMIN — AMLODIPINE BESYLATE 10 MG: 10 TABLET ORAL at 08:07

## 2017-07-11 RX ADMIN — HYDRALAZINE HYDROCHLORIDE 50 MG: 50 TABLET ORAL at 09:07

## 2017-07-11 RX ADMIN — CHLORHEXIDINE GLUCONATE 15 ML: 1.2 RINSE ORAL at 09:07

## 2017-07-11 RX ADMIN — IPRATROPIUM BROMIDE AND ALBUTEROL SULFATE 3 ML: .5; 3 SOLUTION RESPIRATORY (INHALATION) at 08:07

## 2017-07-11 RX ADMIN — IPRATROPIUM BROMIDE AND ALBUTEROL SULFATE 3 ML: .5; 3 SOLUTION RESPIRATORY (INHALATION) at 07:07

## 2017-07-11 NOTE — PROGRESS NOTES
[facility]  Neurocritical Care  Progress Note    Admit Date: 6/18/2017  Service Date: 07/11/2017  Length of Stay: 23    Subjective:     Chief Complaint: Cerebral infarction, watershed distribution, bilateral, acute    History of Present Illness: 66 y woman who presents with hypertensive emergency with severe HA-> antihypertensives-> jerking of concern for seizure-> intubation-> NSCCU.    Hospital Course: -6/27 admission to [facility]-> EEG with diffuse slowing no seizures, MR with bilateral MCA SAQIB watershed infarcts  -no subsequent documented seizures, no significant exam improvement  7/8 Discussed the earlier plan of extubation trial today with the family and interval development. Still with no air leak. I have suggested a trach and peg due to risk for extubation failure, prolonged intubation with laryngeal edema, and secretion. Will consult gen surgery    Interval History:    No acute events overnight. Plan for trach/peg today    Review of Systems   Unable to perform ROS: Intubated   and due to level of consciousness.  Objective:     Vitals:  Temp: 97.9 °F (36.6 °C) (07/11/17 0701)  Pulse: (!) 52 (07/11/17 1000)  Resp: 14 (07/11/17 1000)  BP: 124/60 (07/11/17 1000)  SpO2: 99 % (07/11/17 1000)    Temp:  [97.9 °F (36.6 °C)-98.5 °F (36.9 °C)] 97.9 °F (36.6 °C)  Pulse:  [51-63] 52  Resp:  [11-21] 14  SpO2:  [95 %-100 %] 99 %  BP: ()/(46-67) 124/60         Vent Mode: Spont  Oxygen Concentration (%):  [] 40  Resp Rate Total:  [9.9 br/min-21 br/min] 14 br/min  Vt Set:  [420 mL] 420 mL  PEEP/CPAP:  [5 cmH20] 5 cmH20  Pressure Support:  [5 cmH20] 5 cmH20  Mean Airway Pressure:  [6.6 cmH20-7.6 cmH20] 6.8 cmH20    07/10 0701 - 07/11 0700  In: 2198.8 [I.V.:1168.8]  Out: 300 [Urine:300]    Physical Exam   Constitutional: She appears well-developed and well-nourished.   HENT:   Head: Normocephalic and atraumatic.   Eyes: Pupils are equal, round, and reactive to light.   Cardiovascular: Normal rate.     Pulmonary/Chest:   intubated   Neurological: GCS eye subscore is 4. GCS verbal subscore is 1. GCS motor subscore is 5.      Awake; following some simple commands; reported to be intermittent  PERRL, EOMI  +cough, gag and oculocephalic intact  Able to  fingers on the left; otherwise no spontaneous movement of any extremity seen  Withdraws to pain in all 4 extremities    Unable to test orientation, language, memory, judgment, insight, fund of knowledge, hearing, shoulder shrug, tongue protrusion, coordination, gait due to level of consciousness.    Medications:  Continuous    sodium chloride 0.9% Last Rate: 75 mL/hr at 07/11/17 1000   Scheduled    albuterol-ipratropium 2.5mg-0.5mg/3mL 3 mL Q6H   aliskiren 300 mg Daily   amantadine HCl 100 mg Q8H   amlodipine 10 mg Daily   aspirin 325 mg Daily   atorvastatin 10 mg Daily   carvedilol 25 mg BID   chlorhexidine 15 mL BID   cloNIDine 0.1 mg/24 hr td ptwk 1 patch Q7 Days   heparin (porcine) 7,500 Units Q8H   hydrALAZINE 50 mg Q8H   insulin detemir 10 Units Daily   pantoprazole 40 mg Daily   psyllium 1 packet Daily   sodium chloride 0.9% 3 mL Q8H   sodium chloride 3% 4 mL Q6H   PRN    acetaminophen 650 mg Q6H PRN   dextrose 50% 12.5 g PRN   fentaNYL 100 mcg Q6H PRN   glucagon (human recombinant) 1 mg PRN   hydrALAZINE 10 mg Q4H PRN   insulin aspart 1-10 Units Q4H PRN   labetalol 10 mg Q4H PRN   lorazepam 2 mg Q4H PRN   magnesium oxide 800 mg PRN   magnesium oxide 800 mg PRN   ondansetron 8 mg Q8H PRN   pneumoc 13-finn conj-dip cr(PF) 0.5 mL vaccine x 1 dose   potassium chloride 10% 40 mEq PRN   potassium chloride 10% 40 mEq PRN   potassium chloride 10% 60 mEq PRN   potassium, sodium phosphates 2 packet PRN   potassium, sodium phosphates 2 packet PRN   potassium, sodium phosphates 2 packet PRN   promethazine (PHENERGAN) IVPB 12.5 mg Q6H PRN     Today I personally reviewed pertinent medications, lines/drains/airways, imaging, cardiology, lab results, microbiology  results,    Assessment/Plan:     Neuro   * Cerebral infarction, watershed distribution, bilateral, acute    -from hypoperfusion amid correction of hypertensive emergency  -aspirin, atorvastatin    Amantadine 100 q8h to help improve wakefullness        Pulmonary   Acute respiratory failure    -no air leak; ET in place for 21 days; efforts late last week with solumedrol for possible laryngeal edema without improvement. trach/PEG today  Vent Mode: Spont  Oxygen Concentration (%):  [] 40  Resp Rate Total:  [9.9 br/min-21 br/min] 14 br/min  Vt Set:  [420 mL] 420 mL  PEEP/CPAP:  [5 cmH20] 5 cmH20  Pressure Support:  [5 cmH20] 5 cmH20  Mean Airway Pressure:  [6.6 cmH20-7.6 cmH20] 6.8 cmH20  - daily CXR/ABG while intubated          Cardiac   Essential hypertension    SBP goal 130 -180  Decreased to hydralazine 50mg q8h as SBP overnight 120-130's; slowly wean off - avoid hypoperfusion given aggressive treatment of hypertensive emergency in the past contributed to watershed infarcts  Clonidine 0.1mg/24hr patch  Aliskerin 300mg qd  Coreg 25mg TID  Amlodipine 10mg qd        Fluids/Electrolytes/Nutrition/GI   Prerenal azotemia    BUN/Cr 51/1.0  - increase NS @125cc/hr            Prophylaxis:  Venous Thromboembolism: chemical  Stress Ulcer: PPI  Ventilator Pneumonia: yes     Activity Orders          Up with assistance starting at 06/26 3551        Full Code    Miko Storey MD  Neurocritical Care  [facility]

## 2017-07-11 NOTE — ASSESSMENT & PLAN NOTE
-no air leak; ET in place for 21 days; efforts late last week with solumedrol for possible laryngeal edema without improvement. trach/PEG today  Vent Mode: Spont  Oxygen Concentration (%):  [] 40  Resp Rate Total:  [9.9 br/min-21 br/min] 14 br/min  Vt Set:  [420 mL] 420 mL  PEEP/CPAP:  [5 cmH20] 5 cmH20  Pressure Support:  [5 cmH20] 5 cmH20  Mean Airway Pressure:  [6.6 cmH20-7.6 cmH20] 6.8 cmH20  - daily CXR/ABG while intubated

## 2017-07-11 NOTE — PLAN OF CARE
Problem: Patient Care Overview  Goal: Plan of Care Review  Outcome: Ongoing (interventions implemented as appropriate)  No acute events occurred throughout shift. Trach and PEG rescheduled for tomorrow. Pt unable to verbalize understanding r/t intubation. POC reviewed with Nisa Frank and Nisa Frank's daughter at 1600.  Addressed all questions and concerns.  Pt progressing toward goals as noted. See flowsheets for full list of VS and assessments. Will continue to monitor.

## 2017-07-11 NOTE — PROGRESS NOTES
ICU Attending Note  Neurocritical Care    [id number]    -aspirin, atorvastatin  -amantadine  -trach/PEG  --180  -decrease hydralazine to 50 mg q8h; consider stopping tomorrow  -otherwise continue antihypertensives  -NS increase 125 mL/h  -HGB >7  -detemir, ISS  -holding TF  -prophylaxis

## 2017-07-11 NOTE — PLAN OF CARE
SW met with Pt friend at bedside. Pt friend is unaware of medical choices daughter has made and reported daughter will be here after work. She does not know what time. SW left LTAC list in room with this SW contact information and will check later in the afternoon to speak with Pt daughter in person about LTAC.      LUCIA met with Pt daughter regarding LTAC list left in room. She had started researching LTAC already. Addressed questions and reviewed list with her. She reported wanting to chose [facility] and [facility].     LUCIA sent email to [facility] to complete the referral and placed PC form in chart.    Susan España, DIONNE  Neurocritical Care   [facility]  [id number]

## 2017-07-11 NOTE — PLAN OF CARE
Problem: Patient Care Overview  Goal: Plan of Care Review  Outcome: Ongoing (interventions implemented as appropriate)  POC reviewed with pt and daughter at 0400. Pt unable to verbalize understandin d/t intubation and neuro status. Questions and concerns from daughter addressed and answered. No acute events overnight. Pt to go for trach/PEG today. Pt progressing toward goals. Will continue to monitor. See flowsheets for full assessment and VS info

## 2017-07-11 NOTE — ASSESSMENT & PLAN NOTE
SBP goal 130 -180  Decreased to hydralazine 50mg q8h as SBP overnight 120-130's; slowly wean off - avoid hypoperfusion given aggressive treatment of hypertensive emergency in the past contributed to watershed infarcts  Clonidine 0.1mg/24hr patch  Aliskerin 300mg qd  Coreg 25mg TID  Amlodipine 10mg qd

## 2017-07-11 NOTE — SUBJECTIVE & OBJECTIVE
Interval History:    No acute events overnight. Plan for trach/peg today    Review of Systems   Unable to perform ROS: Intubated   and due to level of consciousness.  Objective:     Vitals:  Temp: 97.9 °F (36.6 °C) (07/11/17 0701)  Pulse: (!) 52 (07/11/17 1000)  Resp: 14 (07/11/17 1000)  BP: 124/60 (07/11/17 1000)  SpO2: 99 % (07/11/17 1000)    Temp:  [97.9 °F (36.6 °C)-98.5 °F (36.9 °C)] 97.9 °F (36.6 °C)  Pulse:  [51-63] 52  Resp:  [11-21] 14  SpO2:  [95 %-100 %] 99 %  BP: ()/(46-67) 124/60         Vent Mode: Spont  Oxygen Concentration (%):  [] 40  Resp Rate Total:  [9.9 br/min-21 br/min] 14 br/min  Vt Set:  [420 mL] 420 mL  PEEP/CPAP:  [5 cmH20] 5 cmH20  Pressure Support:  [5 cmH20] 5 cmH20  Mean Airway Pressure:  [6.6 cmH20-7.6 cmH20] 6.8 cmH20    07/10 0701 - 07/11 0700  In: 2198.8 [I.V.:1168.8]  Out: 300 [Urine:300]    Physical Exam   Constitutional: She appears well-developed and well-nourished.   HENT:   Head: Normocephalic and atraumatic.   Eyes: Pupils are equal, round, and reactive to light.   Cardiovascular: Normal rate.    Pulmonary/Chest:   intubated   Neurological: GCS eye subscore is 4. GCS verbal subscore is 1. GCS motor subscore is 5.      Awake; following some simple commands; reported to be intermittent  PERRL, EOMI  +cough, gag and oculocephalic intact  Able to  fingers on the left; otherwise no spontaneous movement of any extremity seen  Withdraws to pain in all 4 extremities    Unable to test orientation, language, memory, judgment, insight, fund of knowledge, hearing, shoulder shrug, tongue protrusion, coordination, gait due to level of consciousness.    Medications:  Continuous    sodium chloride 0.9% Last Rate: 75 mL/hr at 07/11/17 1000   Scheduled    albuterol-ipratropium 2.5mg-0.5mg/3mL 3 mL Q6H   aliskiren 300 mg Daily   amantadine HCl 100 mg Q8H   amlodipine 10 mg Daily   aspirin 325 mg Daily   atorvastatin 10 mg Daily   carvedilol 25 mg BID   chlorhexidine 15 mL BID    cloNIDine 0.1 mg/24 hr td ptwk 1 patch Q7 Days   heparin (porcine) 7,500 Units Q8H   hydrALAZINE 50 mg Q8H   insulin detemir 10 Units Daily   pantoprazole 40 mg Daily   psyllium 1 packet Daily   sodium chloride 0.9% 3 mL Q8H   sodium chloride 3% 4 mL Q6H   PRN    acetaminophen 650 mg Q6H PRN   dextrose 50% 12.5 g PRN   fentaNYL 100 mcg Q6H PRN   glucagon (human recombinant) 1 mg PRN   hydrALAZINE 10 mg Q4H PRN   insulin aspart 1-10 Units Q4H PRN   labetalol 10 mg Q4H PRN   lorazepam 2 mg Q4H PRN   magnesium oxide 800 mg PRN   magnesium oxide 800 mg PRN   ondansetron 8 mg Q8H PRN   pneumoc 13-finn conj-dip cr(PF) 0.5 mL vaccine x 1 dose   potassium chloride 10% 40 mEq PRN   potassium chloride 10% 40 mEq PRN   potassium chloride 10% 60 mEq PRN   potassium, sodium phosphates 2 packet PRN   potassium, sodium phosphates 2 packet PRN   potassium, sodium phosphates 2 packet PRN   promethazine (PHENERGAN) IVPB 12.5 mg Q6H PRN     Today I personally reviewed pertinent medications, lines/drains/airways, imaging, cardiology, lab results, microbiology results,

## 2017-07-12 ENCOUNTER — SURGERY (OUTPATIENT)
Age: 67
End: 2017-07-12

## 2017-07-12 LAB
ALLENS TEST: ABNORMAL
ANION GAP SERPL CALC-SCNC: 5 MMOL/L
BASOPHILS # BLD AUTO: 0.01 K/UL
BASOPHILS NFR BLD: 0.1 %
BUN SERPL-MCNC: 35 MG/DL
CALCIUM SERPL-MCNC: 8.2 MG/DL
CHLORIDE SERPL-SCNC: 111 MMOL/L
CO2 SERPL-SCNC: 24 MMOL/L
CREAT SERPL-MCNC: 0.8 MG/DL
DELSYS: ABNORMAL
DIFFERENTIAL METHOD: ABNORMAL
EOSINOPHIL # BLD AUTO: 0.1 K/UL
EOSINOPHIL NFR BLD: 1.3 %
ERYTHROCYTE [DISTWIDTH] IN BLOOD BY AUTOMATED COUNT: 14.4 %
ERYTHROCYTE [SEDIMENTATION RATE] IN BLOOD BY WESTERGREN METHOD: 14 MM/H
EST. GFR  (AFRICAN AMERICAN): >60 ML/MIN/1.73 M^2
EST. GFR  (NON AFRICAN AMERICAN): >60 ML/MIN/1.73 M^2
FIO2: 40
FLOW: 60
GLUCOSE SERPL-MCNC: 99 MG/DL
HCO3 UR-SCNC: 25.2 MMOL/L (ref 24–28)
HCT VFR BLD AUTO: 27.1 %
HGB BLD-MCNC: 8.3 G/DL
LYMPHOCYTES # BLD AUTO: 1.6 K/UL
LYMPHOCYTES NFR BLD: 22.1 %
MAGNESIUM SERPL-MCNC: 2 MG/DL
MCH RBC QN AUTO: 26.1 PG
MCHC RBC AUTO-ENTMCNC: 30.6 %
MCV RBC AUTO: 85 FL
MODE: ABNORMAL
MONOCYTES # BLD AUTO: 0.5 K/UL
MONOCYTES NFR BLD: 7.1 %
NEUTROPHILS # BLD AUTO: 5 K/UL
NEUTROPHILS NFR BLD: 68.8 %
PCO2 BLDA: 45.5 MMHG (ref 35–45)
PH SMN: 7.35 [PH] (ref 7.35–7.45)
PHOSPHATE SERPL-MCNC: 3.1 MG/DL
PLATELET # BLD AUTO: 212 K/UL
PMV BLD AUTO: 10.9 FL
PO2 BLDA: 73 MMHG (ref 80–100)
POC BE: 0 MMOL/L
POC SATURATED O2: 93 % (ref 95–100)
POC TCO2: 27 MMOL/L (ref 23–27)
POCT GLUCOSE: 106 MG/DL (ref 70–110)
POCT GLUCOSE: 113 MG/DL (ref 70–110)
POCT GLUCOSE: 115 MG/DL (ref 70–110)
POCT GLUCOSE: 120 MG/DL (ref 70–110)
POCT GLUCOSE: 122 MG/DL (ref 70–110)
POCT GLUCOSE: 134 MG/DL (ref 70–110)
POCT GLUCOSE: 143 MG/DL (ref 70–110)
POCT GLUCOSE: 84 MG/DL (ref 70–110)
POTASSIUM SERPL-SCNC: 4.6 MMOL/L
RBC # BLD AUTO: 3.18 M/UL
SAMPLE: ABNORMAL
SITE: ABNORMAL
SODIUM SERPL-SCNC: 140 MMOL/L
SP02: 100
WBC # BLD AUTO: 7.2 K/UL

## 2017-07-12 PROCEDURE — 27201423 OPTIME MED/SURG SUP & DEVICES STERILE SUPPLY: Performed by: SURGERY

## 2017-07-12 PROCEDURE — 83735 ASSAY OF MAGNESIUM: CPT

## 2017-07-12 PROCEDURE — 25000003 PHARM REV CODE 250: Performed by: PSYCHIATRY & NEUROLOGY

## 2017-07-12 PROCEDURE — 31600 PLANNED TRACHEOSTOMY: CPT | Mod: GC,,, | Performed by: SURGERY

## 2017-07-12 PROCEDURE — 25000242 PHARM REV CODE 250 ALT 637 W/ HCPCS: Performed by: NURSE ANESTHETIST, CERTIFIED REGISTERED

## 2017-07-12 PROCEDURE — 36000706: Performed by: SURGERY

## 2017-07-12 PROCEDURE — 80048 BASIC METABOLIC PNL TOTAL CA: CPT

## 2017-07-12 PROCEDURE — 27800903 OPTIME MED/SURG SUP & DEVICES OTHER IMPLANTS: Performed by: SURGERY

## 2017-07-12 PROCEDURE — 99233 SBSQ HOSP IP/OBS HIGH 50: CPT | Mod: ,,, | Performed by: PHYSICIAN ASSISTANT

## 2017-07-12 PROCEDURE — 0B113F4 BYPASS TRACHEA TO CUTANEOUS WITH TRACHEOSTOMY DEVICE, PERCUTANEOUS APPROACH: ICD-10-PCS | Performed by: SURGERY

## 2017-07-12 PROCEDURE — 25000242 PHARM REV CODE 250 ALT 637 W/ HCPCS: Performed by: INTERNAL MEDICINE

## 2017-07-12 PROCEDURE — 25000003 PHARM REV CODE 250: Performed by: PHYSICIAN ASSISTANT

## 2017-07-12 PROCEDURE — 37000009 HC ANESTHESIA EA ADD 15 MINS: Performed by: SURGERY

## 2017-07-12 PROCEDURE — 27202093 HC TUBE, GASTRIC

## 2017-07-12 PROCEDURE — 94003 VENT MGMT INPAT SUBQ DAY: CPT

## 2017-07-12 PROCEDURE — 25000003 PHARM REV CODE 250: Performed by: INTERNAL MEDICINE

## 2017-07-12 PROCEDURE — 37000008 HC ANESTHESIA 1ST 15 MINUTES: Performed by: SURGERY

## 2017-07-12 PROCEDURE — 43246 EGD PLACE GASTROSTOMY TUBE: CPT

## 2017-07-12 PROCEDURE — 94640 AIRWAY INHALATION TREATMENT: CPT

## 2017-07-12 PROCEDURE — 31600 PLANNED TRACHEOSTOMY: CPT

## 2017-07-12 PROCEDURE — 25000003 PHARM REV CODE 250: Performed by: NURSE PRACTITIONER

## 2017-07-12 PROCEDURE — 25000003 PHARM REV CODE 250: Performed by: ANESTHESIOLOGY

## 2017-07-12 PROCEDURE — 99900026 HC AIRWAY MAINTENANCE (STAT)

## 2017-07-12 PROCEDURE — 0DH63UZ INSERTION OF FEEDING DEVICE INTO STOMACH, PERCUTANEOUS APPROACH: ICD-10-PCS | Performed by: SURGERY

## 2017-07-12 PROCEDURE — 85025 COMPLETE CBC W/AUTO DIFF WBC: CPT

## 2017-07-12 PROCEDURE — 97803 MED NUTRITION INDIV SUBSEQ: CPT

## 2017-07-12 PROCEDURE — 84100 ASSAY OF PHOSPHORUS: CPT

## 2017-07-12 PROCEDURE — 63600175 PHARM REV CODE 636 W HCPCS: Performed by: PHYSICIAN ASSISTANT

## 2017-07-12 PROCEDURE — 43246 EGD PLACE GASTROSTOMY TUBE: CPT | Mod: 51,GC,, | Performed by: SURGERY

## 2017-07-12 PROCEDURE — 36000707: Performed by: SURGERY

## 2017-07-12 PROCEDURE — 20000000 HC ICU ROOM

## 2017-07-12 PROCEDURE — 27000221 HC OXYGEN, UP TO 24 HOURS

## 2017-07-12 PROCEDURE — 25000003 PHARM REV CODE 250: Performed by: NURSE ANESTHETIST, CERTIFIED REGISTERED

## 2017-07-12 PROCEDURE — 63600175 PHARM REV CODE 636 W HCPCS: Performed by: NURSE ANESTHETIST, CERTIFIED REGISTERED

## 2017-07-12 RX ORDER — PHENYLEPHRINE HYDROCHLORIDE 10 MG/ML
INJECTION INTRAVENOUS
Status: DISCONTINUED | OUTPATIENT
Start: 2017-07-12 | End: 2017-07-12 | Stop reason: HOSPADM

## 2017-07-12 RX ORDER — ALBUTEROL SULFATE 90 UG/1
AEROSOL, METERED RESPIRATORY (INHALATION)
Status: DISCONTINUED | OUTPATIENT
Start: 2017-07-12 | End: 2017-07-12 | Stop reason: HOSPADM

## 2017-07-12 RX ORDER — ROCURONIUM BROMIDE 10 MG/ML
INJECTION, SOLUTION INTRAVENOUS
Status: DISCONTINUED | OUTPATIENT
Start: 2017-07-12 | End: 2017-07-12 | Stop reason: HOSPADM

## 2017-07-12 RX ORDER — MIDAZOLAM HYDROCHLORIDE 1 MG/ML
INJECTION, SOLUTION INTRAMUSCULAR; INTRAVENOUS
Status: DISCONTINUED | OUTPATIENT
Start: 2017-07-12 | End: 2017-07-12 | Stop reason: HOSPADM

## 2017-07-12 RX ORDER — AMANTADINE HYDROCHLORIDE 100 MG/1
100 CAPSULE, GELATIN COATED ORAL EVERY 8 HOURS
Status: DISCONTINUED | OUTPATIENT
Start: 2017-07-12 | End: 2017-07-14 | Stop reason: HOSPADM

## 2017-07-12 RX ORDER — SODIUM CHLORIDE 9 MG/ML
INJECTION, SOLUTION INTRAVENOUS CONTINUOUS PRN
Status: DISCONTINUED | OUTPATIENT
Start: 2017-07-12 | End: 2017-07-12 | Stop reason: HOSPADM

## 2017-07-12 RX ADMIN — IPRATROPIUM BROMIDE AND ALBUTEROL SULFATE 3 ML: .5; 3 SOLUTION RESPIRATORY (INHALATION) at 01:07

## 2017-07-12 RX ADMIN — HYDRALAZINE HYDROCHLORIDE 50 MG: 50 TABLET ORAL at 10:07

## 2017-07-12 RX ADMIN — PHENYLEPHRINE HYDROCHLORIDE 200 MCG: 10 INJECTION INTRAVENOUS at 09:07

## 2017-07-12 RX ADMIN — AMANTADINE HYDROCHLORIDE 100 MG: 100 CAPSULE, LIQUID FILLED ORAL at 05:07

## 2017-07-12 RX ADMIN — IPRATROPIUM BROMIDE AND ALBUTEROL SULFATE 3 ML: .5; 3 SOLUTION RESPIRATORY (INHALATION) at 07:07

## 2017-07-12 RX ADMIN — ROCURONIUM BROMIDE 30 MG: 10 INJECTION, SOLUTION INTRAVENOUS at 08:07

## 2017-07-12 RX ADMIN — HYDRALAZINE HYDROCHLORIDE 10 MG: 20 INJECTION INTRAMUSCULAR; INTRAVENOUS at 03:07

## 2017-07-12 RX ADMIN — ASPIRIN 325 MG ORAL TABLET 325 MG: 325 PILL ORAL at 03:07

## 2017-07-12 RX ADMIN — CHLORHEXIDINE GLUCONATE 15 ML: 1.2 RINSE ORAL at 10:07

## 2017-07-12 RX ADMIN — FENTANYL CITRATE 100 MCG: 50 INJECTION INTRAMUSCULAR; INTRAVENOUS at 11:07

## 2017-07-12 RX ADMIN — ALISKIREN HEMIFUMARATE 300 MG: 300 TABLET, FILM COATED ORAL at 03:07

## 2017-07-12 RX ADMIN — AMANTADINE HYDROCHLORIDE 100 MG: 100 CAPSULE, LIQUID FILLED ORAL at 03:07

## 2017-07-12 RX ADMIN — SODIUM CHLORIDE SOLN NEBU 3% 4 ML: 3 NEBU SOLN at 01:07

## 2017-07-12 RX ADMIN — SODIUM CHLORIDE: 0.9 INJECTION, SOLUTION INTRAVENOUS at 08:07

## 2017-07-12 RX ADMIN — HYDRALAZINE HYDROCHLORIDE 50 MG: 50 TABLET ORAL at 03:07

## 2017-07-12 RX ADMIN — CARVEDILOL 25 MG: 25 TABLET, FILM COATED ORAL at 03:07

## 2017-07-12 RX ADMIN — CARVEDILOL 25 MG: 25 TABLET, FILM COATED ORAL at 10:07

## 2017-07-12 RX ADMIN — HEPARIN SODIUM 7500 UNITS: 5000 INJECTION, SOLUTION INTRAVENOUS; SUBCUTANEOUS at 10:07

## 2017-07-12 RX ADMIN — SODIUM CHLORIDE SOLN NEBU 3% 4 ML: 3 NEBU SOLN at 07:07

## 2017-07-12 RX ADMIN — Medication 3 ML: at 10:07

## 2017-07-12 RX ADMIN — PHENYLEPHRINE HYDROCHLORIDE 200 MCG: 10 INJECTION INTRAVENOUS at 08:07

## 2017-07-12 RX ADMIN — IPRATROPIUM BROMIDE AND ALBUTEROL SULFATE 3 ML: .5; 3 SOLUTION RESPIRATORY (INHALATION) at 12:07

## 2017-07-12 RX ADMIN — PANTOPRAZOLE SODIUM 40 MG: 40 GRANULE, DELAYED RELEASE ORAL at 03:07

## 2017-07-12 RX ADMIN — AMLODIPINE BESYLATE 10 MG: 10 TABLET ORAL at 03:07

## 2017-07-12 RX ADMIN — CHLORHEXIDINE GLUCONATE 15 ML: 1.2 RINSE ORAL at 03:07

## 2017-07-12 RX ADMIN — Medication 3 ML: at 05:07

## 2017-07-12 RX ADMIN — MIDAZOLAM HYDROCHLORIDE 2 MG: 1 INJECTION, SOLUTION INTRAMUSCULAR; INTRAVENOUS at 08:07

## 2017-07-12 RX ADMIN — HEPARIN SODIUM 7500 UNITS: 5000 INJECTION, SOLUTION INTRAVENOUS; SUBCUTANEOUS at 03:07

## 2017-07-12 RX ADMIN — AMANTADINE HYDROCHLORIDE 100 MG: 100 CAPSULE ORAL at 10:07

## 2017-07-12 RX ADMIN — Medication 3 ML: at 03:07

## 2017-07-12 RX ADMIN — PSYLLIUM HUSK 1 PACKET: 3.4 POWDER ORAL at 04:07

## 2017-07-12 RX ADMIN — ATORVASTATIN CALCIUM 10 MG: 10 TABLET, FILM COATED ORAL at 03:07

## 2017-07-12 RX ADMIN — ROCURONIUM BROMIDE 50 MG: 10 INJECTION, SOLUTION INTRAVENOUS at 08:07

## 2017-07-12 RX ADMIN — SODIUM CHLORIDE SOLN NEBU 3% 4 ML: 3 NEBU SOLN at 12:07

## 2017-07-12 RX ADMIN — SODIUM CHLORIDE: 0.9 INJECTION, SOLUTION INTRAVENOUS at 04:07

## 2017-07-12 RX ADMIN — HEPARIN SODIUM 7500 UNITS: 5000 INJECTION, SOLUTION INTRAVENOUS; SUBCUTANEOUS at 05:07

## 2017-07-12 RX ADMIN — ALBUTEROL SULFATE 10 PUFF: 90 AEROSOL, METERED RESPIRATORY (INHALATION) at 08:07

## 2017-07-12 RX ADMIN — HYDRALAZINE HYDROCHLORIDE 50 MG: 50 TABLET ORAL at 05:07

## 2017-07-12 NOTE — NURSING
OR nurse and anesthesia at bedside, patient hooked up to transport monitor, patient to be bagged to OR per anesthesia. VSS. Patient's family sent to surgery waiting.

## 2017-07-12 NOTE — PROGRESS NOTES
Patient back from or. Patient tolerated well with no adverse reactions. Will continue to monitor closely.

## 2017-07-12 NOTE — PLAN OF CARE
Problem: Patient Care Overview  Goal: Plan of Care Review  Outcome: Ongoing (interventions implemented as appropriate)  POC reviewed with pt and family at 0500. Pt unable to verbalize understanding d/t intubation. Questions and concerns from pt daughter addressed. No acute events overnight. Pt to go to OR this morning for trach and PEG. Pt progressing toward goals. Will continue to monitor. See flowsheets for full assessment and VS info

## 2017-07-12 NOTE — PLAN OF CARE
Problem: Stroke (Ischemic) (Adult)  Goal: Signs and Symptoms of Listed Potential Problems Will be Absent, Minimized or Managed (Stroke)  Signs and symptoms of listed potential problems will be absent, minimized or managed by discharge/transition of care (reference Stroke (Ischemic) (Adult) CPG).   Outcome: Ongoing (interventions implemented as appropriate)  POC reviewed with pt and family at 1400. Patient's family verbalized understanding. Questions and concerns addressed. Patient received trach/peg in OR today, no issues noted. Pt progressing toward goals. Will continue to monitor. See flowsheets for full assessment and VS info.

## 2017-07-12 NOTE — TRANSFER OF CARE
"Anesthesia Transfer of Care Note    Patient: iNsa Frank    Procedure(s) Performed: Procedure(s) (LRB):  PLACEMENT-TUBE-PEG (N/A)  TRACHEOSTOMY (N/A)    Patient location: ICU    Anesthesia Type: general    Transport from OR: Transported from OR on 6-10 L/min O2 by face mask with adequate spontaneous ventilation. Continuous ECG monitoring in transport. Continuous SpO2 monitoring in transport. Transported from OR intubated on 100% O2 by AMBU with adequate controlled ventilation    Post pain: adequate analgesia    Post vital signs: stable    Level of consciousness: sedated    Nausea/Vomiting: no nausea/vomiting    Complications: none    Transfer of care protocol was followed      Last vitals:   Visit Vitals  /69   Pulse 81   Temp 97.6 F   Resp 15   Ht 5' 5" (1.651 m)   Wt (!) 160.6 kg (354 lb)   LMP  (LMP Unknown)   SpO2 100%   Breastfeeding? No   BMI 58.91 kg/m²     "

## 2017-07-12 NOTE — ADDENDUM NOTE
Addendum  created 07/12/17 0724 by Nidia Bee CRNA    Anesthesia Review and Sign - Signed, Anesthesia Staff edited

## 2017-07-12 NOTE — PROGRESS NOTES
[facility]  Neurocritical Care  Progress Note    Admit Date: 6/18/2017  Service Date: 07/12/2017  Length of Stay: 24    Subjective:     Chief Complaint: Cerebral infarction, watershed distribution, bilateral, acute    History of Present Illness: 66 y woman who presents with hypertensive emergency with severe HA-> antihypertensives-> jerking of concern for seizure-> intubation-> NSCCU.    Hospital Course:   -6/27 admission to [facility]-> EEG with diffuse slowing no seizures, MR with bilateral MCA SAQIB watershed infarcts  -no subsequent documented seizures, no significant exam improvement  -7/8 Discussed the earlier plan of extubation trial today with the family and interval development. Still with no air leak. I have suggested a trach and peg due to risk for extubation failure, prolonged intubation with laryngeal edema, and secretion. Will consult gen surgery  7/12: s/p trach/peg    Interval History: NAEON    Review of Systems: Unable to obtain a complete ROS due to level of consciousness.     Vitals:   Temp: 97.7 °F (36.5 °C) (07/12/17 1500)  Pulse: (!) 48 (07/12/17 1736)  Resp: (!) 4 (07/12/17 1736)  BP: 136/62 (07/12/17 1700)  SpO2: 100 % (07/12/17 1736)    Temp:  [97.1 °F (36.2 °C)-98.5 °F (36.9 °C)] 97.7 °F (36.5 °C)  Pulse:  [48-70] 48  Resp:  [4-37] 4  SpO2:  [91 %-100 %] 100 %  BP: (117-184)/(59-86) 136/62         Vent Mode: A/C  Oxygen Concentration (%):  [40-55] 55  Resp Rate Total:  [11 br/min-27 br/min] 15 br/min  Vt Set:  [420 mL] 420 mL  PEEP/CPAP:  [5 cmH20] 5 cmH20  Pressure Support:  [0 cmH20-5 cmH20] 0 cmH20  Mean Airway Pressure:  [6.9 xrW92-28 cmH20] 9.2 cmH20    07/11 0701 - 07/12 0700  In: 3113.3 [I.V.:2908.3]  Out: 1350 [Urine:1350]     Examination:   Constitutional: Well-nourished and -developed. No apparent distress.   Eyes: Conjunctiva clear, anicteric. Lids no lesions.  Head/Ears/Nose/Mouth/Throat/Neck: Moist mucous membranes. External ears, nose atraumatic.   Cardiovascular:  Regular rhythm. No murmurs. No leg edema.  Respiratory: Comfortable respirations. Clear to auscultation.  Gastrointestinal: No hernia. Soft, nondistended, nontender. + bowel sounds.    Neurologic:  -GCS [id number]  Awake; following some simple commands; reported to be intermittent  PERRL, EOMI  +cough, gag and oculocephalic intact  no spontaneous movement of any extremity seen  Withdraws to pain in all 4 extremities     Unable to test orientation, language, memory, judgment, insight, fund of knowledge, hearing, shoulder shrug, tongue protrusion, coordination, gait due to level of consciousness.ciousness.    Medications:   Continuous  sodium chloride 0.9% Last Rate: 125 mL/hr at 07/12/17 1700   Scheduled  albuterol-ipratropium 2.5mg-0.5mg/3mL 3 mL Q6H   aliskiren 300 mg Daily   amantadine HCl 100 mg Q8H   amlodipine 10 mg Daily   aspirin 325 mg Daily   atorvastatin 10 mg Daily   carvedilol 25 mg BID   chlorhexidine 15 mL BID   cloNIDine 0.1 mg/24 hr td ptwk 1 patch Q7 Days   heparin (porcine) 7,500 Units Q8H   hydrALAZINE 50 mg Q8H   insulin detemir 10 Units Daily   pantoprazole 40 mg Daily   psyllium 1 packet Daily   sodium chloride 0.9% 3 mL Q8H   sodium chloride 3% 4 mL Q6H   PRN  acetaminophen 650 mg Q6H PRN   dextrose 50% 12.5 g PRN   fentaNYL 100 mcg Q6H PRN   glucagon (human recombinant) 1 mg PRN   hydrALAZINE 10 mg Q4H PRN   insulin aspart 1-10 Units Q4H PRN   labetalol 10 mg Q4H PRN   lorazepam 2 mg Q4H PRN   magnesium oxide 800 mg PRN   magnesium oxide 800 mg PRN   ondansetron 8 mg Q8H PRN   pneumoc 13-finn conj-dip cr(PF) 0.5 mL vaccine x 1 dose   potassium chloride 10% 40 mEq PRN   potassium chloride 10% 40 mEq PRN   potassium chloride 10% 60 mEq PRN   potassium, sodium phosphates 2 packet PRN   potassium, sodium phosphates 2 packet PRN   potassium, sodium phosphates 2 packet PRN   promethazine (PHENERGAN) IVPB 12.5 mg Q6H PRN      Today I independently reviewed pertinent medications, lines/drains/airways,  imaging, cardiology, lab results, notably: CXR wnl, s/p trach peg     Assessment/Plan:     Neuro   * Cerebral infarction, watershed distribution, bilateral, acute    -from hypoperfusion amid correction of hypertensive emergency  -aspirin, atorvastatin    Amantadine 100 q8h to help improve wakefullness        Pulmonary   Acute respiratory failure    -no air leak; ET in place for 21 days; efforts late last week with solumedrol for possible laryngeal edema without improvement.   S/p trach/PEG today  Vent Mode: A/C  Oxygen Concentration (%):  [40-55] 55  Resp Rate Total:  [11 br/min-27 br/min] 15 br/min  Vt Set:  [420 mL] 420 mL  PEEP/CPAP:  [5 cmH20] 5 cmH20  Pressure Support:  [0 cmH20-5 cmH20] 0 cmH20  Mean Airway Pressure:  [6.9 ggD56-92 cmH20] 9.2 cmH20  - daily CXR/ABG while intubated          Cardiac   Essential hypertension    SBP goal 130 -180  hydralazine 50mg q8h  Clonidine 0.1mg/24hr patch  Aliskerin 300mg qd  Coreg 25mg TID  Amlodipine 10mg qd        Endocrine   Type 2 diabetes mellitus, uncontrolled    -ISS        Fluids/Electrolytes/Nutrition/GI   Prerenal azotemia    BUN/Cr 35/0.8, improved from yesterday   -NS @125cc/hr            Prophylaxis:  Venous Thromboembolism: mechanical chemical  Stress Ulcer: PPI  Ventilator Pneumonia: yes     Activity Orders          Up with assistance starting at 06/26 8572        Full Code    Mendoza Siddiqui PA-C  Neurocritical Care  [facility]

## 2017-07-12 NOTE — NURSING
Patient returned to room from OR, placed back on central cardiac monitor. Peg site CDI and to gravity drain. Trach site noted to have small amount of bloody drainage, trach gauze dressing applied. Patient still drowsy from OR, RT switched patient to SIMV rate of 14. VSS. RN to monitor.

## 2017-07-12 NOTE — ADDENDUM NOTE
Addendum  created 07/12/17 1023 by Nidia Bee, CRNA    Anesthesia Event edited, Anesthesia Intra Devices edited, Anesthesia Intra Flowsheets edited, Anesthesia Intra Meds edited, Anesthesia Staff edited, Orders acknowledged in Narrator, Patient device added, Patient device edited, Patient device removed, Sign clinical note

## 2017-07-12 NOTE — ASSESSMENT & PLAN NOTE
SBP goal 130 -180  hydralazine 50mg q8h  Clonidine 0.1mg/24hr patch  Aliskerin 300mg qd  Coreg 25mg TID  Amlodipine 10mg qd

## 2017-07-12 NOTE — PROGRESS NOTES
[facility]  Adult Nutrition  Progress Note    SUMMARY     Recommendations    Recommendation/Intervention:   As medically able to restart TF, recommend changing formula to better meet pt's needs. Glucerna 1.5 @ goal rate 55mL/hr.   -Hold for residuals >500mL.     RD to monitor.      Goals: 1) Patient will meet >=85% - 115% of EEN  Nutrition Goal Status: progressing towards goal  Communication of RD Recs: reviewed with physician    Reason for Assessment    Reason for Assessment: RD follow-up  Diagnosis:  (sepsis, fever, elevated troponin, HTN)  Relevent Medical History: DM      General Information Comments: s/p trach/PEG today. Previously tolerating TF at goal.    Nutrition Discharge Planning: optimal nutrition via PEG with tolerance    Nutrition Prescription Ordered    Current Diet Order: NPO  Nutrition Order Comments: Held s/p PEG/trach  Current Nutrition Support Formula Ordered: Diabetisource  Current Nutrition Support Rate Ordered: 50 (ml)  Current Nutrition Support Frequency Ordered: mL/hr        Evaluation of Received Nutrients/Fluid Intake    Enteral Calories (kcal): 1440  Enteral Protein (gm): 72  Enteral (Free Water) Fluid (mL): 982      Energy Calories Required: not meeting needs  % Kcal Needs: 73     Protein Required: not meeting needs  % Protein Needs: 64     Fluid Required: not meeting needs  Comments: 10ml residuals 7/10  Tolerance: tolerating  % Intake of Estimated Energy Needs: 0 - 25 %  % Meal Intake: NPO     Nutrition Risk Screen     Nutrition Risk Screen: no indicators present    Nutrition/Diet History    Patient Reported Diet/Restrictions/Preferences:  (neo)     Food Preferences: NEO Restorationism/cultural preferences at his time        Factors Affecting Nutritional Intake: NPO, on mechanical ventilation     Labs/Tests/Procedures/Meds       Pertinent Labs Reviewed: reviewed  Pertinent Labs Comments: BUN 35, POCT Glu   Pertinent Medications Reviewed: reviewed,  "pertinent  Pertinent Medications Comments: statin, psyllium, IVF    Physical Findings    Overall Physical Appearance: on ventilator support, obese, edematous  Tubes: gastrostomy tube     Skin: dermatitis, edema    Anthropometrics    Temp: 97.5 °F (36.4 °C)     Height: 5' 5" (165.1 cm)  Weight Method: Bed Scale  Weight: (!) 160.6 kg (354 lb)     Ideal Body Weight (IBW), Female: 125 lb     % Ideal Body Weight, Female (lb): 273.55 lb  BMI (Calculated): 57  BMI Grade: greater than 40 - morbid obesity     Estimated/Assessed Needs    Weight Used For Calorie Calculations: (!) 155.1 kg (341 lb 14.9 oz) (Usual Body Weight)      Energy Calorie Requirements (kcal): 1984  Energy Need Method: [facility] (modified)     RMR (Crosby-St. Jeor Equation): 2091.88        Weight Used For Protein Calculations: 56.8 kg (125 lb 3.5 oz) (Ideal Body Weight)  Protein Requirements: 113-142g (2.0-2.5g/kg IBW)     Fluid Need Method: RDA Method, other (see comments) (or per MD)  RDA Method (mL): 1984         CHO Requirement: 50% of total kcals     Assessment and Plan    Nutrition Diagnosis  Problem:  Inadequate energy intake  Etiology:  Decreased ability to consume sufficient energy orally  Signs/Symptoms:  Intubated, NPO  Status: continues    Monitor and Evaluation    Food and Nutrient Intake: enteral nutrition intake  Food and Nutrient Adminstration: enteral and parenteral nutrition administration        Anthropometric Measurements: weight, weight change, body mass index  Biochemical Data, Medical Tests and Procedures: electrolyte and renal panel, gastrointestinal profile, glucose/endocrine profile, lipid profile  Nutrition-Focused Physical Findings: overall appearance, skin    Nutrition Risk    Level of Risk: other (see comments) (f/u 1x/week)    Nutrition Follow-Up    RD Follow-up?: Yes    "

## 2017-07-12 NOTE — ANESTHESIA POSTPROCEDURE EVALUATION
"Anesthesia Post Evaluation    Patient: Nisa Frank    Procedure(s) Performed: Procedure(s) (LRB):  PLACEMENT-TUBE-PEG (N/A)  TRACHEOSTOMY (N/A)    Final Anesthesia Type: general  Patient location during evaluation: ICU  Patient participation: No - Unable to Participate, Intubation  Level of consciousness: sedated  Post-procedure vital signs: reviewed and stable  Pain management: adequate  Airway patency: patent  PONV status at discharge: No PONV  Anesthetic complications: no      Cardiovascular status: stable  Respiratory status: ventilator and intubated  Hydration status: euvolemic  Follow-up not needed.        Visit Vitals  BP (!) 175/79   Pulse 61   Temp 36.6 °C (97.9 °F) (Oral)   Resp 15   Ht 5' 5" (1.651 m)   Wt (!) 160.6 kg (354 lb)   LMP  (LMP Unknown)   SpO2 100%   Breastfeeding? No   BMI 58.91 kg/m²       Pain/Erin Score: Pain Assessment Performed: Yes (7/12/2017  7:01 AM)  Presence of Pain: non-verbal indicators absent (7/12/2017  7:01 AM)      "

## 2017-07-12 NOTE — PLAN OF CARE
SW contacted by Pt daughter at [facility]. She reported needing a job excuse for being at the hospital as her mother is having surgery today. SW completed letter and had MD team sign.     LUCIA spoke with Ainsley from [facility] ([phone]) regarding this Pt. Pt will be touring soon and will give preference asap.    SW left requested letter at bedside for the Pt daughter.    Susan España LMSW  Neurocritical Care   [facility]  [id number]

## 2017-07-12 NOTE — ADDENDUM NOTE
Addendum  created 07/12/17 1017 by Bernadine Reid MD    Anesthesia Attestations filed, Anesthesia Review and Sign - Ready for Procedure, Anesthesia Review and Sign - Signed, Sign clinical note

## 2017-07-12 NOTE — PROGRESS NOTES
[facility]  General Surgery  Progress Note    Subjective:     Interval History:   No acute events overnight, has been NPO p MN in anticipation of PEG/trach procedure today in OR    Post-Op Info:  Procedure(s) (LRB):  PLACEMENT-TUBE-PEG (N/A)  TRACHEOSTOMY perc trach blue rhino (N/A)          Medications:  Continuous Infusions:   sodium chloride 0.9% 125 mL/hr at 07/12/17 0605     Scheduled Meds:   albuterol-ipratropium 2.5mg-0.5mg/3mL  3 mL Nebulization Q6H    aliskiren  300 mg Per NG tube Daily    amantadine HCl  100 mg Oral Q8H    amlodipine  10 mg Per NG tube Daily    aspirin  325 mg Per NG tube Daily    atorvastatin  10 mg Per NG tube Daily    carvedilol  25 mg Per NG tube BID    chlorhexidine  15 mL Mouth/Throat BID    cloNIDine 0.1 mg/24 hr td ptwk  1 patch Transdermal Q7 Days    heparin (porcine)  7,500 Units Subcutaneous Q8H    hydrALAZINE  50 mg Per NG tube Q8H    insulin detemir  10 Units Subcutaneous Daily    pantoprazole  40 mg Per NG tube Daily    psyllium  1 packet Per NG tube Daily    sodium chloride 0.9%  3 mL Intravenous Q8H    sodium chloride 3%  4 mL Nebulization Q6H     PRN Meds:acetaminophen, dextrose 50%, fentaNYL, glucagon (human recombinant), hydrALAZINE, insulin aspart, labetalol, lorazepam, magnesium oxide, magnesium oxide, ondansetron, pneumoc 13-finn conj-dip cr(PF), potassium chloride 10%, potassium chloride 10%, potassium chloride 10%, potassium, sodium phosphates, potassium, sodium phosphates, potassium, sodium phosphates, promethazine (PHENERGAN) IVPB     Objective:     Vital Signs (Most Recent):  Temp: 97.4 °F (36.3 °C) (07/12/17 0305)  Pulse: 62 (07/12/17 0605)  Resp: 16 (07/12/17 0605)  BP: (!) 174/82 (07/12/17 0605)  SpO2: 100 % (07/12/17 0605) Vital Signs (24h Range):  Temp:  [97.1 °F (36.2 °C)-98.5 °F (36.9 °C)] 97.4 °F (36.3 °C)  Pulse:  [48-62] 62  Resp:  [12-23] 16  SpO2:  [96 %-100 %] 100 %  BP: (114-174)/(57-84) 174/82       Intake/Output  Summary (Last 24 hours) at 07/12/17 0634  Last data filed at 07/12/17 0605   Gross per 24 hour   Intake          2998.75 ml   Output             1350 ml   Net          1648.75 ml       Physical Exam   Constitutional: She appears well-developed and well-nourished. No distress.   HENT:   Head: Normocephalic and atraumatic.   Eyes: No scleral icterus.   Neck: Neck supple.   Thick, obese neck   Cardiovascular: Normal rate and regular rhythm.    Pulmonary/Chest:   Coarse BS bilat, intubated   Abdominal:   Soft, obese, well healed lower midline incision noted   Musculoskeletal: She exhibits no edema or tenderness.   Skin: Skin is warm and dry.       Significant Labs:  ABGs:   Recent Labs  Lab 07/11/17  1157   PH 7.382   PCO2 44.8   PO2 80   HCO3 26.6   POCSATURATED 95   BE 2     BMP:   Recent Labs  Lab 07/12/17  0127   GLU 99      K 4.6   *   CO2 24   BUN 35*   CREATININE 0.8   CALCIUM 8.2*   MG 2.0       CBC:   Recent Labs  Lab 07/12/17  0127   WBC 7.20   RBC 3.18*   HGB 8.3*   HCT 27.1*      MCV 85   MCH 26.1*   MCHC 30.6*     CMP:   Recent Labs  Lab 07/12/17  0127   GLU 99   CALCIUM 8.2*      K 4.6   CO2 24   *   BUN 35*   CREATININE 0.8     All pertinent labs from the last 24 hours have been reviewed.    Assessment/Plan:     Active Diagnoses:    Diagnosis Date Noted POA    PRINCIPAL PROBLEM:  Cerebral infarction, watershed distribution, bilateral, acute [I63.8] 06/27/2017 Yes    Positive blood culture [R78.81] 07/10/2017 Yes    Laryngeal edema [J38.4] 07/10/2017 Yes    Oral phase dysphagia [R13.11] 07/10/2017 Yes    Prerenal azotemia [R79.89] 07/10/2017 Unknown    Eve coma scale total score 9-12 [R40.2420] 07/07/2017 Yes    Hypertensive encephalopathy [I67.4] 06/26/2017 Yes    Morbid obesity due to excess calories [E66.01] 06/26/2017 Yes    Acute respiratory failure [J96.00] 06/20/2017 Yes    Hypokalemia [E87.6] 06/19/2017 Yes    Hyperlipidemia [E78.5] 06/19/2017 Yes      Chronic    CAD s/p CABG [I25.10] 06/19/2017 Yes     Chronic    Pyelonephritis [N12] 06/19/2017 Yes    Hypertensive emergency [I16.1] 06/18/2017 Yes    Morbid obesity with BMI of 50.0-59.9, adult [E66.01, Z68.43] 02/24/2015 Not Applicable     Chronic    Essential hypertension [I10] 05/12/2014 Yes     Chronic    Type 2 diabetes mellitus, uncontrolled [E11.65] 05/12/2014 Yes     Chronic      Problems Resolved During this Admission:    Diagnosis Date Noted Date Resolved POA    ARF (acute renal failure) [N17.9] 06/22/2017 07/06/2017 No    Blood poisoning [A41.9] 06/19/2017 06/22/2017 Yes     Plan for PEG/trach procedure today in OR  Has been NPO p MN  Consents obtained and in chart    Milagros Carpenter PA-C   [id number]  General Surgery  [facility]

## 2017-07-12 NOTE — PROGRESS NOTES
ICU Attending Note  Neurocritical Care    Trach/PEG.    [id number]    -aspirin, atorvastatin  -amantadine  -SBT, consider TCM  - mL/h until TF  -HGB >7  -hold detemir, continue ISS  -prophylaxis

## 2017-07-12 NOTE — ASSESSMENT & PLAN NOTE
-no air leak; ET in place for 21 days; efforts late last week with solumedrol for possible laryngeal edema without improvement.   S/p trach/PEG today  Vent Mode: A/C  Oxygen Concentration (%):  [40-55] 55  Resp Rate Total:  [11 br/min-27 br/min] 15 br/min  Vt Set:  [420 mL] 420 mL  PEEP/CPAP:  [5 cmH20] 5 cmH20  Pressure Support:  [0 cmH20-5 cmH20] 0 cmH20  Mean Airway Pressure:  [6.9 sqG86-82 cmH20] 9.2 cmH20  - daily CXR/ABG while intubated

## 2017-07-13 PROBLEM — R13.10 DYSPHAGIA: Status: ACTIVE | Noted: 2017-07-10

## 2017-07-13 PROBLEM — R06.89 RESPIRATORY INSUFFICIENCY: Status: ACTIVE | Noted: 2017-07-13

## 2017-07-13 LAB
ALLENS TEST: ABNORMAL
ANION GAP SERPL CALC-SCNC: 8 MMOL/L
BASOPHILS # BLD AUTO: 0 K/UL
BASOPHILS NFR BLD: 0 %
BUN SERPL-MCNC: 26 MG/DL
CALCIUM SERPL-MCNC: 8.6 MG/DL
CHLORIDE SERPL-SCNC: 110 MMOL/L
CO2 SERPL-SCNC: 23 MMOL/L
CREAT SERPL-MCNC: 0.8 MG/DL
DELSYS: ABNORMAL
DIFFERENTIAL METHOD: ABNORMAL
EOSINOPHIL # BLD AUTO: 0 K/UL
EOSINOPHIL NFR BLD: 0.3 %
ERYTHROCYTE [DISTWIDTH] IN BLOOD BY AUTOMATED COUNT: 14.5 %
ERYTHROCYTE [SEDIMENTATION RATE] IN BLOOD BY WESTERGREN METHOD: 14 MM/H
EST. GFR  (AFRICAN AMERICAN): >60 ML/MIN/1.73 M^2
EST. GFR  (NON AFRICAN AMERICAN): >60 ML/MIN/1.73 M^2
FIO2: 40
GLUCOSE SERPL-MCNC: 115 MG/DL
HCO3 UR-SCNC: 25.2 MMOL/L (ref 24–28)
HCT VFR BLD AUTO: 28.4 %
HGB BLD-MCNC: 8.6 G/DL
LYMPHOCYTES # BLD AUTO: 1.1 K/UL
LYMPHOCYTES NFR BLD: 10.1 %
MAGNESIUM SERPL-MCNC: 1.8 MG/DL
MCH RBC QN AUTO: 26 PG
MCHC RBC AUTO-ENTMCNC: 30.3 %
MCV RBC AUTO: 86 FL
MIN VOL: 8.48
MODE: ABNORMAL
MONOCYTES # BLD AUTO: 0.7 K/UL
MONOCYTES NFR BLD: 6.4 %
NEUTROPHILS # BLD AUTO: 8.9 K/UL
NEUTROPHILS NFR BLD: 82.9 %
PCO2 BLDA: 46.1 MMHG (ref 35–45)
PEEP: 5
PH SMN: 7.35 [PH] (ref 7.35–7.45)
PHOSPHATE SERPL-MCNC: 3.1 MG/DL
PIP: 11
PLATELET # BLD AUTO: 218 K/UL
PMV BLD AUTO: 11.2 FL
PO2 BLDA: 109 MMHG (ref 80–100)
POC BE: 0 MMOL/L
POC SATURATED O2: 98 % (ref 95–100)
POC TCO2: 27 MMOL/L (ref 23–27)
POCT GLUCOSE: 101 MG/DL (ref 70–110)
POCT GLUCOSE: 119 MG/DL (ref 70–110)
POCT GLUCOSE: 126 MG/DL (ref 70–110)
POCT GLUCOSE: 133 MG/DL (ref 70–110)
POCT GLUCOSE: 94 MG/DL (ref 70–110)
POTASSIUM SERPL-SCNC: 4.8 MMOL/L
RBC # BLD AUTO: 3.31 M/UL
SAMPLE: ABNORMAL
SITE: ABNORMAL
SODIUM SERPL-SCNC: 141 MMOL/L
SP02: 100
VT: 420
WBC # BLD AUTO: 10.76 K/UL

## 2017-07-13 PROCEDURE — 25000242 PHARM REV CODE 250 ALT 637 W/ HCPCS: Performed by: INTERNAL MEDICINE

## 2017-07-13 PROCEDURE — 25000003 PHARM REV CODE 250: Performed by: PSYCHIATRY & NEUROLOGY

## 2017-07-13 PROCEDURE — 25000003 PHARM REV CODE 250: Performed by: PHYSICIAN ASSISTANT

## 2017-07-13 PROCEDURE — 99900026 HC AIRWAY MAINTENANCE (STAT)

## 2017-07-13 PROCEDURE — 84100 ASSAY OF PHOSPHORUS: CPT

## 2017-07-13 PROCEDURE — 85025 COMPLETE CBC W/AUTO DIFF WBC: CPT

## 2017-07-13 PROCEDURE — 99900035 HC TECH TIME PER 15 MIN (STAT)

## 2017-07-13 PROCEDURE — 20000000 HC ICU ROOM

## 2017-07-13 PROCEDURE — 82803 BLOOD GASES ANY COMBINATION: CPT

## 2017-07-13 PROCEDURE — 80048 BASIC METABOLIC PNL TOTAL CA: CPT

## 2017-07-13 PROCEDURE — 83735 ASSAY OF MAGNESIUM: CPT

## 2017-07-13 PROCEDURE — 25000003 PHARM REV CODE 250: Performed by: ANESTHESIOLOGY

## 2017-07-13 PROCEDURE — 25000003 PHARM REV CODE 250: Performed by: INTERNAL MEDICINE

## 2017-07-13 PROCEDURE — 94640 AIRWAY INHALATION TREATMENT: CPT

## 2017-07-13 PROCEDURE — 36600 WITHDRAWAL OF ARTERIAL BLOOD: CPT

## 2017-07-13 PROCEDURE — 94003 VENT MGMT INPAT SUBQ DAY: CPT

## 2017-07-13 PROCEDURE — 99233 SBSQ HOSP IP/OBS HIGH 50: CPT | Mod: ,,, | Performed by: PSYCHIATRY & NEUROLOGY

## 2017-07-13 PROCEDURE — 27000221 HC OXYGEN, UP TO 24 HOURS

## 2017-07-13 RX ORDER — HYDRALAZINE HYDROCHLORIDE 50 MG/1
50 TABLET, FILM COATED ORAL EVERY 12 HOURS
Status: DISCONTINUED | OUTPATIENT
Start: 2017-07-13 | End: 2017-07-13

## 2017-07-13 RX ORDER — FLUOXETINE HYDROCHLORIDE 20 MG/1
20 CAPSULE ORAL DAILY
Status: DISCONTINUED | OUTPATIENT
Start: 2017-07-13 | End: 2017-07-14 | Stop reason: HOSPADM

## 2017-07-13 RX ORDER — HYDRALAZINE HYDROCHLORIDE 25 MG/1
25 TABLET, FILM COATED ORAL EVERY 8 HOURS
Status: DISCONTINUED | OUTPATIENT
Start: 2017-07-13 | End: 2017-07-14

## 2017-07-13 RX ADMIN — CARVEDILOL 25 MG: 25 TABLET, FILM COATED ORAL at 09:07

## 2017-07-13 RX ADMIN — MAGNESIUM OXIDE TAB 400 MG (241.3 MG ELEMENTAL MG) 800 MG: 400 (241.3 MG) TAB at 09:07

## 2017-07-13 RX ADMIN — HEPARIN SODIUM 7500 UNITS: 5000 INJECTION, SOLUTION INTRAVENOUS; SUBCUTANEOUS at 05:07

## 2017-07-13 RX ADMIN — CHLORHEXIDINE GLUCONATE 15 ML: 1.2 RINSE ORAL at 09:07

## 2017-07-13 RX ADMIN — SODIUM CHLORIDE SOLN NEBU 3% 4 ML: 3 NEBU SOLN at 01:07

## 2017-07-13 RX ADMIN — AMLODIPINE BESYLATE 10 MG: 10 TABLET ORAL at 09:07

## 2017-07-13 RX ADMIN — AMANTADINE HYDROCHLORIDE 100 MG: 100 CAPSULE ORAL at 09:07

## 2017-07-13 RX ADMIN — HYDRALAZINE HYDROCHLORIDE 25 MG: 25 TABLET, FILM COATED ORAL at 02:07

## 2017-07-13 RX ADMIN — HYDRALAZINE HYDROCHLORIDE 50 MG: 50 TABLET ORAL at 05:07

## 2017-07-13 RX ADMIN — IPRATROPIUM BROMIDE AND ALBUTEROL SULFATE 3 ML: .5; 3 SOLUTION RESPIRATORY (INHALATION) at 01:07

## 2017-07-13 RX ADMIN — ATORVASTATIN CALCIUM 10 MG: 10 TABLET, FILM COATED ORAL at 09:07

## 2017-07-13 RX ADMIN — HEPARIN SODIUM 7500 UNITS: 5000 INJECTION, SOLUTION INTRAVENOUS; SUBCUTANEOUS at 02:07

## 2017-07-13 RX ADMIN — SODIUM CHLORIDE SOLN NEBU 3% 4 ML: 3 NEBU SOLN at 07:07

## 2017-07-13 RX ADMIN — ALISKIREN HEMIFUMARATE 300 MG: 300 TABLET, FILM COATED ORAL at 09:07

## 2017-07-13 RX ADMIN — AMANTADINE HYDROCHLORIDE 100 MG: 100 CAPSULE ORAL at 02:07

## 2017-07-13 RX ADMIN — MAGNESIUM OXIDE TAB 400 MG (241.3 MG ELEMENTAL MG) 800 MG: 400 (241.3 MG) TAB at 05:07

## 2017-07-13 RX ADMIN — PANTOPRAZOLE SODIUM 40 MG: 40 GRANULE, DELAYED RELEASE ORAL at 09:07

## 2017-07-13 RX ADMIN — Medication 3 ML: at 10:07

## 2017-07-13 RX ADMIN — Medication 3 ML: at 02:07

## 2017-07-13 RX ADMIN — Medication 3 ML: at 05:07

## 2017-07-13 RX ADMIN — IPRATROPIUM BROMIDE AND ALBUTEROL SULFATE 3 ML: .5; 3 SOLUTION RESPIRATORY (INHALATION) at 07:07

## 2017-07-13 RX ADMIN — HYDRALAZINE HYDROCHLORIDE 25 MG: 25 TABLET, FILM COATED ORAL at 09:07

## 2017-07-13 RX ADMIN — FLUOXETINE 20 MG: 20 CAPSULE ORAL at 03:07

## 2017-07-13 RX ADMIN — HEPARIN SODIUM 7500 UNITS: 5000 INJECTION, SOLUTION INTRAVENOUS; SUBCUTANEOUS at 09:07

## 2017-07-13 RX ADMIN — AMANTADINE HYDROCHLORIDE 100 MG: 100 CAPSULE ORAL at 05:07

## 2017-07-13 RX ADMIN — ASPIRIN 325 MG ORAL TABLET 325 MG: 325 PILL ORAL at 09:07

## 2017-07-13 RX ADMIN — PSYLLIUM HUSK 1 PACKET: 3.4 POWDER ORAL at 09:07

## 2017-07-13 NOTE — PROGRESS NOTES
ICU Attending Note  Neurocritical Care    [id number]    -aspirin, atorvastatin  -amantadine  -return to SBT, consider TCM  --180  -aliskiren, amlodipine, carvedilol, clonidine patch, decrease hydralazine 25 mg q8h  -stop IVF when on TF  -restart detemir 10 units tonight

## 2017-07-13 NOTE — PROGRESS NOTES
[facility]  General Surgery  Progress Note    Subjective:     Post-Op Info:  Procedure(s) (LRB):  PLACEMENT-TUBE-PEG (N/A)  TRACHEOSTOMY (N/A)   1 Day Post-Op     Interval History:   No acute events overnight. Open tracheostomy and PEG tube placement in OR yesterday. Remains on minimal vent settings. PEG tube to gravity overnight with minimal drainage.      Medications:  Continuous Infusions:   sodium chloride 0.9% 125 mL/hr at 07/13/17 0600     Scheduled Meds:   albuterol-ipratropium 2.5mg-0.5mg/3mL  3 mL Nebulization Q6H    aliskiren  300 mg Per NG tube Daily    amantadine HCl  100 mg Per NG tube Q8H    amlodipine  10 mg Per NG tube Daily    aspirin  325 mg Per NG tube Daily    atorvastatin  10 mg Per NG tube Daily    carvedilol  25 mg Per NG tube BID    chlorhexidine  15 mL Mouth/Throat BID    cloNIDine 0.1 mg/24 hr td ptwk  1 patch Transdermal Q7 Days    heparin (porcine)  7,500 Units Subcutaneous Q8H    hydrALAZINE  50 mg Per NG tube Q8H    insulin detemir  10 Units Subcutaneous Daily    pantoprazole  40 mg Per NG tube Daily    psyllium  1 packet Per NG tube Daily    sodium chloride 0.9%  3 mL Intravenous Q8H    sodium chloride 3%  4 mL Nebulization Q6H     PRN Meds:acetaminophen, dextrose 50%, fentaNYL, glucagon (human recombinant), hydrALAZINE, insulin aspart, labetalol, lorazepam, magnesium oxide, magnesium oxide, ondansetron, pneumoc 13-finn conj-dip cr(PF), potassium chloride 10%, potassium chloride 10%, potassium chloride 10%, potassium, sodium phosphates, potassium, sodium phosphates, potassium, sodium phosphates, promethazine (PHENERGAN) IVPB     Review of patient's allergies indicates:   Allergen Reactions    Latex, natural rubber      Objective:     Vital Signs (Most Recent):  Temp: 98.2 °F (36.8 °C) (07/13/17 0301)  Pulse: (!) 58 (07/13/17 0400)  Resp: 16 (07/13/17 0400)  BP: (!) 183/77 (07/13/17 0400)  SpO2: 100 % (07/13/17 0400) Vital Signs (24h Range):  Temp:   [97.5 °F (36.4 °C)-98.2 °F (36.8 °C)] 98.2 °F (36.8 °C)  Pulse:  [48-70] 58  Resp:  [4-37] 16  SpO2:  [91 %-100 %] 100 %  BP: (131-184)/(62-86) 183/77     Weight: (!) 160.6 kg (354 lb)  Body mass index is 58.91 kg/m².    Intake/Output - Last 3 Shifts       07/11 0700 - 07/12 0659 07/12 0700 - 07/13 0659    I.V. (mL/kg) 2793.8 (17.4) 3389.6 (21.1)    NG/     Total Intake(mL/kg) 2998.8 (18.7) 3389.6 (21.1)    Urine (mL/kg/hr) 1350 (0.4) 1550 (0.4)    Drains  175 (0)    Other  0 (0)    Total Output 1350 1725    Net +1648.8 +1664.6          Urine Occurrence 1 x     Stool Occurrence 0 x           Physical Exam  Constitutional: She appears well-developed and well-nourished.   Morbidly obese   Eyes: EOM are normal.   Neck: Normal range of motion.   8 Shiley cuffed trach in place,stay sutures x 2   Cardiovascular: Normal rate and regular rhythm.  Exam reveals no friction rub.    No murmur heard.  Pulmonary/Chest: Effort normal and breath sounds normal. No respiratory distress. She has no wheezes.   Abdominal: Soft. Bowel sounds are normal. She exhibits no distension. There is no tenderness. No hernia.   PEG tube in place to gravity      Significant Labs:  CBC:   Recent Labs  Lab 07/13/17 0128   WBC 10.76   RBC 3.31*   HGB 8.6*   HCT 28.4*      MCV 86   MCH 26.0*   MCHC 30.3*     CMP:   Recent Labs  Lab 07/13/17 0128   *   CALCIUM 8.6*      K 4.8   CO2 23      BUN 26*   CREATININE 0.8       Significant Diagnostics:  None    Assessment/Plan:     Respiratory insufficiency    - Routine trach care  - Leave silk stay sutures in place to anterior chest wall for 2 weeks.  - May be removed at bedside in 2 weeks by cutting suture beneath air knot and pulling        Dysphagia    - May use PEG tube for TFs at primary team's discretion        Dispo:        - Will sign off        - Please call with questions      Jacob Domingo MD  General Surgery  [facility]

## 2017-07-13 NOTE — PLAN OF CARE
SW spoke with Pt daughter regarding Pt being ready for LTAC as of today. She reported she has not decide which one and will decide tomorrow am. SW will call her then. She reported being surprised Pt is ready.    Susan España LMSW  Neurocritical Care   [facility]  [id number]

## 2017-07-13 NOTE — ASSESSMENT & PLAN NOTE
SBP goal 140-150 overnight; goal 100-180  Hydralazine discontinued  Clonidine 0.1mg/24hr patch  Aliskerin 300mg qd  Coreg 25mg TID  Amlodipine 10mg qd

## 2017-07-13 NOTE — PLAN OF CARE
07/13/17 1333   Discharge Reassessment   Assessment Type Discharge Planning Reassessment   Can the patient answer the patient profile reliably? No, cognitively impaired   How does the patient rate their overall health at the present time? (neo)   Describe the patient's ability to walk at the present time. Does not walk or unable to take any steps at all   How often would a person be available to care for the patient? Occasionally   Number of comorbid conditions (as recorded on the chart) Five or more   During the past month, has the patient often been bothered by feeling down, depressed or hopeless? (neo)   During the past month, has the patient often been bothered by little interest or pleasure in doing things? (neo)   Discharge plan remains the same: No   Provided patient/caregiver education on the expected discharge date and the discharge plan No   Discharge Plan A Long-term acute care facility (LTAC)   Discharge Plan B Skilled Nursing Facility   Change in patient condition or support system No   Patient choice form signed by patient/caregiver No   Explained to the the patient/caregiver why the discharge planned changed: No   Involved the patient/caregiver in establishing a new discharge plan: No       Discharge plan LTAC placement    Pat Perez CM  [id number]

## 2017-07-13 NOTE — PLAN OF CARE
Problem: Airway, Artificial (Adult)  Goal: Signs and Symptoms of Listed Potential Problems Will be Absent, Minimized or Managed (Airway, Artificial)  Signs and symptoms of listed potential problems will be absent, minimized or managed by discharge/transition of care (reference Airway, Artificial (Adult) CPG).   Outcome: Ongoing (interventions implemented as appropriate)  POC reviewed with pt and family at 1400. Patient's family verbalized understanding. Questions and concerns addressed. No acute events today. Pt progressing toward goals. Will continue to monitor. See flowsheets for full assessment and VS info.

## 2017-07-13 NOTE — ASSESSMENT & PLAN NOTE
-from hypoperfusion amid correction of hypertensive emergency  -aspirin, atorvastatin    Amantadine 100 q8h to help improve wakefullness  Started on fluoxetine 20mg qd today given decreased engagement

## 2017-07-13 NOTE — PROGRESS NOTES
Pt was placed on initial trach collar trial at 1540, placed on 10L/40%. Pt tolerated trach collar trial well with O2 sats maintained at %. Placed back on  at 1749 after two hours of trial. No adverse reactions noted, will attempt further trials in the future and continue to monitor.

## 2017-07-13 NOTE — PROGRESS NOTES
[facility]  Neurocritical Care  Progress Note    Admit Date: 6/18/2017  Service Date: 07/13/2017  Length of Stay: 25    Subjective:     Chief Complaint: Cerebral infarction, watershed distribution, bilateral, acute    History of Present Illness: 66 y woman who presents with hypertensive emergency with severe HA-> antihypertensives-> jerking of concern for seizure-> intubation-> NSCCU.    Hospital Course: -6/27 admission to [facility]-> EEG with diffuse slowing no seizures, MR with bilateral MCA SAQIB watershed infarcts  -no subsequent documented seizures, no significant exam improvement  -7/8 Discussed the earlier plan of extubation trial today with the family and interval development. Still with no air leak. I have suggested a trach and peg due to risk for extubation failure, prolonged intubation with laryngeal edema, and secretion. Will consult gen surgery  7/12: s/p trach/peg    Interval History:    No acute events overnight. S/p trach/peg yesterday    Review of Systems   Unable to perform ROS: Intubated   and due to level of consciousness.  Objective:     Vitals:  Temp: 98.6 °F (37 °C) (07/13/17 1500)  Pulse: 63 (07/13/17 1541)  Resp: 15 (07/13/17 1541)  BP: 132/60 (07/13/17 1500)  SpO2: 99 % (07/13/17 1541)    Temp:  [97.7 °F (36.5 °C)-99.5 °F (37.5 °C)] 98.6 °F (37 °C)  Pulse:  [48-73] 63  Resp:  [4-24] 15  SpO2:  [99 %-100 %] 99 %  BP: (131-184)/(60-81) 132/60         Vent Mode: Spont  Oxygen Concentration (%):  [40-55] 40  Resp Rate Total:  [13 br/min-22 br/min] 18 br/min  Vt Set:  [420 mL] 420 mL  PEEP/CPAP:  [5 cmH20] 5 cmH20  Pressure Support:  [0 cmH20-5 cmH20] 5 cmH20  Mean Airway Pressure:  [6.8 peS33-31 cmH20] 8.9 cmH20    07/12 0701 - 07/13 0700  In: 3400 [I.V.:3400]  Out: 1725 [Urine:1550; Drains:175]    Physical Exam   Constitutional: She appears well-developed and well-nourished.   HENT:   Head: Normocephalic and atraumatic.   Eyes: Pupils are equal, round, and reactive to light.    Cardiovascular: Normal rate.    Pulmonary/Chest:   intubated   Neurological: GCS eye subscore is 4. GCS verbal subscore is 1. GCS motor subscore is 5.      Awake; following some simple commands; reported to be intermittent  PERRL, EOMI  +cough, gag and oculocephalic intact  Able to  fingers on the left; otherwise no spontaneous movement of any extremity seen  Withdraws to pain in all 4 extremities    Unable to test orientation, language, memory, judgment, insight, fund of knowledge, hearing, shoulder shrug, tongue protrusion, coordination, gait due to level of consciousness.    Medications:  Continuous    sodium chloride 0.9% Last Rate: 125 mL/hr at 07/13/17 1500   Scheduled    albuterol-ipratropium 2.5mg-0.5mg/3mL 3 mL Q6H   aliskiren 300 mg Daily   amantadine HCl 100 mg Q8H   amlodipine 10 mg Daily   aspirin 325 mg Daily   atorvastatin 10 mg Daily   carvedilol 25 mg BID   chlorhexidine 15 mL BID   cloNIDine 0.1 mg/24 hr td ptwk 1 patch Q7 Days   fluoxetine 20 mg Daily   heparin (porcine) 7,500 Units Q8H   hydrALAZINE 25 mg Q8H   insulin detemir 10 Units Daily   pantoprazole 40 mg Daily   psyllium 1 packet Daily   sodium chloride 0.9% 3 mL Q8H   sodium chloride 3% 4 mL Q6H   PRN    acetaminophen 650 mg Q6H PRN   dextrose 50% 12.5 g PRN   fentaNYL 100 mcg Q6H PRN   glucagon (human recombinant) 1 mg PRN   hydrALAZINE 10 mg Q4H PRN   insulin aspart 1-10 Units Q4H PRN   labetalol 10 mg Q4H PRN   lorazepam 2 mg Q4H PRN   magnesium oxide 800 mg PRN   magnesium oxide 800 mg PRN   ondansetron 8 mg Q8H PRN   pneumoc 13-finn conj-dip cr(PF) 0.5 mL vaccine x 1 dose   potassium chloride 10% 40 mEq PRN   potassium chloride 10% 40 mEq PRN   potassium chloride 10% 60 mEq PRN   potassium, sodium phosphates 2 packet PRN   potassium, sodium phosphates 2 packet PRN   potassium, sodium phosphates 2 packet PRN   promethazine (PHENERGAN) IVPB 12.5 mg Q6H PRN     Today I personally reviewed pertinent medications,  lines/drains/airways, imaging, cardiology, lab results, microbiology results,    Assessment/Plan:     Neuro   * Cerebral infarction, watershed distribution, bilateral, acute    -from hypoperfusion amid correction of hypertensive emergency  -aspirin, atorvastatin    Amantadine 100 q8h to help improve wakefullness  Started on fluoxetine 20mg qd today given decreased engagement         Hypertensive encephalopathy    -see HTN        Pulmonary   Acute respiratory failure    -no air leak; ET in place for 21 days; efforts late last week with solumedrol for possible laryngeal edema without improvement.   S/p trach/PEG on 7/12  -- weaned to trach collar today  - daily CXR/ABG while intubated          Cardiac   Essential hypertension    SBP goal 130 -180  hydralazine 50mg q8h -- decreased to 25mg q8h  Clonidine 0.1mg/24hr patch  Aliskerin 300mg qd  Coreg 25mg TID  Amlodipine 10mg qd        Endocrine   Type 2 diabetes mellitus, uncontrolled    -ISS            Prophylaxis:  Venous Thromboembolism: mechanical chemical  Stress Ulcer: H2B  Ventilator Pneumonia: yes    Dispo: LTAC/SNF when placement available     Activity Orders          Up with assistance starting at 06/26 6814        Full Code    Miko Storey MD  Neurocritical Care  [facility]

## 2017-07-13 NOTE — ASSESSMENT & PLAN NOTE
-no air leak; ET in place for 21 days; efforts late last week with solumedrol for possible laryngeal edema without improvement.   S/p trach/PEG on 7/12  Vent Mode: Spont  Oxygen Concentration (%):  [40-55] 40  Resp Rate Total:  [13 br/min-22 br/min] 18 br/min  Vt Set:  [420 mL] 420 mL  PEEP/CPAP:  [5 cmH20] 5 cmH20  Pressure Support:  [0 cmH20-5 cmH20] 5 cmH20  Mean Airway Pressure:  [6.8 klF17-41 cmH20] 8.9 cmH20  - daily CXR/ABG while intubated

## 2017-07-13 NOTE — ASSESSMENT & PLAN NOTE
-no air leak; ET in place for 21 days; efforts late last week with solumedrol for possible laryngeal edema without improvement.   S/p trach/PEG on 7/12  --SBT; wean as tolerated to trach collar  - daily CXR/ABG while intubated

## 2017-07-13 NOTE — ASSESSMENT & PLAN NOTE
- Routine trach care  - Leave silk stay sutures in place to anterior chest wall for 2 weeks.  - May be removed at bedside in 2 weeks by cutting suture beneath air knot and pulling

## 2017-07-13 NOTE — SUBJECTIVE & OBJECTIVE
Interval History:   No acute events overnight. Open tracheostomy and PEG tube placement in OR yesterday. Remains on minimal vent settings. PEG tube to gravity overnight with minimal drainage.      Medications:  Continuous Infusions:   sodium chloride 0.9% 125 mL/hr at 07/13/17 0600     Scheduled Meds:   albuterol-ipratropium 2.5mg-0.5mg/3mL  3 mL Nebulization Q6H    aliskiren  300 mg Per NG tube Daily    amantadine HCl  100 mg Per NG tube Q8H    amlodipine  10 mg Per NG tube Daily    aspirin  325 mg Per NG tube Daily    atorvastatin  10 mg Per NG tube Daily    carvedilol  25 mg Per NG tube BID    chlorhexidine  15 mL Mouth/Throat BID    cloNIDine 0.1 mg/24 hr td ptwk  1 patch Transdermal Q7 Days    heparin (porcine)  7,500 Units Subcutaneous Q8H    hydrALAZINE  50 mg Per NG tube Q8H    insulin detemir  10 Units Subcutaneous Daily    pantoprazole  40 mg Per NG tube Daily    psyllium  1 packet Per NG tube Daily    sodium chloride 0.9%  3 mL Intravenous Q8H    sodium chloride 3%  4 mL Nebulization Q6H     PRN Meds:acetaminophen, dextrose 50%, fentaNYL, glucagon (human recombinant), hydrALAZINE, insulin aspart, labetalol, lorazepam, magnesium oxide, magnesium oxide, ondansetron, pneumoc 13-finn conj-dip cr(PF), potassium chloride 10%, potassium chloride 10%, potassium chloride 10%, potassium, sodium phosphates, potassium, sodium phosphates, potassium, sodium phosphates, promethazine (PHENERGAN) IVPB     Review of patient's allergies indicates:   Allergen Reactions    Latex, natural rubber      Objective:     Vital Signs (Most Recent):  Temp: 98.2 °F (36.8 °C) (07/13/17 0301)  Pulse: (!) 58 (07/13/17 0400)  Resp: 16 (07/13/17 0400)  BP: (!) 183/77 (07/13/17 0400)  SpO2: 100 % (07/13/17 0400) Vital Signs (24h Range):  Temp:  [97.5 °F (36.4 °C)-98.2 °F (36.8 °C)] 98.2 °F (36.8 °C)  Pulse:  [48-70] 58  Resp:  [4-37] 16  SpO2:  [91 %-100 %] 100 %  BP: (131-184)/(62-86) 183/77     Weight: (!) 160.6 kg (354  lb)  Body mass index is 58.91 kg/m².    Intake/Output - Last 3 Shifts       07/11 0700 - 07/12 0659 07/12 0700 - 07/13 0659    I.V. (mL/kg) 2793.8 (17.4) 3389.6 (21.1)    NG/     Total Intake(mL/kg) 2998.8 (18.7) 3389.6 (21.1)    Urine (mL/kg/hr) 1350 (0.4) 1550 (0.4)    Drains  175 (0)    Other  0 (0)    Total Output 1350 1725    Net +1648.8 +1664.6          Urine Occurrence 1 x     Stool Occurrence 0 x           Physical Exam  Constitutional: She appears well-developed and well-nourished.   Morbidly obese   Eyes: EOM are normal.   Neck: Normal range of motion.   8 Shiley cuffed trach in place,stay sutures x 2   Cardiovascular: Normal rate and regular rhythm.  Exam reveals no friction rub.    No murmur heard.  Pulmonary/Chest: Effort normal and breath sounds normal. No respiratory distress. She has no wheezes.   Abdominal: Soft. Bowel sounds are normal. She exhibits no distension. There is no tenderness. No hernia.   PEG tube in place to gravity      Significant Labs:  CBC:   Recent Labs  Lab 07/13/17  0128   WBC 10.76   RBC 3.31*   HGB 8.6*   HCT 28.4*      MCV 86   MCH 26.0*   MCHC 30.3*     CMP:   Recent Labs  Lab 07/13/17 0128   *   CALCIUM 8.6*      K 4.8   CO2 23      BUN 26*   CREATININE 0.8       Significant Diagnostics:  None

## 2017-07-13 NOTE — ASSESSMENT & PLAN NOTE
-from hypoperfusion amid correction of hypertensive emergency  -aspirin, atorvastatin    Amantadine 100 q8h to help improve wakefullness  Fluoxetine 20mg qd for decreased engagement

## 2017-07-13 NOTE — ASSESSMENT & PLAN NOTE
SBP goal 130 -180  hydralazine 50mg q8h -- decreased to 25mg q8h  Clonidine 0.1mg/24hr patch  Aliskerin 300mg qd  Coreg 25mg TID  Amlodipine 10mg qd

## 2017-07-14 VITALS
WEIGHT: 293 LBS | SYSTOLIC BLOOD PRESSURE: 138 MMHG | DIASTOLIC BLOOD PRESSURE: 63 MMHG | HEART RATE: 64 BPM | TEMPERATURE: 99 F | HEIGHT: 65 IN | OXYGEN SATURATION: 99 % | BODY MASS INDEX: 48.82 KG/M2

## 2017-07-14 PROBLEM — R79.89 PRERENAL AZOTEMIA: Status: ACTIVE | Noted: 2017-07-14

## 2017-07-14 PROBLEM — R06.89 RESPIRATORY INSUFFICIENCY: Status: ACTIVE | Noted: 2017-07-14

## 2017-07-14 LAB
ALLENS TEST: ABNORMAL
ANION GAP SERPL CALC-SCNC: 7 MMOL/L
BASOPHILS # BLD AUTO: 0 K/UL
BASOPHILS NFR BLD: 0 %
BUN SERPL-MCNC: 21 MG/DL
CALCIUM SERPL-MCNC: 8.3 MG/DL
CHLORIDE SERPL-SCNC: 111 MMOL/L
CO2 SERPL-SCNC: 23 MMOL/L
CREAT SERPL-MCNC: 0.7 MG/DL
DELSYS: ABNORMAL
DIFFERENTIAL METHOD: ABNORMAL
EOSINOPHIL # BLD AUTO: 0 K/UL
EOSINOPHIL NFR BLD: 0.4 %
ERYTHROCYTE [DISTWIDTH] IN BLOOD BY AUTOMATED COUNT: 14.9 %
EST. GFR  (AFRICAN AMERICAN): >60 ML/MIN/1.73 M^2
EST. GFR  (NON AFRICAN AMERICAN): >60 ML/MIN/1.73 M^2
FIO2: 40
GLUCOSE SERPL-MCNC: 153 MG/DL
HCO3 UR-SCNC: 25.4 MMOL/L (ref 24–28)
HCT VFR BLD AUTO: 26 %
HGB BLD-MCNC: 7.8 G/DL
LYMPHOCYTES # BLD AUTO: 1.1 K/UL
LYMPHOCYTES NFR BLD: 10.6 %
MAGNESIUM SERPL-MCNC: 1.7 MG/DL
MCH RBC QN AUTO: 25.8 PG
MCHC RBC AUTO-ENTMCNC: 30 %
MCV RBC AUTO: 86 FL
MIN VOL: 7.6
MODE: ABNORMAL
MONOCYTES # BLD AUTO: 0.9 K/UL
MONOCYTES NFR BLD: 8.8 %
NEUTROPHILS # BLD AUTO: 8 K/UL
NEUTROPHILS NFR BLD: 79.8 %
PCO2 BLDA: 44.8 MMHG (ref 35–45)
PEEP: 5
PH SMN: 7.36 [PH] (ref 7.35–7.45)
PHOSPHATE SERPL-MCNC: 3 MG/DL
PLATELET # BLD AUTO: 190 K/UL
PMV BLD AUTO: 11.2 FL
PO2 BLDA: 111 MMHG (ref 80–100)
POC BE: 0 MMOL/L
POC SATURATED O2: 98 % (ref 95–100)
POC TCO2: 27 MMOL/L (ref 23–27)
POCT GLUCOSE: 132 MG/DL (ref 70–110)
POCT GLUCOSE: 167 MG/DL (ref 70–110)
POCT GLUCOSE: 168 MG/DL (ref 70–110)
POCT GLUCOSE: 169 MG/DL (ref 70–110)
POCT GLUCOSE: 178 MG/DL (ref 70–110)
POCT GLUCOSE: 85 MG/DL (ref 70–110)
POTASSIUM SERPL-SCNC: 4.4 MMOL/L
PS: 5
RBC # BLD AUTO: 3.02 M/UL
SAMPLE: ABNORMAL
SITE: ABNORMAL
SODIUM SERPL-SCNC: 141 MMOL/L
SP02: 99
SPONT RATE: 16
WBC # BLD AUTO: 10.03 K/UL

## 2017-07-14 PROCEDURE — 25000003 PHARM REV CODE 250: Performed by: PSYCHIATRY & NEUROLOGY

## 2017-07-14 PROCEDURE — 25000003 PHARM REV CODE 250: Performed by: ANESTHESIOLOGY

## 2017-07-14 PROCEDURE — 25000003 PHARM REV CODE 250: Performed by: PHYSICIAN ASSISTANT

## 2017-07-14 PROCEDURE — 84100 ASSAY OF PHOSPHORUS: CPT

## 2017-07-14 PROCEDURE — 83735 ASSAY OF MAGNESIUM: CPT

## 2017-07-14 PROCEDURE — 85025 COMPLETE CBC W/AUTO DIFF WBC: CPT

## 2017-07-14 PROCEDURE — 36600 WITHDRAWAL OF ARTERIAL BLOOD: CPT

## 2017-07-14 PROCEDURE — 27000221 HC OXYGEN, UP TO 24 HOURS

## 2017-07-14 PROCEDURE — 99900026 HC AIRWAY MAINTENANCE (STAT)

## 2017-07-14 PROCEDURE — 25000003 PHARM REV CODE 250: Performed by: INTERNAL MEDICINE

## 2017-07-14 PROCEDURE — 80048 BASIC METABOLIC PNL TOTAL CA: CPT

## 2017-07-14 PROCEDURE — 99233 SBSQ HOSP IP/OBS HIGH 50: CPT | Mod: ,,, | Performed by: PSYCHIATRY & NEUROLOGY

## 2017-07-14 PROCEDURE — 94640 AIRWAY INHALATION TREATMENT: CPT

## 2017-07-14 PROCEDURE — 82803 BLOOD GASES ANY COMBINATION: CPT

## 2017-07-14 PROCEDURE — 94003 VENT MGMT INPAT SUBQ DAY: CPT

## 2017-07-14 PROCEDURE — 25000242 PHARM REV CODE 250 ALT 637 W/ HCPCS: Performed by: INTERNAL MEDICINE

## 2017-07-14 RX ORDER — ACETAMINOPHEN 650 MG/20.3ML
650 LIQUID ORAL EVERY 6 HOURS PRN
COMMUNITY
Start: 2017-07-14

## 2017-07-14 RX ORDER — CARVEDILOL 25 MG/1
25 TABLET ORAL 2 TIMES DAILY
Status: ON HOLD
Start: 2017-07-14 | End: 2017-08-14 | Stop reason: HOSPADM

## 2017-07-14 RX ORDER — INSULIN ASPART 100 [IU]/ML
1-10 INJECTION, SOLUTION INTRAVENOUS; SUBCUTANEOUS EVERY 4 HOURS PRN
Refills: 0
Start: 2017-07-14 | End: 2018-07-14

## 2017-07-14 RX ORDER — CLONIDINE 0.1 MG/24H
1 PATCH, EXTENDED RELEASE TRANSDERMAL
Start: 2017-07-14 | End: 2018-07-14

## 2017-07-14 RX ORDER — FLUOXETINE HYDROCHLORIDE 20 MG/1
20 CAPSULE ORAL DAILY
Start: 2017-07-14 | End: 2018-07-14

## 2017-07-14 RX ORDER — ALISKIREN 300 MG/1
300 TABLET, FILM COATED ORAL DAILY
Status: ON HOLD
Start: 2017-07-14 | End: 2017-08-14 | Stop reason: HOSPADM

## 2017-07-14 RX ORDER — HYDRALAZINE HYDROCHLORIDE 25 MG/1
25 TABLET, FILM COATED ORAL EVERY 8 HOURS
Qty: 60 TABLET | Refills: 1 | OUTPATIENT
Start: 2017-07-14 | End: 2017-07-14 | Stop reason: HOSPADM

## 2017-07-14 RX ORDER — ATORVASTATIN CALCIUM 10 MG/1
10 TABLET, FILM COATED ORAL DAILY
Start: 2017-07-14 | End: 2018-07-14

## 2017-07-14 RX ORDER — ASPIRIN 325 MG
325 TABLET ORAL DAILY
Refills: 0 | Status: ON HOLD | COMMUNITY
Start: 2017-07-14 | End: 2017-08-14 | Stop reason: HOSPADM

## 2017-07-14 RX ORDER — AMLODIPINE BESYLATE 10 MG/1
10 TABLET ORAL DAILY
Start: 2017-07-14 | End: 2018-07-14

## 2017-07-14 RX ORDER — HEPARIN SODIUM 5000 [USP'U]/ML
7500 INJECTION, SOLUTION INTRAVENOUS; SUBCUTANEOUS EVERY 8 HOURS
Status: ON HOLD
Start: 2017-07-14 | End: 2017-09-27 | Stop reason: ALTCHOICE

## 2017-07-14 RX ORDER — CHLORHEXIDINE GLUCONATE ORAL RINSE 1.2 MG/ML
15 SOLUTION DENTAL 2 TIMES DAILY
Refills: 0
Start: 2017-07-14 | End: 2017-07-28

## 2017-07-14 RX ORDER — IPRATROPIUM BROMIDE AND ALBUTEROL SULFATE 2.5; .5 MG/3ML; MG/3ML
3 SOLUTION RESPIRATORY (INHALATION) EVERY 6 HOURS
Refills: 0 | Status: ON HOLD
Start: 2017-07-14 | End: 2017-08-14

## 2017-07-14 RX ORDER — PANTOPRAZOLE SODIUM 40 MG/1
40 FOR SUSPENSION ORAL DAILY
Start: 2017-07-14 | End: 2018-07-14

## 2017-07-14 RX ORDER — AMANTADINE HYDROCHLORIDE 100 MG/1
100 CAPSULE, GELATIN COATED ORAL EVERY 8 HOURS
Status: ON HOLD
Start: 2017-07-14 | End: 2017-09-27 | Stop reason: HOSPADM

## 2017-07-14 RX ADMIN — PANTOPRAZOLE SODIUM 40 MG: 40 GRANULE, DELAYED RELEASE ORAL at 08:07

## 2017-07-14 RX ADMIN — MAGNESIUM OXIDE TAB 400 MG (241.3 MG ELEMENTAL MG) 800 MG: 400 (241.3 MG) TAB at 05:07

## 2017-07-14 RX ADMIN — HYDRALAZINE HYDROCHLORIDE 25 MG: 25 TABLET, FILM COATED ORAL at 05:07

## 2017-07-14 RX ADMIN — IPRATROPIUM BROMIDE AND ALBUTEROL SULFATE 3 ML: .5; 3 SOLUTION RESPIRATORY (INHALATION) at 08:07

## 2017-07-14 RX ADMIN — Medication 3 ML: at 05:07

## 2017-07-14 RX ADMIN — IPRATROPIUM BROMIDE AND ALBUTEROL SULFATE 3 ML: .5; 3 SOLUTION RESPIRATORY (INHALATION) at 12:07

## 2017-07-14 RX ADMIN — ALISKIREN HEMIFUMARATE 300 MG: 300 TABLET, FILM COATED ORAL at 08:07

## 2017-07-14 RX ADMIN — SODIUM CHLORIDE SOLN NEBU 3% 4 ML: 3 NEBU SOLN at 08:07

## 2017-07-14 RX ADMIN — AMANTADINE HYDROCHLORIDE 100 MG: 100 CAPSULE ORAL at 02:07

## 2017-07-14 RX ADMIN — ATORVASTATIN CALCIUM 10 MG: 10 TABLET, FILM COATED ORAL at 08:07

## 2017-07-14 RX ADMIN — INSULIN ASPART 4 UNITS: 100 INJECTION, SOLUTION INTRAVENOUS; SUBCUTANEOUS at 08:07

## 2017-07-14 RX ADMIN — AMLODIPINE BESYLATE 10 MG: 10 TABLET ORAL at 08:07

## 2017-07-14 RX ADMIN — HEPARIN SODIUM 7500 UNITS: 5000 INJECTION, SOLUTION INTRAVENOUS; SUBCUTANEOUS at 05:07

## 2017-07-14 RX ADMIN — SODIUM CHLORIDE SOLN NEBU 3% 4 ML: 3 NEBU SOLN at 12:07

## 2017-07-14 RX ADMIN — ASPIRIN 325 MG ORAL TABLET 325 MG: 325 PILL ORAL at 08:07

## 2017-07-14 RX ADMIN — HEPARIN SODIUM 7500 UNITS: 5000 INJECTION, SOLUTION INTRAVENOUS; SUBCUTANEOUS at 02:07

## 2017-07-14 RX ADMIN — FLUOXETINE 20 MG: 20 CAPSULE ORAL at 08:07

## 2017-07-14 RX ADMIN — CLONIDINE 1 PATCH: 0.1 PATCH TRANSDERMAL at 08:07

## 2017-07-14 RX ADMIN — AMANTADINE HYDROCHLORIDE 100 MG: 100 CAPSULE ORAL at 06:07

## 2017-07-14 RX ADMIN — SODIUM CHLORIDE: 0.9 INJECTION, SOLUTION INTRAVENOUS at 12:07

## 2017-07-14 RX ADMIN — INSULIN ASPART 3 UNITS: 100 INJECTION, SOLUTION INTRAVENOUS; SUBCUTANEOUS at 12:07

## 2017-07-14 RX ADMIN — CARVEDILOL 25 MG: 25 TABLET, FILM COATED ORAL at 08:07

## 2017-07-14 RX ADMIN — INSULIN ASPART 4 UNITS: 100 INJECTION, SOLUTION INTRAVENOUS; SUBCUTANEOUS at 01:07

## 2017-07-14 RX ADMIN — CHLORHEXIDINE GLUCONATE 15 ML: 1.2 RINSE ORAL at 08:07

## 2017-07-14 NOTE — PLAN OF CARE
Problem: Patient Care Overview  Goal: Plan of Care Review  Outcome: Ongoing (interventions implemented as appropriate)  No acute events throughout the day, VS and assessment per flow sheet, patient progressing towards goal as tolerated. Plan of care reviewed with Nisa Frank and family, all concerns addressed. Will continue to monitor.     Pt to transfer to [facility] at 1600 today, report called to Maria A. Awaiting transport.

## 2017-07-14 NOTE — PROGRESS NOTES
Report called to Maria A at [facility]. Plan for transport for 1600, RT notified of plan for transfer.

## 2017-07-14 NOTE — PLAN OF CARE
[facility]    FACILITY TRANSFER ORDERS      Patient Name: Nisa Frank  YOB: 1950    PCP: Atilio Downs MD   PCP Address: [facility] [address]  PCP Phone Number: [phone]  PCP Fax: [phone]    Encounter Date: 07/14/2017    Admit to: [facility]    Vital Signs:  Routine    Diagnoses:   Active Hospital Problems    Diagnosis  POA    *Cerebral infarction, watershed distribution, bilateral, acute [I63.8]  Yes    Prerenal azotemia [R79.89]  Yes    Respiratory insufficiency [R06.89]  Yes    Positive blood culture [R78.81]  Yes    Laryngeal edema [J38.4]  Yes    Dysphagia [R13.10]  Yes    Francitas coma scale total score 9-12 [R40.2420]  Yes    Hypertensive encephalopathy [I67.4]  Yes    Morbid obesity due to excess calories [E66.01]  Yes    Acute respiratory failure [J96.00]  Yes    Hypokalemia [E87.6]  Yes    Hyperlipidemia [E78.5]  Yes     Chronic    CAD s/p CABG [I25.10]  Yes     Chronic    Pyelonephritis [N12]  Yes    Hypertensive emergency [I16.1]  Yes    Morbid obesity with BMI of 50.0-59.9, adult [E66.01, Z68.43]  Not Applicable     Chronic    Essential hypertension [I10]  Yes     Chronic    Type 2 diabetes mellitus, uncontrolled [E11.65]  Yes     Chronic      Resolved Hospital Problems    Diagnosis Date Resolved POA    ARF (acute renal failure) [N17.9] 07/06/2017 No    Blood poisoning [A41.9] 06/22/2017 Yes       Allergies:  Review of patient's allergies indicates:   Allergen Reactions    Latex, natural rubber        Diet: tube feedings: Continuous diabetasource full strength at 50 mL per hour. Please begin feeds at 10cc/hr and increase by 10cc an hour every 2 hours until goal is achieved. Hold for residuals >400cc.  Enteral free water boluses: 200cc q6h    Activities: Activity as tolerated    Nursing: Turn patient q2h     Labs: per facility protocol      CONSULTS:    Physical Therapy to evaluate and treat. , Occupational Therapy  to evaluate and treat. and Speech Therapy to evaluate and treat for Language, Swallowing and Cognition.    MISCELLANEOUS CARE:  PEG Care: Clean site every 24 hours.     Routine Skin for Bedridden Patients: Apply moisture barrier cream to all skin folds and wet areas in perineal area daily and after baths and all bowel movements.    Diabetes Care:  fingersticks q4h and Report CBG < 60 or > 350 to physician.     Routine trach care    Ventilator settings:  Vent Mode: Spont  Oxygen Concentration (%):  [40] 40  Resp Rate Total:  [13 br/min-31 br/min] 21 br/min  Vt Set:  [420 mL] 420 mL  PEEP/CPAP:  [5 cmH20] 5 cmH20  Pressure Support:  [0 cmH20-5 cmH20] 5 cmH20  Mean Airway Pressure:  [6.9 cmH20-8.9 cmH20] 7.2 cmH20    Wean off ventilator to trach collar as tolerated.    Medications: Review discharge medications with patient and family and provide education.      Current Discharge Medication List      START taking these medications    Details   acetaminophen (TYLENOL) 650 mg/20.3 mL Soln 20.3 mLs (650 mg total) by Per NG tube route every 6 (six) hours as needed.      albuterol-ipratropium 2.5mg-0.5mg/3mL (DUO-NEB) 0.5 mg-3 mg(2.5 mg base)/3 mL nebulizer solution Take 3 mLs by nebulization every 6 (six) hours. Rescue  Refills: 0      amantadine HCl (SYMMETREL) 100 mg capsule 1 capsule (100 mg total) by Per G Tube route every 8 (eight) hours.      aspirin 325 MG tablet 1 tablet (325 mg total) by Per G Tube route once daily.  Refills: 0      carvedilol (COREG) 25 MG tablet 1 tablet (25 mg total) by Per G Tube route 2 (two) times daily.      chlorhexidine (PERIDEX) 0.12 % solution Use as directed 15 mLs in the mouth or throat 2 (two) times daily.  Refills: 0      cloNIDine 0.1 mg/24 hr td ptwk (CATAPRES) 0.1 mg/24 hr Place 1 patch onto the skin every 7 days.      fluoxetine (PROZAC) 20 MG capsule 1 capsule (20 mg total) by Per G Tube route once daily.      HEPARIN SODIUM,PORCINE (HEPARIN, PORCINE,) 5,000 unit/mL injection  Inject 1.5 mLs (7,500 Units total) into the skin every 8 (eight) hours.      insulin aspart (NOVOLOG) 100 unit/mL InPn pen Inject 1-10 Units into the skin every 4 (four) hours as needed (Hyperglycemia).  Refills: 0      insulin detemir (LEVEMIR FLEXTOUCH) 100 unit/mL (3 mL) SubQ InPn pen Inject 10 Units into the skin once daily.  Refills: 0      pantoprazole (PROTONIX) 40 mg GrPS 1 packet (40 mg total) by Per G Tube route once daily.         CONTINUE these medications which have CHANGED    Details   aliskiren (TEKTURNA) 300 MG Tab 1 tablet (300 mg total) by Per G Tube route once daily.      amlodipine (NORVASC) 10 MG tablet 1 tablet (10 mg total) by Per G Tube route once daily.      !! atorvastatin (LIPITOR) 10 MG tablet 1 tablet (10 mg total) by Per G Tube route once daily.              !! - Potential duplicate medications found. Please discuss with provider.      CONTINUE these medications which have NOT CHANGED    Details   !! atorvastatin (LIPITOR) 10 MG tablet Take 10 mg per G tube once daily.      diclofenac sodium (VOLTAREN) 1 % Gel Apply 2 g topically 2 (two) times daily.  Qty: 100 g, Refills: 1                          _________________________________  Miko Storey MD  07/14/2017

## 2017-07-14 NOTE — SUBJECTIVE & OBJECTIVE
Interval History:    No acute events overnight.     Review of Systems   Unable to perform ROS: Intubated   and due to level of consciousness.  Objective:     Vitals:  Temp: 99.2 °F (37.3 °C) (07/14/17 1500)  Pulse: 65 (07/14/17 1500)  Resp: (!) 0 (07/14/17 1500)  BP: 138/63 (07/14/17 1500)  SpO2: 100 % (07/14/17 1500)    Temp:  [98.7 °F (37.1 °C)-99.7 °F (37.6 °C)] 99.2 °F (37.3 °C)  Pulse:  [59-69] 65  Resp:  [0-20] 0  SpO2:  [96 %-100 %] 100 %  BP: (125-168)/(55-70) 138/63         Vent Mode: Spont  Oxygen Concentration (%):  [40] 40  Resp Rate Total:  [14 br/min-31 br/min] 21 br/min  Vt Set:  [420 mL] 420 mL  PEEP/CPAP:  [5 cmH20] 5 cmH20  Pressure Support:  [5 cmH20] 5 cmH20  Mean Airway Pressure:  [6.9 cmH20-8.9 cmH20] 7.1 cmH20    07/13 0701 - 07/14 0700  In: 3660 [I.V.:2750]  Out: 450 [Urine:450]    Physical Exam   Constitutional: She appears well-developed and well-nourished.   HENT:   Head: Normocephalic and atraumatic.   Eyes: Pupils are equal, round, and reactive to light.   Cardiovascular: Normal rate.    Pulmonary/Chest:   intubated   Neurological: GCS eye subscore is 4. GCS verbal subscore is 1. GCS motor subscore is 5.      Awake; following some simple commands; reported to be intermittent  PERRL, EOMI  +cough, gag and oculocephalic intact  Able to  fingers on the left; otherwise no spontaneous movement of any extremity seen  Withdraws to pain in all 4 extremities    Unable to test orientation, language, memory, judgment, insight, fund of knowledge, hearing, shoulder shrug, tongue protrusion, coordination, gait due to level of consciousness.    Medications:  Continuous   Scheduled    albuterol-ipratropium 2.5mg-0.5mg/3mL 3 mL Q6H   aliskiren 300 mg Daily   amantadine HCl 100 mg Q8H   amlodipine 10 mg Daily   aspirin 325 mg Daily   atorvastatin 10 mg Daily   carvedilol 25 mg BID   chlorhexidine 15 mL BID   cloNIDine 0.1 mg/24 hr td ptwk 1 patch Q7 Days   fluoxetine 20 mg Daily   heparin (porcine)  7,500 Units Q8H   insulin detemir 10 Units Daily   pantoprazole 40 mg Daily   psyllium 1 packet Daily   sodium chloride 0.9% 3 mL Q8H   sodium chloride 3% 4 mL Q6H   PRN    acetaminophen 650 mg Q6H PRN   dextrose 50% 12.5 g PRN   fentaNYL 100 mcg Q6H PRN   glucagon (human recombinant) 1 mg PRN   hydrALAZINE 10 mg Q4H PRN   insulin aspart 1-10 Units Q4H PRN   labetalol 10 mg Q4H PRN   lorazepam 2 mg Q4H PRN   magnesium oxide 800 mg PRN   magnesium oxide 800 mg PRN   ondansetron 8 mg Q8H PRN   pneumoc 13-finn conj-dip cr(PF) 0.5 mL vaccine x 1 dose   potassium chloride 10% 40 mEq PRN   potassium chloride 10% 40 mEq PRN   potassium chloride 10% 60 mEq PRN   potassium, sodium phosphates 2 packet PRN   potassium, sodium phosphates 2 packet PRN   potassium, sodium phosphates 2 packet PRN   promethazine (PHENERGAN) IVPB 12.5 mg Q6H PRN     Today I personally reviewed pertinent medications, lines/drains/airways, imaging, cardiology, lab results, microbiology results,

## 2017-07-14 NOTE — PROGRESS NOTES
ICU Attending Note  Neurocritical Care    -aspirin, atorvastatin  -amantadine, fluoxetine  -ventilator weaning per LTACH  --180  -stop hydralazine, continue others  -TF  -detemir 10 units nightly, ISS  -prophylaxis

## 2017-07-14 NOTE — PLAN OF CARE
LUCIA spoke with Pt daughter regarding her final choice for placement. She wants [facility]. She wants to make sure she gets a private room and asked questions about waivers. Will leave requested information in her room to leave with the Pt.     LUCIA contacted Maria A with [facility]. Pt is approved and only needs orders to be able to go. SW will contact her when orders are in. Advised CM of the need for orders so she can report this to the team.    CM advised SW that orders are in. [facility] and contacted Maria A to report. She will get the room number and let SW know when transport can be set up.    Susan España, DIONNE  Neurocritical Care   [facility]  [id number]

## 2017-07-14 NOTE — PLAN OF CARE
SW received call from Maria A with [facility]. She reported Pt room number will be [id number] and the accepting MD is Dr. Chen. RN can call to report to the charge RN at [phone] at 2pm. Advised RN of this.  can be scheduled for 4pm. SW contacted Pt daughter to report the above.    CM advised SW of change in orders, SW sent updated orders to [facility] via [facility].    SW completed [facility] envelope and set up transportaion for 4pm. Placed envelope outside of Pt room.    Susan España LMSW  Neurocritical Care   [facility]  [id number]

## 2017-07-14 NOTE — DISCHARGE SUMMARY
[facility]  Neurocritical Care  Discharge Summary    Admit Date: 6/18/2017    Service Date: 07/14/2017    Discharge Date: 7/14/2017    Length of Stay: 26    Final Active Diagnoses:    Diagnosis Date Noted POA    PRINCIPAL PROBLEM:  Cerebral infarction, watershed distribution, bilateral, acute [I63.8] 06/27/2017 Yes    Prerenal azotemia [R79.89] 07/14/2017 Yes    Respiratory insufficiency [R06.89] 07/14/2017 Yes    Positive blood culture [R78.81] 07/10/2017 Yes    Laryngeal edema [J38.4] 07/10/2017 Yes    Dysphagia [R13.10] 07/10/2017 Yes    Eve coma scale total score 9-12 [R40.2420] 07/07/2017 Yes    Hypertensive encephalopathy [I67.4] 06/26/2017 Yes    Morbid obesity due to excess calories [E66.01] 06/26/2017 Yes    Acute respiratory failure [J96.00] 06/20/2017 Yes    Hypokalemia [E87.6] 06/19/2017 Yes    Hyperlipidemia [E78.5] 06/19/2017 Yes     Chronic    CAD s/p CABG [I25.10] 06/19/2017 Yes     Chronic    Pyelonephritis [N12] 06/19/2017 Yes    Hypertensive emergency [I16.1] 06/18/2017 Yes    Morbid obesity with BMI of 50.0-59.9, adult [E66.01, Z68.43] 02/24/2015 Not Applicable     Chronic    Essential hypertension [I10] 05/12/2014 Yes     Chronic    Type 2 diabetes mellitus, uncontrolled [E11.65] 05/12/2014 Yes     Chronic      Problems Resolved During this Admission:    Diagnosis Date Noted Date Resolved POA    ARF (acute renal failure) [N17.9] 06/22/2017 07/06/2017 No    Blood poisoning [A41.9] 06/19/2017 06/22/2017 Yes      History of Present Illness: 66yr old lady who has been transferred to [facility] for the management AMS and get an MRI. She was admitted to an OSH with headaches and high grade fevers/leucocytosis. She subsequently became unresponsive at the OSH and had to be intubated for airway protection. She was managed for hypertensive emergency. She was noticed to have R facial and RUE twitching. The twitching resolved after phosphyetoin and ativan was given. MRI  was attempted but unable to do so as she exceeded weight limit.    Hospital Course by Event: 66 y.o. female with HTN, HLD, DMII, CAD s/p CABG who presented to ED with complaints of intractable headache.Originally patient was admitted to [facility]; while there, patient reported to have had seizure like activity; was started on eEEG; activity was noted to not have correlating epileptiform activity. Through course of night 6/19/-6/20 had multiple seizures and intubated for airway protection. She was treated there for hypertensive emergency. Neurology was consulted and patient was noted to have had continuous twitching of RUE and right face which ceased with cerebyx, ativan and propofol was administered with reported cessation of events. Per chart review, she was reported to have been hypotensive when she was placed on sedation after being intubated for airway protection. Was unable to do an MRI at [facility] given concern about weight being limiting factor. She was subsquently transferred to [facility] for further workup. EEG here was unremarkable for NCSE; and showed diffuse slowing with no seizures. MRI brain was obtained; which was remarkable for bi-hemispheric watershed infarcts thought to be from relative hypotension. Patient was on Patient was started on amantadine to help improve wakefulness. Patient was also started on fluoxetine given concerns for poor engagement. Patient had no air leak when assessed for extubation trial; efforts with solumedrol for possible laryngeal edema without improvement. General surgery was consulted and patient underwent trach/peg on 7/12. While inpatient, malignant hypertension was also managed and patient required aliskerin, clonidine, coreg, amlodipine and hydralazine - hydralazine was eventually weaned off given concern that relative hypotension is probably what led to watershed infarcts to begin with and would likely benefit from slightly higher BP to maintain cerebral  perfusion. Patient hemodynamically stable on the day of transfer.     Significant Results:  Imaging:  MRI brain WO contrast (6/27/17)  Abnormal examination with numerous small foci of restricted diffusion in a relatively bilateral symmetric distribution involving the deep cerebral white matter/corona radiata with additional small foci in the body and genu of the corpus callosum. Concerning for bilateral watershed infarcts. Paranasal sinus disease and bilateral mastoid effusions. This is nonspecific and may relate to patient's intubated state.    CTA head and neck (6/27/17)  Multifocal small region of hypoattenuation centrum semiovale bilaterally correspond to small size watershed infarct seen on MRI.  Otherwise unremarkable CT of the head specifically without evidence for acute hemorrhage or abnormal parenchymal enhancement.    CTA head: Small caliber distal vertebral or basilar artery which may represent combination of hypoplasia and atherosclerotic disease with severe multifocal narrowing of the basilar artery. No evidence for occlusion. There is essentially persistent fetal circulation of the PCAs bilaterally.    Allowing for motion limitation no evidence for significant stenosis of the anterior circulation.    CTA neck: No evidence for significant stenosis throughout the neck.    Cardiology:  ECHO (6/21/17)  CONCLUSIONS     1 - Normal left ventricular systolic function (EF 55-60%).     2 - Concentric hypertrophy.     3 - Impaired LV relaxation, elevated LAP (grade 2 diastolic dysfunction).     Laboratory:  Lab Results   Component Value Date    HGBA1C 10.5 (H) 06/19/2017    CHOL 205 (H) 05/13/2014    HDL 39 (L) 05/13/2014    LDLCALC 137.2 05/13/2014    TRIG 144 05/13/2014    TSH 2.491 06/30/2017     Consultations:  IP CONSULT TO DIETARY  IP CONSULT TO PULMONOLOGY  IP CONSULT TO NEUROLOGY  IP CONSULT TO NEPHROLOGY  IP CONSULT TO INTERVENTIONAL RADIOLOGY  IP CONSULT TO EPILEPSY  IP CONSULT TO EPILEPSY  IP CONSULT  TO MIDLINE TEAM  IP CONSULT TO VASCULAR (STROKE) NEUROLOGY  IP CONSULT TO PICC TEAM (NIAS)  WOUND CARE CONSULT  IP CONSULT TO MIDLINE TEAM  WOUND CARE CONSULT  IP CONSULT TO PICC TEAM (NIAS)  IP CONSULT TO GENERAL SURGERY    Procedures:   Procedure(s) (LRB):  7/12/2017  PLACEMENT-TUBE-PEG (N/A)  TRACHEOSTOMY (N/A) by Ryan Story MD.    Medications:    Nisa Frank [facility] Medication Instructions MARY:[id number]    Printed on:07/14/17 2518   Medication Information                      acetaminophen (TYLENOL) 650 mg/20.3 mL Soln  20.3 mLs (650 mg total) by Per NG tube route every 6 (six) hours as needed.             albuterol-ipratropium 2.5mg-0.5mg/3mL (DUO-NEB) 0.5 mg-3 mg(2.5 mg base)/3 mL nebulizer solution  Take 3 mLs by nebulization every 6 (six) hours. Rescue             aliskiren (TEKTURNA) 300 MG Tab  1 tablet (300 mg total) by Per G Tube route once daily.             amantadine HCl (SYMMETREL) 100 mg capsule  1 capsule (100 mg total) by Per G Tube route every 8 (eight) hours.             amlodipine (NORVASC) 10 MG tablet  1 tablet (10 mg total) by Per G Tube route once daily.             aspirin 325 MG tablet  1 tablet (325 mg total) by Per G Tube route once daily.             atorvastatin (LIPITOR) 10 MG tablet  Take 10 mg by mouth once daily.             atorvastatin (LIPITOR) 10 MG tablet  1 tablet (10 mg total) by Per G Tube route once daily.             carvedilol (COREG) 25 MG tablet  1 tablet (25 mg total) by Per G Tube route 2 (two) times daily.             chlorhexidine (PERIDEX) 0.12 % solution  Use as directed 15 mLs in the mouth or throat 2 (two) times daily.             cloNIDine 0.1 mg/24 hr td ptwk (CATAPRES) 0.1 mg/24 hr  Place 1 patch onto the skin every 7 days.             diclofenac sodium (VOLTAREN) 1 % Gel  Apply 2 g topically 2 (two) times daily.             EASY TOUCH TWIST LANCETS 30 gauge Misc               fluoxetine (PROZAC) 20 MG capsule  1 capsule (20 mg total) by  Per G Tube route once daily.             HEPARIN SODIUM,PORCINE (HEPARIN, PORCINE,) 5,000 unit/mL injection  Inject 1.5 mLs (7,500 Units total) into the skin every 8 (eight) hours.             insulin aspart (NOVOLOG) 100 unit/mL InPn pen  Inject 1-10 Units into the skin every 4 (four) hours as needed (Hyperglycemia).             insulin detemir (LEVEMIR FLEXTOUCH) 100 unit/mL (3 mL) SubQ InPn pen  Inject 10 Units into the skin once daily.             meclizine (ANTIVERT) 25 mg tablet  Take 1 tablet (25 mg total) by mouth every 6 (six) hours as needed.             pantoprazole (PROTONIX) 40 mg GrPS  1 packet (40 mg total) by Per G Tube route once daily.               Diet: Continuous diabetasource full strength at 50 mL per hour. Please begin feeds at 10cc/hr and increase by 10cc an hour every 2 hours until goal is achieved. Hold for residuals >400cc.  Enteral free water boluses: 200cc q6h    Activity: As tolerated.    Disposition: Discharged to LTAC in stable condition.    Follow Up Plan:  Vascular Neurology in 1 month.     Miko Storey MD  Neurocritical Care  [facility]

## 2017-07-14 NOTE — PROGRESS NOTES
Pt transferred to [facility] via [facility] on portable vent and cardiac monitor. Personal belongings sent with pt. Report called to Maria A at 1430, Maria A notified that pt left at 1640

## 2017-07-14 NOTE — PROGRESS NOTES
[facility]  Neurocritical Care  Progress Note    Admit Date: 6/18/2017  Service Date: 07/14/2017  Length of Stay: 26    Subjective:     Chief Complaint: Cerebral infarction, watershed distribution, bilateral, acute    History of Present Illness: 66 y woman who presents with hypertensive emergency with severe HA-> antihypertensives-> jerking of concern for seizure-> intubation-> NSCCU.    Hospital Course: -6/27 admission to [facility]-> EEG with diffuse slowing no seizures, MR with bilateral MCA SAQIB watershed infarcts  -no subsequent documented seizures, no significant exam improvement  -7/8 Discussed the earlier plan of extubation trial today with the family and interval development. Still with no air leak. I have suggested a trach and peg due to risk for extubation failure, prolonged intubation with laryngeal edema, and secretion. Will consult gen surgery  7/12: s/p trach/peg    Interval History:    No acute events overnight.     Review of Systems   Unable to perform ROS: Intubated   and due to level of consciousness.  Objective:     Vitals:  Temp: 99.2 °F (37.3 °C) (07/14/17 1500)  Pulse: 65 (07/14/17 1500)  Resp: (!) 0 (07/14/17 1500)  BP: 138/63 (07/14/17 1500)  SpO2: 100 % (07/14/17 1500)    Temp:  [98.7 °F (37.1 °C)-99.7 °F (37.6 °C)] 99.2 °F (37.3 °C)  Pulse:  [59-69] 65  Resp:  [0-20] 0  SpO2:  [96 %-100 %] 100 %  BP: (125-168)/(55-70) 138/63         Vent Mode: Spont  Oxygen Concentration (%):  [40] 40  Resp Rate Total:  [14 br/min-31 br/min] 21 br/min  Vt Set:  [420 mL] 420 mL  PEEP/CPAP:  [5 cmH20] 5 cmH20  Pressure Support:  [5 cmH20] 5 cmH20  Mean Airway Pressure:  [6.9 cmH20-8.9 cmH20] 7.1 cmH20    07/13 0701 - 07/14 0700  In: 3660 [I.V.:2750]  Out: 450 [Urine:450]    Physical Exam   Constitutional: She appears well-developed and well-nourished.   HENT:   Head: Normocephalic and atraumatic.   Eyes: Pupils are equal, round, and reactive to light.   Cardiovascular: Normal rate.     Pulmonary/Chest:   intubated   Neurological: GCS eye subscore is 4. GCS verbal subscore is 1. GCS motor subscore is 5.      Awake; following some simple commands; reported to be intermittent  PERRL, EOMI  +cough, gag and oculocephalic intact  Able to  fingers on the left; otherwise no spontaneous movement of any extremity seen  Withdraws to pain in all 4 extremities    Unable to test orientation, language, memory, judgment, insight, fund of knowledge, hearing, shoulder shrug, tongue protrusion, coordination, gait due to level of consciousness.    Medications:  Continuous   Scheduled    albuterol-ipratropium 2.5mg-0.5mg/3mL 3 mL Q6H   aliskiren 300 mg Daily   amantadine HCl 100 mg Q8H   amlodipine 10 mg Daily   aspirin 325 mg Daily   atorvastatin 10 mg Daily   carvedilol 25 mg BID   chlorhexidine 15 mL BID   cloNIDine 0.1 mg/24 hr td ptwk 1 patch Q7 Days   fluoxetine 20 mg Daily   heparin (porcine) 7,500 Units Q8H   insulin detemir 10 Units Daily   pantoprazole 40 mg Daily   psyllium 1 packet Daily   sodium chloride 0.9% 3 mL Q8H   sodium chloride 3% 4 mL Q6H   PRN    acetaminophen 650 mg Q6H PRN   dextrose 50% 12.5 g PRN   fentaNYL 100 mcg Q6H PRN   glucagon (human recombinant) 1 mg PRN   hydrALAZINE 10 mg Q4H PRN   insulin aspart 1-10 Units Q4H PRN   labetalol 10 mg Q4H PRN   lorazepam 2 mg Q4H PRN   magnesium oxide 800 mg PRN   magnesium oxide 800 mg PRN   ondansetron 8 mg Q8H PRN   pneumoc 13-finn conj-dip cr(PF) 0.5 mL vaccine x 1 dose   potassium chloride 10% 40 mEq PRN   potassium chloride 10% 40 mEq PRN   potassium chloride 10% 60 mEq PRN   potassium, sodium phosphates 2 packet PRN   potassium, sodium phosphates 2 packet PRN   potassium, sodium phosphates 2 packet PRN   promethazine (PHENERGAN) IVPB 12.5 mg Q6H PRN     Today I personally reviewed pertinent medications, lines/drains/airways, imaging, cardiology, lab results, microbiology results,    Assessment/Plan:     Neuro   * Cerebral infarction,  watershed distribution, bilateral, acute    -from hypoperfusion amid correction of hypertensive emergency  -aspirin, atorvastatin    Amantadine 100 q8h to help improve wakefullness  Fluoxetine 20mg qd for decreased engagement         Hypertensive encephalopathy    -see HTN        Pulmonary   Acute respiratory failure    -no air leak; ET in place for 21 days; efforts late last week with solumedrol for possible laryngeal edema without improvement.   S/p trach/PEG on 7/12  --SBT; wean as tolerated to trach collar  - daily CXR/ABG while intubated          Cardiac   Essential hypertension    SBP goal 140-150 overnight; goal 100-180  Hydralazine discontinued  Clonidine 0.1mg/24hr patch  Aliskerin 300mg qd  Coreg 25mg TID  Amlodipine 10mg qd        Endocrine   Type 2 diabetes mellitus, uncontrolled    -ISS        Fluids/Electrolytes/Nutrition/GI   Prerenal azotemia    Resolved.   -off IVF            Prophylaxis:  Venous Thromboembolism: mechanical chemical  Stress Ulcer: PPI  Ventilator Pneumonia: yes    Transfer to LTAC today     Activity Orders          Up with assistance starting at 06/26 1289        Full Code    Miko Storey MD  Neurocritical Care  [facility]

## 2017-08-01 NOTE — PROCEDURES
DATE OF PROCEDURE:  06/27/2017.    EEG #:  [id number]    ELECTROENCEPHALOGRAM REPORT     METHODOLOGY:  Electroencephalographic (EEG) recording is recorded with   electrodes placed according to the International 10-20 placement system.  Thirty   two (32) channels of digital signal (sampling rate of 512/sec), including T1   and T2, were simultaneously recorded from the scalp and may include EKG, EMG,   and/or eye monitors.  Recording band pass was 0.1 to 512 Hz.  Digital video   recording of the patient is simultaneously recorded with the EEG.  The patient   is instructed to report clinical symptoms which may occur during the recording   session.  EEG and video recording are stored and archived in digital format.    Activation procedures, which include photic stimulation, hyperventilation and   instructing patients to perform simple tasks, are done in selected patients  The EEG is displayed on a monitor screen and can be reviewed using different   montages.  Computer assisted-analysis is employed to detect spike and   electrographic seizure activity.   The entire record is submitted for computer   analysis.  The entire recording is visually reviewed, and the times identified   by computer analysis as being spikes or seizures are reviewed again.    Compressed spectral analysis (CSA) is also performed on the activity recorded   from each individual channel.  This is displayed as a power display of   frequencies from 0 to 30 Hz over time.   The CSA is reviewed looking for   asymmetries in power between homologous areas of the scalp, then compared with   the original EEG recording.    [employer] software was also utilized in the review of this study.  This software   suite analyzes the EEG recording in multiple domains.  Coherence and rhythmicity   are computed to identify EEG sections which may contain organized seizures.    Each channel undergoes analysis to detect the presence of spike and sharp waves   which have special  and morphological characteristics of epileptic activity.  The   routine EEG recording is converted from special into frequency domain.  This is   then displayed comparing homologous areas to identify areas of significant   asymmetry.  Algorithm to identify non-cortically generated artifact is used to   separate artifact from the EEG.      EEG FINDINGS:  The patient's background consists of diffuse theta range   frequencies with some intermixed delta range frequencies.  At times, the slowing   appears more pronounced on the left, but at other times, slowing appears more   pronounced on the right.  Overall, there is no clear asymmetry to the slowing.    There are no focal, lateralized or epileptiform transients.  No electrographic   seizures are seen.  Normal sleep architecture is not appreciated.  Provocative   maneuvers including hyperventilation and photic stimulation are not performed.    EKG demonstrates sinus rhythm.    INTERPRETATION:  This is an abnormal EEG due to the presence of diffuse slowing   as described above.  These findings are indicative of a moderate-to-moderately   severe encephalopathy.  They are not specific for any particular underlying   etiology.      MARY  dd: 08/01/2017 13:51:22 (CDT)  td: 08/01/2017 16:04:26 (CDT)  Doc ID   #[id number]  Job ID #[id number]    CC:

## 2017-08-08 ENCOUNTER — HOSPITAL ENCOUNTER (INPATIENT)
Facility: HOSPITAL | Age: 67
LOS: 6 days | Discharge: NURSING, REHAB OR LONG-TERM CARE | DRG: 208 | End: 2017-08-14
Attending: EMERGENCY MEDICINE | Admitting: INTERNAL MEDICINE
Payer: MEDICARE

## 2017-08-08 DIAGNOSIS — I16.1 HYPERTENSIVE EMERGENCY WITHOUT CONGESTIVE HEART FAILURE: ICD-10-CM

## 2017-08-08 DIAGNOSIS — J96.01 ACUTE RESPIRATORY FAILURE WITH HYPOXIA: Primary | ICD-10-CM

## 2017-08-08 DIAGNOSIS — J96.90 RESPIRATORY FAILURE: ICD-10-CM

## 2017-08-08 PROBLEM — T17.500A MUCUS PLUGGING OF BRONCHI: Status: ACTIVE | Noted: 2017-08-08

## 2017-08-08 PROBLEM — Z93.0 TRACHEOSTOMY STATUS: Status: ACTIVE | Noted: 2017-08-08

## 2017-08-08 PROBLEM — Z93.1 PEG (PERCUTANEOUS ENDOSCOPIC GASTROSTOMY) STATUS: Status: ACTIVE | Noted: 2017-08-08

## 2017-08-08 PROBLEM — Z86.73: Status: ACTIVE | Noted: 2017-06-27

## 2017-08-08 LAB
ALBUMIN SERPL BCP-MCNC: 2.8 G/DL
ALP SERPL-CCNC: 134 U/L
ALT SERPL W/O P-5'-P-CCNC: 18 U/L
AMORPH CRY URNS QL MICRO: ABNORMAL
AMORPH CRY URNS QL MICRO: ABNORMAL
ANION GAP SERPL CALC-SCNC: 11 MMOL/L
AST SERPL-CCNC: 16 U/L
BACTERIA #/AREA URNS HPF: ABNORMAL /HPF
BACTERIA #/AREA URNS HPF: ABNORMAL /HPF
BASOPHILS # BLD AUTO: 0.01 K/UL
BASOPHILS NFR BLD: 0.1 %
BILIRUB SERPL-MCNC: 1 MG/DL
BILIRUB UR QL STRIP: ABNORMAL
BILIRUB UR QL STRIP: NEGATIVE
BNP SERPL-MCNC: 32 PG/ML
BUN SERPL-MCNC: 45 MG/DL
CALCIUM SERPL-MCNC: 10.1 MG/DL
CHLORIDE SERPL-SCNC: 92 MMOL/L
CLARITY UR: ABNORMAL
CLARITY UR: ABNORMAL
CO2 SERPL-SCNC: 35 MMOL/L
COLOR UR: ABNORMAL
COLOR UR: ABNORMAL
CREAT SERPL-MCNC: 1.5 MG/DL
DIFFERENTIAL METHOD: ABNORMAL
EOSINOPHIL # BLD AUTO: 0.2 K/UL
EOSINOPHIL NFR BLD: 1.1 %
ERYTHROCYTE [DISTWIDTH] IN BLOOD BY AUTOMATED COUNT: 15.1 %
EST. GFR  (AFRICAN AMERICAN): 42 ML/MIN/1.73 M^2
EST. GFR  (NON AFRICAN AMERICAN): 36 ML/MIN/1.73 M^2
GLUCOSE SERPL-MCNC: 232 MG/DL
GLUCOSE UR QL STRIP: ABNORMAL
GLUCOSE UR QL STRIP: ABNORMAL
HCT VFR BLD AUTO: 31.9 %
HGB BLD-MCNC: 9.8 G/DL
HGB UR QL STRIP: ABNORMAL
HGB UR QL STRIP: ABNORMAL
HYALINE CASTS #/AREA URNS LPF: 0 /LPF
HYALINE CASTS #/AREA URNS LPF: 0 /LPF
KETONES UR QL STRIP: NEGATIVE
KETONES UR QL STRIP: NEGATIVE
LACTATE SERPL-SCNC: 1.4 MMOL/L
LEUKOCYTE ESTERASE UR QL STRIP: ABNORMAL
LEUKOCYTE ESTERASE UR QL STRIP: ABNORMAL
LYMPHOCYTES # BLD AUTO: 2.5 K/UL
LYMPHOCYTES NFR BLD: 18.7 %
MCH RBC QN AUTO: 26.8 PG
MCHC RBC AUTO-ENTMCNC: 30.7 G/DL
MCV RBC AUTO: 87 FL
MICROSCOPIC COMMENT: ABNORMAL
MICROSCOPIC COMMENT: ABNORMAL
MONOCYTES # BLD AUTO: 0.8 K/UL
MONOCYTES NFR BLD: 5.8 %
NEUTROPHILS # BLD AUTO: 9.9 K/UL
NEUTROPHILS NFR BLD: 74.3 %
NITRITE UR QL STRIP: NEGATIVE
NITRITE UR QL STRIP: NEGATIVE
PH UR STRIP: 5 [PH] (ref 5–8)
PH UR STRIP: 5 [PH] (ref 5–8)
PLATELET # BLD AUTO: 392 K/UL
PMV BLD AUTO: 10.9 FL
POCT GLUCOSE: 251 MG/DL (ref 70–110)
POCT GLUCOSE: 260 MG/DL (ref 70–110)
POTASSIUM SERPL-SCNC: 4.2 MMOL/L
PROT SERPL-MCNC: 9.1 G/DL
PROT UR QL STRIP: ABNORMAL
PROT UR QL STRIP: ABNORMAL
RBC # BLD AUTO: 3.66 M/UL
RBC #/AREA URNS HPF: 3 /HPF (ref 0–4)
RBC #/AREA URNS HPF: 4 /HPF (ref 0–4)
SODIUM SERPL-SCNC: 138 MMOL/L
SP GR UR STRIP: 1.01 (ref 1–1.03)
SP GR UR STRIP: 1.02 (ref 1–1.03)
SQUAMOUS #/AREA URNS HPF: 15 /HPF
SQUAMOUS #/AREA URNS HPF: 6 /HPF
TROPONIN I SERPL DL<=0.01 NG/ML-MCNC: 0.01 NG/ML
URN SPEC COLLECT METH UR: ABNORMAL
URN SPEC COLLECT METH UR: ABNORMAL
UROBILINOGEN UR STRIP-ACNC: ABNORMAL EU/DL
UROBILINOGEN UR STRIP-ACNC: ABNORMAL EU/DL
WBC # BLD AUTO: 13.34 K/UL
WBC #/AREA URNS HPF: 3 /HPF (ref 0–5)
WBC #/AREA URNS HPF: 6 /HPF (ref 0–5)

## 2017-08-08 PROCEDURE — 25000242 PHARM REV CODE 250 ALT 637 W/ HCPCS: Performed by: EMERGENCY MEDICINE

## 2017-08-08 PROCEDURE — 94640 AIRWAY INHALATION TREATMENT: CPT

## 2017-08-08 PROCEDURE — 99900026 HC AIRWAY MAINTENANCE (STAT)

## 2017-08-08 PROCEDURE — 83605 ASSAY OF LACTIC ACID: CPT

## 2017-08-08 PROCEDURE — 83880 ASSAY OF NATRIURETIC PEPTIDE: CPT

## 2017-08-08 PROCEDURE — A4216 STERILE WATER/SALINE, 10 ML: HCPCS | Performed by: EMERGENCY MEDICINE

## 2017-08-08 PROCEDURE — 63600175 PHARM REV CODE 636 W HCPCS: Performed by: EMERGENCY MEDICINE

## 2017-08-08 PROCEDURE — 93005 ELECTROCARDIOGRAM TRACING: CPT

## 2017-08-08 PROCEDURE — 96375 TX/PRO/DX INJ NEW DRUG ADDON: CPT

## 2017-08-08 PROCEDURE — 63600175 PHARM REV CODE 636 W HCPCS: Performed by: HOSPITALIST

## 2017-08-08 PROCEDURE — 81000 URINALYSIS NONAUTO W/SCOPE: CPT | Mod: 91

## 2017-08-08 PROCEDURE — 87040 BLOOD CULTURE FOR BACTERIA: CPT

## 2017-08-08 PROCEDURE — 82962 GLUCOSE BLOOD TEST: CPT

## 2017-08-08 PROCEDURE — 99285 EMERGENCY DEPT VISIT HI MDM: CPT | Mod: 25

## 2017-08-08 PROCEDURE — 63600175 PHARM REV CODE 636 W HCPCS: Performed by: INTERNAL MEDICINE

## 2017-08-08 PROCEDURE — 20000000 HC ICU ROOM

## 2017-08-08 PROCEDURE — 96374 THER/PROPH/DIAG INJ IV PUSH: CPT

## 2017-08-08 PROCEDURE — 81000 URINALYSIS NONAUTO W/SCOPE: CPT

## 2017-08-08 PROCEDURE — 25000003 PHARM REV CODE 250: Performed by: EMERGENCY MEDICINE

## 2017-08-08 PROCEDURE — 85025 COMPLETE CBC W/AUTO DIFF WBC: CPT

## 2017-08-08 PROCEDURE — 93010 ELECTROCARDIOGRAM REPORT: CPT | Mod: ,,, | Performed by: INTERNAL MEDICINE

## 2017-08-08 PROCEDURE — 94644 CONT INHLJ TX 1ST HOUR: CPT

## 2017-08-08 PROCEDURE — 84484 ASSAY OF TROPONIN QUANT: CPT

## 2017-08-08 PROCEDURE — 25000003 PHARM REV CODE 250: Performed by: INTERNAL MEDICINE

## 2017-08-08 PROCEDURE — 83036 HEMOGLOBIN GLYCOSYLATED A1C: CPT

## 2017-08-08 PROCEDURE — 5A1935Z RESPIRATORY VENTILATION, LESS THAN 24 CONSECUTIVE HOURS: ICD-10-PCS | Performed by: INTERNAL MEDICINE

## 2017-08-08 PROCEDURE — 80053 COMPREHEN METABOLIC PANEL: CPT

## 2017-08-08 RX ORDER — NICARDIPINE HYDROCHLORIDE 0.2 MG/ML
2.5 INJECTION INTRAVENOUS CONTINUOUS
Status: DISCONTINUED | OUTPATIENT
Start: 2017-08-08 | End: 2017-08-08

## 2017-08-08 RX ORDER — SODIUM CHLORIDE 0.9 % (FLUSH) 0.9 %
3 SYRINGE (ML) INJECTION EVERY 8 HOURS
Status: DISCONTINUED | OUTPATIENT
Start: 2017-08-08 | End: 2017-08-14 | Stop reason: HOSPADM

## 2017-08-08 RX ORDER — CARVEDILOL 6.25 MG/1
12.5 TABLET ORAL 2 TIMES DAILY
Status: DISCONTINUED | OUTPATIENT
Start: 2017-08-08 | End: 2017-08-08

## 2017-08-08 RX ORDER — CLONIDINE 0.1 MG/24H
1 PATCH, EXTENDED RELEASE TRANSDERMAL
Status: DISCONTINUED | OUTPATIENT
Start: 2017-08-09 | End: 2017-08-08

## 2017-08-08 RX ORDER — FLUOXETINE HYDROCHLORIDE 20 MG/1
20 CAPSULE ORAL DAILY
Status: DISCONTINUED | OUTPATIENT
Start: 2017-08-09 | End: 2017-08-14 | Stop reason: HOSPADM

## 2017-08-08 RX ORDER — GLUCAGON 1 MG
1 KIT INJECTION
Status: DISCONTINUED | OUTPATIENT
Start: 2017-08-08 | End: 2017-08-14 | Stop reason: HOSPADM

## 2017-08-08 RX ORDER — CARVEDILOL 3.12 MG/1
3.12 TABLET ORAL 2 TIMES DAILY
Status: DISCONTINUED | OUTPATIENT
Start: 2017-08-08 | End: 2017-08-08

## 2017-08-08 RX ORDER — HYDRALAZINE HYDROCHLORIDE 20 MG/ML
10 INJECTION INTRAMUSCULAR; INTRAVENOUS EVERY 6 HOURS PRN
Status: DISCONTINUED | OUTPATIENT
Start: 2017-08-08 | End: 2017-08-09

## 2017-08-08 RX ORDER — ATORVASTATIN CALCIUM 10 MG/1
10 TABLET, FILM COATED ORAL DAILY
Status: DISCONTINUED | OUTPATIENT
Start: 2017-08-08 | End: 2017-08-14 | Stop reason: HOSPADM

## 2017-08-08 RX ORDER — NAPROXEN SODIUM 220 MG/1
81 TABLET, FILM COATED ORAL DAILY
Status: DISCONTINUED | OUTPATIENT
Start: 2017-08-09 | End: 2017-08-14 | Stop reason: HOSPADM

## 2017-08-08 RX ORDER — ATORVASTATIN CALCIUM 10 MG/1
10 TABLET, FILM COATED ORAL DAILY
Status: DISCONTINUED | OUTPATIENT
Start: 2017-08-08 | End: 2017-08-08 | Stop reason: SDUPTHER

## 2017-08-08 RX ORDER — IPRATROPIUM BROMIDE AND ALBUTEROL SULFATE 2.5; .5 MG/3ML; MG/3ML
3 SOLUTION RESPIRATORY (INHALATION) EVERY 4 HOURS
Status: DISCONTINUED | OUTPATIENT
Start: 2017-08-08 | End: 2017-08-11

## 2017-08-08 RX ORDER — AMANTADINE HYDROCHLORIDE 50 MG/5ML
100 SOLUTION ORAL EVERY 8 HOURS
Status: DISCONTINUED | OUTPATIENT
Start: 2017-08-08 | End: 2017-08-14 | Stop reason: HOSPADM

## 2017-08-08 RX ORDER — HYDRALAZINE HYDROCHLORIDE 25 MG/1
25 TABLET, FILM COATED ORAL EVERY 8 HOURS PRN
Status: DISCONTINUED | OUTPATIENT
Start: 2017-08-08 | End: 2017-08-08

## 2017-08-08 RX ORDER — SODIUM CHLORIDE 9 MG/ML
INJECTION, SOLUTION INTRAVENOUS CONTINUOUS
Status: DISCONTINUED | OUTPATIENT
Start: 2017-08-08 | End: 2017-08-08

## 2017-08-08 RX ORDER — IBUPROFEN 200 MG
16 TABLET ORAL
Status: DISCONTINUED | OUTPATIENT
Start: 2017-08-08 | End: 2017-08-14 | Stop reason: HOSPADM

## 2017-08-08 RX ORDER — METHYLPREDNISOLONE SOD SUCC 125 MG
125 VIAL (EA) INJECTION
Status: COMPLETED | OUTPATIENT
Start: 2017-08-08 | End: 2017-08-08

## 2017-08-08 RX ORDER — FUROSEMIDE 10 MG/ML
60 INJECTION INTRAMUSCULAR; INTRAVENOUS ONCE
Status: COMPLETED | OUTPATIENT
Start: 2017-08-08 | End: 2017-08-08

## 2017-08-08 RX ORDER — AMLODIPINE BESYLATE 5 MG/1
10 TABLET ORAL DAILY
Status: DISCONTINUED | OUTPATIENT
Start: 2017-08-09 | End: 2017-08-08

## 2017-08-08 RX ORDER — AMLODIPINE BESYLATE 5 MG/1
10 TABLET ORAL DAILY
Status: DISCONTINUED | OUTPATIENT
Start: 2017-08-08 | End: 2017-08-14 | Stop reason: HOSPADM

## 2017-08-08 RX ORDER — CARVEDILOL 6.25 MG/1
25 TABLET ORAL 2 TIMES DAILY
Status: DISCONTINUED | OUTPATIENT
Start: 2017-08-08 | End: 2017-08-08

## 2017-08-08 RX ORDER — ASPIRIN 325 MG
325 TABLET ORAL DAILY
Status: DISCONTINUED | OUTPATIENT
Start: 2017-08-09 | End: 2017-08-08

## 2017-08-08 RX ORDER — ENOXAPARIN SODIUM 100 MG/ML
40 INJECTION SUBCUTANEOUS EVERY 24 HOURS
Status: DISCONTINUED | OUTPATIENT
Start: 2017-08-08 | End: 2017-08-14 | Stop reason: HOSPADM

## 2017-08-08 RX ORDER — MECLIZINE HYDROCHLORIDE 25 MG/1
25 TABLET ORAL EVERY 6 HOURS PRN
Status: DISCONTINUED | OUTPATIENT
Start: 2017-08-08 | End: 2017-08-12

## 2017-08-08 RX ORDER — IBUPROFEN 200 MG
24 TABLET ORAL
Status: DISCONTINUED | OUTPATIENT
Start: 2017-08-08 | End: 2017-08-14 | Stop reason: HOSPADM

## 2017-08-08 RX ORDER — PANTOPRAZOLE SODIUM 40 MG/1
40 FOR SUSPENSION ORAL DAILY
Status: DISCONTINUED | OUTPATIENT
Start: 2017-08-09 | End: 2017-08-14 | Stop reason: HOSPADM

## 2017-08-08 RX ORDER — CLONIDINE 0.1 MG/24H
1 PATCH, EXTENDED RELEASE TRANSDERMAL
Status: DISCONTINUED | OUTPATIENT
Start: 2017-08-08 | End: 2017-08-14 | Stop reason: HOSPADM

## 2017-08-08 RX ORDER — LORAZEPAM 2 MG/ML
1 INJECTION INTRAMUSCULAR
Status: COMPLETED | OUTPATIENT
Start: 2017-08-08 | End: 2017-08-08

## 2017-08-08 RX ORDER — CHLORHEXIDINE GLUCONATE ORAL RINSE 1.2 MG/ML
15 SOLUTION DENTAL 2 TIMES DAILY
Status: DISCONTINUED | OUTPATIENT
Start: 2017-08-08 | End: 2017-08-14 | Stop reason: HOSPADM

## 2017-08-08 RX ORDER — ASPIRIN 81 MG/1
81 TABLET ORAL DAILY
Status: DISCONTINUED | OUTPATIENT
Start: 2017-08-09 | End: 2017-08-08

## 2017-08-08 RX ORDER — IPRATROPIUM BROMIDE 0.5 MG/2.5ML
0.5 SOLUTION RESPIRATORY (INHALATION)
Status: COMPLETED | OUTPATIENT
Start: 2017-08-08 | End: 2017-08-08

## 2017-08-08 RX ORDER — INSULIN ASPART 100 [IU]/ML
1-10 INJECTION, SOLUTION INTRAVENOUS; SUBCUTANEOUS
Status: DISCONTINUED | OUTPATIENT
Start: 2017-08-08 | End: 2017-08-14 | Stop reason: HOSPADM

## 2017-08-08 RX ORDER — ALBUTEROL SULFATE 2.5 MG/.5ML
10 SOLUTION RESPIRATORY (INHALATION)
Status: COMPLETED | OUTPATIENT
Start: 2017-08-08 | End: 2017-08-08

## 2017-08-08 RX ADMIN — METHYLPREDNISOLONE SODIUM SUCCINATE 125 MG: 125 INJECTION, POWDER, FOR SOLUTION INTRAMUSCULAR; INTRAVENOUS at 09:08

## 2017-08-08 RX ADMIN — SODIUM CHLORIDE: 0.9 INJECTION, SOLUTION INTRAVENOUS at 03:08

## 2017-08-08 RX ADMIN — AMLODIPINE BESYLATE 10 MG: 5 TABLET ORAL at 05:08

## 2017-08-08 RX ADMIN — AMANTADINE HYDROCHLORIDE 100 MG: 50 SOLUTION ORAL at 09:08

## 2017-08-08 RX ADMIN — INSULIN ASPART 4 UNITS: 100 INJECTION, SOLUTION INTRAVENOUS; SUBCUTANEOUS at 11:08

## 2017-08-08 RX ADMIN — INSULIN ASPART 6 UNITS: 100 INJECTION, SOLUTION INTRAVENOUS; SUBCUTANEOUS at 05:08

## 2017-08-08 RX ADMIN — CHLORHEXIDINE GLUCONATE 15 ML: 1.2 RINSE ORAL at 09:08

## 2017-08-08 RX ADMIN — IPRATROPIUM BROMIDE AND ALBUTEROL SULFATE 3 ML: .5; 3 SOLUTION RESPIRATORY (INHALATION) at 07:08

## 2017-08-08 RX ADMIN — LORAZEPAM 1 MG: 2 INJECTION INTRAMUSCULAR; INTRAVENOUS at 09:08

## 2017-08-08 RX ADMIN — CLONIDINE 1 PATCH: 0.1 PATCH TRANSDERMAL at 06:08

## 2017-08-08 RX ADMIN — Medication 3 ML: at 10:08

## 2017-08-08 RX ADMIN — FUROSEMIDE 60 MG: 10 INJECTION, SOLUTION INTRAMUSCULAR; INTRAVENOUS at 05:08

## 2017-08-08 RX ADMIN — HYDRALAZINE HYDROCHLORIDE 10 MG: 20 INJECTION INTRAMUSCULAR; INTRAVENOUS at 09:08

## 2017-08-08 RX ADMIN — IPRATROPIUM BROMIDE 0.5 MG: 0.5 SOLUTION RESPIRATORY (INHALATION) at 09:08

## 2017-08-08 RX ADMIN — ALBUTEROL SULFATE 10 MG: 2.5 SOLUTION RESPIRATORY (INHALATION) at 09:08

## 2017-08-08 RX ADMIN — ATORVASTATIN CALCIUM 10 MG: 10 TABLET, FILM COATED ORAL at 04:08

## 2017-08-08 RX ADMIN — IPRATROPIUM BROMIDE AND ALBUTEROL SULFATE 3 ML: .5; 3 SOLUTION RESPIRATORY (INHALATION) at 03:08

## 2017-08-08 RX ADMIN — ENOXAPARIN SODIUM 40 MG: 100 INJECTION SUBCUTANEOUS at 04:08

## 2017-08-08 NOTE — ASSESSMENT & PLAN NOTE
No acute issues. ASA, statin and BP control. Neuro checks. Patient will need to transfer to a rehab facility

## 2017-08-08 NOTE — HPI
66 year old female with diabetes mellitus type 2, CKD stage 3, CAD s/p CABG, essential hypertension, cerebrovascular disease s/p bi-hemispheric watershed infarcts and trach/PEG status who was brought in via EMS from [facility] for acute respiratory distress, lethargy and hypoxia of 56% while on 4L via trach collar, improving to 90% on non rebreather en route to the ED. Patient was evidently in respiratory distress despite improvement of SaO2. Upon arrival to the ED, patient was connected to MV, given albuterol/tiotropium and suctioned at bedside, immediately correcting her respiratory issue. Per ED staff and patient's daughter who was present at bedside, it was noted that a good amount of thick mucous was suctioned. BP also significantly improved after this was done. Renal function at baseline. UA suggestive of infection however not a reliable sample. Afebrile and non toxic appearing. No new consolidation on imaging but rather generalized haziness unchanged from prior. Distant rales on exam. Patient was admitted to ICU to wean off MV as tolerated and for placement to [facility], as preferred by patient's daughter.

## 2017-08-08 NOTE — PLAN OF CARE
08/08/17 1524   Discharge Assessment   Assessment Type Discharge Planning Assessment   Confirmed/corrected address and phone number on facesheet? Yes   Assessment information obtained from? Caregiver;Medical Record   Expected Length of Stay (days) (ICU, vent, unable to assess)   Communicated expected length of stay with patient/caregiver no   Prior to hospitilization cognitive status: Unable to Assess   Prior to hospitalization functional status: Completely Dependent   Current cognitive status: Unable to Assess   Current Functional Status: Completely Dependent   Arrived From rehab facility   Lives With alone   Able to Return to Prior Arrangements no  (daughter does not want patient to return to [facility])   Is patient able to care for self after discharge? No   How many people do you have in your home that can help with your care after discharge? 0   Who are your caregiver(s) and their phone number(s)? Justin Owen [phone]    Patient's perception of discharge disposition rehab facility   Readmission Within The Last 30 Days previous discharge plan unsuccessful   Patient currently being followed by outpatient case management? No   Patient currently receives home health services? No   Does the patient currently use HME? No   Patient currently receives private duty nursing? No   Patient currently receives any other outside agency services? No   Equipment Currently Used at Home none   Do you have any problems affording any of your prescribed medications? No   Is the patient taking medications as prescribed? yes   Do you have any financial concerns preventing you from receiving the healthcare you need? No   Does the patient have transportation to healthcare appointments? Yes   Transportation Available ambulance   On Dialysis? No   [facility]   Discharge Plan B Other  (alternate/to be determined)   Patient/Family In Agreement With Plan yes   LUCIA met with daughter in ICU, explained role  "of SW/CM with treatment team, provided contact information with the "discharge planning begins on admission" checklist handout. Confirmed information in demographics: no changes. SW reviewed the discharge planning folder, explained to leave in room with patient so that team/patient can all written discharge information through out hospital stay.  SW will follow in ICU and assist as needed.    Patient recent time line:  6/18/17 admit this hospital thru ER  6/26/17 transfer to [facility] for higher level care, neuro ICU  7/14/17 [facility] to [facility]  [facility]. Per daughter, her plan was to discharge to [facility] however unclear how patient was sent to [facility].     Daughter requests referral to  inpatient rehab. SW sent per [facility] and added to EPIC post acute care. Awaiting call to confirm receipt.       "

## 2017-08-08 NOTE — ASSESSMENT & PLAN NOTE
Initially significantly high, then improved along with resolution of respiratory issues. Restart home regimen to be given via PEG. Goal BP < 140/90 mmHg. Adjust meds accordingly.

## 2017-08-08 NOTE — ASSESSMENT & PLAN NOTE
It appears this was likely due to a mucous plug that was accessed via deep suction. No evidence of infection, pneumothorax or significant pulm effusion. ACS unlikely. Is on MV. Pulm consulted for co-management of vent. Hopefully wean off completely. Will need suctioning frequently. Will diurese once for evidence of mild pulmonary edema. Nebs. PPI for stress ulcer prophylaxis.

## 2017-08-08 NOTE — ED TRIAGE NOTES
EMS called due to SOB.  Upon their arrival found pt with resp rate of approx 50 with low O2 Sats.  Pt with trach, bagged on way in.  Pt presents diaphoretic with increased resp rate.  Resp called for Vent and suctioning - at bedside.

## 2017-08-08 NOTE — H&P
[facility] Medicine  History & Physical    Patient Name: Nisa Frank  MRN: [id number]  Admission Date: 8/8/2017  Attending Physician: Jessa Patino MD   Primary Care Provider: Atilio Downs MD         Patient information was obtained from patient, relative(s) and ER records.     Subjective:     Principal Problem:<principal problem not specified>    Chief Complaint:   Chief Complaint   Patient presents with    Respiratory Distress     arrived via [facility], called to [facility], Susan. \Hospital [facility] a [facility] Outlook.\"" for c/o SOB, trach, EMS reports initial oxygen sat's of 56 on 4L, improvement in oxygen sat's on NRB 15L via trach, EMS reports 90% oxygen sat's, resp 50, EMS reports non verbal, GCS 15, ON ARRIVAL TO ER, RESP DISTRESS, RESP 52,         HPI: 66 year old female with diabetes mellitus type 2, CKD stage 3, CAD s/p CABG, essential hypertension, cerebrovascular disease s/p bi-hemispheric watershed infarcts and trach/PEG status who was brought in via EMS from [facility] for acute respiratory distress, lethargy and hypoxia of 56% while on 4L via trach collar, improving to 90% on non rebreather en route to the ED. Patient was evidently in respiratory distress despite improvement of SaO2. Upon arrival to the ED, patient was connected to MV, given albuterol/tiotropium and suctioned at bedside, immediately correcting her respiratory issue. Per ED staff and patient's daughter who was present at bedside, it was noted that a good amount of thick mucous was suctioned. BP also significantly improved after this was done. Renal function at baseline. UA suggestive of infection however not a reliable sample. Afebrile and non toxic appearing. No new consolidation on imaging but rather generalized haziness unchanged from prior. Distant rales on exam. Patient was admitted to ICU to wean off MV as tolerated and for placement to [facility], as preferred by patient's daughter.             Past Medical History:    Diagnosis Date    Acute bilateral cerebral infarction in a watershed distribution 2017    CAD (coronary artery disease)     Diabetes mellitus     Hypertension     Morbid obesity     TAMMY on CPAP     setting +17    Stroke        Past Surgical History:   Procedure Laterality Date    ARTERIAL BYPASS SURGRY      9 tears sgo     SECTION      CORONARY ARTERY BYPASS GRAFT      HYSTERECTOMY      TUBAL LIGATION         Review of patient's allergies indicates:   Allergen Reactions    Latex, natural rubber        No current facility-administered medications on file prior to encounter.      Current Outpatient Prescriptions on File Prior to Encounter   Medication Sig    albuterol-ipratropium 2.5mg-0.5mg/3mL (DUO-NEB) 0.5 mg-3 mg(2.5 mg base)/3 mL nebulizer solution Take 3 mLs by nebulization every 6 (six) hours. Rescue    aliskiren (TEKTURNA) 300 MG Tab 1 tablet (300 mg total) by Per G Tube route once daily.    amantadine HCl (SYMMETREL) 100 mg capsule 1 capsule (100 mg total) by Per G Tube route every 8 (eight) hours.    amlodipine (NORVASC) 10 MG tablet 1 tablet (10 mg total) by Per G Tube route once daily.    aspirin 325 MG tablet 1 tablet (325 mg total) by Per G Tube route once daily.    atorvastatin (LIPITOR) 10 MG tablet 1 tablet (10 mg total) by Per G Tube route once daily.    carvedilol (COREG) 25 MG tablet 1 tablet (25 mg total) by Per G Tube route 2 (two) times daily.    cloNIDine 0.1 mg/24 hr td ptwk (CATAPRES) 0.1 mg/24 hr Place 1 patch onto the skin every 7 days.    fluoxetine (PROZAC) 20 MG capsule 1 capsule (20 mg total) by Per G Tube route once daily.    HEPARIN SODIUM,PORCINE (HEPARIN, PORCINE,) 5,000 unit/mL injection Inject 1.5 mLs (7,500 Units total) into the skin every 8 (eight) hours.    insulin aspart (NOVOLOG) 100 unit/mL InPn pen Inject 1-10 Units into the skin every 4 (four) hours as needed (Hyperglycemia).    insulin detemir (LEVEMIR FLEXTOUCH) 100 unit/mL (3 mL)  SubQ InPn pen Inject 10 Units into the skin once daily.    pantoprazole (PROTONIX) 40 mg GrPS 1 packet (40 mg total) by Per G Tube route once daily.    acetaminophen (TYLENOL) 650 mg/20.3 mL Soln 20.3 mLs (650 mg total) by Per NG tube route every 6 (six) hours as needed.    atorvastatin (LIPITOR) 10 MG tablet Take 10 mg by mouth once daily.    diclofenac sodium (VOLTAREN) 1 % Gel Apply 2 g topically 2 (two) times daily.    EASY TOUCH TWIST LANCETS 30 gauge Misc     meclizine (ANTIVERT) 25 mg tablet Take 1 tablet (25 mg total) by mouth every 6 (six) hours as needed.     Family History     Problem Relation (Age of Onset)    No Known Problems Father, Mother, Sister, Brother, Maternal Aunt, Maternal Uncle, Paternal Aunt, Paternal Uncle, Maternal Grandmother, Maternal Grandfather, Paternal Grandmother, Paternal Grandfather        Social History Main Topics    Smoking status: Never Smoker    Smokeless tobacco: Never Used    Alcohol use Yes      Comment: occ    Drug use: No    Sexual activity: Not Currently     Birth control/ protection: See Surgical Hx     Review of Systems   Constitutional: Negative.    HENT: Negative.    Eyes: Negative.    Respiratory: Positive for shortness of breath and wheezing.    Cardiovascular: Negative.    Gastrointestinal: Negative.    Endocrine: Negative.    Genitourinary: Negative.    Skin: Negative.    Neurological: Negative.    Hematological: Negative.    Psychiatric/Behavioral: The patient is nervous/anxious.      Objective:     Vital Signs (Most Recent):  Temp: 98.1 °F (36.7 °C) (08/08/17 1430)  Pulse: (!) 49 (08/08/17 1558)  Resp: (!) 40 (08/08/17 1558)  BP: (!) 167/73 (08/08/17 1545)  SpO2: 100 % (08/08/17 1558) Vital Signs (24h Range):  Temp:  [98.1 °F (36.7 °C)] 98.1 °F (36.7 °C)  Pulse:  [] 49  Resp:  [7-52] 40  SpO2:  [86 %-100 %] 100 %  BP: (128-233)/() 167/73     Weight: (!) 167.8 kg (370 lb)  Body mass index is 56.26 kg/m².    Physical Exam   Constitutional:  She is oriented to person, place, and time. She appears well-developed. No distress.   Morbidly obese   HENT:   Head: Normocephalic and atraumatic.   Mouth/Throat: Oropharynx is clear and moist.   Tracheostomy in place   Eyes: Conjunctivae and EOM are normal.   Neck: Normal range of motion. Neck supple.   Cardiovascular: Normal rate, regular rhythm, normal heart sounds and intact distal pulses.    Pulmonary/Chest: Effort normal. She has no wheezes. She has rales (distant).   On MV via trach   Abdominal: Soft. Bowel sounds are normal.   Musculoskeletal: Normal range of motion. She exhibits no edema.   Neurological: She is alert and oriented to person, place, and time.   Skin: Skin is warm and dry. She is not diaphoretic.   Psychiatric: She has a normal mood and affect. Her behavior is normal. Thought content normal.   Nursing note and vitals reviewed.       Significant Labs: All pertinent labs within the past 24 hours have been reviewed.    Significant Imaging: I have reviewed all pertinent imaging results/findings within the past 24 hours.  I have reviewed and interpreted all pertinent imaging results/findings within the past 24 hours.    Assessment/Plan:     Acute respiratory failure with hypoxia    It appears this was likely due to a mucous plug that was accessed via deep suction. No evidence of infection, pneumothorax or significant pulm effusion. ACS unlikely. Is on MV. Pulm consulted for co-management of vent. Hopefully wean off completely. Will need suctioning frequently. Will diurese once for evidence of mild pulmonary edema. Nebs. PPI for stress ulcer prophylaxis.           Mucus plugging of bronchi    As above          Tracheostomy status    As above          PEG (percutaneous endoscopic gastrostomy) status    As above          Chronic bilateral cerebral infarction in watershed distribution    No acute issues. ASA, statin and BP control. Neuro checks. Patient will need to transfer to a rehab facility           CAD s/p CABG    On secondary prevention with ASA, statin and BB. On cardiac monitoring.           Hyperlipidemia    statin          Morbid obesity with BMI of 50.0-59.9, adult    Weight loss strongly encouraged. Is to start rehab once acute issue has resolved. Nutrition consult as outpatient.           Type 2 diabetes mellitus, uncontrolled    Uncontrolled. Hgb A1c in June was 10.5%. Will start with SSI and adjust regimen for goal BG < 180           Essential hypertension    Initially significantly high, then improved along with resolution of respiratory issues. Restart home regimen to be given via PEG. Goal BP < 140/90 mmHg. Adjust meds accordingly.             VTE Risk Mitigation         Ordered     enoxaparin injection 40 mg  Daily     Route:  Subcutaneous        08/08/17 1503     Medium Risk of VTE  Once      08/08/17 1503        Attempt to wean off vent as tolerated. Suction frequently. Nebs. SW [facility].     Critical care time spent on the evaluation and treatment of severe organ dysfunction, review of pertinent labs and imaging studies, discussions with consulting providers and discussions with patient/family: > 45 minutes.     Jessa Gomez MD  Department of Hospital Medicine   [facility]

## 2017-08-08 NOTE — ED PROVIDER NOTES
"Encounter Date: 2017    SCRIBE #1 NOTE: I, Maricruz Hudson, am scribing for, and in the presence of,  Philip Shay MD. I have scribed the following portions of the note - Other sections scribed: HPI/ROS/PE.       History     Chief Complaint   Patient presents with    Respiratory Distress     arrived via [facility], called to [facility] for c/o SOB, trach, EMS reports initial oxygen sat's of 56 on 4L, improvement in oxygen sat's on NRB 15L via trach, EMS reports 90% oxygen sat's, resp 50, EMS reports non verbal, GCS 15, ON ARRIVAL TO ER, RESP DISTRESS, RESP 52,      CC: Respiratory Distress    HPI: This 66 y.o. Female with a medical history of acute bilateral infarction in a watershed distribution, CAD, diabetes mellitus, HTN, morbid obesity, TAMMY on CPAP, and stroke presents to the ED via [facility] for emergent evaluation of acute respiratory distress and SOB. Per EMS, non-verbal patient arrived from [facility], reporting initial oxygen sat's of 56 on 4L with improvement in oxygen sat's on NRB 15L via trachea. EMS also reports 90% oxygen sat's with respiratory rate of 50 and initial GCS of 50. Her blood pressure was "210/110." Patient has a CABG scar and LUQ G-tube. Patient also has a catheter in place with urine bag. Otherwise, hx is limited due to patient being in respiratory distress.       The history is provided by the EMS personnel. No  was used.     Review of patient's allergies indicates:   Allergen Reactions    Latex, natural rubber      Past Medical History:   Diagnosis Date    Acute bilateral cerebral infarction in a watershed distribution 2017    CAD (coronary artery disease)     Diabetes mellitus     Hypertension     Morbid obesity     TAMMY on CPAP     setting +17    Stroke      Past Surgical History:   Procedure Laterality Date    ARTERIAL BYPASS SURGRY      9 tears sgo     SECTION      CORONARY ARTERY BYPASS GRAFT      HYSTERECTOMY      TUBAL " LIGATION       Family History   Problem Relation Age of Onset    No Known Problems Father     No Known Problems Mother     No Known Problems Sister     No Known Problems Brother     No Known Problems Maternal Aunt     No Known Problems Maternal Uncle     No Known Problems Paternal Aunt     No Known Problems Paternal Uncle     No Known Problems Maternal Grandmother     No Known Problems Maternal Grandfather     No Known Problems Paternal Grandmother     No Known Problems Paternal Grandfather     Amblyopia Neg Hx     Blindness Neg Hx     Cancer Neg Hx     Cataracts Neg Hx     Diabetes Neg Hx     Glaucoma Neg Hx     Hypertension Neg Hx     Macular degeneration Neg Hx     Retinal detachment Neg Hx     Strabismus Neg Hx     Stroke Neg Hx     Thyroid disease Neg Hx      Social History   Substance Use Topics    Smoking status: Never Smoker    Smokeless tobacco: Never Used    Alcohol use Yes      Comment: occ     Review of Systems   Unable to perform ROS: Severe respiratory distress       Physical Exam     Initial Vitals [08/08/17 0902]   BP Pulse Resp Temp SpO2   (!) 230/109 101 (!) 52 -- (!) 90 %      MAP       149.33         Physical Exam    Nursing note and vitals reviewed.  Constitutional: She appears well-developed and well-nourished. She appears lethargic. She is diaphoretic.   HENT:   Head: Normocephalic and atraumatic.   Eyes: EOM are normal.   Bilateral eyes opens to voice command.   Neck: Neck supple.   Cardiovascular: Regular rhythm. Exam reveals no gallop and no friction rub.    No murmur heard.  Pulmonary/Chest: Tachypnea noted. She is in respiratory distress. She has decreased breath sounds.   Patient has a decrease in lung sounds, but are clear.    Abdominal: Soft. Normal appearance. She exhibits no distension. There is no tenderness.   Obesity in abdomen. Patient has a vertical scar located on anterior abdomen.   Musculoskeletal: She exhibits no edema.   Neurological: She appears  lethargic.   At 9:15 AM, patient became less altered to purposeful movement. Patient is responsive to voice command.   Skin: Skin is warm. No erythema.         ED Course   Procedures  Labs Reviewed   CULTURE, BLOOD   CULTURE, BLOOD   CBC W/ AUTO DIFFERENTIAL   COMPREHENSIVE METABOLIC PANEL   B-TYPE NATRIURETIC PEPTIDE   TROPONIN I   URINALYSIS   LACTIC ACID, PLASMA     EKG Readings: (Independently Interpreted)   Other EKG Interpretations: Rate 87, normal sinus rhythm, left axis deviation, no acute ST segment elevation or depression, Q wave in lead aVR and V1 as well as 3 and aVF.. Appears to be  pulmonary disease pattern          Medical Decision Making:   Patient appears much better at this time.  I suspect she had a mucous plug however I did have to give her breathing treatment over an hour and she improved.  The patient is has a tracheostomy secondary to recent stroke.  She had an episode of respiratory distress similar to this during prior admission.  She is very hypertensive upon arrival and I believe this is secondary to anxiety and stress.  This may have been hypertensive emergency.  She still remains on the ventilator and at this time point I think she needs admission for nebulizers and eventually weaned off the ventilator.  I don't believe this is pneumonia heart failure ulnar embolism or ACS.            Scribe Attestation:   Scribe #1: I performed the above scribed service and the documentation accurately describes the services I performed. I attest to the accuracy of the note.    Attending Attestation:           Physician Attestation for Scribe:  Physician Attestation Statement for Scribe #1: I, Philip Shay MD, reviewed documentation, as scribed by Maricruz Hudson in my presence, and it is both accurate and complete.                 ED Course     Clinical Impression:   The primary encounter diagnosis was Acute respiratory failure with hypoxia. Diagnoses of Respiratory failure and Hypertensive  emergency without congestive heart failure were also pertinent to this visit.                           Philip Shay MD  08/08/17 3814

## 2017-08-08 NOTE — SUBJECTIVE & OBJECTIVE
Past Medical History:   Diagnosis Date    Acute bilateral cerebral infarction in a watershed distribution 2017    CAD (coronary artery disease)     Diabetes mellitus     Hypertension     Morbid obesity     TAMMY on CPAP     setting +17    Stroke        Past Surgical History:   Procedure Laterality Date    ARTERIAL BYPASS SURGRY      9 tears sgo     SECTION      CORONARY ARTERY BYPASS GRAFT      HYSTERECTOMY      TUBAL LIGATION         Review of patient's allergies indicates:   Allergen Reactions    Latex, natural rubber        No current facility-administered medications on file prior to encounter.      Current Outpatient Prescriptions on File Prior to Encounter   Medication Sig    albuterol-ipratropium 2.5mg-0.5mg/3mL (DUO-NEB) 0.5 mg-3 mg(2.5 mg base)/3 mL nebulizer solution Take 3 mLs by nebulization every 6 (six) hours. Rescue    aliskiren (TEKTURNA) 300 MG Tab 1 tablet (300 mg total) by Per G Tube route once daily.    amantadine HCl (SYMMETREL) 100 mg capsule 1 capsule (100 mg total) by Per G Tube route every 8 (eight) hours.    amlodipine (NORVASC) 10 MG tablet 1 tablet (10 mg total) by Per G Tube route once daily.    aspirin 325 MG tablet 1 tablet (325 mg total) by Per G Tube route once daily.    atorvastatin (LIPITOR) 10 MG tablet 1 tablet (10 mg total) by Per G Tube route once daily.    carvedilol (COREG) 25 MG tablet 1 tablet (25 mg total) by Per G Tube route 2 (two) times daily.    cloNIDine 0.1 mg/24 hr td ptwk (CATAPRES) 0.1 mg/24 hr Place 1 patch onto the skin every 7 days.    fluoxetine (PROZAC) 20 MG capsule 1 capsule (20 mg total) by Per G Tube route once daily.    HEPARIN SODIUM,PORCINE (HEPARIN, PORCINE,) 5,000 unit/mL injection Inject 1.5 mLs (7,500 Units total) into the skin every 8 (eight) hours.    insulin aspart (NOVOLOG) 100 unit/mL InPn pen Inject 1-10 Units into the skin every 4 (four) hours as needed (Hyperglycemia).    insulin detemir (LEVEMIR FLEXTOUCH)  100 unit/mL (3 mL) SubQ InPn pen Inject 10 Units into the skin once daily.    pantoprazole (PROTONIX) 40 mg GrPS 1 packet (40 mg total) by Per G Tube route once daily.    acetaminophen (TYLENOL) 650 mg/20.3 mL Soln 20.3 mLs (650 mg total) by Per NG tube route every 6 (six) hours as needed.    atorvastatin (LIPITOR) 10 MG tablet Take 10 mg by mouth once daily.    diclofenac sodium (VOLTAREN) 1 % Gel Apply 2 g topically 2 (two) times daily.    EASY TOUCH TWIST LANCETS 30 gauge Misc     meclizine (ANTIVERT) 25 mg tablet Take 1 tablet (25 mg total) by mouth every 6 (six) hours as needed.     Family History     Problem Relation (Age of Onset)    No Known Problems Father, Mother, Sister, Brother, Maternal Aunt, Maternal Uncle, Paternal Aunt, Paternal Uncle, Maternal Grandmother, Maternal Grandfather, Paternal Grandmother, Paternal Grandfather        Social History Main Topics    Smoking status: Never Smoker    Smokeless tobacco: Never Used    Alcohol use Yes      Comment: occ    Drug use: No    Sexual activity: Not Currently     Birth control/ protection: See Surgical Hx     Review of Systems   Constitutional: Negative.    HENT: Negative.    Eyes: Negative.    Respiratory: Positive for shortness of breath and wheezing.    Cardiovascular: Negative.    Gastrointestinal: Negative.    Endocrine: Negative.    Genitourinary: Negative.    Skin: Negative.    Neurological: Negative.    Hematological: Negative.    Psychiatric/Behavioral: The patient is nervous/anxious.      Objective:     Vital Signs (Most Recent):  Temp: 98.1 °F (36.7 °C) (08/08/17 1430)  Pulse: (!) 49 (08/08/17 1558)  Resp: (!) 40 (08/08/17 1558)  BP: (!) 167/73 (08/08/17 1545)  SpO2: 100 % (08/08/17 1558) Vital Signs (24h Range):  Temp:  [98.1 °F (36.7 °C)] 98.1 °F (36.7 °C)  Pulse:  [] 49  Resp:  [7-52] 40  SpO2:  [86 %-100 %] 100 %  BP: (128-233)/() 167/73     Weight: (!) 167.8 kg (370 lb)  Body mass index is 56.26 kg/m².    Physical  Exam   Constitutional: She is oriented to person, place, and time. She appears well-developed. No distress.   Morbidly obese   HENT:   Head: Normocephalic and atraumatic.   Mouth/Throat: Oropharynx is clear and moist.   Tracheostomy in place   Eyes: Conjunctivae and EOM are normal.   Neck: Normal range of motion. Neck supple.   Cardiovascular: Normal rate, regular rhythm, normal heart sounds and intact distal pulses.    Pulmonary/Chest: Effort normal. She has no wheezes. She has rales (distant).   On MV via trach   Abdominal: Soft. Bowel sounds are normal.   Musculoskeletal: Normal range of motion. She exhibits no edema.   Neurological: She is alert and oriented to person, place, and time.   Skin: Skin is warm and dry. She is not diaphoretic.   Psychiatric: She has a normal mood and affect. Her behavior is normal. Thought content normal.   Nursing note and vitals reviewed.       Significant Labs: All pertinent labs within the past 24 hours have been reviewed.    Significant Imaging: I have reviewed all pertinent imaging results/findings within the past 24 hours.  I have reviewed and interpreted all pertinent imaging results/findings within the past 24 hours.

## 2017-08-08 NOTE — ASSESSMENT & PLAN NOTE
Weight loss strongly encouraged. Is to start rehab once acute issue has resolved. Nutrition consult as outpatient.

## 2017-08-08 NOTE — ASSESSMENT & PLAN NOTE
Uncontrolled. Hgb A1c in June was 10.5%. Will start with SSI and adjust regimen for goal BG < 180

## 2017-08-09 PROBLEM — N18.30 CKD (CHRONIC KIDNEY DISEASE) STAGE 3, GFR 30-59 ML/MIN: Status: ACTIVE | Noted: 2017-08-09

## 2017-08-09 PROBLEM — R82.90 ABNORMAL URINALYSIS: Status: ACTIVE | Noted: 2017-08-09

## 2017-08-09 LAB
ANION GAP SERPL CALC-SCNC: 12 MMOL/L
BUN SERPL-MCNC: 58 MG/DL
CALCIUM SERPL-MCNC: 10 MG/DL
CHLORIDE SERPL-SCNC: 95 MMOL/L
CO2 SERPL-SCNC: 32 MMOL/L
CREAT SERPL-MCNC: 1.7 MG/DL
EST. GFR  (AFRICAN AMERICAN): 36 ML/MIN/1.73 M^2
EST. GFR  (NON AFRICAN AMERICAN): 31 ML/MIN/1.73 M^2
ESTIMATED AVG GLUCOSE: 160 MG/DL
GLUCOSE SERPL-MCNC: 165 MG/DL
HBA1C MFR BLD HPLC: 7.2 %
POCT GLUCOSE: 168 MG/DL (ref 70–110)
POCT GLUCOSE: 182 MG/DL (ref 70–110)
POCT GLUCOSE: 183 MG/DL (ref 70–110)
POCT GLUCOSE: 213 MG/DL (ref 70–110)
POTASSIUM SERPL-SCNC: 3.6 MMOL/L
SODIUM SERPL-SCNC: 139 MMOL/L

## 2017-08-09 PROCEDURE — 63600175 PHARM REV CODE 636 W HCPCS: Performed by: INTERNAL MEDICINE

## 2017-08-09 PROCEDURE — 27000221 HC OXYGEN, UP TO 24 HOURS

## 2017-08-09 PROCEDURE — 94003 VENT MGMT INPAT SUBQ DAY: CPT

## 2017-08-09 PROCEDURE — 63600175 PHARM REV CODE 636 W HCPCS: Performed by: EMERGENCY MEDICINE

## 2017-08-09 PROCEDURE — 25000003 PHARM REV CODE 250: Performed by: INTERNAL MEDICINE

## 2017-08-09 PROCEDURE — G9171 VOICE CURRENT STATUS: HCPCS | Mod: CL

## 2017-08-09 PROCEDURE — 94640 AIRWAY INHALATION TREATMENT: CPT

## 2017-08-09 PROCEDURE — G9172 VOICE GOAL STATUS: HCPCS | Mod: CJ

## 2017-08-09 PROCEDURE — 25000003 PHARM REV CODE 250: Performed by: EMERGENCY MEDICINE

## 2017-08-09 PROCEDURE — 94761 N-INVAS EAR/PLS OXIMETRY MLT: CPT

## 2017-08-09 PROCEDURE — 99900026 HC AIRWAY MAINTENANCE (STAT)

## 2017-08-09 PROCEDURE — 87070 CULTURE OTHR SPECIMN AEROBIC: CPT

## 2017-08-09 PROCEDURE — L8501 TRACHEOSTOMY SPEAKING VALVE: HCPCS

## 2017-08-09 PROCEDURE — 20000000 HC ICU ROOM

## 2017-08-09 PROCEDURE — 36415 COLL VENOUS BLD VENIPUNCTURE: CPT

## 2017-08-09 PROCEDURE — 63600175 PHARM REV CODE 636 W HCPCS: Performed by: HOSPITALIST

## 2017-08-09 PROCEDURE — 25000242 PHARM REV CODE 250 ALT 637 W/ HCPCS: Performed by: EMERGENCY MEDICINE

## 2017-08-09 PROCEDURE — 92597 ORAL SPEECH DEVICE EVAL: CPT

## 2017-08-09 PROCEDURE — 87205 SMEAR GRAM STAIN: CPT

## 2017-08-09 PROCEDURE — A4216 STERILE WATER/SALINE, 10 ML: HCPCS | Performed by: EMERGENCY MEDICINE

## 2017-08-09 PROCEDURE — 80048 BASIC METABOLIC PNL TOTAL CA: CPT

## 2017-08-09 RX ORDER — HYDRALAZINE HYDROCHLORIDE 20 MG/ML
10 INJECTION INTRAMUSCULAR; INTRAVENOUS ONCE
Status: COMPLETED | OUTPATIENT
Start: 2017-08-09 | End: 2017-08-09

## 2017-08-09 RX ORDER — HYDRALAZINE HYDROCHLORIDE 25 MG/1
50 TABLET, FILM COATED ORAL EVERY 8 HOURS
Status: DISCONTINUED | OUTPATIENT
Start: 2017-08-09 | End: 2017-08-10

## 2017-08-09 RX ADMIN — HYDRALAZINE HYDROCHLORIDE 50 MG: 25 TABLET ORAL at 09:08

## 2017-08-09 RX ADMIN — CHLORHEXIDINE GLUCONATE 15 ML: 1.2 RINSE ORAL at 09:08

## 2017-08-09 RX ADMIN — Medication 3 ML: at 01:08

## 2017-08-09 RX ADMIN — INSULIN ASPART 2 UNITS: 100 INJECTION, SOLUTION INTRAVENOUS; SUBCUTANEOUS at 01:08

## 2017-08-09 RX ADMIN — Medication 3 ML: at 06:08

## 2017-08-09 RX ADMIN — INSULIN ASPART 2 UNITS: 100 INJECTION, SOLUTION INTRAVENOUS; SUBCUTANEOUS at 11:08

## 2017-08-09 RX ADMIN — AMANTADINE HYDROCHLORIDE 100 MG: 50 SOLUTION ORAL at 01:08

## 2017-08-09 RX ADMIN — AMANTADINE HYDROCHLORIDE 100 MG: 50 SOLUTION ORAL at 09:08

## 2017-08-09 RX ADMIN — AMLODIPINE BESYLATE 10 MG: 5 TABLET ORAL at 10:08

## 2017-08-09 RX ADMIN — ENOXAPARIN SODIUM 40 MG: 100 INJECTION SUBCUTANEOUS at 06:08

## 2017-08-09 RX ADMIN — ATORVASTATIN CALCIUM 10 MG: 10 TABLET, FILM COATED ORAL at 10:08

## 2017-08-09 RX ADMIN — INSULIN ASPART 2 UNITS: 100 INJECTION, SOLUTION INTRAVENOUS; SUBCUTANEOUS at 06:08

## 2017-08-09 RX ADMIN — CHLORHEXIDINE GLUCONATE 15 ML: 1.2 RINSE ORAL at 10:08

## 2017-08-09 RX ADMIN — IPRATROPIUM BROMIDE AND ALBUTEROL SULFATE 3 ML: .5; 3 SOLUTION RESPIRATORY (INHALATION) at 11:08

## 2017-08-09 RX ADMIN — IPRATROPIUM BROMIDE AND ALBUTEROL SULFATE 3 ML: .5; 3 SOLUTION RESPIRATORY (INHALATION) at 12:08

## 2017-08-09 RX ADMIN — HYDRALAZINE HYDROCHLORIDE 10 MG: 20 INJECTION INTRAMUSCULAR; INTRAVENOUS at 02:08

## 2017-08-09 RX ADMIN — IPRATROPIUM BROMIDE AND ALBUTEROL SULFATE 3 ML: .5; 3 SOLUTION RESPIRATORY (INHALATION) at 08:08

## 2017-08-09 RX ADMIN — Medication 3 ML: at 10:08

## 2017-08-09 RX ADMIN — INSULIN ASPART 2 UNITS: 100 INJECTION, SOLUTION INTRAVENOUS; SUBCUTANEOUS at 05:08

## 2017-08-09 RX ADMIN — PANTOPRAZOLE SODIUM 40 MG: 40 GRANULE, DELAYED RELEASE ORAL at 10:08

## 2017-08-09 RX ADMIN — IPRATROPIUM BROMIDE AND ALBUTEROL SULFATE 3 ML: .5; 3 SOLUTION RESPIRATORY (INHALATION) at 03:08

## 2017-08-09 RX ADMIN — AMANTADINE HYDROCHLORIDE 100 MG: 50 SOLUTION ORAL at 05:08

## 2017-08-09 RX ADMIN — HYDRALAZINE HYDROCHLORIDE 10 MG: 20 INJECTION INTRAMUSCULAR; INTRAVENOUS at 05:08

## 2017-08-09 RX ADMIN — ASPIRIN 81 MG 81 MG: 81 TABLET ORAL at 10:08

## 2017-08-09 RX ADMIN — IPRATROPIUM BROMIDE AND ALBUTEROL SULFATE 3 ML: .5; 3 SOLUTION RESPIRATORY (INHALATION) at 04:08

## 2017-08-09 RX ADMIN — IPRATROPIUM BROMIDE AND ALBUTEROL SULFATE 3 ML: .5; 3 SOLUTION RESPIRATORY (INHALATION) at 07:08

## 2017-08-09 RX ADMIN — HYDRALAZINE HYDROCHLORIDE 50 MG: 25 TABLET ORAL at 01:08

## 2017-08-09 RX ADMIN — FLUOXETINE 20 MG: 20 CAPSULE ORAL at 10:08

## 2017-08-09 NOTE — PLAN OF CARE
Problem: Patient Care Overview  Goal: Plan of Care Review  Patient was weaned to Trach collar per RT as ordered. She is tolerating fine and is in no respiratory distress. She interacts intermittently which concerning for her daughter. She feels this is not how she has been lately. Tube feeding has been increased to goal with no significant residual. She sustained no injury, fall or trauma this shift.

## 2017-08-09 NOTE — PLAN OF CARE
Problem: Nutrition, Enteral (Adult)  Goal: Signs and Symptoms of Listed Potential Problems Will be Absent, Minimized or Managed (Nutrition, Enteral)  Signs and symptoms of listed potential problems will be absent, minimized or managed by discharge/transition of care (reference Nutrition, Enteral (Adult) CPG).  Outcome: Ongoing (interventions implemented as appropriate)  Recommendations  Recommendation/Intervention: 1.) Recommend increasing Diabetisource AC to goal rate 60 mls/hr continuous providing 1728 kcals/day, 86 g protein/day, 144 g CHO/day, and 1178 mls water/day. Hold TF x4 hrs for residuals >250 mls. Flush 90 mls free water Q4 hrs or per MD.   Goals: 1.) patient will tolerate TF at goal rate   Nutrition Goal Status: new  Communication of RD Recs: discussed on rounds (sticky note)

## 2017-08-09 NOTE — PROGRESS NOTES
Received patient on vent on charted settings. Alarms set and functional. Ambu bag and mask @ [facility]OB. Patient resting without distress. Will continue to monitor.

## 2017-08-09 NOTE — CONSULTS
[facility]  Adult Nutrition  Consult Note    SUMMARY     Recommendations  Recommendation/Intervention: 1.) Recommend increasing Diabetisource AC to goal rate 60 mls/hr continuous providing 1728 kcals/day, 86 g protein/day, 144 g CHO/day, and 1178 mls water/day. Hold TF x4 hrs for residuals >250 mls. Flush 90 mls free water Q4 hrs or per MD.   Goals: 1.) patient will tolerate TF at goal rate   Nutrition Goal Status: new  Communication of RD Recs: discussed on rounds (sticky note)    1. Acute respiratory failure with hypoxia    2. Respiratory failure    3. Hypertensive emergency without congestive heart failure      Past Medical History:   Diagnosis Date    Acute bilateral cerebral infarction in a watershed distribution 06/2017    CAD (coronary artery disease)     Diabetes mellitus     Hypertension     Morbid obesity     TAMMY on CPAP     setting +17    Stroke        Reason for Assessment  Reason for Assessment: physician consult  Interdisciplinary Rounds: attended  General Information Comments: Admits with acute on chronic respiratory distress. s/p trach/peg. Enteral nutrition initated from feeds per NH. Plan to discharge to rehab.     Nutrition Prescription Ordered  Current Diet Order: NPO     Current Nutrition Support Formula Ordered: Diabetisource  Current Nutrition Support Rate Ordered: 50 (ml)  Current Nutrition Support Frequency Ordered: continuous        Evaluation of Received Nutrients/Fluid Intake  Enteral Calories (kcal): 1440  Enteral Protein (gm): 72  Enteral (Free Water) Fluid (mL): 982  Tolerance: tolerating  % Intake of Estimated Energy Needs: 75 - 100 %  % Meal Intake: NPO     Nutrition Risk Screen  Nutrition Risk Screen: tube feeding or parenteral nutrition    Nutrition/Diet History  Food Preferences: No cultural or [place] food preferences noted per chart. NKFA.   Nutrition Support Formula Prior to Admit: Diabetasource  Nutrition Support Rate Prior to Admit: 50  "(ml)  Nutrition Support Provision Prior to Admit: 1440 kcals/day, 72 g protein/day, 120 g CHO/day , and 982 mls water/day.     Labs/Tests/Procedures/Meds  Diagnostic Test/Procedure Review: reviewed, pertinent  Pertinent Labs Reviewed: reviewed, pertinent  BMP  Lab Results   Component Value Date     08/08/2017    K 4.2 08/08/2017    CL 92 (L) 08/08/2017    CO2 35 (H) 08/08/2017    BUN 45 (H) 08/08/2017    CREATININE 1.5 (H) 08/08/2017    CALCIUM 10.1 08/08/2017    ANIONGAP 11 08/08/2017    ESTGFRAFRICA 42 (A) 08/08/2017    EGFRNONAA 36 (A) 08/08/2017     Lab Results   Component Value Date    ALBUMIN 2.8 (L) 08/08/2017     Lab Results   Component Value Date    CALCIUM 10.1 08/08/2017    PHOS 3.7 08/07/2017       Recent Labs  Lab 08/09/17  0519   POCTGLUCOSE 183*       Pertinent Medications Reviewed: reviewed  Scheduled Meds:   albuterol-ipratropium 2.5mg-0.5mg/3mL  3 mL Nebulization Q4H    amantadine HCl  100 mg Per G Tube Q8H    amlodipine  10 mg Per G Tube Daily    aspirin  81 mg Per G Tube Daily    atorvastatin  10 mg Per G Tube Daily    chlorhexidine  15 mL Mouth/Throat BID    cloNIDine 0.1 mg/24 hr td ptwk  1 patch Transdermal Q7 Days    enoxaparin  40 mg Subcutaneous Daily    fluoxetine  20 mg Per G Tube Daily    hydrALAZINE  50 mg Per G Tube Q8H    pantoprazole  40 mg Per G Tube Daily    sodium chloride 0.9%  3 mL Intravenous Q8H         Physical Findings  Overall Physical Appearance: obese (mst: 3)  Tubes: gastrostomy tube  Skin: intact (guillermina score 16)    Anthropometrics  Temp: 97.8 °F (36.6 °C)  Height: 5' 8"  Weight Method: Infant Scale  Weight: (!) 136.1 kg (300 lb 0.7 oz)  Ideal Body Weight (IBW), Female: 140 lb  % Ideal Body Weight, Female (lb): 214.32 lb  BMI (Calculated): 45.7  BMI Grade: greater than 40 - morbid obesity  Usual Body Weight (UBW), kg:  (neo)      Estimated/Assessed Needs  Weight Used For Calorie Calculations: (!) 136.1 kg (300 lb 0.7 oz)   Energy Calorie Requirements " (kcal): 5885-6423   Energy Need Method: Kcal/kg (11-14 kcals/kg)  RMR (Elliston-St. Jeor Equation): 1949.5  Weight Used For Protein Calculations: 63.6 kg (140 lb 3.4 oz) (ideal body weight)  1.2 gm Protein (gm): 76.48 and 2.0 gm Protein (gm): 127.47  Fluid Need Method: RDA Method (or per MD)  RDA Method (mL): 1497   Grams of CHO per day: 238 max     Assessment and Plan    Morbid obesity with BMI of 50.0-59.9, adult    Related to (etiology):   Excessive energy intake    Signs and Symptoms (as evidenced by):   BMI >40     Interventions/Recommendations (treatment strategy):  Permissive underfeeds, diabetisource AC @ 60 mls/hr.     Nutrition Diagnosis Status:   New              Monitor and Evaluation  Food and Nutrient Intake: energy intake, enteral nutrition intake  Food and Nutrient Adminstration: diet order, enteral and parenteral nutrition administration  Anthropometric Measurements: weight, weight change, body mass index  Biochemical Data, Medical Tests and Procedures: electrolyte and renal panel, glucose/endocrine profile, lipid profile  Nutrition-Focused Physical Findings: overall appearance    Nutrition Risk  x2 weekly    Nutrition Follow-Up    RD Follow-up?: Yes    Discharge planning: enteral feed above to rehab.

## 2017-08-09 NOTE — PLAN OF CARE
Problem: Patient Care Overview  Goal: Plan of Care Review  Pt remains free of falls/injury this shift, pt turned Q2 hrs, VSS, afebrile, unable to wean vent at this time, sbp elevated 180's-190's, prn hydralazine given with moderate effect, siegel intact urine output 30cc/hr, accu check Q6

## 2017-08-09 NOTE — ASSESSMENT & PLAN NOTE
Uncontrolled. Hgb A1c in June was 10.5%.Continue SSI and adjust regimen for goal BG < 180. Currently at goal

## 2017-08-09 NOTE — ASSESSMENT & PLAN NOTE
It appears this was likely due to a mucous plug that was accessed via deep suction. No evidence of infection, pneumothorax or significant pulm effusion. ACS unlikely. Pulm consulted for co-management of vent. Now weaned off vent and stable. Will need suctioning frequently. Hold off on further diuresis given mild bump in creatinine and is stable on low flow. Nebs. PPI for stress ulcer prophylaxis.

## 2017-08-09 NOTE — PROGRESS NOTES
Discharge planning: Lorraine with [facility] here today, met with patient, continues to follow thru [facility] as well. Patient progressing to T-Piece today. SW will follow in ICU and assist as needed.

## 2017-08-09 NOTE — PHYSICIAN QUERY
"PT Name: Nisa Frank  MR #: [id number]     Physician Query Form - Documentation Clarification      CDS/: Casey Hoffman RN             Contact information:   [email or web address]    This form is a permanent document in the medical record.     Query Date: August 9, 2017    By submitting this query, we are merely seeking further clarification of documentation. Please utilize your independent clinical judgment when addressing the question(s) below.    The Medical record reflects the following:    Supporting Clinical Findings Location in Medical Record       "Type 2 diabetes mellitus, uncontrolled"        H&P 8/8   Glucose: 260,  213,  183      A1C= 7.2;  Avg. Glucose: 160 Point of Care glucose 8/8-8/9      Labs 8/8@1028                                                                              Doctor, Please specify diagnosis or diagnoses associated with above clinical findings.    Provider Use Only    [ x ]  Diabetes mellitus, type 2, hyperglycemia    [  ]  Diabetes mellitus, type 2, other/specify: ________________________________                                                                                                           [  ] Clinically undetermined            "

## 2017-08-09 NOTE — HOSPITAL COURSE
"Ms Frank presented with acute respiratory failure with hypoxia likely due to mucous plug. In the ED, patient was connected to MV, given albuterol/tiotropium and suctioned at bedside, immediately correcting her respiratory issue. Per ED staff and patient's daughter who was present at bedside, it was noted that a good amount of thick mucous was suctioned. BP also significantly improved after this was done. Renal function at baseline. UA suggestive of infection however not a reliable sample. A repeat sample again not reliable despite this being obtained from siegel. Per records at outside facility she presented from, siegel had been in place and there were plans for removal. Unknown if this was done. Patient remained afebrile and non toxic appearing. Mild leukocytosis of 13 with mild neutrophilia of 74% likely stress induced. No new consolidation on imaging but rather generalized haziness unchanged from prior. Distant rales on exam. Patient was admitted to ICU to wean off MV as tolerated and for [facility], as preferred by patient's daughter. Patient was diuresed with on dose of IV lasix. Suctioned intermittently. Able to wean off ventilator on 8/9 in AM. Stable with T piece for more than 8 hours, therefore de-escalated to low flow supplemental O2 via trach mask. Patient following simple commands and answering "yes" or "no" (whispered) adequately to questions, however daughter is concerned that patient is not as active or talkative as usual. ABG showed pH of 7.5, pCO2 of 51, pO2 108, SaO2 99%, HCO3 (per BMP) 34 (which is consistent with her baseline). BUN 50s (slightly elevated than baseline) and Cr 1.4 (improved since discharge from LT[facility]). Noted more urine output than input. No BMs since admission. CT head 8/10 without acute intracranial pathology. Started on IVFs in addition to tube feeds attempting to fix what seems to be volume contraction. Renal function, pH and bicarb much improved with this. IVFs " "discontinued thereafter. Patient continued following simple commands, is alert and able to whisper "yes" or "no" adequately to questions. Most of the times reports "I'm alright". PT/OT evaluated on 8/10 and ST on 8/11. Patient was able to clean eyes with a towel and oral cavity on her own. With some assistance, was able to role side-to-side on bed by holding onto rails and sit up on edge of bed. Was able to maintain posture on her own. Required max assistance to stand. No respiratory distress during intervention. Inpatient rehab still recommended. On 8/11, early in AM, patient developed respiratory distress and hypoxia. This quickly resolved by removing tracheostomy's inner piece, deep suction and nebs. Some thick mucous suctioned, reportedly blood tinged. Patient remained with some bibasilar ronchi and productive cough. Inner piece left out and patient remained stable respiratory wise. Mucomyst added to duo-neb treatment. Patient has a strong cough when present. Mucous cleared by suction. ENT consulted to evaluate tracheostomy. Per ENT's eval, tracheostomy was secured in right place without any evidence of infection or bleeding round the tube itself. Inner piece replaced (#6 Shiley tube) and remained stable. No further intervention required from ENT's standpoint. PO2 persistently above 100 on low flow. Trial at room air on 8/12, but patient asked to have low flow placed back. This was done and patient had remained stable. Scheduled duo-nebs with nebulized mucomyst every 6 hours. Deep suction PRN. Patient likely with chronic bronchitis.Diabetasource AC continuous infusion at 60 cc/hr. Stepped down to floor on 8/12. Transfer[facility] to [facility] on 8[facility]14 in stable condition.   "

## 2017-08-09 NOTE — PROGRESS NOTES
[facility] [place] [facility]  Progress Note    Patient Name: Nisa Frank  MRN: [id number]  Patient Class: IP- Inpatient   Admission Date: 8/8/2017  Length of Stay: 1 days  Attending Physician: Jessa Patino MD  Primary Care Provider: Atilio Downs MD        Subjective:     Principal Problem:<principal problem not specified>    HPI:  66 year old female with diabetes mellitus type 2, CKD stage 3, CAD s/p CABG, essential hypertension, cerebrovascular disease s/p bi-hemispheric watershed infarcts and trach/PEG status who was brought in via EMS from [facility] for acute respiratory distress, lethargy and hypoxia of 56% while on 4L via trach collar, improving to 90% on non rebreather en route to the ED. Patient was evidently in respiratory distress despite improvement of SaO2. Upon arrival to the ED, patient was connected to MV, given albuterol/tiotropium and suctioned at bedside, immediately correcting her respiratory issue. Per ED staff and patient's daughter who was present at bedside, it was noted that a good amount of thick mucous was suctioned. BP also significantly improved after this was done. Renal function at baseline. UA suggestive of infection however not a reliable sample. Afebrile and non toxic appearing. No new consolidation on imaging but rather generalized haziness unchanged from prior. Distant rales on exam. Patient was admitted to ICU to wean off MV as tolerated and for placement to [facility], as preferred by patient's daughter.             Hospital Course:  Ms Frank presented with acute respiratory failure with hypoxia likely due to mucous plug. In the ED, patient was connected to MV, given albuterol/tiotropium and suctioned at bedside, immediately correcting her respiratory issue. Per ED staff and patient's daughter who was present at bedside, it was noted that a good amount of thick mucous was suctioned. BP also significantly improved after this was  done. Renal function at baseline. UA suggestive of infection however not a reliable sample. A repeat sample again not reliable despite this being obtained from christal. Per records at outside facility she presented from, siegel had been in place and there were plans for removal. Unknown if this was done. Patient remained afebrile and non toxic appearing. Mild leukocytosis of 13 with mild neutrophilia of 74% likely stress induced. No new consolidation on imaging but rather generalized haziness unchanged from prior. Distant rales on exam. Patient was admitted to ICU to wean off MV as tolerated and for placement to [facility], as preferred by patient's daughter. Patient was diuresed with on dose of IV lasix. Suctioned intermittently. Able to wean off ventilator on 8/9 in AM. Stable with T piece for more than 8 hours, therefore de-escalated to low flow supplemental O2 via trach mask. Patient following simple commands. Pending official Speech/Physical/Occupational therapies to move on to inpatient rehab. Tube feeds running    Interval History: no events overnight. Remained afebrile. BP better with PRN hydralazine.     Review of Systems   Constitutional: Negative.    HENT: Negative.    Eyes: Negative.    Respiratory: Negative.    Cardiovascular: Negative.    Gastrointestinal: Negative.    Genitourinary: Negative.    Musculoskeletal: Negative.    Skin: Negative.    Neurological: Negative.    Hematological: Negative.    Psychiatric/Behavioral: Negative.      Objective:     Vital Signs (Most Recent):  Temp: 97.8 °F (36.6 °C) (08/09/17 0705)  Pulse: 65 (08/09/17 0814)  Resp: 18 (08/09/17 0814)  BP: (!) 140/66 (08/09/17 0702)  SpO2: 100 % (08/09/17 0814) Vital Signs (24h Range):  Temp:  [97.8 °F (36.6 °C)-98.8 °F (37.1 °C)] 97.8 °F (36.6 °C)  Pulse:  [] 65  Resp:  [7-40] 18  SpO2:  [99 %-100 %] 100 %  BP: (128-211)/(61-93) 140/66     Weight: (!) 136.1 kg (300 lb 0.7 oz)  Body mass index is 45.62 kg/m².    Intake/Output  Summary (Last 24 hours) at 08/09/17 0924  Last data filed at 08/09/17 0705   Gross per 24 hour   Intake                0 ml   Output              740 ml   Net             -740 ml      Physical Exam   Constitutional: She is oriented to person, place, and time. She appears well-developed. No distress.   Morbidly obese   HENT:   Head: Normocephalic and atraumatic.   Mouth/Throat: Oropharynx is clear and moist.   Tracheostomy in place   Eyes: Conjunctivae and EOM are normal.   Neck: Normal range of motion. Neck supple.   Cardiovascular: Normal rate, regular rhythm, normal heart sounds and intact distal pulses.    Pulmonary/Chest: Effort normal. She has no wheezes. She has no rales.   On trach mask   Abdominal: Soft. Bowel sounds are normal.   PEG in place   Musculoskeletal: Normal range of motion. She exhibits no edema.   Neurological: She is alert and oriented to person, place, and time.   Skin: Skin is warm and dry. She is not diaphoretic.   Psychiatric: Her behavior is normal. She exhibits a depressed mood.   Nursing note and vitals reviewed.      Significant Labs: All pertinent labs within the past 24 hours have been reviewed.    Significant Imaging: I have reviewed all pertinent imaging results/findings within the past 24 hours.  I have reviewed and interpreted all pertinent imaging results/findings within the past 24 hours.    Assessment/Plan:      Acute respiratory failure with hypoxia    It appears this was likely due to a mucous plug that was accessed via deep suction. No evidence of infection, pneumothorax or significant pulm effusion. ACS unlikely. Pulm consulted for co-management of vent. Now weaned off vent and stable. Will need suctioning frequently. Hold off on further diuresis given mild bump in creatinine and is stable on low flow. Nebs. PPI for stress ulcer prophylaxis.           Mucus plugging of bronchi    As above          Tracheostomy status    As above          PEG (percutaneous endoscopic  gastrostomy) status    On diabetasource. Rate increased to 60 cc/hr as per nutrition recs          CKD (chronic kidney disease) stage 3, GFR 30-59 ml/min    Close to baseline. Slight bump with lasix. Hold off on lasix. Siegel in place. Strict I/Os. Increased rate on tube feeds          Abnormal urinalysis    Obtained twice and none reliable given high amount of squam cells. Patient afebrile. This is likely colonization from chronic siegel. Siegel switched in the ED. Hold off on abxs. Monitor for signs of infection.            Chronic bilateral cerebral infarction in watershed distribution    No acute issues. ASA, statin and BP control. Neuro checks. Patient will need to transfer to a rehab facility. PT, ST and OT consulted.           CAD s/p CABG    On secondary prevention with ASA and statin. Hold off on BB as patient is chronically in sinus russ. On cardiac monitoring.           Hyperlipidemia    statin          Morbid obesity with BMI of 50.0-59.9, adult    Weight loss strongly encouraged. Is to start rehab once acute issue has resolved. Nutrition consult as outpatient.           Type 2 diabetes mellitus, uncontrolled    Uncontrolled. Hgb A1c in June was 10.5%.Continue SSI and adjust regimen for goal BG < 180. Currently at goal           Essential hypertension    Initially significantly high, then improved along with resolution of respiratory issues. Restart home regimen to be given via PEG. Goal BP < 140/90 mmHg. Adjust meds accordingly.             VTE Risk Mitigation         Ordered     enoxaparin injection 40 mg  Daily     Route:  Subcutaneous        08/08/17 1503     Medium Risk of VTE  Once      08/08/17 1503          Critical care time spent on the evaluation and treatment of severe organ dysfunction, review of pertinent labs and imaging studies, discussions with consulting providers and discussions with patient/family: > 45 minutes.    Jessa Gomez MD  Department of Hospital Medicine   [facility] [place]

## 2017-08-09 NOTE — PLAN OF CARE
"Problem: SLP Goal  Goal: SLP Goal  Short term goals:  1) ongoing PMV assessment  2) tolerate PMV as determined by assessment for verbal communication  Outcome: Ongoing (interventions implemented as appropriate)  Patient seen in room, eyes opened, staring, minimal response to cln, no head nods, did perform some oral motor commands.  Attempted to place PMV , patient lifted left hand to trach and appeared scared .  Cln stated will try again tomorrow as she had recently been off vent as of this am.  Cln did note voicing ("ow") from patient when patient had blood drawn. Attempted to have patient write, unintelligible scribble noted.  Will assess again tomorrow for PMV      "

## 2017-08-09 NOTE — ASSESSMENT & PLAN NOTE
Related to (etiology):   Excessive energy intake    Signs and Symptoms (as evidenced by):   BMI >40     Interventions/Recommendations (treatment strategy):  Permissive underfeeds, diabetisource AC @ 60 mls/hr.     Nutrition Diagnosis Status:   New

## 2017-08-09 NOTE — ASSESSMENT & PLAN NOTE
Close to baseline. Slight bump with lasix. Hold off on lasix. Ross in place. Strict I/Os. Increased rate on tube feeds

## 2017-08-09 NOTE — ASSESSMENT & PLAN NOTE
Obtained twice and none reliable given high amount of squam cells. Patient afebrile. This is likely colonization from chronic siegel. Siegel switched in the ED. Hold off on abxs. Monitor for signs of infection.

## 2017-08-09 NOTE — PT/OT/SLP EVAL
"Speech Language Pathology Evaluation    Nisa Frank   MRN: [id number]   Admitting Diagnosis: <principal problem not specified>    Diet recommendations: Solid Diet Level: NPO  Liquid Diet Level: NPO     SLP Treatment Date: 17  Speech Start Time: 1425     Speech Stop Time: 1500     Speech Total (min): 35 min       TREATMENT BILLABLE MINUTES:  Evaluation Use/Fit Voiced Prosthetic 35    Diagnosis: <principal problem not specified>      Past Medical History:   Diagnosis Date    Acute bilateral cerebral infarction in a watershed distribution 2017    CAD (coronary artery disease)     Diabetes mellitus     Hypertension     Morbid obesity     TAMMY on CPAP     setting +17    Stroke      Past Surgical History:   Procedure Laterality Date    ARTERIAL BYPASS SURGRY      9 tears sgo     SECTION      CORONARY ARTERY BYPASS GRAFT      HYSTERECTOMY      TUBAL LIGATION         Has the patient been evaluated by SLP for swallowing? : No  Keep patient NPO?: Yes   General Precautions: Standard, respiratory    Current Respiratory Status: trach without vent     Social Hx: Patient lives with    Occupational/hobbies/homemaking:     Subjective:  atient seen in room, eyes opened, staring, minimal response to cln, no head nods, did perform some oral motor commands.  Attempted to place PMV , patient lifted left hand to trach and appeared scared .  Cln stated will try again tomorrow as she had recently been off vent as of this am.  Cln did note voicing ("ow") from patient when patient had blood drawn. Attempted to have patient write, unintelligible scribble noted.  Will assess again tomorrow for PMV  Patient goals:          Objective:   Patient found with: blood pressure cuff, siegel catheter, oxygen, tracheostomy, telemetry, PICC line    Oral Musculature Evaluation  Dentition: present and adequate  Mucosal Quality: coated tongue  Mandibular Strength and Mobility: WNL  Oral Labial Strength and Mobility: impaired " coordination  Lingual Strength and Mobility: impaired strength  Buccal Strength and Mobility: flaccid     Cognitive Status:  Behavioral Observations: alert and confused-  Memory and Orientation:   Attention:   Problem Solving:   Pragmatics: poor eye contact  Executive Function:     Language: dnt    Auditory Comprehension: minimal responses    Verbal Expression: trach        Voice: trach, on going PMV assessment    Additional Treatment:      FIM:          Assessment:  Nisa Frank is a 66 y.o. female with a SLP diagnosis of S/P Trach, PMSV Training. She present with cogntive deficits.         Discharge recommendations: Discharge Facility/Level Of Care Needs: rehabilitation facility     Goals:    SLP Goals        Problem: SLP Goal    Goal Priority Disciplines Outcome   SLP Goal     SLP Ongoing (interventions implemented as appropriate)   Description:  Short term goals:  1) ongoing PMV assessment  2) tolerate PMV as determined by assessment for verbal communication                     Plan:   Patient to be seen Therapy Frequency: 4 x/week   Plan of Care expires: 08/17/17  Plan of Care reviewed with: patient  SLP Follow-up?: Yes  SLP - Next Visit Date: 08/10/17           Ligia Ward CCC-SLP  08/09/2017

## 2017-08-09 NOTE — CONSULTS
Progress Note  Pulmonology    Admit Date: 2017   LOS: 1 day       SUBJECTIVE:on t piece     History of Present Illness:  From chart ; 66 year old female with diabetes mellitus type 2, CKD stage 3, CAD s/p CABG, essential hypertension, cerebrovascular disease s/p bi-hemispheric watershed infarcts and trach/PEG status who was brought in via EMS from [facility] for acute respiratory distress, lethargy and hypoxia of 56% while on 4L via trach collar, improving to 90% on non rebreather en route to the ED. Patient was evidently in respiratory distress despite improvement of SaO2. Upon arrival to the ED, patient was connected to MV, given albuterol/tiotropium and suctioned at bedside, immediately correcting her respiratory issue. Per ED staff and patient's daughter who was present at bedside, it was noted that a good amount of thick mucous was suctioned. BP also significantly improved after this was done. Renal function at baseline. UA suggestive of infection however not a reliable sample. Afebrile and non toxic appearing. No new consolidation on imaging but rather generalized haziness unchanged from prior. Distant rales on exam. Patient was admitted to ICU to wean off MV as tolerated and for [facility] to [facility], as preferred by patient's daughter    Review of patient's allergies indicates:   Allergen Reactions    Latex, natural rubber        Past Medical History:   Diagnosis Date    Acute bilateral cerebral infarction in a watershed distribution 2017    CAD (coronary artery disease)     Diabetes mellitus     Hypertension     Morbid obesity     TAMMY on CPAP     setting +17    Stroke      Past Surgical History:   Procedure Laterality Date    ARTERIAL BYPASS SURGRY      9 tears sgo     SECTION      CORONARY ARTERY BYPASS GRAFT      HYSTERECTOMY      TUBAL LIGATION       Family History   Problem Relation Age of Onset    No Known Problems Father     No Known Problems Mother      No Known Problems Sister     No Known Problems Brother     No Known Problems Maternal Aunt     No Known Problems Maternal Uncle     No Known Problems Paternal Aunt     No Known Problems Paternal Uncle     No Known Problems Maternal Grandmother     No Known Problems Maternal Grandfather     No Known Problems Paternal Grandmother     No Known Problems Paternal Grandfather     Amblyopia Neg Hx     Blindness Neg Hx     Cancer Neg Hx     Cataracts Neg Hx     Diabetes Neg Hx     Glaucoma Neg Hx     Hypertension Neg Hx     Macular degeneration Neg Hx     Retinal detachment Neg Hx     Strabismus Neg Hx     Stroke Neg Hx     Thyroid disease Neg Hx        ROS:patient  unable to communicate because intubated, on vent, and sedated     OBJECTIVE:     Vital Signs (Most Recent)  Temp: 97.8 °F (36.6 °C) (08/09/17 0705)  Pulse: 66 (08/09/17 0929)  Resp: 17 (08/09/17 0929)  BP: (!) 173/76 (08/09/17 0902)  SpO2: 100 % (08/09/17 0929)    Vital Signs Range (Last 24H):  Temp:  [97.8 °F (36.6 °C)-98.8 °F (37.1 °C)]   Pulse:  []   Resp:  [7-40]   BP: (128-211)/(61-93)   SpO2:  [99 %-100 %]     Physical Exam:  Now on t piece per trach tube  Head: normocephalic, atraumatic  Eyes:  conjunctivae/corneas clear. PERRL.  Neck: no jugular venous distention, no adenopathy, no carotid bruit and thyroid not enlarged, symmetric, no tenderness/mass/nodules, trachea midline  Lungs:  rales bilaterally and rhonchi bilaterally  Heart: regular rate and rhythm and no murmur  Abdomen: soft, non-tender non-distended; bowel sounds normal  Extremities: no cyanosis or edema, or clubbing  Skin: No rashes or lesions or good skin turgor  Neurologic:somnolent at present    Laboratory:  Recent Results (from the past 24 hour(s))   Blood Culture #2 **CANNOT BE ORDERED STAT**    Collection Time: 08/08/17 10:25 AM   Result Value Ref Range    Blood Culture, Routine No Growth to date    CBC auto differential    Collection Time: 08/08/17 10:28 AM    Result Value Ref Range    WBC 13.34 (H) 3.90 - 12.70 K/uL    RBC 3.66 (L) 4.00 - 5.40 M/uL    Hemoglobin 9.8 (L) 12.0 - 16.0 g/dL    Hematocrit 31.9 (L) 37.0 - 48.5 %    MCV 87 82 - 98 fL    MCH 26.8 (L) 27.0 - 31.0 pg    MCHC 30.7 (L) 32.0 - 36.0 g/dL    RDW 15.1 (H) 11.5 - 14.5 %    Platelets 392 (H) 150 - 350 K/uL    MPV 10.9 9.2 - 12.9 fL    Gran # 9.9 (H) 1.8 - 7.7 K/uL    Lymph # 2.5 1.0 - 4.8 K/uL    Mono # 0.8 0.3 - 1.0 K/uL    Eos # 0.2 0.0 - 0.5 K/uL    Baso # 0.01 0.00 - 0.20 K/uL    Gran% 74.3 (H) 38.0 - 73.0 %    Lymph% 18.7 18.0 - 48.0 %    Mono% 5.8 4.0 - 15.0 %    Eosinophil% 1.1 0.0 - 8.0 %    Basophil% 0.1 0.0 - 1.9 %    Differential Method Automated    Comprehensive metabolic panel    Collection Time: 08/08/17 10:28 AM   Result Value Ref Range    Sodium 138 136 - 145 mmol/L    Potassium 4.2 3.5 - 5.1 mmol/L    Chloride 92 (L) 95 - 110 mmol/L    CO2 35 (H) 23 - 29 mmol/L    Glucose 232 (H) 70 - 110 mg/dL    BUN, Bld 45 (H) 8 - 23 mg/dL    Creatinine 1.5 (H) 0.5 - 1.4 mg/dL    Calcium 10.1 8.7 - 10.5 mg/dL    Total Protein 9.1 (H) 6.0 - 8.4 g/dL    Albumin 2.8 (L) 3.5 - 5.2 g/dL    Total Bilirubin 1.0 0.1 - 1.0 mg/dL    Alkaline Phosphatase 134 55 - 135 U/L    AST 16 10 - 40 U/L    ALT 18 10 - 44 U/L    Anion Gap 11 8 - 16 mmol/L    eGFR if African American 42 (A) >60 mL/min/1.73 m^2    eGFR if non African American 36 (A) >60 mL/min/1.73 m^2   Brain natriuretic peptide    Collection Time: 08/08/17 10:28 AM   Result Value Ref Range    BNP 32 0 - 99 pg/mL   Troponin I    Collection Time: 08/08/17 10:28 AM   Result Value Ref Range    Troponin I 0.011 0.000 - 0.026 ng/mL   Lactic acid, plasma    Collection Time: 08/08/17 10:28 AM   Result Value Ref Range    Lactate (Lactic Acid) 1.4 0.5 - 2.2 mmol/L   Hemoglobin A1c if not done in past 6 weeks    Collection Time: 08/08/17 10:28 AM   Result Value Ref Range    Hemoglobin A1C 7.2 (H) 4.0 - 5.6 %    Estimated Avg Glucose 160 (H) 68 - 131 mg/dL    Urinalysis Catheterized    Collection Time: 08/08/17  1:30 PM   Result Value Ref Range    Specimen UA Urine, Catheterized     Color, UA Laura Yellow, Straw, Laura    Appearance, UA Cloudy (A) Clear    pH, UA 5.0 5.0 - 8.0    Specific Gravity, UA 1.015 1.005 - 1.030    Protein, UA 1+ (A) Negative    Glucose, UA 1+ (A) Negative    Ketones, UA Negative Negative    Bilirubin (UA) Negative Negative    Occult Blood UA 3+ (A) Negative    Nitrite, UA Negative Negative    Urobilinogen, UA 4.0-6.0 (A) <2.0 EU/dL    Leukocytes, UA 2+ (A) Negative   Urinalysis Microscopic    Collection Time: 08/08/17  1:30 PM   Result Value Ref Range    RBC, UA 3 0 - 4 /hpf    WBC, UA 3 0 - 5 /hpf    Bacteria, UA Many (A) None-Occ /hpf    Squam Epithel, UA 6 /hpf    Hyaline Casts, UA 0 0-1/lpf /lpf    Amorphous, UA Many (A) None-Moderate    Microscopic Comment SEE COMMENT    Urinalysis    Collection Time: 08/08/17  4:25 PM   Result Value Ref Range    Specimen UA Urine, Catheterized     Color, UA Laura Yellow, Straw, Laura    Appearance, UA Cloudy (A) Clear    pH, UA 5.0 5.0 - 8.0    Specific Gravity, UA 1.025 1.005 - 1.030    Protein, UA 2+ (A) Negative    Glucose, UA 1+ (A) Negative    Ketones, UA Negative Negative    Bilirubin (UA) 1+ (A) Negative    Occult Blood UA 1+ (A) Negative    Nitrite, UA Negative Negative    Urobilinogen, UA 4.0-6.0 (A) <2.0 EU/dL    Leukocytes, UA 2+ (A) Negative   Urinalysis Microscopic    Collection Time: 08/08/17  4:25 PM   Result Value Ref Range    RBC, UA 4 0 - 4 /hpf    WBC, UA 6 (H) 0 - 5 /hpf    Bacteria, UA Moderate (A) None-Occ /hpf    Squam Epithel, UA 15 /hpf    Hyaline Casts, UA 0 0-1/lpf /lpf    Amorphous, UA Moderate None-Moderate    Microscopic Comment SEE COMMENT    POCT glucose    Collection Time: 08/08/17  5:50 PM   Result Value Ref Range    POCT Glucose 260 (H) 70 - 110 mg/dL   POCT glucose    Collection Time: 08/08/17 11:56 PM   Result Value Ref Range    POCT Glucose 213 (H) 70 - 110 mg/dL    POCT glucose    Collection Time: 08/09/17  5:19 AM   Result Value Ref Range    POCT Glucose 183 (H) 70 - 110 mg/dL     CBC:   Recent Labs  Lab 08/08/17  1028   WBC 13.34*   RBC 3.66*   HGB 9.8*   HCT 31.9*   *   MCV 87   MCH 26.8*   MCHC 30.7*     BMP:   Recent Labs  Lab 08/07/17  0636 08/08/17  1028   * 232*   * 138   K 3.7 4.2   CL 91* 92*   CO2 36* 35*   BUN 46* 45*   CREATININE 1.3 1.5*   CALCIUM 9.5 10.1   MG 2.2  --      CMP:   Recent Labs  Lab 08/08/17  1028   *   CALCIUM 10.1   ALBUMIN 2.8*   PROT 9.1*      K 4.2   CO2 35*   CL 92*   BUN 45*   CREATININE 1.5*   ALKPHOS 134   ALT 18   AST 16   BILITOT 1.0     LFTs:   Recent Labs  Lab 08/08/17  1028   ALT 18   AST 16   ALKPHOS 134   BILITOT 1.0   PROT 9.1*   ALBUMIN 2.8*     Coagulation: No results for input(s): INR, APTT in the last 168 hours.    Invalid input(s): PT  Cardiac markers: No results for input(s): CKMB, TROPONINT, MYOGLOBIN in the last 168 hours.  Microbiology Results (last 7 days)     Procedure Component Value Units Date/Time    Blood Culture #2 **CANNOT BE ORDERED STAT** [[id number]] Collected:  08/08/17 1025    Order Status:  Completed Specimen:  Blood from Peripheral, Hand, Left Updated:  08/08/17 1912     Blood Culture, Routine No Growth to date    Blood Culture #1 **CANNOT BE ORDERED STAT** [[id number]] Collected:  08/08/17 1005    Order Status:  Completed Specimen:  Blood from Peripheral, Hand, Left Updated:  08/08/17 1912     Blood Culture, Routine No Growth to date              Diagnostic Results:  Tracheostomy tube is in stable position. Cardiopulmonary status is unchanged noting relatively clear lungs without focal consolidation or effusion. No pneumothorax.      ASSESSMENT/PLAN:     1. Acute respiratory failure with hypoxia    2. Respiratory failure    3. Hypertensive emergency without congestive heart failure    4.      Tracheostomy /tyrach collar    Plan:now on trach collar and tolerating;secretions  present and studies sent. cxr is normal; secretions due to ongoing bronchitis   Critical care 35 min

## 2017-08-09 NOTE — ASSESSMENT & PLAN NOTE
On secondary prevention with ASA and statin. Hold off on BB as patient is chronically in sinus russ. On cardiac monitoring.

## 2017-08-09 NOTE — SUBJECTIVE & OBJECTIVE
Interval History: no events overnight. Remained afebrile. BP better with PRN hydralazine.     Review of Systems   Constitutional: Negative.    HENT: Negative.    Eyes: Negative.    Respiratory: Negative.    Cardiovascular: Negative.    Gastrointestinal: Negative.    Genitourinary: Negative.    Musculoskeletal: Negative.    Skin: Negative.    Neurological: Negative.    Hematological: Negative.    Psychiatric/Behavioral: Negative.      Objective:     Vital Signs (Most Recent):  Temp: 97.8 °F (36.6 °C) (08/09/17 0705)  Pulse: 65 (08/09/17 0814)  Resp: 18 (08/09/17 0814)  BP: (!) 140/66 (08/09/17 0702)  SpO2: 100 % (08/09/17 0814) Vital Signs (24h Range):  Temp:  [97.8 °F (36.6 °C)-98.8 °F (37.1 °C)] 97.8 °F (36.6 °C)  Pulse:  [] 65  Resp:  [7-40] 18  SpO2:  [99 %-100 %] 100 %  BP: (128-211)/(61-93) 140/66     Weight: (!) 136.1 kg (300 lb 0.7 oz)  Body mass index is 45.62 kg/m².    Intake/Output Summary (Last 24 hours) at 08/09/17 0924  Last data filed at 08/09/17 0705   Gross per 24 hour   Intake                0 ml   Output              740 ml   Net             -740 ml      Physical Exam   Constitutional: She is oriented to person, place, and time. She appears well-developed. No distress.   Morbidly obese   HENT:   Head: Normocephalic and atraumatic.   Mouth/Throat: Oropharynx is clear and moist.   Tracheostomy in place   Eyes: Conjunctivae and EOM are normal.   Neck: Normal range of motion. Neck supple.   Cardiovascular: Normal rate, regular rhythm, normal heart sounds and intact distal pulses.    Pulmonary/Chest: Effort normal. She has no wheezes. She has no rales.   On trach mask   Abdominal: Soft. Bowel sounds are normal.   PEG in place   Musculoskeletal: Normal range of motion. She exhibits no edema.   Neurological: She is alert and oriented to person, place, and time.   Skin: Skin is warm and dry. She is not diaphoretic.   Psychiatric: Her behavior is normal. She exhibits a depressed mood.   Nursing note  and vitals reviewed.      Significant Labs: All pertinent labs within the past 24 hours have been reviewed.    Significant Imaging: I have reviewed all pertinent imaging results/findings within the past 24 hours.  I have reviewed and interpreted all pertinent imaging results/findings within the past 24 hours.

## 2017-08-09 NOTE — ASSESSMENT & PLAN NOTE
No acute issues. ASA, statin and BP control. Neuro checks. Patient will need to transfer to a rehab facility. PT, ST and OT consulted.

## 2017-08-10 LAB
ALLENS TEST: ABNORMAL
ALLENS TEST: ABNORMAL
ANION GAP SERPL CALC-SCNC: 11 MMOL/L
ANION GAP SERPL CALC-SCNC: 16 MMOL/L
BUN SERPL-MCNC: 52 MG/DL
BUN SERPL-MCNC: 53 MG/DL
CALCIUM SERPL-MCNC: 9.4 MG/DL
CALCIUM SERPL-MCNC: 9.7 MG/DL
CHLORIDE SERPL-SCNC: 96 MMOL/L
CHLORIDE SERPL-SCNC: 98 MMOL/L
CO2 SERPL-SCNC: 28 MMOL/L
CO2 SERPL-SCNC: 34 MMOL/L
CREAT SERPL-MCNC: 1.4 MG/DL
CREAT SERPL-MCNC: 1.4 MG/DL
DELSYS: ABNORMAL
DELSYS: ABNORMAL
EST. GFR  (AFRICAN AMERICAN): 45 ML/MIN/1.73 M^2
EST. GFR  (AFRICAN AMERICAN): 45 ML/MIN/1.73 M^2
EST. GFR  (NON AFRICAN AMERICAN): 39 ML/MIN/1.73 M^2
EST. GFR  (NON AFRICAN AMERICAN): 39 ML/MIN/1.73 M^2
GLUCOSE SERPL-MCNC: 190 MG/DL
GLUCOSE SERPL-MCNC: 192 MG/DL
HCO3 UR-SCNC: 40.2 MMOL/L (ref 24–28)
HCO3 UR-SCNC: 40.4 MMOL/L (ref 24–28)
PCO2 BLDA: 51 MMHG (ref 35–45)
PCO2 BLDA: 51.5 MMHG (ref 35–45)
PH SMN: 7.5 [PH] (ref 7.35–7.45)
PH SMN: 7.5 [PH] (ref 7.35–7.45)
PO2 BLDA: 108 MMHG (ref 80–100)
PO2 BLDA: 95 MMHG (ref 80–100)
POC BE: 15 MMOL/L
POC BE: 15 MMOL/L
POC SATURATED O2: 98 % (ref 95–100)
POC SATURATED O2: 99 % (ref 95–100)
POC TCO2: 42 MMOL/L (ref 23–27)
POC TCO2: 42 MMOL/L (ref 23–27)
POCT GLUCOSE: 184 MG/DL (ref 70–110)
POCT GLUCOSE: 196 MG/DL (ref 70–110)
POCT GLUCOSE: 202 MG/DL (ref 70–110)
POCT GLUCOSE: 209 MG/DL (ref 70–110)
POTASSIUM SERPL-SCNC: 3.6 MMOL/L
POTASSIUM SERPL-SCNC: 4.3 MMOL/L
SAMPLE: ABNORMAL
SAMPLE: ABNORMAL
SITE: ABNORMAL
SITE: ABNORMAL
SODIUM SERPL-SCNC: 141 MMOL/L
SODIUM SERPL-SCNC: 142 MMOL/L

## 2017-08-10 PROCEDURE — 99900026 HC AIRWAY MAINTENANCE (STAT)

## 2017-08-10 PROCEDURE — 82803 BLOOD GASES ANY COMBINATION: CPT

## 2017-08-10 PROCEDURE — 97167 OT EVAL HIGH COMPLEX 60 MIN: CPT

## 2017-08-10 PROCEDURE — 97535 SELF CARE MNGMENT TRAINING: CPT

## 2017-08-10 PROCEDURE — 25000242 PHARM REV CODE 250 ALT 637 W/ HCPCS: Performed by: EMERGENCY MEDICINE

## 2017-08-10 PROCEDURE — G8978 MOBILITY CURRENT STATUS: HCPCS | Mod: CN

## 2017-08-10 PROCEDURE — 94761 N-INVAS EAR/PLS OXIMETRY MLT: CPT

## 2017-08-10 PROCEDURE — 94640 AIRWAY INHALATION TREATMENT: CPT

## 2017-08-10 PROCEDURE — 36415 COLL VENOUS BLD VENIPUNCTURE: CPT

## 2017-08-10 PROCEDURE — 63600175 PHARM REV CODE 636 W HCPCS: Performed by: EMERGENCY MEDICINE

## 2017-08-10 PROCEDURE — 20000000 HC ICU ROOM

## 2017-08-10 PROCEDURE — G8988 SELF CARE GOAL STATUS: HCPCS | Mod: CL

## 2017-08-10 PROCEDURE — 25000003 PHARM REV CODE 250: Performed by: INTERNAL MEDICINE

## 2017-08-10 PROCEDURE — 97161 PT EVAL LOW COMPLEX 20 MIN: CPT

## 2017-08-10 PROCEDURE — 63600175 PHARM REV CODE 636 W HCPCS: Performed by: INTERNAL MEDICINE

## 2017-08-10 PROCEDURE — 36600 WITHDRAWAL OF ARTERIAL BLOOD: CPT

## 2017-08-10 PROCEDURE — 99900035 HC TECH TIME PER 15 MIN (STAT)

## 2017-08-10 PROCEDURE — G8987 SELF CARE CURRENT STATUS: HCPCS | Mod: CM

## 2017-08-10 PROCEDURE — 25000003 PHARM REV CODE 250: Performed by: EMERGENCY MEDICINE

## 2017-08-10 PROCEDURE — 80048 BASIC METABOLIC PNL TOTAL CA: CPT | Mod: 91

## 2017-08-10 PROCEDURE — A4216 STERILE WATER/SALINE, 10 ML: HCPCS | Performed by: EMERGENCY MEDICINE

## 2017-08-10 PROCEDURE — G8979 MOBILITY GOAL STATUS: HCPCS | Mod: CM

## 2017-08-10 PROCEDURE — 27000221 HC OXYGEN, UP TO 24 HOURS

## 2017-08-10 RX ORDER — ALPRAZOLAM 0.5 MG/1
0.5 TABLET ORAL 2 TIMES DAILY PRN
Status: DISCONTINUED | OUTPATIENT
Start: 2017-08-11 | End: 2017-08-12

## 2017-08-10 RX ORDER — SODIUM CHLORIDE 9 MG/ML
INJECTION, SOLUTION INTRAVENOUS CONTINUOUS
Status: DISCONTINUED | OUTPATIENT
Start: 2017-08-10 | End: 2017-08-10

## 2017-08-10 RX ORDER — HYDRALAZINE HYDROCHLORIDE 25 MG/1
75 TABLET, FILM COATED ORAL EVERY 8 HOURS
Status: DISCONTINUED | OUTPATIENT
Start: 2017-08-10 | End: 2017-08-13

## 2017-08-10 RX ORDER — SODIUM CHLORIDE 9 MG/ML
INJECTION, SOLUTION INTRAVENOUS CONTINUOUS
Status: ACTIVE | OUTPATIENT
Start: 2017-08-10 | End: 2017-08-10

## 2017-08-10 RX ORDER — IPRATROPIUM BROMIDE AND ALBUTEROL SULFATE 2.5; .5 MG/3ML; MG/3ML
3 SOLUTION RESPIRATORY (INHALATION) EVERY 6 HOURS PRN
Status: DISCONTINUED | OUTPATIENT
Start: 2017-08-11 | End: 2017-08-11

## 2017-08-10 RX ORDER — HYDRALAZINE HYDROCHLORIDE 20 MG/ML
10 INJECTION INTRAMUSCULAR; INTRAVENOUS EVERY 8 HOURS PRN
Status: DISCONTINUED | OUTPATIENT
Start: 2017-08-10 | End: 2017-08-14 | Stop reason: HOSPADM

## 2017-08-10 RX ADMIN — Medication 3 ML: at 06:08

## 2017-08-10 RX ADMIN — PANTOPRAZOLE SODIUM 40 MG: 40 GRANULE, DELAYED RELEASE ORAL at 09:08

## 2017-08-10 RX ADMIN — ENOXAPARIN SODIUM 40 MG: 100 INJECTION SUBCUTANEOUS at 06:08

## 2017-08-10 RX ADMIN — Medication 3 ML: at 09:08

## 2017-08-10 RX ADMIN — IPRATROPIUM BROMIDE AND ALBUTEROL SULFATE 3 ML: .5; 3 SOLUTION RESPIRATORY (INHALATION) at 11:08

## 2017-08-10 RX ADMIN — HYDRALAZINE HYDROCHLORIDE 10 MG: 20 INJECTION INTRAMUSCULAR; INTRAVENOUS at 01:08

## 2017-08-10 RX ADMIN — INSULIN ASPART 1 UNITS: 100 INJECTION, SOLUTION INTRAVENOUS; SUBCUTANEOUS at 09:08

## 2017-08-10 RX ADMIN — AMANTADINE HYDROCHLORIDE 100 MG: 50 SOLUTION ORAL at 09:08

## 2017-08-10 RX ADMIN — AMANTADINE HYDROCHLORIDE 100 MG: 50 SOLUTION ORAL at 03:08

## 2017-08-10 RX ADMIN — SODIUM CHLORIDE: 0.9 INJECTION, SOLUTION INTRAVENOUS at 04:08

## 2017-08-10 RX ADMIN — Medication 3 ML: at 02:08

## 2017-08-10 RX ADMIN — SODIUM CHLORIDE: 0.9 INJECTION, SOLUTION INTRAVENOUS at 10:08

## 2017-08-10 RX ADMIN — IPRATROPIUM BROMIDE AND ALBUTEROL SULFATE 3 ML: .5; 3 SOLUTION RESPIRATORY (INHALATION) at 04:08

## 2017-08-10 RX ADMIN — AMLODIPINE BESYLATE 10 MG: 5 TABLET ORAL at 09:08

## 2017-08-10 RX ADMIN — ATORVASTATIN CALCIUM 10 MG: 10 TABLET, FILM COATED ORAL at 09:08

## 2017-08-10 RX ADMIN — IPRATROPIUM BROMIDE AND ALBUTEROL SULFATE 3 ML: .5; 3 SOLUTION RESPIRATORY (INHALATION) at 12:08

## 2017-08-10 RX ADMIN — HYDRALAZINE HYDROCHLORIDE 50 MG: 25 TABLET ORAL at 05:08

## 2017-08-10 RX ADMIN — IPRATROPIUM BROMIDE AND ALBUTEROL SULFATE 3 ML: .5; 3 SOLUTION RESPIRATORY (INHALATION) at 07:08

## 2017-08-10 RX ADMIN — FLUOXETINE 20 MG: 20 CAPSULE ORAL at 09:08

## 2017-08-10 RX ADMIN — AMANTADINE HYDROCHLORIDE 100 MG: 50 SOLUTION ORAL at 05:08

## 2017-08-10 RX ADMIN — ALPRAZOLAM 0.5 MG: 0.5 TABLET ORAL at 11:08

## 2017-08-10 RX ADMIN — CHLORHEXIDINE GLUCONATE 15 ML: 1.2 RINSE ORAL at 09:08

## 2017-08-10 RX ADMIN — ASPIRIN 81 MG 81 MG: 81 TABLET ORAL at 09:08

## 2017-08-10 RX ADMIN — HYDRALAZINE HYDROCHLORIDE 75 MG: 25 TABLET ORAL at 09:08

## 2017-08-10 RX ADMIN — INSULIN ASPART 2 UNITS: 100 INJECTION, SOLUTION INTRAVENOUS; SUBCUTANEOUS at 12:08

## 2017-08-10 RX ADMIN — INSULIN ASPART 4 UNITS: 100 INJECTION, SOLUTION INTRAVENOUS; SUBCUTANEOUS at 06:08

## 2017-08-10 RX ADMIN — HYDRALAZINE HYDROCHLORIDE 75 MG: 25 TABLET ORAL at 03:08

## 2017-08-10 RX ADMIN — INSULIN ASPART 4 UNITS: 100 INJECTION, SOLUTION INTRAVENOUS; SUBCUTANEOUS at 05:08

## 2017-08-10 NOTE — PROGRESS NOTES
[facility] Medicine  Progress Note    Patient Name: Nisa Frank  MRN: [id number]  Patient Class: IP- Inpatient   Admission Date: 8/8/2017  Length of Stay: 2 days  Attending Physician: Jessa Patino MD  Primary Care Provider: Atilio Downs MD        Subjective:     Principal Problem:<principal problem not specified>    HPI:  66 year old female with diabetes mellitus type 2, CKD stage 3, CAD s/p CABG, essential hypertension, cerebrovascular disease s/p bi-hemispheric watershed infarcts and trach/PEG status who was brought in via EMS from [facility] for acute respiratory distress, lethargy and hypoxia of 56% while on 4L via trach collar, improving to 90% on non rebreather en route to the ED. Patient was evidently in respiratory distress despite improvement of SaO2. Upon arrival to the ED, patient was connected to MV, given albuterol/tiotropium and suctioned at bedside, immediately correcting her respiratory issue. Per ED staff and patient's daughter who was present at bedside, it was noted that a good amount of thick mucous was suctioned. BP also significantly improved after this was done. Renal function at baseline. UA suggestive of infection however not a reliable sample. Afebrile and non toxic appearing. No new consolidation on imaging but rather generalized haziness unchanged from prior. Distant rales on exam. Patient was admitted to ICU to wean off MV as tolerated and for placement to [facility], as preferred by patient's daughter.             Hospital Course:  Ms Frank presented with acute respiratory failure with hypoxia likely due to mucous plug. In the ED, patient was connected to MV, given albuterol/tiotropium and suctioned at bedside, immediately correcting her respiratory issue. Per ED staff and patient's daughter who was present at bedside, it was noted that a good amount of thick mucous was suctioned. BP also significantly improved after this was  "done. Renal function at baseline. UA suggestive of infection however not a reliable sample. A repeat sample again not reliable despite this being obtained from siegel. Per records at outside facility she presented from, siegel had been in place and there were plans for removal. Unknown if this was done. Patient remained afebrile and non toxic appearing. Mild leukocytosis of 13 with mild neutrophilia of 74% likely stress induced. No new consolidation on imaging but rather generalized haziness unchanged from prior. Distant rales on exam. Patient was admitted to ICU to wean off MV as tolerated and for placement to [facility], as preferred by patient's daughter. Patient was diuresed with on dose of IV lasix. Suctioned intermittently. Able to wean off ventilator on 8/9 in AM. Stable with T piece for more than 8 hours, therefore de-escalated to low flow supplemental O2 via trach mask. Patient following simple commands and answering "yes" or "no" (whispered) adequately to questions, however daughter is concerned that patient is not as active or talkative as usual. ABG showed pH of 7.5, pCO2 of 51, pO2 108, SaO2 99%, HCO3 (per BMP) 34 (which is consistent with her baseline). BUN 50s (slightly elevated than baseline) and Cr 1.4 (improved since discharge from LTAC). Noted more urine output than input. No BMs since admission. CT head 8/10 without acute intracranial pathology. Started on IVFs in addition to tube feeds attempting to fix what seems to be volume contraction. Pending official Speech/Physical/Occupational therapies to move on to inpatient rehab.     Interval History: adequate oxygenation on tracheal mask with low flow supplemental O2. Patient following simple commands,alert and whispering yes or no adequately to questions. Does sleep most of the day. CT head negative for acute pathology. ABG with alkalemia with incomplete respiratory compensation. More UOP than input yesterday. On tube feeds. Renal function " "improving. Per nursing, daughter had been attempting to get patient to talk overnight. Daughter concerned that patient is not as "talkative" as before. Per notes from LTAC, patient seemingly acting same as she is now, meaning, limited conversational-wise. Also concerned that patient did not sleep last night.     Review of Systems   Constitutional: Negative.    HENT: Negative.    Eyes: Negative.    Respiratory: Negative.    Cardiovascular: Negative.    Gastrointestinal: Negative.    Genitourinary: Negative.    Musculoskeletal: Negative.    Skin: Negative.    Neurological: Negative.    Hematological: Negative.    Psychiatric/Behavioral: Negative.      Objective:     Vital Signs (Most Recent):  Temp: 97.9 °F (36.6 °C) (08/10/17 0308)  Pulse: (!) 59 (08/10/17 1100)  Resp: (!) 21 (08/10/17 1100)  BP: (!) 150/70 (08/10/17 1100)  SpO2: 100 % (08/10/17 1100) Vital Signs (24h Range):  Temp:  [97.7 °F (36.5 °C)-98 °F (36.7 °C)] 97.9 °F (36.6 °C)  Pulse:  [57-82] 59  Resp:  [17-33] 21  SpO2:  [97 %-100 %] 100 %  BP: (118-185)/(58-83) 150/70     Weight: (!) 138.7 kg (305 lb 12.5 oz)  Body mass index is 46.49 kg/m².    Intake/Output Summary (Last 24 hours) at 08/10/17 1224  Last data filed at 08/10/17 0600   Gross per 24 hour   Intake              460 ml   Output             1015 ml   Net             -555 ml      Physical Exam   Constitutional: She is oriented to person, place, and time. She appears well-developed. No distress.   Morbidly obese   HENT:   Head: Normocephalic and atraumatic.   Mouth/Throat: Oropharynx is clear and moist.   Tracheostomy in place   Eyes: Conjunctivae and EOM are normal.   Neck: Normal range of motion. Neck supple.   Cardiovascular: Normal rate, regular rhythm, normal heart sounds and intact distal pulses.    Pulmonary/Chest: Effort normal. She has no wheezes. She has no rales.   On trach mask   Abdominal: Soft. Bowel sounds are normal.   PEG in place   Musculoskeletal: Normal range of motion. She " exhibits no edema.   Neurological: She is alert and oriented to person, place, and time. No cranial nerve deficit.   3/5 strength bilateral UE and LE. 4/5 hand .    Skin: Skin is warm and dry. She is not diaphoretic.   Psychiatric: Her behavior is normal. She exhibits a depressed mood.   Nursing note and vitals reviewed.      Significant Labs: All pertinent labs within the past 24 hours have been reviewed.    Significant Imaging: I have reviewed all pertinent imaging results/findings within the past 24 hours.  I have reviewed and interpreted all pertinent imaging results/findings within the past 24 hours.    Assessment/Plan:      Acute respiratory failure with hypoxia    It appears this was likely due to a mucous plug that was accessed via deep suction. No evidence of infection, pneumothorax or significant pulm effusion. ACS unlikely. Pulm consulted for co-management of vent. Now weaned off vent and stable. Will need suctioning frequently. Hold off on further diuresis given mild bump in creatinine and is stable on low flow. Nebs. PPI for stress ulcer prophylaxis.           Mucus plugging of bronchi    As above          Tracheostomy status    As above          PEG (percutaneous endoscopic gastrostomy) status    On diabetasource. Rate increased to 60 cc/hr as per nutrition recs          CKD (chronic kidney disease) stage 3, GFR 30-59 ml/min    Close to baseline. Slight bump with lasix since admit from LTAC. Hold off on lasix. Start IVFs temporarily. On tube feeds. Siegel in place. Strict I/Os.           Abnormal urinalysis    Obtained twice and none reliable given high amount of squam cells. Patient afebrile. This is likely colonization from chronic siegel. Siegel switched in the ED. Hold off on abxs. Monitor for signs of infection.            Chronic bilateral cerebral infarction in watershed distribution    No acute issues. ASA, statin and BP control. Neuro checks. Patient will need to transfer to a rehab facility.  PT, ST and OT consulted. Pending official recs          CAD s/p CABG    No chest pain. On secondary prevention with ASA and statin. Hold off on BB as patient is chronically in sinus russ. On cardiac monitoring.           Hyperlipidemia    statin          Morbid obesity with BMI of 50.0-59.9, adult    Weight loss strongly encouraged. Is to start rehab once acute issue has resolved. Nutrition consult as outpatient.           Type 2 diabetes mellitus, uncontrolled    Uncontrolled. Hgb A1c in June was 10.5%.Continue SSI and adjust regimen for goal BG < 180. Currently at goal           Essential hypertension    Significantly high on presentation, then improved along with resolution of respiratory issues. Restarted home regimen to be given via PEG. Will increase hydralazine to 75 mg TID. Goal BP < 140/90 mmHg. Will not given BB as she is in sinus russ physiologically.              VTE Risk Mitigation         Ordered     enoxaparin injection 40 mg  Daily     Route:  Subcutaneous        08/08/17 1503     Medium Risk of VTE  Once      08/08/17 1503        Plan of car discussed with patient's daughter over the phone. Patient participated with PT/OT today. Able to sit up on edge of bed with assistance and was able to remain as so on her own. Patient tends to sleep for the most part of the day and appears to stay awake at nights. Temporarily on IVFs for treatment of alkalosis/alkalemia. Recheck ABG and BMP in the afternoon. Ultimate goal is transfer to [facility] for intensive therapy.     Critical care time spent on the evaluation and treatment of severe organ dysfunction, review of pertinent labs and imaging studies, discussions with consulting providers and discussions with patient/family: > 45 minutes.    Jessa Gomez MD  Department of Hospital Medicine   [facility]

## 2017-08-10 NOTE — PLAN OF CARE
Problem: Occupational Therapy Goal  Goal: Occupational Therapy Goal  Goals to be met by: 8/7/2017    Patient will increase functional independence with ADLs by performing:    UE Dressing with Moderate Assistance.  Grooming while EOB with Supervision.  Supine to sit with Maximum Assistance.  Upper extremity AROM exercise program to BUE  x10 reps per handout, with supervision assistance as needed.      Outcome: Ongoing (interventions implemented as appropriate)  Pt. limited by impaired cognition, RUE weakness, impaired communication with trach collar, impaired standing balance, weakness, impaired motor coordination limiting indep and safety in ADLS and fx mobility.  Pt. performed bed mobility rolling to L side with total assist x 2 people; supine<>sit total assist x 2;' sat unsupported on EOB with CGA at left knee for alignment of feet foot stool.  Pt. Sat 20 min EOB and performed facial hygiene with SBA  and mod vc for thoroughness 2/2 impaired attention; pt tended to stare off in space and needed redirection to task; oral care with swab and SBA with mod vc for tech. Pt. performed sit<.stand total assist x 2; stood 15 seconds with total assist x 2 but maintaining weight through BLE with trunk forwardly flexed.  Pt. Followed simple commands  And communicated with 50% head nods.  Pt. LB ADLS require dep assist; has siegel; UB dressing total assist; NPO has feeding tube.  Pt would benefit from Inpatient rehab.  Continue with OT POC.

## 2017-08-10 NOTE — ASSESSMENT & PLAN NOTE
No chest pain. On secondary prevention with ASA and statin. Hold off on BB as patient is chronically in sinus russ. On cardiac monitoring.

## 2017-08-10 NOTE — SUBJECTIVE & OBJECTIVE
"Interval History: adequate oxygenation on tracheal mask with low flow supplemental O2. Patient following simple commands,alert and whispering yes or no adequately to questions. Does sleep most of the day. CT head negative for acute pathology. ABG with alkalemia with incomplete respiratory compensation. More UOP than input yesterday. On tube feeds. Renal function improving. Per nursing, daughter had been attempting to get patient to talk overnight. Daughter concerned that patient is not as "talkative" as before. Per notes from LT[facility], patient seemingly acting same as she is now, meaning, limited conversational-wise. Also concerned that patient did not sleep last night.     Review of Systems   Constitutional: Negative.    HENT: Negative.    Eyes: Negative.    Respiratory: Negative.    Cardiovascular: Negative.    Gastrointestinal: Negative.    Genitourinary: Negative.    Musculoskeletal: Negative.    Skin: Negative.    Neurological: Negative.    Hematological: Negative.    Psychiatric/Behavioral: Negative.      Objective:     Vital Signs (Most Recent):  Temp: 97.9 °F (36.6 °C) (08/10/17 0308)  Pulse: (!) 59 (08/10/17 1100)  Resp: (!) 21 (08/10/17 1100)  BP: (!) 150/70 (08/10/17 1100)  SpO2: 100 % (08/10/17 1100) Vital Signs (24h Range):  Temp:  [97.7 °F (36.5 °C)-98 °F (36.7 °C)] 97.9 °F (36.6 °C)  Pulse:  [57-82] 59  Resp:  [17-33] 21  SpO2:  [97 %-100 %] 100 %  BP: (118-185)/(58-83) 150/70     Weight: (!) 138.7 kg (305 lb 12.5 oz)  Body mass index is 46.49 kg/m².    Intake/Output Summary (Last 24 hours) at 08/10/17 1224  Last data filed at 08/10/17 0600   Gross per 24 hour   Intake              460 ml   Output             1015 ml   Net             -555 ml      Physical Exam   Constitutional: She is oriented to person, place, and time. She appears well-developed. No distress.   Morbidly obese   HENT:   Head: Normocephalic and atraumatic.   Mouth/Throat: Oropharynx is clear and moist.   Tracheostomy in place   Eyes: " Conjunctivae and EOM are normal.   Neck: Normal range of motion. Neck supple.   Cardiovascular: Normal rate, regular rhythm, normal heart sounds and intact distal pulses.    Pulmonary/Chest: Effort normal. She has no wheezes. She has no rales.   On trach mask   Abdominal: Soft. Bowel sounds are normal.   PEG in place   Musculoskeletal: Normal range of motion. She exhibits no edema.   Neurological: She is alert and oriented to person, place, and time. No cranial nerve deficit.   3/5 strength bilateral UE and LE. 4/5 hand .    Skin: Skin is warm and dry. She is not diaphoretic.   Psychiatric: Her behavior is normal. She exhibits a depressed mood.   Nursing note and vitals reviewed.      Significant Labs: All pertinent labs within the past 24 hours have been reviewed.    Significant Imaging: I have reviewed all pertinent imaging results/findings within the past 24 hours.  I have reviewed and interpreted all pertinent imaging results/findings within the past 24 hours.

## 2017-08-10 NOTE — ASSESSMENT & PLAN NOTE
Close to baseline. Slight bump with lasix since admit from [facility]. Hold off on lasix. Start IVFs temporarily. On tube feeds. Ross in place. Strict I/Os.

## 2017-08-10 NOTE — PT/OT/SLP EVAL
Physical Therapy  Evaluation    Nisa Frank   MRN: [id number]   Admitting Diagnosis: <principal problem not specified>    PT Received On: 08/10/17  PT Start Time: 1200     PT Stop Time: 1233    PT Total Time (min): 33 min       Billable Minutes:  Evaluation 15 cotreat with OT    Diagnosis: <principal problem not specified>      Past Medical History:   Diagnosis Date    Acute bilateral cerebral infarction in a watershed distribution 2017    CAD (coronary artery disease)     Diabetes mellitus     Hypertension     Morbid obesity     TAMMY on CPAP     setting +17    Stroke       Past Surgical History:   Procedure Laterality Date    ARTERIAL BYPASS SURGRY      9 tears sgo     SECTION      CORONARY ARTERY BYPASS GRAFT      HYSTERECTOMY      TUBAL LIGATION           General Precautions: Standard, fall, respiratory  Orthopedic Precautions: N/A   Braces: N/A       Do you have any cultural, spiritual, Yarsani conflicts, given your current situation?: none    Patient History:  Lives With:  (admitted from [facility], was only there 2 days and daughter does not want her to return.  Living alone prior to CVA)  Living Environment Comment: daughter would like for pt to return to rehab at [facility]   Equipment Currently Used at Home: none  DME owned (not currently used): none    Previous Level of Function:  Ambulation Skills: unable to perform  Transfer Skills: unable to perform  ADL Skills: unable to perform    Subjective:  Communicated with nsg prior to session.  Pt answering yes/no with head nod questions appropriately approx 50% of time.  Delayed responses  Chief Complaint: no c/o   Patient goals: unable to state    Pain/Comfort  Pain Rating 1: 0/10      Objective:   Patient found with: blood pressure cuff, telemetry, tracheostomy, PICC line, siegel catheter, pulse ox (continuous), oxygen (trach collar)     Cognitive Exam:  Oriented to: Person    Follows Commands/attention: Inattentive and Follows one-step  commands  Communication: able to nod head yes or no  Safety awareness/insight to disability: impaired    Physical Exam:  Postural examination/scapula alignment: Rounded shoulder and Head forward , pendulous abdomen  Skin integrity: Visible skin intact  Edema: None noted     Sensation:   Pt nodded yes to PT touching her feet    Upper Extremity Range of Motion:  Lower Extremity Range of Motion:  Right Lower Extremity: WFL(PROM)  Left Lower Extremity: WFL (PROM)Lower Extremity Strength:    Right Lower Extremity: grossly 2+/5  Left Lower Extremity: grossly2+/5     Fine motor coordination:  Impaired    Gross motor coordination: impaired    Functional Mobility:  Bed Mobility:  Rolling/Turning to Left: Total assistance, With assist of 2  Rolling/Turning Right: Total assistance, With assist of 2  Scooting/Bridging: Total Assistance, With assist of 2  Supine to Sit: Total Assistance, With assist of 2  Sit to Supine: Total Assistance, With assist of  2    Transfers:  Sit <> Stand Assistance: Total Assistance (with assist opf 2)  Sit <> Stand Assistive Device: No Assistive Device (B UE support)    Gait:   Gait Distance: activity did not occur    Stairs:  N/T    Balance:   Static Sit: FAIR-: Maintains without assist but inconsistent   Dynamic Sit: FAIR: Cannot move trunk without losing balance  Static Stand: Total A, but able to bear weight  Dynamic stand: POOR: N/A    Therapeutic Activities and Exercises:  Dangle protocol, pt sat atEOB with SBA approx 20m to perform ADL's    AM-PAC 6 CLICK MOBILITY  How much help from another person does this patient currently need?   1 = Unable, Total/Dependent Assistance  2 = A lot, Maximum/Moderate Assistance  3 = A little, Minimum/Contact Guard/Supervision  4 = None, Modified [place]/Independent    Turning over in bed (including adjusting bedclothes, sheets and blankets)?: 1  Sitting down on and standing up from a chair with arms (e.g., wheelchair, bedside commode, etc.): 1  Moving  from lying on back to sitting on the side of the bed?: 1  Moving to and from a bed to a chair (including a wheelchair)?: 1  Need to walk in hospital room?: 1  Climbing 3-5 steps with a railing?: 1  Total Score: 6     AM-PAC Raw Score CMS G-Code Modifier Level of Impairment Assistance   6 % Total / Unable   7 - 9 CM 80 - 100% Maximal Assist   10 - 14 CL 60 - 80% Moderate Assist   15 - 19 CK 40 - 60% Moderate Assist   20 - 22 CJ 20 - 40% Minimal Assist   23 CI 1-20% SBA / CGA   24 CH 0% Independent/ Mod I     Patient left left sidelying with all lines intact, call button in reach and nsg notified.    Assessment:   Nisa Frank is a 66 y.o. female with a medical diagnosis of <principal problem not specified> and presents with decreased functional independence 2/2 residual effects of CVA.  Pt could benefit from skilled PT services 5x/wk in order to maximize function prior to D/C..    Rehab identified problem list/impairments: Rehab identified problem list/impairments: weakness, impaired endurance, impaired self care skills, impaired balance, gait instability, impaired functional mobilty, impaired cognition, decreased coordination, decreased upper extremity function, decreased lower extremity function, decreased ROM, decreased safety awareness, impaired coordination, impaired fine motor, impaired cardiopulmonary response to activity    Rehab potential is fair.    Activity tolerance: Fair    Discharge recommendations: Discharge Facility/Level Of Care Needs: rehabilitation facility     Barriers to discharge: Barriers to Discharge: Decreased caregiver support, Inaccessible home environment    Equipment recommendations: Equipment Needed After Discharge: none     GOALS:    Physical Therapy Goals        Problem: Physical Therapy Goal    Goal Priority Disciplines Outcome Goal Variances Interventions   Physical Therapy Goal     PT/OT, PT Ongoing (interventions implemented as appropriate)     Description:  Goals to  be met by: 2017     Patient will increase functional independence with mobility by performin. Supine to sit with Maximum Assistance  2. Rolling to Left and Right with Maximum Assistance.  3. Sit to stand transfer with Maximum Assistance  4. Bed to chair transfer with Maximum Assistance    5. Sitting at edge of bed x30 minutes with Modified [place]  6. Lower extremity exercise program x10-15 reps per handout, with assistance as needed                      PLAN:    Patient to be seen 5 x/week to address the above listed problems via therapeutic activities, therapeutic exercises, neuromuscular re-education  Plan of Care expires: 17  Plan of Care reviewed with: patient    Functional Assessment Tool Used: AM-PAC  Score: 6  Functional Limitation: Mobility: Walking and moving around  Mobility: Walking and Moving Around Current Status (): CN  Mobility: Walking and Moving Around Goal Status (): TREVOR Henderson, PT  08/10/2017

## 2017-08-10 NOTE — PROGRESS NOTES
Consult Note  Pulmonology    Consult Requested By: Jessa Patino MD  Reason for Consult : vent management    SUBJECTIVE: sob     History of Present Illness:  Patient unresponsive;information obtained from chart      Review of patient's allergies indicates:   Allergen Reactions    Latex, natural rubber        Past Medical History:   Diagnosis Date    Acute bilateral cerebral infarction in a watershed distribution 2017    CAD (coronary artery disease)     Diabetes mellitus     Hypertension     Morbid obesity     TAMMY on CPAP     setting +17    Stroke      Past Surgical History:   Procedure Laterality Date    ARTERIAL BYPASS SURGRY      9 tears sgo     SECTION      CORONARY ARTERY BYPASS GRAFT      HYSTERECTOMY      TUBAL LIGATION       Family History   Problem Relation Age of Onset    No Known Problems Father     No Known Problems Mother     No Known Problems Sister     No Known Problems Brother     No Known Problems Maternal Aunt     No Known Problems Maternal Uncle     No Known Problems Paternal Aunt     No Known Problems Paternal Uncle     No Known Problems Maternal Grandmother     No Known Problems Maternal Grandfather     No Known Problems Paternal Grandmother     No Known Problems Paternal Grandfather     Amblyopia Neg Hx     Blindness Neg Hx     Cancer Neg Hx     Cataracts Neg Hx     Diabetes Neg Hx     Glaucoma Neg Hx     Hypertension Neg Hx     Macular degeneration Neg Hx     Retinal detachment Neg Hx     Strabismus Neg Hx     Stroke Neg Hx     Thyroid disease Neg Hx      Review of systems not obtained due to patient factors ; patient trach and on trach collar.    OBJECTIVE:     Vital Signs (Most Recent)  Temp: 97.9 °F (36.6 °C) (08/10/17 0308)  Pulse: 65 (08/10/17 0745)  Resp: 17 (08/10/17 0745)  BP: (!) 118/59 (08/10/17 0600)  SpO2: 100 % (08/10/17 0745)    Vital Signs Range (Last 24H):  Temp:  [97.7 °F (36.5 °C)-98 °F (36.7 °C)]   Pulse:  [57-82]   Resp:   [17-33]   BP: (118-185)/(58-83)   SpO2:  [96 %-100 %]     Physical Exam:    General: no distress,on trach collar  Neck: no jugular venous distention and no carotid bruit  Lungs:  Few rhonchis  Heart: regular rate and rhythm and no murmur  Abdomen: soft, non-tender non-distended; bowel sounds normal and right NGT in place  Extremities: no cyanosis or edema, or clubbing  Neurologic: awake and responsive.  Skin-warm, moist. No rash      Laboratory:  Recent Results (from the past 24 hour(s))   POCT glucose    Collection Time: 08/09/17 12:33 PM   Result Value Ref Range    POCT Glucose 168 (H) 70 - 110 mg/dL   Basic metabolic panel    Collection Time: 08/09/17  2:29 PM   Result Value Ref Range    Sodium 139 136 - 145 mmol/L    Potassium 3.6 3.5 - 5.1 mmol/L    Chloride 95 95 - 110 mmol/L    CO2 32 (H) 23 - 29 mmol/L    Glucose 165 (H) 70 - 110 mg/dL    BUN, Bld 58 (H) 8 - 23 mg/dL    Creatinine 1.7 (H) 0.5 - 1.4 mg/dL    Calcium 10.0 8.7 - 10.5 mg/dL    Anion Gap 12 8 - 16 mmol/L    eGFR if African American 36 (A) >60 mL/min/1.73 m^2    eGFR if non African American 31 (A) >60 mL/min/1.73 m^2   Culture, Respiratory with Gram Stain    Collection Time: 08/09/17  3:00 PM   Result Value Ref Range    Gram Stain (Respiratory) Few WBC's     Gram Stain (Respiratory) Few Gram positive cocci    POCT glucose    Collection Time: 08/09/17  6:24 PM   Result Value Ref Range    POCT Glucose 182 (H) 70 - 110 mg/dL   POCT glucose    Collection Time: 08/09/17 11:35 PM   Result Value Ref Range    POCT Glucose 184 (H) 70 - 110 mg/dL   POCT glucose    Collection Time: 08/10/17  5:34 AM   Result Value Ref Range    POCT Glucose 202 (H) 70 - 110 mg/dL   ISTAT PROCEDURE    Collection Time: 08/10/17  8:57 AM   Result Value Ref Range    POC PH 7.505 (H) 7.35 - 7.45    POC PCO2 51.0 (H) 35 - 45 mmHg    POC PO2 108 (H) 80 - 100 mmHg    POC HCO3 40.2 (H) 24 - 28 mmol/L    POC BE 15 -2 to 2 mmol/L    POC SATURATED O2 99 95 - 100 %    POC TCO2 42 (H) 23 -  27 mmol/L    Sample ARTERIAL     Site LR     Allens Test Pass     DelSys T-Collar    Basic metabolic panel    Collection Time: 08/10/17  9:04 AM   Result Value Ref Range    Sodium 141 136 - 145 mmol/L    Potassium 3.6 3.5 - 5.1 mmol/L    Chloride 96 95 - 110 mmol/L    CO2 34 (H) 23 - 29 mmol/L    Glucose 192 (H) 70 - 110 mg/dL    BUN, Bld 53 (H) 8 - 23 mg/dL    Creatinine 1.4 0.5 - 1.4 mg/dL    Calcium 9.7 8.7 - 10.5 mg/dL    Anion Gap 11 8 - 16 mmol/L    eGFR if African American 45 (A) >60 mL/min/1.73 m^2    eGFR if non African American 39 (A) >60 mL/min/1.73 m^2     CBC:   Recent Labs  Lab 08/08/17  1028   WBC 13.34*   RBC 3.66*   HGB 9.8*   HCT 31.9*   *   MCV 87   MCH 26.8*   MCHC 30.7*     BMP:   Recent Labs  Lab 08/07/17  0636  08/10/17  0904   *  < > 192*   *  < > 141   K 3.7  < > 3.6   CL 91*  < > 96   CO2 36*  < > 34*   BUN 46*  < > 53*   CREATININE 1.3  < > 1.4   CALCIUM 9.5  < > 9.7   MG 2.2  --   --    < > = values in this interval not displayed.  CMP:   Recent Labs  Lab 08/08/17  1028  08/10/17  0904   *  < > 192*   CALCIUM 10.1  < > 9.7   ALBUMIN 2.8*  --   --    PROT 9.1*  --   --      < > 141   K 4.2  < > 3.6   CO2 35*  < > 34*   CL 92*  < > 96   BUN 45*  < > 53*   CREATININE 1.5*  < > 1.4   ALKPHOS 134  --   --    ALT 18  --   --    AST 16  --   --    BILITOT 1.0  --   --    < > = values in this interval not displayed.  LFTs:   Recent Labs  Lab 08/08/17  1028   ALT 18   AST 16   ALKPHOS 134   BILITOT 1.0   PROT 9.1*   ALBUMIN 2.8*     Coagulation: No results for input(s): INR, APTT in the last 168 hours.    Invalid input(s): PT  Cardiac markers: No results for input(s): CKMB, TROPONINT, MYOGLOBIN in the last 168 hours.  Microbiology Results (last 7 days)     Procedure Component Value Units Date/Time    Culture, Respiratory with Gram Stain [[id number]] Collected:  08/09/17 1500    Order Status:  Completed Specimen:  Respiratory from Tracheal Aspirate Updated:  08/10/17  0630     Gram Stain (Respiratory) Few WBC's     Gram Stain (Respiratory) Few Gram positive cocci    Blood Culture #2 **CANNOT BE ORDERED STAT** [[id number]] Collected:  08/08/17 1025    Order Status:  Completed Specimen:  Blood from Peripheral, Hand, Left Updated:  08/09/17 1303     Blood Culture, Routine No Growth to date     Blood Culture, Routine No Growth to date    Blood Culture #1 **CANNOT BE ORDERED STAT** [[id number]] Collected:  08/08/17 1005    Order Status:  Completed Specimen:  Blood from Peripheral, Hand, Left Updated:  08/09/17 1303     Blood Culture, Routine No Growth to date     Blood Culture, Routine No Growth to date        ABGs:   Recent Labs  Lab 08/10/17  0857   PH 7.505*   PCO2 51.0*   PO2 108*   HCO3 40.2*   POCSATURATED 99   BE 15           Diagnostic Results:     Tracheostomy tube is in stable position. Cardiopulmonary status is unchanged noting relatively clear lungs without focal consolidation or effusion. No pneumothorax.      ASSESSMENT/PLAN:     1. Acute respiratory failure with hypoxia    2. Respiratory failure    3. Hypertensive emergency without congestive heart failure          Plan:pt is improving clinically.cxr unremarkable. abg quite adequate ; neuro seems appropriate for circumstances. Will monitor.

## 2017-08-10 NOTE — PT/OT/SLP EVAL
Occupational Therapy  Evaluation/treatment    Nisa Frank   MRN: [id number]   Admitting Diagnosis: <principal problem not specified>    OT Date of Treatment: 08/10/17   OT Start Time: 1200  OT Stop Time: 1233  OT Total Time (min): 33 min    Billable Minutes:  Evaluation 25  Self Care/Home Management 8  Total Time 33    Diagnosis:The primary encounter diagnosis was Acute respiratory failure with hypoxia. Diagnoses of Respiratory failure and Hypertensive emergency without congestive heart failure were also pertinent to this visit.  s/p watershed stroke with Trach/PEG in place.       Past Medical History:   Diagnosis Date    Acute bilateral cerebral infarction in a watershed distribution 2017    CAD (coronary artery disease)     Diabetes mellitus     Hypertension     Morbid obesity     TAMMY on CPAP     setting +17    Stroke       Past Surgical History:   Procedure Laterality Date    ARTERIAL BYPASS SURGRY      9 tears sgo     SECTION      CORONARY ARTERY BYPASS GRAFT      HYSTERECTOMY      TUBAL LIGATION         Referring physician: Carey  Date referred to OT: 8/10/2017    General Precautions: Standard, fall, NPO, respiratory  Orthopedic Precautions: N/A  Braces: N/A    Do you have any cultural, spiritual, Congregation conflicts, given your current situation?: none     Patient History:  Living Environment  Lives With: alone (prior to CVA; transferred from [facility] rehab but daughter does not want her to return to facility)  Living Arrangements: house  Living Environment Comment: Pt. lived alone  prior to CVA; was at [facility] inpatient  2 days then re-admitted to [facility]; daughter would like pt to go to [facility] at discharge from acute.  Equipment Currently Used at Home: none    Prior level of function:   Bed Mobility/Transfers: independent  Grooming: independent  Bathing: independent  Upper Body Dressing: independent  Lower Body Dressing: independent  Toileting: independent  Home Management  Skills: independent  Homemaking Responsibilities: Yes     Dominant hand: right    Subjective:  Communicated with nurse prior to session.    Chief Complaint: none--pt nods head in response to questions; attempted to mouthed words 2/2 trach  Patient/Family stated goals: daughter wants tp to go to [facility] rehab after discharge    Pain/Comfort  Pain Rating 1: 0/10  Pain Rating Post-Intervention 1: 0/10    Objective:  Patient found with: pulse ox (continuous), peripheral IV, oxygen, telemetry, tracheostomy, bed alarm, blood pressure cuff, siegel catheter (PEG tube)    Cognitive Exam:  Oriented to: Person  Follows Commands/attention: Follows one-step commands  Communication: hs trach; uses head nods closed ended questions 50%; attempts to mouth responses  Memory:  Impaired STM, Impaired LTM and Poor immediate recall  Safety awareness/insight to disability: impaired  Coping skills/emotional control: fal affect    Visual/perceptual:  Intact    Physical Exam:  Postural examination/scapula alignment: Rounded shoulder  Skin integrity: Visible skin intact  Edema: None noted BUE    Sensation:   Intact grossly to touch    Upper Extremity Range of Motion:  Right Upper Extremity: WFL except shld AROM grossly 30 abd  Left Upper Extremity: WFL    Upper Extremity Strength:  Right Upper Extremity: WFL except shld grossly 2+/5  Left Upper Extremity: WFL   Strength: WFL    Fine motor coordination:   Impaired  Left hand, manipulation of objects mild and Right hand, manipulation of objects mild    Gross motor coordination: NT    Functional Mobility:  Bed Mobility:  Rolling/Turning to Left: Total assistance, With side rail, With assist of 2  Scooting/Bridging: Total Assistance, With assist of 2  Supine to Sit: Total Assistance, With assist of 2  Sit to Supine: Total Assistance, With assist of 2    Transfers:  Sit <> Stand Assistance: Total Assistance, Other (see comments) (2 person assist; pt following simple commands for  "transition; weight bearing throgh BLE with upper trunk forwardly flexed)  Sit <> Stand Assistive Device: No Assistive Device    Functional Ambulation: NA    Activities of Daily Living:  Feeding Level of Assistance: Activity did not occur (NPO; peg tube feeder)  UE Dressing Level of Assistance: Total assistance  LE Dressing Level of Assistance: Total assistance (socks)  Grooming Position: Seated, EOB  Grooming Level of Assistance: Contact guard assistance (oral swab for mouth and wshed face with min vc for attention/redirection; used RUE)  Toileting Where Assessed:  (siegel)  Toileting Level of Assistance: Total assistance       Balance:   Static Sit: FAIR+: Able to take MINIMAL challenges from all directions  Dynamic Sit: FAIR+: Maintains balance through MINIMAL excursions of active trunk motion  Static Stand: 0: Needs MAXIMAL /Total assist x 2 people assist to maintain   Dynamic stand: 0: N/A    Therapeutic Activities and Exercises:  Facial and oral hygiene mod cueing for redirection and thoroughness seated EOB    AM-PAC 6 CLICK ADL  How much help from another person does this patient currently need?  1 = Unable, Total/Dependent Assistance  2 = A lot, Maximum/Moderate Assistance  3 = A little, Minimum/Contact Guard/Supervision  4 = None, Modified [place]/Independent    Putting on and taking off regular lower body clothing? : 1  Bathing (including washing, rinsing, drying)?: 1  Toileting, which includes using toilet, bedpan, or urinal? : 1  Putting on and taking off regular upper body clothing?: 2  Taking care of personal grooming such as brushing teeth?: 3  Eating meals?: 1  Total Score: 9    AM-PAC Raw Score CMS "G-Code Modifier Level of Impairment Assistance   6 % Total / Unable   7 - 9 CM 80 - 100% Maximal Assist   10-14 CL 60 - 80% Moderate Assist   15 - 19 CK 40 - 60% Moderate Assist   20 - 22 CJ 20 - 40% Minimal Assist   23 CI 1-20% SBA / CGA   24 CH 0% Independent/ Mod I       Patient left left " sidelying with all lines intact, call button in reach, bed alarm on, nurse notified and nurse present    Assessment:  Nisa Frank is a 66 y.o. female with a medical diagnosis of The primary encounter diagnosis was Acute respiratory failure with hypoxia. Diagnoses of Respiratory failure and Hypertensive emergency without congestive heart failure were also pertinent to this visit.  and presents with s/p watershed stroke with Trach/PEG in place.Pt. LB ADLS require dep assist; has siegel; UB dressing total assist; NPO has feeding tube.  Pt would benefit from Inpatient rehab.  Continue with OT POC. Pt would benefit from Inpatient rehab.        Rehab identified problem list/impairments: Rehab identified problem list/impairments: weakness, impaired functional mobilty, impaired cognition, decreased safety awareness, impaired cardiopulmonary response to activity, impaired endurance, gait instability, impaired balance, decreased upper extremity function, decreased lower extremity function, impaired self care skills, impaired fine motor, impaired coordination    Rehab potential is good.    Activity tolerance: Good    Discharge recommendations: Discharge Facility/Level Of Care Needs: rehabilitation facility     Barriers to discharge: Barriers to Discharge: Inaccessible home environment, Decreased caregiver support    Equipment recommendations: none     GOALS:    Occupational Therapy Goals        Problem: Occupational Therapy Goal    Goal Priority Disciplines Outcome Interventions   Occupational Therapy Goal     OT, PT/OT Ongoing (interventions implemented as appropriate)    Description:  Goals to be met by: 8/7/2017    Patient will increase functional independence with ADLs by performing:    UE Dressing with Moderate Assistance.  Grooming while EOB with Supervision.  Supine to sit with Maximum Assistance.  Upper extremity AROM exercise program to BUE  x10 reps per handout, with supervision assistance as needed.                         PLAN:  Patient to be seen 5 x/week to address the above listed problems via self-care/home management, therapeutic activities, therapeutic exercises, neuromuscular re-education  Plan of Care expires: 09/10/17  Plan of Care reviewed with: patient    OT G-codes  Functional Assessment Tool Used: AM-PAC  Score: 9  Functional Limitation: Self care  Self Care Current Status (): TREVOR  Self Care Goal Status (): EMMA Ramírez OT  08/10/2017

## 2017-08-10 NOTE — PLAN OF CARE
Problem: Physical Therapy Goal  Goal: Physical Therapy Goal  Goals to be met by: 2017     Patient will increase functional independence with mobility by performin. Supine to sit with Maximum Assistance  2. Rolling to Left and Right with Maximum Assistance.  3. Sit to stand transfer with Maximum Assistance  4. Bed to chair transfer with Maximum Assistance    5. Sitting at edge of bed x30 minutes with Modified [place]  6. Lower extremity exercise program x10-15 reps per handout, with assistance as needed    Outcome: Ongoing (interventions implemented as appropriate)  Initial PT evaluation performed.  Pt could benefit from skilled PT services 5x/wk in order to maximize function prior to D/C.  PT recommending inpatient rehab.

## 2017-08-10 NOTE — ASSESSMENT & PLAN NOTE
Significantly high on presentation, then improved along with resolution of respiratory issues. Restarted home regimen to be given via PEG. Will increase hydralazine to 75 mg TID. Goal BP < 140/90 mmHg. Will not given BB as she is in sinus russ physiologically.

## 2017-08-10 NOTE — PLAN OF CARE
Problem: Patient Care Overview  Goal: Plan of Care Review  Pt remains free of falls/injury this shift, pt turned Q2 hrs, pt not as responsive to questions today compared to yesterday and not talking, pt tolerating tubefeedings @ goal 60cc/hr, minimal residuals, Bp 160-170's, scheduled BP med given and prn, with moderate relief, siegel intact urine output >30cc/hr, VSS, afebrile

## 2017-08-10 NOTE — PLAN OF CARE
"Problem: Patient Care Overview  Goal: Plan of Care Review  Patient appears alert and responds by shaking or nodding her head appropriately. She is able to move all extremities and follows simple commands. She was able to participate with PT/OT at the bedside including standing briefly. She has been nonverbal except yelling out in pain during needle sticks for labs or ABG's. This appears to be same from her LTAC stay upon reading notes stating "minimally conversant". Her daughter is concerned that she is extending her stroke. A CT of her head was done which showed no significant change demonstrating infarct from previous MRI. ST is assigned to see patient. She sustained no injuries, falls or trauma this shift.       "

## 2017-08-10 NOTE — ASSESSMENT & PLAN NOTE
No acute issues. ASA, statin and BP control. Neuro checks. Patient will need to transfer to a rehab facility. PT, ST and OT consulted. Pending official recs

## 2017-08-11 PROBLEM — J40 BRONCHITIS WITH TRACHEITIS: Status: ACTIVE | Noted: 2017-08-11

## 2017-08-11 PROBLEM — R00.1 CHRONIC SINUS BRADYCARDIA: Status: ACTIVE | Noted: 2017-08-11

## 2017-08-11 PROBLEM — K59.01 SLOW TRANSIT CONSTIPATION: Status: ACTIVE | Noted: 2017-08-11

## 2017-08-11 LAB
ANION GAP SERPL CALC-SCNC: 10 MMOL/L
BACTERIA SPEC AEROBE CULT: NORMAL
BUN SERPL-MCNC: 45 MG/DL
CALCIUM SERPL-MCNC: 9.6 MG/DL
CHLORIDE SERPL-SCNC: 97 MMOL/L
CO2 SERPL-SCNC: 34 MMOL/L
CREAT SERPL-MCNC: 1.2 MG/DL
DELSYS: ABNORMAL
EST. GFR  (AFRICAN AMERICAN): 54 ML/MIN/1.73 M^2
EST. GFR  (NON AFRICAN AMERICAN): 47 ML/MIN/1.73 M^2
GLUCOSE SERPL-MCNC: 209 MG/DL
GRAM STN SPEC: NORMAL
GRAM STN SPEC: NORMAL
HCO3 UR-SCNC: 39.3 MMOL/L (ref 24–28)
PCO2 BLDA: 53.9 MMHG (ref 35–45)
PH SMN: 7.47 [PH] (ref 7.35–7.45)
PO2 BLDA: 104 MMHG (ref 80–100)
POC BE: 14 MMOL/L
POC SATURATED O2: 98 % (ref 95–100)
POC TCO2: 41 MMOL/L (ref 23–27)
POCT GLUCOSE: 192 MG/DL (ref 70–110)
POCT GLUCOSE: 196 MG/DL (ref 70–110)
POTASSIUM SERPL-SCNC: 3.8 MMOL/L
SAMPLE: ABNORMAL
SITE: ABNORMAL
SODIUM SERPL-SCNC: 141 MMOL/L

## 2017-08-11 PROCEDURE — 25000242 PHARM REV CODE 250 ALT 637 W/ HCPCS: Performed by: INTERNAL MEDICINE

## 2017-08-11 PROCEDURE — 97535 SELF CARE MNGMENT TRAINING: CPT

## 2017-08-11 PROCEDURE — 25000003 PHARM REV CODE 250: Performed by: INTERNAL MEDICINE

## 2017-08-11 PROCEDURE — 80048 BASIC METABOLIC PNL TOTAL CA: CPT

## 2017-08-11 PROCEDURE — 97530 THERAPEUTIC ACTIVITIES: CPT

## 2017-08-11 PROCEDURE — A4216 STERILE WATER/SALINE, 10 ML: HCPCS | Performed by: EMERGENCY MEDICINE

## 2017-08-11 PROCEDURE — 87205 SMEAR GRAM STAIN: CPT

## 2017-08-11 PROCEDURE — 99900035 HC TECH TIME PER 15 MIN (STAT)

## 2017-08-11 PROCEDURE — 36415 COLL VENOUS BLD VENIPUNCTURE: CPT

## 2017-08-11 PROCEDURE — 27000221 HC OXYGEN, UP TO 24 HOURS

## 2017-08-11 PROCEDURE — 20000000 HC ICU ROOM

## 2017-08-11 PROCEDURE — 92609 USE OF SPEECH DEVICE SERVICE: CPT

## 2017-08-11 PROCEDURE — 99900026 HC AIRWAY MAINTENANCE (STAT)

## 2017-08-11 PROCEDURE — 94761 N-INVAS EAR/PLS OXIMETRY MLT: CPT

## 2017-08-11 PROCEDURE — 25000003 PHARM REV CODE 250: Performed by: EMERGENCY MEDICINE

## 2017-08-11 PROCEDURE — 87070 CULTURE OTHR SPECIMN AEROBIC: CPT

## 2017-08-11 PROCEDURE — 94640 AIRWAY INHALATION TREATMENT: CPT

## 2017-08-11 PROCEDURE — 99222 1ST HOSP IP/OBS MODERATE 55: CPT | Mod: ,,, | Performed by: PSYCHIATRY & NEUROLOGY

## 2017-08-11 RX ORDER — IPRATROPIUM BROMIDE AND ALBUTEROL SULFATE 2.5; .5 MG/3ML; MG/3ML
3 SOLUTION RESPIRATORY (INHALATION) EVERY 6 HOURS
Status: DISCONTINUED | OUTPATIENT
Start: 2017-08-11 | End: 2017-08-11

## 2017-08-11 RX ORDER — POLYETHYLENE GLYCOL 3350 17 G/17G
17 POWDER, FOR SOLUTION ORAL 2 TIMES DAILY
Status: DISCONTINUED | OUTPATIENT
Start: 2017-08-11 | End: 2017-08-12

## 2017-08-11 RX ORDER — IPRATROPIUM BROMIDE AND ALBUTEROL SULFATE 2.5; .5 MG/3ML; MG/3ML
3 SOLUTION RESPIRATORY (INHALATION) EVERY 6 HOURS
Status: DISCONTINUED | OUTPATIENT
Start: 2017-08-11 | End: 2017-08-12

## 2017-08-11 RX ORDER — ACETYLCYSTEINE 100 MG/ML
4 SOLUTION ORAL; RESPIRATORY (INHALATION) 4 TIMES DAILY
Status: DISCONTINUED | OUTPATIENT
Start: 2017-08-11 | End: 2017-08-12

## 2017-08-11 RX ADMIN — POLYETHYLENE GLYCOL 3350 17 G: 17 POWDER, FOR SOLUTION ORAL at 09:08

## 2017-08-11 RX ADMIN — AMANTADINE HYDROCHLORIDE 100 MG: 50 SOLUTION ORAL at 02:08

## 2017-08-11 RX ADMIN — ATORVASTATIN CALCIUM 10 MG: 10 TABLET, FILM COATED ORAL at 09:08

## 2017-08-11 RX ADMIN — IPRATROPIUM BROMIDE AND ALBUTEROL SULFATE 3 ML: .5; 3 SOLUTION RESPIRATORY (INHALATION) at 11:08

## 2017-08-11 RX ADMIN — CHLORHEXIDINE GLUCONATE 15 ML: 1.2 RINSE ORAL at 09:08

## 2017-08-11 RX ADMIN — INSULIN ASPART 2 UNITS: 100 INJECTION, SOLUTION INTRAVENOUS; SUBCUTANEOUS at 06:08

## 2017-08-11 RX ADMIN — PANTOPRAZOLE SODIUM 40 MG: 40 GRANULE, DELAYED RELEASE ORAL at 09:08

## 2017-08-11 RX ADMIN — HYDRALAZINE HYDROCHLORIDE 75 MG: 25 TABLET ORAL at 02:08

## 2017-08-11 RX ADMIN — AMANTADINE HYDROCHLORIDE 100 MG: 50 SOLUTION ORAL at 09:08

## 2017-08-11 RX ADMIN — HYDRALAZINE HYDROCHLORIDE 75 MG: 25 TABLET ORAL at 05:08

## 2017-08-11 RX ADMIN — Medication 3 ML: at 05:08

## 2017-08-11 RX ADMIN — ACETYLCYSTEINE 4 ML: 100 INHALANT RESPIRATORY (INHALATION) at 11:08

## 2017-08-11 RX ADMIN — FLUOXETINE 20 MG: 20 CAPSULE ORAL at 09:08

## 2017-08-11 RX ADMIN — AMANTADINE HYDROCHLORIDE 100 MG: 50 SOLUTION ORAL at 05:08

## 2017-08-11 RX ADMIN — Medication 3 ML: at 09:08

## 2017-08-11 RX ADMIN — INSULIN ASPART 2 UNITS: 100 INJECTION, SOLUTION INTRAVENOUS; SUBCUTANEOUS at 12:08

## 2017-08-11 RX ADMIN — INSULIN ASPART 1 UNITS: 100 INJECTION, SOLUTION INTRAVENOUS; SUBCUTANEOUS at 11:08

## 2017-08-11 RX ADMIN — HYDRALAZINE HYDROCHLORIDE 75 MG: 25 TABLET ORAL at 09:08

## 2017-08-11 RX ADMIN — AMLODIPINE BESYLATE 10 MG: 5 TABLET ORAL at 09:08

## 2017-08-11 RX ADMIN — ACETYLCYSTEINE 4 ML: 100 INHALANT RESPIRATORY (INHALATION) at 07:08

## 2017-08-11 RX ADMIN — IPRATROPIUM BROMIDE AND ALBUTEROL SULFATE 3 ML: .5; 3 SOLUTION RESPIRATORY (INHALATION) at 07:08

## 2017-08-11 RX ADMIN — ASPIRIN 81 MG 81 MG: 81 TABLET ORAL at 09:08

## 2017-08-11 RX ADMIN — Medication 3 ML: at 02:08

## 2017-08-11 NOTE — PLAN OF CARE
Problem: Physical Therapy Goal  Goal: Physical Therapy Goal  Goals to be met by: 2017     Patient will increase functional independence with mobility by performin. Supine to sit with Maximum Assistance  2. Rolling to Left and Right with Maximum Assistance.  3. Sit to stand transfer with Maximum Assistance  4. Bed to chair transfer with Maximum Assistance    5. Sitting at edge of bed x30 minutes with Modified [place]  6. Lower extremity exercise program x10-15 reps per handout, with assistance as needed     Outcome: Ongoing (interventions implemented as appropriate)  Pt progressing, remains appropriate for inpatient rehab

## 2017-08-11 NOTE — PROGRESS NOTES
ROSALINE attempted by different therapist and unable to obtain sample @ this time. Dr. Gomez @ bedside and aware

## 2017-08-11 NOTE — ASSESSMENT & PLAN NOTE
Close to baseline. Slight bump with lasix since admit from LTAC. Hold off on lasix. Given IVFs temporarily. On tube feeds. Ross in place. Strict I/Os.

## 2017-08-11 NOTE — SUBJECTIVE & OBJECTIVE
Interval History: respiratory distress and hypoxia overnight. Resolved by removing tracheostomy's inner piece, deep suction and nebs. Inner piece reported to be clean when removed. Patient now stable. Has productive cough. Daughter demanding transfer to [facility] [place]. I personally called and no beds available. No emergent indication for transfer. No BMs since admission.    Review of Systems   Constitutional: Negative.    HENT: Negative.    Eyes: Negative.    Respiratory: Positive for cough and shortness of breath.    Cardiovascular: Negative.    Gastrointestinal: Negative.    Genitourinary: Negative.    Musculoskeletal: Negative.    Skin: Negative.    Neurological: Negative.    Hematological: Negative.    Psychiatric/Behavioral: Negative.      Objective:     Vital Signs (Most Recent):  Temp: 98.3 °F (36.8 °C) (08/11/17 0301)  Pulse: (!) 56 (08/11/17 0605)  Resp: (!) 23 (08/11/17 0605)  BP: (!) 151/67 (08/11/17 0602)  SpO2: 100 % (08/11/17 0605) Vital Signs (24h Range):  Temp:  [98.2 °F (36.8 °C)-98.5 °F (36.9 °C)] 98.3 °F (36.8 °C)  Pulse:  [52-80] 56  Resp:  [17-30] 23  SpO2:  [98 %-100 %] 100 %  BP: (124-170)/(58-78) 151/67     Weight: 131.2 kg (289 lb 3.9 oz)  Body mass index is 43.98 kg/m².    Intake/Output Summary (Last 24 hours) at 08/11/17 0700  Last data filed at 08/11/17 0600   Gross per 24 hour   Intake             2525 ml   Output             1165 ml   Net             1360 ml      Physical Exam   Constitutional: She is oriented to person, place, and time. She appears well-developed. No distress.   Morbidly obese   HENT:   Head: Normocephalic and atraumatic.   Mouth/Throat: Oropharynx is clear and moist.   Tracheostomy in place   Eyes: Conjunctivae and EOM are normal.   Neck: Normal range of motion. Neck supple.   Cardiovascular: Normal rate, regular rhythm, normal heart sounds and intact distal pulses.    Pulmonary/Chest: Effort normal. She has no wheezes. She has no rales.   On trach mask  Nimesh barrios    Abdominal: Soft. Bowel sounds are normal.   PEG in place   Musculoskeletal: Normal range of motion. She exhibits no edema.   Neurological: She is alert and oriented to person, place, and time. No cranial nerve deficit.   3/5 strength bilateral UE and LE. 4/5 hand .    Skin: Skin is warm and dry. She is not diaphoretic.   Psychiatric: Her behavior is normal. She exhibits a depressed mood.   Nursing note and vitals reviewed.      Significant Labs: All pertinent labs within the past 24 hours have been reviewed.    Significant Imaging: I have reviewed all pertinent imaging results/findings within the past 24 hours.  I have reviewed and interpreted all pertinent imaging results/findings within the past 24 hours.

## 2017-08-11 NOTE — ASSESSMENT & PLAN NOTE
Significantly high on presentation, then improved along with resolution of respiratory issues. Restarted home regimen to be given via PEG. Increased hydralazine to 75 mg TID. BP obtained by me was at goal at 131/60 mmHg. Goal BP < 140/90 mmHg. Will not given BB as she is in sinus russ physiologically.

## 2017-08-11 NOTE — PLAN OF CARE
08/11/17 1856   Discharge Reassessment   Assessment Type Discharge Planning Reassessment   Discharge plan remains the same: Yes   Provided patient/caregiver education on the expected discharge date and the discharge plan Yes   Discharge Plan A Rehab  ([place]   Discharge Plan B Rehab  (alternate)   Change in patient condition or support system Yes   Patient choice form signed by patient/caregiver No   Explained to the the patient/caregiver why the discharge planned changed: No   Involved the patient/caregiver in establishing a new discharge plan: No   patient in ICU, progressing. SW reviewed chart, discussed with Dr Patino, discussed with Lorraine at  Inpatient Rehab this morning. Met with patient this evening. Per Lorraine, patient needs to show consistent participation with therapy over weekend for potential admission at [facility]. Best if out of ICU a day or two to help assess progress. SW spoke with patient who nods, appears to understand (6:45 pm). SW unable to update with daughter, not available at this time. SW/CM will continue to follow, assist as needed.

## 2017-08-11 NOTE — ASSESSMENT & PLAN NOTE
It appears this was likely due to a mucous plug that was accessed via deep suction. No evidence of infection, pneumothorax or significant pulm effusion. ACS unlikely. Pulm consulted for co-management. Now weaned off vent. Developed another episode of respiratory distress and hypoxia overnight. Again deep suctioned, supp O2 via trach mask and nebs. Ronchi and wheezing noted. Patient improved after removing tracheostomy's inner piece. CXR with no new findings. SaO2 well above 90%. I will consult ENT for recs on this issue. Patient likely has bronchitis/tracheobronchitis. Will continue duo-nebs, supplemental O2 via tracheal mask, deep suction PRN, elevate HOB at least 30 degrees and mucomyst to nebs for mucolytic effect. Will send sputum culture to lab. No acute indication for antibiotic regimen at this time. PPI for stress ulcer prophylaxis.

## 2017-08-11 NOTE — ASSESSMENT & PLAN NOTE
No acute issues. ASA, statin and BP control. Neuro checks. Daughter concerned about patient not being as engaged on conversation. Per my exam and nursing exam, she engages when interacted with, follows simple commands, participates with PT/OT and has no new gross neuro deficits. Per prior notes from LTAC, this is her prior functional status. A CT head showed no new pathologic findings. Patient will need to transfer to a rehab facility. PT, ST and OT consulted. PT/OT in agreement with inpatient rehab. Pending ST re-eval.

## 2017-08-11 NOTE — CONSULTS
[facility]  Neurology  Consult Note    Patient Name: Nisa Frank  MRN: [id number]  Admission Date: 2017  Hospital Length of Stay: 3 days  Code Status: Full Code   Attending Provider: Jessa Patino MD   Consulting Provider: Dustin Harmon MD  Primary Care Physician: Atilio Downs MD  Principal Problem:Acute respiratory failure with hypoxia    Consults  Subjective:     Chief Complaint/Reasonf for consult: Hx of watershed strokes     HPI: 65 y/o female with medical Hx as listed below brought to ED for shortness of breath. Pt seemed to have a mucus plug demonstrating improvement after suction. Ms. Frank had a prolonged hospital stay in a previous admission back in 2017 when admitted for fever and leukocytosis; she had reported seizures and transferred to ICU and intubated. Over the course of several days pt had rhythmic activity of RUE for EEG was obtained but failed to show epileptiform activity rather showing myoclonus. MRI was obtained at that point which was remarkable to bilateral watershed infarctions. Pt had trach and went to an LTAC then rehab.    As mentioned above pt returned to this facility for respiratory distress. She is back on mechanical ventilation. Family is concerned that pt is not as talkative as she was before admission. No involuntary motor activity reported. Neurological eval has been requested.    Past Medical History:   Diagnosis Date    Acute bilateral cerebral infarction in a watershed distribution 2017    CAD (coronary artery disease)     Diabetes mellitus     Hypertension     Morbid obesity     TAMMY on CPAP     setting +17    Stroke        Past Surgical History:   Procedure Laterality Date    ARTERIAL BYPASS SURGRY      9 tears sgo     SECTION      CORONARY ARTERY BYPASS GRAFT      HYSTERECTOMY      TUBAL LIGATION         Review of patient's allergies indicates:   Allergen Reactions    Latex, natural rubber        Current  Neurological Medications:     No current facility-administered medications on file prior to encounter.      Current Outpatient Prescriptions on File Prior to Encounter   Medication Sig    albuterol-ipratropium 2.5mg-0.5mg/3mL (DUO-NEB) 0.5 mg-3 mg(2.5 mg base)/3 mL nebulizer solution Take 3 mLs by nebulization every 6 (six) hours. Rescue    aliskiren (TEKTURNA) 300 MG Tab 1 tablet (300 mg total) by Per G Tube route once daily.    amantadine HCl (SYMMETREL) 100 mg capsule 1 capsule (100 mg total) by Per G Tube route every 8 (eight) hours.    amlodipine (NORVASC) 10 MG tablet 1 tablet (10 mg total) by Per G Tube route once daily.    aspirin 325 MG tablet 1 tablet (325 mg total) by Per G Tube route once daily.    atorvastatin (LIPITOR) 10 MG tablet 1 tablet (10 mg total) by Per G Tube route once daily.    carvedilol (COREG) 25 MG tablet 1 tablet (25 mg total) by Per G Tube route 2 (two) times daily.    cloNIDine 0.1 mg/24 hr td ptwk (CATAPRES) 0.1 mg/24 hr Place 1 patch onto the skin every 7 days.    fluoxetine (PROZAC) 20 MG capsule 1 capsule (20 mg total) by Per G Tube route once daily.    HEPARIN SODIUM,PORCINE (HEPARIN, PORCINE,) 5,000 unit/mL injection Inject 1.5 mLs (7,500 Units total) into the skin every 8 (eight) hours.    insulin aspart (NOVOLOG) 100 unit/mL InPn pen Inject 1-10 Units into the skin every 4 (four) hours as needed (Hyperglycemia).    insulin detemir (LEVEMIR FLEXTOUCH) 100 unit/mL (3 mL) SubQ InPn pen Inject 10 Units into the skin once daily.    pantoprazole (PROTONIX) 40 mg GrPS 1 packet (40 mg total) by Per G Tube route once daily.    acetaminophen (TYLENOL) 650 mg/20.3 mL Soln 20.3 mLs (650 mg total) by Per NG tube route every 6 (six) hours as needed.    atorvastatin (LIPITOR) 10 MG tablet Take 10 mg by mouth once daily.    diclofenac sodium (VOLTAREN) 1 % Gel Apply 2 g topically 2 (two) times daily.    EASY TOUCH TWIST LANCETS 30 gauge Misc     meclizine (ANTIVERT) 25 mg  tablet Take 1 tablet (25 mg total) by mouth every 6 (six) hours as needed.      Family History     Problem Relation (Age of Onset)    No Known Problems Father, Mother, Sister, Brother, Maternal Aunt, Maternal Uncle, Paternal Aunt, Paternal Uncle, Maternal Grandmother, Maternal Grandfather, Paternal Grandmother, Paternal Grandfather        Social History Main Topics    Smoking status: Never Smoker    Smokeless tobacco: Never Used    Alcohol use Yes      Comment: occ    Drug use: No    Sexual activity: Not Currently     Birth control/ protection: See Surgical Hx     Review of Systems   Unable to obtain as pt is on vent and did not answer questions.    Objective:     Vital Signs (Most Recent):  Temp: 98.3 °F (36.8 °C) (08/11/17 0301)  Pulse: (!) 56 (08/11/17 0605)  Resp: (!) 23 (08/11/17 0605)  BP: (!) 151/67 (08/11/17 0602)  SpO2: 100 % (08/11/17 0605) Vital Signs (24h Range):  Temp:  [98.2 °F (36.8 °C)-98.5 °F (36.9 °C)] 98.3 °F (36.8 °C)  Pulse:  [52-80] 56  Resp:  [20-30] 23  SpO2:  [98 %-100 %] 100 %  BP: (124-170)/(58-78) 151/67     Weight: 131.2 kg (289 lb 3.9 oz)  Body mass index is 43.98 kg/m².    Physical Exam  Constitutional: well-developed; on vent  Head: normocephalic.  Eyes: conjunctivae/corneas clear.  Nose: no discharge, no epistaxis  Heart: regular rate and rhythm.  Abdomen: depressible.  Extremities: no cyanosis.  Neurologic: Mental status: no sedation; opens eyes briefly upon verbal stimuli; follows commands.  Cranial nerves: pupils are at 3 mm; moves eyes spontaneously in all direactions; corneal responses present bilaterally; protrudes tongue midline.  Strength: moves UE's and LE's partially against gravity.  Withdraws to noxious stimulation to four extremities       Significant Labs:   CBC: No results for input(s): WBC, HGB, HCT, PLT in the last 48 hours.  CMP:   Recent Labs  Lab 08/10/17  0904 08/10/17  1355 08/11/17  0302   * 190* 209*    142 141   K 3.6 4.3 3.8   CL 96 98 97    CO2 34* 28 34*   BUN 53* 52* 45*   CREATININE 1.4 1.4 1.2   CALCIUM 9.7 9.4 9.6   ANIONGAP 11 16 10   EGFRNONAA 39* 39* 47*       Significant Imaging:   Head CT  Stable foci of hypodensities within the semi-centrum ovale bilaterally corresponding to small infarcts seen on MRI dated June 27, 2010. No definitive evidence for an infarct.      Electronically signed by: BOAZ LOGAN MD  Date: 08/10/17  Time: 10:50     Assessment and Plan:     67 y/o female with Hx of stroke consulted for neurology eval    1. Stroke: back in June pt had watershed stroke likely to episodes of hypotension. At that time pt remained unresponsive for several days. She is now able to follow commands, to verbally communicate, and shows no definite focal findings on exam suggesting a large area stroke or a pure motor or sensory infarction. Perhaps her decreased level of interaction is related more to her acute illness, fatigue, and generalized weakness. Head CT shows ano acute abnormalities.   -Will follow as needed.    Active Diagnoses:    Diagnosis Date Noted POA    PRINCIPAL PROBLEM:  Acute respiratory failure with hypoxia [J96.01] 08/08/2017 Yes    Chronic sinus bradycardia [R00.1] 08/11/2017 Yes    Bronchitis with tracheitis [J40] 08/11/2017 Yes    Slow transit constipation [K59.01] 08/11/2017 Yes    CKD (chronic kidney disease) stage 3, GFR 30-59 ml/min [N18.3] 08/09/2017 Yes    Abnormal urinalysis [R82.90] 08/09/2017 Yes    Mucus plugging of bronchi [J98.09] 08/08/2017 Yes    Tracheostomy status [Z93.0] 08/08/2017 Not Applicable    PEG (percutaneous endoscopic gastrostomy) status [Z93.1] 08/08/2017 Not Applicable    Chronic bilateral cerebral infarction in watershed distribution [I69.30] 06/27/2017 Not Applicable    Hyperlipidemia [E78.5] 06/19/2017 Yes     Chronic    CAD s/p CABG [I25.10] 06/19/2017 Yes     Chronic    Morbid obesity with BMI of 50.0-59.9, adult [E66.01, Z68.43] 02/24/2015 Not Applicable     Chronic     Essential hypertension [I10] 05/12/2014 Yes     Chronic    Type 2 diabetes mellitus, uncontrolled [E11.65] 05/12/2014 Yes     Chronic      Problems Resolved During this Admission:    Diagnosis Date Noted Date Resolved POA       VTE Risk Mitigation         Ordered     enoxaparin injection 40 mg  Daily     Route:  Subcutaneous        08/08/17 1503     Medium Risk of VTE  Once      08/08/17 1503          Thank you for your consult. I will follow-up with patient. Please contact us if you have any additional questions.    Dustin Harmon MD  Neurology  [facility]

## 2017-08-11 NOTE — PROGRESS NOTES
[facility] Medicine  Progress Note    Patient Name: Nisa Frank  MRN: [id number]  Patient Class: IP- Inpatient   Admission Date: 8/8/2017  Length of Stay: 3 days  Attending Physician: Jessa Patino MD  Primary Care Provider: Atilio Downs MD        Subjective:     Principal Problem:<principal problem not specified>    HPI:  66 year old female with diabetes mellitus type 2, CKD stage 3, CAD s/p CABG, essential hypertension, cerebrovascular disease s/p bi-hemispheric watershed infarcts and trach/PEG status who was brought in via EMS from [facility] for acute respiratory distress, lethargy and hypoxia of 56% while on 4L via trach collar, improving to 90% on non rebreather en route to the ED. Patient was evidently in respiratory distress despite improvement of SaO2. Upon arrival to the ED, patient was connected to MV, given albuterol/tiotropium and suctioned at bedside, immediately correcting her respiratory issue. Per ED staff and patient's daughter who was present at bedside, it was noted that a good amount of thick mucous was suctioned. BP also significantly improved after this was done. Renal function at baseline. UA suggestive of infection however not a reliable sample. Afebrile and non toxic appearing. No new consolidation on imaging but rather generalized haziness unchanged from prior. Distant rales on exam. Patient was admitted to ICU to wean off MV as tolerated and for placement to [facility], as preferred by patient's daughter.             Hospital Course:  Ms Frank presented with acute respiratory failure with hypoxia likely due to mucous plug. In the ED, patient was connected to MV, given albuterol/tiotropium and suctioned at bedside, immediately correcting her respiratory issue. Per ED staff and patient's daughter who was present at bedside, it was noted that a good amount of thick mucous was suctioned. BP also significantly improved after this was  "done. Renal function at baseline. UA suggestive of infection however not a reliable sample. A repeat sample again not reliable despite this being obtained from siegel. Per records at outside facility she presented from, siegel had been in place and there were plans for removal. Unknown if this was done. Patient remained afebrile and non toxic appearing. Mild leukocytosis of 13 with mild neutrophilia of 74% likely stress induced. No new consolidation on imaging but rather generalized haziness unchanged from prior. Distant rales on exam. Patient was admitted to ICU to wean off MV as tolerated and for placement to [facility], as preferred by patient's daughter. Patient was diuresed with on dose of IV lasix. Suctioned intermittently. Able to wean off ventilator on 8/9 in AM. Stable with T piece for more than 8 hours, therefore de-escalated to low flow supplemental O2 via trach mask. Patient following simple commands and answering "yes" or "no" (whispered) adequately to questions, however daughter is concerned that patient is not as active or talkative as usual. ABG showed pH of 7.5, pCO2 of 51, pO2 108, SaO2 99%, HCO3 (per BMP) 34 (which is consistent with her baseline). BUN 50s (slightly elevated than baseline) and Cr 1.4 (improved since discharge from LTAC). Noted more urine output than input. No BMs since admission. CT head 8/10 without acute intracranial pathology. Started on IVFs in addition to tube feeds attempting to fix what seems to be volume contraction. Renal function, pH and bicarb improved with this. Patient continued following simple commands, is alert and able to whisper "yes" or "no" adequately to questions. PT/OT evaluated on 8/10. Patient was able to clean eyes with a towel and oral cavity on her own. With some assistance, was able to role side-to-side on bed by holding onto rails and sit up on edge of bed. Was able to maintain posture on her own. Required max assistance to stand. No respiratory " distress during intervention. On 8/11, early in AM, patient developed respiratory distress and hypoxia. This quickly resolved by removing tracheostomy's inner piece, deep suction and nebs. Some thick mucous suctioned, reportedly blood tinged. Patient remained with some bibasilar ronchi and productive cough. Inner piece left out and patient remained stable respiratory wise. ENT consulted to evaluate tracheostomy.     Interval History: respiratory distress and hypoxia overnight. Resolved by removing tracheostomy's inner piece, deep suction and nebs. Inner piece reported to be clean when removed. Patient now stable. Has productive cough. Daughter demanding transfer to [facility]. I personally called and no beds available. No emergent indication for transfer. No BMs since admission.    Review of Systems   Constitutional: Negative.    HENT: Negative.    Eyes: Negative.    Respiratory: Positive for cough and shortness of breath.    Cardiovascular: Negative.    Gastrointestinal: Negative.    Genitourinary: Negative.    Musculoskeletal: Negative.    Skin: Negative.    Neurological: Negative.    Hematological: Negative.    Psychiatric/Behavioral: Negative.      Objective:     Vital Signs (Most Recent):  Temp: 98.3 °F (36.8 °C) (08/11/17 0301)  Pulse: (!) 56 (08/11/17 0605)  Resp: (!) 23 (08/11/17 0605)  BP: (!) 151/67 (08/11/17 0602)  SpO2: 100 % (08/11/17 0605) Vital Signs (24h Range):  Temp:  [98.2 °F (36.8 °C)-98.5 °F (36.9 °C)] 98.3 °F (36.8 °C)  Pulse:  [52-80] 56  Resp:  [17-30] 23  SpO2:  [98 %-100 %] 100 %  BP: (124-170)/(58-78) 151/67     Weight: 131.2 kg (289 lb 3.9 oz)  Body mass index is 43.98 kg/m².    Intake/Output Summary (Last 24 hours) at 08/11/17 0700  Last data filed at 08/11/17 0600   Gross per 24 hour   Intake             2525 ml   Output             1165 ml   Net             1360 ml      Physical Exam   Constitutional: She is oriented to person, place, and time. She appears well-developed. No distress.    Morbidly obese   HENT:   Head: Normocephalic and atraumatic.   Mouth/Throat: Oropharynx is clear and moist.   Tracheostomy in place   Eyes: Conjunctivae and EOM are normal.   Neck: Normal range of motion. Neck supple.   Cardiovascular: Normal rate, regular rhythm, normal heart sounds and intact distal pulses.    Pulmonary/Chest: Effort normal. She has no wheezes. She has no rales.   On trach mask  Bibasilar ronchi   Abdominal: Soft. Bowel sounds are normal.   PEG in place   Musculoskeletal: Normal range of motion. She exhibits no edema.   Neurological: She is alert and oriented to person, place, and time. No cranial nerve deficit.   3/5 strength bilateral UE and LE. 4/5 hand .    Skin: Skin is warm and dry. She is not diaphoretic.   Psychiatric: Her behavior is normal. She exhibits a depressed mood.   Nursing note and vitals reviewed.      Significant Labs: All pertinent labs within the past 24 hours have been reviewed.    Significant Imaging: I have reviewed all pertinent imaging results/findings within the past 24 hours.  I have reviewed and interpreted all pertinent imaging results/findings within the past 24 hours.    Assessment/Plan:      Acute respiratory failure with hypoxia    It appears this was likely due to a mucous plug that was accessed via deep suction. No evidence of infection, pneumothorax or significant pulm effusion. ACS unlikely. Pulm consulted for co-management. Now weaned off vent. Developed another episode of respiratory distress and hypoxia overnight. Again deep suctioned, supp O2 via trach mask and nebs. Ronchi and wheezing noted. Patient improved after removing tracheostomy's inner piece. CXR with no new findings. SaO2 well above 90%. I will consult ENT for recs on this issue. Patient likely has bronchitis/tracheobronchitis. Will continue duo-nebs, supplemental O2 via tracheal mask, deep suction PRN, elevate HOB at least 30 degrees and mucomyst to nebs for mucolytic effect. Will send  sputum culture to lab. No acute indication for antibiotic regimen at this time. PPI for stress ulcer prophylaxis.           Bronchitis with tracheitis    As above          Mucus plugging of bronchi    As above          Tracheostomy status    As above          PEG (percutaneous endoscopic gastrostomy) status    On diabetasource. Rate increased to 60 cc/hr as per nutrition recs          CKD (chronic kidney disease) stage 3, GFR 30-59 ml/min    Close to baseline. Slight bump with lasix since admit from LTAC. Hold off on lasix. Given IVFs temporarily. On tube feeds. Siegel in place. Strict I/Os.           Chronic sinus bradycardia    No acute issues. Avoid jamir blocking agents          Abnormal urinalysis    Obtained twice and none reliable given high amount of squam cells. Patient afebrile. This is likely colonization from chronic siegel. Siegel switched in the ED. Hold off on abxs. Monitor for signs of infection.            Chronic bilateral cerebral infarction in watershed distribution    No acute issues. ASA, statin and BP control. Neuro checks. Daughter concerned about patient not being as engaged on conversation. Per my exam and nursing exam, she engages when interacted with, follows simple commands, participates with PT/OT and has no new gross neuro deficits. Per prior notes from LTAC, this is her prior functional status. A CT head showed no new pathologic findings. Patient will need to transfer to a rehab facility. PT, ST and OT consulted. PT/OT in agreement with inpatient rehab. Pending ST re-eval.          CAD s/p CABG    No chest pain. On secondary prevention with ASA and statin. Hold off on BB as patient is chronically in sinus russ. On cardiac monitoring.           Hyperlipidemia    statin          Morbid obesity with BMI of 50.0-59.9, adult    Weight loss strongly encouraged. Is to start rehab once acute issue has resolved. Nutrition consult as outpatient.           Type 2 diabetes mellitus, uncontrolled     Uncontrolled. Hgb A1c in June was 10.5%.Continue SSI and adjust regimen for goal BG < 180. Currently close to goal. On diabetic tube feeds           Essential hypertension    Significantly high on presentation, then improved along with resolution of respiratory issues. Restarted home regimen to be given via PEG. Increased hydralazine to 75 mg TID. BP obtained by me was at goal at 131/60 mmHg. Goal BP < 140/90 mmHg. Will not given BB as she is in sinus russ physiologically.              VTE Risk Mitigation         Ordered     enoxaparin injection 40 mg  Daily     Route:  Subcutaneous        08/08/17 1503     Medium Risk of VTE  Once      08/08/17 1503        Per nursing, daughter demanding transfer to [facility]. I called transfer center and was connected to ICU on-call staff. There are no beds available and reassured no current necessity for transfer. I will call daughter once ENT has assessed tracheostomy and discuss plan of care moving forward.    Critical care time spent on the evaluation and treatment of severe organ dysfunction, review of pertinent labs and imaging studies, discussions with consulting providers and discussions with patient/family: > 45 minutes.    Jessa Gomez MD  Department of Hospital Medicine   [facility]

## 2017-08-11 NOTE — PT/OT/SLP PROGRESS
Occupational Therapy  Treatment    Nisa Frank   MRN: [id number]   Admitting Diagnosis: Acute respiratory failure with hypoxia    OT Date of Treatment: 08/11/17   OT Start Time: 1055  OT Stop Time: 1135  OT Total Time (min): 40 min    Billable Minutes:  Self Care/Home Management 15 with total time of 40 minutes cotreatment with PT    General Precautions: Standard, fall, respiratory, NPO  Orthopedic Precautions: N/A  Braces: N/A    Do you have any cultural, spiritual, Restorationist conflicts, given your current situation?: none    Subjective:  Communicated with nurseprior to session.    Pain/Comfort  Pain Rating 1: 0/10  Pain Rating Post-Intervention 1: 0/10    Objective:  Patient found with: blood pressure cuff, bed alarm, central line, oxygen, pulse ox (continuous), SCD, telemetry     Functional Mobility:  Bed Mobility:  Rolling/Turning to Left: Total assistance, With assist of 2  Rolling/Turning Right: Total assistance  Scooting/Bridging: Total Assistance, With assist of 2  Supine to Sit: Total Assistance, With assist of 2  Sit to Supine: Total Assistance, With assist of 2    Transfers:   Sit <> Stand Assistance: Total Assistance  Sit <> Stand Assistive Device: Rolling Walker      Activities of Daily Living:  Feeding Level of Assistance: Activity did not occur  UE Dressing Level of Assistance: Total assistance  LE Dressing Level of Assistance: Total assistance  Grooming Position: Seated, EOB  Grooming Level of Assistance: Contact guard assistance (Pt able to wash face)  Toileting Level of Assistance: Total assistance      Balance:   Static Sit: FAIR+: Able to take MINIMAL challenges from all directions  Dynamic Sit: FAIR+: Maintains balance through MINIMAL excursions of active trunk motion  Static Stand: 0: Needs MAXIMAL assist to maintain   Dynamic stand: POOR: N/A    Therapeutic Activities and Exercises:  Shoulder elevation and depression 1 set of 10 with max encouragement and verbal cues  Pt is easily distracted  "with all activity  Pt able to weight shift when sitting edge of bed and initiate a scoot to get feet on floor  Pt able to tolerate standing with max assist of 2 ( 30 seconds followed by 40 sec    AM-PAC 6 CLICK ADL   How much help from another person does this patient currently need?   1 = Unable, Total/Dependent Assistance  2 = A lot, Maximum/Moderate Assistance  3 = A little, Minimum/Contact Guard/Supervision  4 = None, Modified [place]/Independent    Putting on and taking off regular lower body clothing? : 1  Bathing (including washing, rinsing, drying)?: 1  Toileting, which includes using toilet, bedpan, or urinal? : 1  Putting on and taking off regular upper body clothing?: 2  Taking care of personal grooming such as brushing teeth?: 3  Eating meals?: 1  Total Score: 9     AM-PAC Raw Score CMS "G-Code Modifier Level of Impairment Assistance   6 % Total / Unable   7 - 8 CM 80 - 100% Maximal Assist   9-13 CL 60 - 80% Moderate Assist   14 - 19 CK 40 - 60% Moderate Assist   20 - 22 CJ 20 - 40% Minimal Assist   23 CI 1-20% SBA / CGA   24 CH 0% Independent/ Mod I       Patient left supine with nurse notified    ASSESSMENT:  Nisa Frank is a 66 y.o. female with a medical diagnosis of Acute respiratory failure with hypoxia and presents with good tolerate for edge of bed activity. OT to continue to treat to maximize participation for inpatient rehab.    Rehab identified problem list/impairments: Rehab identified problem list/impairments: weakness, impaired endurance, impaired self care skills, impaired functional mobilty, gait instability, impaired balance, decreased coordination, decreased upper extremity function, decreased lower extremity function, impaired cognition, decreased ROM, impaired coordination, impaired joint extensibility    Rehab potential is good.    Activity tolerance: Good    Discharge recommendations: Discharge Facility/Level Of Care Needs: rehabilitation facility     Barriers to " discharge: Barriers to Discharge: Decreased caregiver support    Equipment recommendations: none     GOALS:    Occupational Therapy Goals        Problem: Occupational Therapy Goal    Goal Priority Disciplines Outcome Interventions   Occupational Therapy Goal     OT, PT/OT Ongoing (interventions implemented as appropriate)    Description:  Goals to be met by: 8/7/2017    Patient will increase functional independence with ADLs by performing:    UE Dressing with Moderate Assistance.  Grooming while EOB with Supervision.  Supine to sit with Maximum Assistance.  Upper extremity AROM exercise program to BUE  x10 reps per handout, with supervision assistance as needed.                        Plan:  Patient to be seen 5 x/week to address the above listed problems via self-care/home management, therapeutic activities, therapeutic exercises  Plan of Care expires: 09/09/17  Plan of Care reviewed with: patient         Rhoda Anderson OT  08/11/2017

## 2017-08-11 NOTE — PLAN OF CARE
Problem: Patient Care Overview  Goal: Plan of Care Review  Outcome: Ongoing (interventions implemented as appropriate)  Pt is alert, follows simple commands, VALERO, nods and gestures appropriately to questions. Pt has very minimal conversation, mostly nonverbal. Tolerating tube feeds. No new skin issues noted. Pt remains free of falls. Bed in lowest position. Will continue to monitor.

## 2017-08-11 NOTE — NURSING
2230: After repositioning pt, her RR increased and appeared to have some wheezing. Respiratory called to deep suction pt. RT at bedside but too soon for scheduled nebs.    2243: MD paged for PRN nebs if possible.    2302: Spoke w/ Dr. Barksdale. PRN duonebs ordered, along with xanax PRN for pt's anxiety.     RT also noted that pt's O2 sats decrease when extended shiley #6 inner canula is in place. MD ok'd for inner canula to be left out, pt's O2 sats are better while inner canula is out.

## 2017-08-11 NOTE — PT/OT/SLP PROGRESS
"Speech Language Pathology  Speech  Treatment    Nisa Frank   MRN: [id number]   Admitting Diagnosis: Acute respiratory failure with hypoxia    Diet recommendations: Solid Diet Level: NPO  Liquid Diet Level: NPO     SLP Treatment Date: 08/11/17  Speech Start Time: 1315     Speech Stop Time: 1335     Speech Total (min): 20 min       TREATMENT BILLABLE MINUTES:  Speech Therapy Individual 20    Has the patient been evaluated by SLP for swallowing? : No  Keep patient NPO?: Yes   General Precautions: Standard, respiratory  Current Respiratory Status: trach without vent       Subjective:  Patient sleepy.      Objective:     Patient seen in room, positioned in optimal position in bed.  Noted to be sleepy, cln had observed patient earlier in am participating with PT/OT, following simple commands and using head nods for yes/no responses and some voicing when saying "ow".  In pm, able to place PMV on, patient tolerated for 1 min x2, but did not exhibit any voicing or verbal communication, cotninued to nod her head to yes /no questions, did not attempt any verbal responses to simple wh questions.  ST to continue and monitor use of PMV    FIM:          Assessment:  Nisa Frank is a 66 y.o. female with a medical diagnosis of Acute respiratory failure with hypoxia and presents with decreased voicing secondary to trach    Discharge recommendations: Discharge Facility/Level Of Care Needs: rehabilitation facility     Goals:    SLP Goals        Problem: SLP Goal    Goal Priority Disciplines Outcome   SLP Goal    Medium SLP Ongoing (interventions implemented as appropriate)   Description:  Short term goals:  1) ongoing PMV assessment completed  2) tolerate PMV as determined by assessment for verbal communication                      Plan:   Patient to be seen Therapy Frequency: 4 x/week   Plan of Care expires: 08/17/17  Plan of Care reviewed with: patient  SLP Follow-up?: Yes  SLP - Next Visit Date: 08/10/17           Ligia " PEPE Ward, NAY-SLP  08/11/2017

## 2017-08-11 NOTE — PT/OT/SLP PROGRESS
"Physical Therapy  Treatment    Nisa Frank   MRN: [id number]   Admitting Diagnosis: Acute respiratory failure with hypoxia    PT Received On: 08/11/17  PT Start Time: 1055     PT Stop Time: 1133    PT Total Time (min): 38 min       Billable Minutes:  Therapeutic Activity 23 co-treat with OT    Treatment Type: Treatment  PT/PTA: PT     PTA Visit Number: 0       General Precautions: Standard, fall, NPO, respiratory  Orthopedic Precautions: N/A   Braces: N/A    Do you have any cultural, spiritual, Restoration conflicts, given your current situation?: none    Subjective:  Communicated with nsg prior to session.  Pt shaking her head yes and no appropriately.  Pt voiced "ow" with sit to supine t/f     Pain/Comfort  Pain Rating 1: 0/10    Objective:   Patient found with: blood pressure cuff, central line, siegel catheter, oxygen, SCD, telemetry, tracheostomy, pulse ox (continuous) (trach collar)    Functional Mobility:  Bed Mobility:   Rolling/Turning to Left: Total assistance, With assist of 2  Scooting/Bridging: Stand by Assistance (to EOB, Total A to HOB in supine with bridging and bed in trendelenberg)  Supine to Sit: Maximum Assistance, With assist of 2 (VC for use of BR)  Sit to Supine: Total Assistance, With assist of  2    Transfers:  Sit <> Stand Assistance: Maximum Assistance (with assist of two)  Sit <> Stand Assistive Device: Rolling Walker    Gait:   Gait Distance: activity did not occur    Stairs:  N/T    Balance:   Static Sit: FAIR+: Able to take MINIMAL challenges from all directions  Dynamic Sit: FAIR+: Maintains balance through MINIMAL excursions of active trunk motion  Static Stand: 0: Needs MAXIMAL assist to maintain   Dynamic stand: POOR: N/A     Therapeutic Activities and Exercises:  Dangle protocol, pt sat at EOB approx 20m and performed ADL's with SBA and B FAQ's x 10 reps  Pt stood x 2 trails with RW and Max A x 2 people approx 30sec each.  Instructed pt to perform B AP's and TKE's and finger " flex/ext.while in bed. Pt able to demonstrated with max VC, needs reinforcement     AM-PAC 6 CLICK MOBILITY  How much help from another person does this patient currently need?   1 = Unable, Total/Dependent Assistance  2 = A lot, Maximum/Moderate Assistance  3 = A little, Minimum/Contact Guard/Supervision  4 = None, Modified [place]/Independent    Turning over in bed (including adjusting bedclothes, sheets and blankets)?: 1  Sitting down on and standing up from a chair with arms (e.g., wheelchair, bedside commode, etc.): 2  Moving from lying on back to sitting on the side of the bed?: 2  Moving to and from a bed to a chair (including a wheelchair)?: 2  Need to walk in hospital room?: 1  Climbing 3-5 steps with a railing?: 1  Total Score: 9    AM-PAC Raw Score CMS G-Code Modifier Level of Impairment Assistance   6 % Total / Unable   7 - 9 CM 80 - 100% Maximal Assist   10 - 14 CL 60 - 80% Moderate Assist   15 - 19 CK 40 - 60% Moderate Assist   20 - 22 CJ 20 - 40% Minimal Assist   23 CI 1-20% SBA / CGA   24 CH 0% Independent/ Mod I     Patient left HOB elevated 30* with all lines intact, call button in reach and nsg notified.      Assessment:  Nisa Frank is a 66 y.o. female with a medical diagnosis of Acute respiratory failure with hypoxia and presents with decreased functional independence 2/2 weakness, deconditioing, and residual deficits from CVA.  Pt is making slow steady progress and should continue to do so.    Rehab identified problem list/impairments: Rehab identified problem list/impairments: weakness, impaired endurance, impaired self care skills, impaired balance, gait instability, impaired functional mobilty, impaired cognition, decreased coordination, decreased upper extremity function, decreased lower extremity function, decreased ROM, decreased safety awareness, impaired coordination, impaired fine motor, impaired cardiopulmonary response to activity    Rehab potential is  good.    Activity tolerance: Good    Discharge recommendations: Discharge Facility/Level Of Care Needs: rehabilitation facility     Barriers to discharge: Barriers to Discharge: Inaccessible home environment, Decreased caregiver support    Equipment recommendations: Equipment Needed After Discharge: none     GOALS:    Physical Therapy Goals        Problem: Physical Therapy Goal    Goal Priority Disciplines Outcome Goal Variances Interventions   Physical Therapy Goal     PT/OT, PT Ongoing (interventions implemented as appropriate)     Description:  Goals to be met by: 2017     Patient will increase functional independence with mobility by performin. Supine to sit with Maximum Assistance  2. Rolling to Left and Right with Maximum Assistance.  3. Sit to stand transfer with Maximum Assistance  4. Bed to chair transfer with Maximum Assistance    5. Sitting at edge of bed x30 minutes with Modified [place]  6. Lower extremity exercise program x10-15 reps per handout, with assistance as needed                      PLAN:    Patient to be seen 6 x/week  to address the above listed problems via gait training, therapeutic activities, therapeutic exercises, neuromuscular re-education  Plan of Care expires: 17  Plan of Care reviewed with: patient         Jacquie Beatriz, PT  2017

## 2017-08-11 NOTE — PROGRESS NOTES
Called to pt bedside at 2230. Pt resp rate increased at 28-30bpm. BBS clear/ dim with slight coarseness. Pt has productive cough. Pt sats on 28% ATC 94-95%. Sxn pt with 14fr, was able to pass catheter with out problem. Only small amount thick clear sputum suctioned.Inspected inner cannula. No plug or crusted noted; only small amount of old blood noted.  Bag suction pt to clear airway as patient family stated pt felt as if something was stuck. No mucus plug found. Changed inner cannula however still hear noise as if mucus in airway. Gave treatment; pt tolerated well. Suctioned pt again; small thick clear blood tinged mucus. Increased fio2 to 60% 10L. Pt sats 93-95% however when cannula removed sats %. Dr. Meyers    Gave order to leave cannula out for comfort of patient. Pt tolerated well ; no distress noted at that time.

## 2017-08-11 NOTE — PLAN OF CARE
"Problem: SLP Goal  Goal: SLP Goal  Short term goals:  1) ongoing PMV assessment completed  2) tolerate PMV as determined by assessment for verbal communication   Outcome: Ongoing (interventions implemented as appropriate)  Patient seen in room, positioned in optimal position in bed.  Noted to be sleepy, cln had observed patient earlier in am participating with PT/OT, following simple commands and using head nods for yes/no responses and some voicing when saying "ow".  In pm, able to place PMV on, patient tolerated for 1 min x2, but did not exhibit any voicing or verbal communication, cotninued to nod her head to yes /no questions, did not attempt any verbal responses to simple wh questions.  ST to continue and monitor use of PMV      "

## 2017-08-11 NOTE — ASSESSMENT & PLAN NOTE
Uncontrolled. Hgb A1c in June was 10.5%.Continue SSI and adjust regimen for goal BG < 180. Currently close to goal. On diabetic tube feeds

## 2017-08-11 NOTE — PROGRESS NOTES
[facility]  Adult Nutrition  Progress Note    SUMMARY     Recommendations    Recommendation/Intervention: 1.) Recommend increasing Diabetisource AC to goal rate 60 mls/hr continuous providing 1728 kcals/day, 86 g protein/day, 144 g CHO/day, and 1178 mls water/day. Hold TF x4 hrs for residuals >250 mls. Flush 90 mls free water Q4 hrs or per MD.   Goals: 1.) patient will tolerate TF at goal rate 2) Patient will meet >=85 - 110% of EEN  Nutrition Goal Status: goal met  Communication of RD Recs: discussed on rounds    Continuum of Care Plan    D/C Planning:  Too soon to determine. Will monitor/follow-up.     Reason for Assessment    Reason for Assessment: RD follow-up  Interdisciplinary Rounds: attended  General Information Comments:  TF running at goal rate @ 60 cc/hr (rate increased per RD recs), meeting energy and protein needs. Tolerating well per nsg. Non-verbal. + trach.     Nutrition Prescription Ordered    Current Diet Order: NPO     Current Nutrition Support Formula Ordered: Diabetisource  Current Nutrition Support Rate Ordered: 60 (ml)  Current Nutrition Support Frequency Ordered: continuous        Evaluation of Received Nutrients/Fluid Intake    Enteral Calories (kcal): 1728  Enteral Protein (gm): 86  Enteral (Free Water) Fluid (mL): 1178    Energy Calories Required: meeting needs  Protein Required: meeting needs  Fluid Required: other (see comments) (Net I/O +1410)     Tolerance: tolerating  % Intake of Estimated Energy Needs: 75 - 100 %  % Meal Intake: NPO     Nutrition Risk Screen     Nutrition Risk Screen: tube feeding or parenteral nutrition    Nutrition/Diet History    Food Preferences: No cultural or Mosque food preferences noted per chart. NKFA.     Nutrition Support Formula Prior to Admit: Diabetasource  Nutrition Support Rate Prior to Admit: 50 (ml)  Nutrition Support Provision Prior to Admit: 1440 kcals/day, 72 g protein/day, 120 g CHO/day , and 982 mls water/day.  "    Labs/Tests/Procedures/Meds    Diagnostic Test/Procedure Review: reviewed, pertinent  Pertinent Labs Reviewed: reviewed  BMP  Lab Results   Component Value Date     08/11/2017    K 3.8 08/11/2017    CL 97 08/11/2017    CO2 34 (H) 08/11/2017    BUN 45 (H) 08/11/2017    CREATININE 1.2 08/11/2017    CALCIUM 9.6 08/11/2017    ANIONGAP 10 08/11/2017    ESTGFRAFRICA 54 (A) 08/11/2017    EGFRNONAA 47 (A) 08/11/2017     Lab Results   Component Value Date    CALCIUM 9.6 08/11/2017    PHOS 3.7 08/07/2017     Lab Results   Component Value Date    ALBUMIN 2.8 (L) 08/08/2017       Recent Labs  Lab 08/11/17  0603   POCTGLUCOSE 192*     Pertinent Medications Reviewed: reviewed  Scheduled Meds:   acetylcysteine 100 mg/ml (10%)  4 mL Nebulization QID    albuterol-ipratropium 2.5mg-0.5mg/3mL  3 mL Nebulization Q6H    amantadine HCl  100 mg Per G Tube Q8H    amlodipine  10 mg Per G Tube Daily    aspirin  81 mg Per G Tube Daily    atorvastatin  10 mg Per G Tube Daily    chlorhexidine  15 mL Mouth/Throat BID    cloNIDine 0.1 mg/24 hr td ptwk  1 patch Transdermal Q7 Days    enoxaparin  40 mg Subcutaneous Daily    fluoxetine  20 mg Per G Tube Daily    hydrALAZINE  75 mg Per G Tube Q8H    pantoprazole  40 mg Per G Tube Daily    polyethylene glycol  17 g Oral BID    sodium chloride 0.9%  3 mL Intravenous Q8H     Continuous Infusions:   PRN Meds:.alprazolam, dextrose 50%, dextrose 50%, glucagon (human recombinant), glucose, glucose, hydrALAZINE, insulin aspart, meclizine    Physical Findings    Overall Physical Appearance: obese, other (see comments) (+ trach collar)  Tubes: gastrostomy tube     Skin: intact (Scar Score 15)    Anthropometrics    Temp: 97 °F (36.1 °C)     Height: 5' 8" (172.7 cm)  Weight Method: Bed Scale  Weight: 131.2 kg (289 lb 3.9 oz)  Ideal Body Weight (IBW), Female: 140 lb  % Ideal Body Weight, Female (lb): 206.61 lb  BMI (Calculated): 44.1  BMI Grade: greater than 40 - morbid obesity     Usual " Body Weight (UBW), kg:  (neo)      Estimated/Assessed Needs    Weight Used For Calorie Calculations: 131.2 kg (289 lb 3.9 oz)      Energy Calorie Requirements (kcal): 0028-5791  Energy Need Method: Kcal/kg (11-14 kcals/kg)  RMR ([place]ac-St. Jeor Equation): 1900.5  Weight Used For Protein Calculations: 63.6 kg (140 lb 3.4 oz) (ideal body weight)  Protein Requirements: 76 - 95 g  Fluid Need Method: RDA Method (or per MD)  CHO Requirement = 225 g         Assessment and Plan         Morbid obesity with BMI of 50.0-59.9, adult     Related to (etiology):   Excessive energy intake     Signs and Symptoms (as evidenced by):   BMI >40      Interventions/Recommendations (treatment strategy):  Permissive underfeeds, diabetisource AC @ 60 mls/hr.      Nutrition Diagnosis Status:   Improving (TF meeting EEN for appropriate wt loss)       Monitor and Evaluation    Food and Nutrient Intake: energy intake, enteral nutrition intake  Food and Nutrient Adminstration: diet order, enteral and parenteral nutrition administration  Anthropometric Measurements: weight, weight change, body mass index  Biochemical Data, Medical Tests and Procedures: electrolyte and renal panel, glucose/endocrine profile, lipid profile  Nutrition-Focused Physical Findings: overall appearance    Nutrition Risk    Level of Risk: other (see comments) (f/u 1x weekly)    Nutrition Follow-Up    RD Follow-up?: Yes

## 2017-08-12 LAB
ALBUMIN SERPL BCP-MCNC: 2.6 G/DL
ANION GAP SERPL CALC-SCNC: 9 MMOL/L
BASOPHILS # BLD AUTO: 0.01 K/UL
BASOPHILS NFR BLD: 0.1 %
BUN SERPL-MCNC: 37 MG/DL
CALCIUM SERPL-MCNC: 9.5 MG/DL
CHLORIDE SERPL-SCNC: 99 MMOL/L
CO2 SERPL-SCNC: 35 MMOL/L
CREAT SERPL-MCNC: 1.1 MG/DL
DIFFERENTIAL METHOD: ABNORMAL
EOSINOPHIL # BLD AUTO: 0.1 K/UL
EOSINOPHIL NFR BLD: 1.6 %
ERYTHROCYTE [DISTWIDTH] IN BLOOD BY AUTOMATED COUNT: 15.1 %
EST. GFR  (AFRICAN AMERICAN): >60 ML/MIN/1.73 M^2
EST. GFR  (NON AFRICAN AMERICAN): 52 ML/MIN/1.73 M^2
GLUCOSE SERPL-MCNC: 182 MG/DL
HCT VFR BLD AUTO: 32.1 %
HGB BLD-MCNC: 9.6 G/DL
LYMPHOCYTES # BLD AUTO: 2.2 K/UL
LYMPHOCYTES NFR BLD: 29.5 %
MCH RBC QN AUTO: 26.6 PG
MCHC RBC AUTO-ENTMCNC: 29.9 G/DL
MCV RBC AUTO: 89 FL
MONOCYTES # BLD AUTO: 0.6 K/UL
MONOCYTES NFR BLD: 8.4 %
NEUTROPHILS # BLD AUTO: 4.6 K/UL
NEUTROPHILS NFR BLD: 60.4 %
PHOSPHATE SERPL-MCNC: 3.1 MG/DL
PLATELET # BLD AUTO: 277 K/UL
PMV BLD AUTO: 10.1 FL
POCT GLUCOSE: 162 MG/DL (ref 70–110)
POCT GLUCOSE: 197 MG/DL (ref 70–110)
POCT GLUCOSE: 200 MG/DL (ref 70–110)
POCT GLUCOSE: 200 MG/DL (ref 70–110)
POCT GLUCOSE: 209 MG/DL (ref 70–110)
POTASSIUM SERPL-SCNC: 3.9 MMOL/L
RBC # BLD AUTO: 3.61 M/UL
SODIUM SERPL-SCNC: 143 MMOL/L
WBC # BLD AUTO: 7.59 K/UL

## 2017-08-12 PROCEDURE — 99900026 HC AIRWAY MAINTENANCE (STAT)

## 2017-08-12 PROCEDURE — 25000242 PHARM REV CODE 250 ALT 637 W/ HCPCS: Performed by: INTERNAL MEDICINE

## 2017-08-12 PROCEDURE — 63600175 PHARM REV CODE 636 W HCPCS: Performed by: EMERGENCY MEDICINE

## 2017-08-12 PROCEDURE — 27000221 HC OXYGEN, UP TO 24 HOURS

## 2017-08-12 PROCEDURE — 25000003 PHARM REV CODE 250: Performed by: INTERNAL MEDICINE

## 2017-08-12 PROCEDURE — 36415 COLL VENOUS BLD VENIPUNCTURE: CPT

## 2017-08-12 PROCEDURE — 97530 THERAPEUTIC ACTIVITIES: CPT

## 2017-08-12 PROCEDURE — 80069 RENAL FUNCTION PANEL: CPT

## 2017-08-12 PROCEDURE — 25000003 PHARM REV CODE 250: Performed by: EMERGENCY MEDICINE

## 2017-08-12 PROCEDURE — G8979 MOBILITY GOAL STATUS: HCPCS | Mod: CM

## 2017-08-12 PROCEDURE — 21400001 HC TELEMETRY ROOM

## 2017-08-12 PROCEDURE — 94761 N-INVAS EAR/PLS OXIMETRY MLT: CPT

## 2017-08-12 PROCEDURE — 85025 COMPLETE CBC W/AUTO DIFF WBC: CPT

## 2017-08-12 PROCEDURE — A4216 STERILE WATER/SALINE, 10 ML: HCPCS | Performed by: EMERGENCY MEDICINE

## 2017-08-12 PROCEDURE — 94640 AIRWAY INHALATION TREATMENT: CPT

## 2017-08-12 PROCEDURE — G8980 MOBILITY D/C STATUS: HCPCS | Mod: CM

## 2017-08-12 PROCEDURE — 97535 SELF CARE MNGMENT TRAINING: CPT

## 2017-08-12 PROCEDURE — G8978 MOBILITY CURRENT STATUS: HCPCS | Mod: CM

## 2017-08-12 RX ORDER — IBUPROFEN 600 MG/1
600 TABLET ORAL EVERY 6 HOURS PRN
Status: DISCONTINUED | OUTPATIENT
Start: 2017-08-12 | End: 2017-08-12

## 2017-08-12 RX ORDER — IPRATROPIUM BROMIDE AND ALBUTEROL SULFATE 2.5; .5 MG/3ML; MG/3ML
3 SOLUTION RESPIRATORY (INHALATION) EVERY 6 HOURS
Status: DISCONTINUED | OUTPATIENT
Start: 2017-08-12 | End: 2017-08-14 | Stop reason: HOSPADM

## 2017-08-12 RX ORDER — ACETYLCYSTEINE 100 MG/ML
4 SOLUTION ORAL; RESPIRATORY (INHALATION) 4 TIMES DAILY
Status: DISCONTINUED | OUTPATIENT
Start: 2017-08-12 | End: 2017-08-14 | Stop reason: HOSPADM

## 2017-08-12 RX ORDER — IPRATROPIUM BROMIDE AND ALBUTEROL SULFATE 2.5; .5 MG/3ML; MG/3ML
3 SOLUTION RESPIRATORY (INHALATION) EVERY 6 HOURS
Status: DISCONTINUED | OUTPATIENT
Start: 2017-08-12 | End: 2017-08-12

## 2017-08-12 RX ADMIN — HYDRALAZINE HYDROCHLORIDE 75 MG: 25 TABLET ORAL at 09:08

## 2017-08-12 RX ADMIN — INSULIN ASPART 2 UNITS: 100 INJECTION, SOLUTION INTRAVENOUS; SUBCUTANEOUS at 06:08

## 2017-08-12 RX ADMIN — ENOXAPARIN SODIUM 40 MG: 100 INJECTION SUBCUTANEOUS at 05:08

## 2017-08-12 RX ADMIN — ACETYLCYSTEINE 4 ML: 100 INHALANT RESPIRATORY (INHALATION) at 01:08

## 2017-08-12 RX ADMIN — ACETYLCYSTEINE 4 ML: 100 INHALANT RESPIRATORY (INHALATION) at 08:08

## 2017-08-12 RX ADMIN — AMLODIPINE BESYLATE 10 MG: 5 TABLET ORAL at 08:08

## 2017-08-12 RX ADMIN — IPRATROPIUM BROMIDE AND ALBUTEROL SULFATE 3 ML: .5; 3 SOLUTION RESPIRATORY (INHALATION) at 06:08

## 2017-08-12 RX ADMIN — INSULIN ASPART 4 UNITS: 100 INJECTION, SOLUTION INTRAVENOUS; SUBCUTANEOUS at 05:08

## 2017-08-12 RX ADMIN — IPRATROPIUM BROMIDE AND ALBUTEROL SULFATE 3 ML: .5; 3 SOLUTION RESPIRATORY (INHALATION) at 12:08

## 2017-08-12 RX ADMIN — ATORVASTATIN CALCIUM 10 MG: 10 TABLET, FILM COATED ORAL at 08:08

## 2017-08-12 RX ADMIN — CHLORHEXIDINE GLUCONATE 15 ML: 1.2 RINSE ORAL at 08:08

## 2017-08-12 RX ADMIN — ACETYLCYSTEINE 4 ML: 100 INHALANT RESPIRATORY (INHALATION) at 06:08

## 2017-08-12 RX ADMIN — Medication 3 ML: at 10:08

## 2017-08-12 RX ADMIN — AMANTADINE HYDROCHLORIDE 100 MG: 50 SOLUTION ORAL at 05:08

## 2017-08-12 RX ADMIN — INSULIN ASPART 4 UNITS: 100 INJECTION, SOLUTION INTRAVENOUS; SUBCUTANEOUS at 03:08

## 2017-08-12 RX ADMIN — HYDRALAZINE HYDROCHLORIDE 75 MG: 25 TABLET ORAL at 05:08

## 2017-08-12 RX ADMIN — CHLORHEXIDINE GLUCONATE 15 ML: 1.2 RINSE ORAL at 10:08

## 2017-08-12 RX ADMIN — PANTOPRAZOLE SODIUM 40 MG: 40 GRANULE, DELAYED RELEASE ORAL at 08:08

## 2017-08-12 RX ADMIN — POLYETHYLENE GLYCOL 3350 17 G: 17 POWDER, FOR SOLUTION ORAL at 08:08

## 2017-08-12 RX ADMIN — IPRATROPIUM BROMIDE AND ALBUTEROL SULFATE 3 ML: .5; 3 SOLUTION RESPIRATORY (INHALATION) at 01:08

## 2017-08-12 RX ADMIN — FLUOXETINE 20 MG: 20 CAPSULE ORAL at 08:08

## 2017-08-12 RX ADMIN — AMANTADINE HYDROCHLORIDE 100 MG: 50 SOLUTION ORAL at 02:08

## 2017-08-12 RX ADMIN — Medication 3 ML: at 02:08

## 2017-08-12 RX ADMIN — AMANTADINE HYDROCHLORIDE 100 MG: 50 SOLUTION ORAL at 09:08

## 2017-08-12 RX ADMIN — Medication 3 ML: at 05:08

## 2017-08-12 RX ADMIN — HYDRALAZINE HYDROCHLORIDE 75 MG: 25 TABLET ORAL at 02:08

## 2017-08-12 RX ADMIN — IPRATROPIUM BROMIDE AND ALBUTEROL SULFATE 3 ML: .5; 3 SOLUTION RESPIRATORY (INHALATION) at 08:08

## 2017-08-12 RX ADMIN — ASPIRIN 81 MG 81 MG: 81 TABLET ORAL at 08:08

## 2017-08-12 NOTE — ASSESSMENT & PLAN NOTE
It appears this was likely due to a mucous plug that was accessed via deep suction. No evidence of infection, pneumothorax or significant pulm effusion. ACS unlikely. Pulm consulted for co-management. Now weaned off vent. Developed another episode of respiratory distress and hypoxia overnight. Again deep suctioned, supp O2 via trach mask and nebs. Ronchi and wheezing noted. Patient improved after removing tracheostomy's inner piece. CXR with no new findings. SaO2 well above 90%. ENT reviewed trach. Agreed that it is adequate. Inner piece replaced. Patient likely has chronic bronchitis/tracheobronchitis. Will continue deep suction PRN, elevate HOB at least 30 degrees and mucomyst + nebs for mucolytic effect. Wean off supplemental O2 and monitor while at room air. Sputum Cx with normal jose luis. No acute indication for antibiotic regimen at this time. PPI for stress ulcer prophylaxis.

## 2017-08-12 NOTE — PROGRESS NOTES
[facility] Medicine  Progress Note    Patient Name: Nisa Frank  MRN: [id number]  Patient Class: IP- Inpatient   Admission Date: 8/8/2017  Length of Stay: 4 days  Attending Physician: Jessa Patino MD  Primary Care Provider: Atilio Downs MD        Subjective:     Principal Problem:Acute respiratory failure with hypoxia    HPI:  66 year old female with diabetes mellitus type 2, CKD stage 3, CAD s/p CABG, essential hypertension, cerebrovascular disease s/p bi-hemispheric watershed infarcts and trach/PEG status who was brought in via EMS from [facility] for acute respiratory distress, lethargy and hypoxia of 56% while on 4L via trach collar, improving to 90% on non rebreather en route to the ED. Patient was evidently in respiratory distress despite improvement of SaO2. Upon arrival to the ED, patient was connected to MV, given albuterol/tiotropium and suctioned at bedside, immediately correcting her respiratory issue. Per ED staff and patient's daughter who was present at bedside, it was noted that a good amount of thick mucous was suctioned. BP also significantly improved after this was done. Renal function at baseline. UA suggestive of infection however not a reliable sample. Afebrile and non toxic appearing. No new consolidation on imaging but rather generalized haziness unchanged from prior. Distant rales on exam. Patient was admitted to ICU to wean off MV as tolerated and for placement to [facility], as preferred by patient's daughter.             Hospital Course:  Ms Frank presented with acute respiratory failure with hypoxia likely due to mucous plug. In the ED, patient was connected to MV, given albuterol/tiotropium and suctioned at bedside, immediately correcting her respiratory issue. Per ED staff and patient's daughter who was present at bedside, it was noted that a good amount of thick mucous was suctioned. BP also significantly improved after this  "was done. Renal function at baseline. UA suggestive of infection however not a reliable sample. A repeat sample again not reliable despite this being obtained from siegel. Per records at outside facility she presented from, siegel had been in place and there were plans for removal. Unknown if this was done. Patient remained afebrile and non toxic appearing. Mild leukocytosis of 13 with mild neutrophilia of 74% likely stress induced. No new consolidation on imaging but rather generalized haziness unchanged from prior. Distant rales on exam. Patient was admitted to ICU to wean off MV as tolerated and for placement to [facility], as preferred by patient's daughter. Patient was diuresed with on dose of IV lasix. Suctioned intermittently. Able to wean off ventilator on 8/9 in AM. Stable with T piece for more than 8 hours, therefore de-escalated to low flow supplemental O2 via trach mask. Patient following simple commands and answering "yes" or "no" (whispered) adequately to questions, however daughter is concerned that patient is not as active or talkative as usual. ABG showed pH of 7.5, pCO2 of 51, pO2 108, SaO2 99%, HCO3 (per BMP) 34 (which is consistent with her baseline). BUN 50s (slightly elevated than baseline) and Cr 1.4 (improved since discharge from LTAC). Noted more urine output than input. No BMs since admission. CT head 8/10 without acute intracranial pathology. Started on IVFs in addition to tube feeds attempting to fix what seems to be volume contraction. Renal function, pH and bicarb improved with this. Patient continued following simple commands, is alert and able to whisper "yes" or "no" adequately to questions. Most of the times reports "I'm alright". PT/OT evaluated on 8/10 and ST on 8/11. Patient was able to clean eyes with a towel and oral cavity on her own. With some assistance, was able to role side-to-side on bed by holding onto rails and sit up on edge of bed. Was able to maintain posture on " her own. Required max assistance to stand. No respiratory distress during intervention. Inpatient rehab still recommended. On 8/11, early in AM, patient developed respiratory distress and hypoxia. This quickly resolved by removing tracheostomy's inner piece, deep suction and nebs. Some thick mucous suctioned, reportedly blood tinged. Patient remained with some bibasilar ronchi and productive cough. Inner piece left out and patient remained stable respiratory wise. Mucomyst added to duo-neb treatment. Patient has a strong cough when present. Mucous cleared by suction. ENT consulted to evaluate tracheostomy. Per ENT's eval, tracheostomy was secured in right place without any evidence of infection or bleeding round the tube itself. Inner piece replaced (#6 Shiley tube) and remained stable. No further intervention required from ENT's standpoint. PO2 persistently above 100 on low flow. Trial at room air on 8/12. Stepped down to floor on 8/12.    Interval History: did well overnight. No new complaints. No new events. SaO2 adequate on 40% low flow. Renal function continues to improve. BG at goal.     Review of Systems   Constitutional: Negative.    HENT: Negative.    Eyes: Negative.    Respiratory: Negative for cough and shortness of breath.    Cardiovascular: Negative.    Gastrointestinal: Negative.    Genitourinary: Negative.    Musculoskeletal: Negative.    Skin: Negative.    Neurological: Negative.    Hematological: Negative.    Psychiatric/Behavioral: Negative.      Objective:     Vital Signs (Most Recent):  Temp: 97.5 °F (36.4 °C) (08/12/17 0700)  Pulse: (!) 59 (08/12/17 0800)  Resp: 16 (08/12/17 1035)  BP: (!) 159/67 (08/12/17 1001)  SpO2: 95 % (08/12/17 1035) Vital Signs (24h Range):  Temp:  [97.2 °F (36.2 °C)-98.8 °F (37.1 °C)] 97.5 °F (36.4 °C)  Pulse:  [53-82] 59  Resp:  [16-35] 16  SpO2:  [95 %-100 %] 95 %  BP: (128-172)/(60-89) 159/67     Weight: 131.2 kg (289 lb 3.9 oz)  Body mass index is 43.98  kg/m².    Intake/Output Summary (Last 24 hours) at 08/12/17 1141  Last data filed at 08/12/17 1100   Gross per 24 hour   Intake             1500 ml   Output              870 ml   Net              630 ml      Physical Exam   Constitutional: She is oriented to person, place, and time. She appears well-developed. No distress.   Morbidly obese   HENT:   Head: Normocephalic and atraumatic.   Mouth/Throat: Oropharynx is clear and moist.   Tracheostomy in place   Eyes: Conjunctivae and EOM are normal.   Neck: Normal range of motion. Neck supple.   Cardiovascular: Normal rate, regular rhythm, normal heart sounds and intact distal pulses.    Pulmonary/Chest: Effort normal. No respiratory distress. She has no wheezes. She has no rales.   On trach mask     Abdominal: Soft. Bowel sounds are normal.   PEG in place   Musculoskeletal: Normal range of motion. She exhibits no edema.   Neurological: She is alert and oriented to person, place, and time. No cranial nerve deficit.   3/5 strength bilateral UE and LE. 4/5 hand .    Skin: Skin is warm and dry. She is not diaphoretic.   Psychiatric: Her behavior is normal. She does not exhibit a depressed mood.   Nursing note and vitals reviewed.      Significant Labs: All pertinent labs within the past 24 hours have been reviewed.    Significant Imaging: I have reviewed all pertinent imaging results/findings within the past 24 hours.  I have reviewed and interpreted all pertinent imaging results/findings within the past 24 hours.    Assessment/Plan:      * Acute respiratory failure with hypoxia    It appears this was likely due to a mucous plug that was accessed via deep suction. No evidence of infection, pneumothorax or significant pulm effusion. ACS unlikely. Pulm consulted for co-management. Now weaned off vent. Developed another episode of respiratory distress and hypoxia overnight. Again deep suctioned, supp O2 via trach mask and nebs. Ronchi and wheezing noted. Patient improved  after removing tracheostomy's inner piece. CXR with no new findings. SaO2 well above 90%. ENT reviewed trach. Agreed that it is adequate. Inner piece replaced. Patient likely has chronic bronchitis/tracheobronchitis. Will continue deep suction PRN, elevate HOB at least 30 degrees and mucomyst + nebs for mucolytic effect. Wean off supplemental O2 and monitor while at room air. Sputum Cx with normal jose luis. No acute indication for antibiotic regimen at this time. PPI for stress ulcer prophylaxis.           Bronchitis with tracheitis    As above          Mucus plugging of bronchi    As above          Tracheostomy status    As above          PEG (percutaneous endoscopic gastrostomy) status    On diabetasource. Rate increased to 60 cc/hr as per nutrition recs          CKD (chronic kidney disease) stage 3, GFR 30-59 ml/min    Close to baseline. Slight bump with lasix since admit from LTAC. Hold off on lasix. Given IVFs temporarily. On tube feeds. Renal function much improved. Siegel in place. Strict I/Os.           Slow transit constipation    BMs with miralax.           Chronic sinus bradycardia    No acute issues. Avoid jamir blocking agents          Abnormal urinalysis    Obtained twice and none reliable given high amount of squam cells. Patient afebrile. This is likely colonization from chronic siegel. Siegel switched in the ED. Hold off on abxs. Monitor for signs of infection.            Chronic bilateral cerebral infarction in watershed distribution    No acute issues. ASA, statin and BP control. Neuro checks. Daughter concerned about patient not being as engaged on conversation. Per my exam and nursing exam, she engages when interacted with, follows simple commands, participates with PT/OT and has no new gross neuro deficits. Per prior notes from LTAC, this is her prior functional status. A CT head showed no new pathologic findings. Patient will need to transfer to a rehab facility. PT, ST and OT consulted. PT/OT/ST in  agreement with inpatient rehab.           CAD s/p CABG    No chest pain. On secondary prevention with ASA and statin. Hold off on BB as patient is chronically in sinus russ. On cardiac monitoring.           Hyperlipidemia    statin          Morbid obesity with BMI of 50.0-59.9, adult    Weight loss strongly encouraged. Is to start rehab once acute issue has resolved. Nutrition consult as outpatient.           Type 2 diabetes mellitus, uncontrolled    Uncontrolled. Hgb A1c in June was 10.5%.Continue SSI and adjust regimen for goal BG < 180. At goal. On diabetic tube feeds           Essential hypertension    Significantly high on presentation, then improved along with resolution of respiratory issues. Restarted home regimen to be given via PEG. Increased hydralazine to 75 mg TID. BP obtained by me was at goal at 140s/60 mmHg. Goal BP < 140/90 mmHg. Will not given BB as she is in sinus russ physiologically.              VTE Risk Mitigation         Ordered     enoxaparin injection 40 mg  Daily     Route:  Subcutaneous        08/08/17 1503     Medium Risk of VTE  Once      08/08/17 1503        Plan of care discussed with patient's daughter and sister. Stable for step down to floor     Critical care time spent on the evaluation and treatment of severe organ dysfunction, review of pertinent labs and imaging studies, discussions with consulting providers and discussions with patient/family: > 45 minutes.    Jessa Gomez MD  Department of Hospital Medicine   [facility]

## 2017-08-12 NOTE — PLAN OF CARE
Problem: Patient Care Overview  Goal: Plan of Care Review  Outcome: Ongoing (interventions implemented as appropriate)  No acute issues overnight. 40% trach collar, sats 100%. Denies any sob. One medium, soft stool. Tolerating tube feeding. Afebrile. VSS.

## 2017-08-12 NOTE — PT/OT/SLP PROGRESS
Occupational Therapy  Treatment    Nisa Frank   MRN: [id number]   Admitting Diagnosis: Acute respiratory failure with hypoxia    OT Date of Treatment: 08/12/17   OT Start Time: 1145  OT Stop Time: 1210  OT Total Time (min): 25 min    Billable Minutes:  Self Care/Home Management 10 (cotreat with PTA)    General Precautions: Standard, fall, respiratory, NPO  Orthopedic Precautions: N/A  Braces: N/A    Do you have any cultural, spiritual, Latter-day conflicts, given your current situation?: none    Subjective:  Communicated with nurse prior to session.  Family present and excited to see patient sit EOB    Pain/Comfort  Pain Rating 1: 0/10  Pain Rating Post-Intervention 1: 0/10    Objective:  Patient found with: blood pressure cuff, central line, telemetry, pulse ox (continuous), SCD (PEG feeding)     Functional Mobility:  Bed Mobility:  Rolling/Turning Right: Minimum assistance  Scooting/Bridging: Contact Guard Assistance  Supine to Sit: Maximum Assistance, With assist of 2  Sit to Supine: Maximum Assistance, With assist of 2    Transfers:   Sit <> Stand Assistance: Maximum Assistance (assist of 2)    Activities of Daily Living:        Toileting Where Assessed: Bed level, Other (Comment)  Toileting Level of Assistance: Total assistance        Balance:   Static Sit: FAIR+: Able to take MINIMAL challenges from all directions  Dynamic Sit: FAIR+: Maintains balance through MINIMAL excursions of active trunk motion  Static Stand: POOR: Needs MODERATE assist to maintain  Dynamic stand: 0: N/A    Therapeutic Activities and Exercises:  Patient completed bed mobility to EOB for EOB sit 20 mins, 3 stands, with prolonged rest breaks before return to bed.    AM-PAC 6 CLICK ADL   How much help from another person does this patient currently need?   1 = Unable, Total/Dependent Assistance  2 = A lot, Maximum/Moderate Assistance  3 = A little, Minimum/Contact Guard/Supervision  4 = None, Modified [place]/Independent    Putting  "on and taking off regular lower body clothing? : 1  Bathing (including washing, rinsing, drying)?: 1  Toileting, which includes using toilet, bedpan, or urinal? : 1  Putting on and taking off regular upper body clothing?: 2  Taking care of personal grooming such as brushing teeth?: 3  Eating meals?: 1  Total Score: 9     AM-PAC Raw Score CMS "G-Code Modifier Level of Impairment Assistance   6 % Total / Unable   7 - 8 CM 80 - 100% Maximal Assist   9-13 CL 60 - 80% Moderate Assist   14 - 19 CK 40 - 60% Moderate Assist   20 - 22 CJ 20 - 40% Minimal Assist   23 CI 1-20% SBA / CGA   24 CH 0% Independent/ Mod I       Patient left left sidelying with all lines intact, call button in reach and nurse and family present    ASSESSMENT:  Nisa Frank is a 66 y.o. female with a medical diagnosis of Acute respiratory failure with hypoxia and presents with severe decline in mobility and multiple rehab impairments. Patient tolerated EOB sit times 20 mins with limited ax tolerance, increase wheezing and long rest breaks required between stance .    Rehab identified problem list/impairments: Rehab identified problem list/impairments: impaired functional mobilty, impaired self care skills, weakness, gait instability, impaired balance, decreased lower extremity function, impaired coordination, edema, impaired cognition, decreased ROM, impaired joint extensibility    Rehab potential is good.    Activity tolerance: Fair    Discharge recommendations: Discharge Facility/Level Of Care Needs: rehabilitation facility     Barriers to discharge: Barriers to Discharge: Decreased caregiver support    Equipment recommendations:  (TBD)     GOALS:    Occupational Therapy Goals        Problem: Occupational Therapy Goal    Goal Priority Disciplines Outcome Interventions   Occupational Therapy Goal     OT, PT/OT Ongoing (interventions implemented as appropriate)    Description:  Goals to be met by: 8/7/2017    Patient will increase " functional independence with ADLs by performing:    UE Dressing with Moderate Assistance.  Grooming while EOB with Supervision.  Supine to sit with Maximum Assistance.  Upper extremity AROM exercise program to BUE  x10 reps per handout, with supervision assistance as needed.                        Plan:  Patient to be seen 5 x/week to address the above listed problems via self-care/home management, therapeutic activities, therapeutic exercises  Plan of Care expires: 09/09/17  Plan of Care reviewed with: patient, daughter, sibling         Aprilmike Gutierrez OT  08/12/2017

## 2017-08-12 NOTE — ASSESSMENT & PLAN NOTE
Uncontrolled. Hgb A1c in June was 10.5%.Continue SSI and adjust regimen for goal BG < 180. At goal. On diabetic tube feeds

## 2017-08-12 NOTE — ASSESSMENT & PLAN NOTE
No acute issues. ASA, statin and BP control. Neuro checks. Daughter concerned about patient not being as engaged on conversation. Per my exam and nursing exam, she engages when interacted with, follows simple commands, participates with PT/OT and has no new gross neuro deficits. Per prior notes from LTAC, this is her prior functional status. A CT head showed no new pathologic findings. Patient will need to transfer to a rehab facility. PT, ST and OT consulted. PT/OT/ST in agreement with inpatient rehab.

## 2017-08-12 NOTE — ASSESSMENT & PLAN NOTE
Close to baseline. Slight bump with lasix since admit from LTAC. Hold off on lasix. Given IVFs temporarily. On tube feeds. Renal function much improved. Ross in place. Strict I/Os.

## 2017-08-12 NOTE — SUBJECTIVE & OBJECTIVE
Interval History: did well overnight. No new complaints. No new events. SaO2 adequate on 40% low flow. Renal function continues to improve. BG at goal.     Review of Systems   Constitutional: Negative.    HENT: Negative.    Eyes: Negative.    Respiratory: Negative for cough and shortness of breath.    Cardiovascular: Negative.    Gastrointestinal: Negative.    Genitourinary: Negative.    Musculoskeletal: Negative.    Skin: Negative.    Neurological: Negative.    Hematological: Negative.    Psychiatric/Behavioral: Negative.      Objective:     Vital Signs (Most Recent):  Temp: 97.5 °F (36.4 °C) (08/12/17 0700)  Pulse: (!) 59 (08/12/17 0800)  Resp: 16 (08/12/17 1035)  BP: (!) 159/67 (08/12/17 1001)  SpO2: 95 % (08/12/17 1035) Vital Signs (24h Range):  Temp:  [97.2 °F (36.2 °C)-98.8 °F (37.1 °C)] 97.5 °F (36.4 °C)  Pulse:  [53-82] 59  Resp:  [16-35] 16  SpO2:  [95 %-100 %] 95 %  BP: (128-172)/(60-89) 159/67     Weight: 131.2 kg (289 lb 3.9 oz)  Body mass index is 43.98 kg/m².    Intake/Output Summary (Last 24 hours) at 08/12/17 1141  Last data filed at 08/12/17 1100   Gross per 24 hour   Intake             1500 ml   Output              870 ml   Net              630 ml      Physical Exam   Constitutional: She is oriented to person, place, and time. She appears well-developed. No distress.   Morbidly obese   HENT:   Head: Normocephalic and atraumatic.   Mouth/Throat: Oropharynx is clear and moist.   Tracheostomy in place   Eyes: Conjunctivae and EOM are normal.   Neck: Normal range of motion. Neck supple.   Cardiovascular: Normal rate, regular rhythm, normal heart sounds and intact distal pulses.    Pulmonary/Chest: Effort normal. No respiratory distress. She has no wheezes. She has no rales.   On trach mask     Abdominal: Soft. Bowel sounds are normal.   PEG in place   Musculoskeletal: Normal range of motion. She exhibits no edema.   Neurological: She is alert and oriented to person, place, and time. No cranial nerve  deficit.   3/5 strength bilateral UE and LE. 4/5 hand .    Skin: Skin is warm and dry. She is not diaphoretic.   Psychiatric: Her behavior is normal. She does not exhibit a depressed mood.   Nursing note and vitals reviewed.      Significant Labs: All pertinent labs within the past 24 hours have been reviewed.    Significant Imaging: I have reviewed all pertinent imaging results/findings within the past 24 hours.  I have reviewed and interpreted all pertinent imaging results/findings within the past 24 hours.

## 2017-08-12 NOTE — CONSULTS
CLINICAL HISTORY:  I was asked to see Ms. Nisa Frank who is a 66-year-old   grossly obese black female who has multiple medical problems including,   diabetes, chronic kidney disease stage III, atherosclerotic cardiovascular   disease and status post coronary artery bypass surgery and multiple CVAs.  She   has ventilatory dependent respiratory failure with a permanent tracheotomy.  She   was admitted to the hospital on 08/08/2017 with generalized illness and mucus   obstruction and requiring frequent suctioning and came in to the hospital for   treatment and was placed in ICU.    Yesterday, she developed thick mucus and her tracheotomy became plugged and   malfunctioned and had to be removed.  This was done by the hospital physicians.    She was maintained with suctioning and the respiratory team at the hospital has   now replaced her tracheotomy tube with the reported same tube that she had   before, which is a #6 Shiley tube.    At the bedside, her examination reveals the tracheotomy tube to be in place   without any evidence of infection or bleeding round the tube itself.  With this   being in good condition, the tube is tied securely to her neck.  She does still   have some tracheal secretions which the nurses are suctioning as necessary.  She   is ventilating well and is not having any problems with ventilation since the   tube change.    Nothing further is needed from me at this time and there is no surgical   condition present at all.  She is doing well after the trach change.  Please   reconsult should any questions or problems arise.      ASHLYN  dd: 08/11/2017 12:38:09 (CDT)  td: 08/11/2017 22:19:19 (CDT)  Doc ID   #[id number]  Job ID #[id number]    CC:

## 2017-08-12 NOTE — PROGRESS NOTES
Consult Note  Pulmonology    Consult Requested By: Jessa Patino MD  Reason for Consult : vent management    SUBJECTIVE: sob     History of Present Illness:  Long term trach     Review of patient's allergies indicates:   Allergen Reactions    Latex, natural rubber        Past Medical History:   Diagnosis Date    Acute bilateral cerebral infarction in a watershed distribution 2017    CAD (coronary artery disease)     Diabetes mellitus     Hypertension     Morbid obesity     TAMMY on CPAP     setting +17    Stroke      Past Surgical History:   Procedure Laterality Date    ARTERIAL BYPASS SURGRY      9 tears sgo     SECTION      CORONARY ARTERY BYPASS GRAFT      HYSTERECTOMY      TUBAL LIGATION       Family History   Problem Relation Age of Onset    No Known Problems Father     No Known Problems Mother     No Known Problems Sister     No Known Problems Brother     No Known Problems Maternal Aunt     No Known Problems Maternal Uncle     No Known Problems Paternal Aunt     No Known Problems Paternal Uncle     No Known Problems Maternal Grandmother     No Known Problems Maternal Grandfather     No Known Problems Paternal Grandmother     No Known Problems Paternal Grandfather     Amblyopia Neg Hx     Blindness Neg Hx     Cancer Neg Hx     Cataracts Neg Hx     Diabetes Neg Hx     Glaucoma Neg Hx     Hypertension Neg Hx     Macular degeneration Neg Hx     Retinal detachment Neg Hx     Strabismus Neg Hx     Stroke Neg Hx     Thyroid disease Neg Hx      Review of systems not obtained due to patient factors ; patient trach and on trach collar.    OBJECTIVE:     Vital Signs (Most Recent)  Temp: 98.8 °F (37.1 °C) (17 0000)  Pulse: (!) 59 (17 0800)  Resp: (!) 26 (17 0800)  BP: (!) 154/70 (17 0500)  SpO2: 100 % (17 0800)    Vital Signs Range (Last 24H):  Temp:  [97.2 °F (36.2 °C)-98.8 °F (37.1 °C)]   Pulse:  [53-82]   Resp:  [16-35]   BP:  (129-174)/(59-77)   SpO2:  [98 %-100 %]     Physical Exam:    General: no distress,on trach collar  Neck: no jugular venous distention and no carotid bruit  Lungs:  Few rhonchis  Heart: regular rate and rhythm and no murmur  Abdomen: soft, non-tender non-distended; bowel sounds normal and right NGT in place  Extremities: no cyanosis or edema, or clubbing  Neurologic: awake and responsive.  Skin-warm, moist. No rash      Laboratory:  Recent Results (from the past 24 hour(s))   POCT glucose    Collection Time: 08/11/17 12:47 PM   Result Value Ref Range    POCT Glucose 196 (H) 70 - 110 mg/dL   POCT glucose    Collection Time: 08/11/17 11:49 PM   Result Value Ref Range    POCT Glucose 200 (H) 70 - 110 mg/dL   CBC auto differential    Collection Time: 08/12/17  3:05 AM   Result Value Ref Range    WBC 7.59 3.90 - 12.70 K/uL    RBC 3.61 (L) 4.00 - 5.40 M/uL    Hemoglobin 9.6 (L) 12.0 - 16.0 g/dL    Hematocrit 32.1 (L) 37.0 - 48.5 %    MCV 89 82 - 98 fL    MCH 26.6 (L) 27.0 - 31.0 pg    MCHC 29.9 (L) 32.0 - 36.0 g/dL    RDW 15.1 (H) 11.5 - 14.5 %    Platelets 277 150 - 350 K/uL    MPV 10.1 9.2 - 12.9 fL    Gran # 4.6 1.8 - 7.7 K/uL    Lymph # 2.2 1.0 - 4.8 K/uL    Mono # 0.6 0.3 - 1.0 K/uL    Eos # 0.1 0.0 - 0.5 K/uL    Baso # 0.01 0.00 - 0.20 K/uL    Gran% 60.4 38.0 - 73.0 %    Lymph% 29.5 18.0 - 48.0 %    Mono% 8.4 4.0 - 15.0 %    Eosinophil% 1.6 0.0 - 8.0 %    Basophil% 0.1 0.0 - 1.9 %    Differential Method Automated    Renal function panel    Collection Time: 08/12/17  3:05 AM   Result Value Ref Range    Glucose 182 (H) 70 - 110 mg/dL    Sodium 143 136 - 145 mmol/L    Potassium 3.9 3.5 - 5.1 mmol/L    Chloride 99 95 - 110 mmol/L    CO2 35 (H) 23 - 29 mmol/L    BUN, Bld 37 (H) 8 - 23 mg/dL    Calcium 9.5 8.7 - 10.5 mg/dL    Creatinine 1.1 0.5 - 1.4 mg/dL    Albumin 2.6 (L) 3.5 - 5.2 g/dL    Phosphorus 3.1 2.7 - 4.5 mg/dL    eGFR if African American >60 >60 mL/min/1.73 m^2    eGFR if non  52 (A) >60  mL/min/1.73 m^2    Anion Gap 9 8 - 16 mmol/L   POCT glucose    Collection Time: 08/12/17  5:29 AM   Result Value Ref Range    POCT Glucose 209 (H) 70 - 110 mg/dL     CBC:     Recent Labs  Lab 08/12/17  0305   WBC 7.59   RBC 3.61*   HGB 9.6*   HCT 32.1*      MCV 89   MCH 26.6*   MCHC 29.9*     BMP:   Recent Labs  Lab 08/07/17  0636  08/12/17  0305   *  < > 182*   *  < > 143   K 3.7  < > 3.9   CL 91*  < > 99   CO2 36*  < > 35*   BUN 46*  < > 37*   CREATININE 1.3  < > 1.1   CALCIUM 9.5  < > 9.5   MG 2.2  --   --    < > = values in this interval not displayed.  CMP:   Recent Labs  Lab 08/08/17  1028  08/12/17  0305   *  < > 182*   CALCIUM 10.1  < > 9.5   ALBUMIN 2.8*  --  2.6*   PROT 9.1*  --   --      < > 143   K 4.2  < > 3.9   CO2 35*  < > 35*   CL 92*  < > 99   BUN 45*  < > 37*   CREATININE 1.5*  < > 1.1   ALKPHOS 134  --   --    ALT 18  --   --    AST 16  --   --    BILITOT 1.0  --   --    < > = values in this interval not displayed.  LFTs:     Recent Labs  Lab 08/08/17  1028 08/12/17  0305   ALT 18  --    AST 16  --    ALKPHOS 134  --    BILITOT 1.0  --    PROT 9.1*  --    ALBUMIN 2.8* 2.6*     Coagulation: No results for input(s): INR, APTT in the last 168 hours.    Invalid input(s): PT  Cardiac markers: No results for input(s): CKMB, TROPONINT, MYOGLOBIN in the last 168 hours.  Microbiology Results (last 7 days)     Procedure Component Value Units Date/Time    Culture, Respiratory with Gram Stain [[id number]] Collected:  08/11/17 1252    Order Status:  Sent Specimen:  Respiratory from Tracheal Aspirate Updated:  08/11/17 145    Blood Culture #2 **CANNOT BE ORDERED STAT** [[id number]] Collected:  08/08/17 1025    Order Status:  Completed Specimen:  Blood from Peripheral, Hand, Left Updated:  08/11/17 1303     Blood Culture, Routine No Growth to date     Blood Culture, Routine No Growth to date     Blood Culture, Routine No Growth to date     Blood Culture, Routine No Growth to date     Blood Culture #1 **CANNOT BE ORDERED STAT** [[id number]] Collected:  08/08/17 1005    Order Status:  Completed Specimen:  Blood from Peripheral, Hand, Left Updated:  08/11/17 1303     Blood Culture, Routine No Growth to date     Blood Culture, Routine No Growth to date     Blood Culture, Routine No Growth to date     Blood Culture, Routine No Growth to date    Culture, Respiratory with Gram Stain [[id number]] Collected:  08/09/17 1500    Order Status:  Completed Specimen:  Respiratory from Tracheal Aspirate Updated:  08/11/17 1037     Respiratory Culture Normal respiratory jose luis     Gram Stain (Respiratory) Few WBC's     Gram Stain (Respiratory) Few Gram positive cocci        ABGs:     Recent Labs  Lab 08/11/17  0539   PH 7.471*   PCO2 53.9*   PO2 104*   HCO3 39.3*   POCSATURATED 98   BE 14           Diagnostic Results:     Tracheostomy tube is in stable position. Cardiopulmonary status is unchanged noting relatively clear lungs without focal consolidation or effusion. No pneumothorax.      ASSESSMENT/PLAN:     1. Acute respiratory failure with hypoxia    2. Respiratory failure    3. Hypertensive emergency without congestive heart failure          Plan:doing vvery well and has no resp complaints at this time ;..Will d/c f/u but please to see again at your request.

## 2017-08-12 NOTE — CARE UPDATE
"ICU transfer note    Ms Frank is a 65 yo female who presented with acute on chronic resp failure with hypoxia 2/2 to mucous plug. This resolved quickly with deep suction. Has a tracheostomy and PEG, placed a couple of months ago after a bihemispheric wathershed stroke, apparently from a hypotensive episode. She was connected to vent only temporarily. Has been weaned off to trach mask and now to room air. Very aggressive treatment for secretions and so far stable from that. Ultimate plan is inpt rehab at [facility] who has already accepted her. They wanted to see her stepped down to floor before officially accepting a transfer. Her daughter is present and very attentive. Will express her concerns about patient "not acting as she used to". Patient will choose when to socialize, at times. I did a pretty extensive workup for "delirium" but patient always follows simple commands for me and whispers yes or no adequately to questions. Is working with PT/OT/ST. Is already standing up at side of bed with max assistance. All other details under "hospital course". Pulm and ENT signed off. Expected discharge on Monday.   "

## 2017-08-12 NOTE — PLAN OF CARE
Problem: Patient Care Overview  Goal: Plan of Care Review  Outcome: Ongoing (interventions implemented as appropriate)  Plan of care formulated and reviewed with patient and daughter via telephone. vss stable over shift. Trach collar was weaned to 40% patient did not require trach suctioning over shift. Tf was tolerated and patient had x3 bm's.  Pt worked with patient. Patient was able to sit at side of bed, briefly stand with x2 total assist. Skin remains intact. Patient without injuries.  All precautions remain in place.

## 2017-08-12 NOTE — PT/OT/SLP PROGRESS
Physical Therapy  Treatment    Nisa Frank   MRN: [id number]   Admitting Diagnosis: Acute respiratory failure with hypoxia    PT Received On: 08/12/17  PT Start Time: 1200     PT Stop Time: 1225    PT Total Time (min): 25 min       Billable Minutes:  Co-tx /c OT;Therapeutic Activity 15    Treatment Type: Treatment  PT/PTA: PTA     PTA Visit Number: 1       General Precautions: Standard, fall, respiratory, NPO  Orthopedic Precautions: N/A   Braces: N/A    Do you have any cultural, spiritual, Methodist conflicts, given your current situation?: none    Subjective:  Communicated with JULIÁN Chaves prior to session.  Pt family agreed to therapy.    Pain/Comfort  Pain Rating 1: 0/10    Objective:   Patient found with: blood pressure cuff, central line, pulse ox (continuous), SCD    Functional Mobility:  Bed Mobility:   Rolling/Turning to Left: Moderate assistance  Rolling/Turning Right: Moderate assistance  Scooting/Bridging: Stand by Assistance  Supine to Sit: Moderate Assistance, With assist of 2  Sit to Supine: Maximum Assistance, With assist of  2    Transfers:  Sit <> Stand Assistance: Minimum Assistance  Sit <> Stand Assistive Device: Rolling Walker    Gait:   Gait Distance:  (does not occur)    Stairs:      Balance:   Static Sit: FAIR: Maintains without assist, but unable to take any challenges   Dynamic Sit: FAIR: Cannot move trunk without losing balance  Static Stand: POOR+: Needs MINIMAL assist to maintain  Dynamic stand: POOR: N/A     Therapeutic Activities and Exercises:  Sat EOB X 20 MIN FOR DANGLE PROTOCOL. Sit to stand x 3 trials with MIN A x 15-30secs     AM-PAC 6 CLICK MOBILITY  How much help from another person does this patient currently need?   1 = Unable, Total/Dependent Assistance  2 = A lot, Maximum/Moderate Assistance  3 = A little, Minimum/Contact Guard/Supervision  4 = None, Modified [place]/Independent    Turning over in bed (including adjusting bedclothes, sheets and blankets)?: 3  Sitting  down on and standing up from a chair with arms (e.g., wheelchair, bedside commode, etc.): 3  Moving from lying on back to sitting on the side of the bed?: 3  Moving to and from a bed to a chair (including a wheelchair)?: 2  Need to walk in hospital room?: 1  Climbing 3-5 steps with a railing?: 1  Total Score: 13    AM-PAC Raw Score CMS G-Code Modifier Level of Impairment Assistance   6 % Total / Unable   7 - 9 CM 80 - 100% Maximal Assist   10 - 14 CL 60 - 80% Moderate Assist   15 - 19 CK 40 - 60% Moderate Assist   20 - 22 CJ 20 - 40% Minimal Assist   23 CI 1-20% SBA / CGA   24 CH 0% Independent/ Mod I     Patient left supine with all lines intact, call button in reach, JULIÁN Chaves  present and family.    Assessment:  Nisa Frank is a 66 y.o. female with a medical diagnosis of Acute respiratory failure with hypoxia and presents with increase strength and endurance, needed less asst for sup to sit and asuncion more trials of sit to stand.    Rehab identified problem list/impairments: Rehab identified problem list/impairments: weakness, impaired endurance, gait instability, impaired functional mobilty, impaired self care skills, impaired balance, impaired cognition, decreased lower extremity function, decreased upper extremity function, decreased coordination, decreased safety awareness, decreased ROM, impaired coordination, impaired joint extensibility, edema    Rehab potential is good.    Activity tolerance: Good    Discharge recommendations: Discharge Facility/Level Of Care Needs: rehabilitation facility     Barriers to discharge: Barriers to Discharge: Decreased caregiver support    Equipment recommendations: Equipment Needed After Discharge:  (TBD)     GOALS:    Physical Therapy Goals        Problem: Physical Therapy Goal    Goal Priority Disciplines Outcome Goal Variances Interventions   Physical Therapy Goal     PT/OT, PT Ongoing (interventions implemented as appropriate)     Description:  Goals to be met  by: 2017     Patient will increase functional independence with mobility by performin. Supine to sit with Maximum Assistance  2. Rolling to Left and Right with Maximum Assistance.  3. Sit to stand transfer with Maximum Assistance  4. Bed to chair transfer with Maximum Assistance    5. Sitting at edge of bed x30 minutes with Modified [place]  6. Lower extremity exercise program x10-15 reps per handout, with assistance as needed                      PLAN:    Patient to be seen 6 x/week  to address the above listed problems via gait training, therapeutic activities, therapeutic exercises, neuromuscular re-education  Plan of Care expires: 17  Plan of Care reviewed with: patient         Ryan Cohen, PTA  2017

## 2017-08-12 NOTE — PLAN OF CARE
Problem: Occupational Therapy Goal  Goal: Occupational Therapy Goal  Goals to be met by: 8/7/2017    Patient will increase functional independence with ADLs by performing:    UE Dressing with Moderate Assistance.  Grooming while EOB with Supervision.  Supine to sit with Maximum Assistance.  Upper extremity AROM exercise program to BUE  x10 reps per handout, with supervision assistance as needed.       Outcome: Ongoing (interventions implemented as appropriate)  Patient tolerated EOB sit times 20 mins with limited ax tolerance, increase wheezing and long rest breaks required between stance.

## 2017-08-12 NOTE — PLAN OF CARE
Problem: Patient Care Overview  Goal: Plan of Care Review  Outcome: Ongoing (interventions implemented as appropriate)  Plan of care formulated and reviewed with patient and family when present.  vss stable over shift. Patient was weaned to humidified  Air and occasionally 28% VM. Patient was able to stand at bedside briefly with pt today. Patient is awaiting transfer to floor bed.

## 2017-08-13 LAB
BACTERIA BLD CULT: NORMAL
BACTERIA BLD CULT: NORMAL
BACTERIA SPEC AEROBE CULT: NORMAL
GRAM STN SPEC: NORMAL
POCT GLUCOSE: 170 MG/DL (ref 70–110)
POCT GLUCOSE: 190 MG/DL (ref 70–110)
POCT GLUCOSE: 194 MG/DL (ref 70–110)
POCT GLUCOSE: 195 MG/DL (ref 70–110)

## 2017-08-13 PROCEDURE — 94760 N-INVAS EAR/PLS OXIMETRY 1: CPT

## 2017-08-13 PROCEDURE — 94761 N-INVAS EAR/PLS OXIMETRY MLT: CPT

## 2017-08-13 PROCEDURE — 25000003 PHARM REV CODE 250: Performed by: EMERGENCY MEDICINE

## 2017-08-13 PROCEDURE — A4216 STERILE WATER/SALINE, 10 ML: HCPCS | Performed by: EMERGENCY MEDICINE

## 2017-08-13 PROCEDURE — 21400001 HC TELEMETRY ROOM

## 2017-08-13 PROCEDURE — 63600175 PHARM REV CODE 636 W HCPCS: Performed by: EMERGENCY MEDICINE

## 2017-08-13 PROCEDURE — 25000003 PHARM REV CODE 250: Performed by: INTERNAL MEDICINE

## 2017-08-13 PROCEDURE — 94640 AIRWAY INHALATION TREATMENT: CPT

## 2017-08-13 PROCEDURE — 99900026 HC AIRWAY MAINTENANCE (STAT)

## 2017-08-13 PROCEDURE — 27000221 HC OXYGEN, UP TO 24 HOURS

## 2017-08-13 PROCEDURE — 25000242 PHARM REV CODE 250 ALT 637 W/ HCPCS: Performed by: INTERNAL MEDICINE

## 2017-08-13 RX ORDER — HYDRALAZINE HYDROCHLORIDE 25 MG/1
100 TABLET, FILM COATED ORAL EVERY 8 HOURS
Status: DISCONTINUED | OUTPATIENT
Start: 2017-08-13 | End: 2017-08-14 | Stop reason: HOSPADM

## 2017-08-13 RX ADMIN — AMLODIPINE BESYLATE 10 MG: 5 TABLET ORAL at 09:08

## 2017-08-13 RX ADMIN — HYDRALAZINE HYDROCHLORIDE 100 MG: 25 TABLET ORAL at 02:08

## 2017-08-13 RX ADMIN — ACETYLCYSTEINE 4 ML: 100 INHALANT RESPIRATORY (INHALATION) at 01:08

## 2017-08-13 RX ADMIN — AMANTADINE HYDROCHLORIDE 100 MG: 50 SOLUTION ORAL at 02:08

## 2017-08-13 RX ADMIN — ASPIRIN 81 MG 81 MG: 81 TABLET ORAL at 09:08

## 2017-08-13 RX ADMIN — Medication 3 ML: at 10:08

## 2017-08-13 RX ADMIN — IPRATROPIUM BROMIDE AND ALBUTEROL SULFATE 3 ML: .5; 3 SOLUTION RESPIRATORY (INHALATION) at 07:08

## 2017-08-13 RX ADMIN — Medication 3 ML: at 02:08

## 2017-08-13 RX ADMIN — CHLORHEXIDINE GLUCONATE 15 ML: 1.2 RINSE ORAL at 09:08

## 2017-08-13 RX ADMIN — ENOXAPARIN SODIUM 40 MG: 100 INJECTION SUBCUTANEOUS at 06:08

## 2017-08-13 RX ADMIN — ATORVASTATIN CALCIUM 10 MG: 10 TABLET, FILM COATED ORAL at 09:08

## 2017-08-13 RX ADMIN — Medication 3 ML: at 05:08

## 2017-08-13 RX ADMIN — AMANTADINE HYDROCHLORIDE 100 MG: 50 SOLUTION ORAL at 05:08

## 2017-08-13 RX ADMIN — ACETYLCYSTEINE 4 ML: 100 INHALANT RESPIRATORY (INHALATION) at 07:08

## 2017-08-13 RX ADMIN — INSULIN ASPART 2 UNITS: 100 INJECTION, SOLUTION INTRAVENOUS; SUBCUTANEOUS at 12:08

## 2017-08-13 RX ADMIN — AMANTADINE HYDROCHLORIDE 100 MG: 50 SOLUTION ORAL at 09:08

## 2017-08-13 RX ADMIN — HYDRALAZINE HYDROCHLORIDE 75 MG: 25 TABLET ORAL at 05:08

## 2017-08-13 RX ADMIN — HYDRALAZINE HYDROCHLORIDE 100 MG: 25 TABLET ORAL at 09:08

## 2017-08-13 RX ADMIN — FLUOXETINE 20 MG: 20 CAPSULE ORAL at 09:08

## 2017-08-13 RX ADMIN — IPRATROPIUM BROMIDE AND ALBUTEROL SULFATE 3 ML: .5; 3 SOLUTION RESPIRATORY (INHALATION) at 01:08

## 2017-08-13 RX ADMIN — IPRATROPIUM BROMIDE AND ALBUTEROL SULFATE 3 ML: .5; 3 SOLUTION RESPIRATORY (INHALATION) at 12:08

## 2017-08-13 RX ADMIN — PANTOPRAZOLE SODIUM 40 MG: 40 GRANULE, DELAYED RELEASE ORAL at 09:08

## 2017-08-13 NOTE — NURSING TRANSFER
Nursing Transfer Note      8/12/2017     Transfer From: [address]    Transfer via: Bed    Transfer with: trach collar to O2, patient belongings brought by daughter, and chart.    Transported by: RN    Medicines sent: insulin pen    Chart send with patient: YES     Notified: daughter who's at the bedside    Patient reassessed at: 8/12/17 @2330    Upon arrival to floor: Patient oriented to room, assessment and vital signs completed, and patient given bed bath. Patient stable. Will continue to monitor.

## 2017-08-13 NOTE — ASSESSMENT & PLAN NOTE
Significantly high on presentation, then improved along with resolution of respiratory issues. Restarted home regimen to be given via PEG. Increased hydralazine to 100 mg TID. Goal BP < 140/90 mmHg. Will not given BB as she is in sinus russ physiologically.

## 2017-08-13 NOTE — NURSING TRANSFER
Nursing Transfer Note      8/12/2017     Transfer To: 310    Transfer via bed    Transfer with trach collar to O2    Transported by RN    Medicines sent: insulin pen    Chart send with patient: Yes    Notified: daughter    Patient reassessed at: 8/12 2230 (date, time)    Upon arrival to floor: cardiac monitor applied, patient oriented to room, call bell in reach and bed in lowest position

## 2017-08-13 NOTE — PROGRESS NOTES
Received patient on Aerosol trach collar FIO2 28% at 5lpm oxygen Ambu bag and mask at bed side no signs of any distress at this current time

## 2017-08-13 NOTE — SUBJECTIVE & OBJECTIVE
Interval History: No events overnight. Patient is stable    Review of Systems   Constitutional: Negative.    HENT: Negative.    Eyes: Negative.    Respiratory: Negative for cough and shortness of breath.    Cardiovascular: Negative.    Gastrointestinal: Negative.    Genitourinary: Negative.    Musculoskeletal: Negative.    Skin: Negative.    Neurological: Negative.    Hematological: Negative.    Psychiatric/Behavioral: Negative.      Objective:     Vital Signs (Most Recent):  Temp: 97.4 °F (36.3 °C) (08/13/17 0811)  Pulse: 63 (08/13/17 0811)  Resp: 20 (08/13/17 0811)  BP: (!) 167/74 (08/13/17 0811)  SpO2: 97 % (08/13/17 0811) Vital Signs (24h Range):  Temp:  [97.4 °F (36.3 °C)-98.6 °F (37 °C)] 97.4 °F (36.3 °C)  Pulse:  [52-73] 63  Resp:  [16-32] 20  SpO2:  [89 %-100 %] 97 %  BP: (140-181)/(61-95) 167/74     Weight: 132.7 kg (292 lb 8 oz)  Body mass index is 44.47 kg/m².    Intake/Output Summary (Last 24 hours) at 08/13/17 0821  Last data filed at 08/13/17 0812   Gross per 24 hour   Intake             1380 ml   Output             1575 ml   Net             -195 ml      Physical Exam   Constitutional: She is oriented to person, place, and time. She appears well-developed. No distress.   Morbidly obese   HENT:   Head: Normocephalic and atraumatic.   Mouth/Throat: Oropharynx is clear and moist.   Tracheostomy in place   Eyes: Conjunctivae and EOM are normal.   Neck: Normal range of motion. Neck supple.   Cardiovascular: Normal rate, regular rhythm, normal heart sounds and intact distal pulses.    Pulmonary/Chest: Effort normal. No respiratory distress. She has no wheezes. She has no rales.   On trach mask     Abdominal: Soft. Bowel sounds are normal.   PEG in place   Musculoskeletal: Normal range of motion. She exhibits no edema.   Neurological: She is alert and oriented to person, place, and time. No cranial nerve deficit.   3/5 strength bilateral UE and LE. 4/5 hand .    Skin: Skin is warm and dry. She is not  diaphoretic.   Psychiatric: Her behavior is normal. She does not exhibit a depressed mood.   Nursing note and vitals reviewed.      Significant Labs: All pertinent labs within the past 24 hours have been reviewed.    Significant Imaging: I have reviewed all pertinent imaging results/findings within the past 24 hours.  I have reviewed and interpreted all pertinent imaging results/findings within the past 24 hours.

## 2017-08-13 NOTE — PLAN OF CARE
Problem: Diabetes, Type 2 (Adult)  Goal: Signs and Symptoms of Listed Potential Problems Will be Absent, Minimized or Managed (Diabetes, Type 2)  Signs and symptoms of listed potential problems will be absent, minimized or managed by discharge/transition of care (reference Diabetes, Type 2 (Adult) CPG).    08/13/17 0137   Diabetes, Type 2   Problems Assessed (Type 2 Diabetes) all   Problems Present (Type 2 Diabetes) hyperglycemia       Problem: Fall Risk (Adult)  Goal: Identify Related Risk Factors and Signs and Symptoms  Related risk factors and signs and symptoms are identified upon initiation of Human Response Clinical Practice Guideline (CPG)    08/13/17 0137   Fall Risk   Related Risk Factors (Fall Risk) age-related changes;gait/mobility problems;polypharmacy   Signs and Symptoms (Fall Risk) presence of risk factors     Goal: Absence of Falls  Patient will demonstrate the desired outcomes by discharge/transition of care.    08/13/17 0137   Fall Risk (Adult)   Absence of Falls making progress toward outcome       Problem: Pressure Ulcer Risk (Scar Scale) (Adult,Obstetrics,Pediatric)  Goal: Identify Related Risk Factors and Signs and Symptoms  Related risk factors and signs and symptoms are identified upon initiation of Human Response Clinical Practice Guideline (CPG)    08/09/17 0605   Pressure Ulcer Risk (Scar Scale)   Related Risk Factors (Pressure Ulcer Risk (Scar Scale)) critical care admission;hospitalization prolonged;medication;mobility impaired;mental impairment     Goal: Skin Integrity  Patient will demonstrate the desired outcomes by discharge/transition of care.    08/13/17 0137   Pressure Ulcer Risk (Scar Scale) (Adult,Obstetrics,Pediatric)   Skin Integrity making progress toward outcome       Problem: Patient Care Overview  Goal: Plan of Care Review   08/13/17 0137   Coping/Psychosocial   Plan Of Care Reviewed With patient;daughter       Problem: Nutrition, Enteral (Adult)  Goal: Signs and  Symptoms of Listed Potential Problems Will be Absent, Minimized or Managed (Nutrition, Enteral)  Signs and symptoms of listed potential problems will be absent, minimized or managed by discharge/transition of care (reference Nutrition, Enteral (Adult) CPG).    08/12/17 1812 08/13/17 0137   Nutrition, Enteral   Problems Assessed (Enteral Nutrition) --  all   Problems Present (Enteral Nutrition) none --

## 2017-08-13 NOTE — PLAN OF CARE
Problem: Diabetes, Type 2 (Adult)  Goal: Signs and Symptoms of Listed Potential Problems Will be Absent, Minimized or Managed (Diabetes, Type 2)  Signs and symptoms of listed potential problems will be absent, minimized or managed by discharge/transition of care (reference Diabetes, Type 2 (Adult) CPG).    08/13/17 0137   Diabetes, Type 2   Problems Assessed (Type 2 Diabetes) all   Problems Present (Type 2 Diabetes) hyperglycemia       Problem: Fall Risk (Adult)  Goal: Identify Related Risk Factors and Signs and Symptoms  Related risk factors and signs and symptoms are identified upon initiation of Human Response Clinical Practice Guideline (CPG)   Outcome: Ongoing (interventions implemented as appropriate)   08/13/17 0137   Fall Risk   Related Risk Factors (Fall Risk) age-related changes;gait/mobility problems;polypharmacy   Signs and Symptoms (Fall Risk) presence of risk factors     Goal: Absence of Falls  Patient will demonstrate the desired outcomes by discharge/transition of care.   Outcome: Ongoing (interventions implemented as appropriate)   08/13/17 0137   Fall Risk (Adult)   Absence of Falls making progress toward outcome       Problem: Pressure Ulcer Risk (Scar Scale) (Adult,Obstetrics,Pediatric)  Goal: Identify Related Risk Factors and Signs and Symptoms  Related risk factors and signs and symptoms are identified upon initiation of Human Response Clinical Practice Guideline (CPG)   Outcome: Ongoing (interventions implemented as appropriate)   08/09/17 0605   Pressure Ulcer Risk (Scar Scale)   Related Risk Factors (Pressure Ulcer Risk (Sacr Scale)) critical care admission;hospitalization prolonged;medication;mobility impaired;mental impairment      08/09/17 0605   Pressure Ulcer Risk (Scar Scale)   Related Risk Factors (Pressure Ulcer Risk (Scar Scale)) critical care admission;hospitalization prolonged;medication;mobility impaired;mental impairment     Goal: Skin Integrity  Patient will  demonstrate the desired outcomes by discharge/transition of care.   Outcome: Ongoing (interventions implemented as appropriate)   08/13/17 0137   Pressure Ulcer Risk (Scar Scale) (Adult,Obstetrics,Pediatric)   Skin Integrity making progress toward outcome       Problem: Patient Care Overview  Goal: Plan of Care Review  Outcome: Ongoing (interventions implemented as appropriate)   08/13/17 0137   Coping/Psychosocial   Plan Of Care Reviewed With patient;daughter       Problem: Airway, Artificial (Adult)  Goal: Signs and Symptoms of Listed Potential Problems Will be Absent, Minimized or Managed (Airway, Artificial)  Signs and symptoms of listed potential problems will be absent, minimized or managed by discharge/transition of care (reference Airway, Artificial (Adult) CPG).   Outcome: Ongoing (interventions implemented as appropriate)   08/12/17 1817   Airway, Artificial   Problems Assessed (Artificial Airway) all   Problems Present (Artificial Airway) none       Problem: Infection, Risk/Actual (Adult)  Goal: Infection Prevention/Resolution  Patient will demonstrate the desired outcomes by discharge/transition of care.   Outcome: Ongoing (interventions implemented as appropriate)   08/11/17 2003   Infection, Risk/Actual (Adult)   Infection Prevention/Resolution making progress toward outcome       Problem: Nutrition, Enteral (Adult)  Goal: Signs and Symptoms of Listed Potential Problems Will be Absent, Minimized or Managed (Nutrition, Enteral)  Signs and symptoms of listed potential problems will be absent, minimized or managed by discharge/transition of care (reference Nutrition, Enteral (Adult) CPG).   Outcome: Ongoing (interventions implemented as appropriate)   08/12/17 1812 08/13/17 0137   Nutrition, Enteral   Problems Assessed (Enteral Nutrition) --  all   Problems Present (Enteral Nutrition) none --

## 2017-08-13 NOTE — ASSESSMENT & PLAN NOTE
It appears this was likely due to a mucous plug that was accessed via deep suction. No evidence of infection, pneumothorax or significant pulm effusion. ACS unlikely. Pulm consulted for co-management. Now weaned off vent. Developed another episode of respiratory distress and hypoxia overnight. Again deep suctioned, supp O2 via trach mask and nebs. Ronchi and wheezing noted. Patient improved after removing tracheostomy's inner piece. CXR with no new findings. SaO2 well above 90%. ENT reviewed trach. Agreed that it is adequate. Inner piece replaced. Patient likely has chronic bronchitis/tracheobronchitis. Will continue deep suction PRN, elevate HOB at least 30 degrees and mucomyst + nebs for mucolytic effect. Continue supplemental O2 via low flow NC. Sputum Cx with normal jose luis. No acute indication for antibiotic regimen at this time. PPI for stress ulcer prophylaxis.

## 2017-08-13 NOTE — PROGRESS NOTES
[facility]  Progress Note    Patient Name: Nisa Frank  MRN: [id number]  Patient Class: IP- Inpatient   Admission Date: 8/8/2017  Length of Stay: 5 days  Attending Physician: Jessa Patino MD  Primary Care Provider: Atilio Downs MD        Subjective:     Principal Problem:Acute respiratory failure with hypoxia    HPI:  66 year old female with diabetes mellitus type 2, CKD stage 3, CAD s/p CABG, essential hypertension, cerebrovascular disease s/p bi-hemispheric watershed infarcts and trach/PEG status who was brought in via EMS from [facility] for acute respiratory distress, lethargy and hypoxia of 56% while on 4L via trach collar, improving to 90% on non rebreather en route to the ED. Patient was evidently in respiratory distress despite improvement of SaO2. Upon arrival to the ED, patient was connected to MV, given albuterol/tiotropium and suctioned at bedside, immediately correcting her respiratory issue. Per ED staff and patient's daughter who was present at bedside, it was noted that a good amount of thick mucous was suctioned. BP also significantly improved after this was done. Renal function at baseline. UA suggestive of infection however not a reliable sample. Afebrile and non toxic appearing. No new consolidation on imaging but rather generalized haziness unchanged from prior. Distant rales on exam. Patient was admitted to ICU to wean off MV as tolerated and for placement to [facility], as preferred by patient's daughter.             Hospital Course:  Ms Frank presented with acute respiratory failure with hypoxia likely due to mucous plug. In the ED, patient was connected to MV, given albuterol/tiotropium and suctioned at bedside, immediately correcting her respiratory issue. Per ED staff and patient's daughter who was present at bedside, it was noted that a good amount of thick mucous was suctioned. BP also significantly improved after this  "was done. Renal function at baseline. UA suggestive of infection however not a reliable sample. A repeat sample again not reliable despite this being obtained from siegel. Per records at outside facility she presented from, siegel had been in place and there were plans for removal. Unknown if this was done. Patient remained afebrile and non toxic appearing. Mild leukocytosis of 13 with mild neutrophilia of 74% likely stress induced. No new consolidation on imaging but rather generalized haziness unchanged from prior. Distant rales on exam. Patient was admitted to ICU to wean off MV as tolerated and for placement to [facility], as preferred by patient's daughter. Patient was diuresed with on dose of IV lasix. Suctioned intermittently. Able to wean off ventilator on 8/9 in AM. Stable with T piece for more than 8 hours, therefore de-escalated to low flow supplemental O2 via trach mask. Patient following simple commands and answering "yes" or "no" (whispered) adequately to questions, however daughter is concerned that patient is not as active or talkative as usual. ABG showed pH of 7.5, pCO2 of 51, pO2 108, SaO2 99%, HCO3 (per BMP) 34 (which is consistent with her baseline). BUN 50s (slightly elevated than baseline) and Cr 1.4 (improved since discharge from LTAC). Noted more urine output than input. No BMs since admission. CT head 8/10 without acute intracranial pathology. Started on IVFs in addition to tube feeds attempting to fix what seems to be volume contraction. Renal function, pH and bicarb improved with this. Patient continued following simple commands, is alert and able to whisper "yes" or "no" adequately to questions. Most of the times reports "I'm alright". PT/OT evaluated on 8/10 and ST on 8/11. Patient was able to clean eyes with a towel and oral cavity on her own. With some assistance, was able to role side-to-side on bed by holding onto rails and sit up on edge of bed. Was able to maintain posture on " her own. Required max assistance to stand. No respiratory distress during intervention. Inpatient rehab still recommended. On 8/11, early in AM, patient developed respiratory distress and hypoxia. This quickly resolved by removing tracheostomy's inner piece, deep suction and nebs. Some thick mucous suctioned, reportedly blood tinged. Patient remained with some bibasilar ronchi and productive cough. Inner piece left out and patient remained stable respiratory wise. Mucomyst added to duo-neb treatment. Patient has a strong cough when present. Mucous cleared by suction. ENT consulted to evaluate tracheostomy. Per ENT's eval, tracheostomy was secured in right place without any evidence of infection or bleeding round the tube itself. Inner piece replaced (#6 Shiley tube) and remained stable. No further intervention required from ENT's standpoint. PO2 persistently above 100 on low flow. Trial at room air on 8/12. Stepped down to floor on 8/12. Pending transfer to [facility] which is likely to happen tomorrow.     Interval History: No events overnight. Patient is stable    Review of Systems   Constitutional: Negative.    HENT: Negative.    Eyes: Negative.    Respiratory: Negative for cough and shortness of breath.    Cardiovascular: Negative.    Gastrointestinal: Negative.    Genitourinary: Negative.    Musculoskeletal: Negative.    Skin: Negative.    Neurological: Negative.    Hematological: Negative.    Psychiatric/Behavioral: Negative.      Objective:     Vital Signs (Most Recent):  Temp: 97.4 °F (36.3 °C) (08/13/17 0811)  Pulse: 63 (08/13/17 0811)  Resp: 20 (08/13/17 0811)  BP: (!) 167/74 (08/13/17 0811)  SpO2: 97 % (08/13/17 0811) Vital Signs (24h Range):  Temp:  [97.4 °F (36.3 °C)-98.6 °F (37 °C)] 97.4 °F (36.3 °C)  Pulse:  [52-73] 63  Resp:  [16-32] 20  SpO2:  [89 %-100 %] 97 %  BP: (140-181)/(61-95) 167/74     Weight: 132.7 kg (292 lb 8 oz)  Body mass index is 44.47 kg/m².    Intake/Output Summary (Last 24  hours) at 08/13/17 0821  Last data filed at 08/13/17 0812   Gross per 24 hour   Intake             1380 ml   Output             1575 ml   Net             -195 ml      Physical Exam   Constitutional: She is oriented to person, place, and time. She appears well-developed. No distress.   Morbidly obese   HENT:   Head: Normocephalic and atraumatic.   Mouth/Throat: Oropharynx is clear and moist.   Tracheostomy in place   Eyes: Conjunctivae and EOM are normal.   Neck: Normal range of motion. Neck supple.   Cardiovascular: Normal rate, regular rhythm, normal heart sounds and intact distal pulses.    Pulmonary/Chest: Effort normal. No respiratory distress. She has no wheezes. She has no rales.   On trach mask     Abdominal: Soft. Bowel sounds are normal.   PEG in place   Musculoskeletal: Normal range of motion. She exhibits no edema.   Neurological: She is alert and oriented to person, place, and time. No cranial nerve deficit.   3/5 strength bilateral UE and LE. 4/5 hand .    Skin: Skin is warm and dry. She is not diaphoretic.   Psychiatric: Her behavior is normal. She does not exhibit a depressed mood.   Nursing note and vitals reviewed.      Significant Labs: All pertinent labs within the past 24 hours have been reviewed.    Significant Imaging: I have reviewed all pertinent imaging results/findings within the past 24 hours.  I have reviewed and interpreted all pertinent imaging results/findings within the past 24 hours.    Assessment/Plan:      * Acute respiratory failure with hypoxia    It appears this was likely due to a mucous plug that was accessed via deep suction. No evidence of infection, pneumothorax or significant pulm effusion. ACS unlikely. Pulm consulted for co-management. Now weaned off vent. Developed another episode of respiratory distress and hypoxia overnight. Again deep suctioned, supp O2 via trach mask and nebs. Ronchi and wheezing noted. Patient improved after removing tracheostomy's inner piece.  CXR with no new findings. SaO2 well above 90%. ENT reviewed trach. Agreed that it is adequate. Inner piece replaced. Patient likely has chronic bronchitis/tracheobronchitis. Will continue deep suction PRN, elevate HOB at least 30 degrees and mucomyst + nebs for mucolytic effect. Continue supplemental O2 via low flow NC. Sputum Cx with normal jose luis. No acute indication for antibiotic regimen at this time. PPI for stress ulcer prophylaxis.           Bronchitis with tracheitis    As above          Mucus plugging of bronchi    As above          Tracheostomy status    As above          PEG (percutaneous endoscopic gastrostomy) status    On diabetasource. Rate increased to 60 cc/hr as per nutrition recs          CKD (chronic kidney disease) stage 3, GFR 30-59 ml/min    Close to baseline. Slight bump with lasix since admit from LTAC. Hold off on lasix. Given IVFs temporarily. On tube feeds. Renal function much improved. Siegel in place. Strict I/Os.           Slow transit constipation    BMs with miralax.           Chronic sinus bradycardia    No acute issues. Avoid jamir blocking agents          Abnormal urinalysis    Obtained twice and none reliable given high amount of squam cells. Patient afebrile. This is likely colonization from chronic siegel. Siegel switched in the ED. Hold off on abxs. Monitor for signs of infection.            Chronic bilateral cerebral infarction in watershed distribution    No acute issues. ASA, statin and BP control. Neuro checks. Daughter concerned about patient not being as engaged on conversation. Per my exam and nursing exam, she engages when interacted with, follows simple commands, participates with PT/OT and has no new gross neuro deficits. Per prior notes from LTAC, this is her prior functional status. A CT head showed no new pathologic findings. Patient will need to transfer to a rehab facility. PT, ST and OT consulted. PT/OT/ST in agreement with inpatient rehab.           CAD s/p CABG     No chest pain. On secondary prevention with ASA and statin. Hold off on BB as patient is chronically in sinus russ. On cardiac monitoring.           Hyperlipidemia    statin          Morbid obesity with BMI of 50.0-59.9, adult    Weight loss strongly encouraged. Is to start rehab once acute issue has resolved. Nutrition consult as outpatient.           Type 2 diabetes mellitus, uncontrolled    Uncontrolled. Hgb A1c in June was 10.5%.Continue SSI and adjust regimen for goal BG < 180. At goal. On diabetic tube feeds           Essential hypertension    Significantly high on presentation, then improved along with resolution of respiratory issues. Restarted home regimen to be given via PEG. Increased hydralazine to 100 mg TID. Goal BP < 140/90 mmHg. Will not given BB as she is in sinus russ physiologically.              VTE Risk Mitigation         Ordered     enoxaparin injection 40 mg  Daily     Route:  Subcutaneous        08/08/17 1503     Medium Risk of VTE  Once      08/08/17 1503        Patient stable. Plan for transfer to [facility] inpatient rehab tomorrow.     Jessa Gomez MD  Department of Hospital Medicine   [facility]

## 2017-08-13 NOTE — PLAN OF CARE
08/13/17 0955   Medicare Message   Important Message from Medicare regarding Discharge Appeal Rights Given to patient/caregiver;Explained to patient/caregiver;Signed/date by patient/caregiver   Date IMM was signed 08/13/17   Time IMM was signed 0955

## 2017-08-14 VITALS
RESPIRATION RATE: 18 BRPM | HEART RATE: 69 BPM | SYSTOLIC BLOOD PRESSURE: 135 MMHG | BODY MASS INDEX: 44.33 KG/M2 | TEMPERATURE: 98 F | DIASTOLIC BLOOD PRESSURE: 70 MMHG | HEIGHT: 68 IN | OXYGEN SATURATION: 97 % | WEIGHT: 292.5 LBS

## 2017-08-14 LAB
ANION GAP SERPL CALC-SCNC: 9 MMOL/L
BUN SERPL-MCNC: 26 MG/DL
CALCIUM SERPL-MCNC: 9.5 MG/DL
CHLORIDE SERPL-SCNC: 98 MMOL/L
CO2 SERPL-SCNC: 34 MMOL/L
CREAT SERPL-MCNC: 1 MG/DL
EST. GFR  (AFRICAN AMERICAN): >60 ML/MIN/1.73 M^2
EST. GFR  (NON AFRICAN AMERICAN): 59 ML/MIN/1.73 M^2
GLUCOSE SERPL-MCNC: 162 MG/DL
POCT GLUCOSE: 155 MG/DL (ref 70–110)
POCT GLUCOSE: 169 MG/DL (ref 70–110)
POCT GLUCOSE: 175 MG/DL (ref 70–110)
POTASSIUM SERPL-SCNC: 3.9 MMOL/L
SODIUM SERPL-SCNC: 141 MMOL/L

## 2017-08-14 PROCEDURE — 36415 COLL VENOUS BLD VENIPUNCTURE: CPT

## 2017-08-14 PROCEDURE — 25000242 PHARM REV CODE 250 ALT 637 W/ HCPCS: Performed by: INTERNAL MEDICINE

## 2017-08-14 PROCEDURE — 80048 BASIC METABOLIC PNL TOTAL CA: CPT

## 2017-08-14 PROCEDURE — A4216 STERILE WATER/SALINE, 10 ML: HCPCS | Performed by: EMERGENCY MEDICINE

## 2017-08-14 PROCEDURE — 27000221 HC OXYGEN, UP TO 24 HOURS

## 2017-08-14 PROCEDURE — 25000003 PHARM REV CODE 250: Performed by: EMERGENCY MEDICINE

## 2017-08-14 PROCEDURE — 94761 N-INVAS EAR/PLS OXIMETRY MLT: CPT

## 2017-08-14 PROCEDURE — 25000003 PHARM REV CODE 250: Performed by: INTERNAL MEDICINE

## 2017-08-14 PROCEDURE — 94640 AIRWAY INHALATION TREATMENT: CPT

## 2017-08-14 PROCEDURE — 99900026 HC AIRWAY MAINTENANCE (STAT)

## 2017-08-14 RX ORDER — NAPROXEN SODIUM 220 MG/1
81 TABLET, FILM COATED ORAL DAILY
Refills: 0 | COMMUNITY
Start: 2017-08-14 | End: 2018-08-14

## 2017-08-14 RX ORDER — HYDRALAZINE HYDROCHLORIDE 100 MG/1
100 TABLET, FILM COATED ORAL EVERY 8 HOURS
Qty: 90 TABLET | Refills: 11 | Status: ON HOLD | OUTPATIENT
Start: 2017-08-14 | End: 2017-09-27 | Stop reason: HOSPADM

## 2017-08-14 RX ORDER — ACETYLCYSTEINE 100 MG/ML
4 SOLUTION ORAL; RESPIRATORY (INHALATION) 4 TIMES DAILY
Qty: 480 ML | Refills: 0 | Status: ON HOLD | OUTPATIENT
Start: 2017-08-14 | End: 2017-09-27 | Stop reason: ALTCHOICE

## 2017-08-14 RX ORDER — IPRATROPIUM BROMIDE AND ALBUTEROL SULFATE 2.5; .5 MG/3ML; MG/3ML
3 SOLUTION RESPIRATORY (INHALATION) EVERY 4 HOURS
Refills: 0
Start: 2017-08-14 | End: 2018-08-14

## 2017-08-14 RX ADMIN — AMLODIPINE BESYLATE 10 MG: 5 TABLET ORAL at 10:08

## 2017-08-14 RX ADMIN — Medication 3 ML: at 05:08

## 2017-08-14 RX ADMIN — ACETYLCYSTEINE 4 ML: 100 INHALANT RESPIRATORY (INHALATION) at 09:08

## 2017-08-14 RX ADMIN — ASPIRIN 81 MG 81 MG: 81 TABLET ORAL at 10:08

## 2017-08-14 RX ADMIN — HYDRALAZINE HYDROCHLORIDE 100 MG: 25 TABLET ORAL at 05:08

## 2017-08-14 RX ADMIN — INSULIN ASPART 1 UNITS: 100 INJECTION, SOLUTION INTRAVENOUS; SUBCUTANEOUS at 12:08

## 2017-08-14 RX ADMIN — PANTOPRAZOLE SODIUM 40 MG: 40 GRANULE, DELAYED RELEASE ORAL at 10:08

## 2017-08-14 RX ADMIN — AMANTADINE HYDROCHLORIDE 100 MG: 50 SOLUTION ORAL at 05:08

## 2017-08-14 RX ADMIN — IPRATROPIUM BROMIDE AND ALBUTEROL SULFATE 3 ML: .5; 3 SOLUTION RESPIRATORY (INHALATION) at 01:08

## 2017-08-14 RX ADMIN — ATORVASTATIN CALCIUM 10 MG: 10 TABLET, FILM COATED ORAL at 10:08

## 2017-08-14 RX ADMIN — FLUOXETINE 20 MG: 20 CAPSULE ORAL at 10:08

## 2017-08-14 RX ADMIN — IPRATROPIUM BROMIDE AND ALBUTEROL SULFATE 3 ML: .5; 3 SOLUTION RESPIRATORY (INHALATION) at 09:08

## 2017-08-14 RX ADMIN — CHLORHEXIDINE GLUCONATE 15 ML: 1.2 RINSE ORAL at 10:08

## 2017-08-14 RX ADMIN — INSULIN ASPART 2 UNITS: 100 INJECTION, SOLUTION INTRAVENOUS; SUBCUTANEOUS at 06:08

## 2017-08-14 NOTE — NURSING
In preparation for discharge, d/c'ed patient's saline lock, applied pressure to site, secured site with gauze and tape, no redness or swelling noted. D/C'ed patient's tele, SR, prior to removal. Patient is sitting in room waiting for transportation to nursing facility.

## 2017-08-14 NOTE — NURSING
Report received from JULIÁN Terry. Visualized patient and assessed patient's overall condition and appearance. NAD noted. Family at bedside. Will continue to monitor.

## 2017-08-14 NOTE — PLAN OF CARE
08/14/17 1035   Final Note   Assessment Type Final Discharge Note   Discharge Disposition Rehab  ([id number])   Discharge planning education complete? Yes   What phone number can be called within the next 1-3 days to see how you are doing after discharge? [phone]   Hospital Follow Up  Appt(s) scheduled? No   Discharge plans and expectations educations in teach back method with documentation complete? Yes   Right Care Referral Info   Post Acute Recommendation Other   Referral Type Inpatient Rehab   Facility Name [facility] UNIT   Street [address] [place] [address].   [place] [address]

## 2017-08-14 NOTE — PLAN OF CARE
Problem: Diabetes, Type 2 (Adult)  Intervention: Optimize Glycemic Control   08/14/17 0721   Nutrition Interventions   Glycemic Management blood glucose monitoring;supplemental insulin given       Goal: Signs and Symptoms of Listed Potential Problems Will be Absent, Minimized or Managed (Diabetes, Type 2)  Signs and symptoms of listed potential problems will be absent, minimized or managed by discharge/transition of care (reference Diabetes, Type 2 (Adult) CPG).   Outcome: Ongoing (interventions implemented as appropriate)   08/14/17 0721   Diabetes, Type 2   Problems Assessed (Type 2 Diabetes) all   Problems Present (Type 2 Diabetes) hyperglycemia       Problem: Fall Risk (Adult)  Goal: Identify Related Risk Factors and Signs and Symptoms  Related risk factors and signs and symptoms are identified upon initiation of Human Response Clinical Practice Guideline (CPG)   Outcome: Ongoing (interventions implemented as appropriate)   08/14/17 0721   Fall Risk   Related Risk Factors (Fall Risk) bladder function altered;gait/mobility problems;sensory deficits;environment unfamiliar   Signs and Symptoms (Fall Risk) presence of risk factors     Goal: Absence of Falls  Patient will demonstrate the desired outcomes by discharge/transition of care.   Outcome: Ongoing (interventions implemented as appropriate)   08/14/17 0721   Fall Risk (Adult)   Absence of Falls making progress toward outcome       Problem: Pressure Ulcer Risk (Scar Scale) (Adult,Obstetrics,Pediatric)  Goal: Identify Related Risk Factors and Signs and Symptoms  Related risk factors and signs and symptoms are identified upon initiation of Human Response Clinical Practice Guideline (CPG)   Outcome: Ongoing (interventions implemented as appropriate)   08/14/17 0721   Pressure Ulcer Risk (Scar Scale)   Related Risk Factors (Pressure Ulcer Risk (Scar Scale)) body weight extremes;hospitalization prolonged;mechanical forces;medical devices;mobility impaired      Goal: Skin Integrity  Patient will demonstrate the desired outcomes by discharge/transition of care.   Outcome: Ongoing (interventions implemented as appropriate)   08/14/17 0721   Pressure Ulcer Risk (Scar Scale) (Adult,Obstetrics,Pediatric)   Skin Integrity making progress toward outcome       Problem: Patient Care Overview  Goal: Plan of Care Review  Outcome: Ongoing (interventions implemented as appropriate)   08/14/17 0721   Coping/Psychosocial   Plan Of Care Reviewed With patient;daughter       Problem: Airway, Artificial (Adult)  Goal: Signs and Symptoms of Listed Potential Problems Will be Absent, Minimized or Managed (Airway, Artificial)  Signs and symptoms of listed potential problems will be absent, minimized or managed by discharge/transition of care (reference Airway, Artificial (Adult) CPG).   Outcome: Ongoing (interventions implemented as appropriate)   08/14/17 0721   Airway, Artificial   Problems Assessed (Artificial Airway) all       Problem: Nutrition, Enteral (Adult)  Goal: Signs and Symptoms of Listed Potential Problems Will be Absent, Minimized or Managed (Nutrition, Enteral)  Signs and symptoms of listed potential problems will be absent, minimized or managed by discharge/transition of care (reference Nutrition, Enteral (Adult) CPG).   Outcome: Ongoing (interventions implemented as appropriate)   08/14/17 0721   Nutrition, Enteral   Problems Assessed (Enteral Nutrition) all   Plan of care reviewed with patient and daughter. Fall precautions maintained with bed alarm engaged. No Falls. Turned q 2hr with assist. No skin breakdown noted. Positioned with wedge. Tube feeding with Diabetia source continued at 60ml/hr. Residual 0ml. Trach patent with trach collar. 02 sats remain %. Large incontinent stool, anthony care done.

## 2017-08-14 NOTE — PROGRESS NOTES
Pt received on 28% ATC. Pt has 6.0 XLT Shiley secured in place. Ambu bag and spare trach at bedside.

## 2017-08-14 NOTE — PT/OT/SLP DISCHARGE
Occupational Therapy Discharge Summary    Nisa Frank  MRN: [id number]   Acute respiratory failure with hypoxia   Patient Discharged from acute Occupational Therapy on 8/14/17.  Please refer to prior OT note dated on 8.12.17 for functional status.     Assessment:   Patient appropriate for care in another setting.  GOALS:    Occupational Therapy Goals        Problem: Occupational Therapy Goal    Goal Priority Disciplines Outcome Interventions   Occupational Therapy Goal     OT, PT/OT Ongoing (interventions implemented as appropriate)    Description:  Goals to be met by: 8/7/2017    Patient will increase functional independence with ADLs by performing:    UE Dressing with Moderate Assistance.  Grooming while EOB with Supervision.  Supine to sit with Maximum Assistance.  Upper extremity AROM exercise program to BUE  x10 reps per handout, with supervision assistance as needed.                      Reasons for Discontinuation of Therapy Services  Transfer to alternate level of care.      Plan:  Patient Discharged to: Inpatient Rehab.          MARCO ANTONIO Trejo

## 2017-08-14 NOTE — PROGRESS NOTES
LUCIA spoke with LOREN Schneider. Stated that pt has been accepted at Rehab facility and that she will be sending pt records to Nursing Station. Jennie stated that she will call SW back with Call Report information and that ambulance transportation can be arranged. LUCIA voiced understanding.    10:30am   LOREN Schneider called SW with Call Report information.    Pt will be going to [address] Re[facility] facility. Number [phone]. Nurse is Betsy.     Nurse given information to call report. LUCIA called [facility] ambulance for transport @ [phone].  Ambulance will arrive by 12pm.

## 2017-08-14 NOTE — PLAN OF CARE
[facility]     Department of Hospital Medicine     [address] [place] [address]     [phone] after hours  [phone] fax       FACILITY TRANSFER ORDERS    Patient Name: Nisa Frank  YOB: 1950    08/14/2017    Admit to Rehab facility:  Regular Bed                                              Diagnoses:  Active Hospital Problems    Diagnosis  POA    *Acute respiratory failure with hypoxia [J96.01]  Yes     Priority: 1 - High    Bronchitis with tracheitis [J40]  Yes     Priority: 2     Mucus plugging of bronchi [J98.09]  Yes     Priority: 2     Tracheostomy status [Z93.0]  Not Applicable     Priority: 3     PEG (percutaneous endoscopic gastrostomy) status [Z93.1]  Not Applicable     Priority: 4     CKD (chronic kidney disease) stage 3, GFR 30-59 ml/min [N18.3]  Yes     Priority: 5     Chronic sinus bradycardia [R00.1]  Yes    Slow transit constipation [K59.01]  Yes    Abnormal urinalysis [R82.90]  Yes    Chronic bilateral cerebral infarction in watershed distribution [I69.30]  Not Applicable    Hyperlipidemia [E78.5]  Yes     Chronic    CAD s/p CABG [I25.10]  Yes     Chronic    Morbid obesity with BMI of 50.0-59.9, adult [E66.01, Z68.43]  Not Applicable     Chronic    Essential hypertension [I10]  Yes     Chronic    Type 2 diabetes mellitus, uncontrolled [E11.65]  Yes     Chronic      Resolved Hospital Problems    Diagnosis Date Resolved POA   No resolved problems to display.       Patient is homebound due to:  Acute respiratory failure with hypoxia    Allergies:  Review of patient's allergies indicates:   Allergen Reactions    Latex, natural rubber        Vitals:  Per facility protocol    Diet: Diabetasource AC     Tube Feeding:      - Continuous at 60 mL per hour    - Flush tube with 90 mL free water every 4 hours                          - Hold tube feeds for 4 hours for residuals > 250 cc    Acitivities:    - as  tolerated with physical therapy    LABS:     CMP, CBC each weekly for 4 weeks, then continue per facility protocol   PT-INR each week for 1 month then monthly   Pre-albumin each month for 3 months   TSH every year    Nursing Precautions:     - Aspiration precautions:             -  Upright 90 degrees during meals    - Fall precautions per nursing facility protocol   - Seizure precaution per nursing facility protocol   - Decubitus precautions:        -  for positioning   - Pressure reducing foam mattress   - Turn patient every two hours. Use wedge pillows to anchor patient    CONSULTS:        Physical Therapy to evaluate and treat     Occupational Therapy to evaluate and treat     Speech Therapy  to evaluate and treat     Nutrition to evaluate and recommend diet                Pulmonology for tracheostomy/supplemental O2 co-management. Needs             deep suction at least every 12 hours for copious mucous secretions.    MISCELLANEOUS CARE:       PEG Care:  Clean site every 24 hours     Ross Care: Empty Ross bag every shift.  Change Ross every month     Routine Skin for Bedridden Patients:  Apply moisture barrier cream to all    skin folds and wet areas in perineal area daily and after baths and                           all bowel movements.         DIABETES CARE:        Check blood sugar:      Fingerstick blood sugar every 6 hours if unable to eat      Report CBG < 60 or > 400 to physician.                                          Insulin Sliding Scale          Glucose  Novolog Insulin Subcutaneous        0 - 60   Orange juice or glucose tablet, hold insulin      No insulin   201-250  2 units   251-300  4 units   301-350  6 units   351-400  8 units   >400   10 units then call physician      Medications: Discontinue all previous medication orders, if any. See new list below.     Nisa Frank   Home Medication Instructions MARY:[id number]    Printed on:08/14/17 8208   Medication Information                       acetaminophen (TYLENOL) 650 mg/20.3 mL Soln  20.3 mLs (650 mg total) by Per NG tube route every 6 (six) hours as needed.             acetylcysteine 100 mg/ml, 10%, (MUCOMYST) 100 mg/mL (10 %) nebulizer solution  Take 4 mLs by nebulization 4 (four) times daily.             albuterol-ipratropium 2.5mg-0.5mg/3mL (DUO-NEB) 0.5 mg-3 mg(2.5 mg base)/3 mL nebulizer solution  Take 3 mLs by nebulization every 4 (four) hours. This is to be given along with acetylcysteine.             amantadine HCl (SYMMETREL) 100 mg capsule  1 capsule (100 mg total) by Per G Tube route every 8 (eight) hours.             amlodipine (NORVASC) 10 MG tablet  1 tablet (10 mg total) by Per G Tube route once daily.             aspirin 81 MG Chew  1 tablet (81 mg total) by Per G Tube route once daily.             atorvastatin (LIPITOR) 10 MG tablet  1 tablet (10 mg total) by Per G Tube route once daily.             cloNIDine 0.1 mg/24 hr td ptwk (CATAPRES) 0.1 mg/24 hr  Place 1 patch onto the skin every 7 days (last placed on 8/8 at 6pm)             diclofenac sodium (VOLTAREN) 1 % Gel  Apply 2 g topically 2 (two) times daily.             EASY TOUCH TWIST LANCETS 30 gauge Misc             fluoxetine (PROZAC) 20 MG capsule  1 capsule (20 mg total) by Per G Tube route once daily.             hydrALAZINE (APRESOLINE) 100 MG tablet  1 tablet (100 mg total) by Per G Tube route every 8 (eight) hours.             insulin aspart (NOVOLOG) 100 unit/mL InPn pen  Inject 1-10 Units into the skin every 6 (four) hours as needed (hyperglycemia).             meclizine (ANTIVERT) 25 mg tablet  Take 1 tablet (25 mg total) by mouth every 6 (six) hours as needed for vertigo.             pantoprazole (PROTONIX) 40 mg GrPS  1 packet (40 mg total) by Per G Tube route once daily.                 Electronically signed  _________________________________  Jessa Gomez MD  08/14/2017

## 2017-08-14 NOTE — NURSING
Patient is being wheeled off unit via ambulance personnel in route to Nursing Facility. NAD noted.

## 2017-08-14 NOTE — PT/OT/SLP DISCHARGE
Physical Therapy Discharge Summary    Nisa Frank  MRN: [id number]   Acute respiratory failure with hypoxia   Patient Discharged from acute Physical Therapy on 17.  Please refer to prior PT notes for functional status.     Assessment:   Patient has not met goals.  GOALS:    Physical Therapy Goals        Problem: Physical Therapy Goal    Goal Priority Disciplines Outcome Goal Variances Interventions   Physical Therapy Goal     PT/OT, PT Ongoing (interventions implemented as appropriate)     Description:  Goals to be met by: 2017     Patient will increase functional independence with mobility by performin. Supine to sit with Maximum Assistance  2. Rolling to Left and Right with Maximum Assistance.  3. Sit to stand transfer with Maximum Assistance  4. Bed to chair transfer with Maximum Assistance    5. Sitting at edge of bed x30 minutes with Modified [place]  6. Lower extremity exercise program x10-15 reps per handout, with assistance as needed                    Reasons for Discontinuation of Therapy Services  Transfer to alternate level of care.      Plan:  Patient Discharged to: Inpatient Rehab.

## 2017-08-15 NOTE — PT/OT/SLP DISCHARGE
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    Treatment Area(s):  voice    Goals:    SLP Goals     Not on file          Multidisciplinary Problems (Resolved)        Problem: SLP Goal    Goal Priority Disciplines Outcome   SLP Goal   (Resolved)    Medium SLP Outcome(s) achieved   Description:  Short term goals:  1) ongoing PMV assessment completed  2) tolerate PMV as determined by assessment for verbal communication                     Participation in Treatment (at discharge):  Cooperative    Functional Status at time of Discharge:              Clinical Bedside assessment was not completed                       Instrumental assessment was not completed                                Swallowing Guidelines: Patient is NPO         Speaking Valve: Patient was discharged with speaking valve.              Guidelines: Never leave valve on while sleeping and Valve must be cleaned daily (refer to booklet for instructions)

## 2017-08-15 NOTE — DISCHARGE SUMMARY
[facility]  Discharge Summary      Patient Name: Nisa Frank  MRN: [id number]  Admission Date: 8/8/2017  Hospital Length of Stay: 6 days  Discharge Date and Time:  08/14/2017 8:50 AM  Attending Physician: No att. providers found   Discharging Provider: Jessa Gomez MD  Primary Care Provider: Atilio Downs MD      HPI:   66 year old female with diabetes mellitus type 2, CKD stage 3, CAD s/p CABG, essential hypertension, cerebrovascular disease s/p bi-hemispheric watershed infarcts and trach/PEG status who was brought in via EMS from [facility] for acute respiratory distress, lethargy and hypoxia of 56% while on 4L via trach collar, improving to 90% on non rebreather en route to the ED. Patient was evidently in respiratory distress despite improvement of SaO2. Upon arrival to the ED, patient was connected to MV, given albuterol/tiotropium and suctioned at bedside, immediately correcting her respiratory issue. Per ED staff and patient's daughter who was present at bedside, it was noted that a good amount of thick mucous was suctioned. BP also significantly improved after this was done. Renal function at baseline. UA suggestive of infection however not a reliable sample. Afebrile and non toxic appearing. No new consolidation on imaging but rather generalized haziness unchanged from prior. Distant rales on exam. Patient was admitted to ICU to wean off MV as tolerated and for placement to [facility], as preferred by patient's daughter.             * No surgery found *      Indwelling Lines/Drains at time of discharge:   Lines/Drains/Airways     Drain                 Gastrostomy/Enterostomy Percutaneous endoscopic gastrostomy (PEG) LUQ feeding 61093 days    Female External Urinary Catheter 07/11/17 0426 35 days         Urethral Catheter 08/08/17 1300 Straight-tip 16 Fr. 6 days          Airway                 Surgical Airway Uncuffed 96777 days         Surgical  "Airway 07/12/17 Other (Comment) 34 days              Hospital Course:   Ms Frank presented with acute respiratory failure with hypoxia likely due to mucous plug. In the ED, patient was connected to MV, given albuterol/tiotropium and suctioned at bedside, immediately correcting her respiratory issue. Per ED staff and patient's daughter who was present at bedside, it was noted that a good amount of thick mucous was suctioned. BP also significantly improved after this was done. Renal function at baseline. UA suggestive of infection however not a reliable sample. A repeat sample again not reliable despite this being obtained from siegel. Per records at outside facility she presented from, siegel had been in place and there were plans for removal. Unknown if this was done. Patient remained afebrile and non toxic appearing. Mild leukocytosis of 13 with mild neutrophilia of 74% likely stress induced. No new consolidation on imaging but rather generalized haziness unchanged from prior. Distant rales on exam. Patient was admitted to ICU to wean off MV as tolerated and for placement to [facility], as preferred by patient's daughter. Patient was diuresed with on dose of IV lasix. Suctioned intermittently. Able to wean off ventilator on 8/9 in AM. Stable with T piece for more than 8 hours, therefore de-escalated to low flow supplemental O2 via trach mask. Patient following simple commands and answering "yes" or "no" (whispered) adequately to questions, however daughter is concerned that patient is not as active or talkative as usual. ABG showed pH of 7.5, pCO2 of 51, pO2 108, SaO2 99%, HCO3 (per BMP) 34 (which is consistent with her baseline). BUN 50s (slightly elevated than baseline) and Cr 1.4 (improved since discharge from LTAC). Noted more urine output than input. No BMs since admission. CT head 8/10 without acute intracranial pathology. Started on IVFs in addition to tube feeds attempting to fix what seems to be volume " "contraction. Renal function, pH and bicarb much improved with this. IVFs discontinued thereafter. Patient continued following simple commands, is alert and able to whisper "yes" or "no" adequately to questions. Most of the times reports "I'm alright". PT/OT evaluated on 8/10 and ST on 8/11. Patient was able to clean eyes with a towel and oral cavity on her own. With some assistance, was able to role side-to-side on bed by holding onto rails and sit up on edge of bed. Was able to maintain posture on her own. Required max assistance to stand. No respiratory distress during intervention. Inpatient rehab still recommended. On 8/11, early in AM, patient developed respiratory distress and hypoxia. This quickly resolved by removing tracheostomy's inner piece, deep suction and nebs. Some thick mucous suctioned, reportedly blood tinged. Patient remained with some bibasilar ronchi and productive cough. Inner piece left out and patient remained stable respiratory wise. Mucomyst added to duo-neb treatment. Patient has a strong cough when present. Mucous cleared by suction. ENT consulted to evaluate tracheostomy. Per ENT's eval, tracheostomy was secured in right place without any evidence of infection or bleeding round the tube itself. Inner piece replaced (#6 Shiley tube) and remained stable. No further intervention required from ENT's standpoint. PO2 persistently above 100 on low flow. Trial at room air on 8/12, but patient asked to have low flow placed back. This was done and patient had remained stable. Scheduled duo-nebs with nebulized mucomyst every 6 hours. Deep suction PRN. Patient likely with chronic bronchitis.Diabetasource AC continuous infusion at 60 cc/hr. Stepped down to floor on 8/12. Transferred to [facility] on 8/14 in stable condition.      Consults:   Consults         Status Ordering Provider     Inpatient consult to Dietary  Once     Provider:  (Not yet assigned)    Completed REINA DANIELS     " Inpatient consult to Neurology  Once     Provider:  Dustin Harmon MD    Completed REINA DANIELS     Inpatient consult to Pulmonology  Once     Provider:  Bhavesh Lovett MD    Completed REINA DANIELS              Pending Diagnostic Studies:     None        Final Active Diagnoses:    Diagnosis Date Noted POA    PRINCIPAL PROBLEM:  Acute respiratory failure with hypoxia [J96.01] 08/08/2017 Yes    Bronchitis with tracheitis [J40] 08/11/2017 Yes    Mucus plugging of bronchi [J98.09] 08/08/2017 Yes    Tracheostomy status [Z93.0] 08/08/2017 Not Applicable    PEG (percutaneous endoscopic gastrostomy) status [Z93.1] 08/08/2017 Not Applicable    CKD (chronic kidney disease) stage 3, GFR 30-59 ml/min [N18.3] 08/09/2017 Yes    Chronic sinus bradycardia [R00.1] 08/11/2017 Yes    Slow transit constipation [K59.01] 08/11/2017 Yes    Abnormal urinalysis [R82.90] 08/09/2017 Yes    Chronic bilateral cerebral infarction in watershed distribution [I69.30] 06/27/2017 Not Applicable    Hyperlipidemia [E78.5] 06/19/2017 Yes     Chronic    CAD s/p CABG [I25.10] 06/19/2017 Yes     Chronic    Morbid obesity with BMI of 50.0-59.9, adult [E66.01, Z68.43] 02/24/2015 Not Applicable     Chronic    Essential hypertension [I10] 05/12/2014 Yes     Chronic    Type 2 diabetes mellitus, uncontrolled [E11.65] 05/12/2014 Yes     Chronic      Problems Resolved During this Admission:    Diagnosis Date Noted Date Resolved POA     Discharged Condition: good    Disposition: Rehab Facility    Medications:  Reconciled Home Medications:   Discharge Medication List as of 8/14/2017 12:53 PM      START taking these medications    Details   acetylcysteine 100 mg/ml, 10%, (MUCOMYST) 100 mg/mL (10 %) nebulizer solution Take 4 mLs by nebulization 4 (four) times daily., Starting Mon 8/14/2017, Until Wed 9/13/2017, Print      aspirin 81 MG Chew 1 tablet (81 mg total) by Per G Tube route once daily., Starting Mon 8/14/2017, Until Tue 8/14/2018,  OTC      hydrALAZINE (APRESOLINE) 100 MG tablet 1 tablet (100 mg total) by Per G Tube route every 8 (eight) hours., Starting Mon 8/14/2017, Until Tue 8/14/2018, Print         CONTINUE these medications which have CHANGED    Details   albuterol-ipratropium 2.5mg-0.5mg/3mL (DUO-NEB) 0.5 mg-3 mg(2.5 mg base)/3 mL nebulizer solution Take 3 mLs by nebulization every 4 (four) hours. Rescue, Starting Mon 8/14/2017, Until Tue 8/14/2018, No Print         CONTINUE these medications which have NOT CHANGED    Details   amantadine HCl (SYMMETREL) 100 mg capsule 1 capsule (100 mg total) by Per G Tube route every 8 (eight) hours., Starting Fri 7/14/2017, Until Sat 7/14/2018, No Print      amlodipine (NORVASC) 10 MG tablet 1 tablet (10 mg total) by Per G Tube route once daily., Starting Fri 7/14/2017, Until Sat 7/14/2018, No Print      !! atorvastatin (LIPITOR) 10 MG tablet 1 tablet (10 mg total) by Per G Tube route once daily., Starting Fri 7/14/2017, Until Sat 7/14/2018, No Print      cloNIDine 0.1 mg/24 hr td ptwk (CATAPRES) 0.1 mg/24 hr Place 1 patch onto the skin every 7 days., Starting Fri 7/14/2017, Until Sat 7/14/2018, No Print      fluoxetine (PROZAC) 20 MG capsule 1 capsule (20 mg total) by Per G Tube route once daily., Starting Fri 7/14/2017, Until Sat 7/14/2018, No Print      HEPARIN SODIUM,PORCINE (HEPARIN, PORCINE,) 5,000 unit/mL injection Inject 1.5 mLs (7,500 Units total) into the skin every 8 (eight) hours., Starting Fri 7/14/2017, No Print      insulin aspart (NOVOLOG) 100 unit/mL InPn pen Inject 1-10 Units into the skin every 4 (four) hours as needed (Hyperglycemia)., Starting Fri 7/14/2017, Until Sat 7/14/2018, No Print      pantoprazole (PROTONIX) 40 mg GrPS 1 packet (40 mg total) by Per G Tube route once daily., Starting Fri 7/14/2017, Until Sat 7/14/2018, No Print      acetaminophen (TYLENOL) 650 mg/20.3 mL Soln 20.3 mLs (650 mg total) by Per NG tube route every 6 (six) hours as needed., Starting Fri  7/14/2017, OTC      !! atorvastatin (LIPITOR) 10 MG tablet Take 10 mg by mouth once daily., Until Discontinued, Historical Med      diclofenac sodium (VOLTAREN) 1 % Gel Apply 2 g topically 2 (two) times daily., Starting 6/18/2016, Until Discontinued, Normal      EASY TOUCH TWIST LANCETS 30 gauge Misc Starting 7/6/2015, Until Discontinued, Historical Med      meclizine (ANTIVERT) 25 mg tablet Take 1 tablet (25 mg total) by mouth every 6 (six) hours as needed., Starting 11/25/2016, Until Discontinued, Print       !! - Potential duplicate medications found. Please discuss with provider.      STOP taking these medications       aliskiren (TEKTURNA) 300 MG Tab Comments:   Reason for Stopping:         aspirin 325 MG tablet Comments:   Reason for Stopping:         carvedilol (COREG) 25 MG tablet Comments:   Reason for Stopping:         insulin detemir (LEVEMIR FLEXTOUCH) 100 unit/mL (3 mL) SubQ InPn pen Comments:   Reason for Stopping:             Time spent on the discharge of patient: > 45 minutes    HOS POC IP DISCHARGE SUMMARY    Jessa Gomez MD  Department of Hospital Medicine  [facility]

## 2017-09-12 PROBLEM — J96.90 RESPIRATORY FAILURE: Status: ACTIVE | Noted: 2017-09-12

## 2017-09-13 ENCOUNTER — HOSPITAL ENCOUNTER (INPATIENT)
Facility: HOSPITAL | Age: 67
LOS: 15 days | Discharge: NURSING, REHAB OR LONG-TERM CARE | DRG: 205 | End: 2017-09-28
Attending: HOSPITALIST | Admitting: HOSPITALIST
Payer: MEDICARE

## 2017-09-13 DIAGNOSIS — I49.9 IRREGULAR HEART RATE: ICD-10-CM

## 2017-09-13 DIAGNOSIS — G93.40 ACUTE ENCEPHALOPATHY: ICD-10-CM

## 2017-09-13 DIAGNOSIS — J96.20 ACUTE ON CHRONIC RESPIRATORY FAILURE, UNSPECIFIED WHETHER WITH HYPOXIA OR HYPERCAPNIA: ICD-10-CM

## 2017-09-13 DIAGNOSIS — N17.9 AKI (ACUTE KIDNEY INJURY): ICD-10-CM

## 2017-09-13 DIAGNOSIS — K56.7 ILEUS: ICD-10-CM

## 2017-09-13 DIAGNOSIS — J95.851 VAP (VENTILATOR-ASSOCIATED PNEUMONIA): ICD-10-CM

## 2017-09-13 DIAGNOSIS — J96.90 RESPIRATORY FAILURE: ICD-10-CM

## 2017-09-13 PROBLEM — D72.829 LEUKOCYTOSIS: Status: ACTIVE | Noted: 2017-09-13

## 2017-09-13 LAB
ALBUMIN SERPL BCP-MCNC: 2.3 G/DL
ALLENS TEST: ABNORMAL
ALP SERPL-CCNC: 102 U/L
ALT SERPL W/O P-5'-P-CCNC: 17 U/L
ANION GAP SERPL CALC-SCNC: 11 MMOL/L
AST SERPL-CCNC: 10 U/L
BACTERIA #/AREA URNS AUTO: ABNORMAL /HPF
BASOPHILS # BLD AUTO: 0.01 K/UL
BASOPHILS NFR BLD: 0 %
BILIRUB SERPL-MCNC: 1.1 MG/DL
BILIRUB UR QL STRIP: NEGATIVE
BUN SERPL-MCNC: 75 MG/DL
CALCIUM SERPL-MCNC: 8.9 MG/DL
CHLORIDE SERPL-SCNC: 108 MMOL/L
CLARITY UR REFRACT.AUTO: ABNORMAL
CO2 SERPL-SCNC: 25 MMOL/L
COLOR UR AUTO: ABNORMAL
CREAT SERPL-MCNC: 2.4 MG/DL
CREAT UR-MCNC: 292 MG/DL
DELSYS: ABNORMAL
DIFFERENTIAL METHOD: ABNORMAL
EOSINOPHIL # BLD AUTO: 0 K/UL
EOSINOPHIL NFR BLD: 0 %
ERYTHROCYTE [DISTWIDTH] IN BLOOD BY AUTOMATED COUNT: 16.3 %
EST. GFR  (AFRICAN AMERICAN): 23.4 ML/MIN/1.73 M^2
EST. GFR  (NON AFRICAN AMERICAN): 20.3 ML/MIN/1.73 M^2
GLUCOSE SERPL-MCNC: 167 MG/DL
GLUCOSE UR QL STRIP: NEGATIVE
HCO3 UR-SCNC: 26.9 MMOL/L (ref 24–28)
HCT VFR BLD AUTO: 29.9 %
HGB BLD-MCNC: 9.8 G/DL
HGB UR QL STRIP: NEGATIVE
KETONES UR QL STRIP: NEGATIVE
LEUKOCYTE ESTERASE UR QL STRIP: ABNORMAL
LYMPHOCYTES # BLD AUTO: 2.3 K/UL
LYMPHOCYTES NFR BLD: 11.2 %
MAGNESIUM SERPL-MCNC: 2.1 MG/DL
MCH RBC QN AUTO: 27.1 PG
MCHC RBC AUTO-ENTMCNC: 32.8 G/DL
MCV RBC AUTO: 83 FL
MICROSCOPIC COMMENT: ABNORMAL
MODE: ABNORMAL
MONOCYTES # BLD AUTO: 0.8 K/UL
MONOCYTES NFR BLD: 4 %
NEUTROPHILS # BLD AUTO: 17.1 K/UL
NEUTROPHILS NFR BLD: 84.3 %
NITRITE UR QL STRIP: NEGATIVE
PCO2 BLDA: 42.4 MMHG (ref 35–45)
PH SMN: 7.41 [PH] (ref 7.35–7.45)
PH UR STRIP: 5 [PH] (ref 5–8)
PLATELET # BLD AUTO: 216 K/UL
PMV BLD AUTO: 10.8 FL
PO2 BLDA: 84 MMHG (ref 80–100)
POC BE: 2 MMOL/L
POC SATURATED O2: 96 % (ref 95–100)
POC TCO2: 28 MMOL/L (ref 23–27)
POCT GLUCOSE: 178 MG/DL (ref 70–110)
POCT GLUCOSE: 184 MG/DL (ref 70–110)
POCT GLUCOSE: 187 MG/DL (ref 70–110)
POCT GLUCOSE: 199 MG/DL (ref 70–110)
POTASSIUM SERPL-SCNC: 4.5 MMOL/L
PROT SERPL-MCNC: 6.3 G/DL
PROT UR QL STRIP: NEGATIVE
RBC # BLD AUTO: 3.62 M/UL
RBC #/AREA URNS AUTO: 1 /HPF (ref 0–4)
SAMPLE: ABNORMAL
SITE: ABNORMAL
SODIUM SERPL-SCNC: 144 MMOL/L
SODIUM UR-SCNC: 24 MMOL/L
SP GR UR STRIP: 1.02 (ref 1–1.03)
SP02: 95
SQUAMOUS #/AREA URNS AUTO: 1 /HPF
TSH SERPL DL<=0.005 MIU/L-ACNC: 1.57 UIU/ML
URN SPEC COLLECT METH UR: ABNORMAL
UROBILINOGEN UR STRIP-ACNC: NEGATIVE EU/DL
UUN UR-MCNC: 356 MG/DL
VANCOMYCIN SERPL-MCNC: 16.2 UG/ML
WBC # BLD AUTO: 20.28 K/UL
WBC #/AREA URNS AUTO: 13 /HPF (ref 0–5)

## 2017-09-13 PROCEDURE — 36600 WITHDRAWAL OF ARTERIAL BLOOD: CPT

## 2017-09-13 PROCEDURE — 81001 URINALYSIS AUTO W/SCOPE: CPT

## 2017-09-13 PROCEDURE — 82803 BLOOD GASES ANY COMBINATION: CPT

## 2017-09-13 PROCEDURE — 93005 ELECTROCARDIOGRAM TRACING: CPT

## 2017-09-13 PROCEDURE — 95816 EEG AWAKE AND DROWSY: CPT | Mod: 26,,, | Performed by: PSYCHIATRY & NEUROLOGY

## 2017-09-13 PROCEDURE — 93010 ELECTROCARDIOGRAM REPORT: CPT | Mod: ,,, | Performed by: INTERNAL MEDICINE

## 2017-09-13 PROCEDURE — 82570 ASSAY OF URINE CREATININE: CPT

## 2017-09-13 PROCEDURE — 94640 AIRWAY INHALATION TREATMENT: CPT

## 2017-09-13 PROCEDURE — 87086 URINE CULTURE/COLONY COUNT: CPT

## 2017-09-13 PROCEDURE — 36415 COLL VENOUS BLD VENIPUNCTURE: CPT

## 2017-09-13 PROCEDURE — 85025 COMPLETE CBC W/AUTO DIFF WBC: CPT

## 2017-09-13 PROCEDURE — 11000001 HC ACUTE MED/SURG PRIVATE ROOM

## 2017-09-13 PROCEDURE — 95816 EEG AWAKE AND DROWSY: CPT

## 2017-09-13 PROCEDURE — 25000003 PHARM REV CODE 250: Performed by: STUDENT IN AN ORGANIZED HEALTH CARE EDUCATION/TRAINING PROGRAM

## 2017-09-13 PROCEDURE — 25000242 PHARM REV CODE 250 ALT 637 W/ HCPCS: Performed by: STUDENT IN AN ORGANIZED HEALTH CARE EDUCATION/TRAINING PROGRAM

## 2017-09-13 PROCEDURE — 25000003 PHARM REV CODE 250: Performed by: INTERNAL MEDICINE

## 2017-09-13 PROCEDURE — 87040 BLOOD CULTURE FOR BACTERIA: CPT | Mod: 59

## 2017-09-13 PROCEDURE — 84540 ASSAY OF URINE/UREA-N: CPT

## 2017-09-13 PROCEDURE — 94761 N-INVAS EAR/PLS OXIMETRY MLT: CPT

## 2017-09-13 PROCEDURE — 99223 1ST HOSP IP/OBS HIGH 75: CPT | Mod: AI,GC,, | Performed by: HOSPITALIST

## 2017-09-13 PROCEDURE — 63600175 PHARM REV CODE 636 W HCPCS: Performed by: INTERNAL MEDICINE

## 2017-09-13 PROCEDURE — 80202 ASSAY OF VANCOMYCIN: CPT

## 2017-09-13 PROCEDURE — 99900035 HC TECH TIME PER 15 MIN (STAT)

## 2017-09-13 PROCEDURE — 84300 ASSAY OF URINE SODIUM: CPT

## 2017-09-13 PROCEDURE — 63600175 PHARM REV CODE 636 W HCPCS: Performed by: STUDENT IN AN ORGANIZED HEALTH CARE EDUCATION/TRAINING PROGRAM

## 2017-09-13 PROCEDURE — 83735 ASSAY OF MAGNESIUM: CPT

## 2017-09-13 PROCEDURE — 99900026 HC AIRWAY MAINTENANCE (STAT)

## 2017-09-13 PROCEDURE — 84443 ASSAY THYROID STIM HORMONE: CPT

## 2017-09-13 PROCEDURE — 80053 COMPREHEN METABOLIC PANEL: CPT

## 2017-09-13 PROCEDURE — 27000221 HC OXYGEN, UP TO 24 HOURS

## 2017-09-13 PROCEDURE — 63600175 PHARM REV CODE 636 W HCPCS: Performed by: HOSPITALIST

## 2017-09-13 RX ORDER — CHLORHEXIDINE GLUCONATE ORAL RINSE 1.2 MG/ML
15 SOLUTION DENTAL 2 TIMES DAILY
COMMUNITY

## 2017-09-13 RX ORDER — ACETYLCYSTEINE 100 MG/ML
4 SOLUTION ORAL; RESPIRATORY (INHALATION) 2 TIMES DAILY
Status: DISCONTINUED | OUTPATIENT
Start: 2017-09-13 | End: 2017-09-22

## 2017-09-13 RX ORDER — BISACODYL 5 MG
5 TABLET, DELAYED RELEASE (ENTERIC COATED) ORAL DAILY PRN
COMMUNITY

## 2017-09-13 RX ORDER — CLONIDINE 0.1 MG/24H
1 PATCH, EXTENDED RELEASE TRANSDERMAL
Status: DISCONTINUED | OUTPATIENT
Start: 2017-09-19 | End: 2017-09-18

## 2017-09-13 RX ORDER — LEVETIRACETAM 5 MG/ML
500 INJECTION INTRAVASCULAR EVERY 12 HOURS
Status: DISCONTINUED | OUTPATIENT
Start: 2017-09-13 | End: 2017-09-18

## 2017-09-13 RX ORDER — DOCUSATE SODIUM 100 MG/1
100 CAPSULE, LIQUID FILLED ORAL 2 TIMES DAILY
COMMUNITY

## 2017-09-13 RX ORDER — LISINOPRIL 10 MG/1
10 TABLET ORAL DAILY
COMMUNITY

## 2017-09-13 RX ORDER — IBUPROFEN 200 MG
24 TABLET ORAL
Status: DISCONTINUED | OUTPATIENT
Start: 2017-09-13 | End: 2017-09-28 | Stop reason: HOSPADM

## 2017-09-13 RX ORDER — IBUPROFEN 200 MG
16 TABLET ORAL
Status: DISCONTINUED | OUTPATIENT
Start: 2017-09-13 | End: 2017-09-28 | Stop reason: HOSPADM

## 2017-09-13 RX ORDER — CARVEDILOL 25 MG/1
50 TABLET ORAL 2 TIMES DAILY
Status: ON HOLD | COMMUNITY
End: 2017-09-27 | Stop reason: HOSPADM

## 2017-09-13 RX ORDER — ONDANSETRON 2 MG/ML
4 INJECTION INTRAMUSCULAR; INTRAVENOUS EVERY 4 HOURS PRN
COMMUNITY

## 2017-09-13 RX ORDER — LEVETIRACETAM 500 MG/1
500 TABLET ORAL 2 TIMES DAILY
Status: ON HOLD | COMMUNITY
End: 2017-09-27 | Stop reason: ALTCHOICE

## 2017-09-13 RX ORDER — RISPERIDONE 0.25 MG/1
0.25 TABLET ORAL 2 TIMES DAILY
Status: ON HOLD | COMMUNITY
End: 2017-11-08 | Stop reason: HOSPADM

## 2017-09-13 RX ORDER — INSULIN ASPART 100 [IU]/ML
0-5 INJECTION, SOLUTION INTRAVENOUS; SUBCUTANEOUS EVERY 6 HOURS PRN
Status: DISCONTINUED | OUTPATIENT
Start: 2017-09-13 | End: 2017-09-28 | Stop reason: HOSPADM

## 2017-09-13 RX ORDER — NITROGLYCERIN 0.4 MG/1
0.4 TABLET SUBLINGUAL EVERY 5 MIN PRN
COMMUNITY

## 2017-09-13 RX ORDER — ENOXAPARIN SODIUM 100 MG/ML
30 INJECTION SUBCUTANEOUS EVERY 24 HOURS
Status: DISCONTINUED | OUTPATIENT
Start: 2017-09-13 | End: 2017-09-18

## 2017-09-13 RX ORDER — IPRATROPIUM BROMIDE AND ALBUTEROL SULFATE 2.5; .5 MG/3ML; MG/3ML
3 SOLUTION RESPIRATORY (INHALATION) EVERY 8 HOURS
Status: DISCONTINUED | OUTPATIENT
Start: 2017-09-13 | End: 2017-09-28 | Stop reason: HOSPADM

## 2017-09-13 RX ORDER — ENOXAPARIN SODIUM 100 MG/ML
40 INJECTION SUBCUTANEOUS DAILY
COMMUNITY

## 2017-09-13 RX ORDER — ENOXAPARIN SODIUM 100 MG/ML
40 INJECTION SUBCUTANEOUS EVERY 24 HOURS
Status: DISCONTINUED | OUTPATIENT
Start: 2017-09-13 | End: 2017-09-13 | Stop reason: DRUGHIGH

## 2017-09-13 RX ORDER — DIPHENHYDRAMINE HCL 25 MG
25 CAPSULE ORAL 3 TIMES DAILY PRN
COMMUNITY

## 2017-09-13 RX ORDER — NIFEDIPINE 60 MG/1
120 TABLET, EXTENDED RELEASE ORAL DAILY
Status: ON HOLD | COMMUNITY
End: 2017-09-27 | Stop reason: HOSPADM

## 2017-09-13 RX ADMIN — ENOXAPARIN SODIUM 30 MG: 100 INJECTION SUBCUTANEOUS at 04:09

## 2017-09-13 RX ADMIN — LEVETIRACETAM 500 MG: 5 INJECTION INTRAVENOUS at 06:09

## 2017-09-13 RX ADMIN — IPRATROPIUM BROMIDE AND ALBUTEROL SULFATE 3 ML: .5; 3 SOLUTION RESPIRATORY (INHALATION) at 07:09

## 2017-09-13 RX ADMIN — ACETYLCYSTEINE 4 ML: 100 INHALANT RESPIRATORY (INHALATION) at 03:09

## 2017-09-13 RX ADMIN — ACETYLCYSTEINE 4 ML: 100 INHALANT RESPIRATORY (INHALATION) at 11:09

## 2017-09-13 RX ADMIN — LEVETIRACETAM 500 MG: 5 INJECTION INTRAVENOUS at 09:09

## 2017-09-13 RX ADMIN — IPRATROPIUM BROMIDE AND ALBUTEROL SULFATE 3 ML: .5; 3 SOLUTION RESPIRATORY (INHALATION) at 03:09

## 2017-09-13 RX ADMIN — PIPERACILLIN AND TAZOBACTAM 4.5 G: 4; .5 INJECTION, POWDER, FOR SOLUTION INTRAVENOUS at 06:09

## 2017-09-13 RX ADMIN — VANCOMYCIN HYDROCHLORIDE 1000 MG: 1 INJECTION, POWDER, LYOPHILIZED, FOR SOLUTION INTRAVENOUS at 01:09

## 2017-09-13 RX ADMIN — IPRATROPIUM BROMIDE AND ALBUTEROL SULFATE 3 ML: .5; 3 SOLUTION RESPIRATORY (INHALATION) at 11:09

## 2017-09-13 NOTE — ASSESSMENT & PLAN NOTE
- Concern for illeus at OSH   - will obtain stat KUB,  Hold NG tube feeds for now  - If NG tube is confirmed, and KUB is WNL will continue meds

## 2017-09-13 NOTE — PROGRESS NOTES
Pharmacist Renal Dose Adjustment Note    Nisa Frank is a 67 y.o. female being treated with the medication Enoxaparin     Patient Data:    Vital Signs (Most Recent):  Temp: 96.8 °F (36 °C) (09/13/17 0730)  Pulse: (!) 58 (09/13/17 0744)  Resp: 18 (09/13/17 0744)  BP: 130/62 (09/13/17 0730)  SpO2: 97 % (09/13/17 0730)   Vital Signs (72h Range):  Temp:  [96.8 °F (36 °C)-98.6 °F (37 °C)]   Pulse:  [58-73]   Resp:  [16-20]   BP: (130-136)/(62-66)   SpO2:  [95 %-97 %]        Recent Labs     Lab 09/13/17  0237   CREATININE 2.4*     CrCl=25.9 - patient with KATHI     Medication:Enoxaparin 40 mg QD will be changed to medication Enoxaparin 30 mg QD    Pharmacist's Name: Aubrie Self  Pharmacist's Extension: [phone]

## 2017-09-13 NOTE — PLAN OF CARE
Outside Hospital Acceptance Note      Patient Name:  Nisa Frank    Accepting Physician: Albina Lara MD    Acceptance Date: 9/12/17    Transferring Physician/Midlevel Provider and Institution: [facility]    Reason for Transfer: Family request specifically for [facility].    HPI:   Ms Frank 66 year old female with diabetes mellitus type 2, CKD stage 3, CAD s/p CABG, essential hypertension, cerebrovascular disease s/p bi-hemispheric watershed infarcts and trach/PEG status who was brought in via EMS from [facility] for acute respiratory distress presented to [facility] 8/8 with acute respiratory failure with hypoxia likely due to mucous plug. Patient was admitted to ICU to wean off MV as tolerated and for placement to [facility] where she was transferred to [facility] on 8/14 in stable condition.  She pulled out her trach on 9/4 and went to [facility].  She was treated for an ESBL E. Coli UTI with Meropenem. The family has been very unhappy with the care that the patient has received.  She was medically stable for transfer to a SNF yesterday, however overnight she developed a new WBC 26.  She was found to have a RML and RLL infiltrate and was started on Vanc and Zosyn.  She also developed a new KATHI on top of her CKD (0.6 to 1.8).  Family is requesting transfer to [facility] specifically.  They were informed about the lateral transfer and not higher level of care.  The family said they can bill me.  She will need placement after resolution of her acute medical issues    Vitals:   /85, HR 73, RR 18    Labs:   WBC 26  Creatinine 1.8    Imaging:   CXR:  RML and RLL infiltrate    To Do On Arrival:  Respiratory cx.  Continue abx.  PT/OT.  Consider Pulm consult.    Albina Lara MD  [facility]  Spectra [phone]      Please call extension [phone] (if nobody answers, this will flip to a beeper, so put in your call back number) upon patient arrival to floor for [facility]  Medicine admit team assignment and for additional admit orders for the patient.

## 2017-09-13 NOTE — PROGRESS NOTES
"Notified telemetry room of need for continuous pulse ox. Stated would send up one when available.  1451pm: renotified telemetry room of need for continuous pulse ox; stated "will see if we have one."  "

## 2017-09-13 NOTE — ASSESSMENT & PLAN NOTE
- Per OSH records recent with baseline creatine of 0.6  - Creatine 2.4 on transfer  - ATN (abx added prior to inury) vs prerenal KATHI?   - Will obtain UA and urine lytes, pending lytes would likely bolus with fluids and monitor,   - Strict ins and outs with siegel given patient AMS

## 2017-09-13 NOTE — PLAN OF CARE
Problem: Patient Care Overview  Goal: Plan of Care Review  Patient remains nonverbal, and trached.  Moving extremities slightly, not on command. Attempting to track staff at times, closes eyes after very short intervals. Trach collar in use at 8 liters/nc at 35% with pulse ox ranging between 95-97%; frequent suctioning of thin white/yellow sputum required. Receiving resp treatments every 8 hours atc. Resp culture pending. Diminished breath sounds noted. Patient remains NPO. NGT patent to right nare. Patient repositioned every 2 hours with use of ; no skin breakdown noted. FMS inserted with scant amount of liquid stool noted around FMS; unable to collect enough stool for specimen. C diff isolation initiated pending stool results. Ross patent and draining cloudy yellow urine. Patient receiving IV Zosyn and a one time dose of Vanc IV this shift; afebrile. EEG done today. Glucoses have ranged between 187 - 199mg/dl this shift. Telemetry patent with SB/SR noted. Awaiting continuous pulse ox probe from telemetry room.

## 2017-09-13 NOTE — PLAN OF CARE
09/13/17 1734   Discharge Assessment   Assessment Type Discharge Planning Assessment   Confirmed/corrected address and phone number on facesheet? Yes   Assessment information obtained from? Caregiver;Medical Record  (daughterShayna, via telephone)   Communicated expected length of stay with patient/caregiver no   Prior to hospitilization cognitive status: Alert/Oriented  (prior to original hospitalization on Father's Day)   Prior to hospitalization functional status: Independent   Current cognitive status: Not Oriented to Time;Not Oriented to Place;Not Oriented to Person   Current Functional Status: Completely Dependent   Facility Arrived From: [facility]   Lives With alone   Able to Return to Prior Arrangements unable to determine at this time (comments)   Is patient able to care for self after discharge? Unable to determine at this time (comments)   Who are your caregiver(s) and their phone number(s)? Shayna mohan   Patient's perception of discharge disposition other (comments)  (TBD)   Readmission Within The Last 30 Days previous discharge plan unsuccessful   If yes, most recent facility name: [facility] (this started in June on Father's Day)   Patient currently being followed by outpatient case management? No   Patient currently receives any other outside agency services? No   Equipment Currently Used at Home none   Do you have any problems affording any of your prescribed medications? No   Is the patient taking medications as prescribed? yes   Does the patient have transportation home? Yes   Transportation Available family or friend will provide   Does the patient receive services at the [facility]? No   Discharge Plan A Skilled Nursing Facility   Discharge Plan B Rehab   Patient/Family In Agreement With Plan yes

## 2017-09-13 NOTE — HPI
Ms Frank 66 year old female with diabetes mellitus type 2, CKD stage 3, CAD s/p CABG, essential hypertension, cerebrovascular disease s/p bi-hemispheric watershed infarcts and trach/PEG status who was brought in via EMS from [facility] for acute respiratory distress presented to [facility] 8/8 with acute respiratory failure with hypoxia likely due to mucous plug. Patient was admitted to ICU to wean off MV as tolerated and for placement to [facility] where she was transferred to [facility] on 8/14 in stable condition.  She pulled out her trach on 9/4 and went to [facility].  She was treated for an ESBL E. Coli UTI with Meropenem. The family has been very unhappy with the care that the patient has received.  She was medically stable for transfer to a SNF yesterday, however overnight she developed a new WBC 26.  She was found to have a RML and RLL infiltrate and was started on Vanc and Zosyn.  She also developed a new KATHI on top of her CKD (0.6 to 1.8).  Family is requesting transfer to [facility].    Spoke with daughter on the phone.  Per family patient was previously alert and oriented to person and place.  She was conversing and able to participate in therapy.  This changed shortly after trach removal and replacement on 9/4.  Family reports some complication during trach replacement resulting in overnight ICU care.  After patient was stepped down they report she stopped talking and would not participate with therapy.  The only response they could get from the patient was eye tracking and intermittent hand squeezes.  Family feels that patient may have had a repeat stroke and was requesting MRI.   Spoke with [facility] nurse who reports that patient has been near obtunded for as long as she has had her (past couple of days).  She reported recent WBC jump and associated fever followed by broad spectrum abx.  In addiction notes concern for ileus given high residuals from  NG tube feeds.  Does not high residuals resolved once tube was flushed.         Per nurse medications prior to concern for ileus.  Vanc 1750mg , Q24, Zosyn 4.5 Q 12, ASA 81, Clonidine 0.1 patch weekly, Lipitor 10, Colace, Lovenox 40, Protonix 40, Hydralazine 100 Q8, Keppra 500 BID, Lisniopril 40 PO, 25 Metoprolol XL, Procardia 60 mg Daily, Risperdal 25 QHS.

## 2017-09-13 NOTE — ASSESSMENT & PLAN NOTE
- HX of significant HTN,   - Per OSH on hydralazine 100 Q8 PO, Linsiopril 40 PO, Metoprolol XL 25 BID, Nifedipine 60mg daily Clonidine 0.1 Patch Q week, placed on Tuesday   - Once NG tube cleared by Xray and flushing will continue PO meds

## 2017-09-13 NOTE — ASSESSMENT & PLAN NOTE
- History of recent CVA with recent admission to rehab, per family acute worsening 1 week ago following trach replacement   - OSH records in chart   - Concern for Anoxic brain injury during trach removal/replacement, CVA, Seizure, Worsening Sepsis, Per OSH records concern for psychosis (unlikely)   - Head CT at OSH unremarkable, will repeat in house given concern for worsening mental status   - Given history of unchanged mnetal status for 2 plus days do not feel stroke code is warranted  - GCS of 8, however protecting airway with trach, feel stable for floor   - Likely will need MRI to further clarify possible CVA vs anoxic brain injury  - Consider Neuro consult in AM pending CT/MRI/EEG results

## 2017-09-13 NOTE — ASSESSMENT & PLAN NOTE
- NG tube placed,  Per family was tolerating diet prior to trach replacement on 9/4   - Will continue tube fees pending KUB, confirmed placement

## 2017-09-13 NOTE — ASSESSMENT & PLAN NOTE
- Elevated WBC of 20  - concern for aspiration given hx from nurse at OSH  - Started on Vanc and Zosyn  - Will continue zosyn and hold vanc for now given random level and worsening KATHI  - Per ID Dr. Delaney Would consider Id consult If wished to continue zosyn with unconfirmed source  - Obtain blood cultures, urine culture, and Sputum culture

## 2017-09-13 NOTE — H&P
[facility] Hospital Medicine  History & Physical    Patient Name: Nisa Frank  MRN: [id number]  Admission Date: 9/13/2017  Attending Physician: Bay Henao, *   Primary Care Provider: Atilio Downs MD    [facility] Medicine Team: [facility] HOSP MED 3 Gurmeet Gallagher MD     Patient information was obtained from patient and ER records.     Subjective:     Principal Problem:Acute encephalopathy    Chief Complaint: Transfer from [facility], family request     HPI: Ms Frank 66 year old female with diabetes mellitus type 2, CKD stage 3, CAD s/p CABG, essential hypertension, cerebrovascular disease s/p bi-hemispheric watershed infarcts and trach/PEG status who was brought in via EMS from [facility] for acute respiratory distress presented to [facility] 8/8 with acute respiratory failure with hypoxia likely due to mucous plug. Patient was admitted to ICU to wean off MV as tolerated and for placement to [facility] where she was transferred to [facility] on 8/14 in stable condition.  She pulled out her trach on 9/4 and went to [facility].  She was treated for an ESBL E. Coli UTI with Meropenem. The family has been very unhappy with the care that the patient has received.  She was medically stable for transfer to a SNF yesterday, however overnight she developed a new WBC 26.  She was found to have a RML and RLL infiltrate and was started on Vanc and Zosyn.  She also developed a new KATHI on top of her CKD (0.6 to 1.8).  Family is requesting transfer to [facility].    Spoke with daughter on the phone.  Per family patient was previously alert and oriented to person and place.  She was conversing and able to participate in therapy.  This changed shortly after trach removal and replacement on 9/4.  Family reports some complication during trach replacement resulting in overnight ICU care.  After patient was stepped down they report she stopped talking  and would not participate with therapy.  The only response they could get from the patient was eye tracking and intermittent hand squeezes.  Family feels that patient may have had a repeat stroke and was requesting MRI.   Spoke with [facility] nurse who reports that patient has been near obtunded for as long as she has had her (past couple of days).  She reported recent WBC jump and associated fever followed by broad spectrum abx.  In addiction notes concern for ileus given high residuals from NG tube feeds.  Does not high residuals resolved once tube was flushed.         Per nurse medications prior to concern for ileus.  Vanc 1750mg , Q24, Zosyn 4.5 Q 12, ASA 81, Clonidine 0.1 patch weekly, Lipitor 10, Colace, Lovenox 40, Protonix 40, Hydralazine 100 Q8, Keppra 500 BID, Lisniopril 40 PO, 25 Metoprolol XL, Procardia 60 mg Daily, Risperdal 25 QHS.           Past Medical History:   Diagnosis Date    Acute bilateral cerebral infarction in a watershed distribution 2017    CAD (coronary artery disease)     Diabetes mellitus     Hypertension     Morbid obesity     TAMMY on CPAP     setting +17    Stroke        Past Surgical History:   Procedure Laterality Date    ARTERIAL BYPASS SURGRY      9 tears sgo     SECTION      CORONARY ARTERY BYPASS GRAFT      HYSTERECTOMY      TUBAL LIGATION         Review of patient's allergies indicates:   Allergen Reactions    Latex, natural rubber        No current facility-administered medications on file prior to encounter.      Current Outpatient Prescriptions on File Prior to Encounter   Medication Sig    acetaminophen (TYLENOL) 650 mg/20.3 mL Soln 20.3 mLs (650 mg total) by Per NG tube route every 6 (six) hours as needed.    acetylcysteine 100 mg/ml, 10%, (MUCOMYST) 100 mg/mL (10 %) nebulizer solution Take 4 mLs by nebulization 4 (four) times daily.    albuterol-ipratropium 2.5mg-0.5mg/3mL (DUO-NEB) 0.5 mg-3 mg(2.5 mg base)/3 mL nebulizer solution Take 3 mLs by  nebulization every 4 (four) hours. Rescue    amantadine HCl (SYMMETREL) 100 mg capsule 1 capsule (100 mg total) by Per G Tube route every 8 (eight) hours.    amlodipine (NORVASC) 10 MG tablet 1 tablet (10 mg total) by Per G Tube route once daily.    aspirin 81 MG Chew 1 tablet (81 mg total) by Per G Tube route once daily.    atorvastatin (LIPITOR) 10 MG tablet Take 10 mg by mouth once daily.    atorvastatin (LIPITOR) 10 MG tablet 1 tablet (10 mg total) by Per G Tube route once daily.    cloNIDine 0.1 mg/24 hr td ptwk (CATAPRES) 0.1 mg/24 hr Place 1 patch onto the skin every 7 days.    diclofenac sodium (VOLTAREN) 1 % Gel Apply 2 g topically 2 (two) times daily.    EASY TOUCH TWIST LANCETS 30 gauge Misc     fluoxetine (PROZAC) 20 MG capsule 1 capsule (20 mg total) by Per G Tube route once daily.    HEPARIN SODIUM,PORCINE (HEPARIN, PORCINE,) 5,000 unit/mL injection Inject 1.5 mLs (7,500 Units total) into the skin every 8 (eight) hours.    hydrALAZINE (APRESOLINE) 100 MG tablet 1 tablet (100 mg total) by Per G Tube route every 8 (eight) hours.    insulin aspart (NOVOLOG) 100 unit/mL InPn pen Inject 1-10 Units into the skin every 4 (four) hours as needed (Hyperglycemia).    meclizine (ANTIVERT) 25 mg tablet Take 1 tablet (25 mg total) by mouth every 6 (six) hours as needed.    pantoprazole (PROTONIX) 40 mg GrPS 1 packet (40 mg total) by Per G Tube route once daily.     Family History     Problem Relation (Age of Onset)    No Known Problems Father, Mother, Sister, Brother, Maternal Aunt, Maternal Uncle, Paternal Aunt, Paternal Uncle, Maternal Grandmother, Maternal Grandfather, Paternal Grandmother, Paternal Grandfather        Social History Main Topics    Smoking status: Never Smoker    Smokeless tobacco: Never Used    Alcohol use Yes      Comment: occ    Drug use: No    Sexual activity: Not Currently     Birth control/ protection: See Surgical Hx     Review of Systems   Unable to perform ROS: Mental  status change     Objective:     Vital Signs (Most Recent):  Temp: 98.6 °F (37 °C) (09/13/17 0130)  Pulse: 71 (09/13/17 0217)  Resp: 20 (09/13/17 0217)  BP: 136/66 (09/13/17 0130)  SpO2: 95 % (09/13/17 0217) Vital Signs (24h Range):  Temp:  [98.6 °F (37 °C)] 98.6 °F (37 °C)  Pulse:  [71-73] 71  Resp:  [20] 20  SpO2:  [95 %] 95 %  BP: (136)/(66) 136/66        There is no height or weight on file to calculate BMI.    Physical Exam   Constitutional: She appears well-developed. No distress.   Eyes: Conjunctivae are normal. Right eye exhibits no discharge. Left eye exhibits no discharge.   Pupils responsive to light, no tracking noted.     Cardiovascular: Normal rate, regular rhythm and normal heart sounds.    Pulmonary/Chest: Effort normal and breath sounds normal.   Abdominal: Soft. Bowel sounds are normal.   Musculoskeletal: She exhibits no edema.   Neurological: She has normal reflexes. GCS eye subscore is 2. GCS verbal subscore is 1. GCS motor subscore is 5.   Patient unresponsive to verbal Stimul.  Will localize pain    Skin: Skin is warm and dry. No erythema.        Significant Labs: All pertinent labs within the past 24 hours have been reviewed.    Significant Imaging: I have reviewed all pertinent imaging results/findings within the past 24 hours.    Assessment/Plan:     * Acute encephalopathy    - History of recent CVA with recent admission to rehab, per family acute worsening 1 week ago following trach replacement   - OSH records in chart   - Concern for Anoxic brain injury during trach removal/replacement, CVA, Seizure, Worsening Sepsis, Per OSH records concern for psychosis (unlikely)   - Head CT at OSH unremarkable, will repeat in house given concern for worsening mental status   - Given history of unchanged mnetal status for 2 plus days do not feel stroke code is warranted  - GCS of 8, however protecting airway with trach, feel stable for floor   - Likely will need MRI to further clarify possible CVA vs  anoxic brain injury  - Consider Neuro consult in AM pending CT/MRI/EEG results           Essential hypertension    - HX of significant HTN,   - Per OSH on hydralazine 100 Q8 PO, Linsiopril 40 PO, Metoprolol XL 25 BID, Nifedipine 60mg daily Clonidine 0.1 Patch Q week, placed on Tuesday   - Once NG tube cleared by Xray and flushing will continue PO meds           KATHI (acute kidney injury)    - Per OSH records recent with baseline creatine of 0.6  - Creatine 2.4 on transfer  - ATN (abx added prior to inury) vs prerenal KATHI?   - Will obtain UA and urine lytes, pending lytes would likely bolus with fluids and monitor,   - Strict ins and outs with siegel given patient AMS             Ileus    - Concern for illeus at OSH   - will obtain stat KUB,  Hold NG tube feeds for now  - If NG tube is confirmed, and KUB is WNL will continue meds           S/P CABG (coronary artery bypass graft)    - Continue ASA           Dysphagia    - NG tube placed,  Per family was tolerating diet prior to trach replacement on 9/4   - Will continue tube fees pending KUB, confirmed placement          Leukocytosis    - Elevated WBC of 20  - concern for aspiration given hx from nurse at OSH  - Started on Vanc and Zosyn  - Will continue zosyn and hold vanc for now given random level and worsening KATHI  - Per ID Dr. Delaney Would consider Id consult If wished to continue zosyn with unconfirmed source  - Obtain blood cultures, urine culture, and Sputum culture            Tracheostomy status    - Trach care          Type 2 diabetes mellitus, uncontrolled    - Per OSH records not on insulin   - Will obtain Ha1c during AM labs   - Sliding scale             VTE Risk Mitigation         Ordered     enoxaparin injection 40 mg  Daily     Route:  Subcutaneous        09/13/17 0306        Dispo: Workup for AMS with imaging, KUB for concern regarding ileus and confirm NG tube placement, pending imaging may continue medications, If not will have to monitor closely for  HTN given history.  Continue IV abx given concern for infection with elevated WBC. Will dose Vanc later pending random levels given worsening KATHI.         Gurmeet Gallagher MD  Department of Hospital Medicine   [facility]

## 2017-09-13 NOTE — SUBJECTIVE & OBJECTIVE
Past Medical History:   Diagnosis Date    Acute bilateral cerebral infarction in a watershed distribution 2017    CAD (coronary artery disease)     Diabetes mellitus     Hypertension     Morbid obesity     TAMMY on CPAP     setting +17    Stroke        Past Surgical History:   Procedure Laterality Date    ARTERIAL BYPASS SURGRY      9 tears sgo     SECTION      CORONARY ARTERY BYPASS GRAFT      HYSTERECTOMY      TUBAL LIGATION         Review of patient's allergies indicates:   Allergen Reactions    Latex, natural rubber        No current facility-administered medications on file prior to encounter.      Current Outpatient Prescriptions on File Prior to Encounter   Medication Sig    acetaminophen (TYLENOL) 650 mg/20.3 mL Soln 20.3 mLs (650 mg total) by Per NG tube route every 6 (six) hours as needed.    acetylcysteine 100 mg/ml, 10%, (MUCOMYST) 100 mg/mL (10 %) nebulizer solution Take 4 mLs by nebulization 4 (four) times daily.    albuterol-ipratropium 2.5mg-0.5mg/3mL (DUO-NEB) 0.5 mg-3 mg(2.5 mg base)/3 mL nebulizer solution Take 3 mLs by nebulization every 4 (four) hours. Rescue    amantadine HCl (SYMMETREL) 100 mg capsule 1 capsule (100 mg total) by Per G Tube route every 8 (eight) hours.    amlodipine (NORVASC) 10 MG tablet 1 tablet (10 mg total) by Per G Tube route once daily.    aspirin 81 MG Chew 1 tablet (81 mg total) by Per G Tube route once daily.    atorvastatin (LIPITOR) 10 MG tablet Take 10 mg by mouth once daily.    atorvastatin (LIPITOR) 10 MG tablet 1 tablet (10 mg total) by Per G Tube route once daily.    cloNIDine 0.1 mg/24 hr td ptwk (CATAPRES) 0.1 mg/24 hr Place 1 patch onto the skin every 7 days.    diclofenac sodium (VOLTAREN) 1 % Gel Apply 2 g topically 2 (two) times daily.    EASY TOUCH TWIST LANCETS 30 gauge Misc     fluoxetine (PROZAC) 20 MG capsule 1 capsule (20 mg total) by Per G Tube route once daily.    HEPARIN SODIUM,PORCINE (HEPARIN, PORCINE,) 5,000  unit/mL injection Inject 1.5 mLs (7,500 Units total) into the skin every 8 (eight) hours.    hydrALAZINE (APRESOLINE) 100 MG tablet 1 tablet (100 mg total) by Per G Tube route every 8 (eight) hours.    insulin aspart (NOVOLOG) 100 unit/mL InPn pen Inject 1-10 Units into the skin every 4 (four) hours as needed (Hyperglycemia).    meclizine (ANTIVERT) 25 mg tablet Take 1 tablet (25 mg total) by mouth every 6 (six) hours as needed.    pantoprazole (PROTONIX) 40 mg GrPS 1 packet (40 mg total) by Per G Tube route once daily.     Family History     Problem Relation (Age of Onset)    No Known Problems Father, Mother, Sister, Brother, Maternal Aunt, Maternal Uncle, Paternal Aunt, Paternal Uncle, Maternal Grandmother, Maternal Grandfather, Paternal Grandmother, Paternal Grandfather        Social History Main Topics    Smoking status: Never Smoker    Smokeless tobacco: Never Used    Alcohol use Yes      Comment: occ    Drug use: No    Sexual activity: Not Currently     Birth control/ protection: See Surgical Hx     Review of Systems   Unable to perform ROS: Mental status change     Objective:     Vital Signs (Most Recent):  Temp: 98.6 °F (37 °C) (09/13/17 0130)  Pulse: 71 (09/13/17 0217)  Resp: 20 (09/13/17 0217)  BP: 136/66 (09/13/17 0130)  SpO2: 95 % (09/13/17 0217) Vital Signs (24h Range):  Temp:  [98.6 °F (37 °C)] 98.6 °F (37 °C)  Pulse:  [71-73] 71  Resp:  [20] 20  SpO2:  [95 %] 95 %  BP: (136)/(66) 136/66        There is no height or weight on file to calculate BMI.    Physical Exam   Constitutional: She appears well-developed. No distress.   Eyes: Conjunctivae are normal. Right eye exhibits no discharge. Left eye exhibits no discharge.   Pupils responsive to light, no tracking noted.     Cardiovascular: Normal rate, regular rhythm and normal heart sounds.    Pulmonary/Chest: Effort normal and breath sounds normal.   Abdominal: Soft. Bowel sounds are normal.   Musculoskeletal: She exhibits no edema.    Neurological: She has normal reflexes. GCS eye subscore is 2. GCS verbal subscore is 1. GCS motor subscore is 5.   Patient unresponsive to verbal Stimul.  Will localize pain    Skin: Skin is warm and dry. No erythema.        Significant Labs: All pertinent labs within the past 24 hours have been reviewed.    Significant Imaging: I have reviewed all pertinent imaging results/findings within the past 24 hours.

## 2017-09-13 NOTE — PLAN OF CARE
Problem: Patient Care Overview  Goal: Plan of Care Review  Outcome: Ongoing (interventions implemented as appropriate)  Pt in room with no s/s of distress. Pt has trach collar 35% O2 at 6lpm. Pt suctioned PRN by respiratory this am. PT arrived with NG tube to left nare not being used at this time. Pending orders or placement check.  Pt had CT to head and chest xray. Pending KUB. Pt has liquid stool at this time and has had 3 BMs. PT has a hemorrhoid to the anal area. Pt has no skin breakdown. Pt has multiple folds due to obesity. Pt allergic to rubber and latex and has a silicone catheter that was placed by nursing this AM. Pt is a max assist. Pt opens eyes spontaneously and sometimes to her name. Pt can track but is nonverbal. Pt does not follow commands but moves her arms intermittently to scratch. Pt also moves away from painful stimuli. Wedge ordered for pt. Monitoring.

## 2017-09-13 NOTE — PROCEDURES
DATE OF PROCEDURE:  09/13/2017    EEG NUMBER:  FH     LOCATION OF SERVICE:  1107    REQUESTING PHYSICIAN:  Dr. Henao.    ELECTROENCEPHALOGRAM REPORT    METHODOLOGY:  Electroencephalographic (EEG) recording is recorded with   electrodes placed according to the International 10-20 placement system.  Thirty   two (32) channels of digital signal (sampling rate of 512/sec), including T1   and T2, were simultaneously recorded from the scalp and may include EKG, EMG,   and/or eye monitors.  Recording band pass was 0.1 to 512 Hz.  Digital video   recording of the patient is simultaneously recorded with the EEG.  The patient   is instructed to report clinical symptoms which may occur during the recording   session.  EEG and video recording are stored and archived in digital format.    Activation procedures, which include photic stimulation, hyperventilation and   instructing patients to perform simple tasks, are done in selected patients.    The EEG is displayed on a monitor screen and can be reviewed using different   montages.  Computer assisted-analysis is employed to detect spike and   electrographic seizure activity.   The entire record is submitted for computer   analysis.  The entire recording is visually reviewed, and the times identified   by computer analysis as being spikes or seizures are reviewed again.    Compressed spectral analysis (CSA) is also performed on the activity recorded   from each individual channel.  This is displayed as a power display of   frequencies from 0 to 30 Hz over time.   The CSA is reviewed looking for   asymmetries in power between homologous areas of the scalp, then compared with   the original EEG recording.    Shop[employer] software was also utilized in the review of this study.  This software   suite analyzes the EEG recording in multiple domains.  Coherence and rhythmicity   are computed to identify EEG sections which may contain organized seizures.    Each channel undergoes  analysis to detect the presence of spike and sharp waves   which have special and morphological characteristics of epileptic activity.  The   routine EEG recording is converted from special into frequency domain.  This is   then displayed comparing homologous areas to identify areas of significant   asymmetry.  Algorithm to identify non-cortically generated artifact is used to   separate artifact from the EEG.    EEG FINDINGS:  Recording was obtained at the patient's bedside in the hospital   room.  The patient was awakened and moving around spontaneously.  The patient   did have a tracheostomy and was on ventilatory assist.  Background activity   consisted of a diffuse mixture of delta and theta frequencies, which was seen   symmetrically over the 2 hemispheres.  However, the delta is maximally seen in   the frontal region.  The background fluctuated in amplitudes, but in general did   not change much during the recording session.  There were no lateralized or   focal changes.  No spike or sharp wave activity was seen.  The patient was asked   to open and close her eyes and she would respond appropriately.  Other   activation procedures were not carried out.    IMPRESSION:  Abnormal EEG with generalized slowing indicative of a moderate to   marked generalized but nonspecific encephalopathy.  There are no focal changes   or epileptic activity present.    CLINICAL CORRELATION:  The patient is a 67-year-old female and EEG was requested   to evaluate altered mental status.  The patient apparently has a history of   seizures, but there is no evidence for epileptic process.  There is generalized   slowing indicative of a generalized encephalopathy but the pattern does not   suggest a specific etiology.      RR/HN  dd: 09/13/2017 13:14:33 (CDT)  td: 09/13/2017 14:04:18 (CDT)  Doc ID   #[id number]  Job ID #[id number]    CC:

## 2017-09-13 NOTE — PROGRESS NOTES
S: Patient obtunded, only responds to painful stimuli. GCS 8 on trach collar.    O: /62 (BP Location: Left arm, Patient Position: Lying)   Pulse (!) 58   Temp 96.8 °F (36 °C) (Axillary)   Resp 18   LMP  (LMP Unknown)   SpO2 97%    Oxygen Concentration (%):  [35] 35    ABG    Recent Labs  Lab 09/13/17  0428   PH 7.411   PO2 84   PCO2 42.4   HCO3 26.9   BE 2       Recent Labs  Lab 09/13/17  0237   WBC 20.28*   RBC 3.62*   HGB 9.8*   HCT 29.9*      MCV 83   MCH 27.1   MCHC 32.8     Lab Results   Component Value Date    CREATININE 2.4 (H) 09/13/2017    BUN 75 (H) 09/13/2017     09/13/2017    K 4.5 09/13/2017     09/13/2017    CO2 25 09/13/2017     A: 66 yo F with trach/PEG admitted for worsening encephalopathy  P: Encephalopathy: DDX to include sepsis, anoxic brain injury at OSH, possibly in non convulsive status epilepticus. CT shows no acute processes at [facility]  -will continue vanc/Zosyn, follow up septic workup, urine culture. C. Diff ordered given labs,vitals, ileus  -can consider ID consult for leukocytosis, encephalopathy of unknown source, can consider LP  -will consider MRI for further workup of chronic vs acute CNS pathology, +/- LP CSF studies  -repeating KUB as initial study is poor , NGT in situ    KATHI   -follow up urine studies. DDX to include AIN/ nephrotoxicity vs ATN/prerenal causes in sepsis or C. diff

## 2017-09-14 PROBLEM — G93.40 ACUTE ENCEPHALOPATHY: Chronic | Status: ACTIVE | Noted: 2017-09-13

## 2017-09-14 LAB
ALBUMIN SERPL BCP-MCNC: 2.2 G/DL
ALP SERPL-CCNC: 87 U/L
ALT SERPL W/O P-5'-P-CCNC: 16 U/L
ANION GAP SERPL CALC-SCNC: 10 MMOL/L
ANION GAP SERPL CALC-SCNC: 7 MMOL/L
AST SERPL-CCNC: 11 U/L
BACTERIA UR CULT: NORMAL
BASOPHILS # BLD AUTO: 0.01 K/UL
BASOPHILS NFR BLD: 0.1 %
BILIRUB SERPL-MCNC: 1 MG/DL
BUN SERPL-MCNC: 64 MG/DL
BUN SERPL-MCNC: 69 MG/DL
CALCIUM SERPL-MCNC: 8.7 MG/DL
CALCIUM SERPL-MCNC: 8.7 MG/DL
CHLORIDE SERPL-SCNC: 112 MMOL/L
CHLORIDE SERPL-SCNC: 112 MMOL/L
CO2 SERPL-SCNC: 25 MMOL/L
CO2 SERPL-SCNC: 27 MMOL/L
CREAT SERPL-MCNC: 1 MG/DL
CREAT SERPL-MCNC: 1.3 MG/DL
DIFFERENTIAL METHOD: ABNORMAL
EOSINOPHIL # BLD AUTO: 0.1 K/UL
EOSINOPHIL NFR BLD: 0.6 %
ERYTHROCYTE [DISTWIDTH] IN BLOOD BY AUTOMATED COUNT: 15.9 %
EST. GFR  (AFRICAN AMERICAN): 49.1 ML/MIN/1.73 M^2
EST. GFR  (AFRICAN AMERICAN): >60 ML/MIN/1.73 M^2
EST. GFR  (NON AFRICAN AMERICAN): 42.6 ML/MIN/1.73 M^2
EST. GFR  (NON AFRICAN AMERICAN): 58.4 ML/MIN/1.73 M^2
GLUCOSE SERPL-MCNC: 109 MG/DL
GLUCOSE SERPL-MCNC: 144 MG/DL
HCT VFR BLD AUTO: 28 %
HGB BLD-MCNC: 9 G/DL
LYMPHOCYTES # BLD AUTO: 1.5 K/UL
LYMPHOCYTES NFR BLD: 14.3 %
MAGNESIUM SERPL-MCNC: 2.1 MG/DL
MAGNESIUM SERPL-MCNC: 2.2 MG/DL
MCH RBC QN AUTO: 27.2 PG
MCHC RBC AUTO-ENTMCNC: 32.1 G/DL
MCV RBC AUTO: 85 FL
MONOCYTES # BLD AUTO: 0.8 K/UL
MONOCYTES NFR BLD: 7.2 %
NEUTROPHILS # BLD AUTO: 8.3 K/UL
NEUTROPHILS NFR BLD: 77.4 %
PHOSPHATE SERPL-MCNC: 3.6 MG/DL
PLATELET # BLD AUTO: 174 K/UL
PMV BLD AUTO: 10.4 FL
POCT GLUCOSE: 122 MG/DL (ref 70–110)
POCT GLUCOSE: 134 MG/DL (ref 70–110)
POCT GLUCOSE: 146 MG/DL (ref 70–110)
POCT GLUCOSE: 148 MG/DL (ref 70–110)
POTASSIUM SERPL-SCNC: 3.9 MMOL/L
POTASSIUM SERPL-SCNC: 4.1 MMOL/L
PROT SERPL-MCNC: 6 G/DL
RBC # BLD AUTO: 3.31 M/UL
SODIUM SERPL-SCNC: 146 MMOL/L
SODIUM SERPL-SCNC: 147 MMOL/L
WBC # BLD AUTO: 10.77 K/UL

## 2017-09-14 PROCEDURE — 25000242 PHARM REV CODE 250 ALT 637 W/ HCPCS: Performed by: STUDENT IN AN ORGANIZED HEALTH CARE EDUCATION/TRAINING PROGRAM

## 2017-09-14 PROCEDURE — 84100 ASSAY OF PHOSPHORUS: CPT

## 2017-09-14 PROCEDURE — 63600175 PHARM REV CODE 636 W HCPCS: Performed by: HOSPITALIST

## 2017-09-14 PROCEDURE — 25000003 PHARM REV CODE 250: Performed by: STUDENT IN AN ORGANIZED HEALTH CARE EDUCATION/TRAINING PROGRAM

## 2017-09-14 PROCEDURE — 11000001 HC ACUTE MED/SURG PRIVATE ROOM

## 2017-09-14 PROCEDURE — 99900022

## 2017-09-14 PROCEDURE — 94761 N-INVAS EAR/PLS OXIMETRY MLT: CPT

## 2017-09-14 PROCEDURE — 94640 AIRWAY INHALATION TREATMENT: CPT

## 2017-09-14 PROCEDURE — 83735 ASSAY OF MAGNESIUM: CPT

## 2017-09-14 PROCEDURE — 80048 BASIC METABOLIC PNL TOTAL CA: CPT

## 2017-09-14 PROCEDURE — 27000221 HC OXYGEN, UP TO 24 HOURS

## 2017-09-14 PROCEDURE — 83735 ASSAY OF MAGNESIUM: CPT | Mod: 91

## 2017-09-14 PROCEDURE — 99900026 HC AIRWAY MAINTENANCE (STAT)

## 2017-09-14 PROCEDURE — 99233 SBSQ HOSP IP/OBS HIGH 50: CPT | Mod: GC,,, | Performed by: HOSPITALIST

## 2017-09-14 PROCEDURE — 36415 COLL VENOUS BLD VENIPUNCTURE: CPT

## 2017-09-14 PROCEDURE — 85025 COMPLETE CBC W/AUTO DIFF WBC: CPT

## 2017-09-14 PROCEDURE — 63600175 PHARM REV CODE 636 W HCPCS: Performed by: STUDENT IN AN ORGANIZED HEALTH CARE EDUCATION/TRAINING PROGRAM

## 2017-09-14 PROCEDURE — 80053 COMPREHEN METABOLIC PANEL: CPT

## 2017-09-14 RX ADMIN — ACETYLCYSTEINE 4 ML: 100 INHALANT RESPIRATORY (INHALATION) at 08:09

## 2017-09-14 RX ADMIN — PIPERACILLIN AND TAZOBACTAM 4.5 G: 4; .5 INJECTION, POWDER, FOR SOLUTION INTRAVENOUS at 05:09

## 2017-09-14 RX ADMIN — IPRATROPIUM BROMIDE AND ALBUTEROL SULFATE 3 ML: .5; 3 SOLUTION RESPIRATORY (INHALATION) at 08:09

## 2017-09-14 RX ADMIN — IPRATROPIUM BROMIDE AND ALBUTEROL SULFATE 3 ML: .5; 3 SOLUTION RESPIRATORY (INHALATION) at 04:09

## 2017-09-14 RX ADMIN — ENOXAPARIN SODIUM 30 MG: 100 INJECTION SUBCUTANEOUS at 05:09

## 2017-09-14 RX ADMIN — LEVETIRACETAM 500 MG: 5 INJECTION INTRAVENOUS at 09:09

## 2017-09-14 RX ADMIN — LEVETIRACETAM 500 MG: 5 INJECTION INTRAVENOUS at 08:09

## 2017-09-14 NOTE — ASSESSMENT & PLAN NOTE
Concern for illeus at OSH. However, no abdominal distension on physical exam.     - NG tube placement unsuccessful  - CT scan ordered  - consider Surgery consult following scan

## 2017-09-14 NOTE — HOSPITAL COURSE
9/14: Patients mental status unchanged from yesterday. Labs showing significant improvement. Will continue to treat for VAP. Schedule for CT scan of abdomen.   9/15: Patients mental status improved, appears more awake/alert. But no verbal communication yet. Following commands. Continue to treat to VAP. MRI scheduled. PT/OT   9:16: Patient more awake and alert. Still non verbal. Continuing to treat VAP. PT/OT   9/17: Patient awake and alert. Still non-verbal. Had a long conversation w/ family regarding patient status; will get records/procedure report.   9/18: patient is awake and alert but non-verbal and doesn't follow command. Surgery was consulted for ileus.   9/19: Patient is awake. But non-verbal and doesn;t follow commands. NG tube was pulled out last night. Surgery consulted for PEG tube.   9/20: Patient scheduled for PEG tube placement today. Abx course completed, Na corrected, BP wnl. Patient's family contacted regarding NH placement, d/c pending family's discission.   9/21: PEG tube in place. Consulted dietary for nutrition. Waiting on family for NH placement.   9/22-23: Resumed normal TF via boluses- giving HTN regimen via PEG. Dispo pending SNF acceptance.   9/24-9/26: Dispo pending SNF acceptance. Spoke with daughter today, she is still reviewing the facilities.   9/27: Patient's sister reports hand pain on the left side. There is no pain on palpation or limits in range of motion. An Xray of wrist was obtained and negative for fractures.

## 2017-09-14 NOTE — ASSESSMENT & PLAN NOTE
Creatine 2.4 on transfer; baseline 0.6    - Cr 1.3  - Strict ins and outs with siegel given patient AMS   - monitor

## 2017-09-14 NOTE — NURSING
Pt free of falls/injuries entire shift. No acute events over night. No NG tube present upon initial assessment of pt. Ross and flexi-seal draining well. Pt can follow small commands and follow with eyes. Limited ROM with BLE. Pt placed into bariatric bed during shift, tolerated well. Deep suctioned during shift, tolerated well. No non-verbal indicators of pain noted. All IV meds given as scheduled. VSS. No s/s distress noted. abd XR done at bedside around 0600. Bed in low-locked position. Call light within reach. Will continue to monitor pt

## 2017-09-14 NOTE — PLAN OF CARE
Problem: Patient Care Overview  Goal: Plan of Care Review  Outcome: Ongoing (interventions implemented as appropriate)  Pt is free from injury and falls during shift. Pt shows no signs of acute distress. Non verbal signs of pain absent. Non verbal- unable to assess orientation. Pt follows commands. Vitals stable. Blood sugar checked per orders- remains WNL. Bariatric bed in use with air alternating during shift to prevent wounds. Skin remains intact. Flexi-seal intact- No stool output during shift- unable to collect stool samples. Catheter care performed during shift. Trache collar in use- O2 sat stable (see flowsheet). Bed is in low position with breaks locked. Side rails are up x 2. Environment is clutter free. Call light is within reach of the patient. Will continue to monitor.

## 2017-09-14 NOTE — PROGRESS NOTES
Attempted to place NG tube 4 times with tries by 2 RNs. Left and right nare attempted. Resistance met with tube coming out of pt's mouth each time. NG tube placement unsuccessful. IM 3 paged at this time. Will continue to monitor.     3:22 PM- IM 3 notified. Will continue to monitor.     4:40 PM- MD notified of pt's BP. Will continue to monitor.

## 2017-09-14 NOTE — ASSESSMENT & PLAN NOTE
History of recent CVA with recent admission to rehab, per family acute worsening 1 week ago following trach replacement. OSH records in chart. Head CT unchanged. Per family patient was communicating prior to trach replacement. However, per OSH records patient has been obtunded for the past couple of days. Difficult to determine patients true baseline?    Concern for Anoxic brain injury during trach removal/replacement, CVA, Seizure, Worsening Sepsis, Per OSH records concern for psychosis (unlikely). Urine culture no growth.        - GCS of 8, however protecting airway with trach, stable for floor   - f/u blood culture   - f/u CT scan; consider surgery consult   - possible MRI?? Hold off for now  - Will consider consulting neurology; for longer EEG

## 2017-09-14 NOTE — ASSESSMENT & PLAN NOTE
Elevated WBC of 20. Concern for aspiration given hx from nurse at OSH    - Resolved  - Continue on Vanc and Zosyn  - F/u blood cultures, urine culture, and Sputum culture

## 2017-09-14 NOTE — ASSESSMENT & PLAN NOTE
Concern for possible VAP    - continue vano & zosyn  - Decreased O2 6L; 99%, will continue to titrate

## 2017-09-14 NOTE — ASSESSMENT & PLAN NOTE
HX of significant HTN. Per OSH on hydralazine 100 Q8 PO, Linsiopril 40 PO, Metoprolol XL 25 BID, Nifedipine 60mg daily Clonidine 0.1 Patch Q week, placed on Tuesday        - Labetalol PRN

## 2017-09-14 NOTE — SUBJECTIVE & OBJECTIVE
"Interval History:  NAEON. Patient non-verbal, responsive to painful stimuli, and follows commands.     Review of Systems   Unable to perform ROS: Mental status change     Objective:     Vital Signs (Most Recent):  Temp: 97.2 °F (36.2 °C) (09/14/17 1134)  Pulse: 76 (09/14/17 1507)  Resp: 18 (09/14/17 1208)  BP: (!) 145/72 (09/14/17 1134)  SpO2: 99 % (09/14/17 1208) Vital Signs (24h Range):  Temp:  [97.2 °F (36.2 °C)-97.8 °F (36.6 °C)] 97.2 °F (36.2 °C)  Pulse:  [48-76] 76  Resp:  [14-19] 18  SpO2:  [95 %-100 %] 99 %  BP: (136-180)/(61-88) 145/72        There is no height or weight on file to calculate BMI.    Intake/Output Summary (Last 24 hours) at 09/14/17 1618  Last data filed at 09/14/17 0648   Gross per 24 hour   Intake                0 ml   Output              950 ml   Net             -950 ml      Physical Exam   Constitutional: She appears well-developed. No distress.   Eyes: Conjunctivae are normal. Right eye exhibits no discharge. Left eye exhibits no discharge.   Pupils responsive to light, no tracking noted.     Cardiovascular: Normal rate, regular rhythm and normal heart sounds.    Pulmonary/Chest: Effort normal and breath sounds normal.   Abdominal: Soft. Bowel sounds are normal.   Musculoskeletal: She exhibits no edema.   Neurological: She has normal reflexes. GCS eye subscore is 2. GCS verbal subscore is 1. GCS motor subscore is 5.   Patient awake. But not communicating verbally. Responds to painful stimuli. Follows commands, for example "move left hand/right hand"- minimal movement.    Skin: Skin is warm and dry. No erythema.       Significant Labs:   Blood Culture:   Recent Labs  Lab 09/13/17  0447   LABBLOO No Growth to date  No Growth to date  No Growth to date  No Growth to date     CBC:   Recent Labs  Lab 09/13/17  0237 09/14/17  0750   WBC 20.28* 10.77   HGB 9.8* 9.0*   HCT 29.9* 28.0*    174     CMP:   Recent Labs  Lab 09/13/17  0237 09/14/17  0750    146*   K 4.5 3.9    " 112*   CO2 25 27   * 144*   BUN 75* 69*   CREATININE 2.4* 1.3   CALCIUM 8.9 8.7   PROT 6.3 6.0   ALBUMIN 2.3* 2.2*   BILITOT 1.1* 1.0   ALKPHOS 102 87   AST 10 11   ALT 17 16   ANIONGAP 11 7*   EGFRNONAA 20.3* 42.6*       Significant Imaging: I have reviewed all pertinent imaging results/findings within the past 24 hours.

## 2017-09-14 NOTE — PROGRESS NOTES
[facility] Hospital Medicine  Progress Note    Patient Name: Nisa Frank  MRN: [id number]  Patient Class: IP- Inpatient   Admission Date: 9/13/2017  Length of Stay: 1 days  Attending Physician: Bay Henao, *  Primary Care Provider: Atilio Downs MD    [facility] Medicine Team: [facility] 3 Abimbola Devlin MD    Subjective:     Principal Problem:Acute encephalopathy    HPI:  Ms Frank 66 year old female with diabetes mellitus type 2, CKD stage 3, CAD s/p CABG, essential hypertension, cerebrovascular disease s/p bi-hemispheric watershed infarcts and trach/PEG status who was brought in via EMS from [facility] for acute respiratory distress presented to [facility] 8/8 with acute respiratory failure with hypoxia likely due to mucous plug. Patient was admitted to ICU to wean off MV as tolerated and for placement to [facility] where she was transferred to [facility] on 8/14 in stable condition.  She pulled out her trach on 9/4 and went to [facility].  She was treated for an ESBL E. Coli UTI with Meropenem. The family has been very unhappy with the care that the patient has received.  She was medically stable for transfer to a SNF yesterday, however overnight she developed a new WBC 26.  She was found to have a RML and RLL infiltrate and was started on Vanc and Zosyn.  She also developed a new KATHI on top of her CKD (0.6 to 1.8).  Family is requesting transfer to [facility].    Spoke with daughter on the phone.  Per family patient was previously alert and oriented to person and place.  She was conversing and able to participate in therapy.  This changed shortly after trach removal and replacement on 9/4.  Family reports some complication during trach replacement resulting in overnight ICU care.  After patient was stepped down they report she stopped talking and would not participate with therapy.  The only response they could get from  "the patient was eye tracking and intermittent hand squeezes.  Family feels that patient may have had a repeat stroke and was requesting MRI.   Spoke with Dagoberto Melo nurse who reports that patient has been near obtunded for as long as she has had her (past couple of days).  She reported recent WBC jump and associated fever followed by broad spectrum abx.  In addiction notes concern for ileus given high residuals from NG tube feeds.  Does not high residuals resolved once tube was flushed.         Per nurse medications prior to concern for ileus.  Vanc 1750mg , Q24, Zosyn 4.5 Q 12, ASA 81, Clonidine 0.1 patch weekly, Lipitor 10, Colace, Lovenox 40, Protonix 40, Hydralazine 100 Q8, Keppra 500 BID, Lisniopril 40 PO, 25 Metoprolol XL, Procardia 60 mg Daily, Risperdal 25 QHS.           Hospital Course:  9/14: Patients mental status unchanged from yesterday. Labs showing significant improvement. Will continue to treat for VAP. Schedule for CT scan of abdomen.     Interval History:  NAEON. Patient non-verbal, responsive to painful stimuli, and follows simple commands, I.e "move right arm/left arm.     Review of Systems   Unable to perform ROS: Mental status change     Objective:     Vital Signs (Most Recent):  Temp: 97.2 °F (36.2 °C) (09/14/17 1134)  Pulse: 76 (09/14/17 1507)  Resp: 18 (09/14/17 1208)  BP: (!) 145/72 (09/14/17 1134)  SpO2: 99 % (09/14/17 1208) Vital Signs (24h Range):  Temp:  [97.2 °F (36.2 °C)-97.8 °F (36.6 °C)] 97.2 °F (36.2 °C)  Pulse:  [48-76] 76  Resp:  [14-19] 18  SpO2:  [95 %-100 %] 99 %  BP: (136-180)/(61-88) 145/72        There is no height or weight on file to calculate BMI.    Intake/Output Summary (Last 24 hours) at 09/14/17 1618  Last data filed at 09/14/17 0648   Gross per 24 hour   Intake                0 ml   Output              950 ml   Net             -950 ml      Physical Exam   Constitutional: She appears well-developed. No distress.   Eyes: Conjunctivae are normal. Right eye exhibits no " "discharge. Left eye exhibits no discharge.   Pupils responsive to light, no tracking noted.     Cardiovascular: Normal rate, regular rhythm and normal heart sounds.    Pulmonary/Chest: Effort normal and breath sounds normal.   Abdominal: Soft. Bowel sounds are normal.   Musculoskeletal: She exhibits no edema.   Neurological: She has normal reflexes. GCS eye subscore is 2. GCS verbal subscore is 1. GCS motor subscore is 5.   Patient awake. But not communicating verbally. Responds to painful stimuli. Follows commands, for example "move left hand/right hand"- minimal movement.    Skin: Skin is warm and dry. No erythema.       Significant Labs:   Blood Culture:   Recent Labs  Lab 09/13/17  0447   LABBLOO No Growth to date  No Growth to date  No Growth to date  No Growth to date     CBC:   Recent Labs  Lab 09/13/17 0237 09/14/17  0750   WBC 20.28* 10.77   HGB 9.8* 9.0*   HCT 29.9* 28.0*    174     CMP:   Recent Labs  Lab 09/13/17 0237 09/14/17  0750    146*   K 4.5 3.9    112*   CO2 25 27   * 144*   BUN 75* 69*   CREATININE 2.4* 1.3   CALCIUM 8.9 8.7   PROT 6.3 6.0   ALBUMIN 2.3* 2.2*   BILITOT 1.1* 1.0   ALKPHOS 102 87   AST 10 11   ALT 17 16   ANIONGAP 11 7*   EGFRNONAA 20.3* 42.6*       Significant Imaging: I have reviewed all pertinent imaging results/findings within the past 24 hours.    Assessment/Plan:      * Acute encephalopathy    History of recent CVA with recent admission to rehab, per family acute worsening 1 week ago following trach replacement. OSH records in chart. Head CT unchanged. Per family patient was communicating prior to trach replacement. However, per OSH records patient has been obtunded for the past couple of days. Difficult to determine patients true baseline?    Concern for Anoxic brain injury during trach removal/replacement, CVA, Seizure, Worsening Sepsis, Per OSH records concern for psychosis (unlikely). Urine culture no growth.        - GCS of 8, however " protecting airway with trach, stable for floor   - f/u blood culture   - f/u CT scan; consider surgery consult   - possible MRI?? Hold off for now  - Will consider consulting neurology; for longer EEG        Ileus    Concern for illeus at OSH. However, no abdominal distension on physical exam.     - NG tube placement unsuccessful  - CT scan ordered  - consider Surgery consult following scan         Respiratory failure    Concern for possible VAP    - continue vano & zosyn  - Decreased O2 6L; 99%, will continue to titrate           Tracheostomy status    - Trach care          Dysphagia    Per family was tolerating diet prior to trach replacement on 9/4     - NG tube unsuccessful           KATHI (acute kidney injury)    Creatine 2.4 on transfer; baseline 0.6    - Cr 1.3  - Strict ins and outs with siegel given patient AMS   - monitor           Leukocytosis    Elevated WBC of 20. Concern for aspiration given hx from nurse at OSH    - Resolved  - Continue on Vanc and Zosyn  - F/u blood cultures, urine culture, and Sputum culture            Respiratory insufficiency              Morbid obesity with BMI of 50.0-59.9, adult              S/P CABG (coronary artery bypass graft)    - Continue ASA           Type 2 diabetes mellitus, uncontrolled    - Per OSH records not on insulin   - Will obtain Ha1c during AM labs   - Sliding scale           Essential hypertension    HX of significant HTN. Per OSH on hydralazine 100 Q8 PO, Linsiopril 40 PO, Metoprolol XL 25 BID, Nifedipine 60mg daily Clonidine 0.1 Patch Q week, placed on Tuesday        - Labetalol PRN             VTE Risk Mitigation         Ordered     enoxaparin injection 30 mg  Daily     Route:  Subcutaneous        09/13/17 0943              Abimbola Devlin MD  Department of Hospital Medicine   [facility]

## 2017-09-15 LAB
ALBUMIN SERPL BCP-MCNC: 2.4 G/DL
ALP SERPL-CCNC: 93 U/L
ALT SERPL W/O P-5'-P-CCNC: 15 U/L
ANION GAP SERPL CALC-SCNC: 8 MMOL/L
AST SERPL-CCNC: 9 U/L
BASOPHILS # BLD AUTO: 0 K/UL
BASOPHILS NFR BLD: 0 %
BILIRUB SERPL-MCNC: 1.2 MG/DL
BUN SERPL-MCNC: 47 MG/DL
CALCIUM SERPL-MCNC: 8.7 MG/DL
CHLORIDE SERPL-SCNC: 113 MMOL/L
CO2 SERPL-SCNC: 28 MMOL/L
CREAT SERPL-MCNC: 0.8 MG/DL
DIFFERENTIAL METHOD: ABNORMAL
EOSINOPHIL # BLD AUTO: 0.1 K/UL
EOSINOPHIL NFR BLD: 0.7 %
ERYTHROCYTE [DISTWIDTH] IN BLOOD BY AUTOMATED COUNT: 15.8 %
EST. GFR  (AFRICAN AMERICAN): >60 ML/MIN/1.73 M^2
EST. GFR  (NON AFRICAN AMERICAN): >60 ML/MIN/1.73 M^2
GLUCOSE SERPL-MCNC: 92 MG/DL
HCT VFR BLD AUTO: 27.9 %
HGB BLD-MCNC: 9 G/DL
LYMPHOCYTES # BLD AUTO: 1.7 K/UL
LYMPHOCYTES NFR BLD: 18.6 %
MAGNESIUM SERPL-MCNC: 2.3 MG/DL
MCH RBC QN AUTO: 26.9 PG
MCHC RBC AUTO-ENTMCNC: 32.3 G/DL
MCV RBC AUTO: 84 FL
MONOCYTES # BLD AUTO: 0.4 K/UL
MONOCYTES NFR BLD: 4.2 %
NEUTROPHILS # BLD AUTO: 6.9 K/UL
NEUTROPHILS NFR BLD: 76.3 %
PHOSPHATE SERPL-MCNC: 2.5 MG/DL
PLATELET # BLD AUTO: 173 K/UL
PMV BLD AUTO: 10.7 FL
POCT GLUCOSE: 102 MG/DL (ref 70–110)
POCT GLUCOSE: 112 MG/DL (ref 70–110)
POCT GLUCOSE: 97 MG/DL (ref 70–110)
POTASSIUM SERPL-SCNC: 3.8 MMOL/L
PROT SERPL-MCNC: 6.4 G/DL
RBC # BLD AUTO: 3.34 M/UL
SODIUM SERPL-SCNC: 149 MMOL/L
WBC # BLD AUTO: 8.99 K/UL

## 2017-09-15 PROCEDURE — 99900026 HC AIRWAY MAINTENANCE (STAT)

## 2017-09-15 PROCEDURE — 85025 COMPLETE CBC W/AUTO DIFF WBC: CPT

## 2017-09-15 PROCEDURE — 94761 N-INVAS EAR/PLS OXIMETRY MLT: CPT

## 2017-09-15 PROCEDURE — 27000221 HC OXYGEN, UP TO 24 HOURS

## 2017-09-15 PROCEDURE — 63600175 PHARM REV CODE 636 W HCPCS: Performed by: HOSPITALIST

## 2017-09-15 PROCEDURE — 63600175 PHARM REV CODE 636 W HCPCS: Performed by: STUDENT IN AN ORGANIZED HEALTH CARE EDUCATION/TRAINING PROGRAM

## 2017-09-15 PROCEDURE — S5010 5% DEXTROSE AND 0.45% SALINE: HCPCS | Performed by: STUDENT IN AN ORGANIZED HEALTH CARE EDUCATION/TRAINING PROGRAM

## 2017-09-15 PROCEDURE — 36415 COLL VENOUS BLD VENIPUNCTURE: CPT

## 2017-09-15 PROCEDURE — 84100 ASSAY OF PHOSPHORUS: CPT

## 2017-09-15 PROCEDURE — 99232 SBSQ HOSP IP/OBS MODERATE 35: CPT | Mod: GC,,, | Performed by: HOSPITALIST

## 2017-09-15 PROCEDURE — 25000003 PHARM REV CODE 250: Performed by: STUDENT IN AN ORGANIZED HEALTH CARE EDUCATION/TRAINING PROGRAM

## 2017-09-15 PROCEDURE — 94640 AIRWAY INHALATION TREATMENT: CPT

## 2017-09-15 PROCEDURE — 80053 COMPREHEN METABOLIC PANEL: CPT

## 2017-09-15 PROCEDURE — 25000242 PHARM REV CODE 250 ALT 637 W/ HCPCS: Performed by: STUDENT IN AN ORGANIZED HEALTH CARE EDUCATION/TRAINING PROGRAM

## 2017-09-15 PROCEDURE — 25500020 PHARM REV CODE 255: Performed by: HOSPITALIST

## 2017-09-15 PROCEDURE — 63600175 PHARM REV CODE 636 W HCPCS: Performed by: INTERNAL MEDICINE

## 2017-09-15 PROCEDURE — 83735 ASSAY OF MAGNESIUM: CPT

## 2017-09-15 PROCEDURE — 11000001 HC ACUTE MED/SURG PRIVATE ROOM

## 2017-09-15 RX ORDER — LABETALOL HYDROCHLORIDE 5 MG/ML
5 INJECTION, SOLUTION INTRAVENOUS EVERY 4 HOURS
Status: DISCONTINUED | OUTPATIENT
Start: 2017-09-15 | End: 2017-09-15

## 2017-09-15 RX ORDER — DEXTROSE MONOHYDRATE AND SODIUM CHLORIDE 5; .45 G/100ML; G/100ML
INJECTION, SOLUTION INTRAVENOUS CONTINUOUS
Status: ACTIVE | OUTPATIENT
Start: 2017-09-15 | End: 2017-09-15

## 2017-09-15 RX ORDER — SODIUM CHLORIDE, SODIUM LACTATE, POTASSIUM CHLORIDE, CALCIUM CHLORIDE 600; 310; 30; 20 MG/100ML; MG/100ML; MG/100ML; MG/100ML
INJECTION, SOLUTION INTRAVENOUS CONTINUOUS
Status: DISCONTINUED | OUTPATIENT
Start: 2017-09-15 | End: 2017-09-16

## 2017-09-15 RX ORDER — POTASSIUM CHLORIDE 7.45 MG/ML
10 INJECTION INTRAVENOUS
Status: COMPLETED | OUTPATIENT
Start: 2017-09-15 | End: 2017-09-15

## 2017-09-15 RX ORDER — DEXTROSE MONOHYDRATE AND SODIUM CHLORIDE 5; .45 G/100ML; G/100ML
INJECTION, SOLUTION INTRAVENOUS CONTINUOUS
Status: DISCONTINUED | OUTPATIENT
Start: 2017-09-15 | End: 2017-09-15

## 2017-09-15 RX ORDER — LABETALOL HYDROCHLORIDE 5 MG/ML
10 INJECTION, SOLUTION INTRAVENOUS EVERY 4 HOURS PRN
Status: DISCONTINUED | OUTPATIENT
Start: 2017-09-15 | End: 2017-09-18

## 2017-09-15 RX ORDER — LABETALOL HYDROCHLORIDE 5 MG/ML
10 INJECTION, SOLUTION INTRAVENOUS EVERY 4 HOURS PRN
Status: DISCONTINUED | OUTPATIENT
Start: 2017-09-15 | End: 2017-09-15

## 2017-09-15 RX ADMIN — LABETALOL HYDROCHLORIDE 5 MG: 5 INJECTION INTRAVENOUS at 02:09

## 2017-09-15 RX ADMIN — IPRATROPIUM BROMIDE AND ALBUTEROL SULFATE 3 ML: .5; 3 SOLUTION RESPIRATORY (INHALATION) at 12:09

## 2017-09-15 RX ADMIN — ENOXAPARIN SODIUM 30 MG: 100 INJECTION SUBCUTANEOUS at 06:09

## 2017-09-15 RX ADMIN — IPRATROPIUM BROMIDE AND ALBUTEROL SULFATE 3 ML: .5; 3 SOLUTION RESPIRATORY (INHALATION) at 11:09

## 2017-09-15 RX ADMIN — ACETYLCYSTEINE 4 ML: 100 INHALANT RESPIRATORY (INHALATION) at 12:09

## 2017-09-15 RX ADMIN — DEXTROSE AND SODIUM CHLORIDE: 5; .45 INJECTION, SOLUTION INTRAVENOUS at 09:09

## 2017-09-15 RX ADMIN — IPRATROPIUM BROMIDE AND ALBUTEROL SULFATE 3 ML: .5; 3 SOLUTION RESPIRATORY (INHALATION) at 03:09

## 2017-09-15 RX ADMIN — IOHEXOL 100 ML: 350 INJECTION, SOLUTION INTRAVENOUS at 03:09

## 2017-09-15 RX ADMIN — POTASSIUM CHLORIDE 10 MEQ: 10 INJECTION, SOLUTION INTRAVENOUS at 12:09

## 2017-09-15 RX ADMIN — ACETYLCYSTEINE 4 ML: 100 INHALANT RESPIRATORY (INHALATION) at 11:09

## 2017-09-15 RX ADMIN — PIPERACILLIN AND TAZOBACTAM 4.5 G: 4; .5 INJECTION, POWDER, FOR SOLUTION INTRAVENOUS at 05:09

## 2017-09-15 RX ADMIN — LEVETIRACETAM 500 MG: 5 INJECTION INTRAVENOUS at 09:09

## 2017-09-15 RX ADMIN — ACETYLCYSTEINE 4 ML: 100 INHALANT RESPIRATORY (INHALATION) at 07:09

## 2017-09-15 RX ADMIN — PIPERACILLIN AND TAZOBACTAM 4.5 G: 4; .5 INJECTION, POWDER, FOR SOLUTION INTRAVENOUS at 06:09

## 2017-09-15 RX ADMIN — POTASSIUM CHLORIDE 10 MEQ: 10 INJECTION, SOLUTION INTRAVENOUS at 11:09

## 2017-09-15 RX ADMIN — IPRATROPIUM BROMIDE AND ALBUTEROL SULFATE 3 ML: .5; 3 SOLUTION RESPIRATORY (INHALATION) at 07:09

## 2017-09-15 NOTE — ASSESSMENT & PLAN NOTE
Creatine 2.4 on transfer; baseline 0.6    - Cr 0.8, resolved   - Strict ins and outs with siegel given patient AMS   - monitor renal function

## 2017-09-15 NOTE — PROGRESS NOTES
[facility] Hospital Medicine  Progress Note    Patient Name: Nisa Frank  MRN: [id number]  Patient Class: IP- Inpatient   Admission Date: 9/13/2017  Length of Stay: 2 days  Attending Physician: Bay Henao, *  Primary Care Provider: Atilio Downs MD    [facility] Medicine Team: [facility]OSP MED 3 Abimbola Devlin MD    Subjective:     Principal Problem:Acute encephalopathy    HPI:  Ms Frank 66 year old female with diabetes mellitus type 2, CKD stage 3, CAD s/p CABG, essential hypertension, cerebrovascular disease s/p bi-hemispheric watershed infarcts and trach/PEG status who was brought in via EMS from [place] [place]. \[facility] a [facility] and Outlook.\"" for acute respiratory distress presented to [facility] 8/8 with acute respiratory failure with hypoxia likely due to mucous plug. Patient was admitted to ICU to wean off MV as tolerated and for placement to [facility] where she was transferred to [facility] on 8/14 in stable condition.  She pulled out her trach on 9/4 and went to [facility].  She was treated for an ESBL E. Coli UTI with Meropenem. The family has been very unhappy with the care that the patient has received.  She was medically stable for transfer to a SNF yesterday, however overnight she developed a new WBC 26.  She was found to have a RML and RLL infiltrate and was started on Vanc and Zosyn.  She also developed a new KATHI on top of her CKD (0.6 to 1.8).  Family is requesting transfer [facility].    Spoke with daughter on the phone.  Per family patient was previously alert and oriented to person and place.  She was conversing and able to participate in therapy.  This changed shortly after trach removal and replacement on 9/4.  Family reports some complication during trach replacement resulting in overnight ICU care.  After patient was stepped down they report she stopped talking and would not participate with therapy.  The only response they could get from  the patient was eye tracking and intermittent hand squeezes.  Family feels that patient may have had a repeat stroke and was requesting MRI.   Spoke with Dagoberto Melo nurse who reports that patient has been near obtunded for as long as she has had her (past couple of days).  She reported recent WBC jump and associated fever followed by broad spectrum abx.  In addiction notes concern for ileus given high residuals from NG tube feeds.  Does not high residuals resolved once tube was flushed.         Per nurse medications prior to concern for ileus.  Vanc 1750mg , Q24, Zosyn 4.5 Q 12, ASA 81, Clonidine 0.1 patch weekly, Lipitor 10, Colace, Lovenox 40, Protonix 40, Hydralazine 100 Q8, Keppra 500 BID, Lisniopril 40 PO, 25 Metoprolol XL, Procardia 60 mg Daily, Risperdal 25 QHS.           Hospital Course:  9/14: Patients mental status unchanged from yesterday. Labs showing significant improvement. Will continue to treat for VAP. Schedule for CT scan of abdomen.   9/15: Patients mental status improved, appears more awake/alert. But no verbal communication yet. Following commands. Continue to treat to VAP. MRI scheduled. PT/OT     Interval History:   NAEON. Patient non-verbal. But is following commands.     Review of Systems   Unable to perform ROS: Mental status change     Objective:     Vital Signs (Most Recent):  Temp: 98.6 °F (37 °C) (09/15/17 1145)  Pulse: 62 (09/15/17 1157)  Resp: 18 (09/15/17 1157)  BP: (!) 192/88 (MD notified) (09/15/17 1145)  SpO2: 98 % (09/15/17 1157) Vital Signs (24h Range):  Temp:  [97.6 °F (36.4 °C)-98.6 °F (37 °C)] 98.6 °F (37 °C)  Pulse:  [52-76] 62  Resp:  [16-22] 18  SpO2:  [97 %-100 %] 98 %  BP: (161-210)/(72-88) 192/88        There is no height or weight on file to calculate BMI.    Intake/Output Summary (Last 24 hours) at 09/15/17 1357  Last data filed at 09/15/17 1145   Gross per 24 hour   Intake                0 ml   Output              975 ml   Net             -975 ml      Physical Exam    Constitutional: She appears well-developed and well-nourished. No distress.   HENT:   Head: Normocephalic and atraumatic.   Eyes: Conjunctivae are normal. Right eye exhibits no discharge. Left eye exhibits no discharge.   Pupils responsive to light, no tracking noted.     Cardiovascular: Normal rate, regular rhythm and normal heart sounds.    Pulmonary/Chest: Effort normal and breath sounds normal.   Abdominal: Soft. Bowel sounds are normal. She exhibits no distension. There is no tenderness.   Musculoskeletal: She exhibits no edema.   Neurological: She has normal reflexes. GCS eye subscore is 2. GCS verbal subscore is 1. GCS motor subscore is 5.   Patient awake. Waxing & waning mental status.     But not communicating verbally. Responds to painful stimuli.     Following commands, for example moving left arm, feet, will nod her head.  Improvement from previous exam.    Skin: Skin is warm and dry. No erythema.       Significant Labs:   CBC:   Recent Labs  Lab 09/14/17  0750 09/15/17  0802   WBC 10.77 8.99   HGB 9.0* 9.0*   HCT 28.0* 27.9*    173     CMP:   Recent Labs  Lab 09/14/17  0750 09/14/17  1705 09/15/17  0802   * 147* 149*   K 3.9 4.1 3.8   * 112* 113*   CO2 27 25 28   * 109 92   BUN 69* 64* 47*   CREATININE 1.3 1.0 0.8   CALCIUM 8.7 8.7 8.7   PROT 6.0  --  6.4   ALBUMIN 2.2*  --  2.4*   BILITOT 1.0  --  1.2*   ALKPHOS 87  --  93   AST 11  --  9*   ALT 16  --  15   ANIONGAP 7* 10 8   EGFRNONAA 42.6* 58.4* >60.0       Significant Imaging: I have reviewed all pertinent imaging results/findings within the past 24 hours.    Assessment/Plan:      * Acute encephalopathy    History of recent CVA with recent admission to rehab, per family acute worsening 1 week ago following trach replacement. OSH records in chart. Head CT unchanged. Per family patient was communicating prior to trach replacement. However, per OSH records patient has been obtunded for the past couple of days. Difficult to  determine patients true baseline?    Concern for Anoxic brain injury during trach removal/replacement, CVA, Seizure, Worsening Sepsis. Urine culture no growth.        - GCS of 8, however protecting airway with trach, stable for floor    - Surgery consult tomorrow; if patient has no BM, 72 hrs Ileus    - f/u MRI  - Will consider consulting neurology; for longer EEG  - continue Zosyn tx        Ileus    Concern for ileus at OSH. However, no abdominal distension on physical exam. CT scan notable for Ileus, w/ no SBO.    - NG tube placement unsuccessful  - consider Surgery consult keenan, if patient has no BM         Respiratory failure    Concern for possible VAP    - continue zosyn  - Decreased O2 5L; 99%, will continue to titrate           Tracheostomy status    - Trach care          Dysphagia    Per family was tolerating diet prior to trach replacement on 9/4     - NG tube unsuccessful           KATHI (acute kidney injury)    Creatine 2.4 on transfer; baseline 0.6    - Cr 0.8, resolved   - Strict ins and outs with siegel given patient AMS   - monitor renal function           Leukocytosis    Elevated WBC of 20. Concern for aspiration given hx from nurse at OSH. Urine culture no growth.     - Resolved  - Continue on Zosyn  - F/u blood cultures (NGTD)          Respiratory insufficiency              Morbid obesity with BMI of 50.0-59.9, adult              S/P CABG (coronary artery bypass graft)    - Continue ASA           Type 2 diabetes mellitus, uncontrolled    - Per OSH records not on insulin   - Will obtain Ha1c during AM labs   - Sliding scale           Essential hypertension    HX of significant HTN. Per OSH on hydralazine 100 Q8 PO, Linsiopril 40 PO, Metoprolol XL 25 BID, Nifedipine 60mg daily Clonidine 0.1 Patch Q week, placed on Tuesday      - Labetalol PRN             VTE Risk Mitigation         Ordered     enoxaparin injection 30 mg  Daily     Route:  Subcutaneous        09/13/17 9859              Abimbola Devlin  MD  Department of Hospital Medicine   [facility]

## 2017-09-15 NOTE — ASSESSMENT & PLAN NOTE
Elevated WBC of 20. Concern for aspiration given hx from nurse at OSH. Urine culture no growth.     - Resolved  - Continue on Zosyn  - F/u blood cultures (NGTD)

## 2017-09-15 NOTE — SUBJECTIVE & OBJECTIVE
Interval History:   NAEON. Patient non-verbal. But is following commands.     Review of Systems   Unable to perform ROS: Mental status change     Objective:     Vital Signs (Most Recent):  Temp: 98.6 °F (37 °C) (09/15/17 1145)  Pulse: 62 (09/15/17 1157)  Resp: 18 (09/15/17 1157)  BP: (!) 192/88 (MD notified) (09/15/17 1145)  SpO2: 98 % (09/15/17 1157) Vital Signs (24h Range):  Temp:  [97.6 °F (36.4 °C)-98.6 °F (37 °C)] 98.6 °F (37 °C)  Pulse:  [52-76] 62  Resp:  [16-22] 18  SpO2:  [97 %-100 %] 98 %  BP: (161-210)/(72-88) 192/88        There is no height or weight on file to calculate BMI.    Intake/Output Summary (Last 24 hours) at 09/15/17 1357  Last data filed at 09/15/17 1145   Gross per 24 hour   Intake                0 ml   Output              975 ml   Net             -975 ml      Physical Exam   Constitutional: She appears well-developed and well-nourished. No distress.   HENT:   Head: Normocephalic and atraumatic.   Eyes: Conjunctivae are normal. Right eye exhibits no discharge. Left eye exhibits no discharge.   Pupils responsive to light, no tracking noted.     Cardiovascular: Normal rate, regular rhythm and normal heart sounds.    Pulmonary/Chest: Effort normal and breath sounds normal.   Abdominal: Soft. Bowel sounds are normal. She exhibits no distension. There is no tenderness.   Musculoskeletal: She exhibits no edema.   Neurological: She has normal reflexes. GCS eye subscore is 2. GCS verbal subscore is 1. GCS motor subscore is 5.   Patient awake. Waxing & waning mental status.     But not communicating verbally. Responds to painful stimuli.     Following commands, for example moving left arm, feet, will nod her head.  Improvement from previous exam.    Skin: Skin is warm and dry. No erythema.       Significant Labs:   CBC:   Recent Labs  Lab 09/14/17  0750 09/15/17  0802   WBC 10.77 8.99   HGB 9.0* 9.0*   HCT 28.0* 27.9*    173     CMP:   Recent Labs  Lab 09/14/17  0750 09/14/17  0759  09/15/17  0802   * 147* 149*   K 3.9 4.1 3.8   * 112* 113*   CO2 27 25 28   * 109 92   BUN 69* 64* 47*   CREATININE 1.3 1.0 0.8   CALCIUM 8.7 8.7 8.7   PROT 6.0  --  6.4   ALBUMIN 2.2*  --  2.4*   BILITOT 1.0  --  1.2*   ALKPHOS 87  --  93   AST 11  --  9*   ALT 16  --  15   ANIONGAP 7* 10 8   EGFRNONAA 42.6* 58.4* >60.0       Significant Imaging: I have reviewed all pertinent imaging results/findings within the past 24 hours.

## 2017-09-15 NOTE — ASSESSMENT & PLAN NOTE
History of recent CVA with recent admission to rehab, per family acute worsening 1 week ago following trach replacement. OSH records in chart. Head CT unchanged. Per family patient was communicating prior to trach replacement. However, per OSH records patient has been obtunded for the past couple of days. Difficult to determine patients true baseline?    Concern for Anoxic brain injury during trach removal/replacement, CVA, Seizure, Worsening Sepsis. Urine culture no growth.        - GCS of 8, however protecting airway with trach, stable for floor    - Surgery consult tomorrow; if patient has no BM, 72 hrs Ileus    - f/u MRI  - Will consider consulting neurology; for longer EEG  - continue Zosyn tx

## 2017-09-15 NOTE — PROGRESS NOTES
Pt /88. All other vitals stable. Pt shows no signs of distress. Non verbal signs of pain absent. IM 3 paged at this time. Will continue to monitor.

## 2017-09-15 NOTE — NURSING
"Pt has been free of falls/inuries entire shift. Daughter at bedside and would like to be updated and called with any info as well as any questions for her. Daughter has stated, "Why were yall trying to put another NG tube down her nose yesterday? She was eating well prior to ICU [facility] stay and I would love if frankie can do a swallow study and see if she can swallow and get something in her stomach. She had  PEG tube a while ago but quickly took it out because my mother was eating well again." Otherwise, pt had not acute events tonight. Pt was brought down to CT scan and tolerated well. New IV was placed to L forearm. PICC/midline? to R upper arm still in place and not being used. All scheduled meds given as ordered. Still no stool in flexi-seal to obtain specimen. New silicone siegel placed in pt d/t other siegel leaking throughout shift, pt tolerated well. Deep suctioned every two hours throughout shift by RT and myself. Good cough. Thick, white-yellow sputum noted. O2 now on 5L at 28% and tolerating well. VSS. No s/s distress noted. Bed in low-locked position. Call light within reach. Daughter at bedside. Will continue to monitor pt  "

## 2017-09-15 NOTE — PHARMACY MED REC
[facility] Medication Reconciliation  Template    Patient was admitted on 2017 for Acute encephalopathy.    Patient's prior to admission medication regimen was as follows:  Prescriptions Prior to Admission   Medication Sig Dispense Refill Last Dose    acetaminophen (TYLENOL) 650 mg/20.3 mL Soln 20.3 mLs (650 mg total) by Per NG tube route every 6 (six) hours as needed. (Patient taking differently: 650 mg by Per NG tube route every 6 (six) hours as needed for Pain. )   Unknown    [] acetylcysteine 100 mg/ml, 10%, (MUCOMYST) 100 mg/mL (10 %) nebulizer solution Take 4 mLs by nebulization 4 (four) times daily. 480 mL 0     albuterol-ipratropium 2.5mg-0.5mg/3mL (DUO-NEB) 0.5 mg-3 mg(2.5 mg base)/3 mL nebulizer solution Take 3 mLs by nebulization every 4 (four) hours. Rescue  0     aspirin 81 MG Chew 1 tablet (81 mg total) by Per G Tube route once daily.  0     atorvastatin (LIPITOR) 10 MG tablet 1 tablet (10 mg total) by Per G Tube route once daily. (Patient taking differently: 10 mg by Per G Tube route every evening. )   2017    bisacodyl (DULCOLAX) 5 mg EC tablet Take 5 mg by mouth daily as needed for Constipation.       carvedilol (COREG) 25 MG tablet 50 mg by Per G Tube route 2 (two) times daily.       chlorhexidine (PERIDEX) 0.12 % solution Use as directed 15 mLs in the mouth or throat 2 (two) times daily.       cloNIDine 0.1 mg/24 hr td ptwk (CATAPRES) 0.1 mg/24 hr Place 1 patch onto the skin every 7 days. (Patient taking differently: Place 1 patch onto the skin every Tuesday. )   Past Week    diphenhydrAMINE (BENADRYL) 25 mg capsule 25 mg by Per G Tube route 3 (three) times daily as needed for Itching.       docusate sodium (COLACE) 100 MG capsule 100 mg by Per G Tube route 2 (two) times daily.       enoxaparin (LOVENOX) 40 mg/0.4 mL Syrg Inject 40 mg into the skin once daily.       fluoxetine (PROZAC) 20 MG capsule 1 capsule (20 mg total) by Per G Tube route once daily.   2017     hydrALAZINE (APRESOLINE) 100 MG tablet 1 tablet (100 mg total) by Per G Tube route every 8 (eight) hours. 90 tablet 11     insulin aspart (NOVOLOG) 100 unit/mL InPn pen Inject 1-10 Units into the skin every 4 (four) hours as needed (Hyperglycemia).  0 8/8/2017    levetiracetam (KEPPRA) 500 MG Tab 500 mg by Per G Tube route 2 (two) times daily.       lisinopril 10 MG tablet 10 mg by Per G Tube route once daily.       meclizine (ANTIVERT) 25 mg tablet Take 1 tablet (25 mg total) by mouth every 6 (six) hours as needed. (Patient taking differently: Take 25 mg by mouth every 6 (six) hours as needed for Dizziness. ) 20 tablet 0 Unknown    nifedipine (PROCARDIA-XL) 60 MG (OSM) 24 hr tablet Take 120 mg by mouth once daily.       nitroGLYCERIN (NITROSTAT) 0.4 MG SL tablet Place 0.4 mg under the tongue every 5 (five) minutes as needed for Chest pain.       ondansetron (ZOFRAN, AS HYDROCHLORIDE,) 2 mg/mL injection Inject 4 mg into the vein every 4 (four) hours as needed (for nausea).       pantoprazole (PROTONIX) 40 mg GrPS 1 packet (40 mg total) by Per G Tube route once daily.   8/8/2017    risperidone (RISPERDAL) 0.25 MG Tab 0.25 mg by Per G Tube route 2 (two) times daily.       amantadine HCl (SYMMETREL) 100 mg capsule 1 capsule (100 mg total) by Per G Tube route every 8 (eight) hours.   8/8/2017    amlodipine (NORVASC) 10 MG tablet 1 tablet (10 mg total) by Per G Tube route once daily.   8/8/2017    diclofenac sodium (VOLTAREN) 1 % Gel Apply 2 g topically 2 (two) times daily. 100 g 1 Unknown    EASY TOUCH TWIST LANCETS 30 gauge Misc   6 6/18/2017    HEPARIN SODIUM,PORCINE (HEPARIN, PORCINE,) 5,000 unit/mL injection Inject 1.5 mLs (7,500 Units total) into the skin every 8 (eight) hours.   8/8/2017     Please add appropriate    SmartPhrase below:  Admission Medication Reconciliation - Pharmacy Consult Note    The home medication history was taken by Sloane Aidan, pharmacy technician.  Based on  "information gathered and subsequent review by the clinical pharmacist, the items below may need attention.     You may go to "Admission" then "Reconcile Home Medications" tabs to review and/or act upon these items. Based on information gathered and subsequent review by the clinical pharmacist, the items below may need attention.    Potentially problematic discrepancies with current MAR  o Patient IS taking the following which was not ordered upon admit  o Aspirin 81 mgQD  o Lipitor 10 mg QHS  o Fluoxetine 20 mg QD  o Hydralazine 100 mg TID  o Protonix 40 mg QD  o Coreg 25 mg BID  o Procardia  QD  o Colase 100 mg BID  o Lisinopril 10 mg QD  o Risperidone 0.25 mg BID     Please address this information as you see fit.  Feel free to contact us if you have any questions or require assistance.    Aubrie Self  EXT [phone]        "

## 2017-09-15 NOTE — ASSESSMENT & PLAN NOTE
Concern for ileus at OSH. However, no abdominal distension on physical exam. CT scan notable for Ileus, w/ no SBO.    - NG tube placement unsuccessful  - consider Surgery consult keenan, if patient has no BM

## 2017-09-16 LAB
ALBUMIN SERPL BCP-MCNC: 2.1 G/DL
ALP SERPL-CCNC: 92 U/L
ALT SERPL W/O P-5'-P-CCNC: 12 U/L
ANION GAP SERPL CALC-SCNC: 12 MMOL/L
ANION GAP SERPL CALC-SCNC: 13 MMOL/L
AST SERPL-CCNC: 7 U/L
BASOPHILS # BLD AUTO: 0.01 K/UL
BASOPHILS NFR BLD: 0.1 %
BILIRUB SERPL-MCNC: 1.2 MG/DL
BUN SERPL-MCNC: 22 MG/DL
BUN SERPL-MCNC: 27 MG/DL
CALCIUM SERPL-MCNC: 8.5 MG/DL
CALCIUM SERPL-MCNC: 8.8 MG/DL
CHLORIDE SERPL-SCNC: 114 MMOL/L
CHLORIDE SERPL-SCNC: 114 MMOL/L
CO2 SERPL-SCNC: 24 MMOL/L
CO2 SERPL-SCNC: 25 MMOL/L
CREAT SERPL-MCNC: 0.7 MG/DL
CREAT SERPL-MCNC: 0.7 MG/DL
DIFFERENTIAL METHOD: ABNORMAL
EOSINOPHIL # BLD AUTO: 0 K/UL
EOSINOPHIL NFR BLD: 0.4 %
ERYTHROCYTE [DISTWIDTH] IN BLOOD BY AUTOMATED COUNT: 15.9 %
EST. GFR  (AFRICAN AMERICAN): >60 ML/MIN/1.73 M^2
EST. GFR  (AFRICAN AMERICAN): >60 ML/MIN/1.73 M^2
EST. GFR  (NON AFRICAN AMERICAN): >60 ML/MIN/1.73 M^2
EST. GFR  (NON AFRICAN AMERICAN): >60 ML/MIN/1.73 M^2
GLUCOSE SERPL-MCNC: 74 MG/DL
GLUCOSE SERPL-MCNC: 90 MG/DL
HCT VFR BLD AUTO: 26.2 %
HGB BLD-MCNC: 8.3 G/DL
LYMPHOCYTES # BLD AUTO: 1.5 K/UL
LYMPHOCYTES NFR BLD: 18.6 %
MAGNESIUM SERPL-MCNC: 2 MG/DL
MCH RBC QN AUTO: 26.5 PG
MCHC RBC AUTO-ENTMCNC: 31.7 G/DL
MCV RBC AUTO: 84 FL
MONOCYTES # BLD AUTO: 0.6 K/UL
MONOCYTES NFR BLD: 7.8 %
NEUTROPHILS # BLD AUTO: 5.7 K/UL
NEUTROPHILS NFR BLD: 72.8 %
PHOSPHATE SERPL-MCNC: 2.2 MG/DL
PLATELET # BLD AUTO: 177 K/UL
PMV BLD AUTO: 11.1 FL
POCT GLUCOSE: 100 MG/DL (ref 70–110)
POCT GLUCOSE: 89 MG/DL (ref 70–110)
POCT GLUCOSE: 96 MG/DL (ref 70–110)
POTASSIUM SERPL-SCNC: 3.6 MMOL/L
POTASSIUM SERPL-SCNC: 3.9 MMOL/L
PROT SERPL-MCNC: 5.9 G/DL
RBC # BLD AUTO: 3.13 M/UL
SODIUM SERPL-SCNC: 150 MMOL/L
SODIUM SERPL-SCNC: 152 MMOL/L
WBC # BLD AUTO: 7.78 K/UL

## 2017-09-16 PROCEDURE — 27000221 HC OXYGEN, UP TO 24 HOURS

## 2017-09-16 PROCEDURE — 11000001 HC ACUTE MED/SURG PRIVATE ROOM

## 2017-09-16 PROCEDURE — 99900026 HC AIRWAY MAINTENANCE (STAT)

## 2017-09-16 PROCEDURE — 94640 AIRWAY INHALATION TREATMENT: CPT

## 2017-09-16 PROCEDURE — 63600175 PHARM REV CODE 636 W HCPCS: Performed by: STUDENT IN AN ORGANIZED HEALTH CARE EDUCATION/TRAINING PROGRAM

## 2017-09-16 PROCEDURE — 25000003 PHARM REV CODE 250: Performed by: STUDENT IN AN ORGANIZED HEALTH CARE EDUCATION/TRAINING PROGRAM

## 2017-09-16 PROCEDURE — 84100 ASSAY OF PHOSPHORUS: CPT

## 2017-09-16 PROCEDURE — 25000003 PHARM REV CODE 250: Performed by: INTERNAL MEDICINE

## 2017-09-16 PROCEDURE — 94761 N-INVAS EAR/PLS OXIMETRY MLT: CPT

## 2017-09-16 PROCEDURE — 85025 COMPLETE CBC W/AUTO DIFF WBC: CPT

## 2017-09-16 PROCEDURE — S5010 5% DEXTROSE AND 0.45% SALINE: HCPCS | Performed by: INTERNAL MEDICINE

## 2017-09-16 PROCEDURE — 99232 SBSQ HOSP IP/OBS MODERATE 35: CPT | Mod: GC,,, | Performed by: HOSPITALIST

## 2017-09-16 PROCEDURE — 25000242 PHARM REV CODE 250 ALT 637 W/ HCPCS: Performed by: STUDENT IN AN ORGANIZED HEALTH CARE EDUCATION/TRAINING PROGRAM

## 2017-09-16 PROCEDURE — 36415 COLL VENOUS BLD VENIPUNCTURE: CPT

## 2017-09-16 PROCEDURE — 80048 BASIC METABOLIC PNL TOTAL CA: CPT

## 2017-09-16 PROCEDURE — 80053 COMPREHEN METABOLIC PANEL: CPT

## 2017-09-16 PROCEDURE — 63600175 PHARM REV CODE 636 W HCPCS: Performed by: HOSPITALIST

## 2017-09-16 PROCEDURE — 83735 ASSAY OF MAGNESIUM: CPT

## 2017-09-16 RX ORDER — DEXTROSE MONOHYDRATE 50 MG/ML
INJECTION, SOLUTION INTRAVENOUS CONTINUOUS
Status: ACTIVE | OUTPATIENT
Start: 2017-09-16 | End: 2017-09-17

## 2017-09-16 RX ORDER — DEXTROSE MONOHYDRATE AND SODIUM CHLORIDE 5; .45 G/100ML; G/100ML
INJECTION, SOLUTION INTRAVENOUS CONTINUOUS
Status: DISCONTINUED | OUTPATIENT
Start: 2017-09-16 | End: 2017-09-16

## 2017-09-16 RX ORDER — DEXTROSE MONOHYDRATE AND SODIUM CHLORIDE 5; .45 G/100ML; G/100ML
INJECTION, SOLUTION INTRAVENOUS CONTINUOUS
Status: ACTIVE | OUTPATIENT
Start: 2017-09-16 | End: 2017-09-16

## 2017-09-16 RX ADMIN — ACETYLCYSTEINE 4 ML: 100 INHALANT RESPIRATORY (INHALATION) at 08:09

## 2017-09-16 RX ADMIN — ENOXAPARIN SODIUM 30 MG: 100 INJECTION SUBCUTANEOUS at 05:09

## 2017-09-16 RX ADMIN — LEVETIRACETAM 500 MG: 5 INJECTION INTRAVENOUS at 08:09

## 2017-09-16 RX ADMIN — SODIUM CHLORIDE, SODIUM LACTATE, POTASSIUM CHLORIDE, AND CALCIUM CHLORIDE: 600; 310; 30; 20 INJECTION, SOLUTION INTRAVENOUS at 01:09

## 2017-09-16 RX ADMIN — LEVETIRACETAM 500 MG: 5 INJECTION INTRAVENOUS at 10:09

## 2017-09-16 RX ADMIN — LEVETIRACETAM 500 MG: 5 INJECTION INTRAVENOUS at 01:09

## 2017-09-16 RX ADMIN — PIPERACILLIN AND TAZOBACTAM 4.5 G: 4; .5 INJECTION, POWDER, FOR SOLUTION INTRAVENOUS at 05:09

## 2017-09-16 RX ADMIN — DEXTROSE AND SODIUM CHLORIDE: 5; .45 INJECTION, SOLUTION INTRAVENOUS at 03:09

## 2017-09-16 RX ADMIN — DEXTROSE AND SODIUM CHLORIDE: 5; .45 INJECTION, SOLUTION INTRAVENOUS at 09:09

## 2017-09-16 RX ADMIN — DEXTROSE: 5 SOLUTION INTRAVENOUS at 11:09

## 2017-09-16 RX ADMIN — IPRATROPIUM BROMIDE AND ALBUTEROL SULFATE 3 ML: .5; 3 SOLUTION RESPIRATORY (INHALATION) at 08:09

## 2017-09-16 NOTE — ASSESSMENT & PLAN NOTE
Concern for ileus at OSH. However, no abdominal distension on physical exam. CT scan notable for Ileus, w/ no SBO.    - NG tube placement unsuccessful  - resolving, reported BM today

## 2017-09-16 NOTE — PLAN OF CARE
Problem: Patient Care Overview  Goal: Plan of Care Review  Outcome: Ongoing (interventions implemented as appropriate)  Pt is free from injury and falls during shift. Pt shows no signs of acute distress. Non verbal signs of pain absent. Non verbal- unable to assess orientation. Pt follows commands. Vitals stable. Blood sugar checked per orders- remains WNL. Bariatric bed in use with air alternating during shift to prevent wounds. Skin remains intact. Flexi-seal intact- No stool output during shift- unable to collect stool samples. Catheter care performed during shift- siegel draining urine well. Trache collar in use- O2 sat stable (see flowsheet). Bed is in low position with breaks locked. Side rails are up x 2. Environment is clutter free. Call light is within reach of the patient. Will continue to monitor.

## 2017-09-16 NOTE — ASSESSMENT & PLAN NOTE
History of recent CVA with recent admission to rehab, per family acute worsening 1 week ago following trach replacement. OSH records in chart. Head CT unchanged. Per family patient was communicating prior to trach replacement. However, per OSH records patient has been obtunded for the past couple of days. Difficult to determine patients true baseline?    Concern for Anoxic brain injury during trach removal/replacement, CVA, Seizure, Worsening Sepsis. Urine culture no growth.  MRI no acute hemorrhagic pathology. Patient had a BM.       - GCS of 8, however protecting airway with trach, stable for floor    - Will consider consulting neurology; for longer EEG  - continue Zosyn tx

## 2017-09-16 NOTE — PT/OT/SLP PROGRESS
Occupational Therapy      Nisa Frank  MRN: [id number]    Patient not seen today secondary to pt not following commands. Pt refused supine to sit after tactile cues provided to explain to pt what OT was asking for. Pt very flat and nonverbal, and did not respond to any questions with gestures nor did she even change her gaze (to look at OT). OT to follow up next visit for OT evaluation.    MARCO ANTONIO Pretty  9/16/2017  Pager: [phone]

## 2017-09-16 NOTE — SUBJECTIVE & OBJECTIVE
Interval History:   NAEON. Patient non-verbal, appears more awake and alert. No fevers, chills overnight.     Review of Systems   Unable to perform ROS: Mental status change     Objective:     Vital Signs (Most Recent):  Temp: 98.5 °F (36.9 °C) (09/16/17 0810)  Pulse: (!) 58 (09/16/17 1100)  Resp: 20 (09/16/17 0841)  BP: (!) 158/86 (09/16/17 0810)  SpO2: 97 % (09/16/17 0841) Vital Signs (24h Range):  Temp:  [97.3 °F (36.3 °C)-98.6 °F (37 °C)] 98.5 °F (36.9 °C)  Pulse:  [58-87] 58  Resp:  [16-20] 20  SpO2:  [87 %-100 %] 97 %  BP: (147-186)/(65-89) 158/86     Weight: 132.9 kg (293 lb)  Body mass index is 47.29 kg/m².    Intake/Output Summary (Last 24 hours) at 09/16/17 1402  Last data filed at 09/16/17 0634   Gross per 24 hour   Intake                0 ml   Output             1000 ml   Net            -1000 ml      Physical Exam   Constitutional: She appears well-developed and well-nourished. No distress.   HENT:   Head: Normocephalic and atraumatic.   Eyes: Conjunctivae are normal. Right eye exhibits no discharge. Left eye exhibits no discharge.   Pupils responsive to light, no tracking noted.     Cardiovascular: Normal rate, regular rhythm and normal heart sounds.    Pulmonary/Chest: Effort normal and breath sounds normal.   Abdominal: Soft. Bowel sounds are normal. She exhibits no distension. There is no tenderness.   Musculoskeletal: She exhibits no edema.   Neurological: She has normal reflexes. GCS eye subscore is 2. GCS verbal subscore is 1. GCS motor subscore is 5.   Patient awake and more alert.     But not communicating verbally. Responds to painful stimuli.     Following commands, for example moving left arm, feet, will nod her head.  Improvement from previous exam.  - was able to shake my hand today  - increase in strength UE     Skin: Skin is warm and dry. No erythema.       Significant Labs:   Blood Culture: No results for input(s): LABBLOO in the last 48 hours.  CBC:   Recent Labs  Lab 09/15/17  0802  09/16/17  0436   WBC 8.99 7.78   HGB 9.0* 8.3*   HCT 27.9* 26.2*    177     CMP:   Recent Labs  Lab 09/14/17  1705 09/15/17  0802 09/16/17  0436   * 149* 150*   K 4.1 3.8 3.9   * 113* 114*   CO2 25 28 24    92 74   BUN 64* 47* 27*   CREATININE 1.0 0.8 0.7   CALCIUM 8.7 8.7 8.5*   PROT  --  6.4 5.9*   ALBUMIN  --  2.4* 2.1*   BILITOT  --  1.2* 1.2*   ALKPHOS  --  93 92   AST  --  9* 7*   ALT  --  15 12   ANIONGAP 10 8 12   EGFRNONAA 58.4* >60.0 >60.0       Significant Imaging: I have reviewed and interpreted all pertinent imaging results/findings within the past 24 hours.     MRI: No acute hemorrhagic pathology.

## 2017-09-16 NOTE — PROGRESS NOTES
[facility] Hospital Medicine  Progress Note    Patient Name: Nisa Frank  MRN: [id number]  Patient Class: IP- Inpatient   Admission Date: 9/13/2017  Length of Stay: 3 days  Attending Physician: Bay Henao, *  Primary Care Provider: Atilio Downs MD    [facility] Medicine Team: [facility] MED 3 Abimbola Devlin MD    Subjective:     Principal Problem:Acute encephalopathy    HPI:  Ms Frank 66 year old female with diabetes mellitus type 2, CKD stage 3, CAD s/p CABG, essential hypertension, cerebrovascular disease s/p bi-hemispheric watershed infarcts and trach/PEG status who was brought in via EMS from [facility] for acute respiratory distress presented to [facility] 8/8 with acute respiratory failure with hypoxia likely due to mucous plug. Patient was admitted to ICU to wean off MV as tolerated and for placement to [facility] where she was transferred to [facility] on 8/14 in stable condition.  She pulled out her trach on 9/4 and went to [facility].  She was treated for an ESBL E. Coli UTI with Meropenem. The family has been very unhappy with the care that the patient has received.  She was medically stable for transfer to a SNF yesterday, however overnight she developed a new WBC 26.  She was found to have a RML and RLL infiltrate and was started on Vanc and Zosyn.  She also developed a new KATHI on top of her CKD (0.6 to 1.8).  Family is requesting transfer to [facility].    Spoke with daughter on the phone.  Per family patient was previously alert and oriented to person and place.  She was conversing and able to participate in therapy.  This changed shortly after trach removal and replacement on 9/4.  Family reports some complication during trach replacement resulting in overnight ICU care.  After patient was stepped down they report she stopped talking and would not participate with therapy.  The only response they could get from  the patient was eye tracking and intermittent hand squeezes.  Family feels that patient may have had a repeat stroke and was requesting MRI.   Spoke with Dagoberto Melo nurse who reports that patient has been near obtunded for as long as she has had her (past couple of days).  She reported recent WBC jump and associated fever followed by broad spectrum abx.  In addiction notes concern for ileus given high residuals from NG tube feeds.  Does not high residuals resolved once tube was flushed.         Per nurse medications prior to concern for ileus.  Vanc 1750mg , Q24, Zosyn 4.5 Q 12, ASA 81, Clonidine 0.1 patch weekly, Lipitor 10, Colace, Lovenox 40, Protonix 40, Hydralazine 100 Q8, Keppra 500 BID, Lisniopril 40 PO, 25 Metoprolol XL, Procardia 60 mg Daily, Risperdal 25 QHS.           Hospital Course:  9/14: Patients mental status unchanged from yesterday. Labs showing significant improvement. Will continue to treat for VAP. Schedule for CT scan of abdomen.   9/15: Patients mental status improved, appears more awake/alert. But no verbal communication yet. Following commands. Continue to treat to VAP. MRI scheduled. PT/OT   9:16: Patient more awake and alert. Still non verbal. Continuing to treat VAP. PT/OT     Interval History:   NAEON. Patient non-verbal, appears more awake and alert. No fevers, chills overnight.     Review of Systems   Unable to perform ROS: Mental status change     Objective:     Vital Signs (Most Recent):  Temp: 98.5 °F (36.9 °C) (09/16/17 0810)  Pulse: (!) 58 (09/16/17 1100)  Resp: 20 (09/16/17 0841)  BP: (!) 158/86 (09/16/17 0810)  SpO2: 97 % (09/16/17 0841) Vital Signs (24h Range):  Temp:  [97.3 °F (36.3 °C)-98.6 °F (37 °C)] 98.5 °F (36.9 °C)  Pulse:  [58-87] 58  Resp:  [16-20] 20  SpO2:  [87 %-100 %] 97 %  BP: (147-186)/(65-89) 158/86     Weight: 132.9 kg (293 lb)  Body mass index is 47.29 kg/m².    Intake/Output Summary (Last 24 hours) at 09/16/17 1402  Last data filed at 09/16/17 0693   Gross per  24 hour   Intake                0 ml   Output             1000 ml   Net            -1000 ml      Physical Exam   Constitutional: She appears well-developed and well-nourished. No distress.   HENT:   Head: Normocephalic and atraumatic.   Eyes: Conjunctivae are normal. Right eye exhibits no discharge. Left eye exhibits no discharge.   Pupils responsive to light, no tracking noted.     Cardiovascular: Normal rate, regular rhythm and normal heart sounds.    Pulmonary/Chest: Effort normal and breath sounds normal.   Abdominal: Soft. Bowel sounds are normal. She exhibits no distension. There is no tenderness.   Musculoskeletal: She exhibits no edema.   Neurological: She has normal reflexes. GCS eye subscore is 2. GCS verbal subscore is 1. GCS motor subscore is 5.   Patient awake and more alert.     But not communicating verbally. Responds to painful stimuli.     Following commands, for example moving left arm, feet, will nod her head.  Improvement from previous exam.  - was able to shake my hand today  - increase in strength UE     Skin: Skin is warm and dry. No erythema.       Significant Labs:   Blood Culture: No results for input(s): LABBLOO in the last 48 hours.  CBC:   Recent Labs  Lab 09/15/17  0802 09/16/17  0436   WBC 8.99 7.78   HGB 9.0* 8.3*   HCT 27.9* 26.2*    177     CMP:   Recent Labs  Lab 09/14/17  1705 09/15/17  0802 09/16/17  0436   * 149* 150*   K 4.1 3.8 3.9   * 113* 114*   CO2 25 28 24    92 74   BUN 64* 47* 27*   CREATININE 1.0 0.8 0.7   CALCIUM 8.7 8.7 8.5*   PROT  --  6.4 5.9*   ALBUMIN  --  2.4* 2.1*   BILITOT  --  1.2* 1.2*   ALKPHOS  --  93 92   AST  --  9* 7*   ALT  --  15 12   ANIONGAP 10 8 12   EGFRNONAA 58.4* >60.0 >60.0       Significant Imaging: I have reviewed and interpreted all pertinent imaging results/findings within the past 24 hours.     MRI: No acute hemorrhagic pathology.      Assessment/Plan:      * Acute encephalopathy    History of recent CVA with  recent admission to rehab, per family acute worsening 1 week ago following trach replacement. OSH records in chart. Head CT unchanged. Per family patient was communicating prior to trach replacement. However, per OSH records patient has been obtunded for the past couple of days. Difficult to determine patients true baseline?    Concern for Anoxic brain injury during trach removal/replacement, CVA, Seizure, Worsening Sepsis. Urine culture no growth.  MRI no acute hemorrhagic pathology. Patient had a BM.       - GCS of 8, however protecting airway with trach, stable for floor    - Will consider consulting neurology; for longer EEG  - continue Zosyn tx  - PT/OT, secondary to chronic immobility/bedrest       Ileus    Concern for ileus at OSH. However, no abdominal distension on physical exam. CT scan notable for Ileus, w/ no SBO.    - NG tube placement unsuccessful  - resolving, reported BM today         Respiratory failure    Concern for possible VAP    - continue zosyn  - Decreased O2 5L; 99%, will continue to titrate           Tracheostomy status    - Trach care          Dysphagia    Per family was tolerating diet prior to trach replacement on 9/4     - NG tube unsuccessful           KATHI (acute kidney injury)    Creatine 2.4 on transfer; baseline 0.6    - Cr 0.8, resolved   - Strict ins and outs with siegel given patient AMS   - monitor renal function           Leukocytosis    Elevated WBC of 20. Concern for aspiration given hx from nurse at OSH. Urine culture no growth.     - Resolved  - Continue on Zosyn  - F/u blood cultures (NGTD)          Respiratory insufficiency              Morbid obesity with BMI of 50.0-59.9, adult              S/P CABG (coronary artery bypass graft)    - Continue ASA           Type 2 diabetes mellitus, uncontrolled    - Per OSH records not on insulin   - Will obtain Ha1c during AM labs   - Sliding scale           Essential hypertension    HX of significant HTN. Per OSH on hydralazine 100 Q8  PO, Linsiopril 40 PO, Metoprolol XL 25 BID, Nifedipine 60mg daily Clonidine 0.1 Patch Q week, placed on Tuesday      - Labetalol PRN             VTE Risk Mitigation         Ordered     enoxaparin injection 30 mg  Daily     Route:  Subcutaneous        09/13/17 0932              Abimbola Devlin MD  Department of Hospital Medicine   [facility]

## 2017-09-17 LAB
ANION GAP SERPL CALC-SCNC: 10 MMOL/L
ANION GAP SERPL CALC-SCNC: 10 MMOL/L
ANION GAP SERPL CALC-SCNC: 11 MMOL/L
BASOPHILS # BLD AUTO: 0.01 K/UL
BASOPHILS NFR BLD: 0.2 %
BUN SERPL-MCNC: 18 MG/DL
BUN SERPL-MCNC: 21 MG/DL
BUN SERPL-MCNC: 23 MG/DL
CALCIUM SERPL-MCNC: 8.6 MG/DL
CHLORIDE SERPL-SCNC: 112 MMOL/L
CHLORIDE SERPL-SCNC: 113 MMOL/L
CHLORIDE SERPL-SCNC: 114 MMOL/L
CO2 SERPL-SCNC: 25 MMOL/L
CO2 SERPL-SCNC: 26 MMOL/L
CO2 SERPL-SCNC: 28 MMOL/L
CREAT SERPL-MCNC: 0.7 MG/DL
CREAT SERPL-MCNC: 0.8 MG/DL
CREAT SERPL-MCNC: 0.8 MG/DL
DIFFERENTIAL METHOD: ABNORMAL
EOSINOPHIL # BLD AUTO: 0 K/UL
EOSINOPHIL NFR BLD: 0.7 %
ERYTHROCYTE [DISTWIDTH] IN BLOOD BY AUTOMATED COUNT: 15.6 %
EST. GFR  (AFRICAN AMERICAN): >60 ML/MIN/1.73 M^2
EST. GFR  (NON AFRICAN AMERICAN): >60 ML/MIN/1.73 M^2
GLUCOSE SERPL-MCNC: 101 MG/DL
GLUCOSE SERPL-MCNC: 110 MG/DL
GLUCOSE SERPL-MCNC: 111 MG/DL
HCT VFR BLD AUTO: 28.7 %
HGB BLD-MCNC: 8.8 G/DL
LYMPHOCYTES # BLD AUTO: 1.6 K/UL
LYMPHOCYTES NFR BLD: 25.9 %
MAGNESIUM SERPL-MCNC: 2 MG/DL
MCH RBC QN AUTO: 26.7 PG
MCHC RBC AUTO-ENTMCNC: 30.7 G/DL
MCV RBC AUTO: 87 FL
MONOCYTES # BLD AUTO: 0.6 K/UL
MONOCYTES NFR BLD: 10.3 %
NEUTROPHILS # BLD AUTO: 3.8 K/UL
NEUTROPHILS NFR BLD: 62.7 %
PHOSPHATE SERPL-MCNC: 2.5 MG/DL
PLATELET # BLD AUTO: 167 K/UL
PMV BLD AUTO: 11.3 FL
POCT GLUCOSE: 109 MG/DL (ref 70–110)
POCT GLUCOSE: 132 MG/DL (ref 70–110)
POCT GLUCOSE: 89 MG/DL (ref 70–110)
POTASSIUM SERPL-SCNC: 3.6 MMOL/L
POTASSIUM SERPL-SCNC: 3.7 MMOL/L
POTASSIUM SERPL-SCNC: 4.2 MMOL/L
RBC # BLD AUTO: 3.29 M/UL
SODIUM SERPL-SCNC: 148 MMOL/L
SODIUM SERPL-SCNC: 148 MMOL/L
SODIUM SERPL-SCNC: 150 MMOL/L
SODIUM SERPL-SCNC: 151 MMOL/L
SODIUM SERPL-SCNC: 151 MMOL/L
WBC # BLD AUTO: 6.1 K/UL

## 2017-09-17 PROCEDURE — 63600175 PHARM REV CODE 636 W HCPCS: Performed by: STUDENT IN AN ORGANIZED HEALTH CARE EDUCATION/TRAINING PROGRAM

## 2017-09-17 PROCEDURE — 84100 ASSAY OF PHOSPHORUS: CPT

## 2017-09-17 PROCEDURE — 97162 PT EVAL MOD COMPLEX 30 MIN: CPT

## 2017-09-17 PROCEDURE — 11000001 HC ACUTE MED/SURG PRIVATE ROOM

## 2017-09-17 PROCEDURE — 25000003 PHARM REV CODE 250: Performed by: STUDENT IN AN ORGANIZED HEALTH CARE EDUCATION/TRAINING PROGRAM

## 2017-09-17 PROCEDURE — 97530 THERAPEUTIC ACTIVITIES: CPT

## 2017-09-17 PROCEDURE — 87205 SMEAR GRAM STAIN: CPT

## 2017-09-17 PROCEDURE — 25000242 PHARM REV CODE 250 ALT 637 W/ HCPCS: Performed by: STUDENT IN AN ORGANIZED HEALTH CARE EDUCATION/TRAINING PROGRAM

## 2017-09-17 PROCEDURE — 36415 COLL VENOUS BLD VENIPUNCTURE: CPT

## 2017-09-17 PROCEDURE — 87070 CULTURE OTHR SPECIMN AEROBIC: CPT

## 2017-09-17 PROCEDURE — 27000221 HC OXYGEN, UP TO 24 HOURS

## 2017-09-17 PROCEDURE — 84295 ASSAY OF SERUM SODIUM: CPT

## 2017-09-17 PROCEDURE — 99232 SBSQ HOSP IP/OBS MODERATE 35: CPT | Mod: GC,,, | Performed by: HOSPITALIST

## 2017-09-17 PROCEDURE — 94761 N-INVAS EAR/PLS OXIMETRY MLT: CPT

## 2017-09-17 PROCEDURE — 99223 1ST HOSP IP/OBS HIGH 75: CPT | Mod: GC,,, | Performed by: PSYCHIATRY & NEUROLOGY

## 2017-09-17 PROCEDURE — 80048 BASIC METABOLIC PNL TOTAL CA: CPT | Mod: 91

## 2017-09-17 PROCEDURE — 63600175 PHARM REV CODE 636 W HCPCS: Performed by: HOSPITALIST

## 2017-09-17 PROCEDURE — 85025 COMPLETE CBC W/AUTO DIFF WBC: CPT

## 2017-09-17 PROCEDURE — 94640 AIRWAY INHALATION TREATMENT: CPT

## 2017-09-17 PROCEDURE — 97112 NEUROMUSCULAR REEDUCATION: CPT

## 2017-09-17 PROCEDURE — 97167 OT EVAL HIGH COMPLEX 60 MIN: CPT

## 2017-09-17 PROCEDURE — G8979 MOBILITY GOAL STATUS: HCPCS | Mod: CM

## 2017-09-17 PROCEDURE — G8978 MOBILITY CURRENT STATUS: HCPCS | Mod: CM

## 2017-09-17 PROCEDURE — 83735 ASSAY OF MAGNESIUM: CPT

## 2017-09-17 PROCEDURE — 99900026 HC AIRWAY MAINTENANCE (STAT)

## 2017-09-17 PROCEDURE — 80048 BASIC METABOLIC PNL TOTAL CA: CPT

## 2017-09-17 RX ORDER — HYDRALAZINE HYDROCHLORIDE 20 MG/ML
10 INJECTION INTRAMUSCULAR; INTRAVENOUS ONCE
Status: COMPLETED | OUTPATIENT
Start: 2017-09-17 | End: 2017-09-17

## 2017-09-17 RX ADMIN — ACETYLCYSTEINE 4 ML: 100 INHALANT RESPIRATORY (INHALATION) at 08:09

## 2017-09-17 RX ADMIN — ACETYLCYSTEINE 4 ML: 100 INHALANT RESPIRATORY (INHALATION) at 07:09

## 2017-09-17 RX ADMIN — IPRATROPIUM BROMIDE AND ALBUTEROL SULFATE 3 ML: .5; 3 SOLUTION RESPIRATORY (INHALATION) at 03:09

## 2017-09-17 RX ADMIN — IPRATROPIUM BROMIDE AND ALBUTEROL SULFATE 3 ML: .5; 3 SOLUTION RESPIRATORY (INHALATION) at 12:09

## 2017-09-17 RX ADMIN — HYDRALAZINE HYDROCHLORIDE 10 MG: 20 INJECTION INTRAMUSCULAR; INTRAVENOUS at 10:09

## 2017-09-17 RX ADMIN — ENOXAPARIN SODIUM 30 MG: 100 INJECTION SUBCUTANEOUS at 05:09

## 2017-09-17 RX ADMIN — PIPERACILLIN AND TAZOBACTAM 4.5 G: 4; .5 INJECTION, POWDER, FOR SOLUTION INTRAVENOUS at 06:09

## 2017-09-17 RX ADMIN — LEVETIRACETAM 500 MG: 5 INJECTION INTRAVENOUS at 09:09

## 2017-09-17 RX ADMIN — IPRATROPIUM BROMIDE AND ALBUTEROL SULFATE 3 ML: .5; 3 SOLUTION RESPIRATORY (INHALATION) at 07:09

## 2017-09-17 RX ADMIN — ACETYLCYSTEINE 4 ML: 100 INHALANT RESPIRATORY (INHALATION) at 12:09

## 2017-09-17 RX ADMIN — PIPERACILLIN AND TAZOBACTAM 4.5 G: 4; .5 INJECTION, POWDER, FOR SOLUTION INTRAVENOUS at 05:09

## 2017-09-17 NOTE — CONSULTS
[facility]  Neurology  Consult Note    Patient Name: Nisa Frank  MRN: [id number]  Admission Date: 9/13/2017  Hospital Length of Stay: 4 days  Code Status: Full Code   Attending Provider: Bay Henao, *   Consulting Provider: Tg Mott MD  Primary Care Physician: Atilio Downs MD  Principal Problem:Acute encephalopathy    Inpatient consult to Neurology  Consult performed by: TG MOTT  Consult ordered by: GERI REYES  Reason for consult: encephalopathy         Subjective:     Chief Complaint:  encephalopathy     HPI:   The patient is a 68 y/o f with bihemispheric watershed infarcts s/p trach/PEG, DM-2, CKD3, CAD s/p CABG, HTN, who was transferred from [place] to [facility] for AMS and possible seizure.    There are no family members at the bedside and the history is taken from the chart. She has had a complicated and extended hospital course. Initially on 8/8, she was taken to [facility] 2/2 acute respiratory distress/failure likely 2/2 mucous plug. She was admitted to the ICU, put on mechanical ventilation, weaned her off of MV, and transferred to [place] rehab on 8/14.     In the rehab she pulled trach out, that was replaced on 9/4. Since that time, daughter reports that she has been nonverbal and non communicative, she only has eye tracking and intermittent hand squeezing. she states that there was a complication after trach replacement that required overnight ICU care and they think  that patient may have had a repeat stroke.     She has received meropenem for ESBL E coli UTI during this stay and was preparing for transferring to SNF, however, she was found to have elevated WBC, fever, and infiltrates on the CXR, concerns for VAP, and started on broad spectrum abx for that.     The work up for encephalopathy so far included head CT scan with no interval change, EEG with no epileptiform activity.     She is also being evaluated for C. Diff  and ileus by the primary team. She has also developed an KATHI on the baseline of CKD stage 3.    At the time of visiting the patient today (), she is awake, eyes are open, tracks with the eyes, is non-verbal, seems inattentive, and the only command she follows is squeezing hands.               Past Medical History:   Diagnosis Date    Acute bilateral cerebral infarction in a watershed distribution 2017    CAD (coronary artery disease)     Diabetes mellitus     Hypertension     Morbid obesity     TAMMY on CPAP     setting +17    Stroke        Past Surgical History:   Procedure Laterality Date    ARTERIAL BYPASS SURGRY      9 tears sgo     SECTION      CORONARY ARTERY BYPASS GRAFT      HYSTERECTOMY      TUBAL LIGATION         Review of patient's allergies indicates:   Allergen Reactions    Latex, natural rubber        Current Neurological Medications:     No current facility-administered medications on file prior to encounter.      Current Outpatient Prescriptions on File Prior to Encounter   Medication Sig    acetaminophen (TYLENOL) 650 mg/20.3 mL Soln 20.3 mLs (650 mg total) by Per NG tube route every 6 (six) hours as needed. (Patient taking differently: 650 mg by Per NG tube route every 6 (six) hours as needed for Pain. )    albuterol-ipratropium 2.5mg-0.5mg/3mL (DUO-NEB) 0.5 mg-3 mg(2.5 mg base)/3 mL nebulizer solution Take 3 mLs by nebulization every 4 (four) hours. Rescue    aspirin 81 MG Chew 1 tablet (81 mg total) by Per G Tube route once daily.    atorvastatin (LIPITOR) 10 MG tablet 1 tablet (10 mg total) by Per G Tube route once daily. (Patient taking differently: 10 mg by Per G Tube route every evening. )    cloNIDine 0.1 mg/24 hr td ptwk (CATAPRES) 0.1 mg/24 hr Place 1 patch onto the skin every 7 days. (Patient taking differently: Place 1 patch onto the skin every Tuesday. )    fluoxetine (PROZAC) 20 MG capsule 1 capsule (20 mg total) by Per G Tube route once daily.     hydrALAZINE (APRESOLINE) 100 MG tablet 1 tablet (100 mg total) by Per G Tube route every 8 (eight) hours.    insulin aspart (NOVOLOG) 100 unit/mL InPn pen Inject 1-10 Units into the skin every 4 (four) hours as needed (Hyperglycemia).    meclizine (ANTIVERT) 25 mg tablet Take 1 tablet (25 mg total) by mouth every 6 (six) hours as needed. (Patient taking differently: Take 25 mg by mouth every 6 (six) hours as needed for Dizziness. )    pantoprazole (PROTONIX) 40 mg GrPS 1 packet (40 mg total) by Per G Tube route once daily.    amantadine HCl (SYMMETREL) 100 mg capsule 1 capsule (100 mg total) by Per G Tube route every 8 (eight) hours.    amlodipine (NORVASC) 10 MG tablet 1 tablet (10 mg total) by Per G Tube route once daily.    diclofenac sodium (VOLTAREN) 1 % Gel Apply 2 g topically 2 (two) times daily.    EASY TOUCH TWIST LANCETS 30 gauge Misc     HEPARIN SODIUM,PORCINE (HEPARIN, PORCINE,) 5,000 unit/mL injection Inject 1.5 mLs (7,500 Units total) into the skin every 8 (eight) hours.     Family History     Problem Relation (Age of Onset)    No Known Problems Father, Mother, Sister, Brother, Maternal Aunt, Maternal Uncle, Paternal Aunt, Paternal Uncle, Maternal Grandmother, Maternal Grandfather, Paternal Grandmother, Paternal Grandfather        Social History Main Topics    Smoking status: Never Smoker    Smokeless tobacco: Never Used    Alcohol use Yes      Comment: occ    Drug use: No    Sexual activity: Not Currently     Birth control/ protection: See Surgical Hx     Review of Systems   Unable to perform ROS: Patient nonverbal     Objective:     Vital Signs (Most Recent):  Temp: 98.6 °F (37 °C) (09/17/17 1555)  Pulse: 64 (09/17/17 1555)  Resp: 18 (09/17/17 1555)  BP: (!) 155/80 (09/17/17 1555)  SpO2: (!) 94 % (09/17/17 1555) Vital Signs (24h Range):  Temp:  [97.5 °F (36.4 °C)-98.6 °F (37 °C)] 98.6 °F (37 °C)  Pulse:  [54-87] 64  Resp:  [16-20] 18  SpO2:  [91 %-98 %] 94 %  BP: (152-175)/(70-80) 155/80      Weight: 132.9 kg (293 lb)  Body mass index is 47.29 kg/m².    Physical Exam   Constitutional: She appears listless. She is uncooperative.   HENT:   Head: Normocephalic and atraumatic.   Eyes: EOM are normal. Pupils are equal, round, and reactive to light.   Neck:   Trach in place   Cardiovascular: Normal rate, regular rhythm and normal heart sounds.    Pulses:       Radial pulses are 2+ on the right side, and 2+ on the left side.   Difficult to assess the heart sounds 2/2 breathing noise through the trach   Pulmonary/Chest: Effort normal. No tachypnea. She has no decreased breath sounds. She has no wheezes.   Abdominal: Soft. She exhibits no distension. There is no tenderness.   Neurological: She appears listless. She exhibits normal muscle tone. She displays no seizure activity. GCS eye subscore is 4. GCS verbal subscore is 1. GCS motor subscore is 5.   Reflex Scores:       Tricep reflexes are 1+ on the right side and 1+ on the left side.       Bicep reflexes are 1+ on the right side and 1+ on the left side.       Brachioradialis reflexes are 1+ on the right side and 1+ on the left side.       Patellar reflexes are 1+ on the right side and 1+ on the left side.  Skin: No rash noted. No erythema.   Psychiatric: Her affect is blunt. She is withdrawn. She is noncommunicative. She is inattentive.   Vitals reviewed.      NEUROLOGICAL EXAMINATION:     MENTAL STATUS   Speech: mute     CRANIAL NERVES     CN III, IV, VI   Pupils are equal, round, and reactive to light.  Extraocular motions are normal.   Right pupil: Size: 3 mm. Shape: regular.   Left pupil: Size: 3 mm. Shape: regular.   CN III: no CN III palsy  CN VI: no CN VI palsy  Nystagmus: none     REFLEXES     Reflexes   Right brachioradialis: 1+  Left brachioradialis: 1+  Right biceps: 1+  Left biceps: 1+  Right triceps: 1+  Left triceps: 1+  Right patellar: 1+  Left patellar: 1+  Right plantar: equivocal  Left plantar: equivocal    GAIT AND COORDINATION      Tremor   Resting tremor: absent      Significant Labs:   Blood Culture: No results for input(s): LABBLOO in the last 48 hours.  CBC:   Recent Labs  Lab 09/16/17  0436 09/17/17  0407   WBC 7.78 6.10   HGB 8.3* 8.8*   HCT 26.2* 28.7*    167     CMP:   Recent Labs  Lab 09/16/17  0436 09/16/17  1924 09/17/17  0407 09/17/17  1044 09/17/17  1612   GLU 74 90 111* 110  --    * 152* 151*  151* 150* 148*   K 3.9 3.6 3.7 3.6  --    * 114* 114* 112*  --    CO2 24 25 26 28  --    BUN 27* 22 23 21  --    CREATININE 0.7 0.7 0.8 0.8  --    CALCIUM 8.5* 8.8 8.6* 8.6*  --    MG 2.0  --  2.0  --   --    PROT 5.9*  --   --   --   --    ALBUMIN 2.1*  --   --   --   --    BILITOT 1.2*  --   --   --   --    ALKPHOS 92  --   --   --   --    AST 7*  --   --   --   --    ALT 12  --   --   --   --    ANIONGAP 12 13 11 10  --    EGFRNONAA >60.0 >60.0 >60.0 >60.0  --      Urine Culture: No results for input(s): LABURIN in the last 48 hours.    Significant Imaging:   MRI brain 9/15: now new infarct compared to prior  CT: I have reviewed all pertinent results/findings within the past 24 hours and my personal findings are:  no acute interval change compared to the previous study  EEG 9/13: I have reviewed all pertinent results/findings within the past 24 hours and my personal findings are:  generalized slowing indicative of a moderate-severe nonspecific encephalopathy, no epileptic activity    Assessment and Plan:     * Acute encephalopathy    The patient is a 68 y/o f with  a complicated course after bihemispheric watershed infarcts s/p trach/PEG, DM-2, CKD3, CAD s/p CABG, HTN, who was [place] [facility] [place] of AMS and possible seizure. She has multiple infections going on over this course.  MRI brain with no interval change and 2 EEGs on 8/1 and 9/13, revealing generalized slowing indicative of nonspecific encephalopathy. Of note in the labs, she has elevated Na~150.  At this point the  culprit of the encephalopathy can not be pinpointed and it could be rather a combination of several ongoing problems such as electrolyte abnormality and infections added to the baseline post stroke state.  We recommend:  - correcting Na and any other electrolyte abnormality  - continuing the course of antibiotics (consider consulting ID) to insure infections are addressed appropriately  - addressing the ileus  - repeat EEG            VTE Risk Mitigation         Ordered     enoxaparin injection 30 mg  Daily     Route:  Subcutaneous        09/13/17 0919          Thank you for your consult. I will follow-up with patient. Please contact us if you have any additional questions.    Tereza Mott MD  Neurology  [facility] [address]    I have personally taken the history and examined the patient and agree with the resident's note as stated above.    Andi Rao MD, MARII, FAAN  Department of Neurology   [facility] [place], [place]

## 2017-09-17 NOTE — PROGRESS NOTES
[facility] Hospital Medicine  Progress Note    Patient Name: Nisa Frank  MRN: [id number]  Patient Class: IP- Inpatient   Admission Date: 9/13/2017  Length of Stay: 4 days  Attending Physician: Bay Henao, *  Primary Care Provider: Atilio Downs MD    [facility] Medicine Team: [facility] HOSP MED 3 Abimbola Devlin MD    Subjective:     Principal Problem:Acute encephalopathy    HPI:  Ms Frank 66 year old female with diabetes mellitus type 2, CKD stage 3, CAD s/p CABG, essential hypertension, cerebrovascular disease s/p bi-hemispheric watershed infarcts and trach/PEG status who was brought in via EMS from [facility] for acute respiratory distress presented to [facility] 8/8 with acute respiratory failure with hypoxia likely due to mucous plug. Patient was admitted to ICU to wean off MV as tolerated and for placement to [facility] where she was transferred to [facility] on 8/14 in stable condition.  She pulled out her trach on 9/4 and went to [facility].  She was treated for an ESBL E. Coli UTI with Meropenem. The family has been very unhappy with the care that the patient has received.  She was medically stable for transfer to a SNF yesterday, however overnight she developed a new WBC 26.  She was found to have a RML and RLL infiltrate and was started on Vanc and Zosyn.  She also developed a new KATHI on top of her CKD (0.6 to 1.8).  Family is requesting transfer to [facility].    Spoke with daughter on the phone.  Per family patient was previously alert and oriented to person and place.  She was conversing and able to participate in therapy.  This changed shortly after trach removal and replacement on 9/4.  Family reports some complication during trach replacement resulting in overnight ICU care.  After patient was stepped down they report she stopped talking and would not participate with therapy.  The only response they could get from  the patient was eye tracking and intermittent hand squeezes.  Family feels that patient may have had a repeat stroke and was requesting MRI.   Spoke with [facility] nurse who reports that patient has been near obtunded for as long as she has had her (past couple of days).  She reported recent WBC jump and associated fever followed by broad spectrum abx.  In addiction notes concern for ileus given high residuals from NG tube feeds.  Does not high residuals resolved once tube was flushed.         Per nurse medications prior to concern for ileus.  Vanc 1750mg , Q24, Zosyn 4.5 Q 12, ASA 81, Clonidine 0.1 patch weekly, Lipitor 10, Colace, Lovenox 40, Protonix 40, Hydralazine 100 Q8, Keppra 500 BID, Lisniopril 40 PO, 25 Metoprolol XL, Procardia 60 mg Daily, Risperdal 25 QHS.           Hospital Course:  9/14: Patients mental status unchanged from yesterday. Labs showing significant improvement. Will continue to treat for VAP. Schedule for CT scan of abdomen.   9/15: Patients mental status improved, appears more awake/alert. But no verbal communication yet. Following commands. Continue to treat to VAP. MRI scheduled. PT/OT   9:16: Patient more awake and alert. Still non verbal. Continuing to treat VAP. PT/OT   9/17: Patient awake and alert. Still non-verbal. Had a long conversation w/ family regarding patient status; will get records/procedure report.     Interval History:   NAEON. Patient awake. GCS 8 (2/1/5). Spoke w/ family, who is severly confused as to what happen that cause the patient's mental status. Explained what we been treating for, will attempt to retreive records from [facility].     Review of Systems   Unable to perform ROS: Mental status change     Objective:     Vital Signs (Most Recent):  Temp: 97.5 °F (36.4 °C) (09/17/17 0915)  Pulse: (!) 59 (09/17/17 1100)  Resp: 16 (09/17/17 0915)  BP: (!) 175/80 (09/17/17 0915)  SpO2: (!) 94 % (09/17/17 1200) Vital Signs (24h Range):  Temp:  [97.5 °F (36.4 °C)-98.6 °F  "(37 °C)] 97.5 °F (36.4 °C)  Pulse:  [54-87] 59  Resp:  [16-18] 16  SpO2:  [91 %-99 %] 94 %  BP: (152-175)/(70-80) 175/80     Weight: 132.9 kg (293 lb)  Body mass index is 47.29 kg/m².    Intake/Output Summary (Last 24 hours) at 09/17/17 1453  Last data filed at 09/17/17 0511   Gross per 24 hour   Intake                0 ml   Output              600 ml   Net             -600 ml      Physical Exam   Constitutional: She appears well-developed and well-nourished. No distress.   HENT:   Head: Normocephalic and atraumatic.   Eyes: Conjunctivae are normal. Right eye exhibits no discharge. Left eye exhibits no discharge.   Pupils responsive to light, no tracking noted.     Cardiovascular: Normal rate, regular rhythm and normal heart sounds.    Pulmonary/Chest: Effort normal and breath sounds normal.   Abdominal: Soft. Bowel sounds are normal. She exhibits no distension. There is no tenderness.   Musculoskeletal: She exhibits no edema.   Neurological: She has normal reflexes. GCS eye subscore is 2. GCS verbal subscore is 1. GCS motor subscore is 5.   Patient awake. No insight. Not communicating. Not following commands, "moves random extremities:"      Skin: Skin is warm and dry. No erythema.       Significant Labs:   CBC:   Recent Labs  Lab 09/16/17  0436 09/17/17  0407   WBC 7.78 6.10   HGB 8.3* 8.8*   HCT 26.2* 28.7*    167     CMP:   Recent Labs  Lab 09/16/17  0436 09/16/17  1924 09/17/17  0407 09/17/17  1044   * 152* 151*  151* 150*   K 3.9 3.6 3.7 3.6   * 114* 114* 112*   CO2 24 25 26 28   GLU 74 90 111* 110   BUN 27* 22 23 21   CREATININE 0.7 0.7 0.8 0.8   CALCIUM 8.5* 8.8 8.6* 8.6*   PROT 5.9*  --   --   --    ALBUMIN 2.1*  --   --   --    BILITOT 1.2*  --   --   --    ALKPHOS 92  --   --   --    AST 7*  --   --   --    ALT 12  --   --   --    ANIONGAP 12 13 11 10   EGFRNONAA >60.0 >60.0 >60.0 >60.0       Significant Imaging: I have reviewed all pertinent imaging results/findings within the past 24 " hours.    Assessment/Plan:      * Acute encephalopathy    History of recent CVA with recent admission to rehab, per family acute worsening 1 week ago following trach replacement. OSH records in chart. Head CT unchanged. Per family patient was communicating prior to trach replacement. However, per OSH records patient has been obtunded for the past couple of days. Difficult to determine patients true baseline?    Concern for Anoxic brain injury during trach removal/replacement, CVA, Seizure, Worsening Sepsis. Urine culture no growth.  MRI no acute hemorrhagic pathology. Patient had a BM.       - GCS of 8, however protecting airway with trach, stable for floor    - f/u neurology rec's  - continue Zosyn tx  - records from [facility]   - attempt NG tube placement; if not possible radiology guided keenan         Ileus    Concern for ileus at OSH. However, no abdominal distension on physical exam. CT scan notable for Ileus, w/ no SBO.    - resolving, reported BM today         Respiratory failure    Concern for possible VAP    - continue zosyn  - Decreased O2 5L; 99%, will continue to titrate           Tracheostomy status    - Trach care          Dysphagia    Per family was tolerating diet prior to trach replacement on 9/4     - NG tube attempt again           KATHI (acute kidney injury)    Creatine 2.4 on transfer; baseline 0.6    - Cr 0.8, resolved   - Strict ins and outs with siegel given patient AMS   - monitor renal function           Leukocytosis    Elevated WBC of 20. Concern for aspiration given hx from nurse at OSH. Urine culture no growth.     - Resolved  - Continue on Zosyn  - F/u blood cultures (NGTD)          Respiratory insufficiency              Morbid obesity with BMI of 50.0-59.9, adult              S/P CABG (coronary artery bypass graft)    - Continue ASA           Type 2 diabetes mellitus, uncontrolled    - Per OSH records not on insulin   - Will obtain Ha1c during AM labs   - Sliding scale           Essential  hypertension    HX of significant HTN. Per OSH on hydralazine 100 Q8 PO, Linsiopril 40 PO, Metoprolol XL 25 BID, Nifedipine 60mg daily Clonidine 0.1 Patch Q week, placed on Tuesday      - Labetalol PRN             VTE Risk Mitigation         Ordered     enoxaparin injection 30 mg  Daily     Route:  Subcutaneous        09/13/17 0922              Abimbola Devlin MD  Department of Hospital Medicine   [facility]

## 2017-09-17 NOTE — NURSING
Attempted to place NG tube. Unable to place. MD notified. Medicine team request attempt at feeding tube placement.

## 2017-09-17 NOTE — NURSING
Notified by telemetry of several short runs of V-tach. Medicine team paged and notified. No new orders received.

## 2017-09-17 NOTE — PT/OT/SLP EVAL
Occupational Therapy  Evaluation & Treatment    Nisa Frank   MRN: [id number]   Admitting Diagnosis: Acute encephalopathy    OT Date of Treatment: 17   OT Start Time: 858  OT Stop Time: 927  OT Total Time (min): 29 min    Billable Minutes:  Evaluation 15  Therapeutic Activity 14   Co-eval with PT    Diagnosis: Acute encephalopathy       Past Medical History:   Diagnosis Date    Acute bilateral cerebral infarction in a watershed distribution 2017    CAD (coronary artery disease)     Diabetes mellitus     Hypertension     Morbid obesity     TAMMY on CPAP     setting +17    Stroke       Past Surgical History:   Procedure Laterality Date    ARTERIAL BYPASS SURGRY      9 tears sgo     SECTION      CORONARY ARTERY BYPASS GRAFT      HYSTERECTOMY      TUBAL LIGATION         Referring physician: Claus  Date referred to OT: 09/15/17    General Precautions: Standard, aspiration, fall, NPO  Orthopedic Precautions: N/A  Braces: N/A          Patient History:  Living Environment: PLOF and living environment unknown as pt unable to verbally give information - family member not present during evaluation.   Living Environment Comment:  (Pt unable to provide history to therapists, however, past records indicate pt lived alone prior to CVA)  Equipment Currently Used at Home:Unknown    Prior level of function:   Unknown - pt unable to provide PLOF and family member not present during time of evaluation.  Per chart review, pt was I at PLOF and living alone prior to her CVA.         Dominant hand: unknown    Subjective:  Communicated with nurse prior to session.  Pt non-verbal but responding to name and tracking therapist in room   Chief Complaint: max to total A with all self care and mobility  Patient/Family stated goals: per chart, pt's family wants her to resume therapy    Pain/Comfort  Pain Rating 1:  (pt did not give any signs/symptoms of pain during session)    Objective:  Patient found with:  peripheral IV, siegel catheter, oxygen, telemetry, tracheostomy    Cognitive Exam:  Oriented to: Person - responds to name  Follows Commands/attention: Follows one-step commands and with verbal and tactile cues - needs assist to initiate task of washing face but able to compelte  Communication: expressive aphasia and receptive aphasia  Memory:  Unable to assess  Safety awareness/insight to disability: impaired - pt had pulled out catheter from connection and trach collar prior to therapy arrival - therapists asked nurse for video surveillance for safety as pt is pulling out connections without awareness and inability to call for assist.   Coping skills/emotional control: no significant emotional response noted      Visual/perceptual:  Difficult to assess - pt with L visual preferance with positioning of head to L but moved head R when name called from that side    Physical Exam:  Postural examination/scapula alignment: Rounded shoulder, Head forward, Posterior pelvic tilt and L lateral lean  Skin integrity: Dry - at risk for compromised skin integrity due to immobility  Edema: Mild LEs    Sensation:   Unable to assess    Upper Extremity Range of Motion:  Right / Left Upper Extremity:  Unable to formally assess due to inability to follow all commands - pt noted with functional elbow flex/ext due to reaching wash cloth and washing face      Upper Extremity Strength:  Right /Left Upper Extremity: not formally assessed     Strength: fair     Fine /Gross motor coordination:   Pt unable to formally follow instructions to assess FM/GM grasping, however, observed with gross grasp during task of washing face    Functional Mobility:  Bed Mobility:  Rolling/Turning to Left: Total assistance, With assist of 2 (using draw sheet)  Rolling/Turning Right: With assist of 2, Total assistance (using draw sheet)  Scooting/Bridging: With assist of 2, Total Assistance (total A x2 using draw sheet to scoot up and scoot to side - bed in  trendelenberg to scoot to HOB)  Supine to Sit: Total Assistance, With assist of 2 (max A x2 for supine to sit on EOB)  Sit to Supine: With assist of 2, Moderate Assistance (Pt lowered trunk with min A and raised R LE to lift onto bed, however needed assist to clear bed suraface - L LE followed R but needed A to reach bed - assist x2 but  with significantly less physcial assist)    Transfers:  Sit <> Stand Assistance: Activity did not occur    Functional Ambulation: N/A    Activities of Daily Living:  UE Dressing Level of Assistance: Total assistance - hospital gown    LE Dressing Level of Assistance: Total assistance - socks  }  Grooming Position: EOB (CGA-SBA sitting EOB)  Grooming Level of Assistance: Minimum assistance (pt washed face with washcloth - needed vcs and phycial A to bring wash cloth to face but able to complete once initiated)     Toileting Where Assessed: Bed level  Toileting Level of Assistance: Total assistance     Bathing Level of Assistance: Activity did not occur      Balance:   Static Sit: FAIR-: Maintains without assist but inconsistent   Dynamic Sit: FAIR: Cannot move trunk without losing balance  Static Stand: N/A  Dynamic stand: N/A      Therapeutic Activities and Exercises:  · Pt completed ADLs and func mobility activities for tx session as noted above  · Pt sat on EOB for ~10 minutes with OT/PT - pt noted with weight shift to L hip and L hand on bed for stability.  Pt sat for ~ 10 min with range of SBA to min/mod A to maintain sitting balance.  Pt able to use R hand with L hand on bed during task of washing face.  Able to lean forward with min assist and automatically corrected balance when she began to lean backwards by leaning forward.    · Pt educated on role of OT and POC      AM-PAC 6 CLICK ADL  How much help from another person does this patient currently need?  1 = Unable, Total/Dependent Assistance  2 = A lot, Maximum/Moderate Assistance  3 = A little, Minimum/Contact  "Guard/Supervision  4 = None, Modified [place]/Independent    Putting on and taking off regular lower body clothing? : 1  Bathing (including washing, rinsing, drying)?: 1  Toileting, which includes using toilet, bedpan, or urinal? : 1  Putting on and taking off regular upper body clothing?: 1  Taking care of personal grooming such as brushing teeth?: 2  Eating meals?: 1  Total Score: 7    AM-PAC Raw Score CMS "G-Code Modifier Level of Impairment Assistance   6 % Total / Unable   7 - 9 CM 80 - 100% Maximal Assist   10-14 CL 60 - 80% Moderate Assist   15 - 19 CK 40 - 60% Moderate Assist   20 - 22 CJ 20 - 40% Minimal Assist   23 CI 1-20% SBA / CGA   24 CH 0% Independent/ Mod I       Patient left HOB elevated with all lines intact, call button in reach and nurse notified    Assessment:  Nisa Frank is a 67 y.o. female with a medical diagnosis of Acute encephalopathy and presents with impaired ADLS, cognitive deficits, and mobility. Pt required assistance x2 for most bed mobility tasks and standing was not assessed due to difficulty maintaining sitting for prolonged period of time.  Pt's family was not present for full history on pt's PLOF and goals for discharge.  Pt will benefit from skilled OT services in order to work towards improving her safety and functional performance with ADLs and mobiltiy.  At this time, OT recommends SNF (NH level) with goals to improve to IPR level after time.  DME recommendations are on hold due to lack of information regarding prior equipment needs and discharge disposition.     Pt evaluation falls under high complexity for evaluation category due to 5+ performance deficits identified with comprehensive assessments and significant modifications/assistance required. An expanded review of history and occupational profile completed in addition to expanded review of physical, cognitive and psychosocial history. Several treatment options considered in care.    Pt demo physical " deficits with balance (static/dynamic), functional mobility, bed mobility, UB strength, endurance for age appropriate activities, fine motor skills, gross motor skills, coordination deficits, sensory impairments and decline in prior level of functional indep with daily tasks and activities.       Rehab identified problem list/impairments: Rehab identified problem list/impairments: weakness, impaired endurance, impaired self care skills, impaired sensation, impaired functional mobilty, decreased coordination, impaired cognition, impaired balance, gait instability, decreased upper extremity function, decreased lower extremity function, decreased safety awareness, abnormal tone, impaired fine motor, impaired skin, edema, impaired coordination, decreased ROM, impaired cardiopulmonary response to activity    Rehab potential is fair.    Activity tolerance: Fair    Discharge recommendations: Discharge Facility/Level Of Care Needs: nursing facility, skilled     Barriers to discharge: Barriers to Discharge: Inaccessible home environment, Decreased caregiver support    Equipment recommendations: other (see comments) (TBD at next level of care - need more information regarding PLOF)     GOALS:    Occupational Therapy Goals        Problem: Occupational Therapy Goal    Goal Priority Disciplines Outcome Interventions   Occupational Therapy Goal     OT, PT/OT Ongoing (interventions implemented as appropriate)    Description:  Goals to be met by: 09/30/17     Patient will increase functional independence with ADLs by performing:    UE Dressing with Moderate Assistance.  LE Dressing with Maximum Assistance.  Grooming while seated with Set-up Assistance.  Toileting from bedside commode with Maximum Assistance for hygiene and clothing management.   Toilet transfer to bedside commode with Maximum Assistance.  Upper extremity exercise program x10 reps per handout, with assistance as needed.  Pt will sit EOB for 10 minutes with SBA  during therapeutic activity.                       PLAN:  Patient to be seen 5 x/week to address the above listed problems via self-care/home management, therapeutic activities, therapeutic exercises, neuromuscular re-education  Plan of Care expires: 10/14/17  Plan of Care reviewed with: patient         Nicolasa CANTU Estefany, OT  09/17/2017

## 2017-09-17 NOTE — SUBJECTIVE & OBJECTIVE
"Interval History:   NAEON. Patient awake. GCS 8 (2/1/5). Spoke w/ family, who is severly confused as to what happen that cause the patient's mental status. Explained what we been treating for, will attempt to retreive records from [facility].     Review of Systems   Unable to perform ROS: Mental status change     Objective:     Vital Signs (Most Recent):  Temp: 97.5 °F (36.4 °C) (09/17/17 0915)  Pulse: (!) 59 (09/17/17 1100)  Resp: 16 (09/17/17 0915)  BP: (!) 175/80 (09/17/17 0915)  SpO2: (!) 94 % (09/17/17 1200) Vital Signs (24h Range):  Temp:  [97.5 °F (36.4 °C)-98.6 °F (37 °C)] 97.5 °F (36.4 °C)  Pulse:  [54-87] 59  Resp:  [16-18] 16  SpO2:  [91 %-99 %] 94 %  BP: (152-175)/(70-80) 175/80     Weight: 132.9 kg (293 lb)  Body mass index is 47.29 kg/m².    Intake/Output Summary (Last 24 hours) at 09/17/17 1453  Last data filed at 09/17/17 0511   Gross per 24 hour   Intake                0 ml   Output              600 ml   Net             -600 ml      Physical Exam   Constitutional: She appears well-developed and well-nourished. No distress.   HENT:   Head: Normocephalic and atraumatic.   Eyes: Conjunctivae are normal. Right eye exhibits no discharge. Left eye exhibits no discharge.   Pupils responsive to light, no tracking noted.     Cardiovascular: Normal rate, regular rhythm and normal heart sounds.    Pulmonary/Chest: Effort normal and breath sounds normal.   Abdominal: Soft. Bowel sounds are normal. She exhibits no distension. There is no tenderness.   Musculoskeletal: She exhibits no edema.   Neurological: She has normal reflexes. GCS eye subscore is 2. GCS verbal subscore is 1. GCS motor subscore is 5.   Patient awake. No insight. Not communicating. Not following commands, "moves random extremities:"      Skin: Skin is warm and dry. No erythema.       Significant Labs:   CBC:   Recent Labs  Lab 09/16/17  0436 09/17/17  0407   WBC 7.78 6.10   HGB 8.3* 8.8*   HCT 26.2* 28.7*    167     CMP:   Recent " Labs  Lab 09/16/17  0436 09/16/17  1924 09/17/17  0407 09/17/17  1044   * 152* 151*  151* 150*   K 3.9 3.6 3.7 3.6   * 114* 114* 112*   CO2 24 25 26 28   GLU 74 90 111* 110   BUN 27* 22 23 21   CREATININE 0.7 0.7 0.8 0.8   CALCIUM 8.5* 8.8 8.6* 8.6*   PROT 5.9*  --   --   --    ALBUMIN 2.1*  --   --   --    BILITOT 1.2*  --   --   --    ALKPHOS 92  --   --   --    AST 7*  --   --   --    ALT 12  --   --   --    ANIONGAP 12 13 11 10   EGFRNONAA >60.0 >60.0 >60.0 >60.0       Significant Imaging: I have reviewed all pertinent imaging results/findings within the past 24 hours.

## 2017-09-17 NOTE — PLAN OF CARE
Problem: Occupational Therapy Goal  Goal: Occupational Therapy Goal  Goals to be met by: 09/30/17     Patient will increase functional independence with ADLs by performing:    UE Dressing with Moderate Assistance.  LE Dressing with Maximum Assistance.  Grooming while seated with Set-up Assistance.  Toileting from bedside commode with Maximum Assistance for hygiene and clothing management.   Toilet transfer to bedside commode with Maximum Assistance.  Upper extremity exercise program x10 reps per handout, with assistance as needed.  Pt will sit EOB for 10 minutes with SBA during therapeutic activity.     Outcome: Ongoing (interventions implemented as appropriate)    Pt was agreeable to OT/PT evaluation.  Goals established to assist pt with returning to PLOF regarding ADLs and func mobility.  Pt will benefit from skilled OT services in order to increase his level of safety and independence with ADLs and mobility.        Nicolasa Allison, OT  9/17/2017

## 2017-09-17 NOTE — ASSESSMENT & PLAN NOTE
History of recent CVA with recent admission to rehab, per family acute worsening 1 week ago following trach replacement. OSH records in chart. Head CT unchanged. Per family patient was communicating prior to trach replacement. However, per OSH records patient has been obtunded for the past couple of days. Difficult to determine patients true baseline?    Concern for Anoxic brain injury during trach removal/replacement, CVA, Seizure, Worsening Sepsis. Urine culture no growth.  MRI no acute hemorrhagic pathology. Patient had a BM.       - GCS of 8, however protecting airway with trach, stable for floor    - f/u neurology rec's  - continue Zosyn tx  - records from [facility]

## 2017-09-17 NOTE — HPI
The patient is a 68 y/o f with bihemispheric watershed infarcts s/p trach/PEG, DM-2, CKD3, CAD s/p CABG, HTN, who was transferred from [place] to [facility] and is consulted to the neurology team for evaluation of AMS and possible seizure.    There are no family members at the bedside and the history is taken from the chart. She has had a complicated and extended hospital course. Initially on 8/8, she was taken to [facility] 2/2 acute respiratory distress/failure likely 2/2 mucous plug. She was admitted to the ICU, put on mechanical ventilation, weaned her off of MV, and transferred to [facility] on 8/14. In the rehab she pulled trach out, that was replaced on 9/4. Since that time, daughter reports that she has been nonverbal and non communicative, she only has eye tracking and intermittent hand squeezing. she states that there was a complication after trach replacement that required overnight ICU care and they think  that patient may have had a repeat stroke. She has received meropenem for ESBL E coli UTI during this stay and was preparing for transferring to SNF, however, she was found to have elevated WBC, fever, and infiltrates on the CXR, concerns for VAP, and started on broad spectrum abx for that. The work up for encephalopathy so far included head CT scan with no interval change, EEG with no epileptiform activity. She is also being evaluated for C. Diff and ileus by the primary team. She has also developed an KATHI on the baseline of CKD stage 3. Patient was transferred to the main [place] per request of the family.    At the time of visiting the patient today (9/17), she is awake, eyes are open, tracks with the eyes, is non-verbal, seems inattentive, and the only command she follows is squeezing hands.

## 2017-09-17 NOTE — NURSING
Notified Dr. Devlin from Medicine team that we weren't able to get feeding tube placed despite multiple attempts. OK to not start wrist restraints.

## 2017-09-17 NOTE — PT/OT/SLP EVAL
Physical Therapy  Evaluation    Nisa Frank   MRN: [id number]   Admitting Diagnosis: Acute encephalopathy    PT Received On: 17  PT Start Time: 858     PT Stop Time: 925    PT Total Time (min): 27 min       Billable Minutes:  Evaluation 17 and Neuromuscular Re-education 10    Diagnosis: Acute encephalopathy      Past Medical History:   Diagnosis Date    Acute bilateral cerebral infarction in a watershed distribution 2017    CAD (coronary artery disease)     Diabetes mellitus     Hypertension     Morbid obesity     TAMMY on CPAP     setting +17    Stroke       Past Surgical History:   Procedure Laterality Date    ARTERIAL BYPASS SURGRY      9 tears sgo     SECTION      CORONARY ARTERY BYPASS GRAFT      HYSTERECTOMY      TUBAL LIGATION         Referring physician: Abimbola Devlin MD  Date referred to PT: 09/15/17       General Precautions: Standard, fall, respiratory, NPO  Orthopedic Precautions: N/A   Braces: N/A       Do you have any cultural, spiritual, Taoist conflicts, given your current situation?: none stated    Patient History:  Pt unable to provide PLOF and history. No family present at bedside during therapy visit. Will re-assess when family present.       Subjective:  Communicated with RN prior to session. Pt appropriate to participate with therapy.     Pt non-verbal throughout session. RN reports pt is able to nod appropriately, but did not demonstrate with therapy this date.     Chief Complaint: none stated  Patient goals: none stated this visit           Objective:   Patient found with: telemetry, tracheostomy, oxygen, peripheral IV, siegel catheter     Cognitive Exam:  Oriented to: unable to determine due to non-verbal    Follows Commands/attention: Follows one-step commands and requires assist to initiate and increase time to complete; verbal and tactile cues  Communication: non-verbal  Safety awareness/insight to disability: impaired    Physical Exam:  Postural  examination/scapula alignment: Rounded shoulder, Head forward, Posterior pelvic tilt and Kyphosis    Skin integrity: Visible skin intact  Edema: None noted     Sensation:   Unable to determine due to cognition    Upper Extremity Range of Motion/Strength:  See OT evaluation.       Lower Extremity Range of Motion:  Right Lower Extremity: PROM WFL  Left Lower Extremity: PROM WFL    Lower Extremity Strength:  Right Lower Extremity: at least 2/5 demonstrated with functional mobility; pt able to initiate lifting LE with sit to supine mobility  Left Lower Extremity: unable to accurately assess this visit due to cogntion     Fine motor coordination:  Impaired    Gross motor coordination: Impaired    Functional Mobility:  Bed Mobility:  Rolling: Total Assistance x2 ppl   Supine to Sit:  Total Assistance x2 ppl   Sit to supine: Total Assistance pt able to assist with lowering trunk to bed (L lateral lean) and initiate raising LE's  Scooting: Total Assistance to EOB; dependent via drawsheet towards HOB    Transfers:   Not appropriate due to balance and LE strength    Balance:  Static Sitting: Minimal Assistance fluctuated throughout visit; at times improved to close SBA  Dynamic Sitting: Moderate Assistance   Static Standing: Activity did not occur   Dynamic Standing: Activity did not occur     Neuro Reeducation:  Sitting Balance (~10 minutes)  Required fluctuating assistance for balance throughout session.  Initially, required Max Assist, but once appropriate sitting EOB with B feet supported; pt's static balance improved to Min/SBA using B UE for trunk support (L >R), For dynamic balance activities, pt required Mod A for safety due to unable to modulate weight-shift and trunk control. Pt demonstrated appropriate ability to shift weight between hips and medial/laterally. Required assist with forward weight-shift for safety.  Pt favored attending to L side throughout session, but able to track to R following OT's voice.   Limited following commands, but able to complete ADL tasks with assist for initiating.       AM-PAC 6 CLICK MOBILITY  How much help from another person does this patient currently need?   1 = Unable, Total/Dependent Assistance  2 = A lot, Maximum/Moderate Assistance  3 = A little, Minimum/Contact Guard/Supervision  4 = None, Modified Schleicher/Independent    Turning over in bed (including adjusting bedclothes, sheets and blankets)?: 2  Sitting down on and standing up from a chair with arms (e.g., wheelchair, bedside commode, etc.): 1  Moving from lying on back to sitting on the side of the bed?: 2  Moving to and from a bed to a chair (including a wheelchair)?: 1  Need to walk in hospital room?: 1  Climbing 3-5 steps with a railing?: 1  Total Score: 8     AM-PAC Raw Score CMS G-Code Modifier Level of Impairment Assistance   6 % Total / Unable   7 - 9 CM 80 - 100% Maximal Assist   10 - 14 CL 60 - 80% Moderate Assist   15 - 19 CK 40 - 60% Moderate Assist   20 - 22 CJ 20 - 40% Minimal Assist   23 CI 1-20% SBA / CGA   24 CH 0% Independent/ Mod I     Patient left supine with all lines intact and call button in reach.    Assessment:   Nisa Frank is a 67 y.o. female with a medical diagnosis of Acute encephalopathy and presents with decrease endurance, generalized weakness, impaired balance, coordination, and cognition requiring increase assist with all mobility and increase pt's risk for falls upon d/c.  Pt tolerated session well with no signs of distress noted.   Pt would benefit from continued skilled physical therapy for the listed impairments to improve functional independence and overall safety with mobility prior to d/c. PT recommends d/c disposition to SNF with potential to progress to Rehab pending endurance, tolerance, and cognition. Pt demonstrates good potential to progress and would benefit from therapy daily to maximize recovery to gain functional independence prior to d/c  home.  .      Education:  Education provided to pt regarding: PT role/POC. No understanding indicated.     Whiteboard updated with correct mobility information. RN/PCT notified.  Transfer with therapy only at this time. Appropriate to sit EOB with therapy only at this time.       .    Rehab identified problem list/impairments: Rehab identified problem list/impairments: weakness, impaired endurance, impaired balance, gait instability, decreased upper extremity function, decreased lower extremity function, decreased safety awareness, impaired self care skills, impaired functional mobilty, impaired cognition, decreased coordination, impaired fine motor, impaired cardiopulmonary response to activity    Rehab potential is good.    Activity tolerance: Fair    Discharge recommendations: Discharge Facility/Level Of Care Needs: nursing facility, skilled (with potential to progress to [facility])     Barriers to discharge: Barriers to Discharge: Inaccessible home environment, Decreased caregiver support (pt requires increase assist at this time)    Equipment recommendations: Equipment Needed After Discharge:  (TBD pending progress)     GOALS:    Physical Therapy Goals        Problem: Physical Therapy Goal    Goal Priority Disciplines Outcome Goal Variances Interventions   Physical Therapy Goal     PT/OT, PT      Description:  Goals to be met by: 17     Patient will increase functional independence with mobility by performin. Supine to sit with Maximum Assistance  2. Sit to supine with Maximum Assistance  3. Rolling to Left and Right with Maximum Assistance.  4. Sitting at edge of bed x10 minutes with Minimal Assistance using B UE for trunk support and attending to midline.   5. Pt will perform dynamic reaching while sitting EOB with Minimal Assistance to complete 3/5 activities and Minimal Assistance for balance/trunk support.   6. Lower extremity exercise x10 reps with assistance as needed to improve strength, ROM, and  endurance with all functional activities.                       PLAN:    Patient to be seen 4 x/week to address the above listed problems via therapeutic activities, therapeutic exercises, neuromuscular re-education  Plan of Care expires: 10/17/17  Plan of Care reviewed with: patient    Functional Assessment Tool Used: ampac  Score: 8  Functional Limitation: Mobility: Walking and moving around  Mobility: Walking and Moving Around Current Status (): TREVOR  Mobility: Walking and Moving Around Goal Status (): TREVOR Gramajo, PT  09/17/2017

## 2017-09-17 NOTE — PLAN OF CARE
Problem: Patient Care Overview  Goal: Plan of Care Review  Outcome: Ongoing (interventions implemented as appropriate)  Patient remained free from falls or injury throughout shift. Tele intact. Patient in bariatric bed. Iv fluids infusing without difficulty. Ross catheter flushed. Daughter at bedside. All care explained and questions addressed. Will continue to monitor.

## 2017-09-17 NOTE — PLAN OF CARE
Problem: Physical Therapy Goal  Goal: Physical Therapy Goal  Goals to be met by: 17     Patient will increase functional independence with mobility by performin. Supine to sit with Maximum Assistance  2. Sit to supine with Maximum Assistance  3. Rolling to Left and Right with Maximum Assistance.  4. Sitting at edge of bed x10 minutes with Minimal Assistance using B UE for trunk support and attending to midline.   5. Pt will perform dynamic reaching while sitting EOB with Minimal Assistance to complete 3/5 activities and Minimal Assistance for balance/trunk support.   6. Lower extremity exercise x10 reps with assistance as needed to improve strength, ROM, and endurance with all functional activities.         PT evaluation completed. POC and goals established.    Sloane Gramajo, PT, DPT  2017

## 2017-09-17 NOTE — ASSESSMENT & PLAN NOTE
The patient is a 66 y/o f with a complicate course of the hospital stay after a bihemispheric watershed infarcts s/p trach/PEG, DM-2, CKD3, CAD s/p CABG, HTN, who was transferred from [place] to [facility] and is consulted to the neurology team for evaluation of AMS and possible seizure. She has multiple infections going on over this course.  Head CT scan with no interval change and 2 EEGs on 8/1 and 9/14, revealing generalized slowing indicative of nonspecific encephalopathy. Of note in the labs, she has elevated Na~150.  At this point the culprit of the encephalopathy can not be pinpointed and it could be rather a combination of several ongoing problems such as electrolyte abnormality and infections added to the baseline post stroke state.  We recommend:  - correcting Na and any other electrolyte abnormality  - continuing the course of antibiotics (consider consulting ID) to insure infections are addressed appropriately  - addressing the ileus  - repeat EEG

## 2017-09-17 NOTE — SUBJECTIVE & OBJECTIVE
Past Medical History:   Diagnosis Date    Acute bilateral cerebral infarction in a watershed distribution 2017    CAD (coronary artery disease)     Diabetes mellitus     Hypertension     Morbid obesity     TAMMY on CPAP     setting +17    Stroke        Past Surgical History:   Procedure Laterality Date    ARTERIAL BYPASS SURGRY      9 tears sgo     SECTION      CORONARY ARTERY BYPASS GRAFT      HYSTERECTOMY      TUBAL LIGATION         Review of patient's allergies indicates:   Allergen Reactions    Latex, natural rubber        Current Neurological Medications:     No current facility-administered medications on file prior to encounter.      Current Outpatient Prescriptions on File Prior to Encounter   Medication Sig    acetaminophen (TYLENOL) 650 mg/20.3 mL Soln 20.3 mLs (650 mg total) by Per NG tube route every 6 (six) hours as needed. (Patient taking differently: 650 mg by Per NG tube route every 6 (six) hours as needed for Pain. )    albuterol-ipratropium 2.5mg-0.5mg/3mL (DUO-NEB) 0.5 mg-3 mg(2.5 mg base)/3 mL nebulizer solution Take 3 mLs by nebulization every 4 (four) hours. Rescue    aspirin 81 MG Chew 1 tablet (81 mg total) by Per G Tube route once daily.    atorvastatin (LIPITOR) 10 MG tablet 1 tablet (10 mg total) by Per G Tube route once daily. (Patient taking differently: 10 mg by Per G Tube route every evening. )    cloNIDine 0.1 mg/24 hr td ptwk (CATAPRES) 0.1 mg/24 hr Place 1 patch onto the skin every 7 days. (Patient taking differently: Place 1 patch onto the skin every Tuesday. )    fluoxetine (PROZAC) 20 MG capsule 1 capsule (20 mg total) by Per G Tube route once daily.    hydrALAZINE (APRESOLINE) 100 MG tablet 1 tablet (100 mg total) by Per G Tube route every 8 (eight) hours.    insulin aspart (NOVOLOG) 100 unit/mL InPn pen Inject 1-10 Units into the skin every 4 (four) hours as needed (Hyperglycemia).    meclizine (ANTIVERT) 25 mg tablet Take 1 tablet (25 mg total)  by mouth every 6 (six) hours as needed. (Patient taking differently: Take 25 mg by mouth every 6 (six) hours as needed for Dizziness. )    pantoprazole (PROTONIX) 40 mg GrPS 1 packet (40 mg total) by Per G Tube route once daily.    amantadine HCl (SYMMETREL) 100 mg capsule 1 capsule (100 mg total) by Per G Tube route every 8 (eight) hours.    amlodipine (NORVASC) 10 MG tablet 1 tablet (10 mg total) by Per G Tube route once daily.    diclofenac sodium (VOLTAREN) 1 % Gel Apply 2 g topically 2 (two) times daily.    EASY TOUCH TWIST LANCETS 30 gauge Misc     HEPARIN SODIUM,PORCINE (HEPARIN, PORCINE,) 5,000 unit/mL injection Inject 1.5 mLs (7,500 Units total) into the skin every 8 (eight) hours.     Family History     Problem Relation (Age of Onset)    No Known Problems Father, Mother, Sister, Brother, Maternal Aunt, Maternal Uncle, Paternal Aunt, Paternal Uncle, Maternal Grandmother, Maternal Grandfather, Paternal Grandmother, Paternal Grandfather        Social History Main Topics    Smoking status: Never Smoker    Smokeless tobacco: Never Used    Alcohol use Yes      Comment: occ    Drug use: No    Sexual activity: Not Currently     Birth control/ protection: See Surgical Hx     Review of Systems   Unable to perform ROS: Patient nonverbal     Objective:     Vital Signs (Most Recent):  Temp: 98.6 °F (37 °C) (09/17/17 1555)  Pulse: 64 (09/17/17 1555)  Resp: 18 (09/17/17 1555)  BP: (!) 155/80 (09/17/17 1555)  SpO2: (!) 94 % (09/17/17 1555) Vital Signs (24h Range):  Temp:  [97.5 °F (36.4 °C)-98.6 °F (37 °C)] 98.6 °F (37 °C)  Pulse:  [54-87] 64  Resp:  [16-20] 18  SpO2:  [91 %-98 %] 94 %  BP: (152-175)/(70-80) 155/80     Weight: 132.9 kg (293 lb)  Body mass index is 47.29 kg/m².    Physical Exam   Constitutional: She appears listless. She is uncooperative.   HENT:   Head: Normocephalic and atraumatic.   Eyes: EOM are normal. Pupils are equal, round, and reactive to light.   Neck:   Trach in place    Cardiovascular: Normal rate, regular rhythm and normal heart sounds.    Pulses:       Radial pulses are 2+ on the right side, and 2+ on the left side.   Difficult to assess the heart sounds 2/2 breathing noise through the trach   Pulmonary/Chest: Effort normal. No tachypnea. She has no decreased breath sounds. She has no wheezes.   Abdominal: Soft. She exhibits no distension. There is no tenderness.   Neurological: She appears listless. She exhibits normal muscle tone. She displays no seizure activity. GCS eye subscore is 4. GCS verbal subscore is 1. GCS motor subscore is 5.   Reflex Scores:       Tricep reflexes are 1+ on the right side and 1+ on the left side.       Bicep reflexes are 1+ on the right side and 1+ on the left side.       Brachioradialis reflexes are 1+ on the right side and 1+ on the left side.       Patellar reflexes are 1+ on the right side and 1+ on the left side.  Skin: No rash noted. No erythema.   Psychiatric: Her affect is blunt. She is withdrawn. She is noncommunicative. She is inattentive.   Vitals reviewed.      NEUROLOGICAL EXAMINATION:     MENTAL STATUS   Speech: mute     CRANIAL NERVES     CN III, IV, VI   Pupils are equal, round, and reactive to light.  Extraocular motions are normal.   Right pupil: Size: 3 mm. Shape: regular.   Left pupil: Size: 3 mm. Shape: regular.   CN III: no CN III palsy  CN VI: no CN VI palsy  Nystagmus: none     REFLEXES     Reflexes   Right brachioradialis: 1+  Left brachioradialis: 1+  Right biceps: 1+  Left biceps: 1+  Right triceps: 1+  Left triceps: 1+  Right patellar: 1+  Left patellar: 1+  Right plantar: equivocal  Left plantar: equivocal    GAIT AND COORDINATION     Tremor   Resting tremor: absent      Significant Labs:   Blood Culture: No results for input(s): LABBLOO in the last 48 hours.  CBC:   Recent Labs  Lab 09/16/17 0436 09/17/17  0407   WBC 7.78 6.10   HGB 8.3* 8.8*   HCT 26.2* 28.7*    167     CMP:   Recent Labs  Lab 09/16/17  6816  09/16/17  1924 09/17/17  0407 09/17/17  1044 09/17/17  1612   GLU 74 90 111* 110  --    * 152* 151*  151* 150* 148*   K 3.9 3.6 3.7 3.6  --    * 114* 114* 112*  --    CO2 24 25 26 28  --    BUN 27* 22 23 21  --    CREATININE 0.7 0.7 0.8 0.8  --    CALCIUM 8.5* 8.8 8.6* 8.6*  --    MG 2.0  --  2.0  --   --    PROT 5.9*  --   --   --   --    ALBUMIN 2.1*  --   --   --   --    BILITOT 1.2*  --   --   --   --    ALKPHOS 92  --   --   --   --    AST 7*  --   --   --   --    ALT 12  --   --   --   --    ANIONGAP 12 13 11 10  --    EGFRNONAA >60.0 >60.0 >60.0 >60.0  --      Urine Culture: No results for input(s): LABURIN in the last 48 hours.    Significant Imaging: CT: I have reviewed all pertinent results/findings within the past 24 hours and my personal findings are:  no acute interval change compared to the previous study  EEG: I have reviewed all pertinent results/findings within the past 24 hours and my personal findings are:  generalized slowing indicative of a moderate-severe nonspecific encephalopathy, no epileptic activity

## 2017-09-18 PROBLEM — D72.829 LEUKOCYTOSIS: Status: RESOLVED | Noted: 2017-09-13 | Resolved: 2017-09-18

## 2017-09-18 LAB
ANION GAP SERPL CALC-SCNC: 10 MMOL/L
ANION GAP SERPL CALC-SCNC: 11 MMOL/L
ANION GAP SERPL CALC-SCNC: 9 MMOL/L
BACTERIA BLD CULT: NORMAL
BACTERIA BLD CULT: NORMAL
BASOPHILS # BLD AUTO: 0.01 K/UL
BASOPHILS NFR BLD: 0.2 %
BUN SERPL-MCNC: 14 MG/DL
BUN SERPL-MCNC: 15 MG/DL
BUN SERPL-MCNC: 15 MG/DL
CALCIUM SERPL-MCNC: 8.5 MG/DL
CALCIUM SERPL-MCNC: 8.6 MG/DL
CALCIUM SERPL-MCNC: 8.6 MG/DL
CHLORIDE SERPL-SCNC: 112 MMOL/L
CHLORIDE SERPL-SCNC: 113 MMOL/L
CHLORIDE SERPL-SCNC: 114 MMOL/L
CO2 SERPL-SCNC: 23 MMOL/L
CO2 SERPL-SCNC: 24 MMOL/L
CO2 SERPL-SCNC: 28 MMOL/L
CREAT SERPL-MCNC: 0.7 MG/DL
CREAT SERPL-MCNC: 0.7 MG/DL
CREAT SERPL-MCNC: 0.8 MG/DL
DIFFERENTIAL METHOD: ABNORMAL
EOSINOPHIL # BLD AUTO: 0.1 K/UL
EOSINOPHIL NFR BLD: 1.5 %
ERYTHROCYTE [DISTWIDTH] IN BLOOD BY AUTOMATED COUNT: 15.7 %
EST. GFR  (AFRICAN AMERICAN): >60 ML/MIN/1.73 M^2
EST. GFR  (NON AFRICAN AMERICAN): >60 ML/MIN/1.73 M^2
GLUCOSE SERPL-MCNC: 85 MG/DL
GLUCOSE SERPL-MCNC: 90 MG/DL
GLUCOSE SERPL-MCNC: 91 MG/DL
HCT VFR BLD AUTO: 29 %
HGB BLD-MCNC: 9.1 G/DL
LYMPHOCYTES # BLD AUTO: 1.6 K/UL
LYMPHOCYTES NFR BLD: 30.7 %
MAGNESIUM SERPL-MCNC: 1.8 MG/DL
MCH RBC QN AUTO: 26.2 PG
MCHC RBC AUTO-ENTMCNC: 31.4 G/DL
MCV RBC AUTO: 84 FL
MONOCYTES # BLD AUTO: 0.6 K/UL
MONOCYTES NFR BLD: 11 %
NEUTROPHILS # BLD AUTO: 3 K/UL
NEUTROPHILS NFR BLD: 56.2 %
PHOSPHATE SERPL-MCNC: 2.1 MG/DL
PLATELET # BLD AUTO: 187 K/UL
PMV BLD AUTO: 10.6 FL
POCT GLUCOSE: 73 MG/DL (ref 70–110)
POCT GLUCOSE: 86 MG/DL (ref 70–110)
POCT GLUCOSE: 99 MG/DL (ref 70–110)
POTASSIUM SERPL-SCNC: 3.3 MMOL/L
POTASSIUM SERPL-SCNC: 4.4 MMOL/L
POTASSIUM SERPL-SCNC: 5 MMOL/L
RBC # BLD AUTO: 3.47 M/UL
SODIUM SERPL-SCNC: 147 MMOL/L
SODIUM SERPL-SCNC: 148 MMOL/L
SODIUM SERPL-SCNC: 148 MMOL/L
SODIUM SERPL-SCNC: 149 MMOL/L
SODIUM SERPL-SCNC: 149 MMOL/L
WBC # BLD AUTO: 5.35 K/UL

## 2017-09-18 PROCEDURE — 85025 COMPLETE CBC W/AUTO DIFF WBC: CPT

## 2017-09-18 PROCEDURE — 25000003 PHARM REV CODE 250: Performed by: INTERNAL MEDICINE

## 2017-09-18 PROCEDURE — 80048 BASIC METABOLIC PNL TOTAL CA: CPT

## 2017-09-18 PROCEDURE — 99900026 HC AIRWAY MAINTENANCE (STAT)

## 2017-09-18 PROCEDURE — 99232 SBSQ HOSP IP/OBS MODERATE 35: CPT | Mod: GC,,, | Performed by: HOSPITALIST

## 2017-09-18 PROCEDURE — 25000003 PHARM REV CODE 250: Performed by: STUDENT IN AN ORGANIZED HEALTH CARE EDUCATION/TRAINING PROGRAM

## 2017-09-18 PROCEDURE — 25000003 PHARM REV CODE 250: Performed by: SURGERY

## 2017-09-18 PROCEDURE — 99232 SBSQ HOSP IP/OBS MODERATE 35: CPT | Mod: ,,, | Performed by: PSYCHIATRY & NEUROLOGY

## 2017-09-18 PROCEDURE — 63600175 PHARM REV CODE 636 W HCPCS: Performed by: STUDENT IN AN ORGANIZED HEALTH CARE EDUCATION/TRAINING PROGRAM

## 2017-09-18 PROCEDURE — 25000003 PHARM REV CODE 250: Performed by: HOSPITALIST

## 2017-09-18 PROCEDURE — 63600175 PHARM REV CODE 636 W HCPCS: Performed by: INTERNAL MEDICINE

## 2017-09-18 PROCEDURE — 94640 AIRWAY INHALATION TREATMENT: CPT

## 2017-09-18 PROCEDURE — 25000242 PHARM REV CODE 250 ALT 637 W/ HCPCS: Performed by: STUDENT IN AN ORGANIZED HEALTH CARE EDUCATION/TRAINING PROGRAM

## 2017-09-18 PROCEDURE — 83735 ASSAY OF MAGNESIUM: CPT

## 2017-09-18 PROCEDURE — 84100 ASSAY OF PHOSPHORUS: CPT

## 2017-09-18 PROCEDURE — 87449 NOS EACH ORGANISM AG IA: CPT

## 2017-09-18 PROCEDURE — 84295 ASSAY OF SERUM SODIUM: CPT

## 2017-09-18 PROCEDURE — 94761 N-INVAS EAR/PLS OXIMETRY MLT: CPT

## 2017-09-18 PROCEDURE — 63600175 PHARM REV CODE 636 W HCPCS: Performed by: HOSPITALIST

## 2017-09-18 PROCEDURE — 20600001 HC STEP DOWN PRIVATE ROOM

## 2017-09-18 PROCEDURE — 36415 COLL VENOUS BLD VENIPUNCTURE: CPT

## 2017-09-18 PROCEDURE — 27000221 HC OXYGEN, UP TO 24 HOURS

## 2017-09-18 RX ORDER — AMLODIPINE BESYLATE 10 MG/1
10 TABLET ORAL DAILY
Status: DISCONTINUED | OUTPATIENT
Start: 2017-09-19 | End: 2017-09-18

## 2017-09-18 RX ORDER — HYDRALAZINE HYDROCHLORIDE 20 MG/ML
10 INJECTION INTRAMUSCULAR; INTRAVENOUS EVERY 8 HOURS PRN
Status: DISCONTINUED | OUTPATIENT
Start: 2017-09-18 | End: 2017-09-18

## 2017-09-18 RX ORDER — MAGNESIUM SULFATE HEPTAHYDRATE 40 MG/ML
2 INJECTION, SOLUTION INTRAVENOUS ONCE
Status: COMPLETED | OUTPATIENT
Start: 2017-09-18 | End: 2017-09-18

## 2017-09-18 RX ORDER — DEXTROSE MONOHYDRATE AND SODIUM CHLORIDE 5; .45 G/100ML; G/100ML
INJECTION, SOLUTION INTRAVENOUS CONTINUOUS
Status: DISCONTINUED | OUTPATIENT
Start: 2017-09-18 | End: 2017-09-18

## 2017-09-18 RX ORDER — POTASSIUM CHLORIDE 7.45 MG/ML
10 INJECTION INTRAVENOUS
Status: DISCONTINUED | OUTPATIENT
Start: 2017-09-18 | End: 2017-09-18

## 2017-09-18 RX ORDER — NAPROXEN SODIUM 220 MG/1
81 TABLET, FILM COATED ORAL DAILY
Status: DISCONTINUED | OUTPATIENT
Start: 2017-09-19 | End: 2017-09-21

## 2017-09-18 RX ORDER — AMLODIPINE BESYLATE 10 MG/1
10 TABLET ORAL DAILY
Status: DISCONTINUED | OUTPATIENT
Start: 2017-09-18 | End: 2017-09-23

## 2017-09-18 RX ORDER — DEXTROSE MONOHYDRATE 50 MG/ML
INJECTION, SOLUTION INTRAVENOUS CONTINUOUS
Status: ACTIVE | OUTPATIENT
Start: 2017-09-18 | End: 2017-09-19

## 2017-09-18 RX ORDER — CLONIDINE 0.1 MG/24H
1 PATCH, EXTENDED RELEASE TRANSDERMAL
Status: DISCONTINUED | OUTPATIENT
Start: 2017-09-18 | End: 2017-09-28 | Stop reason: HOSPADM

## 2017-09-18 RX ORDER — HYDRALAZINE HYDROCHLORIDE 50 MG/1
100 TABLET, FILM COATED ORAL EVERY 8 HOURS
Status: DISCONTINUED | OUTPATIENT
Start: 2017-09-18 | End: 2017-09-19

## 2017-09-18 RX ORDER — DEXTROSE MONOHYDRATE 50 MG/ML
INJECTION, SOLUTION INTRAVENOUS CONTINUOUS
Status: DISCONTINUED | OUTPATIENT
Start: 2017-09-18 | End: 2017-09-18

## 2017-09-18 RX ORDER — ENOXAPARIN SODIUM 100 MG/ML
40 INJECTION SUBCUTANEOUS EVERY 24 HOURS
Status: DISCONTINUED | OUTPATIENT
Start: 2017-09-18 | End: 2017-09-28 | Stop reason: HOSPADM

## 2017-09-18 RX ORDER — BISACODYL 10 MG
10 SUPPOSITORY, RECTAL RECTAL DAILY PRN
Status: DISCONTINUED | OUTPATIENT
Start: 2017-09-18 | End: 2017-09-18

## 2017-09-18 RX ORDER — ATORVASTATIN CALCIUM 10 MG/1
10 TABLET, FILM COATED ORAL DAILY
Status: DISCONTINUED | OUTPATIENT
Start: 2017-09-19 | End: 2017-09-21

## 2017-09-18 RX ORDER — HYDRALAZINE HYDROCHLORIDE 20 MG/ML
10 INJECTION INTRAMUSCULAR; INTRAVENOUS EVERY 4 HOURS PRN
Status: DISCONTINUED | OUTPATIENT
Start: 2017-09-18 | End: 2017-09-22

## 2017-09-18 RX ORDER — BISACODYL 10 MG
10 SUPPOSITORY, RECTAL RECTAL DAILY
Status: DISCONTINUED | OUTPATIENT
Start: 2017-09-18 | End: 2017-09-23

## 2017-09-18 RX ADMIN — POTASSIUM CHLORIDE 10 MEQ: 10 INJECTION, SOLUTION INTRAVENOUS at 06:09

## 2017-09-18 RX ADMIN — ACETYLCYSTEINE 4 ML: 100 INHALANT RESPIRATORY (INHALATION) at 09:09

## 2017-09-18 RX ADMIN — NITROGLYCERIN 1 INCH: 20 OINTMENT TOPICAL at 02:09

## 2017-09-18 RX ADMIN — CLONIDINE 1 PATCH: 0.1 PATCH TRANSDERMAL at 11:09

## 2017-09-18 RX ADMIN — POTASSIUM CHLORIDE 10 MEQ: 10 INJECTION, SOLUTION INTRAVENOUS at 07:09

## 2017-09-18 RX ADMIN — DEXTROSE: 5 SOLUTION INTRAVENOUS at 03:09

## 2017-09-18 RX ADMIN — ACETYLCYSTEINE 4 ML: 100 INHALANT RESPIRATORY (INHALATION) at 08:09

## 2017-09-18 RX ADMIN — IPRATROPIUM BROMIDE AND ALBUTEROL SULFATE 3 ML: .5; 3 SOLUTION RESPIRATORY (INHALATION) at 12:09

## 2017-09-18 RX ADMIN — MAGNESIUM SULFATE IN WATER 2 G: 40 INJECTION, SOLUTION INTRAVENOUS at 07:09

## 2017-09-18 RX ADMIN — IPRATROPIUM BROMIDE AND ALBUTEROL SULFATE 3 ML: .5; 3 SOLUTION RESPIRATORY (INHALATION) at 09:09

## 2017-09-18 RX ADMIN — PIPERACILLIN AND TAZOBACTAM 4.5 G: 4; .5 INJECTION, POWDER, FOR SOLUTION INTRAVENOUS at 06:09

## 2017-09-18 RX ADMIN — LEVETIRACETAM 500 MG: 5 INJECTION INTRAVENOUS at 11:09

## 2017-09-18 RX ADMIN — IPRATROPIUM BROMIDE AND ALBUTEROL SULFATE 3 ML: .5; 3 SOLUTION RESPIRATORY (INHALATION) at 03:09

## 2017-09-18 RX ADMIN — POTASSIUM CHLORIDE 10 MEQ: 10 INJECTION, SOLUTION INTRAVENOUS at 08:09

## 2017-09-18 RX ADMIN — POTASSIUM CHLORIDE 10 MEQ: 7.46 INJECTION, SOLUTION INTRAVENOUS at 04:09

## 2017-09-18 RX ADMIN — BISACODYL 10 MG: 10 SUPPOSITORY RECTAL at 07:09

## 2017-09-18 RX ADMIN — ENOXAPARIN SODIUM 40 MG: 100 INJECTION SUBCUTANEOUS at 07:09

## 2017-09-18 RX ADMIN — LABETALOL HYDROCHLORIDE 10 MG: 5 INJECTION, SOLUTION INTRAVENOUS at 03:09

## 2017-09-18 NOTE — CONSULTS
History & Physical  Surgery      SUBJECTIVE:     Chief Complaint/Reason for Consult: ileus    History of Present Illness: Nisa Frank is a 67 y.o. female with h/o DM 2, CKD 3, CAD s/p CABG, HTN, CBA with bihemispheric watershed infarcts who underwent Trach/PEG by Dr. Story in July 2017.  She is currently admitted for encephalopathy of unknown origin.  Her PEG has since been removed.  KUB from this morning showed a small bowel ileus and air/stool in her colon.  General Surgery consulted for NG tube placement and management of ileus.      No current facility-administered medications on file prior to encounter.      Current Outpatient Prescriptions on File Prior to Encounter   Medication Sig    acetaminophen (TYLENOL) 650 mg/20.3 mL Soln 20.3 mLs (650 mg total) by Per NG tube route every 6 (six) hours as needed. (Patient taking differently: 650 mg by Per NG tube route every 6 (six) hours as needed for Pain. )    albuterol-ipratropium 2.5mg-0.5mg/3mL (DUO-NEB) 0.5 mg-3 mg(2.5 mg base)/3 mL nebulizer solution Take 3 mLs by nebulization every 4 (four) hours. Rescue    aspirin 81 MG Chew 1 tablet (81 mg total) by Per G Tube route once daily.    atorvastatin (LIPITOR) 10 MG tablet 1 tablet (10 mg total) by Per G Tube route once daily. (Patient taking differently: 10 mg by Per G Tube route every evening. )    cloNIDine 0.1 mg/24 hr td ptwk (CATAPRES) 0.1 mg/24 hr Place 1 patch onto the skin every 7 days. (Patient taking differently: Place 1 patch onto the skin every Tuesday. )    fluoxetine (PROZAC) 20 MG capsule 1 capsule (20 mg total) by Per G Tube route once daily.    hydrALAZINE (APRESOLINE) 100 MG tablet 1 tablet (100 mg total) by Per G Tube route every 8 (eight) hours.    insulin aspart (NOVOLOG) 100 unit/mL InPn pen Inject 1-10 Units into the skin every 4 (four) hours as needed (Hyperglycemia).    meclizine (ANTIVERT) 25 mg tablet Take 1 tablet (25 mg total) by mouth every 6 (six) hours as needed.  (Patient taking differently: Take 25 mg by mouth every 6 (six) hours as needed for Dizziness. )    pantoprazole (PROTONIX) 40 mg GrPS 1 packet (40 mg total) by Per G Tube route once daily.    amantadine HCl (SYMMETREL) 100 mg capsule 1 capsule (100 mg total) by Per G Tube route every 8 (eight) hours.    amlodipine (NORVASC) 10 MG tablet 1 tablet (10 mg total) by Per G Tube route once daily.    diclofenac sodium (VOLTAREN) 1 % Gel Apply 2 g topically 2 (two) times daily.    EASY TOUCH TWIST LANCETS 30 gauge Misc     HEPARIN SODIUM,PORCINE (HEPARIN, PORCINE,) 5,000 unit/mL injection Inject 1.5 mLs (7,500 Units total) into the skin every 8 (eight) hours.       Review of patient's allergies indicates:   Allergen Reactions    Latex, natural rubber        Past Medical History:   Diagnosis Date    Acute bilateral cerebral infarction in a watershed distribution 2017    CAD (coronary artery disease)     Diabetes mellitus     Hypertension     Morbid obesity     TAMMY on CPAP     setting +17    Stroke      Past Surgical History:   Procedure Laterality Date    ARTERIAL BYPASS SURGRY      9 tears sgo     SECTION      CORONARY ARTERY BYPASS GRAFT      HYSTERECTOMY      TUBAL LIGATION       Family History   Problem Relation Age of Onset    No Known Problems Father     No Known Problems Mother     No Known Problems Sister     No Known Problems Brother     No Known Problems Maternal Aunt     No Known Problems Maternal Uncle     No Known Problems Paternal Aunt     No Known Problems Paternal Uncle     No Known Problems Maternal Grandmother     No Known Problems Maternal Grandfather     No Known Problems Paternal Grandmother     No Known Problems Paternal Grandfather     Amblyopia Neg Hx     Blindness Neg Hx     Cancer Neg Hx     Cataracts Neg Hx     Diabetes Neg Hx     Glaucoma Neg Hx     Hypertension Neg Hx     Macular degeneration Neg Hx     Retinal detachment Neg Hx     Strabismus  Neg Hx     Stroke Neg Hx     Thyroid disease Neg Hx      Social History   Substance Use Topics    Smoking status: Never Smoker    Smokeless tobacco: Never Used    Alcohol use Yes      Comment: occ        Review of Systems   Unable to obtain, patient non verbal    OBJECTIVE:     Vital Signs (Most Recent)  Temp: 98.4 °F (36.9 °C) (09/18/17 1100)  Pulse: 84 (09/18/17 1537)  Resp: 18 (09/18/17 1537)  BP: (!) 190/92 (09/18/17 1505)  SpO2: 98 % (09/18/17 1537)    Physical Exam   Constitutional: She appears well-developed and well-nourished. No distress.   HENT:   Head: Normocephalic and atraumatic.   Eyes: EOM are normal. Pupils are equal, round, and reactive to light. Scleral icterus is present.   Neck: Normal range of motion.   Tracheostomy in place   Cardiovascular: Normal rate and intact distal pulses.    Pulmonary/Chest: Effort normal. No respiratory distress.   Abdominal: Soft. She exhibits no mass. There is no tenderness. There is no rebound and no guarding. No hernia.   Morbidly obese.  Hypoactive BS.  LUQ healed PEG tube site.   Musculoskeletal: Normal range of motion. She exhibits no deformity.   Neurological:   Spontaneously opens eyes, moves all extremities.  Does not follow commands.   Skin: Skin is warm and dry. No erythema.        Laboratory  CBC:   Recent Labs  Lab 09/18/17  0527   WBC 5.35   RBC 3.47*   HGB 9.1*   HCT 29.0*      MCV 84   MCH 26.2*   MCHC 31.4*     CMP:   Recent Labs  Lab 09/16/17  0436  09/18/17  1201   GLU 74  < > 85   CALCIUM 8.5*  < > 8.6*   ALBUMIN 2.1*  --   --    PROT 5.9*  --   --    *  < > 148*   K 3.9  < > 4.4   CO2 24  < > 24   *  < > 114*   BUN 27*  < > 14   CREATININE 0.7  < > 0.7   ALKPHOS 92  --   --    ALT 12  --   --    AST 7*  --   --    BILITOT 1.2*  --   --    < > = values in this interval not displayed.    Diagnostic Results:  X-Ray: Reviewed    ASSESSMENT/PLAN:     A/P:  66 yo F with encephalopathy and ileus    Nursing placed NG tube while I  was at bedside    KUB to check positioning    NG tube to LIWS until return of bowel function    Recommend dulcolax suppository/enema daily    Replace lytes    Will continue to follow    Please call with questions    Myron Blunt  General Surgery, PGY-5  Pager # [phone]

## 2017-09-18 NOTE — PLAN OF CARE
Problem: Patient Care Overview  Goal: Plan of Care Review  Outcome: Ongoing (interventions implemented as appropriate)  Patient trached and nonverbal. Plan of care and all safety precautions maintained and discussed with patient and daughter. Patient follows commands intermittently. Trach collar at 5L 28% humidified. SBP >180 PRN Hydralazine & Labetalol administered. Ross catheter to gravity. Patient remains free from injury/distress. Will continue to monitor.

## 2017-09-18 NOTE — ASSESSMENT & PLAN NOTE
History of recent CVA with recent admission to rehab, per family acute worsening 1 week ago following trach replacement. OSH records in chart. Head CT unchanged. Per family patient was communicating prior to trach replacement. However, per OSH records patient has been obtunded for the past couple of days. Difficult to determine patients true baseline?    Concern for Anoxic brain injury during trach removal/replacement, CVA, Seizure, Worsening Sepsis. Urine culture no growth.  MRI no acute hemorrhagic pathology. Patient had a BM.       - GCS of 8, however protecting airway with trach, stable for floor    - continue Zosyn tx for presumed VAP. End date is on 9/19

## 2017-09-18 NOTE — SUBJECTIVE & OBJECTIVE
Past Medical History:   Diagnosis Date    Acute bilateral cerebral infarction in a watershed distribution 2017    CAD (coronary artery disease)     Diabetes mellitus     Hypertension     Morbid obesity     TAMMY on CPAP     setting +17    Stroke        Past Surgical History:   Procedure Laterality Date    ARTERIAL BYPASS SURGRY      9 tears sgo     SECTION      CORONARY ARTERY BYPASS GRAFT      HYSTERECTOMY      TUBAL LIGATION         Review of patient's allergies indicates:   Allergen Reactions    Latex, natural rubber        Current Neurological Medications:     No current facility-administered medications on file prior to encounter.      Current Outpatient Prescriptions on File Prior to Encounter   Medication Sig    acetaminophen (TYLENOL) 650 mg/20.3 mL Soln 20.3 mLs (650 mg total) by Per NG tube route every 6 (six) hours as needed. (Patient taking differently: 650 mg by Per NG tube route every 6 (six) hours as needed for Pain. )    albuterol-ipratropium 2.5mg-0.5mg/3mL (DUO-NEB) 0.5 mg-3 mg(2.5 mg base)/3 mL nebulizer solution Take 3 mLs by nebulization every 4 (four) hours. Rescue    aspirin 81 MG Chew 1 tablet (81 mg total) by Per G Tube route once daily.    atorvastatin (LIPITOR) 10 MG tablet 1 tablet (10 mg total) by Per G Tube route once daily. (Patient taking differently: 10 mg by Per G Tube route every evening. )    cloNIDine 0.1 mg/24 hr td ptwk (CATAPRES) 0.1 mg/24 hr Place 1 patch onto the skin every 7 days. (Patient taking differently: Place 1 patch onto the skin every Tuesday. )    fluoxetine (PROZAC) 20 MG capsule 1 capsule (20 mg total) by Per G Tube route once daily.    hydrALAZINE (APRESOLINE) 100 MG tablet 1 tablet (100 mg total) by Per G Tube route every 8 (eight) hours.    insulin aspart (NOVOLOG) 100 unit/mL InPn pen Inject 1-10 Units into the skin every 4 (four) hours as needed (Hyperglycemia).    meclizine (ANTIVERT) 25 mg tablet Take 1 tablet (25 mg total)  by mouth every 6 (six) hours as needed. (Patient taking differently: Take 25 mg by mouth every 6 (six) hours as needed for Dizziness. )    pantoprazole (PROTONIX) 40 mg GrPS 1 packet (40 mg total) by Per G Tube route once daily.    amantadine HCl (SYMMETREL) 100 mg capsule 1 capsule (100 mg total) by Per G Tube route every 8 (eight) hours.    amlodipine (NORVASC) 10 MG tablet 1 tablet (10 mg total) by Per G Tube route once daily.    diclofenac sodium (VOLTAREN) 1 % Gel Apply 2 g topically 2 (two) times daily.    EASY TOUCH TWIST LANCETS 30 gauge Misc     HEPARIN SODIUM,PORCINE (HEPARIN, PORCINE,) 5,000 unit/mL injection Inject 1.5 mLs (7,500 Units total) into the skin every 8 (eight) hours.     Family History     Problem Relation (Age of Onset)    No Known Problems Father, Mother, Sister, Brother, Maternal Aunt, Maternal Uncle, Paternal Aunt, Paternal Uncle, Maternal Grandmother, Maternal Grandfather, Paternal Grandmother, Paternal Grandfather        Social History Main Topics    Smoking status: Never Smoker    Smokeless tobacco: Never Used    Alcohol use Yes      Comment: occ    Drug use: No    Sexual activity: Not Currently     Birth control/ protection: See Surgical Hx     Interval History:     NAEON. No active concerns for agitations.     Patient appeared more alert / awake today, with follow of simple commands.     Review of Systems   Unable to perform ROS: Patient nonverbal   Constitutional: Negative for chills and fever.   Respiratory: Negative for shortness of breath.    Cardiovascular: Negative for chest pain.   Neurological: Negative for syncope and facial asymmetry.     Objective:     Vital Signs (Most Recent):  Temp: 98.4 °F (36.9 °C) (09/18/17 1100)  Pulse: 84 (09/18/17 1537)  Resp: 18 (09/18/17 1537)  BP: (!) 190/92 (09/18/17 1505)  SpO2: 98 % (09/18/17 1537) Vital Signs (24h Range):  Temp:  [98.2 °F (36.8 °C)-99.4 °F (37.4 °C)] 98.4 °F (36.9 °C)  Pulse:  [52-84] 84  Resp:  [16-20] 18  SpO2:   [95 %-100 %] 98 %  BP: (146-220)/() 190/92     Weight: 132.9 kg (293 lb)  Body mass index is 47.29 kg/m².    Physical Exam   Constitutional: She appears listless. She is uncooperative.   HENT:   Head: Normocephalic and atraumatic.   Eyes: EOM are normal. Pupils are equal, round, and reactive to light.   Neck:   Trach in place   Cardiovascular: Normal rate, regular rhythm and normal heart sounds.    Pulses:       Radial pulses are 2+ on the right side, and 2+ on the left side.   Difficult to assess the heart sounds 2/2 breathing noise through the trach   Pulmonary/Chest: Effort normal. No tachypnea. She has no decreased breath sounds. She has no wheezes.   Abdominal: Soft. She exhibits no distension. There is no tenderness.   Neurological: She appears listless. She exhibits normal muscle tone. She displays no seizure activity. GCS eye subscore is 4. GCS verbal subscore is 1. GCS motor subscore is 5.   Reflex Scores:       Tricep reflexes are 1+ on the right side and 1+ on the left side.       Bicep reflexes are 1+ on the right side and 1+ on the left side.       Brachioradialis reflexes are 1+ on the right side and 1+ on the left side.       Patellar reflexes are 1+ on the right side and 1+ on the left side.  Skin: No rash noted. No erythema.   Psychiatric: Her affect is blunt. She is withdrawn. She is noncommunicative. She is inattentive.   Vitals reviewed.      NEUROLOGICAL EXAMINATION:     MENTAL STATUS   Disoriented to person: NT / Trach in place / Mute    Attention: normal.   Speech: mute   Level of consciousness: alert (Awake follow simple commands)  General knowledge: NT / Trach in place / Mute      CRANIAL NERVES     CN II   Visual fields full to confrontation.     CN III, IV, VI   Pupils are equal, round, and reactive to light.  Extraocular motions are normal.   Right pupil: Size: 3 mm. Shape: regular.   Left pupil: Size: 3 mm. Shape: regular.   CN III: no CN III palsy  CN VI: no CN VI  palsy  Nystagmus: none     CN V   Facial sensation intact.     CN VII   Facial expression full, symmetric.     CN VIII   CN VIII normal.     CN IX, X   CN IX normal.   CN X normal.     CN XI   Right sternocleidomastoid strength: weak  Left sternocleidomastoid strength: weak    CN XII   CN XII normal.     MOTOR EXAM   Muscle bulk: normal  Overall muscle tone: normal       B/L UE & LE weakness 4/5, with movement of extremities to pain     REFLEXES     Reflexes   Right brachioradialis: 1+  Left brachioradialis: 1+  Right biceps: 1+  Left biceps: 1+  Right triceps: 1+  Left triceps: 1+  Right patellar: 1+  Left patellar: 1+  Right plantar: equivocal  Left plantar: equivocal    SENSORY EXAM   Pinprick normal.     GAIT AND COORDINATION     Gait  Gait: (NT)    Tremor   Resting tremor: absent      Significant Labs:   Blood Culture: No results for input(s): LABBLOO in the last 48 hours.  CBC:     Recent Labs  Lab 09/17/17  0407 09/18/17  0527   WBC 6.10 5.35   HGB 8.8* 9.1*   HCT 28.7* 29.0*    187     CMP:   Recent Labs  Lab 09/17/17  0407  09/17/17  2020 09/18/17  0527 09/18/17  1201 09/18/17  1643   *  < > 101 90 85  --    *  151*  < > 148* 149*  149* 148* 148*   K 3.7  < > 4.2 3.3* 4.4  --    *  < > 113* 112* 114*  --    CO2 26  < > 25 28 24  --    BUN 23  < > 18 15 14  --    CREATININE 0.8  < > 0.7 0.7 0.7  --    CALCIUM 8.6*  < > 8.6* 8.6* 8.6*  --    MG 2.0  --   --  1.8  --   --    ANIONGAP 11  < > 10 9 10  --    EGFRNONAA >60.0  < > >60.0 >60.0 >60.0  --    < > = values in this interval not displayed.  Urine Culture: No results for input(s): LABURIN in the last 48 hours.    Significant Imaging: CT: I have reviewed all pertinent results/findings within the past 24 hours and my personal findings are:  no acute interval change compared to the previous study  EEG: I have reviewed all pertinent results/findings within the past 24 hours and my personal findings are:  generalized slowing  indicative of a moderate-severe nonspecific encephalopathy, no epileptic activity

## 2017-09-18 NOTE — NURSING
Telephone conference with resident reported SBP left arm 216/95 and right arm 191/89, patient HR 59.  Orders to be reviewed, no new orders at present. Will monitor.

## 2017-09-18 NOTE — SUBJECTIVE & OBJECTIVE
Interval History: patient is stable.     Review of Systems   Unable to perform ROS: Patient nonverbal     Objective:     Vital Signs (Most Recent):  Temp: 98.4 °F (36.9 °C) (09/18/17 1100)  Pulse: 60 (09/18/17 1505)  Resp: 16 (09/18/17 1505)  BP: (!) 190/92 (09/18/17 1505)  SpO2: 98 % (09/18/17 1100) Vital Signs (24h Range):  Temp:  [98.2 °F (36.8 °C)-99.4 °F (37.4 °C)] 98.4 °F (36.9 °C)  Pulse:  [52-80] 60  Resp:  [16-20] 16  SpO2:  [94 %-100 %] 98 %  BP: (146-220)/() 190/92     Weight: 132.9 kg (293 lb)  Body mass index is 47.29 kg/m².    Intake/Output Summary (Last 24 hours) at 09/18/17 1554  Last data filed at 09/18/17 0649   Gross per 24 hour   Intake              540 ml   Output              775 ml   Net             -235 ml      Physical Exam   Constitutional: She appears well-developed and well-nourished. No distress.   HENT:   Head: Normocephalic and atraumatic.   Neck: Neck supple. No JVD present.   Cardiovascular: Normal rate, regular rhythm and normal heart sounds.    No murmur heard.  Pulmonary/Chest: Effort normal and breath sounds normal. No respiratory distress. She has no wheezes.   Abdominal: Soft. Bowel sounds are normal. She exhibits no distension. There is no guarding.   Musculoskeletal: She exhibits no edema.   Neurological: She is alert.   Not tracking with her eyes.   Sometimes she squeezes my fingers with her hands.   She moves her limbs spontaneously.    Skin: She is not diaphoretic.   Psychiatric: She has a normal mood and affect.   Vitals reviewed.      Significant Labs:   CBC:   Recent Labs  Lab 09/17/17  0407 09/18/17  0527   WBC 6.10 5.35   HGB 8.8* 9.1*   HCT 28.7* 29.0*    187     CMP:   Recent Labs  Lab 09/17/17  2020 09/18/17  0527 09/18/17  1201   * 149*  149* 148*   K 4.2 3.3* 4.4   * 112* 114*   CO2 25 28 24    90 85   BUN 18 15 14   CREATININE 0.7 0.7 0.7   CALCIUM 8.6* 8.6* 8.6*   ANIONGAP 10 9 10   EGFRNONAA >60.0 >60.0 >60.0       Significant  Imaging: I have reviewed all pertinent imaging results/findings within the past 24 hours. her abdominal x-ray shows distended bowl loops. But no free air under diaphragm.

## 2017-09-18 NOTE — PROGRESS NOTES
Pharmacist Renal Dose Adjustment Note    Nisa Frank is a 67 y.o. female being treated with the medication Lovenox    Patient Data:    Vital Signs (Most Recent):  Temp: 98.8 °F (37.1 °C) (09/18/17 0800)  Pulse: 62 (09/18/17 1036)  Resp: 16 (09/18/17 0913)  BP: (!) 198/89 (09/18/17 0801)  SpO2: 98 % (09/18/17 0913)   Vital Signs (72h Range):  Temp:  [97.3 °F (36.3 °C)-99.4 °F (37.4 °C)]   Pulse:  [52-87]   Resp:  [16-20]   BP: (146-220)/()   SpO2:  [87 %-100 %]        Recent Labs     Lab 09/17/17  1044 09/17/17  2020 09/18/17  0527   CREATININE 0.8 0.7 0.7     Serum creatinine: 0.7 mg/dL 09/18/17 0527  Estimated creatinine clearance: 109.2 mL/min    Medication:Lovenox 30 mg QD will be changed to Lovenox 40 mg QD.    Pharmacist's Name: Aubrie Self  Pharmacist's Extension: [phone]

## 2017-09-18 NOTE — ASSESSMENT & PLAN NOTE
- 68 y/o f with a complicate course of the hospital stay after a bihemispheric watershed infarcts s/p trach/PEG, DM-2, CKD3, CAD s/p CABG, HTN, who was transferred from [place] to [facility] for encephalopathy and is consulted to the neurology team for the same and possible seizure.   - Complicated post-stroke course, with aspiration PNA, trach dislodgement / mucous plug obstruction / UTIs, with poor mentation post trach replacement     - Head CT scan with no interval change   - 2 EEGs on 8/1 and 9/14, revealing generalized slowing indicative of nonspecific encephalopathy. \  - labs, she has elevated Na~150/ UTI concerns     DDx encephalopathy multifactorial metabolic/ infectious / electrolyte disturbance at the background of watershed strokes     Plan:   - Continue to correct Na /  Correct electrolyte abnormality as needed   - discontinue keppra / no active concerns on EEG  - secondary stroke preventions : ASA / statins / anti-coagulation if needed / prevent cerebral hypoperfusions   - HTN control/ target < 160/90 mmHg, resume home meds   - Control underlying infections   - delirium precautions / neuro check

## 2017-09-18 NOTE — PROGRESS NOTES
[facility]  Neurology  Progress Note    Patient Name: Nisa Frank  MRN: [id number]  Admission Date: 2017  Hospital Length of Stay: 5 days  Code Status: Full Code   Attending Provider: Bay Henao, *  Primary Care Physician: Atilio Downs MD   Principal Problem:Acute encephalopathy    Past Medical History:   Diagnosis Date    Acute bilateral cerebral infarction in a watershed distribution 2017    CAD (coronary artery disease)     Diabetes mellitus     Hypertension     Morbid obesity     TAMMY on CPAP     setting +17    Stroke        Past Surgical History:   Procedure Laterality Date    ARTERIAL BYPASS SURGRY      9 tears sgo     SECTION      CORONARY ARTERY BYPASS GRAFT      HYSTERECTOMY      TUBAL LIGATION         Review of patient's allergies indicates:   Allergen Reactions    Latex, natural rubber        Current Neurological Medications:     No current facility-administered medications on file prior to encounter.      Current Outpatient Prescriptions on File Prior to Encounter   Medication Sig    acetaminophen (TYLENOL) 650 mg/20.3 mL Soln 20.3 mLs (650 mg total) by Per NG tube route every 6 (six) hours as needed. (Patient taking differently: 650 mg by Per NG tube route every 6 (six) hours as needed for Pain. )    albuterol-ipratropium 2.5mg-0.5mg/3mL (DUO-NEB) 0.5 mg-3 mg(2.5 mg base)/3 mL nebulizer solution Take 3 mLs by nebulization every 4 (four) hours. Rescue    aspirin 81 MG Chew 1 tablet (81 mg total) by Per G Tube route once daily.    atorvastatin (LIPITOR) 10 MG tablet 1 tablet (10 mg total) by Per G Tube route once daily. (Patient taking differently: 10 mg by Per G Tube route every evening. )    cloNIDine 0.1 mg/24 hr td ptwk (CATAPRES) 0.1 mg/24 hr Place 1 patch onto the skin every 7 days. (Patient taking differently: Place 1 patch onto the skin every Tuesday. )    fluoxetine (PROZAC) 20 MG capsule 1 capsule (20 mg total) by Per G  Tube route once daily.    hydrALAZINE (APRESOLINE) 100 MG tablet 1 tablet (100 mg total) by Per G Tube route every 8 (eight) hours.    insulin aspart (NOVOLOG) 100 unit/mL InPn pen Inject 1-10 Units into the skin every 4 (four) hours as needed (Hyperglycemia).    meclizine (ANTIVERT) 25 mg tablet Take 1 tablet (25 mg total) by mouth every 6 (six) hours as needed. (Patient taking differently: Take 25 mg by mouth every 6 (six) hours as needed for Dizziness. )    pantoprazole (PROTONIX) 40 mg GrPS 1 packet (40 mg total) by Per G Tube route once daily.    amantadine HCl (SYMMETREL) 100 mg capsule 1 capsule (100 mg total) by Per G Tube route every 8 (eight) hours.    amlodipine (NORVASC) 10 MG tablet 1 tablet (10 mg total) by Per G Tube route once daily.    diclofenac sodium (VOLTAREN) 1 % Gel Apply 2 g topically 2 (two) times daily.    EASY TOUCH TWIST LANCETS 30 gauge Misc     HEPARIN SODIUM,PORCINE (HEPARIN, PORCINE,) 5,000 unit/mL injection Inject 1.5 mLs (7,500 Units total) into the skin every 8 (eight) hours.     Family History     Problem Relation (Age of Onset)    No Known Problems Father, Mother, Sister, Brother, Maternal Aunt, Maternal Uncle, Paternal Aunt, Paternal Uncle, Maternal Grandmother, Maternal Grandfather, Paternal Grandmother, Paternal Grandfather        Social History Main Topics    Smoking status: Never Smoker    Smokeless tobacco: Never Used    Alcohol use Yes      Comment: occ    Drug use: No    Sexual activity: Not Currently     Birth control/ protection: See Surgical Hx     Interval History:     NAEON. No active concerns for agitations.     Patient appeared more alert / awake today, with follow of simple commands.     Review of Systems   Unable to perform ROS: Patient nonverbal   Constitutional: Negative for chills and fever.   Respiratory: Negative for shortness of breath.    Cardiovascular: Negative for chest pain.   Neurological: Negative for syncope and facial asymmetry.      Objective:     Vital Signs (Most Recent):  Temp: 98.4 °F (36.9 °C) (09/18/17 1100)  Pulse: 84 (09/18/17 1537)  Resp: 18 (09/18/17 1537)  BP: (!) 190/92 (09/18/17 1505)  SpO2: 98 % (09/18/17 1537) Vital Signs (24h Range):  Temp:  [98.2 °F (36.8 °C)-99.4 °F (37.4 °C)] 98.4 °F (36.9 °C)  Pulse:  [52-84] 84  Resp:  [16-20] 18  SpO2:  [95 %-100 %] 98 %  BP: (146-220)/() 190/92     Weight: 132.9 kg (293 lb)  Body mass index is 47.29 kg/m².    Physical Exam   Constitutional: She appears listless. She is uncooperative.   HENT:   Head: Normocephalic and atraumatic.   Eyes: EOM are normal. Pupils are equal, round, and reactive to light.   Neck:   Trach in place   Cardiovascular: Normal rate, regular rhythm and normal heart sounds.    Pulses:       Radial pulses are 2+ on the right side, and 2+ on the left side.   Difficult to assess the heart sounds 2/2 breathing noise through the trach   Pulmonary/Chest: Effort normal. No tachypnea. She has no decreased breath sounds. She has no wheezes.   Abdominal: Soft. She exhibits no distension. There is no tenderness.   Neurological: She appears listless. She exhibits normal muscle tone. She displays no seizure activity. GCS eye subscore is 4. GCS verbal subscore is 1. GCS motor subscore is 5.   Reflex Scores:       Tricep reflexes are 1+ on the right side and 1+ on the left side.       Bicep reflexes are 1+ on the right side and 1+ on the left side.       Brachioradialis reflexes are 1+ on the right side and 1+ on the left side.       Patellar reflexes are 1+ on the right side and 1+ on the left side.  Skin: No rash noted. No erythema.   Psychiatric: Her affect is blunt. She is withdrawn. She is noncommunicative. She is inattentive.   Vitals reviewed.      NEUROLOGICAL EXAMINATION:     MENTAL STATUS   Disoriented to person: NT / Trach in place / Mute    Attention: normal.   Speech: mute   Level of consciousness: alert (Awake follow simple commands)  General knowledge: NT /  Trach in place / Mute      CRANIAL NERVES     CN II   Visual fields full to confrontation.     CN III, IV, VI   Pupils are equal, round, and reactive to light.  Extraocular motions are normal.   Right pupil: Size: 3 mm. Shape: regular.   Left pupil: Size: 3 mm. Shape: regular.   CN III: no CN III palsy  CN VI: no CN VI palsy  Nystagmus: none     CN V   Facial sensation intact.     CN VII   Facial expression full, symmetric.     CN VIII   CN VIII normal.     CN IX, X   CN IX normal.   CN X normal.     CN XI   Right sternocleidomastoid strength: weak  Left sternocleidomastoid strength: weak    CN XII   CN XII normal.     MOTOR EXAM   Muscle bulk: normal  Overall muscle tone: normal       B/L UE & LE weakness 4/5, with movement of extremities to pain     REFLEXES     Reflexes   Right brachioradialis: 1+  Left brachioradialis: 1+  Right biceps: 1+  Left biceps: 1+  Right triceps: 1+  Left triceps: 1+  Right patellar: 1+  Left patellar: 1+  Right plantar: equivocal  Left plantar: equivocal    SENSORY EXAM   Pinprick normal.     GAIT AND COORDINATION     Gait  Gait: (NT)    Tremor   Resting tremor: absent      Significant Labs:   Blood Culture: No results for input(s): LABBLOO in the last 48 hours.  CBC:     Recent Labs  Lab 09/17/17  0407 09/18/17  0527   WBC 6.10 5.35   HGB 8.8* 9.1*   HCT 28.7* 29.0*    187     CMP:   Recent Labs  Lab 09/17/17  0407  09/17/17  2020 09/18/17  0527 09/18/17  1201 09/18/17  1643   *  < > 101 90 85  --    *  151*  < > 148* 149*  149* 148* 148*   K 3.7  < > 4.2 3.3* 4.4  --    *  < > 113* 112* 114*  --    CO2 26  < > 25 28 24  --    BUN 23  < > 18 15 14  --    CREATININE 0.8  < > 0.7 0.7 0.7  --    CALCIUM 8.6*  < > 8.6* 8.6* 8.6*  --    MG 2.0  --   --  1.8  --   --    ANIONGAP 11  < > 10 9 10  --    EGFRNONAA >60.0  < > >60.0 >60.0 >60.0  --    < > = values in this interval not displayed.  Urine Culture: No results for input(s): LABURIN in the last 48  hours.    Significant Imaging: CT: I have reviewed all pertinent results/findings within the past 24 hours and my personal findings are:  no acute interval change compared to the previous study  EEG: I have reviewed all pertinent results/findings within the past 24 hours and my personal findings are:  generalized slowing indicative of a moderate-severe nonspecific encephalopathy, no epileptic activity    Assessment and Plan:     * Acute encephalopathy    - 66 y/o f with a complicate course of the hospital stay after a bihemispheric watershed infarcts s/p trach/PEG, DM-2, CKD3, CAD s/p CABG, HTN, who was [place] from [place] [facility] [place] and is consulted to the neurology team for the same and possible seizure.   - Complicated post-stroke course, with aspiration PNA, trach dislodgement / mucous plug obstruction / UTIs, with poor mentation post trach replacement     - Head CT scan with no interval change   - 2 EEGs on 8/1 and 9/14, revealing generalized slowing indicative of nonspecific encephalopathy. \  - labs, she has elevated Na~150/ UTI concerns     DDx encephalopathy multifactorial metabolic/ infectious / electrolyte disturbance at the background of watershed strokes     Plan:   - Continue to correct Na /  Correct electrolyte abnormality as needed   - discontinue keppra / no active concerns on EEG  - secondary stroke preventions : ASA / statins / anti-coagulation if needed / prevent cerebral hypoperfusions   - HTN control/ target < 160/90 mmHg, resume home meds   - Control underlying infections   - delirium precautions / neuro check        Respiratory insufficiency    - avoid hypoxias         Chronic bilateral cerebral infarction in watershed distribution    - secondary stroke prevention  - ASA/ statins if not contraindicated  - HTN target < 160/90 mmHg        Hypertensive encephalopathy    - As per primary team   - prevent HTN encephalopathy         Hyperlipidemia    - continue statins          Hypertensive emergency    - target < 160/90 mmHg        Type 2 diabetes mellitus, uncontrolled    - stroke risk factor  - as per primary team             VTE Risk Mitigation         Ordered     enoxaparin injection 40 mg  Daily     Route:  Subcutaneous        09/18/17 1106          Sydni Do MD  Neurology  [facility]    I have personally taken the history and examined the patient and agree with the resident's note as stated above.    Andi Rao MD, MARII, FAAN  Department of Neurology   [facility] [place], [place]

## 2017-09-18 NOTE — ASSESSMENT & PLAN NOTE
HX of significant HTN. Per OSH on hydralazine 100 Q8 PO, Linsiopril 40 PO, Metoprolol XL 25 BID, Nifedipine 60mg. Since patient is NPO she is not receiving any of those meds.  She has Clonidine 0.1 Patch Q week, a new one was placed today 9/18/17.   - her BP is poorly controlled. Her SBP is around 220-190.   - will transfer her to 10th floor so hydralazine IV can be used.   - Her heart rate runs low. Labetalol is not safe to be used at this point.

## 2017-09-18 NOTE — PROGRESS NOTES
[facility] Hospital Medicine  Progress Note    Patient Name: Nisa Frank  MRN: [id number]  Patient Class: IP- Inpatient   Admission Date: 9/13/2017  Length of Stay: 5 days  Attending Physician: Bay Henao, *  Primary Care Provider: Atilio Downs MD    [facility] Medicine Team: [facility] HOSP MED 3 Dimitri Wade MD    Subjective:     Principal Problem:Acute encephalopathy    HPI:  Ms Frank 66 year old female with diabetes mellitus type 2, CKD stage 3, CAD s/p CABG, essential hypertension, cerebrovascular disease s/p bi-hemispheric watershed infarcts and trach/PEG status who was brought in via EMS from [facility] for acute respiratory distress presented to [facility] 8/8 with acute respiratory failure with hypoxia likely due to mucous plug. Patient was admitted to ICU to wean off MV as tolerated and for placement to [facility] where she was transferred to [facility] on 8/14 in stable condition.  She pulled out her trach on 9/4 and went to [facility].  She was treated for an ESBL E. Coli UTI with Meropenem. The family has been very unhappy with the care that the patient has received.  She was medically stable for transfer to a SNF yesterday, however overnight she developed a new WBC 26.  She was found to have a RML and RLL infiltrate and was started on Vanc and Zosyn.  She also developed a new KATHI on top of her CKD (0.6 to 1.8).  Family is requesting transfer [facility].    Spoke with daughter on the phone.  Per family patient was previously alert and oriented to person and place.  She was conversing and able to participate in therapy.  This changed shortly after trach removal and replacement on 9/4.  Family reports some complication during trach replacement resulting in overnight ICU care.  After patient was stepped down they report she stopped talking and would not participate with therapy.  The only response they could get from  the patient was eye tracking and intermittent hand squeezes.  Family feels that patient may have had a repeat stroke and was requesting MRI.   Spoke with Dagoberto Melo nurse who reports that patient has been near obtunded for as long as she has had her (past couple of days).  She reported recent WBC jump and associated fever followed by broad spectrum abx.  In addiction notes concern for ileus given high residuals from NG tube feeds.  Does not high residuals resolved once tube was flushed.         Per nurse medications prior to concern for ileus.  Vanc 1750mg , Q24, Zosyn 4.5 Q 12, ASA 81, Clonidine 0.1 patch weekly, Lipitor 10, Colace, Lovenox 40, Protonix 40, Hydralazine 100 Q8, Keppra 500 BID, Lisniopril 40 PO, 25 Metoprolol XL, Procardia 60 mg Daily, Risperdal 25 QHS.           Hospital Course:  9/14: Patients mental status unchanged from yesterday. Labs showing significant improvement. Will continue to treat for VAP. Schedule for CT scan of abdomen.   9/15: Patients mental status improved, appears more awake/alert. But no verbal communication yet. Following commands. Continue to treat to VAP. MRI scheduled. PT/OT   9:16: Patient more awake and alert. Still non verbal. Continuing to treat VAP. PT/OT   9/17: Patient awake and alert. Still non-verbal. Had a long conversation w/ family regarding patient status; will get records/procedure report.   9/18: patient is awake and alert but non-verbal and doesn't follow command. Surgery was consulted for ileus.     Interval History: patient is stable.     Review of Systems   Unable to perform ROS: Patient nonverbal     Objective:     Vital Signs (Most Recent):  Temp: 98.4 °F (36.9 °C) (09/18/17 1100)  Pulse: 60 (09/18/17 1505)  Resp: 16 (09/18/17 1505)  BP: (!) 190/92 (09/18/17 1505)  SpO2: 98 % (09/18/17 1100) Vital Signs (24h Range):  Temp:  [98.2 °F (36.8 °C)-99.4 °F (37.4 °C)] 98.4 °F (36.9 °C)  Pulse:  [52-80] 60  Resp:  [16-20] 16  SpO2:  [94 %-100 %] 98 %  BP:  (146-220)/() 190/92     Weight: 132.9 kg (293 lb)  Body mass index is 47.29 kg/m².    Intake/Output Summary (Last 24 hours) at 09/18/17 1554  Last data filed at 09/18/17 0649   Gross per 24 hour   Intake              540 ml   Output              775 ml   Net             -235 ml      Physical Exam   Constitutional: She appears well-developed and well-nourished. No distress.   HENT:   Head: Normocephalic and atraumatic.   Neck: Neck supple. No JVD present.   Cardiovascular: Normal rate, regular rhythm and normal heart sounds.    No murmur heard.  Pulmonary/Chest: Effort normal and breath sounds normal. No respiratory distress. She has no wheezes.   Abdominal: Soft. Bowel sounds are normal. She exhibits no distension. There is no guarding.   Musculoskeletal: She exhibits no edema.   Neurological: She is alert.   Not tracking with her eyes.   Sometimes she squeezes my fingers with her hands.   She moves her limbs spontaneously.    Skin: She is not diaphoretic.   Psychiatric: She has a normal mood and affect.   Vitals reviewed.      Significant Labs:   CBC:   Recent Labs  Lab 09/17/17  0407 09/18/17  0527   WBC 6.10 5.35   HGB 8.8* 9.1*   HCT 28.7* 29.0*    187     CMP:   Recent Labs  Lab 09/17/17  2020 09/18/17  0527 09/18/17  1201   * 149*  149* 148*   K 4.2 3.3* 4.4   * 112* 114*   CO2 25 28 24    90 85   BUN 18 15 14   CREATININE 0.7 0.7 0.7   CALCIUM 8.6* 8.6* 8.6*   ANIONGAP 10 9 10   EGFRNONAA >60.0 >60.0 >60.0       Significant Imaging: I have reviewed all pertinent imaging results/findings within the past 24 hours. her abdominal x-ray shows distended bowl loops. But no free air under diaphragm.     Assessment/Plan:      * Acute encephalopathy    History of recent CVA with recent admission to rehab, per family acute worsening 1 week ago following trach replacement. OSH records in chart. Head CT unchanged. Per family patient was communicating prior to trach replacement. However,  per OSH records patient has been obtunded for the past couple of days. Difficult to determine patients true baseline?    Concern for Anoxic brain injury during trach removal/replacement, CVA, Seizure, Worsening Sepsis. Urine culture no growth.  MRI no acute hemorrhagic pathology. Patient had a BM.       - GCS of 8, however protecting airway with trach, stable for floor    - continue Zosyn tx for presumed VAP. End date is on 9/19          Ileus    Concern for ileus at OSH. However, no abdominal distension on physical exam. CT scan notable for Ileus, w/ no SBO.    - NG tube placement unsuccessful  - resolving, reported BM today         KATHI (acute kidney injury)    Creatine 2.4 on transfer; baseline 0.6    - Cr 0.8, resolved   - Strict ins and outs with siegel given patient AMS   - monitor renal function           Respiratory failure    Concern for possible VAP    - continue zosyn  - Decreased O2 5L; 99%, will continue to titrate           Tracheostomy status    - Trach care          Respiratory insufficiency              Dysphagia    Per family was tolerating diet prior to trach replacement on 9/4     - NG tube unsuccessful           Morbid obesity with BMI of 50.0-59.9, adult              S/P CABG (coronary artery bypass graft)    - Continue ASA           Type 2 diabetes mellitus, uncontrolled    - Per OSH records not on insulin   - Her HbA1c  8/9/17 is 7.2   - Sliding scale           Essential hypertension    HX of significant HTN. Per OSH on hydralazine 100 Q8 PO, Linsiopril 40 PO, Metoprolol XL 25 BID, Nifedipine 60mg. Since patient is NPO she is not receiving any of those meds.  She has Clonidine 0.1 Patch Q week, a new one was placed today 9/18/17.   - her BP is poorly controlled. Her SBP is around 220-190.   - will transfer her to 10th floor so hydralazine IV can be used.   - Her heart rate runs low. Labetalol is not safe to be used at this point.             VTE Risk Mitigation         Ordered     enoxaparin  injection 40 mg  Daily     Route:  Subcutaneous        09/18/17 1106              Dimitri Wade MD  Department of [place] [facility] Medicine   [facility]

## 2017-09-18 NOTE — PROGRESS NOTES
Pharmacist Renal Dose Adjustment Note    Nisa Frank is a 67 y.o. female being treated with the medication Zosyn     Patient Data:    Vital Signs (Most Recent):  Temp: 98.8 °F (37.1 °C) (09/18/17 0800)  Pulse: 62 (09/18/17 1036)  Resp: 16 (09/18/17 0913)  BP: (!) 198/89 (09/18/17 0801)  SpO2: 98 % (09/18/17 0913)   Vital Signs (72h Range):  Temp:  [97.3 °F (36.3 °C)-99.4 °F (37.4 °C)]   Pulse:  [52-87]   Resp:  [16-20]   BP: (146-220)/()   SpO2:  [87 %-100 %]        Recent Labs     Lab 09/17/17  1044 09/17/17  2020 09/18/17  0527   CREATININE 0.8 0.7 0.7     Serum creatinine: 0.7 mg/dL 09/18/17 0527  Estimated creatinine clearance: 109.2 mL/min    Medication: Zosyn 4.5 Q 12 hr will be changed to medication:Zosyn 4.5 Q 8 hr.     Pharmacist's Name: Aubrie Self  Pharmacist's Extension: [phone]

## 2017-09-19 ENCOUNTER — ANESTHESIA EVENT (OUTPATIENT)
Dept: SURGERY | Facility: HOSPITAL | Age: 67
DRG: 205 | End: 2017-09-19
Payer: MEDICARE

## 2017-09-19 PROBLEM — J95.851 VAP (VENTILATOR-ASSOCIATED PNEUMONIA): Status: ACTIVE | Noted: 2017-09-19

## 2017-09-19 LAB
ALBUMIN SERPL BCP-MCNC: 2.2 G/DL
ALP SERPL-CCNC: 85 U/L
ALT SERPL W/O P-5'-P-CCNC: 10 U/L
ANION GAP SERPL CALC-SCNC: 11 MMOL/L
ANION GAP SERPL CALC-SCNC: 8 MMOL/L
AST SERPL-CCNC: 6 U/L
BACTERIA SPEC AEROBE CULT: NORMAL
BASOPHILS # BLD AUTO: 0.02 K/UL
BASOPHILS NFR BLD: 0.3 %
BILIRUB SERPL-MCNC: 1.4 MG/DL
BUN SERPL-MCNC: 16 MG/DL
BUN SERPL-MCNC: 20 MG/DL
C DIFF GDH STL QL: NEGATIVE
C DIFF TOX A+B STL QL IA: NEGATIVE
CALCIUM SERPL-MCNC: 8.3 MG/DL
CALCIUM SERPL-MCNC: 8.4 MG/DL
CHLORIDE SERPL-SCNC: 107 MMOL/L
CHLORIDE SERPL-SCNC: 111 MMOL/L
CO2 SERPL-SCNC: 24 MMOL/L
CO2 SERPL-SCNC: 27 MMOL/L
CREAT SERPL-MCNC: 0.8 MG/DL
CREAT SERPL-MCNC: 1.1 MG/DL
DIFFERENTIAL METHOD: ABNORMAL
EOSINOPHIL # BLD AUTO: 0.1 K/UL
EOSINOPHIL NFR BLD: 0.9 %
ERYTHROCYTE [DISTWIDTH] IN BLOOD BY AUTOMATED COUNT: 15.8 %
EST. GFR  (AFRICAN AMERICAN): >60 ML/MIN/1.73 M^2
EST. GFR  (AFRICAN AMERICAN): >60 ML/MIN/1.73 M^2
EST. GFR  (NON AFRICAN AMERICAN): 52.1 ML/MIN/1.73 M^2
EST. GFR  (NON AFRICAN AMERICAN): >60 ML/MIN/1.73 M^2
GLUCOSE SERPL-MCNC: 93 MG/DL
GLUCOSE SERPL-MCNC: 96 MG/DL
GRAM STN SPEC: NORMAL
HCT VFR BLD AUTO: 28.4 %
HGB BLD-MCNC: 9.2 G/DL
LYMPHOCYTES # BLD AUTO: 2 K/UL
LYMPHOCYTES NFR BLD: 29 %
MAGNESIUM SERPL-MCNC: 2.7 MG/DL
MCH RBC QN AUTO: 26.2 PG
MCHC RBC AUTO-ENTMCNC: 32.4 G/DL
MCV RBC AUTO: 81 FL
MONOCYTES # BLD AUTO: 1 K/UL
MONOCYTES NFR BLD: 14.5 %
NEUTROPHILS # BLD AUTO: 3.7 K/UL
NEUTROPHILS NFR BLD: 54.4 %
PHOSPHATE SERPL-MCNC: 3.2 MG/DL
PLATELET # BLD AUTO: 204 K/UL
PMV BLD AUTO: 11 FL
POCT GLUCOSE: 103 MG/DL (ref 70–110)
POCT GLUCOSE: 108 MG/DL (ref 70–110)
POCT GLUCOSE: 96 MG/DL (ref 70–110)
POCT GLUCOSE: 99 MG/DL (ref 70–110)
POTASSIUM SERPL-SCNC: 3.9 MMOL/L
POTASSIUM SERPL-SCNC: 4 MMOL/L
PROT SERPL-MCNC: 6.3 G/DL
RBC # BLD AUTO: 3.51 M/UL
SODIUM SERPL-SCNC: 141 MMOL/L
SODIUM SERPL-SCNC: 142 MMOL/L
SODIUM SERPL-SCNC: 145 MMOL/L
SODIUM SERPL-SCNC: 146 MMOL/L
WBC # BLD AUTO: 6.77 K/UL

## 2017-09-19 PROCEDURE — 63600175 PHARM REV CODE 636 W HCPCS: Performed by: INTERNAL MEDICINE

## 2017-09-19 PROCEDURE — 83735 ASSAY OF MAGNESIUM: CPT

## 2017-09-19 PROCEDURE — 36415 COLL VENOUS BLD VENIPUNCTURE: CPT

## 2017-09-19 PROCEDURE — 80048 BASIC METABOLIC PNL TOTAL CA: CPT

## 2017-09-19 PROCEDURE — 63600175 PHARM REV CODE 636 W HCPCS: Performed by: HOSPITALIST

## 2017-09-19 PROCEDURE — 25000242 PHARM REV CODE 250 ALT 637 W/ HCPCS: Performed by: STUDENT IN AN ORGANIZED HEALTH CARE EDUCATION/TRAINING PROGRAM

## 2017-09-19 PROCEDURE — 97110 THERAPEUTIC EXERCISES: CPT

## 2017-09-19 PROCEDURE — 84100 ASSAY OF PHOSPHORUS: CPT

## 2017-09-19 PROCEDURE — 94761 N-INVAS EAR/PLS OXIMETRY MLT: CPT

## 2017-09-19 PROCEDURE — 99900026 HC AIRWAY MAINTENANCE (STAT)

## 2017-09-19 PROCEDURE — 84295 ASSAY OF SERUM SODIUM: CPT

## 2017-09-19 PROCEDURE — 27000221 HC OXYGEN, UP TO 24 HOURS

## 2017-09-19 PROCEDURE — 20600001 HC STEP DOWN PRIVATE ROOM

## 2017-09-19 PROCEDURE — 97802 MEDICAL NUTRITION INDIV IN: CPT | Performed by: NUTRITIONIST

## 2017-09-19 PROCEDURE — 94640 AIRWAY INHALATION TREATMENT: CPT

## 2017-09-19 PROCEDURE — 94760 N-INVAS EAR/PLS OXIMETRY 1: CPT

## 2017-09-19 PROCEDURE — 85025 COMPLETE CBC W/AUTO DIFF WBC: CPT

## 2017-09-19 PROCEDURE — 25000003 PHARM REV CODE 250: Performed by: STUDENT IN AN ORGANIZED HEALTH CARE EDUCATION/TRAINING PROGRAM

## 2017-09-19 PROCEDURE — 80053 COMPREHEN METABOLIC PANEL: CPT

## 2017-09-19 PROCEDURE — 97530 THERAPEUTIC ACTIVITIES: CPT

## 2017-09-19 PROCEDURE — 99232 SBSQ HOSP IP/OBS MODERATE 35: CPT | Mod: GC,,, | Performed by: HOSPITALIST

## 2017-09-19 PROCEDURE — 25000003 PHARM REV CODE 250: Performed by: HOSPITALIST

## 2017-09-19 PROCEDURE — 99900022

## 2017-09-19 PROCEDURE — 99233 SBSQ HOSP IP/OBS HIGH 50: CPT | Mod: GC,,, | Performed by: PSYCHIATRY & NEUROLOGY

## 2017-09-19 RX ORDER — DEXTROSE MONOHYDRATE 50 MG/ML
INJECTION, SOLUTION INTRAVENOUS CONTINUOUS
Status: ACTIVE | OUTPATIENT
Start: 2017-09-19 | End: 2017-09-19

## 2017-09-19 RX ADMIN — DEXTROSE: 5 SOLUTION INTRAVENOUS at 11:09

## 2017-09-19 RX ADMIN — ACETYLCYSTEINE 4 ML: 100 INHALANT RESPIRATORY (INHALATION) at 09:09

## 2017-09-19 RX ADMIN — HYDRALAZINE HYDROCHLORIDE 10 MG: 20 INJECTION INTRAMUSCULAR; INTRAVENOUS at 05:09

## 2017-09-19 RX ADMIN — IPRATROPIUM BROMIDE AND ALBUTEROL SULFATE 3 ML: .5; 3 SOLUTION RESPIRATORY (INHALATION) at 04:09

## 2017-09-19 RX ADMIN — ENOXAPARIN SODIUM 40 MG: 100 INJECTION SUBCUTANEOUS at 06:09

## 2017-09-19 RX ADMIN — PIPERACILLIN AND TAZOBACTAM 4.5 G: 4; .5 INJECTION, POWDER, FOR SOLUTION INTRAVENOUS at 09:09

## 2017-09-19 RX ADMIN — HYDRALAZINE HYDROCHLORIDE 10 MG: 20 INJECTION INTRAMUSCULAR; INTRAVENOUS at 01:09

## 2017-09-19 RX ADMIN — IPRATROPIUM BROMIDE AND ALBUTEROL SULFATE 3 ML: .5; 3 SOLUTION RESPIRATORY (INHALATION) at 09:09

## 2017-09-19 RX ADMIN — PIPERACILLIN AND TAZOBACTAM 4.5 G: 4; .5 INJECTION, POWDER, FOR SOLUTION INTRAVENOUS at 12:09

## 2017-09-19 RX ADMIN — PIPERACILLIN AND TAZOBACTAM 4.5 G: 4; .5 INJECTION, POWDER, FOR SOLUTION INTRAVENOUS at 03:09

## 2017-09-19 NOTE — PROGRESS NOTES
[facility] Hospital Medicine  Progress Note    Patient Name: Nisa Frank  MRN: [id number]  Patient Class: IP- Inpatient   Admission Date: 9/13/2017  Length of Stay: 6 days  Attending Physician: Bay Henao, *  Primary Care Provider: Atilio Downs MD    [facility] Medicine Team: [facility] HOSP MED 3 Abimbola Devlin MD    Subjective:     Principal Problem:Acute encephalopathy    HPI:  Ms Frank 66 year old female with diabetes mellitus type 2, CKD stage 3, CAD s/p CABG, essential hypertension, cerebrovascular disease s/p bi-hemispheric watershed infarcts and trach/PEG status who was brought in via EMS from [facility] for acute respiratory distress presented to [facility] 8/8 with acute respiratory failure with hypoxia likely due to mucous plug. Patient was admitted to ICU to wean off MV as tolerated and for placement to [facility] where she was transferred to [facility] on 8/14 in stable condition.  She pulled out her trach on 9/4 and went to [facility].  She was treated for an ESBL E. Coli UTI with Meropenem. The family has been very unhappy with the care that the patient has received.  She was medically stable for transfer to a SNF yesterday, however overnight she developed a new WBC 26.  She was found to have a RML and RLL infiltrate and was started on Vanc and Zosyn.  She also developed a new KATHI on top of her CKD (0.6 to 1.8).  Family is requesting [facility].    Spoke with daughter on the phone.  Per family patient was previously alert and oriented to person and place.  She was conversing and able to participate in therapy.  This changed shortly after trach removal and replacement on 9/4.  Family reports some complication during trach replacement resulting in overnight ICU care.  After patient was stepped down they report she stopped talking and would not participate with therapy.  The only response they could get from  the patient was eye tracking and intermittent hand squeezes.  Family feels that patient may have had a repeat stroke and was requesting MRI.   Spoke with Dagoberto Melo nurse who reports that patient has been near obtunded for as long as she has had her (past couple of days).  She reported recent WBC jump and associated fever followed by broad spectrum abx.  In addiction notes concern for ileus given high residuals from NG tube feeds.  Does not high residuals resolved once tube was flushed.         Per nurse medications prior to concern for ileus.  Vanc 1750mg , Q24, Zosyn 4.5 Q 12, ASA 81, Clonidine 0.1 patch weekly, Lipitor 10, Colace, Lovenox 40, Protonix 40, Hydralazine 100 Q8, Keppra 500 BID, Lisniopril 40 PO, 25 Metoprolol XL, Procardia 60 mg Daily, Risperdal 25 QHS.           Hospital Course:  9/14: Patients mental status unchanged from yesterday. Labs showing significant improvement. Will continue to treat for VAP. Schedule for CT scan of abdomen.   9/15: Patients mental status improved, appears more awake/alert. But no verbal communication yet. Following commands. Continue to treat to VAP. MRI scheduled. PT/OT   9:16: Patient more awake and alert. Still non verbal. Continuing to treat VAP. PT/OT   9/17: Patient awake and alert. Still non-verbal. Had a long conversation w/ family regarding patient status; will get records/procedure report.   9/18: patient is awake and alert but non-verbal and doesn't follow command. Surgery was consulted for ileus.   9/19: Patient is awake. But non-verbal and doesn;t follow commands. NG tube was pulled out last night. Surgery consulted for PEG tube.     Interval History:   NAEON. No change from previous exam. NG tube was placed yesterday, but patient pulled it out. Scheduled for PEG tube keenan.     Review of Systems   Unable to perform ROS: Patient nonverbal     Objective:     Vital Signs (Most Recent):  Temp: 98 °F (36.7 °C) (09/19/17 1059)  Pulse: 67 (09/19/17 1100)  Resp: 16  (09/19/17 1059)  BP: (!) 128/59 (09/19/17 1059)  SpO2: 96 % (09/19/17 1059) Vital Signs (24h Range):  Temp:  [97 °F (36.1 °C)-98.2 °F (36.8 °C)] 98 °F (36.7 °C)  Pulse:  [56-84] 67  Resp:  [14-20] 16  SpO2:  [96 %-98 %] 96 %  BP: (128-188)/(59-78) 128/59     Weight: 132.9 kg (292 lb 15.9 oz)  Body mass index is 47.29 kg/m².    Intake/Output Summary (Last 24 hours) at 09/19/17 1511  Last data filed at 09/19/17 1233   Gross per 24 hour   Intake          1023.33 ml   Output             1950 ml   Net          -926.67 ml      Physical Exam   Constitutional: She appears well-developed and well-nourished. No distress.   HENT:   Head: Normocephalic and atraumatic.   Neck: Neck supple. No JVD present.   Cardiovascular: Normal rate, regular rhythm and normal heart sounds.    No murmur heard.  Pulmonary/Chest: Effort normal and breath sounds normal. No respiratory distress. She has no wheezes.   Abdominal: Soft. Bowel sounds are normal. She exhibits no distension. There is no guarding.   Musculoskeletal: She exhibits no edema.   Neurological: She is alert.   Not tracking with her eyes.   Sometimes she squeezes my fingers with her hands.   She moves her limbs spontaneously.    Skin: She is not diaphoretic.   Psychiatric: She has a normal mood and affect.   Vitals reviewed.      Significant Labs:   CBC:   Recent Labs  Lab 09/18/17  0527 09/19/17  0708   WBC 5.35 6.77   HGB 9.1* 9.2*   HCT 29.0* 28.4*    204     CMP:   Recent Labs  Lab 09/18/17  1201  09/18/17  2124 09/19/17  0426 09/19/17  0708   *  < > 147* 145 146*   K 4.4  --  5.0  --  4.0   *  --  113*  --  111*   CO2 24  --  23  --  27   GLU 85  --  91  --  93   BUN 14  --  15  --  16   CREATININE 0.7  --  0.8  --  0.8   CALCIUM 8.6*  --  8.5*  --  8.4*   PROT  --   --   --   --  6.3   ALBUMIN  --   --   --   --  2.2*   BILITOT  --   --   --   --  1.4*   ALKPHOS  --   --   --   --  85   AST  --   --   --   --  6*   ALT  --   --   --   --  10   ANIONGAP 10   --  11  --  8   EGFRNONAA >60.0  --  >60.0  --  >60.0   < > = values in this interval not displayed.  All pertinent labs within the past 24 hours have been reviewed.    Significant Imaging: I have reviewed all pertinent imaging results/findings within the past 24 hours.    Assessment/Plan:      * Acute encephalopathy    History of recent CVA with recent admission to rehab, per family acute worsening 1 week ago following trach replacement. OSH records in chart. Head CT unchanged. Per family patient was communicating prior to trach replacement. However, per OSH records patient has been obtunded for the past couple of days. Difficult to determine patients true baseline?    Concern for Anoxic brain injury during trach removal/replacement, CVA, Seizure, Worsening Sepsis. Urine culture no growth.  MRI no acute hemorrhagic pathology. Patient had a BM.       - GCS of 8, however protecting airway with trach, stable for floor    - completed VAP treatment,  - PEG tube placement keenan  - NPO at midnight  - f/u BMP for resolving NA level           Ileus    Concern for ileus at OSH. However, no abdominal distension on physical exam. CT scan notable for Ileus, w/ no SBO.    - NG tube placement unsuccessful  - resolving, reported BM today         Respiratory failure    Concern for possible VAP    - Decreased O2 5L; 99%, will continue to titrate   - completed VAP treatment         Tracheostomy status    - Trach care          Dysphagia    Per family was tolerating diet prior to trach replacement on 9/4     - NG tube unsuccessful           KATHI (acute kidney injury)    Creatine 2.4 on transfer; baseline 0.6    - Cr 0.8, resolved   - monitor renal function           Respiratory insufficiency              Chronic bilateral cerebral infarction in watershed distribution              Hyperlipidemia              Hypertensive emergency    - IV hydralazine as needed           Morbid obesity with BMI of 50.0-59.9, adult              S/P CABG  (coronary artery bypass graft)    - Continue ASA, unable to swallow            Type 2 diabetes mellitus, uncontrolled    - Per OSH records not on insulin   - Her HbA1c  8/9/17 is 7.2   - Sliding scale           Essential hypertension    HX of significant HTN. Per OSH on hydralazine 100 Q8 PO, Linsiopril 40 PO, Metoprolol XL 25 BID, Nifedipine 60mg. Since patient is NPO she is not receiving any of those meds.  She has Clonidine 0.1 Patch Q week, a new one was placed today 9/18/17.   - her BP is poorly controlled. Her SBP is around 220-190.   - will transfer her to 10th floor so hydralazine IV can be used.   - Her heart rate runs low. Labetalol is not safe to be used at this point.             VTE Risk Mitigation         Ordered     enoxaparin injection 40 mg  Daily     Route:  Subcutaneous        09/18/17 9818              Abimbola Devlin MD  Department of Hospital Medicine   [facility]

## 2017-09-19 NOTE — PROGRESS NOTES
[facility]  Neurology  Progress Note    Patient Name: Nisa Frank  MRN: [id number]  Admission Date: 2017  Hospital Length of Stay: 6 days  Code Status: Full Code   Attending Provider: Bay Henao, *  Primary Care Physician: Atilio Downs MD   Principal Problem:Acute encephalopathy    Past Medical History:   Diagnosis Date    Acute bilateral cerebral infarction in a watershed distribution 2017    CAD (coronary artery disease)     Diabetes mellitus     Hypertension     Morbid obesity     TAMMY on CPAP     setting +17    Stroke        Past Surgical History:   Procedure Laterality Date    ARTERIAL BYPASS SURGRY      9 tears sgo     SECTION      CORONARY ARTERY BYPASS GRAFT      HYSTERECTOMY      TUBAL LIGATION         Review of patient's allergies indicates:   Allergen Reactions    Latex, natural rubber        Current Neurological Medications:     No current facility-administered medications on file prior to encounter.      Current Outpatient Prescriptions on File Prior to Encounter   Medication Sig    acetaminophen (TYLENOL) 650 mg/20.3 mL Soln 20.3 mLs (650 mg total) by Per NG tube route every 6 (six) hours as needed. (Patient taking differently: 650 mg by Per NG tube route every 6 (six) hours as needed for Pain. )    albuterol-ipratropium 2.5mg-0.5mg/3mL (DUO-NEB) 0.5 mg-3 mg(2.5 mg base)/3 mL nebulizer solution Take 3 mLs by nebulization every 4 (four) hours. Rescue    aspirin 81 MG Chew 1 tablet (81 mg total) by Per G Tube route once daily.    atorvastatin (LIPITOR) 10 MG tablet 1 tablet (10 mg total) by Per G Tube route once daily. (Patient taking differently: 10 mg by Per G Tube route every evening. )    cloNIDine 0.1 mg/24 hr td ptwk (CATAPRES) 0.1 mg/24 hr Place 1 patch onto the skin every 7 days. (Patient taking differently: Place 1 patch onto the skin every Tuesday. )    fluoxetine (PROZAC) 20 MG capsule 1 capsule (20 mg total) by Per G  Tube route once daily.    hydrALAZINE (APRESOLINE) 100 MG tablet 1 tablet (100 mg total) by Per G Tube route every 8 (eight) hours.    insulin aspart (NOVOLOG) 100 unit/mL InPn pen Inject 1-10 Units into the skin every 4 (four) hours as needed (Hyperglycemia).    meclizine (ANTIVERT) 25 mg tablet Take 1 tablet (25 mg total) by mouth every 6 (six) hours as needed. (Patient taking differently: Take 25 mg by mouth every 6 (six) hours as needed for Dizziness. )    pantoprazole (PROTONIX) 40 mg GrPS 1 packet (40 mg total) by Per G Tube route once daily.    amantadine HCl (SYMMETREL) 100 mg capsule 1 capsule (100 mg total) by Per G Tube route every 8 (eight) hours.    amlodipine (NORVASC) 10 MG tablet 1 tablet (10 mg total) by Per G Tube route once daily.    diclofenac sodium (VOLTAREN) 1 % Gel Apply 2 g topically 2 (two) times daily.    EASY TOUCH TWIST LANCETS 30 gauge Misc     HEPARIN SODIUM,PORCINE (HEPARIN, PORCINE,) 5,000 unit/mL injection Inject 1.5 mLs (7,500 Units total) into the skin every 8 (eight) hours.     Family History     Problem Relation (Age of Onset)    No Known Problems Father, Mother, Sister, Brother, Maternal Aunt, Maternal Uncle, Paternal Aunt, Paternal Uncle, Maternal Grandmother, Maternal Grandfather, Paternal Grandmother, Paternal Grandfather        Social History Main Topics    Smoking status: Never Smoker    Smokeless tobacco: Never Used    Alcohol use Yes      Comment: occ    Drug use: No    Sexual activity: Not Currently     Birth control/ protection: See Surgical Hx     Interval History:     NAEON. No active concerns for agitations.     Lessened level of alertness / wakefullness today, however with fluctuating mentation.     Review of Systems   Unable to perform ROS: Patient nonverbal   Constitutional: Negative for chills and fever.   Respiratory: Negative for shortness of breath.    Cardiovascular: Negative for chest pain.   Neurological: Negative for syncope and facial  asymmetry.     Objective:     Vital Signs (Most Recent):  Temp: 97.8 °F (36.6 °C) (09/19/17 1526)  Pulse: 73 (09/19/17 1526)  Resp: 16 (09/19/17 1526)  BP: 134/63 (09/19/17 1526)  SpO2: 97 % (09/19/17 1526) Vital Signs (24h Range):  Temp:  [97 °F (36.1 °C)-98.2 °F (36.8 °C)] 97.8 °F (36.6 °C)  Pulse:  [56-78] 73  Resp:  [14-20] 16  SpO2:  [96 %-98 %] 97 %  BP: (128-188)/(59-78) 134/63     Weight: 132.9 kg (292 lb 15.9 oz)  Body mass index is 47.29 kg/m².    Physical Exam   Constitutional: She appears listless. She is uncooperative.   HENT:   Head: Normocephalic and atraumatic.   Eyes: EOM are normal. Pupils are equal, round, and reactive to light.   Neck:   Trach in place   Cardiovascular: Normal rate, regular rhythm and normal heart sounds.    Pulses:       Radial pulses are 2+ on the right side, and 2+ on the left side.   Difficult to assess the heart sounds 2/2 breathing noise through the trach   Pulmonary/Chest: Effort normal. No tachypnea. She has no decreased breath sounds. She has no wheezes.   Abdominal: Soft. She exhibits no distension. There is no tenderness.   Neurological: She appears listless. She exhibits normal muscle tone. She displays no seizure activity. GCS eye subscore is 4. GCS verbal subscore is 1. GCS motor subscore is 5.   Reflex Scores:       Tricep reflexes are 1+ on the right side and 1+ on the left side.       Bicep reflexes are 1+ on the right side and 1+ on the left side.       Brachioradialis reflexes are 1+ on the right side and 1+ on the left side.       Patellar reflexes are 1+ on the right side and 1+ on the left side.  Skin: No rash noted. No erythema.   Psychiatric: Her affect is blunt. She is withdrawn. She is noncommunicative. She is inattentive.   Vitals reviewed.      NEUROLOGICAL EXAMINATION:     MENTAL STATUS   Disoriented to person: NT / Trach in place / Mute    Attention: normal.   Speech: mute   Level of consciousness: alert (Awake follow simple commands)  General  knowledge: NT / Trach in place / Mute      CRANIAL NERVES     CN II   Visual fields full to confrontation.     CN III, IV, VI   Pupils are equal, round, and reactive to light.  Extraocular motions are normal.   Right pupil: Size: 3 mm. Shape: regular.   Left pupil: Size: 3 mm. Shape: regular.   CN III: no CN III palsy  CN VI: no CN VI palsy  Nystagmus: none     CN V   Facial sensation intact.     CN VII   Facial expression full, symmetric.     CN VIII   CN VIII normal.     CN IX, X   CN IX normal.   CN X normal.     CN XI   Right sternocleidomastoid strength: weak  Left sternocleidomastoid strength: weak    CN XII   CN XII normal.     MOTOR EXAM   Muscle bulk: normal  Overall muscle tone: normal       B/L UE & LE weakness 4/5, with movement of extremities to pain     REFLEXES     Reflexes   Right brachioradialis: 1+  Left brachioradialis: 1+  Right biceps: 1+  Left biceps: 1+  Right triceps: 1+  Left triceps: 1+  Right patellar: 1+  Left patellar: 1+  Right plantar: equivocal  Left plantar: equivocal    SENSORY EXAM   Pinprick normal.     GAIT AND COORDINATION     Gait  Gait: (NT)    Tremor   Resting tremor: absent      Significant Labs:   Blood Culture: No results for input(s): LABBLOO in the last 48 hours.  CBC:     Recent Labs  Lab 09/18/17  0527 09/19/17  0708   WBC 5.35 6.77   HGB 9.1* 9.2*   HCT 29.0* 28.4*    204     CMP:   Recent Labs  Lab 09/18/17  0527 09/18/17  1201  09/18/17  2124 09/19/17  0426 09/19/17  0708 09/19/17  1602   GLU 90 85  --  91  --  93  --    *  149* 148*  < > 147* 145 146* 141   K 3.3* 4.4  --  5.0  --  4.0  --    * 114*  --  113*  --  111*  --    CO2 28 24  --  23  --  27  --    BUN 15 14  --  15  --  16  --    CREATININE 0.7 0.7  --  0.8  --  0.8  --    CALCIUM 8.6* 8.6*  --  8.5*  --  8.4*  --    MG 1.8  --   --   --  2.7*  --   --    PROT  --   --   --   --   --  6.3  --    ALBUMIN  --   --   --   --   --  2.2*  --    BILITOT  --   --   --   --   --  1.4*  --     ALKPHOS  --   --   --   --   --  85  --    AST  --   --   --   --   --  6*  --    ALT  --   --   --   --   --  10  --    ANIONGAP 9 10  --  11  --  8  --    EGFRNONAA >60.0 >60.0  --  >60.0  --  >60.0  --    < > = values in this interval not displayed.  Urine Culture: No results for input(s): LABURIN in the last 48 hours.    Significant Imaging: CT: I have reviewed all pertinent results/findings within the past 24 hours and my personal findings are:  no acute interval change compared to the previous study  EEG: I have reviewed all pertinent results/findings within the past 24 hours and my personal findings are:  generalized slowing indicative of a moderate-severe nonspecific encephalopathy, no epileptic activity    Assessment and Plan:     * Acute encephalopathy    - 66 y/o f with a complicate course of the hospital stay after a bihemispheric watershed infarcts s/p trach/PEG, DM-2, CKD3, CAD s/p CABG, HTN, who was transferred from [place] [facility] for [facility]opathy and is consulted to the neurology team for the same and possible seizure.   - Complicated post-stroke course, with aspiration PNA, trach dislodgement / mucous plug obstruction / UTIs, with poor mentation post trach replacement     - Head CT scan with no interval change   - 2 EEGs on 8/1 and 9/14, revealing generalized slowing indicative of nonspecific encephalopathy. \  - labs, she has elevated Na~150/ UTI concerns     DDx encephalopathy multifactorial metabolic/ infectious / electrolyte disturbance at the background of watershed strokes     - persisting concerns of fluctuating level of alertness / wakefulness.     Plan:   - Control active intervention / UTI vs PNA management   - Continue Na correction /  Correct electrolyte abnormality as needed   - Continue to hold keppra / no active concerns on EEG / monitor for improvement in mentation   - secondary stroke preventions : ASA / statins / anti-coagulation if needed / prevent cerebral  hypoperfusion   - HTN control/ target < 160/90 mmHg, resume home meds   - delirium precautions / neuro check        Respiratory insufficiency    - avoid hypoxia         Chronic bilateral cerebral infarction in watershed distribution    - secondary stroke prevention  - ASA/ statins if not contraindicated  - HTN target < 160/90 mmHg        Hypertensive encephalopathy    - As per primary team   - prevent HTN encephalopathy         Hyperlipidemia    - continue statins         Hypertensive emergency    - target < 160/90 mmHg        Type 2 diabetes mellitus, uncontrolled    - stroke risk factor  - as per primary team             VTE Risk Mitigation         Ordered     enoxaparin injection 40 mg  Daily     Route:  Subcutaneous        09/18/17 1106          Sydni Do MD  Neurology  [facility]    I have personally taken the history and examined the patient and agree with the resident's note as stated above.    Andi Rao MD, MARII, FAAN  Department of Neurology   [facility] [place], [place]

## 2017-09-19 NOTE — PLAN OF CARE
Problem: Patient Care Overview  Goal: Plan of Care Review  Outcome: Ongoing (interventions implemented as appropriate)  Pt rousable to voice this shift, unable to follow commands. AVSS on 5L/28% via trach collar, PRN IV Hydralazine available, none needed this shift. Trach site w/ gauze CDI. NPO, unable to take in PO meds as pt does not have NGT. Plan for PEG tube placement tomorrow, please call Anesthesia resident when pts daughter arrives this evening for consent. Ross removed this AM, pt incontinent of urine x2, bladder scanned for 0mL. Unable to place purewick d/t latex/rubber allergy.

## 2017-09-19 NOTE — PT/OT/SLP PROGRESS
Speech Language Pathology  Attempted    Nisa Frank  MRN: [id number]    Tx oders received 9/18 cancelled this date.  Eval orders requested from team.  Will follow pt upon receipt of evaluation orders.  Thank you.    Sia Elizalde, NAY-SLP   9/19/2017

## 2017-09-19 NOTE — PROGRESS NOTES
General Surgery    NG tube placed at bedside yesterday afternoon, immediately removed by patient.    Suppository given with multiple bowel movements overnight, ileus potentially 2/2 constipation and likely resolving    Recommend daily bowel regimen    Please call with questions    Myron Blunt  General Surgery, PGY-5  Pager # [phone]

## 2017-09-19 NOTE — PLAN OF CARE
Problem: Physical Therapy Goal  Goal: Physical Therapy Goal  Goals to be met by: 17     Patient will increase functional independence with mobility by performin. Supine to sit with Maximum Assistance  2. Sit to supine with Maximum Assistance  3. Rolling to Left and Right with Maximum Assistance.  4. Sitting at edge of bed x10 minutes with Minimal Assistance using B UE for trunk support and attending to midline.   5. Pt will perform dynamic reaching while sitting EOB with Minimal Assistance to complete 3/5 activities and Minimal Assistance for balance/trunk support.   6. Lower extremity exercise x10 reps with assistance as needed to improve strength, ROM, and endurance with all functional activities.      Outcome: Ongoing (interventions implemented as appropriate)  No goals met today

## 2017-09-19 NOTE — PLAN OF CARE
Problem: Patient Care Overview  Goal: Plan of Care Review  Outcome: Ongoing (interventions implemented as appropriate)  POC reviewed and understood by patient. Family at bedside. Patient has tracheostomy. Trach secured and intact. O2 at 5L 28%. Patient's IV intact and patent. IV dressing clean, dry, and intact. Patient had suppository earlier today so patient had frequent BM during the night. Patient was turned q2h. Family at bedside. Bed in lowest position. Will continue to monitor.

## 2017-09-19 NOTE — PLAN OF CARE
CM called and spoke with patient's daughter, Shayna. Updated on patient status and that soon will be ready for the next level of care. PT/OT currently recommending SNF. CM explained that most skilled services are provided in a nursing home. Daughter requesting a list. Will have LUCIA Jackson touch base with daughter tomorrow. Daughter in agreement.

## 2017-09-19 NOTE — ASSESSMENT & PLAN NOTE
- 66 y/o f with a complicate course of the hospital stay after a bihemispheric watershed infarcts s/p trach/PEG, DM-2, CKD3, CAD s/p CABG, HTN, who was transferred from [place] to [facility] for encephalopathy and is consulted to the neurology team for the same and possible seizure.   - Complicated post-stroke course, with aspiration PNA, trach dislodgement / mucous plug obstruction / UTIs, with poor mentation post trach replacement     - Head CT scan with no interval change   - 2 EEGs on 8/1 and 9/14, revealing generalized slowing indicative of nonspecific encephalopathy. \  - labs, she has elevated Na~150/ UTI concerns     DDx encephalopathy multifactorial metabolic/ infectious / electrolyte disturbance at the background of watershed strokes     - persisting concerns of fluctuating level of alertness / wakefulness.     Plan:   - Control active intervention / UTI vs PNA management   - Continue Na correction /  Correct electrolyte abnormality as needed   - Continue to hold keppra / no active concerns on EEG / monitor for improvement in mentation   - secondary stroke preventions : ASA / statins / anti-coagulation if needed / prevent cerebral hypoperfusions   - HTN control/ target < 160/90 mmHg, resume home meds   - delirium precautions / neuro check

## 2017-09-19 NOTE — PT/OT/SLP PROGRESS
Physical Therapy  Treatment    Nisa Frank   MRN: [id number]   Admitting Diagnosis: Acute encephalopathy    PT Received On: 09/19/17  PT Start Time: 1333     PT Stop Time: 1344    PT Total Time (min): 11 min       Billable Minutes:  Therapeutic Exercise 11    Treatment Type: Treatment  PT/PTA: PT             General Precautions: Standard, aspiration, fall  Orthopedic Precautions: N/A   Braces:      Do you have any cultural, spiritual, Latter day conflicts, given your current situation?: none stated    Subjective:  Communicated with nursing prior to session.      Pain/Comfort  Pain Rating 1:  (NAD)    Objective:   Patient found with: siegel catheter, oxygen, peripheral IV, telemetry, tracheostomy    Functional Mobility:  Bed Mobility:   Rolling/Turning to Left:  (bed mobility not attempted today)    Transfers:       Gait:        Stairs:      Balance:       Therapeutic Activities and Exercises:  Pt. Received PROM to (B) UE/LE x10 reps in supine     AM-PAC 6 CLICK MOBILITY  How much help from another person does this patient currently need?   1 = Unable, Total/Dependent Assistance  2 = A lot, Maximum/Moderate Assistance  3 = A little, Minimum/Contact Guard/Supervision  4 = None, Modified [place]/Independent    Turning over in bed (including adjusting bedclothes, sheets and blankets)?: 2  Sitting down on and standing up from a chair with arms (e.g., wheelchair, bedside commode, etc.): 1  Moving from lying on back to sitting on the side of the bed?: 2  Moving to and from a bed to a chair (including a wheelchair)?: 1  Need to walk in hospital room?: 1  Climbing 3-5 steps with a railing?: 1  Total Score: 8    AM-PAC Raw Score CMS G-Code Modifier Level of Impairment Assistance   6 % Total / Unable   7 - 9 CM 80 - 100% Maximal Assist   10 - 14 CL 60 - 80% Moderate Assist   15 - 19 CK 40 - 60% Moderate Assist   20 - 22 CJ 20 - 40% Minimal Assist   23 CI 1-20% SBA / CGA   24 CH 0% Independent/ Mod I     Patient  left supine with all lines intact and call button in reach.    Assessment:  Nisa Frank is a 67 y.o. female with a medical diagnosis of Acute encephalopathy and presents with debility/deconditioning. Pt. cooperative and tolerated treatment well. Pt. would benefit from continued PT to increase strength/endurance and improve functional mobility.    Rehab identified problem list/impairments: Rehab identified problem list/impairments: weakness, impaired endurance, impaired self care skills, impaired functional mobilty, gait instability, impaired balance, impaired cognition, decreased coordination, decreased lower extremity function, impaired fine motor, decreased upper extremity function, abnormal tone    Rehab potential is fair.    Activity tolerance: Fair    Discharge recommendations: Discharge Facility/Level Of Care Needs: nursing facility, skilled (long term)     Barriers to discharge: Barriers to Discharge: Inaccessible home environment, Decreased caregiver support    Equipment recommendations: Equipment Needed After Discharge:  (TBD)     GOALS:    Physical Therapy Goals        Problem: Physical Therapy Goal    Goal Priority Disciplines Outcome Goal Variances Interventions   Physical Therapy Goal     PT/OT, PT Ongoing (interventions implemented as appropriate)     Description:  Goals to be met by: 17     Patient will increase functional independence with mobility by performin. Supine to sit with Maximum Assistance  2. Sit to supine with Maximum Assistance  3. Rolling to Left and Right with Maximum Assistance.  4. Sitting at edge of bed x10 minutes with Minimal Assistance using B UE for trunk support and attending to midline.   5. Pt will perform dynamic reaching while sitting EOB with Minimal Assistance to complete 3/5 activities and Minimal Assistance for balance/trunk support.   6. Lower extremity exercise x10 reps with assistance as needed to improve strength, ROM, and endurance with all  functional activities.                       PLAN:    Patient to be seen 4 x/week  to address the above listed problems via therapeutic activities, therapeutic exercises, neuromuscular re-education  Plan of Care expires: 10/17/17  Plan of Care reviewed with: patient         Roland Rowe, PT  09/19/2017

## 2017-09-19 NOTE — PLAN OF CARE
Problem: Patient Care Overview  Goal: Plan of Care Review  Outcome: Ongoing (interventions implemented as appropriate)  Recommendations     Recommendation/Intervention: 1.  Recommend start Isosource 1.5 @ 25ml/hr   2.  Increase 10ml every eight hours until final goal rate of 60ml/hr is reached    3.  Monitor tolerance/residuals and stop TF if >300ml    Goals: Pt to meet >85% of EEN/EPN  Nutrition Goal Status: new  Communication of RD Recs: reviewed with physician    Assessment and Plan     P: Inadequate nutrient intake   E:  Related to diagnosis  S:  As evidenced by NPO, NG Tube

## 2017-09-19 NOTE — SUBJECTIVE & OBJECTIVE
Past Medical History:   Diagnosis Date    Acute bilateral cerebral infarction in a watershed distribution 2017    CAD (coronary artery disease)     Diabetes mellitus     Hypertension     Morbid obesity     TAMMY on CPAP     setting +17    Stroke        Past Surgical History:   Procedure Laterality Date    ARTERIAL BYPASS SURGRY      9 tears sgo     SECTION      CORONARY ARTERY BYPASS GRAFT      HYSTERECTOMY      TUBAL LIGATION         Review of patient's allergies indicates:   Allergen Reactions    Latex, natural rubber        Current Neurological Medications:     No current facility-administered medications on file prior to encounter.      Current Outpatient Prescriptions on File Prior to Encounter   Medication Sig    acetaminophen (TYLENOL) 650 mg/20.3 mL Soln 20.3 mLs (650 mg total) by Per NG tube route every 6 (six) hours as needed. (Patient taking differently: 650 mg by Per NG tube route every 6 (six) hours as needed for Pain. )    albuterol-ipratropium 2.5mg-0.5mg/3mL (DUO-NEB) 0.5 mg-3 mg(2.5 mg base)/3 mL nebulizer solution Take 3 mLs by nebulization every 4 (four) hours. Rescue    aspirin 81 MG Chew 1 tablet (81 mg total) by Per G Tube route once daily.    atorvastatin (LIPITOR) 10 MG tablet 1 tablet (10 mg total) by Per G Tube route once daily. (Patient taking differently: 10 mg by Per G Tube route every evening. )    cloNIDine 0.1 mg/24 hr td ptwk (CATAPRES) 0.1 mg/24 hr Place 1 patch onto the skin every 7 days. (Patient taking differently: Place 1 patch onto the skin every Tuesday. )    fluoxetine (PROZAC) 20 MG capsule 1 capsule (20 mg total) by Per G Tube route once daily.    hydrALAZINE (APRESOLINE) 100 MG tablet 1 tablet (100 mg total) by Per G Tube route every 8 (eight) hours.    insulin aspart (NOVOLOG) 100 unit/mL InPn pen Inject 1-10 Units into the skin every 4 (four) hours as needed (Hyperglycemia).    meclizine (ANTIVERT) 25 mg tablet Take 1 tablet (25 mg total)  by mouth every 6 (six) hours as needed. (Patient taking differently: Take 25 mg by mouth every 6 (six) hours as needed for Dizziness. )    pantoprazole (PROTONIX) 40 mg GrPS 1 packet (40 mg total) by Per G Tube route once daily.    amantadine HCl (SYMMETREL) 100 mg capsule 1 capsule (100 mg total) by Per G Tube route every 8 (eight) hours.    amlodipine (NORVASC) 10 MG tablet 1 tablet (10 mg total) by Per G Tube route once daily.    diclofenac sodium (VOLTAREN) 1 % Gel Apply 2 g topically 2 (two) times daily.    EASY TOUCH TWIST LANCETS 30 gauge Misc     HEPARIN SODIUM,PORCINE (HEPARIN, PORCINE,) 5,000 unit/mL injection Inject 1.5 mLs (7,500 Units total) into the skin every 8 (eight) hours.     Family History     Problem Relation (Age of Onset)    No Known Problems Father, Mother, Sister, Brother, Maternal Aunt, Maternal Uncle, Paternal Aunt, Paternal Uncle, Maternal Grandmother, Maternal Grandfather, Paternal Grandmother, Paternal Grandfather        Social History Main Topics    Smoking status: Never Smoker    Smokeless tobacco: Never Used    Alcohol use Yes      Comment: occ    Drug use: No    Sexual activity: Not Currently     Birth control/ protection: See Surgical Hx     Interval History:     NAEON. No active concerns for agitations.     Lessened level of alertness / wakefullness today, however with fluctuating mentation.     Review of Systems   Unable to perform ROS: Patient nonverbal   Constitutional: Negative for chills and fever.   Respiratory: Negative for shortness of breath.    Cardiovascular: Negative for chest pain.   Neurological: Negative for syncope and facial asymmetry.     Objective:     Vital Signs (Most Recent):  Temp: 97.8 °F (36.6 °C) (09/19/17 1526)  Pulse: 73 (09/19/17 1526)  Resp: 16 (09/19/17 1526)  BP: 134/63 (09/19/17 1526)  SpO2: 97 % (09/19/17 1526) Vital Signs (24h Range):  Temp:  [97 °F (36.1 °C)-98.2 °F (36.8 °C)] 97.8 °F (36.6 °C)  Pulse:  [56-78] 73  Resp:  [14-20]  16  SpO2:  [96 %-98 %] 97 %  BP: (128-188)/(59-78) 134/63     Weight: 132.9 kg (292 lb 15.9 oz)  Body mass index is 47.29 kg/m².    Physical Exam   Constitutional: She appears listless. She is uncooperative.   HENT:   Head: Normocephalic and atraumatic.   Eyes: EOM are normal. Pupils are equal, round, and reactive to light.   Neck:   Trach in place   Cardiovascular: Normal rate, regular rhythm and normal heart sounds.    Pulses:       Radial pulses are 2+ on the right side, and 2+ on the left side.   Difficult to assess the heart sounds 2/2 breathing noise through the trach   Pulmonary/Chest: Effort normal. No tachypnea. She has no decreased breath sounds. She has no wheezes.   Abdominal: Soft. She exhibits no distension. There is no tenderness.   Neurological: She appears listless. She exhibits normal muscle tone. She displays no seizure activity. GCS eye subscore is 4. GCS verbal subscore is 1. GCS motor subscore is 5.   Reflex Scores:       Tricep reflexes are 1+ on the right side and 1+ on the left side.       Bicep reflexes are 1+ on the right side and 1+ on the left side.       Brachioradialis reflexes are 1+ on the right side and 1+ on the left side.       Patellar reflexes are 1+ on the right side and 1+ on the left side.  Skin: No rash noted. No erythema.   Psychiatric: Her affect is blunt. She is withdrawn. She is noncommunicative. She is inattentive.   Vitals reviewed.      NEUROLOGICAL EXAMINATION:     MENTAL STATUS   Disoriented to person: NT / Trach in place / Mute    Attention: normal.   Speech: mute   Level of consciousness: alert (Awake follow simple commands)  General knowledge: NT / Trach in place / Mute      CRANIAL NERVES     CN II   Visual fields full to confrontation.     CN III, IV, VI   Pupils are equal, round, and reactive to light.  Extraocular motions are normal.   Right pupil: Size: 3 mm. Shape: regular.   Left pupil: Size: 3 mm. Shape: regular.   CN III: no CN III palsy  CN VI: no CN VI  palsy  Nystagmus: none     CN V   Facial sensation intact.     CN VII   Facial expression full, symmetric.     CN VIII   CN VIII normal.     CN IX, X   CN IX normal.   CN X normal.     CN XI   Right sternocleidomastoid strength: weak  Left sternocleidomastoid strength: weak    CN XII   CN XII normal.     MOTOR EXAM   Muscle bulk: normal  Overall muscle tone: normal       B/L UE & LE weakness 4/5, with movement of extremities to pain     REFLEXES     Reflexes   Right brachioradialis: 1+  Left brachioradialis: 1+  Right biceps: 1+  Left biceps: 1+  Right triceps: 1+  Left triceps: 1+  Right patellar: 1+  Left patellar: 1+  Right plantar: equivocal  Left plantar: equivocal    SENSORY EXAM   Pinprick normal.     GAIT AND COORDINATION     Gait  Gait: (NT)    Tremor   Resting tremor: absent      Significant Labs:   Blood Culture: No results for input(s): LABBLOO in the last 48 hours.  CBC:     Recent Labs  Lab 09/18/17  0527 09/19/17  0708   WBC 5.35 6.77   HGB 9.1* 9.2*   HCT 29.0* 28.4*    204     CMP:   Recent Labs  Lab 09/18/17  0527 09/18/17  1201  09/18/17  2124 09/19/17  0426 09/19/17  0708 09/19/17  1602   GLU 90 85  --  91  --  93  --    *  149* 148*  < > 147* 145 146* 141   K 3.3* 4.4  --  5.0  --  4.0  --    * 114*  --  113*  --  111*  --    CO2 28 24  --  23  --  27  --    BUN 15 14  --  15  --  16  --    CREATININE 0.7 0.7  --  0.8  --  0.8  --    CALCIUM 8.6* 8.6*  --  8.5*  --  8.4*  --    MG 1.8  --   --   --  2.7*  --   --    PROT  --   --   --   --   --  6.3  --    ALBUMIN  --   --   --   --   --  2.2*  --    BILITOT  --   --   --   --   --  1.4*  --    ALKPHOS  --   --   --   --   --  85  --    AST  --   --   --   --   --  6*  --    ALT  --   --   --   --   --  10  --    ANIONGAP 9 10  --  11  --  8  --    EGFRNONAA >60.0 >60.0  --  >60.0  --  >60.0  --    < > = values in this interval not displayed.  Urine Culture: No results for input(s): LABURIN in the last 48  hours.    Significant Imaging: CT: I have reviewed all pertinent results/findings within the past 24 hours and my personal findings are:  no acute interval change compared to the previous study  EEG: I have reviewed all pertinent results/findings within the past 24 hours and my personal findings are:  generalized slowing indicative of a moderate-severe nonspecific encephalopathy, no epileptic activity

## 2017-09-19 NOTE — PROGRESS NOTES
Notified MD Boucher about patient's NG tube that several attempts were made to put the NG tube in and every time was failed. Patient pulled out last NG tube. MD said to give patient her Hydralyzine IV route. Will continue to monitor.

## 2017-09-19 NOTE — NURSING
ngt inserted to left nare at 60 cm with gastric contents returned on aspiration.  Ngt was then pulled out rapidly and after multiple attempts unable to re insert.

## 2017-09-19 NOTE — ASSESSMENT & PLAN NOTE
Concern for possible VAP    - Decreased O2 5L; 99%, will continue to titrate   - completed VAP treatment

## 2017-09-19 NOTE — PT/OT/SLP PROGRESS
Occupational Therapy  Treatment    Nisa Frank   MRN: [id number]   Admitting Diagnosis: Acute encephalopathy    OT Date of Treatment: 09/19/17   OT Start Time: 1400  OT Stop Time: 1423  OT Total Time (min): 23 min    Billable Minutes:  Therapeutic Activity 23    General Precautions: Standard, aspiration, NPO, fall  Orthopedic Precautions: N/A  Braces: N/A         Subjective:  Communicated with nurse prior to session.    Pain/Comfort  Pain Rating 1:  (Pt presenting with no signs of pain.)    Objective:  Patient found with: peripheral IV, siegel catheter, oxygen, telemetry, tracheostomy     Functional Mobility:  Bed Mobility:  Rolling/Turning to Left: Total assistance  Rolling/Turning Right: Total assistance  Scooting/Bridging: Total Assistance, With assist of 2    Transfers:   Sit <> Stand Assistance: Activity did not occur        Activities of Daily Living:  Feeding Level of Assistance: Activity did not occur    UE Dressing Level of Assistance: Total assistance (from bed level.  Pt needing assist with all aspects and unable to assist with threading of UEs into sleeves.)    LE Dressing Level of Assistance: Activity did not occur    Grooming Position: other (performed supine in bed with HOB elevated.)  Grooming Level of Assistance: Total assistance (Pt was unable to significantly assist with grooming. Attempted face washing with Pt needing hand over hand and still needing total (A).)         Balance:   Static Sit: Activity did not occur secondary to Pt not following commands or initiating any functional movements this session.  Therapeutic Activities and Exercises:  Performed PROM to (B) UEs with Pt supine in bed with HOB elevated to 40 degs.  Performed 1 set 10 reps all planes in (B) UEs.    AM-PAC 6 CLICK ADL   How much help from another person does this patient currently need?   1 = Unable, Total/Dependent Assistance  2 = A lot, Maximum/Moderate Assistance  3 = A little, Minimum/Contact Guard/Supervision  4 =  "None, Modified [place]/Independent    Putting on and taking off regular lower body clothing? : 1  Bathing (including washing, rinsing, drying)?: 1  Toileting, which includes using toilet, bedpan, or urinal? : 1  Putting on and taking off regular upper body clothing?: 1  Taking care of personal grooming such as brushing teeth?: 1  Eating meals?: 1  Total Score: 6     AM-PAC Raw Score CMS "G-Code Modifier Level of Impairment Assistance   6 % Total / Unable   7 - 8 CM 80 - 100% Maximal Assist   9-13 CL 60 - 80% Moderate Assist   14 - 19 CK 40 - 60% Moderate Assist   20 - 22 CJ 20 - 40% Minimal Assist   23 CI 1-20% SBA / CGA   24 CH 0% Independent/ Mod I       Patient left HOB elevated with all lines intact, call button in reach and nurse notified    ASSESSMENT:  Nisa Frank is a 67 y.o. female with a medical diagnosis of Acute encephalopathy . Pt tolerated Tx without incident but demonstrated no initiation of movements throughout Tx session. Attempted hand over hand with self care tasks with no noted assistance from Pt this session. Pt with eyes open and occasionally minimally scanning and making eye contact with therapist, but no verbalizations observed.    Rehab identified problem list/impairments: Rehab identified problem list/impairments: weakness, impaired endurance, impaired self care skills, impaired functional mobilty, gait instability, impaired balance, impaired cognition, decreased coordination, decreased upper extremity function, decreased lower extremity function, decreased ROM, abnormal tone, impaired coordination, impaired fine motor, edema    Rehab potential is fair.    Activity tolerance: Fair    Discharge recommendations: Discharge Facility/Level Of Care Needs: nursing facility, skilled (Long term)     Barriers to discharge: Barriers to Discharge: Decreased caregiver support, Inaccessible home environment    Equipment recommendations:  (TBD)     GOALS:    Occupational Therapy Goals  "       Problem: Occupational Therapy Goal    Goal Priority Disciplines Outcome Interventions   Occupational Therapy Goal     OT, PT/OT Ongoing (interventions implemented as appropriate)    Description:  Goals to be met by: 09/30/17     Patient will increase functional independence with ADLs by performing:    UE Dressing with Moderate Assistance.  LE Dressing with Maximum Assistance.  Grooming while seated with Set-up Assistance.  Toileting from bedside commode with Maximum Assistance for hygiene and clothing management.   Toilet transfer to bedside commode with Maximum Assistance.  Upper extremity exercise program x10 reps per handout, with assistance as needed.  Pt will sit EOB for 10 minutes with SBA during therapeutic activity.                       Plan:  Patient to be seen 5 x/week to address the above listed problems via self-care/home management, therapeutic activities, therapeutic exercises, neuromuscular re-education, cognitive retraining, sensory integration  Plan of Care expires: 10/14/17  Plan of Care reviewed with: patient         Avelino Arrieta, SRIRAMR/L  09/19/2017

## 2017-09-19 NOTE — CONSULTS
"  [facility]  Adult Nutrition  Consult Note    SUMMARY     Recommendations    Recommendation/Intervention: 1.  Recommend start Isosource 1.5 @ 25ml/hr   2.  Increase 10ml every eight hours until final goal rate of 60ml/hr is reached    3.  Monitor tolerance/residuals and stop TF if >300ml    Goals: Pt to meet >85% of EEN/EPN  Nutrition Goal Status: new  Communication of RD Recs: reviewed with physician    Continuum of Care Plan         Reason for Assessment    Reason for Assessment: new tube feeding, physician consult  Diagnosis:  (Acute Encephalopathy)  Relevent Medical History: DM2, CAD, CASBG, HTN, CVA   Interdisciplinary Rounds: did not attend     General Information Comments: Pt recently had NG tube placed and has pulled it out several times.     Nutrition Discharge Planning: JOSE LUIS    Nutrition Prescription Ordered    Current Diet Order: NPO   Nutrition Order Comments: NG tube placed and patient has pulled several times      Evaluation of Received Nutrients/Fluid Intake    Energy Calories Required: not meeting needs   Protein Required: not meeting needs  Fluid Required: not meeting needs     Tolerance: other (see comments) (Unknown at this time)      Nutrition Risk Screen          Nutrition/Diet History       Typical Food/Fluid Intake: JOSE LUIS  Food Preferences: NA        Factors Affecting Nutritional Intake: dry mouth, NPO, inability to feed self    Labs/Tests/Procedures/Meds       Pertinent Labs Reviewed: reviewed, pertinent     Pertinent Medications Reviewed: reviewed, pertinent       Physical Findings    Overall Physical Appearance: obese, weak  Tubes: nasogastric tube  Oral/Mouth Cavity: tooh/teeth broken  Skin: intact (Bruising)    Anthropometrics    Temp: 97.8 °F (36.6 °C)     Height: 5' 6" (167.6 cm)  Weight Method: Bed Scale  Weight: 132.9 kg (292 lb 15.9 oz)     Ideal Body Weight (IBW), Female: 130 lb     % Ideal Body Weight, Female (lb): 225.38 lb  BMI (Calculated): 47.4  BMI Grade: " greater than 40 - morbid obesity    Estimated/Assessed Needs    Weight Used For Calorie Calculations: 132.9 kg (292 lb 15.9 oz)      Energy Calorie Requirements (kcal): 2000  Energy Need Method: Kcal/kg (17-20kcals/kg/BW)        RMR ([place]-St. Jeor Equation): 1880.75        Weight Used For Protein Calculations: 133.9 kg (295 lb 3.1 oz)  Protein Requirements: 100 (0.8-1.0gmsProtein/kg/BW)       Fluid Need Method: RDA Method (1ml/1kca; or MD Rx)        RDA Method (mL): 2000               Assessment and Plan    P: Inadequate nutrient intake   E:  Related to diagnosis  S:  As evidenced by NPO, NG Tube  Monitor and Evaluation    Food and Nutrient Intake: energy intake  Food and Nutrient Adminstration: enteral and parenteral nutrition administration        Anthropometric Measurements: weight, weight change  Biochemical Data, Medical Tests and Procedures: electrolyte and renal panel, gastrointestinal profile, glucose/endocrine profile, inflammatory profile  Nutrition-Focused Physical Findings: overall appearance    Nutrition Risk    Level of Risk: other (see comments) (follow up x2/week)    Nutrition Follow-Up    RD Follow-up?: Yes

## 2017-09-19 NOTE — SUBJECTIVE & OBJECTIVE
Interval History:   NAEON. No change from previous exam. NG tube was placed yesterday, but patient pulled it out. Scheduled for PEG tube keenan.     Review of Systems   Unable to perform ROS: Patient nonverbal     Objective:     Vital Signs (Most Recent):  Temp: 98 °F (36.7 °C) (09/19/17 1059)  Pulse: 67 (09/19/17 1100)  Resp: 16 (09/19/17 1059)  BP: (!) 128/59 (09/19/17 1059)  SpO2: 96 % (09/19/17 1059) Vital Signs (24h Range):  Temp:  [97 °F (36.1 °C)-98.2 °F (36.8 °C)] 98 °F (36.7 °C)  Pulse:  [56-84] 67  Resp:  [14-20] 16  SpO2:  [96 %-98 %] 96 %  BP: (128-188)/(59-78) 128/59     Weight: 132.9 kg (292 lb 15.9 oz)  Body mass index is 47.29 kg/m².    Intake/Output Summary (Last 24 hours) at 09/19/17 1511  Last data filed at 09/19/17 1233   Gross per 24 hour   Intake          1023.33 ml   Output             1950 ml   Net          -926.67 ml      Physical Exam   Constitutional: She appears well-developed and well-nourished. No distress.   HENT:   Head: Normocephalic and atraumatic.   Neck: Neck supple. No JVD present.   Cardiovascular: Normal rate, regular rhythm and normal heart sounds.    No murmur heard.  Pulmonary/Chest: Effort normal and breath sounds normal. No respiratory distress. She has no wheezes.   Abdominal: Soft. Bowel sounds are normal. She exhibits no distension. There is no guarding.   Musculoskeletal: She exhibits no edema.   Neurological: She is alert.   Not tracking with her eyes.   Sometimes she squeezes my fingers with her hands.   She moves her limbs spontaneously.    Skin: She is not diaphoretic.   Psychiatric: She has a normal mood and affect.   Vitals reviewed.      Significant Labs:   CBC:   Recent Labs  Lab 09/18/17  0527 09/19/17  0708   WBC 5.35 6.77   HGB 9.1* 9.2*   HCT 29.0* 28.4*    204     CMP:   Recent Labs  Lab 09/18/17  1201  09/18/17  2124 09/19/17  0426 09/19/17  0708   *  < > 147* 145 146*   K 4.4  --  5.0  --  4.0   *  --  113*  --  111*   CO2 24  --  23  --   27   GLU 85  --  91  --  93   BUN 14  --  15  --  16   CREATININE 0.7  --  0.8  --  0.8   CALCIUM 8.6*  --  8.5*  --  8.4*   PROT  --   --   --   --  6.3   ALBUMIN  --   --   --   --  2.2*   BILITOT  --   --   --   --  1.4*   ALKPHOS  --   --   --   --  85   AST  --   --   --   --  6*   ALT  --   --   --   --  10   ANIONGAP 10  --  11  --  8   EGFRNONAA >60.0  --  >60.0  --  >60.0   < > = values in this interval not displayed.  All pertinent labs within the past 24 hours have been reviewed.    Significant Imaging: I have reviewed all pertinent imaging results/findings within the past 24 hours.

## 2017-09-19 NOTE — PLAN OF CARE
Problem: Occupational Therapy Goal  Goal: Occupational Therapy Goal  Goals to be met by: 09/30/17     Patient will increase functional independence with ADLs by performing:    UE Dressing with Moderate Assistance.  LE Dressing with Maximum Assistance.  Grooming while seated with Set-up Assistance.  Toileting from bedside commode with Maximum Assistance for hygiene and clothing management.   Toilet transfer to bedside commode with Maximum Assistance.  Upper extremity exercise program x10 reps per handout, with assistance as needed.  Pt will sit EOB for 10 minutes with SBA during therapeutic activity.      Outcome: Ongoing (interventions implemented as appropriate)  DANNY Mark/TONEY      9/19/2017

## 2017-09-19 NOTE — PROGRESS NOTES
General Surgery Progress note    Surgery re-consulted for PEG placement.   Previous PEG placed 7/12/17, has since been removed.     NAD  On trach collar  RRR  Abd: soft, NTND, multiple abdominal surgical scars noted; previous PEG site scar noted    Plan for PEG placement in OR tomorrow 9/20/17  NPO/hold tube feeds p MN 9/20/17  Consents to be obtained  Discussed with staff  Continue daily bowel regimen to prevent constipation  As patient has previous history of pulling trach out, recommend placing abdominal binder in place after procedure.    Milagros Carpenter PA-C  [id number]

## 2017-09-19 NOTE — ANESTHESIA PREPROCEDURE EVALUATION
[facility]  Anesthesia Pre-Operative Evaluation         Patient Name: Nisa Frank  YOB: 1950  MRN: [id number]    SUBJECTIVE:     Pre-operative evaluation for Procedure(s) (LRB):  PLACEMENT-TUBE-PEG (N/A)     09/19/2017    Nisa Frank is a 67 y.o. female w/ a significant PMHx of T2DM, CKD 3, CAD s/p CABG, HTN, CBA with bihemispheric watershed infarcts who underwent Trach/PEG in July 2017.  She is currently admitted for encephalopathy of unknown origin.  Her PEG has since been removed, have had difficulty placing NG tube. now presents for the above procedure.    Of note, she had extended hospital course at [facility]. Developed hypoxia 2/2 mucous plug requiring mechanical ventilation. She pulled out trach which was subsequently replaced 9/4.Since then she has been encephalopathic.  She is currently on trach collar.      6.0 Shiley cuffed - cuff deflated    LDA:        Peripheral IV - Single Lumen 09/16/17 0142 Left Hand (Active)   Site Assessment Clean;Dry;Intact;No redness;No swelling 9/19/2017  8:00 AM   Line Status Flushed;Infusing 9/19/2017  8:00 AM   Dressing Status Clean;Dry;Intact 9/19/2017  8:00 AM   Dressing Intervention Dressing reinforced 9/18/2017  8:17 PM   Dressing Change Due 09/20/17 9/18/2017  7:30 AM   Site Change Due 09/20/17 9/19/2017  8:00 AM   Reason Not Rotated Anticipated discharge 9/19/2017  8:00 AM   Number of days: 3       Drips:    dextrose 5 % 75 mL/hr at 09/19/17 1107       Patient Active Problem List   Diagnosis    Essential hypertension    Type 2 diabetes mellitus, uncontrolled    S/P CABG (coronary artery bypass graft)    Morbid obesity with BMI of 50.0-59.9, adult    S/P hysterectomy with oophorectomy    Hypertensive emergency    Hypokalemia    Hyperlipidemia    Moderate protein malnutrition    CAD s/p CABG    Pyelonephritis    Acute respiratory failure    Seizure    Hypertensive encephalopathy    Morbid obesity due to excess  calories    Chronic bilateral cerebral infarction in watershed distribution    Positive blood culture    Eve coma scale total score 9-12    Laryngeal edema    Dysphagia    Prerenal azotemia    Respiratory insufficiency    Acute on chronic respiratory failure    Mucus plugging of bronchi    Tracheostomy status    PEG (percutaneous endoscopic gastrostomy) status    CKD (chronic kidney disease) stage 3, GFR 30-59 ml/min    Abnormal urinalysis    Chronic sinus bradycardia    Bronchitis with tracheitis    Slow transit constipation    Cerebral infarction, watershed distribution, bilateral, acute    Respiratory failure    KATHI (acute kidney injury)    Acute encephalopathy    Ileus       Review of patient's allergies indicates:   Allergen Reactions    Latex, natural rubber        Current Inpatient Medications:   acetylcysteine 100 mg/ml (10%)  4 mL Nebulization BID    albuterol-ipratropium 2.5mg-0.5mg/3mL  3 mL Nebulization Q8H    amlodipine  10 mg Per G Tube Daily    aspirin  81 mg Per NG tube Daily    atorvastatin  10 mg Per NG tube Daily    bisacodyl  10 mg Rectal Daily    cloNIDine 0.1 mg/24 hr td ptwk  1 patch Transdermal Q7 Days    enoxaparin  40 mg Subcutaneous Daily    hydrALAZINE  100 mg Per G Tube Q8H    piperacillin-tazobactam 4.5 g in dextrose 5 % 100 mL IVPB (ready to mix system)  4.5 g Intravenous Q8H       No current facility-administered medications on file prior to encounter.      Current Outpatient Prescriptions on File Prior to Encounter   Medication Sig Dispense Refill    acetaminophen (TYLENOL) 650 mg/20.3 mL Soln 20.3 mLs (650 mg total) by Per NG tube route every 6 (six) hours as needed. (Patient taking differently: 650 mg by Per NG tube route every 6 (six) hours as needed for Pain. )      albuterol-ipratropium 2.5mg-0.5mg/3mL (DUO-NEB) 0.5 mg-3 mg(2.5 mg base)/3 mL nebulizer solution Take 3 mLs by nebulization every 4 (four) hours. Rescue  0    aspirin 81 MG Chew  1 tablet (81 mg total) by Per G Tube route once daily.  0    atorvastatin (LIPITOR) 10 MG tablet 1 tablet (10 mg total) by Per G Tube route once daily. (Patient taking differently: 10 mg by Per G Tube route every evening. )      cloNIDine 0.1 mg/24 hr td ptwk (CATAPRES) 0.1 mg/24 hr Place 1 patch onto the skin every 7 days. (Patient taking differently: Place 1 patch onto the skin every Tuesday. )      fluoxetine (PROZAC) 20 MG capsule 1 capsule (20 mg total) by Per G Tube route once daily.      hydrALAZINE (APRESOLINE) 100 MG tablet 1 tablet (100 mg total) by Per G Tube route every 8 (eight) hours. 90 tablet 11    insulin aspart (NOVOLOG) 100 unit/mL InPn pen Inject 1-10 Units into the skin every 4 (four) hours as needed (Hyperglycemia).  0    meclizine (ANTIVERT) 25 mg tablet Take 1 tablet (25 mg total) by mouth every 6 (six) hours as needed. (Patient taking differently: Take 25 mg by mouth every 6 (six) hours as needed for Dizziness. ) 20 tablet 0    pantoprazole (PROTONIX) 40 mg GrPS 1 packet (40 mg total) by Per G Tube route once daily.      amantadine HCl (SYMMETREL) 100 mg capsule 1 capsule (100 mg total) by Per G Tube route every 8 (eight) hours.      amlodipine (NORVASC) 10 MG tablet 1 tablet (10 mg total) by Per G Tube route once daily.      diclofenac sodium (VOLTAREN) 1 % Gel Apply 2 g topically 2 (two) times daily. 100 g 1    EASY TOUCH TWIST LANCETS 30 gauge Misc   6    HEPARIN SODIUM,PORCINE (HEPARIN, PORCINE,) 5,000 unit/mL injection Inject 1.5 mLs (7,500 Units total) into the skin every 8 (eight) hours.         Past Surgical History:   Procedure Laterality Date    ARTERIAL BYPASS SURGRY      9 tears sgo     SECTION      CORONARY ARTERY BYPASS GRAFT      HYSTERECTOMY      TUBAL LIGATION         Social History     Social History    Marital status:      Spouse name: N/A    Number of children: N/A    Years of education: N/A     Occupational History    Not on file.      Social History Main Topics    Smoking status: Never Smoker    Smokeless tobacco: Never Used    Alcohol use Yes      Comment: occ    Drug use: No    Sexual activity: Not Currently     Birth control/ protection: See Surgical Hx     Other Topics Concern    Not on file     Social History Narrative    No narrative on file       OBJECTIVE:     Vital Signs Range (Last 24H):  Temp:  [36.1 °C (97 °F)-36.8 °C (98.2 °F)]   Pulse:  [56-84]   Resp:  [14-20]   BP: (128-190)/(59-92)   SpO2:  [96 %-98 %]       CBC:   Recent Labs      17   0708   WBC  5.35  6.77   RBC  3.47*  3.51*   HGB  9.1*  9.2*   HCT  29.0*  28.4*   PLT  187  204   MCV  84  81*   MCH  26.2*  26.2*   MCHC  31.4*  32.4       CMP:   Recent Labs      17   0527   17   2124  17   0426  17   0708   NA  149*  149*   < >  147*  145  146*   K  3.3*   < >  5.0   --   4.0   CL  112*   < >  113*   --   111*   CO2  28   < >  23   --   27   BUN  15   < >  15   --   16   CREATININE  0.7   < >  0.8   --   0.8   GLU  90   < >  91   --   93   MG  1.8   --    --   2.7*   --    PHOS  2.1*   --    --   3.2   --    CALCIUM  8.6*   < >  8.5*   --   8.4*   ALBUMIN   --    --    --    --   2.2*   PROT   --    --    --    --   6.3   ALKPHOS   --    --    --    --   85   ALT   --    --    --    --   10   AST   --    --    --    --   6*   BILITOT   --    --    --    --   1.4*    < > = values in this interval not displayed.       INR:  No results for input(s): INR, PROTIME, APTT in the last 72 hours.    Invalid input(s): PT    Diagnostic Studies: No relevant studies.    EK17  Vent. Rate : 071 BPM     Atrial Rate : 071 BPM     P-R Int : 158 ms          QRS Dur : 082 ms      QT Int : 414 ms       P-R-T Axes : 062 -28 127 degrees     QTc Int : 449 ms    Normal sinus rhythm  Left axis deviation  Abnormal R wave progression  T wave abnormality, consider lateral ischemia  Abnormal ECG  When compared with ECG of 08-AUG-2017  09:46,  No significant change was found  Confirmed by Georgi Baker MD ([id number]) on 9/13/2017 6:45:19 PM    2D ECHO 6/21/17    Results for orders placed or performed during the hospital encounter of 06/18/17   2D echo with color flow doppler   Result Value Ref Range    EF 55 55 - 65    Diastolic Dysfunction Yes (A)     Est. PA Systolic Pressure 31.58     Pericardial Effusion NONE     Tricuspid Valve Regurgitation TRIVIAL TO MILD      CONCLUSIONS     1 - Normal left ventricular systolic function (EF 55-60%).     2 - Concentric hypertrophy.     3 - Impaired LV relaxation, elevated LAP (grade 2 diastolic dysfunction).       ASSESSMENT/PLAN:       Anesthesia Evaluation    I have reviewed the Patient Summary Reports.     I have reviewed the Medications.     Review of Systems  Anesthesia Hx:  History of prior surgery of interest to airway management or planning: tracheostomy. Denies Family Hx of Anesthesia complications.   Denies Personal Hx of Anesthesia complications.   Cardiovascular:   Hypertension, poorly controlled CAD      Pulmonary:   Sleep Apnea    Renal/:   Chronic Renal Disease    Hepatic/GI:  Hepatic/GI Normal    Neurological:   CVA Hx watershed infarcts now with Encephalopathy of uncertain etiology   Endocrine:   Diabetes, type 2        Physical Exam  General:  Obesity    Airway/Jaw/Neck:  Airway Findings: Size: 6.0 cuffed Trach collar       Chest/Lungs:  Chest/Lungs Findings: Clear to auscultation, Normal Respiratory Rate     Heart/Vascular:  Heart Findings: Rate: Normal        Mental Status:  Mental Status Findings:         Anesthesia Plan  Type of Anesthesia, risks & benefits discussed:  Anesthesia Type:  general  Patient's Preference:   Intra-op Monitoring Plan: standard ASA monitors  Intra-op Monitoring Plan Comments:   Post Op Pain Control Plan: per primary service following discharge from PACU  Post Op Pain Control Plan Comments:   Induction:   IV and Inhalation  Beta Blocker:  Patient is not currently on  a Beta-Blocker (No further documentation required).       Informed Consent: Patient representative understands risks and agrees with Anesthesia plan.  Questions answered. Anesthesia consent signed with patient representative.  ASA Score: 3     Day of Surgery Review of History & Physical:    H&P update referred to the surgeon.         Ready For Surgery From Anesthesia Perspective.

## 2017-09-20 ENCOUNTER — ANESTHESIA (OUTPATIENT)
Dept: SURGERY | Facility: HOSPITAL | Age: 67
DRG: 205 | End: 2017-09-20
Payer: MEDICARE

## 2017-09-20 PROBLEM — K56.7 ILEUS: Chronic | Status: ACTIVE | Noted: 2017-09-13

## 2017-09-20 PROBLEM — E78.5 HYPERLIPIDEMIA: Status: ACTIVE | Noted: 2017-06-19

## 2017-09-20 LAB
ALBUMIN SERPL BCP-MCNC: 2.2 G/DL
ALP SERPL-CCNC: 80 U/L
ALT SERPL W/O P-5'-P-CCNC: 8 U/L
ANION GAP SERPL CALC-SCNC: 10 MMOL/L
AST SERPL-CCNC: 6 U/L
BILIRUB SERPL-MCNC: 1.3 MG/DL
BUN SERPL-MCNC: 23 MG/DL
CALCIUM SERPL-MCNC: 8.2 MG/DL
CHLORIDE SERPL-SCNC: 107 MMOL/L
CO2 SERPL-SCNC: 26 MMOL/L
CREAT SERPL-MCNC: 1.3 MG/DL
EST. GFR  (AFRICAN AMERICAN): 49.1 ML/MIN/1.73 M^2
EST. GFR  (NON AFRICAN AMERICAN): 42.6 ML/MIN/1.73 M^2
GLUCOSE SERPL-MCNC: 90 MG/DL
MAGNESIUM SERPL-MCNC: 2 MG/DL
PHOSPHATE SERPL-MCNC: 3.2 MG/DL
POCT GLUCOSE: 103 MG/DL (ref 70–110)
POCT GLUCOSE: 67 MG/DL (ref 70–110)
POCT GLUCOSE: 77 MG/DL (ref 70–110)
POCT GLUCOSE: 88 MG/DL (ref 70–110)
POCT GLUCOSE: 90 MG/DL (ref 70–110)
POCT GLUCOSE: 91 MG/DL (ref 70–110)
POTASSIUM SERPL-SCNC: 3.6 MMOL/L
PROT SERPL-MCNC: 6.1 G/DL
SODIUM SERPL-SCNC: 143 MMOL/L
SODIUM SERPL-SCNC: 143 MMOL/L

## 2017-09-20 PROCEDURE — 80053 COMPREHEN METABOLIC PANEL: CPT

## 2017-09-20 PROCEDURE — 71000039 HC RECOVERY, EACH ADD'L HOUR: Performed by: SURGERY

## 2017-09-20 PROCEDURE — 84295 ASSAY OF SERUM SODIUM: CPT

## 2017-09-20 PROCEDURE — D9220A PRA ANESTHESIA: Mod: ANES,,, | Performed by: ANESTHESIOLOGY

## 2017-09-20 PROCEDURE — 43246 EGD PLACE GASTROSTOMY TUBE: CPT | Mod: GC,,, | Performed by: SURGERY

## 2017-09-20 PROCEDURE — 36000704 HC OR TIME LEV I 1ST 15 MIN: Performed by: SURGERY

## 2017-09-20 PROCEDURE — 63600175 PHARM REV CODE 636 W HCPCS: Performed by: HOSPITALIST

## 2017-09-20 PROCEDURE — 20600001 HC STEP DOWN PRIVATE ROOM

## 2017-09-20 PROCEDURE — 99233 SBSQ HOSP IP/OBS HIGH 50: CPT | Mod: GC,,, | Performed by: PSYCHIATRY & NEUROLOGY

## 2017-09-20 PROCEDURE — 63600175 PHARM REV CODE 636 W HCPCS: Performed by: NURSE ANESTHETIST, CERTIFIED REGISTERED

## 2017-09-20 PROCEDURE — 0DH63UZ INSERTION OF FEEDING DEVICE INTO STOMACH, PERCUTANEOUS APPROACH: ICD-10-PCS | Performed by: SURGERY

## 2017-09-20 PROCEDURE — 37000008 HC ANESTHESIA 1ST 15 MINUTES: Performed by: SURGERY

## 2017-09-20 PROCEDURE — 94760 N-INVAS EAR/PLS OXIMETRY 1: CPT

## 2017-09-20 PROCEDURE — 84100 ASSAY OF PHOSPHORUS: CPT

## 2017-09-20 PROCEDURE — 94640 AIRWAY INHALATION TREATMENT: CPT

## 2017-09-20 PROCEDURE — 94761 N-INVAS EAR/PLS OXIMETRY MLT: CPT

## 2017-09-20 PROCEDURE — D9220A PRA ANESTHESIA: Mod: CRNA,,, | Performed by: NURSE ANESTHETIST, CERTIFIED REGISTERED

## 2017-09-20 PROCEDURE — 27800903 OPTIME MED/SURG SUP & DEVICES OTHER IMPLANTS: Performed by: SURGERY

## 2017-09-20 PROCEDURE — 25000242 PHARM REV CODE 250 ALT 637 W/ HCPCS: Performed by: STUDENT IN AN ORGANIZED HEALTH CARE EDUCATION/TRAINING PROGRAM

## 2017-09-20 PROCEDURE — 27000221 HC OXYGEN, UP TO 24 HOURS

## 2017-09-20 PROCEDURE — 25000003 PHARM REV CODE 250: Performed by: NURSE ANESTHETIST, CERTIFIED REGISTERED

## 2017-09-20 PROCEDURE — 25000003 PHARM REV CODE 250: Performed by: STUDENT IN AN ORGANIZED HEALTH CARE EDUCATION/TRAINING PROGRAM

## 2017-09-20 PROCEDURE — 82962 GLUCOSE BLOOD TEST: CPT | Performed by: SURGERY

## 2017-09-20 PROCEDURE — 36000705 HC OR TIME LEV I EA ADD 15 MIN: Performed by: SURGERY

## 2017-09-20 PROCEDURE — 25000003 PHARM REV CODE 250: Performed by: HOSPITALIST

## 2017-09-20 PROCEDURE — 99232 SBSQ HOSP IP/OBS MODERATE 35: CPT | Mod: GC,,, | Performed by: HOSPITALIST

## 2017-09-20 PROCEDURE — 37000009 HC ANESTHESIA EA ADD 15 MINS: Performed by: SURGERY

## 2017-09-20 PROCEDURE — 71000033 HC RECOVERY, INTIAL HOUR: Performed by: SURGERY

## 2017-09-20 PROCEDURE — 63600175 PHARM REV CODE 636 W HCPCS: Performed by: INTERNAL MEDICINE

## 2017-09-20 PROCEDURE — 83735 ASSAY OF MAGNESIUM: CPT

## 2017-09-20 PROCEDURE — 25000003 PHARM REV CODE 250: Performed by: INTERNAL MEDICINE

## 2017-09-20 RX ORDER — FENTANYL CITRATE 50 UG/ML
INJECTION, SOLUTION INTRAMUSCULAR; INTRAVENOUS
Status: DISCONTINUED | OUTPATIENT
Start: 2017-09-20 | End: 2017-09-20

## 2017-09-20 RX ORDER — DEXTROSE MONOHYDRATE 50 MG/ML
INJECTION, SOLUTION INTRAVENOUS CONTINUOUS
Status: ACTIVE | OUTPATIENT
Start: 2017-09-20 | End: 2017-09-21

## 2017-09-20 RX ORDER — MIDAZOLAM HYDROCHLORIDE 1 MG/ML
INJECTION, SOLUTION INTRAMUSCULAR; INTRAVENOUS
Status: DISCONTINUED | OUTPATIENT
Start: 2017-09-20 | End: 2017-09-20

## 2017-09-20 RX ORDER — SODIUM CHLORIDE 9 MG/ML
INJECTION, SOLUTION INTRAVENOUS CONTINUOUS PRN
Status: DISCONTINUED | OUTPATIENT
Start: 2017-09-20 | End: 2017-09-20

## 2017-09-20 RX ADMIN — SODIUM CHLORIDE: 0.9 INJECTION, SOLUTION INTRAVENOUS at 08:09

## 2017-09-20 RX ADMIN — DEXTROSE MONOHYDRATE 12.5 G: 25 INJECTION, SOLUTION INTRAVENOUS at 06:09

## 2017-09-20 RX ADMIN — MIDAZOLAM HYDROCHLORIDE 2 MG: 1 INJECTION, SOLUTION INTRAMUSCULAR; INTRAVENOUS at 07:09

## 2017-09-20 RX ADMIN — IPRATROPIUM BROMIDE AND ALBUTEROL SULFATE 3 ML: .5; 3 SOLUTION RESPIRATORY (INHALATION) at 08:09

## 2017-09-20 RX ADMIN — IPRATROPIUM BROMIDE AND ALBUTEROL SULFATE 3 ML: .5; 3 SOLUTION RESPIRATORY (INHALATION) at 12:09

## 2017-09-20 RX ADMIN — ENOXAPARIN SODIUM 40 MG: 100 INJECTION SUBCUTANEOUS at 04:09

## 2017-09-20 RX ADMIN — IPRATROPIUM BROMIDE AND ALBUTEROL SULFATE 3 ML: .5; 3 SOLUTION RESPIRATORY (INHALATION) at 03:09

## 2017-09-20 RX ADMIN — IPRATROPIUM BROMIDE AND ALBUTEROL SULFATE 3 ML: .5; 3 SOLUTION RESPIRATORY (INHALATION) at 07:09

## 2017-09-20 RX ADMIN — DEXTROSE: 5 SOLUTION INTRAVENOUS at 06:09

## 2017-09-20 RX ADMIN — FENTANYL CITRATE 50 MCG: 50 INJECTION, SOLUTION INTRAMUSCULAR; INTRAVENOUS at 08:09

## 2017-09-20 RX ADMIN — PIPERACILLIN AND TAZOBACTAM 4.5 G: 4; .5 INJECTION, POWDER, FOR SOLUTION INTRAVENOUS at 04:09

## 2017-09-20 RX ADMIN — PIPERACILLIN AND TAZOBACTAM 4.5 G: 4; .5 INJECTION, POWDER, FOR SOLUTION INTRAVENOUS at 08:09

## 2017-09-20 RX ADMIN — HYDRALAZINE HYDROCHLORIDE 10 MG: 20 INJECTION INTRAMUSCULAR; INTRAVENOUS at 04:09

## 2017-09-20 NOTE — PROGRESS NOTES
[facility]  Neurology  Progress Note    Patient Name: Nisa Frank  MRN: [id number]  Admission Date: 2017  Hospital Length of Stay: 7 days  Code Status: Full Code   Attending Provider: Bay Henao, *  Primary Care Physician: Atilio Downs MD   Principal Problem:Acute encephalopathy    Past Medical History:   Diagnosis Date    Acute bilateral cerebral infarction in a watershed distribution 2017    CAD (coronary artery disease)     Diabetes mellitus     Hypertension     Morbid obesity     TAMMY on CPAP     setting +17    Stroke        Past Surgical History:   Procedure Laterality Date    ARTERIAL BYPASS SURGRY      9 tears sgo     SECTION      CORONARY ARTERY BYPASS GRAFT      HYSTERECTOMY      TUBAL LIGATION         Review of patient's allergies indicates:   Allergen Reactions    Latex, natural rubber        Current Neurological Medications:     No current facility-administered medications on file prior to encounter.      Current Outpatient Prescriptions on File Prior to Encounter   Medication Sig    acetaminophen (TYLENOL) 650 mg/20.3 mL Soln 20.3 mLs (650 mg total) by Per NG tube route every 6 (six) hours as needed. (Patient taking differently: 650 mg by Per NG tube route every 6 (six) hours as needed for Pain. )    albuterol-ipratropium 2.5mg-0.5mg/3mL (DUO-NEB) 0.5 mg-3 mg(2.5 mg base)/3 mL nebulizer solution Take 3 mLs by nebulization every 4 (four) hours. Rescue    aspirin 81 MG Chew 1 tablet (81 mg total) by Per G Tube route once daily.    atorvastatin (LIPITOR) 10 MG tablet 1 tablet (10 mg total) by Per G Tube route once daily. (Patient taking differently: 10 mg by Per G Tube route every evening. )    cloNIDine 0.1 mg/24 hr td ptwk (CATAPRES) 0.1 mg/24 hr Place 1 patch onto the skin every 7 days. (Patient taking differently: Place 1 patch onto the skin every Tuesday. )    fluoxetine (PROZAC) 20 MG capsule 1 capsule (20 mg total) by Per G  Tube route once daily.    hydrALAZINE (APRESOLINE) 100 MG tablet 1 tablet (100 mg total) by Per G Tube route every 8 (eight) hours.    insulin aspart (NOVOLOG) 100 unit/mL InPn pen Inject 1-10 Units into the skin every 4 (four) hours as needed (Hyperglycemia).    meclizine (ANTIVERT) 25 mg tablet Take 1 tablet (25 mg total) by mouth every 6 (six) hours as needed. (Patient taking differently: Take 25 mg by mouth every 6 (six) hours as needed for Dizziness. )    pantoprazole (PROTONIX) 40 mg GrPS 1 packet (40 mg total) by Per G Tube route once daily.    amantadine HCl (SYMMETREL) 100 mg capsule 1 capsule (100 mg total) by Per G Tube route every 8 (eight) hours.    amlodipine (NORVASC) 10 MG tablet 1 tablet (10 mg total) by Per G Tube route once daily.    diclofenac sodium (VOLTAREN) 1 % Gel Apply 2 g topically 2 (two) times daily.    EASY TOUCH TWIST LANCETS 30 gauge Misc     HEPARIN SODIUM,PORCINE (HEPARIN, PORCINE,) 5,000 unit/mL injection Inject 1.5 mLs (7,500 Units total) into the skin every 8 (eight) hours.     Family History     Problem Relation (Age of Onset)    No Known Problems Father, Mother, Sister, Brother, Maternal Aunt, Maternal Uncle, Paternal Aunt, Paternal Uncle, Maternal Grandmother, Maternal Grandfather, Paternal Grandmother, Paternal Grandfather        Social History Main Topics    Smoking status: Never Smoker    Smokeless tobacco: Never Used    Alcohol use Yes      Comment: occ    Drug use: No    Sexual activity: Not Currently     Birth control/ protection: See Surgical Hx     Interval History:     NAEON. No active concerns for agitations.     Improved level of alertness / wakefullness today, however with fluctuating mentation.     PEG placement today.     Review of Systems   Unable to perform ROS: Patient nonverbal   Constitutional: Negative for chills and fever.   Respiratory: Negative for shortness of breath.    Cardiovascular: Negative for chest pain.   Neurological: Negative for  syncope and facial asymmetry.     Objective:     Vital Signs (Most Recent):  Temp: 97.2 °F (36.2 °C) (09/20/17 1609)  Pulse: 85 (09/20/17 1731)  Resp: 20 (09/20/17 1731)  BP: (!) 146/67 (09/20/17 1731)  SpO2: 97 % (09/20/17 1728) Vital Signs (24h Range):  Temp:  [97.2 °F (36.2 °C)-98.9 °F (37.2 °C)] 97.2 °F (36.2 °C)  Pulse:  [68-86] 85  Resp:  [14-24] 20  SpO2:  [93 %-98 %] 97 %  BP: ()/(49-74) 146/67     Weight: 132.5 kg (292 lb)  Body mass index is 47.13 kg/m².    Physical Exam   Constitutional: She appears listless. She is uncooperative.   HENT:   Head: Normocephalic and atraumatic.   Eyes: EOM are normal. Pupils are equal, round, and reactive to light.   Neck:   Trach in place   Cardiovascular: Normal rate, regular rhythm and normal heart sounds.    Pulses:       Radial pulses are 2+ on the right side, and 2+ on the left side.   Difficult to assess the heart sounds 2/2 breathing noise through the trach   Pulmonary/Chest: Effort normal. No tachypnea. She has no decreased breath sounds. She has no wheezes.   Abdominal: Soft. She exhibits no distension. There is no tenderness.   Neurological: She appears listless. She exhibits normal muscle tone. She displays no seizure activity. GCS eye subscore is 4. GCS verbal subscore is 1. GCS motor subscore is 5.   Reflex Scores:       Tricep reflexes are 1+ on the right side and 1+ on the left side.       Bicep reflexes are 1+ on the right side and 1+ on the left side.       Brachioradialis reflexes are 1+ on the right side and 1+ on the left side.       Patellar reflexes are 1+ on the right side and 1+ on the left side.  Skin: No rash noted. No erythema.   Psychiatric: Her affect is blunt. She is withdrawn. She is noncommunicative. She is inattentive.   Vitals reviewed.      NEUROLOGICAL EXAMINATION:     MENTAL STATUS   Disoriented to person: NT / Trach in place / Mute    Attention: normal.   Speech: mute   Level of consciousness: alert (Awake follow simple  commands)  General knowledge: NT / Trach in place / Mute      CRANIAL NERVES     CN II   Visual fields full to confrontation.     CN III, IV, VI   Pupils are equal, round, and reactive to light.  Extraocular motions are normal.   Right pupil: Size: 3 mm. Shape: regular.   Left pupil: Size: 3 mm. Shape: regular.   CN III: no CN III palsy  CN VI: no CN VI palsy  Nystagmus: none     CN V   Facial sensation intact.     CN VII   Facial expression full, symmetric.     CN VIII   CN VIII normal.     CN IX, X   CN IX normal.   CN X normal.     CN XI   Right sternocleidomastoid strength: weak  Left sternocleidomastoid strength: weak    CN XII   CN XII normal.     MOTOR EXAM   Muscle bulk: normal  Overall muscle tone: normal       B/L UE & LE weakness 4/5, with movement of extremities to pain     REFLEXES     Reflexes   Right brachioradialis: 1+  Left brachioradialis: 1+  Right biceps: 1+  Left biceps: 1+  Right triceps: 1+  Left triceps: 1+  Right patellar: 1+  Left patellar: 1+  Right plantar: equivocal  Left plantar: equivocal    SENSORY EXAM   Pinprick normal.     GAIT AND COORDINATION     Gait  Gait: (NT)    Tremor   Resting tremor: absent      Significant Labs:   Blood Culture: No results for input(s): LABBLOO in the last 48 hours.  CBC:     Recent Labs  Lab 09/19/17  0708   WBC 6.77   HGB 9.2*   HCT 28.4*        CMP:     Recent Labs  Lab 09/19/17  0426 09/19/17  0708 09/19/17  1602 09/19/17  1831 09/20/17  0442   GLU  --  93  --  96 90    146* 141 142 143  143   K  --  4.0  --  3.9 3.6   CL  --  111*  --  107 107   CO2  --  27  --  24 26   BUN  --  16  --  20 23   CREATININE  --  0.8  --  1.1 1.3   CALCIUM  --  8.4*  --  8.3* 8.2*   MG 2.7*  --   --   --  2.0   PROT  --  6.3  --   --  6.1   ALBUMIN  --  2.2*  --   --  2.2*   BILITOT  --  1.4*  --   --  1.3*   ALKPHOS  --  85  --   --  80   AST  --  6*  --   --  6*   ALT  --  10  --   --  8*   ANIONGAP  --  8  --  11 10   EGFRNONAA  --  >60.0  --  52.1*  42.6*     Urine Culture: No results for input(s): LABURIN in the last 48 hours.    Significant Imaging: CT: I have reviewed all pertinent results/findings within the past 24 hours and my personal findings are:  no acute interval change compared to the previous study  EEG: I have reviewed all pertinent results/findings within the past 24 hours and my personal findings are:  generalized slowing indicative of a moderate-severe nonspecific encephalopathy, no epileptic activity    Assessment and Plan:     * Acute encephalopathy    - 66 y/o f with a complicate course of the hospital stay after a bihemispheric watershed infarcts s/p trach/PEG, DM-2, CKD3, CAD s/p CABG, HTN, who was transferred from [place] to [facility] for encephalopathy and is consulted to the neurology team for the same and possible seizure.   - Complicated post-stroke course, with aspiration PNA, trach dislodgement / mucous plug obstruction / UTIs, with poor mentation post trach replacement     - Head CT scan with no interval change   - 2 EEGs on 8/1 and 9/14, revealing generalized slowing indicative of nonspecific encephalopathy. \  - labs, she has elevated Na~150/ UTI concerns     DDx encephalopathy multifactorial metabolic/ infectious / electrolyte disturbance at the background of watershed strokes     - persisting concerns of fluctuating level of alertness / wakefulness.   - Na corrected / abx completion for VAP / UTI, ileus managed    Plan:   - continue to monitor neurological status / neuro checks / PT/OT/Resp assistance  - Control active infection   - Correct electrolyte abnormality as needed   - Continue to hold keppra / no active concerns on EEG / monitor for improvement in mentation   - secondary stroke preventions : ASA / statins / anti-coagulation if needed / prevent cerebral hypoperfusions   - HTN control/ target < 160/90 mmHg, resume home meds   - delirium precautions / neuro check        Respiratory insufficiency    - avoid hypoxias          Chronic bilateral cerebral infarction in watershed distribution    - secondary stroke prevention  - ASA/ statins if not contraindicated  - HTN target < 160/90 mmHg        Hypertensive encephalopathy    - As per primary team   - prevent HTN encephalopathy         Hyperlipidemia    - continue statins         Hypertensive emergency    - target < 160/90 mmHg        Type 2 diabetes mellitus, uncontrolled    - stroke risk factor  - as per primary team             VTE Risk Mitigation         Ordered     enoxaparin injection 40 mg  Daily     Route:  Subcutaneous        09/18/17 1106          Sydni Do MD  Neurology  [facility]    I have personally taken the history and examined the patient and agree with the resident's note as stated above.    Andi Rao MD, MARII, FAAN  Department of Neurology   [facility] [place], [place]

## 2017-09-20 NOTE — OP NOTE
[facility]  Surgery Department  Operative Note    SUMMARY     Date of Procedure: 9/20/2017     Procedure: Procedure(s) (LRB):  PLACEMENT-TUBE-PEG (N/A)     Surgeon(s) and Role:     * Atilio Blunt MD - Resident - Assisting     * Ryan Story MD - Primary     * Eduardo Briseno MD - Resident - Assisting        Pre-Operative Diagnosis: Ileus [K56.7]    Post-Operative Diagnosis: Post-Op Diagnosis Codes:     * Ileus [K56.7]    Anesthesia: General    Technical Procedures Used: PEG tube placement    Description of the Findings of the Procedure:   Prior to proceeding to the operating room, the procedure was described in detail to the patient and family, all questions were answered and appropriate consent was obtained.  The patient was taken to the operating room and placed in the supine position.  General anesthesia was induced.  A timeout was performed.  The abdomen was prepped and draped in standard fashion.  The patient's abdomen was prepped and draped. An upper endoscope was introduced into the oropharynx and guided down into the esophagus and stomach. The stomach was insufflated with air. Appropriate position for gastrostomy tube placement 2 finger-breadths below the left subcostal margin was chosen. Palpation of the anterior abdominal wall at this point was visualized endoscopically and transillumination from the endoscope was visualized through the anterior abdominal wall. We made a 0.5 cm transverse skin incision. At this point, the stomach was cannulated with a catheter loaded on a needle. This was grasped by a snare which had been passed through the endoscope. The needle was removed, and a guidewire was placed, and the snare was used to grasp the guidewire. The endoscope, snare, and guidewire were all withdrawn from the patient's mouth. A 20-Iraqi gastrostomy tube was loaded onto the guidewire and pulled through the anterior abdominal wall via Seldinger technique.  Repeat endoscopy was performed with the gastrostomy tube at the 7 cm mehreen at the skin. There was no blanching of the gastric mucosa, and when the tube was twisted, the button did not grab the mucosa. The insufflation in the stomach was evacuated, and the endoscope was removed. The gastrostomy tube was secured in place using the supplied devices and connected to a bag for gravity drainage.    Complications: No    Estimated Blood Loss (EBL): Minimal           Implants: * No implants in log *    Specimens:   Specimen (12h ago through future)    None                  Condition: Good    Disposition: PACU - hemodynamically stable.

## 2017-09-20 NOTE — SUBJECTIVE & OBJECTIVE
Interval History:   NAEON. Patient's mental status unchanged. Scheduled for PEG tube placement today.     Review of Systems   Unable to perform ROS: Mental status change     Objective:     Vital Signs (Most Recent):  Temp: 98.5 °F (36.9 °C) (09/20/17 1214)  Pulse: 85 (09/20/17 1519)  Resp: 18 (09/20/17 1519)  BP: (!) 157/70 (09/20/17 1214)  SpO2: 95 % (09/20/17 1519) Vital Signs (24h Range):  Temp:  [97.4 °F (36.3 °C)-98.9 °F (37.2 °C)] 98.5 °F (36.9 °C)  Pulse:  [68-86] 85  Resp:  [14-24] 18  SpO2:  [93 %-98 %] 95 %  BP: ()/(49-70) 157/70     Weight: 132.5 kg (292 lb)  Body mass index is 47.13 kg/m².    Intake/Output Summary (Last 24 hours) at 09/20/17 1559  Last data filed at 09/20/17 0836   Gross per 24 hour   Intake              100 ml   Output                0 ml   Net              100 ml      Physical Exam   Constitutional: She appears well-developed and well-nourished. No distress.   HENT:   Head: Normocephalic and atraumatic.   Neck: Neck supple. No JVD present.   Cardiovascular: Normal rate, regular rhythm and normal heart sounds.    No murmur heard.  Pulmonary/Chest: Effort normal and breath sounds normal. No respiratory distress. She has no wheezes.   Abdominal: Soft. Bowel sounds are normal. She exhibits no distension. There is no guarding.   Musculoskeletal: She exhibits no edema.   Neurological: She is alert.   Not tracking with her eyes.   Sometimes she squeezes my fingers with her hands.   She moves her limbs spontaneously.    Skin: She is not diaphoretic.   Psychiatric: She has a normal mood and affect.   Vitals reviewed.      Significant Labs:   BMP:   Recent Labs  Lab 09/20/17  0442   GLU 90     143   K 3.6      CO2 26   BUN 23   CREATININE 1.3   CALCIUM 8.2*   MG 2.0     CBC:   Recent Labs  Lab 09/19/17  0708   WBC 6.77   HGB 9.2*   HCT 28.4*        All pertinent labs within the past 24 hours have been reviewed.    Significant Imaging: I have reviewed all pertinent  imaging results/findings within the past 24 hours.

## 2017-09-20 NOTE — NURSING TRANSFER
Nursing Transfer Note      9/20/2017     Transfer To: 623    Transfer via bed    Transfer with cardiac monitoring per centralized telemetry, O2 per trach collar (5L and 28%)    Transported by RN/PCT    Medicines sent: none    Chart send with patient: Yes    Notified: receiving nurse    Patient reassessed at: 9/20/2017 9:30 AM

## 2017-09-20 NOTE — NURSING
Paged MD for orders pt cbg steadily drooping no fluids and diabetic and npo orders. Awaiting return call.

## 2017-09-20 NOTE — ASSESSMENT & PLAN NOTE
Concern for ileus at OSH. However, no abdominal distension on physical exam. CT scan notable for Ileus, w/ no SBO.    - resolved

## 2017-09-20 NOTE — PROGRESS NOTES
[facility] Hospital Medicine  Progress Note    Patient Name: Nisa Frank  MRN: [id number]  Patient Class: IP- Inpatient   Admission Date: 9/13/2017  Length of Stay: 7 days  Attending Physician: Bay Henao, *  Primary Care Provider: Atilio Downs MD    [facility] Team: [facility] 3 Abimbola Devlin MD    Subjective:     Principal Problem:Acute encephalopathy    HPI:  Ms Frank 66 year old female with diabetes mellitus type 2, CKD stage 3, CAD s/p CABG, essential hypertension, cerebrovascular disease s/p bi-hemispheric watershed infarcts and trach/PEG status who was brought in via EMS from [place] [place]. \Hospital Has a New Name and Outlook.\"" for acute respiratory distress presented to [facility] 8/8 with acute respiratory failure with hypoxia likely due to mucous plug. Patient was admitted to ICU to wean off MV as tolerated and for placement to [facility] where she was transferred to [facility] on 8/14 in stable condition.  She pulled out her trach on 9/4 and went to [facility].  She was treated for an ESBL E. Coli UTI with Meropenem. The family has been very unhappy with the care that the patient has received.  She was medically stable for transfer to a SNF yesterday, however overnight she developed a new WBC 26.  She was found to have a RML and RLL infiltrate and was started on Vanc and Zosyn.  She also developed a new KATHI on top of her CKD (0.6 to 1.8).  Family is requesting transfer [facility].    Spoke with daughter on the phone.  Per family patient was previously alert and oriented to person and place.  She was conversing and able to participate in therapy.  This changed shortly after trach removal and replacement on 9/4.  Family reports some complication during trach replacement resulting in overnight ICU care.  After patient was stepped down they report she stopped talking and would not participate with therapy.  The only response they could get from  the patient was eye tracking and intermittent hand squeezes.  Family feels that patient may have had a repeat stroke and was requesting MRI.   Spoke with Dagoberto Melo nurse who reports that patient has been near obtunded for as long as she has had her (past couple of days).  She reported recent WBC jump and associated fever followed by broad spectrum abx.  In addiction notes concern for ileus given high residuals from NG tube feeds.  Does not high residuals resolved once tube was flushed.         Per nurse medications prior to concern for ileus.  Vanc 1750mg , Q24, Zosyn 4.5 Q 12, ASA 81, Clonidine 0.1 patch weekly, Lipitor 10, Colace, Lovenox 40, Protonix 40, Hydralazine 100 Q8, Keppra 500 BID, Lisniopril 40 PO, 25 Metoprolol XL, Procardia 60 mg Daily, Risperdal 25 QHS.           Hospital Course:  9/14: Patients mental status unchanged from yesterday. Labs showing significant improvement. Will continue to treat for VAP. Schedule for CT scan of abdomen.   9/15: Patients mental status improved, appears more awake/alert. But no verbal communication yet. Following commands. Continue to treat to VAP. MRI scheduled. PT/OT   9:16: Patient more awake and alert. Still non verbal. Continuing to treat VAP. PT/OT   9/17: Patient awake and alert. Still non-verbal. Had a long conversation w/ family regarding patient status; will get records/procedure report.   9/18: patient is awake and alert but non-verbal and doesn't follow command. Surgery was consulted for ileus.   9/19: Patient is awake. But non-verbal and doesn;t follow commands. NG tube was pulled out last night. Surgery consulted for PEG tube.   9/20: Patient scheduled for PEG tube placement today. Abx course completed, Na corrected, BP wnl. Patient's family contacted regarding NH placement, d/c pending family's discission.     Interval History:   NAEON. Patient's mental status unchanged. Scheduled for PEG tube placement today.     Review of Systems   Unable to perform ROS:  Mental status change     Objective:     Vital Signs (Most Recent):  Temp: 98.5 °F (36.9 °C) (09/20/17 1214)  Pulse: 85 (09/20/17 1519)  Resp: 18 (09/20/17 1519)  BP: (!) 157/70 (09/20/17 1214)  SpO2: 95 % (09/20/17 1519) Vital Signs (24h Range):  Temp:  [97.4 °F (36.3 °C)-98.9 °F (37.2 °C)] 98.5 °F (36.9 °C)  Pulse:  [68-86] 85  Resp:  [14-24] 18  SpO2:  [93 %-98 %] 95 %  BP: ()/(49-70) 157/70     Weight: 132.5 kg (292 lb)  Body mass index is 47.13 kg/m².    Intake/Output Summary (Last 24 hours) at 09/20/17 1559  Last data filed at 09/20/17 0836   Gross per 24 hour   Intake              100 ml   Output                0 ml   Net              100 ml      Physical Exam   Constitutional: She appears well-developed and well-nourished. No distress.   HENT:   Head: Normocephalic and atraumatic.   Neck: Neck supple. No JVD present.   Cardiovascular: Normal rate, regular rhythm and normal heart sounds.    No murmur heard.  Pulmonary/Chest: Effort normal and breath sounds normal. No respiratory distress. She has no wheezes.   Abdominal: Soft. Bowel sounds are normal. She exhibits no distension. There is no guarding.   Musculoskeletal: She exhibits no edema.   Neurological: She is alert.   Not tracking with her eyes.   Sometimes she squeezes my fingers with her hands.   She moves her limbs spontaneously.    Skin: She is not diaphoretic.   Psychiatric: She has a normal mood and affect.   Vitals reviewed.      Significant Labs:   BMP:   Recent Labs  Lab 09/20/17  0442   GLU 90     143   K 3.6      CO2 26   BUN 23   CREATININE 1.3   CALCIUM 8.2*   MG 2.0     CBC:   Recent Labs  Lab 09/19/17  0708   WBC 6.77   HGB 9.2*   HCT 28.4*        All pertinent labs within the past 24 hours have been reviewed.    Significant Imaging: I have reviewed all pertinent imaging results/findings within the past 24 hours.    Assessment/Plan:      * Acute encephalopathy    History of recent CVA with recent admission to  rehab, per family acute worsening 1 week ago following trach replacement. OSH records in chart. Head CT unchanged. Per family patient was communicating prior to trach replacement. However, per OSH records patient has been obtunded for the past couple of days. Difficult to determine patients true baseline?    Concern for Anoxic brain injury during trach removal/replacement, CVA, Seizure, Worsening Sepsis. Urine culture no growth.  MRI no acute hemorrhagic pathology. Patient had a BM.       - GCS of 8, however protecting airway with trach, stable for floor    - s/p PEG tube placement   - monitor electrolytes  - control BP            Ileus    Concern for ileus at OSH. However, no abdominal distension on physical exam. CT scan notable for Ileus, w/ no SBO.    - resolved        Respiratory failure    Concern for possible VAP    - Decreased O2 5L; 99%, will continue to titrate   - completed VAP treatment         Tracheostomy status    - Trach care          Dysphagia    Per family was tolerating diet prior to trach replacement on 9/4     - NG tube unsuccessful           KATHI (acute kidney injury)        - monitor renal function           Respiratory insufficiency              Chronic bilateral cerebral infarction in watershed distribution              Hyperlipidemia              Hypertensive emergency    - IV hydralazine as needed           Morbid obesity with BMI of 50.0-59.9, adult              S/P CABG (coronary artery bypass graft)    - Continue ASA, unable to swallow            Type 2 diabetes mellitus, uncontrolled    - Per OSH records not on insulin   - Her HbA1c  8/9/17 is 7.2   - Sliding scale           Essential hypertension    HX of significant HTN. Per OSH on hydralazine 100 Q8 PO, Linsiopril 40 PO, Metoprolol XL 25 BID, Nifedipine 60mg. Since patient is NPO she is not receiving any of those meds.  She has Clonidine 0.1 Patch Q week, a new one was placed today 9/18/17.   - her BP is poorly controlled. Her SBP is  around 220-190.   - will transfer her to 10th floor so hydralazine IV can be used.   - Her heart rate runs low. Labetalol is not safe to be used at this point.             VTE Risk Mitigation         Ordered     enoxaparin injection 40 mg  Daily     Route:  Subcutaneous        09/18/17 4611              Abimbola Devlin MD  Department of Hospital Medicine   [facility]

## 2017-09-20 NOTE — SUBJECTIVE & OBJECTIVE
Past Medical History:   Diagnosis Date    Acute bilateral cerebral infarction in a watershed distribution 2017    CAD (coronary artery disease)     Diabetes mellitus     Hypertension     Morbid obesity     TAMMY on CPAP     setting +17    Stroke        Past Surgical History:   Procedure Laterality Date    ARTERIAL BYPASS SURGRY      9 tears sgo     SECTION      CORONARY ARTERY BYPASS GRAFT      HYSTERECTOMY      TUBAL LIGATION         Review of patient's allergies indicates:   Allergen Reactions    Latex, natural rubber        Current Neurological Medications:     No current facility-administered medications on file prior to encounter.      Current Outpatient Prescriptions on File Prior to Encounter   Medication Sig    acetaminophen (TYLENOL) 650 mg/20.3 mL Soln 20.3 mLs (650 mg total) by Per NG tube route every 6 (six) hours as needed. (Patient taking differently: 650 mg by Per NG tube route every 6 (six) hours as needed for Pain. )    albuterol-ipratropium 2.5mg-0.5mg/3mL (DUO-NEB) 0.5 mg-3 mg(2.5 mg base)/3 mL nebulizer solution Take 3 mLs by nebulization every 4 (four) hours. Rescue    aspirin 81 MG Chew 1 tablet (81 mg total) by Per G Tube route once daily.    atorvastatin (LIPITOR) 10 MG tablet 1 tablet (10 mg total) by Per G Tube route once daily. (Patient taking differently: 10 mg by Per G Tube route every evening. )    cloNIDine 0.1 mg/24 hr td ptwk (CATAPRES) 0.1 mg/24 hr Place 1 patch onto the skin every 7 days. (Patient taking differently: Place 1 patch onto the skin every Tuesday. )    fluoxetine (PROZAC) 20 MG capsule 1 capsule (20 mg total) by Per G Tube route once daily.    hydrALAZINE (APRESOLINE) 100 MG tablet 1 tablet (100 mg total) by Per G Tube route every 8 (eight) hours.    insulin aspart (NOVOLOG) 100 unit/mL InPn pen Inject 1-10 Units into the skin every 4 (four) hours as needed (Hyperglycemia).    meclizine (ANTIVERT) 25 mg tablet Take 1 tablet (25 mg total)  by mouth every 6 (six) hours as needed. (Patient taking differently: Take 25 mg by mouth every 6 (six) hours as needed for Dizziness. )    pantoprazole (PROTONIX) 40 mg GrPS 1 packet (40 mg total) by Per G Tube route once daily.    amantadine HCl (SYMMETREL) 100 mg capsule 1 capsule (100 mg total) by Per G Tube route every 8 (eight) hours.    amlodipine (NORVASC) 10 MG tablet 1 tablet (10 mg total) by Per G Tube route once daily.    diclofenac sodium (VOLTAREN) 1 % Gel Apply 2 g topically 2 (two) times daily.    EASY TOUCH TWIST LANCETS 30 gauge Misc     HEPARIN SODIUM,PORCINE (HEPARIN, PORCINE,) 5,000 unit/mL injection Inject 1.5 mLs (7,500 Units total) into the skin every 8 (eight) hours.     Family History     Problem Relation (Age of Onset)    No Known Problems Father, Mother, Sister, Brother, Maternal Aunt, Maternal Uncle, Paternal Aunt, Paternal Uncle, Maternal Grandmother, Maternal Grandfather, Paternal Grandmother, Paternal Grandfather        Social History Main Topics    Smoking status: Never Smoker    Smokeless tobacco: Never Used    Alcohol use Yes      Comment: occ    Drug use: No    Sexual activity: Not Currently     Birth control/ protection: See Surgical Hx     Interval History:     NAEON. No active concerns for agitations.     Improved level of alertness / wakefullness today, however with fluctuating mentation.     PEG placement today.     Review of Systems   Unable to perform ROS: Patient nonverbal   Constitutional: Negative for chills and fever.   Respiratory: Negative for shortness of breath.    Cardiovascular: Negative for chest pain.   Neurological: Negative for syncope and facial asymmetry.     Objective:     Vital Signs (Most Recent):  Temp: 97.2 °F (36.2 °C) (09/20/17 1609)  Pulse: 85 (09/20/17 1731)  Resp: 20 (09/20/17 1731)  BP: (!) 146/67 (09/20/17 1731)  SpO2: 97 % (09/20/17 1728) Vital Signs (24h Range):  Temp:  [97.2 °F (36.2 °C)-98.9 °F (37.2 °C)] 97.2 °F (36.2 °C)  Pulse:   [68-86] 85  Resp:  [14-24] 20  SpO2:  [93 %-98 %] 97 %  BP: ()/(49-74) 146/67     Weight: 132.5 kg (292 lb)  Body mass index is 47.13 kg/m².    Physical Exam   Constitutional: She appears listless. She is uncooperative.   HENT:   Head: Normocephalic and atraumatic.   Eyes: EOM are normal. Pupils are equal, round, and reactive to light.   Neck:   Trach in place   Cardiovascular: Normal rate, regular rhythm and normal heart sounds.    Pulses:       Radial pulses are 2+ on the right side, and 2+ on the left side.   Difficult to assess the heart sounds 2/2 breathing noise through the trach   Pulmonary/Chest: Effort normal. No tachypnea. She has no decreased breath sounds. She has no wheezes.   Abdominal: Soft. She exhibits no distension. There is no tenderness.   Neurological: She appears listless. She exhibits normal muscle tone. She displays no seizure activity. GCS eye subscore is 4. GCS verbal subscore is 1. GCS motor subscore is 5.   Reflex Scores:       Tricep reflexes are 1+ on the right side and 1+ on the left side.       Bicep reflexes are 1+ on the right side and 1+ on the left side.       Brachioradialis reflexes are 1+ on the right side and 1+ on the left side.       Patellar reflexes are 1+ on the right side and 1+ on the left side.  Skin: No rash noted. No erythema.   Psychiatric: Her affect is blunt. She is withdrawn. She is noncommunicative. She is inattentive.   Vitals reviewed.      NEUROLOGICAL EXAMINATION:     MENTAL STATUS   Disoriented to person: NT / Trach in place / Mute    Attention: normal.   Speech: mute   Level of consciousness: alert (Awake follow simple commands)  General knowledge: NT / Trach in place / Mute      CRANIAL NERVES     CN II   Visual fields full to confrontation.     CN III, IV, VI   Pupils are equal, round, and reactive to light.  Extraocular motions are normal.   Right pupil: Size: 3 mm. Shape: regular.   Left pupil: Size: 3 mm. Shape: regular.   CN III: no CN III  palsy  CN VI: no CN VI palsy  Nystagmus: none     CN V   Facial sensation intact.     CN VII   Facial expression full, symmetric.     CN VIII   CN VIII normal.     CN IX, X   CN IX normal.   CN X normal.     CN XI   Right sternocleidomastoid strength: weak  Left sternocleidomastoid strength: weak    CN XII   CN XII normal.     MOTOR EXAM   Muscle bulk: normal  Overall muscle tone: normal       B/L UE & LE weakness 4/5, with movement of extremities to pain     REFLEXES     Reflexes   Right brachioradialis: 1+  Left brachioradialis: 1+  Right biceps: 1+  Left biceps: 1+  Right triceps: 1+  Left triceps: 1+  Right patellar: 1+  Left patellar: 1+  Right plantar: equivocal  Left plantar: equivocal    SENSORY EXAM   Pinprick normal.     GAIT AND COORDINATION     Gait  Gait: (NT)    Tremor   Resting tremor: absent      Significant Labs:   Blood Culture: No results for input(s): LABBLOO in the last 48 hours.  CBC:     Recent Labs  Lab 09/19/17  0708   WBC 6.77   HGB 9.2*   HCT 28.4*        CMP:     Recent Labs  Lab 09/19/17  0426 09/19/17  0708 09/19/17  1602 09/19/17  1831 09/20/17  0442   GLU  --  93  --  96 90    146* 141 142 143  143   K  --  4.0  --  3.9 3.6   CL  --  111*  --  107 107   CO2  --  27  --  24 26   BUN  --  16  --  20 23   CREATININE  --  0.8  --  1.1 1.3   CALCIUM  --  8.4*  --  8.3* 8.2*   MG 2.7*  --   --   --  2.0   PROT  --  6.3  --   --  6.1   ALBUMIN  --  2.2*  --   --  2.2*   BILITOT  --  1.4*  --   --  1.3*   ALKPHOS  --  85  --   --  80   AST  --  6*  --   --  6*   ALT  --  10  --   --  8*   ANIONGAP  --  8  --  11 10   EGFRNONAA  --  >60.0  --  52.1* 42.6*     Urine Culture: No results for input(s): LABURIN in the last 48 hours.    Significant Imaging: CT: I have reviewed all pertinent results/findings within the past 24 hours and my personal findings are:  no acute interval change compared to the previous study  EEG: I have reviewed all pertinent results/findings within the past  24 hours and my personal findings are:  generalized slowing indicative of a moderate-severe nonspecific encephalopathy, no epileptic activity

## 2017-09-20 NOTE — ANESTHESIA POSTPROCEDURE EVALUATION
"Anesthesia Post Evaluation    Patient: Nisa Frank    Procedure(s) Performed: Procedure(s) (LRB):  PLACEMENT-TUBE-PEG (N/A)    Final Anesthesia Type: general  Patient location during evaluation: PACU  Patient participation: No - Unable to Participate, Coma/Other Inability to Communicate  Level of consciousness: awake  Post-procedure vital signs: reviewed and stable  Pain management: adequate  Airway patency: patent  PONV status at discharge: No PONV  Anesthetic complications: no      Cardiovascular status: blood pressure returned to baseline  Respiratory status: trach collar.  Hydration status: euvolemic  Follow-up not needed.        Visit Vitals  BP (!) 137/51 (BP Location: Right arm, Patient Position: Lying)   Pulse 72   Temp 37.1 °C (98.7 °F) (Axillary)   Resp (!) 24   Ht 5' 6" (1.676 m)   Wt 132.5 kg (292 lb)   LMP  (LMP Unknown)   SpO2 97%   Breastfeeding? No   BMI 47.13 kg/m²       Pain/Erin Score: Pain Assessment Performed: Yes (9/20/2017  7:26 AM)  Presence of Pain: non-verbal indicators absent (9/20/2017  7:26 AM)  Pain Rating Prior to Med Admin: 0 (9/20/2017  6:15 AM)  Pain Rating Post Med Admin: 0 (9/20/2017  6:15 AM)      "

## 2017-09-20 NOTE — PLAN OF CARE
Pt has current LOCET.  SW faxed PASRR to state.  Awtg 142.    Renetta Jackson LCSW     [phone]  Critical Care (MICU)

## 2017-09-20 NOTE — PLAN OF CARE
Daughter Shayna Owen [phone]        Home:  [phone]    LUCIA went to pt's room to discuss d/c plans.  Pt will need a NH that accepts trachs.  SW left a list of NH Trachs with SW contact info on it.  LUCIA called pt's dtr and left a VM requesting a call back.    LUCIA called to complete LOCET, but no one was available to do so.  Awtg call back from [facility] to complete LOCET.      Renetta Jackson LCSW     [phone]  Critical Care (MICU)

## 2017-09-20 NOTE — PLAN OF CARE
Problem: Patient Care Overview  Goal: Plan of Care Review  Outcome: Ongoing (interventions implemented as appropriate)  Plan of care reviewed with daughter. Trach patent and intact connected to humidified oxygen at 5l/trach collar. Peg site with dressing dry and intact connected to drainage bag. Abdomen binder in place. No s/s of pain noted. Follows simple commands.

## 2017-09-20 NOTE — NURSING
Returned to unit per Bed. Trach patent and intact connected to humidified O2 via trach collar. V/S stable. Peg to drain bag abdomen binder in place.

## 2017-09-20 NOTE — PLAN OF CARE
Problem: Patient Care Overview  Goal: Plan of Care Review  Outcome: Ongoing (interventions implemented as appropriate)  Pt arousal to voice, total care.Vital signs stable. 5L 28% via Tracheostomy collar. Tele- NSR. Blood glucose monitored Q 6 hours.  Pt NPO. Notified anesthesia about daughter in room to give consent for surgery today. Antibiotics continued. Pt incontinent of bowel and bladder. Pt had bowel movement 9/19. No acute events. Pt remained free of falls. Plan of care reviewed with daughter, who is at bedside, who verbalized understanding. No distress noted at this time, will continue to monitor patient.

## 2017-09-20 NOTE — ASSESSMENT & PLAN NOTE
History of recent CVA with recent admission to rehab, per family acute worsening 1 week ago following trach replacement. OSH records in chart. Head CT unchanged. Per family patient was communicating prior to trach replacement. However, per OSH records patient has been obtunded for the past couple of days. Difficult to determine patients true baseline?    Concern for Anoxic brain injury during trach removal/replacement, CVA, Seizure, Worsening Sepsis. Urine culture no growth.  MRI no acute hemorrhagic pathology. Patient had a BM.       - GCS of 8, however protecting airway with trach, stable for floor    - s/p PEG tube placement   - monitor electrolytes  - control BP

## 2017-09-20 NOTE — PLAN OF CARE
LUCIA spoke to pt's dtr, Yao, and discussed d.c plans.  Shayna is aware that pt will need to go to a Trach NH and will review the list.  Lucia also emailed the list of Trach NHs to [email or web address].    Ms Samayoa stated that she will review the list and her family will do research and make decision.    LUCIA will f/u.    Renetta Jackson LCSW     [phone]  Critical Care (MICU)

## 2017-09-20 NOTE — TRANSFER OF CARE
"Anesthesia Transfer of Care Note    Patient: Nisa Frank    Procedure(s) Performed: Procedure(s) (LRB):  PLACEMENT-TUBE-PEG (N/A)    Patient location: PACU    Anesthesia Type: general    Transport from OR: Transported from OR on 6-10 L/min O2 by face mask with adequate spontaneous ventilation    Post pain: adequate analgesia    Post assessment: no apparent anesthetic complications and tolerated procedure well    Post vital signs: stable    Level of consciousness: responds to stimulation    Nausea/Vomiting: no nausea/vomiting    Complications: none    Transfer of care protocol was followed      Last vitals:   Visit Vitals  BP (!) 137/51 (BP Location: Right arm, Patient Position: Lying)   Pulse 72   Temp 37.1 °C (98.7 °F) (Axillary)   Resp (!) 24   Ht 5' 6" (1.676 m)   Wt 132.5 kg (292 lb)   LMP  (LMP Unknown)   SpO2 97%   Breastfeeding? No   BMI 47.13 kg/m²     "

## 2017-09-21 LAB
ALBUMIN SERPL BCP-MCNC: 2.2 G/DL
ALP SERPL-CCNC: 95 U/L
ALT SERPL W/O P-5'-P-CCNC: 8 U/L
ANION GAP SERPL CALC-SCNC: 8 MMOL/L
ANISOCYTOSIS BLD QL SMEAR: SLIGHT
AST SERPL-CCNC: 9 U/L
BASOPHILS # BLD AUTO: ABNORMAL K/UL
BASOPHILS NFR BLD: 0 %
BILIRUB SERPL-MCNC: 1.4 MG/DL
BUN SERPL-MCNC: 23 MG/DL
CALCIUM SERPL-MCNC: 8.5 MG/DL
CHLORIDE SERPL-SCNC: 108 MMOL/L
CO2 SERPL-SCNC: 27 MMOL/L
CREAT SERPL-MCNC: 0.8 MG/DL
DIFFERENTIAL METHOD: ABNORMAL
EOSINOPHIL # BLD AUTO: ABNORMAL K/UL
EOSINOPHIL NFR BLD: 1 %
ERYTHROCYTE [DISTWIDTH] IN BLOOD BY AUTOMATED COUNT: 15.9 %
EST. GFR  (AFRICAN AMERICAN): >60 ML/MIN/1.73 M^2
EST. GFR  (NON AFRICAN AMERICAN): >60 ML/MIN/1.73 M^2
GLUCOSE SERPL-MCNC: 84 MG/DL
HCT VFR BLD AUTO: 30.2 %
HGB BLD-MCNC: 9.6 G/DL
HYPOCHROMIA BLD QL SMEAR: ABNORMAL
LYMPHOCYTES # BLD AUTO: ABNORMAL K/UL
LYMPHOCYTES NFR BLD: 41 %
MAGNESIUM SERPL-MCNC: 1.9 MG/DL
MCH RBC QN AUTO: 27 PG
MCHC RBC AUTO-ENTMCNC: 31.8 G/DL
MCV RBC AUTO: 85 FL
MONOCYTES # BLD AUTO: ABNORMAL K/UL
MONOCYTES NFR BLD: 7 %
NEUTROPHILS NFR BLD: 50 %
NEUTS BAND NFR BLD MANUAL: 1 %
OVALOCYTES BLD QL SMEAR: ABNORMAL
PHOSPHATE SERPL-MCNC: 2.8 MG/DL
PLATELET # BLD AUTO: 172 K/UL
PMV BLD AUTO: 11.6 FL
POCT GLUCOSE: 102 MG/DL (ref 70–110)
POCT GLUCOSE: 105 MG/DL (ref 70–110)
POCT GLUCOSE: 66 MG/DL (ref 70–110)
POCT GLUCOSE: 91 MG/DL (ref 70–110)
POCT GLUCOSE: 92 MG/DL (ref 70–110)
POIKILOCYTOSIS BLD QL SMEAR: SLIGHT
POLYCHROMASIA BLD QL SMEAR: ABNORMAL
POTASSIUM SERPL-SCNC: 3.4 MMOL/L
PROT SERPL-MCNC: 6.5 G/DL
RBC # BLD AUTO: 3.56 M/UL
SODIUM SERPL-SCNC: 143 MMOL/L
WBC # BLD AUTO: 9.28 K/UL

## 2017-09-21 PROCEDURE — 94640 AIRWAY INHALATION TREATMENT: CPT

## 2017-09-21 PROCEDURE — 85027 COMPLETE CBC AUTOMATED: CPT

## 2017-09-21 PROCEDURE — 99232 SBSQ HOSP IP/OBS MODERATE 35: CPT | Mod: GC,,, | Performed by: PSYCHIATRY & NEUROLOGY

## 2017-09-21 PROCEDURE — 99231 SBSQ HOSP IP/OBS SF/LOW 25: CPT | Mod: GC,,, | Performed by: HOSPITALIST

## 2017-09-21 PROCEDURE — 83735 ASSAY OF MAGNESIUM: CPT

## 2017-09-21 PROCEDURE — 80053 COMPREHEN METABOLIC PANEL: CPT

## 2017-09-21 PROCEDURE — 85007 BL SMEAR W/DIFF WBC COUNT: CPT

## 2017-09-21 PROCEDURE — 97110 THERAPEUTIC EXERCISES: CPT

## 2017-09-21 PROCEDURE — 25000003 PHARM REV CODE 250: Performed by: STUDENT IN AN ORGANIZED HEALTH CARE EDUCATION/TRAINING PROGRAM

## 2017-09-21 PROCEDURE — 20600001 HC STEP DOWN PRIVATE ROOM

## 2017-09-21 PROCEDURE — 99900026 HC AIRWAY MAINTENANCE (STAT)

## 2017-09-21 PROCEDURE — 25000242 PHARM REV CODE 250 ALT 637 W/ HCPCS: Performed by: STUDENT IN AN ORGANIZED HEALTH CARE EDUCATION/TRAINING PROGRAM

## 2017-09-21 PROCEDURE — 27000221 HC OXYGEN, UP TO 24 HOURS

## 2017-09-21 PROCEDURE — 84100 ASSAY OF PHOSPHORUS: CPT

## 2017-09-21 PROCEDURE — 97802 MEDICAL NUTRITION INDIV IN: CPT

## 2017-09-21 PROCEDURE — 94760 N-INVAS EAR/PLS OXIMETRY 1: CPT

## 2017-09-21 PROCEDURE — 36415 COLL VENOUS BLD VENIPUNCTURE: CPT

## 2017-09-21 PROCEDURE — 63600175 PHARM REV CODE 636 W HCPCS: Performed by: HOSPITALIST

## 2017-09-21 RX ORDER — NAPROXEN SODIUM 220 MG/1
81 TABLET, FILM COATED ORAL DAILY
Status: DISCONTINUED | OUTPATIENT
Start: 2017-09-21 | End: 2017-09-28 | Stop reason: HOSPADM

## 2017-09-21 RX ORDER — ATORVASTATIN CALCIUM 10 MG/1
10 TABLET, FILM COATED ORAL DAILY
Status: DISCONTINUED | OUTPATIENT
Start: 2017-09-21 | End: 2017-09-28 | Stop reason: HOSPADM

## 2017-09-21 RX ADMIN — ASPIRIN 81 MG CHEWABLE TABLET 81 MG: 81 TABLET CHEWABLE at 10:09

## 2017-09-21 RX ADMIN — ATORVASTATIN CALCIUM 10 MG: 10 TABLET, FILM COATED ORAL at 10:09

## 2017-09-21 RX ADMIN — IPRATROPIUM BROMIDE AND ALBUTEROL SULFATE 3 ML: .5; 3 SOLUTION RESPIRATORY (INHALATION) at 12:09

## 2017-09-21 RX ADMIN — IPRATROPIUM BROMIDE AND ALBUTEROL SULFATE 3 ML: .5; 3 SOLUTION RESPIRATORY (INHALATION) at 08:09

## 2017-09-21 RX ADMIN — AMLODIPINE BESYLATE 10 MG: 10 TABLET ORAL at 10:09

## 2017-09-21 RX ADMIN — ACETYLCYSTEINE 4 ML: 100 INHALANT RESPIRATORY (INHALATION) at 08:09

## 2017-09-21 RX ADMIN — IPRATROPIUM BROMIDE AND ALBUTEROL SULFATE 3 ML: .5; 3 SOLUTION RESPIRATORY (INHALATION) at 03:09

## 2017-09-21 RX ADMIN — DEXTROSE MONOHYDRATE 12.5 G: 25 INJECTION, SOLUTION INTRAVENOUS at 01:09

## 2017-09-21 RX ADMIN — ENOXAPARIN SODIUM 40 MG: 100 INJECTION SUBCUTANEOUS at 04:09

## 2017-09-21 NOTE — PLAN OF CARE
Problem: Patient Care Overview  Goal: Plan of Care Review  Outcome: Ongoing (interventions implemented as appropriate)  Plan of care discussed with pt and sister. Pt tolerating cont tube feeding. Non verbal indicators of pain are not present.  Pt on telemetry, NSR. Pt performed ROM? PT/OT. Pt positions independently. VS stable. Pt remains free of falls and injury. No acute events this shift. Will continue to monitor.

## 2017-09-21 NOTE — PT/OT/SLP PROGRESS
Physical Therapy  Treatment    Nisa Frank   MRN: [id number]   Admitting Diagnosis: Acute encephalopathy    PT Received On: 09/21/17  PT Start Time: 1413     PT Stop Time: 1422    PT Total Time (min): 9 min       Billable Minutes:  Therapeutic Exercise 9    Treatment Type: Treatment  PT/PTA: PT             General Precautions: Standard, aspiration, fall  Orthopedic Precautions: N/A   Braces:      Do you have any cultural, spiritual, Temple conflicts, given your current situation?: none stated    Subjective:  Communicated with nursing prior to session.      Pain/Comfort  Pain Rating 1:  (NAD)    Objective:   Patient found with: oxygen, peripheral IV, tracheostomy, telemetry    Functional Mobility:  Bed Mobility:   Rolling/Turning to Left:  (bed mobility not attempted today)    Transfers:       Gait:        Stairs:      Balance:       Therapeutic Activities and Exercises:  Pt. Performed AAROM to (B) UE/LE x10 reps in supine     AM-PAC 6 CLICK MOBILITY  How much help from another person does this patient currently need?   1 = Unable, Total/Dependent Assistance  2 = A lot, Maximum/Moderate Assistance  3 = A little, Minimum/Contact Guard/Supervision  4 = None, Modified [place]/Independent    Turning over in bed (including adjusting bedclothes, sheets and blankets)?: 2  Sitting down on and standing up from a chair with arms (e.g., wheelchair, bedside commode, etc.): 1  Moving from lying on back to sitting on the side of the bed?: 2  Moving to and from a bed to a chair (including a wheelchair)?: 1  Need to walk in hospital room?: 1  Climbing 3-5 steps with a railing?: 1  Total Score: 8    AM-PAC Raw Score CMS G-Code Modifier Level of Impairment Assistance   6 % Total / Unable   7 - 9 CM 80 - 100% Maximal Assist   10 - 14 CL 60 - 80% Moderate Assist   15 - 19 CK 40 - 60% Moderate Assist   20 - 22 CJ 20 - 40% Minimal Assist   23 CI 1-20% SBA / CGA   24 CH 0% Independent/ Mod I     Patient left supine with  all lines intact and call button in reach.    Assessment:  Nisa Frank is a 67 y.o. female with a medical diagnosis of Acute encephalopathy and presents with increased alertness, following commands better, and increased active movement of all extremities. Pt. cooperative and tolerated treatment well. Pt. would benefit from continued PT to increase strength/endurance and improve functional mobility.    Rehab identified problem list/impairments: Rehab identified problem list/impairments: weakness, impaired endurance, impaired self care skills, impaired functional mobilty, gait instability, impaired balance, impaired cognition, decreased coordination, decreased upper extremity function, decreased lower extremity function, impaired fine motor    Rehab potential is fair.    Activity tolerance: Fair    Discharge recommendations: Discharge Facility/Level Of Care Needs: nursing facility, skilled (long term)     Barriers to discharge: Barriers to Discharge: Inaccessible home environment, Decreased caregiver support    Equipment recommendations: Equipment Needed After Discharge:  (TBD)     GOALS:    Physical Therapy Goals        Problem: Physical Therapy Goal    Goal Priority Disciplines Outcome Goal Variances Interventions   Physical Therapy Goal     PT/OT, PT Ongoing (interventions implemented as appropriate)     Description:  Goals to be met by: 17     Patient will increase functional independence with mobility by performin. Supine to sit with Maximum Assistance  2. Sit to supine with Maximum Assistance  3. Rolling to Left and Right with Maximum Assistance.  4. Sitting at edge of bed x10 minutes with Minimal Assistance using B UE for trunk support and attending to midline.   5. Pt will perform dynamic reaching while sitting EOB with Minimal Assistance to complete 3/5 activities and Minimal Assistance for balance/trunk support.   6. Lower extremity exercise x10 reps with assistance as needed to improve  strength, ROM, and endurance with all functional activities.                       PLAN:    Patient to be seen 4 x/week  to address the above listed problems via therapeutic activities, therapeutic exercises, neuromuscular re-education  Plan of Care expires: 10/17/17  Plan of Care reviewed with: patient         Roland YANCEY Jae, PT  09/21/2017

## 2017-09-21 NOTE — CONSULTS
[facility]  Adult Nutrition  Consult Note    SUMMARY     Recommendations  Recommendation/Intervention:   1. Recommend increasing TF to a goal rate of 60 mL/hr - to provide 2160 kcal/day, 98 g protein/day, and 1100 mL free fluid/day.   2. If bolus TF desired, recommend Isosource 1.5 bolus 300 mL 5x/day - to provide 2250 kcal/day, 102 g protein/day, and 1146 mL free fluid/day.   3. PO diet per MD and SLP.   RD to monitor.    Goals: Pt to meet >85% of EEN/EPN  Nutrition Goal Status: progressing towards goal  Communication of RD Recs: reviewed with physician    Reason for Assessment  Reason for Assessment: physician consult  Diagnosis:  (acute encephalopathy)  Relevent Medical History: DM2, CKD3, CAD s/p CABG, HTN, cerebrovascular disease s/p watershed infarct, s/p trach/PEG placement   Interdisciplinary Rounds: attended  General Information Comments: Patient nonverbal. Plan to start TF today.  Nutrition Discharge Planning: Adequate nutrition via TF.    Nutrition Prescription Ordered  Current Diet Order: NPO   Current Nutrition Support Formula Ordered: Isosource 1.5  Current Nutrition Support Rate Ordered: 15 (ml)  Current Nutrition Support Frequency Ordered: mL/hr      Evaluation of Received Nutrients/Fluid Intake  Enteral Calories (kcal): 540  Enteral Protein (gm): 24  Enteral (Free Water) Fluid (mL): 275  Energy Calories Required: not meeting needs  % Kcal Needs: 26  Protein Required: not meeting needs  % Protein Needs: 23  I/O: -3.7L since admit  Fluid Required: not meeting needs  Comments: LBM 9/20  Tolerance:  (not started yet)  % Intake of Estimated Energy Needs: 25 - 50 %  % Meal Intake: NPO     Nutrition/Diet History   Typical Food/Fluid Intake: JOSE LUIS  Food Preferences: No cultural or Faith needs identified at this time.  Factors Affecting Nutritional Intake: impaired cognitive status/motor control, NPO    Labs/Tests/Procedures/Meds   Pertinent Labs Reviewed: reviewed  Pertinent Labs  "Comments: K 3.4, Ca 8.5, Alb 2.2, T. bili 1.4, POCT Glu , HgbA1c 7.2  Pertinent Medications Reviewed: reviewed     Physical Findings  Overall Physical Appearance: overweight, on oxygen therapy (trach collar)  Tubes: gastrostomy tube  Oral/Mouth Cavity: tooth/teeth missing  Skin: incision    Anthropometrics  Height: 5' 6" (167.6 cm)  Weight Method: Bed Scale  Weight: 132.5 kg (292 lb)  Ideal Body Weight (IBW), Female: 130 lb  % Ideal Body Weight, Female (lb): 224.62 lb  BMI (Calculated): 47.2  BMI Grade: greater than 40 - morbid obesity  Weight Loss: unintentional  Usual Body Weight (UBW), kg: (!) 159 [facility]  Weight Change Amount: 58 lb 6.8 oz  % Usual Body Weight: 83.48  % Weight Change From Usual Weight: 16.52 %    Estimated/Assessed Needs  Weight Used For Calorie Calculations: 132.5 kg (292 lb 1.8 oz)   Energy Calorie Requirements (kcal): 2065 kcal/day  Energy Need Method: [place]-St Jeor (x 1.1)  RMR ([place]-St. Jeor Equation): 1876.75  Weight Used For Protein Calculations: 132.5 kg (292 lb 1.8 oz)  Protein Requirements: 106-133 g/day (0.8-1.0 g/kg)  Fluid Requirements (mL): 1 mL/kcal or per MD  Fluid Need Method: RDA Method  RDA Method (mL): 2065    Assessment and Plan  * Acute encephalopathy    Nutrition Problem  Inadequate oral intake    Related to (etiology):   Decreased ability to consume sufficient energy    Signs and Symptoms (as evidenced by):   NPO and need for TF    Interventions/Recommendations (treatment strategy):  See RD recs above.    Nutrition Diagnosis Status:   New          Monitor and Evaluation  Food and Nutrient Intake: energy intake, enteral nutrition intake  Food and Nutrient Adminstration: diet order, enteral and parenteral nutrition administration  Physical Activity and Function: nutrition-related ADLs and IADLs  Anthropometric Measurements: weight, weight change  Biochemical Data, Medical Tests and Procedures: electrolyte and renal panel, gastrointestinal " profile, glucose/endocrine profile, inflammatory profile  Nutrition-Focused Physical Findings: overall appearance    Nutrition Risk  Level of Risk:  (2x/week)    Nutrition Follow-Up  RD Follow-up?: Yes

## 2017-09-21 NOTE — PLAN OF CARE
Problem: Patient Care Overview  Goal: Plan of Care Review  Plan of care reviewed with daughter, all questions and concerns addressed. Pt vital signs remain normal and stable for patients norms, patient continues to have high systolic blood pressure which was treated per MD orders. Pt urine output remains adeqaute with active bowel sounds. Pt has no nonverbal indicators of pain present and no emesis as of this time. Pt is resting at this time, will continue to monitor.

## 2017-09-21 NOTE — PROGRESS NOTES
[facility]  Neurology  Progress Note    Patient Name: Nisa Frank  MRN: [id number]  Admission Date: 2017  Hospital Length of Stay: 8 days  Code Status: Full Code   Attending Provider: Bay Henao, *  Primary Care Physician: Atilio Downs MD   Principal Problem:Acute encephalopathy    Past Medical History:   Diagnosis Date    Acute bilateral cerebral infarction in a watershed distribution 2017    CAD (coronary artery disease)     Diabetes mellitus     Hypertension     Morbid obesity     TAMMY on CPAP     setting +17    Stroke        Past Surgical History:   Procedure Laterality Date    ARTERIAL BYPASS SURGRY      9 tears sgo     SECTION      CORONARY ARTERY BYPASS GRAFT      HYSTERECTOMY      TUBAL LIGATION         Review of patient's allergies indicates:   Allergen Reactions    Latex, natural rubber        Current Neurological Medications:     No current facility-administered medications on file prior to encounter.      Current Outpatient Prescriptions on File Prior to Encounter   Medication Sig    acetaminophen (TYLENOL) 650 mg/20.3 mL Soln 20.3 mLs (650 mg total) by Per NG tube route every 6 (six) hours as needed. (Patient taking differently: 650 mg by Per NG tube route every 6 (six) hours as needed for Pain. )    albuterol-ipratropium 2.5mg-0.5mg/3mL (DUO-NEB) 0.5 mg-3 mg(2.5 mg base)/3 mL nebulizer solution Take 3 mLs by nebulization every 4 (four) hours. Rescue    aspirin 81 MG Chew 1 tablet (81 mg total) by Per G Tube route once daily.    atorvastatin (LIPITOR) 10 MG tablet 1 tablet (10 mg total) by Per G Tube route once daily. (Patient taking differently: 10 mg by Per G Tube route every evening. )    cloNIDine 0.1 mg/24 hr td ptwk (CATAPRES) 0.1 mg/24 hr Place 1 patch onto the skin every 7 days. (Patient taking differently: Place 1 patch onto the skin every Tuesday. )    fluoxetine (PROZAC) 20 MG capsule 1 capsule (20 mg total) by Per G  Tube route once daily.    hydrALAZINE (APRESOLINE) 100 MG tablet 1 tablet (100 mg total) by Per G Tube route every 8 (eight) hours.    insulin aspart (NOVOLOG) 100 unit/mL InPn pen Inject 1-10 Units into the skin every 4 (four) hours as needed (Hyperglycemia).    meclizine (ANTIVERT) 25 mg tablet Take 1 tablet (25 mg total) by mouth every 6 (six) hours as needed. (Patient taking differently: Take 25 mg by mouth every 6 (six) hours as needed for Dizziness. )    pantoprazole (PROTONIX) 40 mg GrPS 1 packet (40 mg total) by Per G Tube route once daily.    amantadine HCl (SYMMETREL) 100 mg capsule 1 capsule (100 mg total) by Per G Tube route every 8 (eight) hours.    amlodipine (NORVASC) 10 MG tablet 1 tablet (10 mg total) by Per G Tube route once daily.    diclofenac sodium (VOLTAREN) 1 % Gel Apply 2 g topically 2 (two) times daily.    EASY TOUCH TWIST LANCETS 30 gauge Misc     HEPARIN SODIUM,PORCINE (HEPARIN, PORCINE,) 5,000 unit/mL injection Inject 1.5 mLs (7,500 Units total) into the skin every 8 (eight) hours.     Family History     Problem Relation (Age of Onset)    No Known Problems Father, Mother, Sister, Brother, Maternal Aunt, Maternal Uncle, Paternal Aunt, Paternal Uncle, Maternal Grandmother, Maternal Grandfather, Paternal Grandmother, Paternal Grandfather        Social History Main Topics    Smoking status: Never Smoker    Smokeless tobacco: Never Used    Alcohol use Yes      Comment: occ    Drug use: No    Sexual activity: Not Currently     Birth control/ protection: See Surgical Hx     Interval History:     NAEON. No active concerns for agitations.     Improved level of alertness / wakefullness today.     Review of Systems   Unable to perform ROS: Patient nonverbal   Constitutional: Negative for chills and fever.   Respiratory: Negative for shortness of breath.    Cardiovascular: Negative for chest pain.   Neurological: Negative for syncope and facial asymmetry.     Objective:     Vital Signs  (Most Recent):  Temp: 98.7 °F (37.1 °C) (09/21/17 1655)  Pulse: 69 (09/21/17 1655)  Resp: 18 (09/21/17 1655)  BP: (!) 151/62 (09/21/17 1655)  SpO2: 99 % (09/21/17 1655) Vital Signs (24h Range):  Temp:  [98.6 °F (37 °C)-99.3 °F (37.4 °C)] 98.7 °F (37.1 °C)  Pulse:  [68-94] 69  Resp:  [18-22] 18  SpO2:  [96 %-99 %] 99 %  BP: (146-185)/(62-84) 151/62     Weight: 132.5 kg (292 lb)  Body mass index is 47.13 kg/m².    Physical Exam   Constitutional: She appears listless. She is uncooperative.   HENT:   Head: Normocephalic and atraumatic.   Eyes: EOM are normal. Pupils are equal, round, and reactive to light.   Neck:   Trach in place   Cardiovascular: Normal rate, regular rhythm and normal heart sounds.    Pulses:       Radial pulses are 2+ on the right side, and 2+ on the left side.   Difficult to assess the heart sounds 2/2 breathing noise through the trach   Pulmonary/Chest: Effort normal. No tachypnea. She has no decreased breath sounds. She has no wheezes.   Abdominal: Soft. She exhibits no distension. There is no tenderness.   Neurological: She appears listless. She exhibits normal muscle tone. She displays no seizure activity. GCS eye subscore is 4. GCS verbal subscore is 1. GCS motor subscore is 5.   Reflex Scores:       Tricep reflexes are 1+ on the right side and 1+ on the left side.       Bicep reflexes are 1+ on the right side and 1+ on the left side.       Brachioradialis reflexes are 1+ on the right side and 1+ on the left side.       Patellar reflexes are 1+ on the right side and 1+ on the left side.  Skin: No rash noted. No erythema.   Psychiatric: Her affect is blunt. She is withdrawn. She is noncommunicative. She is inattentive.   Vitals reviewed.      NEUROLOGICAL EXAMINATION:     MENTAL STATUS   Disoriented to person: NT / Trach in place / Mute    Attention: normal.   Speech: mute   Level of consciousness: alert (Awake follow simple commands)  General knowledge: NT / Trach in place / Mute      CRANIAL  NERVES     CN II   Visual fields full to confrontation.     CN III, IV, VI   Pupils are equal, round, and reactive to light.  Extraocular motions are normal.   Right pupil: Size: 3 mm. Shape: regular.   Left pupil: Size: 3 mm. Shape: regular.   CN III: no CN III palsy  CN VI: no CN VI palsy  Nystagmus: none     CN V   Facial sensation intact.     CN VII   Facial expression full, symmetric.     CN VIII   CN VIII normal.     CN IX, X   CN IX normal.   CN X normal.     CN XI   Right sternocleidomastoid strength: weak  Left sternocleidomastoid strength: weak    CN XII   CN XII normal.     MOTOR EXAM   Muscle bulk: normal  Overall muscle tone: normal       B/L UE & LE weakness 4/5, with movement of extremities to pain     REFLEXES     Reflexes   Right brachioradialis: 1+  Left brachioradialis: 1+  Right biceps: 1+  Left biceps: 1+  Right triceps: 1+  Left triceps: 1+  Right patellar: 1+  Left patellar: 1+  Right plantar: equivocal  Left plantar: equivocal    SENSORY EXAM   Pinprick normal.     GAIT AND COORDINATION     Gait  Gait: (NT)    Tremor   Resting tremor: absent      Significant Labs:   Blood Culture: No results for input(s): LABBLOO in the last 48 hours.  CBC:     Recent Labs  Lab 09/21/17  1237   WBC 9.28   HGB 9.6*   HCT 30.2*        CMP:     Recent Labs  Lab 09/19/17  1831 09/20/17  0442 09/21/17  0454   GLU 96 90 84    143  143 143   K 3.9 3.6 3.4*    107 108   CO2 24 26 27   BUN 20 23 23   CREATININE 1.1 1.3 0.8   CALCIUM 8.3* 8.2* 8.5*   MG  --  2.0 1.9   PROT  --  6.1 6.5   ALBUMIN  --  2.2* 2.2*   BILITOT  --  1.3* 1.4*   ALKPHOS  --  80 95   AST  --  6* 9*   ALT  --  8* 8*   ANIONGAP 11 10 8   EGFRNONAA 52.1* 42.6* >60.0     Urine Culture: No results for input(s): LABURIN in the last 48 hours.    Significant Imaging: CT: I have reviewed all pertinent results/findings within the past 24 hours and my personal findings are:  no acute interval change compared to the previous study  EEG:  I have reviewed all pertinent results/findings within the past 24 hours and my personal findings are:  generalized slowing indicative of a moderate-severe nonspecific encephalopathy, no epileptic activity    Assessment and Plan:     * Acute encephalopathy    - 66 y/o f with a complicate course of the hospital stay after a bihemispheric watershed infarcts s/p trach/PEG, DM-2, CKD3, CAD s/p CABG, HTN, who was transferred from [place] to [facility] for encephalopathy and is consulted to the neurology team for the same and possible seizure.   - Complicated post-stroke course, with aspiration PNA, trach dislodgement / mucous plug obstruction / UTIs, with poor mentation post trach replacement     - Head CT scan with no interval change / MRI negative for anoxic brain injury   - 2 EEGs on 8/1 and 9/14, revealing generalized slowing indicative of nonspecific encephalopathy.   - labs, she has elevated Na~150/ UTI concerns     DDx encephalopathy multifactorial metabolic/ infectious / electrolyte disturbance at the background of watershed strokes  - improved level of alertness / wakefulness / neuro eval with correction of electrolyte abnormalities / control of infection    Plan:   - No active interventions at this standpoint. No neurological complications / etiologies that would affect the prognosis. Shall sign off.      - Apprec PT/OT assistance  - D/c keppra    - Continue secondary stroke preventions / HTN control  - delirium precautions / neuro check        Respiratory insufficiency    - avoid hypoxias         Chronic bilateral cerebral infarction in watershed distribution    - secondary stroke prevention  - continue ASA/ statins   - HTN target < 160/90 mmHg        Hypertensive encephalopathy    - prevent HTN encephalopathy         Hyperlipidemia    - continue statins         Hypertensive emergency    - target < 160/90 mmHg        Type 2 diabetes mellitus, uncontrolled    - stroke risk factor  - as per primary team              VTE Risk Mitigation         Ordered     enoxaparin injection 40 mg  Daily     Route:  Subcutaneous        09/18/17 7492          Sydni Do MD  Neurology  [facility]    I have personally taken the history and examined the patient and agree with the resident's note as stated above.    Andi Rao MD, MARII, FAAN  Department of Neurology   [facility] [place], [place]

## 2017-09-21 NOTE — PLAN OF CARE
dtr given list of SNF     09/21/17 0347   Discharge Reassessment   Assessment Type Discharge Planning Reassessment   Provided patient/caregiver education on the expected discharge date and the discharge plan Yes   Do you have any problems affording any of your prescribed medications? No   Discharge Plan A Skilled Nursing Facility   Discharge Plan B Home Health   Patient choice form signed by patient/caregiver No   Can the patient answer the patient profile reliably? No, cognitively impaired   How does the patient rate their overall health at the present time? Fair   Describe the patient's ability to walk at the present time. Walks with the help of equipment   How often would a person be available to care for the patient? Occasionally   Number of comorbid conditions (as recorded on the chart) Five or more

## 2017-09-21 NOTE — SUBJECTIVE & OBJECTIVE
Past Medical History:   Diagnosis Date    Acute bilateral cerebral infarction in a watershed distribution 2017    CAD (coronary artery disease)     Diabetes mellitus     Hypertension     Morbid obesity     TAMMY on CPAP     setting +17    Stroke        Past Surgical History:   Procedure Laterality Date    ARTERIAL BYPASS SURGRY      9 tears sgo     SECTION      CORONARY ARTERY BYPASS GRAFT      HYSTERECTOMY      TUBAL LIGATION         Review of patient's allergies indicates:   Allergen Reactions    Latex, natural rubber        Current Neurological Medications:     No current facility-administered medications on file prior to encounter.      Current Outpatient Prescriptions on File Prior to Encounter   Medication Sig    acetaminophen (TYLENOL) 650 mg/20.3 mL Soln 20.3 mLs (650 mg total) by Per NG tube route every 6 (six) hours as needed. (Patient taking differently: 650 mg by Per NG tube route every 6 (six) hours as needed for Pain. )    albuterol-ipratropium 2.5mg-0.5mg/3mL (DUO-NEB) 0.5 mg-3 mg(2.5 mg base)/3 mL nebulizer solution Take 3 mLs by nebulization every 4 (four) hours. Rescue    aspirin 81 MG Chew 1 tablet (81 mg total) by Per G Tube route once daily.    atorvastatin (LIPITOR) 10 MG tablet 1 tablet (10 mg total) by Per G Tube route once daily. (Patient taking differently: 10 mg by Per G Tube route every evening. )    cloNIDine 0.1 mg/24 hr td ptwk (CATAPRES) 0.1 mg/24 hr Place 1 patch onto the skin every 7 days. (Patient taking differently: Place 1 patch onto the skin every Tuesday. )    fluoxetine (PROZAC) 20 MG capsule 1 capsule (20 mg total) by Per G Tube route once daily.    hydrALAZINE (APRESOLINE) 100 MG tablet 1 tablet (100 mg total) by Per G Tube route every 8 (eight) hours.    insulin aspart (NOVOLOG) 100 unit/mL InPn pen Inject 1-10 Units into the skin every 4 (four) hours as needed (Hyperglycemia).    meclizine (ANTIVERT) 25 mg tablet Take 1 tablet (25 mg total)  by mouth every 6 (six) hours as needed. (Patient taking differently: Take 25 mg by mouth every 6 (six) hours as needed for Dizziness. )    pantoprazole (PROTONIX) 40 mg GrPS 1 packet (40 mg total) by Per G Tube route once daily.    amantadine HCl (SYMMETREL) 100 mg capsule 1 capsule (100 mg total) by Per G Tube route every 8 (eight) hours.    amlodipine (NORVASC) 10 MG tablet 1 tablet (10 mg total) by Per G Tube route once daily.    diclofenac sodium (VOLTAREN) 1 % Gel Apply 2 g topically 2 (two) times daily.    EASY TOUCH TWIST LANCETS 30 gauge Misc     HEPARIN SODIUM,PORCINE (HEPARIN, PORCINE,) 5,000 unit/mL injection Inject 1.5 mLs (7,500 Units total) into the skin every 8 (eight) hours.     Family History     Problem Relation (Age of Onset)    No Known Problems Father, Mother, Sister, Brother, Maternal Aunt, Maternal Uncle, Paternal Aunt, Paternal Uncle, Maternal Grandmother, Maternal Grandfather, Paternal Grandmother, Paternal Grandfather        Social History Main Topics    Smoking status: Never Smoker    Smokeless tobacco: Never Used    Alcohol use Yes      Comment: occ    Drug use: No    Sexual activity: Not Currently     Birth control/ protection: See Surgical Hx     Interval History:     NAEON. No active concerns for agitations.     Improved level of alertness / wakefullness today.     Review of Systems   Unable to perform ROS: Patient nonverbal   Constitutional: Negative for chills and fever.   Respiratory: Negative for shortness of breath.    Cardiovascular: Negative for chest pain.   Neurological: Negative for syncope and facial asymmetry.     Objective:     Vital Signs (Most Recent):  Temp: 98.7 °F (37.1 °C) (09/21/17 1655)  Pulse: 69 (09/21/17 1655)  Resp: 18 (09/21/17 1655)  BP: (!) 151/62 (09/21/17 1655)  SpO2: 99 % (09/21/17 1655) Vital Signs (24h Range):  Temp:  [98.6 °F (37 °C)-99.3 °F (37.4 °C)] 98.7 °F (37.1 °C)  Pulse:  [68-94] 69  Resp:  [18-22] 18  SpO2:  [96 %-99 %] 99 %  BP:  (146-185)/(62-84) 151/62     Weight: 132.5 kg (292 lb)  Body mass index is 47.13 kg/m².    Physical Exam   Constitutional: She appears listless. She is uncooperative.   HENT:   Head: Normocephalic and atraumatic.   Eyes: EOM are normal. Pupils are equal, round, and reactive to light.   Neck:   Trach in place   Cardiovascular: Normal rate, regular rhythm and normal heart sounds.    Pulses:       Radial pulses are 2+ on the right side, and 2+ on the left side.   Difficult to assess the heart sounds 2/2 breathing noise through the trach   Pulmonary/Chest: Effort normal. No tachypnea. She has no decreased breath sounds. She has no wheezes.   Abdominal: Soft. She exhibits no distension. There is no tenderness.   Neurological: She appears listless. She exhibits normal muscle tone. She displays no seizure activity. GCS eye subscore is 4. GCS verbal subscore is 1. GCS motor subscore is 5.   Reflex Scores:       Tricep reflexes are 1+ on the right side and 1+ on the left side.       Bicep reflexes are 1+ on the right side and 1+ on the left side.       Brachioradialis reflexes are 1+ on the right side and 1+ on the left side.       Patellar reflexes are 1+ on the right side and 1+ on the left side.  Skin: No rash noted. No erythema.   Psychiatric: Her affect is blunt. She is withdrawn. She is noncommunicative. She is inattentive.   Vitals reviewed.      NEUROLOGICAL EXAMINATION:     MENTAL STATUS   Disoriented to person: NT / Trach in place / Mute    Attention: normal.   Speech: mute   Level of consciousness: alert (Awake follow simple commands)  General knowledge: NT / Trach in place / Mute      CRANIAL NERVES     CN II   Visual fields full to confrontation.     CN III, IV, VI   Pupils are equal, round, and reactive to light.  Extraocular motions are normal.   Right pupil: Size: 3 mm. Shape: regular.   Left pupil: Size: 3 mm. Shape: regular.   CN III: no CN III palsy  CN VI: no CN VI palsy  Nystagmus: none     CN V   Facial  sensation intact.     CN VII   Facial expression full, symmetric.     CN VIII   CN VIII normal.     CN IX, X   CN IX normal.   CN X normal.     CN XI   Right sternocleidomastoid strength: weak  Left sternocleidomastoid strength: weak    CN XII   CN XII normal.     MOTOR EXAM   Muscle bulk: normal  Overall muscle tone: normal       B/L UE & LE weakness 4/5, with movement of extremities to pain     REFLEXES     Reflexes   Right brachioradialis: 1+  Left brachioradialis: 1+  Right biceps: 1+  Left biceps: 1+  Right triceps: 1+  Left triceps: 1+  Right patellar: 1+  Left patellar: 1+  Right plantar: equivocal  Left plantar: equivocal    SENSORY EXAM   Pinprick normal.     GAIT AND COORDINATION     Gait  Gait: (NT)    Tremor   Resting tremor: absent      Significant Labs:   Blood Culture: No results for input(s): LABBLOO in the last 48 hours.  CBC:     Recent Labs  Lab 09/21/17  1237   WBC 9.28   HGB 9.6*   HCT 30.2*        CMP:     Recent Labs  Lab 09/19/17  1831 09/20/17  0442 09/21/17  0454   GLU 96 90 84    143  143 143   K 3.9 3.6 3.4*    107 108   CO2 24 26 27   BUN 20 23 23   CREATININE 1.1 1.3 0.8   CALCIUM 8.3* 8.2* 8.5*   MG  --  2.0 1.9   PROT  --  6.1 6.5   ALBUMIN  --  2.2* 2.2*   BILITOT  --  1.3* 1.4*   ALKPHOS  --  80 95   AST  --  6* 9*   ALT  --  8* 8*   ANIONGAP 11 10 8   EGFRNONAA 52.1* 42.6* >60.0     Urine Culture: No results for input(s): LABURIN in the last 48 hours.    Significant Imaging: CT: I have reviewed all pertinent results/findings within the past 24 hours and my personal findings are:  no acute interval change compared to the previous study  EEG: I have reviewed all pertinent results/findings within the past 24 hours and my personal findings are:  generalized slowing indicative of a moderate-severe nonspecific encephalopathy, no epileptic activity

## 2017-09-21 NOTE — ASSESSMENT & PLAN NOTE
Nutrition Problem  Inadequate oral intake    Related to (etiology):   Decreased ability to consume sufficient energy    Signs and Symptoms (as evidenced by):   NPO and need for TF    Interventions/Recommendations (treatment strategy):  See RD recs above.    Nutrition Diagnosis Status:   New

## 2017-09-21 NOTE — SUBJECTIVE & OBJECTIVE
Interval History:   NAEON. No change in mental status. Patient had PEG tube placed yesterday, usable today. Discharge pending family's decision on NH placement.     Review of Systems   Unable to perform ROS: Mental status change     Objective:     Vital Signs (Most Recent):  Temp: 99.3 °F (37.4 °C) (09/21/17 1100)  Pulse: 94 (09/21/17 1524)  Resp: (!) 22 (09/21/17 1524)  BP: (!) 183/83 (09/21/17 0510)  SpO2: 99 % (09/21/17 1524) Vital Signs (24h Range):  Temp:  [97.2 °F (36.2 °C)-99.3 °F (37.4 °C)] 99.3 °F (37.4 °C)  Pulse:  [68-94] 94  Resp:  [17-22] 22  SpO2:  [95 %-99 %] 99 %  BP: (146-185)/(67-84) 183/83     Weight: 132.5 kg (292 lb)  Body mass index is 47.13 kg/m².    Intake/Output Summary (Last 24 hours) at 09/21/17 1605  Last data filed at 09/21/17 0539   Gross per 24 hour   Intake              500 ml   Output                0 ml   Net              500 ml      Physical Exam   Constitutional: She appears well-developed and well-nourished. No distress.   HENT:   Head: Normocephalic and atraumatic.   Eyes: Conjunctivae are normal. Right eye exhibits no discharge. Left eye exhibits no discharge.   Pupils responsive to light, no tracking noted.     Cardiovascular: Normal rate, regular rhythm and normal heart sounds.    Pulmonary/Chest: Effort normal and breath sounds normal.   Abdominal: Soft. Bowel sounds are normal. She exhibits no distension. There is no tenderness.   Musculoskeletal: She exhibits no edema.   Neurological: She has normal reflexes. GCS eye subscore is 2. GCS verbal subscore is 1. GCS motor subscore is 5.   Patient awake. No insight. Not communicating. Not following commands,   Spontaneous extremity movement      Skin: Skin is warm and dry. No erythema.       Significant Labs:   CBC:   Recent Labs  Lab 09/21/17  1237   WBC 9.28   HGB 9.6*   HCT 30.2*        CMP:   Recent Labs  Lab 09/19/17  1831 09/20/17  0442 09/21/17  0454    143  143 143   K 3.9 3.6 3.4*    107 108   CO2 24  26 27   GLU 96 90 84   BUN 20 23 23   CREATININE 1.1 1.3 0.8   CALCIUM 8.3* 8.2* 8.5*   PROT  --  6.1 6.5   ALBUMIN  --  2.2* 2.2*   BILITOT  --  1.3* 1.4*   ALKPHOS  --  80 95   AST  --  6* 9*   ALT  --  8* 8*   ANIONGAP 11 10 8   EGFRNONAA 52.1* 42.6* >60.0       Significant Imaging: I have reviewed all pertinent imaging results/findings within the past 24 hours.

## 2017-09-21 NOTE — ASSESSMENT & PLAN NOTE
- 68 y/o f with a complicate course of the hospital stay after a bihemispheric watershed infarcts s/p trach/PEG, DM-2, CKD3, CAD s/p CABG, HTN, who was transferred from [place] to [facility] for encephalopathy and is consulted to the neurology team for the same and possible seizure.   - Complicated post-stroke course, with aspiration PNA, trach dislodgement / mucous plug obstruction / UTIs, with poor mentation post trach replacement     - Head CT scan with no interval change / MRI negative for anoxic brain injury   - 2 EEGs on 8/1 and 9/14, revealing generalized slowing indicative of nonspecific encephalopathy.   - labs, she has elevated Na~150/ UTI concerns     DDx encephalopathy multifactorial metabolic/ infectious / electrolyte disturbance at the background of watershed strokes  - improved level of alertness / wakefulness / neuro eval with correction of electrolyte abnormalities / control of infection    Plan:   - No active interventions at this standpoint. Shall sign off.    - PT/OT/Resp assistance  - D/c keppra    - Continue secondary stroke preventions / HTN control  - delirium precautions / neuro check

## 2017-09-21 NOTE — PROGRESS NOTES
[facility] Hospital Medicine  Progress Note    Patient Name: Nisa Frank  MRN: [id number]  Patient Class: IP- Inpatient   Admission Date: 9/13/2017  Length of Stay: 8 days  Attending Physician: Bay Henao, *  Primary Care Provider: Atilio Downs MD    [facility] Medicine Team: [facility] MED 3 Abimbola Devlin MD    Subjective:     Principal Problem:Acute encephalopathy    HPI:  Ms Frank 66 year old female with diabetes mellitus type 2, CKD stage 3, CAD s/p CABG, essential hypertension, cerebrovascular disease s/p bi-hemispheric watershed infarcts and trach/PEG status who was brought in via EMS from [facility] for acute respiratory distress presented to [facility] 8/8 with acute respiratory failure with hypoxia likely due to mucous plug. Patient was admitted to ICU to wean off MV as tolerated and for placement to [facility] where she was transferred to [facility] on 8/14 in stable condition.  She pulled out her trach on 9/4 and went to [facility].  She was treated for an ESBL E. Coli UTI with Meropenem. The family has been very unhappy with the care that the patient has received.  She was medically stable for transfer to a SNF yesterday, however overnight she developed a new WBC 26.  She was found to have a RML and RLL infiltrate and was started on Vanc and Zosyn.  She also developed a new KATHI on top of her CKD (0.6 to 1.8).  Family is requesting transfer to [facility].    Spoke with daughter on the phone.  Per family patient was previously alert and oriented to person and place.  She was conversing and able to participate in therapy.  This changed shortly after trach removal and replacement on 9/4.  Family reports some complication during trach replacement resulting in overnight ICU care.  After patient was stepped down they report she stopped talking and would not participate with therapy.  The only response they could get from  the patient was eye tracking and intermittent hand squeezes.  Family feels that patient may have had a repeat stroke and was requesting MRI.   Spoke with Dagoberto Melo nurse who reports that patient has been near obtunded for as long as she has had her (past couple of days).  She reported recent WBC jump and associated fever followed by broad spectrum abx.  In addiction notes concern for ileus given high residuals from NG tube feeds.  Does not high residuals resolved once tube was flushed.         Per nurse medications prior to concern for ileus.  Vanc 1750mg , Q24, Zosyn 4.5 Q 12, ASA 81, Clonidine 0.1 patch weekly, Lipitor 10, Colace, Lovenox 40, Protonix 40, Hydralazine 100 Q8, Keppra 500 BID, Lisniopril 40 PO, 25 Metoprolol XL, Procardia 60 mg Daily, Risperdal 25 QHS.           Hospital Course:  9/14: Patients mental status unchanged from yesterday. Labs showing significant improvement. Will continue to treat for VAP. Schedule for CT scan of abdomen.   9/15: Patients mental status improved, appears more awake/alert. But no verbal communication yet. Following commands. Continue to treat to VAP. MRI scheduled. PT/OT   9:16: Patient more awake and alert. Still non verbal. Continuing to treat VAP. PT/OT   9/17: Patient awake and alert. Still non-verbal. Had a long conversation w/ family regarding patient status; will get records/procedure report.   9/18: patient is awake and alert but non-verbal and doesn't follow command. Surgery was consulted for ileus.   9/19: Patient is awake. But non-verbal and doesn;t follow commands. NG tube was pulled out last night. Surgery consulted for PEG tube.   9/20: Patient scheduled for PEG tube placement today. Abx course completed, Na corrected, BP wnl. Patient's family contacted regarding NH placement, d/c pending family's discission.   9/21: PEG tube in place. Consulted dietary for nutrition. Waiting on family for NH placement.     Interval History:   NAEON. No change in mental  status. Patient had PEG tube placed yesterday, usable today. Discharge pending family's decision on NH placement.     Review of Systems   Unable to perform ROS: Mental status change     Objective:     Vital Signs (Most Recent):  Temp: 99.3 °F (37.4 °C) (09/21/17 1100)  Pulse: 94 (09/21/17 1524)  Resp: (!) 22 (09/21/17 1524)  BP: (!) 183/83 (09/21/17 0510)  SpO2: 99 % (09/21/17 1524) Vital Signs (24h Range):  Temp:  [97.2 °F (36.2 °C)-99.3 °F (37.4 °C)] 99.3 °F (37.4 °C)  Pulse:  [68-94] 94  Resp:  [17-22] 22  SpO2:  [95 %-99 %] 99 %  BP: (146-185)/(67-84) 183/83     Weight: 132.5 kg (292 lb)  Body mass index is 47.13 kg/m².    Intake/Output Summary (Last 24 hours) at 09/21/17 1605  Last data filed at 09/21/17 0539   Gross per 24 hour   Intake              500 ml   Output                0 ml   Net              500 ml      Physical Exam   Constitutional: She appears well-developed and well-nourished. No distress.   HENT:   Head: Normocephalic and atraumatic.   Eyes: Conjunctivae are normal. Right eye exhibits no discharge. Left eye exhibits no discharge.   Pupils responsive to light, no tracking noted.     Cardiovascular: Normal rate, regular rhythm and normal heart sounds.    Pulmonary/Chest: Effort normal and breath sounds normal.   Abdominal: Soft. Bowel sounds are normal. She exhibits no distension. There is no tenderness.   Musculoskeletal: She exhibits no edema.   Neurological: She has normal reflexes. GCS eye subscore is 2. GCS verbal subscore is 1. GCS motor subscore is 5.   Patient awake. No insight. Not communicating. Not following commands,   Spontaneous extremity movement      Skin: Skin is warm and dry. No erythema.       Significant Labs:   CBC:   Recent Labs  Lab 09/21/17  1237   WBC 9.28   HGB 9.6*   HCT 30.2*        CMP:   Recent Labs  Lab 09/19/17  1831 09/20/17  0442 09/21/17  0454    143  143 143   K 3.9 3.6 3.4*    107 108   CO2 24 26 27   GLU 96 90 84   BUN 20 23 23    CREATININE 1.1 1.3 0.8   CALCIUM 8.3* 8.2* 8.5*   PROT  --  6.1 6.5   ALBUMIN  --  2.2* 2.2*   BILITOT  --  1.3* 1.4*   ALKPHOS  --  80 95   AST  --  6* 9*   ALT  --  8* 8*   ANIONGAP 11 10 8   EGFRNONAA 52.1* 42.6* >60.0       Significant Imaging: I have reviewed all pertinent imaging results/findings within the past 24 hours.    Assessment/Plan:      * Acute encephalopathy    History of recent CVA with recent admission to rehab, per family acute worsening 1 week ago following trach replacement. OSH records in chart. Head CT unchanged. Per family patient was communicating prior to trach replacement. However, per OSH records patient has been obtunded for the past couple of days. Difficult to determine patients true baseline?    Concern for Anoxic brain injury during trach removal/replacement, CVA, Seizure, Worsening Sepsis. Urine culture no growth.  MRI no acute hemorrhagic pathology. Patient had a BM.       - GCS of 8, however protecting airway with trach, stable for floor    - s/p PEG tube placement   - monitor electrolytes  - control BP              Ileus    Concern for ileus at OSH. However, no abdominal distension on physical exam. CT scan notable for Ileus, w/ no SBO.    - resolved        Respiratory failure    Concern for possible VAP    - Decreased O2 5L; 99%, will continue to titrate   - completed VAP treatment         Tracheostomy status    - Trach care          Dysphagia    Per family was tolerating diet prior to trach replacement on 9/4     - PEG tube feeds        KATHI (acute kidney injury)        - monitor renal function           Respiratory insufficiency              Chronic bilateral cerebral infarction in watershed distribution              Hypertensive encephalopathy              Hyperlipidemia              Hypertensive emergency    - IV hydralazine as needed           Morbid obesity with BMI of 50.0-59.9, adult              S/P CABG (coronary artery bypass graft)    - Continue ASA, unable to  swallow            Type 2 diabetes mellitus, uncontrolled    - Per OSH records not on insulin   - Her HbA1c  8/9/17 is 7.2   - Sliding scale           Essential hypertension    HX of significant HTN. Per OSH on hydralazine 100 Q8 PO, Linsiopril 40 PO, Metoprolol XL 25 BID, Nifedipine 60mg. Since patient is NPO she is not receiving any of those meds.  She has Clonidine 0.1 Patch Q week, a new one was placed today 9/18/17.   - her BP is poorly controlled. Her SBP is around 220-190.   - will transfer her to 10th floor so hydralazine IV can be used.   - Her heart rate runs low. Labetalol is not safe to be used at this point.             VTE Risk Mitigation         Ordered     enoxaparin injection 40 mg  Daily     Route:  Subcutaneous        09/18/17 5396              Abimbola Devlin MD  Department of Hospital Medicine   [facility]

## 2017-09-21 NOTE — PLAN OF CARE
SW spoke to pt's dtr, Shayna.  SW requested SNF choices, but Shayna is still reviewing the list.  SW confirmed that Shayna is reviewing the NH Trach list and she agreed to give SW choices tomorrow.    Shayna wants to discuss the options with her family and visit some facilities.    LUCIA updated CM.      Shayna: [phone]      Renetta Jackson LCSW     [phone]  Critical Care (MICU)

## 2017-09-21 NOTE — PROGRESS NOTES
Pt seen and examined  PEG site healthy in appearance  Minimal bilious output in siegel bag  OK to use for tube feeds at noon today  OK for meds  Will signoff at this time    Roland Louis MD PGY III  [phone]

## 2017-09-22 PROBLEM — I16.1 HYPERTENSIVE EMERGENCY: Status: RESOLVED | Noted: 2017-06-18 | Resolved: 2017-09-22

## 2017-09-22 PROBLEM — J95.851 VAP (VENTILATOR-ASSOCIATED PNEUMONIA): Status: RESOLVED | Noted: 2017-09-19 | Resolved: 2017-09-22

## 2017-09-22 PROBLEM — K56.7 ILEUS: Chronic | Status: RESOLVED | Noted: 2017-09-13 | Resolved: 2017-09-22

## 2017-09-22 LAB
ALBUMIN SERPL BCP-MCNC: 2 G/DL
ALP SERPL-CCNC: 91 U/L
ALT SERPL W/O P-5'-P-CCNC: 8 U/L
ANION GAP SERPL CALC-SCNC: 8 MMOL/L
AST SERPL-CCNC: 6 U/L
BASOPHILS # BLD AUTO: 0.01 K/UL
BASOPHILS NFR BLD: 0.1 %
BILIRUB SERPL-MCNC: 1.2 MG/DL
BUN SERPL-MCNC: 20 MG/DL
CALCIUM SERPL-MCNC: 8.1 MG/DL
CHLORIDE SERPL-SCNC: 108 MMOL/L
CO2 SERPL-SCNC: 25 MMOL/L
CREAT SERPL-MCNC: 0.7 MG/DL
DIFFERENTIAL METHOD: ABNORMAL
EOSINOPHIL # BLD AUTO: 0.1 K/UL
EOSINOPHIL NFR BLD: 0.9 %
ERYTHROCYTE [DISTWIDTH] IN BLOOD BY AUTOMATED COUNT: 15.8 %
EST. GFR  (AFRICAN AMERICAN): >60 ML/MIN/1.73 M^2
EST. GFR  (NON AFRICAN AMERICAN): >60 ML/MIN/1.73 M^2
GLUCOSE SERPL-MCNC: 94 MG/DL
HCT VFR BLD AUTO: 27.5 %
HGB BLD-MCNC: 8.6 G/DL
LYMPHOCYTES # BLD AUTO: 2.1 K/UL
LYMPHOCYTES NFR BLD: 27.8 %
MAGNESIUM SERPL-MCNC: 1.6 MG/DL
MCH RBC QN AUTO: 26.9 PG
MCHC RBC AUTO-ENTMCNC: 31.3 G/DL
MCV RBC AUTO: 86 FL
MONOCYTES # BLD AUTO: 0.9 K/UL
MONOCYTES NFR BLD: 12.4 %
NEUTROPHILS # BLD AUTO: 4.4 K/UL
NEUTROPHILS NFR BLD: 57.7 %
PHOSPHATE SERPL-MCNC: 2.5 MG/DL
PLATELET # BLD AUTO: 242 K/UL
PMV BLD AUTO: 10.6 FL
POCT GLUCOSE: 108 MG/DL (ref 70–110)
POCT GLUCOSE: 113 MG/DL (ref 70–110)
POCT GLUCOSE: 83 MG/DL (ref 70–110)
POCT GLUCOSE: 90 MG/DL (ref 70–110)
POCT GLUCOSE: 92 MG/DL (ref 70–110)
POTASSIUM SERPL-SCNC: 3.5 MMOL/L
PROT SERPL-MCNC: 5.9 G/DL
RBC # BLD AUTO: 3.2 M/UL
SODIUM SERPL-SCNC: 141 MMOL/L
WBC # BLD AUTO: 7.6 K/UL

## 2017-09-22 PROCEDURE — 36415 COLL VENOUS BLD VENIPUNCTURE: CPT

## 2017-09-22 PROCEDURE — 85025 COMPLETE CBC W/AUTO DIFF WBC: CPT

## 2017-09-22 PROCEDURE — 20600001 HC STEP DOWN PRIVATE ROOM

## 2017-09-22 PROCEDURE — 94640 AIRWAY INHALATION TREATMENT: CPT

## 2017-09-22 PROCEDURE — 25000003 PHARM REV CODE 250: Performed by: STUDENT IN AN ORGANIZED HEALTH CARE EDUCATION/TRAINING PROGRAM

## 2017-09-22 PROCEDURE — 94761 N-INVAS EAR/PLS OXIMETRY MLT: CPT

## 2017-09-22 PROCEDURE — 25000003 PHARM REV CODE 250: Performed by: INTERNAL MEDICINE

## 2017-09-22 PROCEDURE — 83735 ASSAY OF MAGNESIUM: CPT

## 2017-09-22 PROCEDURE — 94760 N-INVAS EAR/PLS OXIMETRY 1: CPT

## 2017-09-22 PROCEDURE — 25000242 PHARM REV CODE 250 ALT 637 W/ HCPCS: Performed by: STUDENT IN AN ORGANIZED HEALTH CARE EDUCATION/TRAINING PROGRAM

## 2017-09-22 PROCEDURE — 63600175 PHARM REV CODE 636 W HCPCS: Performed by: HOSPITALIST

## 2017-09-22 PROCEDURE — 97530 THERAPEUTIC ACTIVITIES: CPT

## 2017-09-22 PROCEDURE — 86580 TB INTRADERMAL TEST: CPT | Performed by: HOSPITALIST

## 2017-09-22 PROCEDURE — 25000003 PHARM REV CODE 250: Performed by: SURGERY

## 2017-09-22 PROCEDURE — 27000221 HC OXYGEN, UP TO 24 HOURS

## 2017-09-22 PROCEDURE — 97535 SELF CARE MNGMENT TRAINING: CPT

## 2017-09-22 PROCEDURE — 84100 ASSAY OF PHOSPHORUS: CPT

## 2017-09-22 PROCEDURE — 99231 SBSQ HOSP IP/OBS SF/LOW 25: CPT | Mod: GC,,, | Performed by: HOSPITALIST

## 2017-09-22 PROCEDURE — 80053 COMPREHEN METABOLIC PANEL: CPT

## 2017-09-22 PROCEDURE — 99900026 HC AIRWAY MAINTENANCE (STAT)

## 2017-09-22 RX ORDER — ENOXAPARIN SODIUM 100 MG/ML
40 INJECTION SUBCUTANEOUS EVERY 12 HOURS
Qty: 0.4 ML | Refills: 3 | Status: CANCELLED | OUTPATIENT
Start: 2017-09-22

## 2017-09-22 RX ORDER — HYDRALAZINE HYDROCHLORIDE 25 MG/1
25 TABLET, FILM COATED ORAL EVERY 8 HOURS PRN
Status: DISCONTINUED | OUTPATIENT
Start: 2017-09-22 | End: 2017-09-23

## 2017-09-22 RX ORDER — LISINOPRIL 10 MG/1
10 TABLET ORAL DAILY
Status: DISCONTINUED | OUTPATIENT
Start: 2017-09-22 | End: 2017-09-28 | Stop reason: HOSPADM

## 2017-09-22 RX ADMIN — ENOXAPARIN SODIUM 40 MG: 100 INJECTION SUBCUTANEOUS at 05:09

## 2017-09-22 RX ADMIN — IPRATROPIUM BROMIDE AND ALBUTEROL SULFATE 3 ML: .5; 3 SOLUTION RESPIRATORY (INHALATION) at 12:09

## 2017-09-22 RX ADMIN — Medication 5 UNITS: at 05:09

## 2017-09-22 RX ADMIN — POTASSIUM BICARBONATE 25 MEQ: 25 TABLET, EFFERVESCENT ORAL at 08:09

## 2017-09-22 RX ADMIN — IPRATROPIUM BROMIDE AND ALBUTEROL SULFATE 3 ML: .5; 3 SOLUTION RESPIRATORY (INHALATION) at 03:09

## 2017-09-22 RX ADMIN — IPRATROPIUM BROMIDE AND ALBUTEROL SULFATE 3 ML: .5; 3 SOLUTION RESPIRATORY (INHALATION) at 08:09

## 2017-09-22 RX ADMIN — ATORVASTATIN CALCIUM 10 MG: 10 TABLET, FILM COATED ORAL at 08:09

## 2017-09-22 RX ADMIN — AMLODIPINE BESYLATE 10 MG: 10 TABLET ORAL at 08:09

## 2017-09-22 RX ADMIN — LISINOPRIL 10 MG: 10 TABLET ORAL at 08:09

## 2017-09-22 RX ADMIN — IPRATROPIUM BROMIDE AND ALBUTEROL SULFATE 3 ML: .5; 3 SOLUTION RESPIRATORY (INHALATION) at 11:09

## 2017-09-22 RX ADMIN — ACETYLCYSTEINE 4 ML: 100 INHALANT RESPIRATORY (INHALATION) at 08:09

## 2017-09-22 RX ADMIN — ASPIRIN 81 MG CHEWABLE TABLET 81 MG: 81 TABLET CHEWABLE at 08:09

## 2017-09-22 NOTE — PROGRESS NOTES
Spoke with Dr. Boucher with IM 3 regarding patient's tube feeding settings. MD states to leave TF running at a rate of 15 mL/hr tonight and will re-assess increasing TF to goal with primary team in the morning. Goal per nutrition note is 60 mL/hr. Will continue to check residual Q4H and PRN along with Q4H free water bolus per order. Will continue to monitor.

## 2017-09-22 NOTE — ASSESSMENT & PLAN NOTE
HX of significant HTN. Per OSH on hydralazine 100 Q8 PO, Linsiopril 40 PO, Metoprolol XL 25 BID, Nifedipine 60mg. She has Clonidine 0.1 Patch Q week, a new one was placed today 9/18/17.   - her BP is poorly controlled. Her SBP is around 220-190.   - will transfer her to 10th floor so hydralazine IV can be used.   - Her heart rate runs low. Labetalol is not safe to be used at this point.   -resuming home regimen as BP tolerates via PEG

## 2017-09-22 NOTE — PROGRESS NOTES
Spoke with MD on call for IM 3, MD states to start bolus tube feeds at noon. Will disconnect pt from continuous tube feeds and notify dayshift RN of time for first bolus feed. Will continue to monitor.

## 2017-09-22 NOTE — PLAN OF CARE
Problem: Occupational Therapy Goal  Goal: Occupational Therapy Goal  Goals to be met by: 09/30/17     Patient will increase functional independence with ADLs by performing:    UE Dressing with Moderate Assistance.  LE Dressing with Maximum Assistance.  Grooming while seated with Set-up Assistance.  Toileting from bedside commode with Maximum Assistance for hygiene and clothing management.   Toilet transfer to bedside commode with Maximum Assistance.  Upper extremity exercise program x10 reps per handout, with assistance as needed.  Pt will sit EOB for 10 minutes with SBA during therapeutic activity.      Outcome: Ongoing (interventions implemented as appropriate)  No goals met today  MAROC ANTONIO Ramirez  9/22/2017  [phone]

## 2017-09-22 NOTE — SUBJECTIVE & OBJECTIVE
Interval History: No acute events; patient moderately able to follow commands today. Awaiting placement. HTN improving with PO medication via PEG tube.     Review of Systems   Unable to perform ROS: Patient nonverbal   Constitutional: Negative for chills and fever.   Respiratory: Negative for shortness of breath.    Cardiovascular: Negative for chest pain.   Neurological: Negative for syncope and facial asymmetry.     Objective:     Vital Signs (Most Recent):  Temp: 98.8 °F (37.1 °C) (09/22/17 0831)  Pulse: 68 (09/22/17 0831)  Resp: 18 (09/22/17 0831)  BP: (!) 174/81 (09/22/17 0831)  SpO2: 96 % (09/22/17 0831) Vital Signs (24h Range):  Temp:  [97.5 °F (36.4 °C)-99.3 °F (37.4 °C)] 98.8 °F (37.1 °C)  Pulse:  [60-94] 68  Resp:  [18-22] 18  SpO2:  [94 %-99 %] 96 %  BP: (151-188)/(62-82) 174/81     Weight: 132.5 kg (292 lb)  Body mass index is 47.13 kg/m².    Intake/Output Summary (Last 24 hours) at 09/22/17 1032  Last data filed at 09/22/17 1024   Gross per 24 hour   Intake              800 ml   Output                0 ml   Net              800 ml      Physical Exam   Constitutional: She appears listless. She is uncooperative.   HENT:   Head: Normocephalic and atraumatic.   Eyes: EOM are normal. Pupils are equal, round, and reactive to light.   Neck:   Trach in place   Cardiovascular: Normal rate, regular rhythm and normal heart sounds.    Pulses:       Radial pulses are 2+ on the right side, and 2+ on the left side.   Difficult to assess the heart sounds 2/2 breathing noise through the trach   Pulmonary/Chest: Effort normal. No tachypnea. She has no decreased breath sounds. She has no wheezes.   Abdominal: Soft. She exhibits no distension. There is no tenderness.   Neurological: She appears listless. She exhibits normal muscle tone. She displays no seizure activity. GCS eye subscore is 4. GCS verbal subscore is 1. GCS motor subscore is 5.   Reflex Scores:       Tricep reflexes are 1+ on the right side and 1+ on the  left side.       Bicep reflexes are 1+ on the right side and 1+ on the left side.       Brachioradialis reflexes are 1+ on the right side and 1+ on the left side.       Patellar reflexes are 1+ on the right side and 1+ on the left side.  Skin: No rash noted. No erythema.   Psychiatric: Her affect is blunt. She is withdrawn. She is noncommunicative. She is inattentive.   Vitals reviewed.      Significant Labs:   Blood Culture: No results for input(s): LABBLOO in the last 48 hours.  CBC:   Recent Labs  Lab 09/21/17  1237 09/22/17  0413   WBC 9.28 7.60   HGB 9.6* 8.6*   HCT 30.2* 27.5*    242     CMP:   Recent Labs  Lab 09/21/17  0454 09/22/17  0413    141   K 3.4* 3.5    108   CO2 27 25   GLU 84 94   BUN 23 20   CREATININE 0.8 0.7   CALCIUM 8.5* 8.1*   PROT 6.5 5.9*   ALBUMIN 2.2* 2.0*   BILITOT 1.4* 1.2*   ALKPHOS 95 91   AST 9* 6*   ALT 8* 8*   ANIONGAP 8 8   EGFRNONAA >60.0 >60.0     Magnesium:   Recent Labs  Lab 09/21/17  0454 09/22/17  0413   MG 1.9 1.6       Significant Imaging: I have reviewed and interpreted all pertinent imaging results/findings within the past 24 hours.

## 2017-09-22 NOTE — PLAN OF CARE
LUCIA called Shayna Owen (Power of ) [phone] and left another VM requesting a call back.  LUCIA is attempting to get NH Trach choices, to move forward with placement.    Renetta Jackson LCSW     [phone]  Critical Care (MICU)

## 2017-09-22 NOTE — PLAN OF CARE
Problem: Patient Care Overview  Goal: Plan of Care Review  Outcome: Ongoing (interventions implemented as appropriate)  Patient non-verbal but does follow simple commands and eyes open spontaneously. Patient shows no non-verbal signs of pain. PEG tube patent, tube feeds running at 15 mL/hr per Dr. Boucher with IM 3. Wedge ordered so that RN and PCT can turn patient Q2H. CBG being monitored Q6H. Patient remains free of falls or injury. No significant events. Patient's daughter verbalizes complete understanding of plan of care. Will continue to monitor.

## 2017-09-22 NOTE — PROGRESS NOTES
Pt arrived to [facility] rom 325 from [facility]. Patient nonverbal but responding to commands such as squeezing RN hands. Telemetry applied. PIV  and leaking. PIV removed and RN attempting to obtain new PIV access, MD aware of no PIV at this time. Patient has #6 shiley trach and is on trach collar 5L @ 28%. Patient transferred with tube feeds on pause, rate set at 30 mL/hr on kangaroo pump. Peg tube flushed and residual checked and returned (30 mL). Patient and daughter oriented to surroundings. Bed in lowest position. Call system within reach. Side rails x 2 and daughter will be staying at bedside tonight. Will continue to monitor.

## 2017-09-22 NOTE — ASSESSMENT & PLAN NOTE
Per family was tolerating diet prior to trach replacement on 9/4     -viable PEG placed this admission

## 2017-09-22 NOTE — ASSESSMENT & PLAN NOTE
Concern for Anoxic brain injury during trach removal/replacement, CVA, Seizure, Worsening Sepsis. Urine culture no growth.  MRI no acute hemorrhagic pathology. Patient is having BMs      - GCS of 8, however protecting airway with trach, stable for floor    - s/p PEG tube placement   - monitor electrolytes  - control BP

## 2017-09-22 NOTE — PLAN OF CARE
"Problem: Patient Care Overview  Goal: Plan of Care Review  Outcome: Ongoing (interventions implemented as appropriate)  Plan of care discussed with pt. Pt is non verbal. Tried twice throughout shift to get a new IV, no success. Pt is a very hard stick. A PICC consult would be beneficial. Pt NPO. Pain does not display nonverbal indicators of pain. Pt on telemetry, NSR. Pt preformed ROM with PT and tolerated well. PT stated "Pt showed improved in ROM exercises compared to yesterday." Pt needs 2-3 people to assist to pt to reposition. Pt can follow verbal command (i.e squeeze my hand, press your feet down "like your are stepping on the gas pedal". Right side was stronger than the left. Pt remains free of falls and injury. No acute events this shift. Will continue to monitor.      "

## 2017-09-22 NOTE — PLAN OF CARE
Problem: Patient Care Overview  Goal: Plan of Care Review  Outcome: Revised  Pt resting in room throughout shift. Turning Q2H. Nonverbal indicators absent for pain. No SOB or chest pain, on trach collar. SR on Telemetry. VSS. CBG's WNL and no insulin coverage needed. Skin integrity intact and without breakdown. SCD in place. Call light in reach. Fall precautions in place, pt did not have any falls or injuries this shift. Tube feedings and water boluses given Q6H. Plan of care discussed with patient no questions at this time. Will continue to monitor.

## 2017-09-23 LAB
ALBUMIN SERPL BCP-MCNC: 2 G/DL
ALP SERPL-CCNC: 94 U/L
ALT SERPL W/O P-5'-P-CCNC: 8 U/L
ANION GAP SERPL CALC-SCNC: 7 MMOL/L
AST SERPL-CCNC: 9 U/L
BASOPHILS # BLD AUTO: 0.02 K/UL
BASOPHILS NFR BLD: 0.3 %
BILIRUB SERPL-MCNC: 1.3 MG/DL
BUN SERPL-MCNC: 13 MG/DL
CALCIUM SERPL-MCNC: 8.1 MG/DL
CHLORIDE SERPL-SCNC: 105 MMOL/L
CO2 SERPL-SCNC: 27 MMOL/L
CREAT SERPL-MCNC: 0.6 MG/DL
DIFFERENTIAL METHOD: ABNORMAL
EOSINOPHIL # BLD AUTO: 0.1 K/UL
EOSINOPHIL NFR BLD: 0.9 %
ERYTHROCYTE [DISTWIDTH] IN BLOOD BY AUTOMATED COUNT: 15.6 %
EST. GFR  (AFRICAN AMERICAN): >60 ML/MIN/1.73 M^2
EST. GFR  (NON AFRICAN AMERICAN): >60 ML/MIN/1.73 M^2
GLUCOSE SERPL-MCNC: 81 MG/DL
HCT VFR BLD AUTO: 28.5 %
HGB BLD-MCNC: 9 G/DL
LYMPHOCYTES # BLD AUTO: 1.9 K/UL
LYMPHOCYTES NFR BLD: 25.6 %
MAGNESIUM SERPL-MCNC: 1.5 MG/DL
MCH RBC QN AUTO: 26.5 PG
MCHC RBC AUTO-ENTMCNC: 31.6 G/DL
MCV RBC AUTO: 84 FL
MONOCYTES # BLD AUTO: 0.9 K/UL
MONOCYTES NFR BLD: 12.1 %
NEUTROPHILS # BLD AUTO: 4.4 K/UL
NEUTROPHILS NFR BLD: 59.1 %
PHOSPHATE SERPL-MCNC: 2.3 MG/DL
PLATELET # BLD AUTO: 229 K/UL
PMV BLD AUTO: 9.8 FL
POCT GLUCOSE: 106 MG/DL (ref 70–110)
POCT GLUCOSE: 77 MG/DL (ref 70–110)
POCT GLUCOSE: 83 MG/DL (ref 70–110)
POCT GLUCOSE: 91 MG/DL (ref 70–110)
POTASSIUM SERPL-SCNC: 3.6 MMOL/L
PROT SERPL-MCNC: 6.1 G/DL
RBC # BLD AUTO: 3.39 M/UL
SODIUM SERPL-SCNC: 139 MMOL/L
WBC # BLD AUTO: 7.42 K/UL

## 2017-09-23 PROCEDURE — 99231 SBSQ HOSP IP/OBS SF/LOW 25: CPT | Mod: GC,,, | Performed by: HOSPITALIST

## 2017-09-23 PROCEDURE — 94640 AIRWAY INHALATION TREATMENT: CPT

## 2017-09-23 PROCEDURE — 31720 CLEARANCE OF AIRWAYS: CPT

## 2017-09-23 PROCEDURE — 83735 ASSAY OF MAGNESIUM: CPT

## 2017-09-23 PROCEDURE — 84100 ASSAY OF PHOSPHORUS: CPT

## 2017-09-23 PROCEDURE — 27000221 HC OXYGEN, UP TO 24 HOURS

## 2017-09-23 PROCEDURE — 80053 COMPREHEN METABOLIC PANEL: CPT

## 2017-09-23 PROCEDURE — 85025 COMPLETE CBC W/AUTO DIFF WBC: CPT

## 2017-09-23 PROCEDURE — 25000003 PHARM REV CODE 250: Performed by: INTERNAL MEDICINE

## 2017-09-23 PROCEDURE — 99900026 HC AIRWAY MAINTENANCE (STAT)

## 2017-09-23 PROCEDURE — 94761 N-INVAS EAR/PLS OXIMETRY MLT: CPT

## 2017-09-23 PROCEDURE — 63600175 PHARM REV CODE 636 W HCPCS: Performed by: HOSPITALIST

## 2017-09-23 PROCEDURE — 36415 COLL VENOUS BLD VENIPUNCTURE: CPT

## 2017-09-23 PROCEDURE — 20600001 HC STEP DOWN PRIVATE ROOM

## 2017-09-23 PROCEDURE — 25000242 PHARM REV CODE 250 ALT 637 W/ HCPCS: Performed by: STUDENT IN AN ORGANIZED HEALTH CARE EDUCATION/TRAINING PROGRAM

## 2017-09-23 PROCEDURE — 25000003 PHARM REV CODE 250: Performed by: STUDENT IN AN ORGANIZED HEALTH CARE EDUCATION/TRAINING PROGRAM

## 2017-09-23 RX ORDER — AMLODIPINE BESYLATE 10 MG/1
10 TABLET ORAL DAILY
Status: DISCONTINUED | OUTPATIENT
Start: 2017-09-23 | End: 2017-09-25

## 2017-09-23 RX ORDER — NIFEDIPINE 30 MG/1
90 TABLET, EXTENDED RELEASE ORAL DAILY
Status: DISCONTINUED | OUTPATIENT
Start: 2017-09-23 | End: 2017-09-23

## 2017-09-23 RX ORDER — AMOXICILLIN 250 MG
2 CAPSULE ORAL DAILY
Status: DISCONTINUED | OUTPATIENT
Start: 2017-09-23 | End: 2017-09-23

## 2017-09-23 RX ORDER — HYDRALAZINE HYDROCHLORIDE 25 MG/1
25 TABLET, FILM COATED ORAL EVERY 6 HOURS
Status: DISCONTINUED | OUTPATIENT
Start: 2017-09-23 | End: 2017-09-28 | Stop reason: HOSPADM

## 2017-09-23 RX ADMIN — HYDRALAZINE HYDROCHLORIDE 25 MG: 25 TABLET, FILM COATED ORAL at 05:09

## 2017-09-23 RX ADMIN — ASPIRIN 81 MG CHEWABLE TABLET 81 MG: 81 TABLET CHEWABLE at 09:09

## 2017-09-23 RX ADMIN — ENOXAPARIN SODIUM 40 MG: 100 INJECTION SUBCUTANEOUS at 05:09

## 2017-09-23 RX ADMIN — IPRATROPIUM BROMIDE AND ALBUTEROL SULFATE 3 ML: .5; 3 SOLUTION RESPIRATORY (INHALATION) at 08:09

## 2017-09-23 RX ADMIN — POTASSIUM BICARBONATE 25 MEQ: 25 TABLET, EFFERVESCENT ORAL at 09:09

## 2017-09-23 RX ADMIN — LISINOPRIL 10 MG: 10 TABLET ORAL at 09:09

## 2017-09-23 RX ADMIN — HYDRALAZINE HYDROCHLORIDE 25 MG: 25 TABLET, FILM COATED ORAL at 11:09

## 2017-09-23 RX ADMIN — AMLODIPINE BESYLATE 10 MG: 10 TABLET ORAL at 11:09

## 2017-09-23 RX ADMIN — HYDRALAZINE HYDROCHLORIDE 25 MG: 25 TABLET, FILM COATED ORAL at 09:09

## 2017-09-23 RX ADMIN — IPRATROPIUM BROMIDE AND ALBUTEROL SULFATE 3 ML: .5; 3 SOLUTION RESPIRATORY (INHALATION) at 03:09

## 2017-09-23 RX ADMIN — ATORVASTATIN CALCIUM 10 MG: 10 TABLET, FILM COATED ORAL at 09:09

## 2017-09-23 NOTE — PLAN OF CARE
Problem: Patient Care Overview  Goal: Plan of Care Review  Reviewed poc with daughter verbalized understanding  vss  nad

## 2017-09-23 NOTE — PROGRESS NOTES
09/23/17 1208   Vital Signs   BP (!) 185/78   MAP (mmHg) 112   BP Location Right arm   BP Method Automatic   Patient Position Lying     Spoke with MD regarding BP. BP meds given at 1140. Ordered to recheck in about 20 min and call MD if SBP >180. Will implement and continue to monitor.

## 2017-09-23 NOTE — ASSESSMENT & PLAN NOTE
HX of significant HTN. Per OSH on hydralazine 100 Q8 PO, Linsiopril 40 PO, Metoprolol XL 25 BID, Nifedipine 60mg. She has Clonidine 0.1 Patch Q week, a new one was placed today 9/18/17.   - her BP is poorly controlled. Her SBP is around 220-190.   - Her heart rate runs low. Labetalol is not safe to be used at this point.   -resuming home regimen as BP tolerates via PEG  - added PO hydralazine 25mg TID   - norvasc 10mg QS

## 2017-09-23 NOTE — SUBJECTIVE & OBJECTIVE
Interval History:   NAEON. Patient mental status unchanged. Dispo pending placement. Will continue to control BP, tube feeds, monitor electrolytes.     Review of Systems   Unable to perform ROS: Patient nonverbal   Constitutional: Negative for chills and fever.   Respiratory: Negative for shortness of breath.    Cardiovascular: Negative for chest pain.   Neurological: Negative for syncope and facial asymmetry.     Objective:     Vital Signs (Most Recent):  Temp: 99.1 °F (37.3 °C) (09/23/17 0747)  Pulse: (!) 58 (09/23/17 1139)  Resp: 16 (09/23/17 1139)  BP: (!) 202/76 (09/23/17 0747)  SpO2: 97 % (09/23/17 1139) Vital Signs (24h Range):  Temp:  [98.4 °F (36.9 °C)-99.1 °F (37.3 °C)] 99.1 °F (37.3 °C)  Pulse:  [50-84] 58  Resp:  [16-22] 16  SpO2:  [93 %-99 %] 97 %  BP: (160-202)/(60-80) 202/76     Weight: 132.5 kg (292 lb)  Body mass index is 47.13 kg/m².    Intake/Output Summary (Last 24 hours) at 09/23/17 1158  Last data filed at 09/23/17 1148   Gross per 24 hour   Intake             1295 ml   Output             1725 ml   Net             -430 ml      Physical Exam   Constitutional: She appears well-developed and well-nourished. No distress.   HENT:   Head: Normocephalic and atraumatic.   Eyes: Conjunctivae are normal. Right eye exhibits no discharge. Left eye exhibits no discharge.   Pupils responsive to light, no tracking noted.     Cardiovascular: Normal rate, regular rhythm and normal heart sounds.    Pulmonary/Chest: Effort normal and breath sounds normal.   Abdominal: Soft. Bowel sounds are normal. She exhibits no distension. There is no tenderness.   Musculoskeletal: She exhibits no edema.   Neurological: She has normal reflexes. GCS eye subscore is 2. GCS verbal subscore is 1. GCS motor subscore is 5.   Patient awake. No insight. Not communicating. Not following commands,   Spontaneous extremity movement      Skin: Skin is warm and dry. No erythema.       Significant Labs:   BMP:   Recent Labs  Lab  09/23/17  0524   GLU 81      K 3.6      CO2 27   BUN 13   CREATININE 0.6   CALCIUM 8.1*   MG 1.5*     CBC:   Recent Labs  Lab 09/21/17  1237 09/22/17  0413 09/23/17  0524   WBC 9.28 7.60 7.42   HGB 9.6* 8.6* 9.0*   HCT 30.2* 27.5* 28.5*    242 229       Significant Imaging: I have reviewed all pertinent imaging results/findings within the past 24 hours.

## 2017-09-23 NOTE — PROGRESS NOTES
[facility] Hospital Medicine  Progress Note    Patient Name: Nisa Frank  MRN: [id number]  Patient Class: IP- Inpatient   Admission Date: 9/13/2017  Length of Stay: 10 days  Attending Physician: Bay Henao, *  Primary Care Provider: Atilio Downs MD    [facility] Medicine Team: [facility] 3 Abimbola Devlin MD    Subjective:     Principal Problem:Acute encephalopathy    HPI:  Ms Frank 66 year old female with diabetes mellitus type 2, CKD stage 3, CAD s/p CABG, essential hypertension, cerebrovascular disease s/p bi-hemispheric watershed infarcts and trach/PEG status who was brought in via EMS from [facility] for acute respiratory distress presented to [facility] 8/8 with acute respiratory failure with hypoxia likely due to mucous plug. Patient was admitted to ICU to wean off MV as tolerated and for placement to [facility] where she was transferred to [facility] on 8/14 in stable condition.  She pulled out her trach on 9/4 and went to [facility].  She was treated for an ESBL E. Coli UTI with Meropenem. The family has been very unhappy with the care that the patient has received.  She was medically stable for transfer to a SNF yesterday, however overnight she developed a new WBC 26.  She was found to have a RML and RLL infiltrate and was started on Vanc and Zosyn.  She also developed a new KATHI on top of her CKD (0.6 to 1.8).  Family is requesting transfer to [facility].    Spoke with daughter on the phone.  Per family patient was previously alert and oriented to person and place.  She was conversing and able to participate in therapy.  This changed shortly after trach removal and replacement on 9/4.  Family reports some complication during trach replacement resulting in overnight ICU care.  After patient was stepped down they report she stopped talking and would not participate with therapy.  The only response they could get  from the patient was eye tracking and intermittent hand squeezes.  Family feels that patient may have had a repeat stroke and was requesting MRI.   Spoke with Dagoberto Melo nurse who reports that patient has been near obtunded for as long as she has had her (past couple of days).  She reported recent WBC jump and associated fever followed by broad spectrum abx.  In addiction notes concern for ileus given high residuals from NG tube feeds.  Does not high residuals resolved once tube was flushed.         Per nurse medications prior to concern for ileus.  Vanc 1750mg , Q24, Zosyn 4.5 Q 12, ASA 81, Clonidine 0.1 patch weekly, Lipitor 10, Colace, Lovenox 40, Protonix 40, Hydralazine 100 Q8, Keppra 500 BID, Lisniopril 40 PO, 25 Metoprolol XL, Procardia 60 mg Daily, Risperdal 25 QHS.           Hospital Course:  9/14: Patients mental status unchanged from yesterday. Labs showing significant improvement. Will continue to treat for VAP. Schedule for CT scan of abdomen.   9/15: Patients mental status improved, appears more awake/alert. But no verbal communication yet. Following commands. Continue to treat to VAP. MRI scheduled. PT/OT   9:16: Patient more awake and alert. Still non verbal. Continuing to treat VAP. PT/OT   9/17: Patient awake and alert. Still non-verbal. Had a long conversation w/ family regarding patient status; will get records/procedure report.   9/18: patient is awake and alert but non-verbal and doesn't follow command. Surgery was consulted for ileus.   9/19: Patient is awake. But non-verbal and doesn;t follow commands. NG tube was pulled out last night. Surgery consulted for PEG tube.   9/20: Patient scheduled for PEG tube placement today. Abx course completed, Na corrected, BP wnl. Patient's family contacted regarding NH placement, d/c pending family's discission.   9/21: PEG tube in place. Consulted dietary for nutrition. Waiting on family for NH placement.   9/22-23: Resumed normal TF via boluses- giving  HTN regimen via PEG. Dispo pending  SNF acceptance.     Interval History:   NAEON. Patient mental status unchanged. Dispo pending placement. Will continue to control BP, tube feeds, monitor electrolytes.     Review of Systems   Unable to perform ROS: Patient nonverbal   Constitutional: Negative for chills and fever.   Respiratory: Negative for shortness of breath.    Cardiovascular: Negative for chest pain.   Neurological: Negative for syncope and facial asymmetry.     Objective:     Vital Signs (Most Recent):  Temp: 99.1 °F (37.3 °C) (09/23/17 0747)  Pulse: (!) 58 (09/23/17 1139)  Resp: 16 (09/23/17 1139)  BP: (!) 202/76 (09/23/17 0747)  SpO2: 97 % (09/23/17 1139) Vital Signs (24h Range):  Temp:  [98.4 °F (36.9 °C)-99.1 °F (37.3 °C)] 99.1 °F (37.3 °C)  Pulse:  [50-84] 58  Resp:  [16-22] 16  SpO2:  [93 %-99 %] 97 %  BP: (160-202)/(60-80) 202/76     Weight: 132.5 kg (292 lb)  Body mass index is 47.13 kg/m².    Intake/Output Summary (Last 24 hours) at 09/23/17 1158  Last data filed at 09/23/17 1148   Gross per 24 hour   Intake             1295 ml   Output             1725 ml   Net             -430 ml      Physical Exam   Constitutional: She appears well-developed and well-nourished. No distress.   HENT:   Head: Normocephalic and atraumatic.   Eyes: Conjunctivae are normal. Right eye exhibits no discharge. Left eye exhibits no discharge.   Pupils responsive to light, no tracking noted.     Cardiovascular: Normal rate, regular rhythm and normal heart sounds.    Pulmonary/Chest: Effort normal and breath sounds normal.   Abdominal: Soft. Bowel sounds are normal. She exhibits no distension. There is no tenderness.   Musculoskeletal: She exhibits no edema.   Neurological: She has normal reflexes. GCS eye subscore is 2. GCS verbal subscore is 1. GCS motor subscore is 5.   Patient awake. No insight. Not communicating. Not following commands,   Spontaneous extremity movement      Skin: Skin is warm and dry. No erythema.        Significant Labs:   BMP:   Recent Labs  Lab 09/23/17  0524   GLU 81      K 3.6      CO2 27   BUN 13   CREATININE 0.6   CALCIUM 8.1*   MG 1.5*     CBC:   Recent Labs  Lab 09/21/17  1237 09/22/17  0413 09/23/17  0524   WBC 9.28 7.60 7.42   HGB 9.6* 8.6* 9.0*   HCT 30.2* 27.5* 28.5*    242 229       Significant Imaging: I have reviewed all pertinent imaging results/findings within the past 24 hours.    Assessment/Plan:      * Acute encephalopathy      Concern for Anoxic brain injury during trach removal/replacement, CVA, Seizure, Worsening Sepsis. Urine culture no growth.  MRI no acute hemorrhagic pathology. Patient is having BMs      - GCS of 8, however protecting airway with trach, stable for floor    - s/p PEG tube placement   - monitor electrolytes  - control BP              Essential hypertension    HX of significant HTN. Per OSH on hydralazine 100 Q8 PO, Linsiopril 40 PO, Metoprolol XL 25 BID, Nifedipine 60mg. She has Clonidine 0.1 Patch Q week, a new one was placed today 9/18/17.   - her BP is poorly controlled. Her SBP is around 220-190.   - Her heart rate runs low. Labetalol is not safe to be used at this point.   -resuming home regimen as BP tolerates via PEG  - added PO hydralazine 25mg TID   - norvasc 10mg QS           Respiratory failure    Concern for possible VAP    - Decreased O2 5L; 99%, will continue to titrate   - completed VAP treatment         Hypertensive encephalopathy              Tracheostomy status    - Trach care          Dysphagia    Per family was tolerating diet prior to trach replacement on 9/4     -viable PEG placed this admission          KATHI (acute kidney injury)    Improving since admission.     - monitor renal function           Respiratory insufficiency      - stable, on 5L O2 95%         Chronic bilateral cerebral infarction in watershed distribution    - chronic, no new changes on MRI           Hyperlipidemia    -home statin          Morbid obesity with  BMI of 50.0-59.9, adult    -risk factor. Lovenox 40mg BID          S/P CABG (coronary artery bypass graft)    - Continue ASA, unable to swallow            Type 2 diabetes mellitus, uncontrolled    - Per OSH records not on insulin   - Her HbA1c  8/9/17 is 7.2   - Sliding scale             VTE Risk Mitigation         Ordered     enoxaparin injection 40 mg  Daily     Route:  Subcutaneous        09/18/17 0142              Abimbola Devlin MD  Department of Hospital Medicine   [facility]

## 2017-09-23 NOTE — PROGRESS NOTES
09/23/17 0747   Vital Signs   BP (!) 202/76   MAP (mmHg) 109   BP Location Right arm   BP Method Automatic   Patient Position Lying       Notified MD of BP. Ordered to give all AM medications and call if BP still elevated later. Ordered to not give procardia because it cannot be given through PEG tube. Will continue to monitor.

## 2017-09-24 LAB
ALBUMIN SERPL BCP-MCNC: 1.9 G/DL
ALP SERPL-CCNC: 92 U/L
ALT SERPL W/O P-5'-P-CCNC: 7 U/L
ANION GAP SERPL CALC-SCNC: 7 MMOL/L
ANISOCYTOSIS BLD QL SMEAR: SLIGHT
AST SERPL-CCNC: 7 U/L
BASOPHILS # BLD AUTO: ABNORMAL K/UL
BASOPHILS NFR BLD: 0 %
BILIRUB SERPL-MCNC: 0.9 MG/DL
BUN SERPL-MCNC: 9 MG/DL
CALCIUM SERPL-MCNC: 8 MG/DL
CHLORIDE SERPL-SCNC: 105 MMOL/L
CO2 SERPL-SCNC: 28 MMOL/L
CREAT SERPL-MCNC: 0.6 MG/DL
DIFFERENTIAL METHOD: ABNORMAL
EOSINOPHIL # BLD AUTO: ABNORMAL K/UL
EOSINOPHIL NFR BLD: 0 %
ERYTHROCYTE [DISTWIDTH] IN BLOOD BY AUTOMATED COUNT: 15.6 %
EST. GFR  (AFRICAN AMERICAN): >60 ML/MIN/1.73 M^2
EST. GFR  (NON AFRICAN AMERICAN): >60 ML/MIN/1.73 M^2
GLUCOSE SERPL-MCNC: 118 MG/DL
HCT VFR BLD AUTO: 29.1 %
HGB BLD-MCNC: 9.4 G/DL
HYPOCHROMIA BLD QL SMEAR: ABNORMAL
LYMPHOCYTES # BLD AUTO: ABNORMAL K/UL
LYMPHOCYTES NFR BLD: 23 %
MAGNESIUM SERPL-MCNC: 1.3 MG/DL
MCH RBC QN AUTO: 26.9 PG
MCHC RBC AUTO-ENTMCNC: 32.3 G/DL
MCV RBC AUTO: 83 FL
METAMYELOCYTES NFR BLD MANUAL: 2 %
MONOCYTES # BLD AUTO: ABNORMAL K/UL
MONOCYTES NFR BLD: 10 %
NEUTROPHILS NFR BLD: 65 %
PHOSPHATE SERPL-MCNC: 2.5 MG/DL
PLATELET # BLD AUTO: 198 K/UL
PLATELET BLD QL SMEAR: ABNORMAL
PMV BLD AUTO: 10.5 FL
POCT GLUCOSE: 121 MG/DL (ref 70–110)
POCT GLUCOSE: 121 MG/DL (ref 70–110)
POCT GLUCOSE: 132 MG/DL (ref 70–110)
POCT GLUCOSE: 135 MG/DL (ref 70–110)
POLYCHROMASIA BLD QL SMEAR: ABNORMAL
POTASSIUM SERPL-SCNC: 3.9 MMOL/L
PROT SERPL-MCNC: 6 G/DL
RBC # BLD AUTO: 3.49 M/UL
SODIUM SERPL-SCNC: 140 MMOL/L
TB INDURATION 48 - 72 HR READ: 0 MM
WBC # BLD AUTO: 10.01 K/UL

## 2017-09-24 PROCEDURE — 80053 COMPREHEN METABOLIC PANEL: CPT

## 2017-09-24 PROCEDURE — 27000221 HC OXYGEN, UP TO 24 HOURS

## 2017-09-24 PROCEDURE — 63600175 PHARM REV CODE 636 W HCPCS: Performed by: HOSPITALIST

## 2017-09-24 PROCEDURE — 25000003 PHARM REV CODE 250: Performed by: INTERNAL MEDICINE

## 2017-09-24 PROCEDURE — 99900026 HC AIRWAY MAINTENANCE (STAT)

## 2017-09-24 PROCEDURE — 99900022

## 2017-09-24 PROCEDURE — 84100 ASSAY OF PHOSPHORUS: CPT

## 2017-09-24 PROCEDURE — 25000242 PHARM REV CODE 250 ALT 637 W/ HCPCS: Performed by: STUDENT IN AN ORGANIZED HEALTH CARE EDUCATION/TRAINING PROGRAM

## 2017-09-24 PROCEDURE — 25000003 PHARM REV CODE 250: Performed by: STUDENT IN AN ORGANIZED HEALTH CARE EDUCATION/TRAINING PROGRAM

## 2017-09-24 PROCEDURE — 94760 N-INVAS EAR/PLS OXIMETRY 1: CPT

## 2017-09-24 PROCEDURE — 94761 N-INVAS EAR/PLS OXIMETRY MLT: CPT

## 2017-09-24 PROCEDURE — 85027 COMPLETE CBC AUTOMATED: CPT

## 2017-09-24 PROCEDURE — 83735 ASSAY OF MAGNESIUM: CPT

## 2017-09-24 PROCEDURE — 94640 AIRWAY INHALATION TREATMENT: CPT

## 2017-09-24 PROCEDURE — 20600001 HC STEP DOWN PRIVATE ROOM

## 2017-09-24 PROCEDURE — 36415 COLL VENOUS BLD VENIPUNCTURE: CPT

## 2017-09-24 PROCEDURE — 85007 BL SMEAR W/DIFF WBC COUNT: CPT

## 2017-09-24 PROCEDURE — 63600175 PHARM REV CODE 636 W HCPCS: Performed by: STUDENT IN AN ORGANIZED HEALTH CARE EDUCATION/TRAINING PROGRAM

## 2017-09-24 PROCEDURE — 99231 SBSQ HOSP IP/OBS SF/LOW 25: CPT | Mod: GC,,, | Performed by: HOSPITALIST

## 2017-09-24 RX ORDER — MAGNESIUM SULFATE HEPTAHYDRATE 40 MG/ML
2 INJECTION, SOLUTION INTRAVENOUS
Status: COMPLETED | OUTPATIENT
Start: 2017-09-24 | End: 2017-09-24

## 2017-09-24 RX ADMIN — ENOXAPARIN SODIUM 40 MG: 100 INJECTION SUBCUTANEOUS at 05:09

## 2017-09-24 RX ADMIN — HYDRALAZINE HYDROCHLORIDE 25 MG: 25 TABLET, FILM COATED ORAL at 05:09

## 2017-09-24 RX ADMIN — IPRATROPIUM BROMIDE AND ALBUTEROL SULFATE 3 ML: .5; 3 SOLUTION RESPIRATORY (INHALATION) at 08:09

## 2017-09-24 RX ADMIN — AMLODIPINE BESYLATE 10 MG: 10 TABLET ORAL at 08:09

## 2017-09-24 RX ADMIN — IPRATROPIUM BROMIDE AND ALBUTEROL SULFATE 3 ML: .5; 3 SOLUTION RESPIRATORY (INHALATION) at 03:09

## 2017-09-24 RX ADMIN — MAGNESIUM SULFATE IN WATER 2 G: 40 INJECTION, SOLUTION INTRAVENOUS at 08:09

## 2017-09-24 RX ADMIN — HYDRALAZINE HYDROCHLORIDE 25 MG: 25 TABLET, FILM COATED ORAL at 11:09

## 2017-09-24 RX ADMIN — IPRATROPIUM BROMIDE AND ALBUTEROL SULFATE 3 ML: .5; 3 SOLUTION RESPIRATORY (INHALATION) at 11:09

## 2017-09-24 RX ADMIN — ATORVASTATIN CALCIUM 10 MG: 10 TABLET, FILM COATED ORAL at 08:09

## 2017-09-24 RX ADMIN — IPRATROPIUM BROMIDE AND ALBUTEROL SULFATE 3 ML: .5; 3 SOLUTION RESPIRATORY (INHALATION) at 12:09

## 2017-09-24 RX ADMIN — ASPIRIN 81 MG CHEWABLE TABLET 81 MG: 81 TABLET CHEWABLE at 08:09

## 2017-09-24 RX ADMIN — LISINOPRIL 10 MG: 10 TABLET ORAL at 08:09

## 2017-09-24 NOTE — PROGRESS NOTES
Dr. Henao discontinued order for tb test and placed new one. TB test placed to left FA on 9/22 at 5:30 pm and is to be read today after that time. Spoke with MD on team and she said that new one does not need to be placed and to just read that one today. Will do and continue to monitor.

## 2017-09-24 NOTE — PLAN OF CARE
Problem: Patient Care Overview  Goal: Plan of Care Review  Outcome: Revised  Pt resting in room throughout shift. Turning Q2H. Nonverbal indicators absent for pain. No SOB or chest pain, on trach collar. Suctioning PRN. SR on Telemetry. VSS. CBG's WNL and no insulin coverage needed. Skin integrity intact and without breakdown. Purewick in place. SCD in place. Call light in reach. Fall precautions in place, pt did not have any falls or injuries this shift. Tube feedings and water boluses given Q6H. Plan of care discussed with patient no questions at this time. Will continue to monitor.

## 2017-09-24 NOTE — SUBJECTIVE & OBJECTIVE
Interval History:   NAEON. Patient appears to be more alert, responding to simple commands on more of a consistent bases. No fevers, chills, SOB, CP.     Review of Systems   Unable to perform ROS: Patient nonverbal   Constitutional: Negative for chills and fever.   Respiratory: Negative for shortness of breath.    Cardiovascular: Negative for chest pain.   Neurological: Negative for syncope and facial asymmetry.     Objective:     Vital Signs (Most Recent):  Temp: 98.9 °F (37.2 °C) (09/24/17 0847)  Pulse: 65 (09/24/17 1100)  Resp: 18 (09/24/17 0849)  BP: (!) 164/76 (09/24/17 0847)  SpO2: 97 % (09/24/17 0849) Vital Signs (24h Range):  Temp:  [98.6 °F (37 °C)-99.4 °F (37.4 °C)] 98.9 °F (37.2 °C)  Pulse:  [58-88] 65  Resp:  [16-18] 18  SpO2:  [93 %-97 %] 97 %  BP: (140-185)/(64-78) 164/76     Weight: 132.5 kg (292 lb)  Body mass index is 47.13 kg/m².    Intake/Output Summary (Last 24 hours) at 09/24/17 1126  Last data filed at 09/24/17 0847   Gross per 24 hour   Intake             1530 ml   Output              525 ml   Net             1005 ml      Physical Exam   Constitutional: She appears well-developed and well-nourished. No distress.   HENT:   Head: Normocephalic and atraumatic.   Eyes: Conjunctivae are normal. Right eye exhibits no discharge. Left eye exhibits no discharge.   Pupils responsive to light,   minimal tracking noted today, change from previous exam    Cardiovascular: Normal rate, regular rhythm and normal heart sounds.    Pulmonary/Chest: Effort normal and breath sounds normal.   Abdominal: Soft. Bowel sounds are normal. She exhibits no distension. There is no tenderness.   Musculoskeletal: She exhibits no edema.   Neurological: She has normal reflexes. GCS eye subscore is 2. GCS verbal subscore is 1. GCS motor subscore is 5.   Patient awake. No insight. Not communicating.   Following very basic simple commands.       Skin: Skin is warm and dry. No erythema.       Significant Labs:   CBC:   Recent  Labs  Lab 09/23/17  0524 09/24/17  0532   WBC 7.42 10.01   HGB 9.0* 9.4*   HCT 28.5* 29.1*    198     CMP:   Recent Labs  Lab 09/23/17  0524 09/24/17  0532    140   K 3.6 3.9    105   CO2 27 28   GLU 81 118*   BUN 13 9   CREATININE 0.6 0.6   CALCIUM 8.1* 8.0*   PROT 6.1 6.0   ALBUMIN 2.0* 1.9*   BILITOT 1.3* 0.9   ALKPHOS 94 92   AST 9* 7*   ALT 8* 7*   ANIONGAP 7* 7*   EGFRNONAA >60.0 >60.0     All pertinent labs within the past 24 hours have been reviewed.    Significant Imaging: I have reviewed all pertinent imaging results/findings within the past 24 hours.

## 2017-09-24 NOTE — PROGRESS NOTES
[facility] Hospital Medicine  Progress Note    Patient Name: Nisa Frank  MRN: [id number]  Patient Class: IP- Inpatient   Admission Date: 9/13/2017  Length of Stay: 11 days  Attending Physician: Bay Henao, *  Primary Care Provider: Atilio Downs MD    [facility] Medicine Team: [facility] HOSP MED 3 Abimbola Devlin MD    Subjective:     Principal Problem:Acute encephalopathy    HPI:  Ms Frank 66 year old female with diabetes mellitus type 2, CKD stage 3, CAD s/p CABG, essential hypertension, cerebrovascular disease s/p bi-hemispheric watershed infarcts and trach/PEG status who was brought in via EMS from [facility] for acute respiratory distress presented to [facility] 8/8 with acute respiratory failure with hypoxia likely due to mucous plug. Patient was admitted to ICU to wean off MV as tolerated and for placement to [facility] where she was transferred to [facility] on 8/14 in stable condition.  She pulled out her trach on 9/4 and went to [facility].  She was treated for an ESBL E. Coli UTI with Meropenem. The family has been very unhappy with the care that the patient has received.  She was medically stable for transfer to a SNF yesterday, however overnight she developed a new WBC 26.  She was found to have a RML and RLL infiltrate and was started on Vanc and Zosyn.  She also developed a new KATHI on top of her CKD (0.6 to 1.8).  Family is requesting transfer [facility].    Spoke with daughter on the phone.  Per family patient was previously alert and oriented to person and place.  She was conversing and able to participate in therapy.  This changed shortly after trach removal and replacement on 9/4.  Family reports some complication during trach replacement resulting in overnight ICU care.  After patient was stepped down they report she stopped talking and would not participate with therapy.  The only response they could get  from the patient was eye tracking and intermittent hand squeezes.  Family feels that patient may have had a repeat stroke and was requesting MRI.   Spoke with Dagoberto Melo nurse who reports that patient has been near obtunded for as long as she has had her (past couple of days).  She reported recent WBC jump and associated fever followed by broad spectrum abx.  In addiction notes concern for ileus given high residuals from NG tube feeds.  Does not high residuals resolved once tube was flushed.         Per nurse medications prior to concern for ileus.  Vanc 1750mg , Q24, Zosyn 4.5 Q 12, ASA 81, Clonidine 0.1 patch weekly, Lipitor 10, Colace, Lovenox 40, Protonix 40, Hydralazine 100 Q8, Keppra 500 BID, Lisniopril 40 PO, 25 Metoprolol XL, Procardia 60 mg Daily, Risperdal 25 QHS.           Hospital Course:  9/14: Patients mental status unchanged from yesterday. Labs showing significant improvement. Will continue to treat for VAP. Schedule for CT scan of abdomen.   9/15: Patients mental status improved, appears more awake/alert. But no verbal communication yet. Following commands. Continue to treat to VAP. MRI scheduled. PT/OT   9:16: Patient more awake and alert. Still non verbal. Continuing to treat VAP. PT/OT   9/17: Patient awake and alert. Still non-verbal. Had a long conversation w/ family regarding patient status; will get records/procedure report.   9/18: patient is awake and alert but non-verbal and doesn't follow command. Surgery was consulted for ileus.   9/19: Patient is awake. But non-verbal and doesn;t follow commands. NG tube was pulled out last night. Surgery consulted for PEG tube.   9/20: Patient scheduled for PEG tube placement today. Abx course completed, Na corrected, BP wnl. Patient's family contacted regarding NH placement, d/c pending family's discission.   9/21: PEG tube in place. Consulted dietary for nutrition. Waiting on family for NH placement.   9/22-23: Resumed normal TF via boluses- giving  HTN regimen via PEG. Dispo pending SNF acceptance.   9/24: Dispo pending SNF acceptance. Spoke with daughter today, she is still reviewing the facilities.     Interval History:   NAEON. Patient appears to be more alert, responding to simple commands on more of a consistent bases. No fevers, chills, SOB, CP.     Review of Systems   Unable to perform ROS: Patient nonverbal   Constitutional: Negative for chills and fever.   Respiratory: Negative for shortness of breath.    Cardiovascular: Negative for chest pain.   Neurological: Negative for syncope and facial asymmetry.     Objective:     Vital Signs (Most Recent):  Temp: 98.9 °F (37.2 °C) (09/24/17 0847)  Pulse: 65 (09/24/17 1100)  Resp: 18 (09/24/17 0849)  BP: (!) 164/76 (09/24/17 0847)  SpO2: 97 % (09/24/17 0849) Vital Signs (24h Range):  Temp:  [98.6 °F (37 °C)-99.4 °F (37.4 °C)] 98.9 °F (37.2 °C)  Pulse:  [58-88] 65  Resp:  [16-18] 18  SpO2:  [93 %-97 %] 97 %  BP: (140-185)/(64-78) 164/76     Weight: 132.5 kg (292 lb)  Body mass index is 47.13 kg/m².    Intake/Output Summary (Last 24 hours) at 09/24/17 1126  Last data filed at 09/24/17 0847   Gross per 24 hour   Intake             1530 ml   Output              525 ml   Net             1005 ml      Physical Exam   Constitutional: She appears well-developed and well-nourished. No distress.   HENT:   Head: Normocephalic and atraumatic.   Eyes: Conjunctivae are normal. Right eye exhibits no discharge. Left eye exhibits no discharge.   Pupils responsive to light,   minimal tracking noted today, change from previous exam    Cardiovascular: Normal rate, regular rhythm and normal heart sounds.    Pulmonary/Chest: Effort normal and breath sounds normal.   Abdominal: Soft. Bowel sounds are normal. She exhibits no distension. There is no tenderness.   Musculoskeletal: She exhibits no edema.   Neurological: She has normal reflexes. GCS eye subscore is 2. GCS verbal subscore is 1. GCS motor subscore is 5.   Patient awake. No  insight. Not communicating.   Following very basic simple commands.       Skin: Skin is warm and dry. No erythema.       Significant Labs:   CBC:   Recent Labs  Lab 09/23/17  0524 09/24/17  0532   WBC 7.42 10.01   HGB 9.0* 9.4*   HCT 28.5* 29.1*    198     CMP:   Recent Labs  Lab 09/23/17  0524 09/24/17  0532    140   K 3.6 3.9    105   CO2 27 28   GLU 81 118*   BUN 13 9   CREATININE 0.6 0.6   CALCIUM 8.1* 8.0*   PROT 6.1 6.0   ALBUMIN 2.0* 1.9*   BILITOT 1.3* 0.9   ALKPHOS 94 92   AST 9* 7*   ALT 8* 7*   ANIONGAP 7* 7*   EGFRNONAA >60.0 >60.0     All pertinent labs within the past 24 hours have been reviewed.    Significant Imaging: I have reviewed all pertinent imaging results/findings within the past 24 hours.    Assessment/Plan:      * Acute encephalopathy      Concern for Anoxic brain injury during trach removal/replacement, CVA, Seizure, Worsening Sepsis. Urine culture no growth.  MRI no acute hemorrhagic pathology. Patient is having BMs      - GCS of 8, however protecting airway with trach, stable for floor    - s/p PEG tube placement   - monitor electrolytes  - control BP              Essential hypertension    HX of significant HTN. Per OSH on hydralazine 100 Q8 PO, Linsiopril 40 PO, Metoprolol XL 25 BID, Nifedipine 60mg. She has Clonidine 0.1 Patch Q week, a new one was placed today 9/18/17.   - her BP is poorly controlled. Her SBP is around 220-190.   - Her heart rate runs low. Labetalol is not safe to be used at this point.   -resuming home regimen as BP tolerates via PEG  - added PO hydralazine 25mg TID   - norvasc 10mg QS           Respiratory failure    Concern for possible VAP    - Decreased O2 5L; 99%, will continue to titrate   - completed VAP treatment         Hypertensive encephalopathy              Tracheostomy status    - Trach care          Dysphagia    Per family was tolerating diet prior to trach replacement on 9/4     -viable PEG placed this admission          KATHI (acute  kidney injury)    Improving since admission.     - monitor renal function           Respiratory insufficiency              Chronic bilateral cerebral infarction in watershed distribution              Hyperlipidemia    -home statin          Morbid obesity with BMI of 50.0-59.9, adult    -risk factor. Lovenox 40mg BID          S/P CABG (coronary artery bypass graft)    - Continue ASA, unable to swallow            Type 2 diabetes mellitus, uncontrolled    - Per OSH records not on insulin   - Her HbA1c  8/9/17 is 7.2   - Sliding scale             VTE Risk Mitigation         Ordered     enoxaparin injection 40 mg  Daily     Route:  Subcutaneous        09/18/17 5643              Abimbola Devlin MD  Department of Hospital Medicine   [facility]

## 2017-09-25 LAB
ALBUMIN SERPL BCP-MCNC: 1.9 G/DL
ALP SERPL-CCNC: 97 U/L
ALT SERPL W/O P-5'-P-CCNC: 8 U/L
ANION GAP SERPL CALC-SCNC: 5 MMOL/L
ANISOCYTOSIS BLD QL SMEAR: SLIGHT
AST SERPL-CCNC: 9 U/L
BASOPHILS # BLD AUTO: ABNORMAL K/UL
BASOPHILS NFR BLD: 0 %
BILIRUB SERPL-MCNC: 0.7 MG/DL
BUN SERPL-MCNC: 9 MG/DL
CALCIUM SERPL-MCNC: 8.3 MG/DL
CHLORIDE SERPL-SCNC: 106 MMOL/L
CO2 SERPL-SCNC: 28 MMOL/L
CREAT SERPL-MCNC: 0.6 MG/DL
DIFFERENTIAL METHOD: ABNORMAL
EOSINOPHIL # BLD AUTO: ABNORMAL K/UL
EOSINOPHIL NFR BLD: 0 %
ERYTHROCYTE [DISTWIDTH] IN BLOOD BY AUTOMATED COUNT: 15.6 %
EST. GFR  (AFRICAN AMERICAN): >60 ML/MIN/1.73 M^2
EST. GFR  (NON AFRICAN AMERICAN): >60 ML/MIN/1.73 M^2
GLUCOSE SERPL-MCNC: 137 MG/DL
HCT VFR BLD AUTO: 28.6 %
HGB BLD-MCNC: 9 G/DL
LYMPHOCYTES # BLD AUTO: ABNORMAL K/UL
LYMPHOCYTES NFR BLD: 30 %
MAGNESIUM SERPL-MCNC: 1.8 MG/DL
MCH RBC QN AUTO: 26.3 PG
MCHC RBC AUTO-ENTMCNC: 31.5 G/DL
MCV RBC AUTO: 84 FL
METAMYELOCYTES NFR BLD MANUAL: 1 %
MONOCYTES # BLD AUTO: ABNORMAL K/UL
MONOCYTES NFR BLD: 12 %
NEUTROPHILS NFR BLD: 57 %
OVALOCYTES BLD QL SMEAR: ABNORMAL
PHOSPHATE SERPL-MCNC: 2.6 MG/DL
PLATELET # BLD AUTO: 263 K/UL
PMV BLD AUTO: 9.8 FL
POCT GLUCOSE: 114 MG/DL (ref 70–110)
POCT GLUCOSE: 115 MG/DL (ref 70–110)
POCT GLUCOSE: 123 MG/DL (ref 70–110)
POCT GLUCOSE: 136 MG/DL (ref 70–110)
POIKILOCYTOSIS BLD QL SMEAR: SLIGHT
POLYCHROMASIA BLD QL SMEAR: ABNORMAL
POTASSIUM SERPL-SCNC: 3.8 MMOL/L
PROT SERPL-MCNC: 6 G/DL
RBC # BLD AUTO: 3.42 M/UL
SODIUM SERPL-SCNC: 139 MMOL/L
WBC # BLD AUTO: 8.31 K/UL

## 2017-09-25 PROCEDURE — 94760 N-INVAS EAR/PLS OXIMETRY 1: CPT

## 2017-09-25 PROCEDURE — 85007 BL SMEAR W/DIFF WBC COUNT: CPT

## 2017-09-25 PROCEDURE — 84100 ASSAY OF PHOSPHORUS: CPT

## 2017-09-25 PROCEDURE — 99231 SBSQ HOSP IP/OBS SF/LOW 25: CPT | Mod: GC,,, | Performed by: HOSPITALIST

## 2017-09-25 PROCEDURE — 94640 AIRWAY INHALATION TREATMENT: CPT

## 2017-09-25 PROCEDURE — 25000003 PHARM REV CODE 250: Performed by: INTERNAL MEDICINE

## 2017-09-25 PROCEDURE — 63600175 PHARM REV CODE 636 W HCPCS: Performed by: HOSPITALIST

## 2017-09-25 PROCEDURE — 85027 COMPLETE CBC AUTOMATED: CPT

## 2017-09-25 PROCEDURE — 99900026 HC AIRWAY MAINTENANCE (STAT)

## 2017-09-25 PROCEDURE — 25000003 PHARM REV CODE 250: Performed by: STUDENT IN AN ORGANIZED HEALTH CARE EDUCATION/TRAINING PROGRAM

## 2017-09-25 PROCEDURE — 25000242 PHARM REV CODE 250 ALT 637 W/ HCPCS: Performed by: STUDENT IN AN ORGANIZED HEALTH CARE EDUCATION/TRAINING PROGRAM

## 2017-09-25 PROCEDURE — 80053 COMPREHEN METABOLIC PANEL: CPT

## 2017-09-25 PROCEDURE — 36415 COLL VENOUS BLD VENIPUNCTURE: CPT

## 2017-09-25 PROCEDURE — 11000001 HC ACUTE MED/SURG PRIVATE ROOM

## 2017-09-25 PROCEDURE — 83735 ASSAY OF MAGNESIUM: CPT

## 2017-09-25 PROCEDURE — 94761 N-INVAS EAR/PLS OXIMETRY MLT: CPT

## 2017-09-25 PROCEDURE — 97110 THERAPEUTIC EXERCISES: CPT

## 2017-09-25 PROCEDURE — 27000221 HC OXYGEN, UP TO 24 HOURS

## 2017-09-25 RX ORDER — AMOXICILLIN 250 MG
2 CAPSULE ORAL ONCE
Status: DISCONTINUED | OUTPATIENT
Start: 2017-09-25 | End: 2017-09-25

## 2017-09-25 RX ORDER — AMOXICILLIN 250 MG
1 CAPSULE ORAL ONCE
Status: DISCONTINUED | OUTPATIENT
Start: 2017-09-25 | End: 2017-09-25

## 2017-09-25 RX ORDER — POLYETHYLENE GLYCOL 3350 17 G/17G
17 POWDER, FOR SOLUTION ORAL DAILY
Status: DISCONTINUED | OUTPATIENT
Start: 2017-09-25 | End: 2017-09-25

## 2017-09-25 RX ORDER — DOCUSATE SODIUM 50 MG/5ML
100 LIQUID ORAL ONCE
Status: COMPLETED | OUTPATIENT
Start: 2017-09-25 | End: 2017-09-25

## 2017-09-25 RX ORDER — AMLODIPINE BESYLATE 10 MG/1
10 TABLET ORAL DAILY
Status: DISCONTINUED | OUTPATIENT
Start: 2017-09-26 | End: 2017-09-28 | Stop reason: HOSPADM

## 2017-09-25 RX ORDER — SENNOSIDES 8.8 MG/5ML
10 LIQUID ORAL ONCE
Status: COMPLETED | OUTPATIENT
Start: 2017-09-25 | End: 2017-09-25

## 2017-09-25 RX ORDER — AMOXICILLIN 250 MG
1 CAPSULE ORAL DAILY PRN
Status: DISCONTINUED | OUTPATIENT
Start: 2017-09-25 | End: 2017-09-25

## 2017-09-25 RX ORDER — POLYETHYLENE GLYCOL 3350 17 G/17G
17 POWDER, FOR SOLUTION ORAL DAILY
Status: DISCONTINUED | OUTPATIENT
Start: 2017-09-25 | End: 2017-09-28 | Stop reason: HOSPADM

## 2017-09-25 RX ADMIN — CLONIDINE 1 PATCH: 0.1 PATCH TRANSDERMAL at 09:09

## 2017-09-25 RX ADMIN — HYDRALAZINE HYDROCHLORIDE 25 MG: 25 TABLET, FILM COATED ORAL at 12:09

## 2017-09-25 RX ADMIN — IPRATROPIUM BROMIDE AND ALBUTEROL SULFATE 3 ML: .5; 3 SOLUTION RESPIRATORY (INHALATION) at 03:09

## 2017-09-25 RX ADMIN — POLYETHYLENE GLYCOL 3350 17 G: 17 POWDER, FOR SOLUTION ORAL at 01:09

## 2017-09-25 RX ADMIN — DOCUSATE SODIUM 100 MG: 50 LIQUID ORAL at 01:09

## 2017-09-25 RX ADMIN — HYDRALAZINE HYDROCHLORIDE 25 MG: 25 TABLET, FILM COATED ORAL at 06:09

## 2017-09-25 RX ADMIN — LISINOPRIL 10 MG: 10 TABLET ORAL at 09:09

## 2017-09-25 RX ADMIN — IPRATROPIUM BROMIDE AND ALBUTEROL SULFATE 3 ML: .5; 3 SOLUTION RESPIRATORY (INHALATION) at 11:09

## 2017-09-25 RX ADMIN — IPRATROPIUM BROMIDE AND ALBUTEROL SULFATE 3 ML: .5; 3 SOLUTION RESPIRATORY (INHALATION) at 08:09

## 2017-09-25 RX ADMIN — HYDRALAZINE HYDROCHLORIDE 25 MG: 25 TABLET, FILM COATED ORAL at 05:09

## 2017-09-25 RX ADMIN — ATORVASTATIN CALCIUM 10 MG: 10 TABLET, FILM COATED ORAL at 09:09

## 2017-09-25 RX ADMIN — ENOXAPARIN SODIUM 40 MG: 100 INJECTION SUBCUTANEOUS at 04:09

## 2017-09-25 RX ADMIN — AMLODIPINE BESYLATE 10 MG: 10 TABLET ORAL at 09:09

## 2017-09-25 RX ADMIN — SENNOSIDES 10 ML: 8.8 SYRUP ORAL at 01:09

## 2017-09-25 RX ADMIN — ASPIRIN 81 MG CHEWABLE TABLET 81 MG: 81 TABLET CHEWABLE at 09:09

## 2017-09-25 NOTE — ASSESSMENT & PLAN NOTE
Concern for possible VAP  - Decreased O2 5L; 99%, will continue to titrate   - completed VAP treatment   - No Leukocytosis this AM.

## 2017-09-25 NOTE — NURSING TRANSFER
Nursing Transfer Note      9/25/2017     Transfer To:  From:     Transfer via bed    Transfer with cardiac monitoring & 5 L 28% Trach collar    Transported by RN & Charge RN    Medicines sent: Narcisosource 1.5    Chart send with patient: Yes    Notified: Attempted to notify daughter (see previous notes)    Eulalia, MSU RN notified of patient arrival  Chastity, RT at bedside to set patient up on wall O2  Patient SpO2 98% during transportation with portable O2 @ 6L on Trach collar.   All emergency trach supplies at bedside 931 and JULIÁN Esteban updated. SCDs on patient, air mattress implemented.   JULIÁN Esteban updated patient has not received 1800 feeding and/or 1800 Hydralazine tablet.  No further questions at this time.

## 2017-09-25 NOTE — PROGRESS NOTES
MD Citlaly updated last documented BM: 09/13. Stated will place stool softener orders. MD Citlaly also updated patient requiring frequent suctioning and producing excessive secretions; stated will talk to team about orders for secretions.

## 2017-09-25 NOTE — PLAN OF CARE
Covering MSW spoke to pt's daughter Shayna to f/u on choices ([phone]).  She told MSW that she was going to look at [facility] and [facility] today.  She was in agreement with MSW moving forward in sending referral to both facilities.    Request sent to Marta GARZA to send referral.    Macey Phillips LMSW

## 2017-09-25 NOTE — PROGRESS NOTES
Patient daughter called voicing concern about patient left wrist, stating patient grimacing this morning when touched. Passive ROM performed on left wrist and no grimacing or signs of pain noted. Patient tolerated well. No swelling noted. Will continue to monitor

## 2017-09-25 NOTE — SUBJECTIVE & OBJECTIVE
"Interval History:   No acute events overnight. Mrs. Frank is resting comfortably in bed but continues to have a cough. Per her sister, she is less "active" today where she does not respond to commands such as hand squeezing or closing eyes. However, she does not seem to be in any distress.     Review of Systems   Unable to perform ROS: Patient nonverbal   Constitutional: Negative for chills and fever.   Respiratory: Negative for shortness of breath.    Cardiovascular: Negative for chest pain.   Neurological: Negative for syncope and facial asymmetry.     Objective:     Vital Signs (Most Recent):  Temp: 99.6 °F (37.6 °C) (09/25/17 0759)  Pulse: 72 (09/25/17 0846)  Resp: 17 (09/25/17 0846)  BP: 118/60 (09/25/17 0759)  SpO2: 97 % (09/25/17 0846) Vital Signs (24h Range):  Temp:  [97.9 °F (36.6 °C)-99.6 °F (37.6 °C)] 99.6 °F (37.6 °C)  Pulse:  [61-93] 72  Resp:  [16-20] 17  SpO2:  [93 %-99 %] 97 %  BP: (118-165)/(60-74) 118/60     Weight: 132.5 kg (292 lb)  Body mass index is 47.13 kg/m².    Intake/Output Summary (Last 24 hours) at 09/25/17 0903  Last data filed at 09/25/17 0500   Gross per 24 hour   Intake             1400 ml   Output              650 ml   Net              750 ml      Physical Exam   Constitutional: She appears well-developed and well-nourished. No distress.   HENT:   Head: Normocephalic and atraumatic.   Eyes: Conjunctivae are normal. Right eye exhibits no discharge. Left eye exhibits no discharge.   Pupils responsive to light,   minimal tracking noted today, change from previous exam    Cardiovascular: Normal rate, regular rhythm and normal heart sounds.    Pulmonary/Chest: Effort normal and breath sounds normal.   Abdominal: Soft. Bowel sounds are normal. She exhibits no distension. There is no tenderness.   Musculoskeletal: She exhibits no edema.   Neurological: She has normal reflexes. GCS eye subscore is 2. GCS verbal subscore is 1. GCS motor subscore is 5.   Patient awake. No insight. Not " communicating.   Following very basic simple commands but slowly. She squeezed by hand on exam.      Skin: Skin is warm and dry. No erythema.     Significant Labs:   BMP:   Recent Labs  Lab 09/25/17  0431   *      K 3.8      CO2 28   BUN 9   CREATININE 0.6   CALCIUM 8.3*   MG 1.8     CBC:   Recent Labs  Lab 09/24/17  0532 09/25/17  0431   WBC 10.01 8.31   HGB 9.4* 9.0*   HCT 29.1* 28.6*    263

## 2017-09-25 NOTE — PLAN OF CARE
Problem: Patient Care Overview  Goal: Plan of Care Review  Outcome: Revised  Plan of care discussed with patient and patient daughter. VSS. Stool softeners given to promote BM. Patient turned Q2Hour to prevent skin breakdown; Wedge implemented. SCDs on patient to promote circulation. Air mattress also implemented to further prevent skin breakdown. Patient suctioned prn-- emergency respiratory equipment at bedside. POCT Q 6 H- WNL. TF & Fluid bolus given per order; patient tolerated well; no residual noted. Pure-wick implemented to prevent skin breakdown and allow for accurate I&Os. Patient reoriented prn- patient still nonverbal and following with eyes; windows open, white board updated, clock & television in view, all medication and care explained. Patient is free of fall/trauma/injury. Nonverbal indicators of pain absent. All questions addressed. Will continue to monitor

## 2017-09-25 NOTE — PROGRESS NOTES
Faye Wakefield, Pharmacist consulted about patient scheduled Clonidine patch. Patch placed on 09/18 @ 11:40 am on right chest. Old patch not seen anywhere on patient to be removed. Patient entire body assessed for old patch. Ashu Pharmacist stated okay to place new patch even if old patch somewhere on patient because duration of patch placed on 09/18 is worn off since has been on patient for 7 days.

## 2017-09-25 NOTE — PROGRESS NOTES
09/25/17 1400   Urine Assessment   Bladder Scan Volume (mL) 25 mL   UOP: 0. Patient last urinated ~5am. Received 200 mL TF and 150 Fluid bolus per order. MD Citlaly updated. Ordered continue to assess.

## 2017-09-25 NOTE — PLAN OF CARE
Problem: Patient Care Overview  Goal: Plan of Care Review  Outcome: Ongoing (interventions implemented as appropriate)  Reviewed poc with family verbalized understanding  vss  nad  Turning every 2 hr without breakdown

## 2017-09-25 NOTE — PLAN OF CARE
Referral sent to     [facility]  [facility] [phone]    Marta Butler are f/u.

## 2017-09-25 NOTE — ASSESSMENT & PLAN NOTE
Concern for Anoxic brain injury during trach removal/replacement, CVA, Seizure, Worsening Sepsis. Urine culture no growth.  MRI no acute hemorrhagic pathology. Patient is having BMs    - mentation waxes and wanes.    - s/p PEG tube placement   - monitor electrolytes  - control BP

## 2017-09-26 LAB
POCT GLUCOSE: 103 MG/DL (ref 70–110)
POCT GLUCOSE: 121 MG/DL (ref 70–110)
POCT GLUCOSE: 158 MG/DL (ref 70–110)
POCT GLUCOSE: 69 MG/DL (ref 70–110)

## 2017-09-26 PROCEDURE — 11000001 HC ACUTE MED/SURG PRIVATE ROOM

## 2017-09-26 PROCEDURE — 25000003 PHARM REV CODE 250: Performed by: STUDENT IN AN ORGANIZED HEALTH CARE EDUCATION/TRAINING PROGRAM

## 2017-09-26 PROCEDURE — 99900026 HC AIRWAY MAINTENANCE (STAT)

## 2017-09-26 PROCEDURE — 25000003 PHARM REV CODE 250: Performed by: INTERNAL MEDICINE

## 2017-09-26 PROCEDURE — 94761 N-INVAS EAR/PLS OXIMETRY MLT: CPT

## 2017-09-26 PROCEDURE — 99232 SBSQ HOSP IP/OBS MODERATE 35: CPT | Mod: ,,, | Performed by: HOSPITALIST

## 2017-09-26 PROCEDURE — 94640 AIRWAY INHALATION TREATMENT: CPT

## 2017-09-26 PROCEDURE — 25000003 PHARM REV CODE 250: Performed by: HOSPITALIST

## 2017-09-26 PROCEDURE — 97530 THERAPEUTIC ACTIVITIES: CPT

## 2017-09-26 PROCEDURE — 97803 MED NUTRITION INDIV SUBSEQ: CPT

## 2017-09-26 PROCEDURE — 63600175 PHARM REV CODE 636 W HCPCS: Performed by: HOSPITALIST

## 2017-09-26 PROCEDURE — 25000242 PHARM REV CODE 250 ALT 637 W/ HCPCS: Performed by: STUDENT IN AN ORGANIZED HEALTH CARE EDUCATION/TRAINING PROGRAM

## 2017-09-26 PROCEDURE — 27000221 HC OXYGEN, UP TO 24 HOURS

## 2017-09-26 RX ORDER — FLUOXETINE HYDROCHLORIDE 20 MG/1
20 CAPSULE ORAL DAILY
Status: DISCONTINUED | OUTPATIENT
Start: 2017-09-26 | End: 2017-09-28 | Stop reason: HOSPADM

## 2017-09-26 RX ORDER — CARVEDILOL 6.25 MG/1
6.25 TABLET ORAL 2 TIMES DAILY
Status: DISCONTINUED | OUTPATIENT
Start: 2017-09-26 | End: 2017-09-27

## 2017-09-26 RX ORDER — METOPROLOL TARTRATE 25 MG/1
25 TABLET, FILM COATED ORAL 2 TIMES DAILY
Status: DISCONTINUED | OUTPATIENT
Start: 2017-09-26 | End: 2017-09-26

## 2017-09-26 RX ADMIN — HYDRALAZINE HYDROCHLORIDE 25 MG: 25 TABLET, FILM COATED ORAL at 05:09

## 2017-09-26 RX ADMIN — LISINOPRIL 10 MG: 10 TABLET ORAL at 08:09

## 2017-09-26 RX ADMIN — HYDRALAZINE HYDROCHLORIDE 25 MG: 25 TABLET, FILM COATED ORAL at 12:09

## 2017-09-26 RX ADMIN — ASPIRIN 81 MG CHEWABLE TABLET 81 MG: 81 TABLET CHEWABLE at 08:09

## 2017-09-26 RX ADMIN — ATORVASTATIN CALCIUM 10 MG: 10 TABLET, FILM COATED ORAL at 08:09

## 2017-09-26 RX ADMIN — IPRATROPIUM BROMIDE AND ALBUTEROL SULFATE 3 ML: .5; 3 SOLUTION RESPIRATORY (INHALATION) at 11:09

## 2017-09-26 RX ADMIN — POLYETHYLENE GLYCOL 3350 17 G: 17 POWDER, FOR SOLUTION ORAL at 08:09

## 2017-09-26 RX ADMIN — HYDRALAZINE HYDROCHLORIDE 25 MG: 25 TABLET, FILM COATED ORAL at 11:09

## 2017-09-26 RX ADMIN — ENOXAPARIN SODIUM 40 MG: 100 INJECTION SUBCUTANEOUS at 06:09

## 2017-09-26 RX ADMIN — IPRATROPIUM BROMIDE AND ALBUTEROL SULFATE 3 ML: .5; 3 SOLUTION RESPIRATORY (INHALATION) at 07:09

## 2017-09-26 RX ADMIN — AMLODIPINE BESYLATE 10 MG: 10 TABLET ORAL at 08:09

## 2017-09-26 RX ADMIN — IPRATROPIUM BROMIDE AND ALBUTEROL SULFATE 3 ML: .5; 3 SOLUTION RESPIRATORY (INHALATION) at 04:09

## 2017-09-26 RX ADMIN — CARVEDILOL 6.25 MG: 6.25 TABLET, FILM COATED ORAL at 10:09

## 2017-09-26 RX ADMIN — FLUOXETINE 20 MG: 20 CAPSULE ORAL at 08:09

## 2017-09-26 RX ADMIN — DEXTROSE MONOHYDRATE 12.5 G: 25 INJECTION, SOLUTION INTRAVENOUS at 05:09

## 2017-09-26 NOTE — PROGRESS NOTES
Dr Tejada is on the unit, informed about the daughters concern about the pts left arm pain and also she wants the family medical leave form.

## 2017-09-26 NOTE — PROGRESS NOTES
[facility]  Adult Nutrition  Consult Note    SUMMARY     Recommendations    1. Change current TF regimen to Isosource 1.5 bolus 300 mL - 5x/day.    - Would provide 2250 calories, 102 g of protein, and 1146 mL fluid.  2. RD to monitor & follow-up.    Goals: Pt to meet >85% of EEN/EPN  Nutrition Goal Status: goal not met  Communication of RD Recs: reviewed with RN    Reason for Assessment    Reason for Assessment: RD follow-up  Diagnosis:  (acute encephalopathy)  Relevent Medical History: DM2, CKD3, CAD s/p CABG, HTN, cerebrovascular disease s/p watershed infarct, s/p trach/PEG placement   Interdisciplinary Rounds: did not attend     General Information Comments: Pt non-verbal. Tolerating current TF regimen via PEG.  Nutrition Discharge Planning: Adequate nutrition via TF.    Nutrition Prescription Ordered    Current Diet Order: NPO     Current Nutrition Support Formula Ordered: Isosource 1.5  Current Nutrition Support Rate Ordered: 200 (ml)  Current Nutrition Support Frequency Ordered: mL - 4x/day (q 6 hours)      Evaluation of Received Nutrients/Fluid Intake    Enteral Calories (kcal): 1200  Enteral Protein (gm): 54  Enteral (Free Water) Fluid (mL): 611     Energy Calories Required: not meeting needs  % Kcal Needs: 58     Protein Required: not meeting needs  % Protein Needs: 45     Fluid Required: not meeting needs     Tolerance: tolerating    Nutrition/Diet History    Typical Food/Fluid Intake: JOSE LUIS     Food Preferences: No cultural or Sabianism needs identified at this time.     Factors Affecting Nutritional Intake: NPO, impaired cognitive status/motor control    Labs/Tests/Procedures/Meds    Pertinent Labs Reviewed: reviewed  Pertinent Labs Comments: A1C 7.2, Gluc   Pertinent Medications Reviewed: reviewed, pertinent    Physical Findings    Overall Physical Appearance: overweight  Tubes: gastrostomy tube  Oral/Mouth Cavity: tooth/teeth missing  Skin: incision    Anthropometrics    Temp:  "98.5 °F (36.9 °C)     Height: 5' 6" (167.6 cm)  Weight Method: Bed Scale  Weight: 132.5 kg (292 lb 1.8 oz)     Ideal Body Weight (IBW), Female: 130 lb     % Ideal Body Weight, Female (lb): 224.7 lb  BMI (Calculated): 47.2  BMI Grade: greater than 40 - morbid obesity  Weight Loss: unintentional  Usual Body Weight (UBW), kg: (!) 159 kg (per cahrt review 12/2016)  Weight Change Amount: 58 lb 6.8 oz  % Usual Body Weight: 83.48  % Weight Change From Usual Weight: 16.52 %    Estimated/Assessed Needs    Weight Used For Calorie Calculations: 132.5 kg (292 lb 1.8 oz)      Energy Calorie Requirements (kcal): 2065 kcal/day  Energy Need Method: McKinley-St Jeor (x 1.1)     Weight Used For Protein Calculations: 132.5 kg (292 lb 1.8 oz)  Protein Requirements: 106-133 g/day (0.8-1.0 g/kg)    Fluid Requirements (mL): 1 mL/kcal or per MD    Assessment and Plan    Inadequate energy intake r/t current TF regimen AEB meeting <50% EEN, EPN.  Status: New    Monitor and Evaluation    Food and Nutrient Intake: enteral nutrition intake  Food and Nutrient Adminstration: enteral and parenteral nutrition administration     Physical Activity and Function: nutrition-related ADLs and IADLs  Anthropometric Measurements: weight, weight change  Biochemical Data, Medical Tests and Procedures: electrolyte and renal panel, gastrointestinal profile, glucose/endocrine profile, inflammatory profile  Nutrition-Focused Physical Findings: overall appearance    Nutrition Risk    Level of Risk:  (2x/week)    Nutrition Follow-Up    RD Follow-up?: Yes    "

## 2017-09-26 NOTE — PLAN OF CARE
Problem: Occupational Therapy Goal  Goal: Occupational Therapy Goal  Goals to be met by: 09/30/17     Patient will increase functional independence with ADLs by performing:    UE Dressing with Moderate Assistance.  LE Dressing with Maximum Assistance.  Grooming while seated with Set-up Assistance.  Toileting from bedside commode with Maximum Assistance for hygiene and clothing management.   Toilet transfer to bedside commode with Maximum Assistance.  Upper extremity exercise program x10 reps per handout, with assistance as needed.  Pt will sit EOB for 10 minutes with SBA during therapeutic activity.      Outcome: Ongoing (interventions implemented as appropriate)  Goals remain appropriate. Cont POC.    MARCO ANTONIO Pretty  9/26/2017  Pager: [phone]

## 2017-09-26 NOTE — PLAN OF CARE
CM spoke with patient's daughter, Shayna. Daughter is concerned that there is a problem with patient's left hand stating that when they try and move it, patient has tears in her eyes. Requested MD delatorre. CM spoke with Alena Tejada and Citlaly, they will assess patient's hand. Daughter also asked about [place] paperwork, stating that she gave it to Dr Henao. CM unable to locate it at this time but told daughter that if she will have her work fax it to this CM, I will take it to Medical Records. Fax number given. Daughter gave choice of [facility] to LUCIA Jackson; plan for discharge there tomorrow. Will follow.

## 2017-09-26 NOTE — NURSING
Pt remained stable throughout the night, nonverbal indicators of pain absent, TF 200ml Q6 administered with H2O bolus. Pressure was elevated, hydralazine given, and new BP resulted. Will continue to monitor pt

## 2017-09-26 NOTE — PLAN OF CARE
Problem: Patient Care Overview  Goal: Plan of Care Review  Outcome: Ongoing (interventions implemented as appropriate)  Plan of care continued, pt is alert and non verbal. No signs of pain or distress noted. Patient on trach/collar and suctioned prn using aseptic technique. Tube feeding and water bolus given. Safety precautions maintained.

## 2017-09-26 NOTE — PLAN OF CARE
LUCIA spoke to Mary with [facility] ([phone]/  Mary requested updated respiratory info.  SW sent requested info through .        SW called pt's dtr, Yao, ([phone]) and asked if she's chosen a SNF for her mom.  Yao stated that she will visit [facility] later in the day and call SW with feedback.  LUCIA explained that it is important to choose a SNF soon, so that pt is not exposed to more ill nesses.    Renetta Jackson [facility]     [phone]  Critical Care (MICU)

## 2017-09-26 NOTE — PT/OT/SLP PROGRESS
Occupational Therapy  Treatment    Nisa Frank   MRN: [id number]   Admitting Diagnosis: Acute encephalopathy    OT Date of Treatment: 09/26/17   OT Start Time: 1535  OT Stop Time: 1600  OT Total Time (min): 25 min    Billable Minutes:  Therapeutic Activity 25 minutes    General Precautions: Standard, aspiration, fall    Subjective:  Communicated with RN prior to session.    Pain/Comfort  Pain Rating 1:  (did not verbalize; did not appear to be in pain)    Objective:  Patient found with:  (all lines intact), pt did not verbalize, but appeared agreeable with therapy     Functional Mobility:  Bed Mobility:  Scooting/Bridging: Maximum Assistance, With assist of 2  Supine to Sit: Maximum Assistance  Sit to Supine: Maximum Assistance    Transfers:  Sit <> Stand Assistance: Total Assistance (attempted to stand; no effort through LEs, with posterior lean at trunk)  Sit <> Stand Assistive Device: No Assistive Device    Functional Ambulation: did not occur    Activities of Daily Living:    LE Dressing Level of Assistance: Total assistance (OT provided pt with socks; pt began preparing the sock to don, but was unable to lift legs, or bend forward and required Total A for the task (OT unable to position pt safely to allow pt to perform task))    Balance:   Static Sit: FAIR+: Able to take MINIMAL challenges from all directions  Dynamic Sit: FAIR: Cannot move trunk without losing balance    Therapeutic Activities and Exercises:  Pt ed re OT role and POC. Pt performed supine to sit with Max A. Pt sat EOB with CGA, then with SBA and RUE on side rail. Pt sat for 5 minutes, then performed leg extensions (pt with cognitive deficit, only performed 1 rep RLE, and 5 reps LLE; then pt would not continue despite verbal and tactile cues). Pt rested, then performed AAROM marches x5 reps each leg, but began losing her balance EOB requiring OT to assist with balance CGA. Pt required Total A to don socks (but was able to kick legs out to  "assist OT). Pt required Max A to return to supine and Max Ax2 to scoot to HOB.    AM-PAC 6 CLICK ADL   How much help from another person does this patient currently need?   1 = Unable, Total/Dependent Assistance  2 = A lot, Maximum/Moderate Assistance  3 = A little, Minimum/Contact Guard/Supervision  4 = None, Modified [place]/Independent    Putting on and taking off regular lower body clothing? : 1  Bathing (including washing, rinsing, drying)?: 1  Toileting, which includes using toilet, bedpan, or urinal? : 1  Putting on and taking off regular upper body clothing?: 2  Taking care of personal grooming such as brushing teeth?: 2  Eating meals?: 1  Total Score: 8     AM-PAC Raw Score CMS "G-Code Modifier Level of Impairment Assistance   6 % Total / Unable   7 - 8 CM 80 - 100% Maximal Assist   9-13 CL 60 - 80% Moderate Assist   14 - 19 CK 40 - 60% Moderate Assist   20 - 22 CJ 20 - 40% Minimal Assist   23 CI 1-20% SBA / CGA   24 CH 0% Independent/ Mod I       Patient left HOB elevated with all lines intact, call button in reach, RN notified and RT present    ASSESSMENT:  Nisa Frank is a 67 y.o. female with a medical diagnosis of Acute encephalopathy and presents with the impairments listed below. Pt is nonverbal, but participates well with verbal and tactile cues. Pt requiring Max A for bed mobility and Total A for self care 2/2 weakness and cognitive deficits. Pt would benefit from cont OT to improve performance of self care tasks, as well as to improve overall strength for transfers.    Rehab identified problem list/impairments: Rehab identified problem list/impairments: weakness, impaired endurance, impaired self care skills, impaired functional mobilty, impaired balance, impaired cognition, decreased lower extremity function    Rehab potential is fair.    Activity tolerance: Fair    Discharge recommendations: Discharge Facility/Level Of Care Needs: nursing facility, skilled     Barriers to " discharge: Barriers to Discharge: Inaccessible home environment, Decreased caregiver support    Equipment recommendations:  (TBD closer to d/c or at next level of care)     GOALS:    Occupational Therapy Goals        Problem: Occupational Therapy Goal    Goal Priority Disciplines Outcome Interventions   Occupational Therapy Goal     OT, PT/OT Ongoing (interventions implemented as appropriate)    Description:  Goals to be met by: 09/30/17     Patient will increase functional independence with ADLs by performing:    UE Dressing with Moderate Assistance.  LE Dressing with Maximum Assistance.  Grooming while seated with Set-up Assistance.  Toileting from bedside commode with Maximum Assistance for hygiene and clothing management.   Toilet transfer to bedside commode with Maximum Assistance.  Upper extremity exercise program x10 reps per handout, with assistance as needed.  Pt will sit EOB for 10 minutes with SBA during therapeutic activity.                       Plan:  Patient to be seen 5 x/week to address the above listed problems via self-care/home management, therapeutic activities, therapeutic exercises, neuromuscular re-education, cognitive retraining  Plan of Care expires: 10/14/17  Plan of Care reviewed with: patient    MARCO ANTONIO Pretty  9/26/2017  Pager: [phone]

## 2017-09-26 NOTE — PT/OT/SLP PROGRESS
Physical Therapy      Nisa Frank  MRN: [id number]    Patient not seen today secondary to pt being seen by medical team fist attempt, and in nursing care second attempt. Will follow-up at next scheduled visit per PT POC.    Mariela Akers, ECTOR

## 2017-09-26 NOTE — SUBJECTIVE & OBJECTIVE
Interval History:     No complaints overnight.     Review of Systems   Unable to perform ROS: Patient nonverbal   Constitutional: Negative for chills and fever.   Respiratory: Negative for shortness of breath.    Cardiovascular: Negative for chest pain.   Neurological: Negative for syncope and facial asymmetry.     Objective:     Vital Signs (Most Recent):  Temp: 98.8 °F (37.1 °C) (09/26/17 1149)  Pulse: 68 (09/26/17 1149)  Resp: 18 (09/26/17 1149)  BP: (!) 170/77 (09/26/17 1149)  SpO2: 96 % (09/26/17 1149) Vital Signs (24h Range):  Temp:  [97.9 °F (36.6 °C)-99.2 °F (37.3 °C)] 98.8 °F (37.1 °C)  Pulse:  [65-79] 68  Resp:  [18-22] 18  SpO2:  [94 %-99 %] 96 %  BP: (150-183)/(67-82) 170/77     Weight: 132.5 kg (292 lb 1.8 oz)  Body mass index is 47.15 kg/m².    Intake/Output Summary (Last 24 hours) at 09/26/17 1316  Last data filed at 09/26/17 1241   Gross per 24 hour   Intake             1050 ml   Output              500 ml   Net              550 ml      Physical Exam   Constitutional: She appears well-developed and well-nourished. No distress.   HENT:   Head: Normocephalic and atraumatic.   Eyes: Conjunctivae are normal. Right eye exhibits no discharge. Left eye exhibits no discharge.   Pupils responsive to light,   minimal tracking noted today, change from previous exam    Cardiovascular: Normal rate, regular rhythm and normal heart sounds.    Pulmonary/Chest: Effort normal and breath sounds normal.   Abdominal: Soft. Bowel sounds are normal. She exhibits no distension. There is no tenderness.   Musculoskeletal: She exhibits no edema.   Neurological: She has normal reflexes. GCS eye subscore is 2. GCS verbal subscore is 1. GCS motor subscore is 5.   Patient awake. No insight. Not communicating.   Following very basic simple commands but slowly. She squeezed by hand on exam.      Skin: Skin is warm and dry. No erythema.     Significant Labs:   BMP:   Recent Labs  Lab 09/25/17  0431   *      K 3.8   CL  106   CO2 28   BUN 9   CREATININE 0.6   CALCIUM 8.3*   MG 1.8     CBC:   Recent Labs  Lab 09/25/17  0431   WBC 8.31   HGB 9.0*   HCT 28.6*

## 2017-09-27 LAB
ALBUMIN SERPL BCP-MCNC: 2 G/DL
ALP SERPL-CCNC: 101 U/L
ALT SERPL W/O P-5'-P-CCNC: 9 U/L
ANION GAP SERPL CALC-SCNC: 8 MMOL/L
ANISOCYTOSIS BLD QL SMEAR: SLIGHT
AST SERPL-CCNC: 13 U/L
BASOPHILS # BLD AUTO: ABNORMAL K/UL
BASOPHILS NFR BLD: 0 %
BILIRUB SERPL-MCNC: 0.6 MG/DL
BUN SERPL-MCNC: 9 MG/DL
CALCIUM SERPL-MCNC: 8.4 MG/DL
CHLORIDE SERPL-SCNC: 104 MMOL/L
CO2 SERPL-SCNC: 26 MMOL/L
CREAT SERPL-MCNC: 0.7 MG/DL
DIFFERENTIAL METHOD: ABNORMAL
EOSINOPHIL # BLD AUTO: ABNORMAL K/UL
EOSINOPHIL NFR BLD: 0 %
ERYTHROCYTE [DISTWIDTH] IN BLOOD BY AUTOMATED COUNT: 16 %
EST. GFR  (AFRICAN AMERICAN): >60 ML/MIN/1.73 M^2
EST. GFR  (NON AFRICAN AMERICAN): >60 ML/MIN/1.73 M^2
GLUCOSE SERPL-MCNC: 113 MG/DL
HCT VFR BLD AUTO: 28.9 %
HGB BLD-MCNC: 9.5 G/DL
LYMPHOCYTES # BLD AUTO: ABNORMAL K/UL
LYMPHOCYTES NFR BLD: 28 %
MAGNESIUM SERPL-MCNC: 1.6 MG/DL
MCH RBC QN AUTO: 26.3 PG
MCHC RBC AUTO-ENTMCNC: 32.9 G/DL
MCV RBC AUTO: 80 FL
METAMYELOCYTES NFR BLD MANUAL: 1 %
MONOCYTES # BLD AUTO: ABNORMAL K/UL
MONOCYTES NFR BLD: 10 %
MYELOCYTES NFR BLD MANUAL: 1 %
NEUTROPHILS NFR BLD: 60 %
PHOSPHATE SERPL-MCNC: 2.9 MG/DL
PLATELET # BLD AUTO: 235 K/UL
PLATELET BLD QL SMEAR: ABNORMAL
PMV BLD AUTO: 10.3 FL
POCT GLUCOSE: 117 MG/DL (ref 70–110)
POCT GLUCOSE: 119 MG/DL (ref 70–110)
POCT GLUCOSE: 121 MG/DL (ref 70–110)
POCT GLUCOSE: 144 MG/DL (ref 70–110)
POCT GLUCOSE: 152 MG/DL (ref 70–110)
POLYCHROMASIA BLD QL SMEAR: ABNORMAL
POTASSIUM SERPL-SCNC: 5.4 MMOL/L
PROT SERPL-MCNC: 6.6 G/DL
RBC # BLD AUTO: 3.61 M/UL
SODIUM SERPL-SCNC: 138 MMOL/L
WBC # BLD AUTO: 12.81 K/UL

## 2017-09-27 PROCEDURE — 94640 AIRWAY INHALATION TREATMENT: CPT

## 2017-09-27 PROCEDURE — 25000003 PHARM REV CODE 250: Performed by: HOSPITALIST

## 2017-09-27 PROCEDURE — 25000242 PHARM REV CODE 250 ALT 637 W/ HCPCS: Performed by: STUDENT IN AN ORGANIZED HEALTH CARE EDUCATION/TRAINING PROGRAM

## 2017-09-27 PROCEDURE — 85007 BL SMEAR W/DIFF WBC COUNT: CPT

## 2017-09-27 PROCEDURE — 25000003 PHARM REV CODE 250: Performed by: STUDENT IN AN ORGANIZED HEALTH CARE EDUCATION/TRAINING PROGRAM

## 2017-09-27 PROCEDURE — 80053 COMPREHEN METABOLIC PANEL: CPT

## 2017-09-27 PROCEDURE — 99900026 HC AIRWAY MAINTENANCE (STAT)

## 2017-09-27 PROCEDURE — 25000003 PHARM REV CODE 250: Performed by: INTERNAL MEDICINE

## 2017-09-27 PROCEDURE — 83735 ASSAY OF MAGNESIUM: CPT

## 2017-09-27 PROCEDURE — 94761 N-INVAS EAR/PLS OXIMETRY MLT: CPT

## 2017-09-27 PROCEDURE — 85027 COMPLETE CBC AUTOMATED: CPT

## 2017-09-27 PROCEDURE — 11000001 HC ACUTE MED/SURG PRIVATE ROOM

## 2017-09-27 PROCEDURE — 36415 COLL VENOUS BLD VENIPUNCTURE: CPT

## 2017-09-27 PROCEDURE — 99231 SBSQ HOSP IP/OBS SF/LOW 25: CPT | Mod: ,,, | Performed by: HOSPITALIST

## 2017-09-27 PROCEDURE — 27000221 HC OXYGEN, UP TO 24 HOURS

## 2017-09-27 PROCEDURE — 63600175 PHARM REV CODE 636 W HCPCS: Performed by: HOSPITALIST

## 2017-09-27 PROCEDURE — 84100 ASSAY OF PHOSPHORUS: CPT

## 2017-09-27 RX ORDER — HYDRALAZINE HYDROCHLORIDE 25 MG/1
25 TABLET, FILM COATED ORAL EVERY 6 HOURS
Qty: 120 TABLET | Refills: 11 | Status: ON HOLD
Start: 2017-09-27 | End: 2017-11-08 | Stop reason: HOSPADM

## 2017-09-27 RX ORDER — CARVEDILOL 12.5 MG/1
12.5 TABLET ORAL 2 TIMES DAILY
Qty: 60 TABLET | Refills: 11
Start: 2017-09-27 | End: 2018-09-27

## 2017-09-27 RX ORDER — CARVEDILOL 6.25 MG/1
6.25 TABLET ORAL 2 TIMES DAILY
Qty: 60 TABLET | Refills: 11
Start: 2017-09-27 | End: 2017-09-27 | Stop reason: HOSPADM

## 2017-09-27 RX ORDER — CARVEDILOL 12.5 MG/1
12.5 TABLET ORAL 2 TIMES DAILY
Status: DISCONTINUED | OUTPATIENT
Start: 2017-09-27 | End: 2017-09-28 | Stop reason: HOSPADM

## 2017-09-27 RX ADMIN — CARVEDILOL 6.25 MG: 6.25 TABLET, FILM COATED ORAL at 09:09

## 2017-09-27 RX ADMIN — CARVEDILOL 12.5 MG: 12.5 TABLET, FILM COATED ORAL at 09:09

## 2017-09-27 RX ADMIN — ATORVASTATIN CALCIUM 10 MG: 10 TABLET, FILM COATED ORAL at 09:09

## 2017-09-27 RX ADMIN — IPRATROPIUM BROMIDE AND ALBUTEROL SULFATE 3 ML: .5; 3 SOLUTION RESPIRATORY (INHALATION) at 11:09

## 2017-09-27 RX ADMIN — POLYETHYLENE GLYCOL 3350 17 G: 17 POWDER, FOR SOLUTION ORAL at 09:09

## 2017-09-27 RX ADMIN — ASPIRIN 81 MG CHEWABLE TABLET 81 MG: 81 TABLET CHEWABLE at 09:09

## 2017-09-27 RX ADMIN — FLUOXETINE 20 MG: 20 CAPSULE ORAL at 10:09

## 2017-09-27 RX ADMIN — HYDRALAZINE HYDROCHLORIDE 25 MG: 25 TABLET, FILM COATED ORAL at 06:09

## 2017-09-27 RX ADMIN — AMLODIPINE BESYLATE 10 MG: 10 TABLET ORAL at 09:09

## 2017-09-27 RX ADMIN — HYDRALAZINE HYDROCHLORIDE 25 MG: 25 TABLET, FILM COATED ORAL at 01:09

## 2017-09-27 RX ADMIN — HYDRALAZINE HYDROCHLORIDE 25 MG: 25 TABLET, FILM COATED ORAL at 05:09

## 2017-09-27 RX ADMIN — LISINOPRIL 10 MG: 10 TABLET ORAL at 09:09

## 2017-09-27 RX ADMIN — ENOXAPARIN SODIUM 40 MG: 100 INJECTION SUBCUTANEOUS at 05:09

## 2017-09-27 RX ADMIN — IPRATROPIUM BROMIDE AND ALBUTEROL SULFATE 3 ML: .5; 3 SOLUTION RESPIRATORY (INHALATION) at 08:09

## 2017-09-27 RX ADMIN — IPRATROPIUM BROMIDE AND ALBUTEROL SULFATE 3 ML: .5; 3 SOLUTION RESPIRATORY (INHALATION) at 03:09

## 2017-09-27 NOTE — SUBJECTIVE & OBJECTIVE
Interval History:     No acute events overnight.     Review of Systems   Unable to perform ROS: Patient nonverbal   Constitutional: Negative for chills and fever.   Respiratory: Negative for shortness of breath.    Cardiovascular: Negative for chest pain.   Neurological: Negative for syncope and facial asymmetry.     Objective:     Vital Signs (Most Recent):  Temp: 97.9 °F (36.6 °C) (09/27/17 1539)  Pulse: 78 (09/27/17 1539)  Resp: 19 (09/27/17 1539)  BP: (!) 150/72 (09/27/17 1539)  SpO2: 96 % (09/27/17 1539) Vital Signs (24h Range):  Temp:  [97.7 °F (36.5 °C)-99.7 °F (37.6 °C)] 97.9 °F (36.6 °C)  Pulse:  [71-95] 78  Resp:  [16-20] 19  SpO2:  [94 %-99 %] 96 %  BP: (133-178)/(63-81) 150/72     Weight: 132.5 kg (292 lb 1.8 oz)  Body mass index is 47.15 kg/m².    Intake/Output Summary (Last 24 hours) at 09/27/17 1544  Last data filed at 09/26/17 1809   Gross per 24 hour   Intake              350 ml   Output                0 ml   Net              350 ml      Physical Exam   Constitutional: She appears well-developed and well-nourished. No distress.   HENT:   Head: Normocephalic and atraumatic.   Eyes: Conjunctivae are normal. Right eye exhibits no discharge. Left eye exhibits no discharge.   Pupils responsive to light,   minimal tracking noted today, change from previous exam    Cardiovascular: Normal rate, regular rhythm and normal heart sounds.    Pulmonary/Chest: Effort normal and breath sounds normal.   Abdominal: Soft. Bowel sounds are normal. She exhibits no distension. There is no tenderness.   Musculoskeletal: She exhibits no edema.   Neurological: She has normal reflexes. GCS eye subscore is 2. GCS verbal subscore is 1. GCS motor subscore is 5.   Patient awake. No insight. Not communicating.   Following very basic simple commands but slowly. She squeezed by hand on exam.      Skin: Skin is warm and dry. No erythema.     Significant Labs:   BMP:   Recent Labs  Lab 09/27/17  0627   *      K 5.4*   CL  104   CO2 26   BUN 9   CREATININE 0.7   CALCIUM 8.4*   MG 1.6     CBC:   Recent Labs  Lab 09/27/17  0627   WBC 12.81*   HGB 9.5*   HCT 28.9*        Significant Imaging: XRAY Hand: Negative for fractures of acute changes

## 2017-09-27 NOTE — PROGRESS NOTES
[facility] Hospital Medicine  Progress Note    Patient Name: Nisa Frank  MRN: [id number]  Patient Class: IP- Inpatient   Admission Date: 9/13/2017  Length of Stay: 14 days  Attending Physician: Lynsey Tejada MD  Primary Care Provider: Atilio Downs MD    [place] Medicine Team: [facility] MED 3 Rayo Boucher MD    Subjective:     Principal Problem:Acute encephalopathy    HPI:  Ms Frank 66 year old female with diabetes mellitus type 2, CKD stage 3, CAD s/p CABG, essential hypertension, cerebrovascular disease s/p bi-hemispheric watershed infarcts and trach/PEG status who was brought in via EMS from [facility] for acute respiratory distress presented to [facility] 8/8 with acute respiratory failure with hypoxia likely due to mucous plug. Patient was admitted to ICU to wean off MV as tolerated and for placement to [facility] where she was transferred to [facility] on 8/14 in stable condition.  She pulled out her trach on 9/4 and went to [facility].  She was treated for an ESBL E. Coli UTI with Meropenem. The family has been very unhappy with the care that the patient has received.  She was medically stable for transfer to a SNF yesterday, however overnight she developed a new WBC 26.  She was found to have a RML and RLL infiltrate and was started on Vanc and Zosyn.  She also developed a new KATHI on top of her CKD (0.6 to 1.8).  Family is requesting transfer to [facility].    Spoke with daughter on the phone.  Per family patient was previously alert and oriented to person and place.  She was conversing and able to participate in therapy.  This changed shortly after trach removal and replacement on 9/4.  Family reports some complication during trach replacement resulting in overnight ICU care.  After patient was stepped down they report she stopped talking and would not participate with therapy.  The only response they could get from the  patient was eye tracking and intermittent hand squeezes.  Family feels that patient may have had a repeat stroke and was requesting MRI.   Spoke with Dagoberto Melo nurse who reports that patient has been near obtunded for as long as she has had her (past couple of days).  She reported recent WBC jump and associated fever followed by broad spectrum abx.  In addiction notes concern for ileus given high residuals from NG tube feeds.  Does not high residuals resolved once tube was flushed.         Per nurse medications prior to concern for ileus.  Vanc 1750mg , Q24, Zosyn 4.5 Q 12, ASA 81, Clonidine 0.1 patch weekly, Lipitor 10, Colace, Lovenox 40, Protonix 40, Hydralazine 100 Q8, Keppra 500 BID, Lisniopril 40 PO, 25 Metoprolol XL, Procardia 60 mg Daily, Risperdal 25 QHS.           Hospital Course:  9/14: Patients mental status unchanged from yesterday. Labs showing significant improvement. Will continue to treat for VAP. Schedule for CT scan of abdomen.   9/15: Patients mental status improved, appears more awake/alert. But no verbal communication yet. Following commands. Continue to treat to VAP. MRI scheduled. PT/OT   9:16: Patient more awake and alert. Still non verbal. Continuing to treat VAP. PT/OT   9/17: Patient awake and alert. Still non-verbal. Had a long conversation w/ family regarding patient status; will get records/procedure report.   9/18: patient is awake and alert but non-verbal and doesn't follow command. Surgery was consulted for ileus.   9/19: Patient is awake. But non-verbal and doesn;t follow commands. NG tube was pulled out last night. Surgery consulted for PEG tube.   9/20: Patient scheduled for PEG tube placement today. Abx course completed, Na corrected, BP wnl. Patient's family contacted regarding NH placement, d/c pending family's discission.   9/21: PEG tube in place. Consulted dietary for nutrition. Waiting on family for NH placement.   9/22-23: Resumed normal TF via boluses- giving HTN regimen  via PEG. Dispo pending SNF acceptance.   9/24-9/26: Dispo pending SNF acceptance. Spoke with daughter today, she is still reviewing the facilities.   9/27: Patient's sister reports hand pain on the left side. There is no pain on palpation or limits in range of motion. An Xray of wrist was obtained and negative for fractures.     Interval History:     No acute events overnight.     Review of Systems   Unable to perform ROS: Patient nonverbal   Constitutional: Negative for chills and fever.   Respiratory: Negative for shortness of breath.    Cardiovascular: Negative for chest pain.   Neurological: Negative for syncope and facial asymmetry.     Objective:     Vital Signs (Most Recent):  Temp: 97.9 °F (36.6 °C) (09/27/17 1539)  Pulse: 78 (09/27/17 1539)  Resp: 19 (09/27/17 1539)  BP: (!) 150/72 (09/27/17 1539)  SpO2: 96 % (09/27/17 1539) Vital Signs (24h Range):  Temp:  [97.7 °F (36.5 °C)-99.7 °F (37.6 °C)] 97.9 °F (36.6 °C)  Pulse:  [71-95] 78  Resp:  [16-20] 19  SpO2:  [94 %-99 %] 96 %  BP: (133-178)/(63-81) 150/72     Weight: 132.5 kg (292 lb 1.8 oz)  Body mass index is 47.15 kg/m².    Intake/Output Summary (Last 24 hours) at 09/27/17 1544  Last data filed at 09/26/17 1809   Gross per 24 hour   Intake              350 ml   Output                0 ml   Net              350 ml      Physical Exam   Constitutional: She appears well-developed and well-nourished. No distress.   HENT:   Head: Normocephalic and atraumatic.   Eyes: Conjunctivae are normal. Right eye exhibits no discharge. Left eye exhibits no discharge.   Pupils responsive to light,   minimal tracking noted today, change from previous exam    Cardiovascular: Normal rate, regular rhythm and normal heart sounds.    Pulmonary/Chest: Effort normal and breath sounds normal.   Abdominal: Soft. Bowel sounds are normal. She exhibits no distension. There is no tenderness.   Musculoskeletal: She exhibits no edema.   Neurological: She has normal reflexes. GCS eye  subscore is 2. GCS verbal subscore is 1. GCS motor subscore is 5.   Patient awake. No insight. Not communicating.   Following very basic simple commands but slowly. She squeezed by hand on exam.      Skin: Skin is warm and dry. No erythema.     Significant Labs:   BMP:   Recent Labs  Lab 09/27/17  0627   *      K 5.4*      CO2 26   BUN 9   CREATININE 0.7   CALCIUM 8.4*   MG 1.6     CBC:   Recent Labs  Lab 09/27/17 0627   WBC 12.81*   HGB 9.5*   HCT 28.9*        Significant Imaging: XRAY Hand: Negative for fractures of acute changes    Assessment/Plan:      * Acute encephalopathy      Concern for Anoxic brain injury during trach removal/replacement, CVA, Seizure, Worsening Sepsis. Urine culture no growth.  MRI no acute hemorrhagic pathology. Patient is having BMs    - mentation waxes and wanes.    - s/p PEG tube placement   - monitor electrolytes  - control BP              KATHI (acute kidney injury)    Improving since admission.   - monitor renal function   - Cr of 0.6 this AM.  - Resolved.           Respiratory failure    Concern for possible VAP  - Decreased O2 5L; 99%, will continue to titrate   - completed VAP treatment   - No Leukocytosis this AM.         Tracheostomy status    - Trach care  - Patient continues to have cough but afebrile.   - Q2 trach suction.            Respiratory insufficiency              Dysphagia    Per family was tolerating diet prior to trach replacement on 9/4   -viable PEG placed this admission          Chronic bilateral cerebral infarction in watershed distribution              Hypertensive encephalopathy              Hyperlipidemia    -home statin          Morbid obesity with BMI of 50.0-59.9, adult    -risk factor. Lovenox 40mg BID          S/P CABG (coronary artery bypass graft)    - Continue ASA, unable to swallow            Type 2 diabetes mellitus, uncontrolled    - Per OSH records not on insulin   - Her HbA1c  8/9/17 is 7.2   - Sliding scale            Essential hypertension    HX of significant HTN. Per OSH on hydralazine 100 Q8 PO, Linsiopril 40 PO, Metoprolol XL 25 BID, Nifedipine 60mg. She has Clonidine 0.1 Patch Q week, a new one was placed today 9/18/17.   - her BP is poorly controlled. Her SBP is around 220-190.   - Her heart rate runs low. Labetalol is not safe to be used at this point.   -resuming home regimen as BP tolerates via PEG  - added PO hydralazine 25mg TID   - norvasc 10mg QS             VTE Risk Mitigation         Ordered     enoxaparin injection 40 mg  Daily     Route:  Subcutaneous        09/18/17 1106        Disposition: Patient accepted into SNF. Pending dc tomorrow.     Rayo Boucher MD (PGY-1)   Department of Hospital Medicine   [facility]

## 2017-09-27 NOTE — PLAN OF CARE
[facility]    FACILITY TRANSFER ORDERS      Patient Name: Nisa Frank  YOB: 1950    PCP: Atilio Downs MD   [facility]  PCP Phone Number: [phone]  PCP Fax: [phone]    Encounter Date: 09/27/2017    Admit to: [facility]    Vital Signs:  Routine    Diagnoses:   Active Hospital Problems    Diagnosis  POA    *Acute encephalopathy [G93.40]  Yes     Chronic    KATHI (acute kidney injury) [N17.9]  Yes    Respiratory failure [J96.90]  Yes    Tracheostomy status [Z93.0]  Not Applicable    Respiratory insufficiency [R06.89]  Yes    Dysphagia [R13.10]  Yes    Chronic bilateral cerebral infarction in watershed distribution [I69.30]  Not Applicable    Hypertensive encephalopathy [I67.4]  Yes    Hyperlipidemia [E78.5]  Yes    Morbid obesity with BMI of 50.0-59.9, adult [E66.01, Z68.43]  Not Applicable    S/P CABG (coronary artery bypass graft) [Z95.1]  Not Applicable     2005      Type 2 diabetes mellitus, uncontrolled [E11.65]  Yes    Essential hypertension [I10]  Yes      Resolved Hospital Problems    Diagnosis Date Resolved POA    VAP (ventilator-associated pneumonia) [J95.851] 09/22/2017 Yes    Ileus [K56.7] 09/22/2017 Yes     Chronic    Leukocytosis [D72.829] 09/18/2017 Yes    Hypertensive emergency [I16.1] 09/22/2017 Yes       Allergies:  Review of patient's allergies indicates:   Allergen Reactions    Latex, natural rubber        Diet: tube feedings: Dlhoa674ie, 5 times daily of Isosource 1.5 or Per facility protocol    Activities: Activity as tolerated    Nursing: Per facility protocol    Labs: Per facility protocol     CONSULTS:    Physical Therapy to evaluate and treat. , Occupational Therapy to evaluate and treat. and  to evaluate for community resources/long-range planning.    MISCELLANEOUS CARE:  PEG Care: Clean site every 24 hours.  and Routine Skin for Bedridden Patients: Apply moisture  barrier cream to all skin folds and wet areas in perineal area daily and after baths and all bowel movements.    WOUND CARE ORDERS  None    Medications: Review discharge medications with patient and family and provide education.      Current Discharge Medication List      CONTINUE these medications which have CHANGED    Details   carvedilol (COREG) 12.5 MG tablet Take 1 tablet (12.5 mg total) by mouth 2 (two) times daily.  Qty: 60 tablet, Refills: 11      hydrALAZINE (APRESOLINE) 25 MG tablet 1 tablet (25 mg total) by Per G Tube route every 6 (six) hours.  Qty: 120 tablet, Refills: 11         CONTINUE these medications which have NOT CHANGED    Details   acetaminophen (TYLENOL) 650 mg/20.3 mL Soln 20.3 mLs (650 mg total) by Per NG tube route every 6 (six) hours as needed.      albuterol-ipratropium 2.5mg-0.5mg/3mL (DUO-NEB) 0.5 mg-3 mg(2.5 mg base)/3 mL nebulizer solution Take 3 mLs by nebulization every 4 (four) hours. Rescue  Refills: 0      aspirin 81 MG Chew 1 tablet (81 mg total) by Per G Tube route once daily.  Refills: 0      atorvastatin (LIPITOR) 10 MG tablet 1 tablet (10 mg total) by Per G Tube route once daily.      bisacodyl (DULCOLAX) 5 mg EC tablet Take 5 mg by mouth daily as needed for Constipation.      chlorhexidine (PERIDEX) 0.12 % solution Use as directed 15 mLs in the mouth or throat 2 (two) times daily.      cloNIDine 0.1 mg/24 hr td ptwk (CATAPRES) 0.1 mg/24 hr Place 1 patch onto the skin every 7 days.      diphenhydrAMINE (BENADRYL) 25 mg capsule 25 mg by Per G Tube route 3 (three) times daily as needed for Itching.      docusate sodium (COLACE) 100 MG capsule 100 mg by Per G Tube route 2 (two) times daily.      enoxaparin (LOVENOX) 40 mg/0.4 mL Syrg Inject 40 mg into the skin once daily.      fluoxetine (PROZAC) 20 MG capsule 1 capsule (20 mg total) by Per G Tube route once daily.      insulin aspart (NOVOLOG) 100 unit/mL InPn pen Inject 1-10 Units into the skin every 4 (four) hours as needed  (Hyperglycemia).  Refills: 0      lisinopril 10 MG tablet 10 mg by Per G Tube route once daily.      nitroGLYCERIN (NITROSTAT) 0.4 MG SL tablet Place 0.4 mg under the tongue every 5 (five) minutes as needed for Chest pain.      ondansetron (ZOFRAN, AS HYDROCHLORIDE,) 2 mg/mL injection Inject 4 mg into the vein every 4 (four) hours as needed (for nausea).      pantoprazole (PROTONIX) 40 mg GrPS 1 packet (40 mg total) by Per G Tube route once daily.      risperidone (RISPERDAL) 0.25 MG Tab 0.25 mg by Per G Tube route 2 (two) times daily.      amlodipine (NORVASC) 10 MG tablet 1 tablet (10 mg total) by Per G Tube route once daily.      diclofenac sodium (VOLTAREN) 1 % Gel Apply 2 g topically 2 (two) times daily.  Qty: 100 g, Refills: 1      EASY TOUCH TWIST LANCETS 30 gauge Misc Refills: 6         STOP taking these medications       meclizine (ANTIVERT) 25 mg tablet Comments:   Reason for Stopping:         nifedipine (PROCARDIA-XL) 60 MG (OSM) 24 hr tablet Comments:   Reason for Stopping:         amantadine HCl (SYMMETREL) 100 mg capsule Comments:   Reason for Stopping:                    _________________________________  Rayo Boucher MD  09/27/2017

## 2017-09-27 NOTE — PT/OT/SLP PROGRESS
Occupational Therapy      Nisa Frank  MRN: [id number]    Spoke with CM regarding the pt and transfer to NH. CM reports the pt is to transfer today. OT to follow-up if pt remains at [facility].    MARCO ANTONIO Pretty  9/27/2017  Pager: [phone]

## 2017-09-27 NOTE — PLAN OF CARE
LUCIA spoke to Mary with [facility] ([phone]).  Mary stated that they ordered a bed for Ms Paula and wont be able to accept her until tomorrow.  LUCIA informed Ms Shayna and TREVOR.  [facility] and will f/u tomorrow.    Renetta Jackson LCSW     [phone]  Critical Care (MICU)

## 2017-09-27 NOTE — PLAN OF CARE
[facility]     Department of Hospital Medicine     [address] [place] [address]     [phone] after hours  [phone] fax       NURSING HOME ORDERS    09/27/2017    Admit to Nursing Home:  Skilled Bed                                                      Diagnoses:  Active Hospital Problems    Diagnosis  POA    *Acute encephalopathy [G93.40]  Yes     Chronic    KATHI (acute kidney injury) [N17.9]  Yes    Respiratory failure [J96.90]  Yes    Tracheostomy status [Z93.0]  Not Applicable    Respiratory insufficiency [R06.89]  Yes    Dysphagia [R13.10]  Yes    Chronic bilateral cerebral infarction in watershed distribution [I69.30]  Not Applicable    Hypertensive encephalopathy [I67.4]  Yes    Hyperlipidemia [E78.5]  Yes    Morbid obesity with BMI of 50.0-59.9, adult [E66.01, Z68.43]  Not Applicable    S/P CABG (coronary artery bypass graft) [Z95.1]  Not Applicable     2005      Type 2 diabetes mellitus, uncontrolled [E11.65]  Yes    Essential hypertension [I10]  Yes      Resolved Hospital Problems    Diagnosis Date Resolved POA    VAP (ventilator-associated pneumonia) [J95.851] 09/22/2017 Yes    Ileus [K56.7] 09/22/2017 Yes     Chronic    Leukocytosis [D72.829] 09/18/2017 Yes    Hypertensive emergency [I16.1] 09/22/2017 Yes       Patient is homebound due to:  Acute encephalopathy    Allergies:  Review of patient's allergies indicates:   Allergen Reactions    Latex, natural rubber      Vitals:       Every Shift        Diet: Tube Feeding: tube feedings: Bolus 300ml, 5 times daily of Isosource 1.5 or Per facility protocol.     - Flush tube with 150 mL water every 8 hours    Acitivities:      - Up in a chair each morning as tolerated    LABS:  Per facility protocol    Nursing Precautions:     - Aspiration precautions:             -  Upright 90 degrees befor during and after meals             -  Suction at bedside          - Fall precautions per  nursing home protocol   - Decubitus precautions:        -  for positioning   - Pressure reducing foam mattress   - Turn patient every two hours. Use wedge pillows to anchor patient    CONSULTS:   Physical Therapy to evaluate and treat     Occupational Therapy to evaluate and treat     Speech Therapy  to evaluate and treat     Nutrition to evaluate and recommend diet    MISCELLANEOUS CARE:          PEG Care:  Clean site every 24 hours                Trach Care:  Routine, clean site every 24 hours and deep suction at least every 12 hours for copious mucous secretions. Oxygen (low flow 1-5Liters) to maintain saturation of >92%    Medications: Discontinue all previous medication orders, if any. See new list below.     Nisa Frank [facility] Medication Instructions MARY:[id number]    Printed on:09/27/17 8910   Medication Information                      acetaminophen (TYLENOL) 650 mg/20.3 mL Soln  20.3 mLs (650 mg total) by Per NG tube route every 6 (six) hours as needed.             albuterol-ipratropium 2.5mg-0.5mg/3mL (DUO-NEB) 0.5 mg-3 mg(2.5 mg base)/3 mL nebulizer solution  Take 3 mLs by nebulization every 4 (four) hours. Rescue             amlodipine (NORVASC) 10 MG tablet  1 tablet (10 mg total) by Per G Tube route once daily.             aspirin 81 MG Chew  1 tablet (81 mg total) by Per G Tube route once daily.             atorvastatin (LIPITOR) 10 MG tablet  1 tablet (10 mg total) by Per G Tube route once daily.             bisacodyl (DULCOLAX) 5 mg EC tablet  Take 5 mg by mouth daily as needed for Constipation.             carvedilol (COREG) 12.5 MG tablet  Take 1 tablet (12.5 mg total) by mouth 2 (two) times daily.             chlorhexidine (PERIDEX) 0.12 % solution  Use as directed 15 mLs in the mouth or throat 2 (two) times daily.             cloNIDine 0.1 mg/24 hr td ptwk (CATAPRES) 0.1 mg/24 hr  Place 1 patch onto the skin every 7 days.             diclofenac sodium (VOLTAREN) 1 % Gel  Apply 2 g  topically 2 (two) times daily.             diphenhydrAMINE (BENADRYL) 25 mg capsule  25 mg by Per G Tube route 3 (three) times daily as needed for Itching.             docusate sodium (COLACE) 100 MG capsule  100 mg by Per G Tube route 2 (two) times daily.             EASY TOUCH TWIST LANCETS 30 gauge Misc               enoxaparin (LOVENOX) 40 mg/0.4 mL Syrg  Inject 40 mg into the skin once daily.             fluoxetine (PROZAC) 20 MG capsule  1 capsule (20 mg total) by Per G Tube route once daily.             hydrALAZINE (APRESOLINE) 25 MG tablet  1 tablet (25 mg total) by Per G Tube route every 6 (six) hours.             insulin aspart (NOVOLOG) 100 unit/mL InPn pen  Inject 1-10 Units into the skin every 4 (four) hours as needed (Hyperglycemia).             lisinopril 10 MG tablet  10 mg by Per G Tube route once daily.             nitroGLYCERIN (NITROSTAT) 0.4 MG SL tablet  Place 0.4 mg under the tongue every 5 (five) minutes as needed for Chest pain.             ondansetron (ZOFRAN, AS HYDROCHLORIDE,) 2 mg/mL injection  Inject 4 mg into the vein every 4 (four) hours as needed (for nausea).             pantoprazole (PROTONIX) 40 mg GrPS  1 packet (40 mg total) by Per G Tube route once daily.             risperidone (RISPERDAL) 0.25 MG Tab  0.25 mg by Per G Tube route 2 (two) times daily.                       _________________________________  Rayo Boucher MD  09/27/2017

## 2017-09-27 NOTE — PLAN OF CARE
LUCIA called Mary with [facility] ([phone]) and left a message requesting a call back, to confirm that they can accept pt today.    Renetta Jackson [facility]     [phone]  Critical Care (MICU)

## 2017-09-27 NOTE — PLAN OF CARE
Renetta Jackson LCSW    [phone]    Renetta Jackson LCSW     [phone]  Critical Care (MICU)

## 2017-09-27 NOTE — PLAN OF CARE
09/27/17 1536   Discharge Reassessment   Assessment Type Discharge Planning Reassessment   Provided patient/caregiver education on the expected discharge date and the discharge plan Yes   Do you have any problems affording any of your prescribed medications? No   Discharge Plan A Skilled Nursing Facility   Patient choice form signed by patient/caregiver Yes   Can the patient answer the patient profile reliably? No, cognitively impaired   How does the patient rate their overall health at the present time? Fair   Describe the patient's ability to walk at the present time. Does not walk or unable to take any steps at all   How often would a person be available to care for the patient? Occasionally   Number of comorbid conditions (as recorded on the chart) Five or more

## 2017-09-28 VITALS
RESPIRATION RATE: 16 BRPM | BODY MASS INDEX: 47.09 KG/M2 | OXYGEN SATURATION: 98 % | TEMPERATURE: 99 F | DIASTOLIC BLOOD PRESSURE: 67 MMHG | WEIGHT: 293 LBS | SYSTOLIC BLOOD PRESSURE: 145 MMHG | HEART RATE: 69 BPM | HEIGHT: 66 IN

## 2017-09-28 LAB
POCT GLUCOSE: 130 MG/DL (ref 70–110)
POCT GLUCOSE: 174 MG/DL (ref 70–110)

## 2017-09-28 PROCEDURE — 27000221 HC OXYGEN, UP TO 24 HOURS

## 2017-09-28 PROCEDURE — 25000003 PHARM REV CODE 250: Performed by: STUDENT IN AN ORGANIZED HEALTH CARE EDUCATION/TRAINING PROGRAM

## 2017-09-28 PROCEDURE — 94640 AIRWAY INHALATION TREATMENT: CPT

## 2017-09-28 PROCEDURE — 99900026 HC AIRWAY MAINTENANCE (STAT)

## 2017-09-28 PROCEDURE — 25000003 PHARM REV CODE 250: Performed by: INTERNAL MEDICINE

## 2017-09-28 PROCEDURE — 94761 N-INVAS EAR/PLS OXIMETRY MLT: CPT

## 2017-09-28 PROCEDURE — 25000242 PHARM REV CODE 250 ALT 637 W/ HCPCS: Performed by: STUDENT IN AN ORGANIZED HEALTH CARE EDUCATION/TRAINING PROGRAM

## 2017-09-28 PROCEDURE — 99900022

## 2017-09-28 PROCEDURE — 25000003 PHARM REV CODE 250: Performed by: HOSPITALIST

## 2017-09-28 RX ADMIN — IPRATROPIUM BROMIDE AND ALBUTEROL SULFATE 3 ML: .5; 3 SOLUTION RESPIRATORY (INHALATION) at 07:09

## 2017-09-28 RX ADMIN — FLUOXETINE 20 MG: 20 CAPSULE ORAL at 09:09

## 2017-09-28 RX ADMIN — CARVEDILOL 12.5 MG: 12.5 TABLET, FILM COATED ORAL at 09:09

## 2017-09-28 RX ADMIN — HYDRALAZINE HYDROCHLORIDE 25 MG: 25 TABLET, FILM COATED ORAL at 11:09

## 2017-09-28 RX ADMIN — LISINOPRIL 10 MG: 10 TABLET ORAL at 09:09

## 2017-09-28 RX ADMIN — AMLODIPINE BESYLATE 10 MG: 10 TABLET ORAL at 09:09

## 2017-09-28 RX ADMIN — HYDRALAZINE HYDROCHLORIDE 25 MG: 25 TABLET, FILM COATED ORAL at 05:09

## 2017-09-28 RX ADMIN — ATORVASTATIN CALCIUM 10 MG: 10 TABLET, FILM COATED ORAL at 09:09

## 2017-09-28 RX ADMIN — ASPIRIN 81 MG CHEWABLE TABLET 81 MG: 81 TABLET CHEWABLE at 09:09

## 2017-09-28 RX ADMIN — HYDRALAZINE HYDROCHLORIDE 25 MG: 25 TABLET, FILM COATED ORAL at 12:09

## 2017-09-28 RX ADMIN — POLYETHYLENE GLYCOL 3350 17 G: 17 POWDER, FOR SOLUTION ORAL at 09:09

## 2017-09-28 NOTE — PLAN OF CARE
Mary with [facility] ([phone]) confirmed that pt can transfer to [facility].  [facility] ([phone]) will transfer pt to [place] at 2pm, via stretcher, with O2 and BLS.  SW gave RN number for report and informed Ms Corral (dtr) of d/c time.    Renetta Jackson LCSW     [phone]  Critical Care (MICU)

## 2017-09-28 NOTE — PLAN OF CARE
LUCIA called Mary with [facility] ([phone]) and left a message requesting a call back to find out when pt may transfer to SNF.  Awtg call back.    LUCIA sent updated SNF Orders to [facility] through SANDRA Jackson LCSW     [phone]  Critical Care (MICU)'

## 2017-09-28 NOTE — DISCHARGE SUMMARY
DISCHARGE SUMMARY  Hospital Medicine    Team: [facility] HOSP MED 3    Patient Name: Nisa Frank  YOB: 1950    Admit Date: 9/13/2017    Discharge Date: 09/28/2017     Discharge Attending Physician: Dr. Henao, Dr. Tejada    Resident on Service: Jakob Tejada    Chief Complaint: Acute encephalopathy    Princilpal Diagnoses:  Active Hospital Problems    Diagnosis  POA    *Acute encephalopathy [G93.40]  Yes     Chronic    KATHI (acute kidney injury) [N17.9]  Yes    Respiratory failure [J96.90]  Yes    Tracheostomy status [Z93.0]  Not Applicable    Respiratory insufficiency [R06.89]  Yes    Dysphagia [R13.10]  Yes    Chronic bilateral cerebral infarction in watershed distribution [I69.30]  Not Applicable    Hypertensive encephalopathy [I67.4]  Yes    Hyperlipidemia [E78.5]  Yes    Morbid obesity with BMI of 50.0-59.9, adult [E66.01, Z68.43]  Not Applicable    S/P CABG (coronary artery bypass graft) [Z95.1]  Not Applicable     2005      Type 2 diabetes mellitus, uncontrolled [E11.65]  Yes    Essential hypertension [I10]  Yes      Resolved Hospital Problems    Diagnosis Date Resolved POA    VAP (ventilator-associated pneumonia) [J95.851] 09/22/2017 Yes    Ileus [K56.7] 09/22/2017 Yes     Chronic    Leukocytosis [D72.829] 09/18/2017 Yes    Hypertensive emergency [I16.1] 09/22/2017 Yes       Discharged Condition: Admit problems have stabilized     HOSPITAL COURSE:      Initial Presentation:  Ms Frank 66 year old female with diabetes mellitus type 2, CKD stage 3, CAD s/p CABG, essential hypertension, cerebrovascular disease s/p bi-hemispheric watershed infarcts and trach/PEG status who was brought in via EMS from [facility] for acute respiratory distress presented to [facility] 8/8 with acute respiratory failure with hypoxia likely due to mucous plug. Patient was admitted to ICU to wean off MV as tolerated and for placement to [facility] where she was transferred to  [facility] rehab on 8/14 in stable condition.  She pulled out her trach on 9/4 and went to [facility].  She was treated for an ESBL E. Coli UTI with Meropenem. The family has been very unhappy with the care that the patient has received.  She was medically stable for transfer to a SNF yesterday, however overnight she developed a new WBC 26.  She was found to have a RML and RLL infiltrate and was started on Vanc and Zosyn.  She also developed a new KATHI on top of her CKD (0.6 to 1.8).  Family is requesting transfer to [facility].    Spoke with daughter on the phone.  Per family patient was previously alert and oriented to person and place.  She was conversing and able to participate in therapy.  This changed shortly after trach removal and replacement on 9/4.  Family reports some complication during trach replacement resulting in overnight ICU care.  After patient was stepped down they report she stopped talking and would not participate with therapy.  The only response they could get from the patient was eye tracking and intermittent hand squeezes.  Family feels that patient may have had a repeat stroke and was requesting MRI.   Spoke with [facility] nurse who reports that patient has been near obtunded for as long as she has had her (past couple of days).  She reported recent WBC jump and associated fever followed by broad spectrum abx.  In addiction notes concern for ileus given high residuals from NG tube feeds.  Does not high residuals resolved once tube was flushed.          Per nurse medications prior to concern for ileus.  Vanc 1750mg , Q24, Zosyn 4.5 Q 12, ASA 81, Clonidine 0.1 patch weekly, Lipitor 10, Colace, Lovenox 40, Protonix 40, Hydralazine 100 Q8, Keppra 500 BID, Lisniopril 40 PO, 25 Metoprolol XL, Procardia 60 mg Daily, Risperdal 25 QHS.    Course of Principle Problem for Admission:  9/14: Patients mental status unchanged from yesterday. Labs showing significant improvement. Will  continue to treat for VAP. Schedule for CT scan of abdomen.   9/15: Patients mental status improved, appears more awake/alert. But no verbal communication yet. Following commands. Continue to treat to VAP. MRI scheduled. PT/OT   9:16: Patient more awake and alert. Still non verbal. Continuing to treat VAP. PT/OT   9/17: Patient awake and alert. Still non-verbal. Had a long conversation w/ family regarding patient status; will get records/procedure report.   9/18: patient is awake and alert but non-verbal and doesn't follow command. Surgery was consulted for ileus.   9/19: Patient is awake. But non-verbal and doesn;t follow commands. NG tube was pulled out last night. Surgery consulted for PEG tube.   9/20: Patient scheduled for PEG tube placement today. Abx course completed, Na corrected, BP wnl. Patient's family contacted regarding NH placement, d/c pending family's discission.   9/21: PEG tube in place. Consulted dietary for nutrition. Waiting on family for NH placement.   9/22-23: Resumed normal TF via boluses- giving HTN regimen via PEG. Dispo pending SNF acceptance.   9/24-9/26: Dispo pending SNF acceptance. Spoke with daughter today, she is still reviewing the facilities.   9/27: Patient's sister reports hand pain on the left side. There is no pain on palpation or limits in range of motion. An Xray of wrist was obtained and negative for fractures.    Other Medical Problems Addressed in the Hospital:  * Acute encephalopathy  - Concern for Anoxic brain injury during trach removal/replacement, CVA, Seizure, Worsening Sepsis. Urine culture no growth.  MRI no acute hemorrhagic pathology. CT head, EEG, and MRI were all negative for acute events, and confusion believed to be 2/2 hypernatremia, infection, and ileus which have all resolved  - mentation waxes and wanes.    - s/p PEG tube placement   - monitor electrolytes  - control BP         KATHI (acute kidney injury)  - Improving since admission.   - monitor renal  function   - Cr of 0.6 this AM.  - Resolved.         Respiratory failure  - Concern for possible VAP  - Decreased O2 5L; 99%, will continue to titrate   - completed VAP treatment   - No Leukocytosis now      Tracheostomy status  - Trach care  - Patient continues to have cough but afebrile.   - Q2 trach suction.         Dysphagia  -Per family was tolerating diet prior to trach replacement on 9/4   -viable PEG placed this admission          Hyperlipidemia  -home statin        Morbid obesity with BMI of 50.0-59.9, adult  -risk factor. Lovenox 40mg BID        S/P CABG (coronary artery bypass graft)  - Continue ASA          Type 2 diabetes mellitus, uncontrolled  - Per OSH records not on insulin   - Her HbA1c  8/9/17 is 7.2   - Sliding scale         Essential hypertension  - HX of significant HTN. Per OSH on hydralazine 100 Q8 PO, Linsiopril 40 PO, Metoprolol XL 25 BID, Nifedipine 60mg. She has Clonidine 0.1 Patch Q week, a new one was placed today 9/18/17.   - her BP is poorly controlled. Her SBP was around 220-190.   - Her heart rate runs low. Labetalol is not safe to be used at this point.   -resuming home regimen as BP tolerates via PEG  - added PO hydralazine 25mg TID   - norvasc 10mg QS         CONSULTS: Neurology, Nutrition, General Surgery     Vitals:    09/28/17 0905 09/28/17 1100 09/28/17 1150 09/28/17 1243   BP:  (!) 145/67     BP Location:  Right arm     Patient Position:  Lying     Pulse:  63 63 69   Resp:  18 16    Temp:  98.8 °F (37.1 °C)     TempSrc:  Oral     SpO2: 95% 100% 98%    Weight:       Height:           Physical Exam   Constitutional: She appears well-developed and well-nourished. No distress.   HENT:   Head: Normocephalic and atraumatic.   Eyes: Conjunctivae are normal. Right eye exhibits no discharge. Left eye exhibits no discharge.   Pupils responsive to light, tracking    Cardiovascular: Normal rate, regular rhythm and normal heart sounds.    Pulmonary/Chest: Effort normal and breath sounds  normal.   Abdominal: Soft. Bowel sounds are normal. She exhibits no distension. There is no tenderness.   Musculoskeletal: She exhibits no edema.   Neurological: She has normal reflexes. GCS eye subscore is 2. GCS verbal subscore is 1. GCS motor subscore is 5.   Patient awake. No insight. Not communicating.   Following very basic simple commands but slowly. Squeezes hand.     Last CBC/BMP/HgbA1c (if applicable):  Recent Results (from the past 336 hour(s))   CBC auto differential    Collection Time: 09/27/17  6:27 AM   Result Value Ref Range    WBC 12.81 (H) 3.90 - 12.70 K/uL    Hemoglobin 9.5 (L) 12.0 - 16.0 g/dL    Hematocrit 28.9 (L) 37.0 - 48.5 %    Platelets 235 150 - 350 K/uL   CBC auto differential    Collection Time: 09/25/17  4:31 AM   Result Value Ref Range    WBC 8.31 3.90 - 12.70 K/uL    Hemoglobin 9.0 (L) 12.0 - 16.0 g/dL    Hematocrit 28.6 (L) 37.0 - 48.5 %    Platelets 263 150 - 350 K/uL   CBC auto differential    Collection Time: 09/24/17  5:32 AM   Result Value Ref Range    WBC 10.01 3.90 - 12.70 K/uL    Hemoglobin 9.4 (L) 12.0 - 16.0 g/dL    Hematocrit 29.1 (L) 37.0 - 48.5 %    Platelets 198 150 - 350 K/uL     Recent Results (from the past 336 hour(s))   Basic metabolic panel    Collection Time: 09/19/17  6:31 PM   Result Value Ref Range    Sodium 142 136 - 145 mmol/L    Potassium 3.9 3.5 - 5.1 mmol/L    Chloride 107 95 - 110 mmol/L    CO2 24 23 - 29 mmol/L    BUN, Bld 20 8 - 23 mg/dL    Creatinine 1.1 0.5 - 1.4 mg/dL    Calcium 8.3 (L) 8.7 - 10.5 mg/dL    Anion Gap 11 8 - 16 mmol/L   Basic metabolic panel    Collection Time: 09/18/17  9:24 PM   Result Value Ref Range    Sodium 147 (H) 136 - 145 mmol/L    Potassium 5.0 3.5 - 5.1 mmol/L    Chloride 113 (H) 95 - 110 mmol/L    CO2 23 23 - 29 mmol/L    BUN, Bld 15 8 - 23 mg/dL    Creatinine 0.8 0.5 - 1.4 mg/dL    Calcium 8.5 (L) 8.7 - 10.5 mg/dL    Anion Gap 11 8 - 16 mmol/L   Basic metabolic panel    Collection Time: 09/18/17 12:01 PM   Result Value  Ref Range    Sodium 148 (H) 136 - 145 mmol/L    Potassium 4.4 3.5 - 5.1 mmol/L    Chloride 114 (H) 95 - 110 mmol/L    CO2 24 23 - 29 mmol/L    BUN, Bld 14 8 - 23 mg/dL    Creatinine 0.7 0.5 - 1.4 mg/dL    Calcium 8.6 (L) 8.7 - 10.5 mg/dL    Anion Gap 10 8 - 16 mmol/L     Lab Results   Component Value Date    HGBA1C 7.2 (H) 08/08/2017       Other Pertinent Lab Findings:  As above      Special Treatments/Procedures: PEG Tube    Disposition:  SNF      Future Scheduled Appointments:  Future Appointments  Date Time Provider Department Center   11/15/2017 1:40 PM Mariela Crespo MD [facility]ges [facility]       Follow-up Plans from This Hospitalization:  No Follow-up on file.      Discharge Medication List:     Nisa Frank   Home Medication Instructions MARY:[id number]    Printed on:09/28/17 1640   Medication Information                      acetaminophen (TYLENOL) 650 mg/20.3 mL Soln  20.3 mLs (650 mg total) by Per NG tube route every 6 (six) hours as needed.             albuterol-ipratropium 2.5mg-0.5mg/3mL (DUO-NEB) 0.5 mg-3 mg(2.5 mg base)/3 mL nebulizer solution  Take 3 mLs by nebulization every 4 (four) hours. Rescue             amlodipine (NORVASC) 10 MG tablet  1 tablet (10 mg total) by Per G Tube route once daily.             aspirin 81 MG Chew  1 tablet (81 mg total) by Per G Tube route once daily.             atorvastatin (LIPITOR) 10 MG tablet  1 tablet (10 mg total) by Per G Tube route once daily.             bisacodyl (DULCOLAX) 5 mg EC tablet  Take 5 mg by mouth daily as needed for Constipation.             carvedilol (COREG) 12.5 MG tablet  Take 1 tablet (12.5 mg total) by mouth 2 (two) times daily.             chlorhexidine (PERIDEX) 0.12 % solution  Use as directed 15 mLs in the mouth or throat 2 (two) times daily.             cloNIDine 0.1 mg/24 hr td ptwk (CATAPRES) 0.1 mg/24 hr  Place 1 patch onto the skin every 7 days.             diclofenac sodium (VOLTAREN) 1 % Gel  Apply 2 g topically 2  (two) times daily.             diphenhydrAMINE (BENADRYL) 25 mg capsule  25 mg by Per G Tube route 3 (three) times daily as needed for Itching.             docusate sodium (COLACE) 100 MG capsule  100 mg by Per G Tube route 2 (two) times daily.             EASY TOUCH TWIST LANCETS 30 gauge Misc               enoxaparin (LOVENOX) 40 mg/0.4 mL Syrg  Inject 40 mg into the skin once daily.             fluoxetine (PROZAC) 20 MG capsule  1 capsule (20 mg total) by Per G Tube route once daily.             hydrALAZINE (APRESOLINE) 25 MG tablet  1 tablet (25 mg total) by Per G Tube route every 6 (six) hours.             insulin aspart (NOVOLOG) 100 unit/mL InPn pen  Inject 1-10 Units into the skin every 4 (four) hours as needed (Hyperglycemia).             lisinopril 10 MG tablet  10 mg by Per G Tube route once daily.             nitroGLYCERIN (NITROSTAT) 0.4 MG SL tablet  Place 0.4 mg under the tongue every 5 (five) minutes as needed for Chest pain.             ondansetron (ZOFRAN, AS HYDROCHLORIDE,) 2 mg/mL injection  Inject 4 mg into the vein every 4 (four) hours as needed (for nausea).             pantoprazole (PROTONIX) 40 mg GrPS  1 packet (40 mg total) by Per G Tube route once daily.             risperidone (RISPERDAL) 0.25 MG Tab  0.25 mg by Per G Tube route 2 (two) times daily.                 Patient Instructions:  No discharge procedures on file.    At the time of discharge patient was told to take all medications as prescribed, to keep all followup appointments, and to call their primary care physician or return to the emergency room if they have any worsening or concerning symptoms.    Signing Physician:  Jakob Tejada MD

## 2017-09-29 ENCOUNTER — PATIENT OUTREACH (OUTPATIENT)
Dept: ADMINISTRATIVE | Facility: CLINIC | Age: 67
End: 2017-09-29

## 2017-10-02 NOTE — PT/OT/SLP DISCHARGE
Physical Therapy Discharge Summary    Nisa Frank  MRN: [id number]   Acute encephalopathy   Patient Discharged from acute Physical Therapy on 17.  Please refer to prior PT noted date on 17 for functional status.     Assessment:   Patient has not met goals.  GOALS:    Physical Therapy Goals     Not on file          Multidisciplinary Problems (Resolved)        Problem: Physical Therapy Goal    Goal Priority Disciplines Outcome Goal Variances Interventions   Physical Therapy Goal   (Resolved)     PT/OT, PT Outcome(s) achieved     Description:  Goals to be met by: 17     Patient will increase functional independence with mobility by performin. Supine to sit with Maximum Assistance - not met  2. Sit to supine with Maximum Assistance - not met  3. Rolling to Left and Right with Maximum Assistance.- not met  4. Sitting at edge of bed x10 minutes with Minimal Assistance using B UE for trunk support and attending to midline. - not met  5. Pt will perform dynamic reaching while sitting EOB with Minimal Assistance to complete 3/5 activities and Minimal Assistance for balance/trunk support.  -not met  6. Lower extremity exercise x10 reps with assistance as needed to improve strength, ROM, and endurance with all functional activities. - not met                     Reasons for Discontinuation of Therapy Services  Transfer to alternate level of care.      Plan:  Patient Discharged to: Skilled Nursing Facility.

## 2017-10-03 ENCOUNTER — HOSPITAL ENCOUNTER (INPATIENT)
Facility: HOSPITAL | Age: 67
LOS: 43 days | Discharge: HOSPICE | DRG: 870 | End: 2017-11-15
Attending: EMERGENCY MEDICINE | Admitting: FAMILY MEDICINE
Payer: MEDICARE

## 2017-10-03 DIAGNOSIS — I46.9 CARDIAC ARREST, CAUSE UNSPECIFIED: Primary | ICD-10-CM

## 2017-10-03 DIAGNOSIS — I25.10 CAD (CORONARY ARTERY DISEASE): ICD-10-CM

## 2017-10-03 DIAGNOSIS — E87.5 HYPERKALEMIA: ICD-10-CM

## 2017-10-03 DIAGNOSIS — I46.9 CARDIAC ARREST: ICD-10-CM

## 2017-10-03 DIAGNOSIS — I10 ESSENTIAL HYPERTENSION: Chronic | ICD-10-CM

## 2017-10-03 DIAGNOSIS — G93.40 ACUTE ENCEPHALOPATHY: ICD-10-CM

## 2017-10-03 DIAGNOSIS — J96.20 ACUTE ON CHRONIC RESPIRATORY FAILURE, UNSPECIFIED WHETHER WITH HYPOXIA OR HYPERCAPNIA: ICD-10-CM

## 2017-10-03 DIAGNOSIS — R56.9 SEIZURE: ICD-10-CM

## 2017-10-03 DIAGNOSIS — G93.1 ANOXIC BRAIN INJURY: ICD-10-CM

## 2017-10-03 PROBLEM — Z93.0 TRACHEOSTOMY STATUS: Chronic | Status: ACTIVE | Noted: 2017-08-08

## 2017-10-03 LAB
ALBUMIN SERPL BCP-MCNC: 2 G/DL
ALLENS TEST: ABNORMAL
ALP SERPL-CCNC: 132 U/L
ALT SERPL W/O P-5'-P-CCNC: 58 U/L
ANION GAP SERPL CALC-SCNC: 13 MMOL/L
AST SERPL-CCNC: 79 U/L
BASOPHILS # BLD AUTO: ABNORMAL K/UL
BASOPHILS NFR BLD: 0 %
BILIRUB SERPL-MCNC: 0.6 MG/DL
BUN SERPL-MCNC: 22 MG/DL
CALCIUM SERPL-MCNC: 9 MG/DL
CHLORIDE SERPL-SCNC: 102 MMOL/L
CO2 SERPL-SCNC: 22 MMOL/L
CREAT SERPL-MCNC: 1.3 MG/DL
DELSYS: ABNORMAL
DIASTOLIC DYSFUNCTION: NO
DIFFERENTIAL METHOD: ABNORMAL
EOSINOPHIL # BLD AUTO: ABNORMAL K/UL
EOSINOPHIL NFR BLD: 0 %
ERYTHROCYTE [DISTWIDTH] IN BLOOD BY AUTOMATED COUNT: 16.3 %
ERYTHROCYTE [SEDIMENTATION RATE] IN BLOOD BY WESTERGREN METHOD: 20 MM/H
EST. GFR  (AFRICAN AMERICAN): 49 ML/MIN/1.73 M^2
EST. GFR  (NON AFRICAN AMERICAN): 43 ML/MIN/1.73 M^2
ESTIMATED PA SYSTOLIC PRESSURE: 25.66
FIO2: 55
GLUCOSE SERPL-MCNC: 261 MG/DL
HCO3 UR-SCNC: 29.9 MMOL/L (ref 24–28)
HCT VFR BLD AUTO: 27.6 %
HGB BLD-MCNC: 8.4 G/DL
INR PPP: 1
LYMPHOCYTES # BLD AUTO: ABNORMAL K/UL
LYMPHOCYTES NFR BLD: 35 %
MCH RBC QN AUTO: 26.8 PG
MCHC RBC AUTO-ENTMCNC: 30.4 G/DL
MCV RBC AUTO: 88 FL
METAMYELOCYTES NFR BLD MANUAL: 1 %
MODE: ABNORMAL
MONOCYTES # BLD AUTO: ABNORMAL K/UL
MONOCYTES NFR BLD: 4 %
MYELOCYTES NFR BLD MANUAL: 1 %
NEUTROPHILS NFR BLD: 57 %
NEUTS BAND NFR BLD MANUAL: 2 %
PCO2 BLDA: 48.6 MMHG (ref 35–45)
PEEP: 5
PH SMN: 7.4 [PH] (ref 7.35–7.45)
PLATELET # BLD AUTO: 268 K/UL
PMV BLD AUTO: 9.5 FL
PO2 BLDA: 173 MMHG (ref 80–100)
POC BE: 4 MMOL/L
POC SATURATED O2: 100 % (ref 95–100)
POC TCO2: 31 MMOL/L (ref 23–27)
POCT GLUCOSE: 259 MG/DL (ref 70–110)
POCT GLUCOSE: 283 MG/DL (ref 70–110)
POTASSIUM SERPL-SCNC: 5.5 MMOL/L
PROT SERPL-MCNC: 6.6 G/DL
PROTHROMBIN TIME: 10.6 SEC
RBC # BLD AUTO: 3.14 M/UL
RETIRED EF AND QEF - SEE NOTES: 75 (ref 55–65)
SAMPLE: ABNORMAL
SITE: ABNORMAL
SODIUM SERPL-SCNC: 137 MMOL/L
SP02: 92
TRICUSPID VALVE REGURGITATION: NORMAL
TROPONIN I SERPL DL<=0.01 NG/ML-MCNC: 0.01 NG/ML
VT: 400
WBC # BLD AUTO: 12.5 K/UL

## 2017-10-03 PROCEDURE — 82800 BLOOD PH: CPT

## 2017-10-03 PROCEDURE — 85007 BL SMEAR W/DIFF WBC COUNT: CPT

## 2017-10-03 PROCEDURE — 5A1955Z RESPIRATORY VENTILATION, GREATER THAN 96 CONSECUTIVE HOURS: ICD-10-PCS | Performed by: INTERNAL MEDICINE

## 2017-10-03 PROCEDURE — 84484 ASSAY OF TROPONIN QUANT: CPT | Mod: 91

## 2017-10-03 PROCEDURE — 20000000 HC ICU ROOM

## 2017-10-03 PROCEDURE — 93010 ELECTROCARDIOGRAM REPORT: CPT | Mod: ,,, | Performed by: INTERNAL MEDICINE

## 2017-10-03 PROCEDURE — 96365 THER/PROPH/DIAG IV INF INIT: CPT | Mod: 59

## 2017-10-03 PROCEDURE — 94002 VENT MGMT INPAT INIT DAY: CPT

## 2017-10-03 PROCEDURE — 99291 CRITICAL CARE FIRST HOUR: CPT | Mod: ,,, | Performed by: INTERNAL MEDICINE

## 2017-10-03 PROCEDURE — 82962 GLUCOSE BLOOD TEST: CPT

## 2017-10-03 PROCEDURE — 85610 PROTHROMBIN TIME: CPT

## 2017-10-03 PROCEDURE — 96366 THER/PROPH/DIAG IV INF ADDON: CPT | Mod: 59

## 2017-10-03 PROCEDURE — 51702 INSERT TEMP BLADDER CATH: CPT | Mod: 59

## 2017-10-03 PROCEDURE — 93306 TTE W/DOPPLER COMPLETE: CPT | Mod: 26,,, | Performed by: INTERNAL MEDICINE

## 2017-10-03 PROCEDURE — 93005 ELECTROCARDIOGRAM TRACING: CPT

## 2017-10-03 PROCEDURE — A4216 STERILE WATER/SALINE, 10 ML: HCPCS | Performed by: EMERGENCY MEDICINE

## 2017-10-03 PROCEDURE — 63600175 PHARM REV CODE 636 W HCPCS: Performed by: PSYCHIATRY & NEUROLOGY

## 2017-10-03 PROCEDURE — 63600175 PHARM REV CODE 636 W HCPCS: Performed by: EMERGENCY MEDICINE

## 2017-10-03 PROCEDURE — 80053 COMPREHEN METABOLIC PANEL: CPT

## 2017-10-03 PROCEDURE — 36569 INSJ PICC 5 YR+ W/O IMAGING: CPT

## 2017-10-03 PROCEDURE — 99222 1ST HOSP IP/OBS MODERATE 55: CPT | Mod: ,,, | Performed by: PSYCHIATRY & NEUROLOGY

## 2017-10-03 PROCEDURE — 25000003 PHARM REV CODE 250: Performed by: EMERGENCY MEDICINE

## 2017-10-03 PROCEDURE — 99900026 HC AIRWAY MAINTENANCE (STAT)

## 2017-10-03 PROCEDURE — C9113 INJ PANTOPRAZOLE SODIUM, VIA: HCPCS | Performed by: EMERGENCY MEDICINE

## 2017-10-03 PROCEDURE — 84295 ASSAY OF SERUM SODIUM: CPT

## 2017-10-03 PROCEDURE — 36556 INSERT NON-TUNNEL CV CATH: CPT

## 2017-10-03 PROCEDURE — 83036 HEMOGLOBIN GLYCOSYLATED A1C: CPT

## 2017-10-03 PROCEDURE — 96376 TX/PRO/DX INJ SAME DRUG ADON: CPT | Mod: 59

## 2017-10-03 PROCEDURE — 84132 ASSAY OF SERUM POTASSIUM: CPT

## 2017-10-03 PROCEDURE — 99900035 HC TECH TIME PER 15 MIN (STAT)

## 2017-10-03 PROCEDURE — 82803 BLOOD GASES ANY COMBINATION: CPT

## 2017-10-03 PROCEDURE — 25000003 PHARM REV CODE 250: Performed by: PSYCHIATRY & NEUROLOGY

## 2017-10-03 PROCEDURE — 99223 1ST HOSP IP/OBS HIGH 75: CPT | Mod: ,,, | Performed by: INTERNAL MEDICINE

## 2017-10-03 PROCEDURE — 36415 COLL VENOUS BLD VENIPUNCTURE: CPT

## 2017-10-03 PROCEDURE — 85027 COMPLETE CBC AUTOMATED: CPT

## 2017-10-03 PROCEDURE — 96375 TX/PRO/DX INJ NEW DRUG ADDON: CPT | Mod: 59

## 2017-10-03 PROCEDURE — 99291 CRITICAL CARE FIRST HOUR: CPT | Mod: 25

## 2017-10-03 PROCEDURE — 93306 TTE W/DOPPLER COMPLETE: CPT

## 2017-10-03 PROCEDURE — 36600 WITHDRAWAL OF ARTERIAL BLOOD: CPT

## 2017-10-03 PROCEDURE — 02HV33Z INSERTION OF INFUSION DEVICE INTO SUPERIOR VENA CAVA, PERCUTANEOUS APPROACH: ICD-10-PCS | Performed by: FAMILY MEDICINE

## 2017-10-03 PROCEDURE — 96368 THER/DIAG CONCURRENT INF: CPT | Mod: 59

## 2017-10-03 RX ORDER — LEVETIRACETAM 15 MG/ML
1500 INJECTION INTRAVASCULAR EVERY 12 HOURS
Status: DISCONTINUED | OUTPATIENT
Start: 2017-10-04 | End: 2017-10-07

## 2017-10-03 RX ORDER — SODIUM CHLORIDE 9 MG/ML
INJECTION, SOLUTION INTRAVENOUS CONTINUOUS
Status: DISCONTINUED | OUTPATIENT
Start: 2017-10-03 | End: 2017-10-05

## 2017-10-03 RX ORDER — INSULIN ASPART 100 [IU]/ML
1-10 INJECTION, SOLUTION INTRAVENOUS; SUBCUTANEOUS EVERY 6 HOURS PRN
Status: DISCONTINUED | OUTPATIENT
Start: 2017-10-03 | End: 2017-10-30

## 2017-10-03 RX ORDER — SODIUM CHLORIDE 9 MG/ML
500 INJECTION, SOLUTION INTRAVENOUS
Status: COMPLETED | OUTPATIENT
Start: 2017-10-03 | End: 2017-10-03

## 2017-10-03 RX ORDER — SODIUM CHLORIDE 0.9 % (FLUSH) 0.9 %
10 SYRINGE (ML) INJECTION
Status: DISCONTINUED | OUTPATIENT
Start: 2017-10-03 | End: 2017-11-15 | Stop reason: HOSPADM

## 2017-10-03 RX ORDER — SODIUM CHLORIDE 0.9 % (FLUSH) 0.9 %
10 SYRINGE (ML) INJECTION EVERY 6 HOURS
Status: DISCONTINUED | OUTPATIENT
Start: 2017-10-03 | End: 2017-11-15 | Stop reason: HOSPADM

## 2017-10-03 RX ORDER — MIDAZOLAM HYDROCHLORIDE 1 MG/ML
2 INJECTION INTRAMUSCULAR; INTRAVENOUS
Status: COMPLETED | OUTPATIENT
Start: 2017-10-03 | End: 2017-10-03

## 2017-10-03 RX ORDER — CALCIUM GLUCONATE 98 MG/ML
1 INJECTION, SOLUTION INTRAVENOUS
Status: COMPLETED | OUTPATIENT
Start: 2017-10-03 | End: 2017-10-03

## 2017-10-03 RX ORDER — HEPARIN SODIUM 5000 [USP'U]/ML
7500 INJECTION, SOLUTION INTRAVENOUS; SUBCUTANEOUS EVERY 8 HOURS
Status: DISCONTINUED | OUTPATIENT
Start: 2017-10-03 | End: 2017-11-15

## 2017-10-03 RX ORDER — SODIUM CHLORIDE 0.9 % (FLUSH) 0.9 %
3 SYRINGE (ML) INJECTION EVERY 8 HOURS
Status: DISCONTINUED | OUTPATIENT
Start: 2017-10-03 | End: 2017-10-05

## 2017-10-03 RX ORDER — NOREPINEPHRINE BITARTRATE/D5W 4MG/250ML
0.05 PLASTIC BAG, INJECTION (ML) INTRAVENOUS CONTINUOUS
Status: DISCONTINUED | OUTPATIENT
Start: 2017-10-03 | End: 2017-10-05

## 2017-10-03 RX ORDER — PANTOPRAZOLE SODIUM 40 MG/10ML
40 INJECTION, POWDER, LYOPHILIZED, FOR SOLUTION INTRAVENOUS DAILY
Status: DISCONTINUED | OUTPATIENT
Start: 2017-10-03 | End: 2017-10-05

## 2017-10-03 RX ADMIN — MIDAZOLAM 1 MG/HR: 5 INJECTION INTRAMUSCULAR; INTRAVENOUS at 12:10

## 2017-10-03 RX ADMIN — MIDAZOLAM HYDROCHLORIDE 2 MG: 1 INJECTION, SOLUTION INTRAMUSCULAR; INTRAVENOUS at 12:10

## 2017-10-03 RX ADMIN — INSULIN HUMAN 8 UNITS: 100 INJECTION, SOLUTION PARENTERAL at 11:10

## 2017-10-03 RX ADMIN — Medication 0.05 MCG/KG/MIN: at 12:10

## 2017-10-03 RX ADMIN — SODIUM CHLORIDE 500 ML: 9 INJECTION, SOLUTION INTRAVENOUS at 11:10

## 2017-10-03 RX ADMIN — SODIUM CHLORIDE 500 ML: 0.9 INJECTION, SOLUTION INTRAVENOUS at 12:10

## 2017-10-03 RX ADMIN — DEXTROSE 3000 MG: 50 INJECTION, SOLUTION INTRAVENOUS at 09:10

## 2017-10-03 RX ADMIN — SODIUM CHLORIDE: 0.9 INJECTION, SOLUTION INTRAVENOUS at 04:10

## 2017-10-03 RX ADMIN — FENTANYL CITRATE: 50 INJECTION, SOLUTION INTRAMUSCULAR; INTRAVENOUS at 02:10

## 2017-10-03 RX ADMIN — Medication 3 ML: at 10:10

## 2017-10-03 RX ADMIN — PANTOPRAZOLE SODIUM 40 MG: 40 INJECTION, POWDER, FOR SOLUTION INTRAVENOUS at 04:10

## 2017-10-03 RX ADMIN — FENTANYL CITRATE 25 MCG/HR: 50 INJECTION, SOLUTION INTRAMUSCULAR; INTRAVENOUS at 02:10

## 2017-10-03 RX ADMIN — HEPARIN SODIUM 7500 UNITS: 5000 INJECTION, SOLUTION INTRAVENOUS; SUBCUTANEOUS at 09:10

## 2017-10-03 RX ADMIN — CALCIUM GLUCONATE 1 G: 94 INJECTION, SOLUTION INTRAVENOUS at 11:10

## 2017-10-03 RX ADMIN — MIDAZOLAM 1 MG/HR: 5 INJECTION INTRAMUSCULAR; INTRAVENOUS at 09:10

## 2017-10-03 NOTE — PROGRESS NOTES
Received pt via ems to bed 17 in ER. Pt being bagged to trach. Pt placed on servoi vent setting on servoi PRVC 15/450/+5/55%. After VBG MD order to increase RR to 20. Pt currently on PRVC 20/450/+5/55%.
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
## 2017-10-03 NOTE — HPI
Ms. Frank is a 68 yo woman with diabetes mellitus type 2 (A1c 7.2%), CKD stage 3, CAD s/p CABG, essential hypertension, cerebrovascular disease s/p bi-hemispheric watershed infarcts and trach/PEG status who was brought in via EMS from [facility] for cardiac arrest. Per NH staff, she received chest compressions for about 5 minutes. After EMS arrived, ACLS protocol continued and she was given 2 rounds of epi, non-shockable rhythm. ROSC achieved en route to the ER.    She was [place] [facility] [place] after a two week admission where she was initially admitted to our facility and then transferred to [facility], essentially for a second opinion. She has been admitted to multiple facilities recently to be treated for various issues including mucous plug leading to acute hypoxic respiratory failure, she pulled out her trach 9/4 so that was reinserted at [facility] and she was also treated for ESBL UTI while there, and last admit she was treated for likely aspiration pneumonia. Family was concerned for repeat stroke as she had decline in alertness after trach reinserted at [facility]. MRI and EEG during her last admission showed no acute process.    Her daughter/POA, Shayna, can be reached at [phone].

## 2017-10-03 NOTE — SUBJECTIVE & OBJECTIVE
Past Medical History:   Diagnosis Date    Acute bilateral cerebral infarction in a watershed distribution 2017    CAD (coronary artery disease)     Diabetes mellitus     Encephalopathy     Hypertension     Morbid obesity     TAMMY on CPAP     setting +17    Stroke        Past Surgical History:   Procedure Laterality Date    ARTERIAL BYPASS SURGRY      9 tears sgo     SECTION      CORONARY ARTERY BYPASS GRAFT      HYSTERECTOMY      TUBAL LIGATION         Review of patient's allergies indicates:   Allergen Reactions    Latex, natural rubber        No current facility-administered medications on file prior to encounter.      Current Outpatient Prescriptions on File Prior to Encounter   Medication Sig    acetaminophen (TYLENOL) 650 mg/20.3 mL Soln 20.3 mLs (650 mg total) by Per NG tube route every 6 (six) hours as needed. (Patient taking differently: 650 mg by Per NG tube route every 6 (six) hours as needed for Pain. )    albuterol-ipratropium 2.5mg-0.5mg/3mL (DUO-NEB) 0.5 mg-3 mg(2.5 mg base)/3 mL nebulizer solution Take 3 mLs by nebulization every 4 (four) hours. Rescue    amlodipine (NORVASC) 10 MG tablet 1 tablet (10 mg total) by Per G Tube route once daily.    aspirin 81 MG Chew 1 tablet (81 mg total) by Per G Tube route once daily.    atorvastatin (LIPITOR) 10 MG tablet 1 tablet (10 mg total) by Per G Tube route once daily. (Patient taking differently: 10 mg by Per G Tube route every evening. )    bisacodyl (DULCOLAX) 5 mg EC tablet Take 5 mg by mouth daily as needed for Constipation.    carvedilol (COREG) 12.5 MG tablet Take 1 tablet (12.5 mg total) by mouth 2 (two) times daily.    chlorhexidine (PERIDEX) 0.12 % solution Use as directed 15 mLs in the mouth or throat 2 (two) times daily.    cloNIDine 0.1 mg/24 hr td ptwk (CATAPRES) 0.1 mg/24 hr Place 1 patch onto the skin every 7 days. (Patient taking differently: Place 1 patch onto the skin every Tuesday. )    diclofenac sodium  (VOLTAREN) 1 % Gel Apply 2 g topically 2 (two) times daily.    diphenhydrAMINE (BENADRYL) 25 mg capsule 25 mg by Per G Tube route 3 (three) times daily as needed for Itching.    docusate sodium (COLACE) 100 MG capsule 100 mg by Per G Tube route 2 (two) times daily.    EASY TOUCH TWIST LANCETS 30 gauge Misc     enoxaparin (LOVENOX) 40 mg/0.4 mL Syrg Inject 40 mg into the skin once daily.    fluoxetine (PROZAC) 20 MG capsule 1 capsule (20 mg total) by Per G Tube route once daily.    hydrALAZINE (APRESOLINE) 25 MG tablet 1 tablet (25 mg total) by Per G Tube route every 6 (six) hours.    insulin aspart (NOVOLOG) 100 unit/mL InPn pen Inject 1-10 Units into the skin every 4 (four) hours as needed (Hyperglycemia).    lisinopril 10 MG tablet 10 mg by Per G Tube route once daily.    nitroGLYCERIN (NITROSTAT) 0.4 MG SL tablet Place 0.4 mg under the tongue every 5 (five) minutes as needed for Chest pain.    ondansetron (ZOFRAN, AS HYDROCHLORIDE,) 2 mg/mL injection Inject 4 mg into the vein every 4 (four) hours as needed (for nausea).    pantoprazole (PROTONIX) 40 mg GrPS 1 packet (40 mg total) by Per G Tube route once daily.    risperidone (RISPERDAL) 0.25 MG Tab 0.25 mg by Per G Tube route 2 (two) times daily.     Family History     Problem Relation (Age of Onset)    No Known Problems Father, Mother, Sister, Brother, Maternal Aunt, Maternal Uncle, Paternal Aunt, Paternal Uncle, Maternal Grandmother, Maternal Grandfather, Paternal Grandmother, Paternal Grandfather        Social History Main Topics    Smoking status: Never Smoker    Smokeless tobacco: Never Used    Alcohol use Yes      Comment: occ    Drug use: No    Sexual activity: Not Currently     Birth control/ protection: See Surgical Hx     Review of Systems   Unable to perform ROS: Intubated     Objective:     Vital Signs (Most Recent):  Temp: (!) 95.4 °F (35.2 °C) (10/03/17 1416)  Pulse: 62 (10/03/17 1435)  Resp: 20 (10/03/17 1425)  BP: 131/70  (10/03/17 1435)  SpO2: (!) 92 % (10/03/17 1435) Vital Signs (24h Range):  Temp:  [95.4 °F (35.2 °C)] 95.4 °F (35.2 °C)  Pulse:  [54-74] 62  Resp:  [20-28] 20  SpO2:  [92 %-100 %] 92 %  BP: ()/() 131/70     Weight: (!) 158.8 kg (350 lb)  Body mass index is 53.22 kg/m².    Physical Exam   Constitutional: She appears well-developed and well-nourished.   Sedated   HENT:   Right Ear: External ear normal.   Left Ear: External ear normal.   Nose: Nose normal.   Eyes: No scleral icterus.   Pupils 2mm bilaterally, minimally reactive.   Neck:   Trach collar in place. On ventilator.   Cardiovascular: Normal rate and normal heart sounds.  Exam reveals no friction rub.    No murmur heard.  Distant, tachycardic   Pulmonary/Chest: She has no wheezes. She has no rales.   Difficult to auscultate due to body habitus, on mechanical ventilation.   Abdominal: Soft. She exhibits no mass. There is no tenderness. There is no guarding.   Morbidly obese, PEG in place.   Musculoskeletal: She exhibits no edema or deformity.   Neurological:   Sedated   Skin: Skin is dry.        Significant Labs: All pertinent labs within the past 24 hours have been reviewed.    Significant Imaging: I have reviewed and interpreted all pertinent imaging results/findings within the past 24 hours.

## 2017-10-03 NOTE — ED NOTES
Multiple ER staff in room, assisting in pt care, Dr. Yancey, leonard RN, frank RN, jason RN, matthew RN, resp tech Binh, resp supervisor Mone,

## 2017-10-03 NOTE — ED TRIAGE NOTES
Pt presents to ER from [facility] via EMS after cardiac arrest.  EMS reports pt was in PEA upon arrival.  EMS reports after epi and fluid pt was in NSR at a rate of 75.  Upon arrival in ER, pt I being bagged via trach.  Pulse present.

## 2017-10-03 NOTE — CONSULTS
[facility]  Cardiology  Consult Note    Patient Name: Nisa Frank  MRN: [id number]  Admission Date: 10/3/2017  Hospital Length of Stay: 0 days  Code Status: Full Code   Attending Provider: Krish Moran DO   Consulting Provider: Deo Cantrell MD  Primary Care Physician: Atilio Downs MD  Principal Problem:Cardiac arrest, cause unspecified    Patient information was obtained from ER records.     Consults  Subjective:     Chief Complaint:  PEA arrest     HPI:   HPI: This 67 y.o. female with a past medical history of Acute bilateral cerebral infarction in a watershed distribution (06/2017); CAD; Diabetes mellitus; Hypertension; Morbid obesity; TAMMY on CPAP; and Stroke, presents to the ED via EMS from a [facility] c/o a Cardiac Arrest. EMS notes CPR was initiated by the staff there for 5 minutes. EMS noted PEA on contact. EMS continued CPR for another 5 minute. Patient was never shocked. Patient noted to have ROSC, NSR with pulse of 75 and BP of 150/120 after x2 epi were administered by EMS. EMS also initiated fluid bolus of 300 ml upon arrival to ED. Patient is also Ambu bagged via her trach tube. Otherwise, hx is limited as patient is non-verbal.    Currently poorly responsive on vent  HR/BP stable  EKG NSR minor lateral NSSTT changes  Recently prolonged hospitalizations with bilateral CVAs and respiratory failure requiring trach    Echo 10/3/17    1 - Hyperdynamic left ventricular systolic function (EF >70%).     2 - Concentric remodeling.     3 - Biatrial enlargement.     4 - Right ventricular enlargement with mildly to moderately depressed systolic function.     5 - The estimated PA systolic pressure is 26 mmHg.     6 - Trivial to mild tricuspid regurgitation.     7 - Trivial pulmonic regurgitation.     Previously seen by me - last 12/2016  CAD/CABG 2005  HTN, DM, Obesity     DSE 10/27/15    1 - Normal left ventricular systolic function (EF 55-60%).     2 -  Concentric remodeling.     No evidence of stress induced myocardial ischemia.      LE arterial US 11/2014  1. Findings consistent with mild bilateral lower extremity peripheral vascular disease.  Both ABIs are within acceptable range.  2.  Findings suggesting likely greater than 50% stenosis of the right posterior tibial and left anterior and posterior tibial arteries.  3.  No arterial occlusion.    Discharged from [facility] 9/29/17  Initial Presentation:  Ms Frank 66 year old female with diabetes mellitus type 2, CKD stage 3, CAD s/p CABG, essential hypertension, cerebrovascular disease s/p bi-hemispheric watershed infarcts and trach/PEG status who was brought in via EMS from [facility] for acute respiratory distress presented to [facility] 8/8 with acute respiratory failure with hypoxia likely due to mucous plug. Patient was admitted to ICU to wean off MV as tolerated and for placement to [facility] where she was transferred to [facility] on 8/14 in stable condition.  She pulled out her trach on 9/4 and went to [facility].  She was treated for an ESBL E. Coli UTI with Meropenem. The family has been very unhappy with the care that the patient has received.  She was medically stable for transfer to a SNF yesterday, however overnight she developed a new WBC 26.  She was found to have a RML and RLL infiltrate and was started on Vanc and Zosyn.  She also developed a new KATHI on top of her CKD (0.6 to 1.8).  Family is requesting transfer to [facility].    Spoke with daughter on the phone.  Per family patient was previously alert and oriented to person and place.  She was conversing and able to participate in therapy.  This changed shortly after trach removal and replacement on 9/4.  Family reports some complication during trach replacement resulting in overnight ICU care.  After patient was stepped down they report she stopped talking and would not participate with  therapy.  The only response they could get from the patient was eye tracking and intermittent hand squeezes.  Family feels that patient may have had a repeat stroke and was requesting MRI.   Spoke with Dagoberto Melo nurse who reports that patient has been near obtunded for as long as she has had her (past couple of days).  She reported recent WBC jump and associated fever followed by broad spectrum abx.  In addiction notes concern for ileus given high residuals from NG tube feeds.  Does not high residuals resolved once tube was flushed.          Per nurse medications prior to concern for ileus.  Vanc 1750mg , Q24, Zosyn 4.5 Q 12, ASA 81, Clonidine 0.1 patch weekly, Lipitor 10, Colace, Lovenox 40, Protonix 40, Hydralazine 100 Q8, Keppra 500 BID, Lisniopril 40 PO, 25 Metoprolol XL, Procardia 60 mg Daily, Risperdal 25 QHS.     Course of Principle Problem for Admission:  9/14: Patients mental status unchanged from yesterday. Labs showing significant improvement. Will continue to treat for VAP. Schedule for CT scan of abdomen.   9/15: Patients mental status improved, appears more awake/alert. But no verbal communication yet. Following commands. Continue to treat to VAP. MRI scheduled. PT/OT   9:16: Patient more awake and alert. Still non verbal. Continuing to treat VAP. PT/OT   9/17: Patient awake and alert. Still non-verbal. Had a long conversation w/ family regarding patient status; will get records/procedure report.   9/18: patient is awake and alert but non-verbal and doesn't follow command. Surgery was consulted for ileus.   9/19: Patient is awake. But non-verbal and doesn;t follow commands. NG tube was pulled out last night. Surgery consulted for PEG tube.   9/20: Patient scheduled for PEG tube placement today. Abx course completed, Na corrected, BP wnl. Patient's family contacted regarding NH placement, d/c pending family's discission.   9/21: PEG tube in place. Consulted dietary for nutrition. Waiting on family for  NH placement.   9/22-23: Resumed normal TF via boluses- giving HTN regimen via PEG. Dispo pending SNF acceptance.   9/24-9/26: Dispo pending SNF acceptance. Spoke with daughter today, she is still reviewing the facilities.   9/27: Patient's sister reports hand pain on the left side. There is no pain on palpation or limits in range of motion. An Xray of wrist was obtained and negative for fractures.     Other Medical Problems Addressed in the Hospital:  * Acute encephalopathy  - Concern for Anoxic brain injury during trach removal/replacement, CVA, Seizure, Worsening Sepsis. Urine culture no growth.  MRI no acute hemorrhagic pathology. CT head, EEG, and MRI were all negative for acute events, and confusion believed to be 2/2 hypernatremia, infection, and ileus which have all resolved  - mentation waxes and wanes.    - s/p PEG tube placement   - monitor electrolytes  - control BP         KATHI (acute kidney injury)  - Improving since admission.   - monitor renal function   - Cr of 0.6 this AM.  - Resolved.         Respiratory failure  - Concern for possible VAP  - Decreased O2 5L; 99%, will continue to titrate   - completed VAP treatment   - No Leukocytosis now      Tracheostomy status  - Trach care  - Patient continues to have cough but afebrile.   - Q2 trach suction.         Dysphagia  -Per family was tolerating diet prior to trach replacement on 9/4   -viable PEG placed this admission                    Hyperlipidemia  -home statin        Morbid obesity with BMI of 50.0-59.9, adult  -risk factor. Lovenox 40mg BID        S/P CABG (coronary artery bypass graft)  - Continue ASA          Type 2 diabetes mellitus, uncontrolled  - Per OSH records not on insulin   - Her HbA1c  8/9/17 is 7.2   - Sliding scale         Essential hypertension  - HX of significant HTN. Per OSH on hydralazine 100 Q8 PO, Linsiopril 40 PO, Metoprolol XL 25 BID, Nifedipine 60mg. She has Clonidine 0.1 Patch Q week, a new one was placed today 9/18/17.    - her BP is poorly controlled. Her SBP was around 220-190.   - Her heart rate runs low. Labetalol is not safe to be used at this point.   -resuming home regimen as BP tolerates via PEG  - added PO hydralazine 25mg TID   - norvasc 10mg QS        Past Medical History:   Diagnosis Date    Acute bilateral cerebral infarction in a watershed distribution 2017    CAD (coronary artery disease)     Diabetes mellitus     Encephalopathy     Hypertension     Morbid obesity     TAMMY on CPAP     setting +17    Stroke        Past Surgical History:   Procedure Laterality Date    ARTERIAL BYPASS SURGRY      9 tears sgo     SECTION      CORONARY ARTERY BYPASS GRAFT      HYSTERECTOMY      TUBAL LIGATION         Review of patient's allergies indicates:   Allergen Reactions    Latex, natural rubber        No current facility-administered medications on file prior to encounter.      Current Outpatient Prescriptions on File Prior to Encounter   Medication Sig    acetaminophen (TYLENOL) 650 mg/20.3 mL Soln 20.3 mLs (650 mg total) by Per NG tube route every 6 (six) hours as needed. (Patient taking differently: 650 mg by Per NG tube route every 6 (six) hours as needed for Pain. )    albuterol-ipratropium 2.5mg-0.5mg/3mL (DUO-NEB) 0.5 mg-3 mg(2.5 mg base)/3 mL nebulizer solution Take 3 mLs by nebulization every 4 (four) hours. Rescue    amlodipine (NORVASC) 10 MG tablet 1 tablet (10 mg total) by Per G Tube route once daily.    aspirin 81 MG Chew 1 tablet (81 mg total) by Per G Tube route once daily.    atorvastatin (LIPITOR) 10 MG tablet 1 tablet (10 mg total) by Per G Tube route once daily. (Patient taking differently: 10 mg by Per G Tube route every evening. )    carvedilol (COREG) 12.5 MG tablet Take 1 tablet (12.5 mg total) by mouth 2 (two) times daily.    diphenhydrAMINE (BENADRYL) 25 mg capsule 25 mg by Per G Tube route 3 (three) times daily as needed for Itching.    docusate sodium (COLACE) 100 MG  capsule 100 mg by Per G Tube route 2 (two) times daily.    fluoxetine (PROZAC) 20 MG capsule 1 capsule (20 mg total) by Per G Tube route once daily.    hydrALAZINE (APRESOLINE) 25 MG tablet 1 tablet (25 mg total) by Per G Tube route every 6 (six) hours.    lisinopril 10 MG tablet 10 mg by Per G Tube route once daily.    nitroGLYCERIN (NITROSTAT) 0.4 MG SL tablet Place 0.4 mg under the tongue every 5 (five) minutes as needed for Chest pain.    pantoprazole (PROTONIX) 40 mg GrPS 1 packet (40 mg total) by Per G Tube route once daily.    risperidone (RISPERDAL) 0.25 MG Tab 0.25 mg by Per G Tube route 2 (two) times daily.    bisacodyl (DULCOLAX) 5 mg EC tablet Take 5 mg by mouth daily as needed for Constipation.    chlorhexidine (PERIDEX) 0.12 % solution Use as directed 15 mLs in the mouth or throat 2 (two) times daily.    cloNIDine 0.1 mg/24 hr td ptwk (CATAPRES) 0.1 mg/24 hr Place 1 patch onto the skin every 7 days. (Patient taking differently: Place 1 patch onto the skin every Tuesday. )    diclofenac sodium (VOLTAREN) 1 % Gel Apply 2 g topically 2 (two) times daily.    EASY TOUCH TWIST LANCETS 30 gauge Misc     enoxaparin (LOVENOX) 40 mg/0.4 mL Syrg Inject 40 mg into the skin once daily.    insulin aspart (NOVOLOG) 100 unit/mL InPn pen Inject 1-10 Units into the skin every 4 (four) hours as needed (Hyperglycemia).    ondansetron (ZOFRAN, AS HYDROCHLORIDE,) 2 mg/mL injection Inject 4 mg into the vein every 4 (four) hours as needed (for nausea).     Family History     Problem Relation (Age of Onset)    No Known Problems Father, Mother, Sister, Brother, Maternal Aunt, Maternal Uncle, Paternal Aunt, Paternal Uncle, Maternal Grandmother, Maternal Grandfather, Paternal Grandmother, Paternal Grandfather        Social History Main Topics    Smoking status: Never Smoker    Smokeless tobacco: Never Used    Alcohol use Yes      Comment: occ    Drug use: No    Sexual activity: Not Currently     Birth control/  protection: See Surgical Hx     Review of Systems   Unable to perform ROS: patient nonverbal     Objective:     Vital Signs (Most Recent):  Temp: (!) 95.8 °F (35.4 °C) (10/03/17 1510)  Pulse: 63 (10/03/17 1551)  Resp: 20 (10/03/17 1551)  BP: (!) 133/57 (10/03/17 1515)  SpO2: 100 % (10/03/17 1551) Vital Signs (24h Range):  Temp:  [95.4 °F (35.2 °C)-95.8 °F (35.4 °C)] 95.8 °F (35.4 °C)  Pulse:  [54-74] 63  Resp:  [20-28] 20  SpO2:  [92 %-100 %] 100 %  BP: ()/() 133/57     Weight: (!) 158.8 kg (350 lb)  Body mass index is 53.22 kg/m².    SpO2: 100 %  O2 Device (Oxygen Therapy): ventilator      Intake/Output Summary (Last 24 hours) at 10/03/17 1700  Last data filed at 10/03/17 1245   Gross per 24 hour   Intake              750 ml   Output                0 ml   Net              750 ml       Lines/Drains/Airways     Drain                 Gastrostomy/Enterostomy 09/20/17 0816 Gastrostomy tube w/ balloon midline feeding 13 days         Urethral Catheter 10/03/17 1255 Straight-tip 16 Fr. less than 1 day          Airway                 Surgical Airway Other (Comment) Cuffed -- days          Intraosseous Line                 Intraosseous Line 10/03/17 1118 less than 1 day          Pressure Ulcer                 Pressure Ulcer 10/03/17 1305 medial sacral spine Stage I less than 1 day          Peripheral Intravenous Line                 Peripheral IV - Single Lumen 09/21/17 2312 Posterior;Right Forearm 11 days         Peripheral IV - Single Lumen 10/03/17 1200 Left Wrist less than 1 day                Physical Exam   Constitutional: She is oriented to person, place, and time. She appears well-developed and well-nourished.   HENT:   Head: Normocephalic and atraumatic.   Eyes: Conjunctivae are normal. Pupils are equal, round, and reactive to light.   Neck: Normal range of motion. Neck supple.   Cardiovascular: Normal rate, normal heart sounds and intact distal pulses.    Pulmonary/Chest: Effort normal. She has rhonchi.    Abdominal: Soft. Bowel sounds are normal.   Musculoskeletal: Normal range of motion.   Neurological: She is alert and oriented to person, place, and time.   Skin: Skin is warm and dry.       Significant Labs: All pertinent lab results from the last 24 hours have been reviewed.    Significant Imaging: Echocardiogram:   2D echo with color flow doppler:   Results for orders placed or performed during the hospital encounter of 10/03/17   2D echo with color flow doppler   Result Value Ref Range    EF 75 55 - 65    Diastolic Dysfunction No     Est. PA Systolic Pressure 25.66     Tricuspid Valve Regurgitation TRIVIAL TO MILD      Assessment and Plan:     * Cardiac arrest, cause unspecified    Suspect this was primarily pulmonary - possibly from a mucus plug. EKG with no acute changes and good EF on echo. Currently with stable HR/BP - will continue to observe and provide supportive care where needed        Acute encephalopathy    Some witnessed seizure activity in ICU. Neurology consult        Cerebral infarction, watershed distribution, bilateral, acute             Tracheostomy status             Acute on chronic respiratory failure    Per pulmonary        CAD s/p CABG    No MI. Good EF on echo        Morbid obesity with BMI of 50.0-59.9, adult             Type 2 diabetes mellitus, uncontrolled             Essential hypertension                 VTE Risk Mitigation         Ordered     heparin (porcine) injection 7,500 Units  Every 8 hours     Route:  Subcutaneous        10/03/17 1509     High Risk of VTE  Once      10/03/17 1509     Place GARRETT hose  Until discontinued      10/03/17 1509     Place sequential compression device  Until discontinued      10/03/17 1509          Thank you for your consult. I will follow-up with patient. Please contact us if you have any additional questions.    Deo Cantrell MD  Cardiology   [facility]

## 2017-10-03 NOTE — ASSESSMENT & PLAN NOTE
Chronically on trach collar. Placed on vent s/p cardiac arrest - wean as tolerated. Pulmonology consulted.

## 2017-10-03 NOTE — CONSULTS
[facility]  Pulmonology  Consult Note    Patient Name: Nisa Frank  MRN: [id number]  Admission Date: 10/3/2017  Hospital Length of Stay: 0 days  Code Status: Full Code  Attending Physician: Krish Moran DO  Primary Care Provider: Atilio Downs MD   Principal Problem: Cardiac arrest, cause unspecified    Inpatient consult to Pulmonology  Consult performed by: DONIS WALTERS  Consult ordered by: VANDANA LOPEZ        Subjective:     HPI:  Patient is 67 y.o. female with PMH of  has a past medical history of Acute bilateral cerebral infarction in a watershed distribution (2017); CAD (coronary artery disease); Diabetes mellitus; Encephalopathy; Hypertension; Morbid obesity; TAMMY on CPAP; and Stroke. presented on 10/3/17 with unresponsiveness.  Per ER note, there was mentioning of PEA s/p resusitation with ROSC.  Patient currently is non-responsive to tactile stimuli.  History is per chart reviewed and family.      Per family, patient with declined in mentation since early 2017 s/p trach revision at [facility].  Per family, patient was alert but non-verbal after trache revision procedure on 9/3/17.  Patient was apparently found unresponsive this am and EMS was activated.        Past Medical History:   Diagnosis Date    Acute bilateral cerebral infarction in a watershed distribution 2017    CAD (coronary artery disease)     Diabetes mellitus     Encephalopathy     Hypertension     Morbid obesity     TAMMY on CPAP     setting +17    Stroke        Past Surgical History:   Procedure Laterality Date    ARTERIAL BYPASS SURGRY      9 tears sgo     SECTION      CORONARY ARTERY BYPASS GRAFT      HYSTERECTOMY      TUBAL LIGATION         Review of patient's allergies indicates:   Allergen Reactions    Latex, natural rubber        Family History     Problem Relation (Age of Onset)    No Known Problems Father, Mother, Sister, Brother, Maternal Aunt, Maternal Uncle,  Paternal Aunt, Paternal Uncle, Maternal Grandmother, Maternal Grandfather, Paternal Grandmother, Paternal Grandfather        Social History Main Topics    Smoking status: Never Smoker    Smokeless tobacco: Never Used    Alcohol use Yes      Comment: occ    Drug use: No    Sexual activity: Not Currently     Birth control/ protection: See Surgical Hx         Review of Systems   Unable to perform ROS: Patient unresponsive     Objective:     Vital Signs (Most Recent):  Temp: (!) 95.8 °F (35.4 °C) (10/03/17 1510)  Pulse: 63 (10/03/17 1551)  Resp: 20 (10/03/17 1551)  BP: (!) 133/57 (10/03/17 1515)  SpO2: 100 % (10/03/17 1551) Vital Signs (24h Range):  Temp:  [95.4 °F (35.2 °C)-95.8 °F (35.4 °C)] 95.8 °F (35.4 °C)  Pulse:  [54-74] 63  Resp:  [20-28] 20  SpO2:  [92 %-100 %] 100 %  BP: ()/() 133/57     Weight: (!) 158.8 kg (350 lb)  Body mass index is 53.22 kg/m².      Intake/Output Summary (Last 24 hours) at 10/03/17 1734  Last data filed at 10/03/17 1245   Gross per 24 hour   Intake              750 ml   Output                0 ml   Net              750 ml       Physical Exam   Constitutional: She is oriented to person, place, and time. She appears well-developed.   HENT:   Head: Normocephalic and atraumatic.   Mouth/Throat: Oropharynx is clear and moist.   Eyes: Conjunctivae are normal. Right eye exhibits no discharge. Left eye exhibits no discharge.   Neck: Normal range of motion. Neck supple.   Tracheostomy in place   Cardiovascular: Normal rate, regular rhythm and normal heart sounds.    Pulmonary/Chest: Effort normal and breath sounds normal.   Abdominal: Soft. Bowel sounds are normal.   Musculoskeletal: Normal range of motion.   Neurological: She is alert and oriented to person, place, and time. No cranial nerve deficit.   Skin: Skin is warm.   Psychiatric:   Unresponsive to tactile stimuli.       Vents:  Vent Mode: PRVC (10/03/17 1551)  Set Rate: 20 bmp (10/03/17 1551)  Vt Set: 400 mL (10/03/17  1551)  PEEP/CPAP: 5 cmH20 (10/03/17 1551)  Oxygen Concentration (%): 50 (10/03/17 1551)  Peak Airway Pressure: 26.7 cmH2O (10/03/17 1551)  Total Ve: 8.2 mL (10/03/17 1551)  F/VT Ratio<105 (RSBI): (!) 48.78 (10/03/17 1551)    Lines/Drains/Airways     Peripherally Inserted Central Catheter Line                 PICC Double Lumen 10/03/17 1700 left basilic less than 1 day          Drain                 Gastrostomy/Enterostomy 09/20/17 0816 Gastrostomy tube w/ balloon midline feeding 13 days         Urethral Catheter 10/03/17 1255 Straight-tip 16 Fr. less than 1 day          Airway                 Surgical Airway Other (Comment) Cuffed -- days          Intraosseous Line                 Intraosseous Line 10/03/17 1118 less than 1 day          Pressure Ulcer                 Pressure Ulcer 10/03/17 1305 medial sacral spine Stage I less than 1 day          Peripheral Intravenous Line                 Peripheral IV - Single Lumen 09/21/17 2312 Posterior;Right Forearm 11 days         Peripheral IV - Single Lumen 10/03/17 1200 Left Wrist less than 1 day                Significant Labs:    CBC/Anemia Profile:    Recent Labs  Lab 10/03/17  1140   WBC 12.50   HGB 8.4*   HCT 27.6*      MCV 88   RDW 16.3*        Chemistries:    Recent Labs  Lab 10/03/17  1140      K 5.5*      CO2 22*   BUN 22   CREATININE 1.3   CALCIUM 9.0   ALBUMIN 2.0*   PROT 6.6   BILITOT 0.6   ALKPHOS 132   ALT 58*   AST 79*       ABGs:   Recent Labs  Lab 10/03/17  1458   PH 7.396   PCO2 48.6*   HCO3 29.9*   POCSATURATED 100   BE 4       Significant Imaging:   CXR: I have reviewed all pertinent results/findings within the past 24 hours and my personal findings are:  no effusion or consolidation    Assessment/Plan:     Acute on chronic respiratory failure    Suspect obesity hypoventilation at baseline.  Currently on volume control ventilation due to mentation.  cxr unremarkable.  May wean if no cardiac event overnight.          S/P CABG  (coronary artery bypass graft)    Per history.  Current hemodynamics are stable.  Unclear of etiology.  No mentioning of mucous plug.  Card was consulted.  Trending enzymes.                Thank you for your consult. I will follow-up with patient. Please contact us if you have any additional questions.     Williams Padilla MD  Pulmonology  [facility]

## 2017-10-03 NOTE — HPI
Patient is 67 y.o. female with PMH of  has a past medical history of Acute bilateral cerebral infarction in a watershed distribution (06/2017); CAD (coronary artery disease); Diabetes mellitus; Encephalopathy; Hypertension; Morbid obesity; TAMMY on CPAP; and Stroke. presented on 10/3/17 with unresponsiveness.  Per ER note, there was mentioning of PEA s/p resusitation with ROSC.  Patient currently is non-responsive to tactile stimuli.  History is per chart reviewed and family.      Per family, patient with declined in mentation since early September 2017 s/p trach revision at [facility].  Per family, patient was alert but non-verbal after trache revision procedure on 9/3/17.  Patient was apparently found unresponsive this am and EMS was activated.

## 2017-10-03 NOTE — PLAN OF CARE
Problem: Patient Care Overview  Goal: Plan of Care Review  Outcome: Ongoing (interventions implemented as appropriate)  Pt admitted to unit at 1510. Pt unarousable to stimuli. Pt no longer requiring pressors. Pt remains on versed and fentanyl. Pt has very poor urinary out put.Pt with PICC placed still awaiting to see if it has correct placement before using.Pt on vent with PEEP 5 rate 20 FIO2 50%

## 2017-10-03 NOTE — HPI
HPI: This 67 y.o. female with a past medical history of Acute bilateral cerebral infarction in a watershed distribution (06/2017); CAD; Diabetes mellitus; Hypertension; Morbid obesity; TAMMY on CPAP; and Stroke, presents to the ED via EMS from a [facility] c/o a Cardiac Arrest. EMS notes CPR was initiated by the staff there for 5 minutes. EMS noted PEA on contact. EMS continued CPR for another 5 minute. Patient was never shocked. Patient noted to have ROSC, NSR with pulse of 75 and BP of 150/120 after x2 epi were administered by EMS. EMS also initiated fluid bolus of 300 ml upon arrival to ED. Patient is also Ambu bagged via her trach tube. Otherwise, hx is limited as patient is non-verbal.    Currently poorly responsive on vent  HR/BP stable  EKG NSR minor lateral NSSTT changes  Recently prolonged hospitalizations with bilateral CVAs and respiratory failure requiring trach    Echo 10/3/17    1 - Hyperdynamic left ventricular systolic function (EF >70%).     2 - Concentric remodeling.     3 - Biatrial enlargement.     4 - Right ventricular enlargement with mildly to moderately depressed systolic function.     5 - The estimated PA systolic pressure is 26 mmHg.     6 - Trivial to mild tricuspid regurgitation.     7 - Trivial pulmonic regurgitation.     Previously seen by me - last 12/2016  CAD/CABG 2005  HTN, DM, Obesity     DSE 10/27/15    1 - Normal left ventricular systolic function (EF 55-60%).     2 - Concentric remodeling.     No evidence of stress induced myocardial ischemia.      LE arterial US 11/2014  1. Findings consistent with mild bilateral lower extremity peripheral vascular disease.  Both ABIs are within acceptable range.  2.  Findings suggesting likely greater than 50% stenosis of the right posterior tibial and left anterior and posterior tibial arteries.  3.  No arterial occlusion.    Discharged from main [place] [facility] 9/29/17  Initial Presentation:  Ms Frank 66 year old female with  diabetes mellitus type 2, CKD stage 3, CAD s/p CABG, essential hypertension, cerebrovascular disease s/p bi-hemispheric watershed infarcts and trach/PEG status who was brought in via EMS from [facility] for acute respiratory distress presented to [facility] 8/8 with acute respiratory failure with hypoxia likely due to mucous plug. Patient was admitted to ICU to wean off MV as tolerated and for placement to [facility] where she was transferred to [facility] on 8/14 in stable condition.  She pulled out her trach on 9/4 and went to [facility].  She was treated for an ESBL E. Coli UTI with Meropenem. The family has been very unhappy with the care that the patient has received.  She was medically stable for transfer to a SNF yesterday, however overnight she developed a new WBC 26.  She was found to have a RML and RLL infiltrate and was started on Vanc and Zosyn.  She also developed a new KATHI on top of her CKD (0.6 to 1.8).  Family is requesting transfer to [facility] specifically.    Spoke with daughter on the phone.  Per family patient was previously alert and oriented to person and place.  She was conversing and able to participate in therapy.  This changed shortly after trach removal and replacement on 9/4.  Family reports some complication during trach replacement resulting in overnight ICU care.  After patient was stepped down they report she stopped talking and would not participate with therapy.  The only response they could get from the patient was eye tracking and intermittent hand squeezes.  Family feels that patient may have had a repeat stroke and was requesting MRI.   Spoke with [facility] nurse who reports that patient has been near obtunded for as long as she has had her (past couple of days).  She reported recent WBC jump and associated fever followed by broad spectrum abx.  In addiction notes concern for ileus given high residuals from NG tube feeds.  Does not high  residuals resolved once tube was flushed.          Per nurse medications prior to concern for ileus.  Vanc 1750mg , Q24, Zosyn 4.5 Q 12, ASA 81, Clonidine 0.1 patch weekly, Lipitor 10, Colace, Lovenox 40, Protonix 40, Hydralazine 100 Q8, Keppra 500 BID, Lisniopril 40 PO, 25 Metoprolol XL, Procardia 60 mg Daily, Risperdal 25 QHS.     Course of Principle Problem for Admission:  9/14: Patients mental status unchanged from yesterday. Labs showing significant improvement. Will continue to treat for VAP. Schedule for CT scan of abdomen.   9/15: Patients mental status improved, appears more awake/alert. But no verbal communication yet. Following commands. Continue to treat to VAP. MRI scheduled. PT/OT   9:16: Patient more awake and alert. Still non verbal. Continuing to treat VAP. PT/OT   9/17: Patient awake and alert. Still non-verbal. Had a long conversation w/ family regarding patient status; will get records/procedure report.   9/18: patient is awake and alert but non-verbal and doesn't follow command. Surgery was consulted for ileus.   9/19: Patient is awake. But non-verbal and doesn;t follow commands. NG tube was pulled out last night. Surgery consulted for PEG tube.   9/20: Patient scheduled for PEG tube placement today. Abx course completed, Na corrected, BP wnl. Patient's family contacted regarding NH placement, d/c pending family's discission.   9/21: PEG tube in place. Consulted dietary for nutrition. Waiting on family for NH placement.   9/22-23: Resumed normal TF via boluses- giving HTN regimen via PEG. Dispo pending SNF acceptance.   9/24-9/26: Dispo pending SNF acceptance. Spoke with daughter today, she is still reviewing the facilities.   9/27: Patient's sister reports hand pain on the left side. There is no pain on palpation or limits in range of motion. An Xray of wrist was obtained and negative for fractures.     Other Medical Problems Addressed in the Hospital:  * Acute encephalopathy  - Concern for  Anoxic brain injury during trach removal/replacement, CVA, Seizure, Worsening Sepsis. Urine culture no growth.  MRI no acute hemorrhagic pathology. CT head, EEG, and MRI were all negative for acute events, and confusion believed to be 2/2 hypernatremia, infection, and ileus which have all resolved  - mentation waxes and wanes.    - s/p PEG tube placement   - monitor electrolytes  - control BP         KATHI (acute kidney injury)  - Improving since admission.   - monitor renal function   - Cr of 0.6 this AM.  - Resolved.         Respiratory failure  - Concern for possible VAP  - Decreased O2 5L; 99%, will continue to titrate   - completed VAP treatment   - No Leukocytosis now      Tracheostomy status  - Trach care  - Patient continues to have cough but afebrile.   - Q2 trach suction.         Dysphagia  -Per family was tolerating diet prior to trach replacement on 9/4   -viable PEG placed this admission                    Hyperlipidemia  -home statin        Morbid obesity with BMI of 50.0-59.9, adult  -risk factor. Lovenox 40mg BID        S/P CABG (coronary artery bypass graft)  - Continue ASA          Type 2 diabetes mellitus, uncontrolled  - Per OSH records not on insulin   - Her HbA1c  8/9/17 is 7.2   - Sliding scale         Essential hypertension  - HX of significant HTN. Per OSH on hydralazine 100 Q8 PO, Linsiopril 40 PO, Metoprolol XL 25 BID, Nifedipine 60mg. She has Clonidine 0.1 Patch Q week, a new one was placed today 9/18/17.   - her BP is poorly controlled. Her SBP was around 220-190.   - Her heart rate runs low. Labetalol is not safe to be used at this point.   -resuming home regimen as BP tolerates via PEG  - added PO hydralazine 25mg TID   - norvasc 10mg QS

## 2017-10-03 NOTE — PROGRESS NOTES
Lab notified that troponin ordered for 1510 and still do not see any results or was it completed. Lab will come to unit and draw.
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
## 2017-10-03 NOTE — ASSESSMENT & PLAN NOTE
Chronic trach but not usually vent dependent. On vent after cardiac arrest. Pulm consulted to wean as tolerated.

## 2017-10-03 NOTE — PROGRESS NOTES
Results for JOHN LOPEZ (MRN [id number]) as of 10/3/2017 15:24   Ref. Range 10/3/2017 14:58   POC PH Latest Ref Range: 7.35 - 7.45  7.396   POC PCO2 Latest Ref Range: 35 - 45 mmHg 48.6 (H)   POC PO2 Latest Ref Range: 80 - 100 mmHg 173 (H)   POC BE Latest Ref Range: -2 to 2 mmol/L 4   POC HCO3 Latest Ref Range: 24 - 28 mmol/L 29.9 (H)   POC SATURATED O2 Latest Ref Range: 95 - 100 % 100   POC TCO2 Latest Ref Range: 23 - 27 mmol/L 31 (H)   FiO2 Unknown 55   Vt Unknown 400   PEEP Unknown 5   Sample Unknown ARTERIAL   DelSys Unknown Adult Vent   Allens Test Unknown Pass   Site Unknown RR   Mode Unknown AC/PRVC

## 2017-10-03 NOTE — ASSESSMENT & PLAN NOTE
Cardiac arrest. ROSC about 10 min after chest compressions initiated. 2 rounds of epi. No shock delivered. Cardiology consulted. Echo hyperdynamic with EF 75%, no DD. ASA/statin. No BB/ACEi as BP will not tolerate. Maintain euthermia.

## 2017-10-03 NOTE — ASSESSMENT & PLAN NOTE
Suspect obesity hypoventilation at baseline.  Currently on volume control ventilation due to mentation.  cxr unremarkable.  May wean if no cardiac event overnight.

## 2017-10-03 NOTE — ASSESSMENT & PLAN NOTE
Per history.  Current hemodynamics are stable.  Unclear of etiology.  No mentioning of mucous plug.  Card was consulted.  Trending enzymes.

## 2017-10-03 NOTE — ED NOTES
PT's BP 99/58, MD notified.  Okay to give 2 mg of IV versed.  Hold norepi until fluids are finished and if pressure drops start norepi, per MD.

## 2017-10-03 NOTE — ASSESSMENT & PLAN NOTE
Suspect this was primarily pulmonary - possibly from a mucus plug. EKG with no acute changes and good EF on echo. Currently with stable HR/BP - will continue to observe and provide supportive care where needed

## 2017-10-03 NOTE — ED NOTES
MD unable to obtain central line.  Hospitalist at bedside.  Per hospitalist, ok for pt to go to ICU with IO, but notify PICC team of order.

## 2017-10-03 NOTE — SUBJECTIVE & OBJECTIVE
Past Medical History:   Diagnosis Date    Acute bilateral cerebral infarction in a watershed distribution 2017    CAD (coronary artery disease)     Diabetes mellitus     Encephalopathy     Hypertension     Morbid obesity     TAMMY on CPAP     setting +17    Stroke        Past Surgical History:   Procedure Laterality Date    ARTERIAL BYPASS SURGRY      9 tears sgo     SECTION      CORONARY ARTERY BYPASS GRAFT      HYSTERECTOMY      TUBAL LIGATION         Review of patient's allergies indicates:   Allergen Reactions    Latex, natural rubber        Family History     Problem Relation (Age of Onset)    No Known Problems Father, Mother, Sister, Brother, Maternal Aunt, Maternal Uncle, Paternal Aunt, Paternal Uncle, Maternal Grandmother, Maternal Grandfather, Paternal Grandmother, Paternal Grandfather        Social History Main Topics    Smoking status: Never Smoker    Smokeless tobacco: Never Used    Alcohol use Yes      Comment: occ    Drug use: No    Sexual activity: Not Currently     Birth control/ protection: See Surgical Hx         Review of Systems   Unable to perform ROS: Patient unresponsive     Objective:     Vital Signs (Most Recent):  Temp: (!) 95.8 °F (35.4 °C) (10/03/17 1510)  Pulse: 63 (10/03/17 1551)  Resp: 20 (10/03/17 1551)  BP: (!) 133/57 (10/03/17 1515)  SpO2: 100 % (10/03/17 1551) Vital Signs (24h Range):  Temp:  [95.4 °F (35.2 °C)-95.8 °F (35.4 °C)] 95.8 °F (35.4 °C)  Pulse:  [54-74] 63  Resp:  [20-28] 20  SpO2:  [92 %-100 %] 100 %  BP: ()/() 133/57     Weight: (!) 158.8 kg (350 lb)  Body mass index is 53.22 kg/m².      Intake/Output Summary (Last 24 hours) at 10/03/17 7284  Last data filed at 10/03/17 1245   Gross per 24 hour   Intake              750 ml   Output                0 ml   Net              750 ml       Physical Exam   Constitutional: She is oriented to person, place, and time. She appears well-developed.   HENT:   Head: Normocephalic and atraumatic.    Mouth/Throat: Oropharynx is clear and moist.   Eyes: Conjunctivae are normal. Right eye exhibits no discharge. Left eye exhibits no discharge.   Neck: Normal range of motion. Neck supple.   Tracheostomy in place   Cardiovascular: Normal rate, regular rhythm and normal heart sounds.    Pulmonary/Chest: Effort normal and breath sounds normal.   Abdominal: Soft. Bowel sounds are normal.   Musculoskeletal: Normal range of motion.   Neurological: She is alert and oriented to person, place, and time. No cranial nerve deficit.   Skin: Skin is warm.   Psychiatric:   Unresponsive to tactile stimuli.       Vents:  Vent Mode: PRVC (10/03/17 1551)  Set Rate: 20 bmp (10/03/17 1551)  Vt Set: 400 mL (10/03/17 1551)  PEEP/CPAP: 5 cmH20 (10/03/17 1551)  Oxygen Concentration (%): 50 (10/03/17 1551)  Peak Airway Pressure: 26.7 cmH2O (10/03/17 1551)  Total Ve: 8.2 mL (10/03/17 1551)  F/VT Ratio<105 (RSBI): (!) 48.78 (10/03/17 1551)    Lines/Drains/Airways     Peripherally Inserted Central Catheter Line                 PICC Double Lumen 10/03/17 1700 left basilic less than 1 day          Drain                 Gastrostomy/Enterostomy 09/20/17 0816 Gastrostomy tube w/ balloon midline feeding 13 days         Urethral Catheter 10/03/17 1255 Straight-tip 16 Fr. less than 1 day          Airway                 Surgical Airway Other (Comment) Cuffed -- days          Intraosseous Line                 Intraosseous Line 10/03/17 1118 less than 1 day          Pressure Ulcer                 Pressure Ulcer 10/03/17 1305 medial sacral spine Stage I less than 1 day          Peripheral Intravenous Line                 Peripheral IV - Single Lumen 09/21/17 2312 Posterior;Right Forearm 11 days         Peripheral IV - Single Lumen 10/03/17 1200 Left Wrist less than 1 day                Significant Labs:    CBC/Anemia Profile:    Recent Labs  Lab 10/03/17  1140   WBC 12.50   HGB 8.4*   HCT 27.6*      MCV 88   RDW 16.3*        Chemistries:    Recent  Labs  Lab 10/03/17  1140      K 5.5*      CO2 22*   BUN 22   CREATININE 1.3   CALCIUM 9.0   ALBUMIN 2.0*   PROT 6.6   BILITOT 0.6   ALKPHOS 132   ALT 58*   AST 79*       ABGs:   Recent Labs  Lab 10/03/17  1458   PH 7.396   PCO2 48.6*   HCO3 29.9*   POCSATURATED 100   BE 4       Significant Imaging:   CXR: I have reviewed all pertinent results/findings within the past 24 hours and my personal findings are:  no effusion or consolidation

## 2017-10-03 NOTE — ASSESSMENT & PLAN NOTE
Hypoxic vs metabolic. Developed myoclonic jerks after arrival in the ICU. Neurology consulted. Treat underlying conditions and monitor.

## 2017-10-03 NOTE — SUBJECTIVE & OBJECTIVE
Past Medical History:   Diagnosis Date    Acute bilateral cerebral infarction in a watershed distribution 2017    CAD (coronary artery disease)     Diabetes mellitus     Encephalopathy     Hypertension     Morbid obesity     TAMMY on CPAP     setting +17    Stroke        Past Surgical History:   Procedure Laterality Date    ARTERIAL BYPASS SURGRY      9 tears sgo     SECTION      CORONARY ARTERY BYPASS GRAFT      HYSTERECTOMY      TUBAL LIGATION         Review of patient's allergies indicates:   Allergen Reactions    Latex, natural rubber        No current facility-administered medications on file prior to encounter.      Current Outpatient Prescriptions on File Prior to Encounter   Medication Sig    acetaminophen (TYLENOL) 650 mg/20.3 mL Soln 20.3 mLs (650 mg total) by Per NG tube route every 6 (six) hours as needed. (Patient taking differently: 650 mg by Per NG tube route every 6 (six) hours as needed for Pain. )    albuterol-ipratropium 2.5mg-0.5mg/3mL (DUO-NEB) 0.5 mg-3 mg(2.5 mg base)/3 mL nebulizer solution Take 3 mLs by nebulization every 4 (four) hours. Rescue    amlodipine (NORVASC) 10 MG tablet 1 tablet (10 mg total) by Per G Tube route once daily.    aspirin 81 MG Chew 1 tablet (81 mg total) by Per G Tube route once daily.    atorvastatin (LIPITOR) 10 MG tablet 1 tablet (10 mg total) by Per G Tube route once daily. (Patient taking differently: 10 mg by Per G Tube route every evening. )    carvedilol (COREG) 12.5 MG tablet Take 1 tablet (12.5 mg total) by mouth 2 (two) times daily.    diphenhydrAMINE (BENADRYL) 25 mg capsule 25 mg by Per G Tube route 3 (three) times daily as needed for Itching.    docusate sodium (COLACE) 100 MG capsule 100 mg by Per G Tube route 2 (two) times daily.    fluoxetine (PROZAC) 20 MG capsule 1 capsule (20 mg total) by Per G Tube route once daily.    hydrALAZINE (APRESOLINE) 25 MG tablet 1 tablet (25 mg total) by Per G Tube route every 6 (six)  hours.    lisinopril 10 MG tablet 10 mg by Per G Tube route once daily.    nitroGLYCERIN (NITROSTAT) 0.4 MG SL tablet Place 0.4 mg under the tongue every 5 (five) minutes as needed for Chest pain.    pantoprazole (PROTONIX) 40 mg GrPS 1 packet (40 mg total) by Per G Tube route once daily.    risperidone (RISPERDAL) 0.25 MG Tab 0.25 mg by Per G Tube route 2 (two) times daily.    bisacodyl (DULCOLAX) 5 mg EC tablet Take 5 mg by mouth daily as needed for Constipation.    chlorhexidine (PERIDEX) 0.12 % solution Use as directed 15 mLs in the mouth or throat 2 (two) times daily.    cloNIDine 0.1 mg/24 hr td ptwk (CATAPRES) 0.1 mg/24 hr Place 1 patch onto the skin every 7 days. (Patient taking differently: Place 1 patch onto the skin every Tuesday. )    diclofenac sodium (VOLTAREN) 1 % Gel Apply 2 g topically 2 (two) times daily.    EASY TOUCH TWIST LANCETS 30 gauge Misc     enoxaparin (LOVENOX) 40 mg/0.4 mL Syrg Inject 40 mg into the skin once daily.    insulin aspart (NOVOLOG) 100 unit/mL InPn pen Inject 1-10 Units into the skin every 4 (four) hours as needed (Hyperglycemia).    ondansetron (ZOFRAN, AS HYDROCHLORIDE,) 2 mg/mL injection Inject 4 mg into the vein every 4 (four) hours as needed (for nausea).     Family History     Problem Relation (Age of Onset)    No Known Problems Father, Mother, Sister, Brother, Maternal Aunt, Maternal Uncle, Paternal Aunt, Paternal Uncle, Maternal Grandmother, Maternal Grandfather, Paternal Grandmother, Paternal Grandfather        Social History Main Topics    Smoking status: Never Smoker    Smokeless tobacco: Never Used    Alcohol use Yes      Comment: occ    Drug use: No    Sexual activity: Not Currently     Birth control/ protection: See Surgical Hx     Review of Systems   Unable to perform ROS: patient nonverbal     Objective:     Vital Signs (Most Recent):  Temp: (!) 95.8 °F (35.4 °C) (10/03/17 1510)  Pulse: 63 (10/03/17 1551)  Resp: 20 (10/03/17 1551)  BP: (!)  133/57 (10/03/17 1515)  SpO2: 100 % (10/03/17 1551) Vital Signs (24h Range):  Temp:  [95.4 °F (35.2 °C)-95.8 °F (35.4 °C)] 95.8 °F (35.4 °C)  Pulse:  [54-74] 63  Resp:  [20-28] 20  SpO2:  [92 %-100 %] 100 %  BP: ()/() 133/57     Weight: (!) 158.8 kg (350 lb)  Body mass index is 53.22 kg/m².    SpO2: 100 %  O2 Device (Oxygen Therapy): ventilator      Intake/Output Summary (Last 24 hours) at 10/03/17 1700  Last data filed at 10/03/17 1245   Gross per 24 hour   Intake              750 ml   Output                0 ml   Net              750 ml       Lines/Drains/Airways     Drain                 Gastrostomy/Enterostomy 09/20/17 0816 Gastrostomy tube w/ balloon midline feeding 13 days         Urethral Catheter 10/03/17 1255 Straight-tip 16 Fr. less than 1 day          Airway                 Surgical Airway Other (Comment) Cuffed -- days          Intraosseous Line                 Intraosseous Line 10/03/17 1118 less than 1 day          Pressure Ulcer                 Pressure Ulcer 10/03/17 1305 medial sacral spine Stage I less than 1 day          Peripheral Intravenous Line                 Peripheral IV - Single Lumen 09/21/17 2312 Posterior;Right Forearm 11 days         Peripheral IV - Single Lumen 10/03/17 1200 Left Wrist less than 1 day                Physical Exam   Constitutional: She is oriented to person, place, and time. She appears well-developed and well-nourished.   HENT:   Head: Normocephalic and atraumatic.   Eyes: Conjunctivae are normal. Pupils are equal, round, and reactive to light.   Neck: Normal range of motion. Neck supple.   Cardiovascular: Normal rate, normal heart sounds and intact distal pulses.    Pulmonary/Chest: Effort normal. She has rhonchi.   Abdominal: Soft. Bowel sounds are normal.   Musculoskeletal: Normal range of motion.   Neurological: She is alert and oriented to person, place, and time.   Skin: Skin is warm and dry.       Significant Labs: All pertinent lab results from the  last 24 hours have been reviewed.    Significant Imaging: Echocardiogram:   2D echo with color flow doppler:   Results for orders placed or performed during the hospital encounter of 10/03/17   2D echo with color flow doppler   Result Value Ref Range    EF 75 55 - 65    Diastolic Dysfunction No     Est. PA Systolic Pressure 25.66     Tricuspid Valve Regurgitation TRIVIAL TO MILD

## 2017-10-04 LAB
ALBUMIN SERPL BCP-MCNC: 1.9 G/DL
ALLENS TEST: ABNORMAL
ALLENS TEST: ABNORMAL
ALP SERPL-CCNC: 122 U/L
ALT SERPL W/O P-5'-P-CCNC: 46 U/L
ANION GAP SERPL CALC-SCNC: 6 MMOL/L
AST SERPL-CCNC: 41 U/L
BASOPHILS # BLD AUTO: 0.01 K/UL
BASOPHILS NFR BLD: 0.1 %
BILIRUB SERPL-MCNC: 0.5 MG/DL
BUN SERPL-MCNC: 29 MG/DL
CALCIUM SERPL-MCNC: 8.4 MG/DL
CHLORIDE SERPL-SCNC: 104 MMOL/L
CO2 SERPL-SCNC: 28 MMOL/L
CREAT SERPL-MCNC: 1.2 MG/DL
DELSYS: ABNORMAL
DELSYS: ABNORMAL
DIFFERENTIAL METHOD: ABNORMAL
EOSINOPHIL # BLD AUTO: 0 K/UL
EOSINOPHIL NFR BLD: 0.1 %
ERYTHROCYTE [DISTWIDTH] IN BLOOD BY AUTOMATED COUNT: 15.9 %
ERYTHROCYTE [SEDIMENTATION RATE] IN BLOOD BY WESTERGREN METHOD: 15 MM/H
ERYTHROCYTE [SEDIMENTATION RATE] IN BLOOD BY WESTERGREN METHOD: 20 MM/H
EST. GFR  (AFRICAN AMERICAN): 54 ML/MIN/1.73 M^2
EST. GFR  (NON AFRICAN AMERICAN): 47 ML/MIN/1.73 M^2
ESTIMATED AVG GLUCOSE: 146 MG/DL
FIO2: 50
FIO2: 55
GLUCOSE SERPL-MCNC: 190 MG/DL
HBA1C MFR BLD HPLC: 6.7 %
HCO3 UR-SCNC: 26.9 MMOL/L (ref 24–28)
HCO3 UR-SCNC: 27.5 MMOL/L (ref 24–28)
HCT VFR BLD AUTO: 23.3 %
HCT VFR BLD CALC: 27 %PCV (ref 36–54)
HGB BLD-MCNC: 7.5 G/DL
LYMPHOCYTES # BLD AUTO: 1.7 K/UL
LYMPHOCYTES NFR BLD: 13.7 %
MCH RBC QN AUTO: 27.3 PG
MCHC RBC AUTO-ENTMCNC: 32.2 G/DL
MCV RBC AUTO: 85 FL
MODE: ABNORMAL
MODE: ABNORMAL
MONOCYTES # BLD AUTO: 0.9 K/UL
MONOCYTES NFR BLD: 7.7 %
NEUTROPHILS # BLD AUTO: 9.6 K/UL
NEUTROPHILS NFR BLD: 78.4 %
PCO2 BLDA: 47.5 MMHG (ref 35–45)
PCO2 BLDA: 69.6 MMHG (ref 35–45)
PEEP: 5
PEEP: 5
PH SMN: 7.2 [PH] (ref 7.35–7.45)
PH SMN: 7.37 [PH] (ref 7.35–7.45)
PLATELET # BLD AUTO: 219 K/UL
PMV BLD AUTO: 9.4 FL
PO2 BLDA: 56 MMHG (ref 40–60)
PO2 BLDA: 95 MMHG (ref 80–100)
POC BE: -2 MMOL/L
POC BE: 2 MMOL/L
POC IONIZED CALCIUM: 1.27 MMOL/L (ref 1.06–1.42)
POC SATURATED O2: 80 % (ref 95–100)
POC SATURATED O2: 97 % (ref 95–100)
POC TCO2: 29 MMOL/L (ref 23–27)
POC TCO2: 29 MMOL/L (ref 24–29)
POCT GLUCOSE: 121 MG/DL (ref 70–110)
POCT GLUCOSE: 146 MG/DL (ref 70–110)
POCT GLUCOSE: 172 MG/DL (ref 70–110)
POCT GLUCOSE: 274 MG/DL (ref 70–110)
POTASSIUM BLD-SCNC: 5.4 MMOL/L (ref 3.5–5.1)
POTASSIUM SERPL-SCNC: 4.8 MMOL/L
PROT SERPL-MCNC: 6 G/DL
RBC # BLD AUTO: 2.75 M/UL
SAMPLE: ABNORMAL
SAMPLE: ABNORMAL
SITE: ABNORMAL
SITE: ABNORMAL
SODIUM BLD-SCNC: 136 MMOL/L (ref 136–145)
SODIUM SERPL-SCNC: 138 MMOL/L
TROPONIN I SERPL DL<=0.01 NG/ML-MCNC: 0.03 NG/ML
VT: 400
VT: 450
WBC # BLD AUTO: 12.22 K/UL

## 2017-10-04 PROCEDURE — 82803 BLOOD GASES ANY COMBINATION: CPT

## 2017-10-04 PROCEDURE — 94761 N-INVAS EAR/PLS OXIMETRY MLT: CPT

## 2017-10-04 PROCEDURE — 99232 SBSQ HOSP IP/OBS MODERATE 35: CPT | Mod: ,,, | Performed by: INTERNAL MEDICINE

## 2017-10-04 PROCEDURE — 27200966 HC CLOSED SUCTION SYSTEM

## 2017-10-04 PROCEDURE — 63600175 PHARM REV CODE 636 W HCPCS: Performed by: PSYCHIATRY & NEUROLOGY

## 2017-10-04 PROCEDURE — 85025 COMPLETE CBC W/AUTO DIFF WBC: CPT

## 2017-10-04 PROCEDURE — 63600175 PHARM REV CODE 636 W HCPCS: Performed by: EMERGENCY MEDICINE

## 2017-10-04 PROCEDURE — 36415 COLL VENOUS BLD VENIPUNCTURE: CPT

## 2017-10-04 PROCEDURE — 27000221 HC OXYGEN, UP TO 24 HOURS

## 2017-10-04 PROCEDURE — C9113 INJ PANTOPRAZOLE SODIUM, VIA: HCPCS | Performed by: EMERGENCY MEDICINE

## 2017-10-04 PROCEDURE — 99900035 HC TECH TIME PER 15 MIN (STAT)

## 2017-10-04 PROCEDURE — 63600175 PHARM REV CODE 636 W HCPCS: Performed by: INTERNAL MEDICINE

## 2017-10-04 PROCEDURE — 25000003 PHARM REV CODE 250: Performed by: EMERGENCY MEDICINE

## 2017-10-04 PROCEDURE — 99232 SBSQ HOSP IP/OBS MODERATE 35: CPT | Mod: ,,, | Performed by: PSYCHIATRY & NEUROLOGY

## 2017-10-04 PROCEDURE — 36600 WITHDRAWAL OF ARTERIAL BLOOD: CPT

## 2017-10-04 PROCEDURE — A4216 STERILE WATER/SALINE, 10 ML: HCPCS | Performed by: EMERGENCY MEDICINE

## 2017-10-04 PROCEDURE — 80053 COMPREHEN METABOLIC PANEL: CPT

## 2017-10-04 PROCEDURE — 95819 EEG AWAKE AND ASLEEP: CPT | Mod: 26,,, | Performed by: PSYCHIATRY & NEUROLOGY

## 2017-10-04 PROCEDURE — 99900026 HC AIRWAY MAINTENANCE (STAT)

## 2017-10-04 PROCEDURE — 99233 SBSQ HOSP IP/OBS HIGH 50: CPT | Mod: ,,, | Performed by: INTERNAL MEDICINE

## 2017-10-04 PROCEDURE — 20000000 HC ICU ROOM

## 2017-10-04 PROCEDURE — 25000003 PHARM REV CODE 250: Performed by: PSYCHIATRY & NEUROLOGY

## 2017-10-04 PROCEDURE — C9254 INJECTION, LACOSAMIDE: HCPCS | Performed by: PSYCHIATRY & NEUROLOGY

## 2017-10-04 RX ORDER — PROPOFOL 10 MG/ML
5 INJECTION, EMULSION INTRAVENOUS CONTINUOUS
Status: DISCONTINUED | OUTPATIENT
Start: 2017-10-04 | End: 2017-10-05

## 2017-10-04 RX ADMIN — Medication 3 ML: at 09:10

## 2017-10-04 RX ADMIN — PROPOFOL 45 MCG/KG/MIN: 10 INJECTION, EMULSION INTRAVENOUS at 10:10

## 2017-10-04 RX ADMIN — SODIUM CHLORIDE: 0.9 INJECTION, SOLUTION INTRAVENOUS at 08:10

## 2017-10-04 RX ADMIN — HEPARIN SODIUM 7500 UNITS: 5000 INJECTION, SOLUTION INTRAVENOUS; SUBCUTANEOUS at 02:10

## 2017-10-04 RX ADMIN — LORAZEPAM 4 MG: 2 INJECTION INTRAMUSCULAR; INTRAVENOUS at 01:10

## 2017-10-04 RX ADMIN — HEPARIN SODIUM 7500 UNITS: 5000 INJECTION, SOLUTION INTRAVENOUS; SUBCUTANEOUS at 05:10

## 2017-10-04 RX ADMIN — PROPOFOL 50 MCG/KG/MIN: 10 INJECTION, EMULSION INTRAVENOUS at 12:10

## 2017-10-04 RX ADMIN — PROPOFOL 5 MCG/KG/MIN: 10 INJECTION, EMULSION INTRAVENOUS at 12:10

## 2017-10-04 RX ADMIN — PROPOFOL 50 MCG/KG/MIN: 10 INJECTION, EMULSION INTRAVENOUS at 06:10

## 2017-10-04 RX ADMIN — SODIUM CHLORIDE 300 MG: 9 INJECTION, SOLUTION INTRAVENOUS at 04:10

## 2017-10-04 RX ADMIN — SODIUM CHLORIDE: 0.9 INJECTION, SOLUTION INTRAVENOUS at 01:10

## 2017-10-04 RX ADMIN — INSULIN ASPART 2 UNITS: 100 INJECTION, SOLUTION INTRAVENOUS; SUBCUTANEOUS at 05:10

## 2017-10-04 RX ADMIN — Medication 10 ML: at 06:10

## 2017-10-04 RX ADMIN — PROPOFOL 20 MCG/KG/MIN: 10 INJECTION, EMULSION INTRAVENOUS at 05:10

## 2017-10-04 RX ADMIN — LEVETIRACETAM 1500 MG: 15 INJECTION INTRAVENOUS at 08:10

## 2017-10-04 RX ADMIN — HEPARIN SODIUM 7500 UNITS: 5000 INJECTION, SOLUTION INTRAVENOUS; SUBCUTANEOUS at 09:10

## 2017-10-04 RX ADMIN — Medication 10 ML: at 12:10

## 2017-10-04 RX ADMIN — PROPOFOL 50 MCG/KG/MIN: 10 INJECTION, EMULSION INTRAVENOUS at 03:10

## 2017-10-04 RX ADMIN — PANTOPRAZOLE SODIUM 40 MG: 40 INJECTION, POWDER, FOR SOLUTION INTRAVENOUS at 08:10

## 2017-10-04 RX ADMIN — PROPOFOL 50 MCG/KG/MIN: 10 INJECTION, EMULSION INTRAVENOUS at 07:10

## 2017-10-04 RX ADMIN — Medication 3 ML: at 06:10

## 2017-10-04 RX ADMIN — INSULIN ASPART 3 UNITS: 100 INJECTION, SOLUTION INTRAVENOUS; SUBCUTANEOUS at 12:10

## 2017-10-04 RX ADMIN — PROPOFOL 50 MCG/KG/MIN: 10 INJECTION, EMULSION INTRAVENOUS at 10:10

## 2017-10-04 RX ADMIN — LEVETIRACETAM 1500 MG: 15 INJECTION INTRAVENOUS at 09:10

## 2017-10-04 RX ADMIN — SODIUM CHLORIDE: 0.9 INJECTION, SOLUTION INTRAVENOUS at 04:10

## 2017-10-04 NOTE — CONSULTS
[facility]  Neurology  Consult Note    Patient Name: Nisa Frank  MRN: [id number]  Admission Date: 10/3/2017  Hospital Length of Stay: 0 days  Code Status: Full Code   Attending Provider: Krish Moran DO   Consulting Provider: Dustin Harmon MD  Primary Care Physician: Atilio Downs MD  Principal Problem:Cardiac arrest, cause unspecified    Consults  Subjective:     Chief Complaint: Seizure?    HPI: 68 y/o female with medical Hx as listed below was at nursing home when became unresponsive. Nursing staff noted no pulse; CPR began. EMS arrived 5 minutes after and continued resuscitation efforts for 5 more minutes after finding pt on PEA. ROSC was achieved. Pt arrived to ICU intubated. It was noted there sudden intermittent generalized body jerks mostly on tactile stimulation. ALso, pt was hypotensive for which norepinephrine infusion started.    Past Medical History:   Diagnosis Date    Acute bilateral cerebral infarction in a watershed distribution 2017    CAD (coronary artery disease)     Diabetes mellitus     Encephalopathy     Hypertension     Morbid obesity     TAMMY on CPAP     setting +17    Stroke        Past Surgical History:   Procedure Laterality Date    ARTERIAL BYPASS SURGRY      9 tears sgo     SECTION      CORONARY ARTERY BYPASS GRAFT      HYSTERECTOMY      TUBAL LIGATION         Review of patient's allergies indicates:   Allergen Reactions    Latex, natural rubber        Current Neurological Medications:     No current facility-administered medications on file prior to encounter.      Current Outpatient Prescriptions on File Prior to Encounter   Medication Sig    acetaminophen (TYLENOL) 650 mg/20.3 mL Soln 20.3 mLs (650 mg total) by Per NG tube route every 6 (six) hours as needed. (Patient taking differently: 650 mg by Per NG tube route every 6 (six) hours as needed for Pain. )    albuterol-ipratropium 2.5mg-0.5mg/3mL (DUO-NEB) 0.5 mg-3 mg(2.5 mg  base)/3 mL nebulizer solution Take 3 mLs by nebulization every 4 (four) hours. Rescue    amlodipine (NORVASC) 10 MG tablet 1 tablet (10 mg total) by Per G Tube route once daily.    aspirin 81 MG Chew 1 tablet (81 mg total) by Per G Tube route once daily.    atorvastatin (LIPITOR) 10 MG tablet 1 tablet (10 mg total) by Per G Tube route once daily. (Patient taking differently: 10 mg by Per G Tube route every evening. )    carvedilol (COREG) 12.5 MG tablet Take 1 tablet (12.5 mg total) by mouth 2 (two) times daily.    diphenhydrAMINE (BENADRYL) 25 mg capsule 25 mg by Per G Tube route 3 (three) times daily as needed for Itching.    docusate sodium (COLACE) 100 MG capsule 100 mg by Per G Tube route 2 (two) times daily.    fluoxetine (PROZAC) 20 MG capsule 1 capsule (20 mg total) by Per G Tube route once daily.    hydrALAZINE (APRESOLINE) 25 MG tablet 1 tablet (25 mg total) by Per G Tube route every 6 (six) hours.    lisinopril 10 MG tablet 10 mg by Per G Tube route once daily.    nitroGLYCERIN (NITROSTAT) 0.4 MG SL tablet Place 0.4 mg under the tongue every 5 (five) minutes as needed for Chest pain.    pantoprazole (PROTONIX) 40 mg GrPS 1 packet (40 mg total) by Per G Tube route once daily.    risperidone (RISPERDAL) 0.25 MG Tab 0.25 mg by Per G Tube route 2 (two) times daily.    bisacodyl (DULCOLAX) 5 mg EC tablet Take 5 mg by mouth daily as needed for Constipation.    chlorhexidine (PERIDEX) 0.12 % solution Use as directed 15 mLs in the mouth or throat 2 (two) times daily.    cloNIDine 0.1 mg/24 hr td ptwk (CATAPRES) 0.1 mg/24 hr Place 1 patch onto the skin every 7 days. (Patient taking differently: Place 1 patch onto the skin every Tuesday. )    diclofenac sodium (VOLTAREN) 1 % Gel Apply 2 g topically 2 (two) times daily.    EASY TOUCH TWIST LANCETS 30 gauge Misc     enoxaparin (LOVENOX) 40 mg/0.4 mL Syrg Inject 40 mg into the skin once daily.    insulin aspart (NOVOLOG) 100 unit/mL InPn pen Inject  1-10 Units into the skin every 4 (four) hours as needed (Hyperglycemia).    ondansetron (ZOFRAN, AS HYDROCHLORIDE,) 2 mg/mL injection Inject 4 mg into the vein every 4 (four) hours as needed (for nausea).      Family History     Problem Relation (Age of Onset)    No Known Problems Father, Mother, Sister, Brother, Maternal Aunt, Maternal Uncle, Paternal Aunt, Paternal Uncle, Maternal Grandmother, Maternal Grandfather, Paternal Grandmother, Paternal Grandfather        Social History Main Topics    Smoking status: Never Smoker    Smokeless tobacco: Never Used    Alcohol use Yes      Comment: occ    Drug use: No    Sexual activity: Not Currently     Birth control/ protection: See Surgical Hx     Review of Systems   Unable to obtain as pt is unresponsive    Objective:     Vital Signs (Most Recent):  Temp: 98 °F (36.7 °C) (10/03/17 1945)  Pulse: 101 (10/03/17 2045)  Resp: (!) 45 (10/03/17 2045)  BP: (!) 148/82 (10/03/17 2045)  SpO2: 100 % (10/03/17 2045) Vital Signs (24h Range):  Temp:  [95.4 °F (35.2 °C)-98 °F (36.7 °C)] 98 °F (36.7 °C)  Pulse:  [] 101  Resp:  [19-55] 45  SpO2:  [92 %-100 %] 100 %  BP: ()/() 148/82     Weight: 133.9 kg (295 lb 3.1 oz)  Body mass index is 47.65 kg/m².    Physical Exam  Constitutional: well-developed  Head: normocephalic inflammation and erytherma in gums  Eyes: conjunctivae/corneas clear.  Nose: no discharge, no epistaxis  Heart: regular rhtyhm  Abdomen: depressible  Extremities: no edema  Neurologic: Mental status:unresponsive to verbal stimuli  Pupils are round at 3 mm and reactive to light  Positive oculocephalic responses  No corneal reflex bilaterally  Weak gag reflex  Upon tactile stimulation pt present myoclonic activity       Significant Labs:   CBC:   Recent Labs  Lab 10/03/17  1140   WBC 12.50   HGB 8.4*   HCT 27.6*        CMP:   Recent Labs  Lab 10/03/17  1140   *      K 5.5*      CO2 22*   BUN 22   CREATININE 1.3   CALCIUM 9.0    PROT 6.6   ALBUMIN 2.0*   BILITOT 0.6   ALKPHOS 132   AST 79*   ALT 58*   ANIONGAP 13   EGFRNONAA 43*         Assessment and Plan:     66 y/o female consulted for possible seizures    1. Seizures: involuntary movements seem to be myoclonus. Given preceding anoxic event, post-anoxic myoclonus. There could be ongoing seizures as well.   -Early myoclonus after cardiac arrest usually portends a poor prognosis.   -EEG.   -Levetiracetam 3000 mg IV x1 then 1500 mg twice daily.   -Will resume midazolam.    Active Diagnoses:    Diagnosis Date Noted POA    PRINCIPAL PROBLEM:  Cardiac arrest, cause unspecified [I46.9]  Yes    Anoxic brain injury [G93.1] 10/03/2017 Unknown    Acute encephalopathy [G93.40] 09/13/2017 Yes    KATHI (acute kidney injury) [N17.9] 09/13/2017 Yes    Cerebral infarction, watershed distribution, bilateral, acute [I63.8]  Yes     Chronic    Acute on chronic respiratory failure [J96.20] 08/08/2017 Yes    Tracheostomy status [Z93.0] 08/08/2017 Not Applicable     Chronic    CAD s/p CABG [I25.10] 06/19/2017 Yes     Chronic    Moderate protein malnutrition [E44.0] 06/19/2017 Yes     Chronic    Morbid obesity with BMI of 50.0-59.9, adult [E66.01, Z68.43] 02/24/2015 Not Applicable     Chronic    S/P CABG (coronary artery bypass graft) [Z95.1] 06/05/2014 Not Applicable     Chronic    Essential hypertension [I10] 05/12/2014 Yes     Chronic    Type 2 diabetes mellitus, uncontrolled [E11.65] 05/12/2014 Yes     Chronic      Problems Resolved During this Admission:    Diagnosis Date Noted Date Resolved POA       VTE Risk Mitigation         Ordered     heparin (porcine) injection 7,500 Units  Every 8 hours     Route:  Subcutaneous        10/03/17 1509     High Risk of VTE  Once      10/03/17 1509     Place GARRETT hose  Until discontinued      10/03/17 1509     Place sequential compression device  Until discontinued      10/03/17 1509          Thank you for your consult. I will follow-up with patient. Please  contact us if you have any additional questions.    Dustin Harmon MD  Neurology  [facility]

## 2017-10-04 NOTE — ASSESSMENT & PLAN NOTE
No spontaneous respiration.  May be a reflection of neurostatus.   ?brain dead. will d/w neurology.  Unable to assess due to benzo and propofol.

## 2017-10-04 NOTE — PROGRESS NOTES
[facility]  Cardiology  Progress Note    Patient Name: Nisa Frank  MRN: [id number]  Admission Date: 10/3/2017  Hospital Length of Stay: 1 days  Code Status: Full Code   Attending Physician: Juliet Wilson MD   Primary Care Physician: Atilio Downs MD  Expected Discharge Date:   Principal Problem:Cardiac arrest, cause unspecified    Subjective:     Hospital Course:   Remains unresponsive on vent    Echo 10/3/17    1 - Hyperdynamic left ventricular systolic function (EF >70%).     2 - Concentric remodeling.     3 - Biatrial enlargement.     4 - Right ventricular enlargement with mildly to moderately depressed systolic function.     5 - The estimated PA systolic pressure is 26 mmHg.     6 - Trivial to mild tricuspid regurgitation.     7 - Trivial pulmonic regurgitation.     Interval History:     Review of Systems   Unable to perform ROS: intubated     Objective:     Vital Signs (Most Recent):  Temp: 98.5 °F (36.9 °C) (10/04/17 0701)  Pulse: 74 (10/04/17 0815)  Resp: 20 (10/04/17 0815)  BP: (!) 107/53 (10/04/17 0800)  SpO2: 100 % (10/04/17 0815) Vital Signs (24h Range):  Temp:  [95.4 °F (35.2 °C)-98.5 °F (36.9 °C)] 98.5 °F (36.9 °C)  Pulse:  [] 74  Resp:  [18-57] 20  SpO2:  [92 %-100 %] 100 %  BP: ()/() 107/53     Weight: 129.9 kg (286 lb 6 oz)  Body mass index is 46.22 kg/m².     SpO2: 100 %  O2 Device (Oxygen Therapy): ventilator      Intake/Output Summary (Last 24 hours) at 10/04/17 0836  Last data filed at 10/04/17 0800   Gross per 24 hour   Intake           3081.4 ml   Output              418 ml   Net           2663.4 ml       Lines/Drains/Airways     Peripherally Inserted Central Catheter Line                 PICC Double Lumen 10/03/17 1700 left basilic less than 1 day          Drain                 Gastrostomy/Enterostomy 09/20/17 0816 Gastrostomy tube w/ balloon midline feeding 14 days         Rectal Tube 10/03/17 2330 fecal management system less than 1 day          Urethral Catheter 10/03/17 1255 Straight-tip 16 Fr. less than 1 day          Airway                 Surgical Airway Other (Comment) Cuffed -- days          Pressure Ulcer                 Pressure Ulcer 10/03/17 1305 medial sacral spine Stage I less than 1 day          Peripheral Intravenous Line                 Peripheral IV - Single Lumen 09/21/17 2312 Posterior;Right Forearm 12 days                Physical Exam   Constitutional: She is oriented to person, place, and time. She appears well-developed and well-nourished.   HENT:   Head: Normocephalic and atraumatic.   Eyes: Conjunctivae are normal. Pupils are equal, round, and reactive to light.   Neck: Normal range of motion. Neck supple.   Cardiovascular: Normal rate, normal heart sounds and intact distal pulses.    Pulmonary/Chest: Effort normal. She has rhonchi.   Abdominal: Soft. Bowel sounds are normal.   Musculoskeletal: Normal range of motion.   Neurological: She is alert and oriented to person, place, and time.   Skin: Skin is warm and dry.       Significant Labs: All pertinent lab results from the last 24 hours have been reviewed.    Significant Imaging: Echocardiogram:   2D echo with color flow doppler:   Results for orders placed or performed during the hospital encounter of 10/03/17   2D echo with color flow doppler   Result Value Ref Range    EF 75 55 - 65    Diastolic Dysfunction No     Est. PA Systolic Pressure 25.66     Tricuspid Valve Regurgitation TRIVIAL TO MILD      Assessment and Plan:     Brief HPI:     * Cardiac arrest, cause unspecified    Suspect this was primarily pulmonary - possibly from a mucus plug. EKG with no acute changes and good EF on echo. Currently with stable HR/BP - will continue to observe and provide supportive care where needed        Acute encephalopathy    Some witnessed seizure activity in ICU. Neurology consult        Cerebral infarction, watershed distribution, bilateral, acute             Tracheostomy status              Acute on chronic respiratory failure    Per pulmonary        CAD s/p CABG    No MI. Good EF on echo        Morbid obesity with BMI of 50.0-59.9, adult             Type 2 diabetes mellitus, uncontrolled             Essential hypertension                 VTE Risk Mitigation         Ordered     heparin (porcine) injection 7,500 Units  Every 8 hours     Route:  Subcutaneous        10/03/17 1509     High Risk of VTE  Once      10/03/17 1509     Place GARRETT hose  Until discontinued      10/03/17 1509     Place sequential compression device  Until discontinued      10/03/17 1509        Prognosis appears poor  Cardiac stable  Will f/u PRN    Deo Cantrell MD  Cardiology  [facility]

## 2017-10-04 NOTE — SUBJECTIVE & OBJECTIVE
Interval History:  Remained unresponsive on vent.    Objective:     Vital Signs (Most Recent):  Temp: 98.5 °F (36.9 °C) (10/04/17 0701)  Pulse: 74 (10/04/17 0815)  Resp: 20 (10/04/17 0815)  BP: (!) 107/53 (10/04/17 0800)  SpO2: 100 % (10/04/17 0815) Vital Signs (24h Range):  Temp:  [95.4 °F (35.2 °C)-98.5 °F (36.9 °C)] 98.5 °F (36.9 °C)  Pulse:  [] 74  Resp:  [18-57] 20  SpO2:  [92 %-100 %] 100 %  BP: ()/() 107/53     Weight: 129.9 kg (286 lb 6 oz)  Body mass index is 46.22 kg/m².      Intake/Output Summary (Last 24 hours) at 10/04/17 0928  Last data filed at 10/04/17 0800   Gross per 24 hour   Intake           3081.4 ml   Output              418 ml   Net           2663.4 ml       Physical Exam   Constitutional: She appears well-developed.   HENT:   Head: Normocephalic and atraumatic.   Mouth/Throat: Oropharynx is clear and moist.   Eyes: Conjunctivae are normal. Right eye exhibits no discharge. Left eye exhibits no discharge.   Pupils fixed and nonreactive   Neck: Normal range of motion. Neck supple.   Tracheostomy in place   Cardiovascular: Normal rate, regular rhythm and normal heart sounds.    Pulmonary/Chest: Effort normal and breath sounds normal.   Abdominal: Soft. Bowel sounds are normal.   Musculoskeletal: Normal range of motion.   Neurological: No cranial nerve deficit.   Unresponsive with frequent twitch   Skin: Skin is warm.   Psychiatric:   Unresponsive to tactile stimuli.       Vents:  Vent Mode: PRVC (10/04/17 0735)  Set Rate: 20 bmp (10/04/17 0735)  Vt Set: 400 mL (10/04/17 0735)  PEEP/CPAP: 5 cmH20 (10/04/17 0735)  Oxygen Concentration (%): 50 (10/04/17 0815)  Peak Airway Pressure: 22.1 cmH2O (10/04/17 0735)  Total Ve: 8.6 mL (10/04/17 0735)  F/VT Ratio<105 (RSBI): (!) 80.42 (10/04/17 0500)    Lines/Drains/Airways     Peripherally Inserted Central Catheter Line                 PICC Double Lumen 10/03/17 1700 left basilic less than 1 day          Drain                  Gastrostomy/Enterostomy 09/20/17 0816 Gastrostomy tube w/ balloon midline feeding 14 days         Rectal Tube 10/03/17 2330 fecal management system less than 1 day         Urethral Catheter 10/03/17 1255 Straight-tip 16 Fr. less than 1 day          Airway                 Surgical Airway Other (Comment) Cuffed -- days          Pressure Ulcer                 Pressure Ulcer 10/03/17 1305 medial sacral spine Stage I less than 1 day          Peripheral Intravenous Line                 Peripheral IV - Single Lumen 09/21/17 2312 Posterior;Right Forearm 12 days                Significant Labs:    CBC/Anemia Profile:    Recent Labs  Lab 10/03/17  1140 10/04/17  0400   WBC 12.50 12.22   HGB 8.4* 7.5*   HCT 27.6* 23.3*    219   MCV 88 85   RDW 16.3* 15.9*        Chemistries:    Recent Labs  Lab 10/03/17  1140 10/04/17  0400    138   K 5.5* 4.8    104   CO2 22* 28   BUN 22 29*   CREATININE 1.3 1.2   CALCIUM 9.0 8.4*   ALBUMIN 2.0* 1.9*   PROT 6.6 6.0   BILITOT 0.6 0.5   ALKPHOS 132 122   ALT 58* 46*   AST 79* 41*       ABGs:     Recent Labs  Lab 10/04/17  0447   PH 7.370   PCO2 47.5*   HCO3 27.5   POCSATURATED 97   BE 2       Significant Imaging:  CXR: I have reviewed all pertinent results/findings within the past 24 hours and my personal findings are:  no effusion or consolidation

## 2017-10-04 NOTE — PROGRESS NOTES
Patient remains intubated on the servo-i vent with the following settings: PRVC 20, 400, 5, 50%. AMBU bag at [facility], all alarms are set and functioning . Will continue to monitor. Patient has Shiley 6.0 XLT Tracheostomy tube.

## 2017-10-04 NOTE — SUBJECTIVE & OBJECTIVE
Review of Systems   Unable to perform ROS: Intubated     Objective:     Vital Signs (Most Recent):  Temp: 99.3 °F (37.4 °C) (10/04/17 1101)  Pulse: 81 (10/04/17 1245)  Resp: (!) 21 (10/04/17 1245)  BP: (!) 116/58 (10/04/17 1245)  SpO2: 100 % (10/04/17 1245) Vital Signs (24h Range):  Temp:  [95.8 °F (35.4 °C)-99.3 °F (37.4 °C)] 99.3 °F (37.4 °C)  Pulse:  [] 81  Resp:  [18-72] 21  SpO2:  [96 %-100 %] 100 %  BP: (101-229)/() 116/58     Weight: 129.9 kg (286 lb 6 oz)  Body mass index is 46.22 kg/m².    Physical Exam   Constitutional: She appears well-developed and well-nourished.   Propofol weaned. Myoclonic jerking of all extremities   HENT:   Right Ear: External ear normal.   Left Ear: External ear normal.   Nose: Nose normal.   Eyes: No scleral icterus.   Pupils 2mm bilaterally, minimally reactive.   Neck:   Trach collar in place. On ventilator.   Cardiovascular: Normal rate and normal heart sounds.  Exam reveals no friction rub.    No murmur heard.  Distant, tachycardic   Pulmonary/Chest: She has no wheezes. She has no rales.   Difficult to auscultate due to body habitus, on mechanical ventilation.   Abdominal: Soft. She exhibits no mass. There is no tenderness. There is no guarding.   Morbidly obese, PEG in place.   Musculoskeletal: She exhibits no edema or deformity.   Neurological:   Myoclonic jerks. +cough.   Skin: Skin is dry.        Significant Labs: All pertinent labs within the past 24 hours have been reviewed.    Significant Imaging: I have reviewed and interpreted all pertinent imaging results/findings within the past 24 hours.

## 2017-10-04 NOTE — ASSESSMENT & PLAN NOTE
Cardiac arrest. ROSC about 10 min after chest compressions initiated. 2 rounds of epi. No shock delivered. Cardiology consulted. Echo hyperdynamic with EF 75%, no DD. ASA/statin. No BB/ACEi as BP will not tolerate. She did not appear to have an MI given EKG and troponins. Probably precipitated by hypoxia. Maintain body temp 32-34 x48 hours.

## 2017-10-04 NOTE — SUBJECTIVE & OBJECTIVE
Interval History:     Review of Systems   Unable to perform ROS: intubated     Objective:     Vital Signs (Most Recent):  Temp: 98.5 °F (36.9 °C) (10/04/17 0701)  Pulse: 74 (10/04/17 0815)  Resp: 20 (10/04/17 0815)  BP: (!) 107/53 (10/04/17 0800)  SpO2: 100 % (10/04/17 0815) Vital Signs (24h Range):  Temp:  [95.4 °F (35.2 °C)-98.5 °F (36.9 °C)] 98.5 °F (36.9 °C)  Pulse:  [] 74  Resp:  [18-57] 20  SpO2:  [92 %-100 %] 100 %  BP: ()/() 107/53     Weight: 129.9 kg (286 lb 6 oz)  Body mass index is 46.22 kg/m².     SpO2: 100 %  O2 Device (Oxygen Therapy): ventilator      Intake/Output Summary (Last 24 hours) at 10/04/17 0836  Last data filed at 10/04/17 0800   Gross per 24 hour   Intake           3081.4 ml   Output              418 ml   Net           2663.4 ml       Lines/Drains/Airways     Peripherally Inserted Central Catheter Line                 PICC Double Lumen 10/03/17 1700 left basilic less than 1 day          Drain                 Gastrostomy/Enterostomy 09/20/17 0816 Gastrostomy tube w/ balloon midline feeding 14 days         Rectal Tube 10/03/17 2330 fecal management system less than 1 day         Urethral Catheter 10/03/17 1255 Straight-tip 16 Fr. less than 1 day          Airway                 Surgical Airway Other (Comment) Cuffed -- days          Pressure Ulcer                 Pressure Ulcer 10/03/17 1305 medial sacral spine Stage I less than 1 day          Peripheral Intravenous Line                 Peripheral IV - Single Lumen 09/21/17 2312 Posterior;Right Forearm 12 days                Physical Exam   Constitutional: She is oriented to person, place, and time. She appears well-developed and well-nourished.   HENT:   Head: Normocephalic and atraumatic.   Eyes: Conjunctivae are normal. Pupils are equal, round, and reactive to light.   Neck: Normal range of motion. Neck supple.   Cardiovascular: Normal rate, normal heart sounds and intact distal pulses.    Pulmonary/Chest: Effort normal.  She has rhonchi.   Abdominal: Soft. Bowel sounds are normal.   Musculoskeletal: Normal range of motion.   Neurological: She is alert and oriented to person, place, and time.   Skin: Skin is warm and dry.       Significant Labs: All pertinent lab results from the last 24 hours have been reviewed.    Significant Imaging: Echocardiogram:   2D echo with color flow doppler:   Results for orders placed or performed during the hospital encounter of 10/03/17   2D echo with color flow doppler   Result Value Ref Range    EF 75 55 - 65    Diastolic Dysfunction No     Est. PA Systolic Pressure 25.66     Tricuspid Valve Regurgitation TRIVIAL TO MILD

## 2017-10-04 NOTE — HOSPITAL COURSE
Shortly after arriving in the ICU she developed myoclonic jerks, initially only when stimulated and then continuously even without stimulation. This was thought to be due to anoxic brain injury vs seizure. She was started on Levetiracetam 3000 mg IV x1 then 1500 mg twice daily. Jerks improved with propofol that was started overnight. EEG 10/4/2017 was consistent with seizure. She was given ativan and had clinical improvement. Transfer orders to [facility] for continuous EEG monitoring placed.  The etiology of her cardiac arrest was not clear. EKG with no acute change. Echo with preserved EF. She did not appear to have had an MI. Suspect possible mucous plug with desaturation.  Patient was stepped down from the NICU to IM team 3 on 11/12/2017.  She was continued on trach collar.  Insulin regimen was altered for improved glucose control.  He sodium and chloride were elevated so free water flushes were increased and salt tabs were discontinued.  Electrolytes were monitored.  On 11/15/2017 patient was deemed stable for discharged to home with home hospice.      Discharge ROS:  Unable to obtain due to clinical condition      Discharge Physical Exam:  Constitutional: She appears well-developed. No distress.   Obese   HENT:   Head: Normocephalic and atraumatic.   Eyes: Conjunctivae are normal. Pupils are equal, round, and reactive to light.   Cardiovascular: Normal rate, regular rhythm, S1 normal, S2 normal and intact distal pulses.    Pulmonary/Chest: Effort normal and breath sounds normal.   Tracheostomy in place with T-collar.   Abdominal: Soft. Bowel sounds are normal. She exhibits no distension. There is no tenderness.   PEG tube in place.   Musculoskeletal: She exhibits no tenderness or deformity.  Mild edema bilateral hands.    Neurological:   E1VTM4; minimal withdrawal to pain BLE   Skin: Skin is warm and dry.

## 2017-10-04 NOTE — HOSPITAL COURSE
Remains unresponsive on vent    Echo 10/3/17    1 - Hyperdynamic left ventricular systolic function (EF >70%).     2 - Concentric remodeling.     3 - Biatrial enlargement.     4 - Right ventricular enlargement with mildly to moderately depressed systolic function.     5 - The estimated PA systolic pressure is 26 mmHg.     6 - Trivial to mild tricuspid regurgitation.     7 - Trivial pulmonic regurgitation.

## 2017-10-04 NOTE — PLAN OF CARE
Problem: Patient Care Overview  Goal: Plan of Care Review  Outcome: Ongoing (interventions implemented as appropriate)  Patient remains in ICU on ventilator via tracheostomy. Unarousable to stimuli. Myoclonic jerks persist; MD Harmon aware. Discontinued versed and started on propofol infusion. Scheduled keppra administered per order. Fentanyl infusing. NS infusing at 125cc/hr. BP labile throughout shift; currently stable. Flexiseal placed last night; liquid stool evacuated. Ross intact with minimal UO. CBG monitored as ordered; PRN insulin given. Trach care performed and inner cannula changed. Bite block placed to prevent biting on tongue. Remains free from falls, skin breakdown, and other hospital acquired injuries.

## 2017-10-04 NOTE — PROGRESS NOTES
[facility]  Pulmonology  Progress Note    Patient Name: Nisa Frank  MRN: [id number]  Admission Date: 10/3/2017  Hospital Length of Stay: 1 days  Code Status: Full Code  Attending Provider: Juliet Wilson MD  Primary Care Provider: Atilio Downs MD   Principal Problem: Cardiac arrest, cause unspecified    Subjective:     Interval History:  Remained unresponsive on vent.    Objective:     Vital Signs (Most Recent):  Temp: 98.5 °F (36.9 °C) (10/04/17 0701)  Pulse: 74 (10/04/17 0815)  Resp: 20 (10/04/17 0815)  BP: (!) 107/53 (10/04/17 0800)  SpO2: 100 % (10/04/17 0815) Vital Signs (24h Range):  Temp:  [95.4 °F (35.2 °C)-98.5 °F (36.9 °C)] 98.5 °F (36.9 °C)  Pulse:  [] 74  Resp:  [18-57] 20  SpO2:  [92 %-100 %] 100 %  BP: ()/() 107/53     Weight: 129.9 kg (286 lb 6 oz)  Body mass index is 46.22 kg/m².      Intake/Output Summary (Last 24 hours) at 10/04/17 0928  Last data filed at 10/04/17 0800   Gross per 24 hour   Intake           3081.4 ml   Output              418 ml   Net           2663.4 ml       Physical Exam   Constitutional: She appears well-developed.   HENT:   Head: Normocephalic and atraumatic.   Mouth/Throat: Oropharynx is clear and moist.   Eyes: Conjunctivae are normal. Right eye exhibits no discharge. Left eye exhibits no discharge.   Pupils fixed and nonreactive   Neck: Normal range of motion. Neck supple.   Tracheostomy in place   Cardiovascular: Normal rate, regular rhythm and normal heart sounds.    Pulmonary/Chest: Effort normal and breath sounds normal.   Abdominal: Soft. Bowel sounds are normal.   Musculoskeletal: Normal range of motion.   Neurological: No cranial nerve deficit.   Unresponsive with frequent twitch   Skin: Skin is warm.   Psychiatric:   Unresponsive to tactile stimuli.       Vents:  Vent Mode: PRVC (10/04/17 0735)  Set Rate: 20 bmp (10/04/17 0735)  Vt Set: 400 mL (10/04/17 0735)  PEEP/CPAP: 5 cmH20 (10/04/17 0735)  Oxygen  Concentration (%): 50 (10/04/17 0815)  Peak Airway Pressure: 22.1 cmH2O (10/04/17 0735)  Total Ve: 8.6 mL (10/04/17 0735)  F/VT Ratio<105 (RSBI): (!) 80.42 (10/04/17 0500)    Lines/Drains/Airways     Peripherally Inserted Central Catheter Line                 PICC Double Lumen 10/03/17 1700 left basilic less than 1 day          Drain                 Gastrostomy/Enterostomy 09/20/17 0816 Gastrostomy tube w/ balloon midline feeding 14 days         Rectal Tube 10/03/17 2330 fecal management system less than 1 day         Urethral Catheter 10/03/17 1255 Straight-tip 16 Fr. less than 1 day          Airway                 Surgical Airway Other (Comment) Cuffed -- days          Pressure Ulcer                 Pressure Ulcer 10/03/17 1305 medial sacral spine Stage I less than 1 day          Peripheral Intravenous Line                 Peripheral IV - Single Lumen 09/21/17 2312 Posterior;Right Forearm 12 days                Significant Labs:    CBC/Anemia Profile:    Recent Labs  Lab 10/03/17  1140 10/04/17  0400   WBC 12.50 12.22   HGB 8.4* 7.5*   HCT 27.6* 23.3*    219   MCV 88 85   RDW 16.3* 15.9*        Chemistries:    Recent Labs  Lab 10/03/17  1140 10/04/17  0400    138   K 5.5* 4.8    104   CO2 22* 28   BUN 22 29*   CREATININE 1.3 1.2   CALCIUM 9.0 8.4*   ALBUMIN 2.0* 1.9*   PROT 6.6 6.0   BILITOT 0.6 0.5   ALKPHOS 132 122   ALT 58* 46*   AST 79* 41*       ABGs:     Recent Labs  Lab 10/04/17  0447   PH 7.370   PCO2 47.5*   HCO3 27.5   POCSATURATED 97   BE 2       Significant Imaging:  CXR: I have reviewed all pertinent results/findings within the past 24 hours and my personal findings are:  no effusion or consolidation    Assessment/Plan:     Anoxic brain injury    No spontaneous respiration.  May be a reflection of neurostatus.   ?brain dead. will d/w neurology.  Unable to assess due to benzo and propofol.          Acute respiratory failure    Continue with volume control.                 Williams MORA  MD Valerie  Pulmonology  [facility]

## 2017-10-04 NOTE — CONSULTS
Consult Note  Palliative Care      Consult Requested By: Juliet Wilson MD  Reason for Consult:      Advance Directives     SUBJECTIVE:     History of Present Illness:  Chief Complaint   Patient presents with    Cardiac Arrest       arrived [facility], called to [facility] for c/o cardiac arrest, EMS reports fire at scene initiated CPR, [facility] reports PEA on contact, CPR continued, 2 epi administered, ROSC after epi, SR hr in cyndi 70's, fluid bolus initiated of 300ml on arrival to ER, , bagging via ambu bag via trach      CC: Cardiac Arrest  HPI: This 67 y.o. female with a past medical history of Acute bilateral cerebral infarction in a watershed distribution (06/2017); CAD; Diabetes mellitus; Hypertension; Morbid obesity; TAMMY on CPAP; and Stroke, presents to the ED via EMS from a [facility] c/o a Cardiac Arrest. EMS notes CPR was initiated by the staff there for 5 minutes. EMS noted PEA on contact. EMS continued CPR for another 5 minute. Patient was never shocked. Patient noted to have ROSC, NSR with pulse of 75 and BP of 150/120 after x2 epi were administered by EMS. EMS also initiated fluid bolus of 300 ml upon arrival to ED. Patient is also Ambu bagged via her trach tube. Otherwise, hx is limited as patient is non-verbal.       The history is provided by the patient. No  was used.    HPI:  Ms. Frank is a 68 yo woman with diabetes mellitus type 2 (A1c 7.2%), CKD stage 3, CAD s/p CABG, essential hypertension, cerebrovascular disease s/p bi-hemispheric watershed infarcts and trach/PEG status who was brought in via EMS from [facility] for cardiac arrest. Per NH staff, she received chest compressions for about 5 minutes. After EMS arrived, ACLS protocol continued and she was given 2 rounds of epi, non-shockable rhythm. ROSC achieved en route to the ER.     She was discharged from [facility] [place] 9/28/2017 after a two week admission where she was initially admitted to  our facility and then transferred to [facility], essentially for a second opinion. She has been admitted to multiple facilities recently to be treated for various issues including mucous plug leading to acute hypoxic respiratory failure, she pulled out her trach  so that was reinserted at [facility] and she was also treated for ESBL UTI while there, and last admit she was treated for likely aspiration pneumonia. Family was concerned for repeat stroke as she had decline in alertness after trach reinserted at [facility]. MRI and EEG during her last admission showed no acute process.     Her daughter/POA, Shayna, can be reached at [phone].     Hospital Course:  Shortly after arriving in the ICU she developed myoclonic jerks, initially only when stimulated and then continuously even without stimulation. This was thought to be due to anoxic brain injury vs seizure. She was started on Levetiracetam 3000 mg IV x1 then 1500 mg twice daily. Jerks improved with propofol that was started overnight. EEG 10/4/2017 was consistent with seizure. She was given ativan and had clinical improvement. Transfer orders to [facility] for continuous EEG monitoring placed.  The etiology of her cardiac arrest was not clear. EKG with no acute change. Echo with preserved EF. She did not appear to have had an MI. Suspect possible mucous plug with desaturation.    Consult was placed to Palliative Care, but patient was not seen due to patient subsequently transferred to [facility] early morning 10/5/17.         Past Medical History:   Diagnosis Date    Acute bilateral cerebral infarction in a watershed distribution 2017    CAD (coronary artery disease)     Diabetes mellitus     Encephalopathy     Hypertension     Morbid obesity     TAMMY on CPAP     setting +17    Stroke      Past Surgical History:   Procedure Laterality Date    ARTERIAL BYPASS SURGRY      9 tears sgo     SECTION      CORONARY ARTERY BYPASS GRAFT      HYSTERECTOMY      TUBAL  LIGATION       Family History   Problem Relation Age of Onset    No Known Problems Father     No Known Problems Mother     No Known Problems Sister     No Known Problems Brother     No Known Problems Maternal Aunt     No Known Problems Maternal Uncle     No Known Problems Paternal Aunt     No Known Problems Paternal Uncle     No Known Problems Maternal Grandmother     No Known Problems Maternal Grandfather     No Known Problems Paternal Grandmother     No Known Problems Paternal Grandfather     Amblyopia Neg Hx     Blindness Neg Hx     Cancer Neg Hx     Cataracts Neg Hx     Diabetes Neg Hx     Glaucoma Neg Hx     Hypertension Neg Hx     Macular degeneration Neg Hx     Retinal detachment Neg Hx     Strabismus Neg Hx     Stroke Neg Hx     Thyroid disease Neg Hx      Social History   Substance Use Topics    Smoking status: Never Smoker    Smokeless tobacco: Never Used    Alcohol use Yes      Comment: occ       Mental Status:         OBJECTIVE:     Pain Assessment:     Decision-Making Capacity:     Advanced Directives:  Living Will:    Do Not Resuscitate Status:  FULL CODE    Medical Power of :  NO        Living Arrangements:   [facility]      Psychosocial, Spiritual, Cultural:  Patient's most important priorities:  Patient's biggest concerns/fears:  Previous death/end of life care history:  Patient's goals/hopes:      ASSESSMENT/PLAN:       Patient was transferred to [facility] prior to being seen by Palliative Care.  Record was reviewed and discussed with team.     Recommendations:   Patient not seen at [facility], transfer planned to [facility].    Family may benefit from consult to Palliative Care for support once transferred to [facility].    ROSI BowieN, RN, CCRN, CHPN   Palliative Care Nurse Coordinator   [facility]  [phone]        Time Spent: 20 minutes, record review, team discussion.

## 2017-10-04 NOTE — ASSESSMENT & PLAN NOTE
Hypoxic vs metabolic. Developed myoclonic jerks after arrival in the ICU. Neurology consulted. Keppra loaded. EEG revealed seizure, clinical improvement with Ativan. Transfer to [facility] for continuous EEG monitoring.

## 2017-10-04 NOTE — PROCEDURES
DATE OF PROCEDURE:  10/04/2017    DURATION:  29 minutes and 1 second.    ELECTROENCEPHALOGRAM REPORT    METHODOLOGY:  Electroencephalographic (EEG) recording is recorded with   electrodes placed according to the International 10-20 placement system.  Thirty   two (32) channels of digital signal (sampling rate of 512/sec), including T1   and T2, were simultaneously recorded from the scalp and may include EKG, EMG,   and/or eye monitors.  Recording band pass was 0.1 to 512 Hz.  Digital video   recording of the patient is simultaneously recorded with the EEG.  The patient   is instructed to report clinical symptoms which may occur during the recording   session.  EEG and video recording are stored and archived in digital format.    Activation procedures, which include photic stimulation, hyperventilation and   instructing patients to perform simple tasks, are done in selected patients  The EEG is displayed on a monitor screen and can be reviewed using different   montages.  Computer assisted-analysis is employed to detect spike and   electrographic seizure activity.   The entire record is submitted for computer   analysis.  The entire recording is visually reviewed, and the times identified   by computer analysis as being spikes or seizures are reviewed again.    Compressed spectral analysis (CSA) is also performed on the activity recorded   from each individual channel.  This is displayed as a power display of   frequencies from 0 to 30 Hz over time.   The CSA is reviewed looking for   asymmetries in power between homologous areas of the scalp, then compared with   the original EEG recording.    Track[employer]ik software was also utilized in the review of this study.  This software   suite analyzes the EEG recording in multiple domains.  Coherence and rhythmicity   are computed to identify EEG sections which may contain organized seizures.    Each channel undergoes analysis to detect the presence of spike and sharp waves    which have special and morphological characteristics of epileptic activity.  The   routine EEG recording is converted from special into frequency domain.  This is   then displayed comparing homologous areas to identify areas of significant   asymmetry.  Algorithm to identify non-cortically generated artifact is used to   separate artifact from the EEG.  FINDINGS:  The patient is noted to be on propofol during this study initially.    On the video, she is seen lying in a hospital bed with a tracheostomy and   experiencing clinical myoclonic jerks every few seconds.  The myoclonic jerks   correlate with sharp frontal epileptiform discharges on EEG.  Midway through the   study, the propofol is temporarily stopped and the myoclonic jerks worsened in   terms of frequency and severity.  At this point, myoclonic jerks are seen   approximately every 2 to 5 seconds and consisted of whole body large amplitude   trunk and upper extremity jerks clinically and bilaterally synchronous frontally   predominant spike and wave discharges on EEG.  These are thought to be of   cortical origin and not muscle artifact.  Two-thirds of the way through the   study, propofol was readministered and Ativan is administered and EEG becomes   diffusely suppressed and myoclonic jerks are no longer seen.  Cortical activity   is essentially totally suppressed for the remainder of this EEG.  In summary, no   normal cortical activity was seen.  Essentially a variable burst-suppression   pattern was seen with the bursts consisting of brief myoclonic epileptiform   discharges, which correlated with clinical myoclonic seizures.  As the sedation   was titrated down, these worsened and as the sedation was titrated up, these   disappeared, but cortical activity was completely suppressed.  No other findings   were seen.    INTERPRETATION:  Abnormal EEG due to burst suppression and myoclonic status   epilepticus, which is responsive to sedative medications  as described above.    Results were called to the treating team in real time and consideration should   be given to continue 24-hour EEG monitoring if aggressive intervention is being   considered for this patient.      LYNETTE  dd: 10/04/2017 14:15:24 (CDT)  td: 10/04/2017 14:49:32 (CDT)  Doc ID   #[id number]  Job ID #[id number]    CC:

## 2017-10-04 NOTE — PROGRESS NOTES
[facility]  Neurology  Progress Note    Patient Name: Nisa Frank  MRN: [id number]  Admission Date: 10/3/2017  Hospital Length of Stay: 1 days  Code Status: Full Code   Attending Provider: Juliet Wilson MD  Primary Care Physician: Atilio Downs MD   Principal Problem:Cardiac arrest, cause unspecified    Subjective:     Interval History: 68 y/o female with medical Hx as listed below was at nursing home when became unresponsive. Nursing staff noted no pulse; CPR began. EMS arrived 5 minutes after and continued resuscitation efforts for 5 more minutes after finding pt on PEA. ROSC was achieved. Pt arrived to ICU intubated. It was noted there sudden intermittent generalized body jerks mostly on tactile stimulation. ALso, pt was hypotensive for which norepinephrine infusion started.    -10/4/17: Overnight involuntary motor activity became almost continuous. Propofol started making muscle jerks less frequent.    Current Neurological Medications:     Current Facility-Administered Medications   Medication Dose Route Frequency Provider Last Rate Last Dose    0.9%  NaCl infusion   Intravenous Continuous Aleida Yancey  mL/hr at 10/04/17 0852      dextrose 50% injection 12.5 g  12.5 g Intravenous PRN Juliet Wilson MD        heparin (porcine) injection 7,500 Units  7,500 Units Subcutaneous Q8H Aleida Yancey MD   7,500 Units at 10/04/17 0543    insulin aspart pen 1-10 Units  1-10 Units Subcutaneous Q6H PRN Juliet Wilson MD   2 Units at 10/04/17 0549    levetiracetam in NaCl (iso-os) IVPB 1,500 mg  1,500 mg Intravenous Q12H Dustin Harmon MD   1,500 mg at 10/04/17 0853    norepinephrine 4 mg in dextrose 5% 250 mL infusion (premix) (titrating)  0.05 mcg/kg/min Intravenous Continuous Aleida Yancey MD   Stopped at 10/03/17 1645    pantoprazole injection 40 mg  40 mg Intravenous Daily Aleida Yancey MD   40 mg at 10/04/17 0852    propofol (DIPRIVAN) 10 mg/mL  infusion  5 mcg/kg/min Intravenous Continuous Dustin Harmon MD 40.2 mL/hr at 10/04/17 1030 50 mcg/kg/min at 10/04/17 1030    sodium chloride 0.9% flush 10 mL  10 mL Intravenous Q6H Aleida Yancey MD   10 mL at 10/04/17 0600    And    sodium chloride 0.9% flush 10 mL  10 mL Intravenous PRN Aleida Yancey MD        sodium chloride 0.9% flush 3 mL  3 mL Intravenous Q8H Aleida Yancey MD   3 mL at 10/04/17 0600       Review of Systems   Unable to obtain as pt is unresponsive    Objective:     Vital Signs (Most Recent):  Temp: 99.3 °F (37.4 °C) (10/04/17 1101)  Pulse: 80 (10/04/17 1130)  Resp: (!) 34 (10/04/17 1130)  BP: (!) 117/57 (10/04/17 1130)  SpO2: 100 % (10/04/17 1130) Vital Signs (24h Range):  Temp:  [95.4 °F (35.2 °C)-99.3 °F (37.4 °C)] 99.3 °F (37.4 °C)  Pulse:  [] 80  Resp:  [18-72] 34  SpO2:  [92 %-100 %] 100 %  BP: ()/() 117/57     Weight: 129.9 kg (286 lb 6 oz)  Body mass index is 46.22 kg/m².    Physical Exam  Constitutional: well-developed  Head: normocephalic inflammation and erytherma in gums  Eyes: conjunctivae/corneas clear.  Nose: no discharge, no epistaxis  Heart: regular rhtyhm  Abdomen: depressible  Extremities: no cyanosis  Neurologic: Mental status: unresponsive to verbal stimuli  Pupils are round at 2 mm slightly reactive to light  Positive oculocephalic responses  No corneal reflex bilaterally  Weak gag reflex  No response to noxious stimulation to extremities       Significant Labs:   CBC:   Recent Labs  Lab 10/03/17  1140 10/04/17  0400   WBC 12.50 12.22   HGB 8.4* 7.5*   HCT 27.6* 23.3*    219     CMP:   Recent Labs  Lab 10/03/17  1140 10/04/17  0400   * 190*    138   K 5.5* 4.8    104   CO2 22* 28   BUN 22 29*   CREATININE 1.3 1.2   CALCIUM 9.0 8.4*   PROT 6.6 6.0   ALBUMIN 2.0* 1.9*   BILITOT 0.6 0.5   ALKPHOS 132 122   AST 79* 41*   ALT 58* 46*   ANIONGAP 13 6*   EGFRNONAA 43* 47*       Assessment and Plan:     68 y/o female consulted  for possible seizures     1. Seizures: involuntary movements seem to be myoclonus. Given preceding anoxic event, post-anoxic myoclonus. There could be ongoing seizures as well.           -Early myoclonus after cardiac arrest usually portends a poor prognosis.           -EEG today.           -Levetiracetam 3000 mg IV x1 then 1500 mg twice daily.           -Continue propofol.       UPDATE:    Pt in myoclonus SE. Activity aborted with 4 mg of lorazepam and propofol. See EEG official report.  Family wants pt to be transferred to [facility] for further care.    Active Diagnoses:    Diagnosis Date Noted POA    PRINCIPAL PROBLEM:  Cardiac arrest, cause unspecified [I46.9]  Yes    Anoxic brain injury [G93.1] 10/03/2017 Unknown    Acute encephalopathy [G93.40] 09/13/2017 Yes    KATHI (acute kidney injury) [N17.9] 09/13/2017 Yes    Cerebral infarction, watershed distribution, bilateral, acute [I63.8]  Yes     Chronic    Acute on chronic respiratory failure [J96.20] 08/08/2017 Yes    Tracheostomy status [Z93.0] 08/08/2017 Not Applicable     Chronic    Acute respiratory failure [J96.00] 06/20/2017 Yes    CAD s/p CABG [I25.10] 06/19/2017 Yes     Chronic    Moderate protein malnutrition [E44.0] 06/19/2017 Yes     Chronic    Morbid obesity with BMI of 50.0-59.9, adult [E66.01, Z68.43] 02/24/2015 Not Applicable     Chronic    S/P CABG (coronary artery bypass graft) [Z95.1] 06/05/2014 Not Applicable     Chronic    Essential hypertension [I10] 05/12/2014 Yes     Chronic    Type 2 diabetes mellitus, uncontrolled [E11.65] 05/12/2014 Yes     Chronic      Problems Resolved During this Admission:    Diagnosis Date Noted Date Resolved POA       VTE Risk Mitigation         Ordered     heparin (porcine) injection 7,500 Units  Every 8 hours     Route:  Subcutaneous        10/03/17 1509     High Risk of VTE  Once      10/03/17 1509     Place GARRETT hose  Until discontinued      10/03/17 1509     Place sequential compression device   Until discontinued      10/03/17 0941          Dustin Harmon MD  Neurology  [facility]

## 2017-10-04 NOTE — PLAN OF CARE
10/04/17 1751   Discharge Assessment   Assessment Type Discharge Planning Assessment   Confirmed/corrected address and phone number on facesheet? No   Assessment information obtained from? Medical Record;Other  (brother in room)   Expected Length of Stay (days) 5   Communicated expected length of stay with patient/caregiver no   Prior to hospitilization cognitive status: Unable to Assess   Prior to hospitalization functional status: Completely Dependent   Current cognitive status: Coma/Sedated/Intubated   Current Functional Status: Completely Dependent   Facility Arrived From: [facility] level   Able to Return to Prior Arrangements unable to determine at this time (comments)   Is patient able to care for self after discharge? No   Who are your caregiver(s) and their phone number(s)? emergency contact is marques Owen [phone]   Readmission Within The Last 30 Days current reason for admission unrelated to previous admission   If yes, most recent facility name: [facility]   Patient currently being followed by outpatient case management? No   Patient currently receives any other outside agency services? No   Equipment Currently Used at Home respiratory supplies;feeding device;hospital bed;lift device  (currently in SNF--equipment currently needed; other to be determined when goes home )   Do you have any problems affording any of your prescribed medications? No   Is the patient taking medications as prescribed? yes   Discharge Plan A Other  (to be determined)   Patient/Family In Agreement With Plan unable to assess   patient admitted from [facility]. She will transfer to [facility] for tx in neuro ICU when bed available. LUCIA met with patient leeanna Gaming in room. Patient remains on vent, sedated at this time. She currently has trach, Peg in place. Marques Corral is primary contact. LUCIA will follow in ICU and assist as needed.

## 2017-10-04 NOTE — PROGRESS NOTES
21:00: Pt. having myoclonic jerks consistently; MD Harmon notified. New orders received.    23:15: Spoke with lab about missing troponin results and was informed troponin lab had not been drawn; PICC placement was not confirmed at time of scheduled troponin and troponin lab remained lab draw when ordered. Informed lab that RN would now send tubes for missed troponin and Hgb A1C.     00:04: Myoclonic jerks persist after keppra infused and versed restarted per MD order. MD Harmon notified; new orders received to discontinue versed and initiate propofol infusion.

## 2017-10-04 NOTE — PROGRESS NOTES
[facility] Medicine  Progress Note    Patient Name: Nisa Frank  MRN: [id number]  Patient Class: IP- Inpatient   Admission Date: 10/3/2017  Length of Stay: 1 days  Attending Physician: Juliet Wilson MD  Primary Care Provider: Atilio Downs MD        Subjective:     Principal Problem:Cardiac arrest, cause unspecified    HPI:  Ms. Frank is a 68 yo woman with diabetes mellitus type 2 (A1c 7.2%), CKD stage 3, CAD s/p CABG, essential hypertension, cerebrovascular disease s/p bi-hemispheric watershed infarcts and trach/PEG status who was brought in via EMS from [facility] for cardiac arrest. Per NH staff, she received chest compressions for about 5 minutes. After EMS arrived, ACLS protocol continued and she was given 2 rounds of epi, non-shockable rhythm. ROSC achieved en route to the ER.    She was discharged from [facility] 9/28/2017 after a two week admission where she was initially admitted to our facility and then transferred to [facility], essentially for a second opinion. She has been admitted to multiple facilities recently to be treated for various issues including mucous plug leading to acute hypoxic respiratory failure, she pulled out her trach 9/4 so that was reinserted at [facility] and she was also treated for ESBL UTI while there, and last admit she was treated for likely aspiration pneumonia. Family was concerned for repeat stroke as she had decline in alertness after trach reinserted at [facility]. MRI and EEG during her last admission showed no acute process.    Her daughter/POA, Shayna, can be reached at [phone].    Hospital Course:  Shortly after arriving in the ICU she developed myoclonic jerks, initially only when stimulated and then continuously even without stimulation. This was thought to be due to anoxic brain injury vs seizure. She was started on Levetiracetam 3000 mg IV x1 then 1500 mg twice daily. Jerks improved with propofol that was started overnight. EEG  10/4/2017 was consistent with seizure. She was given ativan and had clinical improvement. Transfer orders to [facility] for continuous EEG monitoring placed.  The etiology of her cardiac arrest was not clear. EKG with no acute change. Echo with preserved EF. She did not appear to have had an MI. Suspect possible mucous plug with desaturation.    Review of Systems   Unable to perform ROS: Intubated     Objective:     Vital Signs (Most Recent):  Temp: 99.3 °F (37.4 °C) (10/04/17 1101)  Pulse: 81 (10/04/17 1245)  Resp: (!) 21 (10/04/17 1245)  BP: (!) 116/58 (10/04/17 1245)  SpO2: 100 % (10/04/17 1245) Vital Signs (24h Range):  Temp:  [95.8 °F (35.4 °C)-99.3 °F (37.4 °C)] 99.3 °F (37.4 °C)  Pulse:  [] 81  Resp:  [18-72] 21  SpO2:  [96 %-100 %] 100 %  BP: (101-229)/() 116/58     Weight: 129.9 kg (286 lb 6 oz)  Body mass index is 46.22 kg/m².    Physical Exam   Constitutional: She appears well-developed and well-nourished.   Propofol weaned. Myoclonic jerking of all extremities   HENT:   Right Ear: External ear normal.   Left Ear: External ear normal.   Nose: Nose normal.   Eyes: No scleral icterus.   Pupils 2mm bilaterally, minimally reactive.   Neck:   Trach collar in place. On ventilator.   Cardiovascular: Normal rate and normal heart sounds.  Exam reveals no friction rub.    No murmur heard.  Distant, tachycardic   Pulmonary/Chest: She has no wheezes. She has no rales.   Difficult to auscultate due to body habitus, on mechanical ventilation.   Abdominal: Soft. She exhibits no mass. There is no tenderness. There is no guarding.   Morbidly obese, PEG in place.   Musculoskeletal: She exhibits no edema or deformity.   Neurological:   Myoclonic jerks. +cough.   Skin: Skin is dry.        Significant Labs: All pertinent labs within the past 24 hours have been reviewed.    Significant Imaging: I have reviewed and interpreted all pertinent imaging results/findings within the past 24 hours.    Assessment/Plan:      *  Cardiac arrest, cause unspecified    Cardiac arrest. ROSC about 10 min after chest compressions initiated. 2 rounds of epi. No shock delivered. Cardiology consulted. Echo hyperdynamic with EF 75%, no DD. ASA/statin. No BB/ACEi as BP will not tolerate. She did not appear to have an MI given EKG and troponins. Probably precipitated by hypoxia. Maintain body temp 32-34 x48 hours.         Acute encephalopathy    Hypoxic vs metabolic. Developed myoclonic jerks after arrival in the ICU. Neurology consulted. Keppra loaded. EEG revealed seizure, clinical improvement with Ativan. Transfer to [facility] for continuous EEG monitoring.        Acute on chronic respiratory failure    Chronically on trach collar. Placed on vent s/p cardiac arrest - wean as tolerated but vent dependent at this time. Pulmonology consulted.        KATHI (acute kidney injury)    Baseline Cr. 0.7, 1.3 on admit. IV fluids. Monitor.        Anoxic brain injury    As above        Cerebral infarction, watershed distribution, bilateral, acute    H/o multiple CVAs with residual deficits. Cont home meds. Previously trach and PEG dependent.        Tracheostomy status    Chronic trach but not usually vent dependent. On vent after cardiac arrest. Pulm consulted to wean as tolerated.        Acute respiratory failure    As above        CAD s/p CABG    Remote CABG. See cardiac arrest.         Moderate protein malnutrition    Hold tube feeds for now given acuity. Restart feeds if condition allows.        Morbid obesity with BMI of 50.0-59.9, adult    Weight loss counseling when acute issues resolve.         S/P CABG (coronary artery bypass graft)    As above        Type 2 diabetes mellitus, uncontrolled    Controlled on SSI on previous admit. Resume.        Essential hypertension    Hold home meds and monitor.          VTE Risk Mitigation         Ordered     heparin (porcine) injection 7,500 Units  Every 8 hours     Route:  Subcutaneous        10/03/17 7497     High Risk of  VTE  Once      10/03/17 1509     Place GARRETT hose  Until discontinued      10/03/17 1509     Place sequential compression device  Until discontinued      10/03/17 1509          Critical care time spent on the evaluation and treatment of severe organ dysfunction, review of pertinent labs and imaging studies, discussions with consulting providers and discussions with patient/family: 45 minutes.    Juliet Wilson MD  Department of Hospital Medicine   [facility]

## 2017-10-04 NOTE — ASSESSMENT & PLAN NOTE
Chronically on trach collar. Placed on vent s/p cardiac arrest - wean as tolerated but vent dependent at this time. Pulmonology consulted.

## 2017-10-05 PROBLEM — G93.1 ANOXIC BRAIN INJURY: Status: ACTIVE | Noted: 2017-10-05

## 2017-10-05 LAB
ABO + RH BLD: NORMAL
ALBUMIN SERPL BCP-MCNC: 1.9 G/DL
ALLENS TEST: ABNORMAL
ALP SERPL-CCNC: 121 U/L
ALT SERPL W/O P-5'-P-CCNC: 32 U/L
ANION GAP SERPL CALC-SCNC: 7 MMOL/L
APTT BLDCRRT: 33.2 SEC
AST SERPL-CCNC: 34 U/L
BILIRUB SERPL-MCNC: 0.6 MG/DL
BLD GP AB SCN CELLS X3 SERPL QL: NORMAL
BNP SERPL-MCNC: 101 PG/ML
BUN SERPL-MCNC: 21 MG/DL
CALCIUM SERPL-MCNC: 8.2 MG/DL
CHLORIDE SERPL-SCNC: 106 MMOL/L
CHOLEST SERPL-MCNC: 80 MG/DL
CHOLEST/HDLC SERPL: 3.5 {RATIO}
CK MB SERPL-MCNC: 0.8 NG/ML
CK MB SERPL-RTO: 0.4 %
CK SERPL-CCNC: 192 U/L
CO2 SERPL-SCNC: 26 MMOL/L
CREAT SERPL-MCNC: 0.8 MG/DL
DELSYS: ABNORMAL
ERYTHROCYTE [SEDIMENTATION RATE] IN BLOOD BY WESTERGREN METHOD: 20 MM/H
EST. GFR  (AFRICAN AMERICAN): >60 ML/MIN/1.73 M^2
EST. GFR  (NON AFRICAN AMERICAN): >60 ML/MIN/1.73 M^2
ESTIMATED AVG GLUCOSE: 140 MG/DL
ESTIMATED PA SYSTOLIC PRESSURE: 57.46
FIO2: 50
GLUCOSE SERPL-MCNC: 98 MG/DL
HBA1C MFR BLD HPLC: 6.5 %
HCO3 UR-SCNC: 26 MMOL/L (ref 24–28)
HDLC SERPL-MCNC: 23 MG/DL
HDLC SERPL: 28.8 %
INR PPP: 1.2
LDLC SERPL CALC-MCNC: 40.8 MG/DL
MODE: ABNORMAL
NONHDLC SERPL-MCNC: 57 MG/DL
PCO2 BLDA: 38.8 MMHG (ref 35–45)
PEEP: 5
PH SMN: 7.43 [PH] (ref 7.35–7.45)
PO2 BLDA: 176 MMHG (ref 80–100)
POC BE: 2 MMOL/L
POC SATURATED O2: 100 % (ref 95–100)
POC TCO2: 27 MMOL/L (ref 23–27)
POCT GLUCOSE: 107 MG/DL (ref 70–110)
POCT GLUCOSE: 112 MG/DL (ref 70–110)
POCT GLUCOSE: 115 MG/DL (ref 70–110)
POCT GLUCOSE: 124 MG/DL (ref 70–110)
POTASSIUM SERPL-SCNC: 4.5 MMOL/L
PROT SERPL-MCNC: 6.3 G/DL
PROTHROMBIN TIME: 12.4 SEC
RETIRED EF AND QEF - SEE NOTES: 70 (ref 55–65)
SAMPLE: ABNORMAL
SITE: ABNORMAL
SODIUM SERPL-SCNC: 139 MMOL/L
T4 FREE SERPL-MCNC: 0.93 NG/DL
TRICUSPID VALVE REGURGITATION: ABNORMAL
TRIGL SERPL-MCNC: 81 MG/DL
TROPONIN I SERPL DL<=0.01 NG/ML-MCNC: 0.01 NG/ML
TSH SERPL DL<=0.005 MIU/L-ACNC: 0.23 UIU/ML
VT: 400

## 2017-10-05 PROCEDURE — 25000003 PHARM REV CODE 250: Performed by: PHYSICIAN ASSISTANT

## 2017-10-05 PROCEDURE — 93005 ELECTROCARDIOGRAM TRACING: CPT

## 2017-10-05 PROCEDURE — 80053 COMPREHEN METABOLIC PANEL: CPT

## 2017-10-05 PROCEDURE — 85730 THROMBOPLASTIN TIME PARTIAL: CPT

## 2017-10-05 PROCEDURE — 36415 COLL VENOUS BLD VENIPUNCTURE: CPT

## 2017-10-05 PROCEDURE — 94002 VENT MGMT INPAT INIT DAY: CPT

## 2017-10-05 PROCEDURE — 82553 CREATINE MB FRACTION: CPT

## 2017-10-05 PROCEDURE — 93306 TTE W/DOPPLER COMPLETE: CPT

## 2017-10-05 PROCEDURE — 93010 ELECTROCARDIOGRAM REPORT: CPT | Mod: ,,, | Performed by: INTERNAL MEDICINE

## 2017-10-05 PROCEDURE — 83880 ASSAY OF NATRIURETIC PEPTIDE: CPT

## 2017-10-05 PROCEDURE — 99291 CRITICAL CARE FIRST HOUR: CPT | Mod: GC,,, | Performed by: PSYCHIATRY & NEUROLOGY

## 2017-10-05 PROCEDURE — 63600175 PHARM REV CODE 636 W HCPCS: Performed by: PSYCHIATRY & NEUROLOGY

## 2017-10-05 PROCEDURE — A4216 STERILE WATER/SALINE, 10 ML: HCPCS | Performed by: NURSE PRACTITIONER

## 2017-10-05 PROCEDURE — 99291 CRITICAL CARE FIRST HOUR: CPT | Mod: ,,, | Performed by: PHYSICIAN ASSISTANT

## 2017-10-05 PROCEDURE — 95951 HC EEG MONITORING/VIDEO RECORD: CPT

## 2017-10-05 PROCEDURE — 83036 HEMOGLOBIN GLYCOSYLATED A1C: CPT

## 2017-10-05 PROCEDURE — 85610 PROTHROMBIN TIME: CPT

## 2017-10-05 PROCEDURE — 25000003 PHARM REV CODE 250: Performed by: EMERGENCY MEDICINE

## 2017-10-05 PROCEDURE — 86900 BLOOD TYPING SEROLOGIC ABO: CPT

## 2017-10-05 PROCEDURE — 95957 EEG DIGITAL ANALYSIS: CPT

## 2017-10-05 PROCEDURE — 4A10X4Z MONITORING OF CENTRAL NERVOUS ELECTRICAL ACTIVITY, EXTERNAL APPROACH: ICD-10-PCS | Performed by: PSYCHIATRY & NEUROLOGY

## 2017-10-05 PROCEDURE — 27000221 HC OXYGEN, UP TO 24 HOURS

## 2017-10-05 PROCEDURE — 84443 ASSAY THYROID STIM HORMONE: CPT

## 2017-10-05 PROCEDURE — 25000003 PHARM REV CODE 250: Performed by: NURSE PRACTITIONER

## 2017-10-05 PROCEDURE — 63600175 PHARM REV CODE 636 W HCPCS: Performed by: EMERGENCY MEDICINE

## 2017-10-05 PROCEDURE — A4216 STERILE WATER/SALINE, 10 ML: HCPCS | Performed by: EMERGENCY MEDICINE

## 2017-10-05 PROCEDURE — 99900026 HC AIRWAY MAINTENANCE (STAT)

## 2017-10-05 PROCEDURE — 95951 PR EEG MONITORING/VIDEORECORD: CPT | Mod: 26,,, | Performed by: PSYCHIATRY & NEUROLOGY

## 2017-10-05 PROCEDURE — 80061 LIPID PANEL: CPT

## 2017-10-05 PROCEDURE — 20000000 HC ICU ROOM

## 2017-10-05 PROCEDURE — 86901 BLOOD TYPING SEROLOGIC RH(D): CPT

## 2017-10-05 PROCEDURE — 84439 ASSAY OF FREE THYROXINE: CPT

## 2017-10-05 PROCEDURE — 93306 TTE W/DOPPLER COMPLETE: CPT | Mod: 26,,, | Performed by: INTERNAL MEDICINE

## 2017-10-05 PROCEDURE — 84484 ASSAY OF TROPONIN QUANT: CPT

## 2017-10-05 RX ORDER — ACETAMINOPHEN 325 MG/1
650 TABLET ORAL EVERY 6 HOURS PRN
Status: DISCONTINUED | OUTPATIENT
Start: 2017-10-05 | End: 2017-10-05

## 2017-10-05 RX ORDER — PHENYLEPHRINE HCL IN 0.9% NACL 1 MG/10 ML
SYRINGE (ML) INTRAVENOUS
Status: COMPLETED
Start: 2017-10-05 | End: 2017-10-05

## 2017-10-05 RX ORDER — GLUCAGON 1 MG
1 KIT INJECTION
Status: DISCONTINUED | OUTPATIENT
Start: 2017-10-05 | End: 2017-11-12

## 2017-10-05 RX ORDER — SODIUM CHLORIDE 0.9 % (FLUSH) 0.9 %
3 SYRINGE (ML) INJECTION EVERY 8 HOURS
Status: DISCONTINUED | OUTPATIENT
Start: 2017-10-05 | End: 2017-10-15

## 2017-10-05 RX ORDER — AMOXICILLIN 250 MG
1 CAPSULE ORAL 2 TIMES DAILY
Status: DISCONTINUED | OUTPATIENT
Start: 2017-10-05 | End: 2017-10-05

## 2017-10-05 RX ORDER — ONDANSETRON 2 MG/ML
4 INJECTION INTRAMUSCULAR; INTRAVENOUS EVERY 8 HOURS PRN
Status: DISCONTINUED | OUTPATIENT
Start: 2017-10-05 | End: 2017-11-15 | Stop reason: HOSPADM

## 2017-10-05 RX ORDER — ACETAMINOPHEN 325 MG/1
650 TABLET ORAL EVERY 6 HOURS PRN
Status: DISCONTINUED | OUTPATIENT
Start: 2017-10-05 | End: 2017-11-12

## 2017-10-05 RX ORDER — INSULIN ASPART 100 [IU]/ML
1-10 INJECTION, SOLUTION INTRAVENOUS; SUBCUTANEOUS EVERY 6 HOURS PRN
Status: DISCONTINUED | OUTPATIENT
Start: 2017-10-05 | End: 2017-10-06 | Stop reason: SDUPTHER

## 2017-10-05 RX ORDER — SODIUM CHLORIDE 9 MG/ML
INJECTION, SOLUTION INTRAVENOUS CONTINUOUS
Status: DISCONTINUED | OUTPATIENT
Start: 2017-10-05 | End: 2017-10-11

## 2017-10-05 RX ORDER — LABETALOL HYDROCHLORIDE 5 MG/ML
10 INJECTION, SOLUTION INTRAVENOUS EVERY 4 HOURS PRN
Status: DISCONTINUED | OUTPATIENT
Start: 2017-10-05 | End: 2017-10-11

## 2017-10-05 RX ORDER — FAMOTIDINE 20 MG/1
20 TABLET, FILM COATED ORAL 2 TIMES DAILY
Status: DISCONTINUED | OUTPATIENT
Start: 2017-10-06 | End: 2017-10-24

## 2017-10-05 RX ORDER — PANTOPRAZOLE SODIUM 40 MG/1
40 TABLET, DELAYED RELEASE ORAL DAILY
Status: DISCONTINUED | OUTPATIENT
Start: 2017-10-05 | End: 2017-10-05

## 2017-10-05 RX ORDER — CHLORHEXIDINE GLUCONATE ORAL RINSE 1.2 MG/ML
15 SOLUTION DENTAL 4 TIMES DAILY
Status: DISCONTINUED | OUTPATIENT
Start: 2017-10-05 | End: 2017-10-06

## 2017-10-05 RX ADMIN — HEPARIN SODIUM 7500 UNITS: 5000 INJECTION, SOLUTION INTRAVENOUS; SUBCUTANEOUS at 04:10

## 2017-10-05 RX ADMIN — ACETAMINOPHEN 650 MG: 325 TABLET ORAL at 12:10

## 2017-10-05 RX ADMIN — Medication 10 ML: at 12:10

## 2017-10-05 RX ADMIN — PROPOFOL 50 MCG/KG/MIN: 10 INJECTION, EMULSION INTRAVENOUS at 12:10

## 2017-10-05 RX ADMIN — CHLORHEXIDINE GLUCONATE 15 ML: 1.2 RINSE ORAL at 06:10

## 2017-10-05 RX ADMIN — SODIUM CHLORIDE: 0.9 INJECTION, SOLUTION INTRAVENOUS at 07:10

## 2017-10-05 RX ADMIN — HEPARIN SODIUM 7500 UNITS: 5000 INJECTION, SOLUTION INTRAVENOUS; SUBCUTANEOUS at 10:10

## 2017-10-05 RX ADMIN — Medication 3 ML: at 05:10

## 2017-10-05 RX ADMIN — Medication 10 ML: at 06:10

## 2017-10-05 RX ADMIN — PROPOFOL 50 MCG/KG/MIN: 10 INJECTION, EMULSION INTRAVENOUS at 04:10

## 2017-10-05 RX ADMIN — LEVETIRACETAM 1500 MG: 15 INJECTION INTRAVENOUS at 09:10

## 2017-10-05 RX ADMIN — HEPARIN SODIUM 7500 UNITS: 5000 INJECTION, SOLUTION INTRAVENOUS; SUBCUTANEOUS at 05:10

## 2017-10-05 RX ADMIN — PROPOFOL 50 MCG/KG/MIN: 10 INJECTION, EMULSION INTRAVENOUS at 01:10

## 2017-10-05 RX ADMIN — LEVETIRACETAM 1500 MG: 15 INJECTION INTRAVENOUS at 08:10

## 2017-10-05 RX ADMIN — Medication 3 ML: at 10:10

## 2017-10-05 RX ADMIN — PANTOPRAZOLE SODIUM 40 MG: 40 TABLET, DELAYED RELEASE ORAL at 09:10

## 2017-10-05 RX ADMIN — SODIUM CHLORIDE: 0.9 INJECTION, SOLUTION INTRAVENOUS at 01:10

## 2017-10-05 RX ADMIN — CHLORHEXIDINE GLUCONATE 15 ML: 1.2 RINSE ORAL at 09:10

## 2017-10-05 RX ADMIN — ACETAMINOPHEN 650 MG: 325 TABLET ORAL at 07:10

## 2017-10-05 NOTE — CONSULTS
Inpatient consult to Physical Medicine Rehab  Consult performed by: JOVON DU  Consult ordered by: YUE JAUREGUI  Reason for consult: assess rehab needs        Nisa Frank is a 67 year old woman with DM2, CKD stage 3, CAD s/p CABG, hypertension, cerebrovascular disease s/p bi-hemispheric watershed infarcts with trach/PEG.  Patient presented to [facility] on 10/3 via EMS from [facility] for cardiac arrest.  She underwent ACLS and was intubated and sedated.  Cardiology was consulted and suspect this was primarily pulmonary - possibly from a mucus plug. EKG with no acute changes and good EF on echo.  Hospital course at [facility] was further complicated by possible seizure (Neurology was consulted and EEG was ordered and revealed Myoclonic Status Epilepticus and she was started on Keppra.  24 EEG pending).  She was transferred to [facility] on 10/5 for further evaluation and management. Patient is too acute as she is still intubated.  Will sign off.  Please re-consult when patient is medically stable, participating with therapy, and ready for discharge.    SOFIA Lancaster, FNP-C  Physical Medicine & Rehabilitation   10/05/2017  Spectralink: [phone]

## 2017-10-05 NOTE — SUBJECTIVE & OBJECTIVE
Interval History:   s/p Cardiac Arrest and evaluation of Anoxic Brain Injury. [place] [facility] [place] with bilateral MCA SAQIB watershed infarcts  Review of Systems   Unable to obtain a complete ROS due to level of consciousness.  Objective:     Vitals:  Temp: 98.9 °F (37.2 °C) (10/05/17 0710)  Pulse: 90 (10/05/17 0900)  Resp: (!) 21 (10/05/17 0900)  BP: 130/62 (10/05/17 0900)  SpO2: 100 % (10/05/17 0900)    Temp:  [98.1 °F (36.7 °C)-99.8 °F (37.7 °C)] 98.9 °F (37.2 °C)  Pulse:  [] 90  Resp:  [20-72] 21  SpO2:  [75 %-100 %] 100 %  BP: ()/(48-78) 130/62         Vent Mode: A/C  Oxygen Concentration (%):  [] 40  Resp Rate Total:  [20 br/min-40 br/min] 36 br/min  Vt Set:  [400 mL] 400 mL  PEEP/CPAP:  [5 cmH20] 5 cmH20  Mean Airway Pressure:  [5.3 soH52-46.5 cmH20] 15 cmH20    10/04 0701 - 10/05 0700  In: 4097.5 [I.V.:3797.5]  Out: 1454 [Urine:1454]    Physical Exam     Physical Exam:  GA: Intubated, Comatose, Does not follow commands, comfortable, no acute distress.   HEENT: No scleral icterus or JVD.   Pulmonary: Clear to auscultation Anterior. No wheezing, crackles, or rhonchi.  Cardiac: RRR S1 & S2 w/o rubs/murmurs/gallops.   Abdominal: Bowel sounds present x 4. No appreciable hepatosplenomegaly.  Neuro:  --GCS: E3 VT M1  --Mental Status:  Intubated, Comatose, Does not follow commands  --CN II-XII  Unable to fully asses   --Pupils 2mm, bilaterally slugglish   --No Corneal reflex, no gag, no cough intact.  -- minimal to No withdrawal in all 4 extremities  Unable to test coordination, gait due to level of consciousness.    Medications:  Continuous  sodium chloride 0.9% Last Rate: Stopped (10/05/17 0901)   propofol Last Rate: Stopped (10/05/17 0830)   Scheduled  chlorhexidine 15 mL QID   heparin (porcine) 7,500 Units Q8H   levetiracetam IVPB 1,500 mg Q12H   pantoprazole 40 mg Daily   senna-docusate 8.6-50 mg 1 tablet BID   sodium chloride 0.9% 10 mL Q6H   sodium chloride 0.9% 3 mL  Q8H   PRN  acetaminophen 650 mg Q6H PRN   dextrose 50% 12.5 g PRN   insulin aspart 1-10 Units Q6H PRN   labetalol 10 mg Q4H PRN   ondansetron 4 mg Q8H PRN   sodium chloride 0.9% 10 mL PRN     Today I personally reviewed pertinent medications, lab results, notably:

## 2017-10-05 NOTE — HPI
Nisa Frank is a 67 year old woman with DM2, CKD stage 3, CAD s/p CABG, hypertension, cerebrovascular disease s/p bi-hemispheric watershed infarcts with trach/PEG.  Patient presented to [facility] on 10/3 via EMS from [facility] for cardiac arrest.  She underwent ACLS and was intubated and sedated.  Cardiology was consulted and suspect this was primarily pulmonary - possibly from a mucus plug. EKG with no acute changes and good EF on echo.  Hospital course at [facility] was further complicated by possible seizure (Neurology was consulted and EEG was ordered and revealed myoclonic status   Epilepticus and she was started on Keppra.  24 EEG pending).  She was transfers to [facility] on 10/5 for further evaluation and management.     Functional History: Patient lives in *** with *** in a *** story home with*** to enter.  Prior to admission, ***.  DME: ***.

## 2017-10-05 NOTE — ED NOTES
Family at bedside.  Physician notified of pt's movements.  MD at bedside evaluating pt and speaking with family.

## 2017-10-05 NOTE — ASSESSMENT & PLAN NOTE
S/p Trach PEG prior to this admission   -- Pt was Trach/PEG during her last admission to Neuro ICU 9/28

## 2017-10-05 NOTE — H&P
[facility]  Neurocritical Care  Progress Note    Admit Date: 10/3/2017  Service Date: 10/05/2017  Length of Stay: 2    Subjective:     Chief Complaint: Seizure    History of Present Illness: Ms. Frank is a 68 yo woman with a PMHx DM type II, CKD stage 3, CAD s/p CABG, essential hypertension, cerebrovascular disease s/p bi-hemispheric watershed infarcts and trach/PEG who present as a transfer from [facility] to Neuro Critical Care s/p Cardiac Arrest on 10/4 and evaluation of Anoxic Brain Injury. She was recently discharged from [facility] 9/28/2017 with bilateral MCA SAQIB watershed infarcts. Yesterday, she was brought in via EMS from [facility] cardiac arrest. [facility] chest compressions for about 5 minutes. After EMS arrived, ACLS protocol continued and she was given 2 rounds of epi, non-shockable rhythm. ROSC achieved en route to the ER.Pt arrived to ICU intubated. It was noted there sudden intermittent generalized body jerks mostly on tactile stimulation. Also, pt was hypotensive for which norepinephrine infusion started. Since her discharge here on 9/28 she has been admitted to multiple facilities recently to be treated for various issues including mucous plug leading to acute hypoxic respiratory failure, she pulled out her trach 9/4 so that was reinserted at [facility] and she was also treated for ESBL UTI while there, and last admit she was treated for likely aspiration pneumonia. She was transported here on Propofol. On examination, no cough, no gag, no corneal and minimal to no withdrawal.       Hospital Course: 10/5 s/p Cardiac Arrest on 10/4 and evaluation of Anoxic Brain Injury.She was recently discharged from [facility] 9/28/2017 with bilateral MCA SAQIB watershed infarcts    Interval History:  s/p Cardiac Arrest and evaluation of Anoxic Brain Injury. She was recently discharged from [facility] 9/28/2017 with bilateral MCA SAQIB watershed infarcts  Review of Systems    Unable to obtain a complete ROS due to level of consciousness.  Objective:     Vitals:  Temp: 98.9 °F (37.2 °C) (10/05/17 0710)  Pulse: 90 (10/05/17 0900)  Resp: (!) 21 (10/05/17 0900)  BP: 130/62 (10/05/17 0900)  SpO2: 100 % (10/05/17 0900)    Temp:  [98.1 °F (36.7 °C)-99.8 °F (37.7 °C)] 98.9 °F (37.2 °C)  Pulse:  [] 90  Resp:  [20-42] 21  SpO2:  [75 %-100 %] 100 %  BP: ()/(48-78) 130/62         Vent Mode: A/C  Oxygen Concentration (%):  [] 40  Resp Rate Total:  [20 br/min-40 br/min] 36 br/min  Vt Set:  [400 mL] 400 mL  PEEP/CPAP:  [5 cmH20] 5 cmH20  Mean Airway Pressure:  [5.3 oxH36-97.5 cmH20] 15 cmH20    10/04 0701 - 10/05 0700  In: 4097.5 [I.V.:3797.5]  Out: 1454 [Urine:1454]    Physical Exam   GA: Intubated, Comatose, Does not follow commands, comfortable, no acute distress.   HEENT: No scleral icterus or JVD.   Pulmonary: Clear to auscultation Anterior. No wheezing, crackles, or rhonchi.  Cardiac: RRR S1 & S2 w/o rubs/murmurs/gallops.   Abdominal: Bowel sounds present x 4. No appreciable hepatosplenomegaly.  Neuro:  --GCS: E3 VT M1  --Mental Status:  Intubated, Comatose, Does not follow commands  --CN II-XII  Unable to fully asses   --Pupils 2mm, bilaterally slugglish   --No Corneal reflex, no gag, no cough intact.  -- minimal to No withdrawal in all 4 extremities  Unable to test coordination, gait due to level of consciousness  Unable to test gait due to level of consciousness.    Medications:  Continuous  sodium chloride 0.9% Last Rate: Stopped (10/05/17 0901)   propofol Last Rate: Stopped (10/05/17 0830)   Scheduled  chlorhexidine 15 mL QID   heparin (porcine) 7,500 Units Q8H   levetiracetam IVPB 1,500 mg Q12H   pantoprazole 40 mg Daily   phenylephrine HCl in 0.9% NaCl     senna-docusate 8.6-50 mg 1 tablet BID   sodium chloride 0.9% 10 mL Q6H   sodium chloride 0.9% 3 mL Q8H   PRN  acetaminophen 650 mg Q6H PRN   dextrose 50% 12.5 g PRN   insulin aspart 1-10 Units Q6H PRN   labetalol 10 mg  Q4H PRN   ondansetron 4 mg Q8H PRN   sodium chloride 0.9% 10 mL PRN     Today I personally reviewed pertinent lines/drains/airways, lab results, notably:        Assessment/Plan:     Neuro   * Seizure    Seizure vs Myoclonic Jerking  --Continue Neuro checks q 1hr  --Epilepsy Service consulted  -- Seizure Precautions  -- Continue vEEG  -- Continue Vimpat  -- Pending further recommendations from Epilepsy service  -- EEG to rule out Seizure vs Myoclonic jerking            Cerebral infarction, watershed distribution, bilateral, acute    --[facility] 9/28/2017 with bilateral MCA SAQIB watershed infarct          Anoxic brain injury    Anoxic Brain Injury   -- Obtain MRI to rule out Anoxic Brain Injury s/p Cardiac Arrest on 10/4  -- Vascular Neurology consulted  -- Neuro checks q 1 hr  -- MRI Brain pending          ENT   Tracheostomy status    S/p Trach PEG prior to this admission   -- Pt was Trach/PEG during her last admission to Neuro ICU 9/28        Pulmonary   Acute respiratory failure    Acute Respiratory Failure   -- Daily CXR  -- Daily ABGs   -- S/p Trach/PEG          Cardiac/Vascular   Cardiac arrest, cause unspecified     s/p Cardiac Arrest 10/4 at nursing home  -- received chest compressions for about 5 minutes. After EMS arrived, ACLS protocol continued and she was given 2 rounds of epi, non-shockable rhythm. ROSC achieved en route to the ER.  -- Cardiac enzymes  -- EKG         CAD s/p CABG    Hx of        S/P CABG (coronary artery bypass graft)    Hx of        Endocrine   Type 2 diabetes mellitus, uncontrolled    -- continue to monitor BG  -- SSI        GI   PEG (percutaneous endoscopic gastrostomy) status    Procedure done on last admission 9/28            Prophylaxis:  Venous Thromboembolism: chemical  Stress Ulcer: PPI  Ventilator Pneumonia: yes     Activity Orders          Bed rest starting at 10/03 1510        Full Code    Lynn Negrete PA-C  Neurocritical Care  [facility]

## 2017-10-05 NOTE — PROGRESS NOTES
Pt arrived to room 7075 via [facility]. Pt placed on room ventilator and cardiac monitoring. Pt and VS stable. Pt on propofol infusion at 40mcg/kg/min. NCC notified of pt's arrival.

## 2017-10-05 NOTE — ASSESSMENT & PLAN NOTE
Seizure vs Myoclonic Jerking  --Continue Neuro checks q 1hr  --Epilepsy Service consulted  -- Seizure Precautions  -- Continue vEEG  -- Continue Vimpat  -- Pending further recommendations from Epilepsy service  -- EEG to rule out Seizure vs Myoclonic jerking

## 2017-10-05 NOTE — ASSESSMENT & PLAN NOTE
s/p Cardiac Arrest 10/4 at nursing home  -- received chest compressions for about 5 minutes. After EMS arrived, ACLS protocol continued and she was given 2 rounds of epi, non-shockable rhythm. ROSC achieved en route to the ER.  -- Cardiac enzymes  -- EKG

## 2017-10-05 NOTE — PLAN OF CARE
Problem: Nutrition, Enteral (Adult)  Goal: Signs and Symptoms of Listed Potential Problems Will be Absent, Minimized or Managed (Nutrition, Enteral)  Signs and symptoms of listed potential problems will be absent, minimized or managed by discharge/transition of care (reference Nutrition, Enteral (Adult) CPG).  Outcome: Ongoing (interventions implemented as appropriate)  Nutrition assessment completed. Please see RD note for details.    Recommendation/Intervention:   As medically appropriate, recommend starting TF.  Glucerna 1.5 @ goal rate 45mL/hr.   -Initiate @ 15mL/hr and increase by 10mL q 4hrs, or as tolerated, until goal rate is reached.  - Hold for residuals >500mL.      RD to monitor.         Goals: Pt to receive nutrition by RD follow up  Nutrition Goal Status: new

## 2017-10-05 NOTE — PROGRESS NOTES
[facility]  Neurocritical Care  Progress Note    Admit Date: 10/3/2017  Service Date: 10/05/2017  Length of Stay: 2    Subjective:     Chief Complaint: Cerebral infarction, watershed distribution, bilateral, acute    History of Present Illness: Ms. Frank is a 68 yo woman with a PMHx DM type II, CKD stage 3, CAD s/p CABG, essential hypertension, cerebrovascular disease s/p bi-hemispheric watershed infarcts and trach/PEG who present as a transfer from [facility] to Neuro Critical Care s/p Cardiac Arrest on 10/4 and evaluation of Anoxic Brain Injury. She was recently discharged from [facility] 9/28/2017 with bilateral MCA SAQIB watershed infarcts. Yesterday, she was brought in via EMS from [facility] chest compressions for about 5 minutes. After EMS arrived, ACLS protocol continued and she was given 2 rounds of epi, non-shockable rhythm. ROSC achieved en route to the ER.Pt arrived to ICU intubated. It was noted there sudden intermittent generalized body jerks mostly on tactile stimulation. Also, pt was hypotensive for which norepinephrine infusion started. Since her discharge here on 9/28 she has been admitted to multiple facilities recently to be treated for various issues including mucous plug leading to acute hypoxic respiratory failure, she pulled out her trach 9/4 so that was reinserted at [facility] and she was also treated for ESBL UTI while there, and last admit she was treated for likely aspiration pneumonia. She was transported here on Propofol. On examination, no cough, no gag, no corneal and minimal to no withdrawal.       Hospital Course: 10/5 s/p Cardiac Arrest on 10/4 and evaluation of Anoxic Brain Injury.She was recently discharged from [facility] 9/28/2017 with bilateral MCA SAQIB watershed infarcts    Interval History:   s/p Cardiac Arrest and evaluation of Anoxic Brain Injury. She was recently discharged from [facility] 9/28/2017 with  bilateral MCA SAQIB watershed infarcts  Review of Systems   Unable to obtain a complete ROS due to level of consciousness.  Objective:     Vitals:  Temp: 98.9 °F (37.2 °C) (10/05/17 0710)  Pulse: 90 (10/05/17 0900)  Resp: (!) 21 (10/05/17 0900)  BP: 130/62 (10/05/17 0900)  SpO2: 100 % (10/05/17 0900)    Temp:  [98.1 °F (36.7 °C)-99.8 °F (37.7 °C)] 98.9 °F (37.2 °C)  Pulse:  [] 90  Resp:  [20-72] 21  SpO2:  [75 %-100 %] 100 %  BP: ()/(48-78) 130/62         Vent Mode: A/C  Oxygen Concentration (%):  [] 40  Resp Rate Total:  [20 br/min-40 br/min] 36 br/min  Vt Set:  [400 mL] 400 mL  PEEP/CPAP:  [5 cmH20] 5 cmH20  Mean Airway Pressure:  [5.3 mlD03-46.5 cmH20] 15 cmH20    10/04 0701 - 10/05 0700  In: 4097.5 [I.V.:3797.5]  Out: 1454 [Urine:1454]    Physical Exam     Physical Exam:  GA: Intubated, Comatose, Does not follow commands, comfortable, no acute distress.   HEENT: No scleral icterus or JVD.   Pulmonary: Clear to auscultation Anterior. No wheezing, crackles, or rhonchi.  Cardiac: RRR S1 & S2 w/o rubs/murmurs/gallops.   Abdominal: Bowel sounds present x 4. No appreciable hepatosplenomegaly.  Neuro:  --GCS: E3 VT M1  --Mental Status:  Intubated, Comatose, Does not follow commands  --CN II-XII  Unable to fully asses   --Pupils 2mm, bilaterally slugglish   --No Corneal reflex, no gag, no cough intact.  -- minimal to No withdrawal in all 4 extremities  Unable to test coordination, gait due to level of consciousness.    Medications:  Continuous  sodium chloride 0.9% Last Rate: Stopped (10/05/17 0901)   propofol Last Rate: Stopped (10/05/17 0830)   Scheduled  chlorhexidine 15 mL QID   heparin (porcine) 7,500 Units Q8H   levetiracetam IVPB 1,500 mg Q12H   pantoprazole 40 mg Daily   senna-docusate 8.6-50 mg 1 tablet BID   sodium chloride 0.9% 10 mL Q6H   sodium chloride 0.9% 3 mL Q8H   PRN  acetaminophen 650 mg Q6H PRN   dextrose 50% 12.5 g PRN   insulin aspart 1-10 Units Q6H PRN   labetalol 10 mg Q4H PRN    ondansetron 4 mg Q8H PRN   sodium chloride 0.9% 10 mL PRN     Today I personally reviewed pertinent medications, lab results, notably:        Assessment/Plan:     Neuro   * Cerebral infarction, watershed distribution, bilateral, acute    [place] [place] with bilateral MCA SAQIB watershed infarct          Anoxic brain injury    Anoxic Brain Injury   -- Obtain MRI to rule out Anoxic Brain Injury s/p Cardiac Arrest on 10/4  -- Vascular Neurology consulted  -- Neuro checks q 1 hr  -- MRI Brain pending          Seizure    Seizure vs Myoclonic Jerking  --Continue Neuro checks q 1hr  --Epilepsy Service consulted  -- Seizure Precautions  -- Continue vEEG  -- Continue Vimpat  -- Pending further recommendations from Epilepsy service  -- EEG to rule out Seizure vs Myoclonic jerking            ENT   Tracheostomy status    S/p Trach PEG prior to this admission   -- Pt was Trach/PEG during her last admission to Neuro ICU 9/28        Pulmonary   Acute respiratory failure    Acute Respiratory Failure   -- Daily CXR  -- Daily ABGs   -- S/p Trach/PEG          Cardiac/Vascular   Cardiac arrest, cause unspecified     s/p Cardiac Arrest 10/4 at nursing home  -- received chest compressions for about 5 minutes. After EMS arrived, ACLS protocol continued and she was given 2 rounds of epi, non-shockable rhythm. ROSC achieved en route to the ER.  -- Cardiac enzymes  -- EKG         CAD s/p CABG    Hx of        S/P CABG (coronary artery bypass graft)    Hx of        Endocrine   Type 2 diabetes mellitus, uncontrolled    -- continue to monitor BG  -- SSI        GI   PEG (percutaneous endoscopic gastrostomy) status    Procedure done on last admission 9/28            Prophylaxis:  Venous Thromboembolism: chemical  Stress Ulcer: PPI  Ventilator Pneumonia: yes     Activity Orders          Bed rest starting at 10/03 1510        Full Code    Lynn Negrete PA-C  Neurocritical Care  [facility]

## 2017-10-05 NOTE — PROGRESS NOTES
ABG results are as follows: Decreased FIO2 to 35%. Pt tolerated well at this time  Results for JOHN LOPEZ (MRN [id number]) as of 10/5/2017 05:13   Ref. Range 10/5/2017 04:49   POC PH Latest Ref Range: 7.35 - 7.45  7.434   POC PCO2 Latest Ref Range: 35 - 45 mmHg 38.8   POC PO2 Latest Ref Range: 80 - 100 mmHg 176 (H)   POC BE Latest Ref Range: -2 to 2 mmol/L 2   POC HCO3 Latest Ref Range: 24 - 28 mmol/L 26.0   POC SATURATED O2 Latest Ref Range: 95 - 100 % 100   POC TCO2 Latest Ref Range: 23 - 27 mmol/L 27   FiO2 Unknown 50   Vt Unknown 400   PEEP Unknown 5   Sample Unknown ARTERIAL   DelSys Unknown Adult Vent   Allens Test Unknown Pass   Site Unknown RR   Mode Unknown AC/PRVC   Rate Unknown 20

## 2017-10-05 NOTE — CONSULTS
Spoke with pt's sister. Pt has a POA - pt's daughter; there is no need for an Advanced Directive consult.

## 2017-10-05 NOTE — HOSPITAL COURSE
10/5: s/p Cardiac Arrest on 10/4 and evaluation of anoxic brain injury   10/7: EEG with burst suppression pattern.  MRI with diffuse diffusion restriction  10/10: family meeting   10/11: SSEP  10/12: worsening myoclonus especially when moved. Valproic acid level undetectable likely due to interactions with meropenem. Patient on meropenem for ESBL Klebsiella pneumonia sensitive to meropenem.  10/15: No events overnight.   10/16: No issues overnight  10/18: Trach collar  10/22: Continued hyperkalemia   10/23-30: Pending transfer to LTAC  10/31: Worsening tachypnea, placed back on ventilator   11/2: started glycopyrrolate, increased Detemir and Aspart, DC isolation, patient stable for discharge home  11/3: kayexalate for hyperK (5.2), labs  M/W/F, patient stable for discharge  11/4 Placed on EEG cap . Patient having R side  Facial twitching awaiting read on EEG.  11/5: EEG negative  11/6: awaiting medical equipment for pt to be dc home, dc glycopyrrolate, 2% nebs  11/8: dc home tomorrow, hospice orders placed  11/9: Hospice orders placed. Transfer to 7th floor while Awaiting completion of home preparations by daughter.   11/10: Awaiting home preparations by daughter.  11/11: Transferred to floor

## 2017-10-05 NOTE — PLAN OF CARE
Problem: Patient Care Overview  Goal: Plan of Care Review  Outcome: Ongoing (interventions implemented as appropriate)  Pt remains on vent with FIO2 at 50% rate of 20 and tidal volume 400. Pt has trach #6 shiley. Pt has no skin breakdown noted. Pt remains with flexiseal. Pt remains on diprivan at 50mcg and also on  NS at 125. Pt is awaiting transfer to [facility] with Pt remains packed with ice packs to maintain temp between 32-34 .

## 2017-10-05 NOTE — ASSESSMENT & PLAN NOTE
Nutrition Problem:  Inadequate oral intake    Related to (etiology):   Inability to consume sufficient energy    Signs and Symptoms (as evidenced by):   NPO on mech vent requiring alternative means of nutrition.     Interventions/Recommendations (treatment strategy):  Please see RD recs above.    Nutrition Diagnosis Status:   New

## 2017-10-05 NOTE — ASSESSMENT & PLAN NOTE
--[facility] 9/28/2017 with bilateral MCA SAQIB watershed infarct

## 2017-10-05 NOTE — PT/OT/SLP EVAL
Speech Language Pathology  Discharge      Nisa Frank  MRN: [id number]    Patient not seen today secondary to Other (Pt currently requiring ventilator assistance and minimally responsive). Please re-consult if/when pt appropriate for SLP services.     RADHA Krueger, CCC-SLP

## 2017-10-05 NOTE — ASSESSMENT & PLAN NOTE
Anoxic Brain Injury   -- Obtain MRI to rule out Anoxic Brain Injury s/p Cardiac Arrest on 10/4  -- Vascular Neurology consulted  -- Neuro checks q 1 hr  -- MRI Brain pending

## 2017-10-05 NOTE — HPI
Ms. Frank is a 66 yo woman with a PMHx DM type II, CKD stage 3, CAD s/p CABG, essential hypertension, cerebrovascular disease s/p bi-hemispheric watershed infarcts and trach/PEG who present as a transfer from [facility] to Neuro Critical Care s/p Cardiac Arrest on 10/4 and evaluation of Anoxic Brain Injury. She was recently discharged from [facility] 9/28/2017 with bilateral MCA SAQIB watershed infarcts. Yesterday, she was brought in via EMS from [facility] cardiac arrest. [facility] she received chest compressions for about 5 minutes. After EMS arrived, ACLS protocol continued and she was given 2 rounds of epi, non-shockable rhythm. ROSC achieved en route to the ER.Pt arrived to ICU intubated. It was noted there sudden intermittent generalized body jerks mostly on tactile stimulation. Also, pt was hypotensive for which norepinephrine infusion started. Since her discharge here on 9/28 she has been admitted to multiple facilities recently to be treated for various issues including mucous plug leading to acute hypoxic respiratory failure, she pulled out her trach 9/4 so that was reinserted at [facility] and she was also treated for ESBL UTI while there, and last admit she was treated for likely aspiration pneumonia. She was transported here on Propofol. On examination, no cough, no gag, no corneal and minimal to no withdrawal.

## 2017-10-05 NOTE — NURSING TRANSFER
Nursing Transfer Note      10/5/2017     Transfer To: [facility] 7075    Transfer via stretcher    Transfer with O2, cardiac monitoring, ventilator, propofol drip.    Transported by [facility]     Medicines sent: yes. Insulin and propofol drip    Chart send with patient: Yes    Notified: granddaughter    Patient reassessed at: 10/5/17, 0600

## 2017-10-05 NOTE — PHYSICIAN QUERY
PT Name: Nisa Frank  MR #: [id number]     Physician Query Form - Documentation Clarification      CDS/: Mary Stoddard RN, CCDS         Contact information: [email or web address]    This form is a permanent document in the medical record.     Query Date: October 5, 2017    By submitting this query, we are merely seeking further clarification of documentation. Please utilize your independent clinical judgment when addressing the question(s) below.    The Medical record reflects the following:    Supporting Clinical Findings Location in Medical Record     Type 2 Diabetes Mellitus, uncontrolled       10/5 h/p     Blood glucose 261-->190-->98       10/3, 10/4, 10/5 lab                                                                            Doctor, Please specify diagnosis or diagnoses associated with above clinical findings.    Provider Use Only        ( x   )  DM 2 with hyperglycemia    (    )  DM 2 with hypoglycemia                                                                                                                     [  ] Clinically undetermined

## 2017-10-05 NOTE — PLAN OF CARE
Problem: Patient Care Overview  Goal: Plan of Care Review  Outcome: Ongoing (interventions implemented as appropriate)  POC reviewed with pt and family at 1000. Pt daughter verbalized understanding. Questions and concerns addressed. Pt had MRI, EEG connected, siegel pulled. No acute events today. Pt progressing toward goals. Will continue to monitor. See flowsheets for full assessment and VS info.

## 2017-10-05 NOTE — PLAN OF CARE
Patient in the Neuro ICU, transferred from [facility].  Patient was at [facility] for SNF prior to admit.   Per MD:  MRI with signs of anoxic brain  Injury  - Prognosis is expected to be dismal with limited chances of meaningful neurological recovery.  CM will follow for needs.     Shelby Tucker RN, CCRN-K, [facility]  Neuro-Critical Care   [phone]

## 2017-10-05 NOTE — PROGRESS NOTES
Pt removed from ventilator and placed on transport vent by Jess . Pt is being transferred to [facility]

## 2017-10-05 NOTE — CONSULTS
"  [facility]  Adult Nutrition  Consult Note    SUMMARY     Recommendations    Recommendation/Intervention:   As medically appropriate, recommend starting TF.   Glucerna 1.5 @ goal rate 45mL/hr.   -Initiate @ 15mL/hr and increase by 10mL q 4hrs, or as tolerated, until goal rate is reached.  - Hold for residuals >500mL.     RD to monitor.       Goals: Pt to receive nutrition by RD follow up  Nutrition Goal Status: new  Communication of RD Recs: reviewed with RN    Reason for Assessment    Reason for Assessment: nurse/nurse practitioner consult  Diagnosis: seizures, other (see comments) (cardiac arrest)  Relevent Medical History: DM type II, CKD stage 3, CAD s/p CABG, HTN, PEG/trach         General Information Comments: Pt intubated. Pt with trach/PEG. No TF at this time.    Nutrition Discharge Planning: optimal nutrition via PEG with tolerance.     Nutrition Prescription Ordered    Current Diet Order: NPO     Evaluation of Received Nutrients/Fluid Intake     IV Fluid (mL): 1800  I/O: +I/O, good UOP     % Intake of Estimated Energy Needs: 0 - 25 %  % Meal Intake: NPO     Nutrition Risk Screen          Nutrition/Diet History       Typical Food/Fluid Intake: JOSE LUIS. Pt on TF PTA (PEG in place).  Food Preferences: JOSE LUIS Buddhism/cultural preferences at this time.        Factors Affecting Nutritional Intake: NPO, on mechanical ventilation                Labs/Tests/Procedures/Meds       Pertinent Labs Reviewed: reviewed  Pertinent Labs Comments: HgbA1c 6.5  Pertinent Medications Reviewed: reviewed  Pertinent Medications Comments: heparin, IVF, insulin    Physical Findings    Overall Physical Appearance: obese, on ventilator support  Tubes: gastrostomy tube     Skin: pressure ulcer(s) (St 1 sacral spine)    Anthropometrics    Temp: (!) 103 °F (39.4 °C)     Height: 5' 6" (167.6 cm)  Weight Method: Bed Scale  Weight: 134.3 kg (296 lb 1.2 oz)     Ideal Body Weight (IBW), Female: 130 lb     % Ideal Body Weight, " Female (lb): 227.08 lb  BMI (Calculated): 47.7  BMI Grade: greater than 40 - morbid obesity                            Estimated/Assessed Needs    Weight Used For Calorie Calculations: 134.3 kg (296 lb 1.2 oz)      Energy Calorie Requirements (kcal): 4201-7419 (11-14kcal/kg)  Energy Need Method: Kcal/kg        RMR ([place]-St. Jeor Equation): 1894.75        Weight Used For Protein Calculations: 59 kg (130 lb 1.1 oz)  Protein Requirements: 89-118g (1.5-2.0g/kg)    Fluid Requirements (mL): 1mL/kcal or per MD  RDA Method (mL): 1477     CHO requirements: 50% of total kcals        Assessment and Plan    PEG (percutaneous endoscopic gastrostomy) status    Nutrition Problem:  Inadequate oral intake    Related to (etiology):   Inability to consume sufficient energy    Signs and Symptoms (as evidenced by):   NPO on mech vent requiring alternative means of nutrition.     Interventions/Recommendations (treatment strategy):  Please see RD recs above.    Nutrition Diagnosis Status:   New              Monitor and Evaluation    Food and Nutrient Intake: enteral nutrition intake  Food and Nutrient Adminstration: enteral and parenteral nutrition administration        Anthropometric Measurements: weight, weight change, body mass index  Biochemical Data, Medical Tests and Procedures: electrolyte and renal panel, gastrointestinal profile, glucose/endocrine profile, inflammatory profile, lipid profile  Nutrition-Focused Physical Findings: overall appearance    Nutrition Risk    Level of Risk: other (see comments) (f/u 2x/week)    Nutrition Follow-Up    RD Follow-up?: Yes

## 2017-10-05 NOTE — PROGRESS NOTES
Received pt on noted vent settings. Pt trach clean and intact #6 xlt. Alarms set and functional. ambu bag at [facility]. Pt tolerated well no distress noted at this time.

## 2017-10-05 NOTE — SUBJECTIVE & OBJECTIVE
Interval History:  s/p Cardiac Arrest and evaluation of Anoxic Brain Injury. She was recently discharged from [facility] 9/28/2017 with bilateral MCA SAQIB watershed infarcts  Review of Systems   Unable to obtain a complete ROS due to level of consciousness.  Objective:     Vitals:  Temp: 98.9 °F (37.2 °C) (10/05/17 0710)  Pulse: 90 (10/05/17 0900)  Resp: (!) 21 (10/05/17 0900)  BP: 130/62 (10/05/17 0900)  SpO2: 100 % (10/05/17 0900)    Temp:  [98.1 °F (36.7 °C)-99.8 °F (37.7 °C)] 98.9 °F (37.2 °C)  Pulse:  [] 90  Resp:  [20-42] 21  SpO2:  [75 %-100 %] 100 %  BP: ()/(48-78) 130/62         Vent Mode: A/C  Oxygen Concentration (%):  [] 40  Resp Rate Total:  [20 br/min-40 br/min] 36 br/min  Vt Set:  [400 mL] 400 mL  PEEP/CPAP:  [5 cmH20] 5 cmH20  Mean Airway Pressure:  [5.3 feL23-01.5 cmH20] 15 cmH20    10/04 0701 - 10/05 0700  In: 4097.5 [I.V.:3797.5]  Out: 1454 [Urine:1454]    Physical Exam   GA: Intubated, Comatose, Does not follow commands, comfortable, no acute distress.   HEENT: No scleral icterus or JVD.   Pulmonary: Clear to auscultation Anterior. No wheezing, crackles, or rhonchi.  Cardiac: RRR S1 & S2 w/o rubs/murmurs/gallops.   Abdominal: Bowel sounds present x 4. No appreciable hepatosplenomegaly.  Neuro:  --GCS: E3 VT M1  --Mental Status:  Intubated, Comatose, Does not follow commands  --CN II-XII  Unable to fully asses   --Pupils 2mm, bilaterally slugglish   --No Corneal reflex, no gag, no cough intact.  -- minimal to No withdrawal in all 4 extremities  Unable to test coordination, gait due to level of consciousness  Unable to test gait due to level of consciousness.    Medications:  Continuous  sodium chloride 0.9% Last Rate: Stopped (10/05/17 0901)   propofol Last Rate: Stopped (10/05/17 0830)   Scheduled  chlorhexidine 15 mL QID   heparin (porcine) 7,500 Units Q8H   levetiracetam IVPB 1,500 mg Q12H   pantoprazole 40 mg Daily   phenylephrine HCl in 0.9% NaCl     senna-docusate 8.6-50 mg 1  tablet BID   sodium chloride 0.9% 10 mL Q6H   sodium chloride 0.9% 3 mL Q8H   PRN  acetaminophen 650 mg Q6H PRN   dextrose 50% 12.5 g PRN   insulin aspart 1-10 Units Q6H PRN   labetalol 10 mg Q4H PRN   ondansetron 4 mg Q8H PRN   sodium chloride 0.9% 10 mL PRN     Today I personally reviewed pertinent lines/drains/airways, lab results, notably:

## 2017-10-06 PROBLEM — R50.9 FEVER: Status: ACTIVE | Noted: 2017-10-06

## 2017-10-06 LAB
ALBUMIN SERPL BCP-MCNC: 1.6 G/DL
ALLENS TEST: ABNORMAL
ALP SERPL-CCNC: 114 U/L
ALT SERPL W/O P-5'-P-CCNC: 25 U/L
ANION GAP SERPL CALC-SCNC: 8 MMOL/L
AST SERPL-CCNC: 45 U/L
BACTERIA #/AREA URNS AUTO: NORMAL /HPF
BASOPHILS # BLD AUTO: 0.01 K/UL
BASOPHILS NFR BLD: 0.1 %
BILIRUB SERPL-MCNC: 1.3 MG/DL
BILIRUB UR QL STRIP: NEGATIVE
BUN SERPL-MCNC: 22 MG/DL
CALCIUM SERPL-MCNC: 8 MG/DL
CHLORIDE SERPL-SCNC: 106 MMOL/L
CLARITY UR REFRACT.AUTO: ABNORMAL
CO2 SERPL-SCNC: 23 MMOL/L
COLOR UR AUTO: ABNORMAL
CREAT SERPL-MCNC: 0.8 MG/DL
DELSYS: ABNORMAL
DIFFERENTIAL METHOD: ABNORMAL
EOSINOPHIL # BLD AUTO: 0 K/UL
EOSINOPHIL NFR BLD: 0 %
ERYTHROCYTE [DISTWIDTH] IN BLOOD BY AUTOMATED COUNT: 16.1 %
ERYTHROCYTE [SEDIMENTATION RATE] IN BLOOD BY WESTERGREN METHOD: 20 MM/H
EST. GFR  (AFRICAN AMERICAN): >60 ML/MIN/1.73 M^2
EST. GFR  (NON AFRICAN AMERICAN): >60 ML/MIN/1.73 M^2
FIO2: 40
GLUCOSE SERPL-MCNC: 89 MG/DL
GLUCOSE UR QL STRIP: NEGATIVE
HCO3 UR-SCNC: 26.3 MMOL/L (ref 24–28)
HCT VFR BLD AUTO: 23 %
HGB BLD-MCNC: 7.4 G/DL
HGB UR QL STRIP: NEGATIVE
INR PPP: 1.2
KETONES UR QL STRIP: NEGATIVE
LEUKOCYTE ESTERASE UR QL STRIP: NEGATIVE
LYMPHOCYTES # BLD AUTO: 2 K/UL
LYMPHOCYTES NFR BLD: 13.5 %
MAGNESIUM SERPL-MCNC: 1.3 MG/DL
MCH RBC QN AUTO: 27 PG
MCHC RBC AUTO-ENTMCNC: 32.2 G/DL
MCV RBC AUTO: 84 FL
MICROSCOPIC COMMENT: NORMAL
MIN VOL: 12.5
MODE: ABNORMAL
MONOCYTES # BLD AUTO: 0.8 K/UL
MONOCYTES NFR BLD: 5.3 %
NEUTROPHILS # BLD AUTO: 12.2 K/UL
NEUTROPHILS NFR BLD: 80.7 %
NITRITE UR QL STRIP: NEGATIVE
PCO2 BLDA: 37.1 MMHG (ref 35–45)
PEEP: 5
PH SMN: 7.46 [PH] (ref 7.35–7.45)
PH UR STRIP: 5 [PH] (ref 5–8)
PHOSPHATE SERPL-MCNC: 3.7 MG/DL
PLATELET # BLD AUTO: 174 K/UL
PMV BLD AUTO: 8.9 FL
PO2 BLDA: 134 MMHG (ref 80–100)
POC BE: 2 MMOL/L
POC SATURATED O2: 99 % (ref 95–100)
POC TCO2: 27 MMOL/L (ref 23–27)
POCT GLUCOSE: 103 MG/DL (ref 70–110)
POCT GLUCOSE: 90 MG/DL (ref 70–110)
POCT GLUCOSE: 93 MG/DL (ref 70–110)
POTASSIUM SERPL-SCNC: 4.5 MMOL/L
PROT SERPL-MCNC: 6 G/DL
PROT UR QL STRIP: NEGATIVE
PROTHROMBIN TIME: 12.6 SEC
RBC # BLD AUTO: 2.74 M/UL
RBC #/AREA URNS AUTO: 2 /HPF (ref 0–4)
SAMPLE: ABNORMAL
SITE: ABNORMAL
SODIUM SERPL-SCNC: 137 MMOL/L
SP GR UR STRIP: 1.02 (ref 1–1.03)
SP02: 100
SQUAMOUS #/AREA URNS AUTO: 1 /HPF
URN SPEC COLLECT METH UR: ABNORMAL
UROBILINOGEN UR STRIP-ACNC: NEGATIVE EU/DL
VT: 400
WBC # BLD AUTO: 15.06 K/UL
WBC #/AREA URNS AUTO: 2 /HPF (ref 0–5)

## 2017-10-06 PROCEDURE — 63600175 PHARM REV CODE 636 W HCPCS: Performed by: NURSE PRACTITIONER

## 2017-10-06 PROCEDURE — 84100 ASSAY OF PHOSPHORUS: CPT

## 2017-10-06 PROCEDURE — 63600175 PHARM REV CODE 636 W HCPCS: Performed by: PSYCHIATRY & NEUROLOGY

## 2017-10-06 PROCEDURE — 99291 CRITICAL CARE FIRST HOUR: CPT | Mod: GC,,, | Performed by: PSYCHIATRY & NEUROLOGY

## 2017-10-06 PROCEDURE — 82803 BLOOD GASES ANY COMBINATION: CPT

## 2017-10-06 PROCEDURE — 25000003 PHARM REV CODE 250: Performed by: PHYSICIAN ASSISTANT

## 2017-10-06 PROCEDURE — 95951 HC EEG MONITORING/VIDEO RECORD: CPT

## 2017-10-06 PROCEDURE — 87040 BLOOD CULTURE FOR BACTERIA: CPT

## 2017-10-06 PROCEDURE — A4216 STERILE WATER/SALINE, 10 ML: HCPCS | Performed by: EMERGENCY MEDICINE

## 2017-10-06 PROCEDURE — 80053 COMPREHEN METABOLIC PANEL: CPT

## 2017-10-06 PROCEDURE — A4216 STERILE WATER/SALINE, 10 ML: HCPCS | Performed by: NURSE PRACTITIONER

## 2017-10-06 PROCEDURE — 87186 SC STD MICRODIL/AGAR DIL: CPT

## 2017-10-06 PROCEDURE — 94003 VENT MGMT INPAT SUBQ DAY: CPT

## 2017-10-06 PROCEDURE — 83735 ASSAY OF MAGNESIUM: CPT

## 2017-10-06 PROCEDURE — 81001 URINALYSIS AUTO W/SCOPE: CPT

## 2017-10-06 PROCEDURE — 95957 EEG DIGITAL ANALYSIS: CPT

## 2017-10-06 PROCEDURE — 87205 SMEAR GRAM STAIN: CPT

## 2017-10-06 PROCEDURE — 36600 WITHDRAWAL OF ARTERIAL BLOOD: CPT

## 2017-10-06 PROCEDURE — 85025 COMPLETE CBC W/AUTO DIFF WBC: CPT

## 2017-10-06 PROCEDURE — 25000003 PHARM REV CODE 250: Performed by: NURSE PRACTITIONER

## 2017-10-06 PROCEDURE — 95951 PR EEG MONITORING/VIDEORECORD: CPT | Mod: 26,,, | Performed by: PSYCHIATRY & NEUROLOGY

## 2017-10-06 PROCEDURE — 99900026 HC AIRWAY MAINTENANCE (STAT)

## 2017-10-06 PROCEDURE — 20000000 HC ICU ROOM

## 2017-10-06 PROCEDURE — 87070 CULTURE OTHR SPECIMN AEROBIC: CPT

## 2017-10-06 PROCEDURE — 85610 PROTHROMBIN TIME: CPT

## 2017-10-06 PROCEDURE — 87077 CULTURE AEROBIC IDENTIFY: CPT

## 2017-10-06 PROCEDURE — 27000221 HC OXYGEN, UP TO 24 HOURS

## 2017-10-06 PROCEDURE — 25000003 PHARM REV CODE 250: Performed by: EMERGENCY MEDICINE

## 2017-10-06 PROCEDURE — 25000003 PHARM REV CODE 250: Performed by: PSYCHIATRY & NEUROLOGY

## 2017-10-06 PROCEDURE — 63600175 PHARM REV CODE 636 W HCPCS: Performed by: EMERGENCY MEDICINE

## 2017-10-06 RX ORDER — MAGNESIUM SULFATE HEPTAHYDRATE 40 MG/ML
2 INJECTION, SOLUTION INTRAVENOUS
Status: DISCONTINUED | OUTPATIENT
Start: 2017-10-06 | End: 2017-11-11

## 2017-10-06 RX ORDER — MAGNESIUM SULFATE HEPTAHYDRATE 40 MG/ML
4 INJECTION, SOLUTION INTRAVENOUS
Status: DISCONTINUED | OUTPATIENT
Start: 2017-10-06 | End: 2017-11-11

## 2017-10-06 RX ORDER — CHLORHEXIDINE GLUCONATE ORAL RINSE 1.2 MG/ML
15 SOLUTION DENTAL 2 TIMES DAILY
Status: DISCONTINUED | OUTPATIENT
Start: 2017-10-06 | End: 2017-10-24

## 2017-10-06 RX ORDER — VANCOMYCIN HCL IN 5 % DEXTROSE 1.5G/250ML
1500 PLASTIC BAG, INJECTION (ML) INTRAVENOUS
Status: DISCONTINUED | OUTPATIENT
Start: 2017-10-06 | End: 2017-10-09

## 2017-10-06 RX ADMIN — ACETAMINOPHEN 650 MG: 325 TABLET ORAL at 02:10

## 2017-10-06 RX ADMIN — HEPARIN SODIUM 7500 UNITS: 5000 INJECTION, SOLUTION INTRAVENOUS; SUBCUTANEOUS at 01:10

## 2017-10-06 RX ADMIN — MAGNESIUM SULFATE IN WATER 4 G: 40 INJECTION, SOLUTION INTRAVENOUS at 05:10

## 2017-10-06 RX ADMIN — Medication 10 ML: at 06:10

## 2017-10-06 RX ADMIN — CHLORHEXIDINE GLUCONATE 15 ML: 1.2 RINSE ORAL at 05:10

## 2017-10-06 RX ADMIN — Medication 10 ML: at 12:10

## 2017-10-06 RX ADMIN — LEVETIRACETAM 1500 MG: 15 INJECTION INTRAVENOUS at 09:10

## 2017-10-06 RX ADMIN — Medication 1500 MG: at 11:10

## 2017-10-06 RX ADMIN — CHLORHEXIDINE GLUCONATE 15 ML: 1.2 RINSE ORAL at 12:10

## 2017-10-06 RX ADMIN — PIPERACILLIN AND TAZOBACTAM 4.5 G: 4; .5 INJECTION, POWDER, LYOPHILIZED, FOR SOLUTION INTRAVENOUS; PARENTERAL at 11:10

## 2017-10-06 RX ADMIN — PIPERACILLIN AND TAZOBACTAM 4.5 G: 4; .5 INJECTION, POWDER, LYOPHILIZED, FOR SOLUTION INTRAVENOUS; PARENTERAL at 07:10

## 2017-10-06 RX ADMIN — FAMOTIDINE 20 MG: 20 TABLET, FILM COATED ORAL at 09:10

## 2017-10-06 RX ADMIN — CHLORHEXIDINE GLUCONATE 15 ML: 1.2 RINSE ORAL at 09:10

## 2017-10-06 RX ADMIN — Medication 3 ML: at 06:10

## 2017-10-06 RX ADMIN — HEPARIN SODIUM 7500 UNITS: 5000 INJECTION, SOLUTION INTRAVENOUS; SUBCUTANEOUS at 09:10

## 2017-10-06 RX ADMIN — ACETAMINOPHEN 650 MG: 325 TABLET ORAL at 04:10

## 2017-10-06 RX ADMIN — Medication 3 ML: at 10:10

## 2017-10-06 RX ADMIN — DEXTROSE 500 MG: 50 INJECTION, SOLUTION INTRAVENOUS at 03:10

## 2017-10-06 RX ADMIN — HEPARIN SODIUM 7500 UNITS: 5000 INJECTION, SOLUTION INTRAVENOUS; SUBCUTANEOUS at 05:10

## 2017-10-06 NOTE — SUBJECTIVE & OBJECTIVE
Interval History:  Patient febrile to 101.9 overnight for which she was received tylenol and was placed under cooling blankets.  This AM temperature dropped to 95.9 and david hugger and warming blankets were placed.  EEG continued.  Remains on ventilator.      Review of Systems   Unable to perform ROS: Patient unresponsive       Objective:     Vitals:  Temp: 96 °F (35.6 °C) (10/06/17 0701)  Pulse: 65 (10/06/17 0944)  Resp: 20 (10/06/17 0944)  BP: 128/61 (10/06/17 0900)  SpO2: 100 % (10/06/17 0944)    Temp:  [96 °F (35.6 °C)-103 °F (39.4 °C)] 96 °F (35.6 °C)  Pulse:  [] 65  Resp:  [16-41] 20  SpO2:  [98 %-100 %] 100 %  BP: ()/(49-80) 128/61         Vent Mode: A/C  Oxygen Concentration (%):  [40] 40  Resp Rate Total:  [20 br/min-50 br/min] 20 br/min  Vt Set:  [400 mL] 400 mL  PEEP/CPAP:  [5 cmH20] 5 cmH20  Pressure Support:  [0 cmH20] 0 cmH20  Mean Airway Pressure:  [10 keX46-40 cmH20] 11 cmH20    10/05 0701 - 10/06 0700  In: 2676 [I.V.:2176]  Out: 115 [Urine:115]    Physical Exam   Constitutional:   Obese  Female, unresponsive   HENT:   Head: Normocephalic and atraumatic.   Tracheostomy in place   Cardiovascular: Normal rate and regular rhythm.  Exam reveals no gallop and no friction rub.    No murmur heard.  Pulmonary/Chest: Breath sounds normal. No respiratory distress. She has no wheezes. She has no rales.   On ventilator   Abdominal: Bowel sounds are normal.   Obese abdomen   Genitourinary:   Genitourinary Comments: Catheter and rectal tube in place   Musculoskeletal: She exhibits no edema or deformity.   Skin: Skin is warm and dry.   Neuro:    -GCS 4T  -Mental Status:  Tracheostomy, Comatose, Does not follow commands  -CN II-XII not able to fully assess but patient is with sluggish pupils, no gag or cough  -Minimal withdrawal in all 4 extremities with myoclonus seen in RUE  -Gait and coordination not tested      Medications:  Continuous  sodium chloride 0.9% Last Rate: 75 mL/hr at  10/06/17 0915   Scheduled  chlorhexidine 15 mL BID   famotidine 20 mg BID   heparin (porcine) 7,500 Units Q8H   levetiracetam IVPB 1,500 mg Q12H   piperacillin-tazobactam 4.5 g in dextrose 5 % 100 mL IVPB (ready to mix system) 4.5 g Q8H   sodium chloride 0.9% 10 mL Q6H   sodium chloride 0.9% 3 mL Q8H   vancomycin (VANCOCIN) IVPB 1,500 mg Q12H   PRN  acetaminophen 650 mg Q6H PRN   dextrose 50% 12.5 g PRN   dextrose 50% 12.5 g PRN   glucagon (human recombinant) 1 mg PRN   insulin aspart 1-10 Units Q6H PRN   labetalol 10 mg Q4H PRN   magnesium sulfate IVPB 2 g PRN   magnesium sulfate IVPB 4 g PRN   ondansetron 4 mg Q8H PRN   pneumoc 13-finn conj-dip cr(PF) 0.5 mL vaccine x 1 dose   sodium chloride 0.9% 10 mL PRN     Today I personally reviewed pertinent lab results and imaging.

## 2017-10-06 NOTE — PLAN OF CARE
Problem: Patient Care Overview  Goal: Plan of Care Review  Outcome: Ongoing (interventions implemented as appropriate)  POC reviewed with patient. Pt verbalized unrespnsive. Patient remained on cooling blanket and received around the clock PRN tylenol for fever, but fever persisted in the 101's most of the night. Ice packs applied. Patient also appeared to have jerking movements all night. Team aware. Magnesium replaced. Pt progressing toward goals. Will continue to monitor.  See flow sheets for full assessment and VS info

## 2017-10-06 NOTE — PHYSICIAN QUERY
PT Name: Nisa Frank  MR #: [id number]     Physician Query Form - Diagnosis Clarification      CDS/: Mary Stoddard  RN, CCDS             Contact information: [email or web address]    This form is a permanent document in the medical record.     Query Date: October 6, 2017    By submitting this query, we are merely seeking further clarification of documentation.  Please utilize your independent clinical judgment when addressing the question(s) below.     The medical record contains the following:      Findings Supporting Clinical Information Location in Medical Record   Acute encephalopathy  - Concern for Anoxic brain injury during trach removal/replacement, CVA, Seizure, Worsening Sepsis           Start broad spectrum abx and panculture  Wbc-12.50-->12.22-->15.06  T 95.9-->103  10/3 consult note          10/6 progress note  10/3, 10/4, 10/6 lab  10/5, 10/6 vss     Please clarify if the ___Sepsis _____ diagnosis has been:    [ x ] Ruled In  [  ] Ruled In, Now Resolved  [  ] Resolved Prior to My Assessment  [  ] Ruled Out  [  ] Clinically insignificant  [  ] Clinically undetermined  [  ] Other/Clarification of findings (please specify)_______________________________    Please document in your progress notes daily for the duration of treatment, until resolved, and include in your discharge summary.

## 2017-10-06 NOTE — ASSESSMENT & PLAN NOTE
MRI reveals interval development of diffuse cortical restricted diffusion throughout the cerebral hemispheres and cerebellum concerning for acute global anoxic injury with no hemorrhagic conversion.    -- Vascular Neurology consulted  -- Neuro checks q 1 hr

## 2017-10-06 NOTE — PT/OT/SLP PROGRESS
Occupational Therapy      Nisa Frank  MRN: [id number]    Patient not seen today secondary to not appropriate for OT services at this time.  Pt not following commands or opening eyes to command per nursing.  OT orders discontinued.      MARCO ANTONIO Bowling  10/6/2017

## 2017-10-06 NOTE — NURSING
Pt's rectal probe reading 94.5F, verified with rectal thermometer. Ryan with [facility] notified, will come to bedside to assess.

## 2017-10-06 NOTE — PT/OT/SLP PROGRESS
Physical Therapy  Patient Not Appropriate     Nisa Frank  MRN: [id number]     PT Evaluate and treat orders received and acknowledged. Pt not appropriate for therapy intervention at this time. Please re-consult when pt appropriate to participate. D/C Acute PT services 10/6/2017.    Keshia Barber, PT, DPT  [phone]  5/15/2017

## 2017-10-06 NOTE — CHAPLAIN
Family member at bedside - sister? Asked for Taoist  - has been unable to contact her own .      offered prayer and will put patient on shift report for continued support this weekend.

## 2017-10-06 NOTE — PROGRESS NOTES
[facility]  Neurocritical Care  Progress Note    Admit Date: 10/3/2017  Service Date: 10/06/2017  Length of Stay: 3    Subjective:     Chief Complaint: Seizure    History of Present Illness: Ms. Frank is a 68 yo woman with a PMHx DM type II, CKD stage 3, CAD s/p CABG, essential hypertension, cerebrovascular disease s/p bi-hemispheric watershed infarcts and trach/PEG who present as a transfer from [facility] to Neuro Critical Care s/p Cardiac Arrest on 10/4 and evaluation of Anoxic Brain Injury. She was recently discharged from [facility] 9/28/2017 with bilateral MCA SAQIB watershed infarcts. Yesterday, she was brought in via EMS from [facility] received chest compressions for about 5 minutes. After EMS arrived, ACLS protocol continued and she was given 2 rounds of epi, non-shockable rhythm. ROSC achieved en route to the ER.Pt arrived to ICU intubated. It was noted there sudden intermittent generalized body jerks mostly on tactile stimulation. Also, pt was hypotensive for which norepinephrine infusion started. Since her discharge here on 9/28 she has been admitted to multiple facilities recently to be treated for various issues including mucous plug leading to acute hypoxic respiratory failure, she pulled out her trach 9/4 so that was reinserted at [facility] and she was also treated for ESBL UTI while there, and last admit she was treated for likely aspiration pneumonia. She was transported here on Propofol. On examination, no cough, no gag, no corneal and minimal to no withdrawal.       Hospital Course: 10/5 s/p Cardiac Arrest on 10/4 and evaluation of Anoxic Brain Injury.She was recently discharged from [facility] 9/28/2017 with bilateral MCA SAQIB watershed infarcts    Interval History:  Patient febrile to 101.9 overnight for which she was received tylenol and was placed under cooling blankets.  This AM temperature dropped to 95.9 and david hugger and  warming blankets were placed.  EEG continued.  Remains on ventilator.      Review of Systems   Unable to perform ROS: Patient unresponsive       Objective:     Vitals:  Temp: 96 °F (35.6 °C) (10/06/17 0701)  Pulse: 65 (10/06/17 0944)  Resp: 20 (10/06/17 0944)  BP: 128/61 (10/06/17 0900)  SpO2: 100 % (10/06/17 0944)    Temp:  [96 °F (35.6 °C)-103 °F (39.4 °C)] 96 °F (35.6 °C)  Pulse:  [] 65  Resp:  [16-41] 20  SpO2:  [98 %-100 %] 100 %  BP: ()/(49-80) 128/61         Vent Mode: A/C  Oxygen Concentration (%):  [40] 40  Resp Rate Total:  [20 br/min-50 br/min] 20 br/min  Vt Set:  [400 mL] 400 mL  PEEP/CPAP:  [5 cmH20] 5 cmH20  Pressure Support:  [0 cmH20] 0 cmH20  Mean Airway Pressure:  [10 lsO60-40 cmH20] 11 cmH20    10/05 0701 - 10/06 0700  In: 2676 [I.V.:2176]  Out: 115 [Urine:115]    Physical Exam   Constitutional:   Obese  Female, unresponsive   HENT:   Head: Normocephalic and atraumatic.   Tracheostomy in place   Cardiovascular: Normal rate and regular rhythm.  Exam reveals no gallop and no friction rub.    No murmur heard.  Pulmonary/Chest: Breath sounds normal. No respiratory distress. She has no wheezes. She has no rales.   On ventilator   Abdominal: Bowel sounds are normal.   Obese abdomen   Genitourinary:   Genitourinary Comments: Catheter and rectal tube in place   Musculoskeletal: She exhibits no edema or deformity.   Skin: Skin is warm and dry.   Neuro:    -GCS 4T  -Mental Status:  Tracheostomy, Comatose, Does not follow commands  -CN II-XII not able to fully assess but patient is with sluggish pupils, no gag or cough  -Minimal withdrawal in all 4 extremities with myoclonus seen in RUE  -Gait and coordination not tested      Medications:  Continuous  sodium chloride 0.9% Last Rate: 75 mL/hr at 10/06/17 0915   Scheduled  chlorhexidine 15 mL BID   famotidine 20 mg BID   heparin (porcine) 7,500 Units Q8H   levetiracetam IVPB 1,500 mg Q12H   piperacillin-tazobactam 4.5 g in dextrose  5 % 100 mL IVPB (ready to mix system) 4.5 g Q8H   sodium chloride 0.9% 10 mL Q6H   sodium chloride 0.9% 3 mL Q8H   vancomycin (VANCOCIN) IVPB 1,500 mg Q12H   PRN  acetaminophen 650 mg Q6H PRN   dextrose 50% 12.5 g PRN   dextrose 50% 12.5 g PRN   glucagon (human recombinant) 1 mg PRN   insulin aspart 1-10 Units Q6H PRN   labetalol 10 mg Q4H PRN   magnesium sulfate IVPB 2 g PRN   magnesium sulfate IVPB 4 g PRN   ondansetron 4 mg Q8H PRN   pneumoc 13-finn conj-dip cr(PF) 0.5 mL vaccine x 1 dose   sodium chloride 0.9% 10 mL PRN     Today I personally reviewed pertinent lab results and imaging.    Assessment/Plan:     Neuro   * Seizure    Patient with myoclonic jerks in upper extremity with noxious stimuli, likely representing diffuse cortical damage.  vEEG with decreased activity.  --Continue Neuro checks q 1hr  --Epilepsy Service consulted, appreciate all recommendations  -- Seizure Precautions  -- Continue vEEG  -- Continue Vimpat and will start Keppra load today              Anoxic brain injury    MRI reveals interval development of diffuse cortical restricted diffusion throughout the cerebral hemispheres and cerebellum concerning for acute global anoxic injury with no hemorrhagic conversion.    -- Vascular Neurology consulted  -- Neuro checks q 1 hr            ENT   Tracheostomy status    S/p Trach PEG during previous admission on 9/28/2017        Cardiac/Vascular   S/P CABG (coronary artery bypass graft)    Hx of CABG in 2005        Essential hypertension    Currently normo to hypotensive   -Will continue to hold antihypertensives in setting of hypotension        Endocrine   Moderate protein malnutrition    -Will start tube feeds today  -RD consult        Type 2 diabetes mellitus, uncontrolled    -- continue to monitor BG  -- SSI        GI   PEG (percutaneous endoscopic gastrostomy) status    Gastrostomy tube place during previous admission on 9/28        Other   Fever    Patient with episodes of hyperthermia and  hypothermia over the last 24 hours.   Possibly represent infection versus autonomic instability.  -Blood cultures  -Respiratory culture  -UA with urine culture  -CXR  -Started on broad spectrum antibiotics          Prophylaxis:  Venous Thromboembolism: mechanical chemical  Stress Ulcer: None  Ventilator Pneumonia: yes- patient on broad spectrum antibiotics     Activity Orders          Bed rest starting at 10/03 1510        Full Code    Ryan Roberto MD  Neurocritical Care  [facility]

## 2017-10-06 NOTE — ASSESSMENT & PLAN NOTE
Patient with episodes of hyperthermia and hypothermia over the last 24 hours.   Possibly represent infection versus autonomic instability.  -Blood cultures  -Respiratory culture  -UA with urine culture  -CXR

## 2017-10-06 NOTE — ASSESSMENT & PLAN NOTE
Currently normo to hypotensive   -Will continue to hold antihypertensives in setting of hypotension

## 2017-10-06 NOTE — ASSESSMENT & PLAN NOTE
Patient with myoclonic jerks in upper extremity with noxious stimuli, likely representing diffuse cortical damage.  vEEG with decreased activity.  --Continue Neuro checks q 1hr  --Epilepsy Service consulted, appreciate all recommendations  -- Seizure Precautions  -- Continue vEEG  -- Continue Vimpat and will start Keppra load today

## 2017-10-06 NOTE — NURSING
Multiple attempts to draw blood cultures, phlebotomy notified and were only able to draw one set, MD Pardeep with [facility] notified.

## 2017-10-06 NOTE — PLAN OF CARE
Problem: Patient Care Overview  Goal: Plan of Care Review  Outcome: Ongoing (interventions implemented as appropriate)  POC reviewed with pt and family at 1400. Daughter verbalized understanding, pt unable to. Questions and concerns addressed. No acute events today. Pt progressing toward goals. Will continue to monitor. See flowsheets for full assessment and VS info.

## 2017-10-06 NOTE — NURSING
PRITI Germain, notified of patient's fever of 102 and jerking movements with tachycardia. Ordered to give keppra dose early and continue to monitor patient.

## 2017-10-06 NOTE — NURSING
"Pt experiencing "jerking movements."  Pt's family concerned stating they are worse than yesterday.  MD Pardeep with [facility] notified, will call Dr. Isabel with epilepsy.   "

## 2017-10-07 LAB
ALBUMIN SERPL BCP-MCNC: 1.5 G/DL
ALLENS TEST: ABNORMAL
ALP SERPL-CCNC: 123 U/L
ALT SERPL W/O P-5'-P-CCNC: 23 U/L
AMMONIA PLAS-SCNC: 60 UMOL/L
ANION GAP SERPL CALC-SCNC: 8 MMOL/L
AST SERPL-CCNC: 53 U/L
BASOPHILS # BLD AUTO: 0.01 K/UL
BASOPHILS NFR BLD: 0.1 %
BILIRUB SERPL-MCNC: 0.8 MG/DL
BUN SERPL-MCNC: 20 MG/DL
CALCIUM SERPL-MCNC: 7.9 MG/DL
CHLORIDE SERPL-SCNC: 103 MMOL/L
CO2 SERPL-SCNC: 24 MMOL/L
CORTIS SERPL-MCNC: 16.8 UG/DL
CREAT SERPL-MCNC: 0.7 MG/DL
DELSYS: ABNORMAL
DIFFERENTIAL METHOD: ABNORMAL
EOSINOPHIL # BLD AUTO: 0 K/UL
EOSINOPHIL NFR BLD: 0 %
ERYTHROCYTE [DISTWIDTH] IN BLOOD BY AUTOMATED COUNT: 15.7 %
ERYTHROCYTE [SEDIMENTATION RATE] IN BLOOD BY WESTERGREN METHOD: 20 MM/H
EST. GFR  (AFRICAN AMERICAN): >60 ML/MIN/1.73 M^2
EST. GFR  (NON AFRICAN AMERICAN): >60 ML/MIN/1.73 M^2
FIO2: 50
GLUCOSE SERPL-MCNC: 106 MG/DL
HCO3 UR-SCNC: 24.2 MMOL/L (ref 24–28)
HCT VFR BLD AUTO: 22.5 %
HGB BLD-MCNC: 7.2 G/DL
INR PPP: 1.2
LYMPHOCYTES # BLD AUTO: 2 K/UL
LYMPHOCYTES NFR BLD: 13 %
MAGNESIUM SERPL-MCNC: 2.1 MG/DL
MCH RBC QN AUTO: 26.9 PG
MCHC RBC AUTO-ENTMCNC: 32 G/DL
MCV RBC AUTO: 84 FL
MODE: ABNORMAL
MONOCYTES # BLD AUTO: 1 K/UL
MONOCYTES NFR BLD: 6.4 %
NEUTROPHILS # BLD AUTO: 12 K/UL
NEUTROPHILS NFR BLD: 80 %
PCO2 BLDA: 42.6 MMHG (ref 35–45)
PEEP: 5
PH SMN: 7.36 [PH] (ref 7.35–7.45)
PHOSPHATE SERPL-MCNC: 3.2 MG/DL
PLATELET # BLD AUTO: 192 K/UL
PMV BLD AUTO: 9.4 FL
PO2 BLDA: 38 MMHG (ref 40–60)
POC BE: -1 MMOL/L
POC SATURATED O2: 69 % (ref 95–100)
POC TCO2: 25 MMOL/L (ref 24–29)
POCT GLUCOSE: 117 MG/DL (ref 70–110)
POCT GLUCOSE: 123 MG/DL (ref 70–110)
POCT GLUCOSE: 132 MG/DL (ref 70–110)
POCT GLUCOSE: 133 MG/DL (ref 70–110)
POCT GLUCOSE: 185 MG/DL (ref 70–110)
POTASSIUM SERPL-SCNC: 4.2 MMOL/L
PROCALCITONIN SERPL IA-MCNC: 0.47 NG/ML
PROT SERPL-MCNC: 6 G/DL
PROTHROMBIN TIME: 12.1 SEC
RBC # BLD AUTO: 2.68 M/UL
SAMPLE: ABNORMAL
SITE: ABNORMAL
SODIUM SERPL-SCNC: 135 MMOL/L
SP02: 97
VALPROATE SERPL-MCNC: 28.4 UG/ML
VANCOMYCIN TROUGH SERPL-MCNC: 9.7 UG/ML
VT: 20
WBC # BLD AUTO: 14.95 K/UL

## 2017-10-07 PROCEDURE — 82140 ASSAY OF AMMONIA: CPT

## 2017-10-07 PROCEDURE — 82533 TOTAL CORTISOL: CPT

## 2017-10-07 PROCEDURE — 99900026 HC AIRWAY MAINTENANCE (STAT)

## 2017-10-07 PROCEDURE — 94003 VENT MGMT INPAT SUBQ DAY: CPT

## 2017-10-07 PROCEDURE — 82803 BLOOD GASES ANY COMBINATION: CPT

## 2017-10-07 PROCEDURE — 25000003 PHARM REV CODE 250: Performed by: EMERGENCY MEDICINE

## 2017-10-07 PROCEDURE — 94640 AIRWAY INHALATION TREATMENT: CPT

## 2017-10-07 PROCEDURE — 63600175 PHARM REV CODE 636 W HCPCS: Performed by: INTERNAL MEDICINE

## 2017-10-07 PROCEDURE — 25000242 PHARM REV CODE 250 ALT 637 W/ HCPCS: Performed by: SURGERY

## 2017-10-07 PROCEDURE — 36415 COLL VENOUS BLD VENIPUNCTURE: CPT

## 2017-10-07 PROCEDURE — 36600 WITHDRAWAL OF ARTERIAL BLOOD: CPT

## 2017-10-07 PROCEDURE — 94761 N-INVAS EAR/PLS OXIMETRY MLT: CPT

## 2017-10-07 PROCEDURE — 80202 ASSAY OF VANCOMYCIN: CPT

## 2017-10-07 PROCEDURE — 25000003 PHARM REV CODE 250: Performed by: PSYCHIATRY & NEUROLOGY

## 2017-10-07 PROCEDURE — 25000003 PHARM REV CODE 250: Performed by: PHYSICIAN ASSISTANT

## 2017-10-07 PROCEDURE — 20000000 HC ICU ROOM

## 2017-10-07 PROCEDURE — 25000003 PHARM REV CODE 250: Performed by: SURGERY

## 2017-10-07 PROCEDURE — 85610 PROTHROMBIN TIME: CPT

## 2017-10-07 PROCEDURE — 63600175 PHARM REV CODE 636 W HCPCS: Performed by: PSYCHIATRY & NEUROLOGY

## 2017-10-07 PROCEDURE — 80164 ASSAY DIPROPYLACETIC ACD TOT: CPT

## 2017-10-07 PROCEDURE — 84145 PROCALCITONIN (PCT): CPT

## 2017-10-07 PROCEDURE — 80053 COMPREHEN METABOLIC PANEL: CPT

## 2017-10-07 PROCEDURE — 99233 SBSQ HOSP IP/OBS HIGH 50: CPT | Mod: GC,,, | Performed by: PSYCHIATRY & NEUROLOGY

## 2017-10-07 PROCEDURE — 83735 ASSAY OF MAGNESIUM: CPT

## 2017-10-07 PROCEDURE — 99900035 HC TECH TIME PER 15 MIN (STAT)

## 2017-10-07 PROCEDURE — 85025 COMPLETE CBC W/AUTO DIFF WBC: CPT

## 2017-10-07 PROCEDURE — A4216 STERILE WATER/SALINE, 10 ML: HCPCS | Performed by: EMERGENCY MEDICINE

## 2017-10-07 PROCEDURE — 25000003 PHARM REV CODE 250: Performed by: NURSE PRACTITIONER

## 2017-10-07 PROCEDURE — 63600175 PHARM REV CODE 636 W HCPCS: Performed by: PHYSICIAN ASSISTANT

## 2017-10-07 PROCEDURE — 63600175 PHARM REV CODE 636 W HCPCS: Performed by: EMERGENCY MEDICINE

## 2017-10-07 PROCEDURE — 84100 ASSAY OF PHOSPHORUS: CPT

## 2017-10-07 PROCEDURE — 27000221 HC OXYGEN, UP TO 24 HOURS

## 2017-10-07 PROCEDURE — A4216 STERILE WATER/SALINE, 10 ML: HCPCS | Performed by: NURSE PRACTITIONER

## 2017-10-07 RX ORDER — AMANTADINE HYDROCHLORIDE 50 MG/5ML
100 SOLUTION ORAL DAILY
Status: DISCONTINUED | OUTPATIENT
Start: 2017-10-07 | End: 2017-10-07

## 2017-10-07 RX ORDER — AMANTADINE HYDROCHLORIDE 50 MG/5ML
100 SOLUTION ORAL
Status: DISCONTINUED | OUTPATIENT
Start: 2017-10-08 | End: 2017-10-11

## 2017-10-07 RX ORDER — IPRATROPIUM BROMIDE AND ALBUTEROL SULFATE 2.5; .5 MG/3ML; MG/3ML
3 SOLUTION RESPIRATORY (INHALATION) EVERY 6 HOURS PRN
Status: DISCONTINUED | OUTPATIENT
Start: 2017-10-07 | End: 2017-10-07

## 2017-10-07 RX ORDER — ATORVASTATIN CALCIUM 10 MG/1
10 TABLET, FILM COATED ORAL DAILY
Status: DISCONTINUED | OUTPATIENT
Start: 2017-10-07 | End: 2017-10-11

## 2017-10-07 RX ORDER — IPRATROPIUM BROMIDE AND ALBUTEROL SULFATE 2.5; .5 MG/3ML; MG/3ML
3 SOLUTION RESPIRATORY (INHALATION) EVERY 6 HOURS
Status: DISCONTINUED | OUTPATIENT
Start: 2017-10-07 | End: 2017-10-20

## 2017-10-07 RX ORDER — AMANTADINE HYDROCHLORIDE 50 MG/5ML
200 SOLUTION ORAL EVERY MORNING
Status: DISCONTINUED | OUTPATIENT
Start: 2017-10-08 | End: 2017-10-07

## 2017-10-07 RX ORDER — ACETAMINOPHEN 10 MG/ML
1000 INJECTION, SOLUTION INTRAVENOUS ONCE
Status: COMPLETED | OUTPATIENT
Start: 2017-10-07 | End: 2017-10-07

## 2017-10-07 RX ORDER — AMANTADINE HYDROCHLORIDE 50 MG/5ML
200 SOLUTION ORAL
Status: DISCONTINUED | OUTPATIENT
Start: 2017-10-08 | End: 2017-10-11

## 2017-10-07 RX ADMIN — Medication 10 ML: at 06:10

## 2017-10-07 RX ADMIN — FAMOTIDINE 20 MG: 20 TABLET, FILM COATED ORAL at 09:10

## 2017-10-07 RX ADMIN — FAMOTIDINE 20 MG: 20 TABLET, FILM COATED ORAL at 08:10

## 2017-10-07 RX ADMIN — ACETAMINOPHEN 650 MG: 325 TABLET ORAL at 05:10

## 2017-10-07 RX ADMIN — PIPERACILLIN AND TAZOBACTAM 4.5 G: 4; .5 INJECTION, POWDER, LYOPHILIZED, FOR SOLUTION INTRAVENOUS; PARENTERAL at 08:10

## 2017-10-07 RX ADMIN — AMANTADINE HYDROCHLORIDE 100 MG: 50 SOLUTION ORAL at 01:10

## 2017-10-07 RX ADMIN — HEPARIN SODIUM 7500 UNITS: 5000 INJECTION, SOLUTION INTRAVENOUS; SUBCUTANEOUS at 01:10

## 2017-10-07 RX ADMIN — Medication 3 ML: at 10:10

## 2017-10-07 RX ADMIN — HEPARIN SODIUM 7500 UNITS: 5000 INJECTION, SOLUTION INTRAVENOUS; SUBCUTANEOUS at 05:10

## 2017-10-07 RX ADMIN — HEPARIN SODIUM 7500 UNITS: 5000 INJECTION, SOLUTION INTRAVENOUS; SUBCUTANEOUS at 09:10

## 2017-10-07 RX ADMIN — Medication 1500 MG: at 11:10

## 2017-10-07 RX ADMIN — DEXTROSE 500 MG: 50 INJECTION, SOLUTION INTRAVENOUS at 12:10

## 2017-10-07 RX ADMIN — DEXTROSE 500 MG: 50 INJECTION, SOLUTION INTRAVENOUS at 08:10

## 2017-10-07 RX ADMIN — Medication 1500 MG: at 12:10

## 2017-10-07 RX ADMIN — ACETAMINOPHEN 1000 MG: 10 INJECTION, SOLUTION INTRAVENOUS at 11:10

## 2017-10-07 RX ADMIN — IPRATROPIUM BROMIDE AND ALBUTEROL SULFATE 3 ML: .5; 3 SOLUTION RESPIRATORY (INHALATION) at 01:10

## 2017-10-07 RX ADMIN — Medication 3 ML: at 06:10

## 2017-10-07 RX ADMIN — IPRATROPIUM BROMIDE AND ALBUTEROL SULFATE 3 ML: .5; 3 SOLUTION RESPIRATORY (INHALATION) at 08:10

## 2017-10-07 RX ADMIN — PIPERACILLIN AND TAZOBACTAM 4.5 G: 4; .5 INJECTION, POWDER, LYOPHILIZED, FOR SOLUTION INTRAVENOUS; PARENTERAL at 11:10

## 2017-10-07 RX ADMIN — PIPERACILLIN AND TAZOBACTAM 4.5 G: 4; .5 INJECTION, POWDER, LYOPHILIZED, FOR SOLUTION INTRAVENOUS; PARENTERAL at 03:10

## 2017-10-07 RX ADMIN — CHLORHEXIDINE GLUCONATE 15 ML: 1.2 RINSE ORAL at 08:10

## 2017-10-07 RX ADMIN — LABETALOL HYDROCHLORIDE 10 MG: 5 INJECTION, SOLUTION INTRAVENOUS at 04:10

## 2017-10-07 RX ADMIN — CHLORHEXIDINE GLUCONATE 15 ML: 1.2 RINSE ORAL at 09:10

## 2017-10-07 RX ADMIN — Medication 10 ML: at 11:10

## 2017-10-07 RX ADMIN — LEVETIRACETAM 1500 MG: 15 INJECTION INTRAVENOUS at 08:10

## 2017-10-07 RX ADMIN — INSULIN ASPART 2 UNITS: 100 INJECTION, SOLUTION INTRAVENOUS; SUBCUTANEOUS at 12:10

## 2017-10-07 RX ADMIN — ATORVASTATIN CALCIUM 10 MG: 10 TABLET, FILM COATED ORAL at 11:10

## 2017-10-07 RX ADMIN — Medication 3 ML: at 01:10

## 2017-10-07 RX ADMIN — Medication 10 ML: at 12:10

## 2017-10-07 RX ADMIN — ACETAMINOPHEN 650 MG: 325 TABLET ORAL at 04:10

## 2017-10-07 RX ADMIN — DEXTROSE 500 MG: 50 INJECTION, SOLUTION INTRAVENOUS at 09:10

## 2017-10-07 NOTE — PLAN OF CARE
Problem: Patient Care Overview  Goal: Plan of Care Review  Outcome: Ongoing (interventions implemented as appropriate)  POC reviewed with patient. Pt remained unresponsive. Questions and concerns discussed with family earlier in the night.   PRITI Robbins, notified of no urine output for patient during the night, day shift in and out cath and bladder scanner being dysfunctional.  Ross catheter ordered and inserted at 0600. No complications noted.   No acute events overnight. Pt progressing toward goals. Will continue to monitor. See flow sheets for full assessment and VS info

## 2017-10-07 NOTE — PLAN OF CARE
Problem: Diabetes, Type 2 (Adult)  Intervention: Support/Optimize Psychosocial Response to Condition  POC reviewed with pt and family at 1400. Patient's daughter verbalized understanding. Questions and concerns addressed. No acute events today. Pt progressing toward goals. Will continue to monitor. See flowsheets for full assessment and VS info.

## 2017-10-07 NOTE — PROGRESS NOTES
[facility]  Neurocritical Care  Progress Note    Admit Date: 10/3/2017  Service Date: 10/07/2017  Length of Stay: 4    Subjective:     Chief Complaint: Seizure    History of Present Illness: Ms. Frank is a 66 yo woman with a PMHx DM type II, CKD stage 3, CAD s/p CABG, essential hypertension, cerebrovascular disease s/p bi-hemispheric watershed infarcts and trach/PEG who present as a transfer from [facility] to Neuro Critical Care s/p Cardiac Arrest on 10/4 and evaluation of Anoxic Brain Injury. She was recently discharged from [facility] [place]/28/2017 with bilateral MCA SAQIB watershed infarcts. Yesterday, she was brought in via EMS from [facility] chest compressions for about 5 minutes. After EMS arrived, ACLS protocol continued and she was given 2 rounds of epi, non-shockable rhythm. ROSC achieved en route to the ER.Pt arrived to ICU intubated. It was noted there sudden intermittent generalized body jerks mostly on tactile stimulation. Also, pt was hypotensive for which norepinephrine infusion started. Since her discharge here on 9/28 she has been admitted to multiple facilities recently to be treated for various issues including mucous plug leading to acute hypoxic respiratory failure, she pulled out her trach 9/4 so that was reinserted at [facility] and she was also treated for ESBL UTI while there, and last admit she was treated for likely aspiration pneumonia. She was transported here on Propofol. On examination, no cough, no gag, no corneal and minimal to no withdrawal.       Hospital Course: 10/5 s/p Cardiac Arrest on 10/4 and evaluation of Anoxic Brain Injury.She was recently discharged from [facility] 9/28/2017 with bilateral MCA SAQIB watershed infarcts  10//7: EEG with burst suppression pattern.  MRI with diffuse diffusion restriction. Old periventricular white matter and watershed strokes.    Interval History:  >4 elements OR status of 3  inpatient conditions  EEG with burst suppression pattern.  MRI with diffuse diffusion restriction. Old periventricular white matter and watershed strokes.  Review of Systems   Unable to perform ROS: Patient unresponsive     2 systems OR Unable to obtain a complete ROS due to level of consciousness.  Objective:     Vitals:  Temp: 100.3 °F (37.9 °C) (10/07/17 1656)  Pulse: (!) 111 (10/07/17 1705)  Resp: (!) 72 (10/07/17 1705)  BP: (!) 156/77 (10/07/17 1705)  SpO2: 100 % (10/07/17 1705)    Temp:  [96.4 °F (35.8 °C)-101.1 °F (38.4 °C)] 100.3 °F (37.9 °C)  Pulse:  [] 111  Resp:  [11-94] 72  SpO2:  [83 %-100 %] 100 %  BP: (138-172)/() 156/77         Vent Mode: A/C  Oxygen Concentration (%):  [40-50] 50  Resp Rate Total:  [20 br/min-41 br/min] 38 br/min  Vt Set:  [400 mL] 400 mL  PEEP/CPAP:  [5 cmH20] 5 cmH20  Pressure Support:  [0 cmH20] 0 cmH20  Mean Airway Pressure:  [7 juG15-65 cmH20] 8.8 cmH20    10/06 0701 - 10/07 0700  In: 3691.3 [I.V.:1811.3]  Out: 1850 [Urine:1850]    Physical Exam  Unable to test orientation, language, memory, judgment, insight, fund of knowledge, hearing, shoulder shrug, tongue protrusion, coordination, gait due to level of consciousness.  General   HEENT: S/P trach/  Chest Heart RRR / Lungs Clear to auscultation  Adbomen: Soft nontender + BS; S/P PEG  Extremities: OK distal pulses.  Skin: UK  Neurological Exam:  MS;Coma.  CN: II-XII UK  Motor: LUE   2/5 / RUE 0 /5  RUE extensor posturing; LUE semi purposefully,             LLE   0/5 /  RLE 0 /5  Tone normal bilaterally  Sensory: LT/PP/T/ Vibration UK                 Complex sensory modalities: not tested  DTR:  normal throughout.  Coordination /Fine motor:   Gait: Not tested.  Meningeal signs: Absent  Medications:  Continuous  sodium chloride 0.9% Last Rate: Stopped (10/07/17 0601)   Scheduled  albuterol-ipratropium 2.5mg-0.5mg/3mL 3 mL Q6H   [START ON 10/8/2017] amantadine HCl 100 mg Q24H   [START ON 10/8/2017] amantadine HCl 200  mg Q24H   atorvastatin 10 mg Daily   chlorhexidine 15 mL BID   famotidine 20 mg BID   heparin (porcine) 7,500 Units Q8H   piperacillin-tazobactam 4.5 g in dextrose 5 % 100 mL IVPB (ready to mix system) 4.5 g Q8H   sodium chloride 0.9% 10 mL Q6H   sodium chloride 0.9% 3 mL Q8H   valproate sodium (DEPACON) IVPB 500 mg Q8H   vancomycin (VANCOCIN) IVPB 1,500 mg Q12H   PRN  acetaminophen 650 mg Q6H PRN   dextrose 50% 12.5 g PRN   dextrose 50% 12.5 g PRN   glucagon (human recombinant) 1 mg PRN   insulin aspart 1-10 Units Q6H PRN   labetalol 10 mg Q4H PRN   magnesium sulfate IVPB 2 g PRN   magnesium sulfate IVPB 4 g PRN   ondansetron 4 mg Q8H PRN   pneumoc 13-finn conj-dip cr(PF) 0.5 mL vaccine x 1 dose   sodium chloride 0.9% 10 mL PRN     Today I personally reviewed pertinent medications, lines/drains/airways, imaging, cardiology, lab results, microbiology results, notably:      Assessment/Plan:     No new Assessment & Plan notes have been filed under this hospital service since the last note was generated.  Service: Neuro Critical Care      Active Problem List:   1.S/P cardiac arrest with anoxic brain injury.  2. Post anoxic myoclonus.  3. Recent cardiac arrest.  4. Bilateral watershed strokes.  5. S/P PEg and trach  6. Morbid obesity.     Assessment / Plan:     Neuro:   -Amantadine 200 q 6AM and 100mg at 1PM.  -D/C cEEG monitoring  -EEG x 1 hr on Monday.  -No sedation.  -VPA 500mg tid  -VPA trough.  -D/C Keppra  Resp:  -S/P trach  -Vent Mode: A/C  Oxygen Concentration (%):  [40-50] 50  Resp Rate Total:  [20 br/min-41 br/min] 25 br/min  Vt Set:  [400 mL] 400 mL  PEEP/CPAP:  [5 cmH20] 5 cmH20  Pressure Support:  [0 cmH20] 0 cmH20  Mean Airway Pressure:  [7.5 dqH96-72 cmH20] 9 cmH20   -VAP  -Dual nebs PRN q 6hrs  -Chest PT q 6hrs  CV: TTE: Pulmonary hypertension. Keep SBP < 180 mmHg.  -Restart Lipitor 10mg qd.   IVF/nutrition/Renal/GI:  -Check ammonia  -S/P PEG  Hem / ID: WBC 14K; Febrile.Gram - rods in Sputum.  -Min  BAL  -Panculture.  -Vanc /zosyn   -Procal serum level.  Endo: HLP; Free T4 OK.,  SSI q 6hrs.  -Cortisol.  Prophylaxis:  SC Heparin 7500 U q 8hrs prophylaxis.  VAP  Famotidine.  Advance Directives and Disposition:    Full Code. Pending evolution and follow-up family meeting.                 Uninterrupted Critical Care/Counseling Time (directly spent today by me not including procedures 30-74 min (47361)): =   min    Activity Orders          Bed rest starting at 10/03 1510        Full Code    Mark Dewitt MD  Neurocritical Care  [facility]

## 2017-10-08 LAB
ALBUMIN SERPL BCP-MCNC: 1.5 G/DL
ALLENS TEST: ABNORMAL
ALP SERPL-CCNC: 131 U/L
ALT SERPL W/O P-5'-P-CCNC: 21 U/L
AMMONIA PLAS-SCNC: 38 UMOL/L
ANION GAP SERPL CALC-SCNC: 8 MMOL/L
AST SERPL-CCNC: 46 U/L
BASOPHILS # BLD AUTO: 0.01 K/UL
BASOPHILS NFR BLD: 0.1 %
BILIRUB SERPL-MCNC: 0.5 MG/DL
BUN SERPL-MCNC: 17 MG/DL
CALCIUM SERPL-MCNC: 7.9 MG/DL
CHLORIDE SERPL-SCNC: 100 MMOL/L
CO2 SERPL-SCNC: 26 MMOL/L
CORTIS SERPL-MCNC: 27.4 UG/DL
CORTIS SERPL-MCNC: 31.1 UG/DL
CORTIS SERPL-MCNC: 34.8 UG/DL
CREAT SERPL-MCNC: 0.7 MG/DL
DELSYS: ABNORMAL
DIFFERENTIAL METHOD: ABNORMAL
EOSINOPHIL # BLD AUTO: 0 K/UL
EOSINOPHIL NFR BLD: 0.2 %
ERYTHROCYTE [DISTWIDTH] IN BLOOD BY AUTOMATED COUNT: 15.3 %
ERYTHROCYTE [SEDIMENTATION RATE] IN BLOOD BY WESTERGREN METHOD: 20 MM/H
EST. GFR  (AFRICAN AMERICAN): >60 ML/MIN/1.73 M^2
EST. GFR  (NON AFRICAN AMERICAN): >60 ML/MIN/1.73 M^2
FIO2: 50
GLUCOSE SERPL-MCNC: 127 MG/DL
HCO3 UR-SCNC: 29.1 MMOL/L (ref 24–28)
HCT VFR BLD AUTO: 24.2 %
HGB BLD-MCNC: 7.6 G/DL
INR PPP: 1.1
LYMPHOCYTES # BLD AUTO: 2.3 K/UL
LYMPHOCYTES NFR BLD: 13 %
MAGNESIUM SERPL-MCNC: 1.9 MG/DL
MCH RBC QN AUTO: 26.9 PG
MCHC RBC AUTO-ENTMCNC: 31.4 G/DL
MCV RBC AUTO: 86 FL
MIN VOL: 15.1
MODE: ABNORMAL
MONOCYTES # BLD AUTO: 1.2 K/UL
MONOCYTES NFR BLD: 6.7 %
NEUTROPHILS # BLD AUTO: 14 K/UL
NEUTROPHILS NFR BLD: 80 %
PCO2 BLDA: 45.7 MMHG (ref 35–45)
PEEP: 5
PH SMN: 7.41 [PH] (ref 7.35–7.45)
PHOSPHATE SERPL-MCNC: 3 MG/DL
PIP: 11
PLATELET # BLD AUTO: 246 K/UL
PMV BLD AUTO: 8.8 FL
PO2 BLDA: 197 MMHG (ref 80–100)
POC BE: 4 MMOL/L
POC SATURATED O2: 100 % (ref 95–100)
POC TCO2: 30 MMOL/L (ref 23–27)
POCT GLUCOSE: 113 MG/DL (ref 70–110)
POCT GLUCOSE: 133 MG/DL (ref 70–110)
POCT GLUCOSE: 139 MG/DL (ref 70–110)
POCT GLUCOSE: 82 MG/DL (ref 70–110)
POTASSIUM SERPL-SCNC: 4.5 MMOL/L
PROT SERPL-MCNC: 6.2 G/DL
PROTHROMBIN TIME: 11.6 SEC
RBC # BLD AUTO: 2.83 M/UL
SAMPLE: ABNORMAL
SITE: ABNORMAL
SODIUM SERPL-SCNC: 133 MMOL/L
SODIUM SERPL-SCNC: 134 MMOL/L
SP02: 100
VT: 400
WBC # BLD AUTO: 17.8 K/UL

## 2017-10-08 PROCEDURE — 94003 VENT MGMT INPAT SUBQ DAY: CPT

## 2017-10-08 PROCEDURE — 85025 COMPLETE CBC W/AUTO DIFF WBC: CPT

## 2017-10-08 PROCEDURE — 94668 MNPJ CHEST WALL SBSQ: CPT

## 2017-10-08 PROCEDURE — 20000000 HC ICU ROOM

## 2017-10-08 PROCEDURE — 25000003 PHARM REV CODE 250: Performed by: PHYSICIAN ASSISTANT

## 2017-10-08 PROCEDURE — 85610 PROTHROMBIN TIME: CPT

## 2017-10-08 PROCEDURE — 63600175 PHARM REV CODE 636 W HCPCS: Performed by: PSYCHIATRY & NEUROLOGY

## 2017-10-08 PROCEDURE — 94761 N-INVAS EAR/PLS OXIMETRY MLT: CPT

## 2017-10-08 PROCEDURE — 25000242 PHARM REV CODE 250 ALT 637 W/ HCPCS: Performed by: SURGERY

## 2017-10-08 PROCEDURE — 99900035 HC TECH TIME PER 15 MIN (STAT)

## 2017-10-08 PROCEDURE — 80053 COMPREHEN METABOLIC PANEL: CPT

## 2017-10-08 PROCEDURE — 25000003 PHARM REV CODE 250: Performed by: PSYCHIATRY & NEUROLOGY

## 2017-10-08 PROCEDURE — A4216 STERILE WATER/SALINE, 10 ML: HCPCS | Performed by: EMERGENCY MEDICINE

## 2017-10-08 PROCEDURE — 99900022

## 2017-10-08 PROCEDURE — 63600175 PHARM REV CODE 636 W HCPCS: Performed by: EMERGENCY MEDICINE

## 2017-10-08 PROCEDURE — 25000003 PHARM REV CODE 250: Performed by: EMERGENCY MEDICINE

## 2017-10-08 PROCEDURE — 82140 ASSAY OF AMMONIA: CPT

## 2017-10-08 PROCEDURE — 25000003 PHARM REV CODE 250: Performed by: SURGERY

## 2017-10-08 PROCEDURE — 99900026 HC AIRWAY MAINTENANCE (STAT)

## 2017-10-08 PROCEDURE — 27000221 HC OXYGEN, UP TO 24 HOURS

## 2017-10-08 PROCEDURE — 99233 SBSQ HOSP IP/OBS HIGH 50: CPT | Mod: GC,,, | Performed by: PSYCHIATRY & NEUROLOGY

## 2017-10-08 PROCEDURE — 84100 ASSAY OF PHOSPHORUS: CPT

## 2017-10-08 PROCEDURE — 25000003 PHARM REV CODE 250: Performed by: STUDENT IN AN ORGANIZED HEALTH CARE EDUCATION/TRAINING PROGRAM

## 2017-10-08 PROCEDURE — 63600175 PHARM REV CODE 636 W HCPCS: Performed by: PHYSICIAN ASSISTANT

## 2017-10-08 PROCEDURE — 82803 BLOOD GASES ANY COMBINATION: CPT

## 2017-10-08 PROCEDURE — 25000003 PHARM REV CODE 250: Performed by: NURSE PRACTITIONER

## 2017-10-08 PROCEDURE — 63600175 PHARM REV CODE 636 W HCPCS: Performed by: NURSE PRACTITIONER

## 2017-10-08 PROCEDURE — 82533 TOTAL CORTISOL: CPT | Mod: 91

## 2017-10-08 PROCEDURE — 83735 ASSAY OF MAGNESIUM: CPT

## 2017-10-08 PROCEDURE — 36600 WITHDRAWAL OF ARTERIAL BLOOD: CPT

## 2017-10-08 PROCEDURE — 84295 ASSAY OF SERUM SODIUM: CPT

## 2017-10-08 PROCEDURE — A4216 STERILE WATER/SALINE, 10 ML: HCPCS | Performed by: NURSE PRACTITIONER

## 2017-10-08 PROCEDURE — 94640 AIRWAY INHALATION TREATMENT: CPT

## 2017-10-08 RX ORDER — COSYNTROPIN 0.25 MG/ML
0.25 INJECTION, POWDER, FOR SOLUTION INTRAMUSCULAR; INTRAVENOUS ONCE
Status: COMPLETED | OUTPATIENT
Start: 2017-10-08 | End: 2017-10-08

## 2017-10-08 RX ADMIN — IPRATROPIUM BROMIDE AND ALBUTEROL SULFATE 3 ML: .5; 3 SOLUTION RESPIRATORY (INHALATION) at 12:10

## 2017-10-08 RX ADMIN — ATORVASTATIN CALCIUM 10 MG: 10 TABLET, FILM COATED ORAL at 08:10

## 2017-10-08 RX ADMIN — SODIUM CHLORIDE TAB 1 GM 1 G: 1 TAB at 01:10

## 2017-10-08 RX ADMIN — Medication 3 ML: at 06:10

## 2017-10-08 RX ADMIN — FAMOTIDINE 20 MG: 20 TABLET, FILM COATED ORAL at 09:10

## 2017-10-08 RX ADMIN — IPRATROPIUM BROMIDE AND ALBUTEROL SULFATE 3 ML: .5; 3 SOLUTION RESPIRATORY (INHALATION) at 07:10

## 2017-10-08 RX ADMIN — DEXTROSE 1000 MG: 50 INJECTION, SOLUTION INTRAVENOUS at 10:10

## 2017-10-08 RX ADMIN — AMANTADINE HYDROCHLORIDE 100 MG: 50 SOLUTION ORAL at 01:10

## 2017-10-08 RX ADMIN — COSYNTROPIN 0.25 MG: 0.25 INJECTION, POWDER, LYOPHILIZED, FOR SOLUTION INTRAVENOUS at 10:10

## 2017-10-08 RX ADMIN — FAMOTIDINE 20 MG: 20 TABLET, FILM COATED ORAL at 08:10

## 2017-10-08 RX ADMIN — LABETALOL HYDROCHLORIDE 10 MG: 5 INJECTION, SOLUTION INTRAVENOUS at 01:10

## 2017-10-08 RX ADMIN — HEPARIN SODIUM 7500 UNITS: 5000 INJECTION, SOLUTION INTRAVENOUS; SUBCUTANEOUS at 09:10

## 2017-10-08 RX ADMIN — Medication 1500 MG: at 11:10

## 2017-10-08 RX ADMIN — SODIUM CHLORIDE TAB 1 GM 1 G: 1 TAB at 09:10

## 2017-10-08 RX ADMIN — ACETAMINOPHEN 650 MG: 325 TABLET ORAL at 05:10

## 2017-10-08 RX ADMIN — VALPROATE SODIUM 500 MG: 100 INJECTION, SOLUTION INTRAVENOUS at 09:10

## 2017-10-08 RX ADMIN — Medication 10 ML: at 12:10

## 2017-10-08 RX ADMIN — PIPERACILLIN AND TAZOBACTAM 4.5 G: 4; .5 INJECTION, POWDER, LYOPHILIZED, FOR SOLUTION INTRAVENOUS; PARENTERAL at 03:10

## 2017-10-08 RX ADMIN — Medication 10 ML: at 05:10

## 2017-10-08 RX ADMIN — AMANTADINE HYDROCHLORIDE 200 MG: 50 SOLUTION ORAL at 05:10

## 2017-10-08 RX ADMIN — CHLORHEXIDINE GLUCONATE 15 ML: 1.2 RINSE ORAL at 08:10

## 2017-10-08 RX ADMIN — HEPARIN SODIUM 7500 UNITS: 5000 INJECTION, SOLUTION INTRAVENOUS; SUBCUTANEOUS at 01:10

## 2017-10-08 RX ADMIN — HEPARIN SODIUM 7500 UNITS: 5000 INJECTION, SOLUTION INTRAVENOUS; SUBCUTANEOUS at 05:10

## 2017-10-08 RX ADMIN — PIPERACILLIN AND TAZOBACTAM 4.5 G: 4; .5 INJECTION, POWDER, LYOPHILIZED, FOR SOLUTION INTRAVENOUS; PARENTERAL at 10:10

## 2017-10-08 RX ADMIN — DEXTROSE 500 MG: 50 INJECTION, SOLUTION INTRAVENOUS at 05:10

## 2017-10-08 RX ADMIN — ONDANSETRON 4 MG: 2 INJECTION INTRAMUSCULAR; INTRAVENOUS at 06:10

## 2017-10-08 RX ADMIN — CHLORHEXIDINE GLUCONATE 15 ML: 1.2 RINSE ORAL at 09:10

## 2017-10-08 RX ADMIN — Medication 3 ML: at 10:10

## 2017-10-08 RX ADMIN — Medication 10 ML: at 06:10

## 2017-10-08 RX ADMIN — PIPERACILLIN AND TAZOBACTAM 4.5 G: 4; .5 INJECTION, POWDER, LYOPHILIZED, FOR SOLUTION INTRAVENOUS; PARENTERAL at 07:10

## 2017-10-08 RX ADMIN — LABETALOL HYDROCHLORIDE 10 MG: 5 INJECTION, SOLUTION INTRAVENOUS at 08:10

## 2017-10-08 RX ADMIN — VALPROATE SODIUM 500 MG: 100 INJECTION, SOLUTION INTRAVENOUS at 05:10

## 2017-10-08 NOTE — PLAN OF CARE
Problem: Patient Care Overview  Goal: Plan of Care Review  Outcome: Ongoing (interventions implemented as appropriate)  POC reviewed with patient's daughter. Daughter verbalized understanding.   Questions and concerns addressed. No acute events overnight.    Patient's temperature came down to the high 90's/low 100's after the IV tylenol.  Patient's vital signs up and down with the temperature.   Pt progressing toward goals. Will continue to monitor. See flow sheets for full assessment and VS info

## 2017-10-08 NOTE — NURSING
PRITI Bailey notified of pt's increased jerking movements and temp 103 despite patient being on cooling blanket and covered with ice packs. IV tylenol ordered. Continuing to monitor patient.

## 2017-10-08 NOTE — NURSING
"Patient with SBP reading in the 200's via BP cuff. RN attempted to take BP in multiple locations with same results. Patient with "jerking like" movements.   PRITI Bailey at bedside. Artline BP with results of 's-170's. Patient's sister made aware of BP results. RN to monitor at this time.   "

## 2017-10-08 NOTE — NURSING
Patient's daughter stating patient vomiting, RN suctioned patient's mouth and stopped tube feedings. RN suctioned patient trach, no signs of tube feeding in secretions. Call placed to PRITI Bailey with [facility] informing her of above. Orders received to hold Tf, hook patient's Peg to suction and empty stomach, and get KUB. RN to monitor.

## 2017-10-08 NOTE — NURSING
Patient transferred to CT and back to room with RNx2 and RTx2 via bed. Patient transported on cardiac monitor and portable vent. Ambu bag taken to CT. Patient tolerated transfer well. No issues noted.

## 2017-10-08 NOTE — PLAN OF CARE
Problem: Diabetes, Type 2 (Adult)  Intervention: Optimize Glycemic Control  POC reviewed with pt and family at 1400. Patient's family verbalized understanding. Questions and concerns addressed. No acute events today. Pt progressing toward goals. Will continue to monitor. See flowsheets for full assessment and VS info.

## 2017-10-08 NOTE — NURSING
Call placed to [facility], Dr. Decker made aware that patient with triple flexion withdrawal to bilat lower ext for night nurse. Patient currently with no movement to tactile stimuli to bilat lower ext. Dr. Decker to pass on change with Day team. RN to monitor.

## 2017-10-09 LAB
ABO + RH BLD: NORMAL
ALBUMIN SERPL BCP-MCNC: 1.3 G/DL
ALP SERPL-CCNC: 93 U/L
ALT SERPL W/O P-5'-P-CCNC: 15 U/L
ANION GAP SERPL CALC-SCNC: 10 MMOL/L
ANISOCYTOSIS BLD QL SMEAR: SLIGHT
ANISOCYTOSIS BLD QL SMEAR: SLIGHT
AST SERPL-CCNC: 37 U/L
BASO STIPL BLD QL SMEAR: ABNORMAL
BASOPHILS # BLD AUTO: ABNORMAL K/UL
BASOPHILS NFR BLD: 0 %
BASOPHILS NFR BLD: 0 %
BILIRUB SERPL-MCNC: 0.4 MG/DL
BLD GP AB SCN CELLS X3 SERPL QL: NORMAL
BUN SERPL-MCNC: 19 MG/DL
CALCIUM SERPL-MCNC: 7.7 MG/DL
CHLORIDE SERPL-SCNC: 99 MMOL/L
CO2 SERPL-SCNC: 24 MMOL/L
CREAT SERPL-MCNC: 0.7 MG/DL
DIFFERENTIAL METHOD: ABNORMAL
DIFFERENTIAL METHOD: ABNORMAL
EOSINOPHIL # BLD AUTO: ABNORMAL K/UL
EOSINOPHIL NFR BLD: 0 %
EOSINOPHIL NFR BLD: 0 %
ERYTHROCYTE [DISTWIDTH] IN BLOOD BY AUTOMATED COUNT: 15.1 %
ERYTHROCYTE [DISTWIDTH] IN BLOOD BY AUTOMATED COUNT: 15.2 %
EST. GFR  (AFRICAN AMERICAN): >60 ML/MIN/1.73 M^2
EST. GFR  (NON AFRICAN AMERICAN): >60 ML/MIN/1.73 M^2
GLUCOSE SERPL-MCNC: 122 MG/DL
HCT VFR BLD AUTO: 20.7 %
HCT VFR BLD AUTO: 22.8 %
HGB BLD-MCNC: 6.6 G/DL
HGB BLD-MCNC: 7.2 G/DL
INR PPP: 1.1
LYMPHOCYTES # BLD AUTO: ABNORMAL K/UL
LYMPHOCYTES NFR BLD: 12.5 %
LYMPHOCYTES NFR BLD: 8 %
MAGNESIUM SERPL-MCNC: 1.8 MG/DL
MAGNESIUM SERPL-MCNC: 1.9 MG/DL
MCH RBC QN AUTO: 27.1 PG
MCH RBC QN AUTO: 27.2 PG
MCHC RBC AUTO-ENTMCNC: 31.6 G/DL
MCHC RBC AUTO-ENTMCNC: 31.9 G/DL
MCV RBC AUTO: 85 FL
MCV RBC AUTO: 86 FL
METAMYELOCYTES NFR BLD MANUAL: 0.5 %
MONOCYTES # BLD AUTO: ABNORMAL K/UL
MONOCYTES NFR BLD: 1 %
MONOCYTES NFR BLD: 3 %
MYELOCYTES NFR BLD MANUAL: 0.5 %
MYELOCYTES NFR BLD MANUAL: 1 %
NEUTROPHILS NFR BLD: 81 %
NEUTROPHILS NFR BLD: 90 %
NEUTS BAND NFR BLD MANUAL: 2.5 %
OVALOCYTES BLD QL SMEAR: ABNORMAL
PHOSPHATE SERPL-MCNC: 3 MG/DL
PLATELET # BLD AUTO: 222 K/UL
PLATELET # BLD AUTO: 245 K/UL
PLATELET BLD QL SMEAR: ABNORMAL
PLATELET BLD QL SMEAR: ABNORMAL
PMV BLD AUTO: 8.7 FL
PMV BLD AUTO: 9 FL
POCT GLUCOSE: 111 MG/DL (ref 70–110)
POCT GLUCOSE: 67 MG/DL (ref 70–110)
POCT GLUCOSE: 75 MG/DL (ref 70–110)
POCT GLUCOSE: 80 MG/DL (ref 70–110)
POCT GLUCOSE: 95 MG/DL (ref 70–110)
POCT GLUCOSE: >500 MG/DL (ref 70–110)
POIKILOCYTOSIS BLD QL SMEAR: SLIGHT
POLYCHROMASIA BLD QL SMEAR: ABNORMAL
POLYCHROMASIA BLD QL SMEAR: ABNORMAL
POTASSIUM SERPL-SCNC: 4 MMOL/L
PROT SERPL-MCNC: 5.4 G/DL
PROTHROMBIN TIME: 11.4 SEC
RBC # BLD AUTO: 2.43 M/UL
RBC # BLD AUTO: 2.66 M/UL
SODIUM SERPL-SCNC: 132 MMOL/L
SODIUM SERPL-SCNC: 133 MMOL/L
SODIUM SERPL-SCNC: 133 MMOL/L
SODIUM SERPL-SCNC: 136 MMOL/L
SODIUM SERPL-SCNC: 137 MMOL/L
VALPROATE SERPL-MCNC: 50.8 UG/ML
WBC # BLD AUTO: 15.33 K/UL
WBC # BLD AUTO: 18.1 K/UL

## 2017-10-09 PROCEDURE — 25000003 PHARM REV CODE 250: Performed by: PHYSICIAN ASSISTANT

## 2017-10-09 PROCEDURE — 80053 COMPREHEN METABOLIC PANEL: CPT

## 2017-10-09 PROCEDURE — 85610 PROTHROMBIN TIME: CPT

## 2017-10-09 PROCEDURE — 25000003 PHARM REV CODE 250: Performed by: PSYCHIATRY & NEUROLOGY

## 2017-10-09 PROCEDURE — A4216 STERILE WATER/SALINE, 10 ML: HCPCS | Performed by: NURSE PRACTITIONER

## 2017-10-09 PROCEDURE — 25000242 PHARM REV CODE 250 ALT 637 W/ HCPCS: Performed by: SURGERY

## 2017-10-09 PROCEDURE — 27000221 HC OXYGEN, UP TO 24 HOURS

## 2017-10-09 PROCEDURE — 85027 COMPLETE CBC AUTOMATED: CPT

## 2017-10-09 PROCEDURE — 20000000 HC ICU ROOM

## 2017-10-09 PROCEDURE — 63600175 PHARM REV CODE 636 W HCPCS: Performed by: PSYCHIATRY & NEUROLOGY

## 2017-10-09 PROCEDURE — 63600175 PHARM REV CODE 636 W HCPCS: Performed by: INTERNAL MEDICINE

## 2017-10-09 PROCEDURE — 25000003 PHARM REV CODE 250: Performed by: NURSE PRACTITIONER

## 2017-10-09 PROCEDURE — 84295 ASSAY OF SERUM SODIUM: CPT | Mod: 91

## 2017-10-09 PROCEDURE — 86901 BLOOD TYPING SEROLOGIC RH(D): CPT

## 2017-10-09 PROCEDURE — 94640 AIRWAY INHALATION TREATMENT: CPT

## 2017-10-09 PROCEDURE — A4216 STERILE WATER/SALINE, 10 ML: HCPCS | Performed by: EMERGENCY MEDICINE

## 2017-10-09 PROCEDURE — 94003 VENT MGMT INPAT SUBQ DAY: CPT

## 2017-10-09 PROCEDURE — 63600175 PHARM REV CODE 636 W HCPCS: Performed by: NURSE PRACTITIONER

## 2017-10-09 PROCEDURE — 97803 MED NUTRITION INDIV SUBSEQ: CPT

## 2017-10-09 PROCEDURE — 85007 BL SMEAR W/DIFF WBC COUNT: CPT

## 2017-10-09 PROCEDURE — 99291 CRITICAL CARE FIRST HOUR: CPT | Mod: GC,,, | Performed by: PSYCHIATRY & NEUROLOGY

## 2017-10-09 PROCEDURE — 27200966 HC CLOSED SUCTION SYSTEM

## 2017-10-09 PROCEDURE — 25000003 PHARM REV CODE 250: Performed by: INTERNAL MEDICINE

## 2017-10-09 PROCEDURE — 83735 ASSAY OF MAGNESIUM: CPT | Mod: 91

## 2017-10-09 PROCEDURE — 95813 EEG EXTND MNTR 61-119 MIN: CPT | Mod: 26,,, | Performed by: PSYCHIATRY & NEUROLOGY

## 2017-10-09 PROCEDURE — 95812 EEG 41-60 MINUTES: CPT

## 2017-10-09 PROCEDURE — 86920 COMPATIBILITY TEST SPIN: CPT

## 2017-10-09 PROCEDURE — 84100 ASSAY OF PHOSPHORUS: CPT

## 2017-10-09 PROCEDURE — 80164 ASSAY DIPROPYLACETIC ACD TOT: CPT

## 2017-10-09 PROCEDURE — 83735 ASSAY OF MAGNESIUM: CPT

## 2017-10-09 PROCEDURE — 25000003 PHARM REV CODE 250: Performed by: SURGERY

## 2017-10-09 PROCEDURE — 63600175 PHARM REV CODE 636 W HCPCS: Performed by: EMERGENCY MEDICINE

## 2017-10-09 PROCEDURE — 25000003 PHARM REV CODE 250: Performed by: EMERGENCY MEDICINE

## 2017-10-09 PROCEDURE — 86900 BLOOD TYPING SEROLOGIC ABO: CPT

## 2017-10-09 PROCEDURE — 99900026 HC AIRWAY MAINTENANCE (STAT)

## 2017-10-09 PROCEDURE — 25000003 PHARM REV CODE 250: Performed by: STUDENT IN AN ORGANIZED HEALTH CARE EDUCATION/TRAINING PROGRAM

## 2017-10-09 PROCEDURE — 94668 MNPJ CHEST WALL SBSQ: CPT

## 2017-10-09 RX ORDER — AMOXICILLIN 250 MG
1 CAPSULE ORAL ONCE
Status: DISCONTINUED | OUTPATIENT
Start: 2017-10-09 | End: 2017-10-09

## 2017-10-09 RX ORDER — ATROPINE SULFATE 0.4 MG/ML
INJECTION, SOLUTION ENDOTRACHEAL; INTRAMEDULLARY; INTRAMUSCULAR; INTRAVENOUS; SUBCUTANEOUS
Status: DISPENSED
Start: 2017-10-09 | End: 2017-10-10

## 2017-10-09 RX ORDER — AMOXICILLIN 250 MG
1 CAPSULE ORAL DAILY
Status: DISCONTINUED | OUTPATIENT
Start: 2017-10-09 | End: 2017-10-11

## 2017-10-09 RX ORDER — POLYETHYLENE GLYCOL 3350 17 G/17G
17 POWDER, FOR SOLUTION ORAL DAILY
Status: DISCONTINUED | OUTPATIENT
Start: 2017-10-09 | End: 2017-10-17

## 2017-10-09 RX ORDER — POLYETHYLENE GLYCOL 3350 17 G/17G
17 POWDER, FOR SOLUTION ORAL DAILY
Status: DISCONTINUED | OUTPATIENT
Start: 2017-10-09 | End: 2017-10-09

## 2017-10-09 RX ORDER — ATROPINE SULFATE 0.1 MG/ML
0.2 INJECTION INTRAVENOUS
Status: DISCONTINUED | OUTPATIENT
Start: 2017-10-09 | End: 2017-10-16

## 2017-10-09 RX ADMIN — MAGNESIUM SULFATE IN WATER 2 G: 40 INJECTION, SOLUTION INTRAVENOUS at 05:10

## 2017-10-09 RX ADMIN — AMANTADINE HYDROCHLORIDE 100 MG: 50 SOLUTION ORAL at 01:10

## 2017-10-09 RX ADMIN — VALPROATE SODIUM 500 MG: 100 INJECTION, SOLUTION INTRAVENOUS at 03:10

## 2017-10-09 RX ADMIN — MEROPENEM 2 G: 1 INJECTION, POWDER, FOR SOLUTION INTRAVENOUS at 07:10

## 2017-10-09 RX ADMIN — MEROPENEM 2 G: 1 INJECTION, POWDER, FOR SOLUTION INTRAVENOUS at 11:10

## 2017-10-09 RX ADMIN — POLYETHYLENE GLYCOL 3350 17 G: 17 POWDER, FOR SOLUTION ORAL at 10:10

## 2017-10-09 RX ADMIN — VALPROATE SODIUM 500 MG: 100 INJECTION, SOLUTION INTRAVENOUS at 04:10

## 2017-10-09 RX ADMIN — DEXTROSE MONOHYDRATE 12.5 G: 25 INJECTION, SOLUTION INTRAVENOUS at 05:10

## 2017-10-09 RX ADMIN — IPRATROPIUM BROMIDE AND ALBUTEROL SULFATE 3 ML: .5; 3 SOLUTION RESPIRATORY (INHALATION) at 07:10

## 2017-10-09 RX ADMIN — SODIUM CHLORIDE: 0.9 INJECTION, SOLUTION INTRAVENOUS at 03:10

## 2017-10-09 RX ADMIN — FAMOTIDINE 20 MG: 20 TABLET, FILM COATED ORAL at 08:10

## 2017-10-09 RX ADMIN — ATORVASTATIN CALCIUM 10 MG: 10 TABLET, FILM COATED ORAL at 08:10

## 2017-10-09 RX ADMIN — Medication 3 ML: at 06:10

## 2017-10-09 RX ADMIN — AMANTADINE HYDROCHLORIDE 200 MG: 50 SOLUTION ORAL at 05:10

## 2017-10-09 RX ADMIN — Medication 10 ML: at 11:10

## 2017-10-09 RX ADMIN — Medication 3 ML: at 10:10

## 2017-10-09 RX ADMIN — IPRATROPIUM BROMIDE AND ALBUTEROL SULFATE 3 ML: .5; 3 SOLUTION RESPIRATORY (INHALATION) at 01:10

## 2017-10-09 RX ADMIN — FAMOTIDINE 20 MG: 20 TABLET, FILM COATED ORAL at 09:10

## 2017-10-09 RX ADMIN — HEPARIN SODIUM 7500 UNITS: 5000 INJECTION, SOLUTION INTRAVENOUS; SUBCUTANEOUS at 02:10

## 2017-10-09 RX ADMIN — VALPROATE SODIUM 500 MG: 100 INJECTION, SOLUTION INTRAVENOUS at 09:10

## 2017-10-09 RX ADMIN — SODIUM CHLORIDE 250 ML: 0.9 INJECTION, SOLUTION INTRAVENOUS at 11:10

## 2017-10-09 RX ADMIN — Medication 10 ML: at 05:10

## 2017-10-09 RX ADMIN — Medication 3 ML: at 01:10

## 2017-10-09 RX ADMIN — SODIUM CHLORIDE TAB 1 GM 3 G: 1 TAB at 02:10

## 2017-10-09 RX ADMIN — STANDARDIZED SENNA CONCENTRATE AND DOCUSATE SODIUM 1 TABLET: 8.6; 5 TABLET, FILM COATED ORAL at 10:10

## 2017-10-09 RX ADMIN — SODIUM CHLORIDE: 0.9 INJECTION, SOLUTION INTRAVENOUS at 09:10

## 2017-10-09 RX ADMIN — PIPERACILLIN AND TAZOBACTAM 4.5 G: 4; .5 INJECTION, POWDER, LYOPHILIZED, FOR SOLUTION INTRAVENOUS; PARENTERAL at 03:10

## 2017-10-09 RX ADMIN — CHLORHEXIDINE GLUCONATE 15 ML: 1.2 RINSE ORAL at 09:10

## 2017-10-09 RX ADMIN — HEPARIN SODIUM 7500 UNITS: 5000 INJECTION, SOLUTION INTRAVENOUS; SUBCUTANEOUS at 09:10

## 2017-10-09 RX ADMIN — SODIUM CHLORIDE TAB 1 GM 3 G: 1 TAB at 09:10

## 2017-10-09 RX ADMIN — IPRATROPIUM BROMIDE AND ALBUTEROL SULFATE 3 ML: .5; 3 SOLUTION RESPIRATORY (INHALATION) at 12:10

## 2017-10-09 RX ADMIN — SODIUM CHLORIDE TAB 1 GM 1 G: 1 TAB at 05:10

## 2017-10-09 RX ADMIN — ACETAMINOPHEN 650 MG: 325 TABLET ORAL at 03:10

## 2017-10-09 NOTE — PLAN OF CARE
Problem: Patient Care Overview  Goal: Plan of Care Review  Outcome: Ongoing (interventions implemented as appropriate)  POC reviewed with patient and family at 1530. No evidence of learning from the patient. Questions and concerns addressed with daughter who called during shift. Spot EEG performed. Currently patient is not on continuous EEG. Day bath given. Patient turned q 2 hr. Oral care per protocol. Electrolytes replaced per protocol. RN will continue to monitor. See flowsheets for full assessment and VS info.

## 2017-10-09 NOTE — SUBJECTIVE & OBJECTIVE
Interval History:  >4 elements OR status of 3 inpatient conditions  Day 6 post cardiac arrest. Patient getting EEG. No evidence of ictal activity. . Still on myoclonus. VPA got adjusted yesterday. VPA 57. WBC 18. Klebsiella ESBL. Start Meropenem..  ABG: OK. Na 132. Salt tablets increased to 3gr q 8hrs. R -gaze preference resolved.   Review of Systems   Unable to perform ROS: Patient unresponsive     2 systems OR Unable to obtain a complete ROS due to level of consciousness.  Objective:     Vitals:  Temp: (!) 100.4 °F (38 °C) (10/09/17 1526)  Pulse: 61 (10/09/17 1705)  Resp: (!) 34 (10/09/17 1705)  BP: (!) 175/98 (10/09/17 1605)  SpO2: 100 % (10/09/17 1705)    Temp:  [97.9 °F (36.6 °C)-100.4 °F (38 °C)] 100.4 °F (38 °C)  Pulse:  [] 61  Resp:  [16-48] 34  SpO2:  [96 %-100 %] 100 %  BP: (111-218)/() 175/98         Vent Mode: A/C  Oxygen Concentration (%):  [40] 40  Resp Rate Total:  [20 br/min-34 br/min] 24 br/min  Vt Set:  [400 mL] 400 mL  PEEP/CPAP:  [5 cmH20] 5 cmH20  Pressure Support:  [0 cmH20] 0 cmH20  Mean Airway Pressure:  [7.3 kqI38-65 cmH20] 9.3 cmH20    10/08 0701 - 10/09 0700  In: 2105 [I.V.:335]  Out: 950 [Urine:950]    Physical Exam  Unable to test orientation, language, memory, judgment, insight, fund of knowledge, hearing, shoulder shrug, tongue protrusion, coordination, gait due to level of consciousness.  General   HEENT: S/P trach  Chest Heart RRR / Lungs Clear to auscultation  Adbomen: Soft nontender + BS; S/P PEG  Extremities: OK distal pulses.  Skin: UK  Neurological Exam:  MS; Unresponsive. Open eyes to stimulation,.  CN: II-XII R pupil larger but reactive. New R gaze preference resolved.  Motor: LUE   0/5 / RUE 0 /5  RUE extensor posturing; LUE lost LUE semipurpposeful responses.             LLE   0/5 /  RLE 0 /5  Tone normal bilaterally  Sensory: LT/PP/T/ Vibration UK                 Complex sensory modalities: not tested  She continues having continuous myoclonic activity.  DTR:   normal throughout.  Coordination /Fine motor:   Gait: Not tested.  Meningeal signs: Absent  Medications:  Continuous  sodium chloride 0.9% Last Rate: 100 mL/hr at 10/09/17 1705   Scheduled  albuterol-ipratropium 2.5mg-0.5mg/3mL 3 mL Q6H   amantadine HCl 100 mg Q24H   amantadine HCl 200 mg Q24H   atorvastatin 10 mg Daily   chlorhexidine 15 mL BID   famotidine 20 mg BID   heparin (porcine) 7,500 Units Q8H   meropenem (MERREM) IVPB 2 g Q8H   polyethylene glycol 17 g Daily   senna-docusate 8.6-50 mg 1 tablet Daily   sodium chloride 0.9% 10 mL Q6H   sodium chloride 0.9% 3 mL Q8H   sodium chloride 3 g Q8H   valproate sodium (DEPACON) IVPB 500 mg Q6H   PRN  acetaminophen 650 mg Q6H PRN   dextrose 50% 12.5 g PRN   dextrose 50% 12.5 g PRN   glucagon (human recombinant) 1 mg PRN   insulin aspart 1-10 Units Q6H PRN   labetalol 10 mg Q4H PRN   magnesium sulfate IVPB 2 g PRN   magnesium sulfate IVPB 4 g PRN   ondansetron 4 mg Q8H PRN   pneumoc 13-finn conj-dip cr(PF) 0.5 mL vaccine x 1 dose   sodium chloride 0.9% 10 mL PRN     Today I personally reviewed pertinent medications, lines/drains/airways, imaging, cardiology, lab results, microbiology results, notably:

## 2017-10-09 NOTE — PLAN OF CARE
10/09/17 1540   Readmission Questionnaire   At the time of your discharge, did someone talk to you about what your health problems were? (neo)   At the time of discharge, did someone talk to you about what to watch out for regarding worsening of your health problem? No  (per daughter)   At the time of discharge, did someone talk to you about what to do if you experienced worsening of your health problem? (neo)   At the time of discharge, did someone talk to you about which medication to take when you left the hospital and which ones to stop taking? (neo)   At the time of discharge, did someone talk to you about when and where to follow up with a doctor after you left the hospital? ([place])   What do you believe caused you to be sick enough to be re-admitted? Shayna (daughter) [phone]   How often do you need to have someone help you when you read instructions, pamphlets, or other written material from your doctor or pharmacy? (neo)   Do you have problems taking your medications as prescribed? No   Do you have any problems affording any of  your prescribed medications? No   Do you have problems obtaining/receiving your medications? No   Does the patient have transportation to healthcare appointments? Yes   Lives With alone   Living Arrangements house   Does the patient have family/friends to help with healtcare needs after discharge? yes   Are you currently feeling confused? (neo)   Are you currently having problems thinking? (neo)   Are you currently having memory problems? (neo)   Have you felt down, depressed, or hopeless? Unable to Assess   Have you felt little interest or pleasure in doing things? Unable to assess   In the last 7 days, my sleep quality was: (neo)       Shelby Tucker RN, CCRN-K, [facility]  Neuro-Critical Care   [phone] [phone]

## 2017-10-09 NOTE — PROGRESS NOTES
[facility]  Neurocritical Care  Progress Note    Admit Date: 10/3/2017  Service Date: 10/08/2017  Length of Stay: 5    Subjective:     Chief Complaint: Seizure    History of Present Illness: Ms. Frank is a 66 yo woman with a PMHx DM type II, CKD stage 3, CAD s/p CABG, essential hypertension, cerebrovascular disease s/p bi-hemispheric watershed infarcts and trach/PEG who present as a transfer from [facility] to Neuro Critical Care s/p Cardiac Arrest on 10/4 and evaluation of Anoxic Brain Injury. She was [place] [facility] [place] with bilateral MCA SAQIB watershed infarcts. Yesterday, she was brought in via EMS from [facility] for cardiac arrest. [facility] she received chest compressions for about 5 minutes. After EMS arrived, ACLS protocol continued and she was given 2 rounds of epi, non-shockable rhythm. ROSC achieved en route to the ER.Pt arrived to ICU intubated. It was noted there sudden intermittent generalized body jerks mostly on tactile stimulation. Also, pt was hypotensive for which norepinephrine infusion started. Since her discharge here on 9/28 she has been admitted to multiple facilities recently to be treated for various issues including mucous plug leading to acute hypoxic respiratory failure, she pulled out her trach 9/4 so that was reinserted at [facility] and she was also treated for ESBL UTI while there, and last admit she was treated for likely aspiration pneumonia. She was transported here on Propofol. On examination, no cough, no gag, no corneal and minimal to no withdrawal.       Hospital Course: 10/5 s/p Cardiac Arrest on 10/4 and evaluation of Anoxic Brain Injury.She was recently [place] [facility] [place] with bilateral MCA SAQIB watershed infarcts  10//7: EEG with burst suppression pattern.  MRI with diffuse diffusion restriction. Old periventricular white matter and watershed strokes.  10/8: This AM lost LUE semi purposeful responses.  R gaze preference and R pupil larger at 4mm still sluggishly reactive.  Pending CT head stat. Last VPA level was low. Venous ammonia high 60. Procal 0.47 high CXR appears to have infiltrate, STAT CT no new ICH or stroke just diffuse cortical edema.    Interval History:  >4 elements OR status of 3 inpatient conditions  This AM lost LUE semi purposeful responses. R gaze preference and R pupil larger at 4mm still sluggishly reactive.  Pending CT head stat. Last VPA level was low. Venous ammonia high 60. Procal 0.47 high CXR appears to have infiltrate, STAT CT no new ICH or stroke just diffuse cortical edema.  Review of Systems  2 systems OR Unable to obtain a complete ROS due to level of consciousness.  Objective:     Vitals:  Temp: 99.3 °F (37.4 °C) (10/08/17 2200)  Pulse: 66 (10/08/17 2200)  Resp: (!) 27 (10/08/17 2200)  BP: 132/75 (10/08/17 2200)  SpO2: 100 % (10/08/17 2200)    Temp:  [98.7 °F (37.1 °C)-102.9 °F (39.4 °C)] 99.3 °F (37.4 °C)  Pulse:  [] 66  Resp:  [24-62] 27  SpO2:  [71 %-100 %] 100 %  BP: (108-214)/() 132/75         Vent Mode: A/C  Oxygen Concentration (%):  [40-50] 40  Resp Rate Total:  [26 br/min-45 br/min] 26 br/min  Vt Set:  [400 mL] 400 mL  PEEP/CPAP:  [5 cmH20] 5 cmH20  Pressure Support:  [0 cmH20] 0 cmH20  Mean Airway Pressure:  [3.2 amM81-70 cmH20] 12 cmH20    10/07 0701 - 10/08 0700  In: 2570 [I.V.:200]  Out: 1430 [Urine:1330]    Physical Exam  Unable to test orientation, language, memory, judgment, insight, fund of knowledge, hearing, shoulder shrug, tongue protrusion, coordination, gait due to level of consciousness.  General   HEENT: S/P trach/  Chest Heart RRR / Lungs Clear to auscultation  Adbomen: Soft nontender + BS; S/P PEG  Extremities: OK distal pulses.  Skin: UK  Neurological Exam:  MS;Coma.  CN: II-XII R pupil larger but reactive. New R gaze preference.  Motor: LUE   0/5 / RUE 0 /5  RUE extensor posturing; LUE lost LUE semipurpposeful responses.             LLE   0/5  /  RLE 0 /5  Tone normal bilaterally  Sensory: LT/PP/T/ Vibration                  Complex sensory modalities: not tested  DTR:  normal throughout.  Coordination /Fine motor:   Gait: Not tested.  Meningeal signs: Absent  Medications:  Continuous  sodium chloride 0.9% Last Rate: Stopped (10/07/17 0601)   Scheduled  albuterol-ipratropium 2.5mg-0.5mg/3mL 3 mL Q6H   amantadine HCl 100 mg Q24H   amantadine HCl 200 mg Q24H   atorvastatin 10 mg Daily   chlorhexidine 15 mL BID   famotidine 20 mg BID   heparin (porcine) 7,500 Units Q8H   piperacillin-tazobactam 4.5 g in dextrose 5 % 100 mL IVPB (ready to mix system) 4.5 g Q8H   sodium chloride 0.9% 10 mL Q6H   sodium chloride 0.9% 3 mL Q8H   sodium chloride 1 g Q8H   valproate sodium (DEPACON) IVPB 500 mg Q6H   vancomycin (VANCOCIN) IVPB 1,500 mg Q12H   PRN  acetaminophen 650 mg Q6H PRN   dextrose 50% 12.5 g PRN   dextrose 50% 12.5 g PRN   glucagon (human recombinant) 1 mg PRN   insulin aspart 1-10 Units Q6H PRN   labetalol 10 mg Q4H PRN   magnesium sulfate IVPB 2 g PRN   magnesium sulfate IVPB 4 g PRN   ondansetron 4 mg Q8H PRN   pneumoc 13-finn conj-dip cr(PF) 0.5 mL vaccine x 1 dose   sodium chloride 0.9% 10 mL PRN     Today I personally reviewed pertinent medications, lines/drains/airways, imaging, cardiology, lab results, microbiology results, notably:      Assessment/Plan:     No new Assessment & Plan notes have been filed under this hospital service since the last note was generated.  Service: Neuro Critical Care       Active Problem List:   1.S/P cardiac arrest with anoxic brain injury.  2. Post anoxic myoclonus.  3. Recent cardiac arrest.  4. Bilateral watershed strokes.  5. S/P PEg and trach  6. Morbid obesity.     Assessment / Plan:     Neuro:   -Amantadine 200 q 6AM and 100mg at 1PM.  -cEEG monitoring x 1 hr  -CT head stat.  -No sedation.  -VPA 500mg q 6 hrs  -Reload wth 1 gr os sodium valproate  -VPA trough.in AM.  Resp:  -S/P trach  -Vent Mode: A/C  Oxygen  Concentration (%):  [40-50] 50  Resp Rate Total:  [20 br/min-41 br/min] 25 br/min  Vt Set:  [400 mL] 400 mL  PEEP/CPAP:  [5 cmH20] 5 cmH20  Pressure Support:  [0 cmH20] 0 cmH20  Mean Airway Pressure:  [7.5 awI40-38 cmH20] 9 cmH20   -VAP  -Dual nebs PRN q 6hrs  -Chest PT q 6hrs  -ABG.  CV: TTE: Pulmonary hypertension. Keep SBP < 180 mmHg.  -Lipitor 10mg qd.   IVF/nutrition/Renal/GI: Na 134. Ammonia 60.  -TF at goal.  -Check ammonia  -Arterial ammonia.  -Salt tabs 1 gr q 8hrs  -Serum Na q 8hrs  -S/P PEG  Hem / ID: WBC 17K; Febrile.Gram - rods in Sputum.  -Follow cultures.  -Vanc /zosyn IV  -Procal serum level.  Endo: HLP; Free T4 OK.,  SSI q 6hrs.  -Cortisol and stim test.  Prophylaxis:  SC Heparin 7500 U q 8hrs prophylaxis.  VAP  Famotidine.  Advance Directives and Disposition:    Full Code. Pending evolution and follow-up family meeting.                 Uninterrupted Critical Care/Counseling Time (directly spent today by me not including procedures 30-74 min (59939)): =   min    Activity Orders          Bed rest starting at 10/03 1510        Full Code    Mark Dewitt MD  Neurocritical Care  [facility]

## 2017-10-09 NOTE — PROGRESS NOTES
[facility]  Neurocritical Care  Progress Note    Admit Date: 10/3/2017  Service Date: 10/09/2017  Length of Stay: 6    Subjective:     Chief Complaint: Seizure    History of Present Illness: Ms. Frank is a 66 yo woman with a PMHx DM type II, CKD stage 3, CAD s/p CABG, essential hypertension, cerebrovascular disease s/p bi-hemispheric watershed infarcts and trach/PEG who present as a transfer from [facility] to Neuro Critical Care s/p Cardiac Arrest on 10/4 and evaluation of Anoxic Brain Injury. She was recently discharged [place] [facility] [place] with bilateral MCA SAQIB watershed infarcts. Yesterday, she was brought in via EMS from [facility] received chest compressions for about 5 minutes. After EMS arrived, ACLS protocol continued and she was given 2 rounds of epi, non-shockable rhythm. ROSC achieved en route to the ER.Pt arrived to ICU intubated. It was noted there sudden intermittent generalized body jerks mostly on tactile stimulation. Also, pt was hypotensive for which norepinephrine infusion started. Since her discharge here on 9/28 she has been admitted to multiple facilities recently to be treated for various issues including mucous plug leading to acute hypoxic respiratory failure, she pulled out her trach 9/4 so that was reinserted at [facility] and she was also treated for ESBL UTI while there, and last admit she was treated for likely aspiration pneumonia. She was transported here on Propofol. On examination, no cough, no gag, no corneal and minimal to no withdrawal.       Hospital Course: 10/5 s/p Cardiac Arrest on 10/4 and evaluation of Anoxic Brain Injury.She was recently discharged from [facility] [place] with bilateral MCA SAQIB watershed infarcts  10//7: EEG with burst suppression pattern.  MRI with diffuse diffusion restriction. Old periventricular white matter and watershed strokes.  10/8: This AM lost LUE semi purposeful responses.  R gaze preference and R pupil larger at 4mm still sluggishly reactive.  Pending CT head stat. Last VPA level was low. Venous ammonia high 60. Procal 0.47 high CXR appears to have infiltrate, STAT CT no new ICH or stroke just diffuse cortical edema.  10/9: Day 6 post cardiac arrest. Patient getting EEG. No evidence of ictal activity. Hyponatremia now on salt tablets  3gr q 8hrs. R -gaze preference resolved.     Interval History:  >4 elements OR status of 3 inpatient conditions  Day 6 post cardiac arrest. Patient getting EEG. No evidence of ictal activity. . Still on myoclonus. VPA got adjusted yesterday. VPA 57. WBC 18. Klebsiella ESBL. Start Meropenem..  ABG: OK. Na 132. Salt tablets increased to 3gr q 8hrs. R -gaze preference resolved.   Review of Systems   Unable to perform ROS: Patient unresponsive     2 systems OR Unable to obtain a complete ROS due to level of consciousness.  Objective:     Vitals:  Temp: (!) 100.4 °F (38 °C) (10/09/17 1526)  Pulse: 61 (10/09/17 1705)  Resp: (!) 34 (10/09/17 1705)  BP: (!) 175/98 (10/09/17 1605)  SpO2: 100 % (10/09/17 1705)    Temp:  [97.9 °F (36.6 °C)-100.4 °F (38 °C)] 100.4 °F (38 °C)  Pulse:  [] 61  Resp:  [16-48] 34  SpO2:  [96 %-100 %] 100 %  BP: (111-218)/() 175/98         Vent Mode: A/C  Oxygen Concentration (%):  [40] 40  Resp Rate Total:  [20 br/min-34 br/min] 24 br/min  Vt Set:  [400 mL] 400 mL  PEEP/CPAP:  [5 cmH20] 5 cmH20  Pressure Support:  [0 cmH20] 0 cmH20  Mean Airway Pressure:  [7.3 pcA40-93 cmH20] 9.3 cmH20    10/08 0701 - 10/09 0700  In: 2105 [I.V.:335]  Out: 950 [Urine:950]    Physical Exam  Unable to test orientation, language, memory, judgment, insight, fund of knowledge, hearing, shoulder shrug, tongue protrusion, coordination, gait due to level of consciousness.  General   HEENT: S/P trach  Chest Heart RRR / Lungs Clear to auscultation  Adbomen: Soft nontender + BS; S/P PEG  Extremities: OK distal pulses.  Skin: UK  Neurological Exam:  MS;  Unresponsive. Open eyes to stimulation,.  CN: II-XII R pupil larger but reactive. New R gaze preference resolved.  Motor: LUE   0/5 / RUE 0 /5  RUE extensor posturing; LUE lost LUE semipurpposeful responses.             LLE   0/5 /  RLE 0 /5  Tone normal bilaterally  Sensory: LT/PP/T/ Vibration UK                 Complex sensory modalities: not tested  She continues having continuous myoclonic activity.  DTR:  normal throughout.  Coordination /Fine motor: UK  Gait: Not tested.  Meningeal signs: Absent  Medications:  Continuous  sodium chloride 0.9% Last Rate: 100 mL/hr at 10/09/17 1705   Scheduled  albuterol-ipratropium 2.5mg-0.5mg/3mL 3 mL Q6H   amantadine HCl 100 mg Q24H   amantadine HCl 200 mg Q24H   atorvastatin 10 mg Daily   chlorhexidine 15 mL BID   famotidine 20 mg BID   heparin (porcine) 7,500 Units Q8H   meropenem (MERREM) IVPB 2 g Q8H   polyethylene glycol 17 g Daily   senna-docusate 8.6-50 mg 1 tablet Daily   sodium chloride 0.9% 10 mL Q6H   sodium chloride 0.9% 3 mL Q8H   sodium chloride 3 g Q8H   valproate sodium (DEPACON) IVPB 500 mg Q6H   PRN  acetaminophen 650 mg Q6H PRN   dextrose 50% 12.5 g PRN   dextrose 50% 12.5 g PRN   glucagon (human recombinant) 1 mg PRN   insulin aspart 1-10 Units Q6H PRN   labetalol 10 mg Q4H PRN   magnesium sulfate IVPB 2 g PRN   magnesium sulfate IVPB 4 g PRN   ondansetron 4 mg Q8H PRN   pneumoc 13-finn conj-dip cr(PF) 0.5 mL vaccine x 1 dose   sodium chloride 0.9% 10 mL PRN     Today I personally reviewed pertinent medications, lines/drains/airways, imaging, cardiology, lab results, microbiology results, notably:      Assessment/Plan:     No new Assessment & Plan notes have been filed under this hospital service since the last note was generated.  Service: Neuro Critical Care       Active Problem List:   1.S/P cardiac arrest with anoxic brain injury.  2. Post anoxic myoclonus.  3. Recent cardiac arrest.  4. Bilateral watershed strokes.  5. S/P PEg and trach  6. Morbid  obesity.  7. Klebsiella Pneumonia    9. SIADH     Assessment / Plan:     Neuro:   -Amantadine 200 q 6AM and 100mg at 1PM.  -cEEG monitoring x 1 hr pending report.  -No sedation.  -VPA 500mg q 6 hrs IV  -VPA trough.in AM.  Resp: CXR OK; patchy infiltrate.  -S/P trach  -Vent Mode: A/C  Oxygen Concentration (%):  [40-50] 50  Resp Rate Total:  [20 br/min-41 br/min] 25 br/min  Vt Set:  [400 mL] 400 mL  PEEP/CPAP:  [5 cmH20] 5 cmH20  Pressure Support:  [0 cmH20] 0 cmH20  Mean Airway Pressure:  [7.5 lsM32-33 cmH20] 9 cmH20   -VAP  -Dual nebs PRN q 6hrs  -Chest PT q 6hrs  -ABG.  -CXR today.  CV: TTE: Pulmonary hypertension. Keep SBP < 180 mmHg.  -Lipitor 10mg qd.   IVF/nutrition/Renal/GI: Na 134. Ammonia 60.  -TF held  -NS at 100 cc IVD  -Salt tabs 3 gr q 8hrs  -Serum Na q 8hrs  -S/P PEG  -Senna / myralax  Hem / ID: WBC [id number]; Febrile.Klebsiella pneumonia,.  -Follow cultures.  -Vanc D/C  -Zosyn will be   replace by  Meropenem.  Endo: HLP; Free T4 OK.,  -SSI q 6hrs.  -Lipitor 10 mg qd.  Prophylaxis:  SC Heparin 7500 U q 8hrs prophylaxis.  VAP  Famotidine.  Advance Directives and Disposition:    Full Code. Pending family meeting today,.                 Uninterrupted Critical Care/Counseling Time (directly spent today by me not including procedures 30-74 min (98751)): =  35  min    Activity Orders          Bed rest starting at 10/03 1510        Full Code    Mark Dewitt MD  Neurocritical Care  [facility]

## 2017-10-09 NOTE — NURSING
Pt vomited green bile, Dr. Abreu aware, pt PEG hooked up to suction. Pt monitor reading in 200s with Vtach, pulse ox not picking up, 2 RNs having trouble getting pulses with pts jerking- Dr. Abreu at bedside to evaluate pt. Pt eventually came back down to baseline HR on pulse ox and EKG- Dr. Abreu stated to continue to watch.    Will continue to monitor and treat per POC.

## 2017-10-09 NOTE — PROCEDURES
DATE OF PROCEDURE:  10/05/2017    EEG #:  [id number], [id number], [id number].    REFERRING PHYSICIAN:  Dr. Zendejas.    This EEG was performed to assess for status epilepticus.    ICU EEG/VIDEO MONITORING REPORT     METHODOLOGY:  Electroencephalographic (EEG) is recorded with electrodes placed   according to the International 10-20 placement system.  Thirty two (32) channels   of digital signal (sampling rate of 512/sec), including T1 and T2, were   simultaneously recorded from the scalp and may include EKG, EMG, and/or eye   monitors.  Recording band pass was 0.1 to 512 Hz.  Digital video recording of   the patient is simultaneously recorded with the EEG.  The patient is instructed   to report clinical symptoms which may occur during the recording session.  EEG   and video recording are stored and archived in digital format.  Activation   procedures, which include photic stimulation, hyperventilation and instructing   patients to perform simple tasks, are done in selected patients  The EEG is displayed on a monitor screen and can be reviewed using different   montages.  Computer-assisted analysis is employed to detect spike and   electrographic seizure activity.   The entire record is submitted for computer   analysis.  The entire recording is visually reviewed, and the times identified   by computer analysis as being spikes or seizures are reviewed again.    Compressed spectral analysis (CSA) is also performed on the activity recorded   from each individual channel.  This is displayed as a power display of   frequencies from 0 to 30 Hz over time.   The CSA is reviewed looking for   asymmetries in power between homologous areas of the scalp, then compared with   the original EEG recording.    [employer] software was also utilized in the review of this study.  This software   suite analyzes the EEG recording in multiple domains.  Coherence and rhythmicity   are computed to identify EEG sections which may contain  organized seizures.    Each channel undergoes analysis to detect the presence of spike and sharp waves   which have special and morphological characteristics of epileptic activity.  The   routine EEG recording is converted from special into frequency domain.  This is   then displayed comparing homologous areas to identify areas of significant   asymmetry.  Algorithm to identify non-cortically generated artifact is used to   separate artifact from the EEG.      RECORDING TIMES:  Start on 10/05/2017 at hour 14 minute 15 second 52.  End on 10/06/2017 at hour 7 minute 0 second 4.    Restart on 10/06/2017 at hour 7 minute 0 second 28.  End on 10/06/2017 at hour 18 minute 41.    Restart on 10/06/2017 at hour 20 minute 28.  End on 10/07/2017 at hour 7 minute 0 second 4.    Restart on 10/07/2017 at hour 7 minute 0 second 37.  End on 10/07/2017 at hour 12 minute 19 second 9.    The total time of video EEG recording was 44 hours and 13 minutes.    EEG FINDINGS:  The recording was obtained with a number of standard bipolar and   referential montages during comatose state.  In this state, the background was   diffusely suppressed and comprised of low-amplitude fast activity.  The   background was punctuated by muscle myoclonic artifacts.  In addition, brief   periods of 4 to 5 Hz generalized periodic spike morphology epileptiform   discharges were noted.  The myoclonic artifacts and the generalized spikes were   associated with clinically observed myoclonic jerks involving the face, jaw,   upper torso and bilateral upper extremities.  In addition, head tremor artifacts   were noted on the EEG.  No clear state changes were appreciated.  Spontaneous   variability and reactivity were noted.  The EKG channel revealed sinus rhythm.    IMPRESSION:  This is an abnormal EEG during comatose state.  Diffuse suppressed   background was noted.  Periods of myoclonic status epilepticus were noted.  In   addition, prolonged periods of muscle  myoclonic jerks were noted as artifacts   from the EEG.    CLINICAL CORRELATION:  The patient is a 67-year-old female with a history of   anoxic brain injury who is currently maintained on levetiracetam and valproic   acid.  This is an abnormal EEG during comatose state.  The degree of suppression   and lack of clear ST changes is suggestive of severe encephalopathy,   nonspecific to the cause.  The study is consistent with myoclonic status   epilepticus, but the presence of cortical myoclonic epileptiform discharges as   well as muscle myoclonic activity, which may represent a subcortical phenomena.    These findings were relayed to the Neurocritical Care team.      FAK/SONU  dd: 10/07/2017 13:17:20 (CDT)  td: 10/07/2017 13:57:09 (CDT)  Doc ID   #[id number]  Job ID #[id number]    CC:

## 2017-10-09 NOTE — PROGRESS NOTES
RN notified NCC team that the patient's urine output the past 3 hours has been 24, 18, and 15. RN ordered to give 250 ml bolus per Micah, NP.

## 2017-10-09 NOTE — PLAN OF CARE
Problem: Patient Care Overview  Goal: Plan of Care Review  Outcome: Ongoing (interventions implemented as appropriate)  POC reviewed with pt at 0200. Pt unable to verbalize understanding- will reinforce.  No acute events overnight. Pt progressing toward goals. Will continue to monitor. See flowsheets for full assessment and VS info

## 2017-10-09 NOTE — SUBJECTIVE & OBJECTIVE
Interval History:  >4 elements OR status of 3 inpatient conditions  This AM lost LUE semi purposeful responses. R gaze preference and R pupil larger at 4mm still sluggishly reactive.  Pending CT head stat. Last VPA level was low. Venous ammonia high 60. Procal 0.47 high CXR appears to have infiltrate, STAT CT no new ICH or stroke just diffuse cortical edema.  Review of Systems  2 systems OR Unable to obtain a complete ROS due to level of consciousness.  Objective:     Vitals:  Temp: 99.3 °F (37.4 °C) (10/08/17 2200)  Pulse: 66 (10/08/17 2200)  Resp: (!) 27 (10/08/17 2200)  BP: 132/75 (10/08/17 2200)  SpO2: 100 % (10/08/17 2200)    Temp:  [98.7 °F (37.1 °C)-102.9 °F (39.4 °C)] 99.3 °F (37.4 °C)  Pulse:  [] 66  Resp:  [24-62] 27  SpO2:  [71 %-100 %] 100 %  BP: (108-214)/() 132/75         Vent Mode: A/C  Oxygen Concentration (%):  [40-50] 40  Resp Rate Total:  [26 br/min-45 br/min] 26 br/min  Vt Set:  [400 mL] 400 mL  PEEP/CPAP:  [5 cmH20] 5 cmH20  Pressure Support:  [0 cmH20] 0 cmH20  Mean Airway Pressure:  [3.2 soD01-87 cmH20] 12 cmH20    10/07 0701 - 10/08 0700  In: 2570 [I.V.:200]  Out: 1430 [Urine:1330]    Physical Exam  Unable to test orientation, language, memory, judgment, insight, fund of knowledge, hearing, shoulder shrug, tongue protrusion, coordination, gait due to level of consciousness.  General   HEENT: S/P trach/  Chest Heart RRR / Lungs Clear to auscultation  Adbomen: Soft nontender + BS; S/P PEG  Extremities: OK distal pulses.  Skin: UK  Neurological Exam:  MS;Coma.  CN: II-XII R pupil larger but reactive. New R gaze preference.  Motor: LUE   0/5 / RUE 0 /5  RUE extensor posturing; LUE lost LUE semipurpposeful responses.             LLE   0/5 /  RLE 0 /5  Tone normal bilaterally  Sensory: LT/PP/T/ Vibration UK                 Complex sensory modalities: not tested  DTR:  normal throughout.  Coordination /Fine motor:   Gait: Not tested.  Meningeal signs:  Absent  Medications:  Continuous  sodium chloride 0.9% Last Rate: Stopped (10/07/17 0601)   Scheduled  albuterol-ipratropium 2.5mg-0.5mg/3mL 3 mL Q6H   amantadine HCl 100 mg Q24H   amantadine HCl 200 mg Q24H   atorvastatin 10 mg Daily   chlorhexidine 15 mL BID   famotidine 20 mg BID   heparin (porcine) 7,500 Units Q8H   piperacillin-tazobactam 4.5 g in dextrose 5 % 100 mL IVPB (ready to mix system) 4.5 g Q8H   sodium chloride 0.9% 10 mL Q6H   sodium chloride 0.9% 3 mL Q8H   sodium chloride 1 g Q8H   valproate sodium (DEPACON) IVPB 500 mg Q6H   vancomycin (VANCOCIN) IVPB 1,500 mg Q12H   PRN  acetaminophen 650 mg Q6H PRN   dextrose 50% 12.5 g PRN   dextrose 50% 12.5 g PRN   glucagon (human recombinant) 1 mg PRN   insulin aspart 1-10 Units Q6H PRN   labetalol 10 mg Q4H PRN   magnesium sulfate IVPB 2 g PRN   magnesium sulfate IVPB 4 g PRN   ondansetron 4 mg Q8H PRN   pneumoc 13-finn conj-dip cr(PF) 0.5 mL vaccine x 1 dose   sodium chloride 0.9% 10 mL PRN     Today I personally reviewed pertinent medications, lines/drains/airways, imaging, cardiology, lab results, microbiology results, notably:

## 2017-10-09 NOTE — ASSESSMENT & PLAN NOTE
Nutrition Problem:  Inadequate oral intake    Related to (etiology):   Inability to consume sufficient energy    Signs and Symptoms (as evidenced by):   NPO on mech vent requiring alternative means of nutrition.     Interventions/Recommendations (treatment strategy):  Please see RD recs above.    Nutrition Diagnosis Status:   Continues

## 2017-10-09 NOTE — PLAN OF CARE
10/09/17 1536   Discharge Reassessment   Assessment Type Discharge Planning Reassessment   Provided patient/caregiver education on the expected discharge date and the discharge plan Yes   Do you have any problems affording any of your prescribed medications? No   Discharge Plan A Other   Discharge Plan B Long-term acute care facility (LTAC)   Patient choice form signed by patient/caregiver N/A   Can the patient answer the patient profile reliably? No, cognitively impaired   How does the patient rate their overall health at the present time? (neo)   Describe the patient's ability to walk at the present time. Does not walk or unable to take any steps at all   How often would a person be available to care for the patient? Occasionally   Number of comorbid conditions (as recorded on the chart) Four   During the past month, has the patient often been bothered by feeling down, depressed or hopeless? (neo)   During the past month, has the patient often been bothered by little interest or pleasure in doing things? (neo)       Patient not medically stable for discharge.     Shelby Tucker RN, CCRN-K, [facility]  Neuro-Critical Care   [phone]

## 2017-10-09 NOTE — PROGRESS NOTES
[facility]  Adult Nutrition  Progress Note    SUMMARY     Recommendations    Recommendation/Intervention:   Recommend changing TF to best meet pt's needs and restarting as medically able.   Glucerna 1.5 @ 45mL/hr.   -Hold for residuals >500mL.     RD to monitor.      Goals: Pt to receive nutrition by RD follow up  Nutrition Goal Status: progressing towards goal, goal met  Communication of RD Recs: reviewed with RN    Reason for Assessment    Reason for Assessment: RD follow-up  Diagnosis: seizures, other (see comments) (cardiac arrest)  Relevent Medical History: DM type II, CKD stage 3, CAD s/p CABG, HTN, PEG/trach         General Information Comments: Pt remains intubated. TF ordered. PEG tube to suction 2/2 vomiting this morning.    Nutrition Discharge Planning: optimal nutrition via PEG with tolerance.     Nutrition Prescription Ordered    Current Diet Order: NPO  Nutrition Order Comments: TF held  Current Nutrition Support Formula Ordered: Diabetisource  Current Nutrition Support Rate Ordered: 45 (ml)  Current Nutrition Support Frequency Ordered: mL/hr        Evaluation of Received Nutrients/Fluid Intake    Enteral Calories (kcal): 1296  Enteral Protein (gm): 65  Enteral (Free Water) Fluid (mL): 883      Energy Calories Required: not meeting needs  % Kcal Needs: 88     Protein Required: not meeting needs  % Protein Needs: 73     IV Fluid (mL): 2400  I/O: +I/O, good UOP         Fluid Required: exceed needs  Comments: 60mL residuals 10/8  Tolerance: tolerating  % Intake of Estimated Energy Needs: 0 - 25 %  % Meal Intake: NPO     Nutrition Risk Screen     Nutrition Risk Screen: other (see comments) (trach)    Nutrition/Diet History       Typical Food/Fluid Intake: JOSE LUIS. Pt on TF PTA (PEG in place).  Food Preferences: JOSE LUIS Rastafari/cultural preferences at this time.        Factors Affecting Nutritional Intake: NPO, on mechanical ventilation                Labs/Tests/Procedures/Meds       Pertinent  "Labs Reviewed: reviewed  Pertinent Labs Comments: Na 133, POCT Glu 80- >500  Pertinent Medications Reviewed: reviewed  Pertinent Medications Comments: IVF, vanco, insulin    Physical Findings    Overall Physical Appearance: obese, on ventilator support  Tubes: gastrostomy tube     Skin: pressure ulcer(s) (St 1 sacral spine)    Anthropometrics    Temp: 99 °F (37.2 °C)     Height: 5' 6" (167.6 cm)  Weight Method: Bed Scale  Weight: (!) 145.2 kg (320 lb 1.7 oz)     Ideal Body Weight (IBW), Female: 130 lb     % Ideal Body Weight, Female (lb): 227.08 lb  BMI (Calculated): 47.7  BMI Grade: greater than 40 - morbid obesity                            Estimated/Assessed Needs    Weight Used For Calorie Calculations: 134.3 kg (296 lb 1.2 oz)      Energy Calorie Requirements (kcal): 7920-8515 (11-14kcal/kg)  Energy Need Method: Kcal/kg        RMR ([place]-St. Jeor Equation): 1894.75        Weight Used For Protein Calculations: 59 kg (130 lb 1.1 oz)  Protein Requirements: 89-118g (1.5-2.0g/kg)    Fluid Requirements (mL): 1mL/kcal or per MD  RDA Method (mL): 1477      CHO requirements: 50% of total kcals        Assessment and Plan    PEG (percutaneous endoscopic gastrostomy) status    Nutrition Problem:  Inadequate oral intake    Related to (etiology):   Inability to consume sufficient energy    Signs and Symptoms (as evidenced by):   NPO on mech vent requiring alternative means of nutrition.     Interventions/Recommendations (treatment strategy):  Please see RD recs above.    Nutrition Diagnosis Status:   Continues              Monitor and Evaluation    Food and Nutrient Intake: enteral nutrition intake  Food and Nutrient Adminstration: enteral and parenteral nutrition administration        Anthropometric Measurements: weight, weight change, body mass index  Biochemical Data, Medical Tests and Procedures: electrolyte and renal panel, gastrointestinal profile, glucose/endocrine profile, inflammatory profile, lipid " profile  Nutrition-Focused Physical Findings: overall appearance    Nutrition Risk    Level of Risk: other (see comments) (f/u 2x/week)    Nutrition Follow-Up    RD Follow-up?: Yes

## 2017-10-09 NOTE — PHYSICIAN QUERY
PT Name: Nisa Frank  MR #: [id number]     Physician Query Form - Documentation Clarification      CDS/: Mary Stoddard RN, CCDS          Contact information: [email or web address]    This form is a permanent document in the medical record.     Query Date: October 9, 2017    By submitting this query, we are merely seeking further clarification of documentation. Please utilize your independent clinical judgment when addressing the question(s) below.    The Medical record reflects the following:    Supporting Clinical Findings Location in Medical Record     10/5 s/p Cardiac Arrest on 10/4 and evaluation of Anoxic Brain Injury.She was recently discharged from [facility] 9/28/2017 with bilateral MCA SAQIB watershed infarcts    On examination, no cough, no gag, no corneal and minimal to no withdrawal.       10/5 h/p     Evolving global anoxic injury as identified on recent MRI with worsening diffuse cerebral edema        10/8 ct                                                                             Doctor, Please specify diagnosis or diagnoses associated with above clinical findings.    Provider Use Only        (    )  Cerebral Edema    (  x  )  Other anoxic brain injury                                                                                                                       [  ] Clinically undetermined

## 2017-10-10 PROBLEM — J15.0 KLEBSIELLA PNEUMONIA: Status: ACTIVE | Noted: 2017-10-10

## 2017-10-10 LAB
ABO + RH BLD: NORMAL
ALBUMIN SERPL BCP-MCNC: 1.2 G/DL
ALLENS TEST: ABNORMAL
ALP SERPL-CCNC: 77 U/L
ALT SERPL W/O P-5'-P-CCNC: 12 U/L
ANION GAP SERPL CALC-SCNC: 5 MMOL/L
ANISOCYTOSIS BLD QL SMEAR: SLIGHT
AST SERPL-CCNC: 35 U/L
BACTERIA SPEC AEROBE CULT: NORMAL
BASOPHILS # BLD AUTO: 0 K/UL
BASOPHILS NFR BLD: 0 %
BASOPHILS NFR BLD: 0 %
BILIRUB SERPL-MCNC: 0.3 MG/DL
BLD GP AB SCN CELLS X3 SERPL QL: NORMAL
BLD PROD TYP BPU: NORMAL
BLOOD UNIT EXPIRATION DATE: NORMAL
BLOOD UNIT TYPE CODE: 5100
BLOOD UNIT TYPE: NORMAL
BUN SERPL-MCNC: 19 MG/DL
CALCIUM SERPL-MCNC: 7.1 MG/DL
CHLORIDE SERPL-SCNC: 107 MMOL/L
CO2 SERPL-SCNC: 25 MMOL/L
CODING SYSTEM: NORMAL
CREAT SERPL-MCNC: 0.6 MG/DL
DELSYS: ABNORMAL
DIFFERENTIAL METHOD: ABNORMAL
DIFFERENTIAL METHOD: ABNORMAL
DISPENSE STATUS: NORMAL
EOSINOPHIL # BLD AUTO: 0 K/UL
EOSINOPHIL NFR BLD: 0 %
EOSINOPHIL NFR BLD: 0.1 %
ERYTHROCYTE [DISTWIDTH] IN BLOOD BY AUTOMATED COUNT: 15.2 %
ERYTHROCYTE [DISTWIDTH] IN BLOOD BY AUTOMATED COUNT: 15.2 %
ERYTHROCYTE [SEDIMENTATION RATE] IN BLOOD BY WESTERGREN METHOD: 20 MM/H
EST. GFR  (AFRICAN AMERICAN): >60 ML/MIN/1.73 M^2
EST. GFR  (NON AFRICAN AMERICAN): >60 ML/MIN/1.73 M^2
FIO2: 40
GLUCOSE SERPL-MCNC: 79 MG/DL
GRAM STN SPEC: NORMAL
HCO3 UR-SCNC: 26.8 MMOL/L (ref 24–28)
HCT VFR BLD AUTO: 19 %
HCT VFR BLD AUTO: 20.4 %
HGB BLD-MCNC: 6 G/DL
HGB BLD-MCNC: 6.4 G/DL
INR PPP: 1.1
LYMPHOCYTES # BLD AUTO: 1.6 K/UL
LYMPHOCYTES NFR BLD: 12 %
LYMPHOCYTES NFR BLD: 12.8 %
MAGNESIUM SERPL-MCNC: 2 MG/DL
MCH RBC QN AUTO: 26.3 PG
MCH RBC QN AUTO: 26.6 PG
MCHC RBC AUTO-ENTMCNC: 31.4 G/DL
MCHC RBC AUTO-ENTMCNC: 31.6 G/DL
MCV RBC AUTO: 83 FL
MCV RBC AUTO: 85 FL
METAMYELOCYTES NFR BLD MANUAL: 1 %
MODE: ABNORMAL
MONOCYTES # BLD AUTO: 1.2 K/UL
MONOCYTES NFR BLD: 4 %
MONOCYTES NFR BLD: 9.7 %
MYELOCYTES NFR BLD MANUAL: 1 %
NEUTROPHILS # BLD AUTO: 9.3 K/UL
NEUTROPHILS NFR BLD: 72.7 %
NEUTROPHILS NFR BLD: 80 %
NEUTS BAND NFR BLD MANUAL: 1 %
PCO2 BLDA: 39.1 MMHG (ref 35–45)
PEEP: 5
PH SMN: 7.44 [PH] (ref 7.35–7.45)
PHOSPHATE SERPL-MCNC: 2.8 MG/DL
PLATELET # BLD AUTO: 185 K/UL
PLATELET # BLD AUTO: 205 K/UL
PLATELET BLD QL SMEAR: ABNORMAL
PMV BLD AUTO: 8.6 FL
PMV BLD AUTO: 8.7 FL
PO2 BLDA: 163 MMHG (ref 80–100)
POC BE: 3 MMOL/L
POC SATURATED O2: 100 % (ref 95–100)
POC TCO2: 28 MMOL/L (ref 23–27)
POCT GLUCOSE: 110 MG/DL (ref 70–110)
POCT GLUCOSE: 97 MG/DL (ref 70–110)
POTASSIUM SERPL-SCNC: 3.5 MMOL/L
POTASSIUM SERPL-SCNC: 3.9 MMOL/L
PROMYELOCYTES NFR BLD MANUAL: 1 %
PROT SERPL-MCNC: 4.9 G/DL
PROTHROMBIN TIME: 12 SEC
RBC # BLD AUTO: 2.28 M/UL
RBC # BLD AUTO: 2.41 M/UL
SAMPLE: ABNORMAL
SITE: ABNORMAL
SODIUM SERPL-SCNC: 136 MMOL/L
SODIUM SERPL-SCNC: 137 MMOL/L
SODIUM SERPL-SCNC: 138 MMOL/L
SODIUM SERPL-SCNC: 140 MMOL/L
TRANS ERYTHROCYTES VOL PATIENT: NORMAL ML
VALPROATE SERPL-MCNC: 21.7 UG/ML
VT: 400
WBC # BLD AUTO: 12.77 K/UL
WBC # BLD AUTO: 14.38 K/UL

## 2017-10-10 PROCEDURE — 36600 WITHDRAWAL OF ARTERIAL BLOOD: CPT

## 2017-10-10 PROCEDURE — 25000003 PHARM REV CODE 250: Performed by: PSYCHIATRY & NEUROLOGY

## 2017-10-10 PROCEDURE — 63600175 PHARM REV CODE 636 W HCPCS: Performed by: INTERNAL MEDICINE

## 2017-10-10 PROCEDURE — 25000003 PHARM REV CODE 250: Performed by: NURSE PRACTITIONER

## 2017-10-10 PROCEDURE — 99233 SBSQ HOSP IP/OBS HIGH 50: CPT | Mod: ,,, | Performed by: PHYSICIAN ASSISTANT

## 2017-10-10 PROCEDURE — A4216 STERILE WATER/SALINE, 10 ML: HCPCS | Performed by: NURSE PRACTITIONER

## 2017-10-10 PROCEDURE — 27000221 HC OXYGEN, UP TO 24 HOURS

## 2017-10-10 PROCEDURE — C9399 UNCLASSIFIED DRUGS OR BIOLOG: HCPCS | Performed by: PSYCHIATRY & NEUROLOGY

## 2017-10-10 PROCEDURE — 80164 ASSAY DIPROPYLACETIC ACD TOT: CPT

## 2017-10-10 PROCEDURE — 85610 PROTHROMBIN TIME: CPT

## 2017-10-10 PROCEDURE — 27200966 HC CLOSED SUCTION SYSTEM

## 2017-10-10 PROCEDURE — 63600175 PHARM REV CODE 636 W HCPCS: Performed by: PSYCHIATRY & NEUROLOGY

## 2017-10-10 PROCEDURE — 86850 RBC ANTIBODY SCREEN: CPT

## 2017-10-10 PROCEDURE — 63600175 PHARM REV CODE 636 W HCPCS: Performed by: EMERGENCY MEDICINE

## 2017-10-10 PROCEDURE — P9021 RED BLOOD CELLS UNIT: HCPCS

## 2017-10-10 PROCEDURE — 84100 ASSAY OF PHOSPHORUS: CPT

## 2017-10-10 PROCEDURE — 85025 COMPLETE CBC W/AUTO DIFF WBC: CPT

## 2017-10-10 PROCEDURE — 36430 TRANSFUSION BLD/BLD COMPNT: CPT

## 2017-10-10 PROCEDURE — 83735 ASSAY OF MAGNESIUM: CPT

## 2017-10-10 PROCEDURE — 25000242 PHARM REV CODE 250 ALT 637 W/ HCPCS: Performed by: SURGERY

## 2017-10-10 PROCEDURE — 85027 COMPLETE CBC AUTOMATED: CPT

## 2017-10-10 PROCEDURE — 84295 ASSAY OF SERUM SODIUM: CPT | Mod: 91

## 2017-10-10 PROCEDURE — 25000003 PHARM REV CODE 250: Performed by: EMERGENCY MEDICINE

## 2017-10-10 PROCEDURE — 86900 BLOOD TYPING SEROLOGIC ABO: CPT

## 2017-10-10 PROCEDURE — 86920 COMPATIBILITY TEST SPIN: CPT

## 2017-10-10 PROCEDURE — 80053 COMPREHEN METABOLIC PANEL: CPT

## 2017-10-10 PROCEDURE — 94003 VENT MGMT INPAT SUBQ DAY: CPT

## 2017-10-10 PROCEDURE — 20000000 HC ICU ROOM

## 2017-10-10 PROCEDURE — 84132 ASSAY OF SERUM POTASSIUM: CPT

## 2017-10-10 PROCEDURE — 25000003 PHARM REV CODE 250: Performed by: PHYSICIAN ASSISTANT

## 2017-10-10 PROCEDURE — 85007 BL SMEAR W/DIFF WBC COUNT: CPT

## 2017-10-10 PROCEDURE — 25000003 PHARM REV CODE 250: Performed by: SURGERY

## 2017-10-10 PROCEDURE — 99900026 HC AIRWAY MAINTENANCE (STAT)

## 2017-10-10 PROCEDURE — 36415 COLL VENOUS BLD VENIPUNCTURE: CPT

## 2017-10-10 PROCEDURE — 25000003 PHARM REV CODE 250: Performed by: INTERNAL MEDICINE

## 2017-10-10 PROCEDURE — 25000003 PHARM REV CODE 250: Performed by: STUDENT IN AN ORGANIZED HEALTH CARE EDUCATION/TRAINING PROGRAM

## 2017-10-10 PROCEDURE — A4216 STERILE WATER/SALINE, 10 ML: HCPCS | Performed by: EMERGENCY MEDICINE

## 2017-10-10 PROCEDURE — 94640 AIRWAY INHALATION TREATMENT: CPT

## 2017-10-10 PROCEDURE — 94761 N-INVAS EAR/PLS OXIMETRY MLT: CPT

## 2017-10-10 PROCEDURE — 94668 MNPJ CHEST WALL SBSQ: CPT

## 2017-10-10 PROCEDURE — 82803 BLOOD GASES ANY COMBINATION: CPT

## 2017-10-10 RX ORDER — LISINOPRIL 10 MG/1
10 TABLET ORAL DAILY
Status: DISCONTINUED | OUTPATIENT
Start: 2017-10-10 | End: 2017-10-13

## 2017-10-10 RX ORDER — HYDROCODONE BITARTRATE AND ACETAMINOPHEN 500; 5 MG/1; MG/1
TABLET ORAL
Status: DISCONTINUED | OUTPATIENT
Start: 2017-10-10 | End: 2017-10-15

## 2017-10-10 RX ORDER — POTASSIUM CHLORIDE 20 MEQ/15ML
40 SOLUTION ORAL
Status: DISCONTINUED | OUTPATIENT
Start: 2017-10-10 | End: 2017-10-27

## 2017-10-10 RX ORDER — AMLODIPINE BESYLATE 10 MG/1
10 TABLET ORAL DAILY
Status: DISCONTINUED | OUTPATIENT
Start: 2017-10-10 | End: 2017-11-15 | Stop reason: HOSPADM

## 2017-10-10 RX ADMIN — VALPROATE SODIUM 500 MG: 100 INJECTION, SOLUTION INTRAVENOUS at 04:10

## 2017-10-10 RX ADMIN — SODIUM CHLORIDE TAB 1 GM 3 G: 1 TAB at 05:10

## 2017-10-10 RX ADMIN — IPRATROPIUM BROMIDE AND ALBUTEROL SULFATE 3 ML: .5; 3 SOLUTION RESPIRATORY (INHALATION) at 12:10

## 2017-10-10 RX ADMIN — Medication 3 ML: at 01:10

## 2017-10-10 RX ADMIN — Medication 10 ML: at 06:10

## 2017-10-10 RX ADMIN — Medication 3 ML: at 06:10

## 2017-10-10 RX ADMIN — ACETAMINOPHEN 650 MG: 325 TABLET ORAL at 11:10

## 2017-10-10 RX ADMIN — HEPARIN SODIUM 7500 UNITS: 5000 INJECTION, SOLUTION INTRAVENOUS; SUBCUTANEOUS at 01:10

## 2017-10-10 RX ADMIN — HEPARIN SODIUM 7500 UNITS: 5000 INJECTION, SOLUTION INTRAVENOUS; SUBCUTANEOUS at 09:10

## 2017-10-10 RX ADMIN — STANDARDIZED SENNA CONCENTRATE AND DOCUSATE SODIUM 1 TABLET: 8.6; 5 TABLET, FILM COATED ORAL at 08:10

## 2017-10-10 RX ADMIN — FAMOTIDINE 20 MG: 20 TABLET, FILM COATED ORAL at 08:10

## 2017-10-10 RX ADMIN — LISINOPRIL 10 MG: 10 TABLET ORAL at 10:10

## 2017-10-10 RX ADMIN — CHLORHEXIDINE GLUCONATE 15 ML: 1.2 RINSE ORAL at 09:10

## 2017-10-10 RX ADMIN — MEROPENEM 2 G: 1 INJECTION, POWDER, FOR SOLUTION INTRAVENOUS at 03:10

## 2017-10-10 RX ADMIN — IPRATROPIUM BROMIDE AND ALBUTEROL SULFATE 3 ML: .5; 3 SOLUTION RESPIRATORY (INHALATION) at 07:10

## 2017-10-10 RX ADMIN — IPRATROPIUM BROMIDE AND ALBUTEROL SULFATE 3 ML: .5; 3 SOLUTION RESPIRATORY (INHALATION) at 01:10

## 2017-10-10 RX ADMIN — SODIUM CHLORIDE TAB 1 GM 3 G: 1 TAB at 01:10

## 2017-10-10 RX ADMIN — MEROPENEM 2 G: 1 INJECTION, POWDER, FOR SOLUTION INTRAVENOUS at 10:10

## 2017-10-10 RX ADMIN — CHLORHEXIDINE GLUCONATE 15 ML: 1.2 RINSE ORAL at 08:10

## 2017-10-10 RX ADMIN — MEROPENEM 2 G: 1 INJECTION, POWDER, FOR SOLUTION INTRAVENOUS at 06:10

## 2017-10-10 RX ADMIN — BRIVARACETAM 100 MG: 50 INJECTION, SUSPENSION INTRAVENOUS at 09:10

## 2017-10-10 RX ADMIN — DEXTROSE MONOHYDRATE 750 MG: 5 INJECTION, SOLUTION INTRAVENOUS at 08:10

## 2017-10-10 RX ADMIN — POTASSIUM CHLORIDE 40 MEQ: 20 SOLUTION ORAL at 05:10

## 2017-10-10 RX ADMIN — ATORVASTATIN CALCIUM 10 MG: 10 TABLET, FILM COATED ORAL at 08:10

## 2017-10-10 RX ADMIN — AMANTADINE HYDROCHLORIDE 200 MG: 50 SOLUTION ORAL at 05:10

## 2017-10-10 RX ADMIN — AMLODIPINE BESYLATE 10 MG: 10 TABLET ORAL at 10:10

## 2017-10-10 RX ADMIN — Medication 3 ML: at 10:10

## 2017-10-10 RX ADMIN — DEXTROSE MONOHYDRATE 2000 MG: 5 INJECTION, SOLUTION INTRAVENOUS at 01:10

## 2017-10-10 RX ADMIN — HEPARIN SODIUM 7500 UNITS: 5000 INJECTION, SOLUTION INTRAVENOUS; SUBCUTANEOUS at 05:10

## 2017-10-10 RX ADMIN — VALPROATE SODIUM 500 MG: 100 INJECTION, SOLUTION INTRAVENOUS at 10:10

## 2017-10-10 RX ADMIN — AMANTADINE HYDROCHLORIDE 100 MG: 50 SOLUTION ORAL at 01:10

## 2017-10-10 RX ADMIN — SODIUM CHLORIDE TAB 1 GM 3 G: 1 TAB at 09:10

## 2017-10-10 RX ADMIN — POLYETHYLENE GLYCOL 3350 17 G: 17 POWDER, FOR SOLUTION ORAL at 08:10

## 2017-10-10 RX ADMIN — BRIVARACETAM 100 MG: 50 INJECTION, SUSPENSION INTRAVENOUS at 01:10

## 2017-10-10 RX ADMIN — FAMOTIDINE 20 MG: 20 TABLET, FILM COATED ORAL at 09:10

## 2017-10-10 NOTE — PLAN OF CARE
1125: Nevaeh ADAMS placed pt on SPON mode on vent    1230: RT placed pt back on rate r/t apnea, MD notified.    SEE FLOWSHEET for more info

## 2017-10-10 NOTE — PROGRESS NOTES
Notified Dr. Abreu of pt's repeat cbc. Hct 6.4 20.4 and Hgb 6.4. Reported pt has no blood consent. Per orders will discuss with day team and call family to get blood consent.

## 2017-10-10 NOTE — PROGRESS NOTES
[facility]  Neurocritical Care  Progress Note    Admit Date: 10/3/2017  Service Date: 10/10/2017  Length of Stay: 7    Subjective:     Chief Complaint: Seizure    History of Present Illness: Ms. Frank is a 68 yo woman with a PMHx DM type II, CKD stage 3, CAD s/p CABG, essential hypertension, cerebrovascular disease s/p bi-hemispheric watershed infarcts and trach/PEG who present as a transfer from [facility] to Neuro Critical Care s/p Cardiac Arrest on 10/4 and evaluation of Anoxic Brain Injury. [place] [facility] [place] with bilateral MCA SAQIB watershed infarcts. Yesterday, she was brought in via EMS from [facility] for cardiac arrest. Per nursing homestaff, she received chest compressions for about 5 minutes. After EMS arrived, ACLS protocol continued and she was given 2 rounds of epi, non-shockable rhythm. ROSC achieved en route to the ER.Pt arrived to ICU intubated. It was noted there sudden intermittent generalized body jerks mostly on tactile stimulation. Also, pt was hypotensive for which norepinephrine infusion started. Since her discharge here on 9/28 she has been admitted to multiple facilities recently to be treated for various issues including mucous plug leading to acute hypoxic respiratory failure, she pulled out her trach 9/4 so that was reinserted at [facility] and she was also treated for ESBL UTI while there, and last admit she was treated for likely aspiration pneumonia. She was transported here on Propofol. On examination, no cough, no gag, no corneal and minimal to no withdrawal.       Hospital Course: 10/5: s/p Cardiac Arrest on 10/4 and evaluation of anoxic brain injury   10//7: EEG with burst suppression pattern.  MRI with diffuse diffusion restriction  10/10: plan for family meeting today     Interval History: 1 unit prbcs today to maintain hgb >7.  Increase valproate and begin brivaracetam.  Failed SBT today. Plan for family meeting today.      Review of Systems: JOSE LUIS, pt encephalopathic      Vitals:   Temp: 98 °F (36.7 °C) (10/10/17 1130)  Pulse: (!) 44 (10/10/17 1300)  Resp: 20 (10/10/17 1300)  BP: (!) 145/65 (10/10/17 1300)  SpO2: 100 % (10/10/17 1300)    Temp:  [98 °F (36.7 °C)-100.4 °F (38 °C)] 98 °F (36.7 °C)  Pulse:  [] 44  Resp:  [16-93] 20  SpO2:  [94 %-100 %] 100 %  BP: (113-234)/() 145/65         Vent Mode: A/C  Oxygen Concentration (%):  [40] 40  Resp Rate Total:  [20 br/min-30 br/min] 20 br/min  Vt Set:  [400 mL] 400 mL  PEEP/CPAP:  [5 cmH20] 5 cmH20  Pressure Support:  [0 cmH20-5 cmH20] 0 cmH20  Mean Airway Pressure:  [6.7 waF42-99 cmH20] 11 cmH20    10/09 0701 - 10/10 0700  In: 3341.7 [I.V.:2341.7]  Out: 1346 [Urine:696; Drains:650]     Examination:   Constitutional: Well-nourished and -developed. No apparent distress.   Eyes: Conjunctiva clear, anicteric. Lids no lesions.  Head/Ears/Nose/Mouth/Throat/Neck: Moist mucous membranes. External ears, nose atraumatic.   Cardiovascular: Regular rhythm. No murmurs. No leg edema.  Respiratory: Comfortable respirations. Clear to auscultation.  Gastrointestinal: No hernia. Soft, nondistended, nontender. + bowel sounds.    Neurologic:  -GCS E2VtM3  -PERRL 3 mm   -corneals present bilaterally, gag present, cough absent     Medications:   Continuous  sodium chloride 0.9% Last Rate: 100 mL/hr at 10/10/17 1105   Scheduled  albuterol-ipratropium 2.5mg-0.5mg/3mL 3 mL Q6H   amantadine HCl 100 mg Q24H   amantadine HCl 200 mg Q24H   amlodipine 10 mg Daily   atorvastatin 10 mg Daily   custom IVPB builder 100 mg Q8H   chlorhexidine 15 mL BID   famotidine 20 mg BID   heparin (porcine) 7,500 Units Q8H   lisinopril 10 mg Daily   meropenem (MERREM) IVPB 2 g Q8H   polyethylene glycol 17 g Daily   senna-docusate 8.6-50 mg 1 tablet Daily   sodium chloride 0.9% 10 mL Q6H   sodium chloride 0.9% 3 mL Q8H   sodium chloride 3 g Q8H   valproate sodium (DEPACON) IVPB 2,000 mg Once   valproate sodium (DEPACON)  IVPB 750 mg Q8H   PRN  sodium chloride  Q24H PRN   acetaminophen 650 mg Q6H PRN   atropine 0.2 mg PRN   dextrose 50% 12.5 g PRN   dextrose 50% 12.5 g PRN   glucagon (human recombinant) 1 mg PRN   insulin aspart 1-10 Units Q6H PRN   labetalol 10 mg Q4H PRN   magnesium sulfate IVPB 2 g PRN   magnesium sulfate IVPB 4 g PRN   ondansetron 4 mg Q8H PRN   pneumoc 13-finn conj-dip cr(PF) 0.5 mL vaccine x 1 dose   potassium chloride 10% 40 mEq PRN   sodium chloride 0.9% 10 mL PRN      Today I independently reviewed pertinent medications, lines/drains/airways, imaging, cardiology, lab results,   Assessment/Plan:     Neuro   Anoxic brain injury    -myoclonus  -cEEG  -valproate 2g load today, followed by 750 mg tid  -brivaracetam 100 mg tid           Cerebral infarction, watershed distribution, bilateral, acute    -discharge from [facility] 9/28/2017 with bilateral MCA SAQIB watershed infarct          Pulmonary   Klebsiella pneumonia    -ESBL  -meropenem  -afebrile  -leukocytosis improved         Acute on chronic respiratory failure    -tracheostomy  -failed SBT          Cardiac/Vascular   Cardiac arrest, cause unspecified    -s/p cardiac arrest 10/4 at nursing home              Prophylaxis:  Venous Thromboembolism: chemical  Stress Ulcer: H2B  Ventilator Pneumonia: yes     Activity Orders          Bed rest starting at 10/03 1510        Full Code    Farrah Singh PA-C  Neurocritical Care  [facility]

## 2017-10-10 NOTE — PLAN OF CARE
"ICU Attending Note  Neurocritical Care    [id number]    -valproate increase to 750 mg q8h, reload 2 g IV  -start brivaracetam 100 mg IV q8h per Epilepsy  -amantadine  -SBT and TCM if tolerated  -amlodipine 10 mg, lisinopril 10 mg  --200  -salt tabs,  mL/h  -meropenem for ESBL pneumonia  -HGB >7  -restart TF  -prophylaxis  -Scheduled family discussion regarding goals of care today.    ADDENDUM  I met with her family, including her daughter and by phone her aunt. The patient is unable to participate due to anoxic brain injury. This meeting included discussion of the diagnosis, prognosis, and goals of care, was absolutely necessary for treatment decisions, and bore directly on the management of the patient.    I explained she suffered anoxic brain injury from cardiac arrest from respiratory failure. As a result, she is in a coma with severe postanoxic myoclonus. I reviewed imaging. Although she was making a slow but steady recovery after her recent watershed ischemic strokes, with recent ability to talk and stand with assistance, I explained it is doubtful she will again make any meaningful recovery. Her family understands but puts their terrie in God and "cannot give up". I explained our efforts toward her recovery will continue but it may be important to consider DNR if cardiac arrest recurs.  "

## 2017-10-10 NOTE — PLAN OF CARE
Neuro Critical Care Team MD notified of HR 40s. SBP 150s-160s. Confirmed PRN order for ATROPINE (see MAR).  No new orders at this time.  Will continue to monitor.

## 2017-10-10 NOTE — PROGRESS NOTES
Spoke with Dr. Abreu regarding pt's HR 48. Per orders ok wit HR in 40's as long as BP not dropping.  Will continue to monitor..

## 2017-10-10 NOTE — PLAN OF CARE
Problem: Patient Care Overview  Goal: Plan of Care Review  Outcome: Ongoing (interventions implemented as appropriate)  POC reviewed with pt and family at 1400. Pt unable to verbalize understanding. Questions and concerns of family addressed. No acute events today. Family conference held between pt daughter and MD Myrna. Will continue to monitor. See flowsheets for full assessment and VS info.

## 2017-10-10 NOTE — ASSESSMENT & PLAN NOTE
-[place] [facility] [place] with bilateral MCA SAQIB watershed infarct

## 2017-10-10 NOTE — PROGRESS NOTES
Notified Saad Apple MD with [facility] regarding pt's critical hct 19.0 and trending down hgb level of < 6. Reported previous values. Per MD no additional orders at this time.

## 2017-10-10 NOTE — PHYSICIAN QUERY
"PT Name: Nisa Frank  MR #: [id number]    Physician Query Form - Hematology Clarification      CDS/: Mary Stoddard RN, CCDS          Contact information: [email or web address]    This form is a permanent document in the medical record.      Query Date: October 10, 2017    By submitting this query, we are merely seeking further clarification of documentation. Please utilize your independent clinical judgment when addressing the question(s) below.    The Medical record contains the following:   Indicators  Supporting Clinical Findings Location in Medical Record    "Anemia" documented     X H & H = 8.4/27.6-->7.5/23.3-->7.4/23-->7.6/24.2-->  6/19 10/3, 10/4, 10/6. 10/8, 10/10 lab   X BP =                     HR= Hr-  10/8-- 10/10 vs    "GI bleeding" documented      Acute bleeding (Non GI site)     X Transfusion(s)  1 unit prbcs today to maintain hgb >7 10/10progress note    Treatment:     X Other:  Htn, ckd 3,  10/9 progress note      Provider, please specify diagnosis or diagnoses associated with above clinical findings.     [  ] Acute blood loss anemia  [  ] Iron deficiency anemia  [  ] Chronic blood loss anemia  [  ] Precipitous drop in Hematocrit    [  ] Anemia of chronic disease ( Specify chronic disease)      [  ] CKD (specify stage) ___________________________     [  ] Other (Specify) _______________________________     [  ] Clinically Undetermined     [x  ] Other Hematological Diagnosis (please specify): _anemiaof critical illness________________________________    [  ] Clinically Undetermined       Please document in your progress notes daily for the duration of treatment, until resolved, and include in your discharge summary.                                                                                                      "

## 2017-10-10 NOTE — PROCEDURES
DATE OF PROCEDURE:  10/09/2017    EEG NUMBER:  [id number]    REFERRING PHYSICIAN:  Dr. aGnn    This EEG was performed to assess for status epilepticus.    ELECTROENCEPHALOGRAM REPORT      METHODOLOGY:  Electroencephalographic (EEG) recording is recorded with   electrodes placed according to the International 10-20 placement system.  Thirty   two (32) channels of digital signal (sampling rate of 512/sec), including T1   and T2, were simultaneously recorded from the scalp and may include EKG, EMG,   and/or eye monitors.  Recording band pass was 0.1 to 512 Hz.  Digital video   recording of the patient is simultaneously recorded with the EEG.  The patient   is instructed to report clinical symptoms which may occur during the recording   session.  EEG and video recording are stored and archived in digital format.    Activation procedures, which include photic stimulation, hyperventilation and   instructing patients to perform simple tasks, are done in selected patients.    The EEG is displayed on a monitor screen and can be reviewed using different   montages.  Computer assisted-analysis is employed to detect spike and   electrographic seizure activity.  The entire record is submitted for computer   analysis.  The entire recording is visually reviewed, and the times identified   by computer analysis as being spikes or seizures are reviewed again.    Compressed spectral analysis (CSA) is also performed on the activity recorded   from each individual channel.  This is displayed as a power display of   frequencies from 0 to 30 Hz over time.  The CSA is reviewed looking for   asymmetries in power between homologous areas of the scalp, then compared with   the original EEG recording.    AnonymAsk software was also utilized in the review of this study.  This software   suite analyzes the EEG recording in multiple domains.  Coherence and rhythmicity   are computed to identify EEG sections which may contain organized seizures.     Each channel undergoes analysis to detect the presence of spike and sharp waves   which have special and morphological characteristics of epileptic activity.  The   routine EEG recording is converted from special into frequency domain.  This is   then displayed comparing homologous areas to identify areas of significant   asymmetry.  Algorithm to identify non-cortically generated artifact is used to   separate artifact from the EEG.    RECORDING TIMES:  Start on 10/09/2017 at hour 9 minute 51 second 49  Stop on 10/09/2017 at hour 11 minute 12 second 35  The total time of video EEG recording was 1 hour and 6 minutes.    EEG FINDINGS:  The recording was obtained with a number of standard bipolar and   referential montages during comatose state.  In this state, the background was   diffusely disorganized.  The background was punctuated 5 to 7 Hz independent   left and right lateralized as well as generalized epileptiform discharges often   associated with myoclonic discharges.  The background was also punctuated by   muscle myoclonic artifacts, predominantly in the left hemisphere.  Spontaneous   variability was noted.  Formal reactivity testing was not performed.  No clear   state changes were appreciated.    The EKG channel revealed sinus rhythm.    IMPRESSION:  This is an abnormal EEG during comatose state.  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    CLINICAL CORRELATION:  The patient is a 67-year-old female with a history of   anoxic brain injury who is currently maintained on valproic acid.  This is an   abnormal EEG during comatose state.  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  This is associated with   myoclonus of the jaw, face, eyelids, upper torso and head on video  observation.    These findings were relayed to the Neurocritical Care Team.      FAK/SONU  dd: 10/09/2017 14:37:10 (CDT)  td: 10/09/2017 14:59:40 (CDT)  Doc ID   #[id number]  Job ID #[id number]    CC:

## 2017-10-10 NOTE — PROGRESS NOTES
Spoke with Dr. Abreu regarding pt's HR 44 and . Per orders have Atropine at the bedside. If HR 40 please give 0.2 mg of Atropine IV, orders will be placed shortly. Will continue to monitor..

## 2017-10-11 LAB
ALBUMIN SERPL BCP-MCNC: 1.2 G/DL
ALP SERPL-CCNC: 74 U/L
ALT SERPL W/O P-5'-P-CCNC: 13 U/L
ANION GAP SERPL CALC-SCNC: 5 MMOL/L
ANISOCYTOSIS BLD QL SMEAR: SLIGHT
ANISOCYTOSIS BLD QL SMEAR: SLIGHT
AST SERPL-CCNC: 31 U/L
BACTERIA BLD CULT: NORMAL
BASO STIPL BLD QL SMEAR: ABNORMAL
BASOPHILS # BLD AUTO: ABNORMAL K/UL
BASOPHILS NFR BLD: 0 %
BASOPHILS NFR BLD: 1 %
BILIRUB SERPL-MCNC: 0.3 MG/DL
BLD PROD TYP BPU: NORMAL
BLOOD UNIT EXPIRATION DATE: NORMAL
BLOOD UNIT TYPE CODE: 5100
BLOOD UNIT TYPE: NORMAL
BUN SERPL-MCNC: 14 MG/DL
CA-I BLDV-SCNC: 1.05 MMOL/L
CALCIUM SERPL-MCNC: 6.9 MG/DL
CHLORIDE SERPL-SCNC: 112 MMOL/L
CO2 SERPL-SCNC: 24 MMOL/L
CODING SYSTEM: NORMAL
CREAT SERPL-MCNC: 0.5 MG/DL
DIFFERENTIAL METHOD: ABNORMAL
DIFFERENTIAL METHOD: ABNORMAL
DISPENSE STATUS: NORMAL
EOSINOPHIL # BLD AUTO: ABNORMAL K/UL
EOSINOPHIL NFR BLD: 0 %
EOSINOPHIL NFR BLD: 1 %
ERYTHROCYTE [DISTWIDTH] IN BLOOD BY AUTOMATED COUNT: 15.1 %
ERYTHROCYTE [DISTWIDTH] IN BLOOD BY AUTOMATED COUNT: 15.2 %
EST. GFR  (AFRICAN AMERICAN): >60 ML/MIN/1.73 M^2
EST. GFR  (NON AFRICAN AMERICAN): >60 ML/MIN/1.73 M^2
GLUCOSE SERPL-MCNC: 103 MG/DL
HCT VFR BLD AUTO: 20.9 %
HCT VFR BLD AUTO: 26.4 %
HGB BLD-MCNC: 6.8 G/DL
HGB BLD-MCNC: 8.5 G/DL
HYPOCHROMIA BLD QL SMEAR: ABNORMAL
HYPOCHROMIA BLD QL SMEAR: ABNORMAL
INR PPP: 1.1
LYMPHOCYTES # BLD AUTO: ABNORMAL K/UL
LYMPHOCYTES NFR BLD: 10 %
LYMPHOCYTES NFR BLD: 14 %
MAGNESIUM SERPL-MCNC: 1.8 MG/DL
MAGNESIUM SERPL-MCNC: 2.5 MG/DL
MCH RBC QN AUTO: 27.3 PG
MCH RBC QN AUTO: 27.5 PG
MCHC RBC AUTO-ENTMCNC: 32.2 G/DL
MCHC RBC AUTO-ENTMCNC: 32.5 G/DL
MCV RBC AUTO: 85 FL
MCV RBC AUTO: 85 FL
METAMYELOCYTES NFR BLD MANUAL: 1 %
METAMYELOCYTES NFR BLD MANUAL: 2 %
MONOCYTES # BLD AUTO: ABNORMAL K/UL
MONOCYTES NFR BLD: 1 %
MONOCYTES NFR BLD: 6 %
MYELOCYTES NFR BLD MANUAL: 1 %
NEUTROPHILS NFR BLD: 75 %
NEUTROPHILS NFR BLD: 86 %
NEUTS BAND NFR BLD MANUAL: 1 %
OVALOCYTES BLD QL SMEAR: ABNORMAL
OVALOCYTES BLD QL SMEAR: ABNORMAL
PHOSPHATE SERPL-MCNC: 1.7 MG/DL
PHOSPHATE SERPL-MCNC: 2.6 MG/DL
PLATELET # BLD AUTO: 178 K/UL
PLATELET # BLD AUTO: 214 K/UL
PLATELET BLD QL SMEAR: ABNORMAL
PMV BLD AUTO: 8.8 FL
PMV BLD AUTO: 9 FL
POCT GLUCOSE: 143 MG/DL (ref 70–110)
POCT GLUCOSE: 158 MG/DL (ref 70–110)
POCT GLUCOSE: 164 MG/DL (ref 70–110)
POCT GLUCOSE: 98 MG/DL (ref 70–110)
POIKILOCYTOSIS BLD QL SMEAR: SLIGHT
POIKILOCYTOSIS BLD QL SMEAR: SLIGHT
POLYCHROMASIA BLD QL SMEAR: ABNORMAL
POLYCHROMASIA BLD QL SMEAR: ABNORMAL
POTASSIUM SERPL-SCNC: 3.1 MMOL/L
POTASSIUM SERPL-SCNC: 4.4 MMOL/L
PROMYELOCYTES NFR BLD MANUAL: 1 %
PROT SERPL-MCNC: 4.7 G/DL
PROTHROMBIN TIME: 11.6 SEC
RBC # BLD AUTO: 2.47 M/UL
RBC # BLD AUTO: 3.11 M/UL
SODIUM SERPL-SCNC: 141 MMOL/L
SODIUM SERPL-SCNC: 141 MMOL/L
TRANS ERYTHROCYTES VOL PATIENT: NORMAL ML
VALPROATE SERPL-MCNC: <12.5 UG/ML
WBC # BLD AUTO: 12 K/UL
WBC # BLD AUTO: 15.1 K/UL

## 2017-10-11 PROCEDURE — 84100 ASSAY OF PHOSPHORUS: CPT | Mod: 91

## 2017-10-11 PROCEDURE — 85610 PROTHROMBIN TIME: CPT

## 2017-10-11 PROCEDURE — 84132 ASSAY OF SERUM POTASSIUM: CPT

## 2017-10-11 PROCEDURE — 27000190 HC CPAP FULL FACE MASK W/VALVE

## 2017-10-11 PROCEDURE — 94761 N-INVAS EAR/PLS OXIMETRY MLT: CPT

## 2017-10-11 PROCEDURE — 94640 AIRWAY INHALATION TREATMENT: CPT

## 2017-10-11 PROCEDURE — 85027 COMPLETE CBC AUTOMATED: CPT

## 2017-10-11 PROCEDURE — 25000003 PHARM REV CODE 250: Performed by: NURSE PRACTITIONER

## 2017-10-11 PROCEDURE — 63600175 PHARM REV CODE 636 W HCPCS: Performed by: INTERNAL MEDICINE

## 2017-10-11 PROCEDURE — 94003 VENT MGMT INPAT SUBQ DAY: CPT

## 2017-10-11 PROCEDURE — P9021 RED BLOOD CELLS UNIT: HCPCS

## 2017-10-11 PROCEDURE — 99900026 HC AIRWAY MAINTENANCE (STAT)

## 2017-10-11 PROCEDURE — 63600175 PHARM REV CODE 636 W HCPCS: Performed by: PHYSICIAN ASSISTANT

## 2017-10-11 PROCEDURE — 63600175 PHARM REV CODE 636 W HCPCS: Performed by: EMERGENCY MEDICINE

## 2017-10-11 PROCEDURE — 99233 SBSQ HOSP IP/OBS HIGH 50: CPT | Mod: ,,, | Performed by: PHYSICIAN ASSISTANT

## 2017-10-11 PROCEDURE — 63600175 PHARM REV CODE 636 W HCPCS: Performed by: PSYCHIATRY & NEUROLOGY

## 2017-10-11 PROCEDURE — 27000221 HC OXYGEN, UP TO 24 HOURS

## 2017-10-11 PROCEDURE — 36415 COLL VENOUS BLD VENIPUNCTURE: CPT

## 2017-10-11 PROCEDURE — 84100 ASSAY OF PHOSPHORUS: CPT

## 2017-10-11 PROCEDURE — 80053 COMPREHEN METABOLIC PANEL: CPT

## 2017-10-11 PROCEDURE — 25000242 PHARM REV CODE 250 ALT 637 W/ HCPCS: Performed by: SURGERY

## 2017-10-11 PROCEDURE — 83735 ASSAY OF MAGNESIUM: CPT

## 2017-10-11 PROCEDURE — 83735 ASSAY OF MAGNESIUM: CPT | Mod: 91

## 2017-10-11 PROCEDURE — C9399 UNCLASSIFIED DRUGS OR BIOLOG: HCPCS | Performed by: PSYCHIATRY & NEUROLOGY

## 2017-10-11 PROCEDURE — 80164 ASSAY DIPROPYLACETIC ACD TOT: CPT

## 2017-10-11 PROCEDURE — 25000003 PHARM REV CODE 250: Performed by: STUDENT IN AN ORGANIZED HEALTH CARE EDUCATION/TRAINING PROGRAM

## 2017-10-11 PROCEDURE — 94660 CPAP INITIATION&MGMT: CPT

## 2017-10-11 PROCEDURE — 82330 ASSAY OF CALCIUM: CPT

## 2017-10-11 PROCEDURE — 25000003 PHARM REV CODE 250: Performed by: SURGERY

## 2017-10-11 PROCEDURE — A4216 STERILE WATER/SALINE, 10 ML: HCPCS | Performed by: NURSE PRACTITIONER

## 2017-10-11 PROCEDURE — 25000003 PHARM REV CODE 250: Performed by: EMERGENCY MEDICINE

## 2017-10-11 PROCEDURE — 85007 BL SMEAR W/DIFF WBC COUNT: CPT | Mod: 91

## 2017-10-11 PROCEDURE — 99900035 HC TECH TIME PER 15 MIN (STAT)

## 2017-10-11 PROCEDURE — 84295 ASSAY OF SERUM SODIUM: CPT

## 2017-10-11 PROCEDURE — 94668 MNPJ CHEST WALL SBSQ: CPT

## 2017-10-11 PROCEDURE — 25000003 PHARM REV CODE 250: Performed by: PSYCHIATRY & NEUROLOGY

## 2017-10-11 PROCEDURE — 25000003 PHARM REV CODE 250: Performed by: PHYSICIAN ASSISTANT

## 2017-10-11 PROCEDURE — A4216 STERILE WATER/SALINE, 10 ML: HCPCS | Performed by: EMERGENCY MEDICINE

## 2017-10-11 PROCEDURE — 20000000 HC ICU ROOM

## 2017-10-11 PROCEDURE — 36430 TRANSFUSION BLD/BLD COMPNT: CPT

## 2017-10-11 PROCEDURE — 63600175 PHARM REV CODE 636 W HCPCS: Performed by: NURSE PRACTITIONER

## 2017-10-11 PROCEDURE — 95925 SOMATOSENSORY TESTING: CPT

## 2017-10-11 PROCEDURE — 25000003 PHARM REV CODE 250: Performed by: INTERNAL MEDICINE

## 2017-10-11 RX ORDER — HYDROCODONE BITARTRATE AND ACETAMINOPHEN 500; 5 MG/1; MG/1
TABLET ORAL
Status: DISCONTINUED | OUTPATIENT
Start: 2017-10-11 | End: 2017-10-15

## 2017-10-11 RX ORDER — ATORVASTATIN CALCIUM 10 MG/1
10 TABLET, FILM COATED ORAL DAILY
Status: DISCONTINUED | OUTPATIENT
Start: 2017-10-12 | End: 2017-11-12

## 2017-10-11 RX ORDER — AMANTADINE HYDROCHLORIDE 50 MG/5ML
100 SOLUTION ORAL
Status: DISCONTINUED | OUTPATIENT
Start: 2017-10-11 | End: 2017-10-13

## 2017-10-11 RX ORDER — AMANTADINE HYDROCHLORIDE 50 MG/5ML
200 SOLUTION ORAL
Status: DISCONTINUED | OUTPATIENT
Start: 2017-10-12 | End: 2017-10-13

## 2017-10-11 RX ORDER — LORAZEPAM 1 MG/1
2 TABLET ORAL ONCE
Status: COMPLETED | OUTPATIENT
Start: 2017-10-11 | End: 2017-10-12

## 2017-10-11 RX ORDER — LABETALOL HYDROCHLORIDE 5 MG/ML
10 INJECTION, SOLUTION INTRAVENOUS EVERY 4 HOURS PRN
Status: DISCONTINUED | OUTPATIENT
Start: 2017-10-11 | End: 2017-10-11

## 2017-10-11 RX ORDER — POTASSIUM CHLORIDE 20 MEQ/15ML
20 SOLUTION ORAL ONCE
Status: DISCONTINUED | OUTPATIENT
Start: 2017-10-11 | End: 2017-10-12

## 2017-10-11 RX ORDER — POTASSIUM CHLORIDE 20 MEQ/15ML
40 SOLUTION ORAL
Status: DISCONTINUED | OUTPATIENT
Start: 2017-10-11 | End: 2017-10-27

## 2017-10-11 RX ORDER — POTASSIUM CHLORIDE 20 MEQ/15ML
40 SOLUTION ORAL
Status: DISCONTINUED | OUTPATIENT
Start: 2017-10-11 | End: 2017-10-11

## 2017-10-11 RX ORDER — ZONISAMIDE 100 MG/1
200 CAPSULE ORAL 2 TIMES DAILY
Status: CANCELLED | OUTPATIENT
Start: 2017-10-11

## 2017-10-11 RX ORDER — SODIUM,POTASSIUM PHOSPHATES 280-250MG
2 POWDER IN PACKET (EA) ORAL
Status: DISCONTINUED | OUTPATIENT
Start: 2017-10-11 | End: 2017-11-11

## 2017-10-11 RX ORDER — ROCURONIUM BROMIDE 10 MG/ML
100 INJECTION, SOLUTION INTRAVENOUS
Status: DISCONTINUED | OUTPATIENT
Start: 2017-10-11 | End: 2017-10-16

## 2017-10-11 RX ORDER — ZONISAMIDE 100 MG/1
200 CAPSULE ORAL 2 TIMES DAILY
Status: DISCONTINUED | OUTPATIENT
Start: 2017-10-11 | End: 2017-10-26

## 2017-10-11 RX ORDER — SODIUM,POTASSIUM PHOSPHATES 280-250MG
2 POWDER IN PACKET (EA) ORAL
Status: DISCONTINUED | OUTPATIENT
Start: 2017-10-11 | End: 2017-10-11

## 2017-10-11 RX ADMIN — IPRATROPIUM BROMIDE AND ALBUTEROL SULFATE 3 ML: .5; 3 SOLUTION RESPIRATORY (INHALATION) at 07:10

## 2017-10-11 RX ADMIN — Medication 10 ML: at 06:10

## 2017-10-11 RX ADMIN — HEPARIN SODIUM 5000 UNITS: 5000 INJECTION, SOLUTION INTRAVENOUS; SUBCUTANEOUS at 01:10

## 2017-10-11 RX ADMIN — Medication 10 ML: at 12:10

## 2017-10-11 RX ADMIN — MEROPENEM 2 G: 1 INJECTION, POWDER, FOR SOLUTION INTRAVENOUS at 07:10

## 2017-10-11 RX ADMIN — SODIUM CHLORIDE TAB 1 GM 3 G: 1 TAB at 09:10

## 2017-10-11 RX ADMIN — INSULIN ASPART 2 UNITS: 100 INJECTION, SOLUTION INTRAVENOUS; SUBCUTANEOUS at 07:10

## 2017-10-11 RX ADMIN — POTASSIUM & SODIUM PHOSPHATES POWDER PACK 280-160-250 MG 2 PACKET: 280-160-250 PACK at 04:10

## 2017-10-11 RX ADMIN — Medication 3 ML: at 01:10

## 2017-10-11 RX ADMIN — DEXTROSE MONOHYDRATE 750 MG: 5 INJECTION, SOLUTION INTRAVENOUS at 12:10

## 2017-10-11 RX ADMIN — DEXTROSE MONOHYDRATE 750 MG: 5 INJECTION, SOLUTION INTRAVENOUS at 04:10

## 2017-10-11 RX ADMIN — LISINOPRIL 10 MG: 10 TABLET ORAL at 08:10

## 2017-10-11 RX ADMIN — AMANTADINE HYDROCHLORIDE 100 MG: 50 SOLUTION ORAL at 12:10

## 2017-10-11 RX ADMIN — Medication 10 ML: at 11:10

## 2017-10-11 RX ADMIN — HEPARIN SODIUM 7500 UNITS: 5000 INJECTION, SOLUTION INTRAVENOUS; SUBCUTANEOUS at 05:10

## 2017-10-11 RX ADMIN — BRIVARACETAM 100 MG: 50 INJECTION, SUSPENSION INTRAVENOUS at 02:10

## 2017-10-11 RX ADMIN — FAMOTIDINE 20 MG: 20 TABLET, FILM COATED ORAL at 08:10

## 2017-10-11 RX ADMIN — Medication 3 ML: at 09:10

## 2017-10-11 RX ADMIN — MAGNESIUM SULFATE IN WATER 2 G: 40 INJECTION, SOLUTION INTRAVENOUS at 05:10

## 2017-10-11 RX ADMIN — BRIVARACETAM 100 MG: 50 INJECTION, SUSPENSION INTRAVENOUS at 05:10

## 2017-10-11 RX ADMIN — IPRATROPIUM BROMIDE AND ALBUTEROL SULFATE 3 ML: .5; 3 SOLUTION RESPIRATORY (INHALATION) at 01:10

## 2017-10-11 RX ADMIN — ATORVASTATIN CALCIUM 10 MG: 10 TABLET, FILM COATED ORAL at 08:10

## 2017-10-11 RX ADMIN — MEROPENEM 2 G: 1 INJECTION, POWDER, FOR SOLUTION INTRAVENOUS at 11:10

## 2017-10-11 RX ADMIN — CALCIUM GLUCONATE 3 G: 94 INJECTION, SOLUTION INTRAVENOUS at 11:10

## 2017-10-11 RX ADMIN — AMLODIPINE BESYLATE 10 MG: 10 TABLET ORAL at 08:10

## 2017-10-11 RX ADMIN — INSULIN ASPART 2 UNITS: 100 INJECTION, SOLUTION INTRAVENOUS; SUBCUTANEOUS at 05:10

## 2017-10-11 RX ADMIN — SODIUM CHLORIDE TAB 1 GM 3 G: 1 TAB at 01:10

## 2017-10-11 RX ADMIN — AMANTADINE HYDROCHLORIDE 200 MG: 50 SOLUTION ORAL at 05:10

## 2017-10-11 RX ADMIN — DEXTROSE MONOHYDRATE 750 MG: 5 INJECTION, SOLUTION INTRAVENOUS at 08:10

## 2017-10-11 RX ADMIN — CHLORHEXIDINE GLUCONATE 15 ML: 1.2 RINSE ORAL at 08:10

## 2017-10-11 RX ADMIN — HEPARIN SODIUM 7500 UNITS: 5000 INJECTION, SOLUTION INTRAVENOUS; SUBCUTANEOUS at 09:10

## 2017-10-11 RX ADMIN — MEROPENEM 2 G: 1 INJECTION, POWDER, FOR SOLUTION INTRAVENOUS at 03:10

## 2017-10-11 RX ADMIN — POTASSIUM & SODIUM PHOSPHATES POWDER PACK 280-160-250 MG 2 PACKET: 280-160-250 PACK at 08:10

## 2017-10-11 RX ADMIN — POTASSIUM CHLORIDE 40 MEQ: 20 SOLUTION ORAL at 04:10

## 2017-10-11 RX ADMIN — BRIVARACETAM 100 MG: 50 INJECTION, SUSPENSION INTRAVENOUS at 09:10

## 2017-10-11 RX ADMIN — Medication 3 ML: at 06:10

## 2017-10-11 RX ADMIN — ROCURONIUM BROMIDE 100 MG: 10 SOLUTION INTRAVENOUS at 11:10

## 2017-10-11 RX ADMIN — SODIUM CHLORIDE TAB 1 GM 3 G: 1 TAB at 05:10

## 2017-10-11 RX ADMIN — POTASSIUM & SODIUM PHOSPHATES POWDER PACK 280-160-250 MG 2 PACKET: 280-160-250 PACK at 12:10

## 2017-10-11 RX ADMIN — POTASSIUM CHLORIDE 40 MEQ: 20 SOLUTION ORAL at 08:10

## 2017-10-11 RX ADMIN — GABAPENTIN 200 MG: 400 CAPSULE ORAL at 11:10

## 2017-10-11 RX ADMIN — GABAPENTIN 200 MG: 400 CAPSULE ORAL at 09:10

## 2017-10-11 NOTE — ASSESSMENT & PLAN NOTE
-[place] [facility] [place] with bilateral MCA SAQIB watershed infarct

## 2017-10-11 NOTE — PLAN OF CARE
ICU Attending Note  Neurocritical Care    1 U PRBC    -briviracetam, valproate, check level, add zonisamide 200 mg q12h  --200  -observe bradycardia  -lisinopril, amlodipine  -replete electrolytes  -stop IVF  -meropenem x8 d  -1 U PRBC

## 2017-10-11 NOTE — PLAN OF CARE
Results for JOHN LOPEZ (MRN [id number]) as of 10/11/2017 13:01   Ref. Range 10/11/2017 11:39   Potassium Latest Ref Range: 3.5 - 5.1 mmol/L 4.4        Valproic Acid Lvl Latest Ref Range: 50.0 - 100.0 ug/mL <12.5 (L)       Notified [facility] PRITI Yan. Holding one time dose of K+ and no new orders for Valproic acid at this time. will continue to monitor pt. .

## 2017-10-11 NOTE — PLAN OF CARE
Problem: Patient Care Overview  Goal: Plan of Care Review  Outcome: Ongoing (interventions implemented as appropriate)   10/11/17 0631   Coping/Psychosocial   Plan Of Care Reviewed With patient     POC reviewed with pt at 0500. No evidence of learning. 1 unit of RBC's transfused along with electrolyte replacements. Prn tylenol given x1 for temp 99.8. Pt progressing toward goals. Will continue to monitor. See flowsheets for full assessment and VS info   Goal: Individualization & Mutuality  Pts family states she likes music    Outcome: Ongoing (interventions implemented as appropriate)   10/05/17 0700 10/09/17 1535   Individualization   Patient Specific Preferences --  Quite environment    Mutuality/Individual Preferences   What Anxieties, Fears, Concerns, or Questions Do You Have About Your Care? none --    What Information Would Help Us Give You More Personalized Care? none --

## 2017-10-11 NOTE — PROGRESS NOTES
[facility]  Neurocritical Care  Progress Note    Admit Date: 10/3/2017  Service Date: 10/11/2017  Length of Stay: 8    Subjective:     Chief Complaint: Seizure    History of Present Illness: Ms. Frank is a 68 yo woman with a PMHx DM type II, CKD stage 3, CAD s/p CABG, essential hypertension, cerebrovascular disease s/p bi-hemispheric watershed infarcts and trach/PEG who present as a transfer from [facility] to Neuro Critical Care s/p Cardiac Arrest on 10/4 and evaluation of Anoxic Brain Injury. She was recently discharged from [facility] 9/28/2017 with bilateral MCA SAQIB watershed infarcts. Yesterday, she was brought in via EMS from [facility] for cardiac arrest. Per nursing homestaff, she received chest compressions for about 5 minutes. After EMS arrived, ACLS protocol continued and she was given 2 rounds of epi, non-shockable rhythm. ROSC achieved en route to the ER.Pt arrived to ICU intubated. It was noted there sudden intermittent generalized body jerks mostly on tactile stimulation. Also, pt was hypotensive for which norepinephrine infusion started. Since her discharge here on 9/28 she has been admitted to multiple facilities recently to be treated for various issues including mucous plug leading to acute hypoxic respiratory failure, she pulled out her trach 9/4 so that was reinserted at [facility] and she was also treated for ESBL UTI while there, and last admit she was treated for likely aspiration pneumonia. She was transported here on Propofol. On examination, no cough, no gag, no corneal and minimal to no withdrawal.       Hospital Course: 10/5: s/p Cardiac Arrest on 10/4 and evaluation of anoxic brain injury   10//7: EEG with burst suppression pattern.  MRI with diffuse diffusion restriction  10/10: family meeting   10/11: SSEP    Interval History: SSEP today.  Valproate level pending.  Continue brivaracetam and valproate; begin zonisamide.     Review of Systems: JOSE LUIS 2/2  encephalopathy     Vitals:   Temp: 97.8 °F (36.6 °C) (10/11/17 0730)  Pulse: (!) 45 (10/11/17 0906)  Resp: 20 (10/11/17 0906)  BP: (!) 158/70 (10/11/17 0906)  SpO2: 100 % (10/11/17 0906)    Temp:  [97.8 °F (36.6 °C)-99.8 °F (37.7 °C)] 97.8 °F (36.6 °C)  Pulse:  [] 45  Resp:  [] 20  SpO2:  [94 %-100 %] 100 %  BP: (137-184)/(59-74) 158/70         Vent Mode: A/C  Oxygen Concentration (%):  [40] 40  Resp Rate Total:  [20 br/min-37 br/min] 20 br/min  Vt Set:  [400 mL] 400 mL  PEEP/CPAP:  [5 cmH20] 5 cmH20  Pressure Support:  [0 cmH20] 0 cmH20  Mean Airway Pressure:  [4.6 wzN65-75 cmH20] 10 cmH20    10/10 0701 - 10/11 0700  In: 5105.3 [I.V.:2678.3]  Out: 737 [Urine:737]        Examination:   Constitutional: Well-nourished and -developed. No apparent distress.   Eyes: Conjunctiva clear, anicteric. Lids no lesions.  Head/Ears/Nose/Mouth/Throat/Neck: Moist mucous membranes. External ears, nose atraumatic.   Cardiovascular: Regular rhythm. No murmurs. No leg edema.  Respiratory: Comfortable respirations. Clear to auscultation.  Gastrointestinal: No hernia. Soft, nondistended, nontender. + bowel sounds.     Neurologic:  -GCS E2VtM3  -PERRL 3 mm   -corneals present bilaterally, gag present, cough absent     Medications:   Continuous Scheduled  albuterol-ipratropium 2.5mg-0.5mg/3mL 3 mL Q6H   amantadine HCl 100 mg Q24H   [START ON 10/12/2017] amantadine HCl 200 mg Q24H   amlodipine 10 mg Daily   [START ON 10/12/2017] atorvastatin 10 mg Daily   custom IVPB builder 100 mg Q8H   calcium gluconate IVPB 3 g Once   chlorhexidine 15 mL BID   famotidine 20 mg BID   heparin (porcine) 7,500 Units Q8H   lisinopril 10 mg Daily   meropenem (MERREM) IVPB 2 g Q8H   polyethylene glycol 17 g Daily   potassium chloride 10% 20 mEq Once   sodium chloride 0.9% 10 mL Q6H   sodium chloride 0.9% 3 mL Q8H   sodium chloride 3 g Q8H   valproate sodium (DEPACON) IVPB 750 mg Q8H   zonisamide 200 mg BID   PRN  sodium chloride  Q24H PRN   sodium  chloride  Q24H PRN   acetaminophen 650 mg Q6H PRN   atropine 0.2 mg PRN   dextrose 50% 12.5 g PRN   dextrose 50% 12.5 g PRN   glucagon (human recombinant) 1 mg PRN   insulin aspart 1-10 Units Q6H PRN   magnesium sulfate IVPB 2 g PRN   magnesium sulfate IVPB 4 g PRN   ondansetron 4 mg Q8H PRN   pneumoc 13-finn conj-dip cr(PF) 0.5 mL vaccine x 1 dose   potassium chloride 10% 40 mEq PRN   potassium chloride 10% 40 mEq PRN   potassium, sodium phosphates 2 packet PRN   rocuronium 100 mg On Call Procedure   sodium chloride 0.9% 10 mL PRN      Today I independently reviewed pertinent medications, lines/drains/airways, imaging, cardiology, lab results,       Assessment/Plan:     Neuro   Anoxic brain injury    -myoclonus  -cEEG  -valproate 2g load today, followed by 750 mg tid  -valproate level pending  -brivaracetam 100 mg tid  -begin zonisamide 200 mg bid    -SSEP today   -discussed with Dr. Isabel          Cerebral infarction, watershed distribution, bilateral, acute    [place] [facility] [place] with bilateral MCA SAQIB watershed infarct          Pulmonary   Klebsiella pneumonia    -ESBL  -meropenem x 8 days  -afebrile  -WBC 15         Acute on chronic respiratory failure    -tracheostomy  -failed SBT          Cardiac/Vascular   Cardiac arrest, cause unspecified    -s/p cardiac arrest 10/4 at nursing home          Chronic sinus bradycardia    -asymptomatic at this time         Essential hypertension    -amlodipine 10 mg daily  -lisinopril 10 mg daily             Prophylaxis:  Venous Thromboembolism: chemical  Stress Ulcer: H2B  Ventilator Pneumonia: yes     Activity Orders          Bed rest starting at 10/03 1510        Full Code    Farrah Singh PA-C  Neurocritical Care  [facility]

## 2017-10-11 NOTE — ASSESSMENT & PLAN NOTE
-myoclonus  -cEEG  -valproate 2g load today, followed by 750 mg tid  -valproate level pending  -brivaracetam 100 mg tid  -begin zonisamide 200 mg bid    -SSEP today   -discussed with Dr. Isabel

## 2017-10-11 NOTE — PLAN OF CARE
Problem: Patient Care Overview  Goal: Plan of Care Review  Outcome: Ongoing (interventions implemented as appropriate)  POC reviewed with pt and family at 1400. Pt family verbalized understanding. Questions and concerns addressed. Pt unable to verbalize understanding. No acute events today. Pt progressing toward goals. Will continue to monitor. See flowsheets for full assessment and VS info.

## 2017-10-11 NOTE — PROGRESS NOTES
Notified Pharmacy regarding brivaracetam medication and clarification of duration time. Per Marychuy in pharmacy please run gtt over 15 min..

## 2017-10-11 NOTE — PROGRESS NOTES
Notified Dr. Abreu with [facility] regarding pt's H/H of 6.8 and 20.9. Per orders please give patient 1 unit of RBC's now.

## 2017-10-12 LAB
ALBUMIN SERPL BCP-MCNC: 1.4 G/DL
ALP SERPL-CCNC: 85 U/L
ALT SERPL W/O P-5'-P-CCNC: 17 U/L
ANION GAP SERPL CALC-SCNC: 4 MMOL/L
ANISOCYTOSIS BLD QL SMEAR: SLIGHT
AST SERPL-CCNC: 35 U/L
BASOPHILS # BLD AUTO: ABNORMAL K/UL
BASOPHILS NFR BLD: 0 %
BILIRUB SERPL-MCNC: 0.4 MG/DL
BUN SERPL-MCNC: 11 MG/DL
CALCIUM SERPL-MCNC: 8 MG/DL
CHLORIDE SERPL-SCNC: 109 MMOL/L
CO2 SERPL-SCNC: 28 MMOL/L
CREAT SERPL-MCNC: 0.6 MG/DL
DIFFERENTIAL METHOD: ABNORMAL
EOSINOPHIL # BLD AUTO: ABNORMAL K/UL
EOSINOPHIL NFR BLD: 0 %
ERYTHROCYTE [DISTWIDTH] IN BLOOD BY AUTOMATED COUNT: 15.5 %
EST. GFR  (AFRICAN AMERICAN): >60 ML/MIN/1.73 M^2
EST. GFR  (NON AFRICAN AMERICAN): >60 ML/MIN/1.73 M^2
GLUCOSE SERPL-MCNC: 140 MG/DL
HCT VFR BLD AUTO: 25.6 %
HGB BLD-MCNC: 8.2 G/DL
HYPOCHROMIA BLD QL SMEAR: ABNORMAL
INR PPP: 1.1
LYMPHOCYTES # BLD AUTO: ABNORMAL K/UL
LYMPHOCYTES NFR BLD: 23 %
MCH RBC QN AUTO: 27.7 PG
MCHC RBC AUTO-ENTMCNC: 32 G/DL
MCV RBC AUTO: 87 FL
METAMYELOCYTES NFR BLD MANUAL: 2 %
MONOCYTES # BLD AUTO: ABNORMAL K/UL
MONOCYTES NFR BLD: 3 %
MYELOCYTES NFR BLD MANUAL: 1 %
NEUTROPHILS NFR BLD: 68 %
NEUTS BAND NFR BLD MANUAL: 3 %
OVALOCYTES BLD QL SMEAR: ABNORMAL
PHOSPHATE SERPL-MCNC: 2.4 MG/DL
PHOSPHATE SERPL-MCNC: 2.5 MG/DL
PLATELET # BLD AUTO: 233 K/UL
PMV BLD AUTO: 8.8 FL
POCT GLUCOSE: 130 MG/DL (ref 70–110)
POCT GLUCOSE: 131 MG/DL (ref 70–110)
POCT GLUCOSE: 145 MG/DL (ref 70–110)
POIKILOCYTOSIS BLD QL SMEAR: SLIGHT
POLYCHROMASIA BLD QL SMEAR: ABNORMAL
POTASSIUM SERPL-SCNC: 4.1 MMOL/L
PROT SERPL-MCNC: 5.2 G/DL
PROTHROMBIN TIME: 11.2 SEC
RBC # BLD AUTO: 2.96 M/UL
SODIUM SERPL-SCNC: 141 MMOL/L
SODIUM SERPL-SCNC: 141 MMOL/L
SODIUM SERPL-SCNC: 142 MMOL/L
WBC # BLD AUTO: 15.1 K/UL

## 2017-10-12 PROCEDURE — 20000000 HC ICU ROOM

## 2017-10-12 PROCEDURE — 99233 SBSQ HOSP IP/OBS HIGH 50: CPT | Mod: ,,, | Performed by: PSYCHIATRY & NEUROLOGY

## 2017-10-12 PROCEDURE — 85007 BL SMEAR W/DIFF WBC COUNT: CPT

## 2017-10-12 PROCEDURE — 94761 N-INVAS EAR/PLS OXIMETRY MLT: CPT

## 2017-10-12 PROCEDURE — A4216 STERILE WATER/SALINE, 10 ML: HCPCS | Performed by: NURSE PRACTITIONER

## 2017-10-12 PROCEDURE — 25000003 PHARM REV CODE 250: Performed by: STUDENT IN AN ORGANIZED HEALTH CARE EDUCATION/TRAINING PROGRAM

## 2017-10-12 PROCEDURE — 25000003 PHARM REV CODE 250: Performed by: PSYCHIATRY & NEUROLOGY

## 2017-10-12 PROCEDURE — 84100 ASSAY OF PHOSPHORUS: CPT | Mod: 91

## 2017-10-12 PROCEDURE — 25000003 PHARM REV CODE 250: Performed by: NURSE PRACTITIONER

## 2017-10-12 PROCEDURE — A4216 STERILE WATER/SALINE, 10 ML: HCPCS | Performed by: EMERGENCY MEDICINE

## 2017-10-12 PROCEDURE — 94003 VENT MGMT INPAT SUBQ DAY: CPT

## 2017-10-12 PROCEDURE — 84295 ASSAY OF SERUM SODIUM: CPT | Mod: 91

## 2017-10-12 PROCEDURE — 25000003 PHARM REV CODE 250: Performed by: PHYSICIAN ASSISTANT

## 2017-10-12 PROCEDURE — 63600175 PHARM REV CODE 636 W HCPCS: Performed by: EMERGENCY MEDICINE

## 2017-10-12 PROCEDURE — 94668 MNPJ CHEST WALL SBSQ: CPT

## 2017-10-12 PROCEDURE — 27000221 HC OXYGEN, UP TO 24 HOURS

## 2017-10-12 PROCEDURE — 63600175 PHARM REV CODE 636 W HCPCS: Performed by: PHYSICIAN ASSISTANT

## 2017-10-12 PROCEDURE — C9399 UNCLASSIFIED DRUGS OR BIOLOG: HCPCS | Performed by: PSYCHIATRY & NEUROLOGY

## 2017-10-12 PROCEDURE — 25000003 PHARM REV CODE 250: Performed by: EMERGENCY MEDICINE

## 2017-10-12 PROCEDURE — 27200966 HC CLOSED SUCTION SYSTEM

## 2017-10-12 PROCEDURE — 84100 ASSAY OF PHOSPHORUS: CPT

## 2017-10-12 PROCEDURE — 80053 COMPREHEN METABOLIC PANEL: CPT

## 2017-10-12 PROCEDURE — 63600175 PHARM REV CODE 636 W HCPCS: Performed by: PSYCHIATRY & NEUROLOGY

## 2017-10-12 PROCEDURE — 85610 PROTHROMBIN TIME: CPT

## 2017-10-12 PROCEDURE — 94640 AIRWAY INHALATION TREATMENT: CPT

## 2017-10-12 PROCEDURE — 85027 COMPLETE CBC AUTOMATED: CPT

## 2017-10-12 PROCEDURE — 84295 ASSAY OF SERUM SODIUM: CPT

## 2017-10-12 PROCEDURE — 99900026 HC AIRWAY MAINTENANCE (STAT)

## 2017-10-12 PROCEDURE — 25000242 PHARM REV CODE 250 ALT 637 W/ HCPCS: Performed by: SURGERY

## 2017-10-12 PROCEDURE — 63600175 PHARM REV CODE 636 W HCPCS: Performed by: STUDENT IN AN ORGANIZED HEALTH CARE EDUCATION/TRAINING PROGRAM

## 2017-10-12 PROCEDURE — 99900022

## 2017-10-12 RX ORDER — CLONAZEPAM 1 MG/1
1 TABLET ORAL EVERY 8 HOURS
Status: DISCONTINUED | OUTPATIENT
Start: 2017-10-12 | End: 2017-10-12

## 2017-10-12 RX ORDER — LORAZEPAM 2 MG/ML
4 INJECTION INTRAMUSCULAR ONCE
Status: COMPLETED | OUTPATIENT
Start: 2017-10-12 | End: 2017-10-12

## 2017-10-12 RX ORDER — CLONAZEPAM 1 MG/1
1 TABLET ORAL EVERY 8 HOURS
Status: DISCONTINUED | OUTPATIENT
Start: 2017-10-12 | End: 2017-10-13

## 2017-10-12 RX ADMIN — POLYETHYLENE GLYCOL 3350 17 G: 17 POWDER, FOR SOLUTION ORAL at 09:10

## 2017-10-12 RX ADMIN — MEROPENEM 2 G: 1 INJECTION, POWDER, FOR SOLUTION INTRAVENOUS at 03:10

## 2017-10-12 RX ADMIN — LISINOPRIL 10 MG: 10 TABLET ORAL at 09:10

## 2017-10-12 RX ADMIN — FAMOTIDINE 20 MG: 20 TABLET, FILM COATED ORAL at 09:10

## 2017-10-12 RX ADMIN — HEPARIN SODIUM 7500 UNITS: 5000 INJECTION, SOLUTION INTRAVENOUS; SUBCUTANEOUS at 01:10

## 2017-10-12 RX ADMIN — GABAPENTIN 200 MG: 400 CAPSULE ORAL at 09:10

## 2017-10-12 RX ADMIN — BRIVARACETAM 100 MG: 50 INJECTION, SUSPENSION INTRAVENOUS at 05:10

## 2017-10-12 RX ADMIN — Medication 10 ML: at 12:10

## 2017-10-12 RX ADMIN — Medication 10 ML: at 05:10

## 2017-10-12 RX ADMIN — IPRATROPIUM BROMIDE AND ALBUTEROL SULFATE 3 ML: .5; 3 SOLUTION RESPIRATORY (INHALATION) at 07:10

## 2017-10-12 RX ADMIN — Medication 3 ML: at 05:10

## 2017-10-12 RX ADMIN — IPRATROPIUM BROMIDE AND ALBUTEROL SULFATE 3 ML: .5; 3 SOLUTION RESPIRATORY (INHALATION) at 12:10

## 2017-10-12 RX ADMIN — AMANTADINE HYDROCHLORIDE 100 MG: 50 SOLUTION ORAL at 01:10

## 2017-10-12 RX ADMIN — AMANTADINE HYDROCHLORIDE 200 MG: 50 SOLUTION ORAL at 05:10

## 2017-10-12 RX ADMIN — AMLODIPINE BESYLATE 10 MG: 10 TABLET ORAL at 09:10

## 2017-10-12 RX ADMIN — HEPARIN SODIUM 7500 UNITS: 5000 INJECTION, SOLUTION INTRAVENOUS; SUBCUTANEOUS at 09:10

## 2017-10-12 RX ADMIN — CHLORHEXIDINE GLUCONATE 15 ML: 1.2 RINSE ORAL at 09:10

## 2017-10-12 RX ADMIN — ACETAMINOPHEN 650 MG: 325 TABLET ORAL at 09:10

## 2017-10-12 RX ADMIN — ACETAMINOPHEN 650 MG: 325 TABLET ORAL at 12:10

## 2017-10-12 RX ADMIN — CLONAZEPAM 1 MG: 1 TABLET ORAL at 09:10

## 2017-10-12 RX ADMIN — IPRATROPIUM BROMIDE AND ALBUTEROL SULFATE 3 ML: .5; 3 SOLUTION RESPIRATORY (INHALATION) at 02:10

## 2017-10-12 RX ADMIN — LORAZEPAM 2 MG: 1 TABLET ORAL at 12:10

## 2017-10-12 RX ADMIN — SODIUM CHLORIDE TAB 1 GM 3 G: 1 TAB at 05:10

## 2017-10-12 RX ADMIN — LORAZEPAM 4 MG: 2 INJECTION INTRAMUSCULAR; INTRAVENOUS at 10:10

## 2017-10-12 RX ADMIN — HEPARIN SODIUM 7500 UNITS: 5000 INJECTION, SOLUTION INTRAVENOUS; SUBCUTANEOUS at 05:10

## 2017-10-12 RX ADMIN — SODIUM CHLORIDE TAB 1 GM 3 G: 1 TAB at 09:10

## 2017-10-12 RX ADMIN — Medication 3 ML: at 09:10

## 2017-10-12 RX ADMIN — MEROPENEM 2 G: 1 INJECTION, POWDER, FOR SOLUTION INTRAVENOUS at 07:10

## 2017-10-12 RX ADMIN — IPRATROPIUM BROMIDE AND ALBUTEROL SULFATE 3 ML: .5; 3 SOLUTION RESPIRATORY (INHALATION) at 11:10

## 2017-10-12 RX ADMIN — DEXTROSE MONOHYDRATE 750 MG: 5 INJECTION, SOLUTION INTRAVENOUS at 04:10

## 2017-10-12 RX ADMIN — ATORVASTATIN CALCIUM 10 MG: 10 TABLET, FILM COATED ORAL at 09:10

## 2017-10-12 RX ADMIN — CLONAZEPAM 1 MG: 1 TABLET ORAL at 01:10

## 2017-10-12 RX ADMIN — Medication 10 ML: at 06:10

## 2017-10-12 RX ADMIN — SODIUM CHLORIDE, PRESERVATIVE FREE 10 ML: 5 INJECTION INTRAVENOUS at 12:10

## 2017-10-12 RX ADMIN — BRIVARACETAM 100 MG: 50 INJECTION, SUSPENSION INTRAVENOUS at 03:10

## 2017-10-12 RX ADMIN — MEROPENEM 2 G: 1 INJECTION, POWDER, FOR SOLUTION INTRAVENOUS at 10:10

## 2017-10-12 RX ADMIN — SODIUM CHLORIDE TAB 1 GM 3 G: 1 TAB at 01:10

## 2017-10-12 RX ADMIN — Medication 10 ML: at 11:10

## 2017-10-12 RX ADMIN — BRIVARACETAM 100 MG: 50 INJECTION, SUSPENSION INTRAVENOUS at 09:10

## 2017-10-12 NOTE — SUBJECTIVE & OBJECTIVE
Interval History:    Day 7 post cardiac arrest. Patient continues to have myoclonus that worsens when patient is moved or touched. Myoclonus temporarily improved on Ativan overnight. Valproic acid level undetectable likely due to interactions with meropenem. Patient on meropenem for ESBL Klebsiella pneumonia sensitive to meropenem. WBC 15 10/12, afebrile.     Review of Systems   Unable to obtain a complete ROS due to level of consciousness.  Objective:     Vitals:  Temp: 99.6 °F (37.6 °C) (10/12/17 0919)  Pulse: 62 (10/12/17 0800)  Resp: (!) 22 (10/12/17 0800)  BP: (!) 156/76 (10/12/17 0800)  SpO2: 100 % (10/12/17 0800)    Temp:  [97.9 °F (36.6 °C)-99.6 °F (37.6 °C)] 99.6 °F (37.6 °C)  Pulse:  [] 62  Resp:  [20-34] 22  SpO2:  [99 %-100 %] 100 %  BP: (138-188)/() 156/76         Vent Mode: A/C  Oxygen Concentration (%):  [40] 40  Resp Rate Total:  [20 br/min-34 br/min] 24 br/min  Vt Set:  [400 mL] 400 mL  PEEP/CPAP:  [5 cmH20] 5 cmH20  Pressure Support:  [0 cmH20] 0 cmH20  Mean Airway Pressure:  [11 hgA22-78 cmH20] 12 cmH20    10/11 0701 - 10/12 0700  In: 2886.7 [I.V.:851.7]  Out: 1160 [Urine:1160]    Physical Exam  Unable to test orientation, language, memory, judgment, insight, fund of knowledge, hearing, shoulder shrug, tongue protrusion, coordination, gait due to level of consciousness.  General   HEENT: trach in place  Chest Heart RRR / Lungs Clear to auscultation  Adbomen: Soft nontender; S/P PEG  Extremities: OK distal pulses. WWP  Neurological Exam:  MS; Unresponsive. Open eyes to stimulation,.  CN: II-XII R pupil larger but reactive.   Motor: LUE   0/5 / RUE 0 /5  RUE extensor posturing             LLE   0/5 /  RLE 0 /5  Tone normal bilaterally  Continuous myoclonic activity noted  DTR:  normal throughout.  Coordination /Fine motor:   Gait: Not tested.    Medications:  Continuous Scheduled  albuterol-ipratropium 2.5mg-0.5mg/3mL 3 mL Q6H   amantadine HCl 100 mg Q24H   amantadine HCl 200 mg Q24H    amlodipine 10 mg Daily   atorvastatin 10 mg Daily   custom IVPB builder 100 mg Q8H   chlorhexidine 15 mL BID   famotidine 20 mg BID   heparin (porcine) 7,500 Units Q8H   lisinopril 10 mg Daily   meropenem (MERREM) IVPB 2 g Q8H   polyethylene glycol 17 g Daily   potassium chloride 10% 20 mEq Once   sodium chloride 0.9% 10 mL Q6H   sodium chloride 0.9% 3 mL Q8H   sodium chloride 3 g Q8H   valproate sodium (DEPACON) IVPB 750 mg Q8H   zonisamide 200 mg BID   PRN  sodium chloride  Q24H PRN   sodium chloride  Q24H PRN   acetaminophen 650 mg Q6H PRN   atropine 0.2 mg PRN   dextrose 50% 12.5 g PRN   dextrose 50% 12.5 g PRN   glucagon (human recombinant) 1 mg PRN   insulin aspart 1-10 Units Q6H PRN   magnesium sulfate IVPB 2 g PRN   magnesium sulfate IVPB 4 g PRN   ondansetron 4 mg Q8H PRN   pneumoc 13-finn conj-dip cr(PF) 0.5 mL vaccine x 1 dose   potassium chloride 10% 40 mEq PRN   potassium chloride 10% 40 mEq PRN   potassium, sodium phosphates 2 packet PRN   rocuronium 100 mg On Call Procedure   sodium chloride 0.9% 10 mL PRN     Labs:    Component      Latest Ref Rng & Units 10/12/2017   WBC      3.90 - 12.70 K/uL 15.10 (H)   RBC      4.00 - 5.40 M/uL 2.96 (L)   Hemoglobin      12.0 - 16.0 g/dL 8.2 (L)   Hematocrit      37.0 - 48.5 % 25.6 (L)   MCV      82 - 98 fL 87   MCH      27.0 - 31.0 pg 27.7   MCHC      32.0 - 36.0 g/dL 32.0   RDW      11.5 - 14.5 % 15.5 (H)   Platelets      150 - 350 K/uL 233   MPV      9.2 - 12.9 fL 8.8 (L)   Lymph #      1.0 - 4.8 K/uL CANCELED   Mono #      0.3 - 1.0 K/uL CANCELED   Eos #      0.0 - 0.5 K/uL CANCELED   Baso #      0.00 - 0.20 K/uL CANCELED   Gran%      38.0 - 73.0 % 68.0   Lymph%      18.0 - 48.0 % 23.0   Mono%      4.0 - 15.0 % 3.0 (L)   Eosinophil%      0.0 - 8.0 % 0.0   Basophil%      0.0 - 1.9 % 0.0   BANDS      % 3.0   Metamyelocytes      % 2.0   Myelocytes      % 1.0   Aniso       Slight   Poik       Slight   Poly       Occasional   Hypo       Occasional   Ovalocytes        Occasional   Differential Method       Manual   Sodium      136 - 145 mmol/L 141   Potassium      3.5 - 5.1 mmol/L 4.1   Chloride      95 - 110 mmol/L 109   CO2      23 - 29 mmol/L 28   Glucose      70 - 110 mg/dL 140 (H)   BUN, Bld      8 - 23 mg/dL 11   Creatinine      0.5 - 1.4 mg/dL 0.6   Calcium      8.7 - 10.5 mg/dL 8.0 (L)   Total Protein      6.0 - 8.4 g/dL 5.2 (L)   Albumin      3.5 - 5.2 g/dL 1.4 (L)   Total Bilirubin      0.1 - 1.0 mg/dL 0.4   Alkaline Phosphatase      55 - 135 U/L 85   AST      10 - 40 U/L 35   ALT      10 - 44 U/L 17   Anion Gap      8 - 16 mmol/L 4 (L)   eGFR if African American      >60 mL/min/1.73 m:2 >60.0   eGFR if non African American      >60 mL/min/1.73 m:2 >60.0   Protime      9.0 - 12.5 sec 11.2   Coumadin Monitoring INR      0.8 - 1.2 1.1   POCT Glucose      70 - 110 mg/dL 145 (H)   Phosphorus      2.7 - 4.5 mg/dL 2.4 (L)

## 2017-10-12 NOTE — PLAN OF CARE
Problem: Patient Care Overview  Goal: Plan of Care Review  Outcome: Ongoing (interventions implemented as appropriate)  POC reviewed with Nisa Frank at 1400. No evidence of understanding shown;  Pt unable to verbalize understanding.  No acute events today. Pt on tube feed at 45ml/hr; Pt given oral care per protocol; full bath given per protocol; still on siegel catheter and flexy seal tube.  Pt progressing toward goals. Will continue to monitor. See flowsheets for full assessment and VS info.

## 2017-10-12 NOTE — PLAN OF CARE
ICU Attending Note  Neurocritical Care    Increased myoclonus-> lorazepam with improvement.    [id number]    -stop valproate given interaction with meropenem  -lorazepam 4 mg then start clonazepam 1 mg q8h  -zonisamide, brivaracetam  --200  -lisinopril, amlodipine  -meropenem for pneumonia  -HGB >7  -prophylaxis

## 2017-10-12 NOTE — PROGRESS NOTES
Pt appears to be having seizure like activity. [facility] MD notified and came to bedside. Lorazepam 2 mg given per MD orders. Will continue to monitor.

## 2017-10-12 NOTE — PLAN OF CARE
10/12/17 1231   Discharge Reassessment   Assessment Type Discharge Planning Reassessment   Provided patient/caregiver education on the expected discharge date and the discharge plan Yes   Do you have any problems affording any of your prescribed medications? No   Discharge Plan A Long-term acute care facility (LTAC)   Patient choice form signed by patient/caregiver N/A   Can the patient answer the patient profile reliably? No, cognitively impaired   How does the patient rate their overall health at the present time? (neo)   Describe the patient's ability to walk at the present time. Does not walk or unable to take any steps at all   How often would a person be available to care for the patient? Occasionally   Number of comorbid conditions (as recorded on the chart) Four   During the past month, has the patient often been bothered by feeling down, depressed or hopeless? (neo)   During the past month, has the patient often been bothered by little interest or pleasure in doing things? (neo)       Patient not medically stable for discharge.      Shelby Tucker RN, CCRN-K, [facility]  Neuro-Critical Care   [phone]

## 2017-10-12 NOTE — ASSESSMENT & PLAN NOTE
-discharge from [facility] 9/28/2017 with bilateral MCA SAQIB watershed infarct

## 2017-10-12 NOTE — PLAN OF CARE
Problem: Patient Care Overview  Goal: Plan of Care Review  Outcome: Ongoing (interventions implemented as appropriate)  POC reviewed with pt at 2200. Pt unable to verbalize understanding. Questions and concerns addressed. No acute events overnight. Pt progressing toward goals. Will continue to monitor. See flowsheets for full assessment and VS info

## 2017-10-12 NOTE — PROGRESS NOTES
[facility]  Neurocritical Care  Progress Note    Admit Date: 10/3/2017  Service Date: 10/12/2017  Length of Stay: 9    Subjective:     Chief Complaint: Seizure    History of Present Illness: Ms. Frank is a 68 yo woman with a PMHx DM type II, CKD stage 3, CAD s/p CABG, essential hypertension, cerebrovascular disease s/p bi-hemispheric watershed infarcts and trach/PEG who present as a transfer from [facility] to Neuro Critical Care s/p Cardiac Arrest on 10/4 and evaluation of Anoxic Brain Injury. She was recently discharged from [facility] 9/28/2017 with bilateral MCA SAQIB watershed infarcts. Yesterday, she was brought in via EMS from [facility] for cardiac arrest. Per nursing homestaff, she received chest compressions for about 5 minutes. After EMS arrived, ACLS protocol continued and she was given 2 rounds of epi, non-shockable rhythm. ROSC achieved en route to the ER.Pt arrived to ICU intubated. It was noted there sudden intermittent generalized body jerks mostly on tactile stimulation. Also, pt was hypotensive for which norepinephrine infusion started. Since her discharge here on 9/28 she has been admitted to multiple facilities recently to be treated for various issues including mucous plug leading to acute hypoxic respiratory failure, she pulled out her trach 9/4 so that was reinserted at [facility] and she was also treated for ESBL UTI while there, and last admit she was treated for likely aspiration pneumonia. She was transported here on Propofol. On examination, no cough, no gag, no corneal and minimal to no withdrawal.       Hospital Course: 10/5: s/p Cardiac Arrest on 10/4 and evaluation of anoxic brain injury   10//7: EEG with burst suppression pattern.  MRI with diffuse diffusion restriction  10/10: family meeting   10/11: SSEP  10/12: worsening myoclonus especially when moved. Valproic acid level undetectable likely due to interactions with meropenem. Patient on meropenem for ESBL  Klebsiella pneumonia sensitive to meropenem.    Interval History:    Day 7 post cardiac arrest. Patient continues to have myoclonus that worsens when patient is moved or touched. Myoclonus temporarily improved on Ativan overnight. Valproic acid level undetectable likely due to interactions with meropenem. Patient on meropenem for ESBL Klebsiella pneumonia sensitive to meropenem. WBC 15 10/12, afebrile.     Review of Systems   Unable to obtain a complete ROS due to level of consciousness.  Objective:     Vitals:  Temp: 99.6 °F (37.6 °C) (10/12/17 0919)  Pulse: 62 (10/12/17 0800)  Resp: (!) 22 (10/12/17 0800)  BP: (!) 156/76 (10/12/17 0800)  SpO2: 100 % (10/12/17 0800)    Temp:  [97.9 °F (36.6 °C)-99.6 °F (37.6 °C)] 99.6 °F (37.6 °C)  Pulse:  [] 62  Resp:  [20-34] 22  SpO2:  [99 %-100 %] 100 %  BP: (138-188)/() 156/76         Vent Mode: A/C  Oxygen Concentration (%):  [40] 40  Resp Rate Total:  [20 br/min-34 br/min] 24 br/min  Vt Set:  [400 mL] 400 mL  PEEP/CPAP:  [5 cmH20] 5 cmH20  Pressure Support:  [0 cmH20] 0 cmH20  Mean Airway Pressure:  [11 gkB36-42 cmH20] 12 cmH20    10/11 0701 - 10/12 0700  In: 2886.7 [I.V.:851.7]  Out: 1160 [Urine:1160]    Physical Exam  Unable to test orientation, language, memory, judgment, insight, fund of knowledge, hearing, shoulder shrug, tongue protrusion, coordination, gait due to level of consciousness.  General   HEENT: trach in place  Chest Heart RRR / Lungs Clear to auscultation  Adbomen: Soft nontender; S/P PEG  Extremities: OK distal pulses. WWP  Neurological Exam:  MS; Unresponsive. Open eyes to stimulation,.  CN: II-XII R pupil larger but reactive.   Motor: LUE   0/5 / RUE 0 /5  RUE extensor posturing             LLE   0/5 /  RLE 0 /5  Tone normal bilaterally  Continuous myoclonic activity noted  DTR:  normal throughout.  Coordination /Fine motor:   Gait: Not tested.    Medications:  Continuous Scheduled  albuterol-ipratropium 2.5mg-0.5mg/3mL 3 mL Q6H    amantadine HCl 100 mg Q24H   amantadine HCl 200 mg Q24H   amlodipine 10 mg Daily   atorvastatin 10 mg Daily   custom IVPB builder 100 mg Q8H   chlorhexidine 15 mL BID   famotidine 20 mg BID   heparin (porcine) 7,500 Units Q8H   lisinopril 10 mg Daily   meropenem (MERREM) IVPB 2 g Q8H   polyethylene glycol 17 g Daily   potassium chloride 10% 20 mEq Once   sodium chloride 0.9% 10 mL Q6H   sodium chloride 0.9% 3 mL Q8H   sodium chloride 3 g Q8H   valproate sodium (DEPACON) IVPB 750 mg Q8H   zonisamide 200 mg BID   PRN  sodium chloride  Q24H PRN   sodium chloride  Q24H PRN   acetaminophen 650 mg Q6H PRN   atropine 0.2 mg PRN   dextrose 50% 12.5 g PRN   dextrose 50% 12.5 g PRN   glucagon (human recombinant) 1 mg PRN   insulin aspart 1-10 Units Q6H PRN   magnesium sulfate IVPB 2 g PRN   magnesium sulfate IVPB 4 g PRN   ondansetron 4 mg Q8H PRN   pneumoc 13-finn conj-dip cr(PF) 0.5 mL vaccine x 1 dose   potassium chloride 10% 40 mEq PRN   potassium chloride 10% 40 mEq PRN   potassium, sodium phosphates 2 packet PRN   rocuronium 100 mg On Call Procedure   sodium chloride 0.9% 10 mL PRN     Labs:    Component      Latest Ref Rng & Units 10/12/2017   WBC      3.90 - 12.70 K/uL 15.10 (H)   RBC      4.00 - 5.40 M/uL 2.96 (L)   Hemoglobin      12.0 - 16.0 g/dL 8.2 (L)   Hematocrit      37.0 - 48.5 % 25.6 (L)   MCV      82 - 98 fL 87   MCH      27.0 - 31.0 pg 27.7   MCHC      32.0 - 36.0 g/dL 32.0   RDW      11.5 - 14.5 % 15.5 (H)   Platelets      150 - 350 K/uL 233   MPV      9.2 - 12.9 fL 8.8 (L)   Lymph #      1.0 - 4.8 K/uL CANCELED   Mono #      0.3 - 1.0 K/uL CANCELED   Eos #      0.0 - 0.5 K/uL CANCELED   Baso #      0.00 - 0.20 K/uL CANCELED   Gran%      38.0 - 73.0 % 68.0   Lymph%      18.0 - 48.0 % 23.0   Mono%      4.0 - 15.0 % 3.0 (L)   Eosinophil%      0.0 - 8.0 % 0.0   Basophil%      0.0 - 1.9 % 0.0   BANDS      % 3.0   Metamyelocytes      % 2.0   Myelocytes      % 1.0   Aniso       Slight   Poik       Slight    Poly       Occasional   Hypo       Occasional   Ovalocytes       Occasional   Differential Method       Manual   Sodium      136 - 145 mmol/L 141   Potassium      3.5 - 5.1 mmol/L 4.1   Chloride      95 - 110 mmol/L 109   CO2      23 - 29 mmol/L 28   Glucose      70 - 110 mg/dL 140 (H)   BUN, Bld      8 - 23 mg/dL 11   Creatinine      0.5 - 1.4 mg/dL 0.6   Calcium      8.7 - 10.5 mg/dL 8.0 (L)   Total Protein      6.0 - 8.4 g/dL 5.2 (L)   Albumin      3.5 - 5.2 g/dL 1.4 (L)   Total Bilirubin      0.1 - 1.0 mg/dL 0.4   Alkaline Phosphatase      55 - 135 U/L 85   AST      10 - 40 U/L 35   ALT      10 - 44 U/L 17   Anion Gap      8 - 16 mmol/L 4 (L)   eGFR if African American      >60 mL/min/1.73 m:2 >60.0   eGFR if non African American      >60 mL/min/1.73 m:2 >60.0   Protime      9.0 - 12.5 sec 11.2   Coumadin Monitoring INR      0.8 - 1.2 1.1   POCT Glucose      70 - 110 mg/dL 145 (H)   Phosphorus      2.7 - 4.5 mg/dL 2.4 (L)         Assessment/Plan:     Neuro   Anoxic brain injury    S/p cardiac arrest with myoclonic status epilepticus involving bilateral hemispheres on EEG. S/p Valproate with levels undetectable likely due to interactions with meropenem  - d/c valproate  - start clonazapam 1mg q8 hours  - Ativan 4mg IV x1 now  - continue brivaracetam 100 mg tid  - continue zonisamide 200 mg bid    -f/u SSEP   -discussed with Dr. Isabel          Cerebral infarction, watershed distribution, bilateral, acute    -discharge from [facility] 9/28/2017 with bilateral MCA SAQIB watershed infarct          Pulmonary   Klebsiella pneumonia    -ESBL sensitive to meropenem  -meropenem started 10/9 x 8 days  -afebrile  -WBC 15         Acute on chronic respiratory failure    -tracheostomy  -failed SBT          Cardiac/Vascular   Cardiac arrest, cause unspecified    -s/p cardiac arrest 10/4 at nursing home          Chronic sinus bradycardia    -asymptomatic at this time         Essential hypertension    -amlodipine 10 mg daily  -lisinopril  10 mg daily             Prophylaxis:  Venous Thromboembolism: chemical  Stress Ulcer: H2B  Ventilator Pneumonia: yes     Activity Orders          Bed rest starting at 10/03 1510        Full Code    Manoj Apple MD  Neurocritical Care  [facility]

## 2017-10-12 NOTE — ASSESSMENT & PLAN NOTE
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 S/p Valproate with levels undetectable likely due to interactions with meropenem  - d/c valproate  - start clonazapam 1mg q8 hours  - Ativan 4mg IV x1 now  - continue brivaracetam 100 mg tid  - continue zonisamide 200 mg bid    -f/u SSEP   -discussed with Dr. Isabel

## 2017-10-13 PROBLEM — N17.9 AKI (ACUTE KIDNEY INJURY): Status: RESOLVED | Noted: 2017-09-13 | Resolved: 2017-10-13

## 2017-10-13 LAB
ALBUMIN SERPL BCP-MCNC: 1.4 G/DL
ALP SERPL-CCNC: 96 U/L
ALT SERPL W/O P-5'-P-CCNC: 20 U/L
ANION GAP SERPL CALC-SCNC: 8 MMOL/L
ANISOCYTOSIS BLD QL SMEAR: SLIGHT
AST SERPL-CCNC: 34 U/L
BASO STIPL BLD QL SMEAR: ABNORMAL
BASOPHILS NFR BLD: 0 %
BILIRUB SERPL-MCNC: 0.4 MG/DL
BUN SERPL-MCNC: 10 MG/DL
CALCIUM SERPL-MCNC: 8.1 MG/DL
CHLORIDE SERPL-SCNC: 110 MMOL/L
CO2 SERPL-SCNC: 25 MMOL/L
CREAT SERPL-MCNC: 0.5 MG/DL
DACRYOCYTES BLD QL SMEAR: ABNORMAL
DIFFERENTIAL METHOD: ABNORMAL
EOSINOPHIL NFR BLD: 1 %
ERYTHROCYTE [DISTWIDTH] IN BLOOD BY AUTOMATED COUNT: 16 %
EST. GFR  (AFRICAN AMERICAN): >60 ML/MIN/1.73 M^2
EST. GFR  (NON AFRICAN AMERICAN): >60 ML/MIN/1.73 M^2
GIANT PLATELETS BLD QL SMEAR: PRESENT
GLUCOSE SERPL-MCNC: 118 MG/DL
HCT VFR BLD AUTO: 27.2 %
HGB BLD-MCNC: 8.5 G/DL
HYPOCHROMIA BLD QL SMEAR: ABNORMAL
INR PPP: 1.1
LYMPHOCYTES NFR BLD: 13 %
MCH RBC QN AUTO: 27.6 PG
MCHC RBC AUTO-ENTMCNC: 31.3 G/DL
MCV RBC AUTO: 88 FL
METAMYELOCYTES NFR BLD MANUAL: 3 %
MONOCYTES NFR BLD: 10 %
MYELOCYTES NFR BLD MANUAL: 1 %
NEUTROPHILS NFR BLD: 68 %
NEUTS BAND NFR BLD MANUAL: 4 %
NRBC BLD-RTO: 2 /100 WBC
OVALOCYTES BLD QL SMEAR: ABNORMAL
PHOSPHATE SERPL-MCNC: 2.8 MG/DL
PLATELET # BLD AUTO: 261 K/UL
PLATELET BLD QL SMEAR: ABNORMAL
PMV BLD AUTO: 8.5 FL
POCT GLUCOSE: 114 MG/DL (ref 70–110)
POCT GLUCOSE: 123 MG/DL (ref 70–110)
POCT GLUCOSE: 136 MG/DL (ref 70–110)
POIKILOCYTOSIS BLD QL SMEAR: SLIGHT
POLYCHROMASIA BLD QL SMEAR: ABNORMAL
POTASSIUM SERPL-SCNC: 3.9 MMOL/L
PROT SERPL-MCNC: 5.4 G/DL
PROTHROMBIN TIME: 11.3 SEC
RBC # BLD AUTO: 3.08 M/UL
SODIUM SERPL-SCNC: 141 MMOL/L
SODIUM SERPL-SCNC: 142 MMOL/L
SODIUM SERPL-SCNC: 143 MMOL/L
SODIUM SERPL-SCNC: 143 MMOL/L
WBC # BLD AUTO: 16.75 K/UL

## 2017-10-13 PROCEDURE — 63600175 PHARM REV CODE 636 W HCPCS: Performed by: EMERGENCY MEDICINE

## 2017-10-13 PROCEDURE — A4216 STERILE WATER/SALINE, 10 ML: HCPCS | Performed by: NURSE PRACTITIONER

## 2017-10-13 PROCEDURE — A4216 STERILE WATER/SALINE, 10 ML: HCPCS | Performed by: EMERGENCY MEDICINE

## 2017-10-13 PROCEDURE — 84295 ASSAY OF SERUM SODIUM: CPT | Mod: 91

## 2017-10-13 PROCEDURE — 25000003 PHARM REV CODE 250: Performed by: NURSE PRACTITIONER

## 2017-10-13 PROCEDURE — 94668 MNPJ CHEST WALL SBSQ: CPT

## 2017-10-13 PROCEDURE — 25000003 PHARM REV CODE 250: Performed by: PSYCHIATRY & NEUROLOGY

## 2017-10-13 PROCEDURE — 94640 AIRWAY INHALATION TREATMENT: CPT

## 2017-10-13 PROCEDURE — 27200966 HC CLOSED SUCTION SYSTEM

## 2017-10-13 PROCEDURE — 27000221 HC OXYGEN, UP TO 24 HOURS

## 2017-10-13 PROCEDURE — C9399 UNCLASSIFIED DRUGS OR BIOLOG: HCPCS | Performed by: PSYCHIATRY & NEUROLOGY

## 2017-10-13 PROCEDURE — 25000003 PHARM REV CODE 250: Performed by: PHYSICIAN ASSISTANT

## 2017-10-13 PROCEDURE — 25000003 PHARM REV CODE 250: Performed by: EMERGENCY MEDICINE

## 2017-10-13 PROCEDURE — 20000000 HC ICU ROOM

## 2017-10-13 PROCEDURE — 63600175 PHARM REV CODE 636 W HCPCS: Performed by: STUDENT IN AN ORGANIZED HEALTH CARE EDUCATION/TRAINING PROGRAM

## 2017-10-13 PROCEDURE — 97803 MED NUTRITION INDIV SUBSEQ: CPT

## 2017-10-13 PROCEDURE — 25000003 PHARM REV CODE 250: Performed by: STUDENT IN AN ORGANIZED HEALTH CARE EDUCATION/TRAINING PROGRAM

## 2017-10-13 PROCEDURE — 84100 ASSAY OF PHOSPHORUS: CPT

## 2017-10-13 PROCEDURE — 99900026 HC AIRWAY MAINTENANCE (STAT)

## 2017-10-13 PROCEDURE — 99900035 HC TECH TIME PER 15 MIN (STAT)

## 2017-10-13 PROCEDURE — 63600175 PHARM REV CODE 636 W HCPCS: Performed by: PHYSICIAN ASSISTANT

## 2017-10-13 PROCEDURE — 85027 COMPLETE CBC AUTOMATED: CPT

## 2017-10-13 PROCEDURE — 99233 SBSQ HOSP IP/OBS HIGH 50: CPT | Mod: ,,, | Performed by: PSYCHIATRY & NEUROLOGY

## 2017-10-13 PROCEDURE — 94003 VENT MGMT INPAT SUBQ DAY: CPT

## 2017-10-13 PROCEDURE — 85007 BL SMEAR W/DIFF WBC COUNT: CPT

## 2017-10-13 PROCEDURE — 25000242 PHARM REV CODE 250 ALT 637 W/ HCPCS: Performed by: SURGERY

## 2017-10-13 PROCEDURE — 94761 N-INVAS EAR/PLS OXIMETRY MLT: CPT

## 2017-10-13 PROCEDURE — 85610 PROTHROMBIN TIME: CPT

## 2017-10-13 PROCEDURE — 80053 COMPREHEN METABOLIC PANEL: CPT

## 2017-10-13 PROCEDURE — 63600175 PHARM REV CODE 636 W HCPCS: Performed by: PSYCHIATRY & NEUROLOGY

## 2017-10-13 RX ORDER — LISINOPRIL 20 MG/1
20 TABLET ORAL DAILY
Status: DISCONTINUED | OUTPATIENT
Start: 2017-10-14 | End: 2017-10-16

## 2017-10-13 RX ORDER — LISINOPRIL 10 MG/1
10 TABLET ORAL ONCE
Status: COMPLETED | OUTPATIENT
Start: 2017-10-13 | End: 2017-10-13

## 2017-10-13 RX ORDER — CLONAZEPAM 1 MG/1
2 TABLET ORAL EVERY 8 HOURS
Status: DISCONTINUED | OUTPATIENT
Start: 2017-10-13 | End: 2017-11-15 | Stop reason: HOSPADM

## 2017-10-13 RX ORDER — LABETALOL HYDROCHLORIDE 5 MG/ML
10 INJECTION, SOLUTION INTRAVENOUS EVERY 4 HOURS PRN
Status: DISCONTINUED | OUTPATIENT
Start: 2017-10-13 | End: 2017-10-14

## 2017-10-13 RX ORDER — HYDRALAZINE HYDROCHLORIDE 20 MG/ML
10 INJECTION INTRAMUSCULAR; INTRAVENOUS EVERY 4 HOURS PRN
Status: DISCONTINUED | OUTPATIENT
Start: 2017-10-13 | End: 2017-10-14

## 2017-10-13 RX ORDER — LORAZEPAM 2 MG/ML
4 INJECTION INTRAMUSCULAR ONCE
Status: COMPLETED | OUTPATIENT
Start: 2017-10-13 | End: 2017-10-13

## 2017-10-13 RX ADMIN — SODIUM CHLORIDE TAB 1 GM 3 G: 1 TAB at 05:10

## 2017-10-13 RX ADMIN — CHLORHEXIDINE GLUCONATE 15 ML: 1.2 RINSE ORAL at 08:10

## 2017-10-13 RX ADMIN — GABAPENTIN 200 MG: 400 CAPSULE ORAL at 08:10

## 2017-10-13 RX ADMIN — Medication 3 ML: at 02:10

## 2017-10-13 RX ADMIN — HEPARIN SODIUM 7500 UNITS: 5000 INJECTION, SOLUTION INTRAVENOUS; SUBCUTANEOUS at 05:10

## 2017-10-13 RX ADMIN — SODIUM CHLORIDE TAB 1 GM 3 G: 1 TAB at 01:10

## 2017-10-13 RX ADMIN — GABAPENTIN 200 MG: 400 CAPSULE ORAL at 09:10

## 2017-10-13 RX ADMIN — MEROPENEM 2 G: 1 INJECTION, POWDER, FOR SOLUTION INTRAVENOUS at 03:10

## 2017-10-13 RX ADMIN — IPRATROPIUM BROMIDE AND ALBUTEROL SULFATE 3 ML: .5; 3 SOLUTION RESPIRATORY (INHALATION) at 07:10

## 2017-10-13 RX ADMIN — Medication 10 ML: at 05:10

## 2017-10-13 RX ADMIN — IPRATROPIUM BROMIDE AND ALBUTEROL SULFATE 3 ML: .5; 3 SOLUTION RESPIRATORY (INHALATION) at 11:10

## 2017-10-13 RX ADMIN — MEROPENEM 2 G: 1 INJECTION, POWDER, FOR SOLUTION INTRAVENOUS at 07:10

## 2017-10-13 RX ADMIN — BRIVARACETAM 100 MG: 50 INJECTION, SUSPENSION INTRAVENOUS at 09:10

## 2017-10-13 RX ADMIN — LORAZEPAM 4 MG: 2 INJECTION INTRAMUSCULAR; INTRAVENOUS at 05:10

## 2017-10-13 RX ADMIN — BRIVARACETAM 100 MG: 50 INJECTION, SUSPENSION INTRAVENOUS at 01:10

## 2017-10-13 RX ADMIN — FAMOTIDINE 20 MG: 20 TABLET, FILM COATED ORAL at 09:10

## 2017-10-13 RX ADMIN — CLONAZEPAM 2 MG: 1 TABLET ORAL at 01:10

## 2017-10-13 RX ADMIN — BRIVARACETAM 100 MG: 50 INJECTION, SUSPENSION INTRAVENOUS at 05:10

## 2017-10-13 RX ADMIN — HEPARIN SODIUM 7500 UNITS: 5000 INJECTION, SOLUTION INTRAVENOUS; SUBCUTANEOUS at 09:10

## 2017-10-13 RX ADMIN — Medication 10 ML: at 11:10

## 2017-10-13 RX ADMIN — CHLORHEXIDINE GLUCONATE 15 ML: 1.2 RINSE ORAL at 09:10

## 2017-10-13 RX ADMIN — LISINOPRIL 10 MG: 10 TABLET ORAL at 08:10

## 2017-10-13 RX ADMIN — MEROPENEM 2 G: 1 INJECTION, POWDER, FOR SOLUTION INTRAVENOUS at 11:10

## 2017-10-13 RX ADMIN — FAMOTIDINE 20 MG: 20 TABLET, FILM COATED ORAL at 08:10

## 2017-10-13 RX ADMIN — LISINOPRIL 10 MG: 10 TABLET ORAL at 11:10

## 2017-10-13 RX ADMIN — ATORVASTATIN CALCIUM 10 MG: 10 TABLET, FILM COATED ORAL at 08:10

## 2017-10-13 RX ADMIN — SODIUM CHLORIDE TAB 1 GM 3 G: 1 TAB at 09:10

## 2017-10-13 RX ADMIN — HEPARIN SODIUM 7500 UNITS: 5000 INJECTION, SOLUTION INTRAVENOUS; SUBCUTANEOUS at 01:10

## 2017-10-13 RX ADMIN — Medication 3 ML: at 09:10

## 2017-10-13 RX ADMIN — CLONAZEPAM 1 MG: 1 TABLET ORAL at 05:10

## 2017-10-13 RX ADMIN — CLONAZEPAM 2 MG: 1 TABLET ORAL at 09:10

## 2017-10-13 RX ADMIN — AMANTADINE HYDROCHLORIDE 200 MG: 50 SOLUTION ORAL at 05:10

## 2017-10-13 RX ADMIN — AMLODIPINE BESYLATE 10 MG: 10 TABLET ORAL at 08:10

## 2017-10-13 RX ADMIN — Medication 3 ML: at 05:10

## 2017-10-13 NOTE — ASSESSMENT & PLAN NOTE
BP frequently in 200s   -amlodipine 10 mg daily  -increase lisinopril to 20 mg daily   - sBP goal < 200

## 2017-10-13 NOTE — PLAN OF CARE
ICU Attending Note  Neurocritical Care    [id number]    -increase clonazepam to 2 mg q8h, continue zonisamide, brivaracetam  -stop amantadine  --200  -amlodipine, increase lisinopril 20 mg  -meropenem for PNA  -HGB >7  -TF  -prophylaxis

## 2017-10-13 NOTE — ASSESSMENT & PLAN NOTE
-discharge from [facility] 9/28/2017 with bilateral MCA SAQIB watershed infarct

## 2017-10-13 NOTE — PHYSICIAN QUERY
PT Name: Nisa Frank  MR #: [id number]    Physician Query Form - Cause and Effect Relationship Clarification      CDS/: Mary Stoddard RN, CCDS          Contact information: [email or web address]    This form is a permanent document in the medical record.     Query Date: October 13, 2017    By submitting this query, we are merely seeking further clarification of documentation. Please utilize your independent clinical judgment when addressing the question(s) below.    The Medical record contains the following:  Supporting Clinical Findings   Location in record                                                                          Worsening Sepsis                                Concern for possible VAP                                                                                    10/3 consult note      Resp culture- Klebsiella Pneumoniae ESBL      on meropenem for ESBL Klebsiella pneumonia sensitive to meropenem                                                                                                                                                                                       10/6 lab      10/12 progress note         Provider, please clarify if there is any correlation between ___Sepsis______ and  ESBL Klebsiella pneumonia_           Are the conditions:     [ x ] Due to or associated with each other     [  ] Unrelated to each other     [  ] Other (Please Specify): _________________________     [  ] Clinically Undetermined

## 2017-10-13 NOTE — PROGRESS NOTES
[facility]  Neurocritical Care  Progress Note    Admit Date: 10/3/2017  Service Date: 10/13/2017  Length of Stay: 10    Subjective:     Chief Complaint: Seizure    History of Present Illness: Ms. Frank is a 66 yo woman with a PMHx DM type II, CKD stage 3, CAD s/p CABG, essential hypertension, cerebrovascular disease s/p bi-hemispheric watershed infarcts and trach/PEG who present as a transfer from [facility] to Neuro Critical Care s/p Cardiac Arrest on 10/4 and evaluation of Anoxic Brain Injury. She was recently discharged from [facility] 9/28/2017 with bilateral MCA SAQIB watershed infarcts. Yesterday, she was brought in via EMS from [facility] for cardiac arrest. Per nursing homestaff, she received chest compressions for about 5 minutes. After EMS arrived, ACLS protocol continued and she was given 2 rounds of epi, non-shockable rhythm. ROSC achieved en route to the ER.Pt arrived to ICU intubated. It was noted there sudden intermittent generalized body jerks mostly on tactile stimulation. Also, pt was hypotensive for which norepinephrine infusion started. Since her discharge here on 9/28 she has been admitted to multiple facilities recently to be treated for various issues including mucous plug leading to acute hypoxic respiratory failure, she pulled out her trach 9/4 so that was reinserted at [facility] and she was also treated for ESBL UTI while there, and last admit she was treated for likely aspiration pneumonia. She was transported here on Propofol. On examination, no cough, no gag, no corneal and minimal to no withdrawal.       Hospital Course: 10/5: s/p Cardiac Arrest on 10/4 and evaluation of anoxic brain injury   10//7: EEG with burst suppression pattern.  MRI with diffuse diffusion restriction  10/10: family meeting   10/11: SSEP  10/12: worsening myoclonus especially when moved. Valproic acid level undetectable likely due to interactions with meropenem. Patient on meropenem for ESBL  Klebsiella pneumonia sensitive to meropenem.    Interval History:  Yesterday was started on clonazepam 1mg q8 hours and d/c'ed valproate. Patient continues to have myoclonus with stimulation worse at night time. Continues to require mechanical ventilation via trach.    Review of Systems  Unable to obtain a complete ROS due to level of consciousness.  Objective:     Vitals:  Temp: 98.4 °F (36.9 °C) (10/13/17 1105)  Pulse: 77 (10/13/17 1230)  Resp: (!) 25 (10/13/17 1230)  BP: (!) 192/84 (10/13/17 1230)  SpO2: 100 % (10/13/17 1230)    Temp:  [97.5 °F (36.4 °C)-98.9 °F (37.2 °C)] 98.4 °F (36.9 °C)  Pulse:  [46-81] 77  Resp:  [0-35] 25  SpO2:  [100 %] 100 %  BP: (175-209)/() 192/84         Vent Mode: A/C  Oxygen Concentration (%):  [40] 40  Resp Rate Total:  [20 br/min-36 br/min] 20 br/min  Vt Set:  [400 mL] 400 mL  PEEP/CPAP:  [5 cmH20] 5 cmH20  Pressure Support:  [0 cmH20] 0 cmH20  Mean Airway Pressure:  [9.1 muR29-01 cmH20] 12 cmH20    10/12 0701 - 10/13 0700  In: 2110 [I.V.:360]  Out: 1775 [Urine:1595]    Physical Exam  Unable to test orientation, language, memory, judgment, insight, fund of knowledge, hearing, shoulder shrug, tongue protrusion, coordination, gait due to level of consciousness.  General   HEENT: trach in place  Chest Heart RRR / Lungs Clear to auscultation  Adbomen: Soft nontender; S/P PEG  Extremities: OK distal pulses. WWP  Neurological Exam:  MS; Unresponsive. Open eyes to stimulation,.  CN: II-XII R pupil larger but reactive.   Motor: LUE   0/5 / RUE 0 /5  RUE extensor posturing             LLE   0/5 /  RLE 0 /5  Tone normal bilaterally  Continuous myoclonic activity noted  DTR:  normal throughout.  Coordination /Fine motor:   Gait: Not tested.    Medications:  Continuous Scheduled  albuterol-ipratropium 2.5mg-0.5mg/3mL 3 mL Q6H   amlodipine 10 mg Daily   atorvastatin 10 mg Daily   custom IVPB builder 100 mg Q8H   chlorhexidine 15 mL BID   clonazePAM 2 mg Q8H   famotidine 20 mg BID    heparin (porcine) 7,500 Units Q8H   [START ON 10/14/2017] lisinopril 20 mg Daily   meropenem (MERREM) IVPB 2 g Q8H   polyethylene glycol 17 g Daily   sodium chloride 0.9% 10 mL Q6H   sodium chloride 0.9% 3 mL Q8H   sodium chloride 3 g Q8H   zonisamide 200 mg BID   PRN  sodium chloride  Q24H PRN   sodium chloride  Q24H PRN   acetaminophen 650 mg Q6H PRN   atropine 0.2 mg PRN   dextrose 50% 12.5 g PRN   dextrose 50% 12.5 g PRN   glucagon (human recombinant) 1 mg PRN   insulin aspart 1-10 Units Q6H PRN   magnesium sulfate IVPB 2 g PRN   magnesium sulfate IVPB 4 g PRN   ondansetron 4 mg Q8H PRN   pneumoc 13-finn conj-dip cr(PF) 0.5 mL vaccine x 1 dose   potassium chloride 10% 40 mEq PRN   potassium chloride 10% 40 mEq PRN   potassium, sodium phosphates 2 packet PRN   rocuronium 100 mg On Call Procedure   sodium chloride 0.9% 10 mL PRN     Labs:  Component      Latest Ref Rng & Units 10/13/2017 10/13/2017 10/13/2017           9:05 AM  1:08 AM  1:04 AM   WBC      3.90 - 12.70 K/uL  16.75 (H)    RBC      4.00 - 5.40 M/uL  3.08 (L)    Hemoglobin      12.0 - 16.0 g/dL  8.5 (L)    Hematocrit      37.0 - 48.5 %  27.2 (L)    MCV      82 - 98 fL  88    MCH      27.0 - 31.0 pg  27.6    MCHC      32.0 - 36.0 g/dL  31.3 (L)    RDW      11.5 - 14.5 %  16.0 (H)    Platelets      150 - 350 K/uL  261    MPV      9.2 - 12.9 fL  8.5 (L)    nRBC      0 /100 WBC  2 (A)    Gran%      38.0 - 73.0 %  68.0    Lymph%      18.0 - 48.0 %  13.0 (L)    Mono%      4.0 - 15.0 %  10.0    Eosinophil%      0.0 - 8.0 %  1.0    Basophil%      0.0 - 1.9 %  0.0    BANDS      %  4.0    Metamyelocytes      %  3.0    Myelocytes      %  1.0    Platelet Estimate        Appears normal    Aniso        Slight    Poik        Slight    Poly        Occasional    Hypo        Occasional    Ovalocytes        Occasional    Tear Drop Cells        Occasional    Basophilic Stippling        Occasional    Large/Giant Platelets        Present    Differential Method         Manual    Sodium      136 - 145 mmol/L 142 143    Potassium      3.5 - 5.1 mmol/L  3.9    Chloride      95 - 110 mmol/L  110    CO2      23 - 29 mmol/L  25    Glucose      70 - 110 mg/dL  118 (H)    BUN, Bld      8 - 23 mg/dL  10    Creatinine      0.5 - 1.4 mg/dL  0.5    Calcium      8.7 - 10.5 mg/dL  8.1 (L)    Total Protein      6.0 - 8.4 g/dL  5.4 (L)    Albumin      3.5 - 5.2 g/dL  1.4 (L)    Total Bilirubin      0.1 - 1.0 mg/dL  0.4    Alkaline Phosphatase      55 - 135 U/L  96    AST      10 - 40 U/L  34    ALT      10 - 44 U/L  20    Anion Gap      8 - 16 mmol/L  8    eGFR if African American      >60 mL/min/1.73 m:2  >60.0    eGFR if non African American      >60 mL/min/1.73 m:2  >60.0    Protime      9.0 - 12.5 sec  11.3    Coumadin Monitoring INR      0.8 - 1.2  1.1    Phosphorus      2.7 - 4.5 mg/dL  2.8    POCT Glucose      70 - 110 mg/dL   136 (H)     Imaging:   No new imaging    Assessment/Plan:     Neuro   Anoxic brain injury    S/p cardiac arrest with myoclonic status epilepticus involving bilateral hemispheres on EEG. S/p Valproate with levels undetectable likely due to interactions with meropenem. Clonazepam 1mg q8 hours started 10/12 without significant improvement in myoclonus  - d/c amantadine   - increase clonazapam to 2mg q8 hours  - continue brivaracetam 100 mg tid  - continue zonisamide 200 mg bid    -f/u SSEP   -discussed with Dr. Isabel          Cerebral infarction, watershed distribution, bilateral, acute    -discharge from [facility] 9/28/2017 with bilateral MCA SAQIB watershed infarct          Pulmonary   Klebsiella pneumonia    -ESBL sensitive to meropenem  -meropenem started 10/9 x 8 days  -afebrile  -WBC 16.8        Acute on chronic respiratory failure    -tracheostomy  -failed SBT          Cardiac/Vascular   Cardiac arrest, cause unspecified    -s/p cardiac arrest 10/4 at nursing home          Chronic sinus bradycardia    -asymptomatic at this time         Essential hypertension    BP  frequently in 200s   -amlodipine 10 mg daily  -increase lisinopril to 20 mg daily   - sBP goal < 200        Endocrine   Type 2 diabetes mellitus, uncontrolled    -- continue to monitor BG  -- SSI  -- transition tube feeds to Glucerna per nutrition recs            Prophylaxis:  Venous Thromboembolism: chemical  Stress Ulcer: H2B  Ventilator Pneumonia: yes     Activity Orders          Bed rest starting at 10/03 1510        Full Code    Manoj Apple MD  Neurocritical Care  [facility]

## 2017-10-13 NOTE — ASSESSMENT & PLAN NOTE
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 S/p Valproate with levels undetectable likely due to interactions with meropenem. Clonazepam 1mg q8 hours started 10/12 without significant improvement in myoclonus  - d/c amantadine   - increase clonazapam to 2mg q8 hours  - continue brivaracetam 100 mg tid  - continue zonisamide 200 mg bid    -f/u SSEP   -discussed with Dr. Isabel

## 2017-10-13 NOTE — PLAN OF CARE
Problem: Patient Care Overview  Goal: Plan of Care Review  Outcome: Ongoing (interventions implemented as appropriate)  Plan of care reviewed with patient and family via phone at 1100. Questions and concerns addressed. Family voices understanding. Will continue to monitor neuro status closely. See flowsheets for more details.

## 2017-10-13 NOTE — SUBJECTIVE & OBJECTIVE
Interval History:  Yesterday was started on clonazepam 1mg q8 hours and d/c'ed valproate. Patient continues to have myoclonus with stimulation worse at night time. Continues to require mechanical ventilation via trach.    Review of Systems  Unable to obtain a complete ROS due to level of consciousness.  Objective:     Vitals:  Temp: 98.4 °F (36.9 °C) (10/13/17 1105)  Pulse: 77 (10/13/17 1230)  Resp: (!) 25 (10/13/17 1230)  BP: (!) 192/84 (10/13/17 1230)  SpO2: 100 % (10/13/17 1230)    Temp:  [97.5 °F (36.4 °C)-98.9 °F (37.2 °C)] 98.4 °F (36.9 °C)  Pulse:  [46-81] 77  Resp:  [0-35] 25  SpO2:  [100 %] 100 %  BP: (175-209)/() 192/84         Vent Mode: A/C  Oxygen Concentration (%):  [40] 40  Resp Rate Total:  [20 br/min-36 br/min] 20 br/min  Vt Set:  [400 mL] 400 mL  PEEP/CPAP:  [5 cmH20] 5 cmH20  Pressure Support:  [0 cmH20] 0 cmH20  Mean Airway Pressure:  [9.1 hsN81-91 cmH20] 12 cmH20    10/12 0701 - 10/13 0700  In: 2110 [I.V.:360]  Out: 1775 [Urine:1595]    Physical Exam  Unable to test orientation, language, memory, judgment, insight, fund of knowledge, hearing, shoulder shrug, tongue protrusion, coordination, gait due to level of consciousness.  General   HEENT: trach in place  Chest Heart RRR / Lungs Clear to auscultation  Adbomen: Soft nontender; S/P PEG  Extremities: OK distal pulses. WWP  Neurological Exam:  MS; Unresponsive. Open eyes to stimulation,.  CN: II-XII R pupil larger but reactive.   Motor: LUE   0/5 / RUE 0 /5  RUE extensor posturing             LLE   0/5 /  RLE 0 /5  Tone normal bilaterally  Continuous myoclonic activity noted  DTR:  normal throughout.  Coordination /Fine motor:   Gait: Not tested.    Medications:  Continuous Scheduled  albuterol-ipratropium 2.5mg-0.5mg/3mL 3 mL Q6H   amlodipine 10 mg Daily   atorvastatin 10 mg Daily   custom IVPB builder 100 mg Q8H   chlorhexidine 15 mL BID   clonazePAM 2 mg Q8H   famotidine 20 mg BID   heparin (porcine) 7,500 Units Q8H   [START ON  10/14/2017] lisinopril 20 mg Daily   meropenem (MERREM) IVPB 2 g Q8H   polyethylene glycol 17 g Daily   sodium chloride 0.9% 10 mL Q6H   sodium chloride 0.9% 3 mL Q8H   sodium chloride 3 g Q8H   zonisamide 200 mg BID   PRN  sodium chloride  Q24H PRN   sodium chloride  Q24H PRN   acetaminophen 650 mg Q6H PRN   atropine 0.2 mg PRN   dextrose 50% 12.5 g PRN   dextrose 50% 12.5 g PRN   glucagon (human recombinant) 1 mg PRN   insulin aspart 1-10 Units Q6H PRN   magnesium sulfate IVPB 2 g PRN   magnesium sulfate IVPB 4 g PRN   ondansetron 4 mg Q8H PRN   pneumoc 13-finn conj-dip cr(PF) 0.5 mL vaccine x 1 dose   potassium chloride 10% 40 mEq PRN   potassium chloride 10% 40 mEq PRN   potassium, sodium phosphates 2 packet PRN   rocuronium 100 mg On Call Procedure   sodium chloride 0.9% 10 mL PRN     Labs:  Component      Latest Ref Rng & Units 10/13/2017 10/13/2017 10/13/2017           9:05 AM  1:08 AM  1:04 AM   WBC      3.90 - 12.70 K/uL  16.75 (H)    RBC      4.00 - 5.40 M/uL  3.08 (L)    Hemoglobin      12.0 - 16.0 g/dL  8.5 (L)    Hematocrit      37.0 - 48.5 %  27.2 (L)    MCV      82 - 98 fL  88    MCH      27.0 - 31.0 pg  27.6    MCHC      32.0 - 36.0 g/dL  31.3 (L)    RDW      11.5 - 14.5 %  16.0 (H)    Platelets      150 - 350 K/uL  261    MPV      9.2 - 12.9 fL  8.5 (L)    nRBC      0 /100 WBC  2 (A)    Gran%      38.0 - 73.0 %  68.0    Lymph%      18.0 - 48.0 %  13.0 (L)    Mono%      4.0 - 15.0 %  10.0    Eosinophil%      0.0 - 8.0 %  1.0    Basophil%      0.0 - 1.9 %  0.0    BANDS      %  4.0    Metamyelocytes      %  3.0    Myelocytes      %  1.0    Platelet Estimate        Appears normal    Aniso        Slight    Poik        Slight    Poly        Occasional    Hypo        Occasional    Ovalocytes        Occasional    Tear Drop Cells        Occasional    Basophilic Stippling        Occasional    Large/Giant Platelets        Present    Differential Method        Manual    Sodium      136 - 145 mmol/L 142 143     Potassium      3.5 - 5.1 mmol/L  3.9    Chloride      95 - 110 mmol/L  110    CO2      23 - 29 mmol/L  25    Glucose      70 - 110 mg/dL  118 (H)    BUN, Bld      8 - 23 mg/dL  10    Creatinine      0.5 - 1.4 mg/dL  0.5    Calcium      8.7 - 10.5 mg/dL  8.1 (L)    Total Protein      6.0 - 8.4 g/dL  5.4 (L)    Albumin      3.5 - 5.2 g/dL  1.4 (L)    Total Bilirubin      0.1 - 1.0 mg/dL  0.4    Alkaline Phosphatase      55 - 135 U/L  96    AST      10 - 40 U/L  34    ALT      10 - 44 U/L  20    Anion Gap      8 - 16 mmol/L  8    eGFR if African American      >60 mL/min/1.73 m:2  >60.0    eGFR if non African American      >60 mL/min/1.73 m:2  >60.0    Protime      9.0 - 12.5 sec  11.3    Coumadin Monitoring INR      0.8 - 1.2  1.1    Phosphorus      2.7 - 4.5 mg/dL  2.8    POCT Glucose      70 - 110 mg/dL   136 (H)     Imaging:   No new imaging

## 2017-10-13 NOTE — PROGRESS NOTES
[facility]  Adult Nutrition  Progress Note    SUMMARY     Recommendations    Recommendation/Intervention:   Recommend changing TF to best meet pt's needs.   Glucerna 1.5 @ 45mL/hr.   -Hold for residuals >500mL.     RD to monitor.      Goals: Pt to receive nutrition by RD follow up  Nutrition Goal Status: progressing towards goal, goal met  Communication of RD Recs: reviewed with RN    Reason for Assessment    Reason for Assessment: RD follow-up  Diagnosis: seizures, other (see comments) (cardiac arrest)  Relevent Medical History: DM type II, CKD stage 3, CAD s/p CABG, HTN, PEG/trach         General Information Comments: Pt remains intubated. TF running at goal, tolerating with minimal residuals per nsg.    Nutrition Discharge Planning: optimal nutrition via PEG with tolerance.     Nutrition Prescription Ordered    Current Diet Order: NPO  Nutrition Order Comments: TF held  Current Nutrition Support Formula Ordered: Diabetisource  Current Nutrition Support Rate Ordered: 45 (ml)  Current Nutrition Support Frequency Ordered: mL/hr        Evaluation of Received Nutrients/Fluid Intake    Enteral Calories (kcal): 1296  Enteral Protein (gm): 65  Enteral (Free Water) Fluid (mL): 883      Energy Calories Required: not meeting needs  % Kcal Needs: 88     Protein Required: not meeting needs  % Protein Needs: 73     IV Fluid (mL): 0  I/O: +I/O, good UOP, LBM 10/12         Fluid Required: exceed needs  Comments: 10mL residuals 10/13  Tolerance: tolerating  % Intake of Estimated Energy Needs: 75 - 100 %  % Meal Intake: NPO     Nutrition Risk Screen     Nutrition Risk Screen: other (see comments) (trach)    Nutrition/Diet History       Typical Food/Fluid Intake: JOSE LUIS. Pt on TF PTA (PEG in place).  Food Preferences: JOSE LUIS [employer]/cultural preferences at this time.        Factors Affecting Nutritional Intake: NPO, on mechanical ventilation                Labs/Tests/Procedures/Meds       Pertinent Labs Reviewed:  "reviewed  Pertinent Labs Comments: POCT Glu 130-136  Pertinent Medications Reviewed: reviewed  Pertinent Medications Comments: statin, heparin, insulin    Physical Findings    Overall Physical Appearance: obese, on ventilator support  Tubes: gastrostomy tube     Skin: pressure ulcer(s) (St 1 sacral spine)    Anthropometrics    Temp: 98.7 °F (37.1 °C)     Height: 5' 6" (167.6 cm)  Weight Method: Bed Scale  Weight: (!) 149 kg (328 lb 7.8 oz)     Ideal Body Weight (IBW), Female: 130 lb     % Ideal Body Weight, Female (lb): 227.08 lb  BMI (Calculated): 47.7  BMI Grade: greater than 40 - morbid obesity                            Estimated/Assessed Needs    Weight Used For Calorie Calculations: 134.3 kg (296 lb 1.2 oz)      Energy Calorie Requirements (kcal): 7317-3914 (11-14kcal/kg)  Energy Need Method: Kcal/kg        RMR ([place]-St. Jeor Equation): 1894.75        Weight Used For Protein Calculations: 59 kg (130 lb 1.1 oz)  Protein Requirements: 89-118g (1.5-2.0g/kg)    Fluid Requirements (mL): 1mL/kcal or per MD  RDA Method (mL): 1477     CHO requirements: 50% of total kcals        Assessment and Plan    PEG (percutaneous endoscopic gastrostomy) status    Nutrition Problem:  Inadequate oral intake    Related to (etiology):   Inability to consume sufficient energy    Signs and Symptoms (as evidenced by):   NPO on mech vent requiring alternative means of nutrition.     Interventions/Recommendations (treatment strategy):  Please see RD recs above.    Nutrition Diagnosis Status:   Continues              Monitor and Evaluation    Food and Nutrient Intake: enteral nutrition intake  Food and Nutrient Adminstration: enteral and parenteral nutrition administration        Anthropometric Measurements: weight, weight change, body mass index  Biochemical Data, Medical Tests and Procedures: electrolyte and renal panel, gastrointestinal profile, glucose/endocrine profile, inflammatory profile, lipid profile  Nutrition-Focused " Physical Findings: overall appearance    Nutrition Risk    Level of Risk: other (see comments) (f/u 1x/week)    Nutrition Follow-Up    RD Follow-up?: Yes

## 2017-10-13 NOTE — PROGRESS NOTES
1640: Notified NCC team of prolonged tremors without any stimulus.    1710: Ativan 4 mg IV given per new order. Tremors stopped. Will monitor closely       1810: SBP 200s, called NCC team. Team states will place orders for prn medications

## 2017-10-14 PROBLEM — J15.0 KLEBSIELLA PNEUMONIA: Status: ACTIVE | Noted: 2017-10-14

## 2017-10-14 PROBLEM — R50.9 FEVER: Status: RESOLVED | Noted: 2017-10-06 | Resolved: 2017-10-14

## 2017-10-14 LAB
ALBUMIN SERPL BCP-MCNC: 1.3 G/DL
ALP SERPL-CCNC: 98 U/L
ALT SERPL W/O P-5'-P-CCNC: 20 U/L
ANION GAP SERPL CALC-SCNC: 8 MMOL/L
ANISOCYTOSIS BLD QL SMEAR: SLIGHT
AST SERPL-CCNC: 31 U/L
BASOPHILS # BLD AUTO: ABNORMAL K/UL
BASOPHILS NFR BLD: 0 %
BILIRUB SERPL-MCNC: 0.4 MG/DL
BUN SERPL-MCNC: 8 MG/DL
CALCIUM SERPL-MCNC: 8.1 MG/DL
CHLORIDE SERPL-SCNC: 107 MMOL/L
CO2 SERPL-SCNC: 25 MMOL/L
CREAT SERPL-MCNC: 0.5 MG/DL
DIFFERENTIAL METHOD: ABNORMAL
EOSINOPHIL # BLD AUTO: ABNORMAL K/UL
EOSINOPHIL NFR BLD: 1 %
ERYTHROCYTE [DISTWIDTH] IN BLOOD BY AUTOMATED COUNT: 16.1 %
EST. GFR  (AFRICAN AMERICAN): >60 ML/MIN/1.73 M^2
EST. GFR  (NON AFRICAN AMERICAN): >60 ML/MIN/1.73 M^2
GLUCOSE SERPL-MCNC: 111 MG/DL
HCT VFR BLD AUTO: 27.4 %
HGB BLD-MCNC: 8.5 G/DL
INR PPP: 1
LYMPHOCYTES # BLD AUTO: ABNORMAL K/UL
LYMPHOCYTES NFR BLD: 9 %
MCH RBC QN AUTO: 27.3 PG
MCHC RBC AUTO-ENTMCNC: 31 G/DL
MCV RBC AUTO: 88 FL
METAMYELOCYTES NFR BLD MANUAL: 1 %
MONOCYTES # BLD AUTO: ABNORMAL K/UL
MONOCYTES NFR BLD: 10 %
MYELOCYTES NFR BLD MANUAL: 1 %
NEUTROPHILS NFR BLD: 76 %
NEUTS BAND NFR BLD MANUAL: 2 %
PHOSPHATE SERPL-MCNC: 2.9 MG/DL
PLATELET # BLD AUTO: 285 K/UL
PLATELET BLD QL SMEAR: ABNORMAL
PMV BLD AUTO: 9.1 FL
POCT GLUCOSE: 135 MG/DL (ref 70–110)
POCT GLUCOSE: 136 MG/DL (ref 70–110)
POCT GLUCOSE: 136 MG/DL (ref 70–110)
POTASSIUM SERPL-SCNC: 3.6 MMOL/L
PROT SERPL-MCNC: 5.3 G/DL
PROTHROMBIN TIME: 11 SEC
RBC # BLD AUTO: 3.11 M/UL
SODIUM SERPL-SCNC: 139 MMOL/L
SODIUM SERPL-SCNC: 140 MMOL/L
SODIUM SERPL-SCNC: 140 MMOL/L
WBC # BLD AUTO: 16.41 K/UL

## 2017-10-14 PROCEDURE — 99900026 HC AIRWAY MAINTENANCE (STAT)

## 2017-10-14 PROCEDURE — 25000242 PHARM REV CODE 250 ALT 637 W/ HCPCS: Performed by: SURGERY

## 2017-10-14 PROCEDURE — 63600175 PHARM REV CODE 636 W HCPCS: Performed by: NURSE PRACTITIONER

## 2017-10-14 PROCEDURE — 84295 ASSAY OF SERUM SODIUM: CPT

## 2017-10-14 PROCEDURE — 27200966 HC CLOSED SUCTION SYSTEM

## 2017-10-14 PROCEDURE — 85007 BL SMEAR W/DIFF WBC COUNT: CPT

## 2017-10-14 PROCEDURE — 25000003 PHARM REV CODE 250: Performed by: PSYCHIATRY & NEUROLOGY

## 2017-10-14 PROCEDURE — 63600175 PHARM REV CODE 636 W HCPCS: Performed by: PSYCHIATRY & NEUROLOGY

## 2017-10-14 PROCEDURE — 25000003 PHARM REV CODE 250: Performed by: PHYSICIAN ASSISTANT

## 2017-10-14 PROCEDURE — C9399 UNCLASSIFIED DRUGS OR BIOLOG: HCPCS | Performed by: PSYCHIATRY & NEUROLOGY

## 2017-10-14 PROCEDURE — 99291 CRITICAL CARE FIRST HOUR: CPT | Mod: GC,,, | Performed by: PSYCHIATRY & NEUROLOGY

## 2017-10-14 PROCEDURE — 20000000 HC ICU ROOM

## 2017-10-14 PROCEDURE — 63600175 PHARM REV CODE 636 W HCPCS: Performed by: EMERGENCY MEDICINE

## 2017-10-14 PROCEDURE — 94668 MNPJ CHEST WALL SBSQ: CPT

## 2017-10-14 PROCEDURE — 84100 ASSAY OF PHOSPHORUS: CPT

## 2017-10-14 PROCEDURE — 27000221 HC OXYGEN, UP TO 24 HOURS

## 2017-10-14 PROCEDURE — 25000003 PHARM REV CODE 250: Performed by: STUDENT IN AN ORGANIZED HEALTH CARE EDUCATION/TRAINING PROGRAM

## 2017-10-14 PROCEDURE — 25000003 PHARM REV CODE 250: Performed by: NURSE PRACTITIONER

## 2017-10-14 PROCEDURE — 63600175 PHARM REV CODE 636 W HCPCS: Performed by: PHYSICIAN ASSISTANT

## 2017-10-14 PROCEDURE — 85610 PROTHROMBIN TIME: CPT

## 2017-10-14 PROCEDURE — 94761 N-INVAS EAR/PLS OXIMETRY MLT: CPT

## 2017-10-14 PROCEDURE — 94003 VENT MGMT INPAT SUBQ DAY: CPT

## 2017-10-14 PROCEDURE — 25000003 PHARM REV CODE 250: Performed by: EMERGENCY MEDICINE

## 2017-10-14 PROCEDURE — 80053 COMPREHEN METABOLIC PANEL: CPT

## 2017-10-14 PROCEDURE — A4216 STERILE WATER/SALINE, 10 ML: HCPCS | Performed by: EMERGENCY MEDICINE

## 2017-10-14 PROCEDURE — A4216 STERILE WATER/SALINE, 10 ML: HCPCS | Performed by: NURSE PRACTITIONER

## 2017-10-14 PROCEDURE — 85027 COMPLETE CBC AUTOMATED: CPT

## 2017-10-14 PROCEDURE — 94640 AIRWAY INHALATION TREATMENT: CPT

## 2017-10-14 RX ORDER — HYDRALAZINE HYDROCHLORIDE 20 MG/ML
10 INJECTION INTRAMUSCULAR; INTRAVENOUS EVERY 4 HOURS PRN
Status: DISCONTINUED | OUTPATIENT
Start: 2017-10-14 | End: 2017-10-30

## 2017-10-14 RX ORDER — LABETALOL HYDROCHLORIDE 5 MG/ML
10 INJECTION, SOLUTION INTRAVENOUS EVERY 4 HOURS PRN
Status: DISCONTINUED | OUTPATIENT
Start: 2017-10-14 | End: 2017-10-30

## 2017-10-14 RX ADMIN — CHLORHEXIDINE GLUCONATE 15 ML: 1.2 RINSE ORAL at 08:10

## 2017-10-14 RX ADMIN — HEPARIN SODIUM 7500 UNITS: 5000 INJECTION, SOLUTION INTRAVENOUS; SUBCUTANEOUS at 06:10

## 2017-10-14 RX ADMIN — Medication 3 ML: at 02:10

## 2017-10-14 RX ADMIN — SODIUM CHLORIDE TAB 1 GM 3 G: 1 TAB at 03:10

## 2017-10-14 RX ADMIN — AMLODIPINE BESYLATE 10 MG: 10 TABLET ORAL at 08:10

## 2017-10-14 RX ADMIN — BRIVARACETAM 100 MG: 50 INJECTION, SUSPENSION INTRAVENOUS at 06:10

## 2017-10-14 RX ADMIN — Medication 10 ML: at 11:10

## 2017-10-14 RX ADMIN — CLONAZEPAM 2 MG: 1 TABLET ORAL at 06:10

## 2017-10-14 RX ADMIN — HYDRALAZINE HYDROCHLORIDE 10 MG: 20 INJECTION INTRAMUSCULAR; INTRAVENOUS at 08:10

## 2017-10-14 RX ADMIN — CLONAZEPAM 2 MG: 1 TABLET ORAL at 09:10

## 2017-10-14 RX ADMIN — HEPARIN SODIUM 7500 UNITS: 5000 INJECTION, SOLUTION INTRAVENOUS; SUBCUTANEOUS at 09:10

## 2017-10-14 RX ADMIN — Medication 10 ML: at 12:10

## 2017-10-14 RX ADMIN — Medication 3 ML: at 09:10

## 2017-10-14 RX ADMIN — FAMOTIDINE 20 MG: 20 TABLET, FILM COATED ORAL at 09:10

## 2017-10-14 RX ADMIN — IPRATROPIUM BROMIDE AND ALBUTEROL SULFATE 3 ML: .5; 3 SOLUTION RESPIRATORY (INHALATION) at 01:10

## 2017-10-14 RX ADMIN — FAMOTIDINE 20 MG: 20 TABLET, FILM COATED ORAL at 08:10

## 2017-10-14 RX ADMIN — BRIVARACETAM 100 MG: 50 INJECTION, SUSPENSION INTRAVENOUS at 09:10

## 2017-10-14 RX ADMIN — LABETALOL HYDROCHLORIDE 10 MG: 5 INJECTION, SOLUTION INTRAVENOUS at 08:10

## 2017-10-14 RX ADMIN — CHLORHEXIDINE GLUCONATE 15 ML: 1.2 RINSE ORAL at 09:10

## 2017-10-14 RX ADMIN — MEROPENEM 2 G: 1 INJECTION, POWDER, FOR SOLUTION INTRAVENOUS at 06:10

## 2017-10-14 RX ADMIN — GABAPENTIN 200 MG: 400 CAPSULE ORAL at 08:10

## 2017-10-14 RX ADMIN — MEROPENEM 2 G: 1 INJECTION, POWDER, FOR SOLUTION INTRAVENOUS at 03:10

## 2017-10-14 RX ADMIN — BRIVARACETAM 100 MG: 50 INJECTION, SUSPENSION INTRAVENOUS at 03:10

## 2017-10-14 RX ADMIN — Medication 10 ML: at 06:10

## 2017-10-14 RX ADMIN — SODIUM CHLORIDE TAB 1 GM 3 G: 1 TAB at 09:10

## 2017-10-14 RX ADMIN — GABAPENTIN 200 MG: 400 CAPSULE ORAL at 09:10

## 2017-10-14 RX ADMIN — MEROPENEM 2 G: 1 INJECTION, POWDER, FOR SOLUTION INTRAVENOUS at 12:10

## 2017-10-14 RX ADMIN — IPRATROPIUM BROMIDE AND ALBUTEROL SULFATE 3 ML: .5; 3 SOLUTION RESPIRATORY (INHALATION) at 07:10

## 2017-10-14 RX ADMIN — POTASSIUM CHLORIDE 40 MEQ: 20 SOLUTION ORAL at 08:10

## 2017-10-14 RX ADMIN — Medication 3 ML: at 06:10

## 2017-10-14 RX ADMIN — HEPARIN SODIUM 7500 UNITS: 5000 INJECTION, SOLUTION INTRAVENOUS; SUBCUTANEOUS at 03:10

## 2017-10-14 RX ADMIN — CLONAZEPAM 2 MG: 1 TABLET ORAL at 03:10

## 2017-10-14 RX ADMIN — LISINOPRIL 20 MG: 20 TABLET ORAL at 08:10

## 2017-10-14 RX ADMIN — ATORVASTATIN CALCIUM 10 MG: 10 TABLET, FILM COATED ORAL at 08:10

## 2017-10-14 RX ADMIN — SODIUM CHLORIDE TAB 1 GM 3 G: 1 TAB at 06:10

## 2017-10-14 NOTE — PROGRESS NOTES
[facility]  Neurocritical Care  Progress Note    Admit Date: 10/3/2017  Service Date: 10/14/2017  Length of Stay: 11    Subjective:     Chief Complaint: Seizure    History of Present Illness: Ms. Frank is a 66 yo woman with a PMHx DM type II, CKD stage 3, CAD s/p CABG, essential hypertension, cerebrovascular disease s/p bi-hemispheric watershed infarcts and trach/PEG who present as a transfer from [facility] to Neuro Critical Care s/p Cardiac Arrest on 10/4 and evaluation of Anoxic Brain Injury. She was recently discharged from [facility] [place]2017 with bilateral MCA SAQIB watershed infarcts. Yesterday, she was brought in via EMS from [facility] for cardiac arrest. Per nursing homestaff, she received chest compressions for about 5 minutes. After EMS arrived, ACLS protocol continued and she was given 2 rounds of epi, non-shockable rhythm. ROSC achieved en route to the ER.Pt arrived to ICU intubated. It was noted there sudden intermittent generalized body jerks mostly on tactile stimulation. Also, pt was hypotensive for which norepinephrine infusion started. Since her discharge here on 9/28 she has been admitted to multiple facilities recently to be treated for various issues including mucous plug leading to acute hypoxic respiratory failure, she pulled out her trach 9/4 so that was reinserted at [facility] and she was also treated for ESBL UTI while there, and last admit she was treated for likely aspiration pneumonia. She was transported here on Propofol. On examination, no cough, no gag, no corneal and minimal to no withdrawal.       Hospital Course: 10/5: s/p Cardiac Arrest on 10/4 and evaluation of anoxic brain injury   10//7: EEG with burst suppression pattern.  MRI with diffuse diffusion restriction  10/10: family meeting   10/11: SSEP  10/12: worsening myoclonus especially when moved. Valproic acid level undetectable likely due to interactions with meropenem. Patient on meropenem for ESBL  Klebsiella pneumonia sensitive to meropenem.    Interval History:  No acute events. Clonazepam increased to 2mg q8 hours yesterday and with some improvement in amplitude of myoclonus. Required PRN anti-hypertensives overnight for BP > 200. Remains on mechanical ventilation via trach.     Review of Systems  Unable to obtain a complete ROS due to level of consciousness.  Objective:     Vitals:  Temp: 99.1 °F (37.3 °C) (10/14/17 0701)  Pulse: 80 (10/14/17 0800)  Resp: (!) 25 (10/14/17 0800)  BP: (!) 200/88 (10/14/17 0842)  SpO2: 100 % (10/14/17 0800)    Temp:  [98.4 °F (36.9 °C)-99.1 °F (37.3 °C)] 99.1 °F (37.3 °C)  Pulse:  [54-80] 80  Resp:  [18-33] 25  SpO2:  [99 %-100 %] 100 %  BP: (145-224)/() 200/88         Vent Mode: A/C  Oxygen Concentration (%):  [40] 40  Resp Rate Total:  [20 br/min-30 br/min] 24 br/min  Vt Set:  [400 mL] 400 mL  PEEP/CPAP:  [5 cmH20] 5 cmH20  Pressure Support:  [0 cmH20] 0 cmH20  Mean Airway Pressure:  [8.9 wkL86-32 cmH20] 14 cmH20    10/13 0701 - 10/14 0700  In: 2235 [I.V.:315]  Out: 4235 [Urine:4085]    Physical Exam  Unable to test orientation, language, memory, judgment, insight, fund of knowledge, hearing, shoulder shrug, tongue protrusion, coordination, gait due to level of consciousness.  General   HEENT: trach in place  Chest Heart RRR / Lungs Clear to auscultation  Adbomen: Soft nontender; S/P PEG  Extremities: OK distal pulses. WWP  Neurological Exam:  MS; Unresponsive. .  CN: pupils 3mm bilaterally and reactive. +doll's eye. +corneal reflex   Motor: LUE   0/5 / RUE 0 /5  RUE extensor posturing             LLE   0/5 /  RLE 0 /5  withdraws to pain bilateral lower extremities  Tone normal bilaterally  Intermittent myoclonic activity with painful stimuli noted.   DTR:  normal throughout.  Coordination /Fine motor: unable to test  Gait: Not tested.    Medications:  Continuous Scheduled  albuterol-ipratropium 2.5mg-0.5mg/3mL 3 mL Q6H   amlodipine 10 mg Daily   atorvastatin 10 mg  Daily   custom IVPB builder 100 mg Q8H   chlorhexidine 15 mL BID   clonazePAM 2 mg Q8H   famotidine 20 mg BID   heparin (porcine) 7,500 Units Q8H   lisinopril 20 mg Daily   meropenem (MERREM) IVPB 2 g Q8H   polyethylene glycol 17 g Daily   sodium chloride 0.9% 10 mL Q6H   sodium chloride 0.9% 3 mL Q8H   sodium chloride 3 g Q8H   zonisamide 200 mg BID   PRN  sodium chloride  Q24H PRN   sodium chloride  Q24H PRN   acetaminophen 650 mg Q6H PRN   atropine 0.2 mg PRN   dextrose 50% 12.5 g PRN   dextrose 50% 12.5 g PRN   glucagon (human recombinant) 1 mg PRN   hydrALAZINE 10 mg Q4H PRN   insulin aspart 1-10 Units Q6H PRN   labetalol 10 mg Q4H PRN   magnesium sulfate IVPB 2 g PRN   magnesium sulfate IVPB 4 g PRN   ondansetron 4 mg Q8H PRN   pneumoc 13-finn conj-dip cr(PF) 0.5 mL vaccine x 1 dose   potassium chloride 10% 40 mEq PRN   potassium chloride 10% 40 mEq PRN   potassium, sodium phosphates 2 packet PRN   rocuronium 100 mg On Call Procedure   sodium chloride 0.9% 10 mL PRN     Labs:  Component      Latest Ref Rng & Units 10/14/2017 10/14/2017 10/14/2017           1:08 AM  1:00 AM  1:00 AM   WBC      3.90 - 12.70 K/uL   16.41 (H)   RBC      4.00 - 5.40 M/uL   3.11 (L)   Hemoglobin      12.0 - 16.0 g/dL   8.5 (L)   Hematocrit      37.0 - 48.5 %   27.4 (L)   MCV      82 - 98 fL   88   MCH      27.0 - 31.0 pg   27.3   MCHC      32.0 - 36.0 g/dL   31.0 (L)   RDW      11.5 - 14.5 %   16.1 (H)   Platelets      150 - 350 K/uL   285   MPV      9.2 - 12.9 fL   9.1 (L)   Lymph #      1.0 - 4.8 K/uL   CANCELED   Mono #      0.3 - 1.0 K/uL   CANCELED   Eos #      0.0 - 0.5 K/uL   CANCELED   Baso #      0.00 - 0.20 K/uL   CANCELED   Gran%      38.0 - 73.0 %   76.0 (H)   Lymph%      18.0 - 48.0 %   9.0 (L)   Mono%      4.0 - 15.0 %   10.0   Eosinophil%      0.0 - 8.0 %   1.0   Basophil%      0.0 - 1.9 %   0.0   BANDS      %   2.0   Metamyelocytes      %   1.0   Myelocytes      %   1.0   Platelet Estimate         Appears normal    Aniso         Slight   Differential Method         Manual   Sodium      136 - 145 mmol/L  140 140   Potassium      3.5 - 5.1 mmol/L  3.6    Chloride      95 - 110 mmol/L  107    CO2      23 - 29 mmol/L  25    Glucose      70 - 110 mg/dL  111 (H)    BUN, Bld      8 - 23 mg/dL  8    Creatinine      0.5 - 1.4 mg/dL  0.5    Calcium      8.7 - 10.5 mg/dL  8.1 (L)    Total Protein      6.0 - 8.4 g/dL  5.3 (L)    Albumin      3.5 - 5.2 g/dL  1.3 (L)    Total Bilirubin      0.1 - 1.0 mg/dL  0.4    Alkaline Phosphatase      55 - 135 U/L  98    AST      10 - 40 U/L  31    ALT      10 - 44 U/L  20    Anion Gap      8 - 16 mmol/L  8    eGFR if African American      >60 mL/min/1.73 m:2  >60.0    eGFR if non African American      >60 mL/min/1.73 m:2  >60.0    Protime      9.0 - 12.5 sec   11.0   Coumadin Monitoring INR      0.8 - 1.2   1.0   Phosphorus      2.7 - 4.5 mg/dL   2.9   POCT Glucose      70 - 110 mg/dL 136 (H)       Imaging:  No new imaging    Assessment/Plan:     Neuro   Anoxic brain injury    S/p cardiac arrest with myoclonic status epilepticus involving bilateral hemispheres on EEG. S/p Valproate with levels undetectable likely due to interactions with meropenem. On Clonazepam with some improvement in myoclonus. Amantadine discontinued.   - clonazapam to 2mg q8 hours  - continue brivaracetam 100 mg tid  - continue zonisamide 200 mg bid    -f/u SSEP   -discussed with Dr. Isabel          Cerebral infarction, watershed distribution, bilateral, acute    [place] [facility] [place] with bilateral MCA SAQIB watershed infarct          Pulmonary   Klebsiella pneumonia    -ESBL sensitive to meropenem  -meropenem started 10/9 x 8 days  -afebrile  -WBC stable at 16.4        Acute on chronic respiratory failure    -remains on mechanical ventilation with trach  -failed SBT          Cardiac/Vascular   Cardiac arrest, cause unspecified    -s/p cardiac arrest 10/4 at nursing home          Chronic sinus bradycardia    -intermittent  and asymptomatic at this time         Essential hypertension    BP frequently in 200s   -amlodipine 10 mg daily  - lisinopril to 20 mg daily   - sBP goal < 200  - labetalol and hydralazine q4 hours PRN for sBP > 200        Endocrine   Type 2 diabetes mellitus, uncontrolled    -- continue to monitor BG  -- SSI  -- transition tube feeds to Glucerna per nutrition recs            Prophylaxis:  Venous Thromboembolism: chemical  Stress Ulcer: H2B  Ventilator Pneumonia: yes     Activity Orders          Bed rest starting at 10/03 1510        Full Code    Manoj Apple MD  Neurocritical Care  [facility]

## 2017-10-14 NOTE — ASSESSMENT & PLAN NOTE
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 S/p Valproate with levels undetectable likely due to interactions with meropenem. On Clonazepam with some improvement in myoclonus. Amantadine discontinued.   - clonazapam to 2mg q8 hours  - continue brivaracetam 100 mg tid  - continue zonisamide 200 mg bid    -f/u SSEP   -discussed with Dr. Isabel

## 2017-10-14 NOTE — ASSESSMENT & PLAN NOTE
BP frequently in 200s   -amlodipine 10 mg daily  - lisinopril to 20 mg daily   - sBP goal < 200  - labetalol and hydralazine q4 hours PRN for sBP > 200

## 2017-10-14 NOTE — SUBJECTIVE & OBJECTIVE
Interval History:  No acute events. Clonazepam increased to 2mg q8 hours yesterday and with some improvement in amplitude of myoclonus. Required PRN anti-hypertensives overnight for BP > 200. Remains on mechanical ventilation via trach.     Review of Systems  Unable to obtain a complete ROS due to level of consciousness.  Objective:     Vitals:  Temp: 99.1 °F (37.3 °C) (10/14/17 0701)  Pulse: 80 (10/14/17 0800)  Resp: (!) 25 (10/14/17 0800)  BP: (!) 200/88 (10/14/17 0842)  SpO2: 100 % (10/14/17 0800)    Temp:  [98.4 °F (36.9 °C)-99.1 °F (37.3 °C)] 99.1 °F (37.3 °C)  Pulse:  [54-80] 80  Resp:  [18-33] 25  SpO2:  [99 %-100 %] 100 %  BP: (145-224)/() 200/88         Vent Mode: A/C  Oxygen Concentration (%):  [40] 40  Resp Rate Total:  [20 br/min-30 br/min] 24 br/min  Vt Set:  [400 mL] 400 mL  PEEP/CPAP:  [5 cmH20] 5 cmH20  Pressure Support:  [0 cmH20] 0 cmH20  Mean Airway Pressure:  [8.9 tyT30-59 cmH20] 14 cmH20    10/13 0701 - 10/14 0700  In: 2235 [I.V.:315]  Out: 4235 [Urine:4085]    Physical Exam  Unable to test orientation, language, memory, judgment, insight, fund of knowledge, hearing, shoulder shrug, tongue protrusion, coordination, gait due to level of consciousness.  General   HEENT: trach in place  Chest Heart RRR / Lungs Clear to auscultation  Adbomen: Soft nontender; S/P PEG  Extremities: OK distal pulses. WWP  Neurological Exam:  MS; Unresponsive. .  CN: pupils 2-3mm bilaterally and minimally reactive. +doll's eye. Unable to elicit corneal reflexes.    Motor: LUE   0/5 / RUE 0 /5  RUE extensor posturing             LLE   0/5 /  RLE 0 /5  withdraws to pain bilateral lower extremities  Tone normal bilaterally  Intermittent myoclonic activity with painful stimuli noted.   DTR:  normal throughout.  Coordination /Fine motor: unable to test  Gait: Not tested.    Medications:  Continuous Scheduled  albuterol-ipratropium 2.5mg-0.5mg/3mL 3 mL Q6H   amlodipine 10 mg Daily   atorvastatin 10 mg Daily   custom  IVPB builder 100 mg Q8H   chlorhexidine 15 mL BID   clonazePAM 2 mg Q8H   famotidine 20 mg BID   heparin (porcine) 7,500 Units Q8H   lisinopril 20 mg Daily   meropenem (MERREM) IVPB 2 g Q8H   polyethylene glycol 17 g Daily   sodium chloride 0.9% 10 mL Q6H   sodium chloride 0.9% 3 mL Q8H   sodium chloride 3 g Q8H   zonisamide 200 mg BID   PRN  sodium chloride  Q24H PRN   sodium chloride  Q24H PRN   acetaminophen 650 mg Q6H PRN   atropine 0.2 mg PRN   dextrose 50% 12.5 g PRN   dextrose 50% 12.5 g PRN   glucagon (human recombinant) 1 mg PRN   hydrALAZINE 10 mg Q4H PRN   insulin aspart 1-10 Units Q6H PRN   labetalol 10 mg Q4H PRN   magnesium sulfate IVPB 2 g PRN   magnesium sulfate IVPB 4 g PRN   ondansetron 4 mg Q8H PRN   pneumoc 13-finn conj-dip cr(PF) 0.5 mL vaccine x 1 dose   potassium chloride 10% 40 mEq PRN   potassium chloride 10% 40 mEq PRN   potassium, sodium phosphates 2 packet PRN   rocuronium 100 mg On Call Procedure   sodium chloride 0.9% 10 mL PRN     Labs:  Component      Latest Ref Rng & Units 10/14/2017 10/14/2017 10/14/2017           1:08 AM  1:00 AM  1:00 AM   WBC      3.90 - 12.70 K/uL   16.41 (H)   RBC      4.00 - 5.40 M/uL   3.11 (L)   Hemoglobin      12.0 - 16.0 g/dL   8.5 (L)   Hematocrit      37.0 - 48.5 %   27.4 (L)   MCV      82 - 98 fL   88   MCH      27.0 - 31.0 pg   27.3   MCHC      32.0 - 36.0 g/dL   31.0 (L)   RDW      11.5 - 14.5 %   16.1 (H)   Platelets      150 - 350 K/uL   285   MPV      9.2 - 12.9 fL   9.1 (L)   Lymph #      1.0 - 4.8 K/uL   CANCELED   Mono #      0.3 - 1.0 K/uL   CANCELED   Eos #      0.0 - 0.5 K/uL   CANCELED   Baso #      0.00 - 0.20 K/uL   CANCELED   Gran%      38.0 - 73.0 %   76.0 (H)   Lymph%      18.0 - 48.0 %   9.0 (L)   Mono%      4.0 - 15.0 %   10.0   Eosinophil%      0.0 - 8.0 %   1.0   Basophil%      0.0 - 1.9 %   0.0   BANDS      %   2.0   Metamyelocytes      %   1.0   Myelocytes      %   1.0   Platelet Estimate         Appears normal   Aniso          Slight   Differential Method         Manual   Sodium      136 - 145 mmol/L  140 140   Potassium      3.5 - 5.1 mmol/L  3.6    Chloride      95 - 110 mmol/L  107    CO2      23 - 29 mmol/L  25    Glucose      70 - 110 mg/dL  111 (H)    BUN, Bld      8 - 23 mg/dL  8    Creatinine      0.5 - 1.4 mg/dL  0.5    Calcium      8.7 - 10.5 mg/dL  8.1 (L)    Total Protein      6.0 - 8.4 g/dL  5.3 (L)    Albumin      3.5 - 5.2 g/dL  1.3 (L)    Total Bilirubin      0.1 - 1.0 mg/dL  0.4    Alkaline Phosphatase      55 - 135 U/L  98    AST      10 - 40 U/L  31    ALT      10 - 44 U/L  20    Anion Gap      8 - 16 mmol/L  8    eGFR if African American      >60 mL/min/1.73 m:2  >60.0    eGFR if non African American      >60 mL/min/1.73 m:2  >60.0    Protime      9.0 - 12.5 sec   11.0   Coumadin Monitoring INR      0.8 - 1.2   1.0   Phosphorus      2.7 - 4.5 mg/dL   2.9   POCT Glucose      70 - 110 mg/dL 136 (H)       Imaging:  No new imaging

## 2017-10-14 NOTE — PLAN OF CARE
Problem: Patient Care Overview  Goal: Plan of Care Review  Outcome: Ongoing (interventions implemented as appropriate)  POC reviewed with pt and family at 1400. Pt trached on the ventilator and nonverbal. Questions and concerns addressed with daughter over the phone. No acute events today. Pt progressing toward goals. Pt had high residuals of 250cc at 7am but only 30cc at 3pm. Tube feeds continued. Hydralazine and labetolol IVP PRNs given once this morning for SBP>200. Temperature stayed around 99 today. Potassium replaced. Will continue to monitor. See flowsheets for full assessment and VS info.

## 2017-10-15 LAB
ALBUMIN SERPL BCP-MCNC: 1.4 G/DL
ALLENS TEST: ABNORMAL
ALP SERPL-CCNC: 107 U/L
ALT SERPL W/O P-5'-P-CCNC: 22 U/L
ANION GAP SERPL CALC-SCNC: 8 MMOL/L
ANISOCYTOSIS BLD QL SMEAR: SLIGHT
AST SERPL-CCNC: 33 U/L
BASO STIPL BLD QL SMEAR: ABNORMAL
BASOPHILS # BLD AUTO: ABNORMAL K/UL
BASOPHILS NFR BLD: 0 %
BILIRUB SERPL-MCNC: 0.4 MG/DL
BUN SERPL-MCNC: 8 MG/DL
CALCIUM SERPL-MCNC: 8.4 MG/DL
CHLORIDE SERPL-SCNC: 109 MMOL/L
CO2 SERPL-SCNC: 23 MMOL/L
CREAT SERPL-MCNC: 0.6 MG/DL
DELSYS: ABNORMAL
DIFFERENTIAL METHOD: ABNORMAL
EOSINOPHIL # BLD AUTO: ABNORMAL K/UL
EOSINOPHIL NFR BLD: 0.5 %
ERYTHROCYTE [DISTWIDTH] IN BLOOD BY AUTOMATED COUNT: 16.2 %
ERYTHROCYTE [SEDIMENTATION RATE] IN BLOOD BY WESTERGREN METHOD: 20 MM/H
EST. GFR  (AFRICAN AMERICAN): >60 ML/MIN/1.73 M^2
EST. GFR  (NON AFRICAN AMERICAN): >60 ML/MIN/1.73 M^2
FIO2: 40
GLUCOSE SERPL-MCNC: 127 MG/DL
HCO3 UR-SCNC: 22.5 MMOL/L (ref 24–28)
HCT VFR BLD AUTO: 27.8 %
HGB BLD-MCNC: 8.9 G/DL
INR PPP: 1.1
LYMPHOCYTES # BLD AUTO: ABNORMAL K/UL
LYMPHOCYTES NFR BLD: 19.5 %
MAGNESIUM SERPL-MCNC: 1.3 MG/DL
MCH RBC QN AUTO: 26.9 PG
MCHC RBC AUTO-ENTMCNC: 32 G/DL
MCV RBC AUTO: 84 FL
METAMYELOCYTES NFR BLD MANUAL: 1 %
MIN VOL: 10
MODE: ABNORMAL
MONOCYTES # BLD AUTO: ABNORMAL K/UL
MONOCYTES NFR BLD: 5.5 %
MYELOCYTES NFR BLD MANUAL: 2 %
NEUTROPHILS NFR BLD: 68.5 %
NEUTS BAND NFR BLD MANUAL: 3 %
PCO2 BLDA: 31.1 MMHG (ref 35–45)
PEEP: 5
PH SMN: 7.47 [PH] (ref 7.35–7.45)
PHOSPHATE SERPL-MCNC: 3 MG/DL
PIP: 31
PLATELET # BLD AUTO: 273 K/UL
PLATELET BLD QL SMEAR: ABNORMAL
PMV BLD AUTO: 8.6 FL
PO2 BLDA: 67 MMHG (ref 80–100)
POC BE: -1 MMOL/L
POC SATURATED O2: 94 % (ref 95–100)
POC TCO2: 23 MMOL/L (ref 23–27)
POCT GLUCOSE: 102 MG/DL (ref 70–110)
POCT GLUCOSE: 136 MG/DL (ref 70–110)
POCT GLUCOSE: 141 MG/DL (ref 70–110)
POCT GLUCOSE: 146 MG/DL (ref 70–110)
POTASSIUM SERPL-SCNC: 4.7 MMOL/L
PROT SERPL-MCNC: 5.7 G/DL
PROTHROMBIN TIME: 11.3 SEC
RBC # BLD AUTO: 3.31 M/UL
SAMPLE: ABNORMAL
SITE: ABNORMAL
SODIUM SERPL-SCNC: 140 MMOL/L
SP02: 100
VT: 400
WBC # BLD AUTO: 17.2 K/UL

## 2017-10-15 PROCEDURE — 63600175 PHARM REV CODE 636 W HCPCS: Performed by: NURSE PRACTITIONER

## 2017-10-15 PROCEDURE — 25000003 PHARM REV CODE 250: Performed by: EMERGENCY MEDICINE

## 2017-10-15 PROCEDURE — 82803 BLOOD GASES ANY COMBINATION: CPT

## 2017-10-15 PROCEDURE — 94668 MNPJ CHEST WALL SBSQ: CPT

## 2017-10-15 PROCEDURE — 25000003 PHARM REV CODE 250: Performed by: NURSE PRACTITIONER

## 2017-10-15 PROCEDURE — 94003 VENT MGMT INPAT SUBQ DAY: CPT

## 2017-10-15 PROCEDURE — 25000003 PHARM REV CODE 250: Performed by: PSYCHIATRY & NEUROLOGY

## 2017-10-15 PROCEDURE — 27000221 HC OXYGEN, UP TO 24 HOURS

## 2017-10-15 PROCEDURE — A4216 STERILE WATER/SALINE, 10 ML: HCPCS | Performed by: EMERGENCY MEDICINE

## 2017-10-15 PROCEDURE — 85027 COMPLETE CBC AUTOMATED: CPT

## 2017-10-15 PROCEDURE — 83735 ASSAY OF MAGNESIUM: CPT

## 2017-10-15 PROCEDURE — 63600175 PHARM REV CODE 636 W HCPCS: Performed by: EMERGENCY MEDICINE

## 2017-10-15 PROCEDURE — 99900026 HC AIRWAY MAINTENANCE (STAT)

## 2017-10-15 PROCEDURE — 25000003 PHARM REV CODE 250: Performed by: STUDENT IN AN ORGANIZED HEALTH CARE EDUCATION/TRAINING PROGRAM

## 2017-10-15 PROCEDURE — 63600175 PHARM REV CODE 636 W HCPCS: Performed by: PHYSICIAN ASSISTANT

## 2017-10-15 PROCEDURE — 20000000 HC ICU ROOM

## 2017-10-15 PROCEDURE — 80053 COMPREHEN METABOLIC PANEL: CPT

## 2017-10-15 PROCEDURE — 94640 AIRWAY INHALATION TREATMENT: CPT

## 2017-10-15 PROCEDURE — A4216 STERILE WATER/SALINE, 10 ML: HCPCS | Performed by: NURSE PRACTITIONER

## 2017-10-15 PROCEDURE — 85610 PROTHROMBIN TIME: CPT

## 2017-10-15 PROCEDURE — 63600175 PHARM REV CODE 636 W HCPCS: Performed by: PSYCHIATRY & NEUROLOGY

## 2017-10-15 PROCEDURE — 95951 HC EEG MONITORING/VIDEO RECORD: CPT

## 2017-10-15 PROCEDURE — 25000242 PHARM REV CODE 250 ALT 637 W/ HCPCS: Performed by: SURGERY

## 2017-10-15 PROCEDURE — 94761 N-INVAS EAR/PLS OXIMETRY MLT: CPT

## 2017-10-15 PROCEDURE — 84100 ASSAY OF PHOSPHORUS: CPT

## 2017-10-15 PROCEDURE — 95957 EEG DIGITAL ANALYSIS: CPT

## 2017-10-15 PROCEDURE — 99291 CRITICAL CARE FIRST HOUR: CPT | Mod: GC,,, | Performed by: PSYCHIATRY & NEUROLOGY

## 2017-10-15 PROCEDURE — 36600 WITHDRAWAL OF ARTERIAL BLOOD: CPT

## 2017-10-15 PROCEDURE — 27200966 HC CLOSED SUCTION SYSTEM

## 2017-10-15 PROCEDURE — 85007 BL SMEAR W/DIFF WBC COUNT: CPT

## 2017-10-15 PROCEDURE — C9399 UNCLASSIFIED DRUGS OR BIOLOG: HCPCS | Performed by: PSYCHIATRY & NEUROLOGY

## 2017-10-15 PROCEDURE — 25000003 PHARM REV CODE 250: Performed by: PHYSICIAN ASSISTANT

## 2017-10-15 PROCEDURE — 95951 PR EEG MONITORING/VIDEORECORD: CPT | Mod: 26,,, | Performed by: PSYCHIATRY & NEUROLOGY

## 2017-10-15 RX ORDER — HYDRALAZINE HYDROCHLORIDE 25 MG/1
25 TABLET, FILM COATED ORAL EVERY 6 HOURS
Status: DISCONTINUED | OUTPATIENT
Start: 2017-10-15 | End: 2017-10-16

## 2017-10-15 RX ADMIN — IPRATROPIUM BROMIDE AND ALBUTEROL SULFATE 3 ML: .5; 3 SOLUTION RESPIRATORY (INHALATION) at 01:10

## 2017-10-15 RX ADMIN — MEROPENEM 2 G: 1 INJECTION, POWDER, FOR SOLUTION INTRAVENOUS at 11:10

## 2017-10-15 RX ADMIN — SODIUM CHLORIDE TAB 1 GM 3 G: 1 TAB at 05:10

## 2017-10-15 RX ADMIN — CHLORHEXIDINE GLUCONATE 15 ML: 1.2 RINSE ORAL at 09:10

## 2017-10-15 RX ADMIN — SODIUM CHLORIDE TAB 1 GM 3 G: 1 TAB at 10:10

## 2017-10-15 RX ADMIN — GABAPENTIN 200 MG: 400 CAPSULE ORAL at 11:10

## 2017-10-15 RX ADMIN — AMLODIPINE BESYLATE 10 MG: 10 TABLET ORAL at 09:10

## 2017-10-15 RX ADMIN — HYDRALAZINE HYDROCHLORIDE 25 MG: 25 TABLET, FILM COATED ORAL at 05:10

## 2017-10-15 RX ADMIN — GABAPENTIN 200 MG: 400 CAPSULE ORAL at 10:10

## 2017-10-15 RX ADMIN — CLONAZEPAM 2 MG: 1 TABLET ORAL at 02:10

## 2017-10-15 RX ADMIN — LISINOPRIL 20 MG: 20 TABLET ORAL at 09:10

## 2017-10-15 RX ADMIN — MEROPENEM 2 G: 1 INJECTION, POWDER, FOR SOLUTION INTRAVENOUS at 08:10

## 2017-10-15 RX ADMIN — BRIVARACETAM 100 MG: 50 INJECTION, SUSPENSION INTRAVENOUS at 10:10

## 2017-10-15 RX ADMIN — CLONAZEPAM 2 MG: 1 TABLET ORAL at 10:10

## 2017-10-15 RX ADMIN — CLONAZEPAM 2 MG: 1 TABLET ORAL at 05:10

## 2017-10-15 RX ADMIN — POLYETHYLENE GLYCOL 3350 17 G: 17 POWDER, FOR SOLUTION ORAL at 09:10

## 2017-10-15 RX ADMIN — HEPARIN SODIUM 7500 UNITS: 5000 INJECTION, SOLUTION INTRAVENOUS; SUBCUTANEOUS at 05:10

## 2017-10-15 RX ADMIN — FAMOTIDINE 20 MG: 20 TABLET, FILM COATED ORAL at 10:10

## 2017-10-15 RX ADMIN — BRIVARACETAM 100 MG: 50 INJECTION, SUSPENSION INTRAVENOUS at 02:10

## 2017-10-15 RX ADMIN — Medication 10 ML: at 05:10

## 2017-10-15 RX ADMIN — Medication 3 ML: at 11:10

## 2017-10-15 RX ADMIN — FAMOTIDINE 20 MG: 20 TABLET, FILM COATED ORAL at 09:10

## 2017-10-15 RX ADMIN — Medication 3 ML: at 05:10

## 2017-10-15 RX ADMIN — MAGNESIUM SULFATE IN WATER 2 G: 40 INJECTION, SOLUTION INTRAVENOUS at 09:10

## 2017-10-15 RX ADMIN — IPRATROPIUM BROMIDE AND ALBUTEROL SULFATE 3 ML: .5; 3 SOLUTION RESPIRATORY (INHALATION) at 07:10

## 2017-10-15 RX ADMIN — SODIUM CHLORIDE TAB 1 GM 3 G: 1 TAB at 02:10

## 2017-10-15 RX ADMIN — MEROPENEM 2 G: 1 INJECTION, POWDER, FOR SOLUTION INTRAVENOUS at 04:10

## 2017-10-15 RX ADMIN — ATORVASTATIN CALCIUM 10 MG: 10 TABLET, FILM COATED ORAL at 09:10

## 2017-10-15 RX ADMIN — HEPARIN SODIUM 7500 UNITS: 5000 INJECTION, SOLUTION INTRAVENOUS; SUBCUTANEOUS at 02:10

## 2017-10-15 RX ADMIN — HEPARIN SODIUM 7500 UNITS: 5000 INJECTION, SOLUTION INTRAVENOUS; SUBCUTANEOUS at 10:10

## 2017-10-15 RX ADMIN — HYDRALAZINE HYDROCHLORIDE 25 MG: 25 TABLET, FILM COATED ORAL at 11:10

## 2017-10-15 RX ADMIN — CHLORHEXIDINE GLUCONATE 15 ML: 1.2 RINSE ORAL at 10:10

## 2017-10-15 RX ADMIN — BRIVARACETAM 100 MG: 50 INJECTION, SUSPENSION INTRAVENOUS at 06:10

## 2017-10-15 RX ADMIN — MAGNESIUM SULFATE IN WATER 2 G: 40 INJECTION, SOLUTION INTRAVENOUS at 11:10

## 2017-10-15 NOTE — ASSESSMENT & PLAN NOTE
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 S/p Valproate with levels undetectable likely due to interactions with meropenem. On Clonazepam with some improvement in myoclonus. Amantadine discontinued.   - clonazapam to 2mg q8 hours  - continue brivaracetam 100 mg tid  - continue zonisamide 200 mg bid    - with right gaze preference today, will order EEG to rule out breakthrough seizure activity

## 2017-10-15 NOTE — PROGRESS NOTES
[facility]  Neurocritical Care  Progress Note    Admit Date: 10/3/2017  Service Date: 10/15/2017  Length of Stay: 12    Subjective:     Chief Complaint: Seizure    History of Present Illness: Ms. Frank is a 66 yo woman with a PMHx DM type II, CKD stage 3, CAD s/p CABG, essential hypertension, cerebrovascular disease s/p bi-hemispheric watershed infarcts and trach/PEG who present as a transfer from [facility] to Neuro Critical Care s/p Cardiac Arrest on 10/4 and evaluation of Anoxic Brain Injury. She was recently discharged from [facility] 9/28/2017 with bilateral MCA SAQIB watershed infarcts. Yesterday, she was brought in via EMS from [facility] for cardiac arrest. Per nursing homestaff, she received chest compressions for about 5 minutes. After EMS arrived, ACLS protocol continued and she was given 2 rounds of epi, non-shockable rhythm. ROSC achieved en route to the ER.Pt arrived to ICU intubated. It was noted there sudden intermittent generalized body jerks mostly on tactile stimulation. Also, pt was hypotensive for which norepinephrine infusion started. Since her discharge here on 9/28 she has been admitted to multiple facilities recently to be treated for various issues including mucous plug leading to acute hypoxic respiratory failure, she pulled out her trach 9/4 so that was reinserted at [facility] and she was also treated for ESBL UTI while there, and last admit she was treated for likely aspiration pneumonia. She was transported here on Propofol. On examination, no cough, no gag, no corneal and minimal to no withdrawal.       Hospital Course: 10/5: s/p Cardiac Arrest on 10/4 and evaluation of anoxic brain injury   10//7: EEG with burst suppression pattern.  MRI with diffuse diffusion restriction  10/10: family meeting   10/11: SSEP  10/12: worsening myoclonus especially when moved. Valproic acid level undetectable likely due to interactions with meropenem. Patient on meropenem for ESBL  Klebsiella pneumonia sensitive to meropenem.  10/15: No events overnight.     Interval History:  No events overnight. Her myoclonic jerks have decreased in frequency, she remains comatose.    Review of Systems    Unable to obtain a complete ROS due to level of consciousness.  Objective:     Vitals:  Temp: 98.4 °F (36.9 °C) (10/15/17 1530)  Pulse: 89 (10/15/17 1600)  Resp: (!) 25 (10/15/17 1600)  BP: (!) 179/83 (10/15/17 1600)  SpO2: 100 % (10/15/17 1600)    Temp:  [98.4 °F (36.9 °C)-99.3 °F (37.4 °C)] 98.4 °F (36.9 °C)  Pulse:  [75-98] 89  Resp:  [20-40] 25  SpO2:  [98 %-100 %] 100 %  BP: (160-203)/(73-96) 179/83         Vent Mode: A/C  Oxygen Concentration (%):  [40] 40  Resp Rate Total:  [20 br/min-43 br/min] 24 br/min  Vt Set:  [400 mL] 400 mL  PEEP/CPAP:  [5 cmH20] 5 cmH20  Pressure Support:  [0 cmH20] 0 cmH20  Mean Airway Pressure:  [9.6 ynW06-85 cmH20] 12 cmH20    10/14 0701 - 10/15 0700  In: 2255 [I.V.:250]  Out: 4235 [Urine:4135]    Physical Exam  Physical Exam:  GA: Comatose, comfortable, no acute distress.   HEENT: No scleral icterus or JVD.   Pulmonary: Clear to auscultation A/L. No wheezing, crackles, or rhonchi.  Cardiac: RRR S1 & S2 w/o rubs/murmurs/gallops.   Abdominal: Bowel sounds present x 4.   Skin: No jaundice, rashes, or visible lesions.    Neuro:  --sedation: none  --GCS: [id number]  --Mental Status: Comatose, does not respond to voice or pain. Does not follow commands.  --CN II-XII: Right gaze preference, does not cross midline.   --Pupils 3mm, PERRL.   --brainstem: intact  --Motor: LUE withdraws to pain. No movement in other extremities  --sensory: No response to pain  --Reflexes: not tested  --Gait: deferred      Medications:  Continuous Scheduled    albuterol-ipratropium 2.5mg-0.5mg/3mL 3 mL Q6H   amlodipine 10 mg Daily   atorvastatin 10 mg Daily   custom IVPB builder 100 mg Q8H   chlorhexidine 15 mL BID   clonazePAM 2 mg Q8H   famotidine 20 mg BID   heparin (porcine) 7,500 Units Q8H    hydrALAZINE 25 mg Q6H   lisinopril 20 mg Daily   meropenem (MERREM) IVPB 2 g Q8H   polyethylene glycol 17 g Daily   sodium chloride 0.9% 10 mL Q6H   sodium chloride 0.9% 3 mL Q8H   sodium chloride 3 g Q8H   zonisamide 200 mg BID   PRN    sodium chloride  Q24H PRN   sodium chloride  Q24H PRN   acetaminophen 650 mg Q6H PRN   atropine 0.2 mg PRN   dextrose 50% 12.5 g PRN   dextrose 50% 12.5 g PRN   glucagon (human recombinant) 1 mg PRN   hydrALAZINE 10 mg Q4H PRN   insulin aspart 1-10 Units Q6H PRN   labetalol 10 mg Q4H PRN   magnesium sulfate IVPB 2 g PRN   magnesium sulfate IVPB 4 g PRN   ondansetron 4 mg Q8H PRN   pneumoc 13-finn conj-dip cr(PF) 0.5 mL vaccine x 1 dose   potassium chloride 10% 40 mEq PRN   potassium chloride 10% 40 mEq PRN   potassium, sodium phosphates 2 packet PRN   rocuronium 100 mg On Call Procedure   sodium chloride 0.9% 10 mL PRN     Labs:  Component      Latest Ref Rng & Units 10/14/2017 10/14/2017 10/14/2017           1:08 AM  1:00 AM  1:00 AM   WBC      3.90 - 12.70 K/uL   16.41 (H)   RBC      4.00 - 5.40 M/uL   3.11 (L)   Hemoglobin      12.0 - 16.0 g/dL   8.5 (L)   Hematocrit      37.0 - 48.5 %   27.4 (L)   MCV      82 - 98 fL   88   MCH      27.0 - 31.0 pg   27.3   MCHC      32.0 - 36.0 g/dL   31.0 (L)   RDW      11.5 - 14.5 %   16.1 (H)   Platelets      150 - 350 K/uL   285   MPV      9.2 - 12.9 fL   9.1 (L)   Lymph #      1.0 - 4.8 K/uL   CANCELED   Mono #      0.3 - 1.0 K/uL   CANCELED   Eos #      0.0 - 0.5 K/uL   CANCELED   Baso #      0.00 - 0.20 K/uL   CANCELED   Gran%      38.0 - 73.0 %   76.0 (H)   Lymph%      18.0 - 48.0 %   9.0 (L)   Mono%      4.0 - 15.0 %   10.0   Eosinophil%      0.0 - 8.0 %   1.0   Basophil%      0.0 - 1.9 %   0.0   BANDS      %   2.0   Metamyelocytes      %   1.0   Myelocytes      %   1.0   Platelet Estimate         Appears normal   Aniso         Slight   Differential Method         Manual   Sodium      136 - 145 mmol/L  140 140   Potassium      3.5 -  5.1 mmol/L  3.6    Chloride      95 - 110 mmol/L  107    CO2      23 - 29 mmol/L  25    Glucose      70 - 110 mg/dL  111 (H)    BUN, Bld      8 - 23 mg/dL  8    Creatinine      0.5 - 1.4 mg/dL  0.5    Calcium      8.7 - 10.5 mg/dL  8.1 (L)    Total Protein      6.0 - 8.4 g/dL  5.3 (L)    Albumin      3.5 - 5.2 g/dL  1.3 (L)    Total Bilirubin      0.1 - 1.0 mg/dL  0.4    Alkaline Phosphatase      55 - 135 U/L  98    AST      10 - 40 U/L  31    ALT      10 - 44 U/L  20    Anion Gap      8 - 16 mmol/L  8    eGFR if African American      >60 mL/min/1.73 m:2  >60.0    eGFR if non African American      >60 mL/min/1.73 m:2  >60.0    Protime      9.0 - 12.5 sec   11.0   Coumadin Monitoring INR      0.8 - 1.2   1.0   Phosphorus      2.7 - 4.5 mg/dL   2.9   POCT Glucose      70 - 110 mg/dL 136 (H)       Imaging:  No new imaging    Assessment/Plan:     Neuro   Anoxic brain injury    S/p cardiac arrest with myoclonic status epilepticus involving bilateral hemispheres on EEG. S/p Valproate with levels undetectable likely due to interactions with meropenem. On Clonazepam with some improvement in myoclonus. Amantadine discontinued.   - clonazapam to 2mg q8 hours  - continue brivaracetam 100 mg tid  - continue zonisamide 200 mg bid    - with right gaze preference today, will order EEG to rule out breakthrough seizure activity            Cerebral infarction, watershed distribution, bilateral, acute    -- discharge from [facility] 9/28/2017 with bilateral MCA SAQIB watershed infarct          ENT   Tracheostomy status    -- S/p Trach PEG during previous admission on 9/28/2017        Pulmonary   Klebsiella pneumonia    -- ESBL sensitive to meropenem  -- meropenem started 10/9 x 8 days  -- afebrile  -- WBC stable        Acute on chronic respiratory failure    -remains on mechanical ventilation with trach  -failed SBT          Cardiac/Vascular   Cardiac arrest, cause unspecified    -s/p cardiac arrest 10/4 at nursing home          Chronic sinus  bradycardia    -intermittent and asymptomatic at this time         S/P CABG (coronary artery bypass graft)    Hx of CABG in 2005        Essential hypertension    -- BP frequently in 200s   -- amlodipine 10 mg daily  -- lisinopril to 20 mg daily   -- start home hydralazine 25mg q6h  -- sBP goal < 200  -- labetalol and hydralazine q4 hours PRN for sBP > 200        Endocrine   Type 2 diabetes mellitus, uncontrolled    -- continue to monitor BG  -- SSI          GI   PEG (percutaneous endoscopic gastrostomy) status    -- Gastrostomy tube place during previous admission on 9/28            Prophylaxis:  Venous Thromboembolism: mechanical chemical  Stress Ulcer: H2B  Ventilator Pneumonia: yes     Activity Orders          Bed rest starting at 10/03 1510        Full Code    Adelina Puckett MD  Neurocritical Care  [facility]

## 2017-10-15 NOTE — PLAN OF CARE
Problem: Patient Care Overview  Goal: Plan of Care Review  Outcome: Ongoing (interventions implemented as appropriate)  POC reviewed with pt at 0500. Pt intubated. No family present at this time. No acute events overnight. Pt progressing toward goals. Will continue to monitor. See flowsheets for full assessment and VS info

## 2017-10-15 NOTE — PLAN OF CARE
Problem: Patient Care Overview  Goal: Plan of Care Review  POC reviewed with pt and family at 1100. Pt verbalized understanding. Questions and concerns addressed. No acute events today. Pt progressing toward goals. Will continue to monitor. See flowsheets for full assessment and VS info.

## 2017-10-15 NOTE — ASSESSMENT & PLAN NOTE
-- discharge from [facility] 9/28/2017 with bilateral MCA SAQIB watershed infarct

## 2017-10-15 NOTE — ASSESSMENT & PLAN NOTE
-- BP frequently in 200s   -- amlodipine 10 mg daily  -- lisinopril to 20 mg daily   -- start home hydralazine 25mg q6h  -- sBP goal < 200  -- labetalol and hydralazine q4 hours PRN for sBP > 200

## 2017-10-16 LAB
ALBUMIN SERPL BCP-MCNC: 1.6 G/DL
ALP SERPL-CCNC: 118 U/L
ALT SERPL W/O P-5'-P-CCNC: 25 U/L
ANION GAP SERPL CALC-SCNC: 6 MMOL/L
ANISOCYTOSIS BLD QL SMEAR: SLIGHT
AST SERPL-CCNC: 33 U/L
BASOPHILS # BLD AUTO: ABNORMAL K/UL
BASOPHILS NFR BLD: 0 %
BILIRUB SERPL-MCNC: 0.4 MG/DL
BUN SERPL-MCNC: 10 MG/DL
CALCIUM SERPL-MCNC: 8.4 MG/DL
CHLORIDE SERPL-SCNC: 106 MMOL/L
CO2 SERPL-SCNC: 23 MMOL/L
CREAT SERPL-MCNC: 0.6 MG/DL
DIFFERENTIAL METHOD: ABNORMAL
EOSINOPHIL # BLD AUTO: ABNORMAL K/UL
EOSINOPHIL NFR BLD: 2 %
ERYTHROCYTE [DISTWIDTH] IN BLOOD BY AUTOMATED COUNT: 15.9 %
EST. GFR  (AFRICAN AMERICAN): >60 ML/MIN/1.73 M^2
EST. GFR  (NON AFRICAN AMERICAN): >60 ML/MIN/1.73 M^2
GLUCOSE SERPL-MCNC: 142 MG/DL
HCT VFR BLD AUTO: 29.1 %
HGB BLD-MCNC: 9 G/DL
HYPOCHROMIA BLD QL SMEAR: ABNORMAL
IMM GRANULOCYTES # BLD AUTO: ABNORMAL 10*3/UL
IMM GRANULOCYTES NFR BLD AUTO: ABNORMAL %
INR PPP: 1
LYMPHOCYTES # BLD AUTO: ABNORMAL K/UL
LYMPHOCYTES NFR BLD: 19 %
MAGNESIUM SERPL-MCNC: 1.7 MG/DL
MCH RBC QN AUTO: 26.9 PG
MCHC RBC AUTO-ENTMCNC: 30.9 G/DL
MCV RBC AUTO: 87 FL
MONOCYTES # BLD AUTO: ABNORMAL K/UL
MONOCYTES NFR BLD: 3 %
NEUTROPHILS NFR BLD: 75 %
NEUTS BAND NFR BLD MANUAL: 1 %
NRBC BLD-RTO: 0 /100 WBC
PHOSPHATE SERPL-MCNC: 2.8 MG/DL
PLATELET # BLD AUTO: 385 K/UL
PLATELET BLD QL SMEAR: ABNORMAL
PMV BLD AUTO: 9.4 FL
POCT GLUCOSE: 143 MG/DL (ref 70–110)
POCT GLUCOSE: 150 MG/DL (ref 70–110)
POCT GLUCOSE: 153 MG/DL (ref 70–110)
POCT GLUCOSE: 161 MG/DL (ref 70–110)
POCT GLUCOSE: 165 MG/DL (ref 70–110)
POLYCHROMASIA BLD QL SMEAR: ABNORMAL
POTASSIUM SERPL-SCNC: 5 MMOL/L
PROT SERPL-MCNC: 6.1 G/DL
PROTHROMBIN TIME: 10.7 SEC
RBC # BLD AUTO: 3.34 M/UL
SODIUM SERPL-SCNC: 135 MMOL/L
WBC # BLD AUTO: 17.67 K/UL

## 2017-10-16 PROCEDURE — 85027 COMPLETE CBC AUTOMATED: CPT

## 2017-10-16 PROCEDURE — 27000221 HC OXYGEN, UP TO 24 HOURS

## 2017-10-16 PROCEDURE — 94640 AIRWAY INHALATION TREATMENT: CPT

## 2017-10-16 PROCEDURE — 63600175 PHARM REV CODE 636 W HCPCS: Performed by: PSYCHIATRY & NEUROLOGY

## 2017-10-16 PROCEDURE — 85007 BL SMEAR W/DIFF WBC COUNT: CPT

## 2017-10-16 PROCEDURE — 80053 COMPREHEN METABOLIC PANEL: CPT

## 2017-10-16 PROCEDURE — 99233 SBSQ HOSP IP/OBS HIGH 50: CPT | Mod: ,,, | Performed by: PSYCHIATRY & NEUROLOGY

## 2017-10-16 PROCEDURE — 25000242 PHARM REV CODE 250 ALT 637 W/ HCPCS: Performed by: SURGERY

## 2017-10-16 PROCEDURE — 25000003 PHARM REV CODE 250: Performed by: EMERGENCY MEDICINE

## 2017-10-16 PROCEDURE — 20000000 HC ICU ROOM

## 2017-10-16 PROCEDURE — 85610 PROTHROMBIN TIME: CPT

## 2017-10-16 PROCEDURE — 63600175 PHARM REV CODE 636 W HCPCS: Performed by: NURSE PRACTITIONER

## 2017-10-16 PROCEDURE — 25000003 PHARM REV CODE 250: Performed by: PSYCHIATRY & NEUROLOGY

## 2017-10-16 PROCEDURE — 99900026 HC AIRWAY MAINTENANCE (STAT)

## 2017-10-16 PROCEDURE — 63600175 PHARM REV CODE 636 W HCPCS: Performed by: EMERGENCY MEDICINE

## 2017-10-16 PROCEDURE — 27200966 HC CLOSED SUCTION SYSTEM

## 2017-10-16 PROCEDURE — A4216 STERILE WATER/SALINE, 10 ML: HCPCS | Performed by: EMERGENCY MEDICINE

## 2017-10-16 PROCEDURE — C9399 UNCLASSIFIED DRUGS OR BIOLOG: HCPCS | Performed by: PSYCHIATRY & NEUROLOGY

## 2017-10-16 PROCEDURE — 83735 ASSAY OF MAGNESIUM: CPT

## 2017-10-16 PROCEDURE — 94668 MNPJ CHEST WALL SBSQ: CPT

## 2017-10-16 PROCEDURE — 25000003 PHARM REV CODE 250: Performed by: STUDENT IN AN ORGANIZED HEALTH CARE EDUCATION/TRAINING PROGRAM

## 2017-10-16 PROCEDURE — 63600175 PHARM REV CODE 636 W HCPCS: Performed by: PHYSICIAN ASSISTANT

## 2017-10-16 PROCEDURE — 84100 ASSAY OF PHOSPHORUS: CPT

## 2017-10-16 PROCEDURE — 25000003 PHARM REV CODE 250: Performed by: NURSE PRACTITIONER

## 2017-10-16 PROCEDURE — 25000003 PHARM REV CODE 250: Performed by: PHYSICIAN ASSISTANT

## 2017-10-16 PROCEDURE — 94003 VENT MGMT INPAT SUBQ DAY: CPT

## 2017-10-16 RX ORDER — LEVETIRACETAM 750 MG/1
1500 TABLET ORAL 2 TIMES DAILY
Status: DISCONTINUED | OUTPATIENT
Start: 2017-10-16 | End: 2017-11-15 | Stop reason: HOSPADM

## 2017-10-16 RX ORDER — LISINOPRIL 20 MG/1
40 TABLET ORAL DAILY
Status: DISCONTINUED | OUTPATIENT
Start: 2017-10-17 | End: 2017-10-18

## 2017-10-16 RX ORDER — HYDRALAZINE HYDROCHLORIDE 50 MG/1
50 TABLET, FILM COATED ORAL EVERY 8 HOURS
Status: DISCONTINUED | OUTPATIENT
Start: 2017-10-16 | End: 2017-10-18

## 2017-10-16 RX ADMIN — IPRATROPIUM BROMIDE AND ALBUTEROL SULFATE 3 ML: .5; 3 SOLUTION RESPIRATORY (INHALATION) at 01:10

## 2017-10-16 RX ADMIN — Medication 10 ML: at 09:10

## 2017-10-16 RX ADMIN — BRIVARACETAM 50 MG: 50 INJECTION, SUSPENSION INTRAVENOUS at 08:10

## 2017-10-16 RX ADMIN — CLONAZEPAM 2 MG: 1 TABLET ORAL at 02:10

## 2017-10-16 RX ADMIN — MEROPENEM 2 G: 1 INJECTION, POWDER, FOR SOLUTION INTRAVENOUS at 12:10

## 2017-10-16 RX ADMIN — HYDRALAZINE HYDROCHLORIDE 25 MG: 25 TABLET, FILM COATED ORAL at 12:10

## 2017-10-16 RX ADMIN — SODIUM CHLORIDE TAB 1 GM 3 G: 1 TAB at 02:10

## 2017-10-16 RX ADMIN — HEPARIN SODIUM 7500 UNITS: 5000 INJECTION, SOLUTION INTRAVENOUS; SUBCUTANEOUS at 09:10

## 2017-10-16 RX ADMIN — HYDRALAZINE HYDROCHLORIDE 25 MG: 25 TABLET, FILM COATED ORAL at 05:10

## 2017-10-16 RX ADMIN — MAGNESIUM SULFATE IN WATER 2 G: 40 INJECTION, SOLUTION INTRAVENOUS at 07:10

## 2017-10-16 RX ADMIN — Medication 10 ML: at 11:10

## 2017-10-16 RX ADMIN — GABAPENTIN 200 MG: 400 CAPSULE ORAL at 09:10

## 2017-10-16 RX ADMIN — MEROPENEM 2 G: 1 INJECTION, POWDER, FOR SOLUTION INTRAVENOUS at 03:10

## 2017-10-16 RX ADMIN — CLONAZEPAM 2 MG: 1 TABLET ORAL at 05:10

## 2017-10-16 RX ADMIN — LEVETIRACETAM 1500 MG: 750 TABLET ORAL at 12:10

## 2017-10-16 RX ADMIN — FAMOTIDINE 20 MG: 20 TABLET, FILM COATED ORAL at 09:10

## 2017-10-16 RX ADMIN — LISINOPRIL 20 MG: 20 TABLET ORAL at 09:10

## 2017-10-16 RX ADMIN — AMLODIPINE BESYLATE 10 MG: 10 TABLET ORAL at 09:10

## 2017-10-16 RX ADMIN — CHLORHEXIDINE GLUCONATE 15 ML: 1.2 RINSE ORAL at 09:10

## 2017-10-16 RX ADMIN — IPRATROPIUM BROMIDE AND ALBUTEROL SULFATE 3 ML: .5; 3 SOLUTION RESPIRATORY (INHALATION) at 08:10

## 2017-10-16 RX ADMIN — Medication 10 ML: at 12:10

## 2017-10-16 RX ADMIN — CLONAZEPAM 2 MG: 1 TABLET ORAL at 09:10

## 2017-10-16 RX ADMIN — HEPARIN SODIUM 7500 UNITS: 5000 INJECTION, SOLUTION INTRAVENOUS; SUBCUTANEOUS at 02:10

## 2017-10-16 RX ADMIN — Medication 10 ML: at 05:10

## 2017-10-16 RX ADMIN — ATORVASTATIN CALCIUM 10 MG: 10 TABLET, FILM COATED ORAL at 09:10

## 2017-10-16 RX ADMIN — HEPARIN SODIUM 7500 UNITS: 5000 INJECTION, SOLUTION INTRAVENOUS; SUBCUTANEOUS at 05:10

## 2017-10-16 RX ADMIN — POLYETHYLENE GLYCOL 3350 17 G: 17 POWDER, FOR SOLUTION ORAL at 09:10

## 2017-10-16 RX ADMIN — LEVETIRACETAM 1500 MG: 750 TABLET ORAL at 09:10

## 2017-10-16 RX ADMIN — HYDRALAZINE HYDROCHLORIDE 50 MG: 50 TABLET ORAL at 09:10

## 2017-10-16 RX ADMIN — HYDRALAZINE HYDROCHLORIDE 50 MG: 50 TABLET ORAL at 02:10

## 2017-10-16 RX ADMIN — INSULIN ASPART 1 UNITS: 100 INJECTION, SOLUTION INTRAVENOUS; SUBCUTANEOUS at 12:10

## 2017-10-16 RX ADMIN — SODIUM CHLORIDE TAB 1 GM 3 G: 1 TAB at 09:10

## 2017-10-16 RX ADMIN — SODIUM CHLORIDE TAB 1 GM 3 G: 1 TAB at 05:10

## 2017-10-16 NOTE — PLAN OF CARE
Problem: Patient Care Overview  Goal: Plan of Care Review  Plan of care reviewed with daughter.All questions and concerns addressed.Spontaneous breathing trial tolerated well.No acute distress noted

## 2017-10-16 NOTE — PLAN OF CARE
Problem: Patient Care Overview  Goal: Plan of Care Review  Outcome: Ongoing (interventions implemented as appropriate)  POC reviewed with pt and family at 0500. Pt could not verbalize understanding. Questions and concerns addressed. No acute events today. Pt progressing toward goals. Will continue to monitor. See flowsheets for full assessment and VS info.

## 2017-10-16 NOTE — ASSESSMENT & PLAN NOTE
-- BP frequently in 200s   -- amlodipine 10 mg daily  -- increase lisinopril to 40 mg daily   -- increase hydralazine to 50mg q6h  -- sBP goal < 200  -- labetalol and hydralazine q4 hours PRN for sBP > 200

## 2017-10-16 NOTE — PROCEDURES
ICU EEG/VIDEO MONITORING REPORT    Nisa Frank  [id number]  1950    DATE OF SERVICE: 10/15-16/2017    DATE OF ADMISSION: 10/3/2017 11:18 AM    ADMITTING PROVIDER: Krish Moran DO    REASON FOR CONSULT: 66yo F s/p cardiac arrest    MEDICATIONS:   Current Facility-Administered Medications   Medication    acetaminophen tablet 650 mg    albuterol-ipratropium 2.5mg-0.5mg/3mL nebulizer solution 3 mL    amlodipine tablet 10 mg    atorvastatin tablet 10 mg    atropine injection 0.2 mg    brivaracetam 100 mg in sodium chloride 0.9% 50 mL    brivaracetam 50 mg in sodium chloride 0.9% 25 mL    chlorhexidine 0.12 % solution 15 mL    clonazePAM tablet 2 mg    dextrose 50% injection 12.5 g    dextrose 50% injection 12.5 g    famotidine tablet 20 mg    glucagon (human recombinant) injection 1 mg    heparin (porcine) injection 7,500 Units    hydrALAZINE injection 10 mg    hydrALAZINE tablet 25 mg    insulin aspart pen 1-10 Units    labetalol injection 10 mg    lisinopril tablet 20 mg    magnesium sulfate 2g in water 50mL IVPB (premix)    magnesium sulfate 2g in water 50mL IVPB (premix)    meropenem (MERREM) 2 g in sodium chloride 0.9% 100 mL IVPB    ondansetron injection 4 mg    pneumoc 13-finn conj-dip cr(PF) 0.5 mL    polyethylene glycol packet 17 g    potassium chloride 10% solution 40 mEq    potassium chloride 10% solution 40 mEq    potassium, sodium phosphates 280-160-250 mg packet 2 packet    rocuronium injection 100 mg    sodium chloride 0.9% flush 10 mL    And    sodium chloride 0.9% flush 10 mL    sodium chloride tablet 3 g    zonisamide capsule 200 mg     METHODOLOGY   Electroencephalographic (EEG) recording is with electrodes placed according to the International 10-20 placement system.  Thirty two (32) channels of digital signal are simultaneously recorded from the scalp and may include EKG, EMG, and/or eye monitors.   Recording band pass was 0.1 to 512 hz.  Digital video  recording of the patient is simultaneously recorded with the EEG.  The nursing staff report clinical symptoms and may press an event button when the patient has symptoms of clinical interest to the treating physicians.  EEG and video recording is stored and archived in digital format.  The entire recording is visually reviewed and the times identified by computer analysis as being spikes or seizures are reviewed again.  Activation procedures which include photic stimulation, hyperventilation and instructing patients to perform simple task are done in selected patients.   Compresses spectral analysis (CSA) is also performed on the activity recorded from each individual channel.  This is displayed as a power display of frequencies from 0 to 30 Hz over time.   The CSA analysis is done and displayed continuously.  This is reviewed for asymmetries in power between homologous areas of the scalp and for presence of changes in power which canbe seen when seizures occur.  Sections of suspected abnormalities on the CSA is then compared with the original EEG recording.     trend.ly software was also utilized in the review of this study.  This software suite analyzes the EEG recording in multiple domains.  Coherence and rhythmicity is computed to identify EEG sections which may contain organized seizures.  Each channel undergoes analysis to detect presence of spike and sharp waves which have special and morphological characteristic of epileptic activity.  The routine EEG recording is converted from spacial into frequency domain.  This is then displayed comparing homologous areas to identify areas of significant asymmetry.  Algorithm to identify non-cortically generated artifact is used to separate eye movement, EMG and other artifact from the EEG.      Recording Times  Start on 10/15/17, 15:20  Stop on 10/16/17, 08:00    A total of 16 hours and 36 minutes of EEG was recorded.    EEG FINDINGS  Burst suppression pattern with  prolonged periods of suppression, lasting more than 10 minutes at times, alternating with bursts of 1 second of low amplitude broad triphasic waves seen.    No epileptiform discharges or electrographic seizures were seen.    IMPRESSION:   This is an abnormal C-EEG due to the burst suppression pattern seen, suggestive of severe cerebral dysfunction.  No epileptiform activity or electrographic seizures seen.    CLINICAL CORRELATION IS RECOMMENDED.    Nicky Arguello MD, VICTORINA(), FACNS.  Neurology-Epilepsy.

## 2017-10-16 NOTE — PROGRESS NOTES
[facility]  Neurocritical Care  Progress Note    Admit Date: 10/3/2017  Service Date: 10/16/2017  Length of Stay: 13    Subjective:     Chief Complaint: Seizure    History of Present Illness: Ms. Frank is a 68 yo woman with a PMHx DM type II, CKD stage 3, CAD s/p CABG, essential hypertension, cerebrovascular disease s/p bi-hemispheric watershed infarcts and trach/PEG who present as a transfer from [facility] to Neuro Critical Care s/p Cardiac Arrest on 10/4 and evaluation of Anoxic Brain Injury. She was recently discharged from [facility] 9/28/2017 with bilateral MCA SAQIB watershed infarcts. Yesterday, she was brought in via EMS from [facility] for cardiac arrest. Per nursing homestaff, she received chest compressions for about 5 minutes. After EMS arrived, ACLS protocol continued and she was given 2 rounds of epi, non-shockable rhythm. ROSC achieved en route to the ER.Pt arrived to ICU intubated. It was noted there sudden intermittent generalized body jerks mostly on tactile stimulation. Also, pt was hypotensive for which norepinephrine infusion started. Since her discharge here on 9/28 she has been admitted to multiple facilities recently to be treated for various issues including mucous plug leading to acute hypoxic respiratory failure, she pulled out her trach 9/4 so that was reinserted at [facility] and she was also treated for ESBL UTI while there, and last admit she was treated for likely aspiration pneumonia. She was transported here on Propofol. On examination, no cough, no gag, no corneal and minimal to no withdrawal.       Hospital Course: 10/5: s/p Cardiac Arrest on 10/4 and evaluation of anoxic brain injury   10//7: EEG with burst suppression pattern.  MRI with diffuse diffusion restriction  10/10: family meeting   10/11: SSEP  10/12: worsening myoclonus especially when moved. Valproic acid level undetectable likely due to interactions with meropenem. Patient on meropenem for ESBL  Klebsiella pneumonia sensitive to meropenem.  10/15: No events overnight.   10/16: No issues overnight    Interval History:  No issues overnight.     Review of Systems    Unable to obtain a complete ROS due to level of consciousness.  Objective:     Vitals:  Temp: 98.2 °F (36.8 °C) (10/16/17 1600)  Pulse: 92 (10/16/17 1726)  Resp: (!) 31 (10/16/17 1726)  BP: (!) 162/75 (10/16/17 1700)  SpO2: 100 % (10/16/17 1726)    Temp:  [98 °F (36.7 °C)-98.8 °F (37.1 °C)] 98.2 °F (36.8 °C)  Pulse:  [82-93] 92  Resp:  [10-32] 31  SpO2:  [99 %-100 %] 100 %  BP: (158-190)/(73-89) 162/75         Vent Mode: Spont  Oxygen Concentration (%):  [40] 40  Resp Rate Total:  [20 br/min-31 br/min] 31 br/min  Vt Set:  [400 mL] 400 mL  PEEP/CPAP:  [5 cmH20] 5 cmH20  Pressure Support:  [0 cmH20-10 cmH20] 0 cmH20  Mean Airway Pressure:  [7.1 owE13-89 cmH20] 7.1 cmH20    10/15 0701 - 10/16 0700  In: 1825 [I.V.:250]  Out: 5345 [Urine:5345]    Physical Exam  Physical Exam:  GA: Comatose, comfortable, no acute distress.   HEENT: No scleral icterus or JVD.   Pulmonary: Clear to auscultation A/L. No wheezing, crackles, or rhonchi.  Cardiac: RRR S1 & S2 w/o rubs/murmurs/gallops.   Abdominal: Bowel sounds present x 4.   Skin: No jaundice, rashes, or visible lesions.    Neuro:  --sedation: none  --GCS: [id number]  --Mental Status: Comatose, does not respond to voice. Does not follow commands.  --CN II-XII: Left gaze preference  --Pupils 3mm, PERRL.   --brainstem: cough, gag, corneals intact. +occulocephalics  --Motor: extensor posturing to pain  --sensory: unable to assess  --Reflexes: not tested  --Gait: deferred      Medications:  Continuous Scheduled    albuterol-ipratropium 2.5mg-0.5mg/3mL 3 mL Q6H   amlodipine 10 mg Daily   atorvastatin 10 mg Daily   chlorhexidine 15 mL BID   clonazePAM 2 mg Q8H   famotidine 20 mg BID   heparin (porcine) 7,500 Units Q8H   hydrALAZINE 50 mg Q8H   levetiracetam 1,500 mg BID   [START ON 10/17/2017] lisinopril 40 mg Daily    polyethylene glycol 17 g Daily   sodium chloride 0.9% 10 mL Q6H   sodium chloride 3 g Q8H   zonisamide 200 mg BID   PRN    acetaminophen 650 mg Q6H PRN   atropine 0.2 mg PRN   dextrose 50% 12.5 g PRN   dextrose 50% 12.5 g PRN   glucagon (human recombinant) 1 mg PRN   hydrALAZINE 10 mg Q4H PRN   insulin aspart 1-10 Units Q6H PRN   labetalol 10 mg Q4H PRN   magnesium sulfate IVPB 2 g PRN   magnesium sulfate IVPB 4 g PRN   ondansetron 4 mg Q8H PRN   pneumoc 13-finn conj-dip cr(PF) 0.5 mL vaccine x 1 dose   potassium chloride 10% 40 mEq PRN   potassium chloride 10% 40 mEq PRN   potassium, sodium phosphates 2 packet PRN   rocuronium 100 mg On Call Procedure   sodium chloride 0.9% 10 mL PRN     Labs:  Component      Latest Ref Rng & Units 10/14/2017 10/14/2017 10/14/2017           1:08 AM  1:00 AM  1:00 AM   WBC      3.90 - 12.70 K/uL   16.41 (H)   RBC      4.00 - 5.40 M/uL   3.11 (L)   Hemoglobin      12.0 - 16.0 g/dL   8.5 (L)   Hematocrit      37.0 - 48.5 %   27.4 (L)   MCV      82 - 98 fL   88   MCH      27.0 - 31.0 pg   27.3   MCHC      32.0 - 36.0 g/dL   31.0 (L)   RDW      11.5 - 14.5 %   16.1 (H)   Platelets      150 - 350 K/uL   285   MPV      9.2 - 12.9 fL   9.1 (L)   Lymph #      1.0 - 4.8 K/uL   CANCELED   Mono #      0.3 - 1.0 K/uL   CANCELED   Eos #      0.0 - 0.5 K/uL   CANCELED   Baso #      0.00 - 0.20 K/uL   CANCELED   Gran%      38.0 - 73.0 %   76.0 (H)   Lymph%      18.0 - 48.0 %   9.0 (L)   Mono%      4.0 - 15.0 %   10.0   Eosinophil%      0.0 - 8.0 %   1.0   Basophil%      0.0 - 1.9 %   0.0   BANDS      %   2.0   Metamyelocytes      %   1.0   Myelocytes      %   1.0   Platelet Estimate         Appears normal   Aniso         Slight   Differential Method         Manual   Sodium      136 - 145 mmol/L  140 140   Potassium      3.5 - 5.1 mmol/L  3.6    Chloride      95 - 110 mmol/L  107    CO2      23 - 29 mmol/L  25    Glucose      70 - 110 mg/dL  111 (H)    BUN, Bld      8 - 23 mg/dL  8    Creatinine       0.5 - 1.4 mg/dL  0.5    Calcium      8.7 - 10.5 mg/dL  8.1 (L)    Total Protein      6.0 - 8.4 g/dL  5.3 (L)    Albumin      3.5 - 5.2 g/dL  1.3 (L)    Total Bilirubin      0.1 - 1.0 mg/dL  0.4    Alkaline Phosphatase      55 - 135 U/L  98    AST      10 - 40 U/L  31    ALT      10 - 44 U/L  20    Anion Gap      8 - 16 mmol/L  8    eGFR if African American      >60 mL/min/1.73 m:2  >60.0    eGFR if non African American      >60 mL/min/1.73 m:2  >60.0    Protime      9.0 - 12.5 sec   11.0   Coumadin Monitoring INR      0.8 - 1.2   1.0   Phosphorus      2.7 - 4.5 mg/dL   2.9   POCT Glucose      70 - 110 mg/dL 136 (H)       Imaging:  No new imaging    Assessment/Plan:     Neuro   Anoxic brain injury    S/p cardiac arrest with myoclonic status epilepticus involving bilateral hemispheres on EEG. S/p Valproate with levels undetectable likely due to interactions with meropenem. On Clonazepam with some improvement in myoclonus. Amantadine discontinued.   - clonazapam to 2mg q8 hours  - change brivaracetam to levetiracetam 1500mg q12h  - continue zonisamide 200 mg bid    - EEG negative for focal seizures        Cerebral infarction, watershed distribution, bilateral, acute    -- discharge from [facility] [place] 9/28/2017 with bilateral MCA SAQIB watershed infarct          ENT   Tracheostomy status    -- S/p Trach PEG during previous admission on 9/28/2017        Pulmonary   Klebsiella pneumonia    -- ESBL sensitive to meropenem  -- meropenem started 10/9 x 8 days  -- afebrile  -- WBC stable          Acute on chronic respiratory failure    -remains on mechanical ventilation with trach  -attempt SBT, possible trach collar          Cardiac/Vascular   Cardiac arrest, cause unspecified    -s/p cardiac arrest 10/4 at nursing home          Chronic sinus bradycardia    -intermittent and asymptomatic at this time         S/P CABG (coronary artery bypass graft)    Hx of CABG in 2005        Essential hypertension    -- BP frequently in 200s   --  amlodipine 10 mg daily  -- increase lisinopril to 40 mg daily   -- increase hydralazine to 50mg q6h  -- sBP goal < 200  -- labetalol and hydralazine q4 hours PRN for sBP > 200          Endocrine   Type 2 diabetes mellitus, uncontrolled    -- continue to monitor BG  -- SSI          GI   PEG (percutaneous endoscopic gastrostomy) status    -- Gastrostomy tube place during previous admission on 9/28            Prophylaxis:  Venous Thromboembolism: mechanical chemical  Stress Ulcer: H2B  Ventilator Pneumonia: yes     Activity Orders          Bed rest starting at 10/03 1510        Full Code    Adelina Puckett MD  Neurocritical Care  [facility]

## 2017-10-16 NOTE — PLAN OF CARE
10/16/17 5349   Discharge Reassessment   Assessment Type Discharge Planning Reassessment   Provided patient/caregiver education on the expected discharge date and the discharge plan Yes   Do you have any problems affording any of your prescribed medications? No   Discharge Plan A Long-term acute care facility (LTAC)   Discharge Plan B Long-term acute care facility (LTAC)   Patient choice form signed by patient/caregiver N/A   Can the patient answer the patient profile reliably? No, cognitively impaired   How does the patient rate their overall health at the present time? (neo)   Describe the patient's ability to walk at the present time. Does not walk or unable to take any steps at all   How often would a person be available to care for the patient? Occasionally   Number of comorbid conditions (as recorded on the chart) Four   During the past month, has the patient often been bothered by feeling down, depressed or hopeless? (neo)   During the past month, has the patient often been bothered by little interest or pleasure in doing things? (neo)     Patient not medically stable for discharge.    Shelby Tucker RN, CCRN-K, [facility]  Neuro-Critical Care   [phone]

## 2017-10-16 NOTE — PROGRESS NOTES
Contacted inpatient pharmacy of overdue medication, brivaracetam IVPK, was told that medication is out of stock in the hospital.     0720-Pharmacy called RN and was told that half of the dose was found in the hospital. Relayed information to NCC.

## 2017-10-16 NOTE — SUBJECTIVE & OBJECTIVE
Interval History:  No issues overnight.     Review of Systems    Unable to obtain a complete ROS due to level of consciousness.  Objective:     Vitals:  Temp: 98.2 °F (36.8 °C) (10/16/17 1600)  Pulse: 92 (10/16/17 1726)  Resp: (!) 31 (10/16/17 1726)  BP: (!) 162/75 (10/16/17 1700)  SpO2: 100 % (10/16/17 1726)    Temp:  [98 °F (36.7 °C)-98.8 °F (37.1 °C)] 98.2 °F (36.8 °C)  Pulse:  [82-93] 92  Resp:  [10-32] 31  SpO2:  [99 %-100 %] 100 %  BP: (158-190)/(73-89) 162/75         Vent Mode: Spont  Oxygen Concentration (%):  [40] 40  Resp Rate Total:  [20 br/min-31 br/min] 31 br/min  Vt Set:  [400 mL] 400 mL  PEEP/CPAP:  [5 cmH20] 5 cmH20  Pressure Support:  [0 cmH20-10 cmH20] 0 cmH20  Mean Airway Pressure:  [7.1 xeJ30-98 cmH20] 7.1 cmH20    10/15 0701 - 10/16 0700  In: 1825 [I.V.:250]  Out: 5345 [Urine:5345]    Physical Exam  Physical Exam:  GA: Comatose, comfortable, no acute distress.   HEENT: No scleral icterus or JVD.   Pulmonary: Clear to auscultation A/L. No wheezing, crackles, or rhonchi.  Cardiac: RRR S1 & S2 w/o rubs/murmurs/gallops.   Abdominal: Bowel sounds present x 4.   Skin: No jaundice, rashes, or visible lesions.    Neuro:  --sedation: none  --GCS: [id number]  --Mental Status: Comatose, does not respond to voice. Does not follow commands.  --CN II-XII: Left gaze preference  --Pupils 3mm, PERRL.   --brainstem: cough, gag, corneals intact. +occulocephalics  --Motor: extensor posturing to pain  --sensory: unable to assess  --Reflexes: not tested  --Gait: deferred      Medications:  Continuous Scheduled    albuterol-ipratropium 2.5mg-0.5mg/3mL 3 mL Q6H   amlodipine 10 mg Daily   atorvastatin 10 mg Daily   chlorhexidine 15 mL BID   clonazePAM 2 mg Q8H   famotidine 20 mg BID   heparin (porcine) 7,500 Units Q8H   hydrALAZINE 50 mg Q8H   levetiracetam 1,500 mg BID   [START ON 10/17/2017] lisinopril 40 mg Daily   polyethylene glycol 17 g Daily   sodium chloride 0.9% 10 mL Q6H   sodium chloride 3 g Q8H   zonisamide 200  mg BID   PRN    acetaminophen 650 mg Q6H PRN   atropine 0.2 mg PRN   dextrose 50% 12.5 g PRN   dextrose 50% 12.5 g PRN   glucagon (human recombinant) 1 mg PRN   hydrALAZINE 10 mg Q4H PRN   insulin aspart 1-10 Units Q6H PRN   labetalol 10 mg Q4H PRN   magnesium sulfate IVPB 2 g PRN   magnesium sulfate IVPB 4 g PRN   ondansetron 4 mg Q8H PRN   pneumoc 13-finn conj-dip cr(PF) 0.5 mL vaccine x 1 dose   potassium chloride 10% 40 mEq PRN   potassium chloride 10% 40 mEq PRN   potassium, sodium phosphates 2 packet PRN   rocuronium 100 mg On Call Procedure   sodium chloride 0.9% 10 mL PRN     Labs:  Component      Latest Ref Rng & Units 10/14/2017 10/14/2017 10/14/2017           1:08 AM  1:00 AM  1:00 AM   WBC      3.90 - 12.70 K/uL   16.41 (H)   RBC      4.00 - 5.40 M/uL   3.11 (L)   Hemoglobin      12.0 - 16.0 g/dL   8.5 (L)   Hematocrit      37.0 - 48.5 %   27.4 (L)   MCV      82 - 98 fL   88   MCH      27.0 - 31.0 pg   27.3   MCHC      32.0 - 36.0 g/dL   31.0 (L)   RDW      11.5 - 14.5 %   16.1 (H)   Platelets      150 - 350 K/uL   285   MPV      9.2 - 12.9 fL   9.1 (L)   Lymph #      1.0 - 4.8 K/uL   CANCELED   Mono #      0.3 - 1.0 K/uL   CANCELED   Eos #      0.0 - 0.5 K/uL   CANCELED   Baso #      0.00 - 0.20 K/uL   CANCELED   Gran%      38.0 - 73.0 %   76.0 (H)   Lymph%      18.0 - 48.0 %   9.0 (L)   Mono%      4.0 - 15.0 %   10.0   Eosinophil%      0.0 - 8.0 %   1.0   Basophil%      0.0 - 1.9 %   0.0   BANDS      %   2.0   Metamyelocytes      %   1.0   Myelocytes      %   1.0   Platelet Estimate         Appears normal   [place]         Slight   Differential Method         Manual   Sodium      136 - 145 mmol/L  140 140   Potassium      3.5 - 5.1 mmol/L  3.6    Chloride      95 - 110 mmol/L  107    CO2      23 - 29 mmol/L  25    Glucose      70 - 110 mg/dL  111 (H)    BUN, Bld      8 - 23 mg/dL  8    Creatinine      0.5 - 1.4 mg/dL  0.5    Calcium      8.7 - 10.5 mg/dL  8.1 (L)    Total Protein      6.0 - 8.4 g/dL  5.3  (L)    Albumin      3.5 - 5.2 g/dL  1.3 (L)    Total Bilirubin      0.1 - 1.0 mg/dL  0.4    Alkaline Phosphatase      55 - 135 U/L  98    AST      10 - 40 U/L  31    ALT      10 - 44 U/L  20    Anion Gap      8 - 16 mmol/L  8    eGFR if African American      >60 mL/min/1.73 m:2  >60.0    eGFR if non African American      >60 mL/min/1.73 m:2  >60.0    Protime      9.0 - 12.5 sec   11.0   Coumadin Monitoring INR      0.8 - 1.2   1.0   Phosphorus      2.7 - 4.5 mg/dL   2.9   POCT Glucose      70 - 110 mg/dL 136 (H)       Imaging:  No new imaging

## 2017-10-16 NOTE — ASSESSMENT & PLAN NOTE
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 S/p Valproate with levels undetectable likely due to interactions with meropenem. On Clonazepam with some improvement in myoclonus. Amantadine discontinued.   - clonazapam to 2mg q8 hours  - change brivaracetam to levetiracetam 1500mg q12h  - continue zonisamide 200 mg bid    - EEG negative for focal seizures

## 2017-10-16 NOTE — ASSESSMENT & PLAN NOTE
-- discharge from [facility] 9/28/2017 with bilateral MCA SAQIB watershed infarct

## 2017-10-17 PROBLEM — D63.8 ANEMIA OF CHRONIC DISEASE: Status: ACTIVE | Noted: 2017-10-17

## 2017-10-17 PROBLEM — E87.1 HYPONATREMIA: Status: ACTIVE | Noted: 2017-10-17

## 2017-10-17 PROBLEM — G40.901 STATUS EPILEPTICUS: Status: ACTIVE | Noted: 2017-10-17

## 2017-10-17 PROBLEM — R40.2430 GLASGOW COMA SCALE TOTAL SCORE 3-8: Status: ACTIVE | Noted: 2017-07-07

## 2017-10-17 PROBLEM — D64.9 ANEMIA: Status: ACTIVE | Noted: 2017-10-17

## 2017-10-17 PROBLEM — E87.5 HYPERKALEMIA: Status: ACTIVE | Noted: 2017-06-19

## 2017-10-17 LAB
ALBUMIN SERPL BCP-MCNC: 1.7 G/DL
ALP SERPL-CCNC: 144 U/L
ALT SERPL W/O P-5'-P-CCNC: 32 U/L
ANION GAP SERPL CALC-SCNC: 9 MMOL/L
AST SERPL-CCNC: 38 U/L
BASOPHILS # BLD AUTO: 0.09 K/UL
BASOPHILS NFR BLD: 0.4 %
BILIRUB SERPL-MCNC: 0.4 MG/DL
BUN SERPL-MCNC: 12 MG/DL
CALCIUM SERPL-MCNC: 8.6 MG/DL
CHLORIDE SERPL-SCNC: 105 MMOL/L
CO2 SERPL-SCNC: 19 MMOL/L
CREAT SERPL-MCNC: 0.6 MG/DL
DIFFERENTIAL METHOD: ABNORMAL
EOSINOPHIL # BLD AUTO: 0.2 K/UL
EOSINOPHIL NFR BLD: 1 %
ERYTHROCYTE [DISTWIDTH] IN BLOOD BY AUTOMATED COUNT: 15.9 %
EST. GFR  (AFRICAN AMERICAN): >60 ML/MIN/1.73 M^2
EST. GFR  (NON AFRICAN AMERICAN): >60 ML/MIN/1.73 M^2
GLUCOSE SERPL-MCNC: 157 MG/DL
HCT VFR BLD AUTO: 32 %
HGB BLD-MCNC: 10 G/DL
IMM GRANULOCYTES # BLD AUTO: 0.86 K/UL
IMM GRANULOCYTES NFR BLD AUTO: 4.1 %
INR PPP: 1
LYMPHOCYTES # BLD AUTO: 3.5 K/UL
LYMPHOCYTES NFR BLD: 16.5 %
MCH RBC QN AUTO: 27.5 PG
MCHC RBC AUTO-ENTMCNC: 31.3 G/DL
MCV RBC AUTO: 88 FL
MONOCYTES # BLD AUTO: 1.9 K/UL
MONOCYTES NFR BLD: 9 %
NEUTROPHILS # BLD AUTO: 14.6 K/UL
NEUTROPHILS NFR BLD: 69 %
NRBC BLD-RTO: 0 /100 WBC
PHOSPHATE SERPL-MCNC: 2.8 MG/DL
PLATELET # BLD AUTO: 427 K/UL
PMV BLD AUTO: 9.2 FL
POCT GLUCOSE: 167 MG/DL (ref 70–110)
POCT GLUCOSE: 187 MG/DL (ref 70–110)
POCT GLUCOSE: 222 MG/DL (ref 70–110)
POTASSIUM SERPL-SCNC: 5.2 MMOL/L
POTASSIUM SERPL-SCNC: 5.6 MMOL/L
PROCALCITONIN SERPL IA-MCNC: 0.13 NG/ML
PROT SERPL-MCNC: 6.7 G/DL
PROTHROMBIN TIME: 10.8 SEC
RBC # BLD AUTO: 3.63 M/UL
SODIUM SERPL-SCNC: 133 MMOL/L
WBC # BLD AUTO: 21.12 K/UL

## 2017-10-17 PROCEDURE — 84145 PROCALCITONIN (PCT): CPT

## 2017-10-17 PROCEDURE — 25000242 PHARM REV CODE 250 ALT 637 W/ HCPCS: Performed by: SURGERY

## 2017-10-17 PROCEDURE — 94761 N-INVAS EAR/PLS OXIMETRY MLT: CPT

## 2017-10-17 PROCEDURE — A4216 STERILE WATER/SALINE, 10 ML: HCPCS | Performed by: EMERGENCY MEDICINE

## 2017-10-17 PROCEDURE — 63600175 PHARM REV CODE 636 W HCPCS: Performed by: PHYSICIAN ASSISTANT

## 2017-10-17 PROCEDURE — 25000003 PHARM REV CODE 250: Performed by: STUDENT IN AN ORGANIZED HEALTH CARE EDUCATION/TRAINING PROGRAM

## 2017-10-17 PROCEDURE — 80053 COMPREHEN METABOLIC PANEL: CPT

## 2017-10-17 PROCEDURE — 84100 ASSAY OF PHOSPHORUS: CPT

## 2017-10-17 PROCEDURE — 99233 SBSQ HOSP IP/OBS HIGH 50: CPT | Mod: ,,, | Performed by: PSYCHIATRY & NEUROLOGY

## 2017-10-17 PROCEDURE — 94668 MNPJ CHEST WALL SBSQ: CPT

## 2017-10-17 PROCEDURE — 20000000 HC ICU ROOM

## 2017-10-17 PROCEDURE — 25000003 PHARM REV CODE 250: Performed by: EMERGENCY MEDICINE

## 2017-10-17 PROCEDURE — 25000003 PHARM REV CODE 250: Performed by: PHYSICIAN ASSISTANT

## 2017-10-17 PROCEDURE — 85025 COMPLETE CBC W/AUTO DIFF WBC: CPT

## 2017-10-17 PROCEDURE — 25000003 PHARM REV CODE 250: Performed by: PSYCHIATRY & NEUROLOGY

## 2017-10-17 PROCEDURE — 85610 PROTHROMBIN TIME: CPT

## 2017-10-17 PROCEDURE — 63600175 PHARM REV CODE 636 W HCPCS: Performed by: EMERGENCY MEDICINE

## 2017-10-17 PROCEDURE — 27200966 HC CLOSED SUCTION SYSTEM

## 2017-10-17 PROCEDURE — 94640 AIRWAY INHALATION TREATMENT: CPT

## 2017-10-17 PROCEDURE — 84132 ASSAY OF SERUM POTASSIUM: CPT

## 2017-10-17 PROCEDURE — 99900026 HC AIRWAY MAINTENANCE (STAT)

## 2017-10-17 PROCEDURE — 94003 VENT MGMT INPAT SUBQ DAY: CPT

## 2017-10-17 PROCEDURE — 25000003 PHARM REV CODE 250: Performed by: NURSE PRACTITIONER

## 2017-10-17 PROCEDURE — 27000221 HC OXYGEN, UP TO 24 HOURS

## 2017-10-17 RX ORDER — FUROSEMIDE 10 MG/ML
20 INJECTION INTRAMUSCULAR; INTRAVENOUS ONCE
Status: COMPLETED | OUTPATIENT
Start: 2017-10-17 | End: 2017-10-17

## 2017-10-17 RX ADMIN — SODIUM POLYSTYRENE SULFONATE 15 G: 15 SUSPENSION ORAL; RECTAL at 04:10

## 2017-10-17 RX ADMIN — Medication 10 ML: at 05:10

## 2017-10-17 RX ADMIN — Medication 10 ML: at 12:10

## 2017-10-17 RX ADMIN — CLONAZEPAM 2 MG: 1 TABLET ORAL at 05:10

## 2017-10-17 RX ADMIN — INSULIN ASPART 2 UNITS: 100 INJECTION, SOLUTION INTRAVENOUS; SUBCUTANEOUS at 12:10

## 2017-10-17 RX ADMIN — LISINOPRIL 40 MG: 20 TABLET ORAL at 09:10

## 2017-10-17 RX ADMIN — HYDRALAZINE HYDROCHLORIDE 50 MG: 50 TABLET ORAL at 05:10

## 2017-10-17 RX ADMIN — HYDRALAZINE HYDROCHLORIDE 50 MG: 50 TABLET ORAL at 02:10

## 2017-10-17 RX ADMIN — INSULIN ASPART 2 UNITS: 100 INJECTION, SOLUTION INTRAVENOUS; SUBCUTANEOUS at 05:10

## 2017-10-17 RX ADMIN — FAMOTIDINE 20 MG: 20 TABLET, FILM COATED ORAL at 09:10

## 2017-10-17 RX ADMIN — IPRATROPIUM BROMIDE AND ALBUTEROL SULFATE 3 ML: .5; 3 SOLUTION RESPIRATORY (INHALATION) at 01:10

## 2017-10-17 RX ADMIN — LEVETIRACETAM 1500 MG: 750 TABLET ORAL at 09:10

## 2017-10-17 RX ADMIN — POLYETHYLENE GLYCOL 3350 17 G: 17 POWDER, FOR SOLUTION ORAL at 09:10

## 2017-10-17 RX ADMIN — CHLORHEXIDINE GLUCONATE 15 ML: 1.2 RINSE ORAL at 09:10

## 2017-10-17 RX ADMIN — SODIUM CHLORIDE TAB 1 GM 3 G: 1 TAB at 05:10

## 2017-10-17 RX ADMIN — CLONAZEPAM 2 MG: 1 TABLET ORAL at 09:10

## 2017-10-17 RX ADMIN — INSULIN ASPART 4 UNITS: 100 INJECTION, SOLUTION INTRAVENOUS; SUBCUTANEOUS at 05:10

## 2017-10-17 RX ADMIN — AMLODIPINE BESYLATE 10 MG: 10 TABLET ORAL at 09:10

## 2017-10-17 RX ADMIN — ATORVASTATIN CALCIUM 10 MG: 10 TABLET, FILM COATED ORAL at 09:10

## 2017-10-17 RX ADMIN — HEPARIN SODIUM 7500 UNITS: 5000 INJECTION, SOLUTION INTRAVENOUS; SUBCUTANEOUS at 09:10

## 2017-10-17 RX ADMIN — IPRATROPIUM BROMIDE AND ALBUTEROL SULFATE 3 ML: .5; 3 SOLUTION RESPIRATORY (INHALATION) at 07:10

## 2017-10-17 RX ADMIN — HYDRALAZINE HYDROCHLORIDE 50 MG: 50 TABLET ORAL at 09:10

## 2017-10-17 RX ADMIN — HEPARIN SODIUM 7500 UNITS: 5000 INJECTION, SOLUTION INTRAVENOUS; SUBCUTANEOUS at 02:10

## 2017-10-17 RX ADMIN — FUROSEMIDE 20 MG: 10 INJECTION, SOLUTION INTRAVENOUS at 12:10

## 2017-10-17 RX ADMIN — CLONAZEPAM 2 MG: 1 TABLET ORAL at 02:10

## 2017-10-17 RX ADMIN — SODIUM CHLORIDE TAB 1 GM 3 G: 1 TAB at 02:10

## 2017-10-17 RX ADMIN — GABAPENTIN 200 MG: 400 CAPSULE ORAL at 09:10

## 2017-10-17 RX ADMIN — IPRATROPIUM BROMIDE AND ALBUTEROL SULFATE 3 ML: .5; 3 SOLUTION RESPIRATORY (INHALATION) at 08:10

## 2017-10-17 RX ADMIN — SODIUM CHLORIDE TAB 1 GM 3 G: 1 TAB at 09:10

## 2017-10-17 RX ADMIN — HEPARIN SODIUM 7500 UNITS: 5000 INJECTION, SOLUTION INTRAVENOUS; SUBCUTANEOUS at 05:10

## 2017-10-17 NOTE — PLAN OF CARE
SW attempted to meet with family at bedside, no one was present. Contacted the Pt daughter to discuss discharge planning. Pt was sent to [facility] after her previous stay here and the family did like it there. She is not sure they want her to go there or somewhere else. Reported that the list of LTAC are in the chart. She will be at [facility] later tonight and will ask the RN for the list. She will call with choices when she has reviewed it with her family.     Susan España, DIONNE  Neurocritical Care   [facility]  [id number]

## 2017-10-17 NOTE — PLAN OF CARE
Problem: Patient Care Overview  Goal: Plan of Care Review  Outcome: Ongoing (interventions implemented as appropriate)  POC reviewed with pt at 0500. Pt could not  verbalize understanding due to condition. Questions and concerns not addressed. No acute events overnight. Pt progressing toward goals. Will continue to monitor. See flowsheets for full assessment and VS info

## 2017-10-17 NOTE — PROGRESS NOTES
[facility]  Neurocritical Care  Progress Note    Admit Date: 10/3/2017  Service Date: 10/17/2017  Length of Stay: 14    Subjective:     Chief Complaint: Seizure    Interval History: WBC rising. No fevers. Myoclonus controlled.    Review of Systems: Unable to obtain a complete ROS due to level of consciousness.     Vitals:   Temp: 97.9 °F (36.6 °C) (10/17/17 0700)  Pulse: 89 (10/17/17 0858)  Resp: 20 (10/17/17 0858)  BP: (!) 164/78 (10/17/17 0800)  SpO2: 97 % (10/17/17 0858)    Temp:  [97.1 °F (36.2 °C)-98.2 °F (36.8 °C)] 97.9 °F (36.6 °C)  Pulse:  [82-97] 89  Resp:  [20-42] 20  SpO2:  [97 %-100 %] 97 %  BP: (151-184)/(72-92) 164/78    Vent Mode: A/C  Oxygen Concentration (%):  [40] 40  Resp Rate Total:  [20 br/min-31 br/min] 20 br/min  Vt Set:  [400 mL] 400 mL  PEEP/CPAP:  [5 cmH20] 5 cmH20  Pressure Support:  [0 cmH20-10 cmH20] 0 cmH20  Mean Airway Pressure:  [7.1 mvT49-10 cmH20] 11 cmH20    10/16 0701 - 10/17 0700  In: 2175 [I.V.:175]  Out: 4965 [Urine:4620; Drains:20]     Examination:   Constitutional: Obese woman with chronic critical illness.  Eyes: Conjunctiva clear, anicteric. Lids no lesions.  Head/Ears/Nose/Mouth/Throat/Neck: Moist mucous membranes. External ears, nose atraumatic. + trach.  Cardiovascular: Regular rhythm. No murmurs. No leg edema.  Respiratory: Comfortable respirations. Clear to auscultation. + PEG.  Gastrointestinal: No hernia. Soft, nondistended, nontender. + bowel sounds.    Neurologic:  -GCS [id number]  -Does not open eyes to pain. Follows no commands.  -Cranial nerves intact, particularly pupils reactive, corneals present, oculocephalics intact, cough present.  -Motor to pain flexes in arms, legs.  -Sensation above.  Unable to test orientation, language, memory, judgment, insight, fund of knowledge, hearing, shoulder shrug, tongue protrusion, coordination, gait due to level of consciousness.    Medications:   Continuous Scheduled  albuterol-ipratropium 2.5mg-0.5mg/3mL  3 mL Q6H   amlodipine 10 mg Daily   atorvastatin 10 mg Daily   chlorhexidine 15 mL BID   clonazePAM 2 mg Q8H   famotidine 20 mg BID   heparin (porcine) 7,500 Units Q8H   hydrALAZINE 50 mg Q8H   levetiracetam 1,500 mg BID   lisinopril 40 mg Daily   polyethylene glycol 17 g Daily   sodium chloride 0.9% 10 mL Q6H   sodium chloride 3 g Q8H   zonisamide 200 mg BID   PRN  acetaminophen 650 mg Q6H PRN   dextrose 50% 12.5 g PRN   dextrose 50% 12.5 g PRN   glucagon (human recombinant) 1 mg PRN   hydrALAZINE 10 mg Q4H PRN   insulin aspart 1-10 Units Q6H PRN   labetalol 10 mg Q4H PRN   magnesium sulfate IVPB 2 g PRN   magnesium sulfate IVPB 4 g PRN   ondansetron 4 mg Q8H PRN   pneumoc 13-finn conj-dip cr(PF) 0.5 mL vaccine x 1 dose   potassium chloride 10% 40 mEq PRN   potassium chloride 10% 40 mEq PRN   potassium, sodium phosphates 2 packet PRN   sodium chloride 0.9% 10 mL PRN      Today I independently reviewed pertinent medications, lines/drains/airways, lab results, microbiology results, notably:      W 21<- 18, Na 133, K 5.6    Assessment/Plan:     Neuro   Status epilepticus    -myoclonic from anoxic brain injury  -clonazepam, levetiracetam, zonisamide        Anoxic brain injury    -from cardiac arrest        [place] coma scale total score 3-8    -from anoxic brain injury        Pulmonary   Acute on chronic respiratory failure    -lung protective ventilation  -attempt SBT, possible trach collar          Cardiac/Vascular   Cardiac arrest, cause unspecified    -apparently respiratory in origin        Essential hypertension    --200  -lisinopril, hydralazine, amlodipine        Renal/   Hyponatremia    -salt tabs        Hyperkalemia    -pm K  -furosemide 20 mg IV        Oncology   Anemia of chronic disease    -HGB >7        Leukocytosis    -check procalcitonin  -check C diff given diarrhea, leukocytosis        Endocrine   Type 2 diabetes mellitus, uncontrolled    -ISS        GI    -TF            Prophylaxis:  Venous  Thromboembolism: mechanical chemical  Stress Ulcer: H2B  Ventilator Pneumonia: yes     Activity Orders          Bed rest starting at 10/03 1510        Full Code    Israel Herring MD  Neurocritical Care  [facility]

## 2017-10-18 LAB
ALBUMIN SERPL BCP-MCNC: 1.8 G/DL
ALP SERPL-CCNC: 149 U/L
ALT SERPL W/O P-5'-P-CCNC: 39 U/L
ANION GAP SERPL CALC-SCNC: 8 MMOL/L
ANISOCYTOSIS BLD QL SMEAR: SLIGHT
AST SERPL-CCNC: 32 U/L
BASOPHILS NFR BLD: 0 %
BILIRUB SERPL-MCNC: 0.4 MG/DL
BUN SERPL-MCNC: 15 MG/DL
CALCIUM SERPL-MCNC: 9.1 MG/DL
CHLORIDE SERPL-SCNC: 104 MMOL/L
CO2 SERPL-SCNC: 23 MMOL/L
CREAT SERPL-MCNC: 0.6 MG/DL
DIFFERENTIAL METHOD: ABNORMAL
EOSINOPHIL NFR BLD: 0 %
ERYTHROCYTE [DISTWIDTH] IN BLOOD BY AUTOMATED COUNT: 16.1 %
EST. GFR  (AFRICAN AMERICAN): >60 ML/MIN/1.73 M^2
EST. GFR  (NON AFRICAN AMERICAN): >60 ML/MIN/1.73 M^2
GIANT PLATELETS BLD QL SMEAR: PRESENT
GLUCOSE SERPL-MCNC: 167 MG/DL
HCT VFR BLD AUTO: 30.9 %
HGB BLD-MCNC: 9.5 G/DL
HYPOCHROMIA BLD QL SMEAR: ABNORMAL
IMM GRANULOCYTES # BLD AUTO: ABNORMAL K/UL
IMM GRANULOCYTES NFR BLD AUTO: ABNORMAL %
INR PPP: 1
LYMPHOCYTES NFR BLD: 9 %
MCH RBC QN AUTO: 27.1 PG
MCHC RBC AUTO-ENTMCNC: 30.7 G/DL
MCV RBC AUTO: 88 FL
METAMYELOCYTES NFR BLD MANUAL: 1 %
MONOCYTES NFR BLD: 9 %
NEUTROPHILS NFR BLD: 80 %
NEUTS BAND NFR BLD MANUAL: 1 %
NRBC BLD-RTO: 0 /100 WBC
OVALOCYTES BLD QL SMEAR: ABNORMAL
PHOSPHATE SERPL-MCNC: 3.4 MG/DL
PLATELET # BLD AUTO: 408 K/UL
PLATELET BLD QL SMEAR: ABNORMAL
PMV BLD AUTO: 9.8 FL
POCT GLUCOSE: 143 MG/DL (ref 70–110)
POCT GLUCOSE: 168 MG/DL (ref 70–110)
POCT GLUCOSE: 177 MG/DL (ref 70–110)
POCT GLUCOSE: 185 MG/DL (ref 70–110)
POIKILOCYTOSIS BLD QL SMEAR: SLIGHT
POLYCHROMASIA BLD QL SMEAR: ABNORMAL
POTASSIUM SERPL-SCNC: 5.5 MMOL/L
POTASSIUM SERPL-SCNC: 5.7 MMOL/L
PROT SERPL-MCNC: 6.9 G/DL
PROTHROMBIN TIME: 10.9 SEC
RBC # BLD AUTO: 3.51 M/UL
SODIUM SERPL-SCNC: 135 MMOL/L
WBC # BLD AUTO: 20.64 K/UL

## 2017-10-18 PROCEDURE — 25000003 PHARM REV CODE 250: Performed by: EMERGENCY MEDICINE

## 2017-10-18 PROCEDURE — 27000221 HC OXYGEN, UP TO 24 HOURS

## 2017-10-18 PROCEDURE — 25000003 PHARM REV CODE 250: Performed by: PHYSICIAN ASSISTANT

## 2017-10-18 PROCEDURE — 99233 SBSQ HOSP IP/OBS HIGH 50: CPT | Mod: ,,, | Performed by: PSYCHIATRY & NEUROLOGY

## 2017-10-18 PROCEDURE — 27200966 HC CLOSED SUCTION SYSTEM

## 2017-10-18 PROCEDURE — 20000000 HC ICU ROOM

## 2017-10-18 PROCEDURE — 25000003 PHARM REV CODE 250: Performed by: STUDENT IN AN ORGANIZED HEALTH CARE EDUCATION/TRAINING PROGRAM

## 2017-10-18 PROCEDURE — 25000003 PHARM REV CODE 250: Performed by: PSYCHIATRY & NEUROLOGY

## 2017-10-18 PROCEDURE — 94003 VENT MGMT INPAT SUBQ DAY: CPT

## 2017-10-18 PROCEDURE — 63600175 PHARM REV CODE 636 W HCPCS: Performed by: PSYCHIATRY & NEUROLOGY

## 2017-10-18 PROCEDURE — 80053 COMPREHEN METABOLIC PANEL: CPT

## 2017-10-18 PROCEDURE — 85610 PROTHROMBIN TIME: CPT

## 2017-10-18 PROCEDURE — 94640 AIRWAY INHALATION TREATMENT: CPT

## 2017-10-18 PROCEDURE — 63600175 PHARM REV CODE 636 W HCPCS: Performed by: EMERGENCY MEDICINE

## 2017-10-18 PROCEDURE — 85025 COMPLETE CBC W/AUTO DIFF WBC: CPT

## 2017-10-18 PROCEDURE — 94668 MNPJ CHEST WALL SBSQ: CPT

## 2017-10-18 PROCEDURE — 99900026 HC AIRWAY MAINTENANCE (STAT)

## 2017-10-18 PROCEDURE — 25000242 PHARM REV CODE 250 ALT 637 W/ HCPCS: Performed by: SURGERY

## 2017-10-18 PROCEDURE — 84100 ASSAY OF PHOSPHORUS: CPT

## 2017-10-18 PROCEDURE — A4216 STERILE WATER/SALINE, 10 ML: HCPCS | Performed by: EMERGENCY MEDICINE

## 2017-10-18 PROCEDURE — 94761 N-INVAS EAR/PLS OXIMETRY MLT: CPT

## 2017-10-18 PROCEDURE — 84132 ASSAY OF SERUM POTASSIUM: CPT

## 2017-10-18 PROCEDURE — 63600175 PHARM REV CODE 636 W HCPCS: Performed by: PHYSICIAN ASSISTANT

## 2017-10-18 RX ORDER — FUROSEMIDE 10 MG/ML
20 INJECTION INTRAMUSCULAR; INTRAVENOUS ONCE
Status: COMPLETED | OUTPATIENT
Start: 2017-10-18 | End: 2017-10-18

## 2017-10-18 RX ORDER — LISINOPRIL 20 MG/1
20 TABLET ORAL DAILY
Status: DISCONTINUED | OUTPATIENT
Start: 2017-10-18 | End: 2017-10-19

## 2017-10-18 RX ADMIN — FAMOTIDINE 20 MG: 20 TABLET, FILM COATED ORAL at 09:10

## 2017-10-18 RX ADMIN — SODIUM CHLORIDE TAB 1 GM 3 G: 1 TAB at 09:10

## 2017-10-18 RX ADMIN — SODIUM POLYSTYRENE SULFONATE 15 G: 15 SUSPENSION ORAL; RECTAL at 08:10

## 2017-10-18 RX ADMIN — IPRATROPIUM BROMIDE AND ALBUTEROL SULFATE 3 ML: .5; 3 SOLUTION RESPIRATORY (INHALATION) at 07:10

## 2017-10-18 RX ADMIN — GABAPENTIN 200 MG: 400 CAPSULE ORAL at 09:10

## 2017-10-18 RX ADMIN — LEVETIRACETAM 1500 MG: 750 TABLET ORAL at 09:10

## 2017-10-18 RX ADMIN — Medication 10 ML: at 12:10

## 2017-10-18 RX ADMIN — IPRATROPIUM BROMIDE AND ALBUTEROL SULFATE 3 ML: .5; 3 SOLUTION RESPIRATORY (INHALATION) at 12:10

## 2017-10-18 RX ADMIN — CLONAZEPAM 2 MG: 1 TABLET ORAL at 01:10

## 2017-10-18 RX ADMIN — IPRATROPIUM BROMIDE AND ALBUTEROL SULFATE 6 ML: .5; 3 SOLUTION RESPIRATORY (INHALATION) at 02:10

## 2017-10-18 RX ADMIN — HYDRALAZINE HYDROCHLORIDE 75 MG: 50 TABLET ORAL at 09:10

## 2017-10-18 RX ADMIN — SODIUM CHLORIDE TAB 1 GM 3 G: 1 TAB at 01:10

## 2017-10-18 RX ADMIN — CLONAZEPAM 2 MG: 1 TABLET ORAL at 09:10

## 2017-10-18 RX ADMIN — FUROSEMIDE 20 MG: 10 INJECTION, SOLUTION INTRAVENOUS at 09:10

## 2017-10-18 RX ADMIN — CHLORHEXIDINE GLUCONATE 15 ML: 1.2 RINSE ORAL at 09:10

## 2017-10-18 RX ADMIN — HEPARIN SODIUM 7500 UNITS: 5000 INJECTION, SOLUTION INTRAVENOUS; SUBCUTANEOUS at 05:10

## 2017-10-18 RX ADMIN — SODIUM CHLORIDE TAB 1 GM 3 G: 1 TAB at 05:10

## 2017-10-18 RX ADMIN — HEPARIN SODIUM 7500 UNITS: 5000 INJECTION, SOLUTION INTRAVENOUS; SUBCUTANEOUS at 01:10

## 2017-10-18 RX ADMIN — Medication 10 ML: at 06:10

## 2017-10-18 RX ADMIN — LISINOPRIL 20 MG: 20 TABLET ORAL at 08:10

## 2017-10-18 RX ADMIN — GABAPENTIN 200 MG: 400 CAPSULE ORAL at 08:10

## 2017-10-18 RX ADMIN — INSULIN ASPART 1 UNITS: 100 INJECTION, SOLUTION INTRAVENOUS; SUBCUTANEOUS at 12:10

## 2017-10-18 RX ADMIN — FUROSEMIDE 20 MG: 10 INJECTION, SOLUTION INTRAVENOUS at 10:10

## 2017-10-18 RX ADMIN — SODIUM CHLORIDE 1000 ML: 0.9 INJECTION, SOLUTION INTRAVENOUS at 10:10

## 2017-10-18 RX ADMIN — ATORVASTATIN CALCIUM 10 MG: 10 TABLET, FILM COATED ORAL at 08:10

## 2017-10-18 RX ADMIN — AMLODIPINE BESYLATE 10 MG: 10 TABLET ORAL at 08:10

## 2017-10-18 RX ADMIN — INSULIN ASPART 2 UNITS: 100 INJECTION, SOLUTION INTRAVENOUS; SUBCUTANEOUS at 01:10

## 2017-10-18 RX ADMIN — LEVETIRACETAM 1500 MG: 750 TABLET ORAL at 08:10

## 2017-10-18 RX ADMIN — FAMOTIDINE 20 MG: 20 TABLET, FILM COATED ORAL at 08:10

## 2017-10-18 RX ADMIN — CHLORHEXIDINE GLUCONATE 15 ML: 1.2 RINSE ORAL at 08:10

## 2017-10-18 RX ADMIN — INSULIN ASPART 2 UNITS: 100 INJECTION, SOLUTION INTRAVENOUS; SUBCUTANEOUS at 06:10

## 2017-10-18 RX ADMIN — HYDRALAZINE HYDROCHLORIDE 50 MG: 50 TABLET ORAL at 05:10

## 2017-10-18 RX ADMIN — HEPARIN SODIUM 7500 UNITS: 5000 INJECTION, SOLUTION INTRAVENOUS; SUBCUTANEOUS at 09:10

## 2017-10-18 RX ADMIN — HYDRALAZINE HYDROCHLORIDE 75 MG: 50 TABLET ORAL at 01:10

## 2017-10-18 RX ADMIN — CLONAZEPAM 2 MG: 1 TABLET ORAL at 05:10

## 2017-10-18 NOTE — PROGRESS NOTES
[facility]  Neurocritical Care  Progress Note    Admit Date: 10/3/2017  Service Date: 10/18/2017  Length of Stay: 15    Subjective:     Chief Complaint: Seizure    Interval History: K high again-> Kayexalate. W slightly decreased. Tolerated SBT.    Review of Systems: Unable to obtain a complete ROS due to level of consciousness.     Vitals:   Temp: 98.3 °F (36.8 °C) (10/18/17 0302)  Pulse: 105 (10/18/17 0723)  Resp: (!) 31 (10/18/17 0723)  BP: (!) 150/74 (10/18/17 0705)  SpO2: 100 % (10/18/17 0723)    Temp:  [96.6 °F (35.9 °C)-98.3 °F (36.8 °C)] 98.3 °F (36.8 °C)  Pulse:  [] 105  Resp:  [14-36] 31  SpO2:  [84 %-100 %] 100 %  BP: (143-200)/(70-96) 150/74    Vent Mode: A/C  Oxygen Concentration (%):  [40] 40  Resp Rate Total:  [20 br/min-33 br/min] 33 br/min  Vt Set:  [400 mL] 400 mL  PEEP/CPAP:  [5 cmH20] 5 cmH20  Pressure Support:  [0 cmH20] 0 cmH20  Mean Airway Pressure:  [6.7 nkI97-91 cmH20] 9 cmH20    10/17 0701 - 10/18 0700  In: 1210 [I.V.:115]  Out: 2785 [Urine:2185]     Examination:   Constitutional: Obese woman with chronic critical illness.  Eyes: Conjunctiva clear, anicteric. Lids no lesions.  Head/Ears/Nose/Mouth/Throat/Neck: Moist mucous membranes. External ears, nose atraumatic. + trach.  Cardiovascular: Regular rhythm. No murmurs. No leg edema.  Respiratory: Comfortable respirations. Clear to auscultation. + PEG.  Gastrointestinal: No hernia. Soft, nondistended, nontender. + bowel sounds.     Neurologic:  -GCS [id number]  -Does not open eyes to pain. Follows no commands.  -Cranial nerves intact, particularly pupils reactive, corneals present, oculocephalics intact, cough present.  -Motor to pain flexes in arms, legs.  -Sensation above.  Unable to test orientation, language, memory, judgment, insight, fund of knowledge, hearing, shoulder shrug, tongue protrusion, coordination, gait due to level of consciousness.    Medications:   Scheduled  albuterol-ipratropium 2.5mg-0.5mg/3mL 3  mL Q6H   amlodipine 10 mg Daily   atorvastatin 10 mg Daily   chlorhexidine 15 mL BID   clonazePAM 2 mg Q8H   famotidine 20 mg BID   heparin (porcine) 7,500 Units Q8H   hydrALAZINE 50 mg Q8H   levetiracetam 1,500 mg BID   lisinopril 40 mg Daily   sodium chloride 0.9% 10 mL Q6H   sodium chloride 3 g Q8H   sodium polystyrene 15 g Once   zonisamide 200 mg BID   PRN  acetaminophen 650 mg Q6H PRN   dextrose 50% 12.5 g PRN   dextrose 50% 12.5 g PRN   glucagon (human recombinant) 1 mg PRN   hydrALAZINE 10 mg Q4H PRN   insulin aspart 1-10 Units Q6H PRN   labetalol 10 mg Q4H PRN   magnesium sulfate IVPB 2 g PRN   magnesium sulfate IVPB 4 g PRN   ondansetron 4 mg Q8H PRN   pneumoc 13-finn conj-dip cr(PF) 0.5 mL vaccine x 1 dose   potassium chloride 10% 40 mEq PRN   potassium chloride 10% 40 mEq PRN   potassium, sodium phosphates 2 packet PRN   sodium chloride 0.9% 10 mL PRN      Today I independently reviewed pertinent medications, lines/drains/airways, lab results, microbiology results, notably:      W 21, HGB 9.5, K 5.7, Na 135, procalcitonin low    Assessment/Plan:     Neuro   Status epilepticus    -myoclonic from anoxic brain injury  -clonazepam, levetiracetam, zonisamide        Anoxic brain injury    -from cardiac arrest        [place] coma scale total score 3-8    -from anoxic brain injury        Pulmonary   Acute on chronic respiratory failure    -lung protective ventilation  -attempt TCM        Cardiac/Vascular   Essential hypertension    --200  -down lisinopril 20 mg given hyperkalemia  -up hydralazine to 75 mg q8h  -amlodipine        Renal/   Hyperkalemia    -pm K  -kayexalate, furosemide        Oncology   Anemia of chronic disease    -HGB >7        Leukocytosis    -stable to decreasing        GI    -TF          Prophylaxis:  Venous Thromboembolism: mechanical chemical  Stress Ulcer: H2B  Ventilator Pneumonia: yes     Activity Orders          Bed rest starting at 10/03 1510        Full Code    Israel BLAIR  MD Nevaeh  Neurocritical Care  [facility]

## 2017-10-18 NOTE — PLAN OF CARE
Problem: Patient Care Overview  Goal: Plan of Care Review  Outcome: Ongoing (interventions implemented as appropriate)  POC reviewed with Nisa Frank at 0200. Pt unable to verbalize understanding d/t global aphasia and unresponsiveness. No acute events overnight. Pt progressing toward goals. Will continue to monitor. See flowsheets for full assessment and VS info.  Contact precautions for ESBL in sputum maintained throughout the night. TF @ 45 which is goal. Awaiting family decision from conference with Dr. Zendejas. LTAC placement or palliative care.

## 2017-10-18 NOTE — PLAN OF CARE
Problem: Patient Care Overview  Goal: Plan of Care Review  Outcome: Ongoing (interventions implemented as appropriate)  POC reviewed with pt at 1430. Pt unable to verbalize understanding due to unresponsive state. Pt tolerating trach collar well. Contact precautions maintained for ESBL in sputum. No acute events today. Will continue to monitor. See flowsheets for full assessment and VS info.

## 2017-10-19 PROBLEM — G40.901 STATUS EPILEPTICUS: Status: ACTIVE | Noted: 2017-10-19

## 2017-10-19 PROBLEM — D63.8 ANEMIA OF CHRONIC DISEASE: Status: ACTIVE | Noted: 2017-10-19

## 2017-10-19 PROBLEM — E87.1 HYPONATREMIA: Status: ACTIVE | Noted: 2017-10-19

## 2017-10-19 LAB
ALBUMIN SERPL BCP-MCNC: 1.8 G/DL
ALP SERPL-CCNC: 149 U/L
ALT SERPL W/O P-5'-P-CCNC: 34 U/L
ANION GAP SERPL CALC-SCNC: 7 MMOL/L
ANISOCYTOSIS BLD QL SMEAR: SLIGHT
AST SERPL-CCNC: 28 U/L
BASO STIPL BLD QL SMEAR: ABNORMAL
BASOPHILS # BLD AUTO: 0.04 K/UL
BASOPHILS NFR BLD: 0.2 %
BILIRUB SERPL-MCNC: 0.3 MG/DL
BUN SERPL-MCNC: 19 MG/DL
CALCIUM SERPL-MCNC: 8.8 MG/DL
CHLORIDE SERPL-SCNC: 107 MMOL/L
CO2 SERPL-SCNC: 23 MMOL/L
CREAT SERPL-MCNC: 0.7 MG/DL
DIFFERENTIAL METHOD: ABNORMAL
EOSINOPHIL # BLD AUTO: 0.2 K/UL
EOSINOPHIL NFR BLD: 0.9 %
ERYTHROCYTE [DISTWIDTH] IN BLOOD BY AUTOMATED COUNT: 16.2 %
EST. GFR  (AFRICAN AMERICAN): >60 ML/MIN/1.73 M^2
EST. GFR  (NON AFRICAN AMERICAN): >60 ML/MIN/1.73 M^2
GLUCOSE SERPL-MCNC: 178 MG/DL
HCT VFR BLD AUTO: 28.4 %
HGB BLD-MCNC: 8.7 G/DL
HYPOCHROMIA BLD QL SMEAR: ABNORMAL
IMM GRANULOCYTES # BLD AUTO: 0.49 K/UL
IMM GRANULOCYTES NFR BLD AUTO: 2.8 %
LYMPHOCYTES # BLD AUTO: 3.6 K/UL
LYMPHOCYTES NFR BLD: 20 %
MAGNESIUM SERPL-MCNC: 1.5 MG/DL
MAGNESIUM SERPL-MCNC: 2.3 MG/DL
MCH RBC QN AUTO: 27.4 PG
MCHC RBC AUTO-ENTMCNC: 30.6 G/DL
MCV RBC AUTO: 90 FL
MONOCYTES # BLD AUTO: 2.2 K/UL
MONOCYTES NFR BLD: 12.1 %
NEUTROPHILS # BLD AUTO: 11.4 K/UL
NEUTROPHILS NFR BLD: 64 %
NRBC BLD-RTO: 0 /100 WBC
PHOSPHATE SERPL-MCNC: 3.6 MG/DL
PLATELET # BLD AUTO: 356 K/UL
PLATELET BLD QL SMEAR: ABNORMAL
PMV BLD AUTO: 10 FL
POCT GLUCOSE: 176 MG/DL (ref 70–110)
POCT GLUCOSE: 198 MG/DL (ref 70–110)
POCT GLUCOSE: 202 MG/DL (ref 70–110)
POLYCHROMASIA BLD QL SMEAR: ABNORMAL
POTASSIUM SERPL-SCNC: 5.2 MMOL/L
POTASSIUM SERPL-SCNC: 5.3 MMOL/L
PROT SERPL-MCNC: 6.4 G/DL
RBC # BLD AUTO: 3.17 M/UL
SODIUM SERPL-SCNC: 137 MMOL/L
WBC # BLD AUTO: 17.77 K/UL

## 2017-10-19 PROCEDURE — 80053 COMPREHEN METABOLIC PANEL: CPT

## 2017-10-19 PROCEDURE — 85025 COMPLETE CBC W/AUTO DIFF WBC: CPT

## 2017-10-19 PROCEDURE — 63600175 PHARM REV CODE 636 W HCPCS: Performed by: EMERGENCY MEDICINE

## 2017-10-19 PROCEDURE — 25000003 PHARM REV CODE 250: Performed by: STUDENT IN AN ORGANIZED HEALTH CARE EDUCATION/TRAINING PROGRAM

## 2017-10-19 PROCEDURE — 27000221 HC OXYGEN, UP TO 24 HOURS

## 2017-10-19 PROCEDURE — 99233 SBSQ HOSP IP/OBS HIGH 50: CPT | Mod: ,,, | Performed by: PSYCHIATRY & NEUROLOGY

## 2017-10-19 PROCEDURE — 63600175 PHARM REV CODE 636 W HCPCS: Performed by: NURSE PRACTITIONER

## 2017-10-19 PROCEDURE — 20000000 HC ICU ROOM

## 2017-10-19 PROCEDURE — 99900026 HC AIRWAY MAINTENANCE (STAT)

## 2017-10-19 PROCEDURE — 25000003 PHARM REV CODE 250: Performed by: PSYCHIATRY & NEUROLOGY

## 2017-10-19 PROCEDURE — 97803 MED NUTRITION INDIV SUBSEQ: CPT

## 2017-10-19 PROCEDURE — A4216 STERILE WATER/SALINE, 10 ML: HCPCS | Performed by: EMERGENCY MEDICINE

## 2017-10-19 PROCEDURE — 84100 ASSAY OF PHOSPHORUS: CPT

## 2017-10-19 PROCEDURE — 94761 N-INVAS EAR/PLS OXIMETRY MLT: CPT

## 2017-10-19 PROCEDURE — 94640 AIRWAY INHALATION TREATMENT: CPT

## 2017-10-19 PROCEDURE — 84132 ASSAY OF SERUM POTASSIUM: CPT

## 2017-10-19 PROCEDURE — 25000003 PHARM REV CODE 250: Performed by: PHYSICIAN ASSISTANT

## 2017-10-19 PROCEDURE — 27200966 HC CLOSED SUCTION SYSTEM

## 2017-10-19 PROCEDURE — 94668 MNPJ CHEST WALL SBSQ: CPT

## 2017-10-19 PROCEDURE — 25000242 PHARM REV CODE 250 ALT 637 W/ HCPCS: Performed by: SURGERY

## 2017-10-19 PROCEDURE — 25000003 PHARM REV CODE 250: Performed by: EMERGENCY MEDICINE

## 2017-10-19 PROCEDURE — 83735 ASSAY OF MAGNESIUM: CPT

## 2017-10-19 RX ORDER — HYDRALAZINE HYDROCHLORIDE 50 MG/1
100 TABLET, FILM COATED ORAL EVERY 8 HOURS
Status: DISCONTINUED | OUTPATIENT
Start: 2017-10-19 | End: 2017-11-15 | Stop reason: HOSPADM

## 2017-10-19 RX ORDER — CARVEDILOL 6.25 MG/1
6.25 TABLET ORAL 2 TIMES DAILY
Status: DISCONTINUED | OUTPATIENT
Start: 2017-10-19 | End: 2017-10-30

## 2017-10-19 RX ADMIN — CHLORHEXIDINE GLUCONATE 15 ML: 1.2 RINSE ORAL at 09:10

## 2017-10-19 RX ADMIN — Medication 10 ML: at 06:10

## 2017-10-19 RX ADMIN — MAGNESIUM SULFATE IN WATER 2 G: 40 INJECTION, SOLUTION INTRAVENOUS at 03:10

## 2017-10-19 RX ADMIN — IPRATROPIUM BROMIDE AND ALBUTEROL SULFATE 3 ML: .5; 3 SOLUTION RESPIRATORY (INHALATION) at 08:10

## 2017-10-19 RX ADMIN — CLONAZEPAM 2 MG: 1 TABLET ORAL at 06:10

## 2017-10-19 RX ADMIN — HYDRALAZINE HYDROCHLORIDE 100 MG: 50 TABLET ORAL at 09:10

## 2017-10-19 RX ADMIN — CARVEDILOL 6.25 MG: 6.25 TABLET, FILM COATED ORAL at 09:10

## 2017-10-19 RX ADMIN — HYDRALAZINE HYDROCHLORIDE 75 MG: 50 TABLET ORAL at 06:10

## 2017-10-19 RX ADMIN — INSULIN ASPART 2 UNITS: 100 INJECTION, SOLUTION INTRAVENOUS; SUBCUTANEOUS at 06:10

## 2017-10-19 RX ADMIN — SODIUM CHLORIDE TAB 1 GM 3 G: 1 TAB at 01:10

## 2017-10-19 RX ADMIN — FAMOTIDINE 20 MG: 20 TABLET, FILM COATED ORAL at 08:10

## 2017-10-19 RX ADMIN — CHLORHEXIDINE GLUCONATE 15 ML: 1.2 RINSE ORAL at 08:10

## 2017-10-19 RX ADMIN — GABAPENTIN 200 MG: 400 CAPSULE ORAL at 09:10

## 2017-10-19 RX ADMIN — INSULIN ASPART 4 UNITS: 100 INJECTION, SOLUTION INTRAVENOUS; SUBCUTANEOUS at 12:10

## 2017-10-19 RX ADMIN — SODIUM CHLORIDE TAB 1 GM 3 G: 1 TAB at 06:10

## 2017-10-19 RX ADMIN — CARVEDILOL 6.25 MG: 6.25 TABLET, FILM COATED ORAL at 10:10

## 2017-10-19 RX ADMIN — SODIUM CHLORIDE TAB 1 GM 3 G: 1 TAB at 09:10

## 2017-10-19 RX ADMIN — IPRATROPIUM BROMIDE AND ALBUTEROL SULFATE 3 ML: .5; 3 SOLUTION RESPIRATORY (INHALATION) at 01:10

## 2017-10-19 RX ADMIN — HEPARIN SODIUM 7500 UNITS: 5000 INJECTION, SOLUTION INTRAVENOUS; SUBCUTANEOUS at 09:10

## 2017-10-19 RX ADMIN — Medication 10 ML: at 12:10

## 2017-10-19 RX ADMIN — HEPARIN SODIUM 7500 UNITS: 5000 INJECTION, SOLUTION INTRAVENOUS; SUBCUTANEOUS at 06:10

## 2017-10-19 RX ADMIN — LEVETIRACETAM 1500 MG: 750 TABLET ORAL at 09:10

## 2017-10-19 RX ADMIN — AMLODIPINE BESYLATE 10 MG: 10 TABLET ORAL at 08:10

## 2017-10-19 RX ADMIN — LEVETIRACETAM 1500 MG: 750 TABLET ORAL at 08:10

## 2017-10-19 RX ADMIN — HYDRALAZINE HYDROCHLORIDE 100 MG: 50 TABLET ORAL at 01:10

## 2017-10-19 RX ADMIN — IPRATROPIUM BROMIDE AND ALBUTEROL SULFATE 3 ML: .5; 3 SOLUTION RESPIRATORY (INHALATION) at 07:10

## 2017-10-19 RX ADMIN — ATORVASTATIN CALCIUM 10 MG: 10 TABLET, FILM COATED ORAL at 08:10

## 2017-10-19 RX ADMIN — HEPARIN SODIUM 7500 UNITS: 5000 INJECTION, SOLUTION INTRAVENOUS; SUBCUTANEOUS at 01:10

## 2017-10-19 RX ADMIN — CLONAZEPAM 2 MG: 1 TABLET ORAL at 09:10

## 2017-10-19 RX ADMIN — FAMOTIDINE 20 MG: 20 TABLET, FILM COATED ORAL at 09:10

## 2017-10-19 RX ADMIN — INSULIN ASPART 2 UNITS: 100 INJECTION, SOLUTION INTRAVENOUS; SUBCUTANEOUS at 05:10

## 2017-10-19 RX ADMIN — IPRATROPIUM BROMIDE AND ALBUTEROL SULFATE 3 ML: .5; 3 SOLUTION RESPIRATORY (INHALATION) at 02:10

## 2017-10-19 RX ADMIN — LISINOPRIL 20 MG: 20 TABLET ORAL at 08:10

## 2017-10-19 RX ADMIN — CLONAZEPAM 2 MG: 1 TABLET ORAL at 01:10

## 2017-10-19 NOTE — PROGRESS NOTES
Notified [facility] of pt's K+ of 5.3. No new orders at this time. Will continue to monitor.

## 2017-10-19 NOTE — PROGRESS NOTES
[facility]  Neurocritical Care  Progress Note    Admit Date: 10/3/2017  Service Date: 10/19/2017  Length of Stay: 16    Subjective:     Chief Complaint: Seizure    Interval History: High K again-> kayexalate, furosemide, stopped lisinopril. W slightly down. TCM.    Review of Systems: Unable to obtain a complete ROS due to level of consciousness.     Vitals:   Temp: 97.7 °F (36.5 °C) (10/19/17 0705)  Pulse: 108 (10/19/17 0905)  Resp: (!) 36 (10/19/17 0905)  BP: (!) 166/77 (10/19/17 0905)  SpO2: 100 % (10/19/17 0905)    Temp:  [97.3 °F (36.3 °C)-98.2 °F (36.8 °C)] 97.7 °F (36.5 °C)  Pulse:  [100-112] 108  Resp:  [22-40] 36  SpO2:  [99 %-100 %] 100 %  BP: (133-166)/(59-81) 166/77    Oxygen Concentration (%):  [40] 40    10/18 0701 - 10/19 0700  In: 2935 [I.V.:310]  Out: 1775 [Urine:1715]     Examination:   Constitutional: Obese woman with chronic critical illness.  Eyes: Conjunctiva clear, anicteric. Lids no lesions.  Head/Ears/Nose/Mouth/Throat/Neck: Moist mucous membranes. External ears, nose atraumatic. + trach.  Cardiovascular: Regular rhythm. No murmurs. No leg edema.  Respiratory: Comfortable respirations. Clear to auscultation. + PEG.  Gastrointestinal: No hernia. Soft, nondistended, nontender. + bowel sounds.     Neurologic:  -GCS [id number]  -Opens eyes to pain. Follows no commands.  -Cranial nerves intact, particularly pupils reactive, corneals present, oculocephalics intact, cough present.  -Motor to pain flexes in arms, legs.  -Sensation above.  Unable to test orientation, language, memory, judgment, insight, fund of knowledge, hearing, shoulder shrug, tongue protrusion, coordination, gait due to level of consciousness.    Medications:   Scheduled  albuterol-ipratropium 2.5mg-0.5mg/3mL 3 mL Q6H   amlodipine 10 mg Daily   atorvastatin 10 mg Daily   chlorhexidine 15 mL BID   clonazePAM 2 mg Q8H   famotidine 20 mg BID   heparin (porcine) 7,500 Units Q8H   hydrALAZINE 75 mg Q8H   levetiracetam  1,500 mg BID   lisinopril 20 mg Daily   sodium chloride 0.9% 10 mL Q6H   sodium chloride 3 g Q8H   zonisamide 200 mg BID   PRN  acetaminophen 650 mg Q6H PRN   dextrose 50% 12.5 g PRN   dextrose 50% 12.5 g PRN   glucagon (human recombinant) 1 mg PRN   hydrALAZINE 10 mg Q4H PRN   insulin aspart 1-10 Units Q6H PRN   labetalol 10 mg Q4H PRN   magnesium sulfate IVPB 2 g PRN   magnesium sulfate IVPB 4 g PRN   ondansetron 4 mg Q8H PRN   pneumoc 13-finn conj-dip cr(PF) 0.5 mL vaccine x 1 dose   potassium chloride 10% 40 mEq PRN   potassium chloride 10% 40 mEq PRN   potassium, sodium phosphates 2 packet PRN   sodium chloride 0.9% 10 mL PRN      Today I independently reviewed pertinent medications, lines/drains/airways, lab results, microbiology results, notably:      W 18, HGB 8.7, K 5.3<- 5.5    Assessment/Plan:     Neuro   Status epilepticus    -myoclonic from anoxic brain injury  -clonazepam, levetiracetam, zonisamide        Anoxic brain injury    -from cardiac arrest        [place] coma scale total score 3-8    -from anoxic brain injury        Pulmonary   Acute on chronic respiratory failure    -TCM as tolerated        Cardiac/Vascular   Essential hypertension    --200  -stop lisinopril given high K  -increase hydralazine to 100 mg q8h  -start carvedilol 6.25 mg q12h  -amlodipine        Renal/   Hyperkalemia    -pm K        Oncology   Leukocytosis    -slowly decreasing          -prepare for LTACH    Prophylaxis:  Venous Thromboembolism: mechanical chemical  Stress Ulcer: H2B  Ventilator Pneumonia: yes     Activity Orders          Bed rest starting at 10/03 1510        Full Code    Israel Herring MD  Neurocritical Care  [facility]

## 2017-10-19 NOTE — PROGRESS NOTES
[facility]  Adult Nutrition  Progress Note    SUMMARY     Recommendations    Recommendation/Intervention:   Continue Glucerna 1.5 @ goal rate 45mL/hr.   -Hold for residuals >500mL.     RD to monitor.      Goals: Pt to receive nutrition by RD follow up  Nutrition Goal Status: progressing towards goal, goal met  Communication of RD Recs: reviewed with RN     Reason for Assessment    Reason for Assessment: RD follow-up  Diagnosis: seizures, other (see comments) (cardiac arrest)  Relevent Medical History: DM type II, CKD stage 3, CAD s/p CABG, HTN, PEG/trach         General Information Comments: Pt now on trach collar. Tolerating new formula at goal rate per nsg.    Nutrition Discharge Planning: optimal nutrition via PEG with tolerance.     Nutrition Prescription Ordered    Current Diet Order: NPO  Nutrition Order Comments: TF at goal  Current Nutrition Support Formula Ordered: Glucerna 1.5  Current Nutrition Support Rate Ordered: 45 (ml)  Current Nutrition Support Frequency Ordered: mL/hr        Evaluation of Received Nutrients/Fluid Intake    Enteral Calories (kcal): 1620  Enteral Protein (gm): 89  Enteral (Free Water) Fluid (mL): 820     Energy Calories Required: meeting needs  % Kcal Needs: 100     Protein Required: meeting needs  % Protein Needs: 100     IV Fluid (mL): 0  I/O: +I/O, good UOP, LBM 10/18         Fluid Required: not meeting needs  Comments: 30mL residuals 10/19  Tolerance: tolerating  % Intake of Estimated Energy Needs: 75 - 100 %  % Meal Intake: NPO     Nutrition Risk Screen     Nutrition Risk Screen: other (see comments) (trach)    Nutrition/Diet History       Typical Food/Fluid Intake: JOSE LUIS. Pt on TF PTA (PEG in place).  Food Preferences: JOSE LUIS Hindu/cultural preferences at this time.        Factors Affecting Nutritional Intake: NPO, on mechanical ventilation                Labs/Tests/Procedures/Meds       Pertinent Labs Reviewed: reviewed  Pertinent Labs Comments: POCT Glu  "143-185, K 5.3, Mg 1.5  Pertinent Medications Reviewed: reviewed  Pertinent Medications Comments: statin, heparin, lasix, insulin    Physical Findings    Overall Physical Appearance: obese, on oxygen therapy  Tubes: gastrostomy tube     Skin: intact    Anthropometrics    Temp: 97.7 °F (36.5 °C)     Height: 5' 6" (167.6 cm)  Weight Method: Bed Scale  Weight: (!) 140.4 kg (309 lb 8.4 oz)     Ideal Body Weight (IBW), Female: 130 lb     % Ideal Body Weight, Female (lb): 227.08 lb  BMI (Calculated): 47.7  BMI Grade: greater than 40 - morbid obesity                            Estimated/Assessed Needs    Weight Used For Calorie Calculations: 134.3 kg (296 lb 1.2 oz)      Energy Calorie Requirements (kcal): 9592-2786 (11-14kcal/kg)  Energy Need Method: Kcal/kg        RMR ([place]-St. Jeor Equation): 1894.75        Weight Used For Protein Calculations: 59 kg (130 lb 1.1 oz)  Protein Requirements: 89-118g (1.5-2.0g/kg)    Fluid Requirements (mL): 1mL/kcal or per MD  RDA Method (mL): 1477       CHO requirements: 50% of total kcals      Assessment and Plan    PEG (percutaneous endoscopic gastrostomy) status    Nutrition Problem:  Inadequate oral intake    Related to (etiology):   Inability to consume sufficient energy    Signs and Symptoms (as evidenced by):   NPO on mech vent requiring alternative means of nutrition.     Interventions/Recommendations (treatment strategy):  Please see RD recs above.    Nutrition Diagnosis Status:   Continues              Monitor and Evaluation    Food and Nutrient Intake: enteral nutrition intake  Food and Nutrient Adminstration: enteral and parenteral nutrition administration        Anthropometric Measurements: weight, weight change, body mass index  Biochemical Data, Medical Tests and Procedures: electrolyte and renal panel, gastrointestinal profile, glucose/endocrine profile, inflammatory profile, lipid profile  Nutrition-Focused Physical Findings: overall appearance    Nutrition " Risk    Level of Risk: other (see comments) (f/u 1x/week)    Nutrition Follow-Up    RD Follow-up?: Yes

## 2017-10-19 NOTE — PLAN OF CARE
Problem: Patient Care Overview  Goal: Plan of Care Review  Outcome: Ongoing (interventions implemented as appropriate)  POC reviewed with Preet Frank at 0500. Pt unable to verbalize understanding d/t unresponsiveness. No acute events overnight. Pt progressing toward goals. Will continue to monitor. See flowsheets for full assessment and VS info. TF @ goal of 45. One time dose of lasix 20mg and 1 L NS bolus given - see notes for more details. Contact precautions for ESBL in sputum maintained throughout night.

## 2017-10-19 NOTE — PLAN OF CARE
Problem: Patient Care Overview  Goal: Plan of Care Review  Outcome: Ongoing (interventions implemented as appropriate)  POC reviewed with pt at 1330. Pt is unresponsive and did not demonstrate evidence of learning. Questions and concerns addressed. No acute events today. Pt progressing toward goals. RN will continue to monitor. See flowsheets for full assessment and VS info.

## 2017-10-19 NOTE — PLAN OF CARE
LUCIA advised by NP that this Pt is ready to be placed. LUCIA spoke with Shayna (daughter) [phone] regarding placement at LTAC or [facility]. She reported they want to make the decision as a family and the family will be at [facility] on Sat and Sun. She will have the decision on Monday. Reported that once they make the decision, if the Pt is still ready to go, this SW will set up the transfer that day if possible. She reported this is fine. Advised NP of the above conversation. Pt was at [facility] before. LUCIA contacted and left message for Nunu with [facility] to see if they will have a bed on Monday just in case.    Susan España LMSW  Neurocritical Care   [facility]  [id number]

## 2017-10-19 NOTE — PROGRESS NOTES
Pt's K+ redraw is 5.5 after receiving lasix and kayexalate this morning. Notified JOSE Germain and JOSE Donaldson at bedside. One time dose lasix 20mg and 1L NS bolus ordered. Redraw K+ with morning labs. Will continue to monitor very closely.

## 2017-10-19 NOTE — ASSESSMENT & PLAN NOTE
--200  -stop lisinopril given high K  -increase hydralazine to 100 mg q8h  -start carvedilol 6.25 mg q12h  -amlodipine

## 2017-10-20 LAB
ALBUMIN SERPL BCP-MCNC: 1.8 G/DL
ALLENS TEST: ABNORMAL
ALP SERPL-CCNC: 152 U/L
ALT SERPL W/O P-5'-P-CCNC: 28 U/L
ANION GAP SERPL CALC-SCNC: 8 MMOL/L
AST SERPL-CCNC: 25 U/L
BASOPHILS # BLD AUTO: 0.01 K/UL
BASOPHILS NFR BLD: 0.1 %
BILIRUB SERPL-MCNC: 0.4 MG/DL
BUN SERPL-MCNC: 20 MG/DL
CALCIUM SERPL-MCNC: 8.9 MG/DL
CHLORIDE SERPL-SCNC: 108 MMOL/L
CO2 SERPL-SCNC: 23 MMOL/L
CREAT SERPL-MCNC: 0.6 MG/DL
DELSYS: ABNORMAL
DIFFERENTIAL METHOD: ABNORMAL
EOSINOPHIL # BLD AUTO: 0.2 K/UL
EOSINOPHIL NFR BLD: 1.2 %
ERYTHROCYTE [DISTWIDTH] IN BLOOD BY AUTOMATED COUNT: 16.2 %
EST. GFR  (AFRICAN AMERICAN): >60 ML/MIN/1.73 M^2
EST. GFR  (NON AFRICAN AMERICAN): >60 ML/MIN/1.73 M^2
FIO2: 40
FLOW: 10
GLUCOSE SERPL-MCNC: 190 MG/DL
HCO3 UR-SCNC: 25.2 MMOL/L (ref 24–28)
HCT VFR BLD AUTO: 26.9 %
HGB BLD-MCNC: 8.2 G/DL
IMM GRANULOCYTES # BLD AUTO: 0.2 K/UL
IMM GRANULOCYTES NFR BLD AUTO: 1.5 %
LYMPHOCYTES # BLD AUTO: 2.4 K/UL
LYMPHOCYTES NFR BLD: 18.5 %
MAGNESIUM SERPL-MCNC: 1.6 MG/DL
MAGNESIUM SERPL-MCNC: 2.5 MG/DL
MCH RBC QN AUTO: 27.2 PG
MCHC RBC AUTO-ENTMCNC: 30.5 G/DL
MCV RBC AUTO: 89 FL
MODE: ABNORMAL
MONOCYTES # BLD AUTO: 1.5 K/UL
MONOCYTES NFR BLD: 11.5 %
NEUTROPHILS # BLD AUTO: 8.7 K/UL
NEUTROPHILS NFR BLD: 67.2 %
NRBC BLD-RTO: 0 /100 WBC
PCO2 BLDA: 43.1 MMHG (ref 35–45)
PH SMN: 7.38 [PH] (ref 7.35–7.45)
PHOSPHATE SERPL-MCNC: 3.6 MG/DL
PLATELET # BLD AUTO: 284 K/UL
PMV BLD AUTO: 9.5 FL
PO2 BLDA: 138 MMHG (ref 80–100)
POC BE: 0 MMOL/L
POC SATURATED O2: 99 % (ref 95–100)
POC TCO2: 26 MMOL/L (ref 23–27)
POCT GLUCOSE: 177 MG/DL (ref 70–110)
POCT GLUCOSE: 184 MG/DL (ref 70–110)
POCT GLUCOSE: 207 MG/DL (ref 70–110)
POCT GLUCOSE: 214 MG/DL (ref 70–110)
POCT GLUCOSE: 217 MG/DL (ref 70–110)
POTASSIUM SERPL-SCNC: 5.2 MMOL/L
POTASSIUM SERPL-SCNC: 5.3 MMOL/L
PROT SERPL-MCNC: 6.4 G/DL
RBC # BLD AUTO: 3.02 M/UL
SAMPLE: ABNORMAL
SITE: ABNORMAL
SODIUM SERPL-SCNC: 139 MMOL/L
WBC # BLD AUTO: 13 K/UL

## 2017-10-20 PROCEDURE — 84100 ASSAY OF PHOSPHORUS: CPT

## 2017-10-20 PROCEDURE — 63600175 PHARM REV CODE 636 W HCPCS: Performed by: EMERGENCY MEDICINE

## 2017-10-20 PROCEDURE — 83735 ASSAY OF MAGNESIUM: CPT | Mod: 91

## 2017-10-20 PROCEDURE — 99900026 HC AIRWAY MAINTENANCE (STAT)

## 2017-10-20 PROCEDURE — 25000003 PHARM REV CODE 250: Performed by: STUDENT IN AN ORGANIZED HEALTH CARE EDUCATION/TRAINING PROGRAM

## 2017-10-20 PROCEDURE — 20000000 HC ICU ROOM

## 2017-10-20 PROCEDURE — 25000003 PHARM REV CODE 250: Performed by: PHYSICIAN ASSISTANT

## 2017-10-20 PROCEDURE — 83735 ASSAY OF MAGNESIUM: CPT

## 2017-10-20 PROCEDURE — 36600 WITHDRAWAL OF ARTERIAL BLOOD: CPT

## 2017-10-20 PROCEDURE — 82803 BLOOD GASES ANY COMBINATION: CPT

## 2017-10-20 PROCEDURE — 85025 COMPLETE CBC W/AUTO DIFF WBC: CPT

## 2017-10-20 PROCEDURE — 94640 AIRWAY INHALATION TREATMENT: CPT

## 2017-10-20 PROCEDURE — 63600175 PHARM REV CODE 636 W HCPCS: Performed by: NURSE PRACTITIONER

## 2017-10-20 PROCEDURE — 94761 N-INVAS EAR/PLS OXIMETRY MLT: CPT

## 2017-10-20 PROCEDURE — 63600175 PHARM REV CODE 636 W HCPCS: Performed by: STUDENT IN AN ORGANIZED HEALTH CARE EDUCATION/TRAINING PROGRAM

## 2017-10-20 PROCEDURE — 27000221 HC OXYGEN, UP TO 24 HOURS

## 2017-10-20 PROCEDURE — 25000003 PHARM REV CODE 250: Performed by: PSYCHIATRY & NEUROLOGY

## 2017-10-20 PROCEDURE — 80053 COMPREHEN METABOLIC PANEL: CPT

## 2017-10-20 PROCEDURE — A4216 STERILE WATER/SALINE, 10 ML: HCPCS | Performed by: EMERGENCY MEDICINE

## 2017-10-20 PROCEDURE — 84132 ASSAY OF SERUM POTASSIUM: CPT

## 2017-10-20 PROCEDURE — 25000003 PHARM REV CODE 250: Performed by: EMERGENCY MEDICINE

## 2017-10-20 PROCEDURE — 99900035 HC TECH TIME PER 15 MIN (STAT)

## 2017-10-20 PROCEDURE — 25000242 PHARM REV CODE 250 ALT 637 W/ HCPCS: Performed by: SURGERY

## 2017-10-20 RX ORDER — FUROSEMIDE 10 MG/ML
40 INJECTION INTRAMUSCULAR; INTRAVENOUS ONCE
Status: COMPLETED | OUTPATIENT
Start: 2017-10-20 | End: 2017-10-20

## 2017-10-20 RX ORDER — IPRATROPIUM BROMIDE AND ALBUTEROL SULFATE 2.5; .5 MG/3ML; MG/3ML
3 SOLUTION RESPIRATORY (INHALATION) EVERY 6 HOURS PRN
Status: DISCONTINUED | OUTPATIENT
Start: 2017-10-20 | End: 2017-11-15 | Stop reason: HOSPADM

## 2017-10-20 RX ADMIN — HEPARIN SODIUM 7500 UNITS: 5000 INJECTION, SOLUTION INTRAVENOUS; SUBCUTANEOUS at 06:10

## 2017-10-20 RX ADMIN — IPRATROPIUM BROMIDE AND ALBUTEROL SULFATE 3 ML: .5; 3 SOLUTION RESPIRATORY (INHALATION) at 09:10

## 2017-10-20 RX ADMIN — IPRATROPIUM BROMIDE AND ALBUTEROL SULFATE 3 ML: .5; 3 SOLUTION RESPIRATORY (INHALATION) at 02:10

## 2017-10-20 RX ADMIN — ATORVASTATIN CALCIUM 10 MG: 10 TABLET, FILM COATED ORAL at 09:10

## 2017-10-20 RX ADMIN — FAMOTIDINE 20 MG: 20 TABLET, FILM COATED ORAL at 09:10

## 2017-10-20 RX ADMIN — SODIUM CHLORIDE TAB 1 GM 3 G: 1 TAB at 09:10

## 2017-10-20 RX ADMIN — CHLORHEXIDINE GLUCONATE 15 ML: 1.2 RINSE ORAL at 09:10

## 2017-10-20 RX ADMIN — Medication 10 ML: at 06:10

## 2017-10-20 RX ADMIN — SODIUM CHLORIDE TAB 1 GM 3 G: 1 TAB at 02:10

## 2017-10-20 RX ADMIN — FUROSEMIDE 40 MG: 10 INJECTION, SOLUTION INTRAVENOUS at 11:10

## 2017-10-20 RX ADMIN — INSULIN ASPART 2 UNITS: 100 INJECTION, SOLUTION INTRAVENOUS; SUBCUTANEOUS at 11:10

## 2017-10-20 RX ADMIN — INSULIN ASPART 4 UNITS: 100 INJECTION, SOLUTION INTRAVENOUS; SUBCUTANEOUS at 06:10

## 2017-10-20 RX ADMIN — Medication 10 ML: at 11:10

## 2017-10-20 RX ADMIN — HYDRALAZINE HYDROCHLORIDE 100 MG: 50 TABLET ORAL at 02:10

## 2017-10-20 RX ADMIN — Medication 10 ML: at 12:10

## 2017-10-20 RX ADMIN — HEPARIN SODIUM 7500 UNITS: 5000 INJECTION, SOLUTION INTRAVENOUS; SUBCUTANEOUS at 09:10

## 2017-10-20 RX ADMIN — SODIUM CHLORIDE TAB 1 GM 3 G: 1 TAB at 06:10

## 2017-10-20 RX ADMIN — HYDRALAZINE HYDROCHLORIDE 100 MG: 50 TABLET ORAL at 09:10

## 2017-10-20 RX ADMIN — CARVEDILOL 6.25 MG: 6.25 TABLET, FILM COATED ORAL at 09:10

## 2017-10-20 RX ADMIN — INSULIN ASPART 2 UNITS: 100 INJECTION, SOLUTION INTRAVENOUS; SUBCUTANEOUS at 06:10

## 2017-10-20 RX ADMIN — CLONAZEPAM 2 MG: 1 TABLET ORAL at 09:10

## 2017-10-20 RX ADMIN — INSULIN ASPART 2 UNITS: 100 INJECTION, SOLUTION INTRAVENOUS; SUBCUTANEOUS at 01:10

## 2017-10-20 RX ADMIN — CLONAZEPAM 2 MG: 1 TABLET ORAL at 02:10

## 2017-10-20 RX ADMIN — LEVETIRACETAM 1500 MG: 750 TABLET ORAL at 09:10

## 2017-10-20 RX ADMIN — GABAPENTIN 200 MG: 400 CAPSULE ORAL at 09:10

## 2017-10-20 RX ADMIN — HYDRALAZINE HYDROCHLORIDE 100 MG: 50 TABLET ORAL at 06:10

## 2017-10-20 RX ADMIN — HEPARIN SODIUM 7500 UNITS: 5000 INJECTION, SOLUTION INTRAVENOUS; SUBCUTANEOUS at 02:10

## 2017-10-20 RX ADMIN — CLONAZEPAM 2 MG: 1 TABLET ORAL at 06:10

## 2017-10-20 RX ADMIN — MAGNESIUM SULFATE IN WATER 2 G: 40 INJECTION, SOLUTION INTRAVENOUS at 06:10

## 2017-10-20 RX ADMIN — AMLODIPINE BESYLATE 10 MG: 10 TABLET ORAL at 09:10

## 2017-10-20 NOTE — PLAN OF CARE
Problem: Patient Care Overview  Goal: Plan of Care Review  Outcome: Ongoing (interventions implemented as appropriate)  POC reviewed with Preet Frank at 0400. Pt unable to verbalize understanding d/t unresponsiveness. No acute events overnight. Pt progressing toward goals. Will continue to monitor. See flowsheets for full assessment and VS info. TF @ goal of 45. Contact precautions for ESBL in sputum maintained throughout night. Awaiting family's decision on Monday regarding placement at LTAC or vent [facility].

## 2017-10-20 NOTE — PLAN OF CARE
Problem: Patient Care Overview  Goal: Plan of Care Review  Outcome: Ongoing (interventions implemented as appropriate)  POC reviewed with pt and sibling at 1000. Pt unresponsive and showed no evidence of learning. Pt sister verbalized understanding. Questions and concerns addressed. No acute events today. Pt progressing toward goals. RN will continue to monitor. See flowsheets for full assessment and VS info.

## 2017-10-21 LAB
ALBUMIN SERPL BCP-MCNC: 1.8 G/DL
ALP SERPL-CCNC: 143 U/L
ALT SERPL W/O P-5'-P-CCNC: 23 U/L
ANION GAP SERPL CALC-SCNC: 6 MMOL/L
AST SERPL-CCNC: 22 U/L
BASOPHILS # BLD AUTO: 0.04 K/UL
BASOPHILS NFR BLD: 0.3 %
BILIRUB SERPL-MCNC: 0.3 MG/DL
BUN SERPL-MCNC: 21 MG/DL
CALCIUM SERPL-MCNC: 9.1 MG/DL
CHLORIDE SERPL-SCNC: 108 MMOL/L
CO2 SERPL-SCNC: 24 MMOL/L
CREAT SERPL-MCNC: 0.6 MG/DL
DIFFERENTIAL METHOD: ABNORMAL
EOSINOPHIL # BLD AUTO: 0.1 K/UL
EOSINOPHIL NFR BLD: 1.1 %
ERYTHROCYTE [DISTWIDTH] IN BLOOD BY AUTOMATED COUNT: 16 %
EST. GFR  (AFRICAN AMERICAN): >60 ML/MIN/1.73 M^2
EST. GFR  (NON AFRICAN AMERICAN): >60 ML/MIN/1.73 M^2
GLUCOSE SERPL-MCNC: 190 MG/DL
HCT VFR BLD AUTO: 26.6 %
HGB BLD-MCNC: 8.1 G/DL
IMM GRANULOCYTES # BLD AUTO: 0.14 K/UL
IMM GRANULOCYTES NFR BLD AUTO: 1.2 %
LYMPHOCYTES # BLD AUTO: 2.4 K/UL
LYMPHOCYTES NFR BLD: 19.9 %
MAGNESIUM SERPL-MCNC: 1.7 MG/DL
MAGNESIUM SERPL-MCNC: 2.2 MG/DL
MCH RBC QN AUTO: 27.5 PG
MCHC RBC AUTO-ENTMCNC: 30.5 G/DL
MCV RBC AUTO: 90 FL
MONOCYTES # BLD AUTO: 1.1 K/UL
MONOCYTES NFR BLD: 9.2 %
NEUTROPHILS # BLD AUTO: 8.2 K/UL
NEUTROPHILS NFR BLD: 68.3 %
NRBC BLD-RTO: 0 /100 WBC
PHOSPHATE SERPL-MCNC: 4.4 MG/DL
PLATELET # BLD AUTO: 279 K/UL
PMV BLD AUTO: 9.5 FL
POCT GLUCOSE: 168 MG/DL (ref 70–110)
POCT GLUCOSE: 198 MG/DL (ref 70–110)
POCT GLUCOSE: 206 MG/DL (ref 70–110)
POCT GLUCOSE: 232 MG/DL (ref 70–110)
POTASSIUM SERPL-SCNC: 5.1 MMOL/L
PROT SERPL-MCNC: 6.5 G/DL
RBC # BLD AUTO: 2.95 M/UL
SODIUM SERPL-SCNC: 138 MMOL/L
WBC # BLD AUTO: 12.05 K/UL

## 2017-10-21 PROCEDURE — 25000003 PHARM REV CODE 250: Performed by: STUDENT IN AN ORGANIZED HEALTH CARE EDUCATION/TRAINING PROGRAM

## 2017-10-21 PROCEDURE — 25000003 PHARM REV CODE 250: Performed by: EMERGENCY MEDICINE

## 2017-10-21 PROCEDURE — A4216 STERILE WATER/SALINE, 10 ML: HCPCS | Performed by: EMERGENCY MEDICINE

## 2017-10-21 PROCEDURE — 63600175 PHARM REV CODE 636 W HCPCS: Performed by: NURSE PRACTITIONER

## 2017-10-21 PROCEDURE — 80053 COMPREHEN METABOLIC PANEL: CPT

## 2017-10-21 PROCEDURE — 94761 N-INVAS EAR/PLS OXIMETRY MLT: CPT

## 2017-10-21 PROCEDURE — 25000003 PHARM REV CODE 250: Performed by: PSYCHIATRY & NEUROLOGY

## 2017-10-21 PROCEDURE — 63600175 PHARM REV CODE 636 W HCPCS: Performed by: EMERGENCY MEDICINE

## 2017-10-21 PROCEDURE — 27000221 HC OXYGEN, UP TO 24 HOURS

## 2017-10-21 PROCEDURE — 99900026 HC AIRWAY MAINTENANCE (STAT)

## 2017-10-21 PROCEDURE — 84100 ASSAY OF PHOSPHORUS: CPT

## 2017-10-21 PROCEDURE — 83735 ASSAY OF MAGNESIUM: CPT | Mod: 91

## 2017-10-21 PROCEDURE — 99233 SBSQ HOSP IP/OBS HIGH 50: CPT | Mod: GC,,, | Performed by: PSYCHIATRY & NEUROLOGY

## 2017-10-21 PROCEDURE — 25000003 PHARM REV CODE 250: Performed by: PHYSICIAN ASSISTANT

## 2017-10-21 PROCEDURE — 20000000 HC ICU ROOM

## 2017-10-21 PROCEDURE — 85025 COMPLETE CBC W/AUTO DIFF WBC: CPT

## 2017-10-21 RX ADMIN — INSULIN ASPART 1 UNITS: 100 INJECTION, SOLUTION INTRAVENOUS; SUBCUTANEOUS at 11:10

## 2017-10-21 RX ADMIN — AMLODIPINE BESYLATE 10 MG: 10 TABLET ORAL at 08:10

## 2017-10-21 RX ADMIN — MAGNESIUM SULFATE IN WATER 2 G: 40 INJECTION, SOLUTION INTRAVENOUS at 05:10

## 2017-10-21 RX ADMIN — HYDRALAZINE HYDROCHLORIDE 100 MG: 50 TABLET ORAL at 05:10

## 2017-10-21 RX ADMIN — HEPARIN SODIUM 7500 UNITS: 5000 INJECTION, SOLUTION INTRAVENOUS; SUBCUTANEOUS at 09:10

## 2017-10-21 RX ADMIN — Medication 10 ML: at 05:10

## 2017-10-21 RX ADMIN — CHLORHEXIDINE GLUCONATE 15 ML: 1.2 RINSE ORAL at 08:10

## 2017-10-21 RX ADMIN — HYDRALAZINE HYDROCHLORIDE 100 MG: 50 TABLET ORAL at 01:10

## 2017-10-21 RX ADMIN — INSULIN ASPART 4 UNITS: 100 INJECTION, SOLUTION INTRAVENOUS; SUBCUTANEOUS at 05:10

## 2017-10-21 RX ADMIN — GABAPENTIN 200 MG: 400 CAPSULE ORAL at 09:10

## 2017-10-21 RX ADMIN — LEVETIRACETAM 1500 MG: 750 TABLET ORAL at 08:10

## 2017-10-21 RX ADMIN — SODIUM CHLORIDE TAB 1 GM 3 G: 1 TAB at 09:10

## 2017-10-21 RX ADMIN — HEPARIN SODIUM 7500 UNITS: 5000 INJECTION, SOLUTION INTRAVENOUS; SUBCUTANEOUS at 01:10

## 2017-10-21 RX ADMIN — INSULIN ASPART 2 UNITS: 100 INJECTION, SOLUTION INTRAVENOUS; SUBCUTANEOUS at 05:10

## 2017-10-21 RX ADMIN — CARVEDILOL 6.25 MG: 6.25 TABLET, FILM COATED ORAL at 09:10

## 2017-10-21 RX ADMIN — HYDRALAZINE HYDROCHLORIDE 100 MG: 50 TABLET ORAL at 09:10

## 2017-10-21 RX ADMIN — SODIUM CHLORIDE TAB 1 GM 3 G: 1 TAB at 05:10

## 2017-10-21 RX ADMIN — CARVEDILOL 6.25 MG: 6.25 TABLET, FILM COATED ORAL at 08:10

## 2017-10-21 RX ADMIN — CHLORHEXIDINE GLUCONATE 15 ML: 1.2 RINSE ORAL at 09:10

## 2017-10-21 RX ADMIN — HEPARIN SODIUM 7500 UNITS: 5000 INJECTION, SOLUTION INTRAVENOUS; SUBCUTANEOUS at 05:10

## 2017-10-21 RX ADMIN — SODIUM CHLORIDE TAB 1 GM 3 G: 1 TAB at 01:10

## 2017-10-21 RX ADMIN — CLONAZEPAM 2 MG: 1 TABLET ORAL at 01:10

## 2017-10-21 RX ADMIN — FAMOTIDINE 20 MG: 20 TABLET, FILM COATED ORAL at 08:10

## 2017-10-21 RX ADMIN — Medication 10 ML: at 11:10

## 2017-10-21 RX ADMIN — INSULIN ASPART 4 UNITS: 100 INJECTION, SOLUTION INTRAVENOUS; SUBCUTANEOUS at 11:10

## 2017-10-21 RX ADMIN — LEVETIRACETAM 1500 MG: 750 TABLET ORAL at 09:10

## 2017-10-21 RX ADMIN — GABAPENTIN 200 MG: 400 CAPSULE ORAL at 08:10

## 2017-10-21 RX ADMIN — FAMOTIDINE 20 MG: 20 TABLET, FILM COATED ORAL at 09:10

## 2017-10-21 RX ADMIN — CLONAZEPAM 2 MG: 1 TABLET ORAL at 09:10

## 2017-10-21 RX ADMIN — CLONAZEPAM 2 MG: 1 TABLET ORAL at 05:10

## 2017-10-21 RX ADMIN — ATORVASTATIN CALCIUM 10 MG: 10 TABLET, FILM COATED ORAL at 08:10

## 2017-10-21 NOTE — PLAN OF CARE
Problem: Patient Care Overview  Goal: Plan of Care Review  Outcome: Ongoing (interventions implemented as appropriate)  POC reviewed with pt and family at 1400. Pt unable to verbalize understanding. Questions and concerns addressed with family. No acute events today. Pt progressing toward goals. Will continue to monitor. See flowsheets for full assessment and VS info.

## 2017-10-21 NOTE — SUBJECTIVE & OBJECTIVE
Interval History:  No acute issues. Leukocytosis improving. No electrolyte abnormalities. Plan to transfer to LTAC Monday.    Review of Systems  Unable to obtain a complete ROS due to level of consciousness.  Objective:     Vitals:  Temp: 98.3 °F (36.8 °C) (10/21/17 1100)  Pulse: 82 (10/21/17 1200)  Resp: 19 (10/21/17 1200)  BP: (!) 142/67 (10/21/17 1200)  SpO2: 100 % (10/21/17 1200)    Temp:  [96 °F (35.6 °C)-98.3 °F (36.8 °C)] 98.3 °F (36.8 °C)  Pulse:  [80-93] 82  Resp:  [14-35] 19  SpO2:  [95 %-100 %] 100 %  BP: (106-187)/(55-87) 142/67         Oxygen Concentration (%):  [40] 40  Resp Rate Total:  [24 br/min] 24 br/min    10/20 0701 - 10/21 0700  In: 1950 [I.V.:300]  Out: 2410 [Urine:2390]    Physical Exam   Constitutional: She appears well-developed and well-nourished.   HENT:   Head: Normocephalic and atraumatic.   Cardiovascular: Normal rate, regular rhythm and normal heart sounds.    No murmur heard.  Pulmonary/Chest: Effort normal and breath sounds normal. She has no wheezes.   Abdominal: Soft. Bowel sounds are normal. She exhibits no distension.   Neurological:   --sedation: none  --GCS: [id number]  --Mental Status: Comatose, does not respond to voice. Does not follow commands.  --CN II-XII: Left gaze preference  --Pupils 3mm, PERRL.   --brainstem: cough, gag, corneals intact. +occulocephalics  --Motor: extensor posturing to pain  --sensory: unable to assess  --Reflexes: not tested  --Gait: deferred         Medications:  Continuous Scheduled  amlodipine 10 mg Daily   atorvastatin 10 mg Daily   carvedilol 6.25 mg BID   chlorhexidine 15 mL BID   clonazePAM 2 mg Q8H   famotidine 20 mg BID   heparin (porcine) 7,500 Units Q8H   hydrALAZINE 100 mg Q8H   levetiracetam 1,500 mg BID   sodium chloride 0.9% 10 mL Q6H   sodium chloride 3 g Q8H   zonisamide 200 mg BID   PRN  acetaminophen 650 mg Q6H PRN   albuterol-ipratropium 2.5mg-0.5mg/3mL 3 mL Q6H PRN   dextrose 50% 12.5 g PRN   dextrose 50% 12.5 g PRN   glucagon  (human recombinant) 1 mg PRN   hydrALAZINE 10 mg Q4H PRN   insulin aspart 1-10 Units Q6H PRN   labetalol 10 mg Q4H PRN   magnesium sulfate IVPB 2 g PRN   magnesium sulfate IVPB 4 g PRN   ondansetron 4 mg Q8H PRN   pneumoc 13-finn conj-dip cr(PF) 0.5 mL vaccine x 1 dose   potassium chloride 10% 40 mEq PRN   potassium chloride 10% 40 mEq PRN   potassium, sodium phosphates 2 packet PRN   sodium chloride 0.9% 10 mL PRN     Today I personally reviewed pertinent medications, lines/drains/airways, imaging, lab results,     No significant results

## 2017-10-21 NOTE — PLAN OF CARE
Problem: Patient Care Overview  Goal: Plan of Care Review  Outcome: Ongoing (interventions implemented as appropriate)  POC reviewed with pt and family at 0300. Family verbalized understanding. Questions and concerns addressed. No acute events today. Pt progressing toward goals. Will continue to monitor. See flowsheets for full assessment and VS info.

## 2017-10-21 NOTE — PROGRESS NOTES
[facility]  Neurocritical Care  Progress Note    Admit Date: 10/3/2017  Service Date: 10/21/2017  Length of Stay: 18    Subjective:     Chief Complaint: Seizure    History of Present Illness: Ms. Frank is a 68 yo woman with a PMHx DM type II, CKD stage 3, CAD s/p CABG, essential hypertension, cerebrovascular disease s/p bi-hemispheric watershed infarcts and trach/PEG who present as a transfer from [facility] to Neuro Critical Care s/p Cardiac Arrest on 10/4 and evaluation of Anoxic Brain Injury. She was recently discharged from [facility] 9/28/2017 with bilateral MCA SAQIB watershed infarcts. Yesterday, she was brought in via EMS from  for cardiac arrest. Per nursing homestaff, she received chest compressions for about 5 minutes. After EMS arrived, ACLS protocol continued and she was given 2 rounds of epi, non-shockable rhythm. ROSC achieved en route to the ER.Pt arrived to ICU intubated. It was noted there sudden intermittent generalized body jerks mostly on tactile stimulation. Also, pt was hypotensive for which norepinephrine infusion started. Since her discharge here on 9/28 she has been admitted to multiple facilities recently to be treated for various issues including mucous plug leading to acute hypoxic respiratory failure, she pulled out her trach 9/4 so that was reinserted at [facility] and she was also treated for ESBL UTI while there, and last admit she was treated for likely aspiration pneumonia. She was transported here on Propofol. On examination, no cough, no gag, no corneal and minimal to no withdrawal.       Hospital Course: 10/5: s/p Cardiac Arrest on 10/4 and evaluation of anoxic brain injury   10//7: EEG with burst suppression pattern.  MRI with diffuse diffusion restriction  10/10: family meeting   10/11: SSEP  10/12: worsening myoclonus especially when moved. Valproic acid level undetectable likely due to interactions with meropenem. Patient on meropenem for ESBL  Klebsiella pneumonia sensitive to meropenem.  10/15: No events overnight.   10/16: No issues overnight    Interval History:  No acute issues. Leukocytosis improving. No electrolyte abnormalities. Plan to transfer to LTAC Monday.    Review of Systems  Unable to obtain a complete ROS due to level of consciousness.  Objective:     Vitals:  Temp: 98.3 °F (36.8 °C) (10/21/17 1100)  Pulse: 82 (10/21/17 1200)  Resp: 19 (10/21/17 1200)  BP: (!) 142/67 (10/21/17 1200)  SpO2: 100 % (10/21/17 1200)    Temp:  [96 °F (35.6 °C)-98.3 °F (36.8 °C)] 98.3 °F (36.8 °C)  Pulse:  [80-93] 82  Resp:  [14-35] 19  SpO2:  [95 %-100 %] 100 %  BP: (106-187)/(55-87) 142/67         Oxygen Concentration (%):  [40] 40  Resp Rate Total:  [24 br/min] 24 br/min    10/20 0701 - 10/21 0700  In: 1950 [I.V.:300]  Out: 2410 [Urine:2390]    Physical Exam   Constitutional: She appears well-developed and well-nourished.   HENT:   Head: Normocephalic and atraumatic.   Cardiovascular: Normal rate, regular rhythm and normal heart sounds.    No murmur heard.  Pulmonary/Chest: Effort normal and breath sounds normal. She has no wheezes.   Abdominal: Soft. Bowel sounds are normal. She exhibits no distension.   Neurological:   --sedation: none  --GCS: [id number]  --Mental Status: Comatose, does not respond to voice. Does not follow commands.  --CN II-XII: Left gaze preference  --Pupils 3mm, PERRL.   --brainstem: cough, gag, corneals intact. +occulocephalics  --Motor: extensor posturing to pain  --sensory: unable to assess  --Reflexes: not tested  --Gait: deferred         Medications:  Continuous Scheduled  amlodipine 10 mg Daily   atorvastatin 10 mg Daily   carvedilol 6.25 mg BID   chlorhexidine 15 mL BID   clonazePAM 2 mg Q8H   famotidine 20 mg BID   heparin (porcine) 7,500 Units Q8H   hydrALAZINE 100 mg Q8H   levetiracetam 1,500 mg BID   sodium chloride 0.9% 10 mL Q6H   sodium chloride 3 g Q8H   zonisamide 200 mg BID   PRN  acetaminophen 650 mg Q6H PRN    albuterol-ipratropium 2.5mg-0.5mg/3mL 3 mL Q6H PRN   dextrose 50% 12.5 g PRN   dextrose 50% 12.5 g PRN   glucagon (human recombinant) 1 mg PRN   hydrALAZINE 10 mg Q4H PRN   insulin aspart 1-10 Units Q6H PRN   labetalol 10 mg Q4H PRN   magnesium sulfate IVPB 2 g PRN   magnesium sulfate IVPB 4 g PRN   ondansetron 4 mg Q8H PRN   pneumoc 13-finn conj-dip cr(PF) 0.5 mL vaccine x 1 dose   potassium chloride 10% 40 mEq PRN   potassium chloride 10% 40 mEq PRN   potassium, sodium phosphates 2 packet PRN   sodium chloride 0.9% 10 mL PRN     Today I personally reviewed pertinent medications, lines/drains/airways, imaging, lab results,     No significant results    Assessment/Plan:     Neuro   Status epilepticus    -myoclonic from anoxic brain injury  -clonazepam, levetiracetam, zonisamide        Anoxic brain injury    -from cardiac arrest        [place] coma scale total score 3-8    -from anoxic brain injury        Pulmonary   Acute on chronic respiratory failure    Trach collar  -ABG 10/20 7.3/43/138  - 40% FiO2        Cardiac/Vascular   Essential hypertension    --200  -hydralazine, carvedilol, amlodipine        Renal/   Hyperkalemia    -improving        Oncology   Leukocytosis    -slowly decreasing            Prophylaxis:  Venous Thromboembolism: chemical  Stress Ulcer: H2B  Ventilator Pneumonia: not applicable     Activity Orders          Bed rest starting at 10/03 1510        Full Code    Avelino Gann MD  Neurocritical Care  [facility]

## 2017-10-21 NOTE — PROGRESS NOTES
[facility]  Neurocritical Care  Progress Note    Admit Date: 10/3/2017  Service Date: 10/20/2017  Length of Stay: 17    Subjective:     Chief Complaint: Seizure    Interval History: WBC decreasing. K not as high. Tolerated TCM overnight.    Review of Systems: Unable to obtain a complete ROS due to level of consciousness.     Vitals:   Temp: 96 °F (35.6 °C) (10/20/17 1505)  Pulse: 86 (10/20/17 2202)  Resp: (!) 30 (10/20/17 2202)  BP: (!) 117/58 (10/20/17 2202)  SpO2: 100 % (10/20/17 2202)    Temp:  [96 °F (35.6 °C)-97.9 °F (36.6 °C)] 96 °F (35.6 °C)  Pulse:  [80-97] 86  Resp:  [16-36] 30  SpO2:  [97 %-100 %] 100 %  BP: (106-187)/(55-87) 117/58         Oxygen Concentration (%):  [40] 40    10/19 0701 - 10/20 0700  In: 1690 [I.V.:300]  Out: 1395 [Urine:1395]     Examination:   -GCS [id number] per RN    Medications:   Scheduled  amlodipine 10 mg Daily   atorvastatin 10 mg Daily   carvedilol 6.25 mg BID   chlorhexidine 15 mL BID   clonazePAM 2 mg Q8H   famotidine 20 mg BID   heparin (porcine) 7,500 Units Q8H   hydrALAZINE 100 mg Q8H   levetiracetam 1,500 mg BID   sodium chloride 0.9% 10 mL Q6H   sodium chloride 3 g Q8H   zonisamide 200 mg BID   PRN  acetaminophen 650 mg Q6H PRN   albuterol-ipratropium 2.5mg-0.5mg/3mL 3 mL Q6H PRN   dextrose 50% 12.5 g PRN   dextrose 50% 12.5 g PRN   glucagon (human recombinant) 1 mg PRN   hydrALAZINE 10 mg Q4H PRN   insulin aspart 1-10 Units Q6H PRN   labetalol 10 mg Q4H PRN   magnesium sulfate IVPB 2 g PRN   magnesium sulfate IVPB 4 g PRN   ondansetron 4 mg Q8H PRN   pneumoc 13-finn conj-dip cr(PF) 0.5 mL vaccine x 1 dose   potassium chloride 10% 40 mEq PRN   potassium chloride 10% 40 mEq PRN   potassium, sodium phosphates 2 packet PRN   sodium chloride 0.9% 10 mL PRN      Today I independently reviewed pertinent medications, lines/drains/airways, lab results, notably:      ABG:   Recent Labs  Lab 10/20/17  1332   PH 7.375   PO2 138*   PCO2 43.1   HCO3 25.2   BE 0      Chem:   Recent Labs  Lab 10/20/17  0307 10/20/17  1114     --    K 5.3*  --      --    CO2 23  --    BUN 20  --    CREATININE 0.6  --    *  --    CALCIUM 8.9  --    MG 1.6 2.5   PHOS 3.6  --    AST 25  --    ALT 28  --    ALKPHOS 152*  --    BILITOT 0.4  --    ALBUMIN 1.8*  --    PROT 6.4  --      Heme:   Recent Labs  Lab 10/20/17  0307   WBC 13.00*   HGB 8.2*         Assessment/Plan:     Neuro   Status epilepticus    -myoclonic from anoxic brain injury  -clonazepam, levetiracetam, zonisamide        Anoxic brain injury    -from cardiac arrest        [place] coma scale total score 3-8    -from anoxic brain injury        Pulmonary   Acute on chronic respiratory failure    -on TCM overnight        Cardiac/Vascular   Essential hypertension    --200  -hydralazine, carvedilol, amlodipine        Renal/   Hyperkalemia    -pm K            -Ready for placement in SNF v LTACH.    Prophylaxis:  Venous Thromboembolism: mechanical chemical  Stress Ulcer: H2B  Ventilator Pneumonia: yes     Activity Orders          Bed rest starting at 10/03 1510        Full Code    Israel Herring MD  Neurocritical Care  [facility]

## 2017-10-22 PROBLEM — D72.829 LEUKOCYTOSIS: Status: RESOLVED | Noted: 2017-09-13 | Resolved: 2017-10-22

## 2017-10-22 LAB
ALBUMIN SERPL BCP-MCNC: 1.9 G/DL
ALP SERPL-CCNC: 151 U/L
ALT SERPL W/O P-5'-P-CCNC: 22 U/L
ANION GAP SERPL CALC-SCNC: 9 MMOL/L
AST SERPL-CCNC: 20 U/L
BASOPHILS # BLD AUTO: 0.03 K/UL
BASOPHILS NFR BLD: 0.3 %
BILIRUB SERPL-MCNC: 0.3 MG/DL
BUN SERPL-MCNC: 21 MG/DL
CALCIUM SERPL-MCNC: 9.3 MG/DL
CHLORIDE SERPL-SCNC: 110 MMOL/L
CO2 SERPL-SCNC: 22 MMOL/L
CREAT SERPL-MCNC: 0.6 MG/DL
DIFFERENTIAL METHOD: ABNORMAL
EOSINOPHIL # BLD AUTO: 0.1 K/UL
EOSINOPHIL NFR BLD: 0.9 %
ERYTHROCYTE [DISTWIDTH] IN BLOOD BY AUTOMATED COUNT: 15.9 %
EST. GFR  (AFRICAN AMERICAN): >60 ML/MIN/1.73 M^2
EST. GFR  (NON AFRICAN AMERICAN): >60 ML/MIN/1.73 M^2
GLUCOSE SERPL-MCNC: 185 MG/DL
HCT VFR BLD AUTO: 27 %
HGB BLD-MCNC: 8.1 G/DL
IMM GRANULOCYTES # BLD AUTO: 0.1 K/UL
IMM GRANULOCYTES NFR BLD AUTO: 0.9 %
LYMPHOCYTES # BLD AUTO: 1.7 K/UL
LYMPHOCYTES NFR BLD: 15.4 %
MAGNESIUM SERPL-MCNC: 1.7 MG/DL
MAGNESIUM SERPL-MCNC: 2.4 MG/DL
MCH RBC QN AUTO: 27.4 PG
MCHC RBC AUTO-ENTMCNC: 30 G/DL
MCV RBC AUTO: 91 FL
MONOCYTES # BLD AUTO: 0.8 K/UL
MONOCYTES NFR BLD: 6.8 %
NEUTROPHILS # BLD AUTO: 8.3 K/UL
NEUTROPHILS NFR BLD: 75.7 %
NRBC BLD-RTO: 0 /100 WBC
PHOSPHATE SERPL-MCNC: 4.5 MG/DL
PLATELET # BLD AUTO: 293 K/UL
PMV BLD AUTO: 10.3 FL
POCT GLUCOSE: 181 MG/DL (ref 70–110)
POCT GLUCOSE: 187 MG/DL (ref 70–110)
POCT GLUCOSE: 201 MG/DL (ref 70–110)
POCT GLUCOSE: 219 MG/DL (ref 70–110)
POTASSIUM SERPL-SCNC: 4.9 MMOL/L
POTASSIUM SERPL-SCNC: 5.6 MMOL/L
PROT SERPL-MCNC: 6.8 G/DL
RBC # BLD AUTO: 2.96 M/UL
SODIUM SERPL-SCNC: 141 MMOL/L
WBC # BLD AUTO: 11.01 K/UL

## 2017-10-22 PROCEDURE — 84132 ASSAY OF SERUM POTASSIUM: CPT

## 2017-10-22 PROCEDURE — 63600175 PHARM REV CODE 636 W HCPCS: Performed by: NURSE PRACTITIONER

## 2017-10-22 PROCEDURE — 25000003 PHARM REV CODE 250: Performed by: PSYCHIATRY & NEUROLOGY

## 2017-10-22 PROCEDURE — 94761 N-INVAS EAR/PLS OXIMETRY MLT: CPT

## 2017-10-22 PROCEDURE — 63600175 PHARM REV CODE 636 W HCPCS: Performed by: STUDENT IN AN ORGANIZED HEALTH CARE EDUCATION/TRAINING PROGRAM

## 2017-10-22 PROCEDURE — A4216 STERILE WATER/SALINE, 10 ML: HCPCS | Performed by: EMERGENCY MEDICINE

## 2017-10-22 PROCEDURE — 93005 ELECTROCARDIOGRAM TRACING: CPT

## 2017-10-22 PROCEDURE — 20000000 HC ICU ROOM

## 2017-10-22 PROCEDURE — 25000003 PHARM REV CODE 250: Performed by: PHYSICIAN ASSISTANT

## 2017-10-22 PROCEDURE — 25000003 PHARM REV CODE 250: Performed by: EMERGENCY MEDICINE

## 2017-10-22 PROCEDURE — 25000003 PHARM REV CODE 250: Performed by: STUDENT IN AN ORGANIZED HEALTH CARE EDUCATION/TRAINING PROGRAM

## 2017-10-22 PROCEDURE — 27000221 HC OXYGEN, UP TO 24 HOURS

## 2017-10-22 PROCEDURE — 93010 ELECTROCARDIOGRAM REPORT: CPT | Mod: ,,, | Performed by: INTERNAL MEDICINE

## 2017-10-22 PROCEDURE — 83735 ASSAY OF MAGNESIUM: CPT | Mod: 91

## 2017-10-22 PROCEDURE — 83735 ASSAY OF MAGNESIUM: CPT

## 2017-10-22 PROCEDURE — 63600175 PHARM REV CODE 636 W HCPCS: Performed by: EMERGENCY MEDICINE

## 2017-10-22 PROCEDURE — 85025 COMPLETE CBC W/AUTO DIFF WBC: CPT

## 2017-10-22 PROCEDURE — 99900026 HC AIRWAY MAINTENANCE (STAT)

## 2017-10-22 PROCEDURE — 99233 SBSQ HOSP IP/OBS HIGH 50: CPT | Mod: GC,,, | Performed by: PSYCHIATRY & NEUROLOGY

## 2017-10-22 PROCEDURE — 84100 ASSAY OF PHOSPHORUS: CPT

## 2017-10-22 PROCEDURE — 80053 COMPREHEN METABOLIC PANEL: CPT

## 2017-10-22 RX ADMIN — GABAPENTIN 200 MG: 400 CAPSULE ORAL at 08:10

## 2017-10-22 RX ADMIN — LEVETIRACETAM 1500 MG: 750 TABLET ORAL at 08:10

## 2017-10-22 RX ADMIN — MAGNESIUM SULFATE IN WATER 2 G: 40 INJECTION, SOLUTION INTRAVENOUS at 04:10

## 2017-10-22 RX ADMIN — FAMOTIDINE 20 MG: 20 TABLET, FILM COATED ORAL at 09:10

## 2017-10-22 RX ADMIN — CARVEDILOL 6.25 MG: 6.25 TABLET, FILM COATED ORAL at 09:10

## 2017-10-22 RX ADMIN — HYDRALAZINE HYDROCHLORIDE 100 MG: 50 TABLET ORAL at 05:10

## 2017-10-22 RX ADMIN — Medication 10 ML: at 06:10

## 2017-10-22 RX ADMIN — CLONAZEPAM 2 MG: 1 TABLET ORAL at 02:10

## 2017-10-22 RX ADMIN — INSULIN ASPART 4 UNITS: 100 INJECTION, SOLUTION INTRAVENOUS; SUBCUTANEOUS at 12:10

## 2017-10-22 RX ADMIN — HYDRALAZINE HYDROCHLORIDE 100 MG: 50 TABLET ORAL at 02:10

## 2017-10-22 RX ADMIN — INSULIN DETEMIR 5 UNITS: 100 INJECTION, SOLUTION SUBCUTANEOUS at 10:10

## 2017-10-22 RX ADMIN — SODIUM CHLORIDE TAB 1 GM 3 G: 1 TAB at 02:10

## 2017-10-22 RX ADMIN — FAMOTIDINE 20 MG: 20 TABLET, FILM COATED ORAL at 08:10

## 2017-10-22 RX ADMIN — GABAPENTIN 200 MG: 400 CAPSULE ORAL at 09:10

## 2017-10-22 RX ADMIN — HEPARIN SODIUM 7500 UNITS: 5000 INJECTION, SOLUTION INTRAVENOUS; SUBCUTANEOUS at 05:10

## 2017-10-22 RX ADMIN — INSULIN ASPART 2 UNITS: 100 INJECTION, SOLUTION INTRAVENOUS; SUBCUTANEOUS at 06:10

## 2017-10-22 RX ADMIN — CLONAZEPAM 2 MG: 1 TABLET ORAL at 05:10

## 2017-10-22 RX ADMIN — INSULIN ASPART 4 UNITS: 100 INJECTION, SOLUTION INTRAVENOUS; SUBCUTANEOUS at 05:10

## 2017-10-22 RX ADMIN — LEVETIRACETAM 1500 MG: 750 TABLET ORAL at 09:10

## 2017-10-22 RX ADMIN — HEPARIN SODIUM 7500 UNITS: 5000 INJECTION, SOLUTION INTRAVENOUS; SUBCUTANEOUS at 09:10

## 2017-10-22 RX ADMIN — CLONAZEPAM 2 MG: 1 TABLET ORAL at 09:10

## 2017-10-22 RX ADMIN — HYDRALAZINE HYDROCHLORIDE 100 MG: 50 TABLET ORAL at 09:10

## 2017-10-22 RX ADMIN — CHLORHEXIDINE GLUCONATE 15 ML: 1.2 RINSE ORAL at 08:10

## 2017-10-22 RX ADMIN — SODIUM POLYSTYRENE SULFONATE 30 G: 15 SUSPENSION ORAL; RECTAL at 09:10

## 2017-10-22 RX ADMIN — Medication 10 ML: at 05:10

## 2017-10-22 RX ADMIN — ATORVASTATIN CALCIUM 10 MG: 10 TABLET, FILM COATED ORAL at 09:10

## 2017-10-22 RX ADMIN — HEPARIN SODIUM 7500 UNITS: 5000 INJECTION, SOLUTION INTRAVENOUS; SUBCUTANEOUS at 02:10

## 2017-10-22 RX ADMIN — AMLODIPINE BESYLATE 10 MG: 10 TABLET ORAL at 09:10

## 2017-10-22 RX ADMIN — SODIUM CHLORIDE TAB 1 GM 3 G: 1 TAB at 05:10

## 2017-10-22 RX ADMIN — INSULIN DETEMIR 5 UNITS: 100 INJECTION, SOLUTION SUBCUTANEOUS at 08:10

## 2017-10-22 RX ADMIN — SODIUM CHLORIDE TAB 1 GM 3 G: 1 TAB at 09:10

## 2017-10-22 RX ADMIN — Medication 10 ML: at 12:10

## 2017-10-22 RX ADMIN — CARVEDILOL 6.25 MG: 6.25 TABLET, FILM COATED ORAL at 08:10

## 2017-10-22 NOTE — ASSESSMENT & PLAN NOTE
-- ESBL sensitive to meropenem  -- meropenem started 10/9 x 8 days - now complete  -- afebrile  -- WBC stable

## 2017-10-22 NOTE — PROGRESS NOTES
[facility]  Neurocritical Care  Progress Note    Admit Date: 10/3/2017  Service Date: 10/22/2017  Length of Stay: 19    Subjective:     Chief Complaint: Seizure    History of Present Illness: Ms. Frank is a 68 yo woman with a PMHx DM type II, CKD stage 3, CAD s/p CABG, essential hypertension, cerebrovascular disease s/p bi-hemispheric watershed infarcts and trach/PEG who present as a transfer from [facility] to Neuro Critical Care s/p Cardiac Arrest on 10/4 and evaluation of Anoxic Brain Injury. She was recently discharged from [facility] [place] with bilateral MCA SAQIB watershed infarcts. Yesterday, she was brought in via EMS from [facility] for cardiac arrest. Per nursing homestaff, she received chest compressions for about 5 minutes. After EMS arrived, ACLS protocol continued and she was given 2 rounds of epi, non-shockable rhythm. ROSC achieved en route to the ER.Pt arrived to ICU intubated. It was noted there sudden intermittent generalized body jerks mostly on tactile stimulation. Also, pt was hypotensive for which norepinephrine infusion started. Since her discharge here on 9/28 she has been admitted to multiple facilities recently to be treated for various issues including mucous plug leading to acute hypoxic respiratory failure, she pulled out her trach 9/4 so that was reinserted at [facility] and she was also treated for ESBL UTI while there, and last admit she was treated for likely aspiration pneumonia. She was transported here on Propofol. On examination, no cough, no gag, no corneal and minimal to no withdrawal.       Hospital Course: 10/5: s/p Cardiac Arrest on 10/4 and evaluation of anoxic brain injury   10//7: EEG with burst suppression pattern.  MRI with diffuse diffusion restriction  10/10: family meeting   10/11: SSEP  10/12: worsening myoclonus especially when moved. Valproic acid level undetectable likely due to interactions with meropenem. Patient on meropenem for ESBL  Klebsiella pneumonia sensitive to meropenem.  10/15: No events overnight.   10/16: No issues overnight  10/18: Trach collar  10/22: Continued hyperkalemia     Interval History:  No issues overnight. She continues to have hyperkalemia requiring kayexelate. She is tolerating trach collar well.    Review of Systems  Unable to obtain a complete ROS due to level of consciousness.    Objective:     Vitals:  Temp: 98.4 °F (36.9 °C) (10/22/17 1100)  Pulse: 95 (10/22/17 1100)  Resp: 18 (10/22/17 1100)  BP: (!) 174/83 (10/22/17 1100)  SpO2: 99 % (10/22/17 1100)    Temp:  [97.5 °F (36.4 °C)-98.8 °F (37.1 °C)] 98.4 °F (36.9 °C)  Pulse:  [79-95] 95  Resp:  [18-47] 18  SpO2:  [98 %-100 %] 99 %  BP: (127-174)/(61-84) 174/83         Oxygen Concentration (%):  [35-40] 35  Resp Rate Total:  [26 br/min-28 br/min] 28 br/min    10/21 0701 - 10/22 0700  In: 1455 [I.V.:240]  Out: 1280 [Urine:1080]    Physical Exam  GA: Comatose, comfortable, no acute distress.   HEENT: No scleral icterus or JVD.   Pulmonary: Clear to auscultation A/L. No wheezing, crackles, or rhonchi.  Cardiac: RRR S1 & S2 w/o rubs/murmurs/gallops.   Abdominal: Bowel sounds present x 4.   Skin: No jaundice, rashes, or visible lesions.    Neuro:  --sedation: none  --GCS: [id number]  --Mental Status: Comatose, does not respond to voice. Does not follow commands.  --CN II-XII: Grossly intact  --Pupils 3mm, PERRL.   --brainstem: cough, corneals intact. +occulocephalics, gag absent  --Motor: No movement in any extremity  --sensory: unable to assess  --Reflexes: not tested  --Gait: deferred      Medications:  Continuous Scheduled    amlodipine 10 mg Daily   atorvastatin 10 mg Daily   carvedilol 6.25 mg BID   chlorhexidine 15 mL BID   clonazePAM 2 mg Q8H   famotidine 20 mg BID   heparin (porcine) 7,500 Units Q8H   hydrALAZINE 100 mg Q8H   insulin detemir 5 Units BID   levetiracetam 1,500 mg BID   sodium chloride 0.9% 10 mL Q6H   sodium chloride 3 g Q8H   zonisamide 200 mg BID    PRN    acetaminophen 650 mg Q6H PRN   albuterol-ipratropium 2.5mg-0.5mg/3mL 3 mL Q6H PRN   dextrose 50% 12.5 g PRN   dextrose 50% 12.5 g PRN   glucagon (human recombinant) 1 mg PRN   hydrALAZINE 10 mg Q4H PRN   insulin aspart 1-10 Units Q6H PRN   labetalol 10 mg Q4H PRN   magnesium sulfate IVPB 2 g PRN   magnesium sulfate IVPB 4 g PRN   ondansetron 4 mg Q8H PRN   pneumoc 13-finn conj-dip cr(PF) 0.5 mL vaccine x 1 dose   potassium chloride 10% 40 mEq PRN   potassium chloride 10% 40 mEq PRN   potassium, sodium phosphates 2 packet PRN   sodium chloride 0.9% 10 mL PRN     Labs:  Component      Latest Ref Rng & Units 10/14/2017 10/14/2017 10/14/2017           1:08 AM  1:00 AM  1:00 AM   WBC      3.90 - 12.70 K/uL   16.41 (H)   RBC      4.00 - 5.40 M/uL   3.11 (L)   Hemoglobin      12.0 - 16.0 g/dL   8.5 (L)   Hematocrit      37.0 - 48.5 %   27.4 (L)   MCV      82 - 98 fL   88   MCH      27.0 - 31.0 pg   27.3   MCHC      32.0 - 36.0 g/dL   31.0 (L)   RDW      11.5 - 14.5 %   16.1 (H)   Platelets      150 - 350 K/uL   285   MPV      9.2 - 12.9 fL   9.1 (L)   Lymph #      1.0 - 4.8 K/uL   CANCELED   Mono #      0.3 - 1.0 K/uL   CANCELED   Eos #      0.0 - 0.5 K/uL   CANCELED   Baso #      0.00 - 0.20 K/uL   CANCELED   Gran%      38.0 - 73.0 %   76.0 (H)   Lymph%      18.0 - 48.0 %   9.0 (L)   Mono%      4.0 - 15.0 %   10.0   Eosinophil%      0.0 - 8.0 %   1.0   Basophil%      0.0 - 1.9 %   0.0   BANDS      %   2.0   Metamyelocytes      %   1.0   Myelocytes      %   1.0   Platelet Estimate         Appears normal   Aniso         Slight   Differential Method         Manual   Sodium      136 - 145 mmol/L  140 140   Potassium      3.5 - 5.1 mmol/L  3.6    Chloride      95 - 110 mmol/L  107    CO2      23 - 29 mmol/L  25    Glucose      70 - 110 mg/dL  111 (H)    BUN, Bld      8 - 23 mg/dL  8    Creatinine      0.5 - 1.4 mg/dL  0.5    Calcium      8.7 - 10.5 mg/dL  8.1 (L)    Total Protein      6.0 - 8.4 g/dL  5.3 (L)     Albumin      3.5 - 5.2 g/dL  1.3 (L)    Total Bilirubin      0.1 - 1.0 mg/dL  0.4    Alkaline Phosphatase      55 - 135 U/L  98    AST      10 - 40 U/L  31    ALT      10 - 44 U/L  20    Anion Gap      8 - 16 mmol/L  8    eGFR if African American      >60 mL/min/1.73 m:2  >60.0    eGFR if non African American      >60 mL/min/1.73 m:2  >60.0    Protime      9.0 - 12.5 sec   11.0   Coumadin Monitoring INR      0.8 - 1.2   1.0   Phosphorus      2.7 - 4.5 mg/dL   2.9   POCT Glucose      70 - 110 mg/dL 136 (H)       Imaging:  No new imaging    Assessment/Plan:     Neuro   Status epilepticus    -myoclonic from anoxic brain injury  -clonazepam, levetiracetam, zonisamide        Anoxic brain injury    -from cardiac arrest        Cerebral infarction, watershed distribution, bilateral, acute    [place] [facility] [place] with bilateral MCA SAQIB watershed infarct          ENT   Tracheostomy status    -- S/p Trach PEG during previous admission on 9/28/2017  -- on trach collar, tolerating well        Pulmonary   Klebsiella pneumonia    -- ESBL sensitive to meropenem  -- meropenem started 10/9 x 8 days - now complete  -- afebrile  -- WBC stable          Acute on chronic respiratory failure    Trach collar  -ABG 10/20 7.3/43/138  - 40% FiO2        Cardiac/Vascular   Cardiac arrest, cause unspecified    -apparently respiratory in origin        S/P CABG (coronary artery bypass graft)    Hx of CABG in 2005        Essential hypertension    -- -200  -- hydralazine, carvedilol, amlodipine        Renal/   Hyponatremia    -salt tabs        Hyperkalemia    -- given kayexelate this morning  -- EKG pending        Oncology   Anemia of chronic disease    -HGB >7        Leukocytosis-resolved as of 10/22/2017    -slowly decreasing        Endocrine   Type 2 diabetes mellitus, uncontrolled    -- Start detemir 5mg BID  -- ISS        GI   PEG (percutaneous endoscopic gastrostomy) status    -- Gastrostomy tube place during previous  admission on 9/28            Prophylaxis:  Venous Thromboembolism: mechanical chemical  Stress Ulcer: H2B  Ventilator Pneumonia: yes     Activity Orders          Bed rest starting at 10/03 1510        Full Code    Adelina Puckett MD  Neurocritical Care  [facility]

## 2017-10-22 NOTE — SUBJECTIVE & OBJECTIVE
Interval History:  No issues overnight. She continues to have hyperkalemia requiring kayexelate. She is tolerating trach collar well.    Review of Systems  Unable to obtain a complete ROS due to level of consciousness.    Objective:     Vitals:  Temp: 98.4 °F (36.9 °C) (10/22/17 1100)  Pulse: 95 (10/22/17 1100)  Resp: 18 (10/22/17 1100)  BP: (!) 174/83 (10/22/17 1100)  SpO2: 99 % (10/22/17 1100)    Temp:  [97.5 °F (36.4 °C)-98.8 °F (37.1 °C)] 98.4 °F (36.9 °C)  Pulse:  [79-95] 95  Resp:  [18-47] 18  SpO2:  [98 %-100 %] 99 %  BP: (127-174)/(61-84) 174/83         Oxygen Concentration (%):  [35-40] 35  Resp Rate Total:  [26 br/min-28 br/min] 28 br/min    10/21 0701 - 10/22 0700  In: 1455 [I.V.:240]  Out: 1280 [Urine:1080]    Physical Exam  GA: Comatose, comfortable, no acute distress.   HEENT: No scleral icterus or JVD.   Pulmonary: Clear to auscultation A/L. No wheezing, crackles, or rhonchi.  Cardiac: RRR S1 & S2 w/o rubs/murmurs/gallops.   Abdominal: Bowel sounds present x 4.   Skin: No jaundice, rashes, or visible lesions.    Neuro:  --sedation: none  --GCS: F9Th5X3  --Mental Status: Comatose, does not respond to voice. Does not follow commands.  --CN II-XII: Grossly intact  --Pupils 3mm, PERRL.   --brainstem: cough, corneals intact. +occulocephalics, gag absent  --Motor: No movement in any extremity  --sensory: unable to assess  --Reflexes: not tested  --Gait: deferred      Medications:  Continuous Scheduled    amlodipine 10 mg Daily   atorvastatin 10 mg Daily   carvedilol 6.25 mg BID   chlorhexidine 15 mL BID   clonazePAM 2 mg Q8H   famotidine 20 mg BID   heparin (porcine) 7,500 Units Q8H   hydrALAZINE 100 mg Q8H   insulin detemir 5 Units BID   levetiracetam 1,500 mg BID   sodium chloride 0.9% 10 mL Q6H   sodium chloride 3 g Q8H   zonisamide 200 mg BID   PRN    acetaminophen 650 mg Q6H PRN   albuterol-ipratropium 2.5mg-0.5mg/3mL 3 mL Q6H PRN   dextrose 50% 12.5 g PRN   dextrose 50% 12.5 g PRN   glucagon (human  recombinant) 1 mg PRN   hydrALAZINE 10 mg Q4H PRN   insulin aspart 1-10 Units Q6H PRN   labetalol 10 mg Q4H PRN   magnesium sulfate IVPB 2 g PRN   magnesium sulfate IVPB 4 g PRN   ondansetron 4 mg Q8H PRN   pneumoc 13-finn conj-dip cr(PF) 0.5 mL vaccine x 1 dose   potassium chloride 10% 40 mEq PRN   potassium chloride 10% 40 mEq PRN   potassium, sodium phosphates 2 packet PRN   sodium chloride 0.9% 10 mL PRN     Labs:  Component      Latest Ref Rng & Units 10/14/2017 10/14/2017 10/14/2017           1:08 AM  1:00 AM  1:00 AM   WBC      3.90 - 12.70 K/uL   16.41 (H)   RBC      4.00 - 5.40 M/uL   3.11 (L)   Hemoglobin      12.0 - 16.0 g/dL   8.5 (L)   Hematocrit      37.0 - 48.5 %   27.4 (L)   MCV      82 - 98 fL   88   MCH      27.0 - 31.0 pg   27.3   MCHC      32.0 - 36.0 g/dL   31.0 (L)   RDW      11.5 - 14.5 %   16.1 (H)   Platelets      150 - 350 K/uL   285   MPV      9.2 - 12.9 fL   9.1 (L)   Lymph #      1.0 - 4.8 K/uL   CANCELED   Mono #      0.3 - 1.0 K/uL   CANCELED   Eos #      0.0 - 0.5 K/uL   CANCELED   Baso #      0.00 - 0.20 K/uL   CANCELED   Gran%      38.0 - 73.0 %   76.0 (H)   Lymph%      18.0 - 48.0 %   9.0 (L)   Mono%      4.0 - 15.0 %   10.0   Eosinophil%      0.0 - 8.0 %   1.0   Basophil%      0.0 - 1.9 %   0.0   BANDS      %   2.0   Metamyelocytes      %   1.0   Myelocytes      %   1.0   Platelet Estimate         Appears normal   Aniso         Slight   Differential Method         Manual   Sodium      136 - 145 mmol/L  140 140   Potassium      3.5 - 5.1 mmol/L  3.6    Chloride      95 - 110 mmol/L  107    CO2      23 - 29 mmol/L  25    Glucose      70 - 110 mg/dL  111 (H)    BUN, Bld      8 - 23 mg/dL  8    Creatinine      0.5 - 1.4 mg/dL  0.5    Calcium      8.7 - 10.5 mg/dL  8.1 (L)    Total Protein      6.0 - 8.4 g/dL  5.3 (L)    Albumin      3.5 - 5.2 g/dL  1.3 (L)    Total Bilirubin      0.1 - 1.0 mg/dL  0.4    Alkaline Phosphatase      55 - 135 U/L  98    AST      10 - 40 U/L  31    ALT       10 - 44 U/L  20    Anion Gap      8 - 16 mmol/L  8    eGFR if African American      >60 mL/min/1.73 m:2  >60.0    eGFR if non African American      >60 mL/min/1.73 m:2  >60.0    Protime      9.0 - 12.5 sec   11.0   Coumadin Monitoring INR      0.8 - 1.2   1.0   Phosphorus      2.7 - 4.5 mg/dL   2.9   POCT Glucose      70 - 110 mg/dL 136 (H)       Imaging:  No new imaging

## 2017-10-22 NOTE — ASSESSMENT & PLAN NOTE
-- discharge from [facility] [place] 9/28/2017 with bilateral MCA SAQIB watershed infarct

## 2017-10-22 NOTE — PLAN OF CARE
Problem: Patient Care Overview  Goal: Plan of Care Review  Outcome: Ongoing (interventions implemented as appropriate)  POC reviewed with pt and family at 0300. Family verbalized understanding. Pt unable to verbalize understanding at this time. Questions and concerns addressed. No acute events today. Tube feeds @ 45. Pt has tenacious amounts of secretions in trach, RN Q1 deep suction to help with secretions. No change in neuro assessment. VS stable. Pt progressing toward goals. Will continue to monitor. See flowsheets for full assessment and VS info.

## 2017-10-23 LAB
ALBUMIN SERPL BCP-MCNC: 1.9 G/DL
ALP SERPL-CCNC: 149 U/L
ALT SERPL W/O P-5'-P-CCNC: 19 U/L
ANION GAP SERPL CALC-SCNC: 9 MMOL/L
AST SERPL-CCNC: 23 U/L
BASOPHILS # BLD AUTO: 0.03 K/UL
BASOPHILS NFR BLD: 0.3 %
BILIRUB SERPL-MCNC: 0.4 MG/DL
BUN SERPL-MCNC: 19 MG/DL
CALCIUM SERPL-MCNC: 9.1 MG/DL
CHLORIDE SERPL-SCNC: 111 MMOL/L
CO2 SERPL-SCNC: 22 MMOL/L
CREAT SERPL-MCNC: 0.6 MG/DL
DIFFERENTIAL METHOD: ABNORMAL
EOSINOPHIL # BLD AUTO: 0.1 K/UL
EOSINOPHIL NFR BLD: 1.5 %
ERYTHROCYTE [DISTWIDTH] IN BLOOD BY AUTOMATED COUNT: 15.9 %
EST. GFR  (AFRICAN AMERICAN): >60 ML/MIN/1.73 M^2
EST. GFR  (NON AFRICAN AMERICAN): >60 ML/MIN/1.73 M^2
GLUCOSE SERPL-MCNC: 177 MG/DL
HCT VFR BLD AUTO: 27.6 %
HGB BLD-MCNC: 8.3 G/DL
IMM GRANULOCYTES # BLD AUTO: 0.08 K/UL
IMM GRANULOCYTES NFR BLD AUTO: 0.9 %
LYMPHOCYTES # BLD AUTO: 1.7 K/UL
LYMPHOCYTES NFR BLD: 19.4 %
MAGNESIUM SERPL-MCNC: 1.6 MG/DL
MAGNESIUM SERPL-MCNC: 2 MG/DL
MCH RBC QN AUTO: 27.5 PG
MCHC RBC AUTO-ENTMCNC: 30.1 G/DL
MCV RBC AUTO: 91 FL
MONOCYTES # BLD AUTO: 0.6 K/UL
MONOCYTES NFR BLD: 6.7 %
NEUTROPHILS # BLD AUTO: 6.2 K/UL
NEUTROPHILS NFR BLD: 71.2 %
NRBC BLD-RTO: 0 /100 WBC
PHOSPHATE SERPL-MCNC: 4.4 MG/DL
PLATELET # BLD AUTO: 259 K/UL
PMV BLD AUTO: 9.7 FL
POCT GLUCOSE: 156 MG/DL (ref 70–110)
POCT GLUCOSE: 189 MG/DL (ref 70–110)
POCT GLUCOSE: 198 MG/DL (ref 70–110)
POCT GLUCOSE: 211 MG/DL (ref 70–110)
POTASSIUM SERPL-SCNC: 4.7 MMOL/L
PROT SERPL-MCNC: 7 G/DL
RBC # BLD AUTO: 3.02 M/UL
SODIUM SERPL-SCNC: 142 MMOL/L
WBC # BLD AUTO: 8.65 K/UL

## 2017-10-23 PROCEDURE — 80053 COMPREHEN METABOLIC PANEL: CPT

## 2017-10-23 PROCEDURE — A4216 STERILE WATER/SALINE, 10 ML: HCPCS | Performed by: EMERGENCY MEDICINE

## 2017-10-23 PROCEDURE — 25000003 PHARM REV CODE 250: Performed by: STUDENT IN AN ORGANIZED HEALTH CARE EDUCATION/TRAINING PROGRAM

## 2017-10-23 PROCEDURE — 87205 SMEAR GRAM STAIN: CPT

## 2017-10-23 PROCEDURE — 25000003 PHARM REV CODE 250: Performed by: PSYCHIATRY & NEUROLOGY

## 2017-10-23 PROCEDURE — 99900026 HC AIRWAY MAINTENANCE (STAT)

## 2017-10-23 PROCEDURE — 83735 ASSAY OF MAGNESIUM: CPT

## 2017-10-23 PROCEDURE — 87077 CULTURE AEROBIC IDENTIFY: CPT

## 2017-10-23 PROCEDURE — 27200966 HC CLOSED SUCTION SYSTEM

## 2017-10-23 PROCEDURE — 87186 SC STD MICRODIL/AGAR DIL: CPT | Mod: 59

## 2017-10-23 PROCEDURE — 99233 SBSQ HOSP IP/OBS HIGH 50: CPT | Mod: ,,, | Performed by: PSYCHIATRY & NEUROLOGY

## 2017-10-23 PROCEDURE — 85025 COMPLETE CBC W/AUTO DIFF WBC: CPT

## 2017-10-23 PROCEDURE — 25000003 PHARM REV CODE 250: Performed by: PHYSICIAN ASSISTANT

## 2017-10-23 PROCEDURE — 63600175 PHARM REV CODE 636 W HCPCS: Performed by: EMERGENCY MEDICINE

## 2017-10-23 PROCEDURE — 84100 ASSAY OF PHOSPHORUS: CPT

## 2017-10-23 PROCEDURE — 63600175 PHARM REV CODE 636 W HCPCS: Performed by: NURSE PRACTITIONER

## 2017-10-23 PROCEDURE — 27000221 HC OXYGEN, UP TO 24 HOURS

## 2017-10-23 PROCEDURE — 87070 CULTURE OTHR SPECIMN AEROBIC: CPT

## 2017-10-23 PROCEDURE — 25000003 PHARM REV CODE 250: Performed by: EMERGENCY MEDICINE

## 2017-10-23 PROCEDURE — 94761 N-INVAS EAR/PLS OXIMETRY MLT: CPT

## 2017-10-23 PROCEDURE — 20000000 HC ICU ROOM

## 2017-10-23 PROCEDURE — 99900035 HC TECH TIME PER 15 MIN (STAT)

## 2017-10-23 RX ORDER — NAPROXEN SODIUM 220 MG/1
81 TABLET, FILM COATED ORAL DAILY
Status: DISCONTINUED | OUTPATIENT
Start: 2017-10-24 | End: 2017-11-15 | Stop reason: HOSPADM

## 2017-10-23 RX ADMIN — AMLODIPINE BESYLATE 10 MG: 10 TABLET ORAL at 08:10

## 2017-10-23 RX ADMIN — LEVETIRACETAM 1500 MG: 750 TABLET ORAL at 08:10

## 2017-10-23 RX ADMIN — SODIUM CHLORIDE TAB 1 GM 3 G: 1 TAB at 09:10

## 2017-10-23 RX ADMIN — SODIUM CHLORIDE TAB 1 GM 3 G: 1 TAB at 05:10

## 2017-10-23 RX ADMIN — GABAPENTIN 200 MG: 400 CAPSULE ORAL at 09:10

## 2017-10-23 RX ADMIN — INSULIN ASPART 1 UNITS: 100 INJECTION, SOLUTION INTRAVENOUS; SUBCUTANEOUS at 12:10

## 2017-10-23 RX ADMIN — FAMOTIDINE 20 MG: 20 TABLET, FILM COATED ORAL at 09:10

## 2017-10-23 RX ADMIN — CHLORHEXIDINE GLUCONATE 15 ML: 1.2 RINSE ORAL at 09:10

## 2017-10-23 RX ADMIN — HYDRALAZINE HYDROCHLORIDE 100 MG: 50 TABLET ORAL at 05:10

## 2017-10-23 RX ADMIN — CLONAZEPAM 2 MG: 1 TABLET ORAL at 02:10

## 2017-10-23 RX ADMIN — Medication 10 ML: at 12:10

## 2017-10-23 RX ADMIN — CLONAZEPAM 2 MG: 1 TABLET ORAL at 05:10

## 2017-10-23 RX ADMIN — INSULIN DETEMIR 5 UNITS: 100 INJECTION, SOLUTION SUBCUTANEOUS at 08:10

## 2017-10-23 RX ADMIN — CLONAZEPAM 2 MG: 1 TABLET ORAL at 09:10

## 2017-10-23 RX ADMIN — ATORVASTATIN CALCIUM 10 MG: 10 TABLET, FILM COATED ORAL at 08:10

## 2017-10-23 RX ADMIN — HEPARIN SODIUM 7500 UNITS: 5000 INJECTION, SOLUTION INTRAVENOUS; SUBCUTANEOUS at 05:10

## 2017-10-23 RX ADMIN — FAMOTIDINE 20 MG: 20 TABLET, FILM COATED ORAL at 08:10

## 2017-10-23 RX ADMIN — CARVEDILOL 6.25 MG: 6.25 TABLET, FILM COATED ORAL at 09:10

## 2017-10-23 RX ADMIN — HYDRALAZINE HYDROCHLORIDE 100 MG: 50 TABLET ORAL at 09:10

## 2017-10-23 RX ADMIN — Medication 10 ML: at 05:10

## 2017-10-23 RX ADMIN — CARVEDILOL 6.25 MG: 6.25 TABLET, FILM COATED ORAL at 08:10

## 2017-10-23 RX ADMIN — GABAPENTIN 200 MG: 400 CAPSULE ORAL at 08:10

## 2017-10-23 RX ADMIN — HEPARIN SODIUM 7500 UNITS: 5000 INJECTION, SOLUTION INTRAVENOUS; SUBCUTANEOUS at 02:10

## 2017-10-23 RX ADMIN — SODIUM CHLORIDE TAB 1 GM 3 G: 1 TAB at 02:10

## 2017-10-23 RX ADMIN — INSULIN ASPART 4 UNITS: 100 INJECTION, SOLUTION INTRAVENOUS; SUBCUTANEOUS at 06:10

## 2017-10-23 RX ADMIN — LEVETIRACETAM 1500 MG: 750 TABLET ORAL at 09:10

## 2017-10-23 RX ADMIN — HEPARIN SODIUM 7500 UNITS: 5000 INJECTION, SOLUTION INTRAVENOUS; SUBCUTANEOUS at 09:10

## 2017-10-23 RX ADMIN — MAGNESIUM SULFATE IN WATER 2 G: 40 INJECTION, SOLUTION INTRAVENOUS at 05:10

## 2017-10-23 RX ADMIN — CHLORHEXIDINE GLUCONATE 15 ML: 1.2 RINSE ORAL at 08:10

## 2017-10-23 RX ADMIN — INSULIN ASPART 2 UNITS: 100 INJECTION, SOLUTION INTRAVENOUS; SUBCUTANEOUS at 05:10

## 2017-10-23 RX ADMIN — INSULIN DETEMIR 5 UNITS: 100 INJECTION, SOLUTION SUBCUTANEOUS at 09:10

## 2017-10-23 RX ADMIN — HYDRALAZINE HYDROCHLORIDE 100 MG: 50 TABLET ORAL at 02:10

## 2017-10-23 NOTE — NURSING
Infection control advised to repeat resp culture for record. 2 sets of resp cultures 4 days apart would be required for dc of contact precautions. Patient will be DC prior to the culture resulting.

## 2017-10-23 NOTE — PROGRESS NOTES
[facility]  Neurocritical Care  Progress Note    Admit Date: 10/3/2017  Service Date: 10/23/2017  Length of Stay: 20    Subjective:     Chief Complaint: Seizure    History of Present Illness: Ms. Frank is a 68 yo woman with a PMHx DM type II, CKD stage 3, CAD s/p CABG, essential hypertension, cerebrovascular disease s/p bi-hemispheric watershed infarcts and trach/PEG who present as a transfer from [facility] to Neuro Critical Care s/p Cardiac Arrest on 10/4 and evaluation of Anoxic Brain Injury. [place] [facility] [place] with bilateral MCA SAQIB watershed infarcts. Yesterday, she was brought in via EMS from [facility] for cardiac arrest. Per nursing homestaff, she received chest compressions for about 5 minutes. After EMS arrived, ACLS protocol continued and she was given 2 rounds of epi, non-shockable rhythm. ROSC achieved en route to the ER.Pt arrived to ICU intubated. It was noted there sudden intermittent generalized body jerks mostly on tactile stimulation. Also, pt was hypotensive for which norepinephrine infusion started. Since her discharge here on 9/28 she has been admitted to multiple facilities recently to be treated for various issues including mucous plug leading to acute hypoxic respiratory failure, she pulled out her trach 9/4 so that was reinserted at [facility] and she was also treated for ESBL UTI while there, and last admit she was treated for likely aspiration pneumonia. She was transported here on Propofol. On examination, no cough, no gag, no corneal and minimal to no withdrawal.       Hospital Course: 10/5: s/p Cardiac Arrest on 10/4 and evaluation of anoxic brain injury   10//7: EEG with burst suppression pattern.  MRI with diffuse diffusion restriction  10/10: family meeting   10/11: SSEP  10/12: worsening myoclonus especially when moved. Valproic acid level undetectable likely due to interactions with meropenem. Patient on meropenem for ESBL  Klebsiella pneumonia sensitive to meropenem.  10/15: No events overnight.   10/16: No issues overnight  10/18: Trach collar  10/22: Continued hyperkalemia   10/23: Pending transfer to LTAC    Interval History:  No acute events overnight. She is pending acceptance to LTAC.    Review of Systems  Unable to obtain a complete ROS due to level of consciousness.    Objective:     Vitals:  Temp: 97.6 °F (36.4 °C) (10/23/17 0942)  Pulse: 88 (10/23/17 1503)  Resp: (!) 36 (10/23/17 1503)  BP: 136/63 (10/23/17 1503)  SpO2: 100 % (10/23/17 1503)    Temp:  [95.2 °F (35.1 °C)-97.8 °F (36.6 °C)] 97.6 °F (36.4 °C)  Pulse:  [79-94] 88  Resp:  [20-55] 36  SpO2:  [100 %] 100 %  BP: (136-187)/() 136/63         Oxygen Concentration (%):  [35] 35    10/22 0701 - 10/23 0700  In: 1510 [I.V.:340]  Out: 450 [Urine:450]    Physical Exam  GA: Comatose, comfortable, no acute distress.   HEENT: No scleral icterus or JVD.   Pulmonary: Clear to auscultation A/L. No wheezing, crackles, or rhonchi.  Cardiac: RRR S1 & S2 w/o rubs/murmurs/gallops.   Abdominal: Bowel sounds present x 4.   Skin: No jaundice, rashes, or visible lesions.    Neuro:  --sedation: none  --GCS: P1Te5J2  --Mental Status: Comatose, does not respond to voice. Does not follow commands.  --CN II-XII: Grossly intact  --Pupils 3mm, PERRL.   --brainstem: cough, corneals, occulocephalics intact. Gag absent  --Motor: No movement in any extremity  --sensory: No response to pain  --Reflexes: not tested  --Gait: deferred      Medications:  Continuous Scheduled    amlodipine 10 mg Daily   atorvastatin 10 mg Daily   carvedilol 6.25 mg BID   chlorhexidine 15 mL BID   clonazePAM 2 mg Q8H   famotidine 20 mg BID   heparin (porcine) 7,500 Units Q8H   hydrALAZINE 100 mg Q8H   insulin detemir 5 Units BID   levetiracetam 1,500 mg BID   sodium chloride 0.9% 10 mL Q6H   sodium chloride 3 g Q8H   zonisamide 200 mg BID   PRN    acetaminophen 650 mg Q6H PRN   albuterol-ipratropium 2.5mg-0.5mg/3mL 3  mL Q6H PRN   dextrose 50% 12.5 g PRN   dextrose 50% 12.5 g PRN   glucagon (human recombinant) 1 mg PRN   hydrALAZINE 10 mg Q4H PRN   insulin aspart 1-10 Units Q6H PRN   labetalol 10 mg Q4H PRN   magnesium sulfate IVPB 2 g PRN   magnesium sulfate IVPB 4 g PRN   ondansetron 4 mg Q8H PRN   pneumoc 13-finn conj-dip cr(PF) 0.5 mL vaccine x 1 dose   potassium chloride 10% 40 mEq PRN   potassium chloride 10% 40 mEq PRN   potassium, sodium phosphates 2 packet PRN   sodium chloride 0.9% 10 mL PRN     Labs:  Component      Latest Ref Rng & Units 10/14/2017 10/14/2017 10/14/2017           1:08 AM  1:00 AM  1:00 AM   WBC      3.90 - 12.70 K/uL   16.41 (H)   RBC      4.00 - 5.40 M/uL   3.11 (L)   Hemoglobin      12.0 - 16.0 g/dL   8.5 (L)   Hematocrit      37.0 - 48.5 %   27.4 (L)   MCV      82 - 98 fL   88   MCH      27.0 - 31.0 pg   27.3   MCHC      32.0 - 36.0 g/dL   31.0 (L)   RDW      11.5 - 14.5 %   16.1 (H)   Platelets      150 - 350 K/uL   285   MPV      9.2 - 12.9 fL   9.1 (L)   Lymph #      1.0 - 4.8 K/uL   CANCELED   Mono #      0.3 - 1.0 K/uL   CANCELED   Eos #      0.0 - 0.5 K/uL   CANCELED   Baso #      0.00 - 0.20 K/uL   CANCELED   Gran%      38.0 - 73.0 %   76.0 (H)   Lymph%      18.0 - 48.0 %   9.0 (L)   Mono%      4.0 - 15.0 %   10.0   Eosinophil%      0.0 - 8.0 %   1.0   Basophil%      0.0 - 1.9 %   0.0   BANDS      %   2.0   Metamyelocytes      %   1.0   Myelocytes      %   1.0   Platelet Estimate         Appears normal   Aniso         Slight   Differential Method         Manual   Sodium      136 - 145 mmol/L  140 140   Potassium      3.5 - 5.1 mmol/L  3.6    Chloride      95 - 110 mmol/L  107    CO2      23 - 29 mmol/L  25    Glucose      70 - 110 mg/dL  111 (H)    BUN, Bld      8 - 23 mg/dL  8    Creatinine      0.5 - 1.4 mg/dL  0.5    Calcium      8.7 - 10.5 mg/dL  8.1 (L)    Total Protein      6.0 - 8.4 g/dL  5.3 (L)    Albumin      3.5 - 5.2 g/dL  1.3 (L)    Total Bilirubin      0.1 - 1.0 mg/dL  0.4     Alkaline Phosphatase      55 - 135 U/L  98    AST      10 - 40 U/L  31    ALT      10 - 44 U/L  20    Anion Gap      8 - 16 mmol/L  8    eGFR if African American      >60 mL/min/1.73 m:2  >60.0    eGFR if non African American      >60 mL/min/1.73 m:2  >60.0    Protime      9.0 - 12.5 sec   11.0   Coumadin Monitoring INR      0.8 - 1.2   1.0   Phosphorus      2.7 - 4.5 mg/dL   2.9   POCT Glucose      70 - 110 mg/dL 136 (H)       Imaging:  No new imaging    Assessment/Plan:     Neuro   Status epilepticus    -- myoclonic from anoxic brain injury  -- clonazepam, levetiracetam, zonisamide        Anoxic brain injury    -from cardiac arrest        Cerebral infarction, watershed distribution, bilateral, acute    -- from 6/2017          Holley coma scale total score 3-8    -- from anoxic brain injury        ENT   Tracheostomy status    -- S/p Trach PEG during previous admission on 9/28/2017  -- on trach collar, tolerating well        Pulmonary   Acute on chronic respiratory failure    -- tolerating trach collar        Cardiac/Vascular   Cardiac arrest, cause unspecified    -apparently respiratory in origin        S/P CABG (coronary artery bypass graft)    -- Hx of CABG in 2005        Essential hypertension    -- -200  -- hydralazine, carvedilol, amlodipine        Renal/   Hyponatremia    -- salt tabs  -- now normal        Hyperkalemia    -- s/p multiple doses of kayexelate   -- normal today        Endocrine   Type 2 diabetes mellitus, uncontrolled    -- Start detemir 5mg BID  -- ISS        GI   PEG (percutaneous endoscopic gastrostomy) status    -- Gastrostomy tube place during previous admission on 9/28            Prophylaxis:  Venous Thromboembolism: mechanical chemical  Stress Ulcer: H2B  Ventilator Pneumonia: yes     Activity Orders          Bed rest starting at 10/03 1510        Full Code    dAelina Puckett MD  Neurocritical Care  [facility]

## 2017-10-23 NOTE — SUBJECTIVE & OBJECTIVE
Interval History:  No acute events overnight. She is pending acceptance to LTAC.    Review of Systems  Unable to obtain a complete ROS due to level of consciousness.    Objective:     Vitals:  Temp: 97.6 °F (36.4 °C) (10/23/17 0942)  Pulse: 88 (10/23/17 1503)  Resp: (!) 36 (10/23/17 1503)  BP: 136/63 (10/23/17 1503)  SpO2: 100 % (10/23/17 1503)    Temp:  [95.2 °F (35.1 °C)-97.8 °F (36.6 °C)] 97.6 °F (36.4 °C)  Pulse:  [79-94] 88  Resp:  [20-55] 36  SpO2:  [100 %] 100 %  BP: (136-187)/() 136/63         Oxygen Concentration (%):  [35] 35    10/22 0701 - 10/23 0700  In: 1510 [I.V.:340]  Out: 450 [Urine:450]    Physical Exam  GA: Comatose, comfortable, no acute distress.   HEENT: No scleral icterus or JVD.   Pulmonary: Clear to auscultation A/L. No wheezing, crackles, or rhonchi.  Cardiac: RRR S1 & S2 w/o rubs/murmurs/gallops.   Abdominal: Bowel sounds present x 4.   Skin: No jaundice, rashes, or visible lesions.    Neuro:  --sedation: none  --GCS: [id number]  --Mental Status: Comatose, does not respond to voice. Does not follow commands.  --CN II-XII: Grossly intact  --Pupils 3mm, PERRL.   --brainstem: cough, corneals, occulocephalics intact. Gag absent  --Motor: No movement in any extremity  --sensory: No response to pain  --Reflexes: not tested  --Gait: deferred      Medications:  Continuous Scheduled    amlodipine 10 mg Daily   atorvastatin 10 mg Daily   carvedilol 6.25 mg BID   chlorhexidine 15 mL BID   clonazePAM 2 mg Q8H   famotidine 20 mg BID   heparin (porcine) 7,500 Units Q8H   hydrALAZINE 100 mg Q8H   insulin detemir 5 Units BID   levetiracetam 1,500 mg BID   sodium chloride 0.9% 10 mL Q6H   sodium chloride 3 g Q8H   zonisamide 200 mg BID   PRN    acetaminophen 650 mg Q6H PRN   albuterol-ipratropium 2.5mg-0.5mg/3mL 3 mL Q6H PRN   dextrose 50% 12.5 g PRN   dextrose 50% 12.5 g PRN   glucagon (human recombinant) 1 mg PRN   hydrALAZINE 10 mg Q4H PRN   insulin aspart 1-10 Units Q6H PRN   labetalol 10 mg Q4H PRN    magnesium sulfate IVPB 2 g PRN   magnesium sulfate IVPB 4 g PRN   ondansetron 4 mg Q8H PRN   pneumoc 13-finn conj-dip cr(PF) 0.5 mL vaccine x 1 dose   potassium chloride 10% 40 mEq PRN   potassium chloride 10% 40 mEq PRN   potassium, sodium phosphates 2 packet PRN   sodium chloride 0.9% 10 mL PRN     Labs:  Component      Latest Ref Rng & Units 10/14/2017 10/14/2017 10/14/2017           1:08 AM  1:00 AM  1:00 AM   WBC      3.90 - 12.70 K/uL   16.41 (H)   RBC      4.00 - 5.40 M/uL   3.11 (L)   Hemoglobin      12.0 - 16.0 g/dL   8.5 (L)   Hematocrit      37.0 - 48.5 %   27.4 (L)   MCV      82 - 98 fL   88   MCH      27.0 - 31.0 pg   27.3   MCHC      32.0 - 36.0 g/dL   31.0 (L)   RDW      11.5 - 14.5 %   16.1 (H)   Platelets      150 - 350 K/uL   285   MPV      9.2 - 12.9 fL   9.1 (L)   Lymph #      1.0 - 4.8 K/uL   CANCELED   Mono #      0.3 - 1.0 K/uL   CANCELED   Eos #      0.0 - 0.5 K/uL   CANCELED   Baso #      0.00 - 0.20 K/uL   CANCELED   Gran%      38.0 - 73.0 %   76.0 (H)   Lymph%      18.0 - 48.0 %   9.0 (L)   Mono%      4.0 - 15.0 %   10.0   Eosinophil%      0.0 - 8.0 %   1.0   Basophil%      0.0 - 1.9 %   0.0   BANDS      %   2.0   Metamyelocytes      %   1.0   Myelocytes      %   1.0   Platelet Estimate         Appears normal   Aniso         Slight   Differential Method         Manual   Sodium      136 - 145 mmol/L  140 140   Potassium      3.5 - 5.1 mmol/L  3.6    Chloride      95 - 110 mmol/L  107    CO2      23 - 29 mmol/L  25    Glucose      70 - 110 mg/dL  111 (H)    BUN, Bld      8 - 23 mg/dL  8    Creatinine      0.5 - 1.4 mg/dL  0.5    Calcium      8.7 - 10.5 mg/dL  8.1 (L)    Total Protein      6.0 - 8.4 g/dL  5.3 (L)    Albumin      3.5 - 5.2 g/dL  1.3 (L)    Total Bilirubin      0.1 - 1.0 mg/dL  0.4    Alkaline Phosphatase      55 - 135 U/L  98    AST      10 - 40 U/L  31    ALT      10 - 44 U/L  20    Anion Gap      8 - 16 mmol/L  8    eGFR if African American      >60 mL/min/1.73 m:2  >60.0     eGFR if non African American      >60 mL/min/1.73 m:2  >60.0    Protime      9.0 - 12.5 sec   11.0   Coumadin Monitoring INR      0.8 - 1.2   1.0   Phosphorus      2.7 - 4.5 mg/dL   2.9   POCT Glucose      70 - 110 mg/dL 136 (H)       Imaging:  No new imaging

## 2017-10-23 NOTE — PLAN OF CARE
LUCIA advised Pt cannot go to [facility] due to being in the lifetime reserve days for the insurance. SW checked on [facility] and they are willing to take the Pt.    LUCIA spoke with Pt daughter regarding LTAC choices as [facility] will not be able to take this Pt. Pt daughter reported being upset with the reason they are giving stating that she feels it's because they complained about the care. She reported she will call this SW back to discuss choices further.    LUCIA spoke with Pt daughter regarding LTAC. She is unsure about [facility] but will check out their LTAC today. She asked the list be faxed so she can go see which one is okay.    LUCIA faxed list and will call daughter first thing tomorrow AM.    Susan España, DIONNE  Neurocritical Care   [facility]  [id number]

## 2017-10-23 NOTE — PLAN OF CARE
Problem: Patient Care Overview  Goal: Plan of Care Review  Outcome: Ongoing (interventions implemented as appropriate)  POC reviewed with pt and family at 0300. Fanily verbalized understanding. Pt not able to verbalize understanding. Questions and concerns addressed. No acute events today. Pt ready to be discharged to LTACH on 10/23/2017. VS stable. Pt still having tenacious secretions, needs to be suctioned every hour. Pt progressing toward goals. Will continue to monitor. See flowsheets for full assessment and VS info.

## 2017-10-23 NOTE — PLAN OF CARE
PT REMAINS IN ICU      10/23/17 0755   Discharge Reassessment   Assessment Type Discharge Planning Reassessment   Provided patient/caregiver education on the expected discharge date and the discharge plan No

## 2017-10-23 NOTE — PLAN OF CARE
Problem: Patient Care Overview  Goal: Plan of Care Review  Outcome: Ongoing (interventions implemented as appropriate)  POC reviewed with pt and family at 1400. Patient unable to verbalize understanding. Questions and concerns addressed. No acute events today. Pt progressing toward goals. Will continue to monitor. See flowsheets for full assessment and VS info.

## 2017-10-23 NOTE — NURSING
Infection control called and left voicemail stating patient is several days post antibiotic tx, and dispo possibly today. Unsure if Isolation status needed to persist at disposition. NCC team Input that ESBL treatment resolved. Will FU with IC when possible.

## 2017-10-24 LAB
ALBUMIN SERPL BCP-MCNC: 1.9 G/DL
ALP SERPL-CCNC: 153 U/L
ALT SERPL W/O P-5'-P-CCNC: 18 U/L
ANION GAP SERPL CALC-SCNC: 8 MMOL/L
AST SERPL-CCNC: 20 U/L
BASOPHILS # BLD AUTO: 0.02 K/UL
BASOPHILS NFR BLD: 0.2 %
BILIRUB SERPL-MCNC: 0.3 MG/DL
BUN SERPL-MCNC: 21 MG/DL
CALCIUM SERPL-MCNC: 9.6 MG/DL
CHLORIDE SERPL-SCNC: 112 MMOL/L
CO2 SERPL-SCNC: 24 MMOL/L
CREAT SERPL-MCNC: 0.6 MG/DL
DIFFERENTIAL METHOD: ABNORMAL
EOSINOPHIL # BLD AUTO: 0.1 K/UL
EOSINOPHIL NFR BLD: 1.1 %
ERYTHROCYTE [DISTWIDTH] IN BLOOD BY AUTOMATED COUNT: 16 %
EST. GFR  (AFRICAN AMERICAN): >60 ML/MIN/1.73 M^2
EST. GFR  (NON AFRICAN AMERICAN): >60 ML/MIN/1.73 M^2
GLUCOSE SERPL-MCNC: 177 MG/DL
HCT VFR BLD AUTO: 27.5 %
HGB BLD-MCNC: 8.2 G/DL
IMM GRANULOCYTES # BLD AUTO: 0.06 K/UL
IMM GRANULOCYTES NFR BLD AUTO: 0.7 %
LYMPHOCYTES # BLD AUTO: 1.6 K/UL
LYMPHOCYTES NFR BLD: 18.7 %
MAGNESIUM SERPL-MCNC: 1.8 MG/DL
MCH RBC QN AUTO: 27.6 PG
MCHC RBC AUTO-ENTMCNC: 29.8 G/DL
MCV RBC AUTO: 93 FL
MONOCYTES # BLD AUTO: 0.5 K/UL
MONOCYTES NFR BLD: 5.8 %
NEUTROPHILS # BLD AUTO: 6.5 K/UL
NEUTROPHILS NFR BLD: 73.5 %
NRBC BLD-RTO: 0 /100 WBC
PHOSPHATE SERPL-MCNC: 4.4 MG/DL
PLATELET # BLD AUTO: 257 K/UL
PMV BLD AUTO: 9.7 FL
POCT GLUCOSE: 157 MG/DL (ref 70–110)
POCT GLUCOSE: 170 MG/DL (ref 70–110)
POCT GLUCOSE: 174 MG/DL (ref 70–110)
POCT GLUCOSE: 202 MG/DL (ref 70–110)
POCT GLUCOSE: 233 MG/DL (ref 70–110)
POTASSIUM SERPL-SCNC: 4.9 MMOL/L
PROT SERPL-MCNC: 7 G/DL
RBC # BLD AUTO: 2.97 M/UL
SODIUM SERPL-SCNC: 144 MMOL/L
WBC # BLD AUTO: 8.79 K/UL

## 2017-10-24 PROCEDURE — 25000003 PHARM REV CODE 250: Performed by: EMERGENCY MEDICINE

## 2017-10-24 PROCEDURE — 25000003 PHARM REV CODE 250: Performed by: PHYSICIAN ASSISTANT

## 2017-10-24 PROCEDURE — 63600175 PHARM REV CODE 636 W HCPCS: Performed by: EMERGENCY MEDICINE

## 2017-10-24 PROCEDURE — 99900026 HC AIRWAY MAINTENANCE (STAT)

## 2017-10-24 PROCEDURE — 63600175 PHARM REV CODE 636 W HCPCS: Performed by: NURSE PRACTITIONER

## 2017-10-24 PROCEDURE — 85025 COMPLETE CBC W/AUTO DIFF WBC: CPT

## 2017-10-24 PROCEDURE — 80053 COMPREHEN METABOLIC PANEL: CPT

## 2017-10-24 PROCEDURE — 25000003 PHARM REV CODE 250: Performed by: STUDENT IN AN ORGANIZED HEALTH CARE EDUCATION/TRAINING PROGRAM

## 2017-10-24 PROCEDURE — 83735 ASSAY OF MAGNESIUM: CPT

## 2017-10-24 PROCEDURE — 25000003 PHARM REV CODE 250: Performed by: PSYCHIATRY & NEUROLOGY

## 2017-10-24 PROCEDURE — 27000221 HC OXYGEN, UP TO 24 HOURS

## 2017-10-24 PROCEDURE — 84100 ASSAY OF PHOSPHORUS: CPT

## 2017-10-24 PROCEDURE — 20000000 HC ICU ROOM

## 2017-10-24 PROCEDURE — 94761 N-INVAS EAR/PLS OXIMETRY MLT: CPT

## 2017-10-24 PROCEDURE — A4216 STERILE WATER/SALINE, 10 ML: HCPCS | Performed by: EMERGENCY MEDICINE

## 2017-10-24 PROCEDURE — 99232 SBSQ HOSP IP/OBS MODERATE 35: CPT | Mod: ,,, | Performed by: PSYCHIATRY & NEUROLOGY

## 2017-10-24 RX ADMIN — Medication 10 ML: at 06:10

## 2017-10-24 RX ADMIN — Medication 10 ML: at 11:10

## 2017-10-24 RX ADMIN — SODIUM CHLORIDE TAB 1 GM 3 G: 1 TAB at 05:10

## 2017-10-24 RX ADMIN — HEPARIN SODIUM 7500 UNITS: 5000 INJECTION, SOLUTION INTRAVENOUS; SUBCUTANEOUS at 05:10

## 2017-10-24 RX ADMIN — HYDRALAZINE HYDROCHLORIDE 100 MG: 50 TABLET ORAL at 05:10

## 2017-10-24 RX ADMIN — HYDRALAZINE HYDROCHLORIDE 100 MG: 50 TABLET ORAL at 09:10

## 2017-10-24 RX ADMIN — CHLORHEXIDINE GLUCONATE 15 ML: 1.2 RINSE ORAL at 10:10

## 2017-10-24 RX ADMIN — HEPARIN SODIUM 7500 UNITS: 5000 INJECTION, SOLUTION INTRAVENOUS; SUBCUTANEOUS at 03:10

## 2017-10-24 RX ADMIN — GABAPENTIN 200 MG: 400 CAPSULE ORAL at 09:10

## 2017-10-24 RX ADMIN — INSULIN ASPART 1 UNITS: 100 INJECTION, SOLUTION INTRAVENOUS; SUBCUTANEOUS at 12:10

## 2017-10-24 RX ADMIN — HYDRALAZINE HYDROCHLORIDE 100 MG: 50 TABLET ORAL at 02:10

## 2017-10-24 RX ADMIN — Medication 10 ML: at 05:10

## 2017-10-24 RX ADMIN — HEPARIN SODIUM 7500 UNITS: 5000 INJECTION, SOLUTION INTRAVENOUS; SUBCUTANEOUS at 09:10

## 2017-10-24 RX ADMIN — MAGNESIUM SULFATE IN WATER 2 G: 40 INJECTION, SOLUTION INTRAVENOUS at 05:10

## 2017-10-24 RX ADMIN — CLONAZEPAM 2 MG: 1 TABLET ORAL at 09:10

## 2017-10-24 RX ADMIN — CARVEDILOL 6.25 MG: 6.25 TABLET, FILM COATED ORAL at 09:10

## 2017-10-24 RX ADMIN — CLONAZEPAM 2 MG: 1 TABLET ORAL at 05:10

## 2017-10-24 RX ADMIN — INSULIN ASPART 2 UNITS: 100 INJECTION, SOLUTION INTRAVENOUS; SUBCUTANEOUS at 05:10

## 2017-10-24 RX ADMIN — SODIUM CHLORIDE TAB 1 GM 3 G: 1 TAB at 09:10

## 2017-10-24 RX ADMIN — INSULIN DETEMIR 5 UNITS: 100 INJECTION, SOLUTION SUBCUTANEOUS at 09:10

## 2017-10-24 RX ADMIN — INSULIN ASPART 4 UNITS: 100 INJECTION, SOLUTION INTRAVENOUS; SUBCUTANEOUS at 06:10

## 2017-10-24 RX ADMIN — FAMOTIDINE 20 MG: 20 TABLET, FILM COATED ORAL at 10:10

## 2017-10-24 RX ADMIN — CARVEDILOL 6.25 MG: 6.25 TABLET, FILM COATED ORAL at 10:10

## 2017-10-24 RX ADMIN — ATORVASTATIN CALCIUM 10 MG: 10 TABLET, FILM COATED ORAL at 10:10

## 2017-10-24 RX ADMIN — LEVETIRACETAM 1500 MG: 750 TABLET ORAL at 10:10

## 2017-10-24 RX ADMIN — INSULIN ASPART 1 UNITS: 100 INJECTION, SOLUTION INTRAVENOUS; SUBCUTANEOUS at 11:10

## 2017-10-24 RX ADMIN — LEVETIRACETAM 1500 MG: 750 TABLET ORAL at 09:10

## 2017-10-24 RX ADMIN — AMLODIPINE BESYLATE 10 MG: 10 TABLET ORAL at 10:10

## 2017-10-24 RX ADMIN — GABAPENTIN 200 MG: 400 CAPSULE ORAL at 10:10

## 2017-10-24 RX ADMIN — CLONAZEPAM 2 MG: 1 TABLET ORAL at 03:10

## 2017-10-24 RX ADMIN — ASPIRIN 81 MG CHEWABLE TABLET 81 MG: 81 TABLET CHEWABLE at 10:10

## 2017-10-24 RX ADMIN — INSULIN DETEMIR 5 UNITS: 100 INJECTION, SOLUTION SUBCUTANEOUS at 10:10

## 2017-10-24 RX ADMIN — SODIUM CHLORIDE TAB 1 GM 3 G: 1 TAB at 02:10

## 2017-10-24 RX ADMIN — Medication 10 ML: at 12:10

## 2017-10-24 NOTE — PLAN OF CARE
Problem: Patient Care Overview  Goal: Plan of Care Review  Outcome: Ongoing (interventions implemented as appropriate)  POC reviewed with pt at 0500. Pt unable to verbalize understanding, trached. All questions and concerns addressed. No acute events overnight. Pt progressing toward goals. Will continue to monitor. See flowsheets for full assessment and VS info

## 2017-10-24 NOTE — PROGRESS NOTES
[facility]  Neurocritical Care  Progress Note    Admit Date: 10/3/2017  Service Date: 10/24/2017  Length of Stay: 21    Subjective:     Chief Complaint: Seizure    History of Present Illness: Ms. Frank is a 66 yo woman with a PMHx DM type II, CKD stage 3, CAD s/p CABG, essential hypertension, cerebrovascular disease s/p bi-hemispheric watershed infarcts and trach/PEG who present as a transfer from [facility] to Neuro Critical Care s/p Cardiac Arrest on 10/4 and evaluation of Anoxic Brain Injury. She was recently discharged from [facility] 9/28/2017 with bilateral MCA SAQIB watershed infarcts. Yesterday, she was brought in via EMS from [facility] for cardiac arrest. Per nursing homestaff, she received chest compressions for about 5 minutes. After EMS arrived, ACLS protocol continued and she was given 2 rounds of epi, non-shockable rhythm. ROSC achieved en route to the ER.Pt arrived to ICU intubated. It was noted there sudden intermittent generalized body jerks mostly on tactile stimulation. Also, pt was hypotensive for which norepinephrine infusion started. Since her discharge here on 9/28 she has been admitted to multiple facilities recently to be treated for various issues including mucous plug leading to acute hypoxic respiratory failure, she pulled out her trach 9/4 so that was reinserted at [facility] and she was also treated for ESBL UTI while there, and last admit she was treated for likely aspiration pneumonia. She was transported here on Propofol. On examination, no cough, no gag, no corneal and minimal to no withdrawal.       Hospital Course: 10/5: s/p Cardiac Arrest on 10/4 and evaluation of anoxic brain injury   10//7: EEG with burst suppression pattern.  MRI with diffuse diffusion restriction  10/10: family meeting   10/11: SSEP  10/12: worsening myoclonus especially when moved. Valproic acid level undetectable likely due to interactions with meropenem. Patient on meropenem for ESBL  Klebsiella pneumonia sensitive to meropenem.  10/15: No events overnight.   10/16: No issues overnight  10/18: Trach collar  10/22: Continued hyperkalemia   10/23: Pending transfer to LTAC    Interval History:  No acute events overnight. She is pending placement, stable on trach collar.    Review of Systems  Unable to obtain a complete ROS due to level of consciousness.    Objective:     Vitals:  Temp: 97.8 °F (36.6 °C) (10/24/17 1500)  Pulse: 86 (10/24/17 1500)  Resp: (!) 36 (10/24/17 1500)  BP: (!) 156/70 (10/24/17 1500)  SpO2: 99 % (10/24/17 1500)    Temp:  [96.7 °F (35.9 °C)-99 °F (37.2 °C)] 97.8 °F (36.6 °C)  Pulse:  [84-96] 86  Resp:  [16-39] 36  SpO2:  [96 %-100 %] 99 %  BP: (151-182)/(65-82) 156/70         Oxygen Concentration (%):  [35] 35    10/23 0701 - 10/24 0700  In: 1322.5 [I.V.:280]  Out: 700 [Urine:700]    Physical Exam  GA: Comatose, comfortable, no acute distress.   HEENT: No scleral icterus or JVD.   Pulmonary: Clear to auscultation A/L. No wheezing, crackles, or rhonchi.  Cardiac: RRR S1 & S2 w/o rubs/murmurs/gallops.   Abdominal: Bowel sounds present x 4.   Skin: No jaundice, rashes, or visible lesions.    Neuro:  --sedation: none  --GCS: [id number]  --Mental Status: Comatose, does not respond to voice. Does not follow commands.  --CN II-XII: Grossly intact  --Pupils 3mm, PERRL.   --brainstem: cough, corneals, occulocephalics intact. Gag absent  --Motor: No movement in any extremity  --sensory: No response to pain  --Reflexes: not tested  --Gait: deferred      Medications:  Continuous Scheduled    amLODIPine 10 mg Daily   aspirin 81 mg Daily   atorvastatin 10 mg Daily   carvedilol 6.25 mg BID   clonazePAM 2 mg Q8H   heparin (porcine) 7,500 Units Q8H   hydrALAZINE 100 mg Q8H   insulin detemir 5 Units BID   levETIRAcetam 1,500 mg BID   sodium chloride 0.9% 10 mL Q6H   sodium chloride 3 g Q8H   zonisamide 200 mg BID   PRN    acetaminophen 650 mg Q6H PRN   albuterol-ipratropium 2.5mg-0.5mg/3mL 3 mL Q6H  PRN   dextrose 50% 12.5 g PRN   dextrose 50% 12.5 g PRN   glucagon (human recombinant) 1 mg PRN   hydrALAZINE 10 mg Q4H PRN   insulin aspart 1-10 Units Q6H PRN   labetalol 10 mg Q4H PRN   magnesium sulfate IVPB 2 g PRN   magnesium sulfate IVPB 4 g PRN   ondansetron 4 mg Q8H PRN   pneumoc 13-finn conj-dip cr(PF) 0.5 mL vaccine x 1 dose   potassium chloride 10% 40 mEq PRN   potassium chloride 10% 40 mEq PRN   potassium, sodium phosphates 2 packet PRN   sodium chloride 0.9% 10 mL PRN     Labs:  Component      Latest Ref Rng & Units 10/14/2017 10/14/2017 10/14/2017           1:08 AM  1:00 AM  1:00 AM   WBC      3.90 - 12.70 K/uL   16.41 (H)   RBC      4.00 - 5.40 M/uL   3.11 (L)   Hemoglobin      12.0 - 16.0 g/dL   8.5 (L)   Hematocrit      37.0 - 48.5 %   27.4 (L)   MCV      82 - 98 fL   88   MCH      27.0 - 31.0 pg   27.3   MCHC      32.0 - 36.0 g/dL   31.0 (L)   RDW      11.5 - 14.5 %   16.1 (H)   Platelets      150 - 350 K/uL   285   MPV      9.2 - 12.9 fL   9.1 (L)   Lymph #      1.0 - 4.8 K/uL   CANCELED   Mono #      0.3 - 1.0 K/uL   CANCELED   Eos #      0.0 - 0.5 K/uL   CANCELED   Baso #      0.00 - 0.20 K/uL   CANCELED   Gran%      38.0 - 73.0 %   76.0 (H)   Lymph%      18.0 - 48.0 %   9.0 (L)   Mono%      4.0 - 15.0 %   10.0   Eosinophil%      0.0 - 8.0 %   1.0   Basophil%      0.0 - 1.9 %   0.0   BANDS      %   2.0   Metamyelocytes      %   1.0   Myelocytes      %   1.0   Platelet Estimate         Appears normal   Aniso         Slight   Differential Method         Manual   Sodium      136 - 145 mmol/L  140 140   Potassium      3.5 - 5.1 mmol/L  3.6    Chloride      95 - 110 mmol/L  107    CO2      23 - 29 mmol/L  25    Glucose      70 - 110 mg/dL  111 (H)    BUN, Bld      8 - 23 mg/dL  8    Creatinine      0.5 - 1.4 mg/dL  0.5    Calcium      8.7 - 10.5 mg/dL  8.1 (L)    Total Protein      6.0 - 8.4 g/dL  5.3 (L)    Albumin      3.5 - 5.2 g/dL  1.3 (L)    Total Bilirubin      0.1 - 1.0 mg/dL  0.4    Alkaline  Phosphatase      55 - 135 U/L  98    AST      10 - 40 U/L  31    ALT      10 - 44 U/L  20    Anion Gap      8 - 16 mmol/L  8    eGFR if African American      >60 mL/min/1.73 m:2  >60.0    eGFR if non African American      >60 mL/min/1.73 m:2  >60.0    Protime      9.0 - 12.5 sec   11.0   Coumadin Monitoring INR      0.8 - 1.2   1.0   Phosphorus      2.7 - 4.5 mg/dL   2.9   POCT Glucose      70 - 110 mg/dL 136 (H)       Imaging:  No new imaging    Assessment/Plan:     Neuro   Status epilepticus    67 year old woman with bilateral watershed infarcts s/p PEG/trach during previous admission, who was at LTAC when she went into cardiac arrest. Suspected to be respiratory in origin. She was taken to [facility] where she was noted to be in myoclonic status and was transferred to [facility] for higher level care. On admission brainstem reflexes were absent. Family wanted to continue with full management.    -- myoclonic from anoxic brain injury  -- resolved with AEDs  -- clonazepam, levetiracetam, zonisamide        Anoxic brain injury    -- from cardiac arrest        Cerebral infarction, watershed distribution, bilateral, acute    -- from 6/2017  -- ASA, atorvastatin          [place] coma scale total score 3-8    -- from anoxic brain injury        ENT   Tracheostomy status    -- S/p Trach PEG during previous admission on 7/12/2017  -- on trach collar, tolerating well        Pulmonary   Klebsiella pneumonia    -- ESBL sensitive to meropenem  -- s/p full course of Meropenem   -- afebrile  -- WBC stable          Acute on chronic respiratory failure    -- tolerating trach collar        Cardiac/Vascular   Cardiac arrest, cause unspecified    -- apparently respiratory in origin        S/P CABG (coronary artery bypass graft)    -- Hx of CABG in 2005        Essential hypertension    -- -200  -- hydralazine, carvedilol, amlodipine        Renal/   Hyponatremia    -- salt tabs  -- now normal        Hyperkalemia    -- s/p multiple  doses of kayexelate   -- now stable        Endocrine   Type 2 diabetes mellitus, uncontrolled    -- Start detemir 5mg BID  -- ISS        GI   PEG (percutaneous endoscopic gastrostomy) status    -- Gastrostomy tube place during previous admission on 7/12/17            Prophylaxis:  Venous Thromboembolism: mechanical chemical  Stress Ulcer: NA  Ventilator Pneumonia: not applicable     Activity Orders          Bed rest starting at 10/03 1510        Full Code    Adelina Puckett MD  Neurocritical Care  [facility]

## 2017-10-24 NOTE — SUBJECTIVE & OBJECTIVE
Interval History:  No acute events overnight. She is pending placement, stable on trach collar.    Review of Systems  Unable to obtain a complete ROS due to level of consciousness.    Objective:     Vitals:  Temp: 97.8 °F (36.6 °C) (10/24/17 1500)  Pulse: 86 (10/24/17 1500)  Resp: (!) 36 (10/24/17 1500)  BP: (!) 156/70 (10/24/17 1500)  SpO2: 99 % (10/24/17 1500)    Temp:  [96.7 °F (35.9 °C)-99 °F (37.2 °C)] 97.8 °F (36.6 °C)  Pulse:  [84-96] 86  Resp:  [16-39] 36  SpO2:  [96 %-100 %] 99 %  BP: (151-182)/(65-82) 156/70         Oxygen Concentration (%):  [35] 35    10/23 0701 - 10/24 0700  In: 1322.5 [I.V.:280]  Out: 700 [Urine:700]    Physical Exam  GA: Comatose, comfortable, no acute distress.   HEENT: No scleral icterus or JVD.   Pulmonary: Clear to auscultation A/L. No wheezing, crackles, or rhonchi.  Cardiac: RRR S1 & S2 w/o rubs/murmurs/gallops.   Abdominal: Bowel sounds present x 4.   Skin: No jaundice, rashes, or visible lesions.    Neuro:  --sedation: none  --GCS: [id number]  --Mental Status: Comatose, does not respond to voice. Does not follow commands.  --CN II-XII: Grossly intact  --Pupils 3mm, PERRL.   --brainstem: cough, corneals, occulocephalics intact. Gag absent  --Motor: No movement in any extremity  --sensory: No response to pain  --Reflexes: not tested  --Gait: deferred      Medications:  Continuous Scheduled    amLODIPine 10 mg Daily   aspirin 81 mg Daily   atorvastatin 10 mg Daily   carvedilol 6.25 mg BID   clonazePAM 2 mg Q8H   heparin (porcine) 7,500 Units Q8H   hydrALAZINE 100 mg Q8H   insulin detemir 5 Units BID   levETIRAcetam 1,500 mg BID   sodium chloride 0.9% 10 mL Q6H   sodium chloride 3 g Q8H   zonisamide 200 mg BID   PRN    acetaminophen 650 mg Q6H PRN   albuterol-ipratropium 2.5mg-0.5mg/3mL 3 mL Q6H PRN   dextrose 50% 12.5 g PRN   dextrose 50% 12.5 g PRN   glucagon (human recombinant) 1 mg PRN   hydrALAZINE 10 mg Q4H PRN   insulin aspart 1-10 Units Q6H PRN   labetalol 10 mg Q4H PRN    magnesium sulfate IVPB 2 g PRN   magnesium sulfate IVPB 4 g PRN   ondansetron 4 mg Q8H PRN   pneumoc 13-finn conj-dip cr(PF) 0.5 mL vaccine x 1 dose   potassium chloride 10% 40 mEq PRN   potassium chloride 10% 40 mEq PRN   potassium, sodium phosphates 2 packet PRN   sodium chloride 0.9% 10 mL PRN     Labs:  Component      Latest Ref Rng & Units 10/14/2017 10/14/2017 10/14/2017           1:08 AM  1:00 AM  1:00 AM   WBC      3.90 - 12.70 K/uL   16.41 (H)   RBC      4.00 - 5.40 M/uL   3.11 (L)   Hemoglobin      12.0 - 16.0 g/dL   8.5 (L)   Hematocrit      37.0 - 48.5 %   27.4 (L)   MCV      82 - 98 fL   88   MCH      27.0 - 31.0 pg   27.3   MCHC      32.0 - 36.0 g/dL   31.0 (L)   RDW      11.5 - 14.5 %   16.1 (H)   Platelets      150 - 350 K/uL   285   MPV      9.2 - 12.9 fL   9.1 (L)   Lymph #      1.0 - 4.8 K/uL   CANCELED   Mono #      0.3 - 1.0 K/uL   CANCELED   Eos #      0.0 - 0.5 K/uL   CANCELED   Baso #      0.00 - 0.20 K/uL   CANCELED   Gran%      38.0 - 73.0 %   76.0 (H)   Lymph%      18.0 - 48.0 %   9.0 (L)   Mono%      4.0 - 15.0 %   10.0   Eosinophil%      0.0 - 8.0 %   1.0   Basophil%      0.0 - 1.9 %   0.0   BANDS      %   2.0   Metamyelocytes      %   1.0   Myelocytes      %   1.0   Platelet Estimate         Appears normal   Aniso         Slight   Differential Method         Manual   Sodium      136 - 145 mmol/L  140 140   Potassium      3.5 - 5.1 mmol/L  3.6    Chloride      95 - 110 mmol/L  107    CO2      23 - 29 mmol/L  25    Glucose      70 - 110 mg/dL  111 (H)    BUN, Bld      8 - 23 mg/dL  8    Creatinine      0.5 - 1.4 mg/dL  0.5    Calcium      8.7 - 10.5 mg/dL  8.1 (L)    Total Protein      6.0 - 8.4 g/dL  5.3 (L)    Albumin      3.5 - 5.2 g/dL  1.3 (L)    Total Bilirubin      0.1 - 1.0 mg/dL  0.4    Alkaline Phosphatase      55 - 135 U/L  98    AST      10 - 40 U/L  31    ALT      10 - 44 U/L  20    Anion Gap      8 - 16 mmol/L  8    eGFR if African American      >60 mL/min/1.73 m:2  >60.0     eGFR if non African American      >60 mL/min/1.73 m:2  >60.0    Protime      9.0 - 12.5 sec   11.0   Coumadin Monitoring INR      0.8 - 1.2   1.0   Phosphorus      2.7 - 4.5 mg/dL   2.9   POCT Glucose      70 - 110 mg/dL 136 (H)       Imaging:  No new imaging

## 2017-10-24 NOTE — ASSESSMENT & PLAN NOTE
67 year old woman with bilateral watershed infarcts s/p PEG/trach during previous admission, who was at LTAC when she went into cardiac arrest. Suspected to be respiratory in origin. She was taken to [facility] where she was noted to be in myoclonic status and was transferred to [facility] for higher level care. On admission brainstem reflexes were absent. Family wanted to continue with full management.    -- myoclonic from anoxic brain injury  -- resolved with AEDs  -- clonazepam, levetiracetam, zonisamide

## 2017-10-24 NOTE — PLAN OF CARE
Problem: Patient Care Overview  Goal: Plan of Care Review  POC reviewed with pt at 1400. POC also reviewed via phone conversation with pt daughter at 1600.  Pt daughter verbalized understanding. Questions and concerns addressed. No acute events today.  Awaiting LTAC placement per conversation with case management.  Will continue to monitor. See flowsheets for full assessment and VS info.

## 2017-10-24 NOTE — PLAN OF CARE
LUCIA spoke with Yajairasavannahsheryl (daughter) [phone] regarding Pt placement. She reported she had spoken to [facility] and they stated she wouldn't meet [facility]. She wants [facility] otherwise. SW reported that she would not meet criteria and that the only other option would be a SNF again. She reported that the two where the Pt already went are terrible and she is not doing that again. LUCIA will call [facility] and [facility] to see if there is any way they can have the Pt meet critieria and this SW will call her back.     LUCIA spoke with [facility] regarding the criteria but upon review from several reps over there, there is no way the Pt will meet criteria for LTAC.    LUCIA spoke with TREVOR regarding new plan. LUCIA will send to [place] [facility] and [facility] to see if their rehab level would be able to take this Pt. If so, SW will offer this option to family. Advised NP of the above. She reported she will discuss stepping the Pt down to Medicine with the MD team.    LUCIA sent email to [facility] to complete referrals to [place] [facility] and [facility]. Will contact Pt daughter today to report the above options.    SW received call from Aiyana from [facility]. They will review the referral and see if they will be able to take the Pt there. She will let this SW know so that she can discuss this option with the family if it becomes an option.    LUCIA received call from Steph with [facility] ([phone]). [facility]. [facility] does not have beds any time soon. LTAC would not meet criteria.     LUCIA sent to [facility] via [email or web address].    LUCIA received call from Jennie with [facility]. They reported that based on the review from the chart, they cannot accept Pt as she does not meet criteria.     LUCIA spoke with Pt daughter regarding the above. She will check with [facility] to see if it would be an option. She reported she will be here as requested between 11 and 1pm on Thursday to speak  with Dr. Villanueva. She said it would likely be around 12pm. She will call this SW when she arrives and will have a decision on a NH placement or if [facility] will work if they accept.     Susan España, DIONNE  Neurocritical Care   [facility]  [id number]

## 2017-10-25 LAB
ALBUMIN SERPL BCP-MCNC: 1.9 G/DL
ALP SERPL-CCNC: 147 U/L
ALT SERPL W/O P-5'-P-CCNC: 16 U/L
ANION GAP SERPL CALC-SCNC: 8 MMOL/L
AST SERPL-CCNC: 22 U/L
BASOPHILS # BLD AUTO: 0.04 K/UL
BASOPHILS NFR BLD: 0.4 %
BILIRUB SERPL-MCNC: 0.3 MG/DL
BUN SERPL-MCNC: 20 MG/DL
CALCIUM SERPL-MCNC: 9.6 MG/DL
CHLORIDE SERPL-SCNC: 113 MMOL/L
CO2 SERPL-SCNC: 21 MMOL/L
CREAT SERPL-MCNC: 0.6 MG/DL
DIFFERENTIAL METHOD: ABNORMAL
EOSINOPHIL # BLD AUTO: 0.1 K/UL
EOSINOPHIL NFR BLD: 1.2 %
ERYTHROCYTE [DISTWIDTH] IN BLOOD BY AUTOMATED COUNT: 16 %
EST. GFR  (AFRICAN AMERICAN): >60 ML/MIN/1.73 M^2
EST. GFR  (NON AFRICAN AMERICAN): >60 ML/MIN/1.73 M^2
GLUCOSE SERPL-MCNC: 154 MG/DL
HCT VFR BLD AUTO: 26.9 %
HGB BLD-MCNC: 7.9 G/DL
IMM GRANULOCYTES # BLD AUTO: 0.09 K/UL
IMM GRANULOCYTES NFR BLD AUTO: 0.9 %
LYMPHOCYTES # BLD AUTO: 2.2 K/UL
LYMPHOCYTES NFR BLD: 21.6 %
MAGNESIUM SERPL-MCNC: 1.7 MG/DL
MCH RBC QN AUTO: 26.7 PG
MCHC RBC AUTO-ENTMCNC: 29.4 G/DL
MCV RBC AUTO: 91 FL
MONOCYTES # BLD AUTO: 0.6 K/UL
MONOCYTES NFR BLD: 6.2 %
NEUTROPHILS # BLD AUTO: 7.1 K/UL
NEUTROPHILS NFR BLD: 69.7 %
NRBC BLD-RTO: 0 /100 WBC
PHOSPHATE SERPL-MCNC: 4.2 MG/DL
PLATELET # BLD AUTO: 289 K/UL
PMV BLD AUTO: 10 FL
POCT GLUCOSE: 162 MG/DL (ref 70–110)
POCT GLUCOSE: 188 MG/DL (ref 70–110)
POCT GLUCOSE: 191 MG/DL (ref 70–110)
POCT GLUCOSE: 202 MG/DL (ref 70–110)
POTASSIUM SERPL-SCNC: 5.1 MMOL/L
PROT SERPL-MCNC: 6.9 G/DL
RBC # BLD AUTO: 2.96 M/UL
SODIUM SERPL-SCNC: 142 MMOL/L
WBC # BLD AUTO: 10.22 K/UL

## 2017-10-25 PROCEDURE — 25000003 PHARM REV CODE 250: Performed by: STUDENT IN AN ORGANIZED HEALTH CARE EDUCATION/TRAINING PROGRAM

## 2017-10-25 PROCEDURE — 85025 COMPLETE CBC W/AUTO DIFF WBC: CPT

## 2017-10-25 PROCEDURE — 25000242 PHARM REV CODE 250 ALT 637 W/ HCPCS: Performed by: NURSE PRACTITIONER

## 2017-10-25 PROCEDURE — 99232 SBSQ HOSP IP/OBS MODERATE 35: CPT | Mod: ,,, | Performed by: PSYCHIATRY & NEUROLOGY

## 2017-10-25 PROCEDURE — A4216 STERILE WATER/SALINE, 10 ML: HCPCS | Performed by: EMERGENCY MEDICINE

## 2017-10-25 PROCEDURE — 63600175 PHARM REV CODE 636 W HCPCS: Performed by: NURSE PRACTITIONER

## 2017-10-25 PROCEDURE — 25000003 PHARM REV CODE 250: Performed by: PHYSICIAN ASSISTANT

## 2017-10-25 PROCEDURE — 83735 ASSAY OF MAGNESIUM: CPT

## 2017-10-25 PROCEDURE — 63600175 PHARM REV CODE 636 W HCPCS: Performed by: EMERGENCY MEDICINE

## 2017-10-25 PROCEDURE — 99900026 HC AIRWAY MAINTENANCE (STAT)

## 2017-10-25 PROCEDURE — 25000003 PHARM REV CODE 250: Performed by: PSYCHIATRY & NEUROLOGY

## 2017-10-25 PROCEDURE — 94640 AIRWAY INHALATION TREATMENT: CPT

## 2017-10-25 PROCEDURE — 27000221 HC OXYGEN, UP TO 24 HOURS

## 2017-10-25 PROCEDURE — 80053 COMPREHEN METABOLIC PANEL: CPT

## 2017-10-25 PROCEDURE — 25000003 PHARM REV CODE 250: Performed by: EMERGENCY MEDICINE

## 2017-10-25 PROCEDURE — 84100 ASSAY OF PHOSPHORUS: CPT

## 2017-10-25 PROCEDURE — 20000000 HC ICU ROOM

## 2017-10-25 RX ADMIN — Medication 10 ML: at 05:10

## 2017-10-25 RX ADMIN — CARVEDILOL 6.25 MG: 6.25 TABLET, FILM COATED ORAL at 09:10

## 2017-10-25 RX ADMIN — Medication 10 ML: at 06:10

## 2017-10-25 RX ADMIN — CLONAZEPAM 2 MG: 1 TABLET ORAL at 02:10

## 2017-10-25 RX ADMIN — ATORVASTATIN CALCIUM 10 MG: 10 TABLET, FILM COATED ORAL at 10:10

## 2017-10-25 RX ADMIN — HEPARIN SODIUM 7500 UNITS: 5000 INJECTION, SOLUTION INTRAVENOUS; SUBCUTANEOUS at 09:10

## 2017-10-25 RX ADMIN — GABAPENTIN 200 MG: 400 CAPSULE ORAL at 09:10

## 2017-10-25 RX ADMIN — SODIUM CHLORIDE TAB 1 GM 3 G: 1 TAB at 05:10

## 2017-10-25 RX ADMIN — LEVETIRACETAM 1500 MG: 750 TABLET ORAL at 10:10

## 2017-10-25 RX ADMIN — ASPIRIN 81 MG CHEWABLE TABLET 81 MG: 81 TABLET CHEWABLE at 10:10

## 2017-10-25 RX ADMIN — HYDRALAZINE HYDROCHLORIDE 100 MG: 50 TABLET ORAL at 05:10

## 2017-10-25 RX ADMIN — HEPARIN SODIUM 7500 UNITS: 5000 INJECTION, SOLUTION INTRAVENOUS; SUBCUTANEOUS at 05:10

## 2017-10-25 RX ADMIN — GABAPENTIN 200 MG: 400 CAPSULE ORAL at 10:10

## 2017-10-25 RX ADMIN — INSULIN ASPART 2 UNITS: 100 INJECTION, SOLUTION INTRAVENOUS; SUBCUTANEOUS at 02:10

## 2017-10-25 RX ADMIN — INSULIN DETEMIR 5 UNITS: 100 INJECTION, SOLUTION SUBCUTANEOUS at 09:10

## 2017-10-25 RX ADMIN — IPRATROPIUM BROMIDE AND ALBUTEROL SULFATE 3 ML: .5; 3 SOLUTION RESPIRATORY (INHALATION) at 03:10

## 2017-10-25 RX ADMIN — CLONAZEPAM 2 MG: 1 TABLET ORAL at 09:10

## 2017-10-25 RX ADMIN — HYDRALAZINE HYDROCHLORIDE 100 MG: 50 TABLET ORAL at 09:10

## 2017-10-25 RX ADMIN — MAGNESIUM SULFATE IN WATER 2 G: 40 INJECTION, SOLUTION INTRAVENOUS at 05:10

## 2017-10-25 RX ADMIN — HYDRALAZINE HYDROCHLORIDE 100 MG: 50 TABLET ORAL at 02:10

## 2017-10-25 RX ADMIN — INSULIN ASPART 4 UNITS: 100 INJECTION, SOLUTION INTRAVENOUS; SUBCUTANEOUS at 06:10

## 2017-10-25 RX ADMIN — LEVETIRACETAM 1500 MG: 750 TABLET ORAL at 09:10

## 2017-10-25 RX ADMIN — CLONAZEPAM 2 MG: 1 TABLET ORAL at 05:10

## 2017-10-25 RX ADMIN — CARVEDILOL 6.25 MG: 6.25 TABLET, FILM COATED ORAL at 10:10

## 2017-10-25 RX ADMIN — AMLODIPINE BESYLATE 10 MG: 10 TABLET ORAL at 10:10

## 2017-10-25 RX ADMIN — INSULIN ASPART 2 UNITS: 100 INJECTION, SOLUTION INTRAVENOUS; SUBCUTANEOUS at 05:10

## 2017-10-25 RX ADMIN — SODIUM CHLORIDE TAB 1 GM 2 G: 1 TAB at 09:10

## 2017-10-25 RX ADMIN — Medication 10 ML: at 12:10

## 2017-10-25 RX ADMIN — INSULIN DETEMIR 5 UNITS: 100 INJECTION, SOLUTION SUBCUTANEOUS at 11:10

## 2017-10-25 RX ADMIN — HEPARIN SODIUM 7500 UNITS: 5000 INJECTION, SOLUTION INTRAVENOUS; SUBCUTANEOUS at 02:10

## 2017-10-25 NOTE — PLAN OF CARE
Problem: Patient Care Overview  Goal: Plan of Care Review  Outcome: Ongoing (interventions implemented as appropriate)  POC reviewed with pt and family at 1230. Pt sister at bedside and verbalized understanding of poc. Questions and concerns addressed.  Pt sister states that pt daughter will be in to visit Thursday 10/26 to speak with doctor and  per daughter's conversations with .  No acute events today. Will continue to monitor. See flowsheets for full assessment and VS info.

## 2017-10-25 NOTE — PLAN OF CARE
LUCIA received message from Amparo from [facility] ([phone]) regarding the referral.    LUCIA spoke with Amparo. She reported that [facility] is their sister facility and she is unsure they will take her over them. She will speak with admissions over there to determine if they can consider this Pt. She will let this SW know.    Susan España, DIONNE  Neurocritical Care   [facility]  [id number]

## 2017-10-25 NOTE — PLAN OF CARE
Ssc sent referral to :    [facility]  [address] [place] [address] [phone]  (Age of Trach: 3 months)    [facility]  [address] [place] [address] [phone]  (older than 6-8 months)    [facility]  [address] [place] [address] [phone]  (New Trach)    [facility]  [address] [place] [address], [place] [id number] [phone]  (Age: at least one week old)    [facility]  [address]  [place] [address] [phone]  Case by case, mainly older traches     [facility]  [address] [address] [phone]    Ssc/juno

## 2017-10-25 NOTE — PLAN OF CARE
SW has received denials on all facilities sent save [facility]. SW sent email to [facility] to complete mass referrals to all trache NH in the area. Once SW has facilities that are accepting, SW will present these facilities to the daughter. Daughter will be here tomorrow for meeting with MD team around 12pm.    SW received call from Annie, Respitory director for [facility]. She reported they have one vent bed, but no stable Trache beds. She is not sure when one will be available.     SW will continue follow up with the other 6 pending trache NH.    Susan España, DIONNE  Neurocritical Care   [facility]  [id number]

## 2017-10-25 NOTE — PLAN OF CARE
Problem: Patient Care Overview  Goal: Plan of Care Review  Outcome: Ongoing (interventions implemented as appropriate)  POC reviewed with pt at 0500. Pt unable to verbalize understanding, d/t trache. All questions and concerns addressed. No acute events noted at this time. Pt progressing toward goals. Will continue to monitor. See flowsheets for full assessment and VS info

## 2017-10-25 NOTE — PROGRESS NOTES
ICU Progress Note  Neurocritical Care    Admit Date: 10/3/2017  LOS: 22    CC: Seizure    Code Status: Full Code     SUBJECTIVE:     Interval History/Significant Events: no acute events overnight    Review of Symptoms: comatose, cannot participate    Medications:  Continuous Infusions:   Scheduled Meds:   amLODIPine  10 mg Per G Tube Daily    aspirin  81 mg Per G Tube Daily    atorvastatin  10 mg Per G Tube Daily    carvedilol  6.25 mg Per G Tube BID    clonazePAM  2 mg Per G Tube Q8H    heparin (porcine)  7,500 Units Subcutaneous Q8H    hydrALAZINE  100 mg Per G Tube Q8H    insulin detemir  5 Units Subcutaneous BID    levETIRAcetam  1,500 mg Per G Tube BID    sodium chloride 0.9%  10 mL Intravenous Q6H    sodium chloride  2 g Per G Tube Q8H    zonisamide  200 mg Per G Tube BID     PRN Meds:.acetaminophen, albuterol-ipratropium 2.5mg-0.5mg/3mL, dextrose 50%, dextrose 50%, glucagon (human recombinant), hydrALAZINE, insulin aspart, labetalol, magnesium sulfate IVPB, magnesium sulfate IVPB, ondansetron, pneumoc 13-finn conj-dip cr(PF), potassium chloride 10%, potassium chloride 10%, potassium, sodium phosphates, Flushing PICC Protocol **AND** sodium chloride 0.9% **AND** sodium chloride 0.9%    OBJECTIVE:   Vital Signs (Most Recent):   Temp: 96.6 °F (35.9 °C) (10/25/17 1500)  Pulse: 81 (10/25/17 1500)  Resp: (!) 32 (10/25/17 1500)  BP: (!) 149/69 (10/25/17 1500)  SpO2: 100 % (10/25/17 1500)    Vital Signs (24h Range):   Temp:  [96.6 °F (35.9 °C)-99.1 °F (37.3 °C)] 96.6 °F (35.9 °C)  Pulse:  [78-96] 81  Resp:  [25-48] 32  SpO2:  [98 %-100 %] 100 %  BP: (132-186)/(61-85) 149/69    ICP/CPP (Last 24h):        I & O (Last 24h):   Intake/Output Summary (Last 24 hours) at 10/25/17 1557  Last data filed at 10/25/17 1500   Gross per 24 hour   Intake             1370 ml   Output              565 ml   Net              805 ml     Physical Exam:  GA: comatose, comfortable, no acute distress.   HEENT: No scleral icterus  or JVD.   Pulmonary: Clear to auscultation A/P/L. No wheezing, crackles, or rhonchi.  Cardiac: RRR S1 & S2 w/o rubs/murmurs/gallops.   Abdominal: Bowel sounds present x 4. No appreciable hepatosplenomegaly.  Skin: No jaundice, rashes, or visible lesions.  Neuro:  --Mental Status:  Comatose, no spontaneous eye opening  --Pupils 3mm, PERRL.   --Corneal reflex, gag, cough intact.  Reflex withdrawal    Vent Data:   Oxygen Concentration (%):  [35] 35    Lines/Drains/Airway:        Surgical Airway Other (Comment) Cuffed (Active)   Cuff Pressure 27 cm H2O 10/16/2017  5:22 AM   Cuff Air Leak Pressure 28 cm H2O 10/5/2017  7:42 PM   Status Secured 10/25/2017 11:33 AM   Site Assessment Clean;Dry;No bleeding;No drainage 10/25/2017 11:33 AM   Site Care Cleansed;Dried;Dressing applied 10/25/2017 11:33 AM   Inner Cannula Care Changed/new 10/25/2017  8:40 AM   Ties Assessment Clean;Dry;Intact;Secure 10/25/2017 11:33 AM           PICC Double Lumen 10/03/17 1700 left basilic (Active)   Site Assessment Clean;Dry;Intact;No redness;No swelling 10/25/2017  3:00 PM   Lumen 1 Status Infusing 10/25/2017  3:00 PM   Lumen 2 Status Normal saline locked 10/25/2017  3:00 PM   Length mehreen (cm) 44 cm 10/17/2017  3:22 PM   Current Exposed Catheter (cm) 0 cm 10/18/2017  7:05 AM   Extremity Circumference (cm) 34 cm 10/17/2017  3:22 PM   Dressing Type Transparent 10/25/2017  3:00 PM   Dressing Status Clean;Dry;Intact 10/25/2017  3:00 PM   Dressing Intervention Dressing reinforced 10/25/2017  3:00 PM   Dressing Change Due 10/30/17 10/25/2017  3:00 PM   Daily Line Review Performed 10/25/2017  3:00 PM             Gastrostomy/Enterostomy 09/20/17 0816 Gastrostomy tube w/ balloon midline feeding (Active)   Securement anchored to abdomen w/ adhesive device 10/25/2017  3:00 PM   Interventions Prior to Feeding patency checked;residual checked;residual returned 10/25/2017  3:00 PM   Suction Setting/Drainage Method intermittent setting 10/25/2017  3:00 PM    Drainage clear 10/25/2017  3:00 PM   Feeding Type continuous 10/25/2017  3:00 PM   Clamp Status/Tolerance unclamped 10/25/2017  3:00 PM   Feeding Action feeding continued 10/25/2017  3:00 PM   Dressing dry and intact 10/25/2017  3:00 PM   Insertion Site dry 10/25/2017  3:00 PM   Site Care device rotatated 10/25/2017  3:05 AM   Flush/Irrigation flushed w/;water;no resistance met 10/25/2017  3:00 PM   Current Rate (mL/hr) 45 mL/hr 10/25/2017  7:00 AM   Goal Rate (mL/hr) 45 mL/hr 10/24/2017  7:00 AM   Intake (mL) 45 mL 10/24/2017  2:00 PM   Water Bolus (mL) 50 mL 10/21/2017  7:00 AM   Tube Output(mL)(Include Discarded Residual) 20 mL 10/17/2017  7:00 AM   Tube Feeding Intake (mL) 45 10/25/2017  3:00 PM   Residual Amount (ml) 10 ml 10/24/2017  7:00 AM       Female External Urinary Catheter 10/25/17 1128 (Active)   Skin no redness;no breakdown 10/25/2017 11:28 AM   Tolerance no signs/symptoms of discomfort 10/25/2017 11:28 AM   Suction Continuous suction at 40 mmHg 10/25/2017 11:28 AM     Nutrition/Tube Feeds (if NPO state why): tf     Labs:  ABG: No results for input(s): PH, PO2, PCO2, HCO3, POCSATURATED, BE in the last 24 hours.  BMP:  Recent Labs  Lab 10/25/17  0233      K 5.1   *   CO2 21*   BUN 20   CREATININE 0.6   *   MG 1.7   PHOS 4.2     LFT: Lab Results   Component Value Date    AST 22 10/25/2017    ALT 16 10/25/2017    ALKPHOS 147 (H) 10/25/2017    BILITOT 0.3 10/25/2017    ALBUMIN 1.9 (L) 10/25/2017    PROT 6.9 10/25/2017     CBC:   Lab Results   Component Value Date    WBC 10.22 10/25/2017    HGB 7.9 (L) 10/25/2017    HCT 26.9 (L) 10/25/2017    MCV 91 10/25/2017     10/25/2017     Microbiology x 7d:   Microbiology Results (last 7 days)     Procedure Component Value Units Date/Time    Culture, Respiratory with Gram Stain [[id number]] Collected:  10/23/17 1125    Order Status:  Completed Specimen:  Respiratory from Tracheal Aspirate Updated:  10/25/17 1009     Respiratory Culture --      PRESUMPTIVE PSEUDOMONAS SPECIES  Moderate  Identification and susceptibility pending       Gram Stain (Respiratory) <10 epithelial cells per low power field.     Gram Stain (Respiratory) Rare WBC's     Gram Stain (Respiratory) Many Gram negative rods        Imaging:    I personally reviewed the above image.    ASSESSMENT/PLAN:     Active Hospital Problems    Diagnosis    *Seizure    Status epilepticus    Hyponatremia    Anemia of chronic disease    Klebsiella pneumonia    Anoxic brain injury     Minimally responsive and may related to ann.  Await neuro inputs.        Cardiac arrest, cause unspecified    Cerebral infarction, watershed distribution, bilateral, acute    Chronic sinus bradycardia    Acute on chronic respiratory failure    Tracheostomy status    PEG (percutaneous endoscopic gastrostomy) status    Eve coma scale total score 3-8    CAD s/p CABG    Moderate protein malnutrition    Hyperkalemia    Morbid obesity with BMI of 50.0-59.9, adult    S/P CABG (coronary artery bypass graft)     2005      Essential hypertension    Type 2 diabetes mellitus, uncontrolled      Neuro:   Anoxic brain injury  Continue AEDs  Taper salt tabs.    Pulmonary:   Trach collar/t-piece    Cardiac:   Hemodynamically stable    Renal:    Making urine    ID:   Completed course of antibiotics    Hem/Onc:   Stable hh    Endocrine:    BS stable    Fluids/Electrolytes/Nutrition/GI:     Lines:  Art NA  CVC NA  ETT trach  Ross  NG  PEG NA    Proph:  DVT:SCD  Constipation:  Last output:bowel regimen  PUP:NA  VAP:NA    Plan to meet with the daughter 10/26 to discuss challenges with placement    Uninterrupted Critical Care/Counseling Time (not including procedures):: II    Dany Villanueva MD  Neurocritical care attending

## 2017-10-26 LAB
ALBUMIN SERPL BCP-MCNC: 2.1 G/DL
ALP SERPL-CCNC: 158 U/L
ALT SERPL W/O P-5'-P-CCNC: 18 U/L
ANION GAP SERPL CALC-SCNC: 7 MMOL/L
AST SERPL-CCNC: 23 U/L
BASOPHILS # BLD AUTO: 0.03 K/UL
BASOPHILS NFR BLD: 0.3 %
BILIRUB SERPL-MCNC: 0.3 MG/DL
BUN SERPL-MCNC: 20 MG/DL
CALCIUM SERPL-MCNC: 9.8 MG/DL
CHLORIDE SERPL-SCNC: 111 MMOL/L
CO2 SERPL-SCNC: 24 MMOL/L
CREAT SERPL-MCNC: 0.7 MG/DL
DIFFERENTIAL METHOD: ABNORMAL
EOSINOPHIL # BLD AUTO: 0.2 K/UL
EOSINOPHIL NFR BLD: 1.9 %
ERYTHROCYTE [DISTWIDTH] IN BLOOD BY AUTOMATED COUNT: 16 %
EST. GFR  (AFRICAN AMERICAN): >60 ML/MIN/1.73 M^2
EST. GFR  (NON AFRICAN AMERICAN): >60 ML/MIN/1.73 M^2
GLUCOSE SERPL-MCNC: 168 MG/DL
HCT VFR BLD AUTO: 27.3 %
HGB BLD-MCNC: 8 G/DL
IMM GRANULOCYTES # BLD AUTO: 0.09 K/UL
IMM GRANULOCYTES NFR BLD AUTO: 1 %
LYMPHOCYTES # BLD AUTO: 2.2 K/UL
LYMPHOCYTES NFR BLD: 25.1 %
MAGNESIUM SERPL-MCNC: 1.8 MG/DL
MAGNESIUM SERPL-MCNC: 1.9 MG/DL
MCH RBC QN AUTO: 26.5 PG
MCHC RBC AUTO-ENTMCNC: 29.3 G/DL
MCV RBC AUTO: 90 FL
MONOCYTES # BLD AUTO: 0.5 K/UL
MONOCYTES NFR BLD: 5.5 %
NEUTROPHILS # BLD AUTO: 5.8 K/UL
NEUTROPHILS NFR BLD: 66.2 %
NRBC BLD-RTO: 0 /100 WBC
PHOSPHATE SERPL-MCNC: 4.4 MG/DL
PLATELET # BLD AUTO: 269 K/UL
PMV BLD AUTO: 9.9 FL
POCT GLUCOSE: 150 MG/DL (ref 70–110)
POCT GLUCOSE: 167 MG/DL (ref 70–110)
POCT GLUCOSE: 180 MG/DL (ref 70–110)
POCT GLUCOSE: 211 MG/DL (ref 70–110)
POTASSIUM SERPL-SCNC: 5.4 MMOL/L
POTASSIUM SERPL-SCNC: 5.8 MMOL/L
PROT SERPL-MCNC: 7.3 G/DL
RBC # BLD AUTO: 3.02 M/UL
SODIUM SERPL-SCNC: 142 MMOL/L
WBC # BLD AUTO: 8.75 K/UL

## 2017-10-26 PROCEDURE — 25000003 PHARM REV CODE 250: Performed by: STUDENT IN AN ORGANIZED HEALTH CARE EDUCATION/TRAINING PROGRAM

## 2017-10-26 PROCEDURE — 20000000 HC ICU ROOM

## 2017-10-26 PROCEDURE — 25000003 PHARM REV CODE 250: Performed by: NURSE PRACTITIONER

## 2017-10-26 PROCEDURE — 84100 ASSAY OF PHOSPHORUS: CPT

## 2017-10-26 PROCEDURE — 63600175 PHARM REV CODE 636 W HCPCS: Performed by: NURSE PRACTITIONER

## 2017-10-26 PROCEDURE — 84132 ASSAY OF SERUM POTASSIUM: CPT

## 2017-10-26 PROCEDURE — A4216 STERILE WATER/SALINE, 10 ML: HCPCS | Performed by: EMERGENCY MEDICINE

## 2017-10-26 PROCEDURE — 99900026 HC AIRWAY MAINTENANCE (STAT)

## 2017-10-26 PROCEDURE — 27000221 HC OXYGEN, UP TO 24 HOURS

## 2017-10-26 PROCEDURE — 63600175 PHARM REV CODE 636 W HCPCS: Performed by: EMERGENCY MEDICINE

## 2017-10-26 PROCEDURE — 99232 SBSQ HOSP IP/OBS MODERATE 35: CPT | Mod: ,,, | Performed by: PSYCHIATRY & NEUROLOGY

## 2017-10-26 PROCEDURE — 80053 COMPREHEN METABOLIC PANEL: CPT

## 2017-10-26 PROCEDURE — 25000003 PHARM REV CODE 250: Performed by: PHYSICIAN ASSISTANT

## 2017-10-26 PROCEDURE — 93010 ELECTROCARDIOGRAM REPORT: CPT | Mod: ,,, | Performed by: INTERNAL MEDICINE

## 2017-10-26 PROCEDURE — 25000003 PHARM REV CODE 250: Performed by: PSYCHIATRY & NEUROLOGY

## 2017-10-26 PROCEDURE — 25000003 PHARM REV CODE 250: Performed by: EMERGENCY MEDICINE

## 2017-10-26 PROCEDURE — 97803 MED NUTRITION INDIV SUBSEQ: CPT

## 2017-10-26 PROCEDURE — 83735 ASSAY OF MAGNESIUM: CPT

## 2017-10-26 PROCEDURE — 93005 ELECTROCARDIOGRAM TRACING: CPT

## 2017-10-26 PROCEDURE — 83735 ASSAY OF MAGNESIUM: CPT | Mod: 91

## 2017-10-26 PROCEDURE — 85025 COMPLETE CBC W/AUTO DIFF WBC: CPT

## 2017-10-26 RX ORDER — ZONISAMIDE 100 MG/1
200 CAPSULE ORAL 2 TIMES DAILY
Status: DISCONTINUED | OUTPATIENT
Start: 2017-10-26 | End: 2017-11-15 | Stop reason: HOSPADM

## 2017-10-26 RX ADMIN — HEPARIN SODIUM 7500 UNITS: 5000 INJECTION, SOLUTION INTRAVENOUS; SUBCUTANEOUS at 01:10

## 2017-10-26 RX ADMIN — CARVEDILOL 6.25 MG: 6.25 TABLET, FILM COATED ORAL at 09:10

## 2017-10-26 RX ADMIN — ZONISAMIDE 200 MG: 100 CAPSULE ORAL at 11:10

## 2017-10-26 RX ADMIN — MAGNESIUM SULFATE IN WATER 2 G: 40 INJECTION, SOLUTION INTRAVENOUS at 05:10

## 2017-10-26 RX ADMIN — INSULIN ASPART 2 UNITS: 100 INJECTION, SOLUTION INTRAVENOUS; SUBCUTANEOUS at 05:10

## 2017-10-26 RX ADMIN — HEPARIN SODIUM 7500 UNITS: 5000 INJECTION, SOLUTION INTRAVENOUS; SUBCUTANEOUS at 05:10

## 2017-10-26 RX ADMIN — CLONAZEPAM 2 MG: 1 TABLET ORAL at 05:10

## 2017-10-26 RX ADMIN — INSULIN DETEMIR 5 UNITS: 100 INJECTION, SOLUTION SUBCUTANEOUS at 08:10

## 2017-10-26 RX ADMIN — Medication 10 ML: at 11:10

## 2017-10-26 RX ADMIN — Medication 10 ML: at 12:10

## 2017-10-26 RX ADMIN — INSULIN ASPART 4 UNITS: 100 INJECTION, SOLUTION INTRAVENOUS; SUBCUTANEOUS at 12:10

## 2017-10-26 RX ADMIN — SODIUM CHLORIDE TAB 1 GM 2 G: 1 TAB at 05:10

## 2017-10-26 RX ADMIN — Medication 10 ML: at 05:10

## 2017-10-26 RX ADMIN — SODIUM POLYSTYRENE SULFONATE 30 G: 15 SUSPENSION ORAL; RECTAL at 08:10

## 2017-10-26 RX ADMIN — SODIUM CHLORIDE TAB 1 GM 2 G: 1 TAB at 09:10

## 2017-10-26 RX ADMIN — HYDRALAZINE HYDROCHLORIDE 100 MG: 50 TABLET ORAL at 09:10

## 2017-10-26 RX ADMIN — HYDRALAZINE HYDROCHLORIDE 100 MG: 50 TABLET ORAL at 01:10

## 2017-10-26 RX ADMIN — AMLODIPINE BESYLATE 10 MG: 10 TABLET ORAL at 08:10

## 2017-10-26 RX ADMIN — LEVETIRACETAM 1500 MG: 750 TABLET ORAL at 08:10

## 2017-10-26 RX ADMIN — HEPARIN SODIUM 7500 UNITS: 5000 INJECTION, SOLUTION INTRAVENOUS; SUBCUTANEOUS at 09:10

## 2017-10-26 RX ADMIN — INSULIN ASPART 1 UNITS: 100 INJECTION, SOLUTION INTRAVENOUS; SUBCUTANEOUS at 12:10

## 2017-10-26 RX ADMIN — HYDRALAZINE HYDROCHLORIDE 100 MG: 50 TABLET ORAL at 06:10

## 2017-10-26 RX ADMIN — SODIUM CHLORIDE TAB 1 GM 2 G: 1 TAB at 01:10

## 2017-10-26 RX ADMIN — ASPIRIN 81 MG CHEWABLE TABLET 81 MG: 81 TABLET CHEWABLE at 08:10

## 2017-10-26 RX ADMIN — INSULIN DETEMIR 5 UNITS: 100 INJECTION, SOLUTION SUBCUTANEOUS at 09:10

## 2017-10-26 RX ADMIN — LEVETIRACETAM 1500 MG: 750 TABLET ORAL at 09:10

## 2017-10-26 RX ADMIN — ATORVASTATIN CALCIUM 10 MG: 10 TABLET, FILM COATED ORAL at 08:10

## 2017-10-26 RX ADMIN — CARVEDILOL 6.25 MG: 6.25 TABLET, FILM COATED ORAL at 08:10

## 2017-10-26 RX ADMIN — CLONAZEPAM 2 MG: 1 TABLET ORAL at 01:10

## 2017-10-26 RX ADMIN — ZONISAMIDE 200 MG: 100 CAPSULE ORAL at 09:10

## 2017-10-26 RX ADMIN — CLONAZEPAM 2 MG: 1 TABLET ORAL at 09:10

## 2017-10-26 NOTE — PROGRESS NOTES
[facility]  Neurocritical Care  Progress Note    Admit Date: 10/3/2017  Service Date: 10/26/2017  Length of Stay: 23    Subjective:     Chief Complaint: Seizure    History of Present Illness: Ms. Frank is a 66 yo woman with a PMHx DM type II, CKD stage 3, CAD s/p CABG, essential hypertension, cerebrovascular disease s/p bi-hemispheric watershed infarcts and trach/PEG who present as a transfer from [facility] to Neuro Critical Care s/p Cardiac Arrest on 10/4 and evaluation of Anoxic Brain Injury. She was recently discharged from [facility] 9/28/2017 with bilateral MCA SAQIB watershed infarcts. Yesterday, she was brought in via EMS from [facility] for cardiac arrest. Per nursing homestaff, she received chest compressions for about 5 minutes. After EMS arrived, ACLS protocol continued and she was given 2 rounds of epi, non-shockable rhythm. ROSC achieved en route to the ER.Pt arrived to ICU intubated. It was noted there sudden intermittent generalized body jerks mostly on tactile stimulation. Also, pt was hypotensive for which norepinephrine infusion started. Since her discharge here on 9/28 she has been admitted to multiple facilities recently to be treated for various issues including mucous plug leading to acute hypoxic respiratory failure, she pulled out her trach 9/4 so that was reinserted at [facility] and she was also treated for ESBL UTI while there, and last admit she was treated for likely aspiration pneumonia. She was transported here on Propofol. On examination, no cough, no gag, no corneal and minimal to no withdrawal.       Hospital Course: 10/5: s/p Cardiac Arrest on 10/4 and evaluation of anoxic brain injury   10//7: EEG with burst suppression pattern.  MRI with diffuse diffusion restriction  10/10: family meeting   10/11: SSEP  10/12: worsening myoclonus especially when moved. Valproic acid level undetectable likely due to interactions with meropenem. Patient on meropenem for ESBL  Klebsiella pneumonia sensitive to meropenem.  10/15: No events overnight.   10/16: No issues overnight  10/18: Trach collar  10/22: Continued hyperkalemia   10/23: Pending transfer to LTAC    Interval History:  No acute events overnight. She is pending placement, stable on trach collar.    Review of Systems  Unable to obtain a complete ROS due to level of consciousness.    Objective:     Vitals:  Temp: 97 °F (36.1 °C) (10/26/17 1505)  Pulse: 86 (10/26/17 1505)  Resp: (!) 42 (10/26/17 1505)  BP: (!) 120/58 (10/26/17 1505)  SpO2: 100 % (10/26/17 1505)    Temp:  [95.8 °F (35.4 °C)-98.5 °F (36.9 °C)] 97 °F (36.1 °C)  Pulse:  [80-95] 86  Resp:  [27-42] 42  SpO2:  [100 %] 100 %  BP: (107-172)/(49-79) 120/58         Oxygen Concentration (%):  [35] 35    10/25 0701 - 10/26 0700  In: 1495 [I.V.:280]  Out: 1130 [Urine:1130]    Physical Exam  GA: Comatose, comfortable, no acute distress.   HEENT: No scleral icterus or JVD.   Pulmonary: Clear to auscultation A/L. No wheezing, crackles, or rhonchi.  Cardiac: RRR S1 & S2 w/o rubs/murmurs/gallops.   Abdominal: Bowel sounds present x 4.   Skin: No jaundice, rashes, or visible lesions.    Neuro:  --sedation: none  --GCS: [id number]  --Mental Status: Comatose, does not respond to voice. Does not follow commands.  --CN II-XII: Grossly intact  --Pupils 3mm, PERRL.   --brainstem: cough, corneals, occulocephalics intact. Gag absent  --Motor: No spontaneous movement in any extremity.   --sensory: Flexion to pain in LUE, otherwise no response to pain.  --Reflexes: not tested  --Gait: deferred      Medications:  Continuous Scheduled    amLODIPine 10 mg Daily   aspirin 81 mg Daily   atorvastatin 10 mg Daily   carvedilol 6.25 mg BID   clonazePAM 2 mg Q8H   heparin (porcine) 7,500 Units Q8H   hydrALAZINE 100 mg Q8H   insulin detemir 5 Units BID   levETIRAcetam 1,500 mg BID   sodium chloride 0.9% 10 mL Q6H   sodium chloride 2 g Q8H   zonisamide 200 mg BID   PRN    acetaminophen 650 mg Q6H PRN    albuterol-ipratropium 2.5mg-0.5mg/3mL 3 mL Q6H PRN   dextrose 50% 12.5 g PRN   dextrose 50% 12.5 g PRN   glucagon (human recombinant) 1 mg PRN   hydrALAZINE 10 mg Q4H PRN   insulin aspart 1-10 Units Q6H PRN   labetalol 10 mg Q4H PRN   magnesium sulfate IVPB 2 g PRN   magnesium sulfate IVPB 4 g PRN   ondansetron 4 mg Q8H PRN   pneumoc 13-finn conj-dip cr(PF) 0.5 mL vaccine x 1 dose   potassium chloride 10% 40 mEq PRN   potassium chloride 10% 40 mEq PRN   potassium, sodium phosphates 2 packet PRN   sodium chloride 0.9% 10 mL PRN     Labs:  Component      Latest Ref Rng & Units 10/14/2017 10/14/2017 10/14/2017           1:08 AM  1:00 AM  1:00 AM   WBC      3.90 - 12.70 K/uL   16.41 (H)   RBC      4.00 - 5.40 M/uL   3.11 (L)   Hemoglobin      12.0 - 16.0 g/dL   8.5 (L)   Hematocrit      37.0 - 48.5 %   27.4 (L)   MCV      82 - 98 fL   88   MCH      27.0 - 31.0 pg   27.3   MCHC      32.0 - 36.0 g/dL   31.0 (L)   RDW      11.5 - 14.5 %   16.1 (H)   Platelets      150 - 350 K/uL   285   MPV      9.2 - 12.9 fL   9.1 (L)   Lymph #      1.0 - 4.8 K/uL   CANCELED   Mono #      0.3 - 1.0 K/uL   CANCELED   Eos #      0.0 - 0.5 K/uL   CANCELED   Baso #      0.00 - 0.20 K/uL   CANCELED   Gran%      38.0 - 73.0 %   76.0 (H)   Lymph%      18.0 - 48.0 %   9.0 (L)   Mono%      4.0 - 15.0 %   10.0   Eosinophil%      0.0 - 8.0 %   1.0   Basophil%      0.0 - 1.9 %   0.0   BANDS      %   2.0   Metamyelocytes      %   1.0   Myelocytes      %   1.0   Platelet Estimate         Appears normal   Aniso         Slight   Differential Method         Manual   Sodium      136 - 145 mmol/L  140 140   Potassium      3.5 - 5.1 mmol/L  3.6    Chloride      95 - 110 mmol/L  107    CO2      23 - 29 mmol/L  25    Glucose      70 - 110 mg/dL  111 (H)    BUN, Bld      8 - 23 mg/dL  8    Creatinine      0.5 - 1.4 mg/dL  0.5    Calcium      8.7 - 10.5 mg/dL  8.1 (L)    Total Protein      6.0 - 8.4 g/dL  5.3 (L)    Albumin      3.5 - 5.2 g/dL  1.3 (L)    Total  Bilirubin      0.1 - 1.0 mg/dL  0.4    Alkaline Phosphatase      55 - 135 U/L  98    AST      10 - 40 U/L  31    ALT      10 - 44 U/L  20    Anion Gap      8 - 16 mmol/L  8    eGFR if African American      >60 mL/min/1.73 m:2  >60.0    eGFR if non African American      >60 mL/min/1.73 m:2  >60.0    Protime      9.0 - 12.5 sec   11.0   Coumadin Monitoring INR      0.8 - 1.2   1.0   Phosphorus      2.7 - 4.5 mg/dL   2.9   POCT Glucose      70 - 110 mg/dL 136 (H)       Imaging:  No new imaging    Assessment/Plan:     Neuro   Status epilepticus    67 year old woman with bilateral watershed infarcts s/p PEG/trach during previous admission, who was at LTAC when she went into cardiac arrest. Suspected to be respiratory in origin. She was taken to [facility] where she was noted to be in myoclonic status and was transferred to [facility] for higher level care. On admission brainstem reflexes were absent. Family wanted to continue with full management.    -- myoclonic from anoxic brain injury  -- resolved with AEDs  -- clonazepam, levetiracetam, zonisamide  -- pending placement        Anoxic brain injury    -- from cardiac arrest        Cerebral infarction, watershed distribution, bilateral, acute    -- from 6/2017  -- ASA, atorvastatin          Eve coma scale total score 3-8    -- from anoxic brain injury        ENT   Tracheostomy status    -- S/p Trach PEG during previous admission on 7/12/2017  -- on trach collar, tolerating well        Pulmonary   Klebsiella pneumonia    -- ESBL sensitive to meropenem  -- s/p full course of Meropenem   -- afebrile  -- WBC stable          Acute on chronic respiratory failure    -- tolerating trach collar        Cardiac/Vascular   Cardiac arrest, cause unspecified    -- apparently respiratory in origin        S/P CABG (coronary artery bypass graft)    -- Hx of CABG in 2005        Essential hypertension    -- -200  -- hydralazine, carvedilol, amlodipine        Renal/   Hyponatremia     -- salt tabs  -- now normal        Hyperkalemia    -- s/p multiple doses of kayexelate   -- now stable        Endocrine   Type 2 diabetes mellitus, uncontrolled    -- Start detemir 5mg BID  -- ISS        GI   PEG (percutaneous endoscopic gastrostomy) status    -- Gastrostomy tube place during previous admission on 7/12/17            Prophylaxis:  Venous Thromboembolism: mechanical chemical  Stress Ulcer: NA  Ventilator Pneumonia: not applicable     Activity Orders          Bed rest starting at 10/03 1510        Full Code    Adelina Puckett MD  Neurocritical Care  [facility]

## 2017-10-26 NOTE — PROGRESS NOTES
[facility]  Adult Nutrition  Progress Note    SUMMARY     Recommendations    Recommendation/Intervention:   Continue Glucerna 1.5 @ 45mL/hr.   -For maintenance fluid needs: 200mL water bolus q 6 hrs to meet 1mL/kcal.   -Hold for residuals >500mL.    RD to monitor.      Goals: Pt to receive nutrition by RD follow up  Nutrition Goal Status: progressing towards goal, goal met  Communication of RD Recs: reviewed with RN    Reason for Assessment    Reason for Assessment: RD follow-up  Diagnosis: seizures, other (see comments) (cardiac arrest)  Relevent Medical History: DM type II, CKD stage 3, CAD s/p CABG, HTN, PEG/trach         General Information Comments: Pt remains on trach collar. Awaiting placement. TF at goal, tolerating with minimal residuals.    Nutrition Discharge Planning: optimal nutrition via PEG with tolerance.     Nutrition Prescription Ordered    Current Diet Order: NPO  Nutrition Order Comments: TF at goal  Current Nutrition Support Formula Ordered: Glucerna 1.5  Current Nutrition Support Rate Ordered: 45 (ml)  Current Nutrition Support Frequency Ordered: mL/hr        Evaluation of Received Nutrients/Fluid Intake    Enteral Calories (kcal): 1620  Enteral Protein (gm): 89  Enteral (Free Water) Fluid (mL): 820      Energy Calories Required: meeting needs  % Kcal Needs: 100     Protein Required: meeting needs  % Protein Needs: 100     I/O: -I/O, good UOP, LBM 10/20         Fluid Required: not meeting needs  Comments: 5mL residuals 10/26  Tolerance: tolerating  % Intake of Estimated Energy Needs: 75 - 100 %  % Meal Intake: NPO     Nutrition Risk Screen     Nutrition Risk Screen: other (see comments) (trach)    Nutrition/Diet History       Typical Food/Fluid Intake: JOSE LUIS. Pt on TF PTA (PEG in place).  Food Preferences: JOSE LUIS Oriental orthodox/cultural preferences at this time.        Factors Affecting Nutritional Intake: NPO, impaired cognitive status/motor control             "    Labs/Tests/Procedures/Meds       Pertinent Labs Reviewed: reviewed  Pertinent Labs Comments: K 5.4, POCT Glu 150-202  Pertinent Medications Reviewed: reviewed  Pertinent Medications Comments: statin, insulin, heparin    Physical Findings    Overall Physical Appearance: obese, on oxygen therapy  Tubes: gastrostomy tube     Skin: intact    Anthropometrics    Temp: 97.5 °F (36.4 °C)     Height: 5' 6" (167.6 cm)  Weight Method: Bed Scale  Weight: 125.9 kg (277 lb 9 oz)     Ideal Body Weight (IBW), Female: 130 lb     % Ideal Body Weight, Female (lb): 227.08 lb  BMI (Calculated): 47.7  BMI Grade: greater than 40 - morbid obesity                            Estimated/Assessed Needs    Weight Used For Calorie Calculations: 134.3 kg (296 lb 1.2 oz)      Energy Calorie Requirements (kcal): 2745-2782 (11-14kcal/kg)  Energy Need Method: Kcal/kg        RMR (Redford-St. Jeor Equation): 1894.75        Weight Used For Protein Calculations: 59 kg (130 lb 1.1 oz)  Protein Requirements: 89-118g (1.5-2.0g/kg)    Fluid Requirements (mL): 1mL/kcal or per MD  RDA Method (mL): 1477     CHO requirements: 50% of total kcals    Assessment and Plan    PEG (percutaneous endoscopic gastrostomy) status    Nutrition Problem:  Inadequate oral intake    Related to (etiology):   Inability to consume sufficient energy    Signs and Symptoms (as evidenced by):   NPO on mech vent requiring alternative means of nutrition.     Interventions/Recommendations (treatment strategy):  Please see RD recs above.    Nutrition Diagnosis Status:   Continues              Monitor and Evaluation    Food and Nutrient Intake: enteral nutrition intake  Food and Nutrient Adminstration: enteral and parenteral nutrition administration        Anthropometric Measurements: weight, weight change, body mass index  Biochemical Data, Medical Tests and Procedures: electrolyte and renal panel, gastrointestinal profile, glucose/endocrine profile, inflammatory profile, lipid " profile  Nutrition-Focused Physical Findings: overall appearance    Nutrition Risk    Level of Risk: other (see comments) (f/u 1x/week)    Nutrition Follow-Up    RD Follow-up?: Yes

## 2017-10-26 NOTE — PLAN OF CARE
Problem: Patient Care Overview  Goal: Plan of Care Review  Outcome: Ongoing (interventions implemented as appropriate)  POC reviewed with Nisa Frank at 0500. Pt unable to verbalize understanding d/t unresponsiveness. No acute events overnight. Pt progressing toward goals. Will continue to monitor. See flowsheets for full assessment and VS info. TF @ goal of 45. Contact precautions for ESBL in sputum maintained throughout night. Awaiting placement at LTAC.

## 2017-10-26 NOTE — PLAN OF CARE
Lucia contacted [facility] regarding the referral for the pt.  LUCIA spoke with Yana ([phone])who advised that she would need to contact the sister facility ([facility]) to determine issues with acceptance of the pt.     LUCIA spoke with Mary [phone]) at [facility] regarding the referral of this pt.  Mary advised that the facility would be willing to accept this pt pending family approval.  Mary also advised that the [facility] did not handle acute care patients.      LUCIA contacted [facility]. LUCIA spoke with Mariella ([phone]) who advised that the facility did receive the referral for this pt but required additional documentation to review before a determination could be made.  LUCIA advised that she would submit necessary documentation.        SW submitted requested documents for pt  referral to [facility] via [facility].          Germán George, LMSW [facility]  [id number]'

## 2017-10-26 NOTE — SUBJECTIVE & OBJECTIVE
Interval History:  No acute events overnight. She is pending placement, stable on trach collar.    Review of Systems  Unable to obtain a complete ROS due to level of consciousness.    Objective:     Vitals:  Temp: 97 °F (36.1 °C) (10/26/17 1505)  Pulse: 86 (10/26/17 1505)  Resp: (!) 42 (10/26/17 1505)  BP: (!) 120/58 (10/26/17 1505)  SpO2: 100 % (10/26/17 1505)    Temp:  [95.8 °F (35.4 °C)-98.5 °F (36.9 °C)] 97 °F (36.1 °C)  Pulse:  [80-95] 86  Resp:  [27-42] 42  SpO2:  [100 %] 100 %  BP: (107-172)/(49-79) 120/58         Oxygen Concentration (%):  [35] 35    10/25 0701 - 10/26 0700  In: 1495 [I.V.:280]  Out: 1130 [Urine:1130]    Physical Exam  GA: Comatose, comfortable, no acute distress.   HEENT: No scleral icterus or JVD.   Pulmonary: Clear to auscultation A/L. No wheezing, crackles, or rhonchi.  Cardiac: RRR S1 & S2 w/o rubs/murmurs/gallops.   Abdominal: Bowel sounds present x 4.   Skin: No jaundice, rashes, or visible lesions.    Neuro:  --sedation: none  --GCS: [id number]  --Mental Status: Comatose, does not respond to voice. Does not follow commands.  --CN II-XII: Grossly intact  --Pupils 3mm, PERRL.   --brainstem: cough, corneals, occulocephalics intact. Gag absent  --Motor: No spontaneous movement in any extremity.   --sensory: Flexion to pain in LUE, otherwise no response to pain.  --Reflexes: not tested  --Gait: deferred      Medications:  Continuous Scheduled    amLODIPine 10 mg Daily   aspirin 81 mg Daily   atorvastatin 10 mg Daily   carvedilol 6.25 mg BID   clonazePAM 2 mg Q8H   heparin (porcine) 7,500 Units Q8H   hydrALAZINE 100 mg Q8H   insulin detemir 5 Units BID   levETIRAcetam 1,500 mg BID   sodium chloride 0.9% 10 mL Q6H   sodium chloride 2 g Q8H   zonisamide 200 mg BID   PRN    acetaminophen 650 mg Q6H PRN   albuterol-ipratropium 2.5mg-0.5mg/3mL 3 mL Q6H PRN   dextrose 50% 12.5 g PRN   dextrose 50% 12.5 g PRN   glucagon (human recombinant) 1 mg PRN   hydrALAZINE 10 mg Q4H PRN   insulin aspart 1-10  Units Q6H PRN   labetalol 10 mg Q4H PRN   magnesium sulfate IVPB 2 g PRN   magnesium sulfate IVPB 4 g PRN   ondansetron 4 mg Q8H PRN   pneumoc 13-finn conj-dip cr(PF) 0.5 mL vaccine x 1 dose   potassium chloride 10% 40 mEq PRN   potassium chloride 10% 40 mEq PRN   potassium, sodium phosphates 2 packet PRN   sodium chloride 0.9% 10 mL PRN     Labs:  Component      Latest Ref Rng & Units 10/14/2017 10/14/2017 10/14/2017           1:08 AM  1:00 AM  1:00 AM   WBC      3.90 - 12.70 K/uL   16.41 (H)   RBC      4.00 - 5.40 M/uL   3.11 (L)   Hemoglobin      12.0 - 16.0 g/dL   8.5 (L)   Hematocrit      37.0 - 48.5 %   27.4 (L)   MCV      82 - 98 fL   88   MCH      27.0 - 31.0 pg   27.3   MCHC      32.0 - 36.0 g/dL   31.0 (L)   RDW      11.5 - 14.5 %   16.1 (H)   Platelets      150 - 350 K/uL   285   MPV      9.2 - 12.9 fL   9.1 (L)   Lymph #      1.0 - 4.8 K/uL   CANCELED   Mono #      0.3 - 1.0 K/uL   CANCELED   Eos #      0.0 - 0.5 K/uL   CANCELED   Baso #      0.00 - 0.20 K/uL   CANCELED   Gran%      38.0 - 73.0 %   76.0 (H)   Lymph%      18.0 - 48.0 %   9.0 (L)   Mono%      4.0 - 15.0 %   10.0   Eosinophil%      0.0 - 8.0 %   1.0   Basophil%      0.0 - 1.9 %   0.0   BANDS      %   2.0   Metamyelocytes      %   1.0   Myelocytes      %   1.0   Platelet Estimate         Appears normal   Aniso         Slight   Differential Method         Manual   Sodium      136 - 145 mmol/L  140 140   Potassium      3.5 - 5.1 mmol/L  3.6    Chloride      95 - 110 mmol/L  107    CO2      23 - 29 mmol/L  25    Glucose      70 - 110 mg/dL  111 (H)    BUN, Bld      8 - 23 mg/dL  8    Creatinine      0.5 - 1.4 mg/dL  0.5    Calcium      8.7 - 10.5 mg/dL  8.1 (L)    Total Protein      6.0 - 8.4 g/dL  5.3 (L)    Albumin      3.5 - 5.2 g/dL  1.3 (L)    Total Bilirubin      0.1 - 1.0 mg/dL  0.4    Alkaline Phosphatase      55 - 135 U/L  98    AST      10 - 40 U/L  31    ALT      10 - 44 U/L  20    Anion Gap      8 - 16 mmol/L  8    eGFR if African  American      >60 mL/min/1.73 m:2  >60.0    eGFR if non African American      >60 mL/min/1.73 m:2  >60.0    Protime      9.0 - 12.5 sec   11.0   Coumadin Monitoring INR      0.8 - 1.2   1.0   Phosphorus      2.7 - 4.5 mg/dL   2.9   POCT Glucose      70 - 110 mg/dL 136 (H)       Imaging:  No new imaging

## 2017-10-26 NOTE — PLAN OF CARE
michael [facility] waiting on call from vicente Pfeiffer received can't take pt  Dhaval/cee bro received referral  Dagoberto levin s/t liliana received referral can't take pt because of trach

## 2017-10-26 NOTE — PLAN OF CARE
10/26/17 1247   Discharge Reassessment   Assessment Type Discharge Planning Reassessment   Provided patient/caregiver education on the expected discharge date and the discharge plan Yes   Do you have any problems affording any of your prescribed medications? No   Discharge Plan A New Nursing Home placement - residential care facility   Discharge Plan B Skilled Nursing Facility   Patient choice form signed by patient/caregiver No   Can the patient answer the patient profile reliably? No, cognitively impaired   How does the patient rate their overall health at the present time? (neo)   Describe the patient's ability to walk at the present time. Does not walk or unable to take any steps at all   How often would a person be available to care for the patient? Occasionally   During the past month, has the patient often been bothered by feeling down, depressed or hopeless? (neo)   During the past month, has the patient often been bothered by little interest or pleasure in doing things? (neo)       Per MD, family meeting today to discuss discharge options.    Awaiting placement    Shelby Tucker RN, CCRN-K, [facility]  Neuro-Critical Care   [phone]

## 2017-10-26 NOTE — PLAN OF CARE
LUCIA spoke with Pt daughter regarding meeting with the MD team today at 12. Left message to confirm. Will contact her around 11:30am.    SW received return call from daughter reporting she will be here today at 12.    SW spoke with [facility] of [facility] who reported that if her o2 levels are 28% or below, they will likely be able to take her pending an evaluation.     Susan España, DIONNE  Neurocritical Care   [facility]  [id number]

## 2017-10-26 NOTE — PLAN OF CARE
Problem: Patient Care Overview  Goal: Plan of Care Review  POC reviewed with pt and family at 1400. Pt unable to verbalize understanding. Family questions and concerns addressed. Family discussion with Obi MD regarding wishes for placement. Pt not progressing toward goals due to underlying illness. Will continue to monitor. See flowsheets for full assessment and VS info.

## 2017-10-26 NOTE — ASSESSMENT & PLAN NOTE
67 year old woman with bilateral watershed infarcts s/p PEG/trach during previous admission, who was at LTAC when she went into cardiac arrest. Suspected to be respiratory in origin. She was taken to [facility] where she was noted to be in myoclonic status and was [place] [facility] [place]. On admission brainstem reflexes were absent. Family wanted to continue with full management.    -- myoclonic from anoxic brain injury  -- resolved with AEDs  -- clonazepam, levetiracetam, zonisamide  -- pending placement

## 2017-10-27 LAB
ALBUMIN SERPL BCP-MCNC: 2.1 G/DL
ALP SERPL-CCNC: 173 U/L
ALT SERPL W/O P-5'-P-CCNC: 22 U/L
ANION GAP SERPL CALC-SCNC: 8 MMOL/L
ANION GAP SERPL CALC-SCNC: 8 MMOL/L
ANION GAP SERPL CALC-SCNC: 9 MMOL/L
AST SERPL-CCNC: 30 U/L
BACTERIA SPEC AEROBE CULT: NORMAL
BACTERIA SPEC AEROBE CULT: NORMAL
BASOPHILS # BLD AUTO: 0.04 K/UL
BASOPHILS NFR BLD: 0.4 %
BILIRUB SERPL-MCNC: 0.3 MG/DL
BUN SERPL-MCNC: 17 MG/DL
BUN SERPL-MCNC: 18 MG/DL
BUN SERPL-MCNC: 18 MG/DL
CALCIUM SERPL-MCNC: 9.6 MG/DL
CALCIUM SERPL-MCNC: 9.7 MG/DL
CALCIUM SERPL-MCNC: 9.8 MG/DL
CHLORIDE SERPL-SCNC: 109 MMOL/L
CHLORIDE SERPL-SCNC: 110 MMOL/L
CHLORIDE SERPL-SCNC: 110 MMOL/L
CO2 SERPL-SCNC: 22 MMOL/L
CREAT SERPL-MCNC: 0.6 MG/DL
CREAT SERPL-MCNC: 0.7 MG/DL
CREAT SERPL-MCNC: 0.7 MG/DL
DIFFERENTIAL METHOD: ABNORMAL
EOSINOPHIL # BLD AUTO: 0.2 K/UL
EOSINOPHIL NFR BLD: 1.8 %
ERYTHROCYTE [DISTWIDTH] IN BLOOD BY AUTOMATED COUNT: 16.1 %
EST. GFR  (AFRICAN AMERICAN): >60 ML/MIN/1.73 M^2
EST. GFR  (NON AFRICAN AMERICAN): >60 ML/MIN/1.73 M^2
GLUCOSE SERPL-MCNC: 177 MG/DL
GLUCOSE SERPL-MCNC: 181 MG/DL
GLUCOSE SERPL-MCNC: 205 MG/DL
GRAM STN SPEC: NORMAL
HCT VFR BLD AUTO: 28 %
HGB BLD-MCNC: 8.6 G/DL
IMM GRANULOCYTES # BLD AUTO: 0.16 K/UL
IMM GRANULOCYTES NFR BLD AUTO: 1.7 %
LYMPHOCYTES # BLD AUTO: 2.3 K/UL
LYMPHOCYTES NFR BLD: 24.3 %
MAGNESIUM SERPL-MCNC: 1.5 MG/DL
MAGNESIUM SERPL-MCNC: 1.8 MG/DL
MCH RBC QN AUTO: 27.4 PG
MCHC RBC AUTO-ENTMCNC: 30.7 G/DL
MCV RBC AUTO: 89 FL
MONOCYTES # BLD AUTO: 0.6 K/UL
MONOCYTES NFR BLD: 5.9 %
NEUTROPHILS # BLD AUTO: 6.3 K/UL
NEUTROPHILS NFR BLD: 65.9 %
NRBC BLD-RTO: 0 /100 WBC
PHOSPHATE SERPL-MCNC: 4.4 MG/DL
PLATELET # BLD AUTO: 285 K/UL
PMV BLD AUTO: 9.9 FL
POCT GLUCOSE: 180 MG/DL (ref 70–110)
POCT GLUCOSE: 187 MG/DL (ref 70–110)
POCT GLUCOSE: 192 MG/DL (ref 70–110)
POCT GLUCOSE: 196 MG/DL (ref 70–110)
POCT GLUCOSE: 200 MG/DL (ref 70–110)
POTASSIUM SERPL-SCNC: 5.3 MMOL/L
POTASSIUM SERPL-SCNC: 5.7 MMOL/L
POTASSIUM SERPL-SCNC: 5.7 MMOL/L
PROT SERPL-MCNC: 7.6 G/DL
RBC # BLD AUTO: 3.14 M/UL
SODIUM SERPL-SCNC: 140 MMOL/L
WBC # BLD AUTO: 9.55 K/UL

## 2017-10-27 PROCEDURE — 63600175 PHARM REV CODE 636 W HCPCS: Performed by: NURSE PRACTITIONER

## 2017-10-27 PROCEDURE — 94761 N-INVAS EAR/PLS OXIMETRY MLT: CPT

## 2017-10-27 PROCEDURE — 25000003 PHARM REV CODE 250: Performed by: STUDENT IN AN ORGANIZED HEALTH CARE EDUCATION/TRAINING PROGRAM

## 2017-10-27 PROCEDURE — 25000003 PHARM REV CODE 250: Performed by: NURSE PRACTITIONER

## 2017-10-27 PROCEDURE — 27200966 HC CLOSED SUCTION SYSTEM

## 2017-10-27 PROCEDURE — 85025 COMPLETE CBC W/AUTO DIFF WBC: CPT

## 2017-10-27 PROCEDURE — 99900026 HC AIRWAY MAINTENANCE (STAT)

## 2017-10-27 PROCEDURE — 25000003 PHARM REV CODE 250: Performed by: EMERGENCY MEDICINE

## 2017-10-27 PROCEDURE — 63600175 PHARM REV CODE 636 W HCPCS: Performed by: EMERGENCY MEDICINE

## 2017-10-27 PROCEDURE — 20000000 HC ICU ROOM

## 2017-10-27 PROCEDURE — 92523 SPEECH SOUND LANG COMPREHEN: CPT

## 2017-10-27 PROCEDURE — A4216 STERILE WATER/SALINE, 10 ML: HCPCS | Performed by: EMERGENCY MEDICINE

## 2017-10-27 PROCEDURE — 83735 ASSAY OF MAGNESIUM: CPT | Mod: 91

## 2017-10-27 PROCEDURE — 83735 ASSAY OF MAGNESIUM: CPT

## 2017-10-27 PROCEDURE — 80048 BASIC METABOLIC PNL TOTAL CA: CPT

## 2017-10-27 PROCEDURE — 25000003 PHARM REV CODE 250: Performed by: PHYSICIAN ASSISTANT

## 2017-10-27 PROCEDURE — 80048 BASIC METABOLIC PNL TOTAL CA: CPT | Mod: 91

## 2017-10-27 PROCEDURE — 27000221 HC OXYGEN, UP TO 24 HOURS

## 2017-10-27 PROCEDURE — 99232 SBSQ HOSP IP/OBS MODERATE 35: CPT | Mod: ,,, | Performed by: PSYCHIATRY & NEUROLOGY

## 2017-10-27 PROCEDURE — 80053 COMPREHEN METABOLIC PANEL: CPT

## 2017-10-27 PROCEDURE — 84100 ASSAY OF PHOSPHORUS: CPT

## 2017-10-27 RX ADMIN — SODIUM POLYSTYRENE SULFONATE 30 G: 15 SUSPENSION ORAL; RECTAL at 03:10

## 2017-10-27 RX ADMIN — CLONAZEPAM 2 MG: 1 TABLET ORAL at 05:10

## 2017-10-27 RX ADMIN — CLONAZEPAM 2 MG: 1 TABLET ORAL at 01:10

## 2017-10-27 RX ADMIN — INSULIN ASPART 2 UNITS: 100 INJECTION, SOLUTION INTRAVENOUS; SUBCUTANEOUS at 11:10

## 2017-10-27 RX ADMIN — HYDRALAZINE HYDROCHLORIDE 100 MG: 50 TABLET ORAL at 01:10

## 2017-10-27 RX ADMIN — LEVETIRACETAM 1500 MG: 750 TABLET ORAL at 08:10

## 2017-10-27 RX ADMIN — ATORVASTATIN CALCIUM 10 MG: 10 TABLET, FILM COATED ORAL at 08:10

## 2017-10-27 RX ADMIN — HEPARIN SODIUM 7500 UNITS: 5000 INJECTION, SOLUTION INTRAVENOUS; SUBCUTANEOUS at 01:10

## 2017-10-27 RX ADMIN — AMLODIPINE BESYLATE 10 MG: 10 TABLET ORAL at 08:10

## 2017-10-27 RX ADMIN — Medication 10 ML: at 05:10

## 2017-10-27 RX ADMIN — CLONAZEPAM 2 MG: 1 TABLET ORAL at 10:10

## 2017-10-27 RX ADMIN — HEPARIN SODIUM 7500 UNITS: 5000 INJECTION, SOLUTION INTRAVENOUS; SUBCUTANEOUS at 05:10

## 2017-10-27 RX ADMIN — ZONISAMIDE 200 MG: 100 CAPSULE ORAL at 08:10

## 2017-10-27 RX ADMIN — INSULIN ASPART 1 UNITS: 100 INJECTION, SOLUTION INTRAVENOUS; SUBCUTANEOUS at 11:10

## 2017-10-27 RX ADMIN — INSULIN ASPART 2 UNITS: 100 INJECTION, SOLUTION INTRAVENOUS; SUBCUTANEOUS at 05:10

## 2017-10-27 RX ADMIN — SODIUM CHLORIDE TAB 1 GM 2 G: 1 TAB at 01:10

## 2017-10-27 RX ADMIN — MAGNESIUM SULFATE IN WATER 2 G: 40 INJECTION, SOLUTION INTRAVENOUS at 05:10

## 2017-10-27 RX ADMIN — ASPIRIN 81 MG CHEWABLE TABLET 81 MG: 81 TABLET CHEWABLE at 08:10

## 2017-10-27 RX ADMIN — LEVETIRACETAM 1500 MG: 750 TABLET ORAL at 10:10

## 2017-10-27 RX ADMIN — INSULIN ASPART 1 UNITS: 100 INJECTION, SOLUTION INTRAVENOUS; SUBCUTANEOUS at 12:10

## 2017-10-27 RX ADMIN — HYDRALAZINE HYDROCHLORIDE 100 MG: 50 TABLET ORAL at 10:10

## 2017-10-27 RX ADMIN — SODIUM CHLORIDE TAB 1 GM 2 G: 1 TAB at 05:10

## 2017-10-27 RX ADMIN — CARVEDILOL 6.25 MG: 6.25 TABLET, FILM COATED ORAL at 10:10

## 2017-10-27 RX ADMIN — CARVEDILOL 6.25 MG: 6.25 TABLET, FILM COATED ORAL at 08:10

## 2017-10-27 RX ADMIN — HEPARIN SODIUM 7500 UNITS: 5000 INJECTION, SOLUTION INTRAVENOUS; SUBCUTANEOUS at 10:10

## 2017-10-27 RX ADMIN — INSULIN DETEMIR 5 UNITS: 100 INJECTION, SOLUTION SUBCUTANEOUS at 08:10

## 2017-10-27 RX ADMIN — HYDRALAZINE HYDROCHLORIDE 100 MG: 50 TABLET ORAL at 05:10

## 2017-10-27 RX ADMIN — Medication 10 ML: at 11:10

## 2017-10-27 RX ADMIN — Medication 10 ML: at 12:10

## 2017-10-27 RX ADMIN — SODIUM CHLORIDE TAB 1 GM 2 G: 1 TAB at 10:10

## 2017-10-27 RX ADMIN — ZONISAMIDE 200 MG: 100 CAPSULE ORAL at 10:10

## 2017-10-27 RX ADMIN — INSULIN DETEMIR 5 UNITS: 100 INJECTION, SOLUTION SUBCUTANEOUS at 10:10

## 2017-10-27 RX ADMIN — MAGNESIUM SULFATE IN WATER 2 G: 40 INJECTION, SOLUTION INTRAVENOUS at 11:10

## 2017-10-27 NOTE — SUBJECTIVE & OBJECTIVE
Interval History:  No events overnight. She is pending LTAC.    Review of Systems  Unable to obtain a complete ROS due to level of consciousness.    Objective:     Vitals:  Temp: 98 °F (36.7 °C) (10/27/17 0302)  Pulse: 103 (10/27/17 0833)  Resp: (!) 41 (10/27/17 0833)  BP: (!) 148/69 (10/27/17 0602)  SpO2: 100 % (10/27/17 0833)    Temp:  [97 °F (36.1 °C)-98.3 °F (36.8 °C)] 98 °F (36.7 °C)  Pulse:  [] 103  Resp:  [0-54] 41  SpO2:  [98 %-100 %] 100 %  BP: (117-172)/(57-81) 148/69       Oxygen Concentration (%):  [35] 35    10/26 0701 - 10/27 0700  In: 2085 [I.V.:290]  Out: 1380 [Urine:1380]    Physical Exam  GA: Comatose, comfortable, no acute distress.   HEENT: No scleral icterus or JVD.   Pulmonary: Normal respiratory effort, trach collar.   Cardiac: RRR S1 & S2 w/o rubs/murmurs/gallops.   Abdominal: Bowel sounds present x 4.   Skin: No jaundice, rashes, or visible lesions.    Neuro:  --sedation: none  --GCS: [id number]  --Mental Status: Comatose, does not respond to voice. Does not follow commands.  --CN II-XII: Grossly intact  --Pupils 3mm, PERRL.   --brainstem: cough, corneals, occulocephalics intact. Gag absent  --Motor: No spontaneous movement in any extremity, no movement to pain.   --sensory: No response to pain.  --Reflexes: not tested  --Gait: deferred      Medications:  Continuous Scheduled    amLODIPine 10 mg Daily   aspirin 81 mg Daily   atorvastatin 10 mg Daily   carvedilol 6.25 mg BID   clonazePAM 2 mg Q8H   heparin (porcine) 7,500 Units Q8H   hydrALAZINE 100 mg Q8H   insulin detemir 5 Units BID   levETIRAcetam 1,500 mg BID   sodium chloride 0.9% 10 mL Q6H   sodium chloride 2 g Q8H   zonisamide 200 mg BID   PRN    acetaminophen 650 mg Q6H PRN   albuterol-ipratropium 2.5mg-0.5mg/3mL 3 mL Q6H PRN   dextrose 50% 12.5 g PRN   dextrose 50% 12.5 g PRN   glucagon (human recombinant) 1 mg PRN   hydrALAZINE 10 mg Q4H PRN   insulin aspart 1-10 Units Q6H PRN   labetalol 10 mg Q4H PRN   magnesium sulfate IVPB 2  g PRN   magnesium sulfate IVPB 4 g PRN   ondansetron 4 mg Q8H PRN   pneumoc 13-finn conj-dip cr(PF) 0.5 mL vaccine x 1 dose   potassium, sodium phosphates 2 packet PRN   sodium chloride 0.9% 10 mL PRN     Labs:  Component      Latest Ref Rng & Units 10/14/2017 10/14/2017 10/14/2017           1:08 AM  1:00 AM  1:00 AM   WBC      3.90 - 12.70 K/uL   16.41 (H)   RBC      4.00 - 5.40 M/uL   3.11 (L)   Hemoglobin      12.0 - 16.0 g/dL   8.5 (L)   Hematocrit      37.0 - 48.5 %   27.4 (L)   MCV      82 - 98 fL   88   MCH      27.0 - 31.0 pg   27.3   MCHC      32.0 - 36.0 g/dL   31.0 (L)   RDW      11.5 - 14.5 %   16.1 (H)   Platelets      150 - 350 K/uL   285   MPV      9.2 - 12.9 fL   9.1 (L)   Lymph #      1.0 - 4.8 K/uL   CANCELED   Mono #      0.3 - 1.0 K/uL   CANCELED   Eos #      0.0 - 0.5 K/uL   CANCELED   Baso #      0.00 - 0.20 K/uL   CANCELED   Gran%      38.0 - 73.0 %   76.0 (H)   Lymph%      18.0 - 48.0 %   9.0 (L)   Mono%      4.0 - 15.0 %   10.0   Eosinophil%      0.0 - 8.0 %   1.0   Basophil%      0.0 - 1.9 %   0.0   BANDS      %   2.0   Metamyelocytes      %   1.0   Myelocytes      %   1.0   Platelet Estimate         Appears normal   Aniso         Slight   Differential Method         Manual   Sodium      136 - 145 mmol/L  140 140   Potassium      3.5 - 5.1 mmol/L  3.6    Chloride      95 - 110 mmol/L  107    CO2      23 - 29 mmol/L  25    Glucose      70 - 110 mg/dL  111 (H)    BUN, Bld      8 - 23 mg/dL  8    Creatinine      0.5 - 1.4 mg/dL  0.5    Calcium      8.7 - 10.5 mg/dL  8.1 (L)    Total Protein      6.0 - 8.4 g/dL  5.3 (L)    Albumin      3.5 - 5.2 g/dL  1.3 (L)    Total Bilirubin      0.1 - 1.0 mg/dL  0.4    Alkaline Phosphatase      55 - 135 U/L  98    AST      10 - 40 U/L  31    ALT      10 - 44 U/L  20    Anion Gap      8 - 16 mmol/L  8    eGFR if African American      >60 mL/min/1.73 m:2  >60.0    eGFR if non African American      >60 mL/min/1.73 m:2  >60.0    Protime      9.0 - 12.5 sec    11.0   Coumadin Monitoring INR      0.8 - 1.2   1.0   Phosphorus      2.7 - 4.5 mg/dL   2.9   POCT Glucose      70 - 110 mg/dL 136 (H)       Imaging:  No new imaging

## 2017-10-27 NOTE — PLAN OF CARE
Problem: Patient Care Overview  Goal: Plan of Care Review  Outcome: Ongoing (interventions implemented as appropriate)  POC reviewed with Nisa Frank at 0400. Pt unable to verbalize understanding d/t unresponsiveness. No acute events overnight. Pt progressing toward goals. Will continue to monitor. See flowsheets for full assessment and VS info. TF @ goal of 45. Contact precautions for ESBL in sputum maintained throughout night. Awaiting placement at LTAC. 2 doses of kayexalate given overnight for continuous high K+. 12 lead EKG also done. See notes overnight for more details.

## 2017-10-27 NOTE — PROGRESS NOTES
[facility]  Neurocritical Care  Progress Note    Admit Date: 10/3/2017  Service Date: 10/27/2017  Length of Stay: 24    Subjective:     Chief Complaint: Seizure    History of Present Illness: Ms. Frank is a 68 yo woman with a PMHx DM type II, CKD stage 3, CAD s/p CABG, essential hypertension, cerebrovascular disease s/p bi-hemispheric watershed infarcts and trach/PEG who present as a transfer from [facility] to Neuro Critical Care s/p Cardiac Arrest on 10/4 and evaluation of Anoxic Brain Injury. She was recently discharged from [facility] [place]/28/2017 with bilateral MCA SAQIB watershed infarcts. Yesterday, she was brought in via EMS from  for cardiac arrest. Per nursing homestaff, she received chest compressions for about 5 minutes. After EMS arrived, ACLS protocol continued and she was given 2 rounds of epi, non-shockable rhythm. ROSC achieved en route to the ER.Pt arrived to ICU intubated. It was noted there sudden intermittent generalized body jerks mostly on tactile stimulation. Also, pt was hypotensive for which norepinephrine infusion started. Since her discharge here on 9/28 she has been admitted to multiple facilities recently to be treated for various issues including mucous plug leading to acute hypoxic respiratory failure, she pulled out her trach 9/4 so that was reinserted at [facility] and she was also treated for ESBL UTI while there, and last admit she was treated for likely aspiration pneumonia. She was transported here on Propofol. On examination, no cough, no gag, no corneal and minimal to no withdrawal.       Hospital Course: 10/5: s/p Cardiac Arrest on 10/4 and evaluation of anoxic brain injury   10//7: EEG with burst suppression pattern.  MRI with diffuse diffusion restriction  10/10: family meeting   10/11: SSEP  10/12: worsening myoclonus especially when moved. Valproic acid level undetectable likely due to interactions with meropenem. Patient on meropenem for ESBL  Klebsiella pneumonia sensitive to meropenem.  10/15: No events overnight.   10/16: No issues overnight  10/18: Trach collar  10/22: Continued hyperkalemia   10/23: Pending transfer to LTAC  10/27: Pending LTAC    Interval History:  No events overnight. She is pending LTAC.    Review of Systems  Unable to obtain a complete ROS due to level of consciousness.    Objective:     Vitals:  Temp: 98 °F (36.7 °C) (10/27/17 0302)  Pulse: 103 (10/27/17 0833)  Resp: (!) 41 (10/27/17 0833)  BP: (!) 148/69 (10/27/17 0602)  SpO2: 100 % (10/27/17 0833)    Temp:  [97 °F (36.1 °C)-98.3 °F (36.8 °C)] 98 °F (36.7 °C)  Pulse:  [] 103  Resp:  [0-54] 41  SpO2:  [98 %-100 %] 100 %  BP: (117-172)/(57-81) 148/69       Oxygen Concentration (%):  [35] 35    10/26 0701 - 10/27 0700  In: 2085 [I.V.:290]  Out: 1380 [Urine:1380]    Physical Exam  GA: Comatose, comfortable, no acute distress.   HEENT: No scleral icterus or JVD.   Pulmonary: Normal respiratory effort, trach collar.   Cardiac: RRR S1 & S2 w/o rubs/murmurs/gallops.   Abdominal: Bowel sounds present x 4.   Skin: No jaundice, rashes, or visible lesions.    Neuro:  --sedation: none  --GCS: [id number]  --Mental Status: Comatose, does not respond to voice. Does not follow commands.  --CN II-XII: Grossly intact  --Pupils 3mm, PERRL.   --brainstem: cough, corneals, occulocephalics intact. Gag absent  --Motor: No spontaneous movement in any extremity, no movement to pain.   --sensory: No response to pain.  --Reflexes: not tested  --Gait: deferred      Medications:  Continuous Scheduled    amLODIPine 10 mg Daily   aspirin 81 mg Daily   atorvastatin 10 mg Daily   carvedilol 6.25 mg BID   clonazePAM 2 mg Q8H   heparin (porcine) 7,500 Units Q8H   hydrALAZINE 100 mg Q8H   insulin detemir 5 Units BID   levETIRAcetam 1,500 mg BID   sodium chloride 0.9% 10 mL Q6H   sodium chloride 2 g Q8H   zonisamide 200 mg BID   PRN    acetaminophen 650 mg Q6H PRN   albuterol-ipratropium 2.5mg-0.5mg/3mL 3 mL Q6H  PRN   dextrose 50% 12.5 g PRN   dextrose 50% 12.5 g PRN   glucagon (human recombinant) 1 mg PRN   hydrALAZINE 10 mg Q4H PRN   insulin aspart 1-10 Units Q6H PRN   labetalol 10 mg Q4H PRN   magnesium sulfate IVPB 2 g PRN   magnesium sulfate IVPB 4 g PRN   ondansetron 4 mg Q8H PRN   pneumoc 13-finn conj-dip cr(PF) 0.5 mL vaccine x 1 dose   potassium, sodium phosphates 2 packet PRN   sodium chloride 0.9% 10 mL PRN     Labs:  Component      Latest Ref Rng & Units 10/14/2017 10/14/2017 10/14/2017           1:08 AM  1:00 AM  1:00 AM   WBC      3.90 - 12.70 K/uL   16.41 (H)   RBC      4.00 - 5.40 M/uL   3.11 (L)   Hemoglobin      12.0 - 16.0 g/dL   8.5 (L)   Hematocrit      37.0 - 48.5 %   27.4 (L)   MCV      82 - 98 fL   88   MCH      27.0 - 31.0 pg   27.3   MCHC      32.0 - 36.0 g/dL   31.0 (L)   RDW      11.5 - 14.5 %   16.1 (H)   Platelets      150 - 350 K/uL   285   MPV      9.2 - 12.9 fL   9.1 (L)   Lymph #      1.0 - 4.8 K/uL   CANCELED   Mono #      0.3 - 1.0 K/uL   CANCELED   Eos #      0.0 - 0.5 K/uL   CANCELED   Baso #      0.00 - 0.20 K/uL   CANCELED   Gran%      38.0 - 73.0 %   76.0 (H)   Lymph%      18.0 - 48.0 %   9.0 (L)   Mono%      4.0 - 15.0 %   10.0   Eosinophil%      0.0 - 8.0 %   1.0   Basophil%      0.0 - 1.9 %   0.0   BANDS      %   2.0   Metamyelocytes      %   1.0   Myelocytes      %   1.0   Platelet Estimate         Appears normal   Aniso         Slight   Differential Method         Manual   Sodium      136 - 145 mmol/L  140 140   Potassium      3.5 - 5.1 mmol/L  3.6    Chloride      95 - 110 mmol/L  107    CO2      23 - 29 mmol/L  25    Glucose      70 - 110 mg/dL  111 (H)    BUN, Bld      8 - 23 mg/dL  8    Creatinine      0.5 - 1.4 mg/dL  0.5    Calcium      8.7 - 10.5 mg/dL  8.1 (L)    Total Protein      6.0 - 8.4 g/dL  5.3 (L)    Albumin      3.5 - 5.2 g/dL  1.3 (L)    Total Bilirubin      0.1 - 1.0 mg/dL  0.4    Alkaline Phosphatase      55 - 135 U/L  98    AST      10 - 40 U/L  31    ALT       10 - 44 U/L  20    Anion Gap      8 - 16 mmol/L  8    eGFR if African American      >60 mL/min/1.73 m:2  >60.0    eGFR if non African American      >60 mL/min/1.73 m:2  >60.0    Protime      9.0 - 12.5 sec   11.0   Coumadin Monitoring INR      0.8 - 1.2   1.0   Phosphorus      2.7 - 4.5 mg/dL   2.9   POCT Glucose      70 - 110 mg/dL 136 (H)       Imaging:  No new imaging    Assessment/Plan:     Neuro   Status epilepticus    67 year old woman with bilateral watershed infarcts s/p PEG/trach during previous admission, who was at LTAC when she went into cardiac arrest. Suspected to be respiratory in origin. [place] [facility] [place] was noted to be in myoclonic status and was transferred [place] [facility] [place] higher level care. On admission brainstem reflexes were absent. Family wanted to continue with full management.    -- myoclonic from anoxic brain injury  -- resolved with AEDs  -- clonazepam, levetiracetam, zonisamide  -- pending placement        Anoxic brain injury    -- from cardiac arrest        Cerebral infarction, watershed distribution, bilateral, acute    -- from 6/2017  -- ASA, atorvastatin          Vee coma scale total score 3-8    -- from anoxic brain injury        ENT   Tracheostomy status    -- S/p Trach PEG during previous admission on 7/12/2017  -- on trach collar, tolerating well        Pulmonary   Klebsiella pneumonia    -- ESBL sensitive to meropenem  -- s/p full course of Meropenem   -- afebrile  -- WBC stable          Acute on chronic respiratory failure    -- tolerating trach collar        Cardiac/Vascular   Cardiac arrest, cause unspecified    -- apparently respiratory in origin        S/P CABG (coronary artery bypass graft)    -- Hx of CABG in 2005        Essential hypertension    -- -200  -- hydralazine, carvedilol, amlodipine        Renal/   Hyponatremia    -- salt tabs  -- now normal and stable        Hyperkalemia    -- s/p multiple doses of kayexelate including last night  --  continue to monitor        Endocrine   Type 2 diabetes mellitus, uncontrolled    -- Detemir 5mg BID  -- ISS        GI   PEG (percutaneous endoscopic gastrostomy) status    -- Gastrostomy tube place during previous admission on 7/12/17            Prophylaxis:  Venous Thromboembolism: mechanical chemical  Stress Ulcer: NA  Ventilator Pneumonia: not applicable     Activity Orders          Bed rest starting at 10/03 1510        Full Code    Adelina Puckett MD  Neurocritical Care  [facility]

## 2017-10-27 NOTE — PLAN OF CARE
This CM supervisor called Marleny Gallo to check on the status of the SNF referral.  Dialed [phone].  I was told that no one from admissions was in and they would be available on Monday.  Left a voicemail for the [facility].  Will discuss information with TREVOR Ryan and LUCIA Davis.

## 2017-10-27 NOTE — PT/OT/SLP EVAL
Speech Language Pathology Evaluation/ Discharge Summary     Nisa Frank   MRN: [id number]   Admitting Diagnosis: Seizure    Diet recommendations: Solid Diet Level: NPO  Liquid Diet Level: NPO   Pt NPO with PEG tube at baseline     SLP Treatment Date: 10/27/17  Speech Start Time: 1140     Speech Stop Time: 1152     Speech Total (min): 12 min       TREATMENT BILLABLE MINUTES:  Eval 12     Diagnosis: Seizure    Past Medical History:   Diagnosis Date    Acute bilateral cerebral infarction in a watershed distribution 2017    CAD (coronary artery disease)     Diabetes mellitus     Encephalopathy     Hypertension     Morbid obesity     TAMMY on CPAP     setting +17    Stroke      Past Surgical History:   Procedure Laterality Date    ARTERIAL BYPASS SURGRY      9 tears sgo     SECTION      CORONARY ARTERY BYPASS GRAFT      HYSTERECTOMY      TUBAL LIGATION         Has the patient been evaluated by SLP for swallowing? : Yes  Keep patient NPO?: No   General Precautions: Standard,          Subjective:  Difficult for Pt to achieve and maintain an awake and alert state despite max noxious tactile, auditory visual stimuli     Pain/Comfort  Pain Rating 1:  (Pt unable to report; NAD )  Pain Rating Post-Intervention 1:  (Pt unable to report: NAD)     Objective:    Oral Musculature Evaluation  Oral Musculature: unable to assess due to poor participation/comprehension  Oral Musculature Comments:  (Pt with trach with cuff deflated; Pt currently on T-piece )     Cognitive Status:  Pt minimally responsive throughout assessment despite max noxious tactile, auditory visual stimuli     Behavioral Observations: w/ initiation problems and lethargic; minimally responsive   Memory and Orientation: unable to assess   Attention: unable to assess   Problem Solving: unable to assess     Auditory Comprehension: Pt unable to follow any single step commands and/or asnwer any simple yes/no questions     Verbal Expression: Pt  essentially non-verbal at this time     Voice: unable to elicit any phonation     Reading: N/A unable to assess    Writing: N/A unable to assess     Visual-Spatial: unable to assess Pt unable to maintain awake and alert state for extended periods of time despite max stimuli         Assessment:  Nisa Frank is a 67 y.o. female with trach/PEG NPO, essentially non verbal and minimally responsive to max tactile, auditory and visual stimuli at this time. Pt not currently a candidate for a passy norma speaking valve. Given Pt current state not appropriate for speech and language treatment  At t this time. Should Pt demonstrate increased alertness and responsiveness team to re-consult speech service for re-assessment.     Discharge recommendations:   SNF long term     Plan:   Plan of Care reviewed with: patient  SLP Follow-up?: No         Tash Veliz CCC-SLP  10/27/2017

## 2017-10-27 NOTE — PLAN OF CARE
LUCIA contacted [facility] Gallo [phone] regarding referral for pt.  LUCIA advised that intake/admissions coordinator (Mariella) not in today and referred to Director of Nurses Tanna Saavedra.  Ms. Saavedra not was not available. SW left message for a return call.          Germán George, LMSW [facility]  [id number]

## 2017-10-27 NOTE — PLAN OF CARE
Problem: SLP Goal  Goal: SLP Goal  Pt seen for assessment. Pt minimally responsive not currently appropriate for PMSV, and/or speech-language intervention at this time. Team to re-consult in future should Pt become more awake, alert and appropriate.     Tash Veliz MS, CCC-SLP  Speech Language Pathologist  Pager: [phone]  Date 10/27/2017

## 2017-10-27 NOTE — PROGRESS NOTES
Pt's K+ recheck 5.7 after receiving 30g kayexalate. Notified LUCREO Sanders. Second dose of 30g kayexalate ordered. Will continue to monitor very closely. Recheck K+ with AM labs.

## 2017-10-27 NOTE — ASSESSMENT & PLAN NOTE
67 year old woman with bilateral watershed infarcts s/p PEG/trach during previous admission, who was at LTAC when she went into cardiac arrest. Suspected to be respiratory in origin. She was taken to [facility] where she was noted to be in myoclonic status and was transferred to [facility] for higher level care. On admission brainstem reflexes were absent. Family wanted to continue with full management.    -- myoclonic from anoxic brain injury  -- resolved with AEDs  -- clonazepam, levetiracetam, zonisamide  -- pending placement

## 2017-10-27 NOTE — PLAN OF CARE
LUCIA forwarded information to GREG Bynum for referral for hospice services with [facility].     GREG Bynum forwarded information to hospice facility.          Germán George, LMSW [facility]  [id number]

## 2017-10-27 NOTE — PLAN OF CARE
"CM phoned patient's daughter Shayna Owen [phone] to discuss Hospice referral.  Per daughter, she is not sure what hospice is.  CM explained to daughter that hospice care is comfort care and end of life care.  Per daughter she does not want anything taken away from her mother.  Daughter stated that she wants her mother "just as she is", she just wanted another pair of eyes to look after her.  Daughter stated that she does not want patient's oxygen removed and wants everything done.  Per daughter, she feels that her mother is "there, she just cannot communicate."  CM informed daughter that that is not usually the goal of hospice ( to watch a patient in the nursing home), but CM will inform SW of daughter's wishes and SW can speak with hospice and convey daughter's  wishes.    SW awaiting decision from Marleny Gallo on acceptance.  SW sent referral to [facility].     Shelby Tucker RN, CCRN-K, [facility]  Neuro-Critical Care   [phone]    "

## 2017-10-27 NOTE — PLAN OF CARE
Ssc phoned [facility] @ [phone] did not receive referral I resent to fax # [phone]        Misa/kyle

## 2017-10-28 LAB
ALBUMIN SERPL BCP-MCNC: 2.1 G/DL
ALP SERPL-CCNC: 171 U/L
ALT SERPL W/O P-5'-P-CCNC: 22 U/L
ANION GAP SERPL CALC-SCNC: 10 MMOL/L
AST SERPL-CCNC: 27 U/L
BILIRUB SERPL-MCNC: 0.4 MG/DL
BUN SERPL-MCNC: 17 MG/DL
CALCIUM SERPL-MCNC: 9.6 MG/DL
CHLORIDE SERPL-SCNC: 106 MMOL/L
CO2 SERPL-SCNC: 22 MMOL/L
CREAT SERPL-MCNC: 0.6 MG/DL
EST. GFR  (AFRICAN AMERICAN): >60 ML/MIN/1.73 M^2
EST. GFR  (NON AFRICAN AMERICAN): >60 ML/MIN/1.73 M^2
GLUCOSE SERPL-MCNC: 172 MG/DL
MAGNESIUM SERPL-MCNC: 1.7 MG/DL
PHOSPHATE SERPL-MCNC: 4.5 MG/DL
POCT GLUCOSE: 172 MG/DL (ref 70–110)
POCT GLUCOSE: 176 MG/DL (ref 70–110)
POTASSIUM SERPL-SCNC: 4.7 MMOL/L
PROT SERPL-MCNC: 7.5 G/DL
SODIUM SERPL-SCNC: 138 MMOL/L

## 2017-10-28 PROCEDURE — 20000000 HC ICU ROOM

## 2017-10-28 PROCEDURE — 27200966 HC CLOSED SUCTION SYSTEM

## 2017-10-28 PROCEDURE — 80053 COMPREHEN METABOLIC PANEL: CPT

## 2017-10-28 PROCEDURE — 25000003 PHARM REV CODE 250: Performed by: STUDENT IN AN ORGANIZED HEALTH CARE EDUCATION/TRAINING PROGRAM

## 2017-10-28 PROCEDURE — 84100 ASSAY OF PHOSPHORUS: CPT

## 2017-10-28 PROCEDURE — 27000221 HC OXYGEN, UP TO 24 HOURS

## 2017-10-28 PROCEDURE — A4216 STERILE WATER/SALINE, 10 ML: HCPCS | Performed by: EMERGENCY MEDICINE

## 2017-10-28 PROCEDURE — 99900026 HC AIRWAY MAINTENANCE (STAT)

## 2017-10-28 PROCEDURE — 83735 ASSAY OF MAGNESIUM: CPT

## 2017-10-28 PROCEDURE — 63600175 PHARM REV CODE 636 W HCPCS: Performed by: EMERGENCY MEDICINE

## 2017-10-28 PROCEDURE — 25000003 PHARM REV CODE 250: Performed by: PHYSICIAN ASSISTANT

## 2017-10-28 PROCEDURE — 94761 N-INVAS EAR/PLS OXIMETRY MLT: CPT

## 2017-10-28 PROCEDURE — 99232 SBSQ HOSP IP/OBS MODERATE 35: CPT | Mod: ,,, | Performed by: PHYSICIAN ASSISTANT

## 2017-10-28 PROCEDURE — 25000003 PHARM REV CODE 250: Performed by: EMERGENCY MEDICINE

## 2017-10-28 RX ADMIN — Medication 10 ML: at 11:10

## 2017-10-28 RX ADMIN — ATORVASTATIN CALCIUM 10 MG: 10 TABLET, FILM COATED ORAL at 08:10

## 2017-10-28 RX ADMIN — HYDRALAZINE HYDROCHLORIDE 100 MG: 50 TABLET ORAL at 06:10

## 2017-10-28 RX ADMIN — HEPARIN SODIUM 7500 UNITS: 5000 INJECTION, SOLUTION INTRAVENOUS; SUBCUTANEOUS at 09:10

## 2017-10-28 RX ADMIN — HYDRALAZINE HYDROCHLORIDE 100 MG: 50 TABLET ORAL at 01:10

## 2017-10-28 RX ADMIN — LEVETIRACETAM 1500 MG: 750 TABLET ORAL at 09:10

## 2017-10-28 RX ADMIN — CLONAZEPAM 2 MG: 1 TABLET ORAL at 01:10

## 2017-10-28 RX ADMIN — SODIUM CHLORIDE TAB 1 GM 2 G: 1 TAB at 06:10

## 2017-10-28 RX ADMIN — SODIUM CHLORIDE TAB 1 GM 2 G: 1 TAB at 09:10

## 2017-10-28 RX ADMIN — CARVEDILOL 6.25 MG: 6.25 TABLET, FILM COATED ORAL at 09:10

## 2017-10-28 RX ADMIN — INSULIN DETEMIR 5 UNITS: 100 INJECTION, SOLUTION SUBCUTANEOUS at 09:10

## 2017-10-28 RX ADMIN — ZONISAMIDE 200 MG: 100 CAPSULE ORAL at 09:10

## 2017-10-28 RX ADMIN — ZONISAMIDE 200 MG: 100 CAPSULE ORAL at 08:10

## 2017-10-28 RX ADMIN — SODIUM CHLORIDE TAB 1 GM 2 G: 1 TAB at 01:10

## 2017-10-28 RX ADMIN — CLONAZEPAM 2 MG: 1 TABLET ORAL at 06:10

## 2017-10-28 RX ADMIN — CARVEDILOL 6.25 MG: 6.25 TABLET, FILM COATED ORAL at 08:10

## 2017-10-28 RX ADMIN — LEVETIRACETAM 1500 MG: 750 TABLET ORAL at 08:10

## 2017-10-28 RX ADMIN — Medication 10 ML: at 07:10

## 2017-10-28 RX ADMIN — INSULIN ASPART 2 UNITS: 100 INJECTION, SOLUTION INTRAVENOUS; SUBCUTANEOUS at 06:10

## 2017-10-28 RX ADMIN — HYDRALAZINE HYDROCHLORIDE 100 MG: 50 TABLET ORAL at 09:10

## 2017-10-28 RX ADMIN — INSULIN ASPART 2 UNITS: 100 INJECTION, SOLUTION INTRAVENOUS; SUBCUTANEOUS at 11:10

## 2017-10-28 RX ADMIN — INSULIN ASPART 2 UNITS: 100 INJECTION, SOLUTION INTRAVENOUS; SUBCUTANEOUS at 07:10

## 2017-10-28 RX ADMIN — ASPIRIN 81 MG CHEWABLE TABLET 81 MG: 81 TABLET CHEWABLE at 08:10

## 2017-10-28 RX ADMIN — INSULIN DETEMIR 5 UNITS: 100 INJECTION, SOLUTION SUBCUTANEOUS at 08:10

## 2017-10-28 RX ADMIN — HEPARIN SODIUM 7500 UNITS: 5000 INJECTION, SOLUTION INTRAVENOUS; SUBCUTANEOUS at 01:10

## 2017-10-28 RX ADMIN — AMLODIPINE BESYLATE 10 MG: 10 TABLET ORAL at 08:10

## 2017-10-28 RX ADMIN — Medication 10 ML: at 06:10

## 2017-10-28 RX ADMIN — CLONAZEPAM 2 MG: 1 TABLET ORAL at 09:10

## 2017-10-28 RX ADMIN — HEPARIN SODIUM 7500 UNITS: 5000 INJECTION, SOLUTION INTRAVENOUS; SUBCUTANEOUS at 06:10

## 2017-10-28 NOTE — PLAN OF CARE
Problem: Patient Care Overview  Goal: Plan of Care Review  Outcome: Ongoing (interventions implemented as appropriate)  POC reviewed with pt at 2300. Pt nonverbal; no evidence of learning shown. No acute events today. Pt progressing toward goals. RN will continue to monitor. See flowsheets for full assessment and VS info.

## 2017-10-28 NOTE — PLAN OF CARE
Problem: Patient Care Overview  Goal: Plan of Care Review  Outcome: Ongoing (interventions implemented as appropriate)  POC reviewed with Nisa Frank at 1400. Pt unable to verbalize understanding due to cognitive status.  No acute events today. TF continued at 45cc/hr.  Pt progressing toward goals. Will continue to monitor. See flowsheets for full assessment and VS info.

## 2017-10-29 LAB
ALBUMIN SERPL BCP-MCNC: 2.1 G/DL
ALP SERPL-CCNC: 158 U/L
ALT SERPL W/O P-5'-P-CCNC: 18 U/L
ANION GAP SERPL CALC-SCNC: 8 MMOL/L
AST SERPL-CCNC: 21 U/L
BASOPHILS # BLD AUTO: 0.03 K/UL
BASOPHILS NFR BLD: 0.3 %
BILIRUB SERPL-MCNC: 0.5 MG/DL
BUN SERPL-MCNC: 19 MG/DL
CALCIUM SERPL-MCNC: 9.6 MG/DL
CHLORIDE SERPL-SCNC: 106 MMOL/L
CO2 SERPL-SCNC: 25 MMOL/L
CREAT SERPL-MCNC: 0.7 MG/DL
DIFFERENTIAL METHOD: ABNORMAL
EOSINOPHIL # BLD AUTO: 0.2 K/UL
EOSINOPHIL NFR BLD: 1.9 %
ERYTHROCYTE [DISTWIDTH] IN BLOOD BY AUTOMATED COUNT: 16.1 %
EST. GFR  (AFRICAN AMERICAN): >60 ML/MIN/1.73 M^2
EST. GFR  (NON AFRICAN AMERICAN): >60 ML/MIN/1.73 M^2
GLUCOSE SERPL-MCNC: 199 MG/DL
HCT VFR BLD AUTO: 26 %
HGB BLD-MCNC: 8 G/DL
IMM GRANULOCYTES # BLD AUTO: 0.2 K/UL
IMM GRANULOCYTES NFR BLD AUTO: 2.1 %
LYMPHOCYTES # BLD AUTO: 2.2 K/UL
LYMPHOCYTES NFR BLD: 22.9 %
MAGNESIUM SERPL-MCNC: 1.6 MG/DL
MAGNESIUM SERPL-MCNC: 2 MG/DL
MCH RBC QN AUTO: 27.4 PG
MCHC RBC AUTO-ENTMCNC: 30.8 G/DL
MCV RBC AUTO: 89 FL
MONOCYTES # BLD AUTO: 0.7 K/UL
MONOCYTES NFR BLD: 7.1 %
NEUTROPHILS # BLD AUTO: 6.2 K/UL
NEUTROPHILS NFR BLD: 65.7 %
NRBC BLD-RTO: 0 /100 WBC
PHOSPHATE SERPL-MCNC: 4.7 MG/DL
PLATELET # BLD AUTO: 234 K/UL
PMV BLD AUTO: 10.3 FL
POCT GLUCOSE: 200 MG/DL (ref 70–110)
POCT GLUCOSE: 205 MG/DL (ref 70–110)
POCT GLUCOSE: 214 MG/DL (ref 70–110)
POTASSIUM SERPL-SCNC: 4.5 MMOL/L
PROT SERPL-MCNC: 7.3 G/DL
RBC # BLD AUTO: 2.92 M/UL
SODIUM SERPL-SCNC: 139 MMOL/L
WBC # BLD AUTO: 9.44 K/UL

## 2017-10-29 PROCEDURE — 25000003 PHARM REV CODE 250: Performed by: PHYSICIAN ASSISTANT

## 2017-10-29 PROCEDURE — 94761 N-INVAS EAR/PLS OXIMETRY MLT: CPT

## 2017-10-29 PROCEDURE — 83735 ASSAY OF MAGNESIUM: CPT | Mod: 91

## 2017-10-29 PROCEDURE — 63600175 PHARM REV CODE 636 W HCPCS: Performed by: EMERGENCY MEDICINE

## 2017-10-29 PROCEDURE — 63600175 PHARM REV CODE 636 W HCPCS: Performed by: NURSE PRACTITIONER

## 2017-10-29 PROCEDURE — 25000003 PHARM REV CODE 250: Performed by: STUDENT IN AN ORGANIZED HEALTH CARE EDUCATION/TRAINING PROGRAM

## 2017-10-29 PROCEDURE — A4216 STERILE WATER/SALINE, 10 ML: HCPCS | Performed by: EMERGENCY MEDICINE

## 2017-10-29 PROCEDURE — 99900026 HC AIRWAY MAINTENANCE (STAT)

## 2017-10-29 PROCEDURE — 27000221 HC OXYGEN, UP TO 24 HOURS

## 2017-10-29 PROCEDURE — 83735 ASSAY OF MAGNESIUM: CPT

## 2017-10-29 PROCEDURE — 25000003 PHARM REV CODE 250: Performed by: EMERGENCY MEDICINE

## 2017-10-29 PROCEDURE — 20000000 HC ICU ROOM

## 2017-10-29 PROCEDURE — 99232 SBSQ HOSP IP/OBS MODERATE 35: CPT | Mod: ,,, | Performed by: PHYSICIAN ASSISTANT

## 2017-10-29 PROCEDURE — 85025 COMPLETE CBC W/AUTO DIFF WBC: CPT

## 2017-10-29 PROCEDURE — 80053 COMPREHEN METABOLIC PANEL: CPT

## 2017-10-29 PROCEDURE — 84100 ASSAY OF PHOSPHORUS: CPT

## 2017-10-29 RX ADMIN — CARVEDILOL 6.25 MG: 6.25 TABLET, FILM COATED ORAL at 08:10

## 2017-10-29 RX ADMIN — Medication 10 ML: at 11:10

## 2017-10-29 RX ADMIN — CLONAZEPAM 2 MG: 1 TABLET ORAL at 05:10

## 2017-10-29 RX ADMIN — ASPIRIN 81 MG CHEWABLE TABLET 81 MG: 81 TABLET CHEWABLE at 08:10

## 2017-10-29 RX ADMIN — HYDRALAZINE HYDROCHLORIDE 100 MG: 50 TABLET ORAL at 09:10

## 2017-10-29 RX ADMIN — SODIUM CHLORIDE TAB 1 GM 2 G: 1 TAB at 01:10

## 2017-10-29 RX ADMIN — HEPARIN SODIUM 7500 UNITS: 5000 INJECTION, SOLUTION INTRAVENOUS; SUBCUTANEOUS at 01:10

## 2017-10-29 RX ADMIN — HEPARIN SODIUM 7500 UNITS: 5000 INJECTION, SOLUTION INTRAVENOUS; SUBCUTANEOUS at 05:10

## 2017-10-29 RX ADMIN — INSULIN DETEMIR 5 UNITS: 100 INJECTION, SOLUTION SUBCUTANEOUS at 09:10

## 2017-10-29 RX ADMIN — Medication 10 ML: at 06:10

## 2017-10-29 RX ADMIN — ATORVASTATIN CALCIUM 10 MG: 10 TABLET, FILM COATED ORAL at 08:10

## 2017-10-29 RX ADMIN — INSULIN ASPART 4 UNITS: 100 INJECTION, SOLUTION INTRAVENOUS; SUBCUTANEOUS at 01:10

## 2017-10-29 RX ADMIN — INSULIN ASPART 4 UNITS: 100 INJECTION, SOLUTION INTRAVENOUS; SUBCUTANEOUS at 06:10

## 2017-10-29 RX ADMIN — Medication 10 ML: at 01:10

## 2017-10-29 RX ADMIN — INSULIN ASPART 2 UNITS: 100 INJECTION, SOLUTION INTRAVENOUS; SUBCUTANEOUS at 06:10

## 2017-10-29 RX ADMIN — CLONAZEPAM 2 MG: 1 TABLET ORAL at 09:10

## 2017-10-29 RX ADMIN — INSULIN ASPART 1 UNITS: 100 INJECTION, SOLUTION INTRAVENOUS; SUBCUTANEOUS at 11:10

## 2017-10-29 RX ADMIN — INSULIN DETEMIR 5 UNITS: 100 INJECTION, SOLUTION SUBCUTANEOUS at 08:10

## 2017-10-29 RX ADMIN — LEVETIRACETAM 1500 MG: 750 TABLET ORAL at 09:10

## 2017-10-29 RX ADMIN — HEPARIN SODIUM 7500 UNITS: 5000 INJECTION, SOLUTION INTRAVENOUS; SUBCUTANEOUS at 09:10

## 2017-10-29 RX ADMIN — CARVEDILOL 6.25 MG: 6.25 TABLET, FILM COATED ORAL at 09:10

## 2017-10-29 RX ADMIN — CLONAZEPAM 2 MG: 1 TABLET ORAL at 01:10

## 2017-10-29 RX ADMIN — HYDRALAZINE HYDROCHLORIDE 100 MG: 50 TABLET ORAL at 01:10

## 2017-10-29 RX ADMIN — ZONISAMIDE 200 MG: 100 CAPSULE ORAL at 09:10

## 2017-10-29 RX ADMIN — SODIUM CHLORIDE TAB 1 GM 2 G: 1 TAB at 09:10

## 2017-10-29 RX ADMIN — ZONISAMIDE 200 MG: 100 CAPSULE ORAL at 08:10

## 2017-10-29 RX ADMIN — LEVETIRACETAM 1500 MG: 750 TABLET ORAL at 08:10

## 2017-10-29 RX ADMIN — HYDRALAZINE HYDROCHLORIDE 100 MG: 50 TABLET ORAL at 05:10

## 2017-10-29 RX ADMIN — MAGNESIUM SULFATE IN WATER 2 G: 40 INJECTION, SOLUTION INTRAVENOUS at 07:10

## 2017-10-29 RX ADMIN — Medication 10 ML: at 05:10

## 2017-10-29 RX ADMIN — SODIUM CHLORIDE TAB 1 GM 2 G: 1 TAB at 05:10

## 2017-10-29 RX ADMIN — AMLODIPINE BESYLATE 10 MG: 10 TABLET ORAL at 08:10

## 2017-10-29 NOTE — ASSESSMENT & PLAN NOTE
67 year old woman with bilateral watershed infarcts s/p PEG/trach during previous admission, who was at LTAC when she went into cardiac arrest. Suspected to be respiratory in origin. She was taken to [facility] where she was noted to be in myoclonic status and was transferred to [facility] for higher level care. On admission brainstem reflexes were absent. Family wanted to continue with full management.    -- myoclonic from anoxic brain injury  -- resolved with AEDs  -- clonazepam, levetiracetam, zonisamide  -- pending placement

## 2017-10-29 NOTE — PLAN OF CARE
Problem: Patient Care Overview  Goal: Plan of Care Review  Outcome: Ongoing (interventions implemented as appropriate)  POC reviewed with pt at 2000. Pt nonverbal; no evidence of learning shown. No acute events today. Pt progressing toward goals. RN will continue to monitor. See flowsheets for full assessment and VS info.

## 2017-10-29 NOTE — ASSESSMENT & PLAN NOTE
67 year old woman with bilateral watershed infarcts s/p PEG/trach during previous admission, who was at LTAC when she went into cardiac arrest. Suspected to be respiratory in origin. [place] [facility] [place] she was noted to be in myoclonic status and was transferred to [facility] for higher level care. On admission brainstem reflexes were absent. Family wanted to continue with full management.    -- myoclonic from anoxic brain injury  -- resolved with AEDs  -- clonazepam, levetiracetam, zonisamide  -- pending placement

## 2017-10-29 NOTE — PROGRESS NOTES
[facility]  Neurocritical Care  Progress Note    Admit Date: 10/3/2017  Service Date: 10/28/2017  Length of Stay: 25    Subjective:     Chief Complaint: Seizure    History of Present Illness: Ms. Frank is a 66 yo woman with a PMHx DM type II, CKD stage 3, CAD s/p CABG, essential hypertension, cerebrovascular disease s/p bi-hemispheric watershed infarcts and trach/PEG who present as a transfer from [facility] to Neuro Critical Care s/p Cardiac Arrest on 10/4 and evaluation of Anoxic Brain Injury. She was recently discharged from [facility] 9/28/2017 with bilateral MCA SAQIB watershed infarcts. Yesterday, she was brought in via EMS from [facility] for cardiac arrest. Per nursing homestaff, she received chest compressions for about 5 minutes. After EMS arrived, ACLS protocol continued and she was given 2 rounds of epi, non-shockable rhythm. ROSC achieved en route to the ER.Pt arrived to ICU intubated. It was noted there sudden intermittent generalized body jerks mostly on tactile stimulation. Also, pt was hypotensive for which norepinephrine infusion started. Since her discharge here on 9/28 she has been admitted to multiple facilities recently to be treated for various issues including mucous plug leading to acute hypoxic respiratory failure, she pulled out her trach 9/4 so that was reinserted at [facility] and she was also treated for ESBL UTI while there, and last admit she was treated for likely aspiration pneumonia. She was transported here on Propofol. On examination, no cough, no gag, no corneal and minimal to no withdrawal.       Hospital Course: 10/5: s/p Cardiac Arrest on 10/4 and evaluation of anoxic brain injury   10//7: EEG with burst suppression pattern.  MRI with diffuse diffusion restriction  10/10: family meeting   10/11: SSEP  10/12: worsening myoclonus especially when moved. Valproic acid level undetectable likely due to interactions with meropenem. Patient on meropenem for ESBL  Klebsiella pneumonia sensitive to meropenem.  10/15: No events overnight.   10/16: No issues overnight  10/18: Trach collar  10/22: Continued hyperkalemia   10/23: Pending transfer to LTAC  10/27: Pending LTAC  10/28: Pending LTAC     Interval History: No significant events overnight. K+ WNL this AM. Pending LTAC placement.     Review of Systems: Unable to obtain a complete ROS due to level of consciousness.     Vitals:   Temp: 97.8 °F (36.6 °C) (10/28/17 1500)  Pulse: 101 (10/28/17 1800)  Resp: (!) 28 (10/28/17 1800)  BP: (!) 158/73 (10/28/17 1800)  SpO2: 100 % (10/28/17 1800)    Temp:  [97.6 °F (36.4 °C)-98.4 °F (36.9 °C)] 97.8 °F (36.6 °C)  Pulse:  [] 101  Resp:  [21-38] 28  SpO2:  [100 %] 100 %  BP: (117-187)/(59-87) 158/73         Oxygen Concentration (%):  [35] 35    10/27 0701 - 10/28 0700  In: 1490 [I.V.:280]  Out: 1350 [Urine:1350]     Physical Exam  GA: Comatose, comfortable, no acute distress.   HEENT: No scleral icterus or JVD.   Pulmonary: Normal respiratory effort, trach collar.   Cardiac: RRR S1 & S2 w/o rubs/murmurs/gallops.   Abdominal: Bowel sounds present x 4.   Skin: No jaundice, rashes, or visible lesions.     Neuro:  --sedation: none  --GCS: K2Mh3K2  --Mental Status: Comatose, does not respond to voice. Does not follow commands.  --CN II-XII: Grossly intact  --Pupils 3mm, PERRL.   --brainstem: cough, corneals, occulocephalics intact. Gag absent  --Motor: No spontaneous movement in any extremity, no movement to pain.   --sensory: No response to pain.  --Reflexes: not tested  --Gait: deferred    Medications:   Continuous Scheduled  amLODIPine 10 mg Daily   aspirin 81 mg Daily   atorvastatin 10 mg Daily   carvedilol 6.25 mg BID   clonazePAM 2 mg Q8H   heparin (porcine) 7,500 Units Q8H   hydrALAZINE 100 mg Q8H   insulin detemir 5 Units BID   levETIRAcetam 1,500 mg BID   sodium chloride 0.9% 10 mL Q6H   sodium chloride 2 g Q8H   zonisamide 200 mg BID   PRN  acetaminophen 650 mg Q6H PRN    albuterol-ipratropium 2.5mg-0.5mg/3mL 3 mL Q6H PRN   dextrose 50% 12.5 g PRN   dextrose 50% 12.5 g PRN   glucagon (human recombinant) 1 mg PRN   hydrALAZINE 10 mg Q4H PRN   insulin aspart 1-10 Units Q6H PRN   labetalol 10 mg Q4H PRN   magnesium sulfate IVPB 2 g PRN   magnesium sulfate IVPB 4 g PRN   ondansetron 4 mg Q8H PRN   pneumoc 13-finn conj-dip cr(PF) 0.5 mL vaccine x 1 dose   potassium, sodium phosphates 2 packet PRN   sodium chloride 0.9% 10 mL PRN      Today I independently reviewed pertinent medications, lines/drains/airways, lab results,      Assessment/Plan:     Neuro   Status epilepticus    67 year old woman with bilateral watershed infarcts s/p PEG/trach during previous admission, who was at LTAC when she went into cardiac arrest. Suspected to be respiratory in origin. She was taken to [facility] where she was noted to be in myoclonic status and was transferred to [facility] for higher level care. On admission brainstem reflexes were absent. Family wanted to continue with full management.    -- myoclonic from anoxic brain injury  -- resolved with AEDs  -- clonazepam, levetiracetam, zonisamide  -- pending placement        Anoxic brain injury    -- from cardiac arrest        Cerebral infarction, watershed distribution, bilateral, acute    -- from 6/2017  -- ASA, atorvastatin          Florence coma scale total score 3-8    -- from anoxic brain injury        Pulmonary   Klebsiella pneumonia    -- ESBL sensitive to meropenem  -- s/p full course of Meropenem   -- afebrile  -- WBC stable          Acute on chronic respiratory failure    -- tolerating trach collar        Cardiac/Vascular   Cardiac arrest, cause unspecified    -- apparently respiratory in origin        S/P CABG (coronary artery bypass graft)    -- Hx of CABG in 2005        Essential hypertension    -- -200  -- hydralazine, carvedilol, amlodipine        Renal/   Hyponatremia    -- salt tabs  -- now normal and stable        Hyperkalemia    -- WNL  this AM   -- continue to monitor        Endocrine   Type 2 diabetes mellitus, uncontrolled    -- Detemir 5mg BID  -- ISS        GI   PEG (percutaneous endoscopic gastrostomy) status    -- Gastrostomy tube place during previous admission on 7/12/17            Prophylaxis:  Venous Thromboembolism: mechanical chemical  Stress Ulcer: NA  Ventilator Pneumonia: not applicable     Activity Orders          Bed rest starting at 10/03 1510        Full Code    Jessa Dickson PA-C  Neurocritical Care  [facility]

## 2017-10-29 NOTE — PROGRESS NOTES
[facility]  Neurocritical Care  Progress Note    Admit Date: 10/3/2017  Service Date: 10/29/2017  Length of Stay: 26    Subjective:     Chief Complaint: Seizure    History of Present Illness: Ms. Frank is a 68 yo woman with a PMHx DM type II, CKD stage 3, CAD s/p CABG, essential hypertension, cerebrovascular disease s/p bi-hemispheric watershed infarcts and trach/PEG who present as a transfer from [facility] to Neuro Critical Care s/p Cardiac Arrest on 10/4 and evaluation of Anoxic Brain Injury. She was recently discharged from [facility] 9/28/2017 with bilateral MCA SAQIB watershed infarcts. Yesterday, she was brought in via EMS from [facility] for cardiac arrest. Per nursing homestaff, she received chest compressions for about 5 minutes. After EMS arrived, ACLS protocol continued and she was given 2 rounds of epi, non-shockable rhythm. ROSC achieved en route to the ER.Pt arrived to ICU intubated. It was noted there sudden intermittent generalized body jerks mostly on tactile stimulation. Also, pt was hypotensive for which norepinephrine infusion started. Since her discharge here on 9/28 she has been admitted to multiple facilities recently to be treated for various issues including mucous plug leading to acute hypoxic respiratory failure, she pulled out her trach 9/4 so that was reinserted at [facility] and she was also treated for ESBL UTI while there, and last admit she was treated for likely aspiration pneumonia. She was transported here on Propofol. On examination, no cough, no gag, no corneal and minimal to no withdrawal.       Hospital Course: 10/5: s/p Cardiac Arrest on 10/4 and evaluation of anoxic brain injury   10//7: EEG with burst suppression pattern.  MRI with diffuse diffusion restriction  10/10: family meeting   10/11: SSEP  10/12: worsening myoclonus especially when moved. Valproic acid level undetectable likely due to interactions with meropenem. Patient on meropenem for ESBL  Klebsiella pneumonia sensitive to meropenem.  10/15: No events overnight.   10/16: No issues overnight  10/18: Trach collar  10/22: Continued hyperkalemia   10/23: Pending transfer to LTAC  10/27: Pending LTAC  10/28: Pending LTAC   10/29: Pending LTAC    Interval History: No significant events overnight. Awaiting LTAC placement.     Review of Systems: Unable to obtain a complete ROS due to level of consciousness.     Vitals:   Temp: 99.1 °F (37.3 °C) (10/29/17 1100)  Pulse: 98 (10/29/17 1400)  Resp: (!) 26 (10/29/17 1400)  BP: (!) 154/70 (10/29/17 1400)  SpO2: 100 % (10/29/17 1400)    Temp:  [97.5 °F (36.4 °C)-99.2 °F (37.3 °C)] 99.1 °F (37.3 °C)  Pulse:  [] 98  Resp:  [25-40] 26  SpO2:  [100 %] 100 %  BP: (128-171)/(61-81) 154/70         Oxygen Concentration (%):  [35] 35    10/28 0701 - 10/29 0700  In: 1580 [I.V.:240]  Out: 800 [Urine:800]     Physical Exam    GA: Comatose, comfortable, no acute distress.   HEENT: No scleral icterus or JVD.   Pulmonary: Normal respiratory effort, trach collar.   Cardiac: RRR S1 & S2 w/o rubs/murmurs/gallops.   Abdominal: Bowel sounds present x 4.   Skin: No jaundice, rashes, or visible lesions.     Neuro:  --sedation: none  --GCS: [id number]  --Mental Status: Comatose, does not respond to voice. Does not follow commands.  --CN II-XII: Grossly intact  --Pupils 3mm, PERRL.   --brainstem: cough, corneals, occulocephalics intact. Gag absent  --Motor: No spontaneous movement in any extremity, no movement to pain.   --sensory: No response to pain.  --Reflexes: not tested  --Gait: deferred       Medications:   Continuous Scheduled  amLODIPine 10 mg Daily   aspirin 81 mg Daily   atorvastatin 10 mg Daily   carvedilol 6.25 mg BID   clonazePAM 2 mg Q8H   heparin (porcine) 7,500 Units Q8H   hydrALAZINE 100 mg Q8H   insulin detemir 5 Units BID   levETIRAcetam 1,500 mg BID   sodium chloride 0.9% 10 mL Q6H   sodium chloride 2 g Q8H   zonisamide 200 mg BID   PRN  acetaminophen 650 mg Q6H PRN    albuterol-ipratropium 2.5mg-0.5mg/3mL 3 mL Q6H PRN   dextrose 50% 12.5 g PRN   dextrose 50% 12.5 g PRN   glucagon (human recombinant) 1 mg PRN   hydrALAZINE 10 mg Q4H PRN   insulin aspart 1-10 Units Q6H PRN   labetalol 10 mg Q4H PRN   magnesium sulfate IVPB 2 g PRN   magnesium sulfate IVPB 4 g PRN   ondansetron 4 mg Q8H PRN   pneumoc 13-finn conj-dip cr(PF) 0.5 mL vaccine x 1 dose   potassium, sodium phosphates 2 packet PRN   sodium chloride 0.9% 10 mL PRN      Today I independently reviewed pertinent medications, lines/drains/airways, lab results,     Assessment/Plan:     Neuro   Status epilepticus    67 year old woman with bilateral watershed infarcts s/p PEG/trach during previous admission, who was at LTAC when she went into cardiac arrest. Suspected to be respiratory in origin. She was taken to [facility] where she was noted to be in myoclonic status and was transferred to [facility] for higher level care. On admission brainstem reflexes were absent. Family wanted to continue with full management.    -- myoclonic from anoxic brain injury  -- resolved with AEDs  -- clonazepam, levetiracetam, zonisamide  -- pending placement        Anoxic brain injury    -- from cardiac arrest        Cerebral infarction, watershed distribution, bilateral, acute    -- from 6/2017  -- ASA, atorvastatin          Eve coma scale total score 3-8    -- from anoxic brain injury        Pulmonary   Klebsiella pneumonia    -- ESBL sensitive to meropenem  -- s/p full course of Meropenem   -- afebrile  -- WBC stable          Acute on chronic respiratory failure    -- tolerating trach collar        Cardiac/Vascular   S/P CABG (coronary artery bypass graft)    -- Hx of CABG in 2005        Essential hypertension    -- -200  -- hydralazine, carvedilol, amlodipine        Renal/   Hyponatremia    -- salt tabs  -- now normal and stable        Endocrine   Type 2 diabetes mellitus, uncontrolled    -- Detemir 5mg BID  -- ISS        GI   PEG  (percutaneous endoscopic gastrostomy) status    -- Gastrostomy tube place during previous admission on 7/12/17            Prophylaxis:  Venous Thromboembolism: mechanical chemical  Stress Ulcer: NA  Ventilator Pneumonia: not applicable     Activity Orders          Bed rest starting at 10/03 1510        Full Code    Jessa Dickson PA-C  Neurocritical Care  [facility]

## 2017-10-30 LAB
ALBUMIN SERPL BCP-MCNC: 2 G/DL
ALP SERPL-CCNC: 152 U/L
ALT SERPL W/O P-5'-P-CCNC: 15 U/L
ANION GAP SERPL CALC-SCNC: 9 MMOL/L
AST SERPL-CCNC: 19 U/L
BASOPHILS # BLD AUTO: 0.02 K/UL
BASOPHILS NFR BLD: 0.2 %
BILIRUB SERPL-MCNC: 0.3 MG/DL
BUN SERPL-MCNC: 22 MG/DL
CALCIUM SERPL-MCNC: 9.4 MG/DL
CHLORIDE SERPL-SCNC: 107 MMOL/L
CO2 SERPL-SCNC: 21 MMOL/L
CREAT SERPL-MCNC: 0.7 MG/DL
DIFFERENTIAL METHOD: ABNORMAL
EOSINOPHIL # BLD AUTO: 0.2 K/UL
EOSINOPHIL NFR BLD: 2.2 %
ERYTHROCYTE [DISTWIDTH] IN BLOOD BY AUTOMATED COUNT: 15.9 %
EST. GFR  (AFRICAN AMERICAN): >60 ML/MIN/1.73 M^2
EST. GFR  (NON AFRICAN AMERICAN): >60 ML/MIN/1.73 M^2
GLUCOSE SERPL-MCNC: 207 MG/DL
HCT VFR BLD AUTO: 26 %
HGB BLD-MCNC: 7.9 G/DL
IMM GRANULOCYTES # BLD AUTO: 0.18 K/UL
IMM GRANULOCYTES NFR BLD AUTO: 2 %
LYMPHOCYTES # BLD AUTO: 2.3 K/UL
LYMPHOCYTES NFR BLD: 25.7 %
MAGNESIUM SERPL-MCNC: 1.6 MG/DL
MCH RBC QN AUTO: 27.4 PG
MCHC RBC AUTO-ENTMCNC: 30.4 G/DL
MCV RBC AUTO: 90 FL
MONOCYTES # BLD AUTO: 0.7 K/UL
MONOCYTES NFR BLD: 7.3 %
NEUTROPHILS # BLD AUTO: 5.7 K/UL
NEUTROPHILS NFR BLD: 62.6 %
NRBC BLD-RTO: 0 /100 WBC
PHOSPHATE SERPL-MCNC: 4.4 MG/DL
PLATELET # BLD AUTO: 218 K/UL
PMV BLD AUTO: 10.6 FL
POCT GLUCOSE: 183 MG/DL (ref 70–110)
POCT GLUCOSE: 190 MG/DL (ref 70–110)
POCT GLUCOSE: 193 MG/DL (ref 70–110)
POCT GLUCOSE: 195 MG/DL (ref 70–110)
POCT GLUCOSE: 204 MG/DL (ref 70–110)
POCT GLUCOSE: 209 MG/DL (ref 70–110)
POCT GLUCOSE: 221 MG/DL (ref 70–110)
POTASSIUM SERPL-SCNC: 4.6 MMOL/L
PROT SERPL-MCNC: 7.2 G/DL
RBC # BLD AUTO: 2.88 M/UL
SODIUM SERPL-SCNC: 137 MMOL/L
WBC # BLD AUTO: 9.07 K/UL

## 2017-10-30 PROCEDURE — 83735 ASSAY OF MAGNESIUM: CPT

## 2017-10-30 PROCEDURE — 63600175 PHARM REV CODE 636 W HCPCS: Performed by: EMERGENCY MEDICINE

## 2017-10-30 PROCEDURE — 25000003 PHARM REV CODE 250: Performed by: EMERGENCY MEDICINE

## 2017-10-30 PROCEDURE — 25000003 PHARM REV CODE 250: Performed by: STUDENT IN AN ORGANIZED HEALTH CARE EDUCATION/TRAINING PROGRAM

## 2017-10-30 PROCEDURE — 20000000 HC ICU ROOM

## 2017-10-30 PROCEDURE — 87070 CULTURE OTHR SPECIMN AEROBIC: CPT

## 2017-10-30 PROCEDURE — 63600175 PHARM REV CODE 636 W HCPCS: Performed by: NURSE PRACTITIONER

## 2017-10-30 PROCEDURE — 87186 SC STD MICRODIL/AGAR DIL: CPT

## 2017-10-30 PROCEDURE — 63600175 PHARM REV CODE 636 W HCPCS: Performed by: PHYSICIAN ASSISTANT

## 2017-10-30 PROCEDURE — 27000221 HC OXYGEN, UP TO 24 HOURS

## 2017-10-30 PROCEDURE — 25000003 PHARM REV CODE 250: Performed by: PHYSICIAN ASSISTANT

## 2017-10-30 PROCEDURE — 99900026 HC AIRWAY MAINTENANCE (STAT)

## 2017-10-30 PROCEDURE — 80053 COMPREHEN METABOLIC PANEL: CPT

## 2017-10-30 PROCEDURE — 84100 ASSAY OF PHOSPHORUS: CPT

## 2017-10-30 PROCEDURE — A4216 STERILE WATER/SALINE, 10 ML: HCPCS | Performed by: EMERGENCY MEDICINE

## 2017-10-30 PROCEDURE — 87205 SMEAR GRAM STAIN: CPT

## 2017-10-30 PROCEDURE — 87077 CULTURE AEROBIC IDENTIFY: CPT

## 2017-10-30 PROCEDURE — 99233 SBSQ HOSP IP/OBS HIGH 50: CPT | Mod: GC,,, | Performed by: PSYCHIATRY & NEUROLOGY

## 2017-10-30 PROCEDURE — 85025 COMPLETE CBC W/AUTO DIFF WBC: CPT

## 2017-10-30 RX ORDER — LABETALOL HYDROCHLORIDE 5 MG/ML
10 INJECTION, SOLUTION INTRAVENOUS EVERY 4 HOURS PRN
Status: DISCONTINUED | OUTPATIENT
Start: 2017-10-30 | End: 2017-11-12

## 2017-10-30 RX ORDER — GLYCOPYRROLATE 0.2 MG/ML
0.1 INJECTION INTRAMUSCULAR; INTRAVENOUS 3 TIMES DAILY
Status: COMPLETED | OUTPATIENT
Start: 2017-10-30 | End: 2017-10-30

## 2017-10-30 RX ORDER — GLYCOPYRROLATE 0.2 MG/ML
0.1 INJECTION INTRAMUSCULAR; INTRAVENOUS 4 TIMES DAILY
Status: DISCONTINUED | OUTPATIENT
Start: 2017-10-30 | End: 2017-10-30

## 2017-10-30 RX ORDER — CARVEDILOL 12.5 MG/1
12.5 TABLET ORAL 2 TIMES DAILY
Status: DISCONTINUED | OUTPATIENT
Start: 2017-10-30 | End: 2017-11-15 | Stop reason: HOSPADM

## 2017-10-30 RX ORDER — AMOXICILLIN 250 MG
1 CAPSULE ORAL DAILY
Status: DISCONTINUED | OUTPATIENT
Start: 2017-10-30 | End: 2017-11-15 | Stop reason: HOSPADM

## 2017-10-30 RX ORDER — HYDRALAZINE HYDROCHLORIDE 20 MG/ML
10 INJECTION INTRAMUSCULAR; INTRAVENOUS EVERY 4 HOURS PRN
Status: DISCONTINUED | OUTPATIENT
Start: 2017-10-30 | End: 2017-11-11

## 2017-10-30 RX ORDER — INSULIN ASPART 100 [IU]/ML
1-10 INJECTION, SOLUTION INTRAVENOUS; SUBCUTANEOUS EVERY 4 HOURS PRN
Status: DISCONTINUED | OUTPATIENT
Start: 2017-10-30 | End: 2017-11-12

## 2017-10-30 RX ORDER — INSULIN ASPART 100 [IU]/ML
2 INJECTION, SOLUTION INTRAVENOUS; SUBCUTANEOUS EVERY 4 HOURS
Status: DISCONTINUED | OUTPATIENT
Start: 2017-10-30 | End: 2017-11-02

## 2017-10-30 RX ADMIN — INSULIN ASPART 2 UNITS: 100 INJECTION, SOLUTION INTRAVENOUS; SUBCUTANEOUS at 04:10

## 2017-10-30 RX ADMIN — Medication 10 ML: at 11:10

## 2017-10-30 RX ADMIN — INSULIN DETEMIR 5 UNITS: 100 INJECTION, SOLUTION SUBCUTANEOUS at 08:10

## 2017-10-30 RX ADMIN — LEVETIRACETAM 1500 MG: 750 TABLET ORAL at 08:10

## 2017-10-30 RX ADMIN — SODIUM CHLORIDE TAB 1 GM 2 G: 1 TAB at 03:10

## 2017-10-30 RX ADMIN — INSULIN ASPART 2 UNITS: 100 INJECTION, SOLUTION INTRAVENOUS; SUBCUTANEOUS at 10:10

## 2017-10-30 RX ADMIN — Medication 10 ML: at 12:10

## 2017-10-30 RX ADMIN — LEVETIRACETAM 1500 MG: 750 TABLET ORAL at 09:10

## 2017-10-30 RX ADMIN — SODIUM CHLORIDE TAB 1 GM 2 G: 1 TAB at 06:10

## 2017-10-30 RX ADMIN — INSULIN ASPART 4 UNITS: 100 INJECTION, SOLUTION INTRAVENOUS; SUBCUTANEOUS at 06:10

## 2017-10-30 RX ADMIN — STANDARDIZED SENNA CONCENTRATE AND DOCUSATE SODIUM 1 TABLET: 8.6; 5 TABLET, FILM COATED ORAL at 09:10

## 2017-10-30 RX ADMIN — INSULIN ASPART 4 UNITS: 100 INJECTION, SOLUTION INTRAVENOUS; SUBCUTANEOUS at 10:10

## 2017-10-30 RX ADMIN — HEPARIN SODIUM 7500 UNITS: 5000 INJECTION, SOLUTION INTRAVENOUS; SUBCUTANEOUS at 09:10

## 2017-10-30 RX ADMIN — CLONAZEPAM 2 MG: 1 TABLET ORAL at 06:10

## 2017-10-30 RX ADMIN — HEPARIN SODIUM 7500 UNITS: 5000 INJECTION, SOLUTION INTRAVENOUS; SUBCUTANEOUS at 06:10

## 2017-10-30 RX ADMIN — ZONISAMIDE 200 MG: 100 CAPSULE ORAL at 09:10

## 2017-10-30 RX ADMIN — HYDRALAZINE HYDROCHLORIDE 100 MG: 50 TABLET ORAL at 03:10

## 2017-10-30 RX ADMIN — ZONISAMIDE 200 MG: 100 CAPSULE ORAL at 08:10

## 2017-10-30 RX ADMIN — CARVEDILOL 12.5 MG: 12.5 TABLET, FILM COATED ORAL at 09:10

## 2017-10-30 RX ADMIN — ATORVASTATIN CALCIUM 10 MG: 10 TABLET, FILM COATED ORAL at 09:10

## 2017-10-30 RX ADMIN — ASPIRIN 81 MG CHEWABLE TABLET 81 MG: 81 TABLET CHEWABLE at 09:10

## 2017-10-30 RX ADMIN — SODIUM CHLORIDE TAB 1 GM 2 G: 1 TAB at 09:10

## 2017-10-30 RX ADMIN — GLYCOPYRROLATE 0.1 MG: 0.2 INJECTION INTRAMUSCULAR; INTRAVENOUS at 03:10

## 2017-10-30 RX ADMIN — CLONAZEPAM 2 MG: 1 TABLET ORAL at 09:10

## 2017-10-30 RX ADMIN — HEPARIN SODIUM 7500 UNITS: 5000 INJECTION, SOLUTION INTRAVENOUS; SUBCUTANEOUS at 03:10

## 2017-10-30 RX ADMIN — HYDRALAZINE HYDROCHLORIDE 100 MG: 50 TABLET ORAL at 09:10

## 2017-10-30 RX ADMIN — HYDRALAZINE HYDROCHLORIDE 100 MG: 50 TABLET ORAL at 06:10

## 2017-10-30 RX ADMIN — INSULIN ASPART 2 UNITS: 100 INJECTION, SOLUTION INTRAVENOUS; SUBCUTANEOUS at 09:10

## 2017-10-30 RX ADMIN — CLONAZEPAM 2 MG: 1 TABLET ORAL at 03:10

## 2017-10-30 RX ADMIN — GLYCOPYRROLATE 0.1 MG: 0.2 INJECTION INTRAMUSCULAR; INTRAVENOUS at 09:10

## 2017-10-30 RX ADMIN — AMLODIPINE BESYLATE 10 MG: 10 TABLET ORAL at 09:10

## 2017-10-30 RX ADMIN — CARVEDILOL 12.5 MG: 12.5 TABLET, FILM COATED ORAL at 08:10

## 2017-10-30 RX ADMIN — MAGNESIUM SULFATE IN WATER 2 G: 40 INJECTION, SOLUTION INTRAVENOUS at 06:10

## 2017-10-30 RX ADMIN — INSULIN DETEMIR 5 UNITS: 100 INJECTION, SOLUTION SUBCUTANEOUS at 09:10

## 2017-10-30 RX ADMIN — Medication 10 ML: at 06:10

## 2017-10-30 NOTE — PLAN OF CARE
Problem: Patient Care Overview  Goal: Plan of Care Review  Outcome: Ongoing (interventions implemented as appropriate)  POC reviewed with pt at 0500. Pt cannot verbalize understanding due to condition. Questions and concerns not addressed addressed. No acute events overnight. Pt progressing toward goals. Will continue to monitor. See flowsheets for full assessment and VS info

## 2017-10-30 NOTE — PLAN OF CARE
SW contacted [facility] [phone] regarding referral sent. Spoke with Jessa from admissions. She reported she is unsure of the decision, they are in meeting. She will talk with Yoanna from admissions. They have not been able to reach the daughter to come tour and talk financials. They also need updated notes. Sent via [facility].    Susan España, [facility]  Neurocritical Care   [facility]  [phone]

## 2017-10-30 NOTE — PLAN OF CARE
This CM supervisor called [facility]leny Gallo to check on the status of the SNF referral.  Dialed [phone].  Mariella stated that she has to check with her respiratory therapist and will call me back.  Will discuss information with TREVOR Ryan and LUCIA Davis.

## 2017-10-30 NOTE — PLAN OF CARE
10/30/17 4305   Discharge Reassessment   Assessment Type Discharge Planning Reassessment   Provided patient/caregiver education on the expected discharge date and the discharge plan Yes   Do you have any problems affording any of your prescribed medications? No   Discharge Plan A New Nursing Home placement - detention care facility   Discharge Plan B Skilled Nursing Facility   Can the patient answer the patient profile reliably? No, cognitively impaired   How does the patient rate their overall health at the present time? (neo)   Describe the patient's ability to walk at the present time. Does not walk or unable to take any steps at all   How often would a person be available to care for the patient? Occasionally   Number of comorbid conditions (as recorded on the chart) Four   During the past month, has the patient often been bothered by feeling down, depressed or hopeless? (neo)   During the past month, has the patient often been bothered by little interest or pleasure in doing things? (neo)       Patient awaiting placement   Family choice is Marleny Tucker RN, CCRN-K, [facility]  Neuro-Critical Care   [phone]

## 2017-10-30 NOTE — NURSING
Jerman, Infection Control called back need 2nd sputum culture. Verbal order per Dr. Zendejas to collect.

## 2017-10-30 NOTE — PLAN OF CARE
SW left message for admissions at [facility] ([phone]) regarding status of this referral.    SW left second message for admissions.     Susan España LMSW  Neurocritical Care   [facility]  [phone]

## 2017-10-31 LAB
ALBUMIN SERPL BCP-MCNC: 2 G/DL
ALP SERPL-CCNC: 147 U/L
ALT SERPL W/O P-5'-P-CCNC: 15 U/L
ANION GAP SERPL CALC-SCNC: 8 MMOL/L
AST SERPL-CCNC: 22 U/L
BILIRUB SERPL-MCNC: 0.4 MG/DL
BUN SERPL-MCNC: 28 MG/DL
CALCIUM SERPL-MCNC: 9.5 MG/DL
CHLORIDE SERPL-SCNC: 108 MMOL/L
CO2 SERPL-SCNC: 24 MMOL/L
CREAT SERPL-MCNC: 0.8 MG/DL
EST. GFR  (AFRICAN AMERICAN): >60 ML/MIN/1.73 M^2
EST. GFR  (NON AFRICAN AMERICAN): >60 ML/MIN/1.73 M^2
GLUCOSE SERPL-MCNC: 198 MG/DL
MAGNESIUM SERPL-MCNC: 2 MG/DL
PHOSPHATE SERPL-MCNC: 4.4 MG/DL
POCT GLUCOSE: 186 MG/DL (ref 70–110)
POCT GLUCOSE: 188 MG/DL (ref 70–110)
POCT GLUCOSE: 193 MG/DL (ref 70–110)
POCT GLUCOSE: 211 MG/DL (ref 70–110)
POCT GLUCOSE: 227 MG/DL (ref 70–110)
POCT GLUCOSE: 230 MG/DL (ref 70–110)
POTASSIUM SERPL-SCNC: 4.7 MMOL/L
PROT SERPL-MCNC: 7.1 G/DL
SODIUM SERPL-SCNC: 140 MMOL/L

## 2017-10-31 PROCEDURE — 63600175 PHARM REV CODE 636 W HCPCS: Performed by: PHYSICIAN ASSISTANT

## 2017-10-31 PROCEDURE — 25000003 PHARM REV CODE 250: Performed by: STUDENT IN AN ORGANIZED HEALTH CARE EDUCATION/TRAINING PROGRAM

## 2017-10-31 PROCEDURE — 27000221 HC OXYGEN, UP TO 24 HOURS

## 2017-10-31 PROCEDURE — 99233 SBSQ HOSP IP/OBS HIGH 50: CPT | Mod: ,,, | Performed by: PHYSICIAN ASSISTANT

## 2017-10-31 PROCEDURE — 63600175 PHARM REV CODE 636 W HCPCS: Performed by: PSYCHIATRY & NEUROLOGY

## 2017-10-31 PROCEDURE — 80053 COMPREHEN METABOLIC PANEL: CPT

## 2017-10-31 PROCEDURE — 84100 ASSAY OF PHOSPHORUS: CPT

## 2017-10-31 PROCEDURE — 94761 N-INVAS EAR/PLS OXIMETRY MLT: CPT

## 2017-10-31 PROCEDURE — 94668 MNPJ CHEST WALL SBSQ: CPT

## 2017-10-31 PROCEDURE — 86580 TB INTRADERMAL TEST: CPT | Performed by: PSYCHIATRY & NEUROLOGY

## 2017-10-31 PROCEDURE — 99232 SBSQ HOSP IP/OBS MODERATE 35: CPT | Mod: GC,,, | Performed by: PSYCHIATRY & NEUROLOGY

## 2017-10-31 PROCEDURE — A4216 STERILE WATER/SALINE, 10 ML: HCPCS | Performed by: EMERGENCY MEDICINE

## 2017-10-31 PROCEDURE — 25000003 PHARM REV CODE 250: Performed by: PHYSICIAN ASSISTANT

## 2017-10-31 PROCEDURE — 20000000 HC ICU ROOM

## 2017-10-31 PROCEDURE — 99900026 HC AIRWAY MAINTENANCE (STAT)

## 2017-10-31 PROCEDURE — 25000003 PHARM REV CODE 250: Performed by: EMERGENCY MEDICINE

## 2017-10-31 PROCEDURE — 63600175 PHARM REV CODE 636 W HCPCS: Performed by: EMERGENCY MEDICINE

## 2017-10-31 PROCEDURE — 83735 ASSAY OF MAGNESIUM: CPT

## 2017-10-31 PROCEDURE — 94002 VENT MGMT INPAT INIT DAY: CPT

## 2017-10-31 RX ORDER — FAMOTIDINE 20 MG/1
20 TABLET, FILM COATED ORAL 2 TIMES DAILY
Status: DISCONTINUED | OUTPATIENT
Start: 2017-10-31 | End: 2017-11-12

## 2017-10-31 RX ADMIN — INSULIN ASPART 2 UNITS: 100 INJECTION, SOLUTION INTRAVENOUS; SUBCUTANEOUS at 05:10

## 2017-10-31 RX ADMIN — INSULIN ASPART 2 UNITS: 100 INJECTION, SOLUTION INTRAVENOUS; SUBCUTANEOUS at 03:10

## 2017-10-31 RX ADMIN — ZONISAMIDE 200 MG: 100 CAPSULE ORAL at 09:10

## 2017-10-31 RX ADMIN — INSULIN ASPART 2 UNITS: 100 INJECTION, SOLUTION INTRAVENOUS; SUBCUTANEOUS at 02:10

## 2017-10-31 RX ADMIN — INSULIN DETEMIR 5 UNITS: 100 INJECTION, SOLUTION SUBCUTANEOUS at 09:10

## 2017-10-31 RX ADMIN — FAMOTIDINE 20 MG: 20 TABLET, FILM COATED ORAL at 09:10

## 2017-10-31 RX ADMIN — Medication 10 ML: at 05:10

## 2017-10-31 RX ADMIN — CLONAZEPAM 2 MG: 1 TABLET ORAL at 02:10

## 2017-10-31 RX ADMIN — HYDRALAZINE HYDROCHLORIDE 100 MG: 50 TABLET ORAL at 09:10

## 2017-10-31 RX ADMIN — INSULIN ASPART 2 UNITS: 100 INJECTION, SOLUTION INTRAVENOUS; SUBCUTANEOUS at 09:10

## 2017-10-31 RX ADMIN — INSULIN ASPART 2 UNITS: 100 INJECTION, SOLUTION INTRAVENOUS; SUBCUTANEOUS at 06:10

## 2017-10-31 RX ADMIN — HEPARIN SODIUM 7500 UNITS: 5000 INJECTION, SOLUTION INTRAVENOUS; SUBCUTANEOUS at 09:10

## 2017-10-31 RX ADMIN — HYDRALAZINE HYDROCHLORIDE 100 MG: 50 TABLET ORAL at 05:10

## 2017-10-31 RX ADMIN — CLONAZEPAM 2 MG: 1 TABLET ORAL at 05:10

## 2017-10-31 RX ADMIN — CARVEDILOL 12.5 MG: 12.5 TABLET, FILM COATED ORAL at 09:10

## 2017-10-31 RX ADMIN — LEVETIRACETAM 1500 MG: 750 TABLET ORAL at 09:10

## 2017-10-31 RX ADMIN — SODIUM CHLORIDE TAB 1 GM 2 G: 1 TAB at 09:10

## 2017-10-31 RX ADMIN — STANDARDIZED SENNA CONCENTRATE AND DOCUSATE SODIUM 1 TABLET: 8.6; 5 TABLET, FILM COATED ORAL at 09:10

## 2017-10-31 RX ADMIN — HEPARIN SODIUM 7500 UNITS: 5000 INJECTION, SOLUTION INTRAVENOUS; SUBCUTANEOUS at 02:10

## 2017-10-31 RX ADMIN — INSULIN ASPART 4 UNITS: 100 INJECTION, SOLUTION INTRAVENOUS; SUBCUTANEOUS at 05:10

## 2017-10-31 RX ADMIN — Medication 5 UNITS: at 02:10

## 2017-10-31 RX ADMIN — INSULIN ASPART 4 UNITS: 100 INJECTION, SOLUTION INTRAVENOUS; SUBCUTANEOUS at 02:10

## 2017-10-31 RX ADMIN — HYDRALAZINE HYDROCHLORIDE 100 MG: 50 TABLET ORAL at 02:10

## 2017-10-31 RX ADMIN — AMLODIPINE BESYLATE 10 MG: 10 TABLET ORAL at 09:10

## 2017-10-31 RX ADMIN — ASPIRIN 81 MG CHEWABLE TABLET 81 MG: 81 TABLET CHEWABLE at 09:10

## 2017-10-31 RX ADMIN — Medication 10 ML: at 12:10

## 2017-10-31 RX ADMIN — CLONAZEPAM 2 MG: 1 TABLET ORAL at 09:10

## 2017-10-31 RX ADMIN — SODIUM CHLORIDE TAB 1 GM 2 G: 1 TAB at 05:10

## 2017-10-31 RX ADMIN — ATORVASTATIN CALCIUM 10 MG: 10 TABLET, FILM COATED ORAL at 09:10

## 2017-10-31 RX ADMIN — SODIUM CHLORIDE TAB 1 GM 2 G: 1 TAB at 02:10

## 2017-10-31 RX ADMIN — HEPARIN SODIUM 7500 UNITS: 5000 INJECTION, SOLUTION INTRAVENOUS; SUBCUTANEOUS at 05:10

## 2017-10-31 NOTE — PLAN OF CARE
TREVOR phoned [facility] to speak with Azul about accepting patient.  Msg left on V/M for Azul to call CM back. [phone].    Shelby Tucker RN, CCRN-K, [facility]  Neuro-Critical Care   [phone]

## 2017-10-31 NOTE — PROGRESS NOTES
[facility]  Neurocritical Care  Progress Note    Admit Date: 10/3/2017  Service Date: 10/31/2017  Length of Stay: 28    Subjective:     Chief Complaint: Seizure    History of Present Illness: Ms. Frank is a 68 yo woman with a PMHx DM type II, CKD stage 3, CAD s/p CABG, essential hypertension, cerebrovascular disease s/p bi-hemispheric watershed infarcts and trach/PEG who present as a transfer from [facility] to Neuro Critical Care s/p Cardiac Arrest on 10/4 and evaluation of Anoxic Brain Injury. She was recently discharged from [facility] [place]/28/2017 with bilateral MCA SAQIB watershed infarcts. Yesterday, she was brought in via EMS from [facility] for cardiac arrest. Per nursing homestaff, she received chest compressions for about 5 minutes. After EMS arrived, ACLS protocol continued and she was given 2 rounds of epi, non-shockable rhythm. ROSC achieved en route to the ER.Pt arrived to ICU intubated. It was noted there sudden intermittent generalized body jerks mostly on tactile stimulation. Also, pt was hypotensive for which norepinephrine infusion started. Since her discharge here on 9/28 she has been admitted to multiple facilities recently to be treated for various issues including mucous plug leading to acute hypoxic respiratory failure, she pulled out her trach 9/4 so that was reinserted at [facility] and she was also treated for ESBL UTI while there, and last admit she was treated for likely aspiration pneumonia. She was transported here on Propofol. On examination, no cough, no gag, no corneal and minimal to no withdrawal.       Hospital Course: 10/5: s/p Cardiac Arrest on 10/4 and evaluation of anoxic brain injury   10/7: EEG with burst suppression pattern.  MRI with diffuse diffusion restriction  10/10: family meeting   10/11: SSEP  10/12: worsening myoclonus especially when moved. Valproic acid level undetectable likely due to interactions with meropenem. Patient on meropenem for ESBL  Klebsiella pneumonia sensitive to meropenem.  10/15: No events overnight.   10/16: No issues overnight  10/18: Trach collar  10/22: Continued hyperkalemia   10/23: Pending transfer to LTAC  10/27: Pending LTAC  10/28: Pending LTAC   10/29: Pending LTAC  10/30: Pending LTAC  10/31: Worsening tachypnea, placed back on ventilator     Interval History: RR as high as 40 over last 24 hours and consistently in 30s. Place back on ventilator for tachypnea on spontaneous setting. Will continue to work with CM/SW on placement.     Review of Systems: Unable to obtain a complete ROS due to level of consciousness.     Vitals:   Temp: 98.9 °F (37.2 °C) (10/31/17 0710)  Pulse: 100 (10/31/17 1002)  Resp: (!) 33 (10/31/17 1002)  BP: (!) 148/70 (10/31/17 1002)  SpO2: 99 % (10/31/17 1002)    Temp:  [98.6 °F (37 °C)-99.1 °F (37.3 °C)] 98.9 °F (37.2 °C)  Pulse:  [] 100  Resp:  [28-40] 33  SpO2:  [98 %-100 %] 99 %  BP: (127-154)/(60-72) 148/70         Vent Mode: Spont  Oxygen Concentration (%):  [35-40] 40  Resp Rate Total:  [32 br/min] 32 br/min  Vt Set:  [450 mL] 450 mL  PEEP/CPAP:  [5 cmH20] 5 cmH20  Pressure Support:  [10 cmH20] 10 cmH20  Mean Airway Pressure:  [8.8 cmH20] 8.8 cmH20    10/30 0701 - 10/31 0700  In: 1320 [I.V.:240]  Out: 790 [Urine:500; Drains:40]     Physical Exam:  GA: comatose, comfortable, no acute distress.   HEENT: No scleral icterus or JVD.   Pulmonary: Tachypnic. Trach collar. Rhonchi present.   Cardiac: RRR S1 & S2 w/o rubs/murmurs/gallops.   Abdominal: Bowel sounds present x 4. No appreciable hepatosplenomegaly.  Skin: No jaundice, rashes, or visible lesions.  Neuro:  --GCS: E1 V1T M3  --Mental Status:  Comatose, doesn't respond to voice or pain. Doesn't follow commands  --CN II-XII grossly intact.    --Pupils 3mm, PERRL.   --Corneal reflex present on L, cough intact. Gag reflex absent.  --Motor: no spontaneous movement in any extremity. No movement to pain.  Unable to test orientation, language, memory,  judgment, insight, fund of knowledge, hearing, shoulder shrug, tongue protrusion, coordination, gait due to level of consciousness.       Medications:   Continuous Scheduled  amLODIPine 10 mg Daily   aspirin 81 mg Daily   atorvastatin 10 mg Daily   carvedilol 12.5 mg BID   clonazePAM 2 mg Q8H   famotidine 20 mg BID   heparin (porcine) 7,500 Units Q8H   hydrALAZINE 100 mg Q8H   insulin aspart 2 Units Q4H   insulin detemir 5 Units BID   levETIRAcetam 1,500 mg BID   senna-docusate 8.6-50 mg 1 tablet Daily   sodium chloride 0.9% 10 mL Q6H   sodium chloride 2 g Q8H   zonisamide 200 mg BID   PRN  acetaminophen 650 mg Q6H PRN   albuterol-ipratropium 2.5mg-0.5mg/3mL 3 mL Q6H PRN   dextrose 50% 12.5 g PRN   dextrose 50% 12.5 g PRN   glucagon (human recombinant) 1 mg PRN   hydrALAZINE 10 mg Q4H PRN   insulin aspart 1-10 Units Q4H PRN   labetalol 10 mg Q4H PRN   magnesium sulfate IVPB 2 g PRN   magnesium sulfate IVPB 4 g PRN   ondansetron 4 mg Q8H PRN   pneumoc 13-finn conj-dip cr(PF) 0.5 mL vaccine x 1 dose   potassium, sodium phosphates 2 packet PRN   sodium chloride 0.9% 10 mL PRN      Today I independently reviewed pertinent medications, lines/drains/airways, lab results,     Assessment/Plan:     Neuro   Status epilepticus    67 year old woman with bilateral watershed infarcts s/p PEG/trach during previous admission, who was at LTAC when she went into cardiac arrest. Suspected to be respiratory in origin. She was taken to [facility] where she was noted to be in myoclonic status and was transferred to [facility] for higher level care. On admission brainstem reflexes were absent. Family wanted to continue with full management.    -- myoclonic from anoxic brain injury  -- resolved with AEDs  -- clonazepam, levetiracetam, zonisamide  -- pending placement        Anoxic brain injury    -- from cardiac arrest        Cerebral infarction, watershed distribution, bilateral, acute    -- from 6/2017  -- ASA, atorvastatin          [place]incoln coma  scale total score 3-8    -- from anoxic brain injury        ENT   Tracheostomy status    -- S/p Trach PEG during previous admission on 7/12/2017  -- on trach collar, tolerating well        Pulmonary   Acute on chronic respiratory failure    -- RR as high as 40 over last 24 hours and consistently in 30s. Place back on mechanical ventilation on spontaneous setting to avoid respiratory failure         Cardiac/Vascular   S/P CABG (coronary artery bypass graft)    -- Hx of CABG in 2005        Essential hypertension    -- -200  -- hydralazine, carvedilol, amlodipine        Renal/   Hyponatremia    -- salt tabs  -- now normal and stable        Endocrine   Type 2 diabetes mellitus, uncontrolled    -- Detemir 5mg BID  -- Aspart 2u q4  -- ISS        GI   PEG (percutaneous endoscopic gastrostomy) status    -- Gastrostomy tube place during previous admission on 7/12/17            Prophylaxis:  Venous Thromboembolism: mechanical chemical  Stress Ulcer: H2B  Ventilator Pneumonia: yes     Activity Orders          Bed rest starting at 10/03 1510        Full Code    Jessa Dickson PA-C  Neurocritical Care  [facility]

## 2017-10-31 NOTE — PLAN OF CARE
LUCIA spoke with Marce Kinney from [facility] will not be able to accept pt as she does not have enough remaining Medicare days.    LUCIA spoke with pt's daughter Shayna; explained that pt is not able to be accepted to [facility] because of limited Medicare days. Shayna said she spoke to Mariella at [facility] regarding financial responsibility for correction [facility] care, she said she did not realize she would have to pay monthly for pt to stay there (as much as $500/per month). Shayna said she is reluctant for pt to go to [facility] and does not have the money to pay the monthly cost; she wants to give it more thought and speak with the physician.     LUCIA informed team and CM of above info during afternoon huddle.    Meghan Gomes, CHIQUI [phone]

## 2017-10-31 NOTE — PLAN OF CARE
Azul with [facility] phoned CM back.  Per Azul, she can accept patient on the vent, but Azul stated she is confused about what patient needs at this time.  Azul stated that the liaison with [facility] was at [facility] this AM and stated that the patient will be on Hospice care and is now a DNR.  CM informed Azul, that the patient is on the vent and is still a full code according to our records.  Per Azul, she will call the patient's daughter and call CM back    Shelyb Tucker RN, CCRN-K, [facility]  Neuro-Critical Care   [phone]  .

## 2017-10-31 NOTE — PROGRESS NOTES
[facility]  Neurocritical Care  Progress Note    Admit Date: 10/3/2017  Service Date: 10/30/2017  Length of Stay: 27    Subjective:     Chief Complaint: Seizure    History of Present Illness: Ms. Frank is a 68 yo woman with a PMHx DM type II, CKD stage 3, CAD s/p CABG, essential hypertension, cerebrovascular disease s/p bi-hemispheric watershed infarcts and trach/PEG who present as a transfer from [facility] to Neuro Critical Care s/p Cardiac Arrest on 10/4 and evaluation of Anoxic Brain Injury. She was recently discharged from [facility] 9/28/2017 with bilateral MCA SAQIB watershed infarcts. Yesterday, she was brought in via EMS from [facility] for cardiac arrest. Per nursing homestaff, she received chest compressions for about 5 minutes. After EMS arrived, ACLS protocol continued and she was given 2 rounds of epi, non-shockable rhythm. ROSC achieved en route to the ER.Pt arrived to ICU intubated. It was noted there sudden intermittent generalized body jerks mostly on tactile stimulation. Also, pt was hypotensive for which norepinephrine infusion started. Since her discharge here on 9/28 she has been admitted to multiple facilities recently to be treated for various issues including mucous plug leading to acute hypoxic respiratory failure, she pulled out her trach 9/4 so that was reinserted at [facility] and she was also treated for ESBL UTI while there, and last admit she was treated for likely aspiration pneumonia. She was transported here on Propofol. On examination, no cough, no gag, no corneal and minimal to no withdrawal.       Hospital Course: 10/5: s/p Cardiac Arrest on 10/4 and evaluation of anoxic brain injury   10//7: EEG with burst suppression pattern.  MRI with diffuse diffusion restriction  10/10: family meeting   10/11: SSEP  10/12: worsening myoclonus especially when moved. Valproic acid level undetectable likely due to interactions with meropenem. Patient on meropenem for ESBL  Klebsiella pneumonia sensitive to meropenem.  10/15: No events overnight.   10/16: No issues overnight  10/18: Trach collar  10/22: Continued hyperkalemia   10/23: Pending transfer to LTAC  10/27: Pending LTAC  10/28: Pending LTAC   10/29: Pending LTAC  10/30: Pending LTAC    Interval History:  No significant events overnight. Awaiting LTAC placement.    Review of Systems  Unable to obtain a complete ROS due to level of consciousness.  Objective:     Vitals:  Temp: 98.6 °F (37 °C) (10/30/17 1500)  Pulse: 107 (10/30/17 1929)  Resp: (!) 28 (10/30/17 1929)  BP: 135/60 (10/30/17 1555)  SpO2: 99 % (10/30/17 1929)    Temp:  [98.4 °F (36.9 °C)-99.2 °F (37.3 °C)] 98.6 °F (37 °C)  Pulse:  [] 107  Resp:  [25-39] 28  SpO2:  [99 %-100 %] 99 %  BP: (126-165)/(60-75) 135/60         Oxygen Concentration (%):  [35] 35    10/29 0701 - 10/30 0700  In: 1465 [I.V.:310]  Out: 1290 [Urine:1290]    Physical Exam     Physical Exam:  GA: comatose, comfortable, no acute distress.   HEENT: No scleral icterus or JVD.   Pulmonary: Clear to auscultation A/P/L. No wheezing, crackles, or rhonchi. Intubated.  Cardiac: RRR S1 & S2 w/o rubs/murmurs/gallops.   Abdominal: Bowel sounds present x 4. No appreciable hepatosplenomegaly.  Skin: No jaundice, rashes, or visible lesions.  Neuro:  --GCS: E2 V1 M3  --Mental Status:  Comatose, doesn't respond to voice. Doesn't follow commands  --CN II-XII grossly intact.   --Pupils 3mm, PERRL.   --Corneal reflex, cough intact. Gag reflex absent.  --Motor: no spontaneous movement in any extremity. No movement to pain.  Unable to test orientation, language, memory, judgment, insight, fund of knowledge, hearing, shoulder shrug, tongue protrusion, coordination, gait due to level of consciousness.    Medications:  Continuous Scheduled  amLODIPine 10 mg Daily   aspirin 81 mg Daily   atorvastatin 10 mg Daily   carvedilol 12.5 mg BID   clonazePAM 2 mg Q8H   glycopyrrolate 0.1 mg TID   heparin (porcine) 7,500 Units  Q8H   hydrALAZINE 100 mg Q8H   insulin aspart 2 Units Q4H   insulin detemir 5 Units BID   levETIRAcetam 1,500 mg BID   senna-docusate 8.6-50 mg 1 tablet Daily   sodium chloride 0.9% 10 mL Q6H   sodium chloride 2 g Q8H   zonisamide 200 mg BID   PRN  acetaminophen 650 mg Q6H PRN   albuterol-ipratropium 2.5mg-0.5mg/3mL 3 mL Q6H PRN   dextrose 50% 12.5 g PRN   dextrose 50% 12.5 g PRN   glucagon (human recombinant) 1 mg PRN   hydrALAZINE 10 mg Q4H PRN   insulin aspart 1-10 Units Q4H PRN   labetalol 10 mg Q4H PRN   magnesium sulfate IVPB 2 g PRN   magnesium sulfate IVPB 4 g PRN   ondansetron 4 mg Q8H PRN   pneumoc 13-finn conj-dip cr(PF) 0.5 mL vaccine x 1 dose   potassium, sodium phosphates 2 packet PRN   sodium chloride 0.9% 10 mL PRN     Today I personally reviewed pertinent medications, lines/drains/airways, imaging, cardiology, lab results, microbiology results,    Assessment/Plan:     Neuro   Status epilepticus    67 year old woman with bilateral watershed infarcts s/p PEG/trach during previous admission, who was at LTAC when she went into cardiac arrest. Suspected to be respiratory in origin. She was taken to [facility] where she was noted to be in myoclonic status and was transferred to [facility] for higher level care. On admission brainstem reflexes were absent. Family wanted to continue with full management.    -- myoclonic from anoxic brain injury  -- resolved with AEDs  -- clonazepam, levetiracetam, zonisamide  -- pending placement        Anoxic brain injury    -- from cardiac arrest        Cerebral infarction, watershed distribution, bilateral, acute    -- from 6/2017  -- ASA, atorvastatin          Pulmonary   Acute on chronic respiratory failure    -- tolerating trach collar        Cardiac/Vascular   Essential hypertension    -- -200  -- hydralazine, carvedilol, amlodipine        Endocrine   Type 2 diabetes mellitus, uncontrolled    -- Detemir 5mg BID  -- Aspart 2u q4  -- ISS        GI   PEG (percutaneous  endoscopic gastrostomy) status    -- Gastrostomy tube place during previous admission on 7/12/17            Prophylaxis:  Venous Thromboembolism: mechanical chemical  Stress Ulcer: None  Ventilator Pneumonia: no     Activity Orders          Bed rest starting at 10/03 1510        Full Code    Gemini Torres PA-C  Neurocritical Care  [facility]

## 2017-10-31 NOTE — ASSESSMENT & PLAN NOTE
67 year old woman with bilateral watershed infarcts s/p PEG/trach during previous admission, who was at LTAC when she went into cardiac arrest. Suspected to be respiratory in origin. She was taken to [facility] where she was noted to be in myoclonic status and was transferred to [facility] for higher level care. On admission brainstem reflexes were absent. Family wanted to continue with full management.    -- myoclonic from anoxic brain injury  -- resolved with AEDs  -- clonazepam, levetiracetam, zonisamide  -- pending placement

## 2017-10-31 NOTE — PLAN OF CARE
0932: This CM supervisor called [facility] to follow up on status of referral.  Dialed [phone].  I spoke with AMOL Serna.  Daphne stated that Mariella was following this referral and she would check with her.  Call back number provided    0936:  This  supervisor called [facility] to check on status of referral.  Dialed [phone].  I spoke with Malorie.  Malorie stated that they received more documentation yesterday and this patient would be discussed in the staff meeting.  Call back number provided.  Will discuss information with TREVOR Ryan and LUCIA Christianson.

## 2017-10-31 NOTE — ASSESSMENT & PLAN NOTE
67 year old woman with bilateral watershed infarcts s/p PEG/trach during previous admission, who was at LTAC when she went into cardiac arrest. Suspected to be respiratory in origin. She was taken to [facility] where she was noted to be in myoclonic status and was [place] [facility] [place]. On admission brainstem reflexes were absent. Family wanted to continue with full management.    -- myoclonic from anoxic brain injury  -- resolved with AEDs  -- clonazepam, levetiracetam, zonisamide  -- pending placement

## 2017-10-31 NOTE — ASSESSMENT & PLAN NOTE
-- RR as high as 40 over last 24 hours and consistently in 30s. Place back on mechanical ventilation on spontaneous setting to avoid respiratory failure

## 2017-10-31 NOTE — PLAN OF CARE
CM phoned [facility] [phone] regarding patient acceptance.  CM transferred to the admissions office. No answer.  Message left on V/M to call CM back.  Patient now on vent and per Jessa with [facility], will need vent facility.  Awaiting call back.    Vent Mode: Spont  Oxygen Concentration (%):  [35-40] 40  Resp Rate Total:  [32 br/min] 32 br/min  Vt Set:  [450 mL] 450 mL  PEEP/CPAP:  [5 cmH20] 5 cmH20  Pressure Support:  [10 cmH20] 10 cmH20  Mean Airway Pressure:  [8.8 cmH20] 8.8 cmH20     Shelby Tucker RN, CCRN-K, [facility]  Neuro-Critical Care   [phone]

## 2017-10-31 NOTE — PLAN OF CARE
Assigned SW spoke with CM-pt is now on the vent and may meet LTAC criteria. SW called Tasha at [facility] [place], updated documentation sent via [facility]. Pt may still not be able to do LTAC due to limited Medicare days    SW also spoke with pt's daughter, updated her on referral process with [facility] and [facility]. SW agreed to call later in the day with another update.    Meghan Gomes, LCSW [phone]

## 2017-10-31 NOTE — PLAN OF CARE
Problem: Patient Care Overview  Goal: Plan of Care Review  Outcome: Ongoing (interventions implemented as appropriate)  POC reviewed with pt at 0200. Pt is unable to verbalize understanding. No acute events overnight. On aerosol T tube trach collar at 8L O2. SBP remained <180 without PRN's.  Will continue to monitor. See flowsheets for full assessment and VS info

## 2017-10-31 NOTE — PLAN OF CARE
Problem: Patient Care Overview  Goal: Plan of Care Review  Outcome: Ongoing (interventions implemented as appropriate)  POC reviewed with pt at 1400. Pt showed no evidence of understanding. TB test to right forearm. No acute events today. Pt progressing toward goals. Will continue to monitor. See flowsheets for full assessment and VS info.

## 2017-10-31 NOTE — PLAN OF CARE
Azul from [facility] phoned CM.  Per Azul, she spoke with patient's daughter, Shayna regarding financial obligations for placement and daughter does not want to give the patient's check to [facility] for placement.  Per Azul, [facility] will need the check for placement due to the fact that the patient is on a vent and does not qualify for SNF at this time (patient not participating in therapy).  [facility] unless daughter is willing to sign the patient's check over for her care.    Patient out of LTAC /Acute Medicare days.     Shelby Tucker RN, CCRN-K, [facility]  Neuro-Critical Care   [phone]

## 2017-10-31 NOTE — SUBJECTIVE & OBJECTIVE
Interval History:  No significant events overnight. Awaiting LTAC placement.    Review of Systems  Unable to obtain a complete ROS due to level of consciousness.  Objective:     Vitals:  Temp: 98.6 °F (37 °C) (10/30/17 1500)  Pulse: 107 (10/30/17 1929)  Resp: (!) 28 (10/30/17 1929)  BP: 135/60 (10/30/17 1555)  SpO2: 99 % (10/30/17 1929)    Temp:  [98.4 °F (36.9 °C)-99.2 °F (37.3 °C)] 98.6 °F (37 °C)  Pulse:  [] 107  Resp:  [25-39] 28  SpO2:  [99 %-100 %] 99 %  BP: (126-165)/(60-75) 135/60         Oxygen Concentration (%):  [35] 35    10/29 0701 - 10/30 0700  In: 1465 [I.V.:310]  Out: 1290 [Urine:1290]    Physical Exam     Physical Exam:  GA: comatose, comfortable, no acute distress.   HEENT: No scleral icterus or JVD.   Pulmonary: Clear to auscultation A/P/L. No wheezing, crackles, or rhonchi. Intubated.  Cardiac: RRR S1 & S2 w/o rubs/murmurs/gallops.   Abdominal: Bowel sounds present x 4. No appreciable hepatosplenomegaly.  Skin: No jaundice, rashes, or visible lesions.  Neuro:  --GCS: E2 V1 M3  --Mental Status:  Comatose, doesn't respond to voice. Doesn't follow commands  --CN II-XII grossly intact.   --Pupils 3mm, PERRL.   --Corneal reflex, cough intact. Gag reflex absent.  --Motor: no spontaneous movement in any extremity. No movement to pain.  Unable to test orientation, language, memory, judgment, insight, fund of knowledge, hearing, shoulder shrug, tongue protrusion, coordination, gait due to level of consciousness.    Medications:  Continuous Scheduled  amLODIPine 10 mg Daily   aspirin 81 mg Daily   atorvastatin 10 mg Daily   carvedilol 12.5 mg BID   clonazePAM 2 mg Q8H   glycopyrrolate 0.1 mg TID   heparin (porcine) 7,500 Units Q8H   hydrALAZINE 100 mg Q8H   insulin aspart 2 Units Q4H   insulin detemir 5 Units BID   levETIRAcetam 1,500 mg BID   senna-docusate 8.6-50 mg 1 tablet Daily   sodium chloride 0.9% 10 mL Q6H   sodium chloride 2 g Q8H   zonisamide 200 mg BID   PRN  acetaminophen 650 mg Q6H PRN    albuterol-ipratropium 2.5mg-0.5mg/3mL 3 mL Q6H PRN   dextrose 50% 12.5 g PRN   dextrose 50% 12.5 g PRN   glucagon (human recombinant) 1 mg PRN   hydrALAZINE 10 mg Q4H PRN   insulin aspart 1-10 Units Q4H PRN   labetalol 10 mg Q4H PRN   magnesium sulfate IVPB 2 g PRN   magnesium sulfate IVPB 4 g PRN   ondansetron 4 mg Q8H PRN   pneumoc 13-finn conj-dip cr(PF) 0.5 mL vaccine x 1 dose   potassium, sodium phosphates 2 packet PRN   sodium chloride 0.9% 10 mL PRN     Today I personally reviewed pertinent medications, lines/drains/airways, imaging, cardiology, lab results, microbiology results,

## 2017-11-01 LAB
ALBUMIN SERPL BCP-MCNC: 2.1 G/DL
ALP SERPL-CCNC: 146 U/L
ALT SERPL W/O P-5'-P-CCNC: 18 U/L
ANION GAP SERPL CALC-SCNC: 9 MMOL/L
AST SERPL-CCNC: 24 U/L
BACTERIA SPEC AEROBE CULT: NORMAL
BASOPHILS # BLD AUTO: 0.02 K/UL
BASOPHILS NFR BLD: 0.2 %
BILIRUB SERPL-MCNC: 0.3 MG/DL
BUN SERPL-MCNC: 34 MG/DL
CALCIUM SERPL-MCNC: 9.5 MG/DL
CHLORIDE SERPL-SCNC: 109 MMOL/L
CO2 SERPL-SCNC: 24 MMOL/L
CREAT SERPL-MCNC: 0.7 MG/DL
DIFFERENTIAL METHOD: ABNORMAL
EOSINOPHIL # BLD AUTO: 0.3 K/UL
EOSINOPHIL NFR BLD: 2.7 %
ERYTHROCYTE [DISTWIDTH] IN BLOOD BY AUTOMATED COUNT: 16.1 %
EST. GFR  (AFRICAN AMERICAN): >60 ML/MIN/1.73 M^2
EST. GFR  (NON AFRICAN AMERICAN): >60 ML/MIN/1.73 M^2
GLUCOSE SERPL-MCNC: 191 MG/DL
GRAM STN SPEC: NORMAL
HCT VFR BLD AUTO: 24.6 %
HGB BLD-MCNC: 7.5 G/DL
IMM GRANULOCYTES # BLD AUTO: 0.16 K/UL
IMM GRANULOCYTES NFR BLD AUTO: 1.6 %
INR PPP: 1
LYMPHOCYTES # BLD AUTO: 2.5 K/UL
LYMPHOCYTES NFR BLD: 25.3 %
MAGNESIUM SERPL-MCNC: 1.8 MG/DL
MCH RBC QN AUTO: 28 PG
MCHC RBC AUTO-ENTMCNC: 30.5 G/DL
MCV RBC AUTO: 92 FL
MONOCYTES # BLD AUTO: 0.9 K/UL
MONOCYTES NFR BLD: 8.6 %
NEUTROPHILS # BLD AUTO: 6.1 K/UL
NEUTROPHILS NFR BLD: 61.6 %
NRBC BLD-RTO: 0 /100 WBC
PHOSPHATE SERPL-MCNC: 4.8 MG/DL
PLATELET # BLD AUTO: 205 K/UL
PMV BLD AUTO: 11.6 FL
POCT GLUCOSE: 189 MG/DL (ref 70–110)
POCT GLUCOSE: 196 MG/DL (ref 70–110)
POCT GLUCOSE: 204 MG/DL (ref 70–110)
POCT GLUCOSE: 206 MG/DL (ref 70–110)
POCT GLUCOSE: 221 MG/DL (ref 70–110)
POCT GLUCOSE: 226 MG/DL (ref 70–110)
POTASSIUM SERPL-SCNC: 4.9 MMOL/L
PROT SERPL-MCNC: 7.4 G/DL
PROTHROMBIN TIME: 10.7 SEC
RBC # BLD AUTO: 2.68 M/UL
SODIUM SERPL-SCNC: 142 MMOL/L
WBC # BLD AUTO: 9.84 K/UL

## 2017-11-01 PROCEDURE — 84100 ASSAY OF PHOSPHORUS: CPT

## 2017-11-01 PROCEDURE — 25000003 PHARM REV CODE 250: Performed by: STUDENT IN AN ORGANIZED HEALTH CARE EDUCATION/TRAINING PROGRAM

## 2017-11-01 PROCEDURE — 94761 N-INVAS EAR/PLS OXIMETRY MLT: CPT

## 2017-11-01 PROCEDURE — 85025 COMPLETE CBC W/AUTO DIFF WBC: CPT

## 2017-11-01 PROCEDURE — 25000003 PHARM REV CODE 250: Performed by: EMERGENCY MEDICINE

## 2017-11-01 PROCEDURE — 80053 COMPREHEN METABOLIC PANEL: CPT

## 2017-11-01 PROCEDURE — 25000003 PHARM REV CODE 250: Performed by: PHYSICIAN ASSISTANT

## 2017-11-01 PROCEDURE — 99900026 HC AIRWAY MAINTENANCE (STAT)

## 2017-11-01 PROCEDURE — 97803 MED NUTRITION INDIV SUBSEQ: CPT

## 2017-11-01 PROCEDURE — 20000000 HC ICU ROOM

## 2017-11-01 PROCEDURE — 83735 ASSAY OF MAGNESIUM: CPT

## 2017-11-01 PROCEDURE — 85610 PROTHROMBIN TIME: CPT

## 2017-11-01 PROCEDURE — A4216 STERILE WATER/SALINE, 10 ML: HCPCS | Performed by: EMERGENCY MEDICINE

## 2017-11-01 PROCEDURE — 99232 SBSQ HOSP IP/OBS MODERATE 35: CPT | Mod: ,,, | Performed by: PHYSICIAN ASSISTANT

## 2017-11-01 PROCEDURE — 63600175 PHARM REV CODE 636 W HCPCS: Performed by: EMERGENCY MEDICINE

## 2017-11-01 PROCEDURE — 27000221 HC OXYGEN, UP TO 24 HOURS

## 2017-11-01 RX ORDER — POLYETHYLENE GLYCOL 3350 17 G/17G
17 POWDER, FOR SOLUTION ORAL DAILY
Status: DISCONTINUED | OUTPATIENT
Start: 2017-11-01 | End: 2017-11-15 | Stop reason: HOSPADM

## 2017-11-01 RX ADMIN — INSULIN ASPART 2 UNITS: 100 INJECTION, SOLUTION INTRAVENOUS; SUBCUTANEOUS at 10:11

## 2017-11-01 RX ADMIN — ATORVASTATIN CALCIUM 10 MG: 10 TABLET, FILM COATED ORAL at 08:11

## 2017-11-01 RX ADMIN — INSULIN ASPART 4 UNITS: 100 INJECTION, SOLUTION INTRAVENOUS; SUBCUTANEOUS at 05:11

## 2017-11-01 RX ADMIN — HEPARIN SODIUM 7500 UNITS: 5000 INJECTION, SOLUTION INTRAVENOUS; SUBCUTANEOUS at 09:11

## 2017-11-01 RX ADMIN — INSULIN ASPART 2 UNITS: 100 INJECTION, SOLUTION INTRAVENOUS; SUBCUTANEOUS at 02:11

## 2017-11-01 RX ADMIN — INSULIN ASPART 2 UNITS: 100 INJECTION, SOLUTION INTRAVENOUS; SUBCUTANEOUS at 06:11

## 2017-11-01 RX ADMIN — SODIUM CHLORIDE TAB 1 GM 2 G: 1 TAB at 09:11

## 2017-11-01 RX ADMIN — HEPARIN SODIUM 7500 UNITS: 5000 INJECTION, SOLUTION INTRAVENOUS; SUBCUTANEOUS at 02:11

## 2017-11-01 RX ADMIN — INSULIN DETEMIR 5 UNITS: 100 INJECTION, SOLUTION SUBCUTANEOUS at 10:11

## 2017-11-01 RX ADMIN — POLYETHYLENE GLYCOL 3350 17 G: 17 POWDER, FOR SOLUTION ORAL at 10:11

## 2017-11-01 RX ADMIN — LEVETIRACETAM 1500 MG: 750 TABLET ORAL at 08:11

## 2017-11-01 RX ADMIN — HEPARIN SODIUM 7500 UNITS: 5000 INJECTION, SOLUTION INTRAVENOUS; SUBCUTANEOUS at 05:11

## 2017-11-01 RX ADMIN — FAMOTIDINE 20 MG: 20 TABLET, FILM COATED ORAL at 09:11

## 2017-11-01 RX ADMIN — INSULIN ASPART 1 UNITS: 100 INJECTION, SOLUTION INTRAVENOUS; SUBCUTANEOUS at 02:11

## 2017-11-01 RX ADMIN — SODIUM CHLORIDE TAB 1 GM 2 G: 1 TAB at 02:11

## 2017-11-01 RX ADMIN — HYDRALAZINE HYDROCHLORIDE 100 MG: 50 TABLET ORAL at 02:11

## 2017-11-01 RX ADMIN — ZONISAMIDE 200 MG: 100 CAPSULE ORAL at 08:11

## 2017-11-01 RX ADMIN — CLONAZEPAM 2 MG: 1 TABLET ORAL at 09:11

## 2017-11-01 RX ADMIN — INSULIN ASPART 4 UNITS: 100 INJECTION, SOLUTION INTRAVENOUS; SUBCUTANEOUS at 02:11

## 2017-11-01 RX ADMIN — CARVEDILOL 12.5 MG: 12.5 TABLET, FILM COATED ORAL at 08:11

## 2017-11-01 RX ADMIN — STANDARDIZED SENNA CONCENTRATE AND DOCUSATE SODIUM 1 TABLET: 8.6; 5 TABLET, FILM COATED ORAL at 08:11

## 2017-11-01 RX ADMIN — INSULIN ASPART 4 UNITS: 100 INJECTION, SOLUTION INTRAVENOUS; SUBCUTANEOUS at 10:11

## 2017-11-01 RX ADMIN — FAMOTIDINE 20 MG: 20 TABLET, FILM COATED ORAL at 08:11

## 2017-11-01 RX ADMIN — LEVETIRACETAM 1500 MG: 750 TABLET ORAL at 09:11

## 2017-11-01 RX ADMIN — CLONAZEPAM 2 MG: 1 TABLET ORAL at 05:11

## 2017-11-01 RX ADMIN — AMLODIPINE BESYLATE 10 MG: 10 TABLET ORAL at 08:11

## 2017-11-01 RX ADMIN — SODIUM CHLORIDE TAB 1 GM 2 G: 1 TAB at 05:11

## 2017-11-01 RX ADMIN — CLONAZEPAM 2 MG: 1 TABLET ORAL at 02:11

## 2017-11-01 RX ADMIN — HYDRALAZINE HYDROCHLORIDE 100 MG: 50 TABLET ORAL at 05:11

## 2017-11-01 RX ADMIN — INSULIN ASPART 1 UNITS: 100 INJECTION, SOLUTION INTRAVENOUS; SUBCUTANEOUS at 10:11

## 2017-11-01 RX ADMIN — INSULIN ASPART 2 UNITS: 100 INJECTION, SOLUTION INTRAVENOUS; SUBCUTANEOUS at 05:11

## 2017-11-01 RX ADMIN — HYDRALAZINE HYDROCHLORIDE 100 MG: 50 TABLET ORAL at 09:11

## 2017-11-01 RX ADMIN — ASPIRIN 81 MG CHEWABLE TABLET 81 MG: 81 TABLET CHEWABLE at 08:11

## 2017-11-01 RX ADMIN — ZONISAMIDE 200 MG: 100 CAPSULE ORAL at 09:11

## 2017-11-01 RX ADMIN — Medication 10 ML: at 06:11

## 2017-11-01 RX ADMIN — Medication 10 ML: at 11:11

## 2017-11-01 RX ADMIN — CARVEDILOL 12.5 MG: 12.5 TABLET, FILM COATED ORAL at 09:11

## 2017-11-01 RX ADMIN — INSULIN DETEMIR 5 UNITS: 100 INJECTION, SOLUTION SUBCUTANEOUS at 08:11

## 2017-11-01 NOTE — PLAN OF CARE
LUCIA advised by CM that she has spoken with the daughter regarding options and MD team reported to her that they would like to meet with the daughter regarding placement.     LUCIA spoke with Pt daughter at length regarding Pt care and placement and that MD team wants to meet with her. She reported that after speaking with CM this morning, she has decided they need to bring the Pt home. She asked what would be involved with that. SW reported the Pt will need equipment and supplies along with around the clock care. Reported that if they went with hospice, the equipment would be ordered. Advised insurance may not cover all the supplies she will need. Unsure if HH would be able to provide. Also unsure how often insurance would pay for aid/RN to come. Maybe twice a week for an hour at most. She will need an aid or family member to stay with the Pt. She reported she had applied for a long term care waiver to get an aid for 5 hours each day through the state. Asked about waiting list and she reported they did not have one. Reported she will need to pick a date she can have all aids or extrenious services set up and this SW will have the [facility] set up along with equipment quotes/coverages. She will speak with her family members to confirm they are okay with this and let SW know at 4pm today. She reported that she can come talk with the MD tomorrow after her MD appointment. She will let LUCIA know of a time so she can inform the team.     LUCIA advised MD team and CM of the above. LUCIA left message for [facility], Pt daughter preferred start for the HH agency.    LUCIA spoke with Angeline with [facility]. She reported she will review the referral and discuss with family Hospice vs [facility] and benefits of each. Once daughter has decision, she will make this LUCIA aware of which one will take Pt and if they can order equipment.    LUCIA granted [facility] Epic access.    Susan España, DIONNE  Neurocritical Care   [facility]  [place]  [id number]

## 2017-11-01 NOTE — PLAN OF CARE
"TREVOR phoned Shayna (daughter) [phone], to discuss discharge plan.  Per Shayna, she cannot afford to give the nursing home her mother's check for placement due to the fact that her mother has bills to pay.  Daughter stated that she was told that she needed to provide a list of her mother's assets for placement.    CM informed daughter that any nursing home that she chooses will require the patient's Social Security Check for placement and a list of assets for the Medicaid application.  Daughter informed that if her mother is placed in a nursing home, the nursing home becomes her home and her check would go there for her care.   Daughter stated that she has to speak with her family about options.  Daughter stated that the patient was at SNF prior to this admit and no application had to be done.  CM informed daughter that the patient qualified for SNF at that time, but no longer qualifies due to mother's change in baseline status.   Daughter stated that Dr. Villanueva stated that her mother could go to the floor if she could not be placed.  TREVOR informed daughter that the floor is not an option at this time and that Dr. Villanueva was speaking of temporarily placing patient on the floor until she was accepted at a facility,  that the floor is not a long term placement option.  TREVOR informed daughter that [facility], so she has placement.  TREVOR informed daughter  that she has a few options for placement:   Patient can go to a nursing home, patient can go home with the family and supplies, vent, etc, and the family can be trained to care for the patient around the clock, patient can go home with hospice or [facility].   Daughter stated that she wants to wait for Dr. Villanueva to come back so she can speak with him.  TREVOR informed daughter that Dr. Villanueva may not be on service for days to 2 weeks and the patient is ready for placement now.  Daughter stated:  "are you just going to put my mother out?"  TREVOR " informed daughter that her mother would not be put out of the hospital and that she has an option to file a Medicare appeal.  Daughter informed that if Medicare agrees with her, the patient would remain inpatient, but if Medicare agrees that her mother is ready for placement, the patient would be discharged to a facility upon acceptance or home if the family chooses.  Daughter stated again that she wanted to speak with Dr. Villanueva.  Daughter informed that Dr Villanueva is not on service at this time, but Dr. Zendejas could speak with her.  Daughter agreed to speak with Dr. Zendejas.      Shelby Tucker RN, CCRN-K, [facility]  Neuro-Critical Care   [phone]

## 2017-11-01 NOTE — PROGRESS NOTES
[facility]  Neurocritical Care  Progress Note    Admit Date: 10/3/2017  Service Date: 11/01/2017  Length of Stay: 29    Subjective:     Chief Complaint: Seizure    History of Present Illness: Ms. Frank is a 66 yo woman with a PMHx DM type II, CKD stage 3, CAD s/p CABG, essential hypertension, cerebrovascular disease s/p bi-hemispheric watershed infarcts and trach/PEG who present as a transfer from [facility] to Neuro Critical Care s/p Cardiac Arrest on 10/4 and evaluation of Anoxic Brain Injury. She was recently discharged from [facility] 9/28/2017 with bilateral MCA SAQIB watershed infarcts. Yesterday, she was brought in via EMS from [facility] for cardiac arrest. Per nursing homestaff, she received chest compressions for about 5 minutes. After EMS arrived, ACLS protocol continued and she was given 2 rounds of epi, non-shockable rhythm. ROSC achieved en route to the ER.Pt arrived to ICU intubated. It was noted there sudden intermittent generalized body jerks mostly on tactile stimulation. Also, pt was hypotensive for which norepinephrine infusion started. Since her discharge here on 9/28 she has been admitted to multiple facilities recently to be treated for various issues including mucous plug leading to acute hypoxic respiratory failure, she pulled out her trach 9/4 so that was reinserted at [facility] and she was also treated for ESBL UTI while there, and last admit she was treated for likely aspiration pneumonia. She was transported here on Propofol. On examination, no cough, no gag, no corneal and minimal to no withdrawal.       Hospital Course: 10/5: s/p Cardiac Arrest on 10/4 and evaluation of anoxic brain injury   10/7: EEG with burst suppression pattern.  MRI with diffuse diffusion restriction  10/10: family meeting   10/11: SSEP  10/12: worsening myoclonus especially when moved. Valproic acid level undetectable likely due to interactions with meropenem. Patient on meropenem for ESBL  Klebsiella pneumonia sensitive to meropenem.  10/15: No events overnight.   10/16: No issues overnight  10/18: Trach collar  10/22: Continued hyperkalemia   10/23-30: Pending transfer to LTAC  10/31: Worsening tachypnea, placed back on ventilator     Interval History: On ventilator yesterday and overnight, transition back to TC today. TREVOR/LUCIA discussed placement with daughter at length today. Plan to send home with  and home ventilator. TREVOR/LUCIA will work with daughter to arrange this over the next few days.    Review of Systems: Unable to obtain a complete ROS due to level of consciousness.     Vitals:   Temp: 97.9 °F (36.6 °C) (11/01/17 1500)  Pulse: 94 (11/01/17 1500)  Resp: (!) 31 (11/01/17 1500)  BP: (!) 159/73 (11/01/17 1500)  SpO2: 100 % (11/01/17 1500)    Temp:  [97.4 °F (36.3 °C)-98.6 °F (37 °C)] 97.9 °F (36.6 °C)  Pulse:  [89-96] 94  Resp:  [0-38] 31  SpO2:  [98 %-100 %] 100 %  BP: (139-161)/(63-76) 159/73         Vent Mode: Spont  Oxygen Concentration (%):  [35-40] 35  Resp Rate Total:  [21 br/min-32 br/min] 24 br/min  Vt Set:  [450 mL] 450 mL  PEEP/CPAP:  [5 cmH20] 5 cmH20  Pressure Support:  [10 cmH20] 10 cmH20  Mean Airway Pressure:  [8.3 cmH20-9.4 cmH20] 8.3 cmH20    10/31 0701 - 11/01 0700  In: 1155 [I.V.:210]  Out: 1330 [Urine:1250; Drains:80]     Physical Exam:  GA: comatose, comfortable, no acute distress.   HEENT: No scleral icterus or JVD.   Pulmonary: Tachypnic. Trach collar. Rhonchi present.   Cardiac: RRR S1 & S2 w/o rubs/murmurs/gallops.   Abdominal: Bowel sounds present x 4. No appreciable hepatosplenomegaly.  Skin: No jaundice, rashes, or visible lesions.  Neuro:  --GCS: E1 V1T M3  --Mental Status:  Comatose, doesn't respond to voice or pain. Doesn't follow commands  --CN II-XII grossly intact.    --Pupils 3mm, PERRL.   --Corneal reflex present on L, cough intact. Gag reflex absent.  --Motor: no spontaneous movement in any extremity. No movement to pain.  Unable to test orientation, language,  memory, judgment, insight, fund of knowledge, hearing, shoulder shrug, tongue protrusion, coordination, gait due to level of consciousness.    Medications:   Continuous Scheduled  amLODIPine 10 mg Daily   aspirin 81 mg Daily   atorvastatin 10 mg Daily   carvedilol 12.5 mg BID   clonazePAM 2 mg Q8H   famotidine 20 mg BID   heparin (porcine) 7,500 Units Q8H   hydrALAZINE 100 mg Q8H   insulin aspart 2 Units Q4H   insulin detemir 5 Units BID   levETIRAcetam 1,500 mg BID   polyethylene glycol 17 g Daily   senna-docusate 8.6-50 mg 1 tablet Daily   sodium chloride 0.9% 10 mL Q6H   sodium chloride 2 g Q8H   zonisamide 200 mg BID   PRN  acetaminophen 650 mg Q6H PRN   albuterol-ipratropium 2.5mg-0.5mg/3mL 3 mL Q6H PRN   dextrose 50% 12.5 g PRN   dextrose 50% 12.5 g PRN   glucagon (human recombinant) 1 mg PRN   hydrALAZINE 10 mg Q4H PRN   insulin aspart 1-10 Units Q4H PRN   labetalol 10 mg Q4H PRN   magnesium sulfate IVPB 2 g PRN   magnesium sulfate IVPB 4 g PRN   ondansetron 4 mg Q8H PRN   pneumoc 13-finn conj-dip cr(PF) 0.5 mL vaccine x 1 dose   potassium, sodium phosphates 2 packet PRN   sodium chloride 0.9% 10 mL PRN      Today I independently reviewed pertinent medications, lines/drains/airways, lab results,        Assessment/Plan:     Neuro   Status epilepticus    67 year old woman with bilateral watershed infarcts s/p PEG/trach during previous admission, who was at LTAC when she went into cardiac arrest. Suspected to be respiratory in origin. She was taken to [facility] where she was noted to be in myoclonic status and was transferred to [facility] for higher level care. On admission brainstem reflexes were absent. Family wanted to continue with full management.    -- myoclonic from anoxic brain injury  -- resolved with AEDs  -- clonazepam, levetiracetam, zonisamide  -- pending placement        Anoxic brain injury    -- from cardiac arrest        Cerebral infarction, watershed distribution, bilateral, acute    -- from 6/2017  --  ASA, atorvastatin          Guild coma scale total score 3-8    -- from anoxic brain injury        ENT   Tracheostomy status    -- S/p Trach PEG during previous admission on 7/12/2017  -- On CPAP on ventilator overnight, transition back to TC today         Pulmonary   Klebsiella pneumonia    -- ESBL sensitive to meropenem  -- s/p full course of Meropenem  -- Remains on contact precautions, one culture negative for ESBL, awaiting finalization of second culture     -- afebrile  -- WBC stable          Acute on chronic respiratory failure    -- On ventilator yesterday and overnight, transition back to TC today         Cardiac/Vascular   S/P CABG (coronary artery bypass graft)    -- Hx of CABG in 2005        Essential hypertension    -- -200  -- hydralazine, carvedilol, amlodipine        Renal/   Hyponatremia    -- salt tabs  -- now normal and stable        Endocrine   Type 2 diabetes mellitus, uncontrolled    -- Detemir 5mg BID  -- Aspart 2u q4  -- ISS        GI   PEG (percutaneous endoscopic gastrostomy) status    -- Gastrostomy tube place during previous admission on 7/12/17            Prophylaxis:  Venous Thromboembolism: mechanical chemical  Stress Ulcer: H2B  Ventilator Pneumonia: N/A    Activity Orders          Bed rest starting at 10/03 1510        Full Code    Jessa Dickson PA-C  Neurocritical Care  [facility]

## 2017-11-01 NOTE — ASSESSMENT & PLAN NOTE
-- ESBL sensitive to meropenem  -- s/p full course of Meropenem  -- Remains on contact precautions, one culture negative for ESBL, awaiting finalization of second culture     -- afebrile  -- WBC stable

## 2017-11-01 NOTE — PLAN OF CARE
Problem: Patient Care Overview  Goal: Plan of Care Review  Outcome: Revised  POC reviewed with pt at 0500. Pt is trached and unable to verbalize understanding at this time. Questions and concerns not addressed due to communication barrier. No acute events overnight. Pts neuro status unchanged and VSS. Will continue to monitor. See flowsheets for full assessment and VS info

## 2017-11-01 NOTE — PROGRESS NOTES
[facility]  Adult Nutrition  Progress Note    SUMMARY     Recommendations    Recommendation/Intervention:   Continue Glucerna 1.5 @ 45mL/hr.  - For maintenance fluid needs: 200mL water bolus q 6 hrs to meet 1mL/kcal.   -Hold for residuals >500mL.     RD to monitor.      Goals: Pt to receive nutrition by RD follow up  Nutrition Goal Status: progressing towards goal, goal met  Communication of RD Recs: reviewed with RN    Reason for Assessment    Reason for Assessment: RD follow-up  Diagnosis: seizures, other (see comments) (cardiac arrest)  Relevent Medical History: DM type II, CKD stage 3, CAD s/p CABG, HTN, PEG/trach         General Information Comments: Pt now back on trach collar after changing to vent yesterday. Tolerating TF at goal rate per nsg. No BM x 5 days. Nsg to admin bowel regimen today.    Nutrition Discharge Planning: optimal nutrition via PEG with tolerance.     Nutrition Prescription Ordered    Current Diet Order: NPO  Nutrition Order Comments: TF at goal  Current Nutrition Support Formula Ordered: Glucerna 1.5  Current Nutrition Support Rate Ordered: 45 (ml)  Current Nutrition Support Frequency Ordered: mL/hr        Evaluation of Received Nutrients/Fluid Intake    Enteral Calories (kcal): 1620  Enteral Protein (gm): 89  Enteral (Free Water) Fluid (mL): 820      Energy Calories Required: meeting needs  % Kcal Needs: 100     Protein Required: meeting needs  % Protein Needs: 100     IV Fluid (mL): 0  I/O: +I/O, good UOP, LBM 10/27         Fluid Required: not meeting needs  Comments: 0mL residuals 10/31  Tolerance: tolerating  % Intake of Estimated Energy Needs: 75 - 100 %  % Meal Intake: NPO     Nutrition Risk Screen     Nutrition Risk Screen: other (see comments) (trach)    Nutrition/Diet History       Typical Food/Fluid Intake: JOSE LUIS. Pt on TF PTA (PEG in place).  Food Preferences: JOSE LUIS Nondenominational/cultural preferences at this time.        Factors Affecting Nutritional Intake: NPO,  "impaired cognitive status/motor control                Labs/Tests/Procedures/Meds       Pertinent Labs Reviewed: reviewed  Pertinent Labs Comments: Ph 4.8, POCT Glu 186-226  Pertinent Medications Reviewed: reviewed  Pertinent Medications Comments: statin, insulin    Physical Findings    Overall Physical Appearance: obese, on oxygen therapy  Tubes: gastrostomy tube     Skin: intact    Anthropometrics    Temp: 97.4 °F (36.3 °C)     Height: 5' 6" (167.6 cm)  Weight Method: Bed Scale  Weight: 128.7 kg (283 lb 11.7 oz)     Ideal Body Weight (IBW), Female: 130 lb     % Ideal Body Weight, Female (lb): 227.08 lb  BMI (Calculated): 47.7  BMI Grade: greater than 40 - morbid obesity                            Estimated/Assessed Needs    Weight Used For Calorie Calculations: 134.3 kg (296 lb 1.2 oz)      Energy Calorie Requirements (kcal): 0514-4754 (11-14kcal/kg)  Energy Need Method: Kcal/kg        RMR (Wirt-St. Jeor Equation): 1894.75        Weight Used For Protein Calculations: 59 kg (130 lb 1.1 oz)  Protein Requirements: 89-118g (1.5-2.0g/kg)    Fluid Requirements (mL): 1mL/kcal or per MD  RDA Method (mL): 1477     CHO requirements: 50% of total kcals         Assessment and Plan    PEG (percutaneous endoscopic gastrostomy) status    Nutrition Problem:  Inadequate oral intake    Related to (etiology):   Inability to consume sufficient energy    Signs and Symptoms (as evidenced by):   NPO on mech vent requiring alternative means of nutrition.     Interventions/Recommendations (treatment strategy):  Please see RD recs above.    Nutrition Diagnosis Status:   Improving              Monitor and Evaluation    Food and Nutrient Intake: enteral nutrition intake  Food and Nutrient Adminstration: enteral and parenteral nutrition administration        Anthropometric Measurements: weight, weight change, body mass index  Biochemical Data, Medical Tests and Procedures: electrolyte and renal panel, gastrointestinal profile, " glucose/endocrine profile, inflammatory profile, lipid profile  Nutrition-Focused Physical Findings: overall appearance    Nutrition Risk    Level of Risk: other (see comments) (f/u 1x/week)    Nutrition Follow-Up    RD Follow-up?: Yes

## 2017-11-01 NOTE — ASSESSMENT & PLAN NOTE
67 year old woman with bilateral watershed infarcts s/p PEG/trach during previous admission, who was at LTAC when she went into cardiac arrest. Suspected to be respiratory in origin. She was taken to [facility] where she was noted to be in myoclonic status and was transferred to [facility] for higher level care. On admission brainstem reflexes were absent. Family wanted to continue with full management.    -- myoclonic from anoxic brain injury  -- resolved with AEDs  -- clonazepam, levetiracetam, zonisamide  -- pending placement

## 2017-11-01 NOTE — PLAN OF CARE
Problem: Patient Care Overview  Goal: Plan of Care Review  Outcome: Ongoing (interventions implemented as appropriate)  POC reviewed with patient at bedside at 0900. Patient has remained free of falls, injuries and skin breakdown for the duration of the shift. VSS. No acute distress noted. No evidence of pain. Plans: patient has been placed on trach-collar. SW has contacted family regarding discharge disposition. Family arrangements are being made speak with NCC team. SW and  regarding placement for patient. Will continue to monitor and update as needed.

## 2017-11-01 NOTE — ASSESSMENT & PLAN NOTE
-- S/p Trach PEG during previous admission on 7/12/2017  -- On CPAP on ventilator overnight, transition back to TC today

## 2017-11-01 NOTE — ASSESSMENT & PLAN NOTE
Nutrition Problem:  Inadequate oral intake    Related to (etiology):   Inability to consume sufficient energy    Signs and Symptoms (as evidenced by):   NPO on mech vent requiring alternative means of nutrition.     Interventions/Recommendations (treatment strategy):  Please see RD recs above.    Nutrition Diagnosis Status:   Improving

## 2017-11-02 LAB
ALBUMIN SERPL BCP-MCNC: 2.2 G/DL
ALP SERPL-CCNC: 148 U/L
ALT SERPL W/O P-5'-P-CCNC: 19 U/L
ANION GAP SERPL CALC-SCNC: 7 MMOL/L
AST SERPL-CCNC: 25 U/L
BASOPHILS # BLD AUTO: 0.03 K/UL
BASOPHILS NFR BLD: 0.3 %
BILIRUB SERPL-MCNC: 0.3 MG/DL
BUN SERPL-MCNC: 35 MG/DL
CALCIUM SERPL-MCNC: 9.5 MG/DL
CHLORIDE SERPL-SCNC: 110 MMOL/L
CO2 SERPL-SCNC: 24 MMOL/L
CREAT SERPL-MCNC: 0.7 MG/DL
DIFFERENTIAL METHOD: ABNORMAL
EOSINOPHIL # BLD AUTO: 0.2 K/UL
EOSINOPHIL NFR BLD: 2.2 %
ERYTHROCYTE [DISTWIDTH] IN BLOOD BY AUTOMATED COUNT: 16 %
EST. GFR  (AFRICAN AMERICAN): >60 ML/MIN/1.73 M^2
EST. GFR  (NON AFRICAN AMERICAN): >60 ML/MIN/1.73 M^2
GLUCOSE SERPL-MCNC: 197 MG/DL
HCT VFR BLD AUTO: 25.1 %
HGB BLD-MCNC: 7.6 G/DL
IMM GRANULOCYTES # BLD AUTO: 0.21 K/UL
IMM GRANULOCYTES NFR BLD AUTO: 2 %
LYMPHOCYTES # BLD AUTO: 2.5 K/UL
LYMPHOCYTES NFR BLD: 23.6 %
MAGNESIUM SERPL-MCNC: 1.8 MG/DL
MCH RBC QN AUTO: 27.7 PG
MCHC RBC AUTO-ENTMCNC: 30.3 G/DL
MCV RBC AUTO: 92 FL
MONOCYTES # BLD AUTO: 0.9 K/UL
MONOCYTES NFR BLD: 9 %
NEUTROPHILS # BLD AUTO: 6.5 K/UL
NEUTROPHILS NFR BLD: 62.9 %
NRBC BLD-RTO: 0 /100 WBC
PHOSPHATE SERPL-MCNC: 4.1 MG/DL
PLATELET # BLD AUTO: 213 K/UL
PMV BLD AUTO: 10.8 FL
POCT GLUCOSE: 124 MG/DL (ref 70–110)
POCT GLUCOSE: 153 MG/DL (ref 70–110)
POCT GLUCOSE: 177 MG/DL (ref 70–110)
POCT GLUCOSE: 194 MG/DL (ref 70–110)
POCT GLUCOSE: 200 MG/DL (ref 70–110)
POCT GLUCOSE: 220 MG/DL (ref 70–110)
POTASSIUM SERPL-SCNC: 5.1 MMOL/L
PROT SERPL-MCNC: 7.6 G/DL
RBC # BLD AUTO: 2.74 M/UL
SODIUM SERPL-SCNC: 141 MMOL/L
WBC # BLD AUTO: 10.38 K/UL

## 2017-11-02 PROCEDURE — 99900026 HC AIRWAY MAINTENANCE (STAT)

## 2017-11-02 PROCEDURE — 25000003 PHARM REV CODE 250: Performed by: EMERGENCY MEDICINE

## 2017-11-02 PROCEDURE — 25000003 PHARM REV CODE 250: Performed by: STUDENT IN AN ORGANIZED HEALTH CARE EDUCATION/TRAINING PROGRAM

## 2017-11-02 PROCEDURE — 27000221 HC OXYGEN, UP TO 24 HOURS

## 2017-11-02 PROCEDURE — 25000003 PHARM REV CODE 250: Performed by: PHYSICIAN ASSISTANT

## 2017-11-02 PROCEDURE — 85025 COMPLETE CBC W/AUTO DIFF WBC: CPT

## 2017-11-02 PROCEDURE — 63600175 PHARM REV CODE 636 W HCPCS: Performed by: PHYSICIAN ASSISTANT

## 2017-11-02 PROCEDURE — 94668 MNPJ CHEST WALL SBSQ: CPT

## 2017-11-02 PROCEDURE — 99232 SBSQ HOSP IP/OBS MODERATE 35: CPT | Mod: ,,, | Performed by: NURSE PRACTITIONER

## 2017-11-02 PROCEDURE — 83735 ASSAY OF MAGNESIUM: CPT

## 2017-11-02 PROCEDURE — A4216 STERILE WATER/SALINE, 10 ML: HCPCS | Performed by: EMERGENCY MEDICINE

## 2017-11-02 PROCEDURE — 63600175 PHARM REV CODE 636 W HCPCS: Performed by: EMERGENCY MEDICINE

## 2017-11-02 PROCEDURE — 20000000 HC ICU ROOM

## 2017-11-02 PROCEDURE — 63600175 PHARM REV CODE 636 W HCPCS: Performed by: STUDENT IN AN ORGANIZED HEALTH CARE EDUCATION/TRAINING PROGRAM

## 2017-11-02 PROCEDURE — 25000003 PHARM REV CODE 250: Performed by: NURSE PRACTITIONER

## 2017-11-02 PROCEDURE — 80053 COMPREHEN METABOLIC PANEL: CPT

## 2017-11-02 PROCEDURE — 84100 ASSAY OF PHOSPHORUS: CPT

## 2017-11-02 RX ORDER — GLYCOPYRROLATE 0.2 MG/ML
0.1 INJECTION INTRAMUSCULAR; INTRAVENOUS 3 TIMES DAILY
Status: DISCONTINUED | OUTPATIENT
Start: 2017-11-02 | End: 2017-11-06

## 2017-11-02 RX ORDER — INSULIN ASPART 100 [IU]/ML
3 INJECTION, SOLUTION INTRAVENOUS; SUBCUTANEOUS EVERY 4 HOURS
Status: DISCONTINUED | OUTPATIENT
Start: 2017-11-02 | End: 2017-11-12

## 2017-11-02 RX ADMIN — HYDRALAZINE HYDROCHLORIDE 100 MG: 50 TABLET ORAL at 05:11

## 2017-11-02 RX ADMIN — LEVETIRACETAM 1500 MG: 750 TABLET ORAL at 09:11

## 2017-11-02 RX ADMIN — INSULIN ASPART 2 UNITS: 100 INJECTION, SOLUTION INTRAVENOUS; SUBCUTANEOUS at 02:11

## 2017-11-02 RX ADMIN — CARVEDILOL 12.5 MG: 12.5 TABLET, FILM COATED ORAL at 08:11

## 2017-11-02 RX ADMIN — ACETAMINOPHEN 650 MG: 325 TABLET ORAL at 08:11

## 2017-11-02 RX ADMIN — FAMOTIDINE 20 MG: 20 TABLET, FILM COATED ORAL at 09:11

## 2017-11-02 RX ADMIN — STANDARDIZED SENNA CONCENTRATE AND DOCUSATE SODIUM 1 TABLET: 8.6; 5 TABLET, FILM COATED ORAL at 08:11

## 2017-11-02 RX ADMIN — HYDRALAZINE HYDROCHLORIDE 100 MG: 50 TABLET ORAL at 01:11

## 2017-11-02 RX ADMIN — POLYETHYLENE GLYCOL 3350 17 G: 17 POWDER, FOR SOLUTION ORAL at 08:11

## 2017-11-02 RX ADMIN — HEPARIN SODIUM 7500 UNITS: 5000 INJECTION, SOLUTION INTRAVENOUS; SUBCUTANEOUS at 05:11

## 2017-11-02 RX ADMIN — HEPARIN SODIUM 7500 UNITS: 5000 INJECTION, SOLUTION INTRAVENOUS; SUBCUTANEOUS at 10:11

## 2017-11-02 RX ADMIN — Medication 10 ML: at 12:11

## 2017-11-02 RX ADMIN — ZONISAMIDE 200 MG: 100 CAPSULE ORAL at 08:11

## 2017-11-02 RX ADMIN — INSULIN DETEMIR 5 UNITS: 100 INJECTION, SOLUTION SUBCUTANEOUS at 09:11

## 2017-11-02 RX ADMIN — HYDRALAZINE HYDROCHLORIDE 100 MG: 50 TABLET ORAL at 09:11

## 2017-11-02 RX ADMIN — ATORVASTATIN CALCIUM 10 MG: 10 TABLET, FILM COATED ORAL at 08:11

## 2017-11-02 RX ADMIN — INSULIN ASPART 2 UNITS: 100 INJECTION, SOLUTION INTRAVENOUS; SUBCUTANEOUS at 09:11

## 2017-11-02 RX ADMIN — ZONISAMIDE 200 MG: 100 CAPSULE ORAL at 09:11

## 2017-11-02 RX ADMIN — Medication 10 ML: at 06:11

## 2017-11-02 RX ADMIN — ASPIRIN 81 MG CHEWABLE TABLET 81 MG: 81 TABLET CHEWABLE at 08:11

## 2017-11-02 RX ADMIN — LEVETIRACETAM 1500 MG: 750 TABLET ORAL at 08:11

## 2017-11-02 RX ADMIN — INSULIN ASPART 3 UNITS: 100 INJECTION, SOLUTION INTRAVENOUS; SUBCUTANEOUS at 05:11

## 2017-11-02 RX ADMIN — CLONAZEPAM 2 MG: 1 TABLET ORAL at 05:11

## 2017-11-02 RX ADMIN — GLYCOPYRROLATE 0.1 MG: 0.2 INJECTION INTRAMUSCULAR; INTRAVENOUS at 01:11

## 2017-11-02 RX ADMIN — CARVEDILOL 12.5 MG: 12.5 TABLET, FILM COATED ORAL at 09:11

## 2017-11-02 RX ADMIN — INSULIN ASPART 4 UNITS: 100 INJECTION, SOLUTION INTRAVENOUS; SUBCUTANEOUS at 05:11

## 2017-11-02 RX ADMIN — GLYCOPYRROLATE 0.1 MG: 0.2 INJECTION INTRAMUSCULAR; INTRAVENOUS at 09:11

## 2017-11-02 RX ADMIN — INSULIN ASPART 3 UNITS: 100 INJECTION, SOLUTION INTRAVENOUS; SUBCUTANEOUS at 10:11

## 2017-11-02 RX ADMIN — AMLODIPINE BESYLATE 10 MG: 10 TABLET ORAL at 08:11

## 2017-11-02 RX ADMIN — HEPARIN SODIUM 7500 UNITS: 5000 INJECTION, SOLUTION INTRAVENOUS; SUBCUTANEOUS at 01:11

## 2017-11-02 RX ADMIN — INSULIN ASPART 3 UNITS: 100 INJECTION, SOLUTION INTRAVENOUS; SUBCUTANEOUS at 01:11

## 2017-11-02 RX ADMIN — FAMOTIDINE 20 MG: 20 TABLET, FILM COATED ORAL at 08:11

## 2017-11-02 RX ADMIN — SODIUM CHLORIDE TAB 1 GM 2 G: 1 TAB at 09:11

## 2017-11-02 RX ADMIN — SODIUM CHLORIDE TAB 1 GM 2 G: 1 TAB at 05:11

## 2017-11-02 RX ADMIN — CLONAZEPAM 2 MG: 1 TABLET ORAL at 01:11

## 2017-11-02 RX ADMIN — INSULIN ASPART 1 UNITS: 100 INJECTION, SOLUTION INTRAVENOUS; SUBCUTANEOUS at 02:11

## 2017-11-02 RX ADMIN — SODIUM CHLORIDE TAB 1 GM 2 G: 1 TAB at 01:11

## 2017-11-02 RX ADMIN — INSULIN ASPART 2 UNITS: 100 INJECTION, SOLUTION INTRAVENOUS; SUBCUTANEOUS at 05:11

## 2017-11-02 RX ADMIN — CLONAZEPAM 2 MG: 1 TABLET ORAL at 09:11

## 2017-11-02 RX ADMIN — INSULIN DETEMIR 6 UNITS: 100 INJECTION, SOLUTION SUBCUTANEOUS at 09:11

## 2017-11-02 NOTE — PLAN OF CARE
Problem: Patient Care Overview  Goal: Plan of Care Review  Outcome: Ongoing (interventions implemented as appropriate)  Plan of care reviewed with patient and daughter at 1500. Patient unable to verbalized understanding. Daughter verbalized understanding. All questions and concerns addressed today. Patient remains free from injury and falls. No acute events. Patient progressing towards goal. Will continue to monitor. See flowsheet for full assessment and vitals throughout shift.

## 2017-11-02 NOTE — PLAN OF CARE
Problem: Patient Care Overview  Goal: Plan of Care Review  Outcome: Revised  POC reviewed with pt at 0500. Pt unable to verbalize understanding. Questions and concerns not addressed due to communication barriers. No acute events overnight. Family meeting arranged for today to discuss long term goals for pt. Will continue to monitor. See flowsheets for full assessment and VS info

## 2017-11-02 NOTE — ASSESSMENT & PLAN NOTE
67 year old woman with bilateral watershed infarcts s/p PEG/trach during previous admission, who was at LTAC when she went into cardiac arrest. Suspected to be respiratory in origin. She was taken to [facility] where she was noted to be in myoclonic status and was transferred to [facility] for higher level care. On admission brainstem reflexes were absent. Family wanted to continue with full management.    -- myoclonic from anoxic brain injury  -- resolved with AEDs  -- clonazepam, levetiracetam, zonisamide  -- pending placement

## 2017-11-02 NOTE — ASSESSMENT & PLAN NOTE
-- ESBL sensitive to meropenem  -- s/p full course of Meropenem  -- Discontinued contact precautions    -- afebrile  -- WBC stable

## 2017-11-02 NOTE — ASSESSMENT & PLAN NOTE
Patient with myoclonic jerks in upper extremity with noxious stimuli, likely representing diffuse cortical damage.  vEEG with decreased activity.  --Continue Neuro checks q 1hr  -- Seizure Precautions  -- Continue Clonazepam, Keppra, Zonisamide

## 2017-11-02 NOTE — SUBJECTIVE & OBJECTIVE
Physical Exam:  GA: Comatose, comfortable  HEENT: No scleral icterus or JVD.   Pulmonary: Clear to auscultation A/P/L. No wheezing, crackles, or rhonchi.  Cardiac: RRR S1 & S2 w/o rubs/murmurs/gallops.   Abdominal: Bowel sounds present x 4. No appreciable hepatosplenomegaly.  Skin: No jaundice, rashes, or visible lesions.  Neuro:  --GCS: E1 VT M3  --Mental Status: Comatose  --CN II-XII grossly-JOSE LUIS  --Pupils 3mm, PERRL.   --Corneal present L, reflex, gag,  --Motor: no spontaneous movement in any extremity. No movement to pain    Review of Systems  Unable to obtain a complete ROS due to level of consciousness, trach on ventilator, comatose  Objective:     Vitals:  Temp: 98.3 °F (36.8 °C) (11/02/17 0701)  Pulse: 88 (11/02/17 1139)  Resp: (!) 34 (11/02/17 1139)  BP: (!) 143/67 (11/02/17 1100)  SpO2: 98 % (11/02/17 1139)    Temp:  [97.9 °F (36.6 °C)-98.7 °F (37.1 °C)] 98.3 °F (36.8 °C)  Pulse:  [] 88  Resp:  [0-35] 34  SpO2:  [98 %-100 %] 98 %  BP: (143-168)/(64-77) 143/67         Oxygen Concentration (%):  [] 35    11/01 0701 - 11/02 0700  In: 1360 [I.V.:230]  Out: 1620 [Urine:1455]    Physical Exam  ***Unable to test {Select Exam:52377} due to level of consciousness.    Medications:  Continuous Scheduled  amLODIPine 10 mg Daily   aspirin 81 mg Daily   atorvastatin 10 mg Daily   carvedilol 12.5 mg BID   clonazePAM 2 mg Q8H   famotidine 20 mg BID   glycopyrrolate 0.1 mg TID   heparin (porcine) 7,500 Units Q8H   hydrALAZINE 100 mg Q8H   insulin aspart 3 Units Q4H   insulin detemir 6 Units BID   levETIRAcetam 1,500 mg BID   polyethylene glycol 17 g Daily   senna-docusate 8.6-50 mg 1 tablet Daily   sodium chloride 0.9% 10 mL Q6H   sodium chloride 2 g Q8H   zonisamide 200 mg BID   PRN  acetaminophen 650 mg Q6H PRN   albuterol-ipratropium 2.5mg-0.5mg/3mL 3 mL Q6H PRN   dextrose 50% 12.5 g PRN   dextrose 50% 12.5 g PRN   glucagon (human recombinant) 1 mg PRN   hydrALAZINE 10 mg Q4H PRN   insulin aspart 1-10  Units Q4H PRN   labetalol 10 mg Q4H PRN   magnesium sulfate IVPB 2 g PRN   magnesium sulfate IVPB 4 g PRN   ondansetron 4 mg Q8H PRN   pneumoc 13-finn conj-dip cr(PF) 0.5 mL vaccine x 1 dose   potassium, sodium phosphates 2 packet PRN   sodium chloride 0.9% 10 mL PRN     Today I personally reviewed pertinent {Select Patient Info Reviewed:88346} notably:    ***

## 2017-11-02 NOTE — ASSESSMENT & PLAN NOTE
-- S/p Trach PEG during previous admission on 7/12/2017  -- On spontaneous on ventilator  --restarted glycopyrrolate

## 2017-11-02 NOTE — PLAN OF CARE
11/02/17 1516   Discharge Reassessment   Assessment Type Discharge Planning Reassessment   Provided patient/caregiver education on the expected discharge date and the discharge plan Yes   Do you have any problems affording any of your prescribed medications? No   Discharge Plan A Home with family;Home Health;Hospice/home   Discharge Plan B Home with family;Home Health;Hospice/home   Can the patient answer the patient profile reliably? No, cognitively impaired   How does the patient rate their overall health at the present time? (neo)   Describe the patient's ability to walk at the present time. Does not walk or unable to take any steps at all   How often would a person be available to care for the patient? Whenever needed   Number of comorbid conditions (as recorded on the chart) Four   During the past month, has the patient often been bothered by feeling down, depressed or hopeless? (neo)   During the past month, has the patient often been bothered by little interest or pleasure in doing things? (neo)       Per daughter, she wants to take patient home with home health or hospice.  She stated to [facility]  that she applied for the Medicaid Waiver for a caretaker and there is no wait list.       Shelby Tucker RN, CCRN-K, [facility]  Neuro-Critical Care   [phone]

## 2017-11-02 NOTE — PLAN OF CARE
LUCIA spoke with Shilpi from [facility]. She reported she is still speaking with the family regarding the referral and wanted to discuss the plan. She is still trying to educate the daughter on what will be needed to bring the Pt home and care for her. She requested a list of equipment that will be needed. LUCIA asked she confirm the DC date and readyness of daughter and confirmed that she needs to order the equipment the day before discharge. She will also need orders that day. Reported date so far will be Wednesday. She will call LUCIA with any additional information.     LUCIA faxed list of equipment needed.    Susan España, DIONNE  Neurocritical Care   [facility]  [id number]

## 2017-11-02 NOTE — PROGRESS NOTES
[facility]  Neurocritical Care  Progress Note    Admit Date: 10/3/2017  Service Date: 11/02/2017  Length of Stay: 30    Subjective:     Chief Complaint: Seizure    History of Present Illness: Ms. Frnak is a 66 yo woman with a PMHx DM type II, CKD stage 3, CAD s/p CABG, essential hypertension, cerebrovascular disease s/p bi-hemispheric watershed infarcts and trach/PEG who present as a transfer from [facility] to Neuro Critical Care s/p Cardiac Arrest on 10/4 and evaluation of Anoxic Brain Injury. She was recently discharged from [facility] 9/28/2017 with bilateral MCA SAQIB watershed infarcts. Yesterday, she was brought in via EMS from [facility] for cardiac arrest. Per nursing homestaff, she received chest compressions for about 5 minutes. After EMS arrived, ACLS protocol continued and she was given 2 rounds of epi, non-shockable rhythm. ROSC achieved en route to the ER.Pt arrived to ICU intubated. It was noted there sudden intermittent generalized body jerks mostly on tactile stimulation. Also, pt was hypotensive for which norepinephrine infusion started. Since her discharge here on 9/28 she has been admitted to multiple facilities recently to be treated for various issues including mucous plug leading to acute hypoxic respiratory failure, she pulled out her trach 9/4 so that was reinserted at [facility] and she was also treated for ESBL UTI while there, and last admit she was treated for likely aspiration pneumonia. She was transported here on Propofol. On examination, no cough, no gag, no corneal and minimal to no withdrawal.       Hospital Course: 10/5: s/p Cardiac Arrest on 10/4 and evaluation of anoxic brain injury   10/7: EEG with burst suppression pattern.  MRI with diffuse diffusion restriction  10/10: family meeting   10/11: SSEP  10/12: worsening myoclonus especially when moved. Valproic acid level undetectable likely due to interactions with meropenem. Patient on meropenem for ESBL  Klebsiella pneumonia sensitive to meropenem.  10/15: No events overnight.   10/16: No issues overnight  10/18: Trach collar  10/22: Continued hyperkalemia   10/23-30: Pending transfer to LTAC  10/31: Worsening tachypnea, placed back on ventilator   11/2: started glycopyrrolate, increased Detemir and Aspart, DC isolation, patient stable for discharge home        Physical Exam:  GA: Comatose, comfortable  HEENT: No scleral icterus or JVD.   Pulmonary: Clear to auscultation A/P/L. No wheezing, crackles, or rhonchi.  Cardiac: RRR S1 & S2 w/o rubs/murmurs/gallops.   Abdominal: Bowel sounds present x 4. No appreciable hepatosplenomegaly.  Skin: No jaundice, rashes, or visible lesions.  Neuro:  --GCS: E1 VT M3  --Mental Status: Comatose  --CN II-XII grossly-JOSE LUIS  --Pupils 3mm, PERRL.   --Corneal present L, reflex, gag,  --Motor: no spontaneous movement in any extremity. No movement to pain    Review of Systems  Unable to obtain a complete ROS due to level of consciousness, trach on ventilator, comatose  Objective:     Vitals:  Temp: 98.3 °F (36.8 °C) (11/02/17 0701)  Pulse: 88 (11/02/17 1139)  Resp: (!) 34 (11/02/17 1139)  BP: (!) 143/67 (11/02/17 1100)  SpO2: 98 % (11/02/17 1139)    Temp:  [97.9 °F (36.6 °C)-98.7 °F (37.1 °C)] 98.3 °F (36.8 °C)  Pulse:  [] 88  Resp:  [0-35] 34  SpO2:  [98 %-100 %] 98 %  BP: (143-168)/(64-77) 143/67         Oxygen Concentration (%):  [] 35    11/01 0701 - 11/02 0700  In: 1360 [I.V.:230]  Out: 1620 [Urine:1455]        Medications:  Continuous Scheduled  amLODIPine 10 mg Daily   aspirin 81 mg Daily   atorvastatin 10 mg Daily   carvedilol 12.5 mg BID   clonazePAM 2 mg Q8H   famotidine 20 mg BID   glycopyrrolate 0.1 mg TID   heparin (porcine) 7,500 Units Q8H   hydrALAZINE 100 mg Q8H   insulin aspart 3 Units Q4H   insulin detemir 6 Units BID   levETIRAcetam 1,500 mg BID   polyethylene glycol 17 g Daily   senna-docusate 8.6-50 mg 1 tablet Daily   sodium chloride 0.9% 10 mL Q6H    sodium chloride 2 g Q8H   zonisamide 200 mg BID   PRN  acetaminophen 650 mg Q6H PRN   albuterol-ipratropium 2.5mg-0.5mg/3mL 3 mL Q6H PRN   dextrose 50% 12.5 g PRN   dextrose 50% 12.5 g PRN   glucagon (human recombinant) 1 mg PRN   hydrALAZINE 10 mg Q4H PRN   insulin aspart 1-10 Units Q4H PRN   labetalol 10 mg Q4H PRN   magnesium sulfate IVPB 2 g PRN   magnesium sulfate IVPB 4 g PRN   ondansetron 4 mg Q8H PRN   pneumoc 13-finn conj-dip cr(PF) 0.5 mL vaccine x 1 dose   potassium, sodium phosphates 2 packet PRN   sodium chloride 0.9% 10 mL PRN     Today I personally reviewed pertinent medications, lines/drains/airways, imaging, lab results, microbiology results, notably:        Assessment/Plan:     Neuro   * Seizure    Patient with myoclonic jerks in upper extremity with noxious stimuli, likely representing diffuse cortical damage.  vEEG with decreased activity.  --Continue Neuro checks q 1hr  -- Seizure Precautions  -- Continue Clonazepam, Keppra, Zonisamide            Status epilepticus    67 year old woman with bilateral watershed infarcts s/p PEG/trach during previous admission, who was at LTAC when she went into cardiac arrest. Suspected to be respiratory in origin. She was taken to [facility] where she was noted to be in myoclonic status and was transferred to [facility] for higher level care. On admission brainstem reflexes were absent. Family wanted to continue with full management.    -- myoclonic from anoxic brain injury  -- resolved with AEDs  -- clonazepam, levetiracetam, zonisamide  -- pending placement        Anoxic brain injury    -- from cardiac arrest        Cerebral infarction, watershed distribution, bilateral, acute    -- from 6/2017  -- ASA, atorvastatin          Eve coma scale total score 3-8    -- from anoxic brain injury        ENT   Tracheostomy status    -- S/p Trach PEG during previous admission on 7/12/2017  -- On spontaneous on ventilator  --restarted glycopyrrolate        Pulmonary    Klebsiella pneumonia    -- ESBL sensitive to meropenem  -- s/p full course of Meropenem  -- Discontinued contact precautions    -- afebrile  -- WBC stable          Cardiac/Vascular   Hyperlipidemia    --Atorvastatin 10 daily        S/P CABG (coronary artery bypass graft)    -- Hx of CABG in 2005        Essential hypertension    -- -200  -- hydralazine, carvedilol, amlodipine        Oncology   Anemia of chronic disease    -H/H 7.6/25.1        Endocrine   Type 2 diabetes mellitus, uncontrolled    -- Detemir increased to 6 units BID  -- Aspart increased to 3 units  -- ISS-moderate            Prophylaxis:  Venous Thromboembolism: chemical  Stress Ulcer: H2B  Ventilator Pneumonia: none    Activity Orders          Bed rest starting at 10/03 1510        Full Code    Astrid Fair NP  Neurocritical Care  [facility]

## 2017-11-03 LAB
ALBUMIN SERPL BCP-MCNC: 2.2 G/DL
ALP SERPL-CCNC: 150 U/L
ALT SERPL W/O P-5'-P-CCNC: 19 U/L
ANION GAP SERPL CALC-SCNC: 6 MMOL/L
AST SERPL-CCNC: 22 U/L
BASOPHILS # BLD AUTO: 0.02 K/UL
BASOPHILS NFR BLD: 0.2 %
BILIRUB SERPL-MCNC: 0.3 MG/DL
BUN SERPL-MCNC: 34 MG/DL
CALCIUM SERPL-MCNC: 9.7 MG/DL
CHLORIDE SERPL-SCNC: 111 MMOL/L
CO2 SERPL-SCNC: 26 MMOL/L
CREAT SERPL-MCNC: 0.7 MG/DL
DIFFERENTIAL METHOD: ABNORMAL
EOSINOPHIL # BLD AUTO: 0.2 K/UL
EOSINOPHIL NFR BLD: 2.1 %
ERYTHROCYTE [DISTWIDTH] IN BLOOD BY AUTOMATED COUNT: 16.1 %
EST. GFR  (AFRICAN AMERICAN): >60 ML/MIN/1.73 M^2
EST. GFR  (NON AFRICAN AMERICAN): >60 ML/MIN/1.73 M^2
GLUCOSE SERPL-MCNC: 197 MG/DL
HCT VFR BLD AUTO: 25.6 %
HGB BLD-MCNC: 7.7 G/DL
IMM GRANULOCYTES # BLD AUTO: 0.23 K/UL
IMM GRANULOCYTES NFR BLD AUTO: 2.1 %
LYMPHOCYTES # BLD AUTO: 2.2 K/UL
LYMPHOCYTES NFR BLD: 19.7 %
MAGNESIUM SERPL-MCNC: 1.9 MG/DL
MCH RBC QN AUTO: 27.3 PG
MCHC RBC AUTO-ENTMCNC: 30.1 G/DL
MCV RBC AUTO: 91 FL
MONOCYTES # BLD AUTO: 0.9 K/UL
MONOCYTES NFR BLD: 8.4 %
NEUTROPHILS # BLD AUTO: 7.4 K/UL
NEUTROPHILS NFR BLD: 67.5 %
NRBC BLD-RTO: 0 /100 WBC
PHOSPHATE SERPL-MCNC: 4.4 MG/DL
PLATELET # BLD AUTO: 204 K/UL
PMV BLD AUTO: 10.5 FL
POCT GLUCOSE: 174 MG/DL (ref 70–110)
POCT GLUCOSE: 185 MG/DL (ref 70–110)
POCT GLUCOSE: 189 MG/DL (ref 70–110)
POCT GLUCOSE: 190 MG/DL (ref 70–110)
POTASSIUM SERPL-SCNC: 5.2 MMOL/L
PROT SERPL-MCNC: 7.6 G/DL
RBC # BLD AUTO: 2.82 M/UL
SODIUM SERPL-SCNC: 143 MMOL/L
TB INDURATION 48 - 72 HR READ: 0 MM
WBC # BLD AUTO: 10.94 K/UL

## 2017-11-03 PROCEDURE — 80053 COMPREHEN METABOLIC PANEL: CPT

## 2017-11-03 PROCEDURE — 27000221 HC OXYGEN, UP TO 24 HOURS

## 2017-11-03 PROCEDURE — 25000003 PHARM REV CODE 250: Performed by: EMERGENCY MEDICINE

## 2017-11-03 PROCEDURE — 84100 ASSAY OF PHOSPHORUS: CPT

## 2017-11-03 PROCEDURE — 20000000 HC ICU ROOM

## 2017-11-03 PROCEDURE — 25000003 PHARM REV CODE 250: Performed by: NURSE PRACTITIONER

## 2017-11-03 PROCEDURE — 25000003 PHARM REV CODE 250: Performed by: STUDENT IN AN ORGANIZED HEALTH CARE EDUCATION/TRAINING PROGRAM

## 2017-11-03 PROCEDURE — 99900026 HC AIRWAY MAINTENANCE (STAT)

## 2017-11-03 PROCEDURE — 25000003 PHARM REV CODE 250: Performed by: PHYSICIAN ASSISTANT

## 2017-11-03 PROCEDURE — A4216 STERILE WATER/SALINE, 10 ML: HCPCS | Performed by: EMERGENCY MEDICINE

## 2017-11-03 PROCEDURE — 83735 ASSAY OF MAGNESIUM: CPT

## 2017-11-03 PROCEDURE — 63600175 PHARM REV CODE 636 W HCPCS: Performed by: EMERGENCY MEDICINE

## 2017-11-03 PROCEDURE — 99233 SBSQ HOSP IP/OBS HIGH 50: CPT | Mod: ,,, | Performed by: NURSE PRACTITIONER

## 2017-11-03 PROCEDURE — 94761 N-INVAS EAR/PLS OXIMETRY MLT: CPT

## 2017-11-03 PROCEDURE — 85025 COMPLETE CBC W/AUTO DIFF WBC: CPT

## 2017-11-03 RX ORDER — SODIUM POLYSTYRENE SULFONATE 15 G/60ML
15 SUSPENSION ORAL; RECTAL ONCE
Status: COMPLETED | OUTPATIENT
Start: 2017-11-03 | End: 2017-11-03

## 2017-11-03 RX ADMIN — SODIUM CHLORIDE TAB 1 GM 2 G: 1 TAB at 01:11

## 2017-11-03 RX ADMIN — CARVEDILOL 12.5 MG: 12.5 TABLET, FILM COATED ORAL at 08:11

## 2017-11-03 RX ADMIN — INSULIN ASPART 3 UNITS: 100 INJECTION, SOLUTION INTRAVENOUS; SUBCUTANEOUS at 02:11

## 2017-11-03 RX ADMIN — ZONISAMIDE 200 MG: 100 CAPSULE ORAL at 09:11

## 2017-11-03 RX ADMIN — SODIUM CHLORIDE TAB 1 GM 2 G: 1 TAB at 09:11

## 2017-11-03 RX ADMIN — FAMOTIDINE 20 MG: 20 TABLET, FILM COATED ORAL at 09:11

## 2017-11-03 RX ADMIN — INSULIN ASPART 3 UNITS: 100 INJECTION, SOLUTION INTRAVENOUS; SUBCUTANEOUS at 05:11

## 2017-11-03 RX ADMIN — SODIUM POLYSTYRENE SULFONATE 15 G: 15 SUSPENSION ORAL; RECTAL at 11:11

## 2017-11-03 RX ADMIN — INSULIN ASPART 2 UNITS: 100 INJECTION, SOLUTION INTRAVENOUS; SUBCUTANEOUS at 05:11

## 2017-11-03 RX ADMIN — Medication 10 ML: at 11:11

## 2017-11-03 RX ADMIN — HYDRALAZINE HYDROCHLORIDE 100 MG: 50 TABLET ORAL at 05:11

## 2017-11-03 RX ADMIN — INSULIN ASPART 1 UNITS: 100 INJECTION, SOLUTION INTRAVENOUS; SUBCUTANEOUS at 09:11

## 2017-11-03 RX ADMIN — HYDRALAZINE HYDROCHLORIDE 100 MG: 50 TABLET ORAL at 09:11

## 2017-11-03 RX ADMIN — ASPIRIN 81 MG CHEWABLE TABLET 81 MG: 81 TABLET CHEWABLE at 08:11

## 2017-11-03 RX ADMIN — INSULIN DETEMIR 6 UNITS: 100 INJECTION, SOLUTION SUBCUTANEOUS at 08:11

## 2017-11-03 RX ADMIN — Medication 10 ML: at 05:11

## 2017-11-03 RX ADMIN — INSULIN ASPART 3 UNITS: 100 INJECTION, SOLUTION INTRAVENOUS; SUBCUTANEOUS at 09:11

## 2017-11-03 RX ADMIN — CLONAZEPAM 2 MG: 1 TABLET ORAL at 09:11

## 2017-11-03 RX ADMIN — Medication 10 ML: at 12:11

## 2017-11-03 RX ADMIN — GLYCOPYRROLATE 0.1 MG: 0.2 INJECTION INTRAMUSCULAR; INTRAVENOUS at 01:11

## 2017-11-03 RX ADMIN — AMLODIPINE BESYLATE 10 MG: 10 TABLET ORAL at 08:11

## 2017-11-03 RX ADMIN — INSULIN ASPART 2 UNITS: 100 INJECTION, SOLUTION INTRAVENOUS; SUBCUTANEOUS at 01:11

## 2017-11-03 RX ADMIN — LEVETIRACETAM 1500 MG: 750 TABLET ORAL at 08:11

## 2017-11-03 RX ADMIN — GLYCOPYRROLATE 0.1 MG: 0.2 INJECTION INTRAMUSCULAR; INTRAVENOUS at 09:11

## 2017-11-03 RX ADMIN — HYDRALAZINE HYDROCHLORIDE 100 MG: 50 TABLET ORAL at 01:11

## 2017-11-03 RX ADMIN — LEVETIRACETAM 1500 MG: 750 TABLET ORAL at 09:11

## 2017-11-03 RX ADMIN — INSULIN ASPART 3 UNITS: 100 INJECTION, SOLUTION INTRAVENOUS; SUBCUTANEOUS at 01:11

## 2017-11-03 RX ADMIN — CLONAZEPAM 2 MG: 1 TABLET ORAL at 01:11

## 2017-11-03 RX ADMIN — STANDARDIZED SENNA CONCENTRATE AND DOCUSATE SODIUM 1 TABLET: 8.6; 5 TABLET, FILM COATED ORAL at 08:11

## 2017-11-03 RX ADMIN — CARVEDILOL 12.5 MG: 12.5 TABLET, FILM COATED ORAL at 09:11

## 2017-11-03 RX ADMIN — HEPARIN SODIUM 7500 UNITS: 5000 INJECTION, SOLUTION INTRAVENOUS; SUBCUTANEOUS at 09:11

## 2017-11-03 RX ADMIN — SODIUM CHLORIDE TAB 1 GM 2 G: 1 TAB at 05:11

## 2017-11-03 RX ADMIN — INSULIN ASPART 2 UNITS: 100 INJECTION, SOLUTION INTRAVENOUS; SUBCUTANEOUS at 08:11

## 2017-11-03 RX ADMIN — HEPARIN SODIUM 7500 UNITS: 5000 INJECTION, SOLUTION INTRAVENOUS; SUBCUTANEOUS at 01:11

## 2017-11-03 RX ADMIN — ATORVASTATIN CALCIUM 10 MG: 10 TABLET, FILM COATED ORAL at 08:11

## 2017-11-03 RX ADMIN — INSULIN DETEMIR 6 UNITS: 100 INJECTION, SOLUTION SUBCUTANEOUS at 09:11

## 2017-11-03 RX ADMIN — CLONAZEPAM 2 MG: 1 TABLET ORAL at 05:11

## 2017-11-03 RX ADMIN — FAMOTIDINE 20 MG: 20 TABLET, FILM COATED ORAL at 08:11

## 2017-11-03 RX ADMIN — POLYETHYLENE GLYCOL 3350 17 G: 17 POWDER, FOR SOLUTION ORAL at 08:11

## 2017-11-03 RX ADMIN — HEPARIN SODIUM 7500 UNITS: 5000 INJECTION, SOLUTION INTRAVENOUS; SUBCUTANEOUS at 05:11

## 2017-11-03 RX ADMIN — GLYCOPYRROLATE 0.1 MG: 0.2 INJECTION INTRAMUSCULAR; INTRAVENOUS at 05:11

## 2017-11-03 NOTE — PROGRESS NOTES
[facility]  Neurocritical Care  Progress Note    Admit Date: 10/3/2017  Service Date: 11/03/2017  Length of Stay: 31    Subjective:     Chief Complaint: Seizure    History of Present Illness: Ms. Frank is a 68 yo woman with a PMHx DM type II, CKD stage 3, CAD s/p CABG, essential hypertension, cerebrovascular disease s/p bi-hemispheric watershed infarcts and trach/PEG who present as a transfer from [facility] to Neuro Critical Care s/p Cardiac Arrest on 10/4 and evaluation of Anoxic Brain Injury. She was recently discharged from [facility] 9/28/2017 with bilateral MCA SAQIB watershed infarcts. Yesterday, she was brought in via EMS from [facility] for cardiac arrest. Per nursing homestaff, she received chest compressions for about 5 minutes. After EMS arrived, ACLS protocol continued and she was given 2 rounds of epi, non-shockable rhythm. ROSC achieved en route to the ER.Pt arrived to ICU intubated. It was noted there sudden intermittent generalized body jerks mostly on tactile stimulation. Also, pt was hypotensive for which norepinephrine infusion started. Since her discharge here on 9/28 she has been admitted to multiple facilities recently to be treated for various issues including mucous plug leading to acute hypoxic respiratory failure, she pulled out her trach 9/4 so that was reinserted at [facility] and she was also treated for ESBL UTI while there, and last admit she was treated for likely aspiration pneumonia. She was transported here on Propofol. On examination, no cough, no gag, no corneal and minimal to no withdrawal.       Hospital Course: 10/5: s/p Cardiac Arrest on 10/4 and evaluation of anoxic brain injury   10/7: EEG with burst suppression pattern.  MRI with diffuse diffusion restriction  10/10: family meeting   10/11: SSEP  10/12: worsening myoclonus especially when moved. Valproic acid level undetectable likely due to interactions with meropenem. Patient on meropenem for ESBL  Klebsiella pneumonia sensitive to meropenem.  10/15: No events overnight.   10/16: No issues overnight  10/18: Trach collar  10/22: Continued hyperkalemia   10/23-30: Pending transfer to LTAC  10/31: Worsening tachypnea, placed back on ventilator   11/2: started glycopyrrolate, increased Detemir and Aspart, DC isolation, patient stable for discharge home  11/3: kayexalate for hyperK (5.2), labs  M/W/F, patient stable for discharge        Physical Exam:  GA: comfortable, comatose   HEENT: No scleral icterus or JVD.   Pulmonary: Clear to auscultation A. No wheezing, crackles, or rhonchi.  Cardiac: RRR S1 & S2 w/o rubs/murmurs/gallops.   Abdominal: Bowel sounds present x 4. No appreciable hepatosplenomegaly.  Skin: No jaundice, rashes, or visible lesions.  Neuro:  --GCS: E1 VT M3  --Mental Status: comatose, doesn't respond to voice, doesn't follow commands  --CN [id number] to assess  due to level of counsciousness  --Pupils 3mm, PERRL.   --doesn't respond to pain in upper extremities, withdraws in lower extremities    Review of Systems  Unable to obtain a complete ROS due to level of consciousness.  Objective:     Vitals:  Temp: 97.9 °F (36.6 °C) (11/03/17 0705)  Pulse: 81 (11/03/17 1152)  Resp: (!) 25 (11/03/17 1152)  BP: (!) 157/73 (11/03/17 0900)  SpO2: 100 % (11/03/17 1152)    Temp:  [97.9 °F (36.6 °C)-98.7 °F (37.1 °C)] 97.9 °F (36.6 °C)  Pulse:  [81-97] 81  Resp:  [22-36] 25  SpO2:  [99 %-100 %] 100 %  BP: (147-189)/(68-89) 157/73         Oxygen Concentration (%):  [28-35] 28    11/02 0701 - 11/03 0700  In: 1015 [I.V.:220]  Out: 700 [Urine:700]    Physical Exam    Medications:  Continuous Scheduled  amLODIPine 10 mg Daily   aspirin 81 mg Daily   atorvastatin 10 mg Daily   carvedilol 12.5 mg BID   clonazePAM 2 mg Q8H   famotidine 20 mg BID   glycopyrrolate 0.1 mg TID   heparin (porcine) 7,500 Units Q8H   hydrALAZINE 100 mg Q8H   insulin aspart 3 Units Q4H   insulin detemir 6 Units BID   levETIRAcetam 1,500 mg  BID   polyethylene glycol 17 g Daily   senna-docusate 8.6-50 mg 1 tablet Daily   sodium chloride 0.9% 10 mL Q6H   sodium chloride 2 g Q8H   zonisamide 200 mg BID   PRN  acetaminophen 650 mg Q6H PRN   albuterol-ipratropium 2.5mg-0.5mg/3mL 3 mL Q6H PRN   dextrose 50% 12.5 g PRN   dextrose 50% 12.5 g PRN   glucagon (human recombinant) 1 mg PRN   hydrALAZINE 10 mg Q4H PRN   insulin aspart 1-10 Units Q4H PRN   labetalol 10 mg Q4H PRN   magnesium sulfate IVPB 2 g PRN   magnesium sulfate IVPB 4 g PRN   ondansetron 4 mg Q8H PRN   pneumoc 13-finn conj-dip cr(PF) 0.5 mL vaccine x 1 dose   potassium, sodium phosphates 2 packet PRN   sodium chloride 0.9% 10 mL PRN     Today I personally reviewed pertinent medications, lines/drains/airways, imaging, lab results, microbiology results,     Assessment/Plan:     Neuro   * Seizure    Patient with myoclonic jerks in upper extremity with noxious stimuli, likely representing diffuse cortical damage.  vEEG with decreased activity.  --Continue Neuro checks q 1hr  -- Seizure Precautions  -- Continue Clonazepam, Keppra, Zonisamide            Status epilepticus    67 year old woman with bilateral watershed infarcts s/p PEG/trach during previous admission, who was at LTAC when she went into cardiac arrest. Suspected to be respiratory in origin. She was taken to [facility] where she was noted to be in myoclonic status and was transferred to [facility] for higher level care. On admission brainstem reflexes were absent. Family wanted to continue with full management.    -- myoclonic from anoxic brain injury  -- resolved with AEDs  -- continue clonazepam, levetiracetam, zonisamide  -- pending placement per family decision        Anoxic brain injury    -- from cardiac arrest        Cerebral infarction, watershed distribution, bilateral, acute    -- from 6/2017  -- ASA, atorvastatin          Eve coma scale total score 3-8    -- from anoxic brain injury        ENT   Tracheostomy status    -- S/p Trach  PEG during previous admission on 7/12/2017  -- On spontaneous on ventilator  -- glycopyrrolate, secretions better today        Cardiac/Vascular   Hyperlipidemia    --Atorvastatin 10 daily        S/P CABG (coronary artery bypass graft)    -- Hx of CABG in 2005        Essential hypertension    -- SBP <180  -- hydralazine, carvedilol, amlodipine        Renal/   Hyperkalemia    -- K 5.2 today  --ordered kayexalate   -- continue to monitor        Endocrine   Type 2 diabetes mellitus, uncontrolled    -- Detemir  6 units BID  -- Aspart  to 3 units  -- ISS-moderate        GI   PEG (percutaneous endoscopic gastrostomy) status    -- Gastrostomy tube place during previous admission on 7/12/17            Prophylaxis:  Venous Thromboembolism: chemical  Stress Ulcer: H2B  Ventilator Pneumonia: no     Activity Orders          Bed rest starting at 10/03 1510        Full Code    Astrid Fair NP  Neurocritical Care  [facility]

## 2017-11-03 NOTE — SUBJECTIVE & OBJECTIVE
Physical Exam:  GA: comfortable, comatose   HEENT: No scleral icterus or JVD.   Pulmonary: Clear to auscultation A. No wheezing, crackles, or rhonchi.  Cardiac: RRR S1 & S2 w/o rubs/murmurs/gallops.   Abdominal: Bowel sounds present x 4. No appreciable hepatosplenomegaly.  Skin: No jaundice, rashes, or visible lesions.  Neuro:  --GCS: E1 VT M3  --Mental Status: comatose, doesn't respond to voice, doesn't follow commands  --CN [id number] to assess  due to level of counsciousness  --Pupils 3mm, PERRL.   --doesn't respond to pain in upper extremities, withdraws in lower extremities    Review of Systems  Unable to obtain a complete ROS due to level of consciousness.  Objective:     Vitals:  Temp: 97.9 °F (36.6 °C) (11/03/17 0705)  Pulse: 81 (11/03/17 1152)  Resp: (!) 25 (11/03/17 1152)  BP: (!) 157/73 (11/03/17 0900)  SpO2: 100 % (11/03/17 1152)    Temp:  [97.9 °F (36.6 °C)-98.7 °F (37.1 °C)] 97.9 °F (36.6 °C)  Pulse:  [81-97] 81  Resp:  [22-36] 25  SpO2:  [99 %-100 %] 100 %  BP: (147-189)/(68-89) 157/73         Oxygen Concentration (%):  [28-35] 28    11/02 0701 - 11/03 0700  In: 1015 [I.V.:220]  Out: 700 [Urine:700]    Physical Exam    Medications:  Continuous Scheduled  amLODIPine 10 mg Daily   aspirin 81 mg Daily   atorvastatin 10 mg Daily   carvedilol 12.5 mg BID   clonazePAM 2 mg Q8H   famotidine 20 mg BID   glycopyrrolate 0.1 mg TID   heparin (porcine) 7,500 Units Q8H   hydrALAZINE 100 mg Q8H   insulin aspart 3 Units Q4H   insulin detemir 6 Units BID   levETIRAcetam 1,500 mg BID   polyethylene glycol 17 g Daily   senna-docusate 8.6-50 mg 1 tablet Daily   sodium chloride 0.9% 10 mL Q6H   sodium chloride 2 g Q8H   zonisamide 200 mg BID   PRN  acetaminophen 650 mg Q6H PRN   albuterol-ipratropium 2.5mg-0.5mg/3mL 3 mL Q6H PRN   dextrose 50% 12.5 g PRN   dextrose 50% 12.5 g PRN   glucagon (human recombinant) 1 mg PRN   hydrALAZINE 10 mg Q4H PRN   insulin aspart 1-10 Units Q4H PRN   labetalol 10 mg Q4H PRN    magnesium sulfate IVPB 2 g PRN   magnesium sulfate IVPB 4 g PRN   ondansetron 4 mg Q8H PRN   pneumoc 13-finn conj-dip cr(PF) 0.5 mL vaccine x 1 dose   potassium, sodium phosphates 2 packet PRN   sodium chloride 0.9% 10 mL PRN     Today I personally reviewed pertinent medications, lines/drains/airways, imaging, lab results, microbiology results,

## 2017-11-03 NOTE — ASSESSMENT & PLAN NOTE
-- S/p Trach PEG during previous admission on 7/12/2017  -- On spontaneous on ventilator  -- glycopyrrolate, secretions better today

## 2017-11-03 NOTE — ASSESSMENT & PLAN NOTE
67 year old woman with bilateral watershed infarcts s/p PEG/trach during previous admission, who was at LTAC when she went into cardiac arrest. Suspected to be respiratory in origin. She was taken to [facility] where she was noted to be in myoclonic status and was transferred to [facility] for higher level care. On admission brainstem reflexes were absent. Family wanted to continue with full management.    -- myoclonic from anoxic brain injury  -- resolved with AEDs  -- clonazepam, levetiracetam, zonisamide  -- pending placement

## 2017-11-03 NOTE — PLAN OF CARE
Problem: Patient Care Overview  Goal: Plan of Care Review  Outcome: Ongoing (interventions implemented as appropriate)  POC reviewed with pt and pt's sister at 1100. Pt unable to verbalize understanding due to unresponsive state. Pt's sister verbalized understanding and verbalizes that she agrees the pt is ready to be moved to a LTAC facility. States that she will encourage pt's daughter to make a decision. No other family at bedside throughout shift. No acute events today. Will continue to monitor. See flowsheets for full assessment and VS info.

## 2017-11-04 LAB
ALBUMIN SERPL BCP-MCNC: 2.1 G/DL
ALP SERPL-CCNC: 155 U/L
ALT SERPL W/O P-5'-P-CCNC: 22 U/L
ANION GAP SERPL CALC-SCNC: 7 MMOL/L
AST SERPL-CCNC: 24 U/L
BASOPHILS # BLD AUTO: 0.03 K/UL
BASOPHILS NFR BLD: 0.3 %
BILIRUB SERPL-MCNC: 0.3 MG/DL
BUN SERPL-MCNC: 30 MG/DL
CALCIUM SERPL-MCNC: 9.4 MG/DL
CHLORIDE SERPL-SCNC: 112 MMOL/L
CO2 SERPL-SCNC: 25 MMOL/L
CREAT SERPL-MCNC: 0.7 MG/DL
DIFFERENTIAL METHOD: ABNORMAL
EOSINOPHIL # BLD AUTO: 0.3 K/UL
EOSINOPHIL NFR BLD: 2.2 %
ERYTHROCYTE [DISTWIDTH] IN BLOOD BY AUTOMATED COUNT: 15.8 %
EST. GFR  (AFRICAN AMERICAN): >60 ML/MIN/1.73 M^2
EST. GFR  (NON AFRICAN AMERICAN): >60 ML/MIN/1.73 M^2
GLUCOSE SERPL-MCNC: 186 MG/DL
HCT VFR BLD AUTO: 25.7 %
HGB BLD-MCNC: 7.7 G/DL
IMM GRANULOCYTES # BLD AUTO: 0.3 K/UL
IMM GRANULOCYTES NFR BLD AUTO: 2.5 %
LYMPHOCYTES # BLD AUTO: 2.1 K/UL
LYMPHOCYTES NFR BLD: 17.6 %
MAGNESIUM SERPL-MCNC: 1.6 MG/DL
MCH RBC QN AUTO: 27.8 PG
MCHC RBC AUTO-ENTMCNC: 30 G/DL
MCV RBC AUTO: 93 FL
MONOCYTES # BLD AUTO: 0.9 K/UL
MONOCYTES NFR BLD: 7.2 %
NEUTROPHILS # BLD AUTO: 8.4 K/UL
NEUTROPHILS NFR BLD: 70.2 %
NRBC BLD-RTO: 0 /100 WBC
PHOSPHATE SERPL-MCNC: 4 MG/DL
PLATELET # BLD AUTO: 200 K/UL
PMV BLD AUTO: 11.1 FL
POCT GLUCOSE: 148 MG/DL (ref 70–110)
POCT GLUCOSE: 150 MG/DL (ref 70–110)
POCT GLUCOSE: 154 MG/DL (ref 70–110)
POCT GLUCOSE: 161 MG/DL (ref 70–110)
POCT GLUCOSE: 181 MG/DL (ref 70–110)
POCT GLUCOSE: 186 MG/DL (ref 70–110)
POCT GLUCOSE: 196 MG/DL (ref 70–110)
POCT GLUCOSE: 199 MG/DL (ref 70–110)
POCT GLUCOSE: 200 MG/DL (ref 70–110)
POTASSIUM SERPL-SCNC: 5.1 MMOL/L
PROT SERPL-MCNC: 7.4 G/DL
RBC # BLD AUTO: 2.77 M/UL
SODIUM SERPL-SCNC: 144 MMOL/L
WBC # BLD AUTO: 11.9 K/UL

## 2017-11-04 PROCEDURE — 25000003 PHARM REV CODE 250: Performed by: STUDENT IN AN ORGANIZED HEALTH CARE EDUCATION/TRAINING PROGRAM

## 2017-11-04 PROCEDURE — 25000003 PHARM REV CODE 250: Performed by: EMERGENCY MEDICINE

## 2017-11-04 PROCEDURE — 25000003 PHARM REV CODE 250: Performed by: PHYSICIAN ASSISTANT

## 2017-11-04 PROCEDURE — 20000000 HC ICU ROOM

## 2017-11-04 PROCEDURE — 84100 ASSAY OF PHOSPHORUS: CPT

## 2017-11-04 PROCEDURE — A4216 STERILE WATER/SALINE, 10 ML: HCPCS | Performed by: EMERGENCY MEDICINE

## 2017-11-04 PROCEDURE — 85025 COMPLETE CBC W/AUTO DIFF WBC: CPT

## 2017-11-04 PROCEDURE — 25000003 PHARM REV CODE 250: Performed by: NURSE PRACTITIONER

## 2017-11-04 PROCEDURE — 94761 N-INVAS EAR/PLS OXIMETRY MLT: CPT

## 2017-11-04 PROCEDURE — 27200966 HC CLOSED SUCTION SYSTEM

## 2017-11-04 PROCEDURE — 99900026 HC AIRWAY MAINTENANCE (STAT)

## 2017-11-04 PROCEDURE — 80053 COMPREHEN METABOLIC PANEL: CPT

## 2017-11-04 PROCEDURE — 83735 ASSAY OF MAGNESIUM: CPT

## 2017-11-04 PROCEDURE — 27000221 HC OXYGEN, UP TO 24 HOURS

## 2017-11-04 PROCEDURE — 63600175 PHARM REV CODE 636 W HCPCS: Performed by: EMERGENCY MEDICINE

## 2017-11-04 PROCEDURE — 99233 SBSQ HOSP IP/OBS HIGH 50: CPT | Mod: ,,, | Performed by: NURSE PRACTITIONER

## 2017-11-04 RX ADMIN — ZONISAMIDE 200 MG: 100 CAPSULE ORAL at 08:11

## 2017-11-04 RX ADMIN — INSULIN ASPART 1 UNITS: 100 INJECTION, SOLUTION INTRAVENOUS; SUBCUTANEOUS at 09:11

## 2017-11-04 RX ADMIN — INSULIN ASPART 3 UNITS: 100 INJECTION, SOLUTION INTRAVENOUS; SUBCUTANEOUS at 09:11

## 2017-11-04 RX ADMIN — GLYCOPYRROLATE 0.1 MG: 0.2 INJECTION INTRAMUSCULAR; INTRAVENOUS at 09:11

## 2017-11-04 RX ADMIN — ASPIRIN 81 MG CHEWABLE TABLET 81 MG: 81 TABLET CHEWABLE at 08:11

## 2017-11-04 RX ADMIN — HEPARIN SODIUM 7500 UNITS: 5000 INJECTION, SOLUTION INTRAVENOUS; SUBCUTANEOUS at 09:11

## 2017-11-04 RX ADMIN — STANDARDIZED SENNA CONCENTRATE AND DOCUSATE SODIUM 1 TABLET: 8.6; 5 TABLET, FILM COATED ORAL at 08:11

## 2017-11-04 RX ADMIN — INSULIN DETEMIR 6 UNITS: 100 INJECTION, SOLUTION SUBCUTANEOUS at 08:11

## 2017-11-04 RX ADMIN — LEVETIRACETAM 1500 MG: 750 TABLET ORAL at 08:11

## 2017-11-04 RX ADMIN — FAMOTIDINE 20 MG: 20 TABLET, FILM COATED ORAL at 08:11

## 2017-11-04 RX ADMIN — Medication 10 ML: at 05:11

## 2017-11-04 RX ADMIN — INSULIN ASPART 3 UNITS: 100 INJECTION, SOLUTION INTRAVENOUS; SUBCUTANEOUS at 05:11

## 2017-11-04 RX ADMIN — ZONISAMIDE 200 MG: 100 CAPSULE ORAL at 02:11

## 2017-11-04 RX ADMIN — Medication 10 ML: at 06:11

## 2017-11-04 RX ADMIN — GLYCOPYRROLATE 0.1 MG: 0.2 INJECTION INTRAMUSCULAR; INTRAVENOUS at 02:11

## 2017-11-04 RX ADMIN — HYDRALAZINE HYDROCHLORIDE 100 MG: 50 TABLET ORAL at 02:11

## 2017-11-04 RX ADMIN — HEPARIN SODIUM 7500 UNITS: 5000 INJECTION, SOLUTION INTRAVENOUS; SUBCUTANEOUS at 02:11

## 2017-11-04 RX ADMIN — CARVEDILOL 12.5 MG: 12.5 TABLET, FILM COATED ORAL at 08:11

## 2017-11-04 RX ADMIN — POLYETHYLENE GLYCOL 3350 17 G: 17 POWDER, FOR SOLUTION ORAL at 08:11

## 2017-11-04 RX ADMIN — HYDRALAZINE HYDROCHLORIDE 100 MG: 50 TABLET ORAL at 05:11

## 2017-11-04 RX ADMIN — INSULIN ASPART 2 UNITS: 100 INJECTION, SOLUTION INTRAVENOUS; SUBCUTANEOUS at 05:11

## 2017-11-04 RX ADMIN — CLONAZEPAM 2 MG: 1 TABLET ORAL at 05:11

## 2017-11-04 RX ADMIN — SODIUM CHLORIDE TAB 1 GM 2 G: 1 TAB at 02:11

## 2017-11-04 RX ADMIN — CLONAZEPAM 2 MG: 1 TABLET ORAL at 02:11

## 2017-11-04 RX ADMIN — SODIUM CHLORIDE TAB 1 GM 2 G: 1 TAB at 05:11

## 2017-11-04 RX ADMIN — SODIUM CHLORIDE TAB 1 GM 2 G: 1 TAB at 09:11

## 2017-11-04 RX ADMIN — INSULIN ASPART 3 UNITS: 100 INJECTION, SOLUTION INTRAVENOUS; SUBCUTANEOUS at 01:11

## 2017-11-04 RX ADMIN — AMLODIPINE BESYLATE 10 MG: 10 TABLET ORAL at 11:11

## 2017-11-04 RX ADMIN — HEPARIN SODIUM 7500 UNITS: 5000 INJECTION, SOLUTION INTRAVENOUS; SUBCUTANEOUS at 05:11

## 2017-11-04 RX ADMIN — HYDRALAZINE HYDROCHLORIDE 100 MG: 50 TABLET ORAL at 09:11

## 2017-11-04 RX ADMIN — CLONAZEPAM 2 MG: 1 TABLET ORAL at 09:11

## 2017-11-04 RX ADMIN — INSULIN ASPART 1 UNITS: 100 INJECTION, SOLUTION INTRAVENOUS; SUBCUTANEOUS at 01:11

## 2017-11-04 RX ADMIN — GLYCOPYRROLATE 0.1 MG: 0.2 INJECTION INTRAMUSCULAR; INTRAVENOUS at 05:11

## 2017-11-04 RX ADMIN — Medication 10 ML: at 12:11

## 2017-11-04 RX ADMIN — INSULIN ASPART 3 UNITS: 100 INJECTION, SOLUTION INTRAVENOUS; SUBCUTANEOUS at 02:11

## 2017-11-04 RX ADMIN — ATORVASTATIN CALCIUM 10 MG: 10 TABLET, FILM COATED ORAL at 08:11

## 2017-11-04 NOTE — PLAN OF CARE
Problem: Patient Care Overview  Goal: Plan of Care Review  Outcome: Ongoing (interventions implemented as appropriate)  POC reviewed with pt at 0500. Pt is nonverbal/trach and cannot verbalized understanding. Pt had seizure like manifestations around 0200H; connected to EEG cap for monitoring. Pt progressing toward goals. Will continue to monitor. See flowsheets for full assessment and VS info

## 2017-11-04 NOTE — PLAN OF CARE
Problem: Patient Care Overview  Goal: Plan of Care Review  Outcome: Ongoing (interventions implemented as appropriate)  POC reviewed with pt 1600. Pt unable to verbalize understanding due to neurological state. No visitors seen throughout shift. No acute events today. Pt remains on trach collar. Tube feeds continued at goal of 45ml/hr. Will continue to monitor. See flowsheets for full assessment and VS info.

## 2017-11-04 NOTE — PROGRESS NOTES
[facility]  Neurocritical Care  Progress Note    Admit Date: 10/3/2017  Service Date: 11/04/2017  Length of Stay: 32    Subjective:     Chief Complaint: Seizure    History of Present Illness: Ms. Frank is a 66 yo woman with a PMHx DM type II, CKD stage 3, CAD s/p CABG, essential hypertension, cerebrovascular disease s/p bi-hemispheric watershed infarcts and trach/PEG who present as a transfer from [facility] to Neuro Critical Care s/p Cardiac Arrest on 10/4 and evaluation of Anoxic Brain Injury. She was recently discharged from [facility] 9/28/2017 with bilateral MCA SAQIB watershed infarcts. Yesterday, she was brought in via EMS from [facility] for cardiac arrest. Per nursing homestaff, she received chest compressions for about 5 minutes. After EMS arrived, ACLS protocol continued and she was given 2 rounds of epi, non-shockable rhythm. ROSC achieved en route to the ER.Pt arrived to ICU intubated. It was noted there sudden intermittent generalized body jerks mostly on tactile stimulation. Also, pt was hypotensive for which norepinephrine infusion started. Since her discharge here on 9/28 she has been admitted to multiple facilities recently to be treated for various issues including mucous plug leading to acute hypoxic respiratory failure, she pulled out her trach 9/4 so that was reinserted at [facility] and she was also treated for ESBL UTI while there, and last admit she was treated for likely aspiration pneumonia. She was transported here on Propofol. On examination, no cough, no gag, no corneal and minimal to no withdrawal.       Hospital Course: 10/5: s/p Cardiac Arrest on 10/4 and evaluation of anoxic brain injury   10/7: EEG with burst suppression pattern.  MRI with diffuse diffusion restriction  10/10: family meeting   10/11: SSEP  10/12: worsening myoclonus especially when moved. Valproic acid level undetectable likely due to interactions with meropenem. Patient on meropenem for ESBL  Klebsiella pneumonia sensitive to meropenem.  10/15: No events overnight.   10/16: No issues overnight  10/18: Trach collar  10/22: Continued hyperkalemia   10/23-30: Pending transfer to LTAC  10/31: Worsening tachypnea, placed back on ventilator   11/2: started glycopyrrolate, increased Detemir and Aspart, DC isolation, patient stable for discharge home  11/3: kayexalate for hyperK (5.2), labs  M/W/F, patient stable for discharge  11/4 Placed on EEG cap . Patient having R side  Facial twitching awaiting read on EEG    Interval History: Placed on EEG cap . Patient having R side  Facial twitching awaiting read on EEG        Review of Systems: JOSE LUIS 2/2 level of consciousness    Vitals:   Temp: 98.7 °F (37.1 °C) (11/04/17 0705)  Pulse: 92 (11/04/17 1105)  Resp: (!) 29 (11/04/17 1105)  BP: (!) 173/75 (11/04/17 1105)  SpO2: 100 % (11/04/17 1105)    Temp:  [97.5 °F (36.4 °C)-98.7 °F (37.1 °C)] 98.7 °F (37.1 °C)  Pulse:  [] 92  Resp:  [18-36] 29  SpO2:  [98 %-100 %] 100 %  BP: (136-181)/() 173/75         Oxygen Concentration (%):  [28] 28  Resp Rate Total:  [18 br/min-28 br/min] 20 br/min    11/03 0701 - 11/04 0700  In: 1560 [I.V.:240]  Out: 935 [Urine:935]     Examination:   Constitutional: Comatose Well-nourished and -developed. No apparent distress.   Eyes: Conjunctiva clear, anicteric. Lids no lesions.  Head/Ears/Nose/Mouth/Throat/Neck: Moist mucous membranes. External ears, nose atraumatic.   Cardiovascular: Regular rhythm. No murmurs. No leg edema.  Respiratory: Comfortable respirations. Clear to auscultation.  Gastrointestinal: No hernia. Soft, nondistended, nontender. + bowel sounds.    Neurologic:  -GCS E1VTM3  --Mental Status: comatose, doesn't respond to voice, doesn't follow commands  --CN [id number] to assess  due to level of counsciousness  --Pupils 3mm, PERRL.   --doesn't respond to pain in upper extremities, withdraws in lower extremities    Unable to test orientation, language, memory,  judgment, insight, fund of knowledge, hearing, shoulder shrug, tongue protrusion, coordination, gait due to level of consciousness.    Medications:   Continuous Scheduled  amLODIPine 10 mg Daily   aspirin 81 mg Daily   atorvastatin 10 mg Daily   carvedilol 12.5 mg BID   clonazePAM 2 mg Q8H   famotidine 20 mg BID   glycopyrrolate 0.1 mg TID   heparin (porcine) 7,500 Units Q8H   hydrALAZINE 100 mg Q8H   insulin aspart 3 Units Q4H   insulin detemir 6 Units BID   levETIRAcetam 1,500 mg BID   polyethylene glycol 17 g Daily   senna-docusate 8.6-50 mg 1 tablet Daily   sodium chloride 0.9% 10 mL Q6H   sodium chloride 2 g Q8H   zonisamide 200 mg BID   PRN  acetaminophen 650 mg Q6H PRN   albuterol-ipratropium 2.5mg-0.5mg/3mL 3 mL Q6H PRN   dextrose 50% 12.5 g PRN   dextrose 50% 12.5 g PRN   glucagon (human recombinant) 1 mg PRN   hydrALAZINE 10 mg Q4H PRN   insulin aspart 1-10 Units Q4H PRN   labetalol 10 mg Q4H PRN   magnesium sulfate IVPB 2 g PRN   magnesium sulfate IVPB 4 g PRN   ondansetron 4 mg Q8H PRN   pneumoc 13-finn conj-dip cr(PF) 0.5 mL vaccine x 1 dose   potassium, sodium phosphates 2 packet PRN   sodium chloride 0.9% 10 mL PRN      Today I independently reviewed pertinent medications, lines/drains/airways, imaging, lab results, microbiology results, notably:   Assessment/Plan:     Neuro   * Seizure    Patient with myoclonic jerks in upper extremity with noxious stimuli, likely representing diffuse cortical damage.  vEEG with decreased activity.  --Continue Neuro checks q 1hr  -- Seizure Precautions  -- Continue Clonazepam 2mg Q8  -- Keppra 1500mg bid  --  Zonisamide 200mg bid  -- EEG Cap placed 11/3 overnight, read pending            Anoxic brain injury    -- from cardiac arrest        Cerebral infarction, watershed distribution, bilateral, acute    -- from 6/2017  -- ASA 81mg daily  -- atorvastatin  10mg daily          Eve coma scale total score 3-8    -- from anoxic brain injury             Prophylaxis:  Venous Thromboembolism: mechanical chemical  Stress Ulcer: H2B  Ventilator Pneumonia: no     Activity Orders          Bed rest starting at 10/03 1510        Full Code   I have spent 35 min with this patient, with over 50% of this time spent coordinating care and speaking with the family    Diane Reid NP  Neurocritical Care  [facility]

## 2017-11-04 NOTE — PROGRESS NOTES
RN notified NCC Team pt exhibiting right-sided gaze; MARIE Obrien to come at bedside; RN will continue to monitor

## 2017-11-04 NOTE — ASSESSMENT & PLAN NOTE
Patient with myoclonic jerks in upper extremity with noxious stimuli, likely representing diffuse cortical damage.  vEEG with decreased activity.  --Continue Neuro checks q 1hr  -- Seizure Precautions  -- Continue Clonazepam 2mg Q8  -- Keppra 1500mg bid  --  Zonisamide 200mg bid  -- EEG Cap placed 11/3 overnight, read pending

## 2017-11-04 NOTE — PROGRESS NOTES
EEG cap placed to pt's head as ordered by LUCERO Troy; phoned EEG and left a message regarding placement and patient's info; Epileptology page,still awaiting to call back; RN will continue to monitor

## 2017-11-05 LAB
ALBUMIN SERPL BCP-MCNC: 2.2 G/DL
ALP SERPL-CCNC: 154 U/L
ALT SERPL W/O P-5'-P-CCNC: 21 U/L
ANION GAP SERPL CALC-SCNC: 7 MMOL/L
AST SERPL-CCNC: 24 U/L
BASOPHILS # BLD AUTO: 0.03 K/UL
BASOPHILS NFR BLD: 0.2 %
BILIRUB SERPL-MCNC: 0.3 MG/DL
BUN SERPL-MCNC: 30 MG/DL
CALCIUM SERPL-MCNC: 9.5 MG/DL
CHLORIDE SERPL-SCNC: 111 MMOL/L
CO2 SERPL-SCNC: 24 MMOL/L
CREAT SERPL-MCNC: 0.7 MG/DL
DIFFERENTIAL METHOD: ABNORMAL
EOSINOPHIL # BLD AUTO: 0.2 K/UL
EOSINOPHIL NFR BLD: 1.7 %
ERYTHROCYTE [DISTWIDTH] IN BLOOD BY AUTOMATED COUNT: 16.1 %
EST. GFR  (AFRICAN AMERICAN): >60 ML/MIN/1.73 M^2
EST. GFR  (NON AFRICAN AMERICAN): >60 ML/MIN/1.73 M^2
GLUCOSE SERPL-MCNC: 180 MG/DL
HCT VFR BLD AUTO: 25.2 %
HGB BLD-MCNC: 7.8 G/DL
IMM GRANULOCYTES # BLD AUTO: 0.44 K/UL
IMM GRANULOCYTES NFR BLD AUTO: 3.5 %
LYMPHOCYTES # BLD AUTO: 2.8 K/UL
LYMPHOCYTES NFR BLD: 22.4 %
MAGNESIUM SERPL-MCNC: 1.8 MG/DL
MCH RBC QN AUTO: 28.4 PG
MCHC RBC AUTO-ENTMCNC: 31 G/DL
MCV RBC AUTO: 92 FL
MONOCYTES # BLD AUTO: 1.1 K/UL
MONOCYTES NFR BLD: 8.7 %
NEUTROPHILS # BLD AUTO: 7.9 K/UL
NEUTROPHILS NFR BLD: 63.5 %
NRBC BLD-RTO: 0 /100 WBC
PHOSPHATE SERPL-MCNC: 4.4 MG/DL
PLATELET # BLD AUTO: 220 K/UL
PMV BLD AUTO: 10.3 FL
POCT GLUCOSE: 162 MG/DL (ref 70–110)
POCT GLUCOSE: 163 MG/DL (ref 70–110)
POCT GLUCOSE: 166 MG/DL (ref 70–110)
POCT GLUCOSE: 178 MG/DL (ref 70–110)
POCT GLUCOSE: 182 MG/DL (ref 70–110)
POCT GLUCOSE: 184 MG/DL (ref 70–110)
POTASSIUM SERPL-SCNC: 5.4 MMOL/L
PROT SERPL-MCNC: 7.7 G/DL
RBC # BLD AUTO: 2.75 M/UL
SODIUM SERPL-SCNC: 142 MMOL/L
WBC # BLD AUTO: 12.44 K/UL

## 2017-11-05 PROCEDURE — 25000003 PHARM REV CODE 250: Performed by: STUDENT IN AN ORGANIZED HEALTH CARE EDUCATION/TRAINING PROGRAM

## 2017-11-05 PROCEDURE — 25000003 PHARM REV CODE 250: Performed by: PHYSICIAN ASSISTANT

## 2017-11-05 PROCEDURE — 63600175 PHARM REV CODE 636 W HCPCS: Performed by: EMERGENCY MEDICINE

## 2017-11-05 PROCEDURE — 80053 COMPREHEN METABOLIC PANEL: CPT

## 2017-11-05 PROCEDURE — 99900026 HC AIRWAY MAINTENANCE (STAT)

## 2017-11-05 PROCEDURE — 85025 COMPLETE CBC W/AUTO DIFF WBC: CPT

## 2017-11-05 PROCEDURE — 25000003 PHARM REV CODE 250: Performed by: PSYCHIATRY & NEUROLOGY

## 2017-11-05 PROCEDURE — 99233 SBSQ HOSP IP/OBS HIGH 50: CPT | Mod: GC,,, | Performed by: PSYCHIATRY & NEUROLOGY

## 2017-11-05 PROCEDURE — 25000003 PHARM REV CODE 250: Performed by: EMERGENCY MEDICINE

## 2017-11-05 PROCEDURE — 25000003 PHARM REV CODE 250: Performed by: NURSE PRACTITIONER

## 2017-11-05 PROCEDURE — 94760 N-INVAS EAR/PLS OXIMETRY 1: CPT

## 2017-11-05 PROCEDURE — A4216 STERILE WATER/SALINE, 10 ML: HCPCS | Performed by: EMERGENCY MEDICINE

## 2017-11-05 PROCEDURE — 83735 ASSAY OF MAGNESIUM: CPT

## 2017-11-05 PROCEDURE — 84100 ASSAY OF PHOSPHORUS: CPT

## 2017-11-05 PROCEDURE — 27000221 HC OXYGEN, UP TO 24 HOURS

## 2017-11-05 PROCEDURE — 20000000 HC ICU ROOM

## 2017-11-05 RX ADMIN — INSULIN ASPART 2 UNITS: 100 INJECTION, SOLUTION INTRAVENOUS; SUBCUTANEOUS at 09:11

## 2017-11-05 RX ADMIN — HEPARIN SODIUM 7500 UNITS: 5000 INJECTION, SOLUTION INTRAVENOUS; SUBCUTANEOUS at 02:11

## 2017-11-05 RX ADMIN — GLYCOPYRROLATE 0.1 MG: 0.2 INJECTION INTRAMUSCULAR; INTRAVENOUS at 09:11

## 2017-11-05 RX ADMIN — ZONISAMIDE 200 MG: 100 CAPSULE ORAL at 08:11

## 2017-11-05 RX ADMIN — LEVETIRACETAM 1500 MG: 750 TABLET ORAL at 09:11

## 2017-11-05 RX ADMIN — GLYCOPYRROLATE 0.1 MG: 0.2 INJECTION INTRAMUSCULAR; INTRAVENOUS at 05:11

## 2017-11-05 RX ADMIN — INSULIN DETEMIR 6 UNITS: 100 INJECTION, SOLUTION SUBCUTANEOUS at 09:11

## 2017-11-05 RX ADMIN — CARVEDILOL 12.5 MG: 12.5 TABLET, FILM COATED ORAL at 08:11

## 2017-11-05 RX ADMIN — Medication 10 ML: at 06:11

## 2017-11-05 RX ADMIN — SODIUM POLYSTYRENE SULFONATE 15 G: 15 SUSPENSION ORAL; RECTAL at 09:11

## 2017-11-05 RX ADMIN — INSULIN DETEMIR 6 UNITS: 100 INJECTION, SOLUTION SUBCUTANEOUS at 08:11

## 2017-11-05 RX ADMIN — SODIUM CHLORIDE TAB 1 GM 2 G: 1 TAB at 02:11

## 2017-11-05 RX ADMIN — Medication 10 ML: at 05:11

## 2017-11-05 RX ADMIN — HEPARIN SODIUM 7500 UNITS: 5000 INJECTION, SOLUTION INTRAVENOUS; SUBCUTANEOUS at 09:11

## 2017-11-05 RX ADMIN — CLONAZEPAM 2 MG: 1 TABLET ORAL at 09:11

## 2017-11-05 RX ADMIN — HYDRALAZINE HYDROCHLORIDE 100 MG: 50 TABLET ORAL at 02:11

## 2017-11-05 RX ADMIN — INSULIN ASPART 3 UNITS: 100 INJECTION, SOLUTION INTRAVENOUS; SUBCUTANEOUS at 09:11

## 2017-11-05 RX ADMIN — INSULIN ASPART 3 UNITS: 100 INJECTION, SOLUTION INTRAVENOUS; SUBCUTANEOUS at 06:11

## 2017-11-05 RX ADMIN — SODIUM CHLORIDE TAB 1 GM 2 G: 1 TAB at 05:11

## 2017-11-05 RX ADMIN — Medication 10 ML: at 12:11

## 2017-11-05 RX ADMIN — HEPARIN SODIUM 7500 UNITS: 5000 INJECTION, SOLUTION INTRAVENOUS; SUBCUTANEOUS at 05:11

## 2017-11-05 RX ADMIN — INSULIN ASPART 2 UNITS: 100 INJECTION, SOLUTION INTRAVENOUS; SUBCUTANEOUS at 02:11

## 2017-11-05 RX ADMIN — FAMOTIDINE 20 MG: 20 TABLET, FILM COATED ORAL at 09:11

## 2017-11-05 RX ADMIN — ZONISAMIDE 200 MG: 100 CAPSULE ORAL at 09:11

## 2017-11-05 RX ADMIN — CLONAZEPAM 2 MG: 1 TABLET ORAL at 02:11

## 2017-11-05 RX ADMIN — POLYETHYLENE GLYCOL 3350 17 G: 17 POWDER, FOR SOLUTION ORAL at 08:11

## 2017-11-05 RX ADMIN — ATORVASTATIN CALCIUM 10 MG: 10 TABLET, FILM COATED ORAL at 08:11

## 2017-11-05 RX ADMIN — INSULIN ASPART 3 UNITS: 100 INJECTION, SOLUTION INTRAVENOUS; SUBCUTANEOUS at 01:11

## 2017-11-05 RX ADMIN — INSULIN ASPART 2 UNITS: 100 INJECTION, SOLUTION INTRAVENOUS; SUBCUTANEOUS at 06:11

## 2017-11-05 RX ADMIN — FAMOTIDINE 20 MG: 20 TABLET, FILM COATED ORAL at 08:11

## 2017-11-05 RX ADMIN — STANDARDIZED SENNA CONCENTRATE AND DOCUSATE SODIUM 1 TABLET: 8.6; 5 TABLET, FILM COATED ORAL at 08:11

## 2017-11-05 RX ADMIN — INSULIN ASPART 1 UNITS: 100 INJECTION, SOLUTION INTRAVENOUS; SUBCUTANEOUS at 09:11

## 2017-11-05 RX ADMIN — HYDRALAZINE HYDROCHLORIDE 100 MG: 50 TABLET ORAL at 05:11

## 2017-11-05 RX ADMIN — INSULIN ASPART 1 UNITS: 100 INJECTION, SOLUTION INTRAVENOUS; SUBCUTANEOUS at 01:11

## 2017-11-05 RX ADMIN — CLONAZEPAM 2 MG: 1 TABLET ORAL at 05:11

## 2017-11-05 RX ADMIN — HYDRALAZINE HYDROCHLORIDE 100 MG: 50 TABLET ORAL at 09:11

## 2017-11-05 RX ADMIN — INSULIN ASPART 3 UNITS: 100 INJECTION, SOLUTION INTRAVENOUS; SUBCUTANEOUS at 05:11

## 2017-11-05 RX ADMIN — GLYCOPYRROLATE 0.1 MG: 0.2 INJECTION INTRAMUSCULAR; INTRAVENOUS at 02:11

## 2017-11-05 RX ADMIN — INSULIN ASPART 2 UNITS: 100 INJECTION, SOLUTION INTRAVENOUS; SUBCUTANEOUS at 05:11

## 2017-11-05 RX ADMIN — CARVEDILOL 12.5 MG: 12.5 TABLET, FILM COATED ORAL at 09:11

## 2017-11-05 RX ADMIN — ASPIRIN 81 MG CHEWABLE TABLET 81 MG: 81 TABLET CHEWABLE at 08:11

## 2017-11-05 RX ADMIN — LEVETIRACETAM 1500 MG: 750 TABLET ORAL at 08:11

## 2017-11-05 RX ADMIN — SODIUM CHLORIDE TAB 1 GM 2 G: 1 TAB at 09:11

## 2017-11-05 NOTE — ASSESSMENT & PLAN NOTE
Patient with myoclonic jerks in upper extremity with noxious stimuli, likely representing diffuse cortical damage.  vEEG with decreased activity.  --Continue Neuro checks q 1hr  -- Seizure Precautions  -- Continue Clonazepam 2mg Q8  -- Keppra 1500mg bid  --  Zonisamide 200mg bid  -- no further electrographic seizure activity

## 2017-11-05 NOTE — SUBJECTIVE & OBJECTIVE
Physical Exam:  GA: comfortable, comatose   HEENT: No scleral icterus or JVD.   Pulmonary: Clear to auscultation A. No wheezing, crackles, or rhonchi.  Cardiac: RRR S1 & S2 w/o rubs/murmurs/gallops.   Abdominal: Bowel sounds present x 4. No appreciable hepatosplenomegaly.  Skin: No jaundice, rashes, or visible lesions.  Neuro:  --GCS: E1 VT M3  --Mental Status: comatose, doesn't respond to voice, doesn't follow commands  --CN [id number] to assess  due to level of counsciousness  --Pupils 3mm, PERRL.   --doesn't respond to pain in upper extremities, withdraws in lower extremities    Review of Systems    Unable to obtain a complete ROS due to level of consciousness.  Objective:     Vitals:  Temp: 98.4 °F (36.9 °C) (11/05/17 0705)  Pulse: 101 (11/05/17 0900)  Resp: (!) 29 (11/05/17 0900)  BP: (!) 139/59 (11/05/17 0900)  SpO2: 98 % (11/05/17 0900)    Temp:  [97.7 °F (36.5 °C)-98.4 °F (36.9 °C)] 98.4 °F (36.9 °C)  Pulse:  [] 101  Resp:  [19-32] 29  SpO2:  [98 %-100 %] 98 %  BP: (127-179)/(57-77) 139/59         Oxygen Concentration (%):  [28] 28  Resp Rate Total:  [20 br/min-22 br/min] 20 br/min    11/04 0701 - 11/05 0700  In: 1560 [I.V.:240]  Out: 2000 [Urine:2000]    Physical Exam      Medications:  Continuous Scheduled    amLODIPine 10 mg Daily   aspirin 81 mg Daily   atorvastatin 10 mg Daily   carvedilol 12.5 mg BID   clonazePAM 2 mg Q8H   famotidine 20 mg BID   glycopyrrolate 0.1 mg TID   heparin (porcine) 7,500 Units Q8H   hydrALAZINE 100 mg Q8H   insulin aspart 3 Units Q4H   insulin detemir 6 Units BID   levETIRAcetam 1,500 mg BID   polyethylene glycol 17 g Daily   senna-docusate 8.6-50 mg 1 tablet Daily   sodium chloride 0.9% 10 mL Q6H   sodium chloride 2 g Q8H   zonisamide 200 mg BID   PRN    acetaminophen 650 mg Q6H PRN   albuterol-ipratropium 2.5mg-0.5mg/3mL 3 mL Q6H PRN   dextrose 50% 12.5 g PRN   dextrose 50% 12.5 g PRN   glucagon (human recombinant) 1 mg PRN   hydrALAZINE 10 mg Q4H PRN   insulin  aspart 1-10 Units Q4H PRN   labetalol 10 mg Q4H PRN   magnesium sulfate IVPB 2 g PRN   magnesium sulfate IVPB 4 g PRN   ondansetron 4 mg Q8H PRN   pneumoc 13-finn conj-dip cr(PF) 0.5 mL vaccine x 1 dose   potassium, sodium phosphates 2 packet PRN   sodium chloride 0.9% 10 mL PRN       Pulse: 101 (11/05/17 0900)  Resp: (!) 29 (11/05/17 0900)  BP: (!) 139/59 (11/05/17 0900)  SpO2: 98 % (11/05/17 0900)           Oxygen Concentration (%):  [28] 28  Resp Rate Total:  [20 br/min-22 br/min] 20 br/min    11/04 0701 - 11/05 0700  In: 1560 [I.V.:240]  Out: 2000 [Urine:2000]       33  11/04 0701 - 11/05 0700  In: 1560 [I.V.:240]  Out: 2000 [Urine:2000]   No results for input(s): PH, PCO2, PO2, BE, POCLAC, LACTATE in the last 24 hours.  Recent Labs  Lab 11/05/17 0147      K 5.4*   *   CO2 24   BUN 30*   CREATININE 0.7   CALCIUM 9.5   MG 1.8   PHOS 4.4     Recent Labs  Lab 11/05/17 0147   WBC 12.44   HGB 7.8*        Recent Labs  Lab 11/05/17 0147   PROT 7.7   ALBUMIN 2.2*   AST 24   ALT 21   ALKPHOS 154*   BILITOT 0.3     Recent Labs      11/04/17   2114  11/05/17   0137  11/05/17   0941   POCTGLUCOSE  161*  166*  162*     Continuous Scheduled  amLODIPine 10 mg Daily   aspirin 81 mg Daily   atorvastatin 10 mg Daily   carvedilol 12.5 mg BID   clonazePAM 2 mg Q8H   famotidine 20 mg BID   glycopyrrolate 0.1 mg TID   heparin (porcine) 7,500 Units Q8H   hydrALAZINE 100 mg Q8H   insulin aspart 3 Units Q4H   insulin detemir 6 Units BID   levETIRAcetam 1,500 mg BID   polyethylene glycol 17 g Daily   senna-docusate 8.6-50 mg 1 tablet Daily   sodium chloride 0.9% 10 mL Q6H   sodium chloride 2 g Q8H   zonisamide 200 mg BID   PRN  acetaminophen 650 mg Q6H PRN   albuterol-ipratropium 2.5mg-0.5mg/3mL 3 mL Q6H PRN   dextrose 50% 12.5 g PRN   dextrose 50% 12.5 g PRN   glucagon (human recombinant) 1 mg PRN   hydrALAZINE 10 mg Q4H PRN   insulin aspart 1-10 Units Q4H PRN   labetalol 10 mg Q4H PRN   magnesium sulfate IVPB 2 g  PRN   magnesium sulfate IVPB 4 g PRN   ondansetron 4 mg Q8H PRN   pneumoc 13-finn conj-dip cr(PF) 0.5 mL vaccine x 1 dose   potassium, sodium phosphates 2 packet PRN   sodium chloride 0.9% 10 mL PRN           Lines/Drains/Airways     Peripherally Inserted Central Catheter Line                 PICC Double Lumen 10/03/17 1700 left basilic 32 days          Drain                 Gastrostomy/Enterostomy 09/20/17 0816 Gastrostomy tube w/ balloon midline feeding 46 days    Female External Urinary Catheter 10/25/17 1128 11 days          Airway                 Surgical Airway Other (Comment) Cuffed -- days

## 2017-11-05 NOTE — ASSESSMENT & PLAN NOTE
-- S/p Trach PEG during previous admission on 7/12/2017  -- On spontaneous on ventilator  -- glycopyrrolate

## 2017-11-05 NOTE — PROGRESS NOTES
[facility]  Neurocritical Care  Progress Note    Admit Date: 10/3/2017  Service Date: 11/05/2017  Length of Stay: 33    Subjective:     Chief Complaint: Seizure    History of Present Illness: Ms. Frank is a 68 yo woman with a PMHx DM type II, CKD stage 3, CAD s/p CABG, essential hypertension, cerebrovascular disease s/p bi-hemispheric watershed infarcts and trach/PEG who present as a transfer from [facility] to Neuro Critical Care s/p Cardiac Arrest on 10/4 and evaluation of Anoxic Brain Injury. She was recently discharged from [facility] 9/28/2017 with bilateral MCA SAQIB watershed infarcts. Yesterday, she was brought in via EMS from [facility] for cardiac arrest. Per nursing homestaff, she received chest compressions for about 5 minutes. After EMS arrived, ACLS protocol continued and she was given 2 rounds of epi, non-shockable rhythm. ROSC achieved en route to the ER.Pt arrived to ICU intubated. It was noted there sudden intermittent generalized body jerks mostly on tactile stimulation. Also, pt was hypotensive for which norepinephrine infusion started. Since her discharge here on 9/28 she has been admitted to multiple facilities recently to be treated for various issues including mucous plug leading to acute hypoxic respiratory failure, she pulled out her trach 9/4 so that was reinserted at [facility] and she was also treated for ESBL UTI while there, and last admit she was treated for likely aspiration pneumonia. She was transported here on Propofol. On examination, no cough, no gag, no corneal and minimal to no withdrawal.       Hospital Course: 10/5: s/p Cardiac Arrest on 10/4 and evaluation of anoxic brain injury   10/7: EEG with burst suppression pattern.  MRI with diffuse diffusion restriction  10/10: family meeting   10/11: SSEP  10/12: worsening myoclonus especially when moved. Valproic acid level undetectable likely due to interactions with meropenem. Patient on meropenem for ESBL  Klebsiella pneumonia sensitive to meropenem.  10/15: No events overnight.   10/16: No issues overnight  10/18: Trach collar  10/22: Continued hyperkalemia   10/23-30: Pending transfer to LTAC  10/31: Worsening tachypnea, placed back on ventilator   11/2: started glycopyrrolate, increased Detemir and Aspart, DC isolation, patient stable for discharge home  11/3: kayexalate for hyperK (5.2), labs  M/W/F, patient stable for discharge  11/4 Placed on EEG cap . Patient having R side  Facial twitching awaiting read on EEG        Physical Exam:  GA: comfortable, comatose   HEENT: No scleral icterus or JVD.   Pulmonary: Clear to auscultation A. No wheezing, crackles, or rhonchi.  Cardiac: RRR S1 & S2 w/o rubs/murmurs/gallops.   Abdominal: Bowel sounds present x 4. No appreciable hepatosplenomegaly.  Skin: No jaundice, rashes, or visible lesions.  Neuro:  --GCS: E1 VT M3  --Mental Status: comatose, doesn't respond to voice, doesn't follow commands  --CN [id number] to assess  due to level of counsciousness  --Pupils 3mm, PERRL.   --doesn't respond to pain in upper extremities, withdraws in lower extremities    Review of Systems    Unable to obtain a complete ROS due to level of consciousness.  Objective:     Vitals:  Temp: 98.4 °F (36.9 °C) (11/05/17 0705)  Pulse: 101 (11/05/17 0900)  Resp: (!) 29 (11/05/17 0900)  BP: (!) 139/59 (11/05/17 0900)  SpO2: 98 % (11/05/17 0900)    Temp:  [97.7 °F (36.5 °C)-98.4 °F (36.9 °C)] 98.4 °F (36.9 °C)  Pulse:  [] 101  Resp:  [19-32] 29  SpO2:  [98 %-100 %] 98 %  BP: (127-179)/(57-77) 139/59         Oxygen Concentration (%):  [28] 28  Resp Rate Total:  [20 br/min-22 br/min] 20 br/min    11/04 0701 - 11/05 0700  In: 1560 [I.V.:240]  Out: 2000 [Urine:2000]    Physical Exam      Medications:  Continuous Scheduled    amLODIPine 10 mg Daily   aspirin 81 mg Daily   atorvastatin 10 mg Daily   carvedilol 12.5 mg BID   clonazePAM 2 mg Q8H   famotidine 20 mg BID   glycopyrrolate 0.1 mg TID    heparin (porcine) 7,500 Units Q8H   hydrALAZINE 100 mg Q8H   insulin aspart 3 Units Q4H   insulin detemir 6 Units BID   levETIRAcetam 1,500 mg BID   polyethylene glycol 17 g Daily   senna-docusate 8.6-50 mg 1 tablet Daily   sodium chloride 0.9% 10 mL Q6H   sodium chloride 2 g Q8H   zonisamide 200 mg BID   PRN    acetaminophen 650 mg Q6H PRN   albuterol-ipratropium 2.5mg-0.5mg/3mL 3 mL Q6H PRN   dextrose 50% 12.5 g PRN   dextrose 50% 12.5 g PRN   glucagon (human recombinant) 1 mg PRN   hydrALAZINE 10 mg Q4H PRN   insulin aspart 1-10 Units Q4H PRN   labetalol 10 mg Q4H PRN   magnesium sulfate IVPB 2 g PRN   magnesium sulfate IVPB 4 g PRN   ondansetron 4 mg Q8H PRN   pneumoc 13-finn conj-dip cr(PF) 0.5 mL vaccine x 1 dose   potassium, sodium phosphates 2 packet PRN   sodium chloride 0.9% 10 mL PRN       Pulse: 101 (11/05/17 0900)  Resp: (!) 29 (11/05/17 0900)  BP: (!) 139/59 (11/05/17 0900)  SpO2: 98 % (11/05/17 0900)           Oxygen Concentration (%):  [28] 28  Resp Rate Total:  [20 br/min-22 br/min] 20 br/min    11/04 0701 - 11/05 0700  In: 1560 [I.V.:240]  Out: 2000 [Urine:2000]       33  11/04 0701 - 11/05 0700  In: 1560 [I.V.:240]  Out: 2000 [Urine:2000]   No results for input(s): PH, PCO2, PO2, BE, POCLAC, LACTATE in the last 24 hours.  Recent Labs  Lab 11/05/17 0147      K 5.4*   *   CO2 24   BUN 30*   CREATININE 0.7   CALCIUM 9.5   MG 1.8   PHOS 4.4     Recent Labs  Lab 11/05/17 0147   WBC 12.44   HGB 7.8*        Recent Labs  Lab 11/05/17  0147   PROT 7.7   ALBUMIN 2.2*   AST 24   ALT 21   ALKPHOS 154*   BILITOT 0.3     Recent Labs      11/04/17   2114  11/05/17   0137  11/05/17   0941   POCTGLUCOSE  161*  166*  162*     Continuous Scheduled  amLODIPine 10 mg Daily   aspirin 81 mg Daily   atorvastatin 10 mg Daily   carvedilol 12.5 mg BID   clonazePAM 2 mg Q8H   famotidine 20 mg BID   glycopyrrolate 0.1 mg TID   heparin (porcine) 7,500 Units Q8H   hydrALAZINE 100 mg Q8H   insulin aspart 3  Units Q4H   insulin detemir 6 Units BID   levETIRAcetam 1,500 mg BID   polyethylene glycol 17 g Daily   senna-docusate 8.6-50 mg 1 tablet Daily   sodium chloride 0.9% 10 mL Q6H   sodium chloride 2 g Q8H   zonisamide 200 mg BID   PRN  acetaminophen 650 mg Q6H PRN   albuterol-ipratropium 2.5mg-0.5mg/3mL 3 mL Q6H PRN   dextrose 50% 12.5 g PRN   dextrose 50% 12.5 g PRN   glucagon (human recombinant) 1 mg PRN   hydrALAZINE 10 mg Q4H PRN   insulin aspart 1-10 Units Q4H PRN   labetalol 10 mg Q4H PRN   magnesium sulfate IVPB 2 g PRN   magnesium sulfate IVPB 4 g PRN   ondansetron 4 mg Q8H PRN   pneumoc 13-finn conj-dip cr(PF) 0.5 mL vaccine x 1 dose   potassium, sodium phosphates 2 packet PRN   sodium chloride 0.9% 10 mL PRN           Lines/Drains/Airways     Peripherally Inserted Central Catheter Line                 PICC Double Lumen 10/03/17 1700 left basilic 32 days          Drain                 Gastrostomy/Enterostomy 09/20/17 0816 Gastrostomy tube w/ balloon midline feeding 46 days    Female External Urinary Catheter 10/25/17 1128 11 days          Airway                 Surgical Airway Other (Comment) Cuffed -- days                        Assessment/Plan:     Neuro   * Seizure    Patient with myoclonic jerks in upper extremity with noxious stimuli, likely representing diffuse cortical damage.  vEEG with decreased activity.  --Continue Neuro checks q 1hr  -- Seizure Precautions  -- Continue Clonazepam 2mg Q8  -- Keppra 1500mg bid  --  Zonisamide 200mg bid  -- no further electrographic seizure activity            Anoxic brain injury    -- from cardiac arrest        Cerebral infarction, watershed distribution, bilateral, acute    -- from 6/2017  -- ASA 81mg daily  -- atorvastatin  10mg daily          Eve coma scale total score 3-8    -- from anoxic brain injury        ENT   Tracheostomy status    -- S/p Trach PEG during previous admission on 7/12/2017  -- On spontaneous on ventilator  -- glycopyrrolate         Cardiac/Vascular   Essential hypertension    -- -180  -- hydralazine, carvedilol, amlodipine           Hyperkalemia  -1 dose of kayexalate        Prophylaxis:  Venous Thromboembolism: mechanical chemical  Stress Ulcer: H2B  Ventilator Pneumonia: not applicable     Activity Orders          Bed rest starting at 10/03 1510        Full Code    Jah Mccarty MD  Neurocritical Care  [facility]

## 2017-11-05 NOTE — PLAN OF CARE
Problem: Patient Care Overview  Goal: Plan of Care Review  Outcome: Ongoing (interventions implemented as appropriate)  POC reviewed with pt at 0500. Pt is nonverbal/trach; only responses to pain and cannot verbalized understanding. No acute events overnight; no seizure-like manifestations observed within the shift. Pt progressing toward goals. Will continue to monitor. See flowsheets for full assessment and VS info

## 2017-11-06 LAB
POCT GLUCOSE: 134 MG/DL (ref 70–110)
POCT GLUCOSE: 158 MG/DL (ref 70–110)
POCT GLUCOSE: 160 MG/DL (ref 70–110)
POCT GLUCOSE: 188 MG/DL (ref 70–110)
POCT GLUCOSE: 193 MG/DL (ref 70–110)
POCT GLUCOSE: 194 MG/DL (ref 70–110)
POCT GLUCOSE: 87 MG/DL (ref 70–110)

## 2017-11-06 PROCEDURE — 25000003 PHARM REV CODE 250: Performed by: PHYSICIAN ASSISTANT

## 2017-11-06 PROCEDURE — 25000003 PHARM REV CODE 250: Performed by: NURSE PRACTITIONER

## 2017-11-06 PROCEDURE — 25000003 PHARM REV CODE 250: Performed by: EMERGENCY MEDICINE

## 2017-11-06 PROCEDURE — 94640 AIRWAY INHALATION TREATMENT: CPT

## 2017-11-06 PROCEDURE — 63600175 PHARM REV CODE 636 W HCPCS: Performed by: EMERGENCY MEDICINE

## 2017-11-06 PROCEDURE — 99900026 HC AIRWAY MAINTENANCE (STAT)

## 2017-11-06 PROCEDURE — 20000000 HC ICU ROOM

## 2017-11-06 PROCEDURE — 25000003 PHARM REV CODE 250: Performed by: STUDENT IN AN ORGANIZED HEALTH CARE EDUCATION/TRAINING PROGRAM

## 2017-11-06 PROCEDURE — A4216 STERILE WATER/SALINE, 10 ML: HCPCS | Performed by: EMERGENCY MEDICINE

## 2017-11-06 PROCEDURE — 25000242 PHARM REV CODE 250 ALT 637 W/ HCPCS: Performed by: PHYSICIAN ASSISTANT

## 2017-11-06 PROCEDURE — 27000221 HC OXYGEN, UP TO 24 HOURS

## 2017-11-06 PROCEDURE — 99233 SBSQ HOSP IP/OBS HIGH 50: CPT | Mod: ,,, | Performed by: PHYSICIAN ASSISTANT

## 2017-11-06 RX ORDER — SYRING-NEEDL,DISP,INSUL,0.3 ML 29 G X1/2"
296 SYRINGE, EMPTY DISPOSABLE MISCELLANEOUS ONCE
Status: COMPLETED | OUTPATIENT
Start: 2017-11-06 | End: 2017-11-06

## 2017-11-06 RX ORDER — SODIUM CHLORIDE FOR INHALATION 3 %
4 VIAL, NEBULIZER (ML) INHALATION EVERY 8 HOURS
Status: DISCONTINUED | OUTPATIENT
Start: 2017-11-06 | End: 2017-11-15 | Stop reason: HOSPADM

## 2017-11-06 RX ADMIN — INSULIN ASPART 1 UNITS: 100 INJECTION, SOLUTION INTRAVENOUS; SUBCUTANEOUS at 01:11

## 2017-11-06 RX ADMIN — Medication 10 ML: at 05:11

## 2017-11-06 RX ADMIN — ZONISAMIDE 200 MG: 100 CAPSULE ORAL at 08:11

## 2017-11-06 RX ADMIN — HYDRALAZINE HYDROCHLORIDE 100 MG: 50 TABLET ORAL at 05:11

## 2017-11-06 RX ADMIN — INSULIN ASPART 2 UNITS: 100 INJECTION, SOLUTION INTRAVENOUS; SUBCUTANEOUS at 03:11

## 2017-11-06 RX ADMIN — MAGESIUM CITRATE 296 ML: 1.75 LIQUID ORAL at 01:11

## 2017-11-06 RX ADMIN — ASPIRIN 81 MG CHEWABLE TABLET 81 MG: 81 TABLET CHEWABLE at 08:11

## 2017-11-06 RX ADMIN — Medication 10 ML: at 06:11

## 2017-11-06 RX ADMIN — Medication 10 ML: at 12:11

## 2017-11-06 RX ADMIN — HYDRALAZINE HYDROCHLORIDE 100 MG: 50 TABLET ORAL at 09:11

## 2017-11-06 RX ADMIN — CARVEDILOL 12.5 MG: 12.5 TABLET, FILM COATED ORAL at 08:11

## 2017-11-06 RX ADMIN — ZONISAMIDE 200 MG: 100 CAPSULE ORAL at 09:11

## 2017-11-06 RX ADMIN — CLONAZEPAM 2 MG: 1 TABLET ORAL at 09:11

## 2017-11-06 RX ADMIN — SODIUM CHLORIDE TAB 1 GM 2 G: 1 TAB at 05:11

## 2017-11-06 RX ADMIN — STANDARDIZED SENNA CONCENTRATE AND DOCUSATE SODIUM 1 TABLET: 8.6; 5 TABLET, FILM COATED ORAL at 08:11

## 2017-11-06 RX ADMIN — INSULIN ASPART 3 UNITS: 100 INJECTION, SOLUTION INTRAVENOUS; SUBCUTANEOUS at 05:11

## 2017-11-06 RX ADMIN — INSULIN ASPART 3 UNITS: 100 INJECTION, SOLUTION INTRAVENOUS; SUBCUTANEOUS at 02:11

## 2017-11-06 RX ADMIN — INSULIN ASPART 3 UNITS: 100 INJECTION, SOLUTION INTRAVENOUS; SUBCUTANEOUS at 09:11

## 2017-11-06 RX ADMIN — INSULIN DETEMIR 6 UNITS: 100 INJECTION, SOLUTION SUBCUTANEOUS at 09:11

## 2017-11-06 RX ADMIN — LEVETIRACETAM 1500 MG: 750 TABLET ORAL at 08:11

## 2017-11-06 RX ADMIN — CLONAZEPAM 2 MG: 1 TABLET ORAL at 05:11

## 2017-11-06 RX ADMIN — HEPARIN SODIUM 7500 UNITS: 5000 INJECTION, SOLUTION INTRAVENOUS; SUBCUTANEOUS at 09:11

## 2017-11-06 RX ADMIN — CARVEDILOL 12.5 MG: 12.5 TABLET, FILM COATED ORAL at 09:11

## 2017-11-06 RX ADMIN — ATORVASTATIN CALCIUM 10 MG: 10 TABLET, FILM COATED ORAL at 08:11

## 2017-11-06 RX ADMIN — INSULIN ASPART 2 UNITS: 100 INJECTION, SOLUTION INTRAVENOUS; SUBCUTANEOUS at 05:11

## 2017-11-06 RX ADMIN — HYDRALAZINE HYDROCHLORIDE 100 MG: 50 TABLET ORAL at 02:11

## 2017-11-06 RX ADMIN — FAMOTIDINE 20 MG: 20 TABLET, FILM COATED ORAL at 09:11

## 2017-11-06 RX ADMIN — INSULIN DETEMIR 6 UNITS: 100 INJECTION, SOLUTION SUBCUTANEOUS at 08:11

## 2017-11-06 RX ADMIN — INSULIN ASPART 2 UNITS: 100 INJECTION, SOLUTION INTRAVENOUS; SUBCUTANEOUS at 09:11

## 2017-11-06 RX ADMIN — HEPARIN SODIUM 7500 UNITS: 5000 INJECTION, SOLUTION INTRAVENOUS; SUBCUTANEOUS at 02:11

## 2017-11-06 RX ADMIN — INSULIN ASPART 3 UNITS: 100 INJECTION, SOLUTION INTRAVENOUS; SUBCUTANEOUS at 01:11

## 2017-11-06 RX ADMIN — SODIUM CHLORIDE TAB 1 GM 2 G: 1 TAB at 09:11

## 2017-11-06 RX ADMIN — AMLODIPINE BESYLATE 10 MG: 10 TABLET ORAL at 10:11

## 2017-11-06 RX ADMIN — GLYCOPYRROLATE 0.1 MG: 0.2 INJECTION INTRAMUSCULAR; INTRAVENOUS at 05:11

## 2017-11-06 RX ADMIN — HEPARIN SODIUM 7500 UNITS: 5000 INJECTION, SOLUTION INTRAVENOUS; SUBCUTANEOUS at 05:11

## 2017-11-06 RX ADMIN — FAMOTIDINE 20 MG: 20 TABLET, FILM COATED ORAL at 08:11

## 2017-11-06 RX ADMIN — SODIUM CHLORIDE SOLN NEBU 3% 4 ML: 3 NEBU SOLN at 03:11

## 2017-11-06 RX ADMIN — CLONAZEPAM 2 MG: 1 TABLET ORAL at 02:11

## 2017-11-06 RX ADMIN — SODIUM CHLORIDE TAB 1 GM 2 G: 1 TAB at 02:11

## 2017-11-06 RX ADMIN — POLYETHYLENE GLYCOL 3350 17 G: 17 POWDER, FOR SOLUTION ORAL at 08:11

## 2017-11-06 RX ADMIN — LEVETIRACETAM 1500 MG: 750 TABLET ORAL at 09:11

## 2017-11-06 RX ADMIN — INSULIN ASPART 1 UNITS: 100 INJECTION, SOLUTION INTRAVENOUS; SUBCUTANEOUS at 09:11

## 2017-11-06 NOTE — PLAN OF CARE
Problem: Patient Care Overview  Goal: Plan of Care Review  Outcome: Ongoing (interventions implemented as appropriate)  POC reviewed with pt and pt's daughter via phone call. Pt unable to verbalize understanding. Pt's daughter verbalized understanding. Questions and concerns addressed. No acute events today. Will continue to monitor. See flowsheets for full assessment and VS info.

## 2017-11-06 NOTE — ASSESSMENT & PLAN NOTE
-- S/p Trach PEG during previous admission on 7/12/2017  -- On spontaneous on ventilator  -- 2% nebs, dc glycopyrrolate

## 2017-11-06 NOTE — PLAN OF CARE
Problem: Patient Care Overview  Goal: Plan of Care Review  Outcome: Ongoing (interventions implemented as appropriate)  POC reviewed with pt at 0500. Pt is nonverbal/trach and and cannot verbalized understanding due to chronic condition and current disposition. No acute events overnight. Pt progressing toward goals. Will continue to monitor. See flowsheets for full assessment and VS info

## 2017-11-06 NOTE — PLAN OF CARE
11/06/17 1559   Discharge Reassessment   Assessment Type Discharge Planning Reassessment   Provided patient/caregiver education on the expected discharge date and the discharge plan Yes   Do you have any problems affording any of your prescribed medications? No   Discharge Plan A Home with family;Hospice/home   Discharge Plan B Home with family;Hospice/home   Patient choice form signed by patient/caregiver No   Can the patient answer the patient profile reliably? No, cognitively impaired   How does the patient rate their overall health at the present time? (neo)   Describe the patient's ability to walk at the present time. Does not walk or unable to take any steps at all   How often would a person be available to care for the patient? Whenever needed   Number of comorbid conditions (as recorded on the chart) Four   During the past month, has the patient often been bothered by feeling down, depressed or hopeless? (neo)   During the past month, has the patient often been bothered by little interest or pleasure in doing things? (neo)       SW working with daughter and [facility] on home discharge for patient this week pending daughter coming to hospital for education on how to care for patient and [facility] ordering DME    Shelby Tucker RN, CCRN-K, [facility]  Neuro-Critical Care   [phone]

## 2017-11-06 NOTE — PLAN OF CARE
Problem: Patient Care Overview  Goal: Plan of Care Review  Outcome: Ongoing (interventions implemented as appropriate)  POC reviewed with pt and family at 1500. Pt unable to verbalize understanding. No acute events today. Will continue to monitor. See flowsheets for full assessment and VS info.

## 2017-11-06 NOTE — PROGRESS NOTES
Spoke with pt's daughter via telephone. Stated that they are still waiting on medical equipment to be delivered so pt can be discharged home for family to care for. Tentative delivery of medical equipment is Wednesday (11/9). Daughter plans to speak with , Fern, for further planning.

## 2017-11-06 NOTE — PROGRESS NOTES
[facility]  Neurocritical Care  Progress Note    Admit Date: 10/3/2017  Service Date: 11/06/2017  Length of Stay: 34    Subjective:     Chief Complaint: Seizure    History of Present Illness: Ms. Frank is a 68 yo woman with a PMHx DM type II, CKD stage 3, CAD s/p CABG, essential hypertension, cerebrovascular disease s/p bi-hemispheric watershed infarcts and trach/PEG who present as a transfer from [facility] to Neuro Critical Care s/p Cardiac Arrest on 10/4 and evaluation of Anoxic Brain Injury. She was recently [place] [facility] [place] with bilateral MCA SAQIB watershed infarcts. Yesterday, she was brought in via EMS from [facility] for cardiac arrest. Per nursing homestaff, she received chest compressions for about 5 minutes. After EMS arrived, ACLS protocol continued and she was given 2 rounds of epi, non-shockable rhythm. ROSC achieved en route to the ER.Pt arrived to ICU intubated. It was noted there sudden intermittent generalized body jerks mostly on tactile stimulation. Also, pt was hypotensive for which norepinephrine infusion started. Since her discharge here on 9/28 she has been admitted to multiple facilities recently to be treated for various issues including mucous plug leading to acute hypoxic respiratory failure, she pulled out her trach 9/4 so that was reinserted at [facility] and she was also treated for ESBL UTI while there, and last admit she was treated for likely aspiration pneumonia. She was transported here on Propofol. On examination, no cough, no gag, no corneal and minimal to no withdrawal.       Hospital Course:  10/5: s/p Cardiac Arrest on 10/4 and evaluation of anoxic brain injury   10/7: EEG with burst suppression pattern.  MRI with diffuse diffusion restriction  10/10: family meeting   10/11: SSEP  10/12: worsening myoclonus especially when moved. Valproic acid level undetectable likely due to interactions with meropenem. Patient on meropenem for ESBL  Klebsiella pneumonia sensitive to meropenem.  10/15: No events overnight.   10/16: No issues overnight  10/18: Trach collar  10/22: Continued hyperkalemia   10/23-30: Pending transfer to LTAC  10/31: Worsening tachypnea, placed back on ventilator   11/2: started glycopyrrolate, increased Detemir and Aspart, DC isolation, patient stable for discharge home  11/3: kayexalate for hyperK (5.2), labs  M/W/F, patient stable for discharge  11/4 Placed on EEG cap . Patient having R side  Facial twitching awaiting read on EEG.  11/5: EEG negative  11/6: awaiting medical equipment for pt to be dc home, dc glycopyrrolate, 2% nebs    Interval History: NAEON    Review of Systems: Unable to obtain a complete ROS due to level of consciousness.     Vitals:   Temp: 98.6 °F (37 °C) (11/06/17 1105)  Pulse: 93 (11/06/17 1153)  Resp: (!) 24 (11/06/17 1153)  BP: (!) 153/66 (11/06/17 1105)  SpO2: 100 % (11/06/17 1153)    Temp:  [97.7 °F (36.5 °C)-99.2 °F (37.3 °C)] 98.6 °F (37 °C)  Pulse:  [] 93  Resp:  [11-35] 24  SpO2:  [96 %-100 %] 100 %  BP: (126-180)/(58-79) 153/66         Oxygen Concentration (%):  [28] 28  Resp Rate Total:  [22 br/min-26 br/min] 22 br/min    11/05 0701 - 11/06 0700  In: 1560 [I.V.:240]  Out: 1450 [Urine:1450]     Examination:   Constitutional: Well-nourished and -developed. No apparent distress.   Eyes: Conjunctiva clear, anicteric. Lids no lesions.  Head/Ears/Nose/Mouth/Throat/Neck: Moist mucous membranes. External ears, nose atraumatic.   Cardiovascular: Regular rhythm. No murmurs. No leg edema.  Respiratory: Comfortable respirations. Clear to auscultation.  Gastrointestinal: No hernia. Soft, nondistended, nontender. + bowel sounds.    Neurologic:  --GCS: E1 VT M3  --Mental Status: comatose, doesn't respond to voice, doesn't follow commands  --CN [id number] to assess  due to level of counsciousness  --Pupils 3mm, PERRL.   --doesn't respond to pain in upper extremities, withdraws in lower  extremities    Medications:   Continuous Scheduled  amLODIPine 10 mg Daily   aspirin 81 mg Daily   atorvastatin 10 mg Daily   carvedilol 12.5 mg BID   clonazePAM 2 mg Q8H   famotidine 20 mg BID   heparin (porcine) 7,500 Units Q8H   hydrALAZINE 100 mg Q8H   insulin aspart 3 Units Q4H   insulin detemir 6 Units BID   levETIRAcetam 1,500 mg BID   magnesium citrate 296 mL Once   polyethylene glycol 17 g Daily   senna-docusate 8.6-50 mg 1 tablet Daily   sodium chloride 0.9% 10 mL Q6H   sodium chloride 3% 4 mL Q8H   sodium chloride 2 g Q8H   zonisamide 200 mg BID   PRN  acetaminophen 650 mg Q6H PRN   albuterol-ipratropium 2.5mg-0.5mg/3mL 3 mL Q6H PRN   dextrose 50% 12.5 g PRN   dextrose 50% 12.5 g PRN   glucagon (human recombinant) 1 mg PRN   hydrALAZINE 10 mg Q4H PRN   insulin aspart 1-10 Units Q4H PRN   labetalol 10 mg Q4H PRN   magnesium sulfate IVPB 2 g PRN   magnesium sulfate IVPB 4 g PRN   ondansetron 4 mg Q8H PRN   pneumoc 13-finn conj-dip cr(PF) 0.5 mL vaccine x 1 dose   potassium, sodium phosphates 2 packet PRN   sodium chloride 0.9% 10 mL PRN      Today I independently reviewed pertinent medications, lines/drains/airways, imaging, cardiology, lab results, microbiology results, notably: labs every other day, hyperkalemia but kayexalate admin yesterday, not escalating care, airway secure on trach collar, VSS, HDS    Assessment/Plan:     Neuro   * Seizure    Patient with myoclonic jerks in upper extremity with noxious stimuli, likely representing diffuse cortical damage.  vEEG with decreased activity.  -- Continue Neuro checks q 1hr  -- Seizure Precautions  -- Continue Clonazepam 2mg Q8  -- Keppra 1500mg bid  -- Zonisamide 200mg bid  -- no further electrographic seizure activity  -- awaiting medical equipment deliver to be transferred home with family care.            Anoxic brain injury    -- from cardiac arrest        Cerebral infarction, watershed distribution, bilateral, acute    -- from 6/2017  -- ASA 81mg  daily  -- atorvastatin 10mg daily          Eve coma scale total score 3-8    -- from anoxic brain injury        ENT   Tracheostomy status    -- S/p Trach PEG during previous admission on 7/12/2017  -- On spontaneous on ventilator  -- 2% nebs, dc glycopyrrolate        Cardiac/Vascular   Essential hypertension    -- -180  -- hydralazine, carvedilol, amlodipine            Prophylaxis:  Venous Thromboembolism: mechanical chemical  Stress Ulcer: H2B  Ventilator Pneumonia: yes    Activity Orders          Bed rest starting at 10/03 1510        Full Code    Mendoza Siddiqui PA-C  Neurocritical Care  [facility]

## 2017-11-06 NOTE — ASSESSMENT & PLAN NOTE
Patient with myoclonic jerks in upper extremity with noxious stimuli, likely representing diffuse cortical damage.  vEEG with decreased activity.  -- Continue Neuro checks q 1hr  -- Seizure Precautions  -- Continue Clonazepam 2mg Q8  -- Keppra 1500mg bid  -- Zonisamide 200mg bid  -- no further electrographic seizure activity  -- awaiting medical equipment deliver to be transferred home with family care.

## 2017-11-06 NOTE — PROCEDURES
DATE OF STUDY:  11/04/2017.    EEG #:  FH-.    REFERRING PHYSICIAN:  Dr. Cortes.    This EEG was performed to assess for subclinical seizures.    ICU EEG/VIDEO MONITORING REPORT    METHODOLOGY:  Electroencephalographic (EEG) is recorded with electrodes placed   according to the International 10-20 placement system.  Thirty two (32) channels   of digital signal (sampling rate of 512/sec), including T1 and T2, were   simultaneously recorded from the scalp and may include EKG, EMG, and/or eye   monitors.  Recording band pass was 0.1 to 512 Hz.  Digital video recording of   the patient is simultaneously recorded with the EEG.  The patient is instructed   to report clinical symptoms which may occur during the recording session.  EEG   and video recording are stored and archived in digital format.  Activation   procedures, which include photic stimulation, hyperventilation and instructing   patients to perform simple tasks, are done in selected patients  The EEG is displayed on a monitor screen and can be reviewed using different   montages.  Computer-assisted analysis is employed to detect spike and   electrographic seizure activity.   The entire record is submitted for computer   analysis.  The entire recording is visually reviewed, and the times identified   by computer analysis as being spikes or seizures are reviewed again.    Compressed spectral analysis (CSA) is also performed on the activity recorded   from each individual channel.  This is displayed as a power display of   frequencies from 0 to 30 Hz over time.   The CSA is reviewed looking for   asymmetries in power between homologous areas of the scalp, then compared with   the original EEG recording.    Wis.dm software was also utilized in the review of this study.  This software   suite analyzes the EEG recording in multiple domains.  Coherence and rhythmicity   are computed to identify EEG sections which may contain organized seizures.    Each  channel undergoes analysis to detect the presence of spike and sharp waves   which have special and morphological characteristics of epileptic activity.  The   routine EEG recording is converted from special into frequency domain.  This is   then displayed comparing homologous areas to identify areas of significant   asymmetry.  Algorithm to identify non-cortically generated artifact is used to   separate artifact from the EEG.      RECORDING TIMES:  Start on 11/04/2017 at hour 2, minute 21, second 24.  End on 11/04/2007 at hour 9, minute 21, second 51.  Total time of video EEG recording was 6 hours and 59 minutes.    EEG FINDINGS:  The recording was obtained with a number of standard bipolar and   referential montages during obtunded state.  In this state, the background was   diffusely disorganized and comprised of low-amplitude fast activity intermixed   with occasional delta and rarely theta range activity.  Spontaneous variability   was noted.  Reactivity was noted.  No clear state changes were appreciated.    During this study, there were notable eye blinks.  There was no change in the   background during primary gaze and gaze deviation to the right.  Intermittent   photic stimulation failed to evoke driving responses.  There were no interictal   epileptiform abnormalities and no clinical or electrographic seizures were   recorded.    The EKG channel revealed sinus rhythm.    IMPRESSION:  This is an abnormal EEG during obtunded state.  Diffuse suppression   and disorganization of the background is noted.    CLINICAL CORRELATION:  The patient is a 67-year-old female with a history of   anoxic brain injury who is currently maintained on clonazepam, Keppra and   Zonegran.  This is an abnormal EEG during obtunded state.  The overall degree of   suppression and slowing is suggestive of a moderate-to-severe encephalopathy,   nonspecific to the cause.  During periods of increased alertness, eye blinks   were noted.   Periods of right gaze deviation were not associated with   epileptiform abnormality on EEG.  The presence of variability and reactivity is   reassuring.      FAK/HN  dd: 11/04/2017 11:00:46 (CDT)  td: 11/04/2017 11:23:06 (CDT)  Doc ID   #[id number]  Job ID #[id number]    CC:

## 2017-11-07 LAB
POCT GLUCOSE: 141 MG/DL (ref 70–110)
POCT GLUCOSE: 149 MG/DL (ref 70–110)
POCT GLUCOSE: 160 MG/DL (ref 70–110)
POCT GLUCOSE: 170 MG/DL (ref 70–110)
POCT GLUCOSE: 172 MG/DL (ref 70–110)

## 2017-11-07 PROCEDURE — 25000003 PHARM REV CODE 250: Performed by: PHYSICIAN ASSISTANT

## 2017-11-07 PROCEDURE — 20000000 HC ICU ROOM

## 2017-11-07 PROCEDURE — 94761 N-INVAS EAR/PLS OXIMETRY MLT: CPT

## 2017-11-07 PROCEDURE — 27000221 HC OXYGEN, UP TO 24 HOURS

## 2017-11-07 PROCEDURE — 94640 AIRWAY INHALATION TREATMENT: CPT

## 2017-11-07 PROCEDURE — 25000003 PHARM REV CODE 250: Performed by: STUDENT IN AN ORGANIZED HEALTH CARE EDUCATION/TRAINING PROGRAM

## 2017-11-07 PROCEDURE — 25000242 PHARM REV CODE 250 ALT 637 W/ HCPCS: Performed by: PHYSICIAN ASSISTANT

## 2017-11-07 PROCEDURE — 63600175 PHARM REV CODE 636 W HCPCS: Performed by: STUDENT IN AN ORGANIZED HEALTH CARE EDUCATION/TRAINING PROGRAM

## 2017-11-07 PROCEDURE — 99900026 HC AIRWAY MAINTENANCE (STAT)

## 2017-11-07 PROCEDURE — A4216 STERILE WATER/SALINE, 10 ML: HCPCS | Performed by: EMERGENCY MEDICINE

## 2017-11-07 PROCEDURE — 25000003 PHARM REV CODE 250: Performed by: EMERGENCY MEDICINE

## 2017-11-07 PROCEDURE — 99233 SBSQ HOSP IP/OBS HIGH 50: CPT | Mod: ,,, | Performed by: PHYSICIAN ASSISTANT

## 2017-11-07 PROCEDURE — 63600175 PHARM REV CODE 636 W HCPCS: Performed by: EMERGENCY MEDICINE

## 2017-11-07 RX ADMIN — FAMOTIDINE 20 MG: 20 TABLET, FILM COATED ORAL at 09:11

## 2017-11-07 RX ADMIN — SODIUM CHLORIDE TAB 1 GM 2 G: 1 TAB at 05:11

## 2017-11-07 RX ADMIN — STANDARDIZED SENNA CONCENTRATE AND DOCUSATE SODIUM 1 TABLET: 8.6; 5 TABLET, FILM COATED ORAL at 09:11

## 2017-11-07 RX ADMIN — HYDRALAZINE HYDROCHLORIDE 100 MG: 50 TABLET ORAL at 02:11

## 2017-11-07 RX ADMIN — HEPARIN SODIUM 7500 UNITS: 5000 INJECTION, SOLUTION INTRAVENOUS; SUBCUTANEOUS at 02:11

## 2017-11-07 RX ADMIN — HEPARIN SODIUM 7500 UNITS: 5000 INJECTION, SOLUTION INTRAVENOUS; SUBCUTANEOUS at 10:11

## 2017-11-07 RX ADMIN — SODIUM CHLORIDE TAB 1 GM 2 G: 1 TAB at 09:11

## 2017-11-07 RX ADMIN — AMLODIPINE BESYLATE 10 MG: 10 TABLET ORAL at 10:11

## 2017-11-07 RX ADMIN — INSULIN ASPART 3 UNITS: 100 INJECTION, SOLUTION INTRAVENOUS; SUBCUTANEOUS at 06:11

## 2017-11-07 RX ADMIN — CARVEDILOL 12.5 MG: 12.5 TABLET, FILM COATED ORAL at 09:11

## 2017-11-07 RX ADMIN — HYDRALAZINE HYDROCHLORIDE 100 MG: 50 TABLET ORAL at 09:11

## 2017-11-07 RX ADMIN — SODIUM CHLORIDE TAB 1 GM 2 G: 1 TAB at 02:11

## 2017-11-07 RX ADMIN — ATORVASTATIN CALCIUM 10 MG: 10 TABLET, FILM COATED ORAL at 09:11

## 2017-11-07 RX ADMIN — ZONISAMIDE 200 MG: 100 CAPSULE ORAL at 09:11

## 2017-11-07 RX ADMIN — INSULIN ASPART 3 UNITS: 100 INJECTION, SOLUTION INTRAVENOUS; SUBCUTANEOUS at 09:11

## 2017-11-07 RX ADMIN — INSULIN ASPART 3 UNITS: 100 INJECTION, SOLUTION INTRAVENOUS; SUBCUTANEOUS at 02:11

## 2017-11-07 RX ADMIN — SODIUM CHLORIDE SOLN NEBU 3% 4 ML: 3 NEBU SOLN at 03:11

## 2017-11-07 RX ADMIN — INSULIN DETEMIR 6 UNITS: 100 INJECTION, SOLUTION SUBCUTANEOUS at 08:11

## 2017-11-07 RX ADMIN — HYDRALAZINE HYDROCHLORIDE 100 MG: 50 TABLET ORAL at 05:11

## 2017-11-07 RX ADMIN — INSULIN ASPART 3 UNITS: 100 INJECTION, SOLUTION INTRAVENOUS; SUBCUTANEOUS at 05:11

## 2017-11-07 RX ADMIN — CLONAZEPAM 2 MG: 1 TABLET ORAL at 09:11

## 2017-11-07 RX ADMIN — Medication 10 ML: at 12:11

## 2017-11-07 RX ADMIN — INSULIN DETEMIR 6 UNITS: 100 INJECTION, SOLUTION SUBCUTANEOUS at 09:11

## 2017-11-07 RX ADMIN — Medication 10 ML: at 06:11

## 2017-11-07 RX ADMIN — SODIUM CHLORIDE SOLN NEBU 3% 4 ML: 3 NEBU SOLN at 12:11

## 2017-11-07 RX ADMIN — INSULIN ASPART 2 UNITS: 100 INJECTION, SOLUTION INTRAVENOUS; SUBCUTANEOUS at 05:11

## 2017-11-07 RX ADMIN — LEVETIRACETAM 1500 MG: 750 TABLET ORAL at 09:11

## 2017-11-07 RX ADMIN — SODIUM CHLORIDE SOLN NEBU 3% 4 ML: 3 NEBU SOLN at 08:11

## 2017-11-07 RX ADMIN — INSULIN ASPART 2 UNITS: 100 INJECTION, SOLUTION INTRAVENOUS; SUBCUTANEOUS at 09:11

## 2017-11-07 RX ADMIN — Medication 10 ML: at 05:11

## 2017-11-07 RX ADMIN — ZONISAMIDE 200 MG: 100 CAPSULE ORAL at 10:11

## 2017-11-07 RX ADMIN — HEPARIN SODIUM 7500 UNITS: 5000 INJECTION, SOLUTION INTRAVENOUS; SUBCUTANEOUS at 05:11

## 2017-11-07 RX ADMIN — ASPIRIN 81 MG CHEWABLE TABLET 81 MG: 81 TABLET CHEWABLE at 09:11

## 2017-11-07 RX ADMIN — CLONAZEPAM 2 MG: 1 TABLET ORAL at 02:11

## 2017-11-07 RX ADMIN — CLONAZEPAM 2 MG: 1 TABLET ORAL at 05:11

## 2017-11-07 RX ADMIN — INSULIN ASPART 2 UNITS: 100 INJECTION, SOLUTION INTRAVENOUS; SUBCUTANEOUS at 02:11

## 2017-11-07 RX ADMIN — POLYETHYLENE GLYCOL 3350 17 G: 17 POWDER, FOR SOLUTION ORAL at 09:11

## 2017-11-07 NOTE — PLAN OF CARE
LUCIA spoke with Pt daughter regarding discharge date and DME. She reported she has not heard anything more about the DME from [place] ([facility]). She and family are coming at 12:00pm today for family training. She and another family member did come last night to watch what the RN does to take care of her mother.     LUCIA spoke with Shilpi with [facility] ([phone]). She reported the daughter told her Thursday for discharge which is why the bed was being delivered on Wed. She can have it there today. LUCIA asked her if she would do that. She reported that hospice agencies do not order or maintain vents and has apprehension and concerns over the daughters expectations of what the hospice agency and PCA from the state will be doing for her mother. She feels the daughter needs her expectations of her mother clarified by the MD team. LUCIA asked if she would be willing to come when the daughter meets with the team and express her concerns so the daughter can have realistic expectations about the care at home. Hospice will try to attend at 12.     LUCIA advised MD team of the above.    LUCIA met with Shilpi and Pt daughter at bedside. Pt daughter and family members to complete training to take care of Pt today with respitory therapy and RN. Addressed questions and contacted team to report family is here.     LUCIA spoke with RN and Shilpi regarding DME and timeline for discharge. MD team is placing orders. LUCIA will fax once completed. Shilpi to order more DME and ensure it is all completed tomorrow. LUCIA to set up transportation home for 4pm on Thursday.    Susan España LMSW  Neurocritical Care   [facility]  [id number]

## 2017-11-07 NOTE — PLAN OF CARE
Problem: Patient Care Overview  Goal: Plan of Care Review  Outcome: Ongoing (interventions implemented as appropriate)  POC reviewed with pt at 0500. Pt unable to  verbalize understanding.  No acute events overnight. Pt progressing toward goals. Will continue to monitor. See flowsheets for full assessment and VS info

## 2017-11-07 NOTE — PLAN OF CARE
[facility]     Department of Hospital Medicine     [address] [place] [address]     [phone] after hours  [phone] fax                                   HOSPICE  ORDERS     11/07/2017    Admit to Hospice:  Home Service    Diagnoses:  Active Hospital Problems    Diagnosis  POA    *Seizure [R56.9]  Yes    Status epilepticus [G40.901]  Yes    Hyponatremia [E87.1]  Yes    Anemia of chronic disease [D63.8]  Yes    Klebsiella pneumonia [J15.0]  Yes    Anoxic brain injury [G93.1]  Yes    Cardiac arrest, cause unspecified [I46.9]  Yes    Cerebral infarction, watershed distribution, bilateral, acute [I63.8]  Yes     Chronic    Chronic sinus bradycardia [R00.1]  Yes    Acute on chronic respiratory failure [J96.20]  Yes    Tracheostomy status [Z93.0]  Not Applicable     Chronic    PEG (percutaneous endoscopic gastrostomy) status [Z93.1]  Not Applicable    [place] coma scale total score 3-8 [R40.2430]  Yes    CAD s/p CABG [I25.10]  Yes     Chronic    Moderate protein malnutrition [E44.0]  Yes     Chronic    Hyperkalemia [E87.5]  Yes    Hyperlipidemia [E78.5]  Yes    Morbid obesity with BMI of 50.0-59.9, adult [E66.01, Z68.43]  Not Applicable     Chronic    S/P CABG (coronary artery bypass graft) [Z95.1]  Not Applicable     Chronic     2005      Essential hypertension [I10]  Yes     Chronic    Type 2 diabetes mellitus, uncontrolled [E11.65]  Yes     Chronic      Resolved Hospital Problems    Diagnosis Date Resolved POA    Fever [R50.9] 10/14/2017 No    KATHI (acute kidney injury) [N17.9] 10/13/2017 Yes    Leukocytosis [D72.829] 10/22/2017 Yes       Hospice Qualifying Diagnoses: Anoxic brain injury s/p cardiac arrest       Patient has a life expectancy < 6 months due to these conditions.    Vital Signs: Routine per Hospice Protocol.    Allergies:  Review of patient's allergies indicates:   Allergen Reactions    Latex, natural rubber        Diet:  Tube Feeds: Glucerna. Formula rate 45. Strength Full. Route PEG.    Activities: As tolerated    Nursing: Per Hospice Routine    Future Orders:  Hospice Medical Director may dictate new orders for comfortable care measures & sign death certificate.    Vent Settings:  Trach collar  5L at 28%    Medications:      Nisa Frank   Home Medication Instructions MARY:[id number]    Printed on:11/07/17 6998   Medication Information                      acetaminophen (TYLENOL) 650 mg/20.3 mL Soln  20.3 mLs (650 mg total) by Per NG tube route every 6 (six) hours as needed.             albuterol-ipratropium 2.5mg-0.5mg/3mL (DUO-NEB) 0.5 mg-3 mg(2.5 mg base)/3 mL nebulizer solution  Take 3 mLs by nebulization every 4 (four) hours. Rescue             amlodipine (NORVASC) 10 MG tablet  1 tablet (10 mg total) by Per G Tube route once daily.             aspirin 81 MG Chew  1 tablet (81 mg total) by Per G Tube route once daily.             atorvastatin (LIPITOR) 10 MG tablet  1 tablet (10 mg total) by Per G Tube route once daily.             bisacodyl (DULCOLAX) 5 mg EC tablet  Take 5 mg by mouth daily as needed for Constipation.             carvedilol (COREG) 12.5 MG tablet  Take 1 tablet (12.5 mg total) by mouth 2 (two) times daily.             chlorhexidine (PERIDEX) 0.12 % solution  Use as directed 15 mLs in the mouth or throat 2 (two) times daily.             cloNIDine 0.1 mg/24 hr td ptwk (CATAPRES) 0.1 mg/24 hr  Place 1 patch onto the skin every 7 days.             diclofenac sodium (VOLTAREN) 1 % Gel  Apply 2 g topically 2 (two) times daily.             diphenhydrAMINE (BENADRYL) 25 mg capsule  25 mg by Per G Tube route 3 (three) times daily as needed for Itching.             docusate sodium (COLACE) 100 MG capsule  100 mg by Per G Tube route 2 (two) times daily.             EASY TOUCH TWIST LANCETS 30 gauge Misc               enoxaparin (LOVENOX) 40 mg/0.4 mL Syrg  Inject 40 mg into the skin once daily.              fluoxetine (PROZAC) 20 MG capsule  1 capsule (20 mg total) by Per G Tube route once daily.             hydrALAZINE (APRESOLINE) 25 MG tablet  1 tablet (25 mg total) by Per G Tube route every 6 (six) hours.             insulin aspart (NOVOLOG) 100 unit/mL InPn pen  Inject 1-10 Units into the skin every 4 (four) hours as needed (Hyperglycemia).             LACTOSE-REDUCED FOOD/FIBER (ISOSOURCE 1.5 RONA FEEDING TUBE)  300 mLs by FEEDING TUBE route 4 (four) times daily.             lisinopril 10 MG tablet  10 mg by Per G Tube route once daily.             nitroGLYCERIN (NITROSTAT) 0.4 MG SL tablet  Place 0.4 mg under the tongue every 5 (five) minutes as needed for Chest pain.             ondansetron (ZOFRAN, AS HYDROCHLORIDE,) 2 mg/mL injection  Inject 4 mg into the vein every 4 (four) hours as needed (for nausea).             pantoprazole (PROTONIX) 40 mg GrPS  1 packet (40 mg total) by Per G Tube route once daily.             risperidone (RISPERDAL) 0.25 MG Tab  0.25 mg by Per G Tube route 2 (two) times daily.                   DIABETES CARE:   Nurse to perform and educate diabetic management with blood glucose monitoring:        Fingerstick blood sugar AC and HS    Report CBG < 60 or > 350 to physician.         Insulin Sliding Scale         Glucose  Novolog Insulin Subcutaneous        0 - 60   Orange juice or glucose tablet      No insulin   201-250  2 units   251-300  4 units   301-350  6 units   351-400  8 units   >400   10 units then call physician        _________________________________  Gemini Torres PA-C  11/07/2017

## 2017-11-07 NOTE — PLAN OF CARE
LUCIA spoke with Pt daughter regarding what time she is coming in today. She reported she cannot come due to a MD appointment but she and her family members are coming in tomorrow. She said the DME has been ordered per the hospice worker and she thinks the hospital bed will be delivered on Wed evening. SW to call hospice worker to find out specifically as Wed Pt is supposed to discharge home. She reported Hospice worker is in meetings until 4:30pm. SW will try to reach her regarding DME delivery and vent questions.    Susan España LMSW  Neurocritical Care   [facility]  [id number]

## 2017-11-07 NOTE — PROGRESS NOTES
Education was given to family for trach care at home. Proper techniques of using sterile suction catheter, changing inner cannula, trach care were demonstrated at bedside. Hands on practice were given to family at bedside with supervision, questions answered at bedside.

## 2017-11-08 LAB
ALBUMIN SERPL BCP-MCNC: 2.3 G/DL
ALP SERPL-CCNC: 155 U/L
ALT SERPL W/O P-5'-P-CCNC: 28 U/L
ANION GAP SERPL CALC-SCNC: 6 MMOL/L
AST SERPL-CCNC: 28 U/L
BASOPHILS # BLD AUTO: 0.04 K/UL
BASOPHILS NFR BLD: 0.3 %
BILIRUB SERPL-MCNC: 0.3 MG/DL
BUN SERPL-MCNC: 29 MG/DL
CALCIUM SERPL-MCNC: 9.5 MG/DL
CHLORIDE SERPL-SCNC: 114 MMOL/L
CO2 SERPL-SCNC: 22 MMOL/L
CREAT SERPL-MCNC: 0.7 MG/DL
DIFFERENTIAL METHOD: ABNORMAL
EOSINOPHIL # BLD AUTO: 0.2 K/UL
EOSINOPHIL NFR BLD: 1.8 %
ERYTHROCYTE [DISTWIDTH] IN BLOOD BY AUTOMATED COUNT: 16.1 %
EST. GFR  (AFRICAN AMERICAN): >60 ML/MIN/1.73 M^2
EST. GFR  (NON AFRICAN AMERICAN): >60 ML/MIN/1.73 M^2
GLUCOSE SERPL-MCNC: 184 MG/DL
HCT VFR BLD AUTO: 25.4 %
HGB BLD-MCNC: 7.5 G/DL
IMM GRANULOCYTES # BLD AUTO: 0.42 K/UL
IMM GRANULOCYTES NFR BLD AUTO: 3.2 %
LYMPHOCYTES # BLD AUTO: 2.5 K/UL
LYMPHOCYTES NFR BLD: 19.2 %
MAGNESIUM SERPL-MCNC: 2 MG/DL
MCH RBC QN AUTO: 27.4 PG
MCHC RBC AUTO-ENTMCNC: 29.5 G/DL
MCV RBC AUTO: 93 FL
MONOCYTES # BLD AUTO: 0.9 K/UL
MONOCYTES NFR BLD: 6.9 %
NEUTROPHILS # BLD AUTO: 8.9 K/UL
NEUTROPHILS NFR BLD: 68.6 %
NRBC BLD-RTO: 0 /100 WBC
PHOSPHATE SERPL-MCNC: 3.9 MG/DL
PLATELET # BLD AUTO: 238 K/UL
PMV BLD AUTO: 11.5 FL
POCT GLUCOSE: 145 MG/DL (ref 70–110)
POCT GLUCOSE: 149 MG/DL (ref 70–110)
POCT GLUCOSE: 150 MG/DL (ref 70–110)
POCT GLUCOSE: 160 MG/DL (ref 70–110)
POCT GLUCOSE: 164 MG/DL (ref 70–110)
POCT GLUCOSE: 177 MG/DL (ref 70–110)
POCT GLUCOSE: 198 MG/DL (ref 70–110)
POTASSIUM SERPL-SCNC: 5.4 MMOL/L
POTASSIUM SERPL-SCNC: 6.1 MMOL/L
PROT SERPL-MCNC: 7.7 G/DL
RBC # BLD AUTO: 2.74 M/UL
SODIUM SERPL-SCNC: 142 MMOL/L
WBC # BLD AUTO: 12.97 K/UL

## 2017-11-08 PROCEDURE — 25000242 PHARM REV CODE 250 ALT 637 W/ HCPCS: Performed by: PHYSICIAN ASSISTANT

## 2017-11-08 PROCEDURE — 25000003 PHARM REV CODE 250: Performed by: PHYSICIAN ASSISTANT

## 2017-11-08 PROCEDURE — 63600175 PHARM REV CODE 636 W HCPCS: Performed by: EMERGENCY MEDICINE

## 2017-11-08 PROCEDURE — 94640 AIRWAY INHALATION TREATMENT: CPT

## 2017-11-08 PROCEDURE — 25000003 PHARM REV CODE 250: Performed by: STUDENT IN AN ORGANIZED HEALTH CARE EDUCATION/TRAINING PROGRAM

## 2017-11-08 PROCEDURE — A4216 STERILE WATER/SALINE, 10 ML: HCPCS | Performed by: EMERGENCY MEDICINE

## 2017-11-08 PROCEDURE — 97803 MED NUTRITION INDIV SUBSEQ: CPT

## 2017-11-08 PROCEDURE — 27000221 HC OXYGEN, UP TO 24 HOURS

## 2017-11-08 PROCEDURE — 84100 ASSAY OF PHOSPHORUS: CPT

## 2017-11-08 PROCEDURE — 25000003 PHARM REV CODE 250: Performed by: EMERGENCY MEDICINE

## 2017-11-08 PROCEDURE — 84132 ASSAY OF SERUM POTASSIUM: CPT

## 2017-11-08 PROCEDURE — 25000003 PHARM REV CODE 250: Performed by: PSYCHIATRY & NEUROLOGY

## 2017-11-08 PROCEDURE — 80053 COMPREHEN METABOLIC PANEL: CPT

## 2017-11-08 PROCEDURE — 63600175 PHARM REV CODE 636 W HCPCS: Performed by: NURSE PRACTITIONER

## 2017-11-08 PROCEDURE — 20000000 HC ICU ROOM

## 2017-11-08 PROCEDURE — 99233 SBSQ HOSP IP/OBS HIGH 50: CPT | Mod: ,,, | Performed by: PHYSICIAN ASSISTANT

## 2017-11-08 PROCEDURE — 99900026 HC AIRWAY MAINTENANCE (STAT)

## 2017-11-08 PROCEDURE — 83735 ASSAY OF MAGNESIUM: CPT

## 2017-11-08 PROCEDURE — 85025 COMPLETE CBC W/AUTO DIFF WBC: CPT

## 2017-11-08 RX ORDER — AMOXICILLIN 250 MG
1 CAPSULE ORAL DAILY
COMMUNITY
Start: 2017-11-09

## 2017-11-08 RX ORDER — HYDRALAZINE HYDROCHLORIDE 100 MG/1
100 TABLET, FILM COATED ORAL EVERY 8 HOURS
Qty: 90 TABLET | Refills: 11 | Status: SHIPPED | OUTPATIENT
Start: 2017-11-08 | End: 2018-11-08

## 2017-11-08 RX ORDER — ZONISAMIDE 100 MG/1
200 CAPSULE ORAL 2 TIMES DAILY
Qty: 120 CAPSULE | Refills: 11 | Status: SHIPPED | OUTPATIENT
Start: 2017-11-08 | End: 2018-11-08

## 2017-11-08 RX ORDER — SODIUM CHLORIDE FOR INHALATION 3 %
4 VIAL, NEBULIZER (ML) INHALATION EVERY 8 HOURS
Start: 2017-11-08

## 2017-11-08 RX ORDER — CLONAZEPAM 2 MG/1
2 TABLET ORAL EVERY 8 HOURS
Qty: 90 TABLET | Refills: 1
Start: 2017-11-08 | End: 2018-11-08

## 2017-11-08 RX ORDER — FUROSEMIDE 10 MG/ML
20 INJECTION INTRAMUSCULAR; INTRAVENOUS ONCE
Status: COMPLETED | OUTPATIENT
Start: 2017-11-08 | End: 2017-11-08

## 2017-11-08 RX ORDER — FAMOTIDINE 20 MG/1
20 TABLET, FILM COATED ORAL 2 TIMES DAILY
Qty: 60 TABLET | Refills: 11 | Status: SHIPPED | OUTPATIENT
Start: 2017-11-08 | End: 2018-11-08

## 2017-11-08 RX ORDER — LEVETIRACETAM 750 MG/1
1500 TABLET ORAL 2 TIMES DAILY
Qty: 120 TABLET | Refills: 11 | Status: SHIPPED | OUTPATIENT
Start: 2017-11-08 | End: 2018-11-08

## 2017-11-08 RX ORDER — HEPARIN SODIUM 5000 [USP'U]/ML
7500 INJECTION, SOLUTION INTRAVENOUS; SUBCUTANEOUS EVERY 8 HOURS
Start: 2017-11-08

## 2017-11-08 RX ORDER — POLYETHYLENE GLYCOL 3350 17 G/17G
17 POWDER, FOR SOLUTION ORAL DAILY
Refills: 0
Start: 2017-11-09

## 2017-11-08 RX ADMIN — CARVEDILOL 12.5 MG: 12.5 TABLET, FILM COATED ORAL at 08:11

## 2017-11-08 RX ADMIN — ATORVASTATIN CALCIUM 10 MG: 10 TABLET, FILM COATED ORAL at 08:11

## 2017-11-08 RX ADMIN — SODIUM CHLORIDE SOLN NEBU 3% 4 ML: 3 NEBU SOLN at 01:11

## 2017-11-08 RX ADMIN — Medication 10 ML: at 11:11

## 2017-11-08 RX ADMIN — FAMOTIDINE 20 MG: 20 TABLET, FILM COATED ORAL at 09:11

## 2017-11-08 RX ADMIN — Medication 10 ML: at 05:11

## 2017-11-08 RX ADMIN — INSULIN DETEMIR 6 UNITS: 100 INJECTION, SOLUTION SUBCUTANEOUS at 09:11

## 2017-11-08 RX ADMIN — SODIUM CHLORIDE SOLN NEBU 3% 4 ML: 3 NEBU SOLN at 08:11

## 2017-11-08 RX ADMIN — SODIUM CHLORIDE TAB 1 GM 2 G: 1 TAB at 02:11

## 2017-11-08 RX ADMIN — STANDARDIZED SENNA CONCENTRATE AND DOCUSATE SODIUM 1 TABLET: 8.6; 5 TABLET, FILM COATED ORAL at 08:11

## 2017-11-08 RX ADMIN — ASPIRIN 81 MG CHEWABLE TABLET 81 MG: 81 TABLET CHEWABLE at 08:11

## 2017-11-08 RX ADMIN — LEVETIRACETAM 1500 MG: 750 TABLET ORAL at 08:11

## 2017-11-08 RX ADMIN — HEPARIN SODIUM 7500 UNITS: 5000 INJECTION, SOLUTION INTRAVENOUS; SUBCUTANEOUS at 05:11

## 2017-11-08 RX ADMIN — INSULIN ASPART 3 UNITS: 100 INJECTION, SOLUTION INTRAVENOUS; SUBCUTANEOUS at 02:11

## 2017-11-08 RX ADMIN — HEPARIN SODIUM 7500 UNITS: 5000 INJECTION, SOLUTION INTRAVENOUS; SUBCUTANEOUS at 09:11

## 2017-11-08 RX ADMIN — HYDRALAZINE HYDROCHLORIDE 100 MG: 50 TABLET ORAL at 02:11

## 2017-11-08 RX ADMIN — HYDRALAZINE HYDROCHLORIDE 100 MG: 50 TABLET ORAL at 05:11

## 2017-11-08 RX ADMIN — CLONAZEPAM 2 MG: 1 TABLET ORAL at 02:11

## 2017-11-08 RX ADMIN — HEPARIN SODIUM 7500 UNITS: 5000 INJECTION, SOLUTION INTRAVENOUS; SUBCUTANEOUS at 02:11

## 2017-11-08 RX ADMIN — HYDRALAZINE HYDROCHLORIDE 100 MG: 50 TABLET ORAL at 09:11

## 2017-11-08 RX ADMIN — INSULIN ASPART 2 UNITS: 100 INJECTION, SOLUTION INTRAVENOUS; SUBCUTANEOUS at 09:11

## 2017-11-08 RX ADMIN — FUROSEMIDE 20 MG: 10 INJECTION, SOLUTION INTRAVENOUS at 10:11

## 2017-11-08 RX ADMIN — CARVEDILOL 12.5 MG: 12.5 TABLET, FILM COATED ORAL at 09:11

## 2017-11-08 RX ADMIN — INSULIN ASPART 1 UNITS: 100 INJECTION, SOLUTION INTRAVENOUS; SUBCUTANEOUS at 09:11

## 2017-11-08 RX ADMIN — INSULIN ASPART 3 UNITS: 100 INJECTION, SOLUTION INTRAVENOUS; SUBCUTANEOUS at 09:11

## 2017-11-08 RX ADMIN — SODIUM CHLORIDE SOLN NEBU 3% 4 ML: 3 NEBU SOLN at 04:11

## 2017-11-08 RX ADMIN — INSULIN ASPART 3 UNITS: 100 INJECTION, SOLUTION INTRAVENOUS; SUBCUTANEOUS at 05:11

## 2017-11-08 RX ADMIN — CLONAZEPAM 2 MG: 1 TABLET ORAL at 05:11

## 2017-11-08 RX ADMIN — ZONISAMIDE 200 MG: 100 CAPSULE ORAL at 09:11

## 2017-11-08 RX ADMIN — FAMOTIDINE 20 MG: 20 TABLET, FILM COATED ORAL at 08:11

## 2017-11-08 RX ADMIN — INSULIN ASPART 2 UNITS: 100 INJECTION, SOLUTION INTRAVENOUS; SUBCUTANEOUS at 05:11

## 2017-11-08 RX ADMIN — AMLODIPINE BESYLATE 10 MG: 10 TABLET ORAL at 11:11

## 2017-11-08 RX ADMIN — CLONAZEPAM 2 MG: 1 TABLET ORAL at 09:11

## 2017-11-08 RX ADMIN — INSULIN ASPART 1 UNITS: 100 INJECTION, SOLUTION INTRAVENOUS; SUBCUTANEOUS at 03:11

## 2017-11-08 RX ADMIN — POLYETHYLENE GLYCOL 3350 17 G: 17 POWDER, FOR SOLUTION ORAL at 08:11

## 2017-11-08 RX ADMIN — LEVETIRACETAM 1500 MG: 750 TABLET ORAL at 09:11

## 2017-11-08 RX ADMIN — SODIUM POLYSTYRENE SULFONATE 30 G: 15 SUSPENSION ORAL; RECTAL at 04:11

## 2017-11-08 RX ADMIN — SODIUM CHLORIDE TAB 1 GM 2 G: 1 TAB at 09:11

## 2017-11-08 RX ADMIN — SODIUM CHLORIDE TAB 1 GM 2 G: 1 TAB at 05:11

## 2017-11-08 NOTE — PLAN OF CARE
LUCIA completed list of equipment for the Pt daughter to have and placed it at bedside for her. RN reports daughter will be here today at 2pm.     LUCIA faxed Hospice orders and updated equipment list to [facility].     LUCIA contacted Shilpi with [facility] ([phone]) regarding equipment delivery today and verify she has everything she needs for discharge tomorrow. She reported that she is going to the office in an hour. She will check the orders that were sent and call the DME company they use to verify the order and delivery time. Currently she reports she is ready to go for discharge tomorrow at 4pm.     LUCIA completed [place] envelope and placed outside of Pt room. LUCIA set up transportation for tomorrow at 4pm.    LUCIA spoke with [facility] regarding the TF. Sent latest nutrition note via fax.    Susan España LMSW  Neurocritical Care   [facility]  [id number]

## 2017-11-08 NOTE — PLAN OF CARE
[facility]     Department of Hospital Medicine     [address] [place] [address]     [phone] after hours  [phone] fax                                   HOSPICE  ORDERS     11/08/2017    Admit to Hospice:  Home Service      Diagnoses:  Active Hospital Problems    Diagnosis  POA    *Seizure [R56.9]  Yes    Status epilepticus [G40.901]  Yes    Hyponatremia [E87.1]  Yes    Anemia of chronic disease [D63.8]  Yes    Klebsiella pneumonia [J15.0]  Yes    Anoxic brain injury [G93.1]  Yes    Cardiac arrest, cause unspecified [I46.9]  Yes    Cerebral infarction, watershed distribution, bilateral, acute [I63.8]  Yes     Chronic    Chronic sinus bradycardia [R00.1]  Yes    Acute on chronic respiratory failure [J96.20]  Yes    Tracheostomy status [Z93.0]  Not Applicable     Chronic    PEG (percutaneous endoscopic gastrostomy) status [Z93.1]  Not Applicable    Eve coma scale total score 3-8 [R40.2430]  Yes    CAD s/p CABG [I25.10]  Yes     Chronic    Moderate protein malnutrition [E44.0]  Yes     Chronic    Hyperkalemia [E87.5]  Yes    Hyperlipidemia [E78.5]  Yes    Morbid obesity with BMI of 50.0-59.9, adult [E66.01, Z68.43]  Not Applicable     Chronic    S/P CABG (coronary artery bypass graft) [Z95.1]  Not Applicable     Chronic     2005      Essential hypertension [I10]  Yes     Chronic    Type 2 diabetes mellitus, uncontrolled [E11.65]  Yes     Chronic      Resolved Hospital Problems    Diagnosis Date Resolved POA    Fever [R50.9] 10/14/2017 No    KATHI (acute kidney injury) [N17.9] 10/13/2017 Yes    Leukocytosis [D72.829] 10/22/2017 Yes       Hospice Qualifying Diagnoses: Anoxic brain injury s/p cardiac arrest  Patient has a life expectancy < 6 months due to these conditions.    Vital Signs: Routine per Hospice Protocol.    Allergies:  Review of patient's allergies indicates:   Allergen Reactions    Latex, natural rubber        Diet:  Tube feeds: Glucerna. Formula rate 45. Full strength. Route. Peg.     Activities: As tolerated    Nursing: Per Hospice Routine    Future Orders:  Hospice Medical Director may dictate new orders for comfortable care measures & sign death certificate.    Medications:            Zac Nisa Gail   Home Medication Instructions MARY:[id number]    Printed on:11/08/17 5942   Medication Information                      acetaminophen (TYLENOL) 650 mg/20.3 mL Soln  20.3 mLs (650 mg total) by Per NG tube route every 6 (six) hours as needed.             albuterol-ipratropium 2.5mg-0.5mg/3mL (DUO-NEB) 0.5 mg-3 mg(2.5 mg base)/3 mL nebulizer solution  Take 3 mLs by nebulization every 4 (four) hours. Rescue             amlodipine (NORVASC) 10 MG tablet  1 tablet (10 mg total) by Per G Tube route once daily.             aspirin 81 MG Chew  1 tablet (81 mg total) by Per G Tube route once daily.             atorvastatin (LIPITOR) 10 MG tablet  1 tablet (10 mg total) by Per G Tube route once daily.             carvedilol (COREG) 12.5 MG tablet  Take 1 tablet (12.5 mg total) by mouth 2 (two) times daily.             chlorhexidine (PERIDEX) 0.12 % solution  Use as directed 15 mLs in the mouth or throat 2 (two) times daily.             clonazePAM (KLONOPIN) 2 MG Tab  1 tablet (2 mg total) by Per G Tube route every 8 (eight) hours.             EASY TOUCH TWIST LANCETS 30 gauge Misc               famotidine (PEPCID) 20 MG tablet  1 tablet (20 mg total) by Per G Tube route 2 (two) times daily.             fluoxetine (PROZAC) 20 MG capsule  1 capsule (20 mg total) by Per G Tube route once daily.             HEPARIN SODIUM,PORCINE (HEPARIN, PORCINE,) 5,000 unit/mL injection  Inject 1.5 mLs (7,500 Units total) into the skin every 8 (eight) hours.             hydrALAZINE (APRESOLINE) 100 MG tablet  1 tablet (100 mg total) by Per G Tube route every 8 (eight) hours.             insulin aspart (NOVOLOG) 100 unit/mL InPn pen  Inject 1-10 Units  into the skin every 4 (four) hours as needed (Hyperglycemia).             insulin detemir (LEVEMIR FLEXTOUCH) 100 unit/mL (3 mL) SubQ InPn pen  Inject 6 Units into the skin 2 (two) times daily.             levETIRAcetam (KEPPRA) 750 MG Tab  2 tablets (1,500 mg total) by Per G Tube route 2 (two) times daily.             ondansetron (ZOFRAN, AS HYDROCHLORIDE,) 2 mg/mL injection  Inject 4 mg into the vein every 4 (four) hours as needed (for nausea).             polyethylene glycol (GLYCOLAX) 17 gram PwPk  17 g by Per G Tube route once daily.             senna-docusate 8.6-50 mg (PERICOLACE) 8.6-50 mg per tablet  1 tablet by Per G Tube route once daily.             sodium chloride 1 gram tablet  Take 2 tablets (2 g total) by mouth every 8 (eight) hours.             sodium chloride 3% 3 % nebulizer solution  Take 4 mLs by nebulization every 8 (eight) hours.             zonisamide (ZONEGRAN) 100 MG Cap  2 capsules (200 mg total) by Per NG tube route 2 (two) times daily.               Tube feedings/Formulas: Glucerna full strength @ 45 mL/hr continuous via Gastrostomy tube.     DIABETES CARE:     Nurse to perform and educate diabetic management with blood glucose monitoring:      Fingerstick blood sugar AC and HS       Report CBG < 60 or > 350 to physician.         Insulin Sliding Scale         Glucose  Novolog Insulin Subcutaneous        0 - 60   Orange juice or glucose tablet      No insulin   201-250  2 units   251-300  4 units   301-350  6 units   351-400  8 units   >400   10 units then call physician        _________________________________  Mendoza Siddiqui PA-C  11/08/2017

## 2017-11-08 NOTE — ASSESSMENT & PLAN NOTE
Patient with myoclonic jerks in upper extremity with noxious stimuli, likely representing diffuse cortical damage.  vEEG with decreased activity.  -- Continue Neuro checks q 1hr  -- Seizure Precautions  -- Continue Clonazepam 2mg Q8  -- Keppra 1500mg bid  -- Zonisamide 200mg bid  -- no further electrographic seizure activity  -- transfer home tomorrow 11/8, hospice orders placed

## 2017-11-08 NOTE — PROGRESS NOTES
[facility]  Neurocritical Care  Progress Note    Admit Date: 10/3/2017  Service Date: 11/08/2017  Length of Stay: 36    Subjective:     Chief Complaint: Seizure    History of Present Illness: Ms. Frank is a 68 yo woman with a PMHx DM type II, CKD stage 3, CAD s/p CABG, essential hypertension, cerebrovascular disease s/p bi-hemispheric watershed infarcts and trach/PEG who present as a transfer from [facility] to Neuro Critical Care s/p Cardiac Arrest on 10/4 and evaluation of Anoxic Brain Injury. She was recently discharged from [facility] [place] with bilateral MCA SAQIB watershed infarcts. Yesterday, she was brought in via EMS from [facility] for cardiac arrest. Per nursing homestaff, she received chest compressions for about 5 minutes. After EMS arrived, ACLS protocol continued and she was given 2 rounds of epi, non-shockable rhythm. ROSC achieved en route to the ER.Pt arrived to ICU intubated. It was noted there sudden intermittent generalized body jerks mostly on tactile stimulation. Also, pt was hypotensive for which norepinephrine infusion started. Since her discharge here on 9/28 she has been admitted to multiple facilities recently to be treated for various issues including mucous plug leading to acute hypoxic respiratory failure, she pulled out her trach 9/4 so that was reinserted at [facility] and she was also treated for ESBL UTI while there, and last admit she was treated for likely aspiration pneumonia. She was transported here on Propofol. On examination, no cough, no gag, no corneal and minimal to no withdrawal.       Hospital Course:  10/5: s/p Cardiac Arrest on 10/4 and evaluation of anoxic brain injury   10/7: EEG with burst suppression pattern.  MRI with diffuse diffusion restriction  10/10: family meeting   10/11: SSEP  10/12: worsening myoclonus especially when moved. Valproic acid level undetectable likely due to interactions with meropenem. Patient on meropenem for ESBL  Klebsiella pneumonia sensitive to meropenem.  10/15: No events overnight.   10/16: No issues overnight  10/18: Trach collar  10/22: Continued hyperkalemia   10/23-30: Pending transfer to LTAC  10/31: Worsening tachypnea, placed back on ventilator   11/2: started glycopyrrolate, increased Detemir and Aspart, DC isolation, patient stable for discharge home  11/3: kayexalate for hyperK (5.2), labs  M/W/F, patient stable for discharge  11/4 Placed on EEG cap . Patient having R side  Facial twitching awaiting read on EEG.  11/5: EEG negative  11/6: awaiting medical equipment for pt to be dc home, dc glycopyrrolate, 2% nebs  11/8: dc home tomorrow, hospice orders placed    Interval History: NAOEN    Review of Systems: Unable to obtain a complete ROS due to level of consciousness.     Vitals:   Temp: 99 °F (37.2 °C) (11/08/17 1505)  Pulse: 92 (11/08/17 1605)  Resp: (!) 22 (11/08/17 1605)  BP: (!) 141/65 (11/08/17 1605)  SpO2: 100 % (11/08/17 1605)    Temp:  [98 °F (36.7 °C)-99 °F (37.2 °C)] 99 °F (37.2 °C)  Pulse:  [] 92  Resp:  [15-55] 22  SpO2:  [100 %] 100 %  BP: (121-177)/(56-92) 141/65         Oxygen Concentration (%):  [28] 28    11/07 0701 - 11/08 0700  In: 1535 [I.V.:230]  Out: 200 [Urine:200]     Examination:   Constitutional: Well-nourished and -developed. No apparent distress.   Eyes: Conjunctiva clear, anicteric. Lids no lesions.  Head/Ears/Nose/Mouth/Throat/Neck: Moist mucous membranes. External ears, nose atraumatic.   Cardiovascular: Regular rhythm. No murmurs. No leg edema.  Respiratory: Comfortable respirations. Clear to auscultation.  Gastrointestinal: No hernia. Soft, nondistended, nontender. + bowel sounds.    Neurologic:  --GCS: E1 VT M3  --Mental Status: comatose, doesn't respond to voice, doesn't follow commands  --CN VF-SAF-pstubw to assess  due to level of counsciousness  --Pupils 3mm, PERRL.   --doesn't respond to pain in upper extremities, withdraws in lower extremities         Medications:    Continuous Scheduled  amLODIPine 10 mg Daily   aspirin 81 mg Daily   atorvastatin 10 mg Daily   carvedilol 12.5 mg BID   clonazePAM 2 mg Q8H   famotidine 20 mg BID   heparin (porcine) 7,500 Units Q8H   hydrALAZINE 100 mg Q8H   insulin aspart 3 Units Q4H   insulin detemir 6 Units BID   levETIRAcetam 1,500 mg BID   polyethylene glycol 17 g Daily   senna-docusate 8.6-50 mg 1 tablet Daily   sodium chloride 0.9% 10 mL Q6H   sodium chloride 3% 4 mL Q8H   sodium chloride 2 g Q8H   sodium polystyrene 30 g Once   zonisamide 200 mg BID   PRN  acetaminophen 650 mg Q6H PRN   albuterol-ipratropium 2.5mg-0.5mg/3mL 3 mL Q6H PRN   dextrose 50% 12.5 g PRN   dextrose 50% 12.5 g PRN   glucagon (human recombinant) 1 mg PRN   hydrALAZINE 10 mg Q4H PRN   insulin aspart 1-10 Units Q4H PRN   labetalol 10 mg Q4H PRN   magnesium sulfate IVPB 2 g PRN   magnesium sulfate IVPB 4 g PRN   ondansetron 4 mg Q8H PRN   pneumoc 13-finn conj-dip cr(PF) 0.5 mL vaccine x 1 dose   potassium, sodium phosphates 2 packet PRN   sodium chloride 0.9% 10 mL PRN      Today I independently reviewed pertinent medications, lines/drains/airways, imaging, cardiology, lab results, microbiology results, notably: meds reviewed, labs reviewed, airway secure s/p trach, peg, imaging reviewed, no clinical change, stable to be dc to home hospice    Assessment/Plan:     Neuro   * Seizure    Patient with myoclonic jerks in upper extremity with noxious stimuli, likely representing diffuse cortical damage.  vEEG with decreased activity.  -- Continue Neuro checks q 1hr  -- Seizure Precautions  -- Continue Clonazepam 2mg Q8  -- Keppra 1500mg bid  -- Zonisamide 200mg bid  -- no further electrographic seizure activity  -- transfer home tomorrow 11/8, hospice orders placed            Anoxic brain injury    -- from cardiac arrest        Cerebral infarction, watershed distribution, bilateral, acute    -- from 6/2017  -- ASA 81mg daily  -- atorvastatin 10mg daily          [place] coma  scale total score 3-8    -- from anoxic brain injury        ENT   Tracheostomy status    -- s/p Trach PEG during previous admission on 7/12/2017  -- Oxygen Concentration (%):  [28] 28  -- spontaneous   -- 2% nebs, dc glycopyrrolate        Cardiac/Vascular   Essential hypertension    -- -180  -- hydralazine, carvedilol, amlodipine            Prophylaxis:  Venous Thromboembolism: mechanical chemical  Stress Ulcer: H2B  Ventilator Pneumonia: yes     Activity Orders          Bed rest starting at 10/03 1510        Full Code    Mendoza Siddiqui PA-C  Neurocritical Care  [facility]

## 2017-11-08 NOTE — PLAN OF CARE
Problem: Patient Care Overview  Goal: Plan of Care Review  Outcome: Ongoing (interventions implemented as appropriate)  POC reviewed with pt at 0500. Pt unable to verbalize understanding.  No acute events overnight. Pt progressing toward goals. Pt scheduled to be discharged to home hospice 11/9.  Will continue to monitor. See flowsheets for full assessment and VS info

## 2017-11-08 NOTE — ASSESSMENT & PLAN NOTE
-- s/p Trach PEG during previous admission on 7/12/2017  -- Oxygen Concentration (%):  [28] 28  -- spontaneous   -- 2% nebs, dc glycopyrrolate

## 2017-11-08 NOTE — PLAN OF CARE
LUCIA notified by Pt daughter that she spoke with the MD and he gave her until Monday to get the equipment and fix a gas leak that occurred last night. She wants to cancel the transport tomorrow. Advised RN of her request to MD. She will verify with MD team and let CM know tomorrow when the Pt is actually going to leave. If Pt does not leave: [facility] transport will need to be cancelled (it is set up for 4pm tomorrow)    LUCIA faxed new hospice orders to [facility] and contacted [facility]. She will work on making sure it is all delivered.     Susan España, DIONNE  Neurocritical Care   [facility]  [phone]

## 2017-11-08 NOTE — PLAN OF CARE
Problem: Patient Care Overview  Goal: Plan of Care Review  Outcome: Ongoing (interventions implemented as appropriate)  Pt daughter, sister, and another support person was present today for a couple of hours for training. Training was done by myself and respiratory therapy. They received education & training on trach care, suctioning, importance of turning patient and how to turn the patient, how to clean the patient, insulin administration, glucose monitoring, medication administration via peg tube, tube feedings, importance of 24 hour monitored care and not being left alone, importance of suctioning and how its frequent every hour, importance of being attentive to patients needs/cues to be suctioned.     Encouraged family to come back tomorrow to reinforce training and to get more hands on experience with trained professionals before going home. They agreed and plan to be here for 2pm tomorrow.    Plan for patient to go home Thursday at 4pm under the care of her daughter. Hospice has also been consulted. Dr. Villanueva and the hospice social worker were both present with the family today when discussing on how to precede forward. All questions were addressed with patients daughter.

## 2017-11-08 NOTE — PLAN OF CARE
Problem: Patient Care Overview  Goal: Plan of Care Review  Outcome: Ongoing (interventions implemented as appropriate)  POC reviewed with pt and family at 1400. Pt nonverbal and unable to verbalize understanding. Questions and concerns addressed with pts daughter and pts sister. Pt daughter and sister were taught on how to check blood glucose level and how to administer insulin. Also taught on how to crush and administer medications through the pts PEG tube. No acute events today. Pt progressing toward goals. Will continue to monitor. See flowsheets for full assessment and VS info.

## 2017-11-08 NOTE — PROGRESS NOTES
[facility]  Adult Nutrition  Progress Note    SUMMARY     Recommendations    Recommendation/Intervention:   Continue Glucerna 1.5 @ 45mL/hr.   -For maintenance fluid needs: 200mL water bolus q 6 hrs to meet 1mL/kcal.   -Hold for residuals >500mL.     RD to monitor.      Goals: Pt to receive nutrition by RD follow up  Nutrition Goal Status: progressing towards goal, goal met  Communication of RD Recs: reviewed with RN    Reason for Assessment    Reason for Assessment: RD follow-up  Diagnosis: seizures, other (see comments) (cardiac arrest)  Relevent Medical History: DM type II, CKD stage 3, CAD s/p CABG, HTN, PEG/trach         General Information Comments: TF at goal, tolerating appropriately. Preparing for discharge home.    Nutrition Discharge Planning: optimal nutrition via PEG with tolerance.     Nutrition Prescription Ordered    Current Diet Order: NPO  Nutrition Order Comments: TF at goal  Current Nutrition Support Formula Ordered: Glucerna 1.5  Current Nutrition Support Rate Ordered: 45 (ml)  Current Nutrition Support Frequency Ordered: mL/hr        Evaluation of Received Nutrients/Fluid Intake    Enteral Calories (kcal): 1620  Enteral Protein (gm): 89  Enteral (Free Water) Fluid (mL): 820    Energy Calories Required: meeting needs  % Kcal Needs: 100     Protein Required: meeting needs  % Protein Needs: 100     I/O: +I/O, low UOP         Fluid Required: not meeting needs  Comments: 0mL residuals 11/6  Tolerance: tolerating  % Intake of Estimated Energy Needs: 75 - 100 %  % Meal Intake: NPO     Nutrition Risk Screen     Nutrition Risk Screen: other (see comments) (trach)    Nutrition/Diet History       Typical Food/Fluid Intake: JOSE LUIS. Pt on TF PTA (PEG in place).  Food Preferences: JOSE LUIS Voodoo/cultural preferences at this time.        Factors Affecting Nutritional Intake: NPO, impaired cognitive status/motor control                Labs/Tests/Procedures/Meds       Pertinent Labs Reviewed:  "reviewed  Pertinent Labs Comments: K 6.1, POCT Glu 141-198  Pertinent Medications Reviewed: reviewed  Pertinent Medications Comments: statin,insulin    Physical Findings    Overall Physical Appearance: obese, on oxygen therapy  Tubes: gastrostomy tube     Skin: intact    Anthropometrics    Temp: 98.2 °F (36.8 °C)     Height: 5' 6" (167.6 cm)  Weight Method: Bed Scale  Weight: 130 kg (286 lb 9.6 oz)     Ideal Body Weight (IBW), Female: 130 lb     % Ideal Body Weight, Female (lb): 227.08 lb  BMI (Calculated): 47.7  BMI Grade: greater than 40 - morbid obesity                            Estimated/Assessed Needs    Weight Used For Calorie Calculations: 134.3 kg (296 lb 1.2 oz)      Energy Calorie Requirements (kcal): 8432-4369 (11-14kcal/kg)  Energy Need Method: Kcal/kg        RMR ([place]-St. Jeor Equation): 1894.75        Weight Used For Protein Calculations: 59 kg (130 lb 1.1 oz)  Protein Requirements: 89-118g (1.5-2.0g/kg)    Fluid Requirements (mL): 1mL/kcal or per MD  RDA Method (mL): 1477     CHO requirements: 50% of total kcals     Assessment and Plan    PEG (percutaneous endoscopic gastrostomy) status    Nutrition Problem:  Inadequate oral intake    Related to (etiology):   Inability to consume sufficient energy    Signs and Symptoms (as evidenced by):   NPO on mech vent requiring alternative means of nutrition.     Interventions/Recommendations (treatment strategy):  Please see RD recs above.    Nutrition Diagnosis Status:   Improving              Monitor and Evaluation    Food and Nutrient Intake: enteral nutrition intake  Food and Nutrient Adminstration: enteral and parenteral nutrition administration        Anthropometric Measurements: weight, weight change, body mass index  Biochemical Data, Medical Tests and Procedures: electrolyte and renal panel, gastrointestinal profile, glucose/endocrine profile, inflammatory profile, lipid profile  Nutrition-Focused Physical Findings: overall appearance    Nutrition " Risk    Level of Risk: other (see comments) (f/u 1x/week)    Nutrition Follow-Up    RD Follow-up?: Yes

## 2017-11-09 LAB
POCT GLUCOSE: 152 MG/DL (ref 70–110)
POCT GLUCOSE: 164 MG/DL (ref 70–110)
POCT GLUCOSE: 166 MG/DL (ref 70–110)
POCT GLUCOSE: 168 MG/DL (ref 70–110)
POCT GLUCOSE: 180 MG/DL (ref 70–110)
POCT GLUCOSE: 192 MG/DL (ref 70–110)
POTASSIUM SERPL-SCNC: 5.1 MMOL/L

## 2017-11-09 PROCEDURE — 25000242 PHARM REV CODE 250 ALT 637 W/ HCPCS: Performed by: PHYSICIAN ASSISTANT

## 2017-11-09 PROCEDURE — 25000003 PHARM REV CODE 250: Performed by: STUDENT IN AN ORGANIZED HEALTH CARE EDUCATION/TRAINING PROGRAM

## 2017-11-09 PROCEDURE — 99900022

## 2017-11-09 PROCEDURE — 27000221 HC OXYGEN, UP TO 24 HOURS

## 2017-11-09 PROCEDURE — 25000003 PHARM REV CODE 250: Performed by: PHYSICIAN ASSISTANT

## 2017-11-09 PROCEDURE — 84132 ASSAY OF SERUM POTASSIUM: CPT

## 2017-11-09 PROCEDURE — 94761 N-INVAS EAR/PLS OXIMETRY MLT: CPT

## 2017-11-09 PROCEDURE — 25000242 PHARM REV CODE 250 ALT 637 W/ HCPCS: Performed by: NURSE PRACTITIONER

## 2017-11-09 PROCEDURE — 20000000 HC ICU ROOM

## 2017-11-09 PROCEDURE — 99900026 HC AIRWAY MAINTENANCE (STAT)

## 2017-11-09 PROCEDURE — 99233 SBSQ HOSP IP/OBS HIGH 50: CPT | Mod: ,,, | Performed by: PHYSICIAN ASSISTANT

## 2017-11-09 PROCEDURE — 27201112

## 2017-11-09 PROCEDURE — 63600175 PHARM REV CODE 636 W HCPCS: Performed by: EMERGENCY MEDICINE

## 2017-11-09 PROCEDURE — 25000003 PHARM REV CODE 250: Performed by: EMERGENCY MEDICINE

## 2017-11-09 PROCEDURE — 94640 AIRWAY INHALATION TREATMENT: CPT

## 2017-11-09 PROCEDURE — A4216 STERILE WATER/SALINE, 10 ML: HCPCS | Performed by: EMERGENCY MEDICINE

## 2017-11-09 RX ADMIN — INSULIN ASPART 3 UNITS: 100 INJECTION, SOLUTION INTRAVENOUS; SUBCUTANEOUS at 06:11

## 2017-11-09 RX ADMIN — SODIUM CHLORIDE TAB 1 GM 2 G: 1 TAB at 05:11

## 2017-11-09 RX ADMIN — HEPARIN SODIUM 7500 UNITS: 5000 INJECTION, SOLUTION INTRAVENOUS; SUBCUTANEOUS at 09:11

## 2017-11-09 RX ADMIN — CLONAZEPAM 2 MG: 1 TABLET ORAL at 05:11

## 2017-11-09 RX ADMIN — SODIUM CHLORIDE SOLN NEBU 3% 4 ML: 3 NEBU SOLN at 03:11

## 2017-11-09 RX ADMIN — HEPARIN SODIUM 7500 UNITS: 5000 INJECTION, SOLUTION INTRAVENOUS; SUBCUTANEOUS at 03:11

## 2017-11-09 RX ADMIN — INSULIN ASPART 3 UNITS: 100 INJECTION, SOLUTION INTRAVENOUS; SUBCUTANEOUS at 09:11

## 2017-11-09 RX ADMIN — ASPIRIN 81 MG CHEWABLE TABLET 81 MG: 81 TABLET CHEWABLE at 09:11

## 2017-11-09 RX ADMIN — Medication 10 ML: at 11:11

## 2017-11-09 RX ADMIN — LEVETIRACETAM 1500 MG: 750 TABLET ORAL at 08:11

## 2017-11-09 RX ADMIN — INSULIN ASPART 3 UNITS: 100 INJECTION, SOLUTION INTRAVENOUS; SUBCUTANEOUS at 02:11

## 2017-11-09 RX ADMIN — INSULIN DETEMIR 6 UNITS: 100 INJECTION, SOLUTION SUBCUTANEOUS at 08:11

## 2017-11-09 RX ADMIN — INSULIN ASPART 3 UNITS: 100 INJECTION, SOLUTION INTRAVENOUS; SUBCUTANEOUS at 03:11

## 2017-11-09 RX ADMIN — ATORVASTATIN CALCIUM 10 MG: 10 TABLET, FILM COATED ORAL at 09:11

## 2017-11-09 RX ADMIN — SODIUM CHLORIDE SOLN NEBU 3% 4 ML: 3 NEBU SOLN at 11:11

## 2017-11-09 RX ADMIN — FAMOTIDINE 20 MG: 20 TABLET, FILM COATED ORAL at 08:11

## 2017-11-09 RX ADMIN — ZONISAMIDE 200 MG: 100 CAPSULE ORAL at 10:11

## 2017-11-09 RX ADMIN — SODIUM CHLORIDE TAB 1 GM 2 G: 1 TAB at 09:11

## 2017-11-09 RX ADMIN — CARVEDILOL 12.5 MG: 12.5 TABLET, FILM COATED ORAL at 08:11

## 2017-11-09 RX ADMIN — SODIUM CHLORIDE SOLN NEBU 3% 4 ML: 3 NEBU SOLN at 12:11

## 2017-11-09 RX ADMIN — SODIUM CHLORIDE TAB 1 GM 2 G: 1 TAB at 03:11

## 2017-11-09 RX ADMIN — HYDRALAZINE HYDROCHLORIDE 100 MG: 50 TABLET ORAL at 09:11

## 2017-11-09 RX ADMIN — Medication 10 ML: at 05:11

## 2017-11-09 RX ADMIN — INSULIN DETEMIR 6 UNITS: 100 INJECTION, SOLUTION SUBCUTANEOUS at 10:11

## 2017-11-09 RX ADMIN — CARVEDILOL 12.5 MG: 12.5 TABLET, FILM COATED ORAL at 09:11

## 2017-11-09 RX ADMIN — CLONAZEPAM 2 MG: 1 TABLET ORAL at 03:11

## 2017-11-09 RX ADMIN — INSULIN ASPART 1 UNITS: 100 INJECTION, SOLUTION INTRAVENOUS; SUBCUTANEOUS at 02:11

## 2017-11-09 RX ADMIN — STANDARDIZED SENNA CONCENTRATE AND DOCUSATE SODIUM 1 TABLET: 8.6; 5 TABLET, FILM COATED ORAL at 09:11

## 2017-11-09 RX ADMIN — INSULIN ASPART 2 UNITS: 100 INJECTION, SOLUTION INTRAVENOUS; SUBCUTANEOUS at 03:11

## 2017-11-09 RX ADMIN — INSULIN ASPART 1 UNITS: 100 INJECTION, SOLUTION INTRAVENOUS; SUBCUTANEOUS at 09:11

## 2017-11-09 RX ADMIN — AMLODIPINE BESYLATE 10 MG: 10 TABLET ORAL at 11:11

## 2017-11-09 RX ADMIN — FAMOTIDINE 20 MG: 20 TABLET, FILM COATED ORAL at 09:11

## 2017-11-09 RX ADMIN — HEPARIN SODIUM 7500 UNITS: 5000 INJECTION, SOLUTION INTRAVENOUS; SUBCUTANEOUS at 05:11

## 2017-11-09 RX ADMIN — HYDRALAZINE HYDROCHLORIDE 100 MG: 50 TABLET ORAL at 05:11

## 2017-11-09 RX ADMIN — LEVETIRACETAM 1500 MG: 750 TABLET ORAL at 09:11

## 2017-11-09 RX ADMIN — HYDRALAZINE HYDROCHLORIDE 100 MG: 50 TABLET ORAL at 03:11

## 2017-11-09 RX ADMIN — POLYETHYLENE GLYCOL 3350 17 G: 17 POWDER, FOR SOLUTION ORAL at 10:11

## 2017-11-09 RX ADMIN — SODIUM CHLORIDE SOLN NEBU 3% 4 ML: 3 NEBU SOLN at 08:11

## 2017-11-09 RX ADMIN — INSULIN ASPART 3 UNITS: 100 INJECTION, SOLUTION INTRAVENOUS; SUBCUTANEOUS at 05:11

## 2017-11-09 RX ADMIN — INSULIN ASPART 2 UNITS: 100 INJECTION, SOLUTION INTRAVENOUS; SUBCUTANEOUS at 10:11

## 2017-11-09 RX ADMIN — ZONISAMIDE 200 MG: 100 CAPSULE ORAL at 08:11

## 2017-11-09 RX ADMIN — INSULIN ASPART 3 UNITS: 100 INJECTION, SOLUTION INTRAVENOUS; SUBCUTANEOUS at 10:11

## 2017-11-09 RX ADMIN — CLONAZEPAM 2 MG: 1 TABLET ORAL at 09:11

## 2017-11-09 RX ADMIN — INSULIN ASPART 2 UNITS: 100 INJECTION, SOLUTION INTRAVENOUS; SUBCUTANEOUS at 06:11

## 2017-11-09 RX ADMIN — INSULIN ASPART 2 UNITS: 100 INJECTION, SOLUTION INTRAVENOUS; SUBCUTANEOUS at 05:11

## 2017-11-09 NOTE — PLAN OF CARE
Problem: Patient Care Overview  Goal: Plan of Care Review  Outcome: Ongoing (interventions implemented as appropriate)  POC reviewed with pt at 0500. Pt unable to verbalize understanding. . No acute events overnight. Pts K remained high at 5.4.  NCC notified lasix orderered and administered.  K recheck remains high at 5.1.  Pt progressing toward goals. Will continue to monitor. See flowsheets for full assessment and VS info

## 2017-11-09 NOTE — PLAN OF CARE
Per bedside nurse, she was informed by patient's daughter that she has a gas leak at home and Dr. Villanueva has agreed to let the patient stay in the hospital until Monday 11/13/17.  CM cancelled  transport with [facility].       Shelby Tucker RN, CCRN-K, [facility]  Neuro-Critical Care   [phone]

## 2017-11-09 NOTE — PLAN OF CARE
11/09/17 1346   Discharge Reassessment   Assessment Type Discharge Planning Reassessment   Provided patient/caregiver education on the expected discharge date and the discharge plan Yes   Do you have any problems affording any of your prescribed medications? No   Discharge Plan A Hospice/home   Discharge Plan B Hospice/home   Patient choice form signed by patient/caregiver N/A   Can the patient answer the patient profile reliably? No, cognitively impaired   How does the patient rate their overall health at the present time? (neo)   Describe the patient's ability to walk at the present time. Does not walk or unable to take any steps at all   How often would a person be available to care for the patient? Whenever needed   Number of comorbid conditions (as recorded on the chart) Four   During the past month, has the patient often been bothered by feeling down, depressed or hopeless? (neo)   During the past month, has the patient often been bothered by little interest or pleasure in doing things? (neo)       Per bedside nurse, she was informed by patient's daughter that she has a gas leak at home and Dr. Villanueva has agreed to let the patient stay in the hospital until Monday 11/13/17.  CM [facility]  [facility]    Shelby Tucker RN, CCRN-K, [facility]  Neuro-Critical Care   [phone]

## 2017-11-10 LAB
ALBUMIN SERPL BCP-MCNC: 2.2 G/DL
ALP SERPL-CCNC: 147 U/L
ALT SERPL W/O P-5'-P-CCNC: 25 U/L
ANION GAP SERPL CALC-SCNC: 9 MMOL/L
AST SERPL-CCNC: 21 U/L
BASOPHILS # BLD AUTO: 0.02 K/UL
BASOPHILS NFR BLD: 0.2 %
BILIRUB SERPL-MCNC: 0.2 MG/DL
BUN SERPL-MCNC: 39 MG/DL
CALCIUM SERPL-MCNC: 9.3 MG/DL
CHLORIDE SERPL-SCNC: 115 MMOL/L
CO2 SERPL-SCNC: 24 MMOL/L
CREAT SERPL-MCNC: 0.8 MG/DL
DIFFERENTIAL METHOD: ABNORMAL
EOSINOPHIL # BLD AUTO: 0.2 K/UL
EOSINOPHIL NFR BLD: 1.8 %
ERYTHROCYTE [DISTWIDTH] IN BLOOD BY AUTOMATED COUNT: 16.2 %
EST. GFR  (AFRICAN AMERICAN): >60 ML/MIN/1.73 M^2
EST. GFR  (NON AFRICAN AMERICAN): >60 ML/MIN/1.73 M^2
GLUCOSE SERPL-MCNC: 166 MG/DL
HCT VFR BLD AUTO: 24.5 %
HGB BLD-MCNC: 7.3 G/DL
IMM GRANULOCYTES # BLD AUTO: 0.32 K/UL
IMM GRANULOCYTES NFR BLD AUTO: 2.9 %
LYMPHOCYTES # BLD AUTO: 2.7 K/UL
LYMPHOCYTES NFR BLD: 24.2 %
MAGNESIUM SERPL-MCNC: 2 MG/DL
MCH RBC QN AUTO: 27.9 PG
MCHC RBC AUTO-ENTMCNC: 29.8 G/DL
MCV RBC AUTO: 94 FL
MONOCYTES # BLD AUTO: 1 K/UL
MONOCYTES NFR BLD: 8.6 %
NEUTROPHILS # BLD AUTO: 6.9 K/UL
NEUTROPHILS NFR BLD: 62.3 %
NRBC BLD-RTO: 0 /100 WBC
PHOSPHATE SERPL-MCNC: 4.6 MG/DL
PLATELET # BLD AUTO: 232 K/UL
PMV BLD AUTO: 11.3 FL
POCT GLUCOSE: 148 MG/DL (ref 70–110)
POCT GLUCOSE: 148 MG/DL (ref 70–110)
POCT GLUCOSE: 161 MG/DL (ref 70–110)
POCT GLUCOSE: 209 MG/DL (ref 70–110)
POTASSIUM SERPL-SCNC: 4.5 MMOL/L
PROT SERPL-MCNC: 7.2 G/DL
RBC # BLD AUTO: 2.62 M/UL
SODIUM SERPL-SCNC: 148 MMOL/L
WBC # BLD AUTO: 11.14 K/UL

## 2017-11-10 PROCEDURE — 25000003 PHARM REV CODE 250: Performed by: STUDENT IN AN ORGANIZED HEALTH CARE EDUCATION/TRAINING PROGRAM

## 2017-11-10 PROCEDURE — 25000003 PHARM REV CODE 250: Performed by: PHYSICIAN ASSISTANT

## 2017-11-10 PROCEDURE — 85025 COMPLETE CBC W/AUTO DIFF WBC: CPT

## 2017-11-10 PROCEDURE — 20000000 HC ICU ROOM

## 2017-11-10 PROCEDURE — 63600175 PHARM REV CODE 636 W HCPCS: Performed by: EMERGENCY MEDICINE

## 2017-11-10 PROCEDURE — 83735 ASSAY OF MAGNESIUM: CPT

## 2017-11-10 PROCEDURE — 94761 N-INVAS EAR/PLS OXIMETRY MLT: CPT

## 2017-11-10 PROCEDURE — 99233 SBSQ HOSP IP/OBS HIGH 50: CPT | Mod: ,,, | Performed by: PHYSICIAN ASSISTANT

## 2017-11-10 PROCEDURE — 94640 AIRWAY INHALATION TREATMENT: CPT

## 2017-11-10 PROCEDURE — 84100 ASSAY OF PHOSPHORUS: CPT

## 2017-11-10 PROCEDURE — 80053 COMPREHEN METABOLIC PANEL: CPT

## 2017-11-10 PROCEDURE — 99900026 HC AIRWAY MAINTENANCE (STAT)

## 2017-11-10 PROCEDURE — 25000242 PHARM REV CODE 250 ALT 637 W/ HCPCS: Performed by: PHYSICIAN ASSISTANT

## 2017-11-10 PROCEDURE — A4216 STERILE WATER/SALINE, 10 ML: HCPCS | Performed by: EMERGENCY MEDICINE

## 2017-11-10 PROCEDURE — 27000221 HC OXYGEN, UP TO 24 HOURS

## 2017-11-10 PROCEDURE — 25000003 PHARM REV CODE 250: Performed by: EMERGENCY MEDICINE

## 2017-11-10 RX ADMIN — ZONISAMIDE 200 MG: 100 CAPSULE ORAL at 09:11

## 2017-11-10 RX ADMIN — HEPARIN SODIUM 7500 UNITS: 5000 INJECTION, SOLUTION INTRAVENOUS; SUBCUTANEOUS at 09:11

## 2017-11-10 RX ADMIN — SODIUM CHLORIDE TAB 1 GM 2 G: 1 TAB at 09:11

## 2017-11-10 RX ADMIN — LEVETIRACETAM 1500 MG: 750 TABLET ORAL at 09:11

## 2017-11-10 RX ADMIN — INSULIN ASPART 3 UNITS: 100 INJECTION, SOLUTION INTRAVENOUS; SUBCUTANEOUS at 06:11

## 2017-11-10 RX ADMIN — SODIUM CHLORIDE SOLN NEBU 3% 4 ML: 3 NEBU SOLN at 11:11

## 2017-11-10 RX ADMIN — CARVEDILOL 12.5 MG: 12.5 TABLET, FILM COATED ORAL at 09:11

## 2017-11-10 RX ADMIN — INSULIN DETEMIR 6 UNITS: 100 INJECTION, SOLUTION SUBCUTANEOUS at 09:11

## 2017-11-10 RX ADMIN — SODIUM CHLORIDE SOLN NEBU 3% 4 ML: 3 NEBU SOLN at 04:11

## 2017-11-10 RX ADMIN — HEPARIN SODIUM 7500 UNITS: 5000 INJECTION, SOLUTION INTRAVENOUS; SUBCUTANEOUS at 05:11

## 2017-11-10 RX ADMIN — INSULIN ASPART 3 UNITS: 100 INJECTION, SOLUTION INTRAVENOUS; SUBCUTANEOUS at 05:11

## 2017-11-10 RX ADMIN — HEPARIN SODIUM 7500 UNITS: 5000 INJECTION, SOLUTION INTRAVENOUS; SUBCUTANEOUS at 02:11

## 2017-11-10 RX ADMIN — INSULIN ASPART 3 UNITS: 100 INJECTION, SOLUTION INTRAVENOUS; SUBCUTANEOUS at 09:11

## 2017-11-10 RX ADMIN — HYDRALAZINE HYDROCHLORIDE 100 MG: 50 TABLET ORAL at 09:11

## 2017-11-10 RX ADMIN — Medication 10 ML: at 05:11

## 2017-11-10 RX ADMIN — SODIUM CHLORIDE TAB 1 GM 2 G: 1 TAB at 05:11

## 2017-11-10 RX ADMIN — INSULIN ASPART 1 UNITS: 100 INJECTION, SOLUTION INTRAVENOUS; SUBCUTANEOUS at 09:11

## 2017-11-10 RX ADMIN — HYDRALAZINE HYDROCHLORIDE 100 MG: 50 TABLET ORAL at 02:11

## 2017-11-10 RX ADMIN — CLONAZEPAM 2 MG: 1 TABLET ORAL at 09:11

## 2017-11-10 RX ADMIN — POLYETHYLENE GLYCOL 3350 17 G: 17 POWDER, FOR SOLUTION ORAL at 09:11

## 2017-11-10 RX ADMIN — INSULIN ASPART 3 UNITS: 100 INJECTION, SOLUTION INTRAVENOUS; SUBCUTANEOUS at 01:11

## 2017-11-10 RX ADMIN — ACETAMINOPHEN 650 MG: 325 TABLET ORAL at 12:11

## 2017-11-10 RX ADMIN — CLONAZEPAM 2 MG: 1 TABLET ORAL at 02:11

## 2017-11-10 RX ADMIN — SODIUM CHLORIDE TAB 1 GM 2 G: 1 TAB at 02:11

## 2017-11-10 RX ADMIN — AMLODIPINE BESYLATE 10 MG: 10 TABLET ORAL at 11:11

## 2017-11-10 RX ADMIN — ASPIRIN 81 MG CHEWABLE TABLET 81 MG: 81 TABLET CHEWABLE at 09:11

## 2017-11-10 RX ADMIN — FAMOTIDINE 20 MG: 20 TABLET, FILM COATED ORAL at 09:11

## 2017-11-10 RX ADMIN — INSULIN ASPART 2 UNITS: 100 INJECTION, SOLUTION INTRAVENOUS; SUBCUTANEOUS at 01:11

## 2017-11-10 RX ADMIN — SODIUM CHLORIDE SOLN NEBU 3% 4 ML: 3 NEBU SOLN at 07:11

## 2017-11-10 RX ADMIN — Medication 10 ML: at 11:11

## 2017-11-10 RX ADMIN — STANDARDIZED SENNA CONCENTRATE AND DOCUSATE SODIUM 1 TABLET: 8.6; 5 TABLET, FILM COATED ORAL at 09:11

## 2017-11-10 RX ADMIN — HYDRALAZINE HYDROCHLORIDE 100 MG: 50 TABLET ORAL at 05:11

## 2017-11-10 RX ADMIN — INSULIN ASPART 3 UNITS: 100 INJECTION, SOLUTION INTRAVENOUS; SUBCUTANEOUS at 02:11

## 2017-11-10 RX ADMIN — Medication 10 ML: at 06:11

## 2017-11-10 RX ADMIN — ATORVASTATIN CALCIUM 10 MG: 10 TABLET, FILM COATED ORAL at 09:11

## 2017-11-10 RX ADMIN — INSULIN ASPART 2 UNITS: 100 INJECTION, SOLUTION INTRAVENOUS; SUBCUTANEOUS at 05:11

## 2017-11-10 RX ADMIN — CLONAZEPAM 2 MG: 1 TABLET ORAL at 05:11

## 2017-11-10 NOTE — SUBJECTIVE & OBJECTIVE
Interval History:  No acute events overnight    Review of Systems  Unable to obtain a complete ROS due to level of consciousness.    Objective:     Vitals:  Temp: 98.6 °F (37 °C) (11/10/17 1100)  Pulse: 91 (11/10/17 1400)  Resp: 19 (11/10/17 1400)  BP: (!) 145/69 (11/10/17 1400)  SpO2: 100 % (11/10/17 1400)    Temp:  [98.2 °F (36.8 °C)-99.5 °F (37.5 °C)] 98.6 °F (37 °C)  Pulse:  [] 91  Resp:  [15-33] 19  SpO2:  [97 %-100 %] 100 %  BP: (119-162)/(56-77) 145/69         Oxygen Concentration (%):  [28] 28  Resp Rate Total:  [15 br/min-24 br/min] 15 br/min    11/09 0701 - 11/10 0700  In: 1465 [I.V.:230]  Out: 950 [Urine:950]    Physical Exam  Physical Exam:  GA:comatose   HEENT: No scleral icterus or JVD.   Pulmonary: Clear to auscultation A/P/L. No wheezing, crackles, or rhonchi.   Cardiac: RRR S1 & S2 w/o rubs/murmurs/gallops.   Abdominal: Bowel sounds present x 4. No appreciable hepatosplenomegaly.  Skin: No jaundice, rashes, or visible lesions.  Neuro:  --GCS: E1 VT M3  --Mental Status:  comatose  --CN II-XII grossly intact.   --Pupils 3mm, PERRL.   --doesn't respond to pain in upper extremities, withdraws in lowers  Unable to test orientation, language, memory, judgment, insight, fund of knowledge, hearing, shoulder shrug, tongue protrusion, coordination, gait due to level of consciousness.    Medications:  Continuous Scheduled    amLODIPine 10 mg Daily   aspirin 81 mg Daily   atorvastatin 10 mg Daily   carvedilol 12.5 mg BID   clonazePAM 2 mg Q8H   famotidine 20 mg BID   heparin (porcine) 7,500 Units Q8H   hydrALAZINE 100 mg Q8H   insulin aspart 3 Units Q4H   insulin detemir 6 Units BID   levETIRAcetam 1,500 mg BID   polyethylene glycol 17 g Daily   senna-docusate 8.6-50 mg 1 tablet Daily   sodium chloride 0.9% 10 mL Q6H   sodium chloride 3% 4 mL Q8H   sodium chloride 2 g Q8H   zonisamide 200 mg BID   PRN    acetaminophen 650 mg Q6H PRN   albuterol-ipratropium 2.5mg-0.5mg/3mL 3 mL Q6H PRN   dextrose 50% 12.5  g PRN   dextrose 50% 12.5 g PRN   glucagon (human recombinant) 1 mg PRN   hydrALAZINE 10 mg Q4H PRN   insulin aspart 1-10 Units Q4H PRN   labetalol 10 mg Q4H PRN   magnesium sulfate IVPB 2 g PRN   magnesium sulfate IVPB 4 g PRN   ondansetron 4 mg Q8H PRN   pneumoc 13-finn conj-dip cr(PF) 0.5 mL vaccine x 1 dose   potassium, sodium phosphates 2 packet PRN   sodium chloride 0.9% 10 mL PRN     Today I personally reviewed pertinent medications, lines/drains/airways, imaging, cardiology, lab results, microbiology results,

## 2017-11-10 NOTE — SUBJECTIVE & OBJECTIVE
Interval History:  No acute events overnight    Review of Systems  Unable to obtain a complete ROS due to level of consciousness.  Objective:     Vitals:  Temp: 98.2 °F (36.8 °C) (11/09/17 1505)  Pulse: 93 (11/09/17 1805)  Resp: (!) 21 (11/09/17 1805)  BP: (!) 140/63 (11/09/17 1805)  SpO2: 100 % (11/09/17 1934)    Temp:  [97.6 °F (36.4 °C)-99.3 °F (37.4 °C)] 98.2 °F (36.8 °C)  Pulse:  [] 93  Resp:  [11-27] 21  SpO2:  [99 %-100 %] 100 %  BP: (114-164)/(55-78) 140/63         Oxygen Concentration (%):  [28] 28    11/08 0701 - 11/09 0700  In: 1470 [I.V.:230]  Out: 650 [Urine:650]    Physical Exam  Physical Exam:  GA: comfortable, comatose   HEENT: No scleral icterus or JVD.   Pulmonary: Clear to auscultation A/P/L. No wheezing, crackles, or rhonchi.   Cardiac: RRR S1 & S2 w/o rubs/murmurs/gallops.   Abdominal: Bowel sounds present x 4. No appreciable hepatosplenomegaly.  Skin: No jaundice, rashes, or visible lesions.  Neuro:  --GCS: E1 VT M3  --Mental Status:  comatose  --CN II-XII grossly intact.   --Pupils 3mm, PERRL.   --doesn't respond to pain in upper extremities, withdraws in lowers  Unable to test orientation, language, memory, judgment, insight, fund of knowledge, hearing, shoulder shrug, tongue protrusion, coordination, gait due to level of consciousness.    Medications:  Continuous Scheduled  amLODIPine 10 mg Daily   aspirin 81 mg Daily   atorvastatin 10 mg Daily   carvedilol 12.5 mg BID   clonazePAM 2 mg Q8H   famotidine 20 mg BID   heparin (porcine) 7,500 Units Q8H   hydrALAZINE 100 mg Q8H   insulin aspart 3 Units Q4H   insulin detemir 6 Units BID   levETIRAcetam 1,500 mg BID   polyethylene glycol 17 g Daily   senna-docusate 8.6-50 mg 1 tablet Daily   sodium chloride 0.9% 10 mL Q6H   sodium chloride 3% 4 mL Q8H   sodium chloride 2 g Q8H   zonisamide 200 mg BID   PRN  acetaminophen 650 mg Q6H PRN   albuterol-ipratropium 2.5mg-0.5mg/3mL 3 mL Q6H PRN   dextrose 50% 12.5 g PRN   dextrose 50% 12.5 g PRN    glucagon (human recombinant) 1 mg PRN   hydrALAZINE 10 mg Q4H PRN   insulin aspart 1-10 Units Q4H PRN   labetalol 10 mg Q4H PRN   magnesium sulfate IVPB 2 g PRN   magnesium sulfate IVPB 4 g PRN   ondansetron 4 mg Q8H PRN   pneumoc 13-finn conj-dip cr(PF) 0.5 mL vaccine x 1 dose   potassium, sodium phosphates 2 packet PRN   sodium chloride 0.9% 10 mL PRN     Today I personally reviewed pertinent medications, lines/drains/airways, imaging, cardiology, lab results, microbiology results,

## 2017-11-10 NOTE — PLAN OF CARE
Problem: Patient Care Overview  Goal: Plan of Care Review  Outcome: Ongoing (interventions implemented as appropriate)  POC reviewed with pt at 0445H. Pt is nonverbal/trached with global aphasia and only responses to pain; cannot verbalized understanding and no means to validate learning. No acute events overnight. Pt progressing toward goals and has transfer order to Neuro Stepdown. Will continue to monitor. See flowsheets for full assessment and VS info

## 2017-11-10 NOTE — ASSESSMENT & PLAN NOTE
Patient with myoclonic jerks in upper extremity with noxious stimuli, likely representing diffuse cortical damage.  vEEG with decreased activity.  -- Continue Neuro checks q 1hr  -- Seizure Precautions  -- Continue Clonazepam 2mg Q8  -- Keppra 1500mg bid  -- Zonisamide 200mg bid  -- no further electrographic seizure activity  -- transfer to floor, transfer to home once daughter has prepared home, hospice orders placed

## 2017-11-10 NOTE — PROGRESS NOTES
[facility]  Neurocritical Care  Progress Note    Admit Date: 10/3/2017  Service Date: 11/09/2017  Length of Stay: 37    Subjective:     Chief Complaint: Seizure    History of Present Illness: Ms. Frank is a 68 yo woman with a PMHx DM type II, CKD stage 3, CAD s/p CABG, essential hypertension, cerebrovascular disease s/p bi-hemispheric watershed infarcts and trach/PEG who present as a transfer from [facility] to Neuro Critical Care s/p Cardiac Arrest on 10/4 and evaluation of Anoxic Brain Injury. She was recently discharged from [facility] [place]/28/2017 with bilateral MCA SAQIB watershed infarcts. Yesterday, she was brought in via EMS from [facility] for cardiac arrest. Per nursing homestaff, she received chest compressions for about 5 minutes. After EMS arrived, ACLS protocol continued and she was given 2 rounds of epi, non-shockable rhythm. ROSC achieved en route to the ER.Pt arrived to ICU intubated. It was noted there sudden intermittent generalized body jerks mostly on tactile stimulation. Also, pt was hypotensive for which norepinephrine infusion started. Since her discharge here on 9/28 she has been admitted to multiple facilities recently to be treated for various issues including mucous plug leading to acute hypoxic respiratory failure, she pulled out her trach 9/4 so that was reinserted at [facility] and she was also treated for ESBL UTI while there, and last admit she was treated for likely aspiration pneumonia. She was transported here on Propofol. On examination, no cough, no gag, no corneal and minimal to no withdrawal.       Hospital Course: 10/5: s/p Cardiac Arrest on 10/4 and evaluation of anoxic brain injury   10/7: EEG with burst suppression pattern.  MRI with diffuse diffusion restriction  10/10: family meeting   10/11: SSEP  10/12: worsening myoclonus especially when moved. Valproic acid level undetectable likely due to interactions with meropenem. Patient on meropenem for ESBL  Klebsiella pneumonia sensitive to meropenem.  10/15: No events overnight.   10/16: No issues overnight  10/18: Trach collar  10/22: Continued hyperkalemia   10/23-30: Pending transfer to LTAC  10/31: Worsening tachypnea, placed back on ventilator   11/2: started glycopyrrolate, increased Detemir and Aspart, DC isolation, patient stable for discharge home  11/3: kayexalate for hyperK (5.2), labs  M/W/F, patient stable for discharge  11/4 Placed on EEG cap . Patient having R side  Facial twitching awaiting read on EEG.  11/5: EEG negative  11/6: awaiting medical equipment for pt to be dc home, dc glycopyrrolate, 2% nebs  11/8: dc home tomorrow, hospice orders placed  11/9: Hospice orders placed. Transfer to 7th floor while Awaiting completion of home preparations by daughter.     Interval History:  No acute events overnight    Review of Systems  Unable to obtain a complete ROS due to level of consciousness.  Objective:     Vitals:  Temp: 98.2 °F (36.8 °C) (11/09/17 1505)  Pulse: 93 (11/09/17 1805)  Resp: (!) 21 (11/09/17 1805)  BP: (!) 140/63 (11/09/17 1805)  SpO2: 100 % (11/09/17 1934)    Temp:  [97.6 °F (36.4 °C)-99.3 °F (37.4 °C)] 98.2 °F (36.8 °C)  Pulse:  [] 93  Resp:  [11-27] 21  SpO2:  [99 %-100 %] 100 %  BP: (114-164)/(55-78) 140/63         Oxygen Concentration (%):  [28] 28    11/08 0701 - 11/09 0700  In: 1470 [I.V.:230]  Out: 650 [Urine:650]    Physical Exam  Physical Exam:  GA: comfortable, comatose   HEENT: No scleral icterus or JVD.   Pulmonary: Clear to auscultation A/P/L. No wheezing, crackles, or rhonchi.   Cardiac: RRR S1 & S2 w/o rubs/murmurs/gallops.   Abdominal: Bowel sounds present x 4. No appreciable hepatosplenomegaly.  Skin: No jaundice, rashes, or visible lesions.  Neuro:  --GCS: E1 VT M3  --Mental Status:  comatose  --CN II-XII grossly intact.   --Pupils 3mm, PERRL.   --doesn't respond to pain in upper extremities, withdraws in lowers  Unable to test orientation, language, memory,  judgment, insight, fund of knowledge, hearing, shoulder shrug, tongue protrusion, coordination, gait due to level of consciousness.    Medications:  Continuous Scheduled  amLODIPine 10 mg Daily   aspirin 81 mg Daily   atorvastatin 10 mg Daily   carvedilol 12.5 mg BID   clonazePAM 2 mg Q8H   famotidine 20 mg BID   heparin (porcine) 7,500 Units Q8H   hydrALAZINE 100 mg Q8H   insulin aspart 3 Units Q4H   insulin detemir 6 Units BID   levETIRAcetam 1,500 mg BID   polyethylene glycol 17 g Daily   senna-docusate 8.6-50 mg 1 tablet Daily   sodium chloride 0.9% 10 mL Q6H   sodium chloride 3% 4 mL Q8H   sodium chloride 2 g Q8H   zonisamide 200 mg BID   PRN  acetaminophen 650 mg Q6H PRN   albuterol-ipratropium 2.5mg-0.5mg/3mL 3 mL Q6H PRN   dextrose 50% 12.5 g PRN   dextrose 50% 12.5 g PRN   glucagon (human recombinant) 1 mg PRN   hydrALAZINE 10 mg Q4H PRN   insulin aspart 1-10 Units Q4H PRN   labetalol 10 mg Q4H PRN   magnesium sulfate IVPB 2 g PRN   magnesium sulfate IVPB 4 g PRN   ondansetron 4 mg Q8H PRN   pneumoc 13-finn conj-dip cr(PF) 0.5 mL vaccine x 1 dose   potassium, sodium phosphates 2 packet PRN   sodium chloride 0.9% 10 mL PRN     Today I personally reviewed pertinent medications, lines/drains/airways, imaging, cardiology, lab results, microbiology results,    Assessment/Plan:     Neuro   * Seizure    Patient with myoclonic jerks in upper extremity with noxious stimuli, likely representing diffuse cortical damage.  vEEG with decreased activity.  -- Continue Neuro checks q 1hr  -- Seizure Precautions  -- Continue Clonazepam 2mg Q8  -- Keppra 1500mg bid  -- Zonisamide 200mg bid  -- no further electrographic seizure activity  -- transfer to floor, transfer to home once daughter has prepared home, hospice orders placed            Anoxic brain injury    -- from cardiac arrest        Cerebral infarction, watershed distribution, bilateral, acute    -- from 6/2017  -- ASA 81mg daily  -- atorvastatin 10mg daily           [place] coma scale total score 3-8    -- from anoxic brain injury        ENT   Tracheostomy status    -- s/p Trach PEG during previous admission on 7/12/2017  -- Oxygen Concentration (%):  [28] 28  Resp Rate Total:  [19 br/min] 19 br/min  -- spontaneous   -- 2% nebs, dc glycopyrrolate        Cardiac/Vascular   Essential hypertension    -- -180  -- hydralazine, carvedilol, amlodipine  -- prn labetolol and hydralazine        Endocrine   Type 2 diabetes mellitus, uncontrolled    -- Detemir  6 units BID  -- Aspart  to 3 units  -- ISS-moderate            Prophylaxis:  Venous Thromboembolism: chemical  Stress Ulcer: H2B  Ventilator Pneumonia: not applicable     Activity Orders          Bed rest starting at 10/03 1510        Full Code    Gemini Torres PA-C  Neurocritical Care  [facility]

## 2017-11-10 NOTE — ASSESSMENT & PLAN NOTE
-- s/p Trach PEG during previous admission on 7/12/2017  -- Oxygen Concentration (%):  [28] 28  Resp Rate Total:  [19 br/min] 19 br/min  -- spontaneous   -- 2% nebs, dc glycopyrrolate

## 2017-11-10 NOTE — PLAN OF CARE
LUCIA advised by CM this AM that the Pt daughter now says she has to have her home re-piped due to the gas leak and she does not know when the Pt will be able to dc home. Pt is stepping down today still under NCC.    SW sent out referrals to [facility], [facility], [facility], and [facility] to see if the Pt can go there for SNF. Will follow up once referrals are sent successfully.    Susan España, DIONNE  Neurocritical Care   [facility]  [id number]

## 2017-11-10 NOTE — ASSESSMENT & PLAN NOTE
Patient with myoclonic jerks in upper extremity with noxious stimuli, likely representing diffuse cortical damage.  vEEG with decreased activity.  -- Continue Neuro checks q 1hr  -- Seizure Precautions  -- Continue Clonazepam 2mg Q8  -- Keppra 1500mg bid  -- Zonisamide 200mg bid  -- no further electrographic seizure activity  -- transfered to floor, transfer to home once daughter has prepared home, hospice orders placed

## 2017-11-10 NOTE — ASSESSMENT & PLAN NOTE
-- s/p Trach PEG during previous admission on 7/12/2017  -- Oxygen Concentration (%):  [28] 28  Resp Rate Total:  [15 br/min-24 br/min] 15 br/min  -- spontaneous   -- 2% nebs

## 2017-11-10 NOTE — PLAN OF CARE
Problem: Patient Care Overview  Goal: Plan of Care Review  Outcome: Ongoing (interventions implemented as appropriate)  Plan of care reviewed with patient and sister at 1500. Patient unable to verbalize understanding. Sister verbalized understanding. All questions and concerns addressed today. Patient remains free from injury and falls. No acute events. Patient progressing towards goal. Will continue to monitor. See flowsheet for full assessment and vitals throughout shift.

## 2017-11-11 LAB
POCT GLUCOSE: 154 MG/DL (ref 70–110)
POCT GLUCOSE: 161 MG/DL (ref 70–110)
POCT GLUCOSE: 161 MG/DL (ref 70–110)
POCT GLUCOSE: 166 MG/DL (ref 70–110)
POCT GLUCOSE: 173 MG/DL (ref 70–110)
POCT GLUCOSE: 178 MG/DL (ref 70–110)
POCT GLUCOSE: 181 MG/DL (ref 70–110)
POCT GLUCOSE: 190 MG/DL (ref 70–110)

## 2017-11-11 PROCEDURE — 63600175 PHARM REV CODE 636 W HCPCS: Performed by: EMERGENCY MEDICINE

## 2017-11-11 PROCEDURE — 25000242 PHARM REV CODE 250 ALT 637 W/ HCPCS: Performed by: PHYSICIAN ASSISTANT

## 2017-11-11 PROCEDURE — A4216 STERILE WATER/SALINE, 10 ML: HCPCS | Performed by: EMERGENCY MEDICINE

## 2017-11-11 PROCEDURE — 25000003 PHARM REV CODE 250: Performed by: STUDENT IN AN ORGANIZED HEALTH CARE EDUCATION/TRAINING PROGRAM

## 2017-11-11 PROCEDURE — 25000003 PHARM REV CODE 250: Performed by: PHYSICIAN ASSISTANT

## 2017-11-11 PROCEDURE — 94640 AIRWAY INHALATION TREATMENT: CPT

## 2017-11-11 PROCEDURE — 27000221 HC OXYGEN, UP TO 24 HOURS

## 2017-11-11 PROCEDURE — 99232 SBSQ HOSP IP/OBS MODERATE 35: CPT | Mod: ,,, | Performed by: PHYSICIAN ASSISTANT

## 2017-11-11 PROCEDURE — 94761 N-INVAS EAR/PLS OXIMETRY MLT: CPT

## 2017-11-11 PROCEDURE — 25000003 PHARM REV CODE 250: Performed by: EMERGENCY MEDICINE

## 2017-11-11 PROCEDURE — 99900026 HC AIRWAY MAINTENANCE (STAT)

## 2017-11-11 PROCEDURE — 20600001 HC STEP DOWN PRIVATE ROOM

## 2017-11-11 RX ADMIN — INSULIN DETEMIR 6 UNITS: 100 INJECTION, SOLUTION SUBCUTANEOUS at 08:11

## 2017-11-11 RX ADMIN — INSULIN ASPART 1 UNITS: 100 INJECTION, SOLUTION INTRAVENOUS; SUBCUTANEOUS at 02:11

## 2017-11-11 RX ADMIN — CLONAZEPAM 2 MG: 1 TABLET ORAL at 02:11

## 2017-11-11 RX ADMIN — SODIUM CHLORIDE, PRESERVATIVE FREE 10 ML: 5 INJECTION INTRAVENOUS at 12:11

## 2017-11-11 RX ADMIN — SODIUM CHLORIDE SOLN NEBU 3% 4 ML: 3 NEBU SOLN at 04:11

## 2017-11-11 RX ADMIN — SODIUM CHLORIDE TAB 1 GM 2 G: 1 TAB at 02:11

## 2017-11-11 RX ADMIN — FAMOTIDINE 20 MG: 20 TABLET, FILM COATED ORAL at 08:11

## 2017-11-11 RX ADMIN — INSULIN DETEMIR 6 UNITS: 100 INJECTION, SOLUTION SUBCUTANEOUS at 10:11

## 2017-11-11 RX ADMIN — CLONAZEPAM 2 MG: 1 TABLET ORAL at 05:11

## 2017-11-11 RX ADMIN — HEPARIN SODIUM 7500 UNITS: 5000 INJECTION, SOLUTION INTRAVENOUS; SUBCUTANEOUS at 05:11

## 2017-11-11 RX ADMIN — LEVETIRACETAM 1500 MG: 750 TABLET ORAL at 10:11

## 2017-11-11 RX ADMIN — HEPARIN SODIUM 7500 UNITS: 5000 INJECTION, SOLUTION INTRAVENOUS; SUBCUTANEOUS at 10:11

## 2017-11-11 RX ADMIN — INSULIN ASPART 3 UNITS: 100 INJECTION, SOLUTION INTRAVENOUS; SUBCUTANEOUS at 10:11

## 2017-11-11 RX ADMIN — CLONAZEPAM 2 MG: 1 TABLET ORAL at 10:11

## 2017-11-11 RX ADMIN — INSULIN ASPART 2 UNITS: 100 INJECTION, SOLUTION INTRAVENOUS; SUBCUTANEOUS at 05:11

## 2017-11-11 RX ADMIN — ZONISAMIDE 200 MG: 100 CAPSULE ORAL at 10:11

## 2017-11-11 RX ADMIN — POLYETHYLENE GLYCOL 3350 17 G: 17 POWDER, FOR SOLUTION ORAL at 08:11

## 2017-11-11 RX ADMIN — ASPIRIN 81 MG CHEWABLE TABLET 81 MG: 81 TABLET CHEWABLE at 08:11

## 2017-11-11 RX ADMIN — SODIUM CHLORIDE SOLN NEBU 3% 4 ML: 3 NEBU SOLN at 11:11

## 2017-11-11 RX ADMIN — HEPARIN SODIUM 7500 UNITS: 5000 INJECTION, SOLUTION INTRAVENOUS; SUBCUTANEOUS at 02:11

## 2017-11-11 RX ADMIN — INSULIN ASPART 3 UNITS: 100 INJECTION, SOLUTION INTRAVENOUS; SUBCUTANEOUS at 06:11

## 2017-11-11 RX ADMIN — INSULIN ASPART 1 UNITS: 100 INJECTION, SOLUTION INTRAVENOUS; SUBCUTANEOUS at 10:11

## 2017-11-11 RX ADMIN — AMLODIPINE BESYLATE 10 MG: 10 TABLET ORAL at 12:11

## 2017-11-11 RX ADMIN — SODIUM CHLORIDE TAB 1 GM 2 G: 1 TAB at 10:11

## 2017-11-11 RX ADMIN — SODIUM CHLORIDE SOLN NEBU 3% 4 ML: 3 NEBU SOLN at 08:11

## 2017-11-11 RX ADMIN — HYDRALAZINE HYDROCHLORIDE 100 MG: 50 TABLET ORAL at 10:11

## 2017-11-11 RX ADMIN — HYDRALAZINE HYDROCHLORIDE 100 MG: 50 TABLET ORAL at 02:11

## 2017-11-11 RX ADMIN — ATORVASTATIN CALCIUM 10 MG: 10 TABLET, FILM COATED ORAL at 08:11

## 2017-11-11 RX ADMIN — SODIUM CHLORIDE TAB 1 GM 2 G: 1 TAB at 05:11

## 2017-11-11 RX ADMIN — Medication 10 ML: at 05:11

## 2017-11-11 RX ADMIN — CARVEDILOL 12.5 MG: 12.5 TABLET, FILM COATED ORAL at 08:11

## 2017-11-11 RX ADMIN — LEVETIRACETAM 1500 MG: 750 TABLET ORAL at 08:11

## 2017-11-11 RX ADMIN — INSULIN ASPART 3 UNITS: 100 INJECTION, SOLUTION INTRAVENOUS; SUBCUTANEOUS at 02:11

## 2017-11-11 RX ADMIN — INSULIN ASPART 3 UNITS: 100 INJECTION, SOLUTION INTRAVENOUS; SUBCUTANEOUS at 05:11

## 2017-11-11 RX ADMIN — CARVEDILOL 12.5 MG: 12.5 TABLET, FILM COATED ORAL at 10:11

## 2017-11-11 RX ADMIN — STANDARDIZED SENNA CONCENTRATE AND DOCUSATE SODIUM 1 TABLET: 8.6; 5 TABLET, FILM COATED ORAL at 08:11

## 2017-11-11 RX ADMIN — ZONISAMIDE 200 MG: 100 CAPSULE ORAL at 08:11

## 2017-11-11 RX ADMIN — HYDRALAZINE HYDROCHLORIDE 100 MG: 50 TABLET ORAL at 05:11

## 2017-11-11 RX ADMIN — Medication 10 ML: at 12:11

## 2017-11-11 RX ADMIN — FAMOTIDINE 20 MG: 20 TABLET, FILM COATED ORAL at 10:11

## 2017-11-11 RX ADMIN — Medication 10 ML: at 06:11

## 2017-11-11 NOTE — PLAN OF CARE
Problem: Patient Care Overview  Goal: Plan of Care Review  Outcome: Ongoing (interventions implemented as appropriate)  POC reviewed with Ms. Frank and Sister at 1400. Pt unable to verbalize understanding. Sister verbalized understanding. Questions and concerns addressed. No acute events today. Pt progressing toward goals. Will continue to monitor. See flowsheets for full assessment and VS info.

## 2017-11-11 NOTE — ASSESSMENT & PLAN NOTE
67 year old woman with bilateral watershed infarcts s/p PEG/trach during previous admission, who was at LTAC when she went into cardiac arrest. Suspected to be respiratory in origin. [place] [facility] [place] noted to be in myoclonic status and was transferred to [place] for higher level care. On admission brainstem reflexes were absent. Family wanted to continue with full management.    -- myoclonic from anoxic brain injury  -- resolved with AEDs  -- clonazepam, levetiracetam, zonisamide  -- pending placement

## 2017-11-11 NOTE — PROGRESS NOTES
[facility]  Neurocritical Care  Progress Note    Admit Date: 10/3/2017  Service Date: 11/11/2017  Length of Stay: 39    Subjective:     Chief Complaint: Seizure    History of Present Illness: Ms. Frank is a 66 yo woman with a PMHx DM type II, CKD stage 3, CAD s/p CABG, essential hypertension, cerebrovascular disease s/p bi-hemispheric watershed infarcts and trach/PEG who present as a transfer from [facility] to Neuro Critical Care s/p Cardiac Arrest on 10/4 and evaluation of Anoxic Brain Injury. She was recently discharged from [facility] 9/28/2017 with bilateral MCA SAQIB watershed infarcts. Yesterday, she was brought in via EMS from [facility] for cardiac arrest. Per nursing homestaff, she received chest compressions for about 5 minutes. After EMS arrived, ACLS protocol continued and she was given 2 rounds of epi, non-shockable rhythm. ROSC achieved en route to the ER.Pt arrived to ICU intubated. It was noted there sudden intermittent generalized body jerks mostly on tactile stimulation. Also, pt was hypotensive for which norepinephrine infusion started. Since her discharge here on 9/28 she has been admitted to multiple facilities recently to be treated for various issues including mucous plug leading to acute hypoxic respiratory failure, she pulled out her trach 9/4 so that was reinserted at [facility] and she was also treated for ESBL UTI while there, and last admit she was treated for likely aspiration pneumonia. She was transported here on Propofol. On examination, no cough, no gag, no corneal and minimal to no withdrawal.       Hospital Course: 10/5: s/p Cardiac Arrest on 10/4 and evaluation of anoxic brain injury   10/7: EEG with burst suppression pattern.  MRI with diffuse diffusion restriction  10/10: family meeting   10/11: SSEP  10/12: worsening myoclonus especially when moved. Valproic acid level undetectable likely due to interactions with meropenem. Patient on meropenem for ESBL  Klebsiella pneumonia sensitive to meropenem.  10/15: No events overnight.   10/16: No issues overnight  10/18: Trach collar  10/22: Continued hyperkalemia   10/23-30: Pending transfer to LTAC  10/31: Worsening tachypnea, placed back on ventilator   11/2: started glycopyrrolate, increased Detemir and Aspart, DC isolation, patient stable for discharge home  11/3: kayexalate for hyperK (5.2), labs  M/W/F, patient stable for discharge  11/4 Placed on EEG cap . Patient having R side  Facial twitching awaiting read on EEG.  11/5: EEG negative  11/6: awaiting medical equipment for pt to be dc home, dc glycopyrrolate, 2% nebs  11/8: dc home tomorrow, hospice orders placed  11/9: Hospice orders placed. Transfer to 7th floor while Awaiting completion of home preparations by daughter.   11/10: Awaiting home preparations by daughter.  11/11: Transferred to floor     Interval History: Stepdown to floor today. Awaiting placement.     Review of Systems: Unable to obtain a complete ROS due to level of consciousness.     Vitals:   Temp: 98.5 °F (36.9 °C) (11/11/17 1247)  Pulse: 91 (11/11/17 1426)  Resp: 18 (11/11/17 1426)  BP: 135/69 (11/11/17 1247)  SpO2: 98 % (11/11/17 1426)    Temp:  [97.5 °F (36.4 °C)-99.3 °F (37.4 °C)] 98.5 °F (36.9 °C)  Pulse:  [] 91  Resp:  [18-30] 18  SpO2:  [97 %-100 %] 98 %  BP: (121-182)/(58-82) 135/69         Oxygen Concentration (%):  [28] 28    11/10 0701 - 11/11 0700  In: 1300 [I.V.:160]  Out: 650 [Urine:650]     Physical Exam:  GA:comatose   HEENT: No scleral icterus or JVD.   Pulmonary: Clear to auscultation A/P/L. No wheezing, crackles, or rhonchi.   Cardiac: RRR S1 & S2 w/o rubs/murmurs/gallops.   Abdominal: Bowel sounds present x 4. No appreciable hepatosplenomegaly.  Skin: No jaundice, rashes, or visible lesions.  Neuro:  --GCS: E1 VT M3  --Mental Status:  comatose  --CN II-XII grossly intact.   --Pupils 3mm, PERRL.   --doesn't respond to pain in upper extremities, withdraws in  lowers    Unable to test orientation, language, memory, judgment, insight, fund of knowledge, hearing, shoulder shrug, tongue protrusion, coordination, gait due to level of consciousness.    Medications:   Continuous Scheduled  amLODIPine 10 mg Daily   aspirin 81 mg Daily   atorvastatin 10 mg Daily   carvedilol 12.5 mg BID   clonazePAM 2 mg Q8H   famotidine 20 mg BID   heparin (porcine) 7,500 Units Q8H   hydrALAZINE 100 mg Q8H   insulin aspart 3 Units Q4H   insulin detemir 6 Units BID   levETIRAcetam 1,500 mg BID   polyethylene glycol 17 g Daily   senna-docusate 8.6-50 mg 1 tablet Daily   sodium chloride 0.9% 10 mL Q6H   sodium chloride 3% 4 mL Q8H   sodium chloride 2 g Q8H   zonisamide 200 mg BID   PRN  acetaminophen 650 mg Q6H PRN   albuterol-ipratropium 2.5mg-0.5mg/3mL 3 mL Q6H PRN   dextrose 50% 12.5 g PRN   dextrose 50% 12.5 g PRN   glucagon (human recombinant) 1 mg PRN   insulin aspart 1-10 Units Q4H PRN   labetalol 10 mg Q4H PRN   ondansetron 4 mg Q8H PRN   pneumoc 13-finn conj-dip cr(PF) 0.5 mL vaccine x 1 dose   sodium chloride 0.9% 10 mL PRN      Today I independently reviewed pertinent medications, lab results,      Assessment/Plan:     Neuro   Status epilepticus    67 year old woman with bilateral watershed infarcts s/p PEG/trach during previous admission, who was at LTAC when she went into cardiac arrest. Suspected to be respiratory in origin. [place] [facility] was noted to be in myoclonic status and was transferred to [facility] for higher level care. On admission brainstem reflexes were absent. Family wanted to continue with full management.    -- myoclonic from anoxic brain injury  -- resolved with AEDs  -- clonazepam, levetiracetam, zonisamide  -- pending placement        Anoxic brain injury    -- from cardiac arrest        Cerebral infarction, watershed distribution, bilateral, acute    -- from 6/2017  -- ASA 81mg daily  -- atorvastatin 10mg daily          [place] coma scale total score  3-8    -- from anoxic brain injury        ENT   Tracheostomy status    -- s/p Trach PEG during previous admission on 7/12/2017  -- Oxygen Concentration (%):  [28] 28  -- Trach collar   -- 3% nebs for secretions         Cardiac/Vascular   Hyperlipidemia    --Atorvastatin 10 daily        Essential hypertension    -- -180  -- hydralazine, carvedilol, amlodipine  -- prn labetolol and hydralazine        Renal/   Hyponatremia    -- salt tabs  -- now normal and stable        GI   PEG (percutaneous endoscopic gastrostomy) status    -- Gastrostomy tube place during previous admission on 7/12/17            Prophylaxis:  Venous Thromboembolism: chemical  Stress Ulcer: H2B  Ventilator Pneumonia: not applicable     Activity Orders          Bed rest starting at 10/03 1510        Full Code    Jessa Dickson PA-C  Neurocritical Care  [facility]
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
## 2017-11-11 NOTE — ASSESSMENT & PLAN NOTE
-- s/p Trach PEG during previous admission on 7/12/2017  -- Oxygen Concentration (%):  [28] 28  -- Trach collar   -- 3% nebs for secretions

## 2017-11-11 NOTE — PROGRESS NOTES
[facility]  Neurocritical Care  Progress Note    Admit Date: 10/3/2017  Service Date: 11/10/2017  Length of Stay: 38    Subjective:     Chief Complaint: Seizure    History of Present Illness: Ms. Frank is a 68 yo woman with a PMHx DM type II, CKD stage 3, CAD s/p CABG, essential hypertension, cerebrovascular disease s/p bi-hemispheric watershed infarcts and trach/PEG who present as a transfer from [facility] to Neuro Critical Care s/p Cardiac Arrest on 10/4 and evaluation of Anoxic Brain Injury. She was recently discharged from [facility] [place] 9/28/2017 with bilateral MCA SAQIB watershed infarcts. Yesterday, she was brought in via EMS from [facility] for cardiac arrest. Per nursing homestaff, she received chest compressions for about 5 minutes. After EMS arrived, ACLS protocol continued and she was given 2 rounds of epi, non-shockable rhythm. ROSC achieved en route to the ER.Pt arrived to ICU intubated. It was noted there sudden intermittent generalized body jerks mostly on tactile stimulation. Also, pt was hypotensive for which norepinephrine infusion started. Since her discharge here on 9/28 she has been admitted to multiple facilities recently to be treated for various issues including mucous plug leading to acute hypoxic respiratory failure, she pulled out her trach 9/4 so that was reinserted at [facility] and she was also treated for ESBL UTI while there, and last admit she was treated for likely aspiration pneumonia. She was transported here on Propofol. On examination, no cough, no gag, no corneal and minimal to no withdrawal.       Hospital Course: 10/5: s/p Cardiac Arrest on 10/4 and evaluation of anoxic brain injury   10/7: EEG with burst suppression pattern.  MRI with diffuse diffusion restriction  10/10: family meeting   10/11: SSEP  10/12: worsening myoclonus especially when moved. Valproic acid level undetectable likely due to interactions with meropenem. Patient on meropenem for ESBL  Klebsiella pneumonia sensitive to meropenem.  10/15: No events overnight.   10/16: No issues overnight  10/18: Trach collar  10/22: Continued hyperkalemia   10/23-30: Pending transfer to LTAC  10/31: Worsening tachypnea, placed back on ventilator   11/2: started glycopyrrolate, increased Detemir and Aspart, DC isolation, patient stable for discharge home  11/3: kayexalate for hyperK (5.2), labs  M/W/F, patient stable for discharge  11/4 Placed on EEG cap . Patient having R side  Facial twitching awaiting read on EEG.  11/5: EEG negative  11/6: awaiting medical equipment for pt to be dc home, dc glycopyrrolate, 2% nebs  11/8: dc home tomorrow, hospice orders placed  11/9: Hospice orders placed. Transfer to 7th floor while Awaiting completion of home preparations by daughter.   11/10: Awaiting home preparations by daughter.    Interval History:  No acute events overnight    Review of Systems  Unable to obtain a complete ROS due to level of consciousness.    Objective:     Vitals:  Temp: 98.6 °F (37 °C) (11/10/17 1100)  Pulse: 91 (11/10/17 1400)  Resp: 19 (11/10/17 1400)  BP: (!) 145/69 (11/10/17 1400)  SpO2: 100 % (11/10/17 1400)    Temp:  [98.2 °F (36.8 °C)-99.5 °F (37.5 °C)] 98.6 °F (37 °C)  Pulse:  [] 91  Resp:  [15-33] 19  SpO2:  [97 %-100 %] 100 %  BP: (119-162)/(56-77) 145/69         Oxygen Concentration (%):  [28] 28  Resp Rate Total:  [15 br/min-24 br/min] 15 br/min    11/09 0701 - 11/10 0700  In: 1465 [I.V.:230]  Out: 950 [Urine:950]    Physical Exam  Physical Exam:  GA:comatose   HEENT: No scleral icterus or JVD.   Pulmonary: Clear to auscultation A/P/L. No wheezing, crackles, or rhonchi.   Cardiac: RRR S1 & S2 w/o rubs/murmurs/gallops.   Abdominal: Bowel sounds present x 4. No appreciable hepatosplenomegaly.  Skin: No jaundice, rashes, or visible lesions.  Neuro:  --GCS: E1 VT M3  --Mental Status:  comatose  --CN II-XII grossly intact.   --Pupils 3mm, PERRL.   --doesn't respond to pain in upper  extremities, withdraws in lowers  Unable to test orientation, language, memory, judgment, insight, fund of knowledge, hearing, shoulder shrug, tongue protrusion, coordination, gait due to level of consciousness.    Medications:  Continuous Scheduled    amLODIPine 10 mg Daily   aspirin 81 mg Daily   atorvastatin 10 mg Daily   carvedilol 12.5 mg BID   clonazePAM 2 mg Q8H   famotidine 20 mg BID   heparin (porcine) 7,500 Units Q8H   hydrALAZINE 100 mg Q8H   insulin aspart 3 Units Q4H   insulin detemir 6 Units BID   levETIRAcetam 1,500 mg BID   polyethylene glycol 17 g Daily   senna-docusate 8.6-50 mg 1 tablet Daily   sodium chloride 0.9% 10 mL Q6H   sodium chloride 3% 4 mL Q8H   sodium chloride 2 g Q8H   zonisamide 200 mg BID   PRN    acetaminophen 650 mg Q6H PRN   albuterol-ipratropium 2.5mg-0.5mg/3mL 3 mL Q6H PRN   dextrose 50% 12.5 g PRN   dextrose 50% 12.5 g PRN   glucagon (human recombinant) 1 mg PRN   hydrALAZINE 10 mg Q4H PRN   insulin aspart 1-10 Units Q4H PRN   labetalol 10 mg Q4H PRN   magnesium sulfate IVPB 2 g PRN   magnesium sulfate IVPB 4 g PRN   ondansetron 4 mg Q8H PRN   pneumoc 13-finn conj-dip cr(PF) 0.5 mL vaccine x 1 dose   potassium, sodium phosphates 2 packet PRN   sodium chloride 0.9% 10 mL PRN     Today I personally reviewed pertinent medications, lines/drains/airways, imaging, cardiology, lab results, microbiology results,    Assessment/Plan:     Neuro   * Seizure    Patient with myoclonic jerks in upper extremity with noxious stimuli, likely representing diffuse cortical damage.  vEEG with decreased activity.  -- Continue Neuro checks q 1hr  -- Seizure Precautions  -- Continue Clonazepam 2mg Q8  -- Keppra 1500mg bid  -- Zonisamide 200mg bid  -- no further electrographic seizure activity  -- transfered to floor, transfer to home once daughter has prepared home, hospice orders placed        Anoxic brain injury    -- from cardiac arrest        Cerebral infarction, watershed distribution, bilateral,  acute    -- from 6/2017  -- ASA 81mg daily  -- atorvastatin 10mg daily          Eve coma scale total score 3-8    -- from anoxic brain injury        ENT   Tracheostomy status    -- s/p Trach PEG during previous admission on 7/12/2017  -- Oxygen Concentration (%):  [28] 28  Resp Rate Total:  [15 br/min-24 br/min] 15 br/min  -- spontaneous   -- 2% nebs        Cardiac/Vascular   Essential hypertension    -- -180  -- hydralazine, carvedilol, amlodipine  -- prn labetolol and hydralazine        Endocrine   Type 2 diabetes mellitus, uncontrolled    -- Detemir  6 units BID  -- Aspart  to 3 units  -- ISS-moderate            Prophylaxis:  Venous Thromboembolism: chemical  Stress Ulcer: H2B  Ventilator Pneumonia: no     Activity Orders          Bed rest starting at 10/03 1510        Full Code    FATMATA ChesterC  Neurocritical Care  [facility]

## 2017-11-11 NOTE — PLAN OF CARE
Problem: Patient Care Overview  Goal: Plan of Care Review  Outcome: Ongoing (interventions implemented as appropriate)  POC reviewed with pt at 0300. Pt did not verbalized  Understanding due to condition. Questions and concerns not addressed. No acute events overnight. Pt progressing toward goals. Will continue to monitor. See flowsheets for full assessment and VS info

## 2017-11-11 NOTE — PROGRESS NOTES
Pt transported to NSU via bed with telemetry, O2, emergency equipment by RN and PCT. VSS Pt tolerated transport well. Report received by Diego Vasquez. Will continue to monitor.

## 2017-11-12 PROBLEM — E87.0 HYPERNATREMIA: Status: ACTIVE | Noted: 2017-10-19

## 2017-11-12 LAB
ALBUMIN SERPL BCP-MCNC: 2.3 G/DL
ALP SERPL-CCNC: 146 U/L
ALT SERPL W/O P-5'-P-CCNC: 32 U/L
ANION GAP SERPL CALC-SCNC: 6 MMOL/L
AST SERPL-CCNC: 27 U/L
BASOPHILS # BLD AUTO: 0.03 K/UL
BASOPHILS NFR BLD: 0.3 %
BILIRUB SERPL-MCNC: 0.3 MG/DL
BUN SERPL-MCNC: 44 MG/DL
CALCIUM SERPL-MCNC: 9.5 MG/DL
CHLORIDE SERPL-SCNC: 118 MMOL/L
CO2 SERPL-SCNC: 26 MMOL/L
CREAT SERPL-MCNC: 0.9 MG/DL
DIFFERENTIAL METHOD: ABNORMAL
EOSINOPHIL # BLD AUTO: 0.1 K/UL
EOSINOPHIL NFR BLD: 0.8 %
ERYTHROCYTE [DISTWIDTH] IN BLOOD BY AUTOMATED COUNT: 16.7 %
EST. GFR  (AFRICAN AMERICAN): >60 ML/MIN/1.73 M^2
EST. GFR  (NON AFRICAN AMERICAN): >60 ML/MIN/1.73 M^2
GLUCOSE SERPL-MCNC: 187 MG/DL
HCT VFR BLD AUTO: 25.3 %
HGB BLD-MCNC: 7.5 G/DL
IMM GRANULOCYTES # BLD AUTO: 0.33 K/UL
IMM GRANULOCYTES NFR BLD AUTO: 2.8 %
LYMPHOCYTES # BLD AUTO: 2.3 K/UL
LYMPHOCYTES NFR BLD: 18.9 %
MAGNESIUM SERPL-MCNC: 2.7 MG/DL
MCH RBC QN AUTO: 27.9 PG
MCHC RBC AUTO-ENTMCNC: 29.6 G/DL
MCV RBC AUTO: 94 FL
MONOCYTES # BLD AUTO: 1 K/UL
MONOCYTES NFR BLD: 8 %
NEUTROPHILS # BLD AUTO: 8.2 K/UL
NEUTROPHILS NFR BLD: 69.2 %
NRBC BLD-RTO: 0 /100 WBC
PHOSPHATE SERPL-MCNC: 4.2 MG/DL
PLATELET # BLD AUTO: 254 K/UL
PMV BLD AUTO: 11.4 FL
POCT GLUCOSE: 136 MG/DL (ref 70–110)
POCT GLUCOSE: 165 MG/DL (ref 70–110)
POCT GLUCOSE: 165 MG/DL (ref 70–110)
POCT GLUCOSE: 166 MG/DL (ref 70–110)
POCT GLUCOSE: 174 MG/DL (ref 70–110)
POCT GLUCOSE: 180 MG/DL (ref 70–110)
POTASSIUM SERPL-SCNC: 4.9 MMOL/L
PROT SERPL-MCNC: 7.5 G/DL
RBC # BLD AUTO: 2.69 M/UL
SODIUM SERPL-SCNC: 150 MMOL/L
WBC # BLD AUTO: 11.88 K/UL

## 2017-11-12 PROCEDURE — 99900026 HC AIRWAY MAINTENANCE (STAT)

## 2017-11-12 PROCEDURE — 84100 ASSAY OF PHOSPHORUS: CPT

## 2017-11-12 PROCEDURE — 80053 COMPREHEN METABOLIC PANEL: CPT

## 2017-11-12 PROCEDURE — 25000003 PHARM REV CODE 250: Performed by: PHYSICIAN ASSISTANT

## 2017-11-12 PROCEDURE — 25000003 PHARM REV CODE 250: Performed by: STUDENT IN AN ORGANIZED HEALTH CARE EDUCATION/TRAINING PROGRAM

## 2017-11-12 PROCEDURE — 25000003 PHARM REV CODE 250: Performed by: EMERGENCY MEDICINE

## 2017-11-12 PROCEDURE — 99232 SBSQ HOSP IP/OBS MODERATE 35: CPT | Mod: GC,,, | Performed by: HOSPITALIST

## 2017-11-12 PROCEDURE — 94640 AIRWAY INHALATION TREATMENT: CPT

## 2017-11-12 PROCEDURE — 25000242 PHARM REV CODE 250 ALT 637 W/ HCPCS: Performed by: PHYSICIAN ASSISTANT

## 2017-11-12 PROCEDURE — 85025 COMPLETE CBC W/AUTO DIFF WBC: CPT

## 2017-11-12 PROCEDURE — 63600175 PHARM REV CODE 636 W HCPCS: Performed by: EMERGENCY MEDICINE

## 2017-11-12 PROCEDURE — 63600175 PHARM REV CODE 636 W HCPCS: Performed by: INTERNAL MEDICINE

## 2017-11-12 PROCEDURE — 36415 COLL VENOUS BLD VENIPUNCTURE: CPT

## 2017-11-12 PROCEDURE — A4216 STERILE WATER/SALINE, 10 ML: HCPCS | Performed by: EMERGENCY MEDICINE

## 2017-11-12 PROCEDURE — 20600001 HC STEP DOWN PRIVATE ROOM

## 2017-11-12 PROCEDURE — 83735 ASSAY OF MAGNESIUM: CPT

## 2017-11-12 PROCEDURE — 27000221 HC OXYGEN, UP TO 24 HOURS

## 2017-11-12 PROCEDURE — 94761 N-INVAS EAR/PLS OXIMETRY MLT: CPT

## 2017-11-12 RX ORDER — IBUPROFEN 200 MG
24 TABLET ORAL
Status: DISCONTINUED | OUTPATIENT
Start: 2017-11-12 | End: 2017-11-15 | Stop reason: HOSPADM

## 2017-11-12 RX ORDER — INSULIN ASPART 100 [IU]/ML
4 INJECTION, SOLUTION INTRAVENOUS; SUBCUTANEOUS EVERY 6 HOURS
Status: DISCONTINUED | OUTPATIENT
Start: 2017-11-12 | End: 2017-11-15 | Stop reason: HOSPADM

## 2017-11-12 RX ORDER — INSULIN ASPART 100 [IU]/ML
1-10 INJECTION, SOLUTION INTRAVENOUS; SUBCUTANEOUS
Status: DISCONTINUED | OUTPATIENT
Start: 2017-11-12 | End: 2017-11-15 | Stop reason: HOSPADM

## 2017-11-12 RX ORDER — GLUCAGON 1 MG
1 KIT INJECTION
Status: DISCONTINUED | OUTPATIENT
Start: 2017-11-12 | End: 2017-11-15 | Stop reason: HOSPADM

## 2017-11-12 RX ORDER — IBUPROFEN 200 MG
16 TABLET ORAL
Status: DISCONTINUED | OUTPATIENT
Start: 2017-11-12 | End: 2017-11-15 | Stop reason: HOSPADM

## 2017-11-12 RX ADMIN — INSULIN ASPART 1 UNITS: 100 INJECTION, SOLUTION INTRAVENOUS; SUBCUTANEOUS at 02:11

## 2017-11-12 RX ADMIN — Medication 10 ML: at 12:11

## 2017-11-12 RX ADMIN — CLONAZEPAM 2 MG: 1 TABLET ORAL at 05:11

## 2017-11-12 RX ADMIN — CARVEDILOL 12.5 MG: 12.5 TABLET, FILM COATED ORAL at 09:11

## 2017-11-12 RX ADMIN — HYDRALAZINE HYDROCHLORIDE 100 MG: 50 TABLET ORAL at 05:11

## 2017-11-12 RX ADMIN — FAMOTIDINE 20 MG: 20 TABLET, FILM COATED ORAL at 08:11

## 2017-11-12 RX ADMIN — LEVETIRACETAM 1500 MG: 750 TABLET ORAL at 08:11

## 2017-11-12 RX ADMIN — LEVETIRACETAM 1500 MG: 750 TABLET ORAL at 09:11

## 2017-11-12 RX ADMIN — SODIUM CHLORIDE SOLN NEBU 3% 4 ML: 3 NEBU SOLN at 07:11

## 2017-11-12 RX ADMIN — HYDRALAZINE HYDROCHLORIDE 100 MG: 50 TABLET ORAL at 09:11

## 2017-11-12 RX ADMIN — SODIUM CHLORIDE SOLN NEBU 3% 4 ML: 3 NEBU SOLN at 04:11

## 2017-11-12 RX ADMIN — INSULIN DETEMIR 6 UNITS: 100 INJECTION, SOLUTION SUBCUTANEOUS at 08:11

## 2017-11-12 RX ADMIN — HEPARIN SODIUM 7500 UNITS: 5000 INJECTION, SOLUTION INTRAVENOUS; SUBCUTANEOUS at 09:11

## 2017-11-12 RX ADMIN — CLONAZEPAM 2 MG: 1 TABLET ORAL at 09:11

## 2017-11-12 RX ADMIN — CLONAZEPAM 2 MG: 1 TABLET ORAL at 02:11

## 2017-11-12 RX ADMIN — HEPARIN SODIUM 7500 UNITS: 5000 INJECTION, SOLUTION INTRAVENOUS; SUBCUTANEOUS at 05:11

## 2017-11-12 RX ADMIN — SODIUM CHLORIDE TAB 1 GM 2 G: 1 TAB at 05:11

## 2017-11-12 RX ADMIN — INSULIN ASPART 2 UNITS: 100 INJECTION, SOLUTION INTRAVENOUS; SUBCUTANEOUS at 09:11

## 2017-11-12 RX ADMIN — INSULIN ASPART 3 UNITS: 100 INJECTION, SOLUTION INTRAVENOUS; SUBCUTANEOUS at 02:11

## 2017-11-12 RX ADMIN — ZONISAMIDE 200 MG: 100 CAPSULE ORAL at 09:11

## 2017-11-12 RX ADMIN — INSULIN ASPART 4 UNITS: 100 INJECTION, SOLUTION INTRAVENOUS; SUBCUTANEOUS at 11:11

## 2017-11-12 RX ADMIN — ASPIRIN 81 MG CHEWABLE TABLET 81 MG: 81 TABLET CHEWABLE at 08:11

## 2017-11-12 RX ADMIN — AMLODIPINE BESYLATE 10 MG: 10 TABLET ORAL at 11:11

## 2017-11-12 RX ADMIN — INSULIN DETEMIR 9 UNITS: 100 INJECTION, SOLUTION SUBCUTANEOUS at 09:11

## 2017-11-12 RX ADMIN — INSULIN ASPART 4 UNITS: 100 INJECTION, SOLUTION INTRAVENOUS; SUBCUTANEOUS at 05:11

## 2017-11-12 RX ADMIN — HYDRALAZINE HYDROCHLORIDE 100 MG: 50 TABLET ORAL at 02:11

## 2017-11-12 RX ADMIN — POLYETHYLENE GLYCOL 3350 17 G: 17 POWDER, FOR SOLUTION ORAL at 08:11

## 2017-11-12 RX ADMIN — SODIUM CHLORIDE SOLN NEBU 3% 4 ML: 3 NEBU SOLN at 11:11

## 2017-11-12 RX ADMIN — INSULIN ASPART 3 UNITS: 100 INJECTION, SOLUTION INTRAVENOUS; SUBCUTANEOUS at 09:11

## 2017-11-12 RX ADMIN — STANDARDIZED SENNA CONCENTRATE AND DOCUSATE SODIUM 1 TABLET: 8.6; 5 TABLET, FILM COATED ORAL at 09:11

## 2017-11-12 RX ADMIN — INSULIN ASPART 2 UNITS: 100 INJECTION, SOLUTION INTRAVENOUS; SUBCUTANEOUS at 05:11

## 2017-11-12 RX ADMIN — CARVEDILOL 12.5 MG: 12.5 TABLET, FILM COATED ORAL at 08:11

## 2017-11-12 RX ADMIN — ZONISAMIDE 200 MG: 100 CAPSULE ORAL at 08:11

## 2017-11-12 RX ADMIN — Medication 10 ML: at 05:11

## 2017-11-12 RX ADMIN — INSULIN ASPART 3 UNITS: 100 INJECTION, SOLUTION INTRAVENOUS; SUBCUTANEOUS at 05:11

## 2017-11-12 RX ADMIN — ATORVASTATIN CALCIUM 10 MG: 10 TABLET, FILM COATED ORAL at 08:11

## 2017-11-12 RX ADMIN — INSULIN DETEMIR 9 UNITS: 100 INJECTION, SOLUTION SUBCUTANEOUS at 11:11

## 2017-11-12 RX ADMIN — HEPARIN SODIUM 7500 UNITS: 5000 INJECTION, SOLUTION INTRAVENOUS; SUBCUTANEOUS at 02:11

## 2017-11-12 NOTE — ASSESSMENT & PLAN NOTE
Cardiac arrest. ROSC about 10 min after chest compressions initiated. 2 rounds of epi. No shock delivered. Cardiology consulted. Echo hyperdynamic with EF 75%, no DD. ASA/statin. No BB/ACEi as BP will not tolerate. She did not appear to have an MI given EKG and troponins. Probably precipitated by hypoxia. Maintain body temp 32-34 x48 hours.   - Cardiac arrest likely in setting of respiratory failure 10/04 with anoxic brain injury superimposed on prior strokes.  - Continuing aspirin 81mg per G daily, carvedilol 12.5mg per G BID.  - Given her significant debility and conversations with family regarding hospice placement, will hold longer-term preventive medication such as atorvastatin.

## 2017-11-12 NOTE — ASSESSMENT & PLAN NOTE
- Will increase insulin detemir from 6U subQ BID to 9U subQ BID, insulin aspart from 3U subQ q4hr to 4U subQ q6hr with additional moderate-dose sliding scale insulin 1-10U subQ q6hr PRN.

## 2017-11-12 NOTE — ASSESSMENT & PLAN NOTE
- Chronic respiratory failure given large area of cerebral infarct, cardiac arrest; now s/p tracheostomy and tolerating T-collar well.

## 2017-11-12 NOTE — ASSESSMENT & PLAN NOTE
- Continuing amlodipine 10mg per G daily, carvedilol as under cardiac arrest, hydralazine 100mg per G q8hr.

## 2017-11-12 NOTE — SUBJECTIVE & OBJECTIVE
Interval History: No acute events overnight. Patient waxes/wanes with minimal responsiveness on exam. No family at bedside.    Review of Systems   Unable to perform ROS: Patient nonverbal     Objective:     Vital Signs (Most Recent):  Temp: 98.8 °F (37.1 °C) (11/12/17 0712)  Pulse: 98 (11/12/17 0758)  Resp: 20 (11/12/17 0758)  BP: (!) 164/72 (11/12/17 0712)  SpO2: 99 % (11/12/17 0758) Vital Signs (24h Range):  Temp:  [97.5 °F (36.4 °C)-99.1 °F (37.3 °C)] 98.8 °F (37.1 °C)  Pulse:  [] 98  Resp:  [18-20] 20  SpO2:  [94 %-99 %] 99 %  BP: (135-200)/(65-90) 164/72     Weight: 130.5 kg (287 lb 11.2 oz)  Body mass index is 46.44 kg/m².    Intake/Output Summary (Last 24 hours) at 11/12/17 0942  Last data filed at 11/12/17 0530   Gross per 24 hour   Intake             1257 ml   Output                2 ml   Net             1255 ml      Physical Exam   Constitutional: She appears well-developed. No distress.   Obese   HENT:   Head: Normocephalic and atraumatic.   Eyes: Conjunctivae are normal. Pupils are equal, round, and reactive to light.   Cardiovascular: Normal rate, regular rhythm, S1 normal, S2 normal and intact distal pulses.    Pulmonary/Chest: Effort normal and breath sounds normal.   Tracheostomy in place with T-collar.   Abdominal: Soft. Bowel sounds are normal. She exhibits no distension. There is no tenderness.   PEG tube in place.   Musculoskeletal: She exhibits no tenderness or deformity.   Neurological:   E1VTM4; minimal withdrawal to pain BLE   Skin: Skin is warm and dry.   Nursing note and vitals reviewed.    Significant Labs:   Recent Results (from the past 24 hour(s))   POCT glucose    Collection Time: 11/11/17 11:04 AM   Result Value Ref Range    POCT Glucose 190 (H) 70 - 110 mg/dL   POCT glucose    Collection Time: 11/11/17  2:56 PM   Result Value Ref Range    POCT Glucose 178 (H) 70 - 110 mg/dL   POCT glucose    Collection Time: 11/11/17  6:12 PM   Result Value Ref Range    POCT Glucose 166 (H) 70  - 110 mg/dL   POCT glucose    Collection Time: 11/11/17 10:20 PM   Result Value Ref Range    POCT Glucose 165 (H) 70 - 110 mg/dL   POCT glucose    Collection Time: 11/12/17  2:35 AM   Result Value Ref Range    POCT Glucose 174 (H) 70 - 110 mg/dL   POCT glucose    Collection Time: 11/12/17  5:22 AM   Result Value Ref Range    POCT Glucose 165 (H) 70 - 110 mg/dL   CBC auto differential    Collection Time: 11/12/17  6:37 AM   Result Value Ref Range    WBC 11.88 3.90 - 12.70 K/uL    RBC 2.69 (L) 4.00 - 5.40 M/uL    Hemoglobin 7.5 (L) 12.0 - 16.0 g/dL    Hematocrit 25.3 (L) 37.0 - 48.5 %    MCV 94 82 - 98 fL    MCH 27.9 27.0 - 31.0 pg    MCHC 29.6 (L) 32.0 - 36.0 g/dL    RDW 16.7 (H) 11.5 - 14.5 %    Platelets 254 150 - 350 K/uL    MPV 11.4 9.2 - 12.9 fL    Immature Granulocytes 2.8 (H) 0.0 - 0.5 %    Gran # 8.2 (H) 1.8 - 7.7 K/uL    Immature Grans (Abs) 0.33 (H) 0.00 - 0.04 K/uL    Lymph # 2.3 1.0 - 4.8 K/uL    Mono # 1.0 0.3 - 1.0 K/uL    Eos # 0.1 0.0 - 0.5 K/uL    Baso # 0.03 0.00 - 0.20 K/uL    nRBC 0 0 /100 WBC    Gran% 69.2 38.0 - 73.0 %    Lymph% 18.9 18.0 - 48.0 %    Mono% 8.0 4.0 - 15.0 %    Eosinophil% 0.8 0.0 - 8.0 %    Basophil% 0.3 0.0 - 1.9 %    Differential Method Automated    Comprehensive metabolic panel    Collection Time: 11/12/17  6:37 AM   Result Value Ref Range    Sodium 150 (H) 136 - 145 mmol/L    Potassium 4.9 3.5 - 5.1 mmol/L    Chloride 118 (H) 95 - 110 mmol/L    CO2 26 23 - 29 mmol/L    Glucose 187 (H) 70 - 110 mg/dL    BUN, Bld 44 (H) 8 - 23 mg/dL    Creatinine 0.9 0.5 - 1.4 mg/dL    Calcium 9.5 8.7 - 10.5 mg/dL    Total Protein 7.5 6.0 - 8.4 g/dL    Albumin 2.3 (L) 3.5 - 5.2 g/dL    Total Bilirubin 0.3 0.1 - 1.0 mg/dL    Alkaline Phosphatase 146 (H) 55 - 135 U/L    AST 27 10 - 40 U/L    ALT 32 10 - 44 U/L    Anion Gap 6 (L) 8 - 16 mmol/L    eGFR if African American >60.0 >60 mL/min/1.73 m^2    eGFR if non African American >60.0 >60 mL/min/1.73 m^2   Magnesium    Collection Time: 11/12/17   6:37 AM   Result Value Ref Range    Magnesium 2.7 (H) 1.6 - 2.6 mg/dL   Phosphorus    Collection Time: 11/12/17  6:37 AM   Result Value Ref Range    Phosphorus 4.2 2.7 - 4.5 mg/dL   POCT glucose    Collection Time: 11/12/17  9:32 AM   Result Value Ref Range    POCT Glucose 180 (H) 70 - 110 mg/dL     Significant Imaging:   No new imaging this morning.

## 2017-11-12 NOTE — PROGRESS NOTES
VSS. Pt opening eyes spontaneously, pupils 3 brisk equal reactive, L and R corneal reflexes present, gag and couch reflexes present. Pt resting comfortably. VSS. TF at goal 45. Will continue to monitor.

## 2017-11-12 NOTE — ASSESSMENT & PLAN NOTE
- Continuing levetiracetam 1500mg per G BID, zonisamide 200mg per G BID, clonazepam 2mg per G q8hr.

## 2017-11-12 NOTE — ASSESSMENT & PLAN NOTE
- h/o multiple strokes with significant residual deficits; now with significant debility after strokes and subsequent cardiac arrest.  - Medications as under cardiac arrest.

## 2017-11-12 NOTE — PLAN OF CARE
Hospital Medicine Consult Service  Case discussed with PRITI Sanches from Neurocritical team. Patient to be accepted to hospital medicine service and [facility] medicine to assume care of patient tomorrow morning at 7:00 am if patient is out of the Neuro ICU and on the floor. Patient to be placed on IMT list this evening and be reassigned to hospital medicine service for the am. Please see Neuro ICU stepdown note for full details on patient's hospital course.     Patient is awaiting home hospice placement after cardiac arrest with anoxic brain injury.    SHAWN ZHANG MD  Attending Staff Physician   [facility] Medicine  Pager: [phone]  Spectralink: [phone]

## 2017-11-12 NOTE — PROGRESS NOTES
[facility] Hospital Medicine  Progress Note    Patient Name: Nisa Frank  MRN: [id number]  Patient Class: IP- Inpatient   Admission Date: 10/3/2017  Length of Stay: 40 days  Attending Physician: Ligia Sorto MD  Primary Care Provider: Atilio Downs MD    [facility] Medicine Team: [facility] HOSP MED 3 D Tony Vidal MD    Subjective:     Principal Problem:Seizure    HPI:  Ms. Frank is a 66 yo woman with diabetes mellitus type 2 (A1c 7.2%), CKD stage 3, CAD s/p CABG, essential hypertension, cerebrovascular disease s/p bi-hemispheric watershed infarcts and trach/PEG status who was brought in via EMS from [facility] for cardiac arrest. Per NH staff, she received chest compressions for about 5 minutes. After EMS arrived, ACLS protocol continued and she was given 2 rounds of epi, non-shockable rhythm. ROSC achieved en route to the ER.    She was discharged from [facility] 9/28/2017 after a two week admission where she was initially admitted to our facility and then transferred to [facility], essentially for a second opinion. She has been admitted to multiple facilities recently to be treated for various issues including mucous plug leading to acute hypoxic respiratory failure, she pulled out her trach 9/4 so that was reinserted at [facility] and she was also treated for ESBL UTI while there, and last admit she was treated for likely aspiration pneumonia. Family was concerned for repeat stroke as she had decline in alertness after trach reinserted at [facility] [place]. MRI and EEG during her last admission showed no acute process.    Her daughter/POA, Shayna, can be reached at [phone].    Hospital Course:  Shortly after arriving in the ICU she developed myoclonic jerks, initially only when stimulated and then continuously even without stimulation. This was thought to be due to anoxic brain injury vs seizure. She was started on Levetiracetam 3000 mg IV x1 then 1500 mg twice daily. Jerks improved with propofol that  was started overnight. EEG 10/4/2017 was consistent with seizure. She was given ativan and had clinical improvement. Transfer orders to [facility] for continuous EEG monitoring placed.  The etiology of her cardiac arrest was not clear. EKG with no acute change. Echo with preserved EF. She did not appear to have had an MI. Suspect possible mucous plug with desaturation.    Interval History: No acute events overnight. Patient waxes/wanes with minimal responsiveness on exam. No family at bedside.    Review of Systems   Unable to perform ROS: Patient nonverbal     Objective:     Vital Signs (Most Recent):  Temp: 98.8 °F (37.1 °C) (11/12/17 0712)  Pulse: 98 (11/12/17 0758)  Resp: 20 (11/12/17 0758)  BP: (!) 164/72 (11/12/17 0712)  SpO2: 99 % (11/12/17 0758) Vital Signs (24h Range):  Temp:  [97.5 °F (36.4 °C)-99.1 °F (37.3 °C)] 98.8 °F (37.1 °C)  Pulse:  [] 98  Resp:  [18-20] 20  SpO2:  [94 %-99 %] 99 %  BP: (135-200)/(65-90) 164/72     Weight: 130.5 kg (287 lb 11.2 oz)  Body mass index is 46.44 kg/m².    Intake/Output Summary (Last 24 hours) at 11/12/17 0942  Last data filed at 11/12/17 0530   Gross per 24 hour   Intake             1257 ml   Output                2 ml   Net             1255 ml      Physical Exam   Constitutional: She appears well-developed. No distress.   Obese   HENT:   Head: Normocephalic and atraumatic.   Eyes: Conjunctivae are normal. Pupils are equal, round, and reactive to light.   Cardiovascular: Normal rate, regular rhythm, S1 normal, S2 normal and intact distal pulses.    Pulmonary/Chest: Effort normal and breath sounds normal.   Tracheostomy in place with T-collar.   Abdominal: Soft. Bowel sounds are normal. She exhibits no distension. There is no tenderness.   PEG tube in place.   Musculoskeletal: She exhibits no tenderness or deformity.   Neurological:   E1VTM4; minimal withdrawal to pain BLE   Skin: Skin is warm and dry.   Nursing note and vitals reviewed.    Significant Labs:   Recent  Results (from the past 24 hour(s))   POCT glucose    Collection Time: 11/11/17 11:04 AM   Result Value Ref Range    POCT Glucose 190 (H) 70 - 110 mg/dL   POCT glucose    Collection Time: 11/11/17  2:56 PM   Result Value Ref Range    POCT Glucose 178 (H) 70 - 110 mg/dL   POCT glucose    Collection Time: 11/11/17  6:12 PM   Result Value Ref Range    POCT Glucose 166 (H) 70 - 110 mg/dL   POCT glucose    Collection Time: 11/11/17 10:20 PM   Result Value Ref Range    POCT Glucose 165 (H) 70 - 110 mg/dL   POCT glucose    Collection Time: 11/12/17  2:35 AM   Result Value Ref Range    POCT Glucose 174 (H) 70 - 110 mg/dL   POCT glucose    Collection Time: 11/12/17  5:22 AM   Result Value Ref Range    POCT Glucose 165 (H) 70 - 110 mg/dL   CBC auto differential    Collection Time: 11/12/17  6:37 AM   Result Value Ref Range    WBC 11.88 3.90 - 12.70 K/uL    RBC 2.69 (L) 4.00 - 5.40 M/uL    Hemoglobin 7.5 (L) 12.0 - 16.0 g/dL    Hematocrit 25.3 (L) 37.0 - 48.5 %    MCV 94 82 - 98 fL    MCH 27.9 27.0 - 31.0 pg    MCHC 29.6 (L) 32.0 - 36.0 g/dL    RDW 16.7 (H) 11.5 - 14.5 %    Platelets 254 150 - 350 K/uL    MPV 11.4 9.2 - 12.9 fL    Immature Granulocytes 2.8 (H) 0.0 - 0.5 %    Gran # 8.2 (H) 1.8 - 7.7 K/uL    Immature Grans (Abs) 0.33 (H) 0.00 - 0.04 K/uL    Lymph # 2.3 1.0 - 4.8 K/uL    Mono # 1.0 0.3 - 1.0 K/uL    Eos # 0.1 0.0 - 0.5 K/uL    Baso # 0.03 0.00 - 0.20 K/uL    nRBC 0 0 /100 WBC    Gran% 69.2 38.0 - 73.0 %    Lymph% 18.9 18.0 - 48.0 %    Mono% 8.0 4.0 - 15.0 %    Eosinophil% 0.8 0.0 - 8.0 %    Basophil% 0.3 0.0 - 1.9 %    Differential Method Automated    Comprehensive metabolic panel    Collection Time: 11/12/17  6:37 AM   Result Value Ref Range    Sodium 150 (H) 136 - 145 mmol/L    Potassium 4.9 3.5 - 5.1 mmol/L    Chloride 118 (H) 95 - 110 mmol/L    CO2 26 23 - 29 mmol/L    Glucose 187 (H) 70 - 110 mg/dL    BUN, Bld 44 (H) 8 - 23 mg/dL    Creatinine 0.9 0.5 - 1.4 mg/dL    Calcium 9.5 8.7 - 10.5 mg/dL    Total  Protein 7.5 6.0 - 8.4 g/dL    Albumin 2.3 (L) 3.5 - 5.2 g/dL    Total Bilirubin 0.3 0.1 - 1.0 mg/dL    Alkaline Phosphatase 146 (H) 55 - 135 U/L    AST 27 10 - 40 U/L    ALT 32 10 - 44 U/L    Anion Gap 6 (L) 8 - 16 mmol/L    eGFR if African American >60.0 >60 mL/min/1.73 m^2    eGFR if non African American >60.0 >60 mL/min/1.73 m^2   Magnesium    Collection Time: 11/12/17  6:37 AM   Result Value Ref Range    Magnesium 2.7 (H) 1.6 - 2.6 mg/dL   Phosphorus    Collection Time: 11/12/17  6:37 AM   Result Value Ref Range    Phosphorus 4.2 2.7 - 4.5 mg/dL   POCT glucose    Collection Time: 11/12/17  9:32 AM   Result Value Ref Range    POCT Glucose 180 (H) 70 - 110 mg/dL     Significant Imaging:   No new imaging this morning.    Assessment/Plan:      * Seizure    - Continuing levetiracetam 1500mg per G BID, zonisamide 200mg per G BID, clonazepam 2mg per G q8hr.        Hypernatremia    - Will start enteric free water boluses with tube feedings given slowly elevating sodium.        Anoxic brain injury    - As under cardiac arrest.        Cardiac arrest, cause unspecified    Cardiac arrest. ROSC about 10 min after chest compressions initiated. 2 rounds of epi. No shock delivered. Cardiology consulted. Echo hyperdynamic with EF 75%, no DD. ASA/statin. No BB/ACEi as BP will not tolerate. She did not appear to have an MI given EKG and troponins. Probably precipitated by hypoxia. Maintain body temp 32-34 x48 hours.   - Cardiac arrest likely in setting of respiratory failure 10/04 with anoxic brain injury superimposed on prior strokes.  - Continuing aspirin 81mg per G daily, carvedilol 12.5mg per G BID.  - Given her significant debility and conversations with family regarding hospice placement, will hold longer-term preventive medication such as atorvastatin.        Cerebral infarction, watershed distribution, bilateral, acute    - h/o multiple strokes with significant residual deficits; now with significant debility after strokes  and subsequent cardiac arrest.  - Medications as under cardiac arrest.        Tracheostomy status    - As under chronic respiratory failure.        Acute on chronic respiratory failure    - Chronic respiratory failure given large area of cerebral infarct, cardiac arrest; now s/p tracheostomy and tolerating T-collar well.        CAD s/p CABG    - As under cardiac arrest.        Moderate protein malnutrition    - Continuing tube feedings per G tube.        Hyperlipidemia    - As under cardiac arrest.        Morbid obesity with BMI of 50.0-59.9, adult    - No acute issues at this time.        S/P CABG (coronary artery bypass graft)    - As under cardiac arrest.        Type 2 diabetes mellitus, uncontrolled    - Will increase insulin detemir from 6U subQ BID to 9U subQ BID, insulin aspart from 3U subQ q4hr to 4U subQ q6hr with additional moderate-dose sliding scale insulin 1-10U subQ q6hr PRN.        Essential hypertension    - Continuing amlodipine 10mg per G daily, carvedilol as under cardiac arrest, hydralazine 100mg per G q8hr.          VTE Risk Mitigation         Ordered     heparin (porcine) injection 7,500 Units  Every 8 hours     Route:  Subcutaneous        10/03/17 1509     High Risk of VTE  Once      10/03/17 1509     Place sequential compression device  Until discontinued      10/03/17 1509        Diet/Code status  - Diet NPO; tube feedings via G tube with enteral water boluses.  - Code status listed as full; given hospice discussions will need to speak with patient's daughter regarding patient's wishes in the event of recurrent cardiac arrest.    ALINE Gonzales MD   PGY-3  [phone]

## 2017-11-13 PROBLEM — E87.5 HYPERKALEMIA: Status: RESOLVED | Noted: 2017-06-19 | Resolved: 2017-11-13

## 2017-11-13 LAB
ANION GAP SERPL CALC-SCNC: 8 MMOL/L
BUN SERPL-MCNC: 46 MG/DL
CALCIUM SERPL-MCNC: 9.4 MG/DL
CHLORIDE SERPL-SCNC: 114 MMOL/L
CO2 SERPL-SCNC: 25 MMOL/L
CREAT SERPL-MCNC: 0.8 MG/DL
EST. GFR  (AFRICAN AMERICAN): >60 ML/MIN/1.73 M^2
EST. GFR  (NON AFRICAN AMERICAN): >60 ML/MIN/1.73 M^2
GLUCOSE SERPL-MCNC: 164 MG/DL
MAGNESIUM SERPL-MCNC: 2.3 MG/DL
PHOSPHATE SERPL-MCNC: 4.6 MG/DL
POCT GLUCOSE: 158 MG/DL (ref 70–110)
POCT GLUCOSE: 166 MG/DL (ref 70–110)
POCT GLUCOSE: 180 MG/DL (ref 70–110)
POCT GLUCOSE: 181 MG/DL (ref 70–110)
POCT GLUCOSE: 206 MG/DL (ref 70–110)
POTASSIUM SERPL-SCNC: 4.6 MMOL/L
SODIUM SERPL-SCNC: 147 MMOL/L

## 2017-11-13 PROCEDURE — 25000003 PHARM REV CODE 250: Performed by: STUDENT IN AN ORGANIZED HEALTH CARE EDUCATION/TRAINING PROGRAM

## 2017-11-13 PROCEDURE — 94640 AIRWAY INHALATION TREATMENT: CPT

## 2017-11-13 PROCEDURE — 99232 SBSQ HOSP IP/OBS MODERATE 35: CPT | Mod: GC,,, | Performed by: HOSPITALIST

## 2017-11-13 PROCEDURE — 25000003 PHARM REV CODE 250: Performed by: EMERGENCY MEDICINE

## 2017-11-13 PROCEDURE — A4216 STERILE WATER/SALINE, 10 ML: HCPCS | Performed by: EMERGENCY MEDICINE

## 2017-11-13 PROCEDURE — 25000242 PHARM REV CODE 250 ALT 637 W/ HCPCS

## 2017-11-13 PROCEDURE — 25000003 PHARM REV CODE 250: Performed by: PHYSICIAN ASSISTANT

## 2017-11-13 PROCEDURE — 20600001 HC STEP DOWN PRIVATE ROOM

## 2017-11-13 PROCEDURE — 63600175 PHARM REV CODE 636 W HCPCS: Performed by: EMERGENCY MEDICINE

## 2017-11-13 PROCEDURE — 83735 ASSAY OF MAGNESIUM: CPT

## 2017-11-13 PROCEDURE — 94761 N-INVAS EAR/PLS OXIMETRY MLT: CPT

## 2017-11-13 PROCEDURE — 99900026 HC AIRWAY MAINTENANCE (STAT)

## 2017-11-13 PROCEDURE — 27000221 HC OXYGEN, UP TO 24 HOURS

## 2017-11-13 PROCEDURE — 25000242 PHARM REV CODE 250 ALT 637 W/ HCPCS: Performed by: PHYSICIAN ASSISTANT

## 2017-11-13 PROCEDURE — 80048 BASIC METABOLIC PNL TOTAL CA: CPT

## 2017-11-13 PROCEDURE — 84100 ASSAY OF PHOSPHORUS: CPT

## 2017-11-13 RX ORDER — SODIUM CHLORIDE FOR INHALATION 3 %
VIAL, NEBULIZER (ML) INHALATION
Status: COMPLETED
Start: 2017-11-13 | End: 2017-11-13

## 2017-11-13 RX ADMIN — HYDRALAZINE HYDROCHLORIDE 100 MG: 50 TABLET ORAL at 02:11

## 2017-11-13 RX ADMIN — HEPARIN SODIUM 7500 UNITS: 5000 INJECTION, SOLUTION INTRAVENOUS; SUBCUTANEOUS at 02:11

## 2017-11-13 RX ADMIN — INSULIN DETEMIR 9 UNITS: 100 INJECTION, SOLUTION SUBCUTANEOUS at 10:11

## 2017-11-13 RX ADMIN — INSULIN DETEMIR 9 UNITS: 100 INJECTION, SOLUTION SUBCUTANEOUS at 09:11

## 2017-11-13 RX ADMIN — INSULIN ASPART 4 UNITS: 100 INJECTION, SOLUTION INTRAVENOUS; SUBCUTANEOUS at 12:11

## 2017-11-13 RX ADMIN — ZONISAMIDE 200 MG: 100 CAPSULE ORAL at 09:11

## 2017-11-13 RX ADMIN — INSULIN ASPART 4 UNITS: 100 INJECTION, SOLUTION INTRAVENOUS; SUBCUTANEOUS at 06:11

## 2017-11-13 RX ADMIN — INSULIN ASPART 4 UNITS: 100 INJECTION, SOLUTION INTRAVENOUS; SUBCUTANEOUS at 05:11

## 2017-11-13 RX ADMIN — CLONAZEPAM 2 MG: 1 TABLET ORAL at 05:11

## 2017-11-13 RX ADMIN — SODIUM CHLORIDE SOLN NEBU 3% 4 ML: 3 NEBU SOLN at 08:11

## 2017-11-13 RX ADMIN — LEVETIRACETAM 1500 MG: 750 TABLET ORAL at 10:11

## 2017-11-13 RX ADMIN — POLYETHYLENE GLYCOL 3350 17 G: 17 POWDER, FOR SOLUTION ORAL at 09:11

## 2017-11-13 RX ADMIN — CLONAZEPAM 2 MG: 1 TABLET ORAL at 10:11

## 2017-11-13 RX ADMIN — HEPARIN SODIUM 7500 UNITS: 5000 INJECTION, SOLUTION INTRAVENOUS; SUBCUTANEOUS at 05:11

## 2017-11-13 RX ADMIN — AMLODIPINE BESYLATE 10 MG: 10 TABLET ORAL at 11:11

## 2017-11-13 RX ADMIN — CARVEDILOL 12.5 MG: 12.5 TABLET, FILM COATED ORAL at 10:11

## 2017-11-13 RX ADMIN — Medication 10 ML: at 12:11

## 2017-11-13 RX ADMIN — Medication 10 ML: at 11:11

## 2017-11-13 RX ADMIN — Medication 10 ML: at 05:11

## 2017-11-13 RX ADMIN — SODIUM CHLORIDE 30 MG/ML INHALATION SOLUTION 15 ML: 30 SOLUTION INHALANT at 05:11

## 2017-11-13 RX ADMIN — HYDRALAZINE HYDROCHLORIDE 100 MG: 50 TABLET ORAL at 05:11

## 2017-11-13 RX ADMIN — CLONAZEPAM 2 MG: 1 TABLET ORAL at 02:11

## 2017-11-13 RX ADMIN — ZONISAMIDE 200 MG: 100 CAPSULE ORAL at 10:11

## 2017-11-13 RX ADMIN — HEPARIN SODIUM 7500 UNITS: 5000 INJECTION, SOLUTION INTRAVENOUS; SUBCUTANEOUS at 10:11

## 2017-11-13 RX ADMIN — STANDARDIZED SENNA CONCENTRATE AND DOCUSATE SODIUM 1 TABLET: 8.6; 5 TABLET, FILM COATED ORAL at 09:11

## 2017-11-13 RX ADMIN — INSULIN ASPART 4 UNITS: 100 INJECTION, SOLUTION INTRAVENOUS; SUBCUTANEOUS at 11:11

## 2017-11-13 RX ADMIN — HYDRALAZINE HYDROCHLORIDE 100 MG: 50 TABLET ORAL at 10:11

## 2017-11-13 RX ADMIN — INSULIN ASPART 2 UNITS: 100 INJECTION, SOLUTION INTRAVENOUS; SUBCUTANEOUS at 05:11

## 2017-11-13 RX ADMIN — INSULIN ASPART 1 UNITS: 100 INJECTION, SOLUTION INTRAVENOUS; SUBCUTANEOUS at 12:11

## 2017-11-13 RX ADMIN — LEVETIRACETAM 1500 MG: 750 TABLET ORAL at 09:11

## 2017-11-13 RX ADMIN — CARVEDILOL 12.5 MG: 12.5 TABLET, FILM COATED ORAL at 09:11

## 2017-11-13 RX ADMIN — ASPIRIN 81 MG CHEWABLE TABLET 81 MG: 81 TABLET CHEWABLE at 09:11

## 2017-11-13 RX ADMIN — INSULIN ASPART 1 UNITS: 100 INJECTION, SOLUTION INTRAVENOUS; SUBCUTANEOUS at 10:11

## 2017-11-13 RX ADMIN — INSULIN ASPART 2 UNITS: 100 INJECTION, SOLUTION INTRAVENOUS; SUBCUTANEOUS at 06:11

## 2017-11-13 RX ADMIN — Medication 10 ML: at 06:11

## 2017-11-13 NOTE — PLAN OF CARE
Problem: Patient Care Overview  Goal: Plan of Care Review  Outcome: Ongoing (interventions implemented as appropriate)  Plan of care reviewed.  Patient is lethargic.  No neuro changes.  Patient respond to pain. Able to urinate on her own.  VSS.  Resting comfortably in bed.  Will continue to monitor.

## 2017-11-13 NOTE — ASSESSMENT & PLAN NOTE
- Discontinued salt tabs decreased free water flushes to 300cc  - Continue to follow with daily BMP

## 2017-11-13 NOTE — ASSESSMENT & PLAN NOTE
Cardiac arrest. ROSC about 10 min after chest compressions initiated. 2 rounds of epi. No shock delivered. Cardiology consulted. Echo hyperdynamic with EF 75%, no DD. ASA/statin. No BB/ACEi as BP will not tolerate. She did not appear to have an MI given EKG and troponins. Probably precipitated by hypoxia. Maintain body temp 32-34 x48 hours.   - Cardiac arrest likely in setting of respiratory failure 10/04 with anoxic brain injury superimposed on prior strokes.  - Continuing aspirin 81mg per G daily, carvedilol 12.5mg per G BID.  - Hospice per family

## 2017-11-13 NOTE — PLAN OF CARE
Problem: Patient Care Overview  Goal: Plan of Care Review  Outcome: Ongoing (interventions implemented as appropriate)  POC reviewed with Nisa Frank at 0400. Pt unable to verbalize understanding d/t nonverbal. Questions. No acute events overnight. Pt progressing toward goals. Will continue to monitor. See flowsheets for full assessment and VS info. Awaiting LTAC placement.

## 2017-11-13 NOTE — SUBJECTIVE & OBJECTIVE
Interval History: No acute events overnight.  Patient minimally responsive to painful stimuli. Serum sodium trending downward after discontinuation of salt tabs.  Hypertensive but not requiring prn.      Review of Systems   Unable to perform ROS: Patient unresponsive     Objective:     Vital Signs (Most Recent):  Temp: 97 °F (36.1 °C) (11/13/17 0712)  Pulse: 90 (11/13/17 1117)  Resp: 18 (11/13/17 0814)  BP: (!) 161/74 (11/13/17 0712)  SpO2: 98 % (11/13/17 0814) Vital Signs (24h Range):  Temp:  [97 °F (36.1 °C)-98.9 °F (37.2 °C)] 97 °F (36.1 °C)  Pulse:  [] 90  Resp:  [16-23] 18  SpO2:  [96 %-100 %] 98 %  BP: (133-178)/(60-79) 161/74     Weight: 130.5 kg (287 lb 11.2 oz)  Body mass index is 46.44 kg/m².    Intake/Output Summary (Last 24 hours) at 11/13/17 1139  Last data filed at 11/13/17 0602   Gross per 24 hour   Intake             2605 ml   Output                0 ml   Net             2605 ml      Physical Exam   Constitutional: She appears well-developed. No distress.   Obese   HENT:   Head: Normocephalic and atraumatic.   Eyes: Conjunctivae are normal. Pupils are equal, round, and reactive to light.   Cardiovascular: Normal rate, regular rhythm, S1 normal, S2 normal and intact distal pulses.    Pulmonary/Chest: Effort normal and breath sounds normal.   Tracheostomy in place with T-collar.   Abdominal: Soft. Bowel sounds are normal. She exhibits no distension. There is no tenderness.   PEG tube in place.   Musculoskeletal: She exhibits no tenderness or deformity.   Neurological:   E1VTM4; minimal withdrawal to pain BLE   Skin: Skin is warm and dry.   Nursing note and vitals reviewed.      Significant Labs:   Recent Results (from the past 24 hour(s))   POCT glucose    Collection Time: 11/12/17 12:01 PM   Result Value Ref Range    POCT Glucose 136 (H) 70 - 110 mg/dL   POCT glucose    Collection Time: 11/12/17  5:48 PM   Result Value Ref Range    POCT Glucose 166 (H) 70 - 110 mg/dL   POCT glucose    Collection  Time: 11/13/17 12:18 AM   Result Value Ref Range    POCT Glucose 181 (H) 70 - 110 mg/dL   Basic metabolic panel    Collection Time: 11/13/17  5:20 AM   Result Value Ref Range    Sodium 147 (H) 136 - 145 mmol/L    Potassium 4.6 3.5 - 5.1 mmol/L    Chloride 114 (H) 95 - 110 mmol/L    CO2 25 23 - 29 mmol/L    Glucose 164 (H) 70 - 110 mg/dL    BUN, Bld 46 (H) 8 - 23 mg/dL    Creatinine 0.8 0.5 - 1.4 mg/dL    Calcium 9.4 8.7 - 10.5 mg/dL    Anion Gap 8 8 - 16 mmol/L    eGFR if African American >60.0 >60 mL/min/1.73 m^2    eGFR if non African American >60.0 >60 mL/min/1.73 m^2   Magnesium    Collection Time: 11/13/17  5:20 AM   Result Value Ref Range    Magnesium 2.3 1.6 - 2.6 mg/dL   Phosphorus    Collection Time: 11/13/17  5:20 AM   Result Value Ref Range    Phosphorus 4.6 (H) 2.7 - 4.5 mg/dL   POCT glucose    Collection Time: 11/13/17  5:38 AM   Result Value Ref Range    POCT Glucose 180 (H) 70 - 110 mg/dL   POCT glucose    Collection Time: 11/13/17 11:11 AM   Result Value Ref Range    POCT Glucose 206 (H) 70 - 110 mg/dL         Significant Imaging: No new imaging

## 2017-11-13 NOTE — PROGRESS NOTES
[facility] Hospital Medicine  Progress Note    Patient Name: Nisa Frank  MRN: [id number]  Patient Class: IP- Inpatient   Admission Date: 10/3/2017  Length of Stay: 41 days  Attending Physician: Ligia Sorto MD  Primary Care Provider: Atilio Downs MD    [facility] Medicine Team: [facility] MED 3 Ryan Roberto MD    Subjective:     Principal Problem:Seizure    HPI:  Ms. Frank is a 68 yo woman with diabetes mellitus type 2 (A1c 7.2%), CKD stage 3, CAD s/p CABG, essential hypertension, cerebrovascular disease s/p bi-hemispheric watershed infarcts and trach/PEG status who was brought in via EMS from [facility] for cardiac arrest. Per NH staff, she received chest compressions for about 5 minutes. After EMS arrived, ACLS protocol continued and she was given 2 rounds of epi, non-shockable rhythm. ROSC achieved en route to the ER.    She was discharged from [facility] 9/28/2017 after a two week admission where she was initially admitted to our facility and then transferred to [facility], essentially for a second opinion. She has been admitted to multiple facilities recently to be treated for various issues including mucous plug leading to acute hypoxic respiratory failure, she pulled out her trach 9/4 so that was reinserted at [facility] and she was also treated for ESBL UTI while there, and last admit she was treated for likely aspiration pneumonia. Family was concerned for repeat stroke as she had decline in alertness after trach reinserted at [facility] [place]. MRI and EEG during her last admission showed no acute process.    Her daughter/POA, Shayna, can be reached at [phone].    Hospital Course:  Shortly after arriving in the ICU she developed myoclonic jerks, initially only when stimulated and then continuously even without stimulation. This was thought to be due to anoxic brain injury vs seizure. She was started on Levetiracetam 3000 mg IV x1 then 1500 mg twice daily. Jerks improved with propofol  that was started overnight. EEG 10/4/2017 was consistent with seizure. She was given ativan and had clinical improvement. Transfer orders to [facility] for continuous EEG monitoring placed.  The etiology of her cardiac arrest was not clear. EKG with no acute change. Echo with preserved EF. She did not appear to have had an MI. Suspect possible mucous plug with desaturation.    Interval History: No acute events overnight.  Patient minimally responsive to painful stimuli. Serum sodium trending downward after discontinuation of salt tabs.  Hypertensive but not requiring prn.      Review of Systems   Unable to perform ROS: Patient unresponsive     Objective:     Vital Signs (Most Recent):  Temp: 97 °F (36.1 °C) (11/13/17 0712)  Pulse: 90 (11/13/17 1117)  Resp: 18 (11/13/17 0814)  BP: (!) 161/74 (11/13/17 0712)  SpO2: 98 % (11/13/17 0814) Vital Signs (24h Range):  Temp:  [97 °F (36.1 °C)-98.9 °F (37.2 °C)] 97 °F (36.1 °C)  Pulse:  [] 90  Resp:  [16-23] 18  SpO2:  [96 %-100 %] 98 %  BP: (133-178)/(60-79) 161/74     Weight: 130.5 kg (287 lb 11.2 oz)  Body mass index is 46.44 kg/m².    Intake/Output Summary (Last 24 hours) at 11/13/17 1139  Last data filed at 11/13/17 0602   Gross per 24 hour   Intake             2605 ml   Output                0 ml   Net             2605 ml      Physical Exam   Constitutional: She appears well-developed. No distress.   Obese   HENT:   Head: Normocephalic and atraumatic.   Eyes: Conjunctivae are normal. Pupils are equal, round, and reactive to light.   Cardiovascular: Normal rate, regular rhythm, S1 normal, S2 normal and intact distal pulses.    Pulmonary/Chest: Effort normal and breath sounds normal.   Tracheostomy in place with T-collar.   Abdominal: Soft. Bowel sounds are normal. She exhibits no distension. There is no tenderness.   PEG tube in place.   Musculoskeletal: She exhibits no tenderness or deformity.   Neurological:   E1VTM4; minimal withdrawal to pain BLE   Skin: Skin is warm  and dry.   Nursing note and vitals reviewed.      Significant Labs:   Recent Results (from the past 24 hour(s))   POCT glucose    Collection Time: 11/12/17 12:01 PM   Result Value Ref Range    POCT Glucose 136 (H) 70 - 110 mg/dL   POCT glucose    Collection Time: 11/12/17  5:48 PM   Result Value Ref Range    POCT Glucose 166 (H) 70 - 110 mg/dL   POCT glucose    Collection Time: 11/13/17 12:18 AM   Result Value Ref Range    POCT Glucose 181 (H) 70 - 110 mg/dL   Basic metabolic panel    Collection Time: 11/13/17  5:20 AM   Result Value Ref Range    Sodium 147 (H) 136 - 145 mmol/L    Potassium 4.6 3.5 - 5.1 mmol/L    Chloride 114 (H) 95 - 110 mmol/L    CO2 25 23 - 29 mmol/L    Glucose 164 (H) 70 - 110 mg/dL    BUN, Bld 46 (H) 8 - 23 mg/dL    Creatinine 0.8 0.5 - 1.4 mg/dL    Calcium 9.4 8.7 - 10.5 mg/dL    Anion Gap 8 8 - 16 mmol/L    eGFR if African American >60.0 >60 mL/min/1.73 m^2    eGFR if non African American >60.0 >60 mL/min/1.73 m^2   Magnesium    Collection Time: 11/13/17  5:20 AM   Result Value Ref Range    Magnesium 2.3 1.6 - 2.6 mg/dL   Phosphorus    Collection Time: 11/13/17  5:20 AM   Result Value Ref Range    Phosphorus 4.6 (H) 2.7 - 4.5 mg/dL   POCT glucose    Collection Time: 11/13/17  5:38 AM   Result Value Ref Range    POCT Glucose 180 (H) 70 - 110 mg/dL   POCT glucose    Collection Time: 11/13/17 11:11 AM   Result Value Ref Range    POCT Glucose 206 (H) 70 - 110 mg/dL         Significant Imaging: No new imaging    Assessment/Plan:      * Seizure    - Continuing levetiracetam 1500mg per G BID, zonisamide 200mg per G BID, clonazepam 2mg per G q8hr.        Hypernatremia    - Discontinued salt tabs decreased free water flushes to 300cc  - Continue to follow with daily BMP        Anoxic brain injury    - As under cardiac arrest.        Cardiac arrest, cause unspecified    Cardiac arrest. ROSC about 10 min after chest compressions initiated. 2 rounds of epi. No shock delivered. Cardiology consulted. Echo  hyperdynamic with EF 75%, no DD. ASA/statin. No BB/ACEi as BP will not tolerate. She did not appear to have an MI given EKG and troponins. Probably precipitated by hypoxia. Maintain body temp 32-34 x48 hours.   - Cardiac arrest likely in setting of respiratory failure 10/04 with anoxic brain injury superimposed on prior strokes.  - Continuing aspirin 81mg per G daily, carvedilol 12.5mg per G BID.  - Hospice per family        Cerebral infarction, watershed distribution, bilateral, acute    - h/o multiple strokes with significant residual deficits; now with significant debility after strokes and subsequent cardiac arrest.  - Medications as under cardiac arrest.        PEG (percutaneous endoscopic gastrostomy) status              Tracheostomy status    - As under chronic respiratory failure.        Acute on chronic respiratory failure    - Chronic respiratory failure given large area of cerebral infarct, cardiac arrest; now s/p tracheostomy and tolerating T-collar well.        CAD s/p CABG    - As under cardiac arrest.        Moderate protein malnutrition    - Continuing tube feedings per G tube.        Hyperlipidemia    - As under cardiac arrest.        Morbid obesity with BMI of 50.0-59.9, adult    - No acute issues at this time.        S/P CABG (coronary artery bypass graft)    - As under cardiac arrest.        Type 2 diabetes mellitus, uncontrolled    - Will increase insulin detemir from 6U subQ BID to 9U subQ BID, insulin aspart from 3U subQ q4hr to 4U subQ q6hr with additional moderate-dose sliding scale insulin 1-10U subQ q6hr PRN.        Essential hypertension    - Continuing amlodipine 10mg per G daily, carvedilol as under cardiac arrest, hydralazine 100mg per G q8hr.          VTE Risk Mitigation         Ordered     heparin (porcine) injection 7,500 Units  Every 8 hours     Route:  Subcutaneous        10/03/17 1509     High Risk of VTE  Once      10/03/17 1509     Place sequential compression device  Until  discontinued      10/03/17 1509        Dispo:  Pending hospice placement      Ryan Roberto MD  Department of Hospital Medicine   [facility]

## 2017-11-14 LAB
ANION GAP SERPL CALC-SCNC: 7 MMOL/L
BASOPHILS # BLD AUTO: 0.03 K/UL
BASOPHILS NFR BLD: 0.3 %
BUN SERPL-MCNC: 46 MG/DL
CALCIUM SERPL-MCNC: 9.3 MG/DL
CHLORIDE SERPL-SCNC: 111 MMOL/L
CO2 SERPL-SCNC: 27 MMOL/L
CREAT SERPL-MCNC: 0.8 MG/DL
DIFFERENTIAL METHOD: ABNORMAL
EOSINOPHIL # BLD AUTO: 0.1 K/UL
EOSINOPHIL NFR BLD: 1.1 %
ERYTHROCYTE [DISTWIDTH] IN BLOOD BY AUTOMATED COUNT: 16.4 %
EST. GFR  (AFRICAN AMERICAN): >60 ML/MIN/1.73 M^2
EST. GFR  (NON AFRICAN AMERICAN): >60 ML/MIN/1.73 M^2
GLUCOSE SERPL-MCNC: 171 MG/DL
HCT VFR BLD AUTO: 23.9 %
HGB BLD-MCNC: 7.1 G/DL
IMM GRANULOCYTES # BLD AUTO: 0.35 K/UL
IMM GRANULOCYTES NFR BLD AUTO: 3.1 %
LYMPHOCYTES # BLD AUTO: 2.9 K/UL
LYMPHOCYTES NFR BLD: 25.2 %
MAGNESIUM SERPL-MCNC: 2.4 MG/DL
MCH RBC QN AUTO: 28.3 PG
MCHC RBC AUTO-ENTMCNC: 29.7 G/DL
MCV RBC AUTO: 95 FL
MONOCYTES # BLD AUTO: 1 K/UL
MONOCYTES NFR BLD: 9.1 %
NEUTROPHILS # BLD AUTO: 7 K/UL
NEUTROPHILS NFR BLD: 61.2 %
NRBC BLD-RTO: 0 /100 WBC
PHOSPHATE SERPL-MCNC: 4.5 MG/DL
PLATELET # BLD AUTO: 247 K/UL
PMV BLD AUTO: 12.1 FL
POCT GLUCOSE: 133 MG/DL (ref 70–110)
POCT GLUCOSE: 145 MG/DL (ref 70–110)
POCT GLUCOSE: 162 MG/DL (ref 70–110)
POCT GLUCOSE: 164 MG/DL (ref 70–110)
POCT GLUCOSE: 199 MG/DL (ref 70–110)
POTASSIUM SERPL-SCNC: 4.5 MMOL/L
RBC # BLD AUTO: 2.51 M/UL
SODIUM SERPL-SCNC: 145 MMOL/L
WBC # BLD AUTO: 11.36 K/UL

## 2017-11-14 PROCEDURE — 83735 ASSAY OF MAGNESIUM: CPT

## 2017-11-14 PROCEDURE — 84100 ASSAY OF PHOSPHORUS: CPT

## 2017-11-14 PROCEDURE — 25000003 PHARM REV CODE 250: Performed by: PHYSICIAN ASSISTANT

## 2017-11-14 PROCEDURE — 25000242 PHARM REV CODE 250 ALT 637 W/ HCPCS: Performed by: PHYSICIAN ASSISTANT

## 2017-11-14 PROCEDURE — 36415 COLL VENOUS BLD VENIPUNCTURE: CPT

## 2017-11-14 PROCEDURE — 94640 AIRWAY INHALATION TREATMENT: CPT

## 2017-11-14 PROCEDURE — 20600001 HC STEP DOWN PRIVATE ROOM

## 2017-11-14 PROCEDURE — 25000003 PHARM REV CODE 250: Performed by: EMERGENCY MEDICINE

## 2017-11-14 PROCEDURE — 25000003 PHARM REV CODE 250: Performed by: STUDENT IN AN ORGANIZED HEALTH CARE EDUCATION/TRAINING PROGRAM

## 2017-11-14 PROCEDURE — 94761 N-INVAS EAR/PLS OXIMETRY MLT: CPT

## 2017-11-14 PROCEDURE — 63600175 PHARM REV CODE 636 W HCPCS: Performed by: EMERGENCY MEDICINE

## 2017-11-14 PROCEDURE — 85025 COMPLETE CBC W/AUTO DIFF WBC: CPT

## 2017-11-14 PROCEDURE — 99900026 HC AIRWAY MAINTENANCE (STAT)

## 2017-11-14 PROCEDURE — A4216 STERILE WATER/SALINE, 10 ML: HCPCS | Performed by: EMERGENCY MEDICINE

## 2017-11-14 PROCEDURE — 63600175 PHARM REV CODE 636 W HCPCS: Performed by: INTERNAL MEDICINE

## 2017-11-14 PROCEDURE — 27000221 HC OXYGEN, UP TO 24 HOURS

## 2017-11-14 PROCEDURE — 80048 BASIC METABOLIC PNL TOTAL CA: CPT

## 2017-11-14 PROCEDURE — 99231 SBSQ HOSP IP/OBS SF/LOW 25: CPT | Mod: GC,,, | Performed by: HOSPITALIST

## 2017-11-14 RX ADMIN — INSULIN ASPART 2 UNITS: 100 INJECTION, SOLUTION INTRAVENOUS; SUBCUTANEOUS at 06:11

## 2017-11-14 RX ADMIN — ZONISAMIDE 200 MG: 100 CAPSULE ORAL at 09:11

## 2017-11-14 RX ADMIN — INSULIN ASPART 2 UNITS: 100 INJECTION, SOLUTION INTRAVENOUS; SUBCUTANEOUS at 01:11

## 2017-11-14 RX ADMIN — CARVEDILOL 12.5 MG: 12.5 TABLET, FILM COATED ORAL at 09:11

## 2017-11-14 RX ADMIN — Medication 10 ML: at 12:11

## 2017-11-14 RX ADMIN — LEVETIRACETAM 1500 MG: 750 TABLET ORAL at 09:11

## 2017-11-14 RX ADMIN — SODIUM CHLORIDE SOLN NEBU 3% 4 ML: 3 NEBU SOLN at 03:11

## 2017-11-14 RX ADMIN — LEVETIRACETAM 1500 MG: 750 TABLET ORAL at 10:11

## 2017-11-14 RX ADMIN — HYDRALAZINE HYDROCHLORIDE 100 MG: 50 TABLET ORAL at 01:11

## 2017-11-14 RX ADMIN — ASPIRIN 81 MG CHEWABLE TABLET 81 MG: 81 TABLET CHEWABLE at 09:11

## 2017-11-14 RX ADMIN — INSULIN ASPART 4 UNITS: 100 INJECTION, SOLUTION INTRAVENOUS; SUBCUTANEOUS at 12:11

## 2017-11-14 RX ADMIN — CLONAZEPAM 2 MG: 1 TABLET ORAL at 06:11

## 2017-11-14 RX ADMIN — CLONAZEPAM 2 MG: 1 TABLET ORAL at 01:11

## 2017-11-14 RX ADMIN — STANDARDIZED SENNA CONCENTRATE AND DOCUSATE SODIUM 1 TABLET: 8.6; 5 TABLET, FILM COATED ORAL at 09:11

## 2017-11-14 RX ADMIN — SODIUM CHLORIDE SOLN NEBU 3% 4 ML: 3 NEBU SOLN at 09:11

## 2017-11-14 RX ADMIN — CLONAZEPAM 2 MG: 1 TABLET ORAL at 09:11

## 2017-11-14 RX ADMIN — SODIUM CHLORIDE SOLN NEBU 3% 4 ML: 3 NEBU SOLN at 01:11

## 2017-11-14 RX ADMIN — HEPARIN SODIUM 7500 UNITS: 5000 INJECTION, SOLUTION INTRAVENOUS; SUBCUTANEOUS at 02:11

## 2017-11-14 RX ADMIN — INSULIN DETEMIR 9 UNITS: 100 INJECTION, SOLUTION SUBCUTANEOUS at 09:11

## 2017-11-14 RX ADMIN — HYDRALAZINE HYDROCHLORIDE 100 MG: 50 TABLET ORAL at 06:11

## 2017-11-14 RX ADMIN — POLYETHYLENE GLYCOL 3350 17 G: 17 POWDER, FOR SOLUTION ORAL at 09:11

## 2017-11-14 RX ADMIN — INSULIN ASPART 4 UNITS: 100 INJECTION, SOLUTION INTRAVENOUS; SUBCUTANEOUS at 06:11

## 2017-11-14 RX ADMIN — HEPARIN SODIUM 7500 UNITS: 5000 INJECTION, SOLUTION INTRAVENOUS; SUBCUTANEOUS at 06:11

## 2017-11-14 RX ADMIN — INSULIN DETEMIR 9 UNITS: 100 INJECTION, SOLUTION SUBCUTANEOUS at 10:11

## 2017-11-14 RX ADMIN — AMLODIPINE BESYLATE 10 MG: 10 TABLET ORAL at 12:11

## 2017-11-14 RX ADMIN — HYDRALAZINE HYDROCHLORIDE 100 MG: 50 TABLET ORAL at 10:11

## 2017-11-14 RX ADMIN — INSULIN ASPART 2 UNITS: 100 INJECTION, SOLUTION INTRAVENOUS; SUBCUTANEOUS at 05:11

## 2017-11-14 RX ADMIN — HEPARIN SODIUM 7500 UNITS: 5000 INJECTION, SOLUTION INTRAVENOUS; SUBCUTANEOUS at 10:11

## 2017-11-14 RX ADMIN — INSULIN ASPART 4 UNITS: 100 INJECTION, SOLUTION INTRAVENOUS; SUBCUTANEOUS at 05:11

## 2017-11-14 NOTE — ASSESSMENT & PLAN NOTE
- Chronic respiratory failure given large area of cerebral infarct, cardiac arrest; now s/p tracheostomy and tolerating T-collar well.  - Q4 tracheostomy suctioning for secretions

## 2017-11-14 NOTE — ASSESSMENT & PLAN NOTE
- Currently resolved  - Discontinued salt tabs decreased free water flushes to 300cc  - Continue to follow with daily BMP

## 2017-11-14 NOTE — PROGRESS NOTES
Threaten phone call from patient 's daughter.  He said that she expected us to take care of her mom the same way that they were taking care of her in ICU.  And if something happen to her mother before she comes back home, she already has her .

## 2017-11-14 NOTE — PROGRESS NOTES
Patient seen for NDNQI survey. No pressure injury injuries noted to patient's buttocks or heels. Moisture related skin irritation noted to patient's bilateral medial buttocks due to incontinence. No open wounds noted. Skin pink/reddened and moist. Blanchable. Barrier cream in use by nursing staff and covidien blue bed pads. Recommend continuing barrier cream as needed for moisture management. Nursing to continue care.     11/14/17 1245       Wound 11/14/17 1245 Moisture associated dermatitis;Incontinence associated dermatitis medial gluteal   Date First Assessed/Time First Assessed: 11/14/17 1245   Wound Type: Moisture associated dermatitis;Incontinence associated dermatitis  Orientation: medial  Location: gluteal   Wound WDL ex   Drainage Amount none   Drainage Characteristics/Odor no odor   Wound Base pink   Periwound Area moist   Wound Length (cm) (located to medial sides of left and right buttocks )   Interventions barrier applied

## 2017-11-14 NOTE — ASSESSMENT & PLAN NOTE
Cardiac arrest. ROSC about 10 min after chest compressions initiated. 2 rounds of epi. No shock delivered. Cardiology consulted. Echo hyperdynamic with EF 75%, no DD. ASA/statin. No BB/ACEi as BP will not tolerate. She did not appear to have an MI given EKG and troponins. Probably precipitated by hypoxia. Maintain body temp 32-34 x48 hours.   - Cardiac arrest likely in setting of respiratory failure 10/04 with anoxic brain injury superimposed on prior strokes.  - Continuing aspirin 81mg per G daily, carvedilol 12.5mg per G BID.  - Hospice per family, and patient to be discharged home on 11/15/2017 per family

## 2017-11-14 NOTE — PLAN OF CARE
CM called and spoke with patient's daughter, Shayna ([phone]). Discharge plans confirmed - patient will be discharged to home with [facility] tomorrow. All DME are being delivered tonight. Daughter has work and an MD appointment tomorrow but will be home by 4:30pm. Will arrange for critical care ambulance with 4:30 . Home address confirmed [address] [place] [id number]. Will follow.

## 2017-11-14 NOTE — SUBJECTIVE & OBJECTIVE
Interval History: No acute events overnight.  Patient remains minimally responsive.      Review of Systems   Unable to perform ROS: Patient unresponsive     Objective:     Vital Signs (Most Recent):  Temp: 99.2 °F (37.3 °C) (11/14/17 0749)  Pulse: 100 (11/14/17 0921)  Resp: (!) 22 (11/14/17 0921)  BP: (!) 150/70 (11/14/17 0450)  SpO2: 98 % (11/14/17 1207) Vital Signs (24h Range):  Temp:  [97.9 °F (36.6 °C)-99.2 °F (37.3 °C)] 99.2 °F (37.3 °C)  Pulse:  [] 100  Resp:  [17-22] 22  SpO2:  [90 %-99 %] 98 %  BP: (137-150)/(63-70) 150/70     Weight: 129 kg (284 lb 6.3 oz)  Body mass index is 45.9 kg/m².  No intake or output data in the 24 hours ending 11/14/17 1229   Physical Exam   Constitutional: She appears well-developed. No distress.   Obese   HENT:   Head: Normocephalic and atraumatic.   Eyes: Conjunctivae are normal. Pupils are equal, round, and reactive to light.   Cardiovascular: Normal rate, regular rhythm, S1 normal, S2 normal and intact distal pulses.    Pulmonary/Chest: Effort normal and breath sounds normal.   Tracheostomy in place with T-collar.   Abdominal: Soft. Bowel sounds are normal. She exhibits no distension. There is no tenderness.   PEG tube in place.   Musculoskeletal: She exhibits no tenderness or deformity.   Neurological:   E1VTM4; minimal withdrawal to pain BLE   Skin: Skin is warm and dry.   Nursing note and vitals reviewed.      Significant Labs:   Recent Results (from the past 24 hour(s))   POCT glucose    Collection Time: 11/13/17  4:47 PM   Result Value Ref Range    POCT Glucose 158 (H) 70 - 110 mg/dL   POCT glucose    Collection Time: 11/13/17 10:33 PM   Result Value Ref Range    POCT Glucose 166 (H) 70 - 110 mg/dL   CBC auto differential    Collection Time: 11/14/17  5:05 AM   Result Value Ref Range    WBC 11.36 3.90 - 12.70 K/uL    RBC 2.51 (L) 4.00 - 5.40 M/uL    Hemoglobin 7.1 (L) 12.0 - 16.0 g/dL    Hematocrit 23.9 (L) 37.0 - 48.5 %    MCV 95 82 - 98 fL    MCH 28.3 27.0 - 31.0  pg    MCHC 29.7 (L) 32.0 - 36.0 g/dL    RDW 16.4 (H) 11.5 - 14.5 %    Platelets 247 150 - 350 K/uL    MPV 12.1 9.2 - 12.9 fL    Immature Granulocytes 3.1 (H) 0.0 - 0.5 %    Gran # 7.0 1.8 - 7.7 K/uL    Immature Grans (Abs) 0.35 (H) 0.00 - 0.04 K/uL    Lymph # 2.9 1.0 - 4.8 K/uL    Mono # 1.0 0.3 - 1.0 K/uL    Eos # 0.1 0.0 - 0.5 K/uL    Baso # 0.03 0.00 - 0.20 K/uL    nRBC 0 0 /100 WBC    Gran% 61.2 38.0 - 73.0 %    Lymph% 25.2 18.0 - 48.0 %    Mono% 9.1 4.0 - 15.0 %    Eosinophil% 1.1 0.0 - 8.0 %    Basophil% 0.3 0.0 - 1.9 %    Differential Method Automated    Basic metabolic panel    Collection Time: 11/14/17  5:05 AM   Result Value Ref Range    Sodium 145 136 - 145 mmol/L    Potassium 4.5 3.5 - 5.1 mmol/L    Chloride 111 (H) 95 - 110 mmol/L    CO2 27 23 - 29 mmol/L    Glucose 171 (H) 70 - 110 mg/dL    BUN, Bld 46 (H) 8 - 23 mg/dL    Creatinine 0.8 0.5 - 1.4 mg/dL    Calcium 9.3 8.7 - 10.5 mg/dL    Anion Gap 7 (L) 8 - 16 mmol/L    eGFR if African American >60.0 >60 mL/min/1.73 m^2    eGFR if non African American >60.0 >60 mL/min/1.73 m^2   Magnesium    Collection Time: 11/14/17  5:05 AM   Result Value Ref Range    Magnesium 2.4 1.6 - 2.6 mg/dL   Phosphorus    Collection Time: 11/14/17  5:05 AM   Result Value Ref Range    Phosphorus 4.5 2.7 - 4.5 mg/dL   POCT glucose    Collection Time: 11/14/17  6:16 AM   Result Value Ref Range    POCT Glucose 162 (H) 70 - 110 mg/dL   POCT glucose    Collection Time: 11/14/17  9:23 AM   Result Value Ref Range    POCT Glucose 145 (H) 70 - 110 mg/dL         Significant Imaging: No new imaging

## 2017-11-14 NOTE — PROGRESS NOTES
[facility] Hospital Medicine  Progress Note    Patient Name: Nisa Frank  MRN: [id number]  Patient Class: IP- Inpatient   Admission Date: 10/3/2017  Length of Stay: 42 days  Attending Physician: Ligia Sorto MD  Primary Care Provider: Atilio Downs MD    [facility] Medicine Team: [facility] 3 Ryan Roberto MD    Subjective:     Principal Problem:Seizure    HPI:  Ms. Frank is a 68 yo woman with diabetes mellitus type 2 (A1c 7.2%), CKD stage 3, CAD s/p CABG, essential hypertension, cerebrovascular disease s/p bi-hemispheric watershed infarcts and trach/PEG status who was brought in via EMS from [facility] for cardiac arrest. Per NH staff, she received chest compressions for about 5 minutes. After EMS arrived, ACLS protocol continued and she was given 2 rounds of epi, non-shockable rhythm. ROSC achieved en route to the ER.    [place] [facility] [place] after a two week admission where she was initially admitted to our facility and then transferred [place] [facility] essentially for a second opinion. She has been admitted to multiple facilities recently to be treated for various issues including mucous plug leading to acute hypoxic respiratory failure, she pulled out her trach 9/4 so that was reinserted at [facility] and she was also treated for ESBL UTI while there, and last admit she was treated for likely aspiration pneumonia. Family was concerned for repeat stroke as she had decline in alertness after trach reinserted at [facility]. MRI and EEG during her last admission showed no acute process.    Her daughter/POA, Shayna, can be reached at [phone].    Hospital Course:  Shortly after arriving in the ICU she developed myoclonic jerks, initially only when stimulated and then continuously even without stimulation. This was thought to be due to anoxic brain injury vs seizure. She was started on Levetiracetam 3000 mg IV x1 then 1500 mg twice daily. Jerks improved with propofol  that was started overnight. EEG 10/4/2017 was consistent with seizure. She was given ativan and had clinical improvement. Transfer orders to [facility] for continuous EEG monitoring placed.  The etiology of her cardiac arrest was not clear. EKG with no acute change. Echo with preserved EF. She did not appear to have had an MI. Suspect possible mucous plug with desaturation.  Patient was stepped down from the NICU to IM team 3 on 11/12/2017.  She was continued on trach collar.  Insulin regimen was altered for improved glucose control.  He sodium and chloride were elevated so free water flushes were increased and salt tabs were discontinued.      Interval History: No acute events overnight.  Patient remains minimally responsive.      Review of Systems   Unable to perform ROS: Patient unresponsive     Objective:     Vital Signs (Most Recent):  Temp: 99.2 °F (37.3 °C) (11/14/17 0749)  Pulse: 100 (11/14/17 0921)  Resp: (!) 22 (11/14/17 0921)  BP: (!) 150/70 (11/14/17 0450)  SpO2: 98 % (11/14/17 1207) Vital Signs (24h Range):  Temp:  [97.9 °F (36.6 °C)-99.2 °F (37.3 °C)] 99.2 °F (37.3 °C)  Pulse:  [] 100  Resp:  [17-22] 22  SpO2:  [90 %-99 %] 98 %  BP: (137-150)/(63-70) 150/70     Weight: 129 kg (284 lb 6.3 oz)  Body mass index is 45.9 kg/m².  No intake or output data in the 24 hours ending 11/14/17 1229   Physical Exam   Constitutional: She appears well-developed. No distress.   Obese   HENT:   Head: Normocephalic and atraumatic.   Eyes: Conjunctivae are normal. Pupils are equal, round, and reactive to light.   Cardiovascular: Normal rate, regular rhythm, S1 normal, S2 normal and intact distal pulses.    Pulmonary/Chest: Effort normal and breath sounds normal.   Tracheostomy in place with T-collar.   Abdominal: Soft. Bowel sounds are normal. She exhibits no distension. There is no tenderness.   PEG tube in place.   Musculoskeletal: She exhibits no tenderness or deformity.   Neurological:   E1VTM4; minimal withdrawal to  pain BLE   Skin: Skin is warm and dry.   Nursing note and vitals reviewed.      Significant Labs:   Recent Results (from the past 24 hour(s))   POCT glucose    Collection Time: 11/13/17  4:47 PM   Result Value Ref Range    POCT Glucose 158 (H) 70 - 110 mg/dL   POCT glucose    Collection Time: 11/13/17 10:33 PM   Result Value Ref Range    POCT Glucose 166 (H) 70 - 110 mg/dL   CBC auto differential    Collection Time: 11/14/17  5:05 AM   Result Value Ref Range    WBC 11.36 3.90 - 12.70 K/uL    RBC 2.51 (L) 4.00 - 5.40 M/uL    Hemoglobin 7.1 (L) 12.0 - 16.0 g/dL    Hematocrit 23.9 (L) 37.0 - 48.5 %    MCV 95 82 - 98 fL    MCH 28.3 27.0 - 31.0 pg    MCHC 29.7 (L) 32.0 - 36.0 g/dL    RDW 16.4 (H) 11.5 - 14.5 %    Platelets 247 150 - 350 K/uL    MPV 12.1 9.2 - 12.9 fL    Immature Granulocytes 3.1 (H) 0.0 - 0.5 %    Gran # 7.0 1.8 - 7.7 K/uL    Immature Grans (Abs) 0.35 (H) 0.00 - 0.04 K/uL    Lymph # 2.9 1.0 - 4.8 K/uL    Mono # 1.0 0.3 - 1.0 K/uL    Eos # 0.1 0.0 - 0.5 K/uL    Baso # 0.03 0.00 - 0.20 K/uL    nRBC 0 0 /100 WBC    Gran% 61.2 38.0 - 73.0 %    Lymph% 25.2 18.0 - 48.0 %    Mono% 9.1 4.0 - 15.0 %    Eosinophil% 1.1 0.0 - 8.0 %    Basophil% 0.3 0.0 - 1.9 %    Differential Method Automated    Basic metabolic panel    Collection Time: 11/14/17  5:05 AM   Result Value Ref Range    Sodium 145 136 - 145 mmol/L    Potassium 4.5 3.5 - 5.1 mmol/L    Chloride 111 (H) 95 - 110 mmol/L    CO2 27 23 - 29 mmol/L    Glucose 171 (H) 70 - 110 mg/dL    BUN, Bld 46 (H) 8 - 23 mg/dL    Creatinine 0.8 0.5 - 1.4 mg/dL    Calcium 9.3 8.7 - 10.5 mg/dL    Anion Gap 7 (L) 8 - 16 mmol/L    eGFR if African American >60.0 >60 mL/min/1.73 m^2    eGFR if non African American >60.0 >60 mL/min/1.73 m^2   Magnesium    Collection Time: 11/14/17  5:05 AM   Result Value Ref Range    Magnesium 2.4 1.6 - 2.6 mg/dL   Phosphorus    Collection Time: 11/14/17  5:05 AM   Result Value Ref Range    Phosphorus 4.5 2.7 - 4.5 mg/dL   POCT glucose     Collection Time: 11/14/17  6:16 AM   Result Value Ref Range    POCT Glucose 162 (H) 70 - 110 mg/dL   POCT glucose    Collection Time: 11/14/17  9:23 AM   Result Value Ref Range    POCT Glucose 145 (H) 70 - 110 mg/dL         Significant Imaging: No new imaging    Assessment/Plan:      * Seizure    - Continuing levetiracetam 1500mg per G BID, zonisamide 200mg per G BID, clonazepam 2mg per G q8hr.        Anemia of chronic disease              Hypernatremia    - Currently resolved  - Discontinued salt tabs decreased free water flushes to 300cc  - Continue to follow with daily BMP        Anoxic brain injury    - As under cardiac arrest.        Cardiac arrest, cause unspecified    Cardiac arrest. ROSC about 10 min after chest compressions initiated. 2 rounds of epi. No shock delivered. Cardiology consulted. Echo hyperdynamic with EF 75%, no DD. ASA/statin. No BB/ACEi as BP will not tolerate. She did not appear to have an MI given EKG and troponins. Probably precipitated by hypoxia. Maintain body temp 32-34 x48 hours.   - Cardiac arrest likely in setting of respiratory failure 10/04 with anoxic brain injury superimposed on prior strokes.  - Continuing aspirin 81mg per G daily, carvedilol 12.5mg per G BID.  - Hospice per family, and patient to be discharged home on 11/15/2017 per family        Cerebral infarction, watershed distribution, bilateral, acute    - h/o multiple strokes with significant residual deficits; now with significant debility after strokes and subsequent cardiac arrest.  - Medications as under cardiac arrest.        PEG (percutaneous endoscopic gastrostomy) status              Tracheostomy status    - As under chronic respiratory failure.        Acute on chronic respiratory failure    - Chronic respiratory failure given large area of cerebral infarct, cardiac arrest; now s/p tracheostomy and tolerating T-collar well.  - Q4 tracheostomy suctioning for secretions        Eve coma scale total score 3-8     Tracheostomy with trach collar          CAD s/p CABG    - As under cardiac arrest.        Moderate protein malnutrition    - Continuing tube feedings per G tube.        Hyperlipidemia    - As under cardiac arrest.        Morbid obesity with BMI of 50.0-59.9, adult    - No acute issues at this time.        S/P CABG (coronary artery bypass graft)    - As under cardiac arrest.        Type 2 diabetes mellitus, uncontrolled    - Will increase insulin detemir from 6U subQ BID to 9U subQ BID, insulin aspart from 3U subQ q4hr to 4U subQ q6hr with additional moderate-dose sliding scale insulin 1-10U subQ q6hr PRN.        Essential hypertension    - Continuing amlodipine 10mg per G daily, carvedilol as under cardiac arrest, hydralazine 100mg per G q8hr.          VTE Risk Mitigation         Ordered     heparin (porcine) injection 7,500 Units  Every 8 hours     Route:  Subcutaneous        10/03/17 1509     High Risk of VTE  Once      10/03/17 1509     Place sequential compression device  Until discontinued      10/03/17 1509          Dispo:  Pending home hospice    Ryan Roberto MD  Department of Hospital Medicine   [facility]

## 2017-11-14 NOTE — PLAN OF CARE
[facility]     Department of Hospital Medicine     [address] [place] [address]     [phone] after hours  [phone] fax                                   HOSPICE  ORDERS     11/14/2017    Admit to Hospice:  Home Service    Diagnoses:  Active Hospital Problems    Diagnosis  POA    *Seizure [R56.9]  Yes    Status epilepticus [G40.901]  Yes    Hypernatremia [E87.0]  Yes    Anemia of chronic disease [D63.8]  Yes    Klebsiella pneumonia [J15.0]  Yes    Anoxic brain injury [G93.1]  Yes    Cardiac arrest, cause unspecified [I46.9]  Yes    Cerebral infarction, watershed distribution, bilateral, acute [I63.8]  Yes     Chronic    Chronic sinus bradycardia [R00.1]  Yes    Acute on chronic respiratory failure [J96.20]  Yes    Tracheostomy status [Z93.0]  Not Applicable     Chronic    PEG (percutaneous endoscopic gastrostomy) status [Z93.1]  Not Applicable    Orlando coma scale total score 3-8 [R40.2430]  Yes    CAD s/p CABG [I25.10]  Yes     Chronic    Moderate protein malnutrition [E44.0]  Yes     Chronic    Hyperlipidemia [E78.5]  Yes    Morbid obesity with BMI of 50.0-59.9, adult [E66.01, Z68.43]  Not Applicable     Chronic    S/P CABG (coronary artery bypass graft) [Z95.1]  Not Applicable     Chronic     2005      Essential hypertension [I10]  Yes     Chronic    Type 2 diabetes mellitus, uncontrolled [E11.65]  Yes     Chronic      Resolved Hospital Problems    Diagnosis Date Resolved POA    Fever [R50.9] 10/14/2017 No    KATHI (acute kidney injury) [N17.9] 10/13/2017 Yes    Leukocytosis [D72.829] 10/22/2017 Yes    Hyperkalemia [E87.5] 11/13/2017 Yes       Hospice Qualifying Diagnoses: Anoxic brain injury       Patient has a life expectancy < 6 months due to these conditions.    Vital Signs: Routine per Hospice Protocol.    Allergies:  Review of patient's allergies indicates:   Allergen Reactions    Latex, natural rubber        Diet:    Current Nutrition Support Formula Ordered: Glucerna 1.5  Current Nutrition Support Rate Ordered: 45 (ml)  250cc Free Water Flushes q4    Activities: As tolerated    Nursing: Per Hospice Routine    Future Orders:  Hospice Medical Director may dictate new orders for comfortable care measures & sign death certificate.    Medications:          Zac Nisa Gail   Home Medication Instructions MARY:[id number]    Printed on:11/14/17 8820   Medication Information                      acetaminophen (TYLENOL) 650 mg/20.3 mL Soln  20.3 mLs (650 mg total) by Per NG tube route every 6 (six) hours as needed.             albuterol-ipratropium 2.5mg-0.5mg/3mL (DUO-NEB) 0.5 mg-3 mg(2.5 mg base)/3 mL nebulizer solution  Take 3 mLs by nebulization every 4 (four) hours. Rescue             amlodipine (NORVASC) 10 MG tablet  1 tablet (10 mg total) by Per G Tube route once daily.             aspirin 81 MG Chew  1 tablet (81 mg total) by Per G Tube route once daily.             atorvastatin (LIPITOR) 10 MG tablet  1 tablet (10 mg total) by Per G Tube route once daily.             bisacodyl (DULCOLAX) 5 mg EC tablet  Take 5 mg by mouth daily as needed for Constipation.             carvedilol (COREG) 12.5 MG tablet  Take 1 tablet (12.5 mg total) by mouth 2 (two) times daily.             chlorhexidine (PERIDEX) 0.12 % solution  Use as directed 15 mLs in the mouth or throat 2 (two) times daily.             clonazePAM (KLONOPIN) 2 MG Tab  1 tablet (2 mg total) by Per G Tube route every 8 (eight) hours.             cloNIDine 0.1 mg/24 hr td ptwk (CATAPRES) 0.1 mg/24 hr  Place 1 patch onto the skin every 7 days.             diclofenac sodium (VOLTAREN) 1 % Gel  Apply 2 g topically 2 (two) times daily.             diphenhydrAMINE (BENADRYL) 25 mg capsule  25 mg by Per G Tube route 3 (three) times daily as needed for Itching.             docusate sodium (COLACE) 100 MG capsule  100 mg by Per G Tube route 2 (two) times daily.             EASY  TOUCH TWIST LANCETS 30 gauge Misc               enoxaparin (LOVENOX) 40 mg/0.4 mL Syrg  Inject 40 mg into the skin once daily.             famotidine (PEPCID) 20 MG tablet  1 tablet (20 mg total) by Per G Tube route 2 (two) times daily.             fluoxetine (PROZAC) 20 MG capsule  1 capsule (20 mg total) by Per G Tube route once daily.             HEPARIN SODIUM,PORCINE (HEPARIN, PORCINE,) 5,000 unit/mL injection  Inject 1.5 mLs (7,500 Units total) into the skin every 8 (eight) hours.             hydrALAZINE (APRESOLINE) 100 MG tablet  1 tablet (100 mg total) by Per G Tube route every 8 (eight) hours.             insulin aspart (NOVOLOG) 100 unit/mL InPn pen  Inject 1-10 Units into the skin every 4 (four) hours as needed (Hyperglycemia).             insulin detemir (LEVEMIR FLEXTOUCH) 100 unit/mL (3 mL) SubQ InPn pen  Inject 6 Units into the skin 2 (two) times daily.             insulin detemir (LEVEMIR FLEXTOUCH) 100 unit/mL (3 mL) SubQ InPn pen  Inject 9 Units into the skin 2 (two) times daily.             LACTOSE-REDUCED FOOD/FIBER (ISOSOURCE 1.5 RONA FEEDING TUBE)  300 mLs by FEEDING TUBE route 4 (four) times daily.             levETIRAcetam (KEPPRA) 750 MG Tab  2 tablets (1,500 mg total) by Per G Tube route 2 (two) times daily.             lisinopril 10 MG tablet  10 mg by Per G Tube route once daily.             nitroGLYCERIN (NITROSTAT) 0.4 MG SL tablet  Place 0.4 mg under the tongue every 5 (five) minutes as needed for Chest pain.             ondansetron (ZOFRAN, AS HYDROCHLORIDE,) 2 mg/mL injection  Inject 4 mg into the vein every 4 (four) hours as needed (for nausea).             pantoprazole (PROTONIX) 40 mg GrPS  1 packet (40 mg total) by Per G Tube route once daily.             polyethylene glycol (GLYCOLAX) 17 gram PwPk  17 g by Per G Tube route once daily.             senna-docusate 8.6-50 mg (PERICOLACE) 8.6-50 mg per tablet  1 tablet by Per G Tube route once daily.             sodium chloride 1  gram tablet  Take 2 tablets (2 g total) by mouth every 8 (eight) hours.             sodium chloride 3% 3 % nebulizer solution  Take 4 mLs by nebulization every 8 (eight) hours.             zonisamide (ZONEGRAN) 100 MG Cap  2 capsules (200 mg total) by Per NG tube route 2 (two) times daily.                             DIABETES CARE:        Fingerstick blood sugar every 6 hours if patient is unable to eat    Report CBG < 60 or > 350 to physician.         Insulin Sliding Scale         Glucose  Novolog Insulin Subcutaneous        0 - 60   Orange juice or glucose tablet      No insulin   201-250  2 units   251-300  4 units   301-350  6 units   351-400  8 units   >400   10 units then call physician        _________________________________  Ryan Roberto MD  11/14/2017

## 2017-11-15 VITALS
RESPIRATION RATE: 20 BRPM | OXYGEN SATURATION: 99 % | HEART RATE: 90 BPM | SYSTOLIC BLOOD PRESSURE: 142 MMHG | HEIGHT: 66 IN | WEIGHT: 284.38 LBS | BODY MASS INDEX: 45.7 KG/M2 | DIASTOLIC BLOOD PRESSURE: 65 MMHG | TEMPERATURE: 99 F

## 2017-11-15 LAB
ANION GAP SERPL CALC-SCNC: 8 MMOL/L
BUN SERPL-MCNC: 44 MG/DL
CALCIUM SERPL-MCNC: 9.1 MG/DL
CHLORIDE SERPL-SCNC: 113 MMOL/L
CO2 SERPL-SCNC: 25 MMOL/L
CREAT SERPL-MCNC: 0.8 MG/DL
EST. GFR  (AFRICAN AMERICAN): >60 ML/MIN/1.73 M^2
EST. GFR  (NON AFRICAN AMERICAN): >60 ML/MIN/1.73 M^2
GLUCOSE SERPL-MCNC: 171 MG/DL
MAGNESIUM SERPL-MCNC: 2.5 MG/DL
PHOSPHATE SERPL-MCNC: 4.3 MG/DL
POCT GLUCOSE: 151 MG/DL (ref 70–110)
POCT GLUCOSE: 158 MG/DL (ref 70–110)
POCT GLUCOSE: 168 MG/DL (ref 70–110)
POTASSIUM SERPL-SCNC: 4.8 MMOL/L
SODIUM SERPL-SCNC: 146 MMOL/L

## 2017-11-15 PROCEDURE — 25000003 PHARM REV CODE 250: Performed by: PHYSICIAN ASSISTANT

## 2017-11-15 PROCEDURE — 25000003 PHARM REV CODE 250: Performed by: STUDENT IN AN ORGANIZED HEALTH CARE EDUCATION/TRAINING PROGRAM

## 2017-11-15 PROCEDURE — 97803 MED NUTRITION INDIV SUBSEQ: CPT

## 2017-11-15 PROCEDURE — 84100 ASSAY OF PHOSPHORUS: CPT

## 2017-11-15 PROCEDURE — A4216 STERILE WATER/SALINE, 10 ML: HCPCS | Performed by: EMERGENCY MEDICINE

## 2017-11-15 PROCEDURE — 36415 COLL VENOUS BLD VENIPUNCTURE: CPT

## 2017-11-15 PROCEDURE — 99239 HOSP IP/OBS DSCHRG MGMT >30: CPT | Mod: GC,,, | Performed by: HOSPITALIST

## 2017-11-15 PROCEDURE — 99900026 HC AIRWAY MAINTENANCE (STAT)

## 2017-11-15 PROCEDURE — 94761 N-INVAS EAR/PLS OXIMETRY MLT: CPT

## 2017-11-15 PROCEDURE — 25000242 PHARM REV CODE 250 ALT 637 W/ HCPCS: Performed by: PHYSICIAN ASSISTANT

## 2017-11-15 PROCEDURE — 63600175 PHARM REV CODE 636 W HCPCS: Performed by: EMERGENCY MEDICINE

## 2017-11-15 PROCEDURE — 80048 BASIC METABOLIC PNL TOTAL CA: CPT

## 2017-11-15 PROCEDURE — 27000221 HC OXYGEN, UP TO 24 HOURS

## 2017-11-15 PROCEDURE — 83735 ASSAY OF MAGNESIUM: CPT

## 2017-11-15 PROCEDURE — 25000003 PHARM REV CODE 250: Performed by: EMERGENCY MEDICINE

## 2017-11-15 PROCEDURE — 94640 AIRWAY INHALATION TREATMENT: CPT

## 2017-11-15 RX ADMIN — CARVEDILOL 12.5 MG: 12.5 TABLET, FILM COATED ORAL at 08:11

## 2017-11-15 RX ADMIN — ZONISAMIDE 200 MG: 100 CAPSULE ORAL at 08:11

## 2017-11-15 RX ADMIN — INSULIN ASPART 4 UNITS: 100 INJECTION, SOLUTION INTRAVENOUS; SUBCUTANEOUS at 11:11

## 2017-11-15 RX ADMIN — INSULIN ASPART 4 UNITS: 100 INJECTION, SOLUTION INTRAVENOUS; SUBCUTANEOUS at 05:11

## 2017-11-15 RX ADMIN — HEPARIN SODIUM 7500 UNITS: 5000 INJECTION, SOLUTION INTRAVENOUS; SUBCUTANEOUS at 05:11

## 2017-11-15 RX ADMIN — AMLODIPINE BESYLATE 10 MG: 10 TABLET ORAL at 11:11

## 2017-11-15 RX ADMIN — INSULIN ASPART 2 UNITS: 100 INJECTION, SOLUTION INTRAVENOUS; SUBCUTANEOUS at 05:11

## 2017-11-15 RX ADMIN — ASPIRIN 81 MG CHEWABLE TABLET 81 MG: 81 TABLET CHEWABLE at 08:11

## 2017-11-15 RX ADMIN — HYDRALAZINE HYDROCHLORIDE 100 MG: 50 TABLET ORAL at 05:11

## 2017-11-15 RX ADMIN — STANDARDIZED SENNA CONCENTRATE AND DOCUSATE SODIUM 1 TABLET: 8.6; 5 TABLET, FILM COATED ORAL at 08:11

## 2017-11-15 RX ADMIN — HYDRALAZINE HYDROCHLORIDE 100 MG: 50 TABLET ORAL at 01:11

## 2017-11-15 RX ADMIN — CLONAZEPAM 2 MG: 1 TABLET ORAL at 01:11

## 2017-11-15 RX ADMIN — SODIUM CHLORIDE SOLN NEBU 3% 4 ML: 3 NEBU SOLN at 07:11

## 2017-11-15 RX ADMIN — LEVETIRACETAM 1500 MG: 750 TABLET ORAL at 08:11

## 2017-11-15 RX ADMIN — SODIUM CHLORIDE SOLN NEBU 3% 15 ML: 3 NEBU SOLN at 12:11

## 2017-11-15 RX ADMIN — INSULIN ASPART 2 UNITS: 100 INJECTION, SOLUTION INTRAVENOUS; SUBCUTANEOUS at 12:11

## 2017-11-15 RX ADMIN — POLYETHYLENE GLYCOL 3350 17 G: 17 POWDER, FOR SOLUTION ORAL at 08:11

## 2017-11-15 RX ADMIN — CLONAZEPAM 2 MG: 1 TABLET ORAL at 05:11

## 2017-11-15 RX ADMIN — Medication 10 ML: at 05:11

## 2017-11-15 RX ADMIN — INSULIN DETEMIR 9 UNITS: 100 INJECTION, SOLUTION SUBCUTANEOUS at 08:11

## 2017-11-15 RX ADMIN — INSULIN ASPART 4 UNITS: 100 INJECTION, SOLUTION INTRAVENOUS; SUBCUTANEOUS at 12:11

## 2017-11-15 NOTE — ASSESSMENT & PLAN NOTE
Cardiac arrest. ROSC about 10 min after chest compressions initiated. 2 rounds of epi. No shock delivered. Cardiology consulted. Echo hyperdynamic with EF 75%, no DD. ASA/statin. No BB/ACEi as BP will not tolerate. She did not appear to have an MI given EKG and troponins. Probably precipitated by hypoxia. Maintain body temp 32-34 x48 hours.   - Cardiac arrest likely in setting of respiratory failure 10/04 with anoxic brain injury superimposed on prior strokes.  - Continuing aspirin 81mg per G daily, carvedilol 12.5mg per G BID.  - Hospice per family, and patient to be discharged home with home hospice

## 2017-11-15 NOTE — PLAN OF CARE
Problem: Patient Care Overview  Goal: Plan of Care Review  Outcome: Ongoing (interventions implemented as appropriate)  Pt had no significant changes in neuro status and non eventful day. Pt continues to require frequent suctioning d/t mod/large mucous production. Pt sister visit and was updated on plan of care and verbalized understanding.

## 2017-11-15 NOTE — PLAN OF CARE
CM called and spoke with Soha with [facility]; confirmed that they will admit patient to hospice services today. Patient to discharge via ambulance arranged for 4:30pm.    3:07 pm - Discharge summary and updated hospice orders faxed to Soha at [phone].

## 2017-11-15 NOTE — NURSING
Pt telemetry was discontinued. Notified team d/t pt cardiac hx and nonverbal status. Request to re-order cardiac monitoring. Spoke to [place]. No new orders. Will continue to monitor.

## 2017-11-15 NOTE — NURSING
Notified Med 3 of pt's low grade fever of 98.7 axillary per Charge nurses advisement. Pt's temp as been the same for several days  And of pt noted to have scant amount bleeding from left ear- just a few drops. Verbalized understanding and will see patient.

## 2017-11-15 NOTE — CARE UPDATE
"I was notified by nurse about bleeding from left ear and an axillary temperature of 98.7F.  Upon evaluation of the patient found to have small amount of dried blood in the ear without any blood appreciated in the canal.  Patients sister who was present reports that she frequently has bleeding from "bumps" when they clean her face.  The patient is currently afebrile.      Ryan Roberto MD   Resident Physician  [facility] Medicine  [facility]      "

## 2017-11-15 NOTE — ASSESSMENT & PLAN NOTE
Nutrition Problem:  Inadequate oral intake    Related to (etiology):   Inability to consume sufficient energy    Signs and Symptoms (as evidenced by):   NPO; PEG TFs    Interventions/Recommendations (treatment strategy):  Please see RD recs     Nutrition Diagnosis Status:   Resolved

## 2017-11-15 NOTE — PROGRESS NOTES
[facility]  Adult Nutrition  Progress Note    SUMMARY     Recommendations    Recommendation/Intervention: Continue Glucerna 1.5 @ 45mL/hr. For maintenance fluid needs: 200mL water bolus q 6 hrs to meet 1mL/kcal. Hold for residuals >500mL.     RD to monitor.    Goals: Continue tolerance to TF  Nutrition Goal Status: new  Communication of RD Recs:  (POC)      Reason for Assessment    Reason for Assessment: RD follow-up  Diagnosis: seizures, other (see comments) (cardiac arrest)  Relevent Medical History: DM type II, CKD stage 3, CAD s/p CABG, HTN, PEG/trach      General Information Comments: Tolerating TF, planning to discharge home with hospice today.     Nutrition Discharge Planning: Glucerna 1.5 @ 45ml/hr; 200mL water bolus q 6 hrs     Nutrition Prescription Ordered    Current Diet Order: NPO  Nutrition Order Comments: TF at goal  Current Nutrition Support Formula Ordered: Glucerna 1.5  Current Nutrition Support Rate Ordered: 45 (ml)  Current Nutrition Support Frequency Ordered: mL/hr     Evaluation of Received Nutrients/Fluid Intake    Enteral Calories (kcal): 1620  Enteral Protein (gm): 89  Enteral (Free Water) Fluid (mL): 820  Energy Calories Required: meeting needs  % Kcal Needs: 100  Protein Required: meeting needs  % Protein Needs: 100  IV Fluid (mL): 0  I/O: +8.7L  Fluid Required: not meeting needs  Comments: 0ml residuals 11/11  Tolerance: tolerating  % Intake of Estimated Energy Needs: 75 - 100 %  % Meal Intake: 100%     Nutrition Risk Screen     Nutrition Risk Screen: other (see comments) (trach)    Nutrition/Diet History    Typical Food/Fluid Intake: JOSE LUIS. Pt on TF PTA (PEG in place).  Food Preferences: jose luis  Factors Affecting Nutritional Intake: NPO, impaired cognitive status/motor control     Labs/Tests/Procedures/Meds    Pertinent Labs Reviewed: reviewed, pertinent  Pertinent Labs Comments: Na 146, Cl 113, BUN 44, POCT Gluc 168  Pertinent Medications Reviewed: reviewed,  "pertinent  Pertinent Medications Comments: insulin, senna    Physical Findings    Overall Physical Appearance:  (trach collar)  Tubes: gastrostomy tube  Skin: dermatitis    Anthropometrics    Temp: 98.8 °F (37.1 °C)     Height: 5' 6" (167.6 cm)  Weight Method: Bed Scale  Weight: 129 kg (284 lb 6.3 oz)     Ideal Body Weight (IBW), Female: 130 lb     % Ideal Body Weight, Female (lb): 218.77 lb  BMI (Calculated): 46  BMI Grade: greater than 40 - morbid obesity       Estimated/Assessed Needs    Weight Used For Calorie Calculations: 129 kg (284 lb 6.3 oz)   Height (cm): 167.6 cm  Energy Calorie Requirements (kcal): 9553-5109  Energy Need Method: Kcal/kg  RMR (Codington-St. Jeor Equation): 1841.75  Weight Used For Protein Calculations: 59 kg (130 lb 1.1 oz)  Protein Requirements: 89-118g (1.5-2.0g/kg)  Fluid Requirements (mL): 1mL/kcal or per MD  RDA Method (mL): 1419       Assessment and Plan    PEG (percutaneous endoscopic gastrostomy) status    Nutrition Problem:  Inadequate oral intake    Related to (etiology):   Inability to consume sufficient energy    Signs and Symptoms (as evidenced by):   NPO; PEG TFs    Interventions/Recommendations (treatment strategy):  Please see RD recs     Nutrition Diagnosis Status:   Resolved               Monitor and Evaluation    Food and Nutrient Intake: enteral nutrition intake  Food and Nutrient Adminstration: enteral and parenteral nutrition administration        Anthropometric Measurements: weight, weight change, body mass index  Biochemical Data, Medical Tests and Procedures: electrolyte and renal panel, gastrointestinal profile, glucose/endocrine profile, inflammatory profile, lipid profile  Nutrition-Focused Physical Findings: overall appearance    Nutrition Risk    Level of Risk: other (see comments) (f/u 1x/week)    Nutrition Follow-Up    RD Follow-up?: Yes    "

## 2017-11-15 NOTE — DISCHARGE SUMMARY
[facility] Hospital Medicine  Discharge Summary      Patient Name: Nisa Frank  MRN: [id number]  Admission Date: 10/3/2017  Hospital Length of Stay: 43 days  Discharge Date and Time:  11/15/2017 9:31 AM  Attending Physician: Ligia Sorto MD   Discharging Provider: Ryan Roberto MD  Primary Care Provider: Atilio Downs MD  Hospital Medicine Team: Networked reference to record PCT  Ryan Roberto MD    HPI:   Ms. Frank is a 68 yo woman with diabetes mellitus type 2 (A1c 7.2%), CKD stage 3, CAD s/p CABG, essential hypertension, cerebrovascular disease s/p bi-hemispheric watershed infarcts and trach/PEG status who was brought in via EMS from [facility] for cardiac arrest. Per NH staff, she received chest compressions for about 5 minutes. After EMS arrived, ACLS protocol continued and she was given 2 rounds of epi, non-shockable rhythm. ROSC achieved en route to the ER.    She was discharged from [facility] 9/28/2017 after a two week admission where she was initially admitted to our facility and then transferred to [facility], essentially for a second opinion. She has been admitted to multiple facilities recently to be treated for various issues including mucous plug leading to acute hypoxic respiratory failure, she pulled out her trach 9/4 so that was reinserted at [facility] and she was also treated for ESBL UTI while there, and last admit she was treated for likely aspiration pneumonia. Family was concerned for repeat stroke as she had decline in alertness after trach reinserted at [facility]. MRI and EEG during her last admission showed no acute process.    Her daughter/POA, Shayna, can be reached at [phone].    * No surgery found *      Hospital Course:   Shortly after arriving in the ICU she developed myoclonic jerks, initially only when stimulated and then continuously even without stimulation. This was thought to be due to anoxic brain injury vs seizure. She was started on  Levetiracetam 3000 mg IV x1 then 1500 mg twice daily. Jerks improved with propofol that was started overnight. EEG 10/4/2017 was consistent with seizure. She was given ativan and had clinical improvement. Transfer orders to [facility] for continuous EEG monitoring placed.  The etiology of her cardiac arrest was not clear. EKG with no acute change. Echo with preserved EF. She did not appear to have had an MI. Suspect possible mucous plug with desaturation.  Patient was stepped down from the NICU to IM team 3 on 11/12/2017.  She was continued on trach collar.  Insulin regimen was altered for improved glucose control.  He sodium and chloride were elevated so free water flushes were increased and salt tabs were discontinued.  Electrolytes were monitored.  On 11/15/2017 patient was deemed stable for discharged to home with home hospice.      Discharge ROS:  Unable to obtain due to clinical condition      Discharge Physical Exam:  Constitutional: She appears well-developed. No distress.   Obese   HENT:   Head: Normocephalic and atraumatic.   Eyes: Conjunctivae are normal. Pupils are equal, round, and reactive to light.   Cardiovascular: Normal rate, regular rhythm, S1 normal, S2 normal and intact distal pulses.    Pulmonary/Chest: Effort normal and breath sounds normal.   Tracheostomy in place with T-collar.   Abdominal: Soft. Bowel sounds are normal. She exhibits no distension. There is no tenderness.   PEG tube in place.   Musculoskeletal: She exhibits no tenderness or deformity.  Mild edema bilateral hands.    Neurological:   E1VTM4; minimal withdrawal to pain BLE   Skin: Skin is warm and dry.      Consults:   Consults         Status Ordering Provider     Inpatient consult to Neurology  Once     Provider:  Dustin Harmon MD    Completed VANDANA LOPEZ     Inpatient consult to Palliative Care  Once     Provider:  Deyanira Rodriguez RN    Completed VANDANA LOPEZ     Inpatient consult to Physical Medicine Rehab  Once      Provider:  (Not yet assigned)    Completed YUE JAUREGUI     Inpatient consult to Pulmonology  Once     Provider:  Williams Padilla MD    Completed VANDANA LOPEZ     Inpatient consult to Social Work/Case Management  Once     Provider:  (Not yet assigned)    Acknowledged YUE JAUREGUI     Inpatient consult to Spiritual Care  Once     Provider:  (Not yet assigned)    Completed VANDANA LOPEZ     IP consult to dietary  Once     Provider:  (Not yet assigned)    Completed YUE JAUREGUI          * Anoxic brain injury    - As under cardiac arrest.        Hypernatremia    - Currently resolved  - Discontinued salt tabs decreased free water flushes to 300cc          Cardiac arrest, cause unspecified    Cardiac arrest. ROSC about 10 min after chest compressions initiated. 2 rounds of epi. No shock delivered. Cardiology consulted. Echo hyperdynamic with EF 75%, no DD. ASA/statin. No BB/ACEi as BP will not tolerate. She did not appear to have an MI given EKG and troponins. Probably precipitated by hypoxia. Maintain body temp 32-34 x48 hours.   - Cardiac arrest likely in setting of respiratory failure 10/04 with anoxic brain injury superimposed on prior strokes.  - Continuing aspirin 81mg per G daily, carvedilol 12.5mg per G BID.  - Hospice per family, and patient to be discharged home with home hospice        Cerebral infarction, watershed distribution, bilateral, acute    - h/o multiple strokes with significant residual deficits; now with significant debility after strokes and subsequent cardiac arrest.  - Medications as under cardiac arrest.        Acute on chronic respiratory failure    - Chronic respiratory failure given large area of cerebral infarct, cardiac arrest; now s/p tracheostomy and tolerating T-collar well.  - Q4 tracheostomy suctioning for secretions        Hyperlipidemia    - As under cardiac arrest.        Essential hypertension    - Continuing amlodipine 10mg per G daily, carvedilol as  under cardiac arrest, hydralazine 100mg per G q8hr.          Final Active Diagnoses:    Diagnosis Date Noted POA    PRINCIPAL PROBLEM:  Anoxic brain injury [G93.1] 10/05/2017 Yes    Status epilepticus [G40.901] 10/19/2017 Yes    Hypernatremia [E87.0] 10/19/2017 Yes    Anemia of chronic disease [D63.8] 10/19/2017 Yes    Klebsiella pneumonia [J15.0] 10/14/2017 Yes    Cardiac arrest, cause unspecified [I46.9]  Yes    Cerebral infarction, watershed distribution, bilateral, acute [I63.8]  Yes     Chronic    Chronic sinus bradycardia [R00.1] 08/11/2017 Yes    Acute on chronic respiratory failure [J96.20] 08/08/2017 Yes    Tracheostomy status [Z93.0] 08/08/2017 Not Applicable     Chronic    PEG (percutaneous endoscopic gastrostomy) status [Z93.1] 08/08/2017 Not Applicable    [place] coma scale total score 3-8 [R40.2430] 07/07/2017 Yes    Seizure [R56.9] 06/20/2017 Yes    CAD s/p CABG [I25.10] 06/19/2017 Yes     Chronic    Moderate protein malnutrition [E44.0] 06/19/2017 Yes     Chronic    Hyperlipidemia [E78.5] 06/19/2017 Yes    Morbid obesity with BMI of 50.0-59.9, adult [E66.01, Z68.43] 02/24/2015 Not Applicable     Chronic    S/P CABG (coronary artery bypass graft) [Z95.1] 06/05/2014 Not Applicable     Chronic    Essential hypertension [I10] 05/12/2014 Yes     Chronic    Type 2 diabetes mellitus, uncontrolled [E11.65] 05/12/2014 Yes     Chronic      Problems Resolved During this Admission:    Diagnosis Date Noted Date Resolved POA    Fever [R50.9] 10/06/2017 10/14/2017 No    KATHI (acute kidney injury) [N17.9] 09/13/2017 10/13/2017 Yes    Leukocytosis [D72.829] 09/13/2017 10/22/2017 Yes    Hyperkalemia [E87.5] 06/19/2017 11/13/2017 Yes       Discharged Condition: stable    Disposition: Long Term Care    Follow Up:  Follow-up Information     Atilio Downs MD In 1 week.    Specialty:  General Practice  Contact information:  [id number] [place] [id number]  [phone]                  Patient Instructions:   No discharge procedures on file.    Significant Diagnostic Studies: Labs:   CMP   Recent Labs  Lab 11/14/17  0505 11/15/17  0508    146*   K 4.5 4.8   * 113*   CO2 27 25   * 171*   BUN 46* 44*   CREATININE 0.8 0.8   CALCIUM 9.3 9.1   ANIONGAP 7* 8   ESTGFRAFRICA >60.0 >60.0   EGFRNONAA >60.0 >60.0    and CBC   Recent Labs  Lab 11/14/17  0505   WBC 11.36   HGB 7.1*   HCT 23.9*        Imaging Results          X-Ray Chest 1 View (Final result)  Result time 10/15/17 14:14:28    Final result by Osbaldo Echols MD (10/15/17 14:14:28)                 Impression:     Post sternotomy and tracheostomy. Resolved pulmonary vascular congestion. Mild consolidation may still be present in left lung base, improved.      Electronically signed by: OSBALDO ECHOLS MD  Date:     10/15/17  Time:    14:14              Narrative:    Portable AP view of the chest was obtained.  Comparison -- 10/10. Postoperative changes from tracheostomy and sternotomy are again identified. The heart size is normal. Mediastinal contour is unremarkable. The lung volumes are low. Previous vascular congestion has resolved. Mild consolidation may still be present at the left lung base, improved. No significant pleural fluid is seen.                             X-Ray Chest 1 View (Final result)  Result time 10/10/17 10:37:09    Final result by Gonzalez Carney MD (10/10/17 10:37:09)                 Impression:        Increased pulmonary vascular congestion and prominence of interstitial markings, possibly from CHF.                Electronically signed by: GONZALEZ CARNEY MD  Date:     10/10/17  Time:    10:37              Narrative:    History: Question pneumonia.    Procedure: Chest one view    Findings:    The position of the tracheostomy remains in a significant change from the previous study.  There is again a median sternotomy.    Since the previous study there is increased pulmonary vascular  congestion and interstitial markings, suspicious for CHF.  Clinical correlation suggested.  Left costophrenic angle is not included on the radiograph.                             X-Ray Chest 1 View (Final result)  Result time 10/09/17 13:14:13    Final result by Philip Badillo MD (10/09/17 13:14:13)                 Impression:     No significant detrimental interval change in the appearance of the chest since 10/8/17.      Electronically signed by: Philip Badillo MD  Date:     10/09/17  Time:    13:14              Narrative:    Comparison is made to 10/8/17.    Postoperative changes in the thorax, a tracheostomy cannula, and a vascular catheter entering from the left arm with its tip near the junction of the superior vena cava and right atrium are all observed.  Heart appears enlarged, but the cardiomediastinal silhouette demonstrates no detrimental change since the examination referenced above.  Pulmonary vascularity is normal, without interval engorgement.  The lung zones are clear, and free of significant airspace consolidation or volume loss.  No pleural fluid of any substantial volume is seen on either side.  No pneumothorax.                             X-Ray Abdomen AP 1 View (Final result)  Result time 10/08/17 19:02:26    Final result by Fred Cowan MD (10/08/17 19:02:26)                 Impression:      As above      Electronically signed by: FRED COWAN MD  Date:     10/08/17  Time:    19:02              Narrative:    Abdomen AP 1 view    Clinical history: PEG tube placement    Comparison: 9/18/2017    Findings:  Single view.    PEG tube has been placed, overlies the distal gastric body/pyloric region, limited assessment given lack of contrast and positioning.  No high grade obstruction.  No acute change in the cardiopulmonary status.  There has been some bowel decompression since the previous exam.                             CT Head Without Contrast (Final result)     Abnormal  Result time 10/08/17  10:06:31    Final result by Lion Manjarrez DO (10/08/17 10:06:31)                 Impression:     Evolving global anoxic injury as identified on recent MRI with worsening diffuse cerebral edema with development of global loss of gray-white matter differentiation.    There is mass effect with slight crowding of the cerebral sulci and smaller caliber ventricular system without hydrocephalus.    No evidence for acute intracranial hemorrhage. Clinical correlation advised.      Electronically signed by: LION MANJARREZ DO  Date:     10/08/17  Time:    10:06              Narrative:    CT brain without contrast.    Comparison: MRI brain 10/05/2017 and CT 09/13/2017    Technique: Multiple 5 mm axial images of the head were obtained without intravenous contrast.    Results: There is diffuse loss of gray-white matter differentiation compatible with evolving edema and sequela of global marks the injury. There is mass effect with slight crowding of the cerebral sulci and smaller caliber of the ventricular system compatible with evolving cerebral edema. There is no evidence for acute intracranial hemorrhage. Superimposed numerous small areas of hypoattenuation in the supratentorial white matter correspond to hypodensities seen on prior CT and suggestive for underlying chronic ischemic change.    Allowing for motion limitation there is no acute intracranial hemorrhage.    Patchy ethmoid air cell opacities with severe opacification of sphenoid sinus and complete opacification right mastoid air cells and partial opacification left mastoid air cells.        Case was discussed with Dilcia at 10:05:36 on 10/08/17   Electronic notification system activated.                             X-Ray Chest AP Single View (Final result)  Result time 10/08/17 06:57:56    Final result by Yordy Langley MD (10/08/17 06:57:56)                 Impression:      1.  No significant detrimental interval change compared to  10/7/2017.      Electronically signed by: FOREIGN KWAN  Date:     10/08/17  Time:    06:57              Narrative:    Comparison:10/7/2017    Technique: Single AP portable chest radiograph.    Findings:   Tracheostomy cannula and left-sided PICC line project in similar radiographic position. There is no significant change in cardiopulmonary status from prior exam. No evidence of pneumothorax.                             X-Ray Chest AP Single View (Final result)  Result time 10/07/17 08:33:13    Final result by Mahnaz Zamora MD (10/07/17 08:33:13)                 Impression:      Increased ill-defined left lower lung zone air space opacities, suspicious for infectious/inflammatory disease (including aspiration), or edema.      Electronically signed by: MAHNAZ ZAMORA MD  Date:     10/07/17  Time:    08:33              Narrative:    Chest, one view    Comparison: October 6, 2017    Findings: Lines and tubes remain in stable position.    Increased ill-defined left lower lung zone air space opacities. No effusion or pneumothorax.                             X-Ray Chest AP Single View (Final result)  Result time 10/06/17 05:49:34    Final result by Philip Badillo MD (10/06/17 05:49:34)                 Impression:     As above.      Electronically signed by: Philip Badillo MD  Date:     10/06/17  Time:    05:49              Narrative:    No significant interval change in the appearance of the chest since 10/5/17 at 11:07 AM.  No pneumothorax.                             X-Ray Chest 1 View (Final result)  Result time 10/05/17 11:45:29    Final result by Philip Badillo MD (10/05/17 11:45:29)                 Impression:     Allowing for considerable differences in patient positioning, no significant detrimental interval change in the appearance of the chest since 10/4/17 is observed.      Electronically signed by: Philip Badillo MD  Date:     10/05/17  Time:    11:45              Narrative:    Tracheostomy cannula and a vascular  catheter entering from the left arm, the latter having its tip at the level of the junction of the superior vena cava and right atrium, are again noted, as are postoperative changes in the thorax.  Heart size and the appearance of the cardiomediastinal silhouette have not changed appreciably since 10/4/17.  Lung zones are adequately aerated allowing for a poor inspiratory depth, with no significant superimposed air space consolidation or volume loss evident.  No pleural fluid of any substantial volume is seen on either side.  No pneumothorax.  Gastrostomy appliance in the left upper quadrant is incidentally seen.                             MRI Brain Without Contrast (Final result)     Abnormal  Result time 10/05/17 11:26:07    Final result by Lion Manjarrez DO (10/05/17 11:26:07)                 Impression:     Interval development of diffuse cortical restricted diffusion throughout the cerebral hemispheres and cerebellum concerning for acute global anoxic hypoxic injury in light of history despite lack of corresponding flair hyperintensity. No evidence for hemorrhagic conversion.    Superimposed Age-appropriate cerebral volume loss with patchy and confluent T2 flair signal abnormality supratentorial white matter are nonspecific suggestive for underlying chronic ischemic change.    Clinical correlation and followup advised.    Please see above for additional details.       Electronically signed by: LION MANJARREZ DO  Date:     10/05/17  Time:    11:26              Narrative:    Procedure: MRI the brain without contrast.    Technique: Sagittal and axial T1, axial T2, axial FLAIR, axial gradient, and axial diffusion imaging of the whole brain.    Comparison: 09/15/2017    Findings: Interval development of diffuse cortical symmetrical restricted diffusion throughout the cerebral hemispheres and cerebellum and the given history most compatible with acute hypoxic anoxic encephalopathy. There is no significant  corresponding edema signal on the flair imaging most compatible with acute and injury. Clinical correlation advised. There is slight limitation by patient motion. Age-appropriate generalized cerebral volume loss with compensatory enlargement of ventricles sulci and cisterns without hydrocephalus.    There are patchy and confluent regions of T2 flair signal hyperintensity throughout the supratentorial parenchyma most prominent in the periventricular white matter without corresponding diffusion signal abnormality. This is nonspecific and suggestive for moderate degree of chronic ischemic change.  No abnormal parenchymal susceptibility to suggest parenchymal hemorrhage. Compensatory enlargement of ventricles without hydrocephalus. The major intracranial T2 flow voids are present.    Fluid signal opacification mastoid air cells with patchy opacities within the paranasal sinuses most pronounced in the ethmoid air cells and sphenoid sinus.    Bilateral proptosis.  Electronic notification system activated and  Case was discussed with ISAIAH Singh at 11:25:16 on 10/05/17                             X-Ray Chest 1 View (Final result)  Result time 10/04/17 04:37:59    Final result by Foreign Langley MD (10/04/17 04:37:59)                 Impression:      1.  No significant detrimental interval change compared to 10/3/2017.      Electronically signed by: FOREIGN LANGLEY  Date:     10/04/17  Time:    04:37              Narrative:    Comparison:10/3/2017    Technique: Single AP portable chest radiograph.    Findings:   The patient is rotated. Tracheostomy tube terminates approximately 2.5 cm above the jasmin. There is a left-sided PICC line which appears to project in stable radiographic position allowing for patient rotation. The cardiomediastinal silhouette is enlarged. No definite new focal air space consolidation or large pleural effusion is identified. No evidence of pneumothorax.                             X-Ray  Chest 1 View (Final result)  Result time 10/03/17 19:36:06    Final result by Tommy Cowan MD (10/03/17 19:36:06)                 Impression:      As above      Electronically signed by: TOMMY COWAN MD  Date:     10/03/17  Time:    19:36              Narrative:    Chest one view    Indication:PICC line    Comparison:10/3/2017    Findings: Since the previous exam, the PICC catheter has been advanced, tip now lies at the brachiocephalic/SVC junction.  No pneumothorax or acute detrimental change otherwise in the cardiopulmonary status.                             X-Ray Chest 1 View (Final result)  Result time 10/03/17 17:57:52    Final result by Tommy Cowan MD (10/03/17 17:57:52)                 Impression:      As above      Electronically signed by: TOMMY COWAN MD  Date:     10/03/17  Time:    17:57              Narrative:    Chest one view    Indication:Catheter placement    Comparison:10/3/2017    Findings: Left PICC catheter has been placed in the interval, tip projects at midline, likely within the brachiocephalic vessel, advancement as warranted.  Endotracheal tube tip lies approximately 3.0 cm above the jasmin.  There is no pneumothorax.  There has been no significant interval detrimental change in the cardiopulmonary status since the previous exam.                             X-ray chest AP portable (Final result)  Result time 10/03/17 14:32:07    Final result by Greg Alegria MD (10/03/17 14:32:07)                 Impression:      Tracheostomy tube appears in satisfactory position.  Surgical changes.  Poor depth of inspiration.  No acute chest disease identified.      Electronically signed by: GREG ALEGRIA MD  Date:     10/03/17  Time:    14:32              Narrative:    Comparison is made to prior examination dated 9/13/17.    A single portable AP radiograph of the chest was obtained.  The tracheostomy tube appears in satisfactory position.  There are surgical changes present with  sternal wires identified.  The heart and mediastinal structures are within normal limits in size.  There is a poor depth of inspiration.  The lungs are free of focal consolidations.  There is no evidence for pneumothorax or large pleural effusions.  Bony structures appear intact.                              Pending Diagnostic Studies:     Procedure Component Value Units Date/Time    CBC auto differential [[id number]] Collected:  11/06/17 0314    Order Status:  Sent Lab Status:  In process Updated:  11/06/17 0314    Specimen:  Blood from Blood     Comprehensive metabolic panel [[id number]] Collected:  11/06/17 0314    Order Status:  Sent Lab Status:  In process Updated:  11/06/17 0314    Specimen:  Blood from Blood     Cortisol ACTH Stimulation [[id number]] Collected:  10/08/17 1202    Order Status:  Sent Lab Status:  In process Updated:  10/08/17 1202    Specimen:  Blood from Blood     Cortisol, AM [[id number]] Collected:  10/08/17 1202    Order Status:  Sent Lab Status:  In process Updated:  10/08/17 1202    Specimen:  Blood from Blood     Cortisol, PM [[id number]] Collected:  10/08/17 1202    Order Status:  Sent Lab Status:  In process Updated:  10/08/17 1202    Specimen:  Blood from Blood     Magnesium [[id number]] Collected:  11/06/17 0314    Order Status:  Sent Lab Status:  In process Updated:  11/06/17 0314    Specimen:  Blood from Blood     Phosphorus [[id number]] Collected:  11/06/17 0314    Order Status:  Sent Lab Status:  In process Updated:  11/06/17 0314    Specimen:  Blood from Blood          Medications:  Reconciled Home Medications:   Current Discharge Medication List      START taking these medications    Details   clonazePAM (KLONOPIN) 2 MG Tab 1 tablet (2 mg total) by Per G Tube route every 8 (eight) hours.  Qty: 90 tablet, Refills: 1      famotidine (PEPCID) 20 MG tablet 1 tablet (20 mg total) by Per G Tube route 2 (two) times daily.  Qty: 60 tablet, Refills: 11      HEPARIN SODIUM,PORCINE (HEPARIN,  PORCINE,) 5,000 unit/mL injection Inject 1.5 mLs (7,500 Units total) into the skin every 8 (eight) hours.      !! insulin detemir (LEVEMIR FLEXTOUCH) 100 unit/mL (3 mL) SubQ InPn pen Inject 6 Units into the skin 2 (two) times daily.  Refills: 0      !! insulin detemir (LEVEMIR FLEXTOUCH) 100 unit/mL (3 mL) SubQ InPn pen Inject 9 Units into the skin 2 (two) times daily.  Refills: 0      levETIRAcetam (KEPPRA) 750 MG Tab 2 tablets (1,500 mg total) by Per G Tube route 2 (two) times daily.  Qty: 120 tablet, Refills: 11      polyethylene glycol (GLYCOLAX) 17 gram PwPk 17 g by Per G Tube route once daily.  Refills: 0      senna-docusate 8.6-50 mg (PERICOLACE) 8.6-50 mg per tablet 1 tablet by Per G Tube route once daily.      sodium chloride 1 gram tablet Take 2 tablets (2 g total) by mouth every 8 (eight) hours.      sodium chloride 3% 3 % nebulizer solution Take 4 mLs by nebulization every 8 (eight) hours.      zonisamide (ZONEGRAN) 100 MG Cap 2 capsules (200 mg total) by Per NG tube route 2 (two) times daily.  Qty: 120 capsule, Refills: 11       !! - Potential duplicate medications found. Please discuss with provider.      CONTINUE these medications which have CHANGED    Details   hydrALAZINE (APRESOLINE) 100 MG tablet 1 tablet (100 mg total) by Per G Tube route every 8 (eight) hours.  Qty: 90 tablet, Refills: 11         CONTINUE these medications which have NOT CHANGED    Details   acetaminophen (TYLENOL) 650 mg/20.3 mL Soln 20.3 mLs (650 mg total) by Per NG tube route every 6 (six) hours as needed.      albuterol-ipratropium 2.5mg-0.5mg/3mL (DUO-NEB) 0.5 mg-3 mg(2.5 mg base)/3 mL nebulizer solution Take 3 mLs by nebulization every 4 (four) hours. Rescue  Refills: 0      amlodipine (NORVASC) 10 MG tablet 1 tablet (10 mg total) by Per G Tube route once daily.      aspirin 81 MG Chew 1 tablet (81 mg total) by Per G Tube route once daily.  Refills: 0      atorvastatin (LIPITOR) 10 MG tablet 1 tablet (10 mg total) by Per G  Tube route once daily.      carvedilol (COREG) 12.5 MG tablet Take 1 tablet (12.5 mg total) by mouth 2 (two) times daily.  Qty: 60 tablet, Refills: 11      diphenhydrAMINE (BENADRYL) 25 mg capsule 25 mg by Per G Tube route 3 (three) times daily as needed for Itching.      docusate sodium (COLACE) 100 MG capsule 100 mg by Per G Tube route 2 (two) times daily.      fluoxetine (PROZAC) 20 MG capsule 1 capsule (20 mg total) by Per G Tube route once daily.      LACTOSE-REDUCED FOOD/FIBER (ISOSOURCE 1.5 RONA FEEDING TUBE) 300 mLs by FEEDING TUBE route 4 (four) times daily.      lisinopril 10 MG tablet 10 mg by Per G Tube route once daily.      nitroGLYCERIN (NITROSTAT) 0.4 MG SL tablet Place 0.4 mg under the tongue every 5 (five) minutes as needed for Chest pain.      pantoprazole (PROTONIX) 40 mg GrPS 1 packet (40 mg total) by Per G Tube route once daily.      bisacodyl (DULCOLAX) 5 mg EC tablet Take 5 mg by mouth daily as needed for Constipation.      chlorhexidine (PERIDEX) 0.12 % solution Use as directed 15 mLs in the mouth or throat 2 (two) times daily.      cloNIDine 0.1 mg/24 hr td ptwk (CATAPRES) 0.1 mg/24 hr Place 1 patch onto the skin every 7 days.      diclofenac sodium (VOLTAREN) 1 % Gel Apply 2 g topically 2 (two) times daily.  Qty: 100 g, Refills: 1      EASY TOUCH TWIST LANCETS 30 gauge Misc Refills: 6      enoxaparin (LOVENOX) 40 mg/0.4 mL Syrg Inject 40 mg into the skin once daily.      insulin aspart (NOVOLOG) 100 unit/mL InPn pen Inject 1-10 Units into the skin every 4 (four) hours as needed (Hyperglycemia).  Refills: 0      ondansetron (ZOFRAN, AS HYDROCHLORIDE,) 2 mg/mL injection Inject 4 mg into the vein every 4 (four) hours as needed (for nausea).         STOP taking these medications       risperidone (RISPERDAL) 0.25 MG Tab Comments:   Reason for Stopping:               Indwelling Lines/Drains at time of discharge:   Lines/Drains/Airways     Peripherally Inserted Central Catheter Line                  PICC Double Lumen 10/03/17 1700 left basilic 42 days          Drain                 Gastrostomy/Enterostomy 09/20/17 0816 Gastrostomy tube w/ balloon midline feeding 56 days          Airway                 Surgical Airway Other (Comment) Cuffed -- days                  Ryan Roberto MD  Department of Hospital Medicine  [facility]

## 2017-11-15 NOTE — PLAN OF CARE
LUCIA arranged for Jess to transport pt to her home, via stretcher with O2 and trach collar.  Transport is scheduled for 4pm.    CM informed LUCIA of plan.    Renetta Jackson LCSW     [phone]  Critical Care (MICU)  .

## 2019-12-13 NOTE — SUBJECTIVE & OBJECTIVE
Interval History:    No acute events overnight. S/p trach/peg yesterday    Review of Systems   Unable to perform ROS: Intubated   and due to level of consciousness.  Objective:     Vitals:  Temp: 98.6 °F (37 °C) (07/13/17 1500)  Pulse: 63 (07/13/17 1541)  Resp: 15 (07/13/17 1541)  BP: 132/60 (07/13/17 1500)  SpO2: 99 % (07/13/17 1541)    Temp:  [97.7 °F (36.5 °C)-99.5 °F (37.5 °C)] 98.6 °F (37 °C)  Pulse:  [48-73] 63  Resp:  [4-24] 15  SpO2:  [99 %-100 %] 99 %  BP: (131-184)/(60-81) 132/60         Vent Mode: Spont  Oxygen Concentration (%):  [40-55] 40  Resp Rate Total:  [13 br/min-22 br/min] 18 br/min  Vt Set:  [420 mL] 420 mL  PEEP/CPAP:  [5 cmH20] 5 cmH20  Pressure Support:  [0 cmH20-5 cmH20] 5 cmH20  Mean Airway Pressure:  [6.8 krR14-89 cmH20] 8.9 cmH20    07/12 0701 - 07/13 0700  In: 3400 [I.V.:3400]  Out: 1725 [Urine:1550; Drains:175]    Physical Exam   Constitutional: She appears well-developed and well-nourished.   HENT:   Head: Normocephalic and atraumatic.   Eyes: Pupils are equal, round, and reactive to light.   Cardiovascular: Normal rate.    Pulmonary/Chest:   intubated   Neurological: GCS eye subscore is 4. GCS verbal subscore is 1. GCS motor subscore is 5.      Awake; following some simple commands; reported to be intermittent  PERRL, EOMI  +cough, gag and oculocephalic intact  Able to  fingers on the left; otherwise no spontaneous movement of any extremity seen  Withdraws to pain in all 4 extremities    Unable to test orientation, language, memory, judgment, insight, fund of knowledge, hearing, shoulder shrug, tongue protrusion, coordination, gait due to level of consciousness.    Medications:  Continuous    sodium chloride 0.9% Last Rate: 125 mL/hr at 07/13/17 1500   Scheduled    albuterol-ipratropium 2.5mg-0.5mg/3mL 3 mL Q6H   aliskiren 300 mg Daily   amantadine HCl 100 mg Q8H   amlodipine 10 mg Daily   aspirin 325 mg Daily   atorvastatin 10 mg Daily   carvedilol 25 mg BID   chlorhexidine 15 mL  BID   cloNIDine 0.1 mg/24 hr td ptwk 1 patch Q7 Days   fluoxetine 20 mg Daily   heparin (porcine) 7,500 Units Q8H   hydrALAZINE 25 mg Q8H   insulin detemir 10 Units Daily   pantoprazole 40 mg Daily   psyllium 1 packet Daily   sodium chloride 0.9% 3 mL Q8H   sodium chloride 3% 4 mL Q6H   PRN    acetaminophen 650 mg Q6H PRN   dextrose 50% 12.5 g PRN   fentaNYL 100 mcg Q6H PRN   glucagon (human recombinant) 1 mg PRN   hydrALAZINE 10 mg Q4H PRN   insulin aspart 1-10 Units Q4H PRN   labetalol 10 mg Q4H PRN   lorazepam 2 mg Q4H PRN   magnesium oxide 800 mg PRN   magnesium oxide 800 mg PRN   ondansetron 8 mg Q8H PRN   pneumoc 13-finn conj-dip cr(PF) 0.5 mL vaccine x 1 dose   potassium chloride 10% 40 mEq PRN   potassium chloride 10% 40 mEq PRN   potassium chloride 10% 60 mEq PRN   potassium, sodium phosphates 2 packet PRN   potassium, sodium phosphates 2 packet PRN   potassium, sodium phosphates 2 packet PRN   promethazine (PHENERGAN) IVPB 12.5 mg Q6H PRN     Today I personally reviewed pertinent medications, lines/drains/airways, imaging, cardiology, lab results, microbiology results,   no

## 2020-07-13 NOTE — ASSESSMENT & PLAN NOTE
"Pawan Bullard is a 68 year old male who is being evaluated via a billable telephone visit.      The patient has been notified of following:     \"This telephone visit will be conducted via a call between you and your physician/provider. We have found that certain health care needs can be provided without the need for a physical exam.  This service lets us provide the care you need with a short phone conversation.  If a prescription is necessary we can send it directly to your pharmacy.  If lab work is needed we can place an order for that and you can then stop by our lab to have the test done at a later time.    Telephone visits are billed at different rates depending on your insurance coverage. During this emergency period, for some insurers they may be billed the same as an in-person visit.  Please reach out to your insurance provider with any questions.    If during the course of the call the physician/provider feels a telephone visit is not appropriate, you will not be charged for this service.\"    Patient has given verbal consent for Telephone visit?  Yes    What phone number would you like to be contacted at? [phone]    How would you like to obtain your AVS? Mail a copy    SUBJECTIVE: Pawan Bullard is a 68 year old male  Chief Complaint   Patient presents with     URI     x 3 days       Today I am doing a video visit due to COVID-19 virus outbreak and attempts to limit clinic visits.     RESPIRATORY SYMPTOMS      Duration: 3 day    Description  rhinorrhea, sore throat, cough, headache, fatigue/malaise, hoarse voice and stomach ache    Severity: moderate    Accompanying signs and symptoms: None    History (predisposing factors):  none    Precipitating or alleviating factors: None    Therapies tried and outcome:  Albuterol inhaler and tylenol severe cold    Feeling \"under the weather\" since 7/11  Sl sore throat  Run down  Scratchy throat.    Congestion.    A little cough  Some hoarseness. " PO hydralazine, coreg, norvasc- goal less than 180     Slight headache.   Breathing OK.  Would like albuteral inhaler refilled.   Still has shortness of breath at times working in the yard.    Using albuteral inhaler a few times a week.  Seems better over time.  Aggravating factors: being outside.   No fever.   Temp normal at home.  98.8 today.    No known exposure.  Did see grandchildren a few times-mostly outside.  Grad party a few weeks ago, again outside.      Patient Active Problem List   Diagnosis     Psoriatic arthropathy (H)     Esophageal reflux     Hypertension goal BP (blood pressure) < 140/90     FAMILY HISTORY OF GI NEOPLASM     FAMILY HISTORY OF DIABETES MELLITUS     Impotence of organic origin     Chondrocalcinosis     HYPERLIPIDEMIA LDL GOAL <130     Osteoarthritis     Advanced directives, counseling/discussion     High risk medications (not anticoagulants) long-term use     ROSADO (dyspnea on exertion)      ROS:  A little upset stomach.    No nausea or vomiting or diarrhea.   No aches.  A little run down.   No chest pain.     ASSESSMENT:     ICD-10-CM    1. ROSADO (dyspnea on exertion)  R06.00 albuterol (PROAIR HFA/PROVENTIL HFA/VENTOLIN HFA) 108 (90 Base) MCG/ACT inhaler   2. Cough  R05 Symptomatic COVID-19 Virus (Coronavirus) by PCR   3. Suspected COVID-19 virus infection  Z20.828 Symptomatic COVID-19 Virus (Coronavirus) by PCR      This may be a viral upper respiratory infection.  It could be a coronavirus infection.  PLAN: I have ordered the coronavirus PCR test to see if you have the virus.  Expect a call to schedule a time and location for the test.  It may take a couple of days to get a report.    In the meantime, you can use your albuterol inhaler 2 puffs every 4 hours as needed for shortness of breath coughing or wheezing.  Over-the-counter cough and cold medicines are okay to use if desired.  Make sure you are drinking enough fluids.  Limit exposure to other people at this time and quarantine at home until we know about the PCR coronavirus test  "reports.    Discharge Instructions for COVID-19 Patients  You have--or may have--COVID-19. Please follow the instructions listed below.   If you have a weakened immune system, discuss with your doctor any other actions you need to take.  How can I protect others?  If you have symptoms (fever, cough, body aches or trouble breathing):    Stay home and away from others (self-isolate) until:  ? At least 10 days have passed since your symptoms started. And   ? You've had no fever--and no medicine that reduces fever--for 3 full days (72 hours). And   ? Your other symptoms have resolved (gotten better).  If you don't show symptoms, but testing showed that you have COVID-19:    Stay home and away from others (self-isolate) until at least 10 days have passed since the date of your first positive COVID-19 test.  During this time    Stay in your own room, even for meals. Use your own bathroom if you can.    Stay away from others in your home. No hugging, kissing or shaking hands. No visitors.    Don't go to work, school or anywhere else.    Clean \"high touch\" surfaces often (doorknobs, counters, handles). Use household cleaning spray or wipes. You'll find a full list of  on the EPA website: www.epa.gov/pesticide-registration/list-n-disinfectants-use-against-sars-cov-2.    Cover your mouth and nose with a mask, tissue or wash cloth to avoid spreading germs.    Wash your hands and face often. Use soap and water.    Caregivers in these groups are at risk for severe illness due to COVID-19:  ? People 65 years and older  ? People who live in a nursing home or long-term care facility  ? People with chronic disease (lung, heart, cancer, diabetes, kidney, liver, immunologic)  ? People who have a weakened immune system, including those who:    Are in cancer treatment    Take medicine that weakens the immune system, such as corticosteroids    Had a bone marrow or organ transplant    Have an immune deficiency    Have poorly " controlled HIV or AIDS    Are obese (body mass index of 40 or higher)    Smoke regularly    Caregivers should wear gloves while washing dishes, handling laundry and cleaning bedrooms and bathrooms.    Use caution when washing and drying laundry: Don't shake dirty laundry and use the warmest water setting that you can.    For more tips on managing your health at home, go to www.cdc.gov/coronavirus/2019-ncov/downloads/10Things.pdf.  How can I take care of myself at home?  1. Get lots of rest. Drink extra fluids (unless a doctor has told you not to).  2. Take Tylenol (acetaminophen) for fever or pain. If you have liver or kidney problems, ask your family doctor if it's okay to take Tylenol.     Adults can take either:  ? 650 mg (two 325 mg pills) every 4 to 6 hours, or   ? 1,000 mg (two 500 mg pills) every 8 hours as needed.  ? Note: Don't take more than 3,000 mg in one day. Acetaminophen is found in many medicines (both prescribed and over-the-counter medicines). Read all labels to be sure you don't take too much.   For children, check the Tylenol bottle for the right dose. The dose is based on the child's age or weight.  3. If you have other health problems (like cancer, heart failure, an organ transplant or severe kidney disease): Call your specialty clinic if you don't feel better in the next 2 days.  4. Know when to call 911. Emergency warning signs include:  ? Trouble breathing or shortness of breath  ? Pain or pressure in the chest that doesn't go away  ? Feeling confused like you haven't felt before, or not being able to wake up  ? Bluish-colored lips or face  5. Your doctor may have prescribed a blood thinner medicine. Follow their instructions.  Where can I get more information?     [facility] - About COVID-19:   [email or web address]    CDC - What to Do If You're Sick: www.cdc.gov/coronavirus/2019-ncov/about/steps-when-sick.html    CDC - Ending Home Isolation:  www.cdc.gov/coronavirus/2019-ncov/hcp/disposition-in-home-patients.html    CDC - Caring for Someone: www.cdc.gov/coronavirus/2019-ncov/if-you-are-sick/care-for-someone.html    [facility] - Interim Guidance for Hospital Discharge to Home: www.health.[facility].[place]/diseases/coronavirus/hcp/hospdischarge.pdf    [facility] clinical trials (COVID-19 research studies): clinicalaffairs.[facility]-clinical-trials    Below are the COVID-19 hotlines at the [place] Department of Health ([facility]). Interpreters are available.  ? For health questions: Call 387-166-1180 or 1-954.537.4575 (7 a.m. to 7 p.m.)  ? For questions about schools and childcare: Call 131-963-4975 or 1-361.462.5740 (7 a.m. to 7 p.m.)    For informational purposes only. Not to replace the advice of your health care provider. Clinically reviewed by the Infection Prevention Team.Copyright   2020 [facility]. All rights reserved. [employer] [id number] - 06/20.       Phone call duration:  16 minutes    AMAN SUTTON MD

## 2020-09-07 NOTE — ASSESSMENT & PLAN NOTE
-- apparently respiratory in origin   Problem: Airway Clearance - Ineffective  Goal: Achieve or maintain patent airway  9/7/2020 0155 by Nicola Bean RN  Outcome: Ongoing  9/6/2020 1956 by Angel Bazzi RN  Outcome: Ongoing     Problem: Gas Exchange - Impaired  Goal: Absence of hypoxia  9/7/2020 0155 by Nicola Bean RN  Outcome: Ongoing  9/6/2020 1956 by Angel Bazzi RN  Outcome: Ongoing  Goal: Promote optimal lung function  9/7/2020 0155 by Nicola Bean RN  Outcome: Ongoing  9/6/2020 1956 by Angel Bazzi RN  Outcome: Ongoing     Problem: Breathing Pattern - Ineffective  Goal: Ability to achieve and maintain a regular respiratory rate  9/7/2020 0155 by Nicola Bean RN  Outcome: Ongoing  9/6/2020 1956 by Angel Bazzi RN  Outcome: Ongoing     Problem:  Body Temperature -  Risk of, Imbalanced  Goal: Ability to maintain a body temperature within defined limits  9/7/2020 0155 by Nicola Bean RN  Outcome: Ongoing  9/6/2020 1956 by Angel Bazzi RN  Outcome: Ongoing  Goal: Will regain or maintain usual level of consciousness  9/7/2020 0155 by Nicola Bean RN  Outcome: Ongoing  9/6/2020 1956 by Angel Bazzi RN  Outcome: Ongoing  Goal: Complications related to the disease process, condition or treatment will be avoided or minimized  9/7/2020 0155 by Nicola Bean RN  Outcome: Ongoing  9/6/2020 1956 by Angel Bazzi RN  Outcome: Ongoing     Problem: Isolation Precautions - Risk of Spread of Infection  Goal: Prevent transmission of infection  9/7/2020 0155 by Nicola Bean RN  Outcome: Ongoing  9/6/2020 1956 by Angel Bazzi RN  Outcome: Ongoing     Problem: Nutrition Deficits  Goal: Optimize nutrtional status  9/7/2020 0155 by Nicola Bean RN  Outcome: Ongoing  9/6/2020 1956 by Angel Bazzi RN  Outcome: Ongoing     Problem: Risk for Fluid Volume Deficit  Goal: Maintain normal heart rhythm  9/7/2020 0155 by Nicola Bean RN  Outcome: Ongoing  9/6/2020 1956 by Angel Bazzi RN  Outcome: Ongoing  Goal: Maintain absence of muscle cramping  9/7/2020 0155 by Delfino Hilton RN  Outcome: Ongoing  9/6/2020 1956 by Tino Bueno RN  Outcome: Ongoing  Goal: Maintain normal serum potassium, sodium, calcium, phosphorus, and pH  9/7/2020 0155 by Delfino Hilton RN  Outcome: Ongoing  9/6/2020 1956 by Tino Bueno RN  Outcome: Ongoing     Problem: Loneliness or Risk for Loneliness  Goal: Demonstrate positive use of time alone when socialization is not possible  9/7/2020 0155 by Delfino Hilton RN  Outcome: Ongoing  9/6/2020 1956 by Tino Bueno RN  Outcome: Ongoing     Problem: Fatigue  Goal: Verbalize increase energy and improved vitality  9/7/2020 0155 by Delfino Hilton RN  Outcome: Ongoing  9/6/2020 1956 by Tino Bueno RN  Outcome: Ongoing     Problem: Patient Education: Go to Patient Education Activity  Goal: Patient/Family Education  9/7/2020 0155 by Delfino Hilton RN  Outcome: Ongoing  9/6/2020 1956 by Tino Bueno RN  Outcome: Ongoing     Problem: Falls - Risk of:  Goal: Will remain free from falls  Description: Will remain free from falls  9/7/2020 0155 by Delfino Hilton RN  Outcome: Ongoing  9/6/2020 1956 by Tino Bueno RN  Outcome: Ongoing  Goal: Absence of physical injury  Description: Absence of physical injury  9/7/2020 0155 by Delfino Hilton RN  Outcome: Ongoing  9/6/2020 1956 by Tino Bueno RN  Outcome: Ongoing

## 2020-10-15 NOTE — PROGRESS NOTES
[facility]  Neurocritical Care  Progress Note    Admit Date: 6/18/2017  Service Date: 06/29/2017  Length of Stay: 11    Subjective:     Chief Complaint: Cerebral infarction, watershed distribution, bilateral, acute    History of Present Illness: 67 yo lady who presented to emergency room with severe headache and was found to have very elevated blood pressure at OSH.  Treated with antihypertensives and developed jerking thought to be seizure.  Required intubation due to becoming unresponsive and has remained minimally interactive since then, and EEG (-) for seizures. multiple CT head taken at OSH with no reports of pathological processes identified. Transferred to [facility] for further evaluation and higher level of care.    Hospital Course: -6/27: EEG with diffuse slowing no seizures, MRI with bilateral MCA SAQIB watershed infarcts    Review of Symptoms:   Unable to obtain, mental status    Physical Exam:  GA:  comfortable, no acute distress.   HEENT: No scleral icterus or JVD.   Pulmonary: Clear to auscultation A/L. No wheezing, crackles, or rhonchi.  Cardiac: RRR S1 & S2 w/o rubs/murmurs/gallops.   Abdominal: Bowel sounds present x 4. No appreciable hepatosplenomegaly.  Skin: No jaundice, rashes, or visible lesions.  Pulses: 2+ DP bilat    Neuro:  --sedation: none  --GCS: [id number]  --Mental Status: opens eyes to deep pain, no commands   --CN II-XII grossly intact.   --Pupils 3-->2mm, PERRL.   --brainstem: weak cough, good gag  --Motor: withdrawal x4, no commands  --sensory: see motor  --Reflexes: not tested  --Gait: deferred      Recent Labs  Lab 06/29/17  0336   WBC 11.80   RBC 3.71*   HGB 9.7*   HCT 31.7*      MCV 85   MCH 26.1*   MCHC 30.6*       Recent Labs  Lab 06/29/17  0336   CALCIUM 8.5*      K 4.8   CO2 22*   *   BUN 24*   CREATININE 0.8     No results for input(s): INR, APTT in the last 24 hours.    Invalid input(s): PT  Vent Mode: SIMV  Oxygen Concentration (%):  [40-80]  Speech Language Pathology  Daily Treatment     Patient Name: Obdulia High  Age:  44 y.o., Sex:  female  Medical Record #: [id number]  Today's Date: 10/15/2020     Precautions  Precautions: Fall Risk, Other (See Comments)  Comments: dizzy, double vision    Subjective    Patient pleasant and cooperative.  Spouse accompanied her to session.     Objective       10/15/20 1431   Cognition   Moderate Attention Minimal (4)   SLP Total Time Spent   SLP Individual Total Time Spent (Mins) 30   Treatment Charges   SLP Cognitive Skill Development First 15 Minutes 1   SLP Cognitive Skill Development Additional 15 Minutes 1       Assessment    Patient performed typing/keyboard exercise.  Type racer- increased from 5 to 6 wpm with error at 86% able to self correct with extra time.    Patient completed Keeping in mind task she was able to perform alternating attention elements referring to key with 100% acc, but demonstrated difficulty with attention in tally of sums in each column.  1/4 tallied correctly independently.  Corrected with min A.    Strengths: Able to follow instructions, Alert and oriented, Effective communication skills, Independent prior level of function, Making steady progress towards goals, Motivated for self care and independence, Pleasant and cooperative, Supportive family, Willingly participates in therapeutic activities  Barriers: Fatigue(diplopia )    Plan    Target alternating attention.    Speech Therapy Problems     Problem: Problem Solving STGs     Dates: Start: 10/08/20       Goal: STG-Within one week, patient will     Dates: Start: 10/08/20       Description: 1) Individualized goal:  complete functional organization tasks related to medication and financial mngt. With 100% accuracy provided SPV  2) Interventions:  SLP Cognitive Skill Development and SLP Group Treatment        Note:     Goal Note filed on 10/12/20 0801 by Patricia Long MS,CCC-SLP    Simple math problems 32% IND, MIN to MOD cues to  60  Resp Rate Total:  [12 br/min-35 br/min] 27 br/min  Vt Set:  [420 mL-500 mL] 420 mL  PEEP/CPAP:  [5 cmH20-8 cmH20] 5 cmH20  Pressure Support:  [0 cmH20-10 cmH20] 10 cmH20  Mean Airway Pressure:  [8.8 rpU17-75 cmH20] 11 cmH20      I have personally reviewed all labs, imaging, and studies today  Vitals:    06/29/17 1158   BP:    Pulse: 78   Resp: 18   Temp:        Assessment/Plan:     Neuro   * Cerebral infarction, watershed distribution, bilateral, acute    Bilateral watershed SAQIB/MCA infarcts  Likely secondary to relative hypoperfusion  Wean ETT as tolerated  Will address stroke risk factors also and treat  PT OT SLP        Pulmonary   Acute respiratory failure    Daily ABG  CXR  Wean as tolerated  May need trach due to bilat infarcts and limited arousal        Cardiac   CAD s/p CABG    asa        Essential hypertension    sbp goal normal        Endocrine   Type 2 diabetes mellitus, uncontrolled    RISS  aspart  Diabetic tube feeds        Fluids/Electrolytes/Nutrition/GI   Hyperlipidemia    statin        Hypokalemia    Replete daily prn        Morbid obesity with BMI of 50.0-59.9, adult    Nutrition consult            Prophylaxis:  Venous Thromboembolism: chemical  Stress Ulcer: H2B  Ventilator Pneumonia: yes     Activity Orders          Up with assistance starting at 06/26 1686        Full Code    Jac Gonzalez MD  Neurocritical Care  [facility]    Cc time 32 minutes     achieve 100%.                   Goal: STG-Within one week, patient will     Dates: Start: 10/08/20       Description: 1) Individualized goal:  complete alternating attention tasks with 80% accuracy provided MIN cues.   2) Interventions:  SLP Cognitive Skill Development and SLP Group Treatment        Note:     Goal Note filed on 10/12/20 0801 by Patricia Long MS,CCC-SLP    Will continue to target.                         Problem: Speech/Swallowing LTGs     Dates: Start: 10/08/20       Goal: LTG-By discharge, patient will safely swallow     Dates: Start: 10/08/20       Description: 1) Individualized goal:  regular textures/thin liquids for safe return to PLOF.   2) Interventions:  SLP Swallowing Dysfunction Treatment, SLP Oral Pharyngeal Evaluation, SLP Video Swallow Evaluation, SLP Cognitive Skill Development, and SLP Group Treatment              Goal: LTG-By discharge, patient will solve complex problem     Dates: Start: 10/08/20       Description: 1) Individualized goal: By utilizing compensatory intervention for memory and problem-solving to allow for safe completion of daily activities with MOD I in order to prepare for safe d/c home  2) Interventions:  SLP Cognitive Skill Development and SLP Group Treatment

## 2020-11-10 NOTE — PROGRESS NOTES
[facility]  General Surgery  Progress Note    Subjective:     Interval History:   No acute events overnight, has been NPO p MN in anticipation for PEG/trach procedure today    Post-Op Info:  Procedure(s) (LRB):  PLACEMENT-TUBE-PEG (N/A)  TRACHEOSTOMY perc trach blue rhino (N/A)          Medications:  Continuous Infusions:   sodium chloride 0.9% 75 mL/hr at 07/11/17 0605     Scheduled Meds:   albuterol-ipratropium 2.5mg-0.5mg/3mL  3 mL Nebulization Q6H    aliskiren  300 mg Per NG tube Daily    amantadine HCl  100 mg Oral Q8H    amlodipine  10 mg Per NG tube Daily    aspirin  325 mg Per NG tube Daily    atorvastatin  10 mg Per NG tube Daily    carvedilol  25 mg Per NG tube BID    chlorhexidine  15 mL Mouth/Throat BID    cloNIDine 0.1 mg/24 hr td ptwk  1 patch Transdermal Q7 Days    heparin (porcine)  7,500 Units Subcutaneous Q8H    hydrALAZINE  100 mg Per NG tube Q8H    insulin detemir  10 Units Subcutaneous Daily    pantoprazole  40 mg Per NG tube Daily    psyllium  1 packet Per NG tube Daily    sodium chloride 0.9%  3 mL Intravenous Q8H    sodium chloride 3%  4 mL Nebulization Q6H     PRN Meds:acetaminophen, dextrose 50%, fentaNYL, glucagon (human recombinant), hydrALAZINE, insulin aspart, labetalol, lorazepam, magnesium oxide, magnesium oxide, ondansetron, pneumoc 13-finn conj-dip cr(PF), potassium chloride 10%, potassium chloride 10%, potassium chloride 10%, potassium, sodium phosphates, potassium, sodium phosphates, potassium, sodium phosphates, promethazine (PHENERGAN) IVPB     Objective:     Vital Signs (Most Recent):  Temp: 98.1 °F (36.7 °C) (07/11/17 0305)  Pulse: (!) 53 (07/11/17 0605)  Resp: 14 (07/11/17 0605)  BP: 125/61 (07/11/17 0605)  SpO2: 100 % (07/11/17 0605) Vital Signs (24h Range):  Temp:  [97.9 °F (36.6 °C)-98.5 °F (36.9 °C)] 98.1 °F (36.7 °C)  Pulse:  [51-73] 53  Resp:  [11-25] 14  SpO2:  [95 %-100 %] 100 %  BP: ()/(46-79) 125/61       Intake/Output  Summary (Last 24 hours) at 07/11/17 0725  Last data filed at 07/11/17 0605   Gross per 24 hour   Intake          2198.75 ml   Output              300 ml   Net          1898.75 ml       Physical Exam   Constitutional: She appears well-developed and well-nourished. No distress.   HENT:   Head: Normocephalic and atraumatic.   Eyes: No scleral icterus.   Neck: Neck supple.   Thick, obese neck   Cardiovascular: Normal rate and regular rhythm.    Pulmonary/Chest:   Coarse BS bilat, intubated   Abdominal:   Soft, obese, well healed lower midline incision noted   Musculoskeletal: She exhibits no edema or tenderness.   Skin: Skin is warm and dry.       Significant Labs:  ABGs:   Recent Labs  Lab 07/10/17  0416   PH 7.352   PCO2 50.2*   PO2 100   HCO3 27.8   POCSATURATED 97   BE 2     BMP:   Recent Labs  Lab 07/11/17 0106   *      K 4.5      CO2 25   BUN 54*   CREATININE 1.1   CALCIUM 8.1*   MG 2.1       CBC:   Recent Labs  Lab 07/11/17 0106   WBC 9.75   RBC 3.30*   HGB 8.4*   HCT 28.3*      MCV 86   MCH 25.5*   MCHC 29.7*     CMP:   Recent Labs  Lab 07/11/17 0106   *   CALCIUM 8.1*      K 4.5   CO2 25      BUN 54*   CREATININE 1.1     All pertinent labs from the last 24 hours have been reviewed.    Assessment/Plan:     Active Diagnoses:    Diagnosis Date Noted POA    PRINCIPAL PROBLEM:  Cerebral infarction, watershed distribution, bilateral, acute [I63.8] 06/27/2017 Yes    Positive blood culture [R78.81] 07/10/2017 Yes    Laryngeal edema [J38.4] 07/10/2017 Yes    Oral phase dysphagia [R13.11] 07/10/2017 Yes    Prerenal azotemia [R79.89] 07/10/2017 Unknown    [place] coma scale total score 9-12 [R40.2420] 07/07/2017 Yes    Hypertensive encephalopathy [I67.4] 06/26/2017 Yes    Morbid obesity due to excess calories [E66.01] 06/26/2017 Yes    Acute respiratory failure [J96.00] 06/20/2017 Yes    Hypokalemia [E87.6] 06/19/2017 Yes    Hyperlipidemia [E78.5] 06/19/2017 Yes      Chronic    CAD s/p CABG [I25.10] 06/19/2017 Yes     Chronic    Pyelonephritis [N12] 06/19/2017 Yes    Hypertensive emergency [I16.1] 06/18/2017 Yes    Morbid obesity with BMI of 50.0-59.9, adult [E66.01, Z68.43] 02/24/2015 Not Applicable     Chronic    Essential hypertension [I10] 05/12/2014 Yes     Chronic    Type 2 diabetes mellitus, uncontrolled [E11.65] 05/12/2014 Yes     Chronic      Problems Resolved During this Admission:    Diagnosis Date Noted Date Resolved POA    ARF (acute renal failure) [N17.9] 06/22/2017 07/06/2017 No    Blood poisoning [A41.9] 06/19/2017 06/22/2017 Yes     Plan for PEG/trach procedure today in OR  Has been NPO p MN  Consents obtained and in chart      Milagros Carpenter PA-C   [id number]  General Surgery  [facility]   Pt was seen and evaluated by me. Pt was seen and evaluated by me.

## 2021-01-21 NOTE — ASSESSMENT & PLAN NOTE
-slowly decreasing   Detail Level: Zone Detail Level: Detailed Detail Level: Generalized Patient Specific Counseling (Will Not Stick From Patient To Patient): Declines biopsy today

## 2022-06-07 NOTE — ASSESSMENT & PLAN NOTE
History of recent CVA with recent admission to rehab, per family acute worsening 1 week ago following trach replacement. OSH records in chart. Head CT unchanged. Per family patient was communicating prior to trach replacement. However, per OSH records patient has been obtunded for the past couple of days. Difficult to determine patients true baseline?    Concern for Anoxic brain injury during trach removal/replacement, CVA, Seizure, Worsening Sepsis. Urine culture no growth.  MRI no acute hemorrhagic pathology. Patient had a BM.       - GCS of 8, however protecting airway with trach, stable for floor    - completed VAP treatment,  - PEG tube placement keenan  - NPO at midnight  - f/u BMP for resolving NA level      Per Sasha Luevano at Dr. Carolyn Sevilla office she will notify Dr. Palomo Collado that pt. Hasn't stopped her aspirin.

## 2022-11-21 NOTE — SUBJECTIVE & OBJECTIVE
Called and spoke with the patient regarding a scheduling error to her upcoming appointment. Indicated that she needed to be scheduled for 11/29/22 at 8:20 instead of 11/30/22 at 2:40. Scheduled patient for virtual appointment with Dr. Jara on 11/29/22 at 8:20am. Patient verbalized agreement and understanding of the change in appointment date and time.   Interval History:  >4 elements OR status of 3 inpatient conditions  EEG with burst suppression pattern.  MRI with diffuse diffusion restriction. Old periventricular white matter and watershed strokes.  Review of Systems   Unable to perform ROS: Patient unresponsive     2 systems OR Unable to obtain a complete ROS due to level of consciousness.  Objective:     Vitals:  Temp: 100.3 °F (37.9 °C) (10/07/17 1656)  Pulse: (!) 111 (10/07/17 1705)  Resp: (!) 72 (10/07/17 1705)  BP: (!) 156/77 (10/07/17 1705)  SpO2: 100 % (10/07/17 1705)    Temp:  [96.4 °F (35.8 °C)-101.1 °F (38.4 °C)] 100.3 °F (37.9 °C)  Pulse:  [] 111  Resp:  [11-94] 72  SpO2:  [83 %-100 %] 100 %  BP: (138-172)/() 156/77         Vent Mode: A/C  Oxygen Concentration (%):  [40-50] 50  Resp Rate Total:  [20 br/min-41 br/min] 38 br/min  Vt Set:  [400 mL] 400 mL  PEEP/CPAP:  [5 cmH20] 5 cmH20  Pressure Support:  [0 cmH20] 0 cmH20  Mean Airway Pressure:  [7 gfG57-93 cmH20] 8.8 cmH20    10/06 0701 - 10/07 0700  In: 3691.3 [I.V.:1811.3]  Out: 1850 [Urine:1850]    Physical Exam  Unable to test orientation, language, memory, judgment, insight, fund of knowledge, hearing, shoulder shrug, tongue protrusion, coordination, gait due to level of consciousness.  General   HEENT: S/P trach/  Chest Heart RRR / Lungs Clear to auscultation  Adbomen: Soft nontender + BS; S/P PEG  Extremities: OK distal pulses.  Skin: UK  Neurological Exam:  MS;Coma.  CN: II-XII UK  Motor: LUE   2/5 / RUE 0 /5  RUE extensor posturing; LUE semi purposefully,             LLE   0/5 /  RLE 0 /5  Tone normal bilaterally  Sensory: LT/PP/T/ Vibration UK                 Complex sensory modalities: not tested  DTR:  normal throughout.  Coordination /Fine motor: UK  Gait: Not tested.  Meningeal signs: Absent  Medications:  Continuous  sodium chloride 0.9% Last Rate: Stopped (10/07/17 0601)   Scheduled  albuterol-ipratropium 2.5mg-0.5mg/3mL 3 mL Q6H   [START ON 10/8/2017] amantadine HCl 100 mg  Q24H   [START ON 10/8/2017] amantadine HCl 200 mg Q24H   atorvastatin 10 mg Daily   chlorhexidine 15 mL BID   famotidine 20 mg BID   heparin (porcine) 7,500 Units Q8H   piperacillin-tazobactam 4.5 g in dextrose 5 % 100 mL IVPB (ready to mix system) 4.5 g Q8H   sodium chloride 0.9% 10 mL Q6H   sodium chloride 0.9% 3 mL Q8H   valproate sodium (DEPACON) IVPB 500 mg Q8H   vancomycin (VANCOCIN) IVPB 1,500 mg Q12H   PRN  acetaminophen 650 mg Q6H PRN   dextrose 50% 12.5 g PRN   dextrose 50% 12.5 g PRN   glucagon (human recombinant) 1 mg PRN   insulin aspart 1-10 Units Q6H PRN   labetalol 10 mg Q4H PRN   magnesium sulfate IVPB 2 g PRN   magnesium sulfate IVPB 4 g PRN   ondansetron 4 mg Q8H PRN   pneumoc 13-finn conj-dip cr(PF) 0.5 mL vaccine x 1 dose   sodium chloride 0.9% 10 mL PRN     Today I personally reviewed pertinent medications, lines/drains/airways, imaging, cardiology, lab results, microbiology results, notably:
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
## 2023-02-12 NOTE — PROGRESS NOTES
[facility] Hospital Medicine  Progress Note    Patient Name: Nisa Frank  MRN: [id number]  Patient Class: IP- Inpatient   Admission Date: 9/13/2017  Length of Stay: 12 days  Attending Physician: Bay Henao, *  Primary Care Provider: Atilio Downs MD    [facility] Medicine Team: [facility] MED 3 Rayo Boucher MD    Subjective:     Principal Problem:Acute encephalopathy    HPI:  Ms Frank 66 year old female with diabetes mellitus type 2, CKD stage 3, CAD s/p CABG, essential hypertension, cerebrovascular disease s/p bi-hemispheric watershed infarcts and trach/PEG status who was brought in via EMS from [facility] for acute respiratory distress presented to [facility] 8/8 with acute respiratory failure with hypoxia likely due to mucous plug. Patient was admitted to ICU to wean off MV as tolerated and for placement to [facility] where she was transferred to [facility] on 8/14 in stable condition.  She pulled out her trach on 9/4 and went to [facility].  She was treated for an ESBL E. Coli UTI with Meropenem. The family has been very unhappy with the care that the patient has received.  She was medically stable for transfer to a SNF yesterday, however overnight she developed a new WBC 26.  She was found to have a RML and RLL infiltrate and was started on Vanc and Zosyn.  She also developed a new KTAHI on top of her CKD (0.6 to 1.8).  Family is requesting transfer to [facility].    Spoke with daughter on the phone.  Per family patient was previously alert and oriented to person and place.  She was conversing and able to participate in therapy.  This changed shortly after trach removal and replacement on 9/4.  Family reports some complication during trach replacement resulting in overnight ICU care.  After patient was stepped down they report she stopped talking and would not participate with therapy.  The only response they could get from  the patient was eye tracking and intermittent hand squeezes.  Family feels that patient may have had a repeat stroke and was requesting MRI.   Spoke with Dagoberto Melo nurse who reports that patient has been near obtunded for as long as she has had her (past couple of days).  She reported recent WBC jump and associated fever followed by broad spectrum abx.  In addiction notes concern for ileus given high residuals from NG tube feeds.  Does not high residuals resolved once tube was flushed.         Per nurse medications prior to concern for ileus.  Vanc 1750mg , Q24, Zosyn 4.5 Q 12, ASA 81, Clonidine 0.1 patch weekly, Lipitor 10, Colace, Lovenox 40, Protonix 40, Hydralazine 100 Q8, Keppra 500 BID, Lisniopril 40 PO, 25 Metoprolol XL, Procardia 60 mg Daily, Risperdal 25 QHS.           Hospital Course:  9/14: Patients mental status unchanged from yesterday. Labs showing significant improvement. Will continue to treat for VAP. Schedule for CT scan of abdomen.   9/15: Patients mental status improved, appears more awake/alert. But no verbal communication yet. Following commands. Continue to treat to VAP. MRI scheduled. PT/OT   9:16: Patient more awake and alert. Still non verbal. Continuing to treat VAP. PT/OT   9/17: Patient awake and alert. Still non-verbal. Had a long conversation w/ family regarding patient status; will get records/procedure report.   9/18: patient is awake and alert but non-verbal and doesn't follow command. Surgery was consulted for ileus.   9/19: Patient is awake. But non-verbal and doesn;t follow commands. NG tube was pulled out last night. Surgery consulted for PEG tube.   9/20: Patient scheduled for PEG tube placement today. Abx course completed, Na corrected, BP wnl. Patient's family contacted regarding NH placement, d/c pending family's discission.   9/21: PEG tube in place. Consulted dietary for nutrition. Waiting on family for NH placement.   9/22-23: Resumed normal TF via boluses- giving HTN  "regimen via PEG. Dispo pending SNF acceptance.   9/24: Dispo pending SNF acceptance. Spoke with daughter today, she is still reviewing the facilities.     Interval History:   No acute events overnight. Mrs. Frank is resting comfortably in bed but continues to have a cough. Per her sister, she is less "active" today where she does not respond to commands such as hand squeezing or closing eyes. However, she does not seem to be in any distress.     Review of Systems   Unable to perform ROS: Patient nonverbal   Constitutional: Negative for chills and fever.   Respiratory: Negative for shortness of breath.    Cardiovascular: Negative for chest pain.   Neurological: Negative for syncope and facial asymmetry.     Objective:     Vital Signs (Most Recent):  Temp: 99.6 °F (37.6 °C) (09/25/17 0759)  Pulse: 72 (09/25/17 0846)  Resp: 17 (09/25/17 0846)  BP: 118/60 (09/25/17 0759)  SpO2: 97 % (09/25/17 0846) Vital Signs (24h Range):  Temp:  [97.9 °F (36.6 °C)-99.6 °F (37.6 °C)] 99.6 °F (37.6 °C)  Pulse:  [61-93] 72  Resp:  [16-20] 17  SpO2:  [93 %-99 %] 97 %  BP: (118-165)/(60-74) 118/60     Weight: 132.5 kg (292 lb)  Body mass index is 47.13 kg/m².    Intake/Output Summary (Last 24 hours) at 09/25/17 0903  Last data filed at 09/25/17 0500   Gross per 24 hour   Intake             1400 ml   Output              650 ml   Net              750 ml      Physical Exam   Constitutional: She appears well-developed and well-nourished. No distress.   HENT:   Head: Normocephalic and atraumatic.   Eyes: Conjunctivae are normal. Right eye exhibits no discharge. Left eye exhibits no discharge.   Pupils responsive to light,   minimal tracking noted today, change from previous exam    Cardiovascular: Normal rate, regular rhythm and normal heart sounds.    Pulmonary/Chest: Effort normal and breath sounds normal.   Abdominal: Soft. Bowel sounds are normal. She exhibits no distension. There is no tenderness.   Musculoskeletal: She exhibits no edema. "   Neurological: She has normal reflexes. GCS eye subscore is 2. GCS verbal subscore is 1. GCS motor subscore is 5.   Patient awake. No insight. Not communicating.   Following very basic simple commands but slowly. She squeezed by hand on exam.      Skin: Skin is warm and dry. No erythema.     Significant Labs:   BMP:   Recent Labs  Lab 09/25/17  0431   *      K 3.8      CO2 28   BUN 9   CREATININE 0.6   CALCIUM 8.3*   MG 1.8     CBC:   Recent Labs  Lab 09/24/17  0532 09/25/17  0431   WBC 10.01 8.31   HGB 9.4* 9.0*   HCT 29.1* 28.6*    263     Assessment/Plan:      * Acute encephalopathy      Concern for Anoxic brain injury during trach removal/replacement, CVA, Seizure, Worsening Sepsis. Urine culture no growth.  MRI no acute hemorrhagic pathology. Patient is having BMs    - mentation waxes and wanes.    - s/p PEG tube placement   - monitor electrolytes  - control BP              KATHI (acute kidney injury)    Improving since admission.   - monitor renal function   - Cr of 0.6 this AM.  - Resolved.           Respiratory failure    Concern for possible VAP  - Decreased O2 5L; 99%, will continue to titrate   - completed VAP treatment   - No Leukocytosis this AM.         Tracheostomy status    - Trach care  - Patient continues to have cough but afebrile.   - Q2 trach suction.            Respiratory insufficiency              Dysphagia    Per family was tolerating diet prior to trach replacement on 9/4   -viable PEG placed this admission          Chronic bilateral cerebral infarction in watershed distribution              Hypertensive encephalopathy              Hyperlipidemia    -home statin          Morbid obesity with BMI of 50.0-59.9, adult    -risk factor. Lovenox 40mg BID          S/P CABG (coronary artery bypass graft)    - Continue ASA, unable to swallow            Type 2 diabetes mellitus, uncontrolled    - Per OSH records not on insulin   - Her HbA1c  8/9/17 is 7.2   - Sliding scale  Strong peripheral pulses           Essential hypertension    HX of significant HTN. Per OSH on hydralazine 100 Q8 PO, Linsiopril 40 PO, Metoprolol XL 25 BID, Nifedipine 60mg. She has Clonidine 0.1 Patch Q week, a new one was placed today 9/18/17.   - her BP is poorly controlled. Her SBP is around 220-190.   - Her heart rate runs low. Labetalol is not safe to be used at this point.   -resuming home regimen as BP tolerates via PEG  - added PO hydralazine 25mg TID   - norvasc 10mg QS             VTE Risk Mitigation         Ordered     enoxaparin injection 40 mg  Daily     Route:  Subcutaneous        09/18/17 1106        Disposition: Will discuss with daughter potential SNF placement. Patient currently stable. Discharge pending SNF placement.     Rayo Boucher MD (PGY-1)   Department of Hospital Medicine   [facility]

## 2023-07-19 NOTE — ASSESSMENT & PLAN NOTE
-- DO NOT REPLY / DO NOT REPLY ALL --  -- Message is from Engagement Center Operations (EC[employer]) --    General Patient Message: Patient is calling in because she is having problems with arthritis in both knees and she also says she needs a referral.  Patient will like a call back.       Alternative phone number: [phone]    Can a detailed message be left? Yes    Message Turnaround:     Is it Working Hours? Yes - Working Hours     [place]:    Please give this turnaround time to the caller:   \"This message will be sent to [state Provider's name]. The clinical team will fulfill your request as soon as they review your message.\"                 Tracheostomy with trach collar

## 2024-06-20 NOTE — ASSESSMENT & PLAN NOTE
No epileptic activity on EEG.  No evidence of seizures today.  Probable myoclonus.      Encephalopathy etiology is unclear at this time.  Patient remains minimally responsive off sedation.  Possible CVA vs infectious, head CT negative thus far but will need MRI which cannot be done at this facility.  Pt accepted for transfer for further further workup with MRI and LP given fever with AMS.  Neurology following.     No

## 2024-10-24 NOTE — PROGRESS NOTES
[facility] Hospital Medicine  Progress Note    Patient Name: Nisa Frank  MRN: [id number]  Patient Class: IP- Inpatient   Admission Date: 9/13/2017  Length of Stay: 9 days  Attending Physician: Bay Henao, *  Primary Care Provider: Atilio Downs MD    [facility] Medicine Team: [facility] [place] [facility] 3 Medardo Su MD    Subjective:     Principal Problem:Acute encephalopathy    HPI:  Ms Frank 66 year old female with diabetes mellitus type 2, CKD stage 3, CAD s/p CABG, essential hypertension, cerebrovascular disease s/p bi-hemispheric watershed infarcts and trach/PEG status who was brought in via EMS from [facility] for acute respiratory distress presented to [facility] 8/8 with acute respiratory failure with hypoxia likely due to mucous plug. Patient was admitted to ICU to wean off MV as tolerated and for placement to [facility] where she was transferred to [facility] on 8/14 in stable condition.  She pulled out her trach on 9/4 and went to [facility].  She was treated for an ESBL E. Coli UTI with Meropenem. The family has been very unhappy with the care that the patient has received.  She was medically stable for transfer to a SNF yesterday, however overnight she developed a new WBC 26.  She was found to have a RML and RLL infiltrate and was started on Vanc and Zosyn.  She also developed a new KATHI on top of her CKD (0.6 to 1.8).  Family is requesting transfer [facility].    Spoke with daughter on the phone.  Per family patient was previously alert and oriented to person and place.  She was conversing and able to participate in therapy.  This changed shortly after trach removal and replacement on 9/4.  Family reports some complication during trach replacement resulting in overnight ICU care.  After patient was stepped down they report she stopped talking and would not participate with therapy.  The only response they could get  from the patient was eye tracking and intermittent hand squeezes.  Family feels that patient may have had a repeat stroke and was requesting MRI.   Spoke with Dagoberto Melo nurse who reports that patient has been near obtunded for as long as she has had her (past couple of days).  She reported recent WBC jump and associated fever followed by broad spectrum abx.  In addiction notes concern for ileus given high residuals from NG tube feeds.  Does not high residuals resolved once tube was flushed.         Per nurse medications prior to concern for ileus.  Vanc 1750mg , Q24, Zosyn 4.5 Q 12, ASA 81, Clonidine 0.1 patch weekly, Lipitor 10, Colace, Lovenox 40, Protonix 40, Hydralazine 100 Q8, Keppra 500 BID, Lisniopril 40 PO, 25 Metoprolol XL, Procardia 60 mg Daily, Risperdal 25 QHS.           Hospital Course:  9/14: Patients mental status unchanged from yesterday. Labs showing significant improvement. Will continue to treat for VAP. Schedule for CT scan of abdomen.   9/15: Patients mental status improved, appears more awake/alert. But no verbal communication yet. Following commands. Continue to treat to VAP. MRI scheduled. PT/OT   9:16: Patient more awake and alert. Still non verbal. Continuing to treat VAP. PT/OT   9/17: Patient awake and alert. Still non-verbal. Had a long conversation w/ family regarding patient status; will get records/procedure report.   9/18: patient is awake and alert but non-verbal and doesn't follow command. Surgery was consulted for ileus.   9/19: Patient is awake. But non-verbal and doesn;t follow commands. NG tube was pulled out last night. Surgery consulted for PEG tube.   9/20: Patient scheduled for PEG tube placement today. Abx course completed, Na corrected, BP wnl. Patient's family contacted regarding NH placement, d/c pending family's discission.   9/21: PEG tube in place. Consulted dietary for nutrition. Waiting on family for NH placement.   9/22: Resumed normal TF via boluses- giving HTN  regimen via PEG. Dispo pending  SNF acceptance.     Interval History: No acute events; patient moderately able to follow commands today. Awaiting placement. HTN improving with PO medication via PEG tube.     Review of Systems   Unable to perform ROS: Patient nonverbal   Constitutional: Negative for chills and fever.   Respiratory: Negative for shortness of breath.    Cardiovascular: Negative for chest pain.   Neurological: Negative for syncope and facial asymmetry.     Objective:     Vital Signs (Most Recent):  Temp: 98.8 °F (37.1 °C) (09/22/17 0831)  Pulse: 68 (09/22/17 0831)  Resp: 18 (09/22/17 0831)  BP: (!) 174/81 (09/22/17 0831)  SpO2: 96 % (09/22/17 0831) Vital Signs (24h Range):  Temp:  [97.5 °F (36.4 °C)-99.3 °F (37.4 °C)] 98.8 °F (37.1 °C)  Pulse:  [60-94] 68  Resp:  [18-22] 18  SpO2:  [94 %-99 %] 96 %  BP: (151-188)/(62-82) 174/81     Weight: 132.5 kg (292 lb)  Body mass index is 47.13 kg/m².    Intake/Output Summary (Last 24 hours) at 09/22/17 1032  Last data filed at 09/22/17 1024   Gross per 24 hour   Intake              800 ml   Output                0 ml   Net              800 ml      Physical Exam   Constitutional: She appears listless. She is uncooperative.   HENT:   Head: Normocephalic and atraumatic.   Eyes: EOM are normal. Pupils are equal, round, and reactive to light.   Neck:   Trach in place   Cardiovascular: Normal rate, regular rhythm and normal heart sounds.    Pulses:       Radial pulses are 2+ on the right side, and 2+ on the left side.   Difficult to assess the heart sounds 2/2 breathing noise through the trach   Pulmonary/Chest: Effort normal. No tachypnea. She has no decreased breath sounds. She has no wheezes.   Abdominal: Soft. She exhibits no distension. There is no tenderness.   Neurological: She appears listless. She exhibits normal muscle tone. She displays no seizure activity. GCS eye subscore is 4. GCS verbal subscore is 1. GCS motor subscore is 5.   Reflex Scores:       Tricep  reflexes are 1+ on the right side and 1+ on the left side.       Bicep reflexes are 1+ on the right side and 1+ on the left side.       Brachioradialis reflexes are 1+ on the right side and 1+ on the left side.       Patellar reflexes are 1+ on the right side and 1+ on the left side.  Skin: No rash noted. No erythema.   Psychiatric: Her affect is blunt. She is withdrawn. She is noncommunicative. She is inattentive.   Vitals reviewed.      Significant Labs:   Blood Culture: No results for input(s): LABBLOO in the last 48 hours.  CBC:   Recent Labs  Lab 09/21/17  1237 09/22/17  0413   WBC 9.28 7.60   HGB 9.6* 8.6*   HCT 30.2* 27.5*    242     CMP:   Recent Labs  Lab 09/21/17  0454 09/22/17  0413    141   K 3.4* 3.5    108   CO2 27 25   GLU 84 94   BUN 23 20   CREATININE 0.8 0.7   CALCIUM 8.5* 8.1*   PROT 6.5 5.9*   ALBUMIN 2.2* 2.0*   BILITOT 1.4* 1.2*   ALKPHOS 95 91   AST 9* 6*   ALT 8* 8*   ANIONGAP 8 8   EGFRNONAA >60.0 >60.0     Magnesium:   Recent Labs  Lab 09/21/17  0454 09/22/17  0413   MG 1.9 1.6       Significant Imaging: I have reviewed and interpreted all pertinent imaging results/findings within the past 24 hours.    Assessment/Plan:      * Acute encephalopathy      Concern for Anoxic brain injury during trach removal/replacement, CVA, Seizure, Worsening Sepsis. Urine culture no growth.  MRI no acute hemorrhagic pathology. Patient is having BMs      - GCS of 8, however protecting airway with trach, stable for floor    - s/p PEG tube placement   - monitor electrolytes  - control BP              KATHI (acute kidney injury)    Improving since admission.     - monitor renal function           Respiratory failure    Concern for possible VAP    - Decreased O2 5L; 99%, will continue to titrate   - completed VAP treatment         Tracheostomy status    - Trach care                    Dysphagia    Per family was tolerating diet prior to trach replacement on 9/4     -viable PEG placed this  admission          Chronic bilateral cerebral infarction in watershed distribution      -likely etiology of new baseline of mental status; medical management and correction of BP, electrolytes, infection with improvement. vEEG negative, Neuro signed off.         Hypertensive encephalopathy      -resolving with BP control        Hyperlipidemia    -home statin          Morbid obesity with BMI of 50.0-59.9, adult    -risk factor. Lovenox 40mg BID          S/P CABG (coronary artery bypass graft)    - Continue ASA, unable to swallow            Type 2 diabetes mellitus, uncontrolled    - Per OSH records not on insulin   - Her HbA1c  8/9/17 is 7.2   - Sliding scale           Essential hypertension    HX of significant HTN. Per OSH on hydralazine 100 Q8 PO, Linsiopril 40 PO, Metoprolol XL 25 BID, Nifedipine 60mg. She has Clonidine 0.1 Patch Q week, a new one was placed today 9/18/17.   - her BP is poorly controlled. Her SBP is around 220-190.   - will transfer her to 10th floor so hydralazine IV can be used.   - Her heart rate runs low. Labetalol is not safe to be used at this point.   -resuming home regimen as BP tolerates via PEG            VTE Risk Mitigation         Ordered     enoxaparin injection 40 mg  Daily     Route:  Subcutaneous        09/18/17 1102              Medardo Su MD  Department of Hospital Medicine   [facility]   PAST SURGICAL HISTORY:  No significant past surgical history

## 2025-05-06 NOTE — CONSULTS
Consult Note  Pulmonology    Consult Requested By: Rebeca Chew MD  Reason for Consult : vent management    SUBJECTIVE: headaches     History of Present Illness:  Patient unresponsive;information obtained from chart  ; 66 y.o. female with essential hypertension, hyperlipidemia (.2 May 2014), type 2 diabetes mellitus (HbA1c 8.2% May 2014), CAD s/p CABG, morbid obesity (BMI 58.7), and moderate protein malnutrition who presents to [facility] ED with complaints of headache this morning.  She cannot provide much details on the quality of the headache but does say she's been feeling more weak and tired lately.  She denies any nausea, vomiting, fevers, or chills, and hasn't had any neck stiffness, photophobia, nor any phonophobia.  She denies any laterality to  Her weakness and has never had a stroke before.  She also denies any dysuria, hematuria, abdominal pain, diarrhea, chest pain, shortness of breath, nor any coughing.  Further history is limited as she is a very poor historian and has very garbled speech.    Review of patient's allergies indicates:   Allergen Reactions    Latex, natural rubber        Past Medical History:   Diagnosis Date    Diabetes mellitus     Hypertension      Past Surgical History:   Procedure Laterality Date    ARTERIAL BYPASS SURGRY      9 tears sgo    CARDIAC SURGERY       SECTION      HYSTERECTOMY      TUBAL LIGATION       Family History   Problem Relation Age of Onset    No Known Problems Father     No Known Problems Mother     No Known Problems Sister     No Known Problems Brother     No Known Problems Maternal Aunt     No Known Problems Maternal Uncle     No Known Problems Paternal Aunt     No Known Problems Paternal Uncle     No Known Problems Maternal Grandmother     No Known Problems Maternal Grandfather     No Known Problems Paternal Grandmother     No Known Problems Paternal Grandfather     Amblyopia Neg Hx     Blindness Neg Hx     Cancer Neg Hx      Cataracts Neg Hx     Diabetes Neg Hx     Glaucoma Neg Hx     Hypertension Neg Hx     Macular degeneration Neg Hx     Retinal detachment Neg Hx     Strabismus Neg Hx     Stroke Neg Hx     Thyroid disease Neg Hx        Review of systems not obtained due to patient factors ; patient intubated and unable to communicate.  OBJECTIVE:     Vital Signs (Most Recent)  Temp: (!) 101 °F (38.3 °C) (06/20/17 0245)  Pulse: 78 (06/20/17 0600)  Resp: (!) 27 (06/20/17 0600)  BP: (!) 159/71 (06/20/17 0903)  SpO2: 100 % (06/20/17 0600)    Vital Signs Range (Last 24H):  Temp:  [99.1 °F (37.3 °C)-101 °F (38.3 °C)]   Pulse:  []   Resp:  []   BP: ()/(53-76)   SpO2:  [92 %-100 %]     Physical Exam:    General: no distress,.on the ventilator  Neck: no jugular venous distention and no carotid bruit  Lungs:  diminished breath sounds bilaterally and 7.5 ett in place and rhonchi bilaterally  Heart: regular rate and rhythm and no murmur  Abdomen: soft, non-tender non-distended; bowel sounds normal and right NGT in place  Extremities: no cyanosis or edema, or clubbing  Neurologic: sedated.  Skin-warm, moist. No rash      Laboratory:  Recent Results (from the past 24 hour(s))   POCT glucose    Collection Time: 06/19/17 12:20 PM   Result Value Ref Range    POCT Glucose 268 (H) 70 - 110 mg/dL   ISTAT PROCEDURE    Collection Time: 06/19/17  5:17 PM   Result Value Ref Range    POC PH 7.467 (H) 7.35 - 7.45    POC PCO2 44.9 35 - 45 mmHg    POC PO2 82 80 - 100 mmHg    POC HCO3 32.5 (H) 24 - 28 mmol/L    POC BE 8 -2 to 2 mmol/L    POC SATURATED O2 97 95 - 100 %    POC TCO2 34 (H) 23 - 27 mmol/L    Sample ARTERIAL     Site RR     Allens Test Pass     DelSys CPAP/BiPAP     Mode CPAP     PEEP 17     FiO2 30     Spont Rate 28     Min Vol 9     Sp02 97    POCT glucose    Collection Time: 06/19/17  5:28 PM   Result Value Ref Range    POCT Glucose 234 (H) 70 - 110 mg/dL   POCT glucose    Collection Time: 06/19/17  9:36 PM   Result  Value Ref Range    POCT Glucose 212 (H) 70 - 110 mg/dL   POCT glucose    Collection Time: 06/20/17  1:20 AM   Result Value Ref Range    POCT Glucose 219 (H) 70 - 110 mg/dL   ISTAT PROCEDURE    Collection Time: 06/20/17  1:36 AM   Result Value Ref Range    POC PH 7.154 (LL) 7.35 - 7.45    POC PCO2 51.6 (H) 35 - 45 mmHg    POC PO2 126 (H) 80 - 100 mmHg    POC HCO3 18.1 (L) 24 - 28 mmol/L    POC BE -11 -2 to 2 mmol/L    POC SATURATED O2 98 95 - 100 %    POC TCO2 20 (L) 23 - 27 mmol/L    Rate 16     Sample ARTERIAL     Site LR     Allens Test Pass     DelSys CPAP/BiPAP     Mode BiPAP     FiO2 30     Spont Rate 18     Min Vol 29     IP 10     EP 5    CBC auto differential    Collection Time: 06/20/17  2:31 AM   Result Value Ref Range    WBC 19.43 (H) 3.90 - 12.70 K/uL    RBC 4.26 4.00 - 5.40 M/uL    Hemoglobin 11.1 (L) 12.0 - 16.0 g/dL    Hematocrit 35.3 (L) 37.0 - 48.5 %    MCV 83 82 - 98 fL    MCH 26.1 (L) 27.0 - 31.0 pg    MCHC 31.4 (L) 32.0 - 36.0 %    RDW 14.2 11.5 - 14.5 %    Platelets 314 150 - 350 K/uL    MPV 10.0 9.2 - 12.9 fL    Gran # 13.7 (H) 1.8 - 7.7 K/uL    Lymph # 4.2 1.0 - 4.8 K/uL    Mono # 1.5 (H) 0.3 - 1.0 K/uL    Eos # 0.1 0.0 - 0.5 K/uL    Baso # 0.02 0.00 - 0.20 K/uL    Gran% 70.3 38.0 - 73.0 %    Lymph% 21.7 18.0 - 48.0 %    Mono% 7.6 4.0 - 15.0 %    Eosinophil% 0.3 0.0 - 8.0 %    Basophil% 0.1 0.0 - 1.9 %    Differential Method Automated    Comprehensive Metabolic Panel (CMP)    Collection Time: 06/20/17  2:32 AM   Result Value Ref Range    Sodium 134 (L) 136 - 145 mmol/L    Potassium 3.5 3.5 - 5.1 mmol/L    Chloride 93 (L) 95 - 110 mmol/L    CO2 17 (L) 23 - 29 mmol/L    Glucose 217 (H) 70 - 110 mg/dL    BUN, Bld 22 8 - 23 mg/dL    Creatinine 2.4 (H) 0.5 - 1.4 mg/dL    Calcium 7.7 (L) 8.7 - 10.5 mg/dL    Total Protein 6.9 6.0 - 8.4 g/dL    Albumin 2.6 (L) 3.5 - 5.2 g/dL    Total Bilirubin 1.0 0.1 - 1.0 mg/dL    Alkaline Phosphatase 110 55 - 135 U/L    AST 8 (L) 10 - 40 U/L    ALT <5 (L) 10 - 44  U/L    Anion Gap 24 (H) 8 - 16 mmol/L    eGFR if African American 24 (A) >60 mL/min/1.73 m^2    eGFR if non African American 20 (A) >60 mL/min/1.73 m^2   Magnesium    Collection Time: 06/20/17  2:32 AM   Result Value Ref Range    Magnesium 1.2 (L) 1.6 - 2.6 mg/dL   Phosphorus    Collection Time: 06/20/17  2:32 AM   Result Value Ref Range    Phosphorus 6.0 (H) 2.7 - 4.5 mg/dL   Lactic acid, plasma    Collection Time: 06/20/17  2:32 AM   Result Value Ref Range    Lactate (Lactic Acid) 11.1 (HH) 0.5 - 2.2 mmol/L   ISTAT PROCEDURE    Collection Time: 06/20/17  2:46 AM   Result Value Ref Range    POC PH 7.351 7.35 - 7.45    POC PCO2 42.2 35 - 45 mmHg    POC PO2 69 (L) 80 - 100 mmHg    POC HCO3 23.3 (L) 24 - 28 mmol/L    POC BE -2 -2 to 2 mmol/L    POC SATURATED O2 93 (L) 95 - 100 %    POC TCO2 25 23 - 27 mmol/L    Rate 22     Sample ARTERIAL     Site RR     Allens Test Pass     DelSys Adult Vent     Mode AC/PRVC     Vt 450     PEEP 5     FiO2 40    POCT glucose    Collection Time: 06/20/17  5:58 AM   Result Value Ref Range    POCT Glucose 254 (H) 70 - 110 mg/dL     CBC:   Recent Labs  Lab 06/20/17 0231   WBC 19.43*   RBC 4.26   HGB 11.1*   HCT 35.3*      MCV 83   MCH 26.1*   MCHC 31.4*     BMP:   Recent Labs  Lab 06/20/17 0232   *   *   K 3.5   CL 93*   CO2 17*   BUN 22   CREATININE 2.4*   CALCIUM 7.7*   MG 1.2*     CMP:   Recent Labs  Lab 06/20/17 0232   *   CALCIUM 7.7*   ALBUMIN 2.6*   PROT 6.9   *   K 3.5   CO2 17*   CL 93*   BUN 22   CREATININE 2.4*   ALKPHOS 110   ALT <5*   AST 8*   BILITOT 1.0     LFTs:   Recent Labs  Lab 06/20/17 0232   ALT <5*   AST 8*   ALKPHOS 110   BILITOT 1.0   PROT 6.9   ALBUMIN 2.6*     Coagulation: No results for input(s): INR, APTT in the last 168 hours.    Invalid input(s): PT  Cardiac markers: No results for input(s): CKMB, TROPONINT, MYOGLOBIN in the last 168 hours.  Microbiology Results (last 7 days)     Procedure Component Value Units Date/Time     Urine culture [[id number]] Collected:  06/18/17 2120    Order Status:  Completed Specimen:  Urine from Urine, Catheterized Updated:  06/20/17 1023     Urine Culture, Routine No growth    Blood culture [[id number]] Collected:  06/18/17 2225    Order Status:  Completed Specimen:  Blood from Peripheral, Forearm, Right Updated:  06/20/17 0303     Blood Culture, Routine No Growth to date     Blood Culture, Routine No Growth to date    Blood culture [[id number]] Collected:  06/18/17 2240    Order Status:  Completed Specimen:  Blood from Peripheral, Forearm, Right Updated:  06/20/17 0303     Blood Culture, Routine No Growth to date     Blood Culture, Routine No Growth to date    Culture, Respiratory with Gram Stain [[id number]] Collected:  06/20/17 0251    Order Status:  Sent Specimen:  Respiratory from Sputum, Induced Updated:  06/20/17 0258        ABGs:   Recent Labs  Lab 06/20/17  0246   PH 7.351   PCO2 42.2   PO2 69*   HCO3 23.3*   POCSATURATED 93*   BE -2       Ventilator Setting:  Vent Mode: PRVC  Oxygen Concentration (%):  [] 100  Resp Rate Total:  [21 br/min-33 br/min] 27 br/min  Vt Set:  [450 mL] 450 mL  PEEP/CPAP:  [5 cmH20] 5 cmH20  Mean Airway Pressure:  [11.6 ppI92-68 cmH20] 12 cmH20     Diagnostic Results:   Imaging Results          X-Ray Chest 1 View (Final result)  Result time 06/20/17 03:39:15    Final result by Fred Cowan MD (06/20/17 03:39:15)                 Impression:      As above        Electronically signed by: FRED COWAN MD  Date:     06/20/17  Time:    03:39              Narrative:    Chest AP portable    Indication:Intubation    Comparison:6/18/2017    Findings: Endotracheal tube tip lies approximately 2.6 cm above the jasmin.  Nasogastric tube courses below the diaphragm, tip appears to project over the gastric lumen although is obscured by soft tissues. The cardiomediastinal silhouette is prominent, similar to the previous exam.  There there may be a left pleural effusion  versus soft tissue overlap.  The trachea is midline.  The lungs are symmetrically expanded bilaterally with patchy increased interstitial and parenchymal attenuation bilaterally, likely edema. No large focal consolidation seen.  There is no pneumothorax.  The osseous structures are remarkable for degenerative changes of the spine and shoulders.                             CT Head Without Contrast (Final result)  Result time 06/19/17 14:09:35    Final result by Lion Manjarrez DO (06/19/17 14:09:35)                 Impression:     No significant change from prior. Age-appropriate generalized cerebral volume loss with mild degree of patchy decreased attenuation supratentorial white matter suggestive for chronic microvascular ischemic change similarly seen on the prior.     No evidence for acute intracranial hemorrhage or new abnormal parenchymal attenuation. Clinical correlation and further evaluation as warranted.      Electronically signed by: LION MANJARREZ DO  Date:     06/19/17  Time:    14:09              Narrative:    CT brain without contrast.    Comparison: Earlier today 06/19/2017 at 645 AM    Technique: Multiple 5 mm axial images of the head were obtained without intravenous contrast.    Results: Generalized cerebral volume loss appropriate for age. There is continued mild degree of patchy decreased attenuation throughout the supratentorial white matter suggestive for chronic microvascular ischemic change.  No evidence for acute intracranial hemorrhage or sulcal effacement. No definite new abnormal parenchyma attenuation. Ventricles are stable in size and configuration without evidence for hydrocephalus. No midline shift or mass effect.  Partial opacification of the mastoid air cells bilaterally.  Visualized paranasal sinuses are clear.                             CT Head With Contrast (Final result)  Result time 06/19/17 07:14:10    Final result by Graciela Langley MD (06/19/17 07:14:10)                  Impression:      No CT evidence of acute intracranial abnormality as detailed above.  Clinical correlation and further evaluation as warranted.      Electronically signed by: HORTENSIA KWAN  Date:     06/19/17  Time:    07:14              Narrative:    TECHNIQUE: 5 mm axial CT images of the brain were obtained following the administration of 100 cc Omni 350 IV contrast.  Coronal and sagittal reformatted images reviewed.    COMPARISON: Head CT 11/25/2016.    FINDINGS:  There is no evidence of acute intracranial hemorrhage, hydrocephalus, midline shift or mass effect.  Hypoattenuation in the supratentorial and periventricular white matter is nonspecific but likely represent sequela of chronic microvascular ischemic change.  Grey-white matter differentiation is otherwise maintained.  Remote lacunar type infarcts versus prominent perivascular spaces in the basal ganglia bilaterally.  Remote left thalamic lacunar type infarct.  No evidence to suggest abnormal postcontrast enhancement noting limitation of CT technique and lack of precontrast images for comparison.  The paranasal sinuses and mastoid air cells are clear of acute process.  The osseous calvarium intact.                             X-Ray Chest 1 View (Final result)  Result time 06/18/17 20:32:12    Final result by Fred Coawn MD (06/18/17 20:32:12)                 Impression:      Overall, grossly stable chronic findings, possible mild congestive change centrally, no large focal consolidation.        Electronically signed by: FRED COWAN MD  Date:     06/18/17  Time:    20:32              Narrative:    Chest AP portable    Indication:Fever    Comparison:9/27/2016    Findings: Exam is limited by body habitus. The cardiomediastinal silhouette is mildly prominent, similar to the previous exam, magnified by technique.  There is no definite pleural effusion although the costophrenic angles are obscured, likely on the basis of soft tissue overlap.  The  trachea is midline.  The lungs are symmetrically expanded bilaterally with mildly prominent central hilar interstitial attenuation, could reflect mild congestive change. No large focal consolidation seen.  There is no pneumothorax.  The osseous structures are remarkable for degenerative changes of the spine and shoulders.                                  ASSESSMENT/PLAN:     1. Sepsis, due to unspecified organism    2. Fever    3. Elevated troponin    4. Essential hypertension    5.      Acute respiratory failure  6.      Vent management      Plan:unfortunate woman admitted with UTI ; seizures  on borges ; pt transferred to ICU and Intubated ; history of untreated TAMMY though she has cpap at home . Will follow ; Critical care 35 min   20

## (undated) DEVICE — TRAY MINOR GEN SURG

## (undated) DEVICE — NDL N SERIES MICRO-DISSECTION

## (undated) DEVICE — SEE MEDLINE ITEM 152487

## (undated) DEVICE — SPONGE IV DRAIN 4X4 STERILE

## (undated) DEVICE — ELECTRODE REM PLYHSV RETURN 9

## (undated) DEVICE — DRAPE THYROID WITH ARMBOARD

## (undated) DEVICE — GAUZE SPONGE 4X4 12PLY

## (undated) DEVICE — DRESSING GAUZE 6PLY 4X4

## (undated) DEVICE — SUT PROLENE 2-0 30 SH

## (undated) DEVICE — SUT PROLENE 0-36 V-7

## (undated) DEVICE — SEE L#95700

## (undated) DEVICE — SEE MEDLINE ITEM 146313

## (undated) DEVICE — SEE MEDLINE ITEM 152622

## (undated) DEVICE — KIT PEG PULL STANDARD 20F

## (undated) DEVICE — SEE MEDLINE ITEM 146420

## (undated) DEVICE — SEE MEDLINE ITEM 146417

## (undated) DEVICE — CHLORAPREP 10.5 ML APPLICATOR

## (undated) DEVICE — GOWN SURGICAL X-LARGE

## (undated) DEVICE — SUT SILK 2-0 SH 18IN BLACK

## (undated) DEVICE — SEE MEDLINE ITEM 157117

## (undated) DEVICE — LUBRICANT SURGILUBE 2 OZ

## (undated) DEVICE — SUT 3-0 12-18IN SILK

## (undated) DEVICE — BLADE SURG CARBON STEEL SZ11